# Patient Record
Sex: FEMALE | Race: WHITE | NOT HISPANIC OR LATINO | Employment: OTHER | URBAN - METROPOLITAN AREA
[De-identification: names, ages, dates, MRNs, and addresses within clinical notes are randomized per-mention and may not be internally consistent; named-entity substitution may affect disease eponyms.]

---

## 2019-04-13 ENCOUNTER — HOSPITAL ENCOUNTER (EMERGENCY)
Facility: HOSPITAL | Age: 74
Discharge: HOME | End: 2019-04-13
Attending: EMERGENCY MEDICINE
Payer: MEDICARE

## 2019-04-13 VITALS
HEIGHT: 65 IN | WEIGHT: 180 LBS | RESPIRATION RATE: 19 BRPM | DIASTOLIC BLOOD PRESSURE: 82 MMHG | SYSTOLIC BLOOD PRESSURE: 157 MMHG | BODY MASS INDEX: 29.99 KG/M2 | TEMPERATURE: 98 F | HEART RATE: 78 BPM | OXYGEN SATURATION: 98 %
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
## 2019-04-13 DIAGNOSIS — S20.211A CHEST WALL CONTUSION, RIGHT, INITIAL ENCOUNTER: Primary | ICD-10-CM

## 2019-04-13 DIAGNOSIS — T14.8XXA MUSCLE STRAIN: ICD-10-CM

## 2019-04-13 PROCEDURE — 99284 EMERGENCY DEPT VISIT MOD MDM: CPT | Mod: ER

## 2019-04-13 RX ORDER — DIAZEPAM 5 MG/1
5 TABLET ORAL EVERY 6 HOURS PRN
Qty: 10 TABLET | Refills: 0 | Status: SHIPPED | OUTPATIENT
Start: 2019-04-13 | End: 2019-06-04

## 2019-04-13 RX ORDER — NAPROXEN 500 MG/1
500 TABLET ORAL 2 TIMES DAILY WITH MEALS
Qty: 60 TABLET | Refills: 0 | Status: SHIPPED | OUTPATIENT
Start: 2019-04-13 | End: 2019-06-04

## 2019-04-13 NOTE — ED PROVIDER NOTES
"Encounter Date: 4/13/2019       History     Chief Complaint   Patient presents with    Fall     fall while getting in car yesterday, pain to back, and "everywhere"     Patient presents to ER with pain to right posterior ribs after she fell while trying to get into her car yesterday.  Patient states she fell because her foot got stuck between the grass in the pavement while she was getting an and fell back into the open door, injuring her right posterior ribs.  Patient states that hurts when she takes a deep breath. Patient denies any head injury or loss of consciousness.  Ambulated into ER.        Review of patient's allergies indicates:  No Known Allergies  Past Medical History:   Diagnosis Date    Cataract     Glaucoma     Heart valve problem      Past Surgical History:   Procedure Laterality Date    ANKLE SURGERY      lipoma removal       Family History   Problem Relation Age of Onset    Cancer Mother     Cataracts Sister     Amblyopia Neg Hx     Blindness Neg Hx     Diabetes Neg Hx     Glaucoma Neg Hx     Hypertension Neg Hx     Macular degeneration Neg Hx     Retinal detachment Neg Hx     Strabismus Neg Hx     Stroke Neg Hx     Thyroid disease Neg Hx     Breast cancer Neg Hx     Colon cancer Neg Hx     Ovarian cancer Neg Hx      Social History     Tobacco Use    Smoking status: Never Smoker   Substance Use Topics    Alcohol use: No    Drug use: No     Review of Systems   Musculoskeletal: Positive for back pain.        Right posterior rib pain that is worse with deep breath   All other systems reviewed and are negative.      Physical Exam     Initial Vitals [04/13/19 1042]   BP Pulse Resp Temp SpO2   (!) 157/82 78 19 97.6 °F (36.4 °C) 98 %      MAP       --         Physical Exam    Nursing note and vitals reviewed.  Constitutional: Vital signs are normal. She appears well-developed and well-nourished. She is not diaphoretic. She is cooperative.   HENT:   Head: Normocephalic and " atraumatic.   Right Ear: External ear normal.   Left Ear: External ear normal.   Nose: Nose normal.   Mouth/Throat: Oropharynx is clear and moist.   Eyes: Conjunctivae, EOM and lids are normal. Pupils are equal, round, and reactive to light. Right eye exhibits no discharge. No scleral icterus.   Neck: Trachea normal, normal range of motion and full passive range of motion without pain. Neck supple. No thyromegaly present. No tracheal deviation and normal range of motion present. No neck rigidity. No Brudzinski's sign noted. No JVD present.   Cardiovascular: Normal rate, regular rhythm, normal heart sounds, intact distal pulses and normal pulses. Exam reveals no gallop and no friction rub.    No murmur heard.  Pulmonary/Chest: Effort normal and breath sounds normal. No stridor. No tachypnea. No respiratory distress. She has no wheezes. She has no rhonchi. She has no rales. She exhibits no tenderness.   Abdominal: Soft. Normal appearance and bowel sounds are normal. She exhibits no distension and no mass. There is no tenderness. There is no rebound and no guarding.   Musculoskeletal: Normal range of motion. She exhibits tenderness. She exhibits no edema.   Right posterior rib pain that is worse with palpation and deep breathing   Lymphadenopathy:     She has no cervical adenopathy.     She has no axillary adenopathy.   Neurological: She is alert and oriented to person, place, and time. She has normal strength and normal reflexes.   Skin: Skin is warm, dry and intact. Capillary refill takes less than 2 seconds. No rash noted. No erythema.   Psychiatric: She has a normal mood and affect. Her speech is normal and behavior is normal. Judgment and thought content normal. Cognition and memory are normal.         ED Course   Procedures  Labs Reviewed - No data to display       Imaging Results    None          Medical Decision Making:   Differential Diagnosis:   Rib contusion, chest wall contusion, back pain, pneumothorax                       Clinical Impression:       ICD-10-CM ICD-9-CM   1. Chest wall contusion, right, initial encounter S20.211A 922.1   2. Fall against sharp object, subsequent encounter W01.119D V58.89     E888.0   3. Muscle strain T14.8XXA 848.9                                Ary Trujillo, [facility]  04/13/19 1214

## 2020-01-01 ENCOUNTER — ANESTHESIA (OUTPATIENT)
Dept: INTENSIVE CARE | Facility: HOSPITAL | Age: 75
DRG: 219 | End: 2020-01-01
Payer: MEDICARE

## 2020-01-01 ENCOUNTER — ANESTHESIA (OUTPATIENT)
Dept: SURGERY | Facility: HOSPITAL | Age: 75
DRG: 219 | End: 2020-01-01
Payer: MEDICARE

## 2020-01-01 ENCOUNTER — ANESTHESIA EVENT (OUTPATIENT)
Dept: CARDIOLOGY | Facility: HOSPITAL | Age: 75
DRG: 189 | End: 2020-01-01
Payer: MEDICARE

## 2020-01-01 ENCOUNTER — OFFICE VISIT (OUTPATIENT)
Dept: URGENT CARE | Facility: CLINIC | Age: 75
End: 2020-01-01
Payer: MEDICARE

## 2020-01-01 ENCOUNTER — ANESTHESIA EVENT (OUTPATIENT)
Dept: SURGERY | Facility: HOSPITAL | Age: 75
DRG: 219 | End: 2020-01-01
Payer: MEDICARE

## 2020-01-01 ENCOUNTER — HOSPITAL ENCOUNTER (INPATIENT)
Facility: HOSPITAL | Age: 75
LOS: 67 days | DRG: 219 | End: 2020-11-05
Attending: EMERGENCY MEDICINE | Admitting: HOSPITALIST
Payer: MEDICARE

## 2020-01-01 ENCOUNTER — TELEPHONE (OUTPATIENT)
Dept: OTOLARYNGOLOGY | Facility: CLINIC | Age: 75
End: 2020-01-01

## 2020-01-01 ENCOUNTER — RESEARCH ENCOUNTER (OUTPATIENT)
Dept: RESEARCH | Facility: HOSPITAL | Age: 75
End: 2020-01-01

## 2020-01-01 ENCOUNTER — HOSPITAL ENCOUNTER (INPATIENT)
Facility: HOSPITAL | Age: 75
LOS: 4 days | Discharge: HOME | DRG: 189 | End: 2020-08-27
Attending: EMERGENCY MEDICINE | Admitting: INTERNAL MEDICINE
Payer: MEDICARE

## 2020-01-01 ENCOUNTER — TELEPHONE (OUTPATIENT)
Dept: FAMILY MEDICINE | Facility: CLINIC | Age: 75
End: 2020-01-01

## 2020-01-01 ENCOUNTER — PATIENT OUTREACH (OUTPATIENT)
Dept: ADMINISTRATIVE | Facility: CLINIC | Age: 75
End: 2020-01-01

## 2020-01-01 ENCOUNTER — ANESTHESIA EVENT (OUTPATIENT)
Dept: INTENSIVE CARE | Facility: HOSPITAL | Age: 75
DRG: 219 | End: 2020-01-01
Payer: MEDICARE

## 2020-01-01 ENCOUNTER — NURSE TRIAGE (OUTPATIENT)
Dept: ADMINISTRATIVE | Facility: CLINIC | Age: 75
End: 2020-01-01

## 2020-01-01 ENCOUNTER — OFFICE VISIT (OUTPATIENT)
Dept: OPTOMETRY | Facility: CLINIC | Age: 75
End: 2020-01-01
Payer: MEDICARE

## 2020-01-01 ENCOUNTER — ANESTHESIA (OUTPATIENT)
Dept: CARDIOLOGY | Facility: HOSPITAL | Age: 75
DRG: 189 | End: 2020-01-01
Payer: MEDICARE

## 2020-01-01 VITALS
WEIGHT: 155.19 LBS | TEMPERATURE: 98 F | HEIGHT: 65 IN | BODY MASS INDEX: 25.85 KG/M2 | SYSTOLIC BLOOD PRESSURE: 103 MMHG | OXYGEN SATURATION: 52 % | DIASTOLIC BLOOD PRESSURE: 51 MMHG

## 2020-01-01 VITALS
DIASTOLIC BLOOD PRESSURE: 65 MMHG | SYSTOLIC BLOOD PRESSURE: 117 MMHG | WEIGHT: 177.94 LBS | HEART RATE: 75 BPM | HEIGHT: 65 IN | TEMPERATURE: 98 F | RESPIRATION RATE: 16 BRPM | OXYGEN SATURATION: 95 % | BODY MASS INDEX: 29.65 KG/M2

## 2020-01-01 VITALS
TEMPERATURE: 101 F | RESPIRATION RATE: 16 BRPM | HEIGHT: 65 IN | BODY MASS INDEX: 31.16 KG/M2 | WEIGHT: 187 LBS | OXYGEN SATURATION: 97 % | SYSTOLIC BLOOD PRESSURE: 129 MMHG | DIASTOLIC BLOOD PRESSURE: 84 MMHG | HEART RATE: 117 BPM

## 2020-01-01 DIAGNOSIS — J96.01 ACUTE RESPIRATORY FAILURE WITH HYPOXIA: ICD-10-CM

## 2020-01-01 DIAGNOSIS — R06.2 WHEEZING: ICD-10-CM

## 2020-01-01 DIAGNOSIS — I48.91 A-FIB: ICD-10-CM

## 2020-01-01 DIAGNOSIS — R09.02 HYPOXIA: ICD-10-CM

## 2020-01-01 DIAGNOSIS — Z98.890 S/P MVR (MITRAL VALVE REPAIR): Primary | ICD-10-CM

## 2020-01-01 DIAGNOSIS — K11.7 INCREASED OROPHARYNGEAL SECRETIONS: ICD-10-CM

## 2020-01-01 DIAGNOSIS — I50.9 CONGESTIVE HEART FAILURE, UNSPECIFIED HF CHRONICITY, UNSPECIFIED HEART FAILURE TYPE: ICD-10-CM

## 2020-01-01 DIAGNOSIS — Z03.818 ENCOUNTER FOR OBSERVATION FOR SUSPECTED EXPOSURE TO OTHER BIOLOGICAL AGENTS RULED OUT: ICD-10-CM

## 2020-01-01 DIAGNOSIS — N17.9 AKI (ACUTE KIDNEY INJURY): ICD-10-CM

## 2020-01-01 DIAGNOSIS — R52 PAIN: ICD-10-CM

## 2020-01-01 DIAGNOSIS — R07.9 CHEST PAIN: ICD-10-CM

## 2020-01-01 DIAGNOSIS — E87.0 HYPERNATREMIA: ICD-10-CM

## 2020-01-01 DIAGNOSIS — I95.9 HYPOTENSION: ICD-10-CM

## 2020-01-01 DIAGNOSIS — I31.4 PERICARDIAL EFFUSION WITH CARDIAC TAMPONADE: ICD-10-CM

## 2020-01-01 DIAGNOSIS — R00.0 TACHYCARDIA: ICD-10-CM

## 2020-01-01 DIAGNOSIS — Z71.89 ADVANCED CARE PLANNING/COUNSELING DISCUSSION: ICD-10-CM

## 2020-01-01 DIAGNOSIS — I48.91 ATRIAL FIBRILLATION WITH RVR: ICD-10-CM

## 2020-01-01 DIAGNOSIS — R00.1 BRADYCARDIA: ICD-10-CM

## 2020-01-01 DIAGNOSIS — H25.13 NUCLEAR SCLEROSIS OF BOTH EYES: Primary | ICD-10-CM

## 2020-01-01 DIAGNOSIS — R06.02 SOB (SHORTNESS OF BREATH): ICD-10-CM

## 2020-01-01 DIAGNOSIS — G93.41 ENCEPHALOPATHY, METABOLIC: ICD-10-CM

## 2020-01-01 DIAGNOSIS — R06.02 SHORTNESS OF BREATH: ICD-10-CM

## 2020-01-01 DIAGNOSIS — Z51.5 PALLIATIVE CARE ENCOUNTER: ICD-10-CM

## 2020-01-01 DIAGNOSIS — D72.829 LEUKOCYTOSIS, UNSPECIFIED TYPE: ICD-10-CM

## 2020-01-01 DIAGNOSIS — I51.3 LEFT ATRIAL THROMBUS: ICD-10-CM

## 2020-01-01 DIAGNOSIS — J38.01 VOCAL FOLD PARESIS, RIGHT: ICD-10-CM

## 2020-01-01 DIAGNOSIS — I34.0 MITRAL VALVE INSUFFICIENCY, UNSPECIFIED ETIOLOGY: ICD-10-CM

## 2020-01-01 DIAGNOSIS — J96.01 ACUTE RESPIRATORY FAILURE WITH HYPOXIA: Primary | ICD-10-CM

## 2020-01-01 DIAGNOSIS — I48.0 PAROXYSMAL ATRIAL FIBRILLATION: ICD-10-CM

## 2020-01-01 DIAGNOSIS — I50.9 CHF (CONGESTIVE HEART FAILURE): ICD-10-CM

## 2020-01-01 DIAGNOSIS — R50.9 FEVER, UNSPECIFIED FEVER CAUSE: ICD-10-CM

## 2020-01-01 DIAGNOSIS — Z79.899 ON COMBINATION ANTIPSYCHOTIC DRUG THERAPY: ICD-10-CM

## 2020-01-01 DIAGNOSIS — F43.23 ADJUSTMENT DISORDER WITH MIXED ANXIETY AND DEPRESSED MOOD: ICD-10-CM

## 2020-01-01 DIAGNOSIS — Z71.89 GOALS OF CARE, COUNSELING/DISCUSSION: ICD-10-CM

## 2020-01-01 DIAGNOSIS — I50.9 HEART FAILURE: ICD-10-CM

## 2020-01-01 DIAGNOSIS — R06.03 RESPIRATORY DISTRESS: ICD-10-CM

## 2020-01-01 DIAGNOSIS — Z79.899 LONG TERM CURRENT USE OF ANTIPSYCHOTIC MEDICATION: ICD-10-CM

## 2020-01-01 DIAGNOSIS — Z98.890 S/P TVR (TRICUSPID VALVE REPAIR): ICD-10-CM

## 2020-01-01 DIAGNOSIS — I34.0 SEVERE MITRAL REGURGITATION: ICD-10-CM

## 2020-01-01 DIAGNOSIS — J90 PLEURAL EFFUSION: ICD-10-CM

## 2020-01-01 DIAGNOSIS — R06.00 DYSPNEA, UNSPECIFIED TYPE: ICD-10-CM

## 2020-01-01 DIAGNOSIS — Z98.890 S/P PERICARDIAL WINDOW CREATION: ICD-10-CM

## 2020-01-01 DIAGNOSIS — J20.9 ACUTE PURULENT BRONCHITIS: Primary | ICD-10-CM

## 2020-01-01 DIAGNOSIS — F41.9 ANXIETY: ICD-10-CM

## 2020-01-01 DIAGNOSIS — R53.81 DEBILITY: ICD-10-CM

## 2020-01-01 DIAGNOSIS — H52.7 REFRACTIVE ERROR: ICD-10-CM

## 2020-01-01 DIAGNOSIS — I50.33 ACUTE ON CHRONIC DIASTOLIC (CONGESTIVE) HEART FAILURE: ICD-10-CM

## 2020-01-01 DIAGNOSIS — D72.829 LEUKOCYTOSIS: ICD-10-CM

## 2020-01-01 DIAGNOSIS — I31.39 PERICARDIAL EFFUSION WITH CARDIAC TAMPONADE: ICD-10-CM

## 2020-01-01 DIAGNOSIS — E87.6 HYPOKALEMIA: ICD-10-CM

## 2020-01-01 LAB
ABO + RH BLD: NORMAL
ALBUMIN SERPL BCP-MCNC: 1.2 G/DL (ref 3.5–5.2)
ALBUMIN SERPL BCP-MCNC: 1.3 G/DL (ref 3.5–5.2)
ALBUMIN SERPL BCP-MCNC: 1.4 G/DL (ref 3.5–5.2)
ALBUMIN SERPL BCP-MCNC: 1.5 G/DL (ref 3.5–5.2)
ALBUMIN SERPL BCP-MCNC: 1.6 G/DL (ref 3.5–5.2)
ALBUMIN SERPL BCP-MCNC: 1.7 G/DL (ref 3.5–5.2)
ALBUMIN SERPL BCP-MCNC: 1.8 G/DL (ref 3.5–5.2)
ALBUMIN SERPL BCP-MCNC: 1.9 G/DL (ref 3.5–5.2)
ALBUMIN SERPL BCP-MCNC: 2 G/DL (ref 3.5–5.2)
ALBUMIN SERPL BCP-MCNC: 2.1 G/DL (ref 3.5–5.2)
ALBUMIN SERPL BCP-MCNC: 2.1 G/DL (ref 3.5–5.2)
ALBUMIN SERPL BCP-MCNC: 2.2 G/DL (ref 3.5–5.2)
ALBUMIN SERPL BCP-MCNC: 2.2 G/DL (ref 3.5–5.2)
ALBUMIN SERPL BCP-MCNC: 2.3 G/DL (ref 3.5–5.2)
ALBUMIN SERPL BCP-MCNC: 2.4 G/DL (ref 3.5–5.2)
ALBUMIN SERPL BCP-MCNC: 2.5 G/DL (ref 3.5–5.2)
ALBUMIN SERPL BCP-MCNC: 2.6 G/DL (ref 3.5–5.2)
ALBUMIN SERPL BCP-MCNC: 2.7 G/DL (ref 3.5–5.2)
ALBUMIN SERPL BCP-MCNC: 2.8 G/DL (ref 3.5–5.2)
ALBUMIN SERPL BCP-MCNC: 2.9 G/DL (ref 3.5–5.2)
ALBUMIN SERPL BCP-MCNC: 3 G/DL (ref 3.5–5.2)
ALBUMIN SERPL BCP-MCNC: 3 G/DL (ref 3.5–5.2)
ALBUMIN SERPL BCP-MCNC: 3.1 G/DL (ref 3.5–5.2)
ALBUMIN SERPL BCP-MCNC: 3.2 G/DL (ref 3.5–5.2)
ALBUMIN SERPL BCP-MCNC: 3.3 G/DL (ref 3.5–5.2)
ALBUMIN SERPL BCP-MCNC: 3.3 G/DL (ref 3.5–5.2)
ALBUMIN SERPL BCP-MCNC: 3.5 G/DL (ref 3.5–5.2)
ALBUMIN SERPL BCP-MCNC: 3.6 G/DL (ref 3.5–5.2)
ALBUMIN SERPL BCP-MCNC: 3.7 G/DL (ref 3.5–5.2)
ALBUMIN SERPL BCP-MCNC: 3.8 G/DL (ref 3.5–5.2)
ALBUMIN SERPL BCP-MCNC: 4 G/DL (ref 3.5–5.2)
ALBUMIN SERPL-MCNC: 4.2 G/DL (ref 3.3–5.5)
ALBUMIN/CREAT UR: 1033.3 UG/MG (ref 0–30)
ALLENS TEST: ABNORMAL
ALLENS TEST: NORMAL
ALP SERPL-CCNC: 110 U/L (ref 55–135)
ALP SERPL-CCNC: 127 U/L (ref 55–135)
ALP SERPL-CCNC: 141 U/L (ref 55–135)
ALP SERPL-CCNC: 142 U/L (ref 55–135)
ALP SERPL-CCNC: 156 U/L (ref 55–135)
ALP SERPL-CCNC: 160 U/L (ref 55–135)
ALP SERPL-CCNC: 163 U/L (ref 55–135)
ALP SERPL-CCNC: 168 U/L (ref 55–135)
ALP SERPL-CCNC: 180 U/L (ref 55–135)
ALP SERPL-CCNC: 181 U/L (ref 55–135)
ALP SERPL-CCNC: 181 U/L (ref 55–135)
ALP SERPL-CCNC: 182 U/L (ref 55–135)
ALP SERPL-CCNC: 183 U/L (ref 55–135)
ALP SERPL-CCNC: 185 U/L (ref 55–135)
ALP SERPL-CCNC: 188 U/L (ref 55–135)
ALP SERPL-CCNC: 193 U/L (ref 55–135)
ALP SERPL-CCNC: 194 U/L (ref 55–135)
ALP SERPL-CCNC: 200 U/L (ref 55–135)
ALP SERPL-CCNC: 200 U/L (ref 55–135)
ALP SERPL-CCNC: 202 U/L (ref 55–135)
ALP SERPL-CCNC: 203 U/L (ref 55–135)
ALP SERPL-CCNC: 204 U/L (ref 55–135)
ALP SERPL-CCNC: 204 U/L (ref 55–135)
ALP SERPL-CCNC: 214 U/L (ref 55–135)
ALP SERPL-CCNC: 215 U/L (ref 55–135)
ALP SERPL-CCNC: 218 U/L (ref 55–135)
ALP SERPL-CCNC: 226 U/L (ref 55–135)
ALP SERPL-CCNC: 231 U/L (ref 55–135)
ALP SERPL-CCNC: 235 U/L (ref 55–135)
ALP SERPL-CCNC: 237 U/L (ref 55–135)
ALP SERPL-CCNC: 238 U/L (ref 55–135)
ALP SERPL-CCNC: 252 U/L (ref 55–135)
ALP SERPL-CCNC: 253 U/L (ref 55–135)
ALP SERPL-CCNC: 254 U/L (ref 55–135)
ALP SERPL-CCNC: 263 U/L (ref 55–135)
ALP SERPL-CCNC: 272 U/L (ref 55–135)
ALP SERPL-CCNC: 276 U/L (ref 55–135)
ALP SERPL-CCNC: 28 U/L (ref 55–135)
ALP SERPL-CCNC: 280 U/L (ref 55–135)
ALP SERPL-CCNC: 281 U/L (ref 55–135)
ALP SERPL-CCNC: 282 U/L (ref 55–135)
ALP SERPL-CCNC: 285 U/L (ref 55–135)
ALP SERPL-CCNC: 29 U/L (ref 55–135)
ALP SERPL-CCNC: 294 U/L (ref 55–135)
ALP SERPL-CCNC: 30 U/L (ref 55–135)
ALP SERPL-CCNC: 30 U/L (ref 55–135)
ALP SERPL-CCNC: 307 U/L (ref 55–135)
ALP SERPL-CCNC: 309 U/L (ref 55–135)
ALP SERPL-CCNC: 315 U/L (ref 55–135)
ALP SERPL-CCNC: 316 U/L (ref 55–135)
ALP SERPL-CCNC: 318 U/L (ref 55–135)
ALP SERPL-CCNC: 32 U/L (ref 55–135)
ALP SERPL-CCNC: 321 U/L (ref 55–135)
ALP SERPL-CCNC: 325 U/L (ref 55–135)
ALP SERPL-CCNC: 327 U/L (ref 55–135)
ALP SERPL-CCNC: 327 U/L (ref 55–135)
ALP SERPL-CCNC: 335 U/L (ref 55–135)
ALP SERPL-CCNC: 34 U/L (ref 55–135)
ALP SERPL-CCNC: 343 U/L (ref 55–135)
ALP SERPL-CCNC: 358 U/L (ref 55–135)
ALP SERPL-CCNC: 37 U/L (ref 55–135)
ALP SERPL-CCNC: 375 U/L (ref 55–135)
ALP SERPL-CCNC: 380 U/L (ref 55–135)
ALP SERPL-CCNC: 388 U/L (ref 55–135)
ALP SERPL-CCNC: 41 U/L (ref 55–135)
ALP SERPL-CCNC: 50 U/L (ref 55–135)
ALP SERPL-CCNC: 53 U/L (ref 55–135)
ALP SERPL-CCNC: 62 U/L (ref 55–135)
ALP SERPL-CCNC: 65 U/L (ref 55–135)
ALP SERPL-CCNC: 69 U/L (ref 42–141)
ALP SERPL-CCNC: 71 U/L (ref 55–135)
ALP SERPL-CCNC: 72 U/L (ref 55–135)
ALP SERPL-CCNC: 72 U/L (ref 55–135)
ALP SERPL-CCNC: 73 U/L (ref 55–135)
ALP SERPL-CCNC: 73 U/L (ref 55–135)
ALP SERPL-CCNC: 74 U/L (ref 55–135)
ALP SERPL-CCNC: 74 U/L (ref 55–135)
ALP SERPL-CCNC: 79 U/L (ref 55–135)
ALP SERPL-CCNC: 80 U/L (ref 55–135)
ALP SERPL-CCNC: 80 U/L (ref 55–135)
ALP SERPL-CCNC: 81 U/L (ref 55–135)
ALP SERPL-CCNC: 88 U/L (ref 55–135)
ALP SERPL-CCNC: 93 U/L (ref 55–135)
ALP SERPL-CCNC: 98 U/L (ref 55–135)
ALT SERPL W/O P-5'-P-CCNC: 101 U/L (ref 10–44)
ALT SERPL W/O P-5'-P-CCNC: 117 U/L (ref 10–44)
ALT SERPL W/O P-5'-P-CCNC: 121 U/L (ref 10–44)
ALT SERPL W/O P-5'-P-CCNC: 15 U/L (ref 10–44)
ALT SERPL W/O P-5'-P-CCNC: 16 U/L (ref 10–44)
ALT SERPL W/O P-5'-P-CCNC: 169 U/L (ref 10–44)
ALT SERPL W/O P-5'-P-CCNC: 17 U/L (ref 10–44)
ALT SERPL W/O P-5'-P-CCNC: 17 U/L (ref 10–44)
ALT SERPL W/O P-5'-P-CCNC: 181 U/L (ref 10–44)
ALT SERPL W/O P-5'-P-CCNC: 189 U/L (ref 10–44)
ALT SERPL W/O P-5'-P-CCNC: 19 U/L (ref 10–44)
ALT SERPL W/O P-5'-P-CCNC: 20 U/L (ref 10–44)
ALT SERPL W/O P-5'-P-CCNC: 203 U/L (ref 10–44)
ALT SERPL W/O P-5'-P-CCNC: 21 U/L (ref 10–44)
ALT SERPL W/O P-5'-P-CCNC: 21 U/L (ref 10–44)
ALT SERPL W/O P-5'-P-CCNC: 22 U/L (ref 10–44)
ALT SERPL W/O P-5'-P-CCNC: 24 U/L (ref 10–44)
ALT SERPL W/O P-5'-P-CCNC: 24 U/L (ref 10–44)
ALT SERPL W/O P-5'-P-CCNC: 27 U/L (ref 10–44)
ALT SERPL W/O P-5'-P-CCNC: 27 U/L (ref 10–44)
ALT SERPL W/O P-5'-P-CCNC: 29 U/L (ref 10–44)
ALT SERPL W/O P-5'-P-CCNC: 30 U/L (ref 10–44)
ALT SERPL W/O P-5'-P-CCNC: 32 U/L (ref 10–44)
ALT SERPL W/O P-5'-P-CCNC: 32 U/L (ref 10–44)
ALT SERPL W/O P-5'-P-CCNC: 35 U/L (ref 10–44)
ALT SERPL W/O P-5'-P-CCNC: 35 U/L (ref 10–44)
ALT SERPL W/O P-5'-P-CCNC: 36 U/L (ref 10–44)
ALT SERPL W/O P-5'-P-CCNC: 36 U/L (ref 10–44)
ALT SERPL W/O P-5'-P-CCNC: 37 U/L (ref 10–44)
ALT SERPL W/O P-5'-P-CCNC: 38 U/L (ref 10–44)
ALT SERPL W/O P-5'-P-CCNC: 39 U/L (ref 10–44)
ALT SERPL W/O P-5'-P-CCNC: 40 U/L (ref 10–44)
ALT SERPL W/O P-5'-P-CCNC: 41 U/L (ref 10–44)
ALT SERPL W/O P-5'-P-CCNC: 43 U/L (ref 10–44)
ALT SERPL W/O P-5'-P-CCNC: 44 U/L (ref 10–44)
ALT SERPL W/O P-5'-P-CCNC: 45 U/L (ref 10–44)
ALT SERPL W/O P-5'-P-CCNC: 46 U/L (ref 10–44)
ALT SERPL W/O P-5'-P-CCNC: 47 U/L (ref 10–44)
ALT SERPL W/O P-5'-P-CCNC: 48 U/L (ref 10–44)
ALT SERPL W/O P-5'-P-CCNC: 49 U/L (ref 10–44)
ALT SERPL W/O P-5'-P-CCNC: 58 U/L (ref 10–44)
ALT SERPL W/O P-5'-P-CCNC: 58 U/L (ref 10–44)
ALT SERPL W/O P-5'-P-CCNC: 61 U/L (ref 10–44)
ALT SERPL W/O P-5'-P-CCNC: 62 U/L (ref 10–44)
ALT SERPL W/O P-5'-P-CCNC: 63 U/L (ref 10–44)
ALT SERPL W/O P-5'-P-CCNC: 66 U/L (ref 10–44)
ALT SERPL W/O P-5'-P-CCNC: 66 U/L (ref 10–44)
ALT SERPL W/O P-5'-P-CCNC: 72 U/L (ref 10–44)
ALT SERPL W/O P-5'-P-CCNC: 72 U/L (ref 10–44)
ALT SERPL W/O P-5'-P-CCNC: 77 U/L (ref 10–44)
ALT SERPL W/O P-5'-P-CCNC: 79 U/L (ref 10–44)
ALT SERPL W/O P-5'-P-CCNC: 87 U/L (ref 10–44)
ALT SERPL W/O P-5'-P-CCNC: 88 U/L (ref 10–44)
ALT SERPL W/O P-5'-P-CCNC: 95 U/L (ref 10–44)
ANION GAP SERPL CALC-SCNC: 10 MMOL/L (ref 8–16)
ANION GAP SERPL CALC-SCNC: 11 MMOL/L (ref 8–16)
ANION GAP SERPL CALC-SCNC: 12 MMOL/L (ref 8–16)
ANION GAP SERPL CALC-SCNC: 13 MMOL/L (ref 8–16)
ANION GAP SERPL CALC-SCNC: 14 MMOL/L (ref 8–16)
ANION GAP SERPL CALC-SCNC: 15 MMOL/L (ref 8–16)
ANION GAP SERPL CALC-SCNC: 16 MMOL/L (ref 8–16)
ANION GAP SERPL CALC-SCNC: 17 MMOL/L (ref 8–16)
ANION GAP SERPL CALC-SCNC: 19 MMOL/L (ref 8–16)
ANION GAP SERPL CALC-SCNC: 19 MMOL/L (ref 8–16)
ANION GAP SERPL CALC-SCNC: 5 MMOL/L (ref 8–16)
ANION GAP SERPL CALC-SCNC: 6 MMOL/L (ref 8–16)
ANION GAP SERPL CALC-SCNC: 7 MMOL/L (ref 8–16)
ANION GAP SERPL CALC-SCNC: 8 MMOL/L (ref 8–16)
ANION GAP SERPL CALC-SCNC: 9 MMOL/L (ref 8–16)
ANISOCYTOSIS BLD QL SMEAR: SLIGHT
AORTIC ROOT ANNULUS: 2.5 CM
AORTIC VALVE CUSP SEPERATION: 1.87 CM
APTT BLDCRRT: 100.1 SEC (ref 21–32)
APTT BLDCRRT: 24.2 SEC (ref 21–32)
APTT BLDCRRT: 26 SEC (ref 21–32)
APTT BLDCRRT: 26.7 SEC (ref 21–32)
APTT BLDCRRT: 27 SEC (ref 21–32)
APTT BLDCRRT: 29.2 SEC (ref 21–32)
APTT BLDCRRT: 29.3 SEC (ref 21–32)
APTT BLDCRRT: 29.3 SEC (ref 21–32)
APTT BLDCRRT: 29.4 SEC (ref 21–32)
APTT BLDCRRT: 29.6 SEC (ref 21–32)
APTT BLDCRRT: 29.9 SEC (ref 21–32)
APTT BLDCRRT: 30.2 SEC (ref 21–32)
APTT BLDCRRT: 30.2 SEC (ref 21–32)
APTT BLDCRRT: 30.4 SEC (ref 21–32)
APTT BLDCRRT: 30.5 SEC (ref 21–32)
APTT BLDCRRT: 31 SEC (ref 21–32)
APTT BLDCRRT: 31.3 SEC (ref 21–32)
APTT BLDCRRT: 31.4 SEC (ref 21–32)
APTT BLDCRRT: 31.7 SEC (ref 21–32)
APTT BLDCRRT: 31.8 SEC (ref 21–32)
APTT BLDCRRT: 31.9 SEC (ref 21–32)
APTT BLDCRRT: 31.9 SEC (ref 21–32)
APTT BLDCRRT: 32.3 SEC (ref 21–32)
APTT BLDCRRT: 32.8 SEC (ref 21–32)
APTT BLDCRRT: 33 SEC (ref 21–32)
APTT BLDCRRT: 33.2 SEC (ref 21–32)
APTT BLDCRRT: 33.6 SEC (ref 21–32)
APTT BLDCRRT: 34.6 SEC (ref 21–32)
APTT BLDCRRT: 35.2 SEC (ref 21–32)
APTT BLDCRRT: 35.2 SEC (ref 21–32)
APTT BLDCRRT: 35.3 SEC (ref 21–32)
APTT BLDCRRT: 37.3 SEC (ref 21–32)
APTT BLDCRRT: 37.7 SEC (ref 21–32)
APTT BLDCRRT: 39.6 SEC (ref 21–32)
APTT BLDCRRT: 39.6 SEC (ref 21–32)
APTT BLDCRRT: 39.8 SEC (ref 21–32)
APTT BLDCRRT: 40 SEC (ref 21–32)
APTT BLDCRRT: 40.5 SEC (ref 21–32)
APTT BLDCRRT: 41 SEC (ref 21–32)
APTT BLDCRRT: 41.3 SEC (ref 21–32)
APTT BLDCRRT: 41.5 SEC (ref 21–32)
APTT BLDCRRT: 41.5 SEC (ref 21–32)
APTT BLDCRRT: 41.7 SEC (ref 21–32)
APTT BLDCRRT: 41.9 SEC (ref 21–32)
APTT BLDCRRT: 42.1 SEC (ref 21–32)
APTT BLDCRRT: 42.4 SEC (ref 21–32)
APTT BLDCRRT: 42.4 SEC (ref 21–32)
APTT BLDCRRT: 43.5 SEC (ref 21–32)
APTT BLDCRRT: 43.6 SEC (ref 21–32)
APTT BLDCRRT: 44.2 SEC (ref 21–32)
APTT BLDCRRT: 44.2 SEC (ref 21–32)
APTT BLDCRRT: 44.5 SEC (ref 21–32)
APTT BLDCRRT: 44.7 SEC (ref 21–32)
APTT BLDCRRT: 45.6 SEC (ref 21–32)
APTT BLDCRRT: 45.7 SEC (ref 21–32)
APTT BLDCRRT: 46.7 SEC (ref 21–32)
APTT BLDCRRT: 47.1 SEC (ref 21–32)
APTT BLDCRRT: 48.4 SEC (ref 21–32)
APTT BLDCRRT: 49.8 SEC (ref 21–32)
APTT BLDCRRT: 50.8 SEC (ref 21–32)
APTT BLDCRRT: 53.5 SEC (ref 21–32)
APTT BLDCRRT: 56.1 SEC (ref 21–32)
APTT BLDCRRT: 56.5 SEC (ref 21–32)
APTT BLDCRRT: 57.3 SEC (ref 21–32)
APTT BLDCRRT: 58.4 SEC (ref 21–32)
APTT BLDCRRT: 58.6 SEC (ref 21–32)
APTT BLDCRRT: 58.7 SEC (ref 21–32)
APTT BLDCRRT: 61.2 SEC (ref 21–32)
APTT BLDCRRT: 61.5 SEC (ref 21–32)
APTT BLDCRRT: 61.8 SEC (ref 21–32)
APTT BLDCRRT: 61.9 SEC (ref 21–32)
APTT BLDCRRT: 62 SEC (ref 21–32)
APTT BLDCRRT: 62.1 SEC (ref 21–32)
APTT BLDCRRT: 62.6 SEC (ref 21–32)
APTT BLDCRRT: 64.1 SEC (ref 21–32)
APTT BLDCRRT: 64.2 SEC (ref 21–32)
APTT BLDCRRT: 66.2 SEC (ref 21–32)
APTT BLDCRRT: 67.7 SEC (ref 21–32)
APTT BLDCRRT: 67.8 SEC (ref 21–32)
APTT BLDCRRT: 68.9 SEC (ref 21–32)
APTT BLDCRRT: 69 SEC (ref 21–32)
APTT BLDCRRT: 69.7 SEC (ref 21–32)
APTT BLDCRRT: 71.2 SEC (ref 21–32)
APTT BLDCRRT: 72.8 SEC (ref 21–32)
APTT BLDCRRT: 72.8 SEC (ref 21–32)
APTT BLDCRRT: 73.5 SEC (ref 21–32)
APTT BLDCRRT: 74.3 SEC (ref 21–32)
APTT BLDCRRT: 75.1 SEC (ref 21–32)
APTT BLDCRRT: 75.2 SEC (ref 21–32)
APTT BLDCRRT: 77 SEC (ref 21–32)
APTT BLDCRRT: 78.1 SEC (ref 21–32)
APTT BLDCRRT: 78.3 SEC (ref 21–32)
APTT BLDCRRT: 79.8 SEC (ref 21–32)
APTT BLDCRRT: 81 SEC (ref 21–32)
APTT BLDCRRT: 82.4 SEC (ref 21–32)
APTT BLDCRRT: 82.4 SEC (ref 21–32)
APTT BLDCRRT: 86.4 SEC (ref 21–32)
APTT BLDCRRT: 86.4 SEC (ref 21–32)
APTT BLDCRRT: 87.5 SEC (ref 21–32)
APTT BLDCRRT: 94.8 SEC (ref 21–32)
APTT BLDCRRT: 96.6 SEC (ref 21–32)
APTT BLDCRRT: >150 SEC (ref 21–32)
APTT BLDCRRT: NORMAL SEC (ref 21–32)
ASCENDING AORTA: 2.11 CM
ASCENDING AORTA: 3.15 CM
ASCENDING AORTA: 3.18 CM
ASCENDING AORTA: 3.4 CM
AST SERPL-CCNC: 101 U/L (ref 10–40)
AST SERPL-CCNC: 103 U/L (ref 10–40)
AST SERPL-CCNC: 106 U/L (ref 10–40)
AST SERPL-CCNC: 110 U/L (ref 10–40)
AST SERPL-CCNC: 116 U/L (ref 10–40)
AST SERPL-CCNC: 118 U/L (ref 10–40)
AST SERPL-CCNC: 121 U/L (ref 10–40)
AST SERPL-CCNC: 122 U/L (ref 10–40)
AST SERPL-CCNC: 126 U/L (ref 10–40)
AST SERPL-CCNC: 128 U/L (ref 10–40)
AST SERPL-CCNC: 13 U/L (ref 10–40)
AST SERPL-CCNC: 136 U/L (ref 10–40)
AST SERPL-CCNC: 145 U/L (ref 10–40)
AST SERPL-CCNC: 15 U/L (ref 10–40)
AST SERPL-CCNC: 155 U/L (ref 10–40)
AST SERPL-CCNC: 16 U/L (ref 10–40)
AST SERPL-CCNC: 16 U/L (ref 10–40)
AST SERPL-CCNC: 18 U/L (ref 10–40)
AST SERPL-CCNC: 25 U/L (ref 10–40)
AST SERPL-CCNC: 27 U/L (ref 10–40)
AST SERPL-CCNC: 28 U/L (ref 10–40)
AST SERPL-CCNC: 28 U/L (ref 10–40)
AST SERPL-CCNC: 30 U/L (ref 10–40)
AST SERPL-CCNC: 31 U/L (ref 10–40)
AST SERPL-CCNC: 32 U/L (ref 10–40)
AST SERPL-CCNC: 32 U/L (ref 10–40)
AST SERPL-CCNC: 33 U/L (ref 10–40)
AST SERPL-CCNC: 33 U/L (ref 10–40)
AST SERPL-CCNC: 34 U/L (ref 10–40)
AST SERPL-CCNC: 34 U/L (ref 10–40)
AST SERPL-CCNC: 340 U/L (ref 10–40)
AST SERPL-CCNC: 35 U/L (ref 10–40)
AST SERPL-CCNC: 36 U/L (ref 10–40)
AST SERPL-CCNC: 37 U/L (ref 10–40)
AST SERPL-CCNC: 39 U/L (ref 10–40)
AST SERPL-CCNC: 39 U/L (ref 10–40)
AST SERPL-CCNC: 41 U/L (ref 10–40)
AST SERPL-CCNC: 41 U/L (ref 10–40)
AST SERPL-CCNC: 42 U/L (ref 10–40)
AST SERPL-CCNC: 42 U/L (ref 10–40)
AST SERPL-CCNC: 428 U/L (ref 10–40)
AST SERPL-CCNC: 43 U/L (ref 10–40)
AST SERPL-CCNC: 44 U/L (ref 10–40)
AST SERPL-CCNC: 448 U/L (ref 10–40)
AST SERPL-CCNC: 45 U/L (ref 10–40)
AST SERPL-CCNC: 450 U/L (ref 10–40)
AST SERPL-CCNC: 46 U/L (ref 10–40)
AST SERPL-CCNC: 46 U/L (ref 10–40)
AST SERPL-CCNC: 47 U/L (ref 10–40)
AST SERPL-CCNC: 48 U/L (ref 10–40)
AST SERPL-CCNC: 48 U/L (ref 10–40)
AST SERPL-CCNC: 49 U/L (ref 10–40)
AST SERPL-CCNC: 49 U/L (ref 10–40)
AST SERPL-CCNC: 51 U/L (ref 10–40)
AST SERPL-CCNC: 52 U/L (ref 10–40)
AST SERPL-CCNC: 52 U/L (ref 10–40)
AST SERPL-CCNC: 53 U/L (ref 10–40)
AST SERPL-CCNC: 54 U/L (ref 10–40)
AST SERPL-CCNC: 55 U/L (ref 10–40)
AST SERPL-CCNC: 58 U/L (ref 10–40)
AST SERPL-CCNC: 60 U/L (ref 10–40)
AST SERPL-CCNC: 62 U/L (ref 10–40)
AST SERPL-CCNC: 67 U/L (ref 10–40)
AST SERPL-CCNC: 70 U/L (ref 10–40)
AST SERPL-CCNC: 73 U/L (ref 10–40)
AST SERPL-CCNC: 74 U/L (ref 10–40)
AST SERPL-CCNC: 77 U/L (ref 10–40)
AST SERPL-CCNC: 79 U/L (ref 10–40)
AST SERPL-CCNC: 87 U/L (ref 10–40)
AST SERPL-CCNC: 92 U/L (ref 10–40)
AST SERPL-CCNC: 92 U/L (ref 10–40)
AST SERPL-CCNC: 96 U/L (ref 10–40)
AST SERPL-CCNC: 98 U/L (ref 10–40)
AV INDEX (PROSTH): 0.54
AV INDEX (PROSTH): 0.6
AV INDEX (PROSTH): 0.86
AV INDEX (PROSTH): 1.02
AV MEAN GRADIENT: 2 MMHG
AV MEAN GRADIENT: 3 MMHG
AV MEAN GRADIENT: 3 MMHG
AV MEAN GRADIENT: 5 MMHG
AV PEAK GRADIENT: 11 MMHG
AV PEAK GRADIENT: 4 MMHG
AV VALVE AREA: 1.69 CM2
AV VALVE AREA: 2.24 CM2
AV VALVE AREA: 2.45 CM2
AV VALVE AREA: 3.11 CM2
AV VELOCITY RATIO: 0.51
AV VELOCITY RATIO: 0.6
AV VELOCITY RATIO: 0.62
AV VELOCITY RATIO: 0.68
BACTERIA #/AREA URNS AUTO: ABNORMAL /HPF
BACTERIA #/AREA URNS HPF: ABNORMAL /HPF
BACTERIA #/AREA URNS HPF: NORMAL /HPF
BACTERIA BLD CULT: NORMAL
BACTERIA SPEC AEROBE CULT: ABNORMAL
BACTERIA SPEC AEROBE CULT: NORMAL
BACTERIA SPEC AEROBE CULT: NORMAL
BACTERIA SPEC ANAEROBE CULT: NORMAL
BACTERIA UR CULT: ABNORMAL
BACTERIA UR CULT: NORMAL
BASO STIPL BLD QL SMEAR: ABNORMAL
BASOPHILS # BLD AUTO: 0.01 K/UL (ref 0–0.2)
BASOPHILS # BLD AUTO: 0.02 K/UL (ref 0–0.2)
BASOPHILS # BLD AUTO: 0.03 K/UL (ref 0–0.2)
BASOPHILS # BLD AUTO: 0.04 K/UL (ref 0–0.2)
BASOPHILS # BLD AUTO: 0.05 K/UL (ref 0–0.2)
BASOPHILS # BLD AUTO: 0.06 K/UL (ref 0–0.2)
BASOPHILS # BLD AUTO: 0.06 K/UL (ref 0–0.2)
BASOPHILS # BLD AUTO: 0.07 K/UL (ref 0–0.2)
BASOPHILS # BLD AUTO: 0.07 K/UL (ref 0–0.2)
BASOPHILS # BLD AUTO: ABNORMAL K/UL (ref 0–0.2)
BASOPHILS NFR BLD: 0 % (ref 0–1.9)
BASOPHILS NFR BLD: 0.1 % (ref 0–1.9)
BASOPHILS NFR BLD: 0.2 % (ref 0–1.9)
BASOPHILS NFR BLD: 0.3 % (ref 0–1.9)
BASOPHILS NFR BLD: 0.4 % (ref 0–1.9)
BASOPHILS NFR BLD: 0.5 % (ref 0–1.9)
BASOPHILS NFR BLD: 0.6 % (ref 0–1.9)
BASOPHILS NFR BLD: 0.7 % (ref 0–1.9)
BILIRUB DIRECT SERPL-MCNC: 0.5 MG/DL (ref 0.1–0.3)
BILIRUB SERPL-MCNC: 0.3 MG/DL (ref 0.1–1)
BILIRUB SERPL-MCNC: 0.4 MG/DL (ref 0.1–1)
BILIRUB SERPL-MCNC: 0.5 MG/DL (ref 0.1–1)
BILIRUB SERPL-MCNC: 0.5 MG/DL (ref 0.1–1)
BILIRUB SERPL-MCNC: 0.6 MG/DL (ref 0.1–1)
BILIRUB SERPL-MCNC: 0.7 MG/DL (ref 0.1–1)
BILIRUB SERPL-MCNC: 0.8 MG/DL (ref 0.1–1)
BILIRUB SERPL-MCNC: 0.9 MG/DL (ref 0.1–1)
BILIRUB SERPL-MCNC: 0.9 MG/DL (ref 0.2–1.6)
BILIRUB SERPL-MCNC: 1 MG/DL (ref 0.1–1)
BILIRUB SERPL-MCNC: 1.1 MG/DL (ref 0.1–1)
BILIRUB SERPL-MCNC: 1.2 MG/DL (ref 0.1–1)
BILIRUB SERPL-MCNC: 1.3 MG/DL (ref 0.1–1)
BILIRUB SERPL-MCNC: 1.4 MG/DL (ref 0.1–1)
BILIRUB SERPL-MCNC: 1.5 MG/DL (ref 0.1–1)
BILIRUB SERPL-MCNC: 1.7 MG/DL (ref 0.1–1)
BILIRUB SERPL-MCNC: 1.7 MG/DL (ref 0.1–1)
BILIRUB SERPL-MCNC: 1.8 MG/DL (ref 0.1–1)
BILIRUB SERPL-MCNC: 1.9 MG/DL (ref 0.1–1)
BILIRUB SERPL-MCNC: 2 MG/DL (ref 0.1–1)
BILIRUB SERPL-MCNC: 2.1 MG/DL (ref 0.1–1)
BILIRUB SERPL-MCNC: 2.1 MG/DL (ref 0.1–1)
BILIRUB SERPL-MCNC: 2.2 MG/DL (ref 0.1–1)
BILIRUB SERPL-MCNC: 2.3 MG/DL (ref 0.1–1)
BILIRUB SERPL-MCNC: 2.5 MG/DL (ref 0.1–1)
BILIRUB SERPL-MCNC: 2.6 MG/DL (ref 0.1–1)
BILIRUB SERPL-MCNC: 2.9 MG/DL (ref 0.1–1)
BILIRUB SERPL-MCNC: 2.9 MG/DL (ref 0.1–1)
BILIRUB SERPL-MCNC: 3.2 MG/DL (ref 0.1–1)
BILIRUB SERPL-MCNC: 3.3 MG/DL (ref 0.1–1)
BILIRUB SERPL-MCNC: 3.5 MG/DL (ref 0.1–1)
BILIRUB SERPL-MCNC: 3.5 MG/DL (ref 0.1–1)
BILIRUB SERPL-MCNC: 3.6 MG/DL (ref 0.1–1)
BILIRUB SERPL-MCNC: 3.7 MG/DL (ref 0.1–1)
BILIRUB SERPL-MCNC: 3.7 MG/DL (ref 0.1–1)
BILIRUB SERPL-MCNC: 3.9 MG/DL (ref 0.1–1)
BILIRUB SERPL-MCNC: 3.9 MG/DL (ref 0.1–1)
BILIRUB SERPL-MCNC: 4 MG/DL (ref 0.1–1)
BILIRUB SERPL-MCNC: 4 MG/DL (ref 0.1–1)
BILIRUB SERPL-MCNC: 4.2 MG/DL (ref 0.1–1)
BILIRUB SERPL-MCNC: 4.3 MG/DL (ref 0.1–1)
BILIRUB SERPL-MCNC: 4.4 MG/DL (ref 0.1–1)
BILIRUB UR QL STRIP: NEGATIVE
BILIRUBIN, POC UA: NEGATIVE
BLD GP AB SCN CELLS X3 SERPL QL: NORMAL
BLD PROD TYP BPU: NORMAL
BLOOD UNIT EXPIRATION DATE: NORMAL
BLOOD UNIT TYPE CODE: 600
BLOOD UNIT TYPE CODE: 600
BLOOD UNIT TYPE CODE: 6200
BLOOD UNIT TYPE CODE: 9500
BLOOD UNIT TYPE: NORMAL
BLOOD, POC UA: ABNORMAL
BNP SERPL-MCNC: 250 PG/ML (ref 0–99)
BSA FOR ECHO PROCEDURE: 1.95 M2
BSA FOR ECHO PROCEDURE: 1.95 M2
BSA FOR ECHO PROCEDURE: 1.96 M2
BSA FOR ECHO PROCEDURE: 1.96 M2
BSA FOR ECHO PROCEDURE: 1.99 M2
BUN SERPL-MCNC: 103 MG/DL (ref 8–23)
BUN SERPL-MCNC: 112 MG/DL (ref 8–23)
BUN SERPL-MCNC: 113 MG/DL (ref 8–23)
BUN SERPL-MCNC: 116 MG/DL (ref 8–23)
BUN SERPL-MCNC: 128 MG/DL (ref 8–23)
BUN SERPL-MCNC: 132 MG/DL (ref 8–23)
BUN SERPL-MCNC: 141 MG/DL (ref 8–23)
BUN SERPL-MCNC: 149 MG/DL (ref 8–23)
BUN SERPL-MCNC: 149 MG/DL (ref 8–23)
BUN SERPL-MCNC: 2 MG/DL (ref 8–23)
BUN SERPL-MCNC: 20 MG/DL (ref 8–23)
BUN SERPL-MCNC: 21 MG/DL (ref 8–23)
BUN SERPL-MCNC: 21 MG/DL (ref 8–23)
BUN SERPL-MCNC: 22 MG/DL (ref 8–23)
BUN SERPL-MCNC: 22 MG/DL (ref 8–23)
BUN SERPL-MCNC: 23 MG/DL (ref 8–23)
BUN SERPL-MCNC: 23 MG/DL (ref 8–23)
BUN SERPL-MCNC: 24 MG/DL (ref 8–23)
BUN SERPL-MCNC: 25 MG/DL (ref 7–22)
BUN SERPL-MCNC: 25 MG/DL (ref 8–23)
BUN SERPL-MCNC: 25 MG/DL (ref 8–23)
BUN SERPL-MCNC: 26 MG/DL (ref 8–23)
BUN SERPL-MCNC: 27 MG/DL (ref 8–23)
BUN SERPL-MCNC: 28 MG/DL (ref 8–23)
BUN SERPL-MCNC: 29 MG/DL (ref 8–23)
BUN SERPL-MCNC: 3 MG/DL (ref 8–23)
BUN SERPL-MCNC: 30 MG/DL (ref 8–23)
BUN SERPL-MCNC: 30 MG/DL (ref 8–23)
BUN SERPL-MCNC: 31 MG/DL (ref 8–23)
BUN SERPL-MCNC: 32 MG/DL (ref 8–23)
BUN SERPL-MCNC: 33 MG/DL (ref 8–23)
BUN SERPL-MCNC: 34 MG/DL (ref 8–23)
BUN SERPL-MCNC: 35 MG/DL (ref 8–23)
BUN SERPL-MCNC: 35 MG/DL (ref 8–23)
BUN SERPL-MCNC: 36 MG/DL (ref 8–23)
BUN SERPL-MCNC: 36 MG/DL (ref 8–23)
BUN SERPL-MCNC: 37 MG/DL (ref 8–23)
BUN SERPL-MCNC: 38 MG/DL (ref 8–23)
BUN SERPL-MCNC: 39 MG/DL (ref 8–23)
BUN SERPL-MCNC: 4 MG/DL (ref 8–23)
BUN SERPL-MCNC: 40 MG/DL (ref 8–23)
BUN SERPL-MCNC: 41 MG/DL (ref 8–23)
BUN SERPL-MCNC: 41 MG/DL (ref 8–23)
BUN SERPL-MCNC: 42 MG/DL (ref 8–23)
BUN SERPL-MCNC: 42 MG/DL (ref 8–23)
BUN SERPL-MCNC: 43 MG/DL (ref 8–23)
BUN SERPL-MCNC: 44 MG/DL (ref 8–23)
BUN SERPL-MCNC: 45 MG/DL (ref 8–23)
BUN SERPL-MCNC: 47 MG/DL (ref 8–23)
BUN SERPL-MCNC: 48 MG/DL (ref 8–23)
BUN SERPL-MCNC: 49 MG/DL (ref 8–23)
BUN SERPL-MCNC: 5 MG/DL (ref 8–23)
BUN SERPL-MCNC: 50 MG/DL (ref 8–23)
BUN SERPL-MCNC: 51 MG/DL (ref 8–23)
BUN SERPL-MCNC: 51 MG/DL (ref 8–23)
BUN SERPL-MCNC: 52 MG/DL (ref 8–23)
BUN SERPL-MCNC: 53 MG/DL (ref 8–23)
BUN SERPL-MCNC: 54 MG/DL (ref 8–23)
BUN SERPL-MCNC: 54 MG/DL (ref 8–23)
BUN SERPL-MCNC: 55 MG/DL (ref 8–23)
BUN SERPL-MCNC: 56 MG/DL (ref 8–23)
BUN SERPL-MCNC: 56 MG/DL (ref 8–23)
BUN SERPL-MCNC: 57 MG/DL (ref 8–23)
BUN SERPL-MCNC: 57 MG/DL (ref 8–23)
BUN SERPL-MCNC: 58 MG/DL (ref 8–23)
BUN SERPL-MCNC: 61 MG/DL (ref 8–23)
BUN SERPL-MCNC: 62 MG/DL (ref 8–23)
BUN SERPL-MCNC: 63 MG/DL (ref 8–23)
BUN SERPL-MCNC: 64 MG/DL (ref 8–23)
BUN SERPL-MCNC: 65 MG/DL (ref 8–23)
BUN SERPL-MCNC: 66 MG/DL (ref 8–23)
BUN SERPL-MCNC: 66 MG/DL (ref 8–23)
BUN SERPL-MCNC: 68 MG/DL (ref 8–23)
BUN SERPL-MCNC: 71 MG/DL (ref 8–23)
BUN SERPL-MCNC: 73 MG/DL (ref 8–23)
BUN SERPL-MCNC: 78 MG/DL (ref 8–23)
BUN SERPL-MCNC: 90 MG/DL (ref 8–23)
BURR CELLS BLD QL SMEAR: ABNORMAL
CA-I BLDV-SCNC: 1.04 MMOL/L (ref 1.06–1.42)
CA-I BLDV-SCNC: 1.11 MMOL/L (ref 1.06–1.42)
CA-I BLDV-SCNC: 1.13 MMOL/L (ref 1.06–1.42)
CA-I BLDV-SCNC: 1.13 MMOL/L (ref 1.06–1.42)
CA-I BLDV-SCNC: 1.23 MMOL/L (ref 1.06–1.42)
CA-I BLDV-SCNC: 1.23 MMOL/L (ref 1.06–1.42)
CA-I BLDV-SCNC: 1.26 MMOL/L (ref 1.06–1.42)
CA-I BLDV-SCNC: 1.26 MMOL/L (ref 1.06–1.42)
CA-I BLDV-SCNC: 1.28 MMOL/L (ref 1.06–1.42)
CA-I BLDV-SCNC: 1.3 MMOL/L (ref 1.06–1.42)
CA-I BLDV-SCNC: 1.31 MMOL/L (ref 1.06–1.42)
CA-I BLDV-SCNC: 1.32 MMOL/L (ref 1.06–1.42)
CALCIUM SERPL-MCNC: 10 MG/DL (ref 8.7–10.5)
CALCIUM SERPL-MCNC: 10.2 MG/DL (ref 8–10.3)
CALCIUM SERPL-MCNC: 10.3 MG/DL (ref 8.7–10.5)
CALCIUM SERPL-MCNC: 11.1 MG/DL (ref 8.7–10.5)
CALCIUM SERPL-MCNC: 7.3 MG/DL (ref 8.7–10.5)
CALCIUM SERPL-MCNC: 7.3 MG/DL (ref 8.7–10.5)
CALCIUM SERPL-MCNC: 7.4 MG/DL (ref 8.7–10.5)
CALCIUM SERPL-MCNC: 7.4 MG/DL (ref 8.7–10.5)
CALCIUM SERPL-MCNC: 7.5 MG/DL (ref 8.7–10.5)
CALCIUM SERPL-MCNC: 7.6 MG/DL (ref 8.7–10.5)
CALCIUM SERPL-MCNC: 7.7 MG/DL (ref 8.7–10.5)
CALCIUM SERPL-MCNC: 7.8 MG/DL (ref 8.7–10.5)
CALCIUM SERPL-MCNC: 7.9 MG/DL (ref 8.7–10.5)
CALCIUM SERPL-MCNC: 8 MG/DL (ref 8.7–10.5)
CALCIUM SERPL-MCNC: 8.1 MG/DL (ref 8.7–10.5)
CALCIUM SERPL-MCNC: 8.2 MG/DL (ref 8.7–10.5)
CALCIUM SERPL-MCNC: 8.3 MG/DL (ref 8.7–10.5)
CALCIUM SERPL-MCNC: 8.4 MG/DL (ref 8.7–10.5)
CALCIUM SERPL-MCNC: 8.5 MG/DL (ref 8.7–10.5)
CALCIUM SERPL-MCNC: 8.6 MG/DL (ref 8.7–10.5)
CALCIUM SERPL-MCNC: 8.6 MG/DL (ref 8.7–10.5)
CALCIUM SERPL-MCNC: 8.7 MG/DL (ref 8.7–10.5)
CALCIUM SERPL-MCNC: 8.8 MG/DL (ref 8.7–10.5)
CALCIUM SERPL-MCNC: 8.9 MG/DL (ref 8.7–10.5)
CALCIUM SERPL-MCNC: 9 MG/DL (ref 8.7–10.5)
CALCIUM SERPL-MCNC: 9.1 MG/DL (ref 8.7–10.5)
CALCIUM SERPL-MCNC: 9.2 MG/DL (ref 8.7–10.5)
CALCIUM SERPL-MCNC: 9.3 MG/DL (ref 8.7–10.5)
CALCIUM SERPL-MCNC: 9.4 MG/DL (ref 8.7–10.5)
CALCIUM SERPL-MCNC: 9.5 MG/DL (ref 8.7–10.5)
CALCIUM SERPL-MCNC: 9.7 MG/DL (ref 8.7–10.5)
CALCIUM SERPL-MCNC: 9.8 MG/DL (ref 8.7–10.5)
CALCIUM SERPL-MCNC: 9.9 MG/DL (ref 8.7–10.5)
CHLORIDE SERPL-SCNC: 100 MMOL/L (ref 95–110)
CHLORIDE SERPL-SCNC: 100 MMOL/L (ref 95–110)
CHLORIDE SERPL-SCNC: 101 MMOL/L (ref 95–110)
CHLORIDE SERPL-SCNC: 101 MMOL/L (ref 95–110)
CHLORIDE SERPL-SCNC: 102 MMOL/L (ref 95–110)
CHLORIDE SERPL-SCNC: 103 MMOL/L (ref 95–110)
CHLORIDE SERPL-SCNC: 104 MMOL/L (ref 95–110)
CHLORIDE SERPL-SCNC: 105 MMOL/L (ref 95–110)
CHLORIDE SERPL-SCNC: 106 MMOL/L (ref 95–110)
CHLORIDE SERPL-SCNC: 107 MMOL/L (ref 95–110)
CHLORIDE SERPL-SCNC: 107 MMOL/L (ref 98–108)
CHLORIDE SERPL-SCNC: 108 MMOL/L (ref 95–110)
CHLORIDE SERPL-SCNC: 109 MMOL/L (ref 95–110)
CHLORIDE SERPL-SCNC: 110 MMOL/L (ref 95–110)
CHLORIDE SERPL-SCNC: 110 MMOL/L (ref 95–110)
CHLORIDE SERPL-SCNC: 111 MMOL/L (ref 95–110)
CHLORIDE SERPL-SCNC: 112 MMOL/L (ref 95–110)
CHLORIDE SERPL-SCNC: 113 MMOL/L (ref 95–110)
CHLORIDE SERPL-SCNC: 114 MMOL/L (ref 95–110)
CHLORIDE SERPL-SCNC: 115 MMOL/L (ref 95–110)
CHLORIDE SERPL-SCNC: 116 MMOL/L (ref 95–110)
CHLORIDE SERPL-SCNC: 117 MMOL/L (ref 95–110)
CHLORIDE SERPL-SCNC: 118 MMOL/L (ref 95–110)
CHLORIDE SERPL-SCNC: 119 MMOL/L (ref 95–110)
CHLORIDE SERPL-SCNC: 121 MMOL/L (ref 95–110)
CHLORIDE SERPL-SCNC: 122 MMOL/L (ref 95–110)
CHLORIDE SERPL-SCNC: 123 MMOL/L (ref 95–110)
CHLORIDE SERPL-SCNC: 94 MMOL/L (ref 95–110)
CHLORIDE SERPL-SCNC: 95 MMOL/L (ref 95–110)
CHLORIDE SERPL-SCNC: 96 MMOL/L (ref 95–110)
CHLORIDE SERPL-SCNC: 97 MMOL/L (ref 95–110)
CHLORIDE SERPL-SCNC: 98 MMOL/L (ref 95–110)
CHLORIDE SERPL-SCNC: 99 MMOL/L (ref 95–110)
CLARITY UR REFRACT.AUTO: ABNORMAL
CLARITY UR: CLEAR
CLARITY UR: CLEAR
CLARITY, POC UA: CLEAR
CO2 SERPL-SCNC: 17 MMOL/L (ref 23–29)
CO2 SERPL-SCNC: 18 MMOL/L (ref 23–29)
CO2 SERPL-SCNC: 19 MMOL/L (ref 23–29)
CO2 SERPL-SCNC: 20 MMOL/L (ref 23–29)
CO2 SERPL-SCNC: 21 MMOL/L (ref 23–29)
CO2 SERPL-SCNC: 22 MMOL/L (ref 23–29)
CO2 SERPL-SCNC: 23 MMOL/L (ref 23–29)
CO2 SERPL-SCNC: 24 MMOL/L (ref 23–29)
CO2 SERPL-SCNC: 25 MMOL/L (ref 23–29)
CO2 SERPL-SCNC: 26 MMOL/L (ref 23–29)
CO2 SERPL-SCNC: 27 MMOL/L (ref 23–29)
CO2 SERPL-SCNC: 28 MMOL/L (ref 23–29)
CO2 SERPL-SCNC: 29 MMOL/L (ref 23–29)
CO2 SERPL-SCNC: 30 MMOL/L (ref 23–29)
CO2 SERPL-SCNC: 31 MMOL/L (ref 23–29)
CO2 SERPL-SCNC: 32 MMOL/L (ref 23–29)
CO2 SERPL-SCNC: 32 MMOL/L (ref 23–29)
CO2 SERPL-SCNC: 35 MMOL/L (ref 23–29)
CODING SYSTEM: NORMAL
COLOR UR AUTO: ABNORMAL
COLOR UR AUTO: ABNORMAL
COLOR UR AUTO: YELLOW
COLOR UR: COLORLESS
COLOR UR: YELLOW
COLOR, POC UA: YELLOW
CREAT SERPL-MCNC: 0.3 MG/DL (ref 0.5–1.4)
CREAT SERPL-MCNC: 0.4 MG/DL (ref 0.5–1.4)
CREAT SERPL-MCNC: 0.7 MG/DL (ref 0.5–1.4)
CREAT SERPL-MCNC: 0.8 MG/DL (ref 0.5–1.4)
CREAT SERPL-MCNC: 0.9 MG/DL (ref 0.5–1.4)
CREAT SERPL-MCNC: 1 MG/DL (ref 0.5–1.4)
CREAT SERPL-MCNC: 1 MG/DL (ref 0.6–1.2)
CREAT SERPL-MCNC: 1.1 MG/DL (ref 0.5–1.4)
CREAT SERPL-MCNC: 1.2 MG/DL (ref 0.5–1.4)
CREAT SERPL-MCNC: 1.3 MG/DL (ref 0.5–1.4)
CREAT SERPL-MCNC: 1.4 MG/DL (ref 0.5–1.4)
CREAT SERPL-MCNC: 1.5 MG/DL (ref 0.5–1.4)
CREAT SERPL-MCNC: 1.6 MG/DL (ref 0.5–1.4)
CREAT SERPL-MCNC: 1.7 MG/DL (ref 0.5–1.4)
CREAT SERPL-MCNC: 1.8 MG/DL (ref 0.5–1.4)
CREAT SERPL-MCNC: 1.9 MG/DL (ref 0.5–1.4)
CREAT SERPL-MCNC: 2 MG/DL (ref 0.5–1.4)
CREAT SERPL-MCNC: 2.1 MG/DL (ref 0.5–1.4)
CREAT SERPL-MCNC: 2.2 MG/DL (ref 0.5–1.4)
CREAT SERPL-MCNC: 2.3 MG/DL (ref 0.5–1.4)
CREAT SERPL-MCNC: 2.4 MG/DL (ref 0.5–1.4)
CREAT SERPL-MCNC: 2.4 MG/DL (ref 0.5–1.4)
CREAT SERPL-MCNC: 2.6 MG/DL (ref 0.5–1.4)
CREAT SERPL-MCNC: 2.6 MG/DL (ref 0.5–1.4)
CREAT SERPL-MCNC: 2.7 MG/DL (ref 0.5–1.4)
CREAT SERPL-MCNC: 2.8 MG/DL (ref 0.5–1.4)
CREAT SERPL-MCNC: 2.9 MG/DL (ref 0.5–1.4)
CREAT SERPL-MCNC: 2.9 MG/DL (ref 0.5–1.4)
CREAT SERPL-MCNC: 3.1 MG/DL (ref 0.5–1.4)
CREAT SERPL-MCNC: 3.4 MG/DL (ref 0.5–1.4)
CREAT SERPL-MCNC: 3.6 MG/DL (ref 0.5–1.4)
CREAT SERPL-MCNC: 3.7 MG/DL (ref 0.5–1.4)
CREAT SERPL-MCNC: 3.8 MG/DL (ref 0.5–1.4)
CREAT SERPL-MCNC: 4 MG/DL (ref 0.5–1.4)
CREAT SERPL-MCNC: 4.1 MG/DL (ref 0.5–1.4)
CREAT UR-MCNC: 27 MG/DL (ref 15–325)
CREAT UR-MCNC: 33 MG/DL (ref 15–325)
CTP QC/QA: YES
CTP QC/QA: YES
CV ECHO LV RWT: 0.37 CM
CV ECHO LV RWT: 0.4 CM
CV ECHO LV RWT: 0.47 CM
CV ECHO LV RWT: 0.47 CM
DACRYOCYTES BLD QL SMEAR: ABNORMAL
DELSYS: ABNORMAL
DELSYS: NORMAL
DIFFERENTIAL METHOD: ABNORMAL
DIFFERENTIAL METHOD: NORMAL
DIFFERENTIAL METHOD: NORMAL
DIGOXIN SERPL-MCNC: 0.5 NG/ML (ref 0.8–2)
DIGOXIN SERPL-MCNC: 0.5 NG/ML (ref 0.8–2)
DIGOXIN SERPL-MCNC: 0.6 NG/ML (ref 0.8–2)
DIGOXIN SERPL-MCNC: 0.7 NG/ML (ref 0.8–2)
DIGOXIN SERPL-MCNC: 0.7 NG/ML (ref 0.8–2)
DIGOXIN SERPL-MCNC: 0.9 NG/ML (ref 0.8–2)
DIGOXIN SERPL-MCNC: 0.9 NG/ML (ref 0.8–2)
DIGOXIN SERPL-MCNC: 1 NG/ML (ref 0.8–2)
DIGOXIN SERPL-MCNC: 1.1 NG/ML (ref 0.8–2)
DIGOXIN SERPL-MCNC: 1.3 NG/ML (ref 0.8–2)
DIGOXIN SERPL-MCNC: 1.3 NG/ML (ref 0.8–2)
DIGOXIN SERPL-MCNC: 1.4 NG/ML (ref 0.8–2)
DIGOXIN SERPL-MCNC: 1.5 NG/ML (ref 0.8–2)
DIGOXIN SERPL-MCNC: 1.6 NG/ML (ref 0.8–2)
DIGOXIN SERPL-MCNC: 1.9 NG/ML (ref 0.8–2)
DIGOXIN SERPL-MCNC: 2 NG/ML (ref 0.8–2)
DIGOXIN SERPL-MCNC: 2 NG/ML (ref 0.8–2)
DIGOXIN SERPL-MCNC: 2.1 NG/ML (ref 0.8–2)
DIGOXIN SERPL-MCNC: 2.2 NG/ML (ref 0.8–2)
DIGOXIN SERPL-MCNC: 2.4 NG/ML (ref 0.8–2)
DIGOXIN SERPL-MCNC: 2.5 NG/ML (ref 0.8–2)
DIGOXIN SERPL-MCNC: 2.5 NG/ML (ref 0.8–2)
DIGOXIN SERPL-MCNC: 2.7 NG/ML (ref 0.8–2)
DIGOXIN SERPL-MCNC: NORMAL NG/ML (ref 0.8–2)
DISPENSE STATUS: NORMAL
DOP CALC AO PEAK VEL: 0.99 M/S
DOP CALC AO PEAK VEL: 1.01 M/S
DOP CALC AO PEAK VEL: 1.02 M/S
DOP CALC AO PEAK VEL: 1.67 M/S
DOP CALC AO VTI: 10.25 CM
DOP CALC AO VTI: 12.97 CM
DOP CALC AO VTI: 13.09 CM
DOP CALC AO VTI: 24.75 CM
DOP CALC LVOT AREA: 2.8 CM2
DOP CALC LVOT AREA: 3 CM2
DOP CALC LVOT AREA: 3.1 CM2
DOP CALC LVOT AREA: 3.8 CM2
DOP CALC LVOT DIAMETER: 1.9 CM
DOP CALC LVOT DIAMETER: 1.97 CM
DOP CALC LVOT DIAMETER: 2 CM
DOP CALC LVOT DIAMETER: 2.19 CM
DOP CALC LVOT PEAK VEL: 0.52 M/S
DOP CALC LVOT PEAK VEL: 0.63 M/S
DOP CALC LVOT PEAK VEL: 0.67 M/S
DOP CALC LVOT PEAK VEL: 1 M/S
DOP CALC LVOT STROKE VOLUME: 21.95 CM3
DOP CALC LVOT STROKE VOLUME: 31.84 CM3
DOP CALC LVOT STROKE VOLUME: 32.02 CM3
DOP CALC LVOT STROKE VOLUME: 55.5 CM3
DOP CALCLVOT PEAK VEL VTI: 10.45 CM
DOP CALCLVOT PEAK VEL VTI: 11.3 CM
DOP CALCLVOT PEAK VEL VTI: 14.74 CM
DOP CALCLVOT PEAK VEL VTI: 6.99 CM
E WAVE DECELERATION TIME: 167.17 MSEC
E/A RATIO: 2.2
E/E' RATIO: 18 M/S
ECHO LV POSTERIOR WALL: 0.8 CM (ref 0.6–1.1)
ECHO LV POSTERIOR WALL: 0.94 CM (ref 0.6–1.1)
ECHO LV POSTERIOR WALL: 1.08 CM (ref 0.6–1.1)
ECHO LV POSTERIOR WALL: 1.1 CM (ref 0.6–1.1)
EOSINOPHIL # BLD AUTO: 0 K/UL (ref 0–0.5)
EOSINOPHIL # BLD AUTO: 0.1 K/UL (ref 0–0.5)
EOSINOPHIL # BLD AUTO: 0.2 K/UL (ref 0–0.5)
EOSINOPHIL # BLD AUTO: 0.3 K/UL (ref 0–0.5)
EOSINOPHIL # BLD AUTO: 0.4 K/UL (ref 0–0.5)
EOSINOPHIL # BLD AUTO: ABNORMAL K/UL (ref 0–0.5)
EOSINOPHIL NFR BLD: 0 % (ref 0–8)
EOSINOPHIL NFR BLD: 0.1 % (ref 0–8)
EOSINOPHIL NFR BLD: 0.2 % (ref 0–8)
EOSINOPHIL NFR BLD: 0.3 % (ref 0–8)
EOSINOPHIL NFR BLD: 0.4 % (ref 0–8)
EOSINOPHIL NFR BLD: 0.5 % (ref 0–8)
EOSINOPHIL NFR BLD: 0.5 % (ref 0–8)
EOSINOPHIL NFR BLD: 0.6 % (ref 0–8)
EOSINOPHIL NFR BLD: 0.6 % (ref 0–8)
EOSINOPHIL NFR BLD: 0.7 % (ref 0–8)
EOSINOPHIL NFR BLD: 0.8 % (ref 0–8)
EOSINOPHIL NFR BLD: 0.9 % (ref 0–8)
EOSINOPHIL NFR BLD: 0.9 % (ref 0–8)
EOSINOPHIL NFR BLD: 1 % (ref 0–8)
EOSINOPHIL NFR BLD: 1.1 % (ref 0–8)
EOSINOPHIL NFR BLD: 1.2 % (ref 0–8)
EOSINOPHIL NFR BLD: 1.3 % (ref 0–8)
EOSINOPHIL NFR BLD: 1.4 % (ref 0–8)
EOSINOPHIL NFR BLD: 1.7 % (ref 0–8)
EOSINOPHIL NFR BLD: 1.8 % (ref 0–8)
EOSINOPHIL NFR BLD: 1.8 % (ref 0–8)
EOSINOPHIL NFR BLD: 2 % (ref 0–8)
EOSINOPHIL NFR BLD: 2.2 % (ref 0–8)
EOSINOPHIL NFR BLD: 2.4 % (ref 0–8)
EOSINOPHIL NFR BLD: 2.5 % (ref 0–8)
EOSINOPHIL NFR BLD: 2.7 % (ref 0–8)
EOSINOPHIL NFR BLD: 3.2 % (ref 0–8)
EOSINOPHIL NFR BLD: 3.5 % (ref 0–8)
ERYTHROCYTE [DISTWIDTH] IN BLOOD BY AUTOMATED COUNT: 13.4 % (ref 11.5–14.5)
ERYTHROCYTE [DISTWIDTH] IN BLOOD BY AUTOMATED COUNT: 13.5 % (ref 11.5–14.5)
ERYTHROCYTE [DISTWIDTH] IN BLOOD BY AUTOMATED COUNT: 13.6 % (ref 11.5–14.5)
ERYTHROCYTE [DISTWIDTH] IN BLOOD BY AUTOMATED COUNT: 13.6 % (ref 11.5–14.5)
ERYTHROCYTE [DISTWIDTH] IN BLOOD BY AUTOMATED COUNT: 13.8 % (ref 11.5–14.5)
ERYTHROCYTE [DISTWIDTH] IN BLOOD BY AUTOMATED COUNT: 13.8 % (ref 11.5–14.5)
ERYTHROCYTE [DISTWIDTH] IN BLOOD BY AUTOMATED COUNT: 14.1 % (ref 11.5–14.5)
ERYTHROCYTE [DISTWIDTH] IN BLOOD BY AUTOMATED COUNT: 14.2 % (ref 11.5–14.5)
ERYTHROCYTE [DISTWIDTH] IN BLOOD BY AUTOMATED COUNT: 14.2 % (ref 11.5–14.5)
ERYTHROCYTE [DISTWIDTH] IN BLOOD BY AUTOMATED COUNT: 14.3 % (ref 11.5–14.5)
ERYTHROCYTE [DISTWIDTH] IN BLOOD BY AUTOMATED COUNT: 14.5 % (ref 11.5–14.5)
ERYTHROCYTE [DISTWIDTH] IN BLOOD BY AUTOMATED COUNT: 14.6 % (ref 11.5–14.5)
ERYTHROCYTE [DISTWIDTH] IN BLOOD BY AUTOMATED COUNT: 15 % (ref 11.5–14.5)
ERYTHROCYTE [DISTWIDTH] IN BLOOD BY AUTOMATED COUNT: 15.1 % (ref 11.5–14.5)
ERYTHROCYTE [DISTWIDTH] IN BLOOD BY AUTOMATED COUNT: 15.2 % (ref 11.5–14.5)
ERYTHROCYTE [DISTWIDTH] IN BLOOD BY AUTOMATED COUNT: 15.5 % (ref 11.5–14.5)
ERYTHROCYTE [DISTWIDTH] IN BLOOD BY AUTOMATED COUNT: 15.9 % (ref 11.5–14.5)
ERYTHROCYTE [DISTWIDTH] IN BLOOD BY AUTOMATED COUNT: 16.1 % (ref 11.5–14.5)
ERYTHROCYTE [DISTWIDTH] IN BLOOD BY AUTOMATED COUNT: 16.2 % (ref 11.5–14.5)
ERYTHROCYTE [DISTWIDTH] IN BLOOD BY AUTOMATED COUNT: 16.2 % (ref 11.5–14.5)
ERYTHROCYTE [DISTWIDTH] IN BLOOD BY AUTOMATED COUNT: 16.3 % (ref 11.5–14.5)
ERYTHROCYTE [DISTWIDTH] IN BLOOD BY AUTOMATED COUNT: 16.4 % (ref 11.5–14.5)
ERYTHROCYTE [DISTWIDTH] IN BLOOD BY AUTOMATED COUNT: 16.5 % (ref 11.5–14.5)
ERYTHROCYTE [DISTWIDTH] IN BLOOD BY AUTOMATED COUNT: 16.6 % (ref 11.5–14.5)
ERYTHROCYTE [DISTWIDTH] IN BLOOD BY AUTOMATED COUNT: 16.7 % (ref 11.5–14.5)
ERYTHROCYTE [DISTWIDTH] IN BLOOD BY AUTOMATED COUNT: 16.7 % (ref 11.5–14.5)
ERYTHROCYTE [DISTWIDTH] IN BLOOD BY AUTOMATED COUNT: 16.8 % (ref 11.5–14.5)
ERYTHROCYTE [DISTWIDTH] IN BLOOD BY AUTOMATED COUNT: 16.9 % (ref 11.5–14.5)
ERYTHROCYTE [DISTWIDTH] IN BLOOD BY AUTOMATED COUNT: 17 % (ref 11.5–14.5)
ERYTHROCYTE [DISTWIDTH] IN BLOOD BY AUTOMATED COUNT: 17.1 % (ref 11.5–14.5)
ERYTHROCYTE [DISTWIDTH] IN BLOOD BY AUTOMATED COUNT: 17.2 % (ref 11.5–14.5)
ERYTHROCYTE [DISTWIDTH] IN BLOOD BY AUTOMATED COUNT: 17.3 % (ref 11.5–14.5)
ERYTHROCYTE [DISTWIDTH] IN BLOOD BY AUTOMATED COUNT: 17.4 % (ref 11.5–14.5)
ERYTHROCYTE [DISTWIDTH] IN BLOOD BY AUTOMATED COUNT: 17.4 % (ref 11.5–14.5)
ERYTHROCYTE [DISTWIDTH] IN BLOOD BY AUTOMATED COUNT: 17.5 % (ref 11.5–14.5)
ERYTHROCYTE [DISTWIDTH] IN BLOOD BY AUTOMATED COUNT: 17.6 % (ref 11.5–14.5)
ERYTHROCYTE [DISTWIDTH] IN BLOOD BY AUTOMATED COUNT: 17.6 % (ref 11.5–14.5)
ERYTHROCYTE [DISTWIDTH] IN BLOOD BY AUTOMATED COUNT: 17.7 % (ref 11.5–14.5)
ERYTHROCYTE [DISTWIDTH] IN BLOOD BY AUTOMATED COUNT: 17.8 % (ref 11.5–14.5)
ERYTHROCYTE [DISTWIDTH] IN BLOOD BY AUTOMATED COUNT: 17.9 % (ref 11.5–14.5)
ERYTHROCYTE [DISTWIDTH] IN BLOOD BY AUTOMATED COUNT: 18 % (ref 11.5–14.5)
ERYTHROCYTE [DISTWIDTH] IN BLOOD BY AUTOMATED COUNT: 18 % (ref 11.5–14.5)
ERYTHROCYTE [DISTWIDTH] IN BLOOD BY AUTOMATED COUNT: 18.1 % (ref 11.5–14.5)
ERYTHROCYTE [DISTWIDTH] IN BLOOD BY AUTOMATED COUNT: 18.2 % (ref 11.5–14.5)
ERYTHROCYTE [DISTWIDTH] IN BLOOD BY AUTOMATED COUNT: 18.3 % (ref 11.5–14.5)
ERYTHROCYTE [DISTWIDTH] IN BLOOD BY AUTOMATED COUNT: 18.5 % (ref 11.5–14.5)
ERYTHROCYTE [DISTWIDTH] IN BLOOD BY AUTOMATED COUNT: 18.6 % (ref 11.5–14.5)
ERYTHROCYTE [DISTWIDTH] IN BLOOD BY AUTOMATED COUNT: 18.6 % (ref 11.5–14.5)
ERYTHROCYTE [DISTWIDTH] IN BLOOD BY AUTOMATED COUNT: 18.7 % (ref 11.5–14.5)
ERYTHROCYTE [DISTWIDTH] IN BLOOD BY AUTOMATED COUNT: 18.7 % (ref 11.5–14.5)
ERYTHROCYTE [DISTWIDTH] IN BLOOD BY AUTOMATED COUNT: 19 % (ref 11.5–14.5)
ERYTHROCYTE [DISTWIDTH] IN BLOOD BY AUTOMATED COUNT: 19 % (ref 11.5–14.5)
ERYTHROCYTE [DISTWIDTH] IN BLOOD BY AUTOMATED COUNT: 19.4 % (ref 11.5–14.5)
ERYTHROCYTE [DISTWIDTH] IN BLOOD BY AUTOMATED COUNT: 19.4 % (ref 11.5–14.5)
ERYTHROCYTE [DISTWIDTH] IN BLOOD BY AUTOMATED COUNT: 19.5 % (ref 11.5–14.5)
ERYTHROCYTE [DISTWIDTH] IN BLOOD BY AUTOMATED COUNT: 19.5 % (ref 11.5–14.5)
ERYTHROCYTE [SEDIMENTATION RATE] IN BLOOD BY WESTERGREN METHOD: 10 MM/H
ERYTHROCYTE [SEDIMENTATION RATE] IN BLOOD BY WESTERGREN METHOD: 12 MM/H
ERYTHROCYTE [SEDIMENTATION RATE] IN BLOOD BY WESTERGREN METHOD: 12 MM/H
ERYTHROCYTE [SEDIMENTATION RATE] IN BLOOD BY WESTERGREN METHOD: 14 MM/H
ERYTHROCYTE [SEDIMENTATION RATE] IN BLOOD BY WESTERGREN METHOD: 14 MM/H
ERYTHROCYTE [SEDIMENTATION RATE] IN BLOOD BY WESTERGREN METHOD: 16 MM/H
ERYTHROCYTE [SEDIMENTATION RATE] IN BLOOD BY WESTERGREN METHOD: 18 MM/H
ERYTHROCYTE [SEDIMENTATION RATE] IN BLOOD BY WESTERGREN METHOD: 20 MM/H
ERYTHROCYTE [SEDIMENTATION RATE] IN BLOOD BY WESTERGREN METHOD: 24 MM/H
ERYTHROCYTE [SEDIMENTATION RATE] IN BLOOD BY WESTERGREN METHOD: 28 MM/H
EST. GFR  (AFRICAN AMERICAN): 11.6 ML/MIN/1.73 M^2
EST. GFR  (AFRICAN AMERICAN): 11.9 ML/MIN/1.73 M^2
EST. GFR  (AFRICAN AMERICAN): 12.7 ML/MIN/1.73 M^2
EST. GFR  (AFRICAN AMERICAN): 13.1 ML/MIN/1.73 M^2
EST. GFR  (AFRICAN AMERICAN): 13.5 ML/MIN/1.73 M^2
EST. GFR  (AFRICAN AMERICAN): 14.5 ML/MIN/1.73 M^2
EST. GFR  (AFRICAN AMERICAN): 16.2 ML/MIN/1.73 M^2
EST. GFR  (AFRICAN AMERICAN): 17.6 ML/MIN/1.73 M^2
EST. GFR  (AFRICAN AMERICAN): 17.6 ML/MIN/1.73 M^2
EST. GFR  (AFRICAN AMERICAN): 18.3 ML/MIN/1.73 M^2
EST. GFR  (AFRICAN AMERICAN): 19.2 ML/MIN/1.73 M^2
EST. GFR  (AFRICAN AMERICAN): 20.1 ML/MIN/1.73 M^2
EST. GFR  (AFRICAN AMERICAN): 20.1 ML/MIN/1.73 M^2
EST. GFR  (AFRICAN AMERICAN): 22.1 ML/MIN/1.73 M^2
EST. GFR  (AFRICAN AMERICAN): 22.1 ML/MIN/1.73 M^2
EST. GFR  (AFRICAN AMERICAN): 23.3 ML/MIN/1.73 M^2
EST. GFR  (AFRICAN AMERICAN): 24.5 ML/MIN/1.73 M^2
EST. GFR  (AFRICAN AMERICAN): 26 ML/MIN/1.73 M^2
EST. GFR  (AFRICAN AMERICAN): 27.5 ML/MIN/1.73 M^2
EST. GFR  (AFRICAN AMERICAN): 29.3 ML/MIN/1.73 M^2
EST. GFR  (AFRICAN AMERICAN): 31.3 ML/MIN/1.73 M^2
EST. GFR  (AFRICAN AMERICAN): 33.5 ML/MIN/1.73 M^2
EST. GFR  (AFRICAN AMERICAN): 36.1 ML/MIN/1.73 M^2
EST. GFR  (AFRICAN AMERICAN): 39 ML/MIN/1.73 M^2
EST. GFR  (AFRICAN AMERICAN): 42 ML/MIN/1.73 M^2
EST. GFR  (AFRICAN AMERICAN): 42.4 ML/MIN/1.73 M^2
EST. GFR  (AFRICAN AMERICAN): 46 ML/MIN/1.73 M^2
EST. GFR  (AFRICAN AMERICAN): 46 ML/MIN/1.73 M^2
EST. GFR  (AFRICAN AMERICAN): 46.4 ML/MIN/1.73 M^2
EST. GFR  (AFRICAN AMERICAN): 51 ML/MIN/1.73 M^2
EST. GFR  (AFRICAN AMERICAN): 51 ML/MIN/1.73 M^2
EST. GFR  (AFRICAN AMERICAN): 51.1 ML/MIN/1.73 M^2
EST. GFR  (AFRICAN AMERICAN): 56.8 ML/MIN/1.73 M^2
EST. GFR  (AFRICAN AMERICAN): 56.8 ML/MIN/1.73 M^2
EST. GFR  (AFRICAN AMERICAN): 57 ML/MIN/1.73 M^2
EST. GFR  (AFRICAN AMERICAN): >60 ML/MIN/1.73 M^2
EST. GFR  (NON AFRICAN AMERICAN): 10 ML/MIN/1.73 M^2
EST. GFR  (NON AFRICAN AMERICAN): 10.3 ML/MIN/1.73 M^2
EST. GFR  (NON AFRICAN AMERICAN): 11 ML/MIN/1.73 M^2
EST. GFR  (NON AFRICAN AMERICAN): 11.4 ML/MIN/1.73 M^2
EST. GFR  (NON AFRICAN AMERICAN): 11.7 ML/MIN/1.73 M^2
EST. GFR  (NON AFRICAN AMERICAN): 12.6 ML/MIN/1.73 M^2
EST. GFR  (NON AFRICAN AMERICAN): 14.1 ML/MIN/1.73 M^2
EST. GFR  (NON AFRICAN AMERICAN): 15.2 ML/MIN/1.73 M^2
EST. GFR  (NON AFRICAN AMERICAN): 15.2 ML/MIN/1.73 M^2
EST. GFR  (NON AFRICAN AMERICAN): 15.9 ML/MIN/1.73 M^2
EST. GFR  (NON AFRICAN AMERICAN): 16.6 ML/MIN/1.73 M^2
EST. GFR  (NON AFRICAN AMERICAN): 17.4 ML/MIN/1.73 M^2
EST. GFR  (NON AFRICAN AMERICAN): 17.4 ML/MIN/1.73 M^2
EST. GFR  (NON AFRICAN AMERICAN): 19.2 ML/MIN/1.73 M^2
EST. GFR  (NON AFRICAN AMERICAN): 19.2 ML/MIN/1.73 M^2
EST. GFR  (NON AFRICAN AMERICAN): 20.2 ML/MIN/1.73 M^2
EST. GFR  (NON AFRICAN AMERICAN): 21.3 ML/MIN/1.73 M^2
EST. GFR  (NON AFRICAN AMERICAN): 22.5 ML/MIN/1.73 M^2
EST. GFR  (NON AFRICAN AMERICAN): 23.9 ML/MIN/1.73 M^2
EST. GFR  (NON AFRICAN AMERICAN): 25.4 ML/MIN/1.73 M^2
EST. GFR  (NON AFRICAN AMERICAN): 27.1 ML/MIN/1.73 M^2
EST. GFR  (NON AFRICAN AMERICAN): 29.1 ML/MIN/1.73 M^2
EST. GFR  (NON AFRICAN AMERICAN): 31.3 ML/MIN/1.73 M^2
EST. GFR  (NON AFRICAN AMERICAN): 33.8 ML/MIN/1.73 M^2
EST. GFR  (NON AFRICAN AMERICAN): 36.8 ML/MIN/1.73 M^2
EST. GFR  (NON AFRICAN AMERICAN): 37 ML/MIN/1.73 M^2
EST. GFR  (NON AFRICAN AMERICAN): 40 ML/MIN/1.73 M^2
EST. GFR  (NON AFRICAN AMERICAN): 40 ML/MIN/1.73 M^2
EST. GFR  (NON AFRICAN AMERICAN): 40.2 ML/MIN/1.73 M^2
EST. GFR  (NON AFRICAN AMERICAN): 44 ML/MIN/1.73 M^2
EST. GFR  (NON AFRICAN AMERICAN): 44 ML/MIN/1.73 M^2
EST. GFR  (NON AFRICAN AMERICAN): 44.3 ML/MIN/1.73 M^2
EST. GFR  (NON AFRICAN AMERICAN): 49 ML/MIN/1.73 M^2
EST. GFR  (NON AFRICAN AMERICAN): 49.2 ML/MIN/1.73 M^2
EST. GFR  (NON AFRICAN AMERICAN): 49.2 ML/MIN/1.73 M^2
EST. GFR  (NON AFRICAN AMERICAN): 55 ML/MIN/1.73 M^2
EST. GFR  (NON AFRICAN AMERICAN): 55.2 ML/MIN/1.73 M^2
EST. GFR  (NON AFRICAN AMERICAN): >60 ML/MIN/1.73 M^2
FIBRINOGEN PPP-MCNC: 123 MG/DL (ref 182–366)
FIBRINOGEN PPP-MCNC: 134 MG/DL (ref 182–366)
FIBRINOGEN PPP-MCNC: 157 MG/DL (ref 182–366)
FIBRINOGEN PPP-MCNC: 222 MG/DL (ref 182–366)
FIBRINOGEN PPP-MCNC: 229 MG/DL (ref 182–366)
FIBRINOGEN PPP-MCNC: 238 MG/DL (ref 182–366)
FIBRINOGEN PPP-MCNC: 238 MG/DL (ref 182–366)
FIBRINOGEN PPP-MCNC: 261 MG/DL (ref 182–366)
FIBRINOGEN PPP-MCNC: 288 MG/DL (ref 182–366)
FIBRINOGEN PPP-MCNC: 315 MG/DL (ref 182–366)
FIBRINOGEN PPP-MCNC: 354 MG/DL (ref 182–366)
FIBRINOGEN PPP-MCNC: 362 MG/DL (ref 182–366)
FIBRINOGEN PPP-MCNC: 387 MG/DL (ref 182–366)
FIBRINOGEN PPP-MCNC: 395 MG/DL (ref 182–366)
FIBRINOGEN PPP-MCNC: 404 MG/DL (ref 182–366)
FIBRINOGEN PPP-MCNC: 414 MG/DL (ref 182–366)
FIBRINOGEN PPP-MCNC: 422 MG/DL (ref 182–366)
FIBRINOGEN PPP-MCNC: 424 MG/DL (ref 182–366)
FIBRINOGEN PPP-MCNC: 424 MG/DL (ref 182–366)
FIBRINOGEN PPP-MCNC: 433 MG/DL (ref 182–366)
FIBRINOGEN PPP-MCNC: 451 MG/DL (ref 182–366)
FIBRINOGEN PPP-MCNC: 451 MG/DL (ref 182–366)
FIBRINOGEN PPP-MCNC: 470 MG/DL (ref 182–366)
FIBRINOGEN PPP-MCNC: 477 MG/DL (ref 182–366)
FIBRINOGEN PPP-MCNC: 506 MG/DL (ref 182–366)
FIBRINOGEN PPP-MCNC: 548 MG/DL (ref 182–366)
FIBRINOGEN PPP-MCNC: 557 MG/DL (ref 182–366)
FIBRINOGEN PPP-MCNC: 577 MG/DL (ref 182–366)
FIBRINOGEN PPP-MCNC: 587 MG/DL (ref 182–366)
FIBRINOGEN PPP-MCNC: 643 MG/DL (ref 182–366)
FIBRINOGEN PPP-MCNC: 655 MG/DL (ref 182–366)
FIBRINOGEN PPP-MCNC: 791 MG/DL (ref 182–366)
FIBRINOGEN PPP-MCNC: 791 MG/DL (ref 182–366)
FIBRINOGEN PPP-MCNC: >800 MG/DL (ref 182–366)
FIBRINOGEN PPP-MCNC: >800 MG/DL (ref 182–366)
FINAL PATHOLOGIC DIAGNOSIS: NORMAL
FINAL PATHOLOGIC DIAGNOSIS: NORMAL
FIO2: 100
FIO2: 30
FIO2: 40
FIO2: 45
FIO2: 50
FIO2: 55
FIO2: 60
FIO2: 65
FIO2: 65
FIO2: 70
FIO2: 80
FIO2: 80
FLOW: 25
FLOW: 3
FLOW: 5
FLUAV AG NPH QL: NEGATIVE
FLUBV AG NPH QL: NEGATIVE
FOLATE SERPL-MCNC: 9.2 NG/ML (ref 4–24)
FRACTIONAL SHORTENING: 15 % (ref 28–44)
FRACTIONAL SHORTENING: 15 % (ref 28–44)
FRACTIONAL SHORTENING: 27 % (ref 28–44)
FRACTIONAL SHORTENING: 30 % (ref 28–44)
GIANT PLATELETS BLD QL SMEAR: PRESENT
GIANT PLATELETS BLD QL SMEAR: PRESENT
GLUCOSE SERPL-MCNC: 100 MG/DL (ref 70–110)
GLUCOSE SERPL-MCNC: 100 MG/DL (ref 70–110)
GLUCOSE SERPL-MCNC: 101 MG/DL (ref 70–110)
GLUCOSE SERPL-MCNC: 102 MG/DL (ref 70–110)
GLUCOSE SERPL-MCNC: 102 MG/DL (ref 70–110)
GLUCOSE SERPL-MCNC: 103 MG/DL (ref 70–110)
GLUCOSE SERPL-MCNC: 104 MG/DL (ref 70–110)
GLUCOSE SERPL-MCNC: 105 MG/DL (ref 70–110)
GLUCOSE SERPL-MCNC: 105 MG/DL (ref 70–110)
GLUCOSE SERPL-MCNC: 106 MG/DL (ref 70–110)
GLUCOSE SERPL-MCNC: 107 MG/DL (ref 70–110)
GLUCOSE SERPL-MCNC: 108 MG/DL (ref 70–110)
GLUCOSE SERPL-MCNC: 109 MG/DL (ref 70–110)
GLUCOSE SERPL-MCNC: 110 MG/DL (ref 70–110)
GLUCOSE SERPL-MCNC: 111 MG/DL (ref 70–110)
GLUCOSE SERPL-MCNC: 112 MG/DL (ref 70–110)
GLUCOSE SERPL-MCNC: 113 MG/DL (ref 70–110)
GLUCOSE SERPL-MCNC: 114 MG/DL (ref 70–110)
GLUCOSE SERPL-MCNC: 115 MG/DL (ref 70–110)
GLUCOSE SERPL-MCNC: 116 MG/DL (ref 70–110)
GLUCOSE SERPL-MCNC: 117 MG/DL (ref 70–110)
GLUCOSE SERPL-MCNC: 117 MG/DL (ref 70–110)
GLUCOSE SERPL-MCNC: 119 MG/DL (ref 70–110)
GLUCOSE SERPL-MCNC: 119 MG/DL (ref 70–110)
GLUCOSE SERPL-MCNC: 120 MG/DL (ref 70–110)
GLUCOSE SERPL-MCNC: 120 MG/DL (ref 70–110)
GLUCOSE SERPL-MCNC: 121 MG/DL (ref 70–110)
GLUCOSE SERPL-MCNC: 121 MG/DL (ref 70–110)
GLUCOSE SERPL-MCNC: 122 MG/DL (ref 70–110)
GLUCOSE SERPL-MCNC: 124 MG/DL (ref 70–110)
GLUCOSE SERPL-MCNC: 125 MG/DL (ref 70–110)
GLUCOSE SERPL-MCNC: 126 MG/DL (ref 70–110)
GLUCOSE SERPL-MCNC: 127 MG/DL (ref 70–110)
GLUCOSE SERPL-MCNC: 128 MG/DL (ref 70–110)
GLUCOSE SERPL-MCNC: 128 MG/DL (ref 70–110)
GLUCOSE SERPL-MCNC: 129 MG/DL (ref 70–110)
GLUCOSE SERPL-MCNC: 130 MG/DL (ref 70–110)
GLUCOSE SERPL-MCNC: 130 MG/DL (ref 70–110)
GLUCOSE SERPL-MCNC: 132 MG/DL (ref 70–110)
GLUCOSE SERPL-MCNC: 134 MG/DL (ref 70–110)
GLUCOSE SERPL-MCNC: 135 MG/DL (ref 70–110)
GLUCOSE SERPL-MCNC: 135 MG/DL (ref 70–110)
GLUCOSE SERPL-MCNC: 136 MG/DL (ref 70–110)
GLUCOSE SERPL-MCNC: 137 MG/DL (ref 70–110)
GLUCOSE SERPL-MCNC: 139 MG/DL (ref 70–110)
GLUCOSE SERPL-MCNC: 139 MG/DL (ref 70–110)
GLUCOSE SERPL-MCNC: 140 MG/DL (ref 70–110)
GLUCOSE SERPL-MCNC: 141 MG/DL (ref 70–110)
GLUCOSE SERPL-MCNC: 142 MG/DL (ref 70–110)
GLUCOSE SERPL-MCNC: 144 MG/DL (ref 70–110)
GLUCOSE SERPL-MCNC: 146 MG/DL (ref 70–110)
GLUCOSE SERPL-MCNC: 146 MG/DL (ref 70–110)
GLUCOSE SERPL-MCNC: 148 MG/DL (ref 70–110)
GLUCOSE SERPL-MCNC: 148 MG/DL (ref 70–110)
GLUCOSE SERPL-MCNC: 149 MG/DL (ref 70–110)
GLUCOSE SERPL-MCNC: 149 MG/DL (ref 70–110)
GLUCOSE SERPL-MCNC: 150 MG/DL (ref 70–110)
GLUCOSE SERPL-MCNC: 152 MG/DL (ref 70–110)
GLUCOSE SERPL-MCNC: 152 MG/DL (ref 70–110)
GLUCOSE SERPL-MCNC: 155 MG/DL (ref 73–118)
GLUCOSE SERPL-MCNC: 161 MG/DL (ref 70–110)
GLUCOSE SERPL-MCNC: 162 MG/DL (ref 70–110)
GLUCOSE SERPL-MCNC: 175 MG/DL (ref 70–110)
GLUCOSE SERPL-MCNC: 178 MG/DL (ref 70–110)
GLUCOSE SERPL-MCNC: 180 MG/DL (ref 70–110)
GLUCOSE SERPL-MCNC: 183 MG/DL (ref 70–110)
GLUCOSE SERPL-MCNC: 188 MG/DL (ref 70–110)
GLUCOSE SERPL-MCNC: 196 MG/DL (ref 70–110)
GLUCOSE SERPL-MCNC: 202 MG/DL (ref 70–110)
GLUCOSE SERPL-MCNC: 203 MG/DL (ref 70–110)
GLUCOSE SERPL-MCNC: 209 MG/DL (ref 70–110)
GLUCOSE SERPL-MCNC: 211 MG/DL (ref 70–110)
GLUCOSE SERPL-MCNC: 217 MG/DL (ref 70–110)
GLUCOSE SERPL-MCNC: 221 MG/DL (ref 70–110)
GLUCOSE SERPL-MCNC: 223 MG/DL (ref 70–110)
GLUCOSE SERPL-MCNC: 229 MG/DL (ref 70–110)
GLUCOSE SERPL-MCNC: 235 MG/DL (ref 70–110)
GLUCOSE SERPL-MCNC: 236 MG/DL (ref 70–110)
GLUCOSE SERPL-MCNC: 243 MG/DL (ref 70–110)
GLUCOSE SERPL-MCNC: 251 MG/DL (ref 70–110)
GLUCOSE SERPL-MCNC: 72 MG/DL (ref 70–110)
GLUCOSE SERPL-MCNC: 75 MG/DL (ref 70–110)
GLUCOSE SERPL-MCNC: 77 MG/DL (ref 70–110)
GLUCOSE SERPL-MCNC: 77 MG/DL (ref 70–110)
GLUCOSE SERPL-MCNC: 78 MG/DL (ref 70–110)
GLUCOSE SERPL-MCNC: 78 MG/DL (ref 70–110)
GLUCOSE SERPL-MCNC: 81 MG/DL (ref 70–110)
GLUCOSE SERPL-MCNC: 82 MG/DL (ref 70–110)
GLUCOSE SERPL-MCNC: 85 MG/DL (ref 70–110)
GLUCOSE SERPL-MCNC: 86 MG/DL (ref 70–110)
GLUCOSE SERPL-MCNC: 88 MG/DL (ref 70–110)
GLUCOSE SERPL-MCNC: 90 MG/DL (ref 70–110)
GLUCOSE SERPL-MCNC: 91 MG/DL (ref 70–110)
GLUCOSE SERPL-MCNC: 91 MG/DL (ref 70–110)
GLUCOSE SERPL-MCNC: 93 MG/DL (ref 70–110)
GLUCOSE SERPL-MCNC: 94 MG/DL (ref 70–110)
GLUCOSE SERPL-MCNC: 94 MG/DL (ref 70–110)
GLUCOSE SERPL-MCNC: 96 MG/DL (ref 70–110)
GLUCOSE SERPL-MCNC: 96 MG/DL (ref 70–110)
GLUCOSE SERPL-MCNC: 97 MG/DL (ref 70–110)
GLUCOSE SERPL-MCNC: 97 MG/DL (ref 70–110)
GLUCOSE SERPL-MCNC: 98 MG/DL (ref 70–110)
GLUCOSE SERPL-MCNC: 99 MG/DL (ref 70–110)
GLUCOSE UR QL STRIP: NEGATIVE
GLUCOSE, POC UA: NEGATIVE
GRAM STN SPEC: ABNORMAL
GRAM STN SPEC: NORMAL
HCO3 UR-SCNC: 15.9 MMOL/L (ref 24–28)
HCO3 UR-SCNC: 16.7 MMOL/L (ref 24–28)
HCO3 UR-SCNC: 16.8 MMOL/L (ref 24–28)
HCO3 UR-SCNC: 17.1 MMOL/L (ref 24–28)
HCO3 UR-SCNC: 17.3 MMOL/L (ref 24–28)
HCO3 UR-SCNC: 17.7 MMOL/L (ref 24–28)
HCO3 UR-SCNC: 17.8 MMOL/L (ref 24–28)
HCO3 UR-SCNC: 18.2 MMOL/L (ref 24–28)
HCO3 UR-SCNC: 18.8 MMOL/L (ref 24–28)
HCO3 UR-SCNC: 19.1 MMOL/L (ref 24–28)
HCO3 UR-SCNC: 19.3 MMOL/L (ref 24–28)
HCO3 UR-SCNC: 19.3 MMOL/L (ref 24–28)
HCO3 UR-SCNC: 19.4 MMOL/L (ref 24–28)
HCO3 UR-SCNC: 19.4 MMOL/L (ref 24–28)
HCO3 UR-SCNC: 19.5 MMOL/L (ref 24–28)
HCO3 UR-SCNC: 19.6 MMOL/L (ref 24–28)
HCO3 UR-SCNC: 19.7 MMOL/L (ref 24–28)
HCO3 UR-SCNC: 20 MMOL/L (ref 24–28)
HCO3 UR-SCNC: 20 MMOL/L (ref 24–28)
HCO3 UR-SCNC: 20.1 MMOL/L (ref 24–28)
HCO3 UR-SCNC: 20.2 MMOL/L (ref 24–28)
HCO3 UR-SCNC: 20.4 MMOL/L (ref 24–28)
HCO3 UR-SCNC: 20.5 MMOL/L (ref 24–28)
HCO3 UR-SCNC: 20.6 MMOL/L (ref 24–28)
HCO3 UR-SCNC: 20.9 MMOL/L (ref 24–28)
HCO3 UR-SCNC: 21.3 MMOL/L (ref 24–28)
HCO3 UR-SCNC: 21.3 MMOL/L (ref 24–28)
HCO3 UR-SCNC: 21.4 MMOL/L (ref 24–28)
HCO3 UR-SCNC: 21.9 MMOL/L (ref 24–28)
HCO3 UR-SCNC: 22 MMOL/L (ref 24–28)
HCO3 UR-SCNC: 22.1 MMOL/L (ref 24–28)
HCO3 UR-SCNC: 22.1 MMOL/L (ref 24–28)
HCO3 UR-SCNC: 22.2 MMOL/L (ref 24–28)
HCO3 UR-SCNC: 22.3 MMOL/L (ref 24–28)
HCO3 UR-SCNC: 22.4 MMOL/L (ref 24–28)
HCO3 UR-SCNC: 23 MMOL/L (ref 24–28)
HCO3 UR-SCNC: 23.2 MMOL/L (ref 24–28)
HCO3 UR-SCNC: 23.3 MMOL/L (ref 24–28)
HCO3 UR-SCNC: 23.4 MMOL/L (ref 24–28)
HCO3 UR-SCNC: 23.5 MMOL/L (ref 24–28)
HCO3 UR-SCNC: 23.5 MMOL/L (ref 24–28)
HCO3 UR-SCNC: 23.6 MMOL/L (ref 24–28)
HCO3 UR-SCNC: 23.7 MMOL/L (ref 24–28)
HCO3 UR-SCNC: 23.8 MMOL/L (ref 24–28)
HCO3 UR-SCNC: 24 MMOL/L (ref 24–28)
HCO3 UR-SCNC: 24.4 MMOL/L (ref 24–28)
HCO3 UR-SCNC: 24.5 MMOL/L (ref 24–28)
HCO3 UR-SCNC: 24.5 MMOL/L (ref 24–28)
HCO3 UR-SCNC: 24.6 MMOL/L (ref 24–28)
HCO3 UR-SCNC: 24.6 MMOL/L (ref 24–28)
HCO3 UR-SCNC: 24.8 MMOL/L (ref 24–28)
HCO3 UR-SCNC: 24.8 MMOL/L (ref 24–28)
HCO3 UR-SCNC: 25 MMOL/L (ref 24–28)
HCO3 UR-SCNC: 25 MMOL/L (ref 24–28)
HCO3 UR-SCNC: 25.2 MMOL/L (ref 24–28)
HCO3 UR-SCNC: 25.3 MMOL/L (ref 24–28)
HCO3 UR-SCNC: 25.4 MMOL/L (ref 24–28)
HCO3 UR-SCNC: 25.4 MMOL/L (ref 24–28)
HCO3 UR-SCNC: 25.7 MMOL/L (ref 24–28)
HCO3 UR-SCNC: 25.9 MMOL/L (ref 24–28)
HCO3 UR-SCNC: 25.9 MMOL/L (ref 24–28)
HCO3 UR-SCNC: 26.1 MMOL/L (ref 24–28)
HCO3 UR-SCNC: 26.2 MMOL/L (ref 24–28)
HCO3 UR-SCNC: 26.3 MMOL/L (ref 24–28)
HCO3 UR-SCNC: 26.4 MMOL/L (ref 24–28)
HCO3 UR-SCNC: 26.6 MMOL/L (ref 24–28)
HCO3 UR-SCNC: 26.7 MMOL/L (ref 24–28)
HCO3 UR-SCNC: 26.7 MMOL/L (ref 24–28)
HCO3 UR-SCNC: 26.8 MMOL/L (ref 24–28)
HCO3 UR-SCNC: 26.8 MMOL/L (ref 24–28)
HCO3 UR-SCNC: 26.9 MMOL/L (ref 24–28)
HCO3 UR-SCNC: 27.1 MMOL/L (ref 24–28)
HCO3 UR-SCNC: 27.1 MMOL/L (ref 24–28)
HCO3 UR-SCNC: 28.4 MMOL/L (ref 24–28)
HCO3 UR-SCNC: 29.2 MMOL/L (ref 24–28)
HCO3 UR-SCNC: 29.9 MMOL/L (ref 24–28)
HCO3 UR-SCNC: 31.3 MMOL/L (ref 24–28)
HCO3 UR-SCNC: 31.5 MMOL/L (ref 24–28)
HCO3 UR-SCNC: 31.6 MMOL/L (ref 24–28)
HCO3 UR-SCNC: 31.8 MMOL/L (ref 24–28)
HCO3 UR-SCNC: 32.1 MMOL/L (ref 24–28)
HCO3 UR-SCNC: 32.2 MMOL/L (ref 24–28)
HCO3 UR-SCNC: 33.9 MMOL/L (ref 24–28)
HCO3 UR-SCNC: 34.3 MMOL/L (ref 24–28)
HCO3 UR-SCNC: 35.2 MMOL/L (ref 24–28)
HCO3 UR-SCNC: 36.7 MMOL/L (ref 24–28)
HCO3 UR-SCNC: 37.2 MMOL/L (ref 24–28)
HCO3 UR-SCNC: 37.3 MMOL/L (ref 24–28)
HCT VFR BLD AUTO: 19.4 % (ref 37–48.5)
HCT VFR BLD AUTO: 19.4 % (ref 37–48.5)
HCT VFR BLD AUTO: 21.2 % (ref 37–48.5)
HCT VFR BLD AUTO: 21.4 % (ref 37–48.5)
HCT VFR BLD AUTO: 21.7 % (ref 37–48.5)
HCT VFR BLD AUTO: 22.1 % (ref 37–48.5)
HCT VFR BLD AUTO: 22.3 % (ref 37–48.5)
HCT VFR BLD AUTO: 22.3 % (ref 37–48.5)
HCT VFR BLD AUTO: 22.4 % (ref 37–48.5)
HCT VFR BLD AUTO: 22.4 % (ref 37–48.5)
HCT VFR BLD AUTO: 22.5 % (ref 37–48.5)
HCT VFR BLD AUTO: 23 % (ref 37–48.5)
HCT VFR BLD AUTO: 23.2 % (ref 37–48.5)
HCT VFR BLD AUTO: 23.3 % (ref 37–48.5)
HCT VFR BLD AUTO: 23.6 % (ref 37–48.5)
HCT VFR BLD AUTO: 23.7 % (ref 37–48.5)
HCT VFR BLD AUTO: 23.7 % (ref 37–48.5)
HCT VFR BLD AUTO: 24.1 % (ref 37–48.5)
HCT VFR BLD AUTO: 24.2 % (ref 37–48.5)
HCT VFR BLD AUTO: 24.2 % (ref 37–48.5)
HCT VFR BLD AUTO: 24.3 % (ref 37–48.5)
HCT VFR BLD AUTO: 24.3 % (ref 37–48.5)
HCT VFR BLD AUTO: 24.4 % (ref 37–48.5)
HCT VFR BLD AUTO: 24.4 % (ref 37–48.5)
HCT VFR BLD AUTO: 24.5 % (ref 37–48.5)
HCT VFR BLD AUTO: 24.5 % (ref 37–48.5)
HCT VFR BLD AUTO: 24.6 % (ref 37–48.5)
HCT VFR BLD AUTO: 24.8 % (ref 37–48.5)
HCT VFR BLD AUTO: 24.9 % (ref 37–48.5)
HCT VFR BLD AUTO: 25.1 % (ref 37–48.5)
HCT VFR BLD AUTO: 25.2 % (ref 37–48.5)
HCT VFR BLD AUTO: 25.3 % (ref 37–48.5)
HCT VFR BLD AUTO: 25.4 % (ref 37–48.5)
HCT VFR BLD AUTO: 25.5 % (ref 37–48.5)
HCT VFR BLD AUTO: 25.5 % (ref 37–48.5)
HCT VFR BLD AUTO: 25.6 % (ref 37–48.5)
HCT VFR BLD AUTO: 25.7 % (ref 37–48.5)
HCT VFR BLD AUTO: 26 % (ref 37–48.5)
HCT VFR BLD AUTO: 26.1 % (ref 37–48.5)
HCT VFR BLD AUTO: 26.4 % (ref 37–48.5)
HCT VFR BLD AUTO: 26.5 % (ref 37–48.5)
HCT VFR BLD AUTO: 26.5 % (ref 37–48.5)
HCT VFR BLD AUTO: 26.9 % (ref 37–48.5)
HCT VFR BLD AUTO: 26.9 % (ref 37–48.5)
HCT VFR BLD AUTO: 27 % (ref 37–48.5)
HCT VFR BLD AUTO: 27.3 % (ref 37–48.5)
HCT VFR BLD AUTO: 27.4 % (ref 37–48.5)
HCT VFR BLD AUTO: 27.4 % (ref 37–48.5)
HCT VFR BLD AUTO: 27.6 % (ref 37–48.5)
HCT VFR BLD AUTO: 27.6 % (ref 37–48.5)
HCT VFR BLD AUTO: 27.9 % (ref 37–48.5)
HCT VFR BLD AUTO: 28.1 % (ref 37–48.5)
HCT VFR BLD AUTO: 28.2 % (ref 37–48.5)
HCT VFR BLD AUTO: 28.5 % (ref 37–48.5)
HCT VFR BLD AUTO: 28.6 % (ref 37–48.5)
HCT VFR BLD AUTO: 28.7 % (ref 37–48.5)
HCT VFR BLD AUTO: 28.7 % (ref 37–48.5)
HCT VFR BLD AUTO: 28.8 % (ref 37–48.5)
HCT VFR BLD AUTO: 28.9 % (ref 37–48.5)
HCT VFR BLD AUTO: 29.2 % (ref 37–48.5)
HCT VFR BLD AUTO: 29.3 % (ref 37–48.5)
HCT VFR BLD AUTO: 29.4 % (ref 37–48.5)
HCT VFR BLD AUTO: 29.5 % (ref 37–48.5)
HCT VFR BLD AUTO: 29.6 % (ref 37–48.5)
HCT VFR BLD AUTO: 30 % (ref 37–48.5)
HCT VFR BLD AUTO: 30 % (ref 37–48.5)
HCT VFR BLD AUTO: 30.2 % (ref 37–48.5)
HCT VFR BLD AUTO: 30.3 % (ref 37–48.5)
HCT VFR BLD AUTO: 30.3 % (ref 37–48.5)
HCT VFR BLD AUTO: 30.4 % (ref 37–48.5)
HCT VFR BLD AUTO: 30.4 % (ref 37–48.5)
HCT VFR BLD AUTO: 30.5 % (ref 37–48.5)
HCT VFR BLD AUTO: 30.6 % (ref 37–48.5)
HCT VFR BLD AUTO: 30.7 % (ref 37–48.5)
HCT VFR BLD AUTO: 30.7 % (ref 37–48.5)
HCT VFR BLD AUTO: 30.8 % (ref 37–48.5)
HCT VFR BLD AUTO: 31.1 % (ref 37–48.5)
HCT VFR BLD AUTO: 31.2 % (ref 37–48.5)
HCT VFR BLD AUTO: 31.2 % (ref 37–48.5)
HCT VFR BLD AUTO: 31.7 % (ref 37–48.5)
HCT VFR BLD AUTO: 32 % (ref 37–48.5)
HCT VFR BLD AUTO: 32 % (ref 37–48.5)
HCT VFR BLD AUTO: 32.4 % (ref 37–48.5)
HCT VFR BLD AUTO: 32.6 % (ref 37–48.5)
HCT VFR BLD AUTO: 32.7 % (ref 37–48.5)
HCT VFR BLD AUTO: 32.9 % (ref 37–48.5)
HCT VFR BLD AUTO: 33.3 % (ref 37–48.5)
HCT VFR BLD AUTO: 33.5 % (ref 37–48.5)
HCT VFR BLD AUTO: 42.8 % (ref 37–48.5)
HCT VFR BLD AUTO: 44.1 % (ref 37–48.5)
HCT VFR BLD AUTO: 44.9 % (ref 37–48.5)
HCT VFR BLD AUTO: 45.4 % (ref 37–48.5)
HCT VFR BLD AUTO: 47.2 % (ref 37–48.5)
HCT VFR BLD CALC: 17 %PCV (ref 36–54)
HCT VFR BLD CALC: 17 %PCV (ref 36–54)
HCT VFR BLD CALC: 20 %PCV (ref 36–54)
HCT VFR BLD CALC: 21 %PCV (ref 36–54)
HCT VFR BLD CALC: 22 %PCV (ref 36–54)
HCT VFR BLD CALC: 22 %PCV (ref 36–54)
HCT VFR BLD CALC: 23 %PCV (ref 36–54)
HCT VFR BLD CALC: 24 %PCV (ref 36–54)
HCT VFR BLD CALC: 25 %PCV (ref 36–54)
HCT VFR BLD CALC: 25 %PCV (ref 36–54)
HCT VFR BLD CALC: 26 %PCV (ref 36–54)
HCT VFR BLD CALC: 27 %PCV (ref 36–54)
HCT VFR BLD CALC: 28 %PCV (ref 36–54)
HCT VFR BLD CALC: 33 %PCV (ref 36–54)
HCT VFR BLD CALC: <15 %PCV (ref 36–54)
HGB BLD-MCNC: 14 G/DL (ref 12–16)
HGB BLD-MCNC: 14.1 G/DL (ref 12–16)
HGB BLD-MCNC: 14.2 G/DL (ref 12–16)
HGB BLD-MCNC: 14.7 G/DL (ref 12–16)
HGB BLD-MCNC: 15 G/DL (ref 12–16)
HGB BLD-MCNC: 5.8 G/DL (ref 12–16)
HGB BLD-MCNC: 5.8 G/DL (ref 12–16)
HGB BLD-MCNC: 6.5 G/DL (ref 12–16)
HGB BLD-MCNC: 6.5 G/DL (ref 12–16)
HGB BLD-MCNC: 6.6 G/DL (ref 12–16)
HGB BLD-MCNC: 6.7 G/DL (ref 12–16)
HGB BLD-MCNC: 6.8 G/DL (ref 12–16)
HGB BLD-MCNC: 6.8 G/DL (ref 12–16)
HGB BLD-MCNC: 6.9 G/DL (ref 12–16)
HGB BLD-MCNC: 6.9 G/DL (ref 12–16)
HGB BLD-MCNC: 7 G/DL (ref 12–16)
HGB BLD-MCNC: 7.1 G/DL (ref 12–16)
HGB BLD-MCNC: 7.1 G/DL (ref 12–16)
HGB BLD-MCNC: 7.2 G/DL (ref 12–16)
HGB BLD-MCNC: 7.3 G/DL (ref 12–16)
HGB BLD-MCNC: 7.3 G/DL (ref 12–16)
HGB BLD-MCNC: 7.4 G/DL (ref 12–16)
HGB BLD-MCNC: 7.5 G/DL (ref 12–16)
HGB BLD-MCNC: 7.5 G/DL (ref 12–16)
HGB BLD-MCNC: 7.6 G/DL (ref 12–16)
HGB BLD-MCNC: 7.6 G/DL (ref 12–16)
HGB BLD-MCNC: 7.7 G/DL (ref 12–16)
HGB BLD-MCNC: 7.8 G/DL (ref 12–16)
HGB BLD-MCNC: 7.9 G/DL (ref 12–16)
HGB BLD-MCNC: 8 G/DL (ref 12–16)
HGB BLD-MCNC: 8.1 G/DL (ref 12–16)
HGB BLD-MCNC: 8.2 G/DL (ref 12–16)
HGB BLD-MCNC: 8.3 G/DL (ref 12–16)
HGB BLD-MCNC: 8.3 G/DL (ref 12–16)
HGB BLD-MCNC: 8.5 G/DL (ref 12–16)
HGB BLD-MCNC: 8.5 G/DL (ref 12–16)
HGB BLD-MCNC: 8.6 G/DL (ref 12–16)
HGB BLD-MCNC: 8.6 G/DL (ref 12–16)
HGB BLD-MCNC: 8.7 G/DL (ref 12–16)
HGB BLD-MCNC: 8.8 G/DL (ref 12–16)
HGB BLD-MCNC: 8.9 G/DL (ref 12–16)
HGB BLD-MCNC: 9 G/DL (ref 12–16)
HGB BLD-MCNC: 9.1 G/DL (ref 12–16)
HGB BLD-MCNC: 9.2 G/DL (ref 12–16)
HGB BLD-MCNC: 9.3 G/DL (ref 12–16)
HGB BLD-MCNC: 9.4 G/DL (ref 12–16)
HGB BLD-MCNC: 9.5 G/DL (ref 12–16)
HGB BLD-MCNC: 9.6 G/DL (ref 12–16)
HGB BLD-MCNC: 9.7 G/DL (ref 12–16)
HGB BLD-MCNC: 9.9 G/DL (ref 12–16)
HGB BLD-MCNC: 9.9 G/DL (ref 12–16)
HGB UR QL STRIP: ABNORMAL
HR TR ECHO: 94 BPM
HYALINE CASTS #/AREA URNS LPF: 1 /LPF
HYALINE CASTS #/AREA URNS LPF: 1 /LPF
HYALINE CASTS UR QL AUTO: 0 /LPF
HYALINE CASTS UR QL AUTO: 0 /LPF
HYALINE CASTS UR QL AUTO: 9 /LPF
HYPOCHROMIA BLD QL SMEAR: ABNORMAL
IMM GRANULOCYTES # BLD AUTO: 0.01 K/UL (ref 0–0.04)
IMM GRANULOCYTES # BLD AUTO: 0.01 K/UL (ref 0–0.04)
IMM GRANULOCYTES # BLD AUTO: 0.03 K/UL (ref 0–0.04)
IMM GRANULOCYTES # BLD AUTO: 0.04 K/UL (ref 0–0.04)
IMM GRANULOCYTES # BLD AUTO: 0.04 K/UL (ref 0–0.04)
IMM GRANULOCYTES # BLD AUTO: 0.05 K/UL (ref 0–0.04)
IMM GRANULOCYTES # BLD AUTO: 0.07 K/UL (ref 0–0.04)
IMM GRANULOCYTES # BLD AUTO: 0.08 K/UL (ref 0–0.04)
IMM GRANULOCYTES # BLD AUTO: 0.09 K/UL (ref 0–0.04)
IMM GRANULOCYTES # BLD AUTO: 0.1 K/UL (ref 0–0.04)
IMM GRANULOCYTES # BLD AUTO: 0.11 K/UL (ref 0–0.04)
IMM GRANULOCYTES # BLD AUTO: 0.11 K/UL (ref 0–0.04)
IMM GRANULOCYTES # BLD AUTO: 0.12 K/UL (ref 0–0.04)
IMM GRANULOCYTES # BLD AUTO: 0.13 K/UL (ref 0–0.04)
IMM GRANULOCYTES # BLD AUTO: 0.14 K/UL (ref 0–0.04)
IMM GRANULOCYTES # BLD AUTO: 0.15 K/UL (ref 0–0.04)
IMM GRANULOCYTES # BLD AUTO: 0.16 K/UL (ref 0–0.04)
IMM GRANULOCYTES # BLD AUTO: 0.17 K/UL (ref 0–0.04)
IMM GRANULOCYTES # BLD AUTO: 0.18 K/UL (ref 0–0.04)
IMM GRANULOCYTES # BLD AUTO: 0.19 K/UL (ref 0–0.04)
IMM GRANULOCYTES # BLD AUTO: 0.2 K/UL (ref 0–0.04)
IMM GRANULOCYTES # BLD AUTO: 0.21 K/UL (ref 0–0.04)
IMM GRANULOCYTES # BLD AUTO: 0.22 K/UL (ref 0–0.04)
IMM GRANULOCYTES # BLD AUTO: 0.23 K/UL (ref 0–0.04)
IMM GRANULOCYTES # BLD AUTO: 0.24 K/UL (ref 0–0.04)
IMM GRANULOCYTES # BLD AUTO: 0.26 K/UL (ref 0–0.04)
IMM GRANULOCYTES # BLD AUTO: 0.26 K/UL (ref 0–0.04)
IMM GRANULOCYTES # BLD AUTO: 0.27 K/UL (ref 0–0.04)
IMM GRANULOCYTES # BLD AUTO: 0.29 K/UL (ref 0–0.04)
IMM GRANULOCYTES # BLD AUTO: 0.3 K/UL (ref 0–0.04)
IMM GRANULOCYTES # BLD AUTO: 0.31 K/UL (ref 0–0.04)
IMM GRANULOCYTES # BLD AUTO: 0.31 K/UL (ref 0–0.04)
IMM GRANULOCYTES # BLD AUTO: 0.32 K/UL (ref 0–0.04)
IMM GRANULOCYTES # BLD AUTO: 0.34 K/UL (ref 0–0.04)
IMM GRANULOCYTES # BLD AUTO: 0.35 K/UL (ref 0–0.04)
IMM GRANULOCYTES # BLD AUTO: 0.36 K/UL (ref 0–0.04)
IMM GRANULOCYTES # BLD AUTO: 0.36 K/UL (ref 0–0.04)
IMM GRANULOCYTES # BLD AUTO: 0.37 K/UL (ref 0–0.04)
IMM GRANULOCYTES # BLD AUTO: 0.38 K/UL (ref 0–0.04)
IMM GRANULOCYTES # BLD AUTO: 0.38 K/UL (ref 0–0.04)
IMM GRANULOCYTES # BLD AUTO: 0.39 K/UL (ref 0–0.04)
IMM GRANULOCYTES # BLD AUTO: 0.4 K/UL (ref 0–0.04)
IMM GRANULOCYTES # BLD AUTO: 0.41 K/UL (ref 0–0.04)
IMM GRANULOCYTES # BLD AUTO: 0.42 K/UL (ref 0–0.04)
IMM GRANULOCYTES # BLD AUTO: 0.43 K/UL (ref 0–0.04)
IMM GRANULOCYTES # BLD AUTO: 0.43 K/UL (ref 0–0.04)
IMM GRANULOCYTES # BLD AUTO: 0.47 K/UL (ref 0–0.04)
IMM GRANULOCYTES # BLD AUTO: 0.51 K/UL (ref 0–0.04)
IMM GRANULOCYTES # BLD AUTO: 0.52 K/UL (ref 0–0.04)
IMM GRANULOCYTES # BLD AUTO: 0.58 K/UL (ref 0–0.04)
IMM GRANULOCYTES # BLD AUTO: 0.59 K/UL (ref 0–0.04)
IMM GRANULOCYTES # BLD AUTO: 0.72 K/UL (ref 0–0.04)
IMM GRANULOCYTES # BLD AUTO: 0.84 K/UL (ref 0–0.04)
IMM GRANULOCYTES # BLD AUTO: 0.86 K/UL (ref 0–0.04)
IMM GRANULOCYTES # BLD AUTO: 1.06 K/UL (ref 0–0.04)
IMM GRANULOCYTES # BLD AUTO: ABNORMAL K/UL (ref 0–0.04)
IMM GRANULOCYTES NFR BLD AUTO: 0.1 % (ref 0–0.5)
IMM GRANULOCYTES NFR BLD AUTO: 0.1 % (ref 0–0.5)
IMM GRANULOCYTES NFR BLD AUTO: 0.3 % (ref 0–0.5)
IMM GRANULOCYTES NFR BLD AUTO: 0.4 % (ref 0–0.5)
IMM GRANULOCYTES NFR BLD AUTO: 0.4 % (ref 0–0.5)
IMM GRANULOCYTES NFR BLD AUTO: 0.5 % (ref 0–0.5)
IMM GRANULOCYTES NFR BLD AUTO: 0.6 % (ref 0–0.5)
IMM GRANULOCYTES NFR BLD AUTO: 0.7 % (ref 0–0.5)
IMM GRANULOCYTES NFR BLD AUTO: 0.8 % (ref 0–0.5)
IMM GRANULOCYTES NFR BLD AUTO: 0.8 % (ref 0–0.5)
IMM GRANULOCYTES NFR BLD AUTO: 0.9 % (ref 0–0.5)
IMM GRANULOCYTES NFR BLD AUTO: 1 % (ref 0–0.5)
IMM GRANULOCYTES NFR BLD AUTO: 1 % (ref 0–0.5)
IMM GRANULOCYTES NFR BLD AUTO: 1.1 % (ref 0–0.5)
IMM GRANULOCYTES NFR BLD AUTO: 1.2 % (ref 0–0.5)
IMM GRANULOCYTES NFR BLD AUTO: 1.2 % (ref 0–0.5)
IMM GRANULOCYTES NFR BLD AUTO: 1.3 % (ref 0–0.5)
IMM GRANULOCYTES NFR BLD AUTO: 1.4 % (ref 0–0.5)
IMM GRANULOCYTES NFR BLD AUTO: 1.5 % (ref 0–0.5)
IMM GRANULOCYTES NFR BLD AUTO: 1.6 % (ref 0–0.5)
IMM GRANULOCYTES NFR BLD AUTO: 1.7 % (ref 0–0.5)
IMM GRANULOCYTES NFR BLD AUTO: 1.7 % (ref 0–0.5)
IMM GRANULOCYTES NFR BLD AUTO: 1.8 % (ref 0–0.5)
IMM GRANULOCYTES NFR BLD AUTO: 1.9 % (ref 0–0.5)
IMM GRANULOCYTES NFR BLD AUTO: 2 % (ref 0–0.5)
IMM GRANULOCYTES NFR BLD AUTO: 2 % (ref 0–0.5)
IMM GRANULOCYTES NFR BLD AUTO: 2.1 % (ref 0–0.5)
IMM GRANULOCYTES NFR BLD AUTO: 2.2 % (ref 0–0.5)
IMM GRANULOCYTES NFR BLD AUTO: 2.3 % (ref 0–0.5)
IMM GRANULOCYTES NFR BLD AUTO: 2.4 % (ref 0–0.5)
IMM GRANULOCYTES NFR BLD AUTO: 2.4 % (ref 0–0.5)
IMM GRANULOCYTES NFR BLD AUTO: 2.6 % (ref 0–0.5)
IMM GRANULOCYTES NFR BLD AUTO: 2.7 % (ref 0–0.5)
IMM GRANULOCYTES NFR BLD AUTO: 2.7 % (ref 0–0.5)
IMM GRANULOCYTES NFR BLD AUTO: 3.2 % (ref 0–0.5)
IMM GRANULOCYTES NFR BLD AUTO: 3.3 % (ref 0–0.5)
IMM GRANULOCYTES NFR BLD AUTO: 3.4 % (ref 0–0.5)
IMM GRANULOCYTES NFR BLD AUTO: 3.4 % (ref 0–0.5)
IMM GRANULOCYTES NFR BLD AUTO: 3.5 % (ref 0–0.5)
IMM GRANULOCYTES NFR BLD AUTO: 3.7 % (ref 0–0.5)
IMM GRANULOCYTES NFR BLD AUTO: 3.7 % (ref 0–0.5)
IMM GRANULOCYTES NFR BLD AUTO: 3.8 % (ref 0–0.5)
IMM GRANULOCYTES NFR BLD AUTO: 3.8 % (ref 0–0.5)
IMM GRANULOCYTES NFR BLD AUTO: 4.2 % (ref 0–0.5)
IMM GRANULOCYTES NFR BLD AUTO: 4.4 % (ref 0–0.5)
IMM GRANULOCYTES NFR BLD AUTO: ABNORMAL % (ref 0–0.5)
INR PPP: 1 (ref 0.8–1.2)
INR PPP: 1.1 (ref 0.8–1.2)
INR PPP: 1.2 (ref 0.8–1.2)
INR PPP: 1.3 (ref 0.8–1.2)
INR PPP: 1.4 (ref 0.8–1.2)
INR PPP: 1.5 (ref 0.8–1.2)
INR PPP: 1.6 (ref 0.8–1.2)
INR PPP: 1.6 (ref 0.8–1.2)
INR PPP: 1.7 (ref 0.8–1.2)
INR PPP: 1.8 (ref 0.8–1.2)
INR PPP: 1.8 (ref 0.8–1.2)
INR PPP: 1.9 (ref 0.8–1.2)
INR PPP: 2 (ref 0.8–1.2)
INR PPP: 2.1 (ref 0.8–1.2)
INR PPP: 2.1 (ref 0.8–1.2)
INR PPP: 2.2 (ref 0.8–1.2)
INR PPP: 2.3 (ref 0.8–1.2)
INR PPP: 2.4 (ref 0.8–1.2)
INR PPP: 2.6 (ref 0.8–1.2)
INR PPP: 2.7 (ref 0.8–1.2)
INR PPP: 2.8 (ref 0.8–1.2)
INR PPP: 2.8 (ref 0.8–1.2)
INR PPP: 3.2 (ref 0.8–1.2)
INR PPP: 3.4 (ref 0.8–1.2)
INR PPP: 3.5 (ref 0.8–1.2)
INR PPP: 3.7 (ref 0.8–1.2)
INR PPP: 3.8 (ref 0.8–1.2)
INR PPP: 4 (ref 0.8–1.2)
INR PPP: 4.9 (ref 0.8–1.2)
INR PPP: 5.1 (ref 0.8–1.2)
INR PPP: 5.5 (ref 0.8–1.2)
INTERVENTRICULAR SEPTUM: 0.93 CM (ref 0.6–1.1)
INTERVENTRICULAR SEPTUM: 1.1 CM (ref 0.6–1.1)
INTERVENTRICULAR SEPTUM: 1.12 CM (ref 0.6–1.1)
INTERVENTRICULAR SEPTUM: 1.17 CM (ref 0.6–1.1)
IVRT: 65.74 MSEC
KETONES UR QL STRIP: NEGATIVE
KETONES, POC UA: NEGATIVE
LA MAJOR: 5.2 CM
LA MAJOR: 6.25 CM
LA MAJOR: 6.54 CM
LA MINOR: 5.31 CM
LA MINOR: 6.05 CM
LA MINOR: 6.91 CM
LA WIDTH: 4.69 CM
LA WIDTH: 4.9 CM
LA WIDTH: 5.88 CM
LACTATE SERPL-SCNC: 3.7 MMOL/L (ref 0.5–2.2)
LDH SERPL L TO P-CCNC: 0.5 MMOL/L (ref 0.36–1.25)
LDH SERPL L TO P-CCNC: 0.51 MMOL/L (ref 0.36–1.25)
LDH SERPL L TO P-CCNC: 0.53 MMOL/L (ref 0.36–1.25)
LDH SERPL L TO P-CCNC: 0.55 MMOL/L (ref 0.36–1.25)
LDH SERPL L TO P-CCNC: 0.58 MMOL/L (ref 0.36–1.25)
LDH SERPL L TO P-CCNC: 0.59 MMOL/L (ref 0.36–1.25)
LDH SERPL L TO P-CCNC: 0.6 MMOL/L (ref 0.36–1.25)
LDH SERPL L TO P-CCNC: 0.61 MMOL/L (ref 0.36–1.25)
LDH SERPL L TO P-CCNC: 0.64 MMOL/L (ref 0.36–1.25)
LDH SERPL L TO P-CCNC: 0.64 MMOL/L (ref 0.36–1.25)
LDH SERPL L TO P-CCNC: 0.65 MMOL/L (ref 0.36–1.25)
LDH SERPL L TO P-CCNC: 0.69 MMOL/L (ref 0.36–1.25)
LDH SERPL L TO P-CCNC: 0.75 MMOL/L (ref 0.36–1.25)
LDH SERPL L TO P-CCNC: 0.77 MMOL/L (ref 0.36–1.25)
LDH SERPL L TO P-CCNC: 0.79 MMOL/L (ref 0.36–1.25)
LDH SERPL L TO P-CCNC: 0.79 MMOL/L (ref 0.36–1.25)
LDH SERPL L TO P-CCNC: 0.8 MMOL/L (ref 0.36–1.25)
LDH SERPL L TO P-CCNC: 0.85 MMOL/L (ref 0.36–1.25)
LDH SERPL L TO P-CCNC: 0.86 MMOL/L (ref 0.36–1.25)
LDH SERPL L TO P-CCNC: 1 MMOL/L (ref 0.36–1.25)
LDH SERPL L TO P-CCNC: 1.39 MMOL/L (ref 0.36–1.25)
LDH SERPL L TO P-CCNC: 1.43 MMOL/L (ref 0.36–1.25)
LDH SERPL L TO P-CCNC: 1.57 MMOL/L (ref 0.36–1.25)
LDH SERPL L TO P-CCNC: 1.63 MMOL/L (ref 0.36–1.25)
LDH SERPL L TO P-CCNC: 1.67 MMOL/L (ref 0.36–1.25)
LDH SERPL L TO P-CCNC: 1.7 MMOL/L (ref 0.36–1.25)
LDH SERPL L TO P-CCNC: 1.83 MMOL/L (ref 0.5–2.2)
LDH SERPL L TO P-CCNC: 1.99 MMOL/L (ref 0.36–1.25)
LDH SERPL L TO P-CCNC: 10.11 MMOL/L (ref 0.36–1.25)
LDH SERPL L TO P-CCNC: 2.42 MMOL/L (ref 0.36–1.25)
LDH SERPL L TO P-CCNC: 2.68 MMOL/L (ref 0.5–2.2)
LDH SERPL L TO P-CCNC: 3.88 MMOL/L (ref 0.36–1.25)
LDH SERPL L TO P-CCNC: 7.5 MMOL/L (ref 0.36–1.25)
LDH SERPL L TO P-CCNC: 8.53 MMOL/L (ref 0.36–1.25)
LDH SERPL L TO P-CCNC: 8.72 MMOL/L (ref 0.36–1.25)
LDH SERPL L TO P-CCNC: 8.94 MMOL/L (ref 0.36–1.25)
LDH SERPL L TO P-CCNC: 8.96 MMOL/L (ref 0.36–1.25)
LDH SERPL L TO P-CCNC: 8.99 MMOL/L (ref 0.36–1.25)
LDH SERPL L TO P-CCNC: 9.04 MMOL/L (ref 0.36–1.25)
LDH SERPL L TO P-CCNC: 9.8 MMOL/L (ref 0.36–1.25)
LDH SERPL L TO P-CCNC: 9.87 MMOL/L (ref 0.36–1.25)
LEFT ATRIUM SIZE: 4.01 CM
LEFT ATRIUM SIZE: 4.31 CM
LEFT ATRIUM SIZE: 4.92 CM
LEFT ATRIUM SIZE: 5.65 CM
LEFT ATRIUM VOLUME INDEX: 47.3 ML/M2
LEFT ATRIUM VOLUME INDEX: 66.4 ML/M2
LEFT ATRIUM VOLUME INDEX: 97.5 ML/M2
LEFT ATRIUM VOLUME: 128.8 CM3
LEFT ATRIUM VOLUME: 185.34 CM3
LEFT ATRIUM VOLUME: 90.28 CM3
LEFT INTERNAL DIMENSION IN SYSTOLE: 3.02 CM (ref 2.1–4)
LEFT INTERNAL DIMENSION IN SYSTOLE: 3.39 CM (ref 2.1–4)
LEFT INTERNAL DIMENSION IN SYSTOLE: 3.9 CM (ref 2.1–4)
LEFT INTERNAL DIMENSION IN SYSTOLE: 4.01 CM (ref 2.1–4)
LEFT VENTRICLE DIASTOLIC VOLUME INDEX: 42.81 ML/M2
LEFT VENTRICLE DIASTOLIC VOLUME INDEX: 43.98 ML/M2
LEFT VENTRICLE DIASTOLIC VOLUME INDEX: 52.67 ML/M2
LEFT VENTRICLE DIASTOLIC VOLUME INDEX: 54.4 ML/M2
LEFT VENTRICLE DIASTOLIC VOLUME: 100.12 ML
LEFT VENTRICLE DIASTOLIC VOLUME: 103.86 ML
LEFT VENTRICLE DIASTOLIC VOLUME: 83.04 ML
LEFT VENTRICLE DIASTOLIC VOLUME: 83.97 ML
LEFT VENTRICLE MASS INDEX: 102 G/M2
LEFT VENTRICLE MASS INDEX: 60 G/M2
LEFT VENTRICLE MASS INDEX: 91 G/M2
LEFT VENTRICLE MASS INDEX: 94 G/M2
LEFT VENTRICLE SYSTOLIC VOLUME INDEX: 18.3 ML/M2
LEFT VENTRICLE SYSTOLIC VOLUME INDEX: 24.7 ML/M2
LEFT VENTRICLE SYSTOLIC VOLUME INDEX: 37 ML/M2
LEFT VENTRICLE SYSTOLIC VOLUME INDEX: 40.9 ML/M2
LEFT VENTRICLE SYSTOLIC VOLUME: 35.49 ML
LEFT VENTRICLE SYSTOLIC VOLUME: 46.93 ML
LEFT VENTRICLE SYSTOLIC VOLUME: 70.55 ML
LEFT VENTRICLE SYSTOLIC VOLUME: 78.18 ML
LEFT VENTRICULAR INTERNAL DIMENSION IN DIASTOLE: 4.3 CM (ref 3.5–6)
LEFT VENTRICULAR INTERNAL DIMENSION IN DIASTOLE: 4.6 CM (ref 3.5–6)
LEFT VENTRICULAR INTERNAL DIMENSION IN DIASTOLE: 4.66 CM (ref 3.5–6)
LEFT VENTRICULAR INTERNAL DIMENSION IN DIASTOLE: 4.73 CM (ref 3.5–6)
LEFT VENTRICULAR MASS: 116.87 G
LEFT VENTRICULAR MASS: 173.01 G
LEFT VENTRICULAR MASS: 178.92 G
LEFT VENTRICULAR MASS: 193.31 G
LEUKOCYTE EST, POC UA: NEGATIVE
LEUKOCYTE ESTERASE UR QL STRIP: ABNORMAL
LV LATERAL E/E' RATIO: 15.75 M/S
LV SEPTAL E/E' RATIO: 21 M/S
LYMPHOCYTES # BLD AUTO: 0.5 K/UL (ref 1–4.8)
LYMPHOCYTES # BLD AUTO: 0.5 K/UL (ref 1–4.8)
LYMPHOCYTES # BLD AUTO: 0.6 K/UL (ref 1–4.8)
LYMPHOCYTES # BLD AUTO: 0.7 K/UL (ref 1–4.8)
LYMPHOCYTES # BLD AUTO: 0.7 K/UL (ref 1–4.8)
LYMPHOCYTES # BLD AUTO: 0.8 K/UL (ref 1–4.8)
LYMPHOCYTES # BLD AUTO: 0.9 K/UL (ref 1–4.8)
LYMPHOCYTES # BLD AUTO: 1 K/UL (ref 1–4.8)
LYMPHOCYTES # BLD AUTO: 1.1 K/UL (ref 1–4.8)
LYMPHOCYTES # BLD AUTO: 1.2 K/UL (ref 1–4.8)
LYMPHOCYTES # BLD AUTO: 1.3 K/UL (ref 1–4.8)
LYMPHOCYTES # BLD AUTO: 1.4 K/UL (ref 1–4.8)
LYMPHOCYTES # BLD AUTO: 1.5 K/UL (ref 1–4.8)
LYMPHOCYTES # BLD AUTO: 1.6 K/UL (ref 1–4.8)
LYMPHOCYTES # BLD AUTO: 1.7 K/UL (ref 1–4.8)
LYMPHOCYTES # BLD AUTO: 1.7 K/UL (ref 1–4.8)
LYMPHOCYTES # BLD AUTO: 1.8 K/UL (ref 1–4.8)
LYMPHOCYTES # BLD AUTO: 1.9 K/UL (ref 1–4.8)
LYMPHOCYTES # BLD AUTO: 2.1 K/UL (ref 1–4.8)
LYMPHOCYTES # BLD AUTO: 2.2 K/UL (ref 1–4.8)
LYMPHOCYTES # BLD AUTO: 2.4 K/UL (ref 1–4.8)
LYMPHOCYTES # BLD AUTO: 2.5 K/UL (ref 1–4.8)
LYMPHOCYTES # BLD AUTO: 2.6 K/UL (ref 1–4.8)
LYMPHOCYTES # BLD AUTO: 2.8 K/UL (ref 1–4.8)
LYMPHOCYTES # BLD AUTO: 3.8 K/UL (ref 1–4.8)
LYMPHOCYTES # BLD AUTO: ABNORMAL K/UL (ref 1–4.8)
LYMPHOCYTES NFR BLD: 10.2 % (ref 18–48)
LYMPHOCYTES NFR BLD: 10.3 % (ref 18–48)
LYMPHOCYTES NFR BLD: 10.4 % (ref 18–48)
LYMPHOCYTES NFR BLD: 10.6 % (ref 18–48)
LYMPHOCYTES NFR BLD: 10.8 % (ref 18–48)
LYMPHOCYTES NFR BLD: 10.9 % (ref 18–48)
LYMPHOCYTES NFR BLD: 11.1 % (ref 18–48)
LYMPHOCYTES NFR BLD: 11.2 % (ref 18–48)
LYMPHOCYTES NFR BLD: 11.2 % (ref 18–48)
LYMPHOCYTES NFR BLD: 11.3 % (ref 18–48)
LYMPHOCYTES NFR BLD: 11.5 % (ref 18–48)
LYMPHOCYTES NFR BLD: 11.6 % (ref 18–48)
LYMPHOCYTES NFR BLD: 11.7 % (ref 18–48)
LYMPHOCYTES NFR BLD: 12 % (ref 18–48)
LYMPHOCYTES NFR BLD: 12.1 % (ref 18–48)
LYMPHOCYTES NFR BLD: 12.6 % (ref 18–48)
LYMPHOCYTES NFR BLD: 12.8 % (ref 18–48)
LYMPHOCYTES NFR BLD: 12.9 % (ref 18–48)
LYMPHOCYTES NFR BLD: 12.9 % (ref 18–48)
LYMPHOCYTES NFR BLD: 13 % (ref 18–48)
LYMPHOCYTES NFR BLD: 13 % (ref 18–48)
LYMPHOCYTES NFR BLD: 13.4 % (ref 18–48)
LYMPHOCYTES NFR BLD: 13.6 % (ref 18–48)
LYMPHOCYTES NFR BLD: 14.1 % (ref 18–48)
LYMPHOCYTES NFR BLD: 14.4 % (ref 18–48)
LYMPHOCYTES NFR BLD: 14.8 % (ref 18–48)
LYMPHOCYTES NFR BLD: 15 % (ref 18–48)
LYMPHOCYTES NFR BLD: 15.3 % (ref 18–48)
LYMPHOCYTES NFR BLD: 16 % (ref 18–48)
LYMPHOCYTES NFR BLD: 2 % (ref 18–48)
LYMPHOCYTES NFR BLD: 2 % (ref 18–48)
LYMPHOCYTES NFR BLD: 24.3 % (ref 18–48)
LYMPHOCYTES NFR BLD: 33.1 % (ref 18–48)
LYMPHOCYTES NFR BLD: 37.5 % (ref 18–48)
LYMPHOCYTES NFR BLD: 4 % (ref 18–48)
LYMPHOCYTES NFR BLD: 4 % (ref 18–48)
LYMPHOCYTES NFR BLD: 4.3 % (ref 18–48)
LYMPHOCYTES NFR BLD: 4.5 % (ref 18–48)
LYMPHOCYTES NFR BLD: 4.7 % (ref 18–48)
LYMPHOCYTES NFR BLD: 5 % (ref 18–48)
LYMPHOCYTES NFR BLD: 5.2 % (ref 18–48)
LYMPHOCYTES NFR BLD: 5.4 % (ref 18–48)
LYMPHOCYTES NFR BLD: 5.4 % (ref 18–48)
LYMPHOCYTES NFR BLD: 5.6 % (ref 18–48)
LYMPHOCYTES NFR BLD: 5.8 % (ref 18–48)
LYMPHOCYTES NFR BLD: 5.8 % (ref 18–48)
LYMPHOCYTES NFR BLD: 5.9 % (ref 18–48)
LYMPHOCYTES NFR BLD: 6 % (ref 18–48)
LYMPHOCYTES NFR BLD: 6.3 % (ref 18–48)
LYMPHOCYTES NFR BLD: 6.4 % (ref 18–48)
LYMPHOCYTES NFR BLD: 6.5 % (ref 18–48)
LYMPHOCYTES NFR BLD: 6.6 % (ref 18–48)
LYMPHOCYTES NFR BLD: 6.7 % (ref 18–48)
LYMPHOCYTES NFR BLD: 6.9 % (ref 18–48)
LYMPHOCYTES NFR BLD: 6.9 % (ref 18–48)
LYMPHOCYTES NFR BLD: 7 % (ref 18–48)
LYMPHOCYTES NFR BLD: 7.2 % (ref 18–48)
LYMPHOCYTES NFR BLD: 7.3 % (ref 18–48)
LYMPHOCYTES NFR BLD: 7.4 % (ref 18–48)
LYMPHOCYTES NFR BLD: 7.5 % (ref 18–48)
LYMPHOCYTES NFR BLD: 7.6 % (ref 18–48)
LYMPHOCYTES NFR BLD: 7.7 % (ref 18–48)
LYMPHOCYTES NFR BLD: 7.7 % (ref 18–48)
LYMPHOCYTES NFR BLD: 7.8 % (ref 18–48)
LYMPHOCYTES NFR BLD: 8 % (ref 18–48)
LYMPHOCYTES NFR BLD: 8.1 % (ref 18–48)
LYMPHOCYTES NFR BLD: 8.4 % (ref 18–48)
LYMPHOCYTES NFR BLD: 8.7 % (ref 18–48)
LYMPHOCYTES NFR BLD: 8.9 % (ref 18–48)
LYMPHOCYTES NFR BLD: 9 % (ref 18–48)
LYMPHOCYTES NFR BLD: 9.1 % (ref 18–48)
LYMPHOCYTES NFR BLD: 9.4 % (ref 18–48)
LYMPHOCYTES NFR BLD: 9.5 % (ref 18–48)
LYMPHOCYTES NFR BLD: 9.6 % (ref 18–48)
LYMPHOCYTES NFR BLD: 9.6 % (ref 18–48)
LYMPHOCYTES NFR BLD: 9.7 % (ref 18–48)
LYMPHOCYTES NFR BLD: 9.7 % (ref 18–48)
LYMPHOCYTES NFR BLD: 9.8 % (ref 18–48)
LYMPHOCYTES NFR BLD: 9.8 % (ref 18–48)
Lab: NORMAL
MAGNESIUM SERPL-MCNC: 1.8 MG/DL (ref 1.6–2.6)
MAGNESIUM SERPL-MCNC: 1.9 MG/DL (ref 1.6–2.6)
MAGNESIUM SERPL-MCNC: 1.9 MG/DL (ref 1.6–2.6)
MAGNESIUM SERPL-MCNC: 2 MG/DL (ref 1.6–2.6)
MAGNESIUM SERPL-MCNC: 2.1 MG/DL (ref 1.6–2.6)
MAGNESIUM SERPL-MCNC: 2.2 MG/DL (ref 1.6–2.6)
MAGNESIUM SERPL-MCNC: 2.3 MG/DL (ref 1.6–2.6)
MAGNESIUM SERPL-MCNC: 2.4 MG/DL (ref 1.6–2.6)
MAGNESIUM SERPL-MCNC: 2.5 MG/DL (ref 1.6–2.6)
MAGNESIUM SERPL-MCNC: 2.6 MG/DL (ref 1.6–2.6)
MAGNESIUM SERPL-MCNC: 2.7 MG/DL (ref 1.6–2.6)
MAGNESIUM SERPL-MCNC: 2.8 MG/DL (ref 1.6–2.6)
MAGNESIUM SERPL-MCNC: 2.9 MG/DL (ref 1.6–2.6)
MAGNESIUM SERPL-MCNC: 3.1 MG/DL (ref 1.6–2.6)
MAGNESIUM SERPL-MCNC: 3.2 MG/DL (ref 1.6–2.6)
MAGNESIUM SERPL-MCNC: 3.3 MG/DL (ref 1.6–2.6)
MAGNESIUM SERPL-MCNC: 3.3 MG/DL (ref 1.6–2.6)
MAGNESIUM SERPL-MCNC: 3.4 MG/DL (ref 1.6–2.6)
MCH RBC QN AUTO: 28.5 PG (ref 27–31)
MCH RBC QN AUTO: 28.5 PG (ref 27–31)
MCH RBC QN AUTO: 28.9 PG (ref 27–31)
MCH RBC QN AUTO: 28.9 PG (ref 27–31)
MCH RBC QN AUTO: 29 PG (ref 27–31)
MCH RBC QN AUTO: 29.1 PG (ref 27–31)
MCH RBC QN AUTO: 29.2 PG (ref 27–31)
MCH RBC QN AUTO: 29.3 PG (ref 27–31)
MCH RBC QN AUTO: 29.4 PG (ref 27–31)
MCH RBC QN AUTO: 29.5 PG (ref 27–31)
MCH RBC QN AUTO: 29.6 PG (ref 27–31)
MCH RBC QN AUTO: 29.7 PG (ref 27–31)
MCH RBC QN AUTO: 29.8 PG (ref 27–31)
MCH RBC QN AUTO: 29.9 PG (ref 27–31)
MCH RBC QN AUTO: 30 PG (ref 27–31)
MCH RBC QN AUTO: 30.1 PG (ref 27–31)
MCH RBC QN AUTO: 30.2 PG (ref 27–31)
MCH RBC QN AUTO: 30.2 PG (ref 27–31)
MCH RBC QN AUTO: 30.3 PG (ref 27–31)
MCH RBC QN AUTO: 30.4 PG (ref 27–31)
MCH RBC QN AUTO: 30.5 PG (ref 27–31)
MCH RBC QN AUTO: 30.6 PG (ref 27–31)
MCH RBC QN AUTO: 30.7 PG (ref 27–31)
MCH RBC QN AUTO: 30.8 PG (ref 27–31)
MCH RBC QN AUTO: 30.9 PG (ref 27–31)
MCH RBC QN AUTO: 31 PG (ref 27–31)
MCH RBC QN AUTO: 31.1 PG (ref 27–31)
MCH RBC QN AUTO: 31.1 PG (ref 27–31)
MCH RBC QN AUTO: 31.2 PG (ref 27–31)
MCH RBC QN AUTO: 31.2 PG (ref 27–31)
MCH RBC QN AUTO: 31.3 PG (ref 27–31)
MCH RBC QN AUTO: 31.4 PG (ref 27–31)
MCHC RBC AUTO-ENTMCNC: 28.7 G/DL (ref 32–36)
MCHC RBC AUTO-ENTMCNC: 28.8 G/DL (ref 32–36)
MCHC RBC AUTO-ENTMCNC: 28.9 G/DL (ref 32–36)
MCHC RBC AUTO-ENTMCNC: 28.9 G/DL (ref 32–36)
MCHC RBC AUTO-ENTMCNC: 29 G/DL (ref 32–36)
MCHC RBC AUTO-ENTMCNC: 29 G/DL (ref 32–36)
MCHC RBC AUTO-ENTMCNC: 29.1 G/DL (ref 32–36)
MCHC RBC AUTO-ENTMCNC: 29.2 G/DL (ref 32–36)
MCHC RBC AUTO-ENTMCNC: 29.3 G/DL (ref 32–36)
MCHC RBC AUTO-ENTMCNC: 29.3 G/DL (ref 32–36)
MCHC RBC AUTO-ENTMCNC: 29.4 G/DL (ref 32–36)
MCHC RBC AUTO-ENTMCNC: 29.5 G/DL (ref 32–36)
MCHC RBC AUTO-ENTMCNC: 29.6 G/DL (ref 32–36)
MCHC RBC AUTO-ENTMCNC: 29.6 G/DL (ref 32–36)
MCHC RBC AUTO-ENTMCNC: 29.7 G/DL (ref 32–36)
MCHC RBC AUTO-ENTMCNC: 29.8 G/DL (ref 32–36)
MCHC RBC AUTO-ENTMCNC: 29.8 G/DL (ref 32–36)
MCHC RBC AUTO-ENTMCNC: 29.9 G/DL (ref 32–36)
MCHC RBC AUTO-ENTMCNC: 30 G/DL (ref 32–36)
MCHC RBC AUTO-ENTMCNC: 30.1 G/DL (ref 32–36)
MCHC RBC AUTO-ENTMCNC: 30.1 G/DL (ref 32–36)
MCHC RBC AUTO-ENTMCNC: 30.2 G/DL (ref 32–36)
MCHC RBC AUTO-ENTMCNC: 30.2 G/DL (ref 32–36)
MCHC RBC AUTO-ENTMCNC: 30.3 G/DL (ref 32–36)
MCHC RBC AUTO-ENTMCNC: 30.3 G/DL (ref 32–36)
MCHC RBC AUTO-ENTMCNC: 30.4 G/DL (ref 32–36)
MCHC RBC AUTO-ENTMCNC: 30.4 G/DL (ref 32–36)
MCHC RBC AUTO-ENTMCNC: 30.5 G/DL (ref 32–36)
MCHC RBC AUTO-ENTMCNC: 30.5 G/DL (ref 32–36)
MCHC RBC AUTO-ENTMCNC: 30.6 G/DL (ref 32–36)
MCHC RBC AUTO-ENTMCNC: 30.7 G/DL (ref 32–36)
MCHC RBC AUTO-ENTMCNC: 30.8 G/DL (ref 32–36)
MCHC RBC AUTO-ENTMCNC: 30.8 G/DL (ref 32–36)
MCHC RBC AUTO-ENTMCNC: 30.9 G/DL (ref 32–36)
MCHC RBC AUTO-ENTMCNC: 31 G/DL (ref 32–36)
MCHC RBC AUTO-ENTMCNC: 31.1 G/DL (ref 32–36)
MCHC RBC AUTO-ENTMCNC: 31.2 G/DL (ref 32–36)
MCHC RBC AUTO-ENTMCNC: 31.2 G/DL (ref 32–36)
MCHC RBC AUTO-ENTMCNC: 31.3 G/DL (ref 32–36)
MCHC RBC AUTO-ENTMCNC: 31.3 G/DL (ref 32–36)
MCHC RBC AUTO-ENTMCNC: 31.4 G/DL (ref 32–36)
MCHC RBC AUTO-ENTMCNC: 31.4 G/DL (ref 32–36)
MCHC RBC AUTO-ENTMCNC: 31.5 G/DL (ref 32–36)
MCHC RBC AUTO-ENTMCNC: 31.5 G/DL (ref 32–36)
MCHC RBC AUTO-ENTMCNC: 31.6 G/DL (ref 32–36)
MCHC RBC AUTO-ENTMCNC: 31.7 G/DL (ref 32–36)
MCHC RBC AUTO-ENTMCNC: 31.8 G/DL (ref 32–36)
MCHC RBC AUTO-ENTMCNC: 31.8 G/DL (ref 32–36)
MCHC RBC AUTO-ENTMCNC: 31.9 G/DL (ref 32–36)
MCHC RBC AUTO-ENTMCNC: 32 G/DL (ref 32–36)
MCHC RBC AUTO-ENTMCNC: 32.1 G/DL (ref 32–36)
MCHC RBC AUTO-ENTMCNC: 32.2 G/DL (ref 32–36)
MCHC RBC AUTO-ENTMCNC: 32.3 G/DL (ref 32–36)
MCHC RBC AUTO-ENTMCNC: 32.3 G/DL (ref 32–36)
MCHC RBC AUTO-ENTMCNC: 32.4 G/DL (ref 32–36)
MCHC RBC AUTO-ENTMCNC: 32.5 G/DL (ref 32–36)
MCHC RBC AUTO-ENTMCNC: 32.5 G/DL (ref 32–36)
MCHC RBC AUTO-ENTMCNC: 32.6 G/DL (ref 32–36)
MCHC RBC AUTO-ENTMCNC: 32.6 G/DL (ref 32–36)
MCHC RBC AUTO-ENTMCNC: 32.7 G/DL (ref 32–36)
MCHC RBC AUTO-ENTMCNC: 32.7 G/DL (ref 32–36)
MCHC RBC AUTO-ENTMCNC: 32.8 G/DL (ref 32–36)
MCHC RBC AUTO-ENTMCNC: 32.9 G/DL (ref 32–36)
MCHC RBC AUTO-ENTMCNC: 32.9 G/DL (ref 32–36)
MCHC RBC AUTO-ENTMCNC: 33 G/DL (ref 32–36)
MCHC RBC AUTO-ENTMCNC: 33.2 G/DL (ref 32–36)
MCHC RBC AUTO-ENTMCNC: 33.2 G/DL (ref 32–36)
MCHC RBC AUTO-ENTMCNC: 33.3 G/DL (ref 32–36)
MCHC RBC AUTO-ENTMCNC: 33.5 G/DL (ref 32–36)
MCHC RBC AUTO-ENTMCNC: 33.6 G/DL (ref 32–36)
MCHC RBC AUTO-ENTMCNC: 33.7 G/DL (ref 32–36)
MCHC RBC AUTO-ENTMCNC: 33.9 G/DL (ref 32–36)
MCHC RBC AUTO-ENTMCNC: 34.1 G/DL (ref 32–36)
MCHC RBC AUTO-ENTMCNC: 34.4 G/DL (ref 32–36)
MCHC RBC AUTO-ENTMCNC: 34.4 G/DL (ref 32–36)
MCHC RBC AUTO-ENTMCNC: 34.6 G/DL (ref 32–36)
MCV RBC AUTO: 100 FL (ref 82–98)
MCV RBC AUTO: 101 FL (ref 82–98)
MCV RBC AUTO: 102 FL (ref 82–98)
MCV RBC AUTO: 103 FL (ref 82–98)
MCV RBC AUTO: 90 FL (ref 82–98)
MCV RBC AUTO: 91 FL (ref 82–98)
MCV RBC AUTO: 92 FL (ref 82–98)
MCV RBC AUTO: 93 FL (ref 82–98)
MCV RBC AUTO: 94 FL (ref 82–98)
MCV RBC AUTO: 95 FL (ref 82–98)
MCV RBC AUTO: 96 FL (ref 82–98)
MCV RBC AUTO: 97 FL (ref 82–98)
MCV RBC AUTO: 98 FL (ref 82–98)
MCV RBC AUTO: 99 FL (ref 82–98)
METAMYELOCYTES NFR BLD MANUAL: 1 %
METAMYELOCYTES NFR BLD MANUAL: 2 %
METAMYELOCYTES NFR BLD MANUAL: 3 %
METHEMOGLOBIN: 1.1 % (ref 0–3)
METHEMOGLOBIN: 1.2 % (ref 0–3)
METHEMOGLOBIN: 1.2 % (ref 0–3)
METHEMOGLOBIN: 1.3 % (ref 0–3)
METHEMOGLOBIN: 1.4 % (ref 0–3)
MICROALBUMIN UR DL<=1MG/L-MCNC: 341 UG/ML
MICROSCOPIC COMMENT: ABNORMAL
MICROSCOPIC COMMENT: NORMAL
MIN VOL: 10.9
MIN VOL: 5
MIN VOL: 5.6
MIN VOL: 6.2
MIN VOL: 6.27
MIN VOL: 6.49
MIN VOL: 6.52
MIN VOL: 6.61
MIN VOL: 6.89
MIN VOL: 7.3
MIN VOL: 8.55
MIN VOL: 9
MODE: ABNORMAL
MODE: NORMAL
MONOCYTES # BLD AUTO: 0.3 K/UL (ref 0.3–1)
MONOCYTES # BLD AUTO: 0.5 K/UL (ref 0.3–1)
MONOCYTES # BLD AUTO: 0.6 K/UL (ref 0.3–1)
MONOCYTES # BLD AUTO: 0.7 K/UL (ref 0.3–1)
MONOCYTES # BLD AUTO: 0.8 K/UL (ref 0.3–1)
MONOCYTES # BLD AUTO: 0.9 K/UL (ref 0.3–1)
MONOCYTES # BLD AUTO: 1 K/UL (ref 0.3–1)
MONOCYTES # BLD AUTO: 1.1 K/UL (ref 0.3–1)
MONOCYTES # BLD AUTO: 1.2 K/UL (ref 0.3–1)
MONOCYTES # BLD AUTO: 1.3 K/UL (ref 0.3–1)
MONOCYTES # BLD AUTO: 1.4 K/UL (ref 0.3–1)
MONOCYTES # BLD AUTO: 1.4 K/UL (ref 0.3–1)
MONOCYTES # BLD AUTO: 1.5 K/UL (ref 0.3–1)
MONOCYTES # BLD AUTO: 1.7 K/UL (ref 0.3–1)
MONOCYTES # BLD AUTO: 2 K/UL (ref 0.3–1)
MONOCYTES # BLD AUTO: ABNORMAL K/UL (ref 0.3–1)
MONOCYTES NFR BLD: 0 % (ref 4–15)
MONOCYTES NFR BLD: 1 % (ref 4–15)
MONOCYTES NFR BLD: 10.1 % (ref 4–15)
MONOCYTES NFR BLD: 10.3 % (ref 4–15)
MONOCYTES NFR BLD: 10.4 % (ref 4–15)
MONOCYTES NFR BLD: 10.5 % (ref 4–15)
MONOCYTES NFR BLD: 10.6 % (ref 4–15)
MONOCYTES NFR BLD: 10.6 % (ref 4–15)
MONOCYTES NFR BLD: 11.7 % (ref 4–15)
MONOCYTES NFR BLD: 12.1 % (ref 4–15)
MONOCYTES NFR BLD: 14.4 % (ref 4–15)
MONOCYTES NFR BLD: 2 % (ref 4–15)
MONOCYTES NFR BLD: 2.9 % (ref 4–15)
MONOCYTES NFR BLD: 3 % (ref 4–15)
MONOCYTES NFR BLD: 3.3 % (ref 4–15)
MONOCYTES NFR BLD: 3.7 % (ref 4–15)
MONOCYTES NFR BLD: 3.7 % (ref 4–15)
MONOCYTES NFR BLD: 3.9 % (ref 4–15)
MONOCYTES NFR BLD: 4 % (ref 4–15)
MONOCYTES NFR BLD: 4.2 % (ref 4–15)
MONOCYTES NFR BLD: 4.2 % (ref 4–15)
MONOCYTES NFR BLD: 4.3 % (ref 4–15)
MONOCYTES NFR BLD: 4.3 % (ref 4–15)
MONOCYTES NFR BLD: 4.5 % (ref 4–15)
MONOCYTES NFR BLD: 4.6 % (ref 4–15)
MONOCYTES NFR BLD: 4.8 % (ref 4–15)
MONOCYTES NFR BLD: 4.9 % (ref 4–15)
MONOCYTES NFR BLD: 4.9 % (ref 4–15)
MONOCYTES NFR BLD: 5 % (ref 4–15)
MONOCYTES NFR BLD: 5.1 % (ref 4–15)
MONOCYTES NFR BLD: 5.4 % (ref 4–15)
MONOCYTES NFR BLD: 5.4 % (ref 4–15)
MONOCYTES NFR BLD: 5.5 % (ref 4–15)
MONOCYTES NFR BLD: 5.5 % (ref 4–15)
MONOCYTES NFR BLD: 5.7 % (ref 4–15)
MONOCYTES NFR BLD: 5.8 % (ref 4–15)
MONOCYTES NFR BLD: 5.9 % (ref 4–15)
MONOCYTES NFR BLD: 6 % (ref 4–15)
MONOCYTES NFR BLD: 6.1 % (ref 4–15)
MONOCYTES NFR BLD: 6.5 % (ref 4–15)
MONOCYTES NFR BLD: 6.5 % (ref 4–15)
MONOCYTES NFR BLD: 6.6 % (ref 4–15)
MONOCYTES NFR BLD: 6.8 % (ref 4–15)
MONOCYTES NFR BLD: 7 % (ref 4–15)
MONOCYTES NFR BLD: 7.1 % (ref 4–15)
MONOCYTES NFR BLD: 7.1 % (ref 4–15)
MONOCYTES NFR BLD: 7.2 % (ref 4–15)
MONOCYTES NFR BLD: 7.3 % (ref 4–15)
MONOCYTES NFR BLD: 7.5 % (ref 4–15)
MONOCYTES NFR BLD: 7.5 % (ref 4–15)
MONOCYTES NFR BLD: 7.7 % (ref 4–15)
MONOCYTES NFR BLD: 7.8 % (ref 4–15)
MONOCYTES NFR BLD: 7.9 % (ref 4–15)
MONOCYTES NFR BLD: 8 % (ref 4–15)
MONOCYTES NFR BLD: 8.2 % (ref 4–15)
MONOCYTES NFR BLD: 8.3 % (ref 4–15)
MONOCYTES NFR BLD: 8.4 % (ref 4–15)
MONOCYTES NFR BLD: 8.4 % (ref 4–15)
MONOCYTES NFR BLD: 8.5 % (ref 4–15)
MONOCYTES NFR BLD: 8.6 % (ref 4–15)
MONOCYTES NFR BLD: 8.7 % (ref 4–15)
MONOCYTES NFR BLD: 8.8 % (ref 4–15)
MONOCYTES NFR BLD: 8.8 % (ref 4–15)
MONOCYTES NFR BLD: 8.9 % (ref 4–15)
MONOCYTES NFR BLD: 8.9 % (ref 4–15)
MONOCYTES NFR BLD: 9 % (ref 4–15)
MONOCYTES NFR BLD: 9.1 % (ref 4–15)
MONOCYTES NFR BLD: 9.2 % (ref 4–15)
MONOCYTES NFR BLD: 9.2 % (ref 4–15)
MONOCYTES NFR BLD: 9.3 % (ref 4–15)
MONOCYTES NFR BLD: 9.8 % (ref 4–15)
MV MEAN GRADIENT: 4 MMHG
MV PEAK A VEL: 0.86 M/S
MV PEAK E VEL: 1.89 M/S
MV STENOSIS PRESSURE HALF TIME: 46.74 MS
MV STENOSIS PRESSURE HALF TIME: 48.48 MS
MV VALVE AREA P 1/2 METHOD: 4.54 CM2
MV VALVE AREA P 1/2 METHOD: 4.71 CM2
MYELOCYTES NFR BLD MANUAL: 1 %
MYELOCYTES NFR BLD MANUAL: 2 %
MYELOCYTES NFR BLD MANUAL: 3 %
NEUTROPHILS # BLD AUTO: 10 K/UL (ref 1.8–7.7)
NEUTROPHILS # BLD AUTO: 10 K/UL (ref 1.8–7.7)
NEUTROPHILS # BLD AUTO: 10.1 K/UL (ref 1.8–7.7)
NEUTROPHILS # BLD AUTO: 10.2 K/UL (ref 1.8–7.7)
NEUTROPHILS # BLD AUTO: 10.5 K/UL (ref 1.8–7.7)
NEUTROPHILS # BLD AUTO: 10.5 K/UL (ref 1.8–7.7)
NEUTROPHILS # BLD AUTO: 10.7 K/UL (ref 1.8–7.7)
NEUTROPHILS # BLD AUTO: 10.8 K/UL (ref 1.8–7.7)
NEUTROPHILS # BLD AUTO: 10.9 K/UL (ref 1.8–7.7)
NEUTROPHILS # BLD AUTO: 11 K/UL (ref 1.8–7.7)
NEUTROPHILS # BLD AUTO: 11.2 K/UL (ref 1.8–7.7)
NEUTROPHILS # BLD AUTO: 11.2 K/UL (ref 1.8–7.7)
NEUTROPHILS # BLD AUTO: 11.5 K/UL (ref 1.8–7.7)
NEUTROPHILS # BLD AUTO: 11.6 K/UL (ref 1.8–7.7)
NEUTROPHILS # BLD AUTO: 11.7 K/UL (ref 1.8–7.7)
NEUTROPHILS # BLD AUTO: 11.7 K/UL (ref 1.8–7.7)
NEUTROPHILS # BLD AUTO: 11.8 K/UL (ref 1.8–7.7)
NEUTROPHILS # BLD AUTO: 12 K/UL (ref 1.8–7.7)
NEUTROPHILS # BLD AUTO: 12.1 K/UL (ref 1.8–7.7)
NEUTROPHILS # BLD AUTO: 12.7 K/UL (ref 1.8–7.7)
NEUTROPHILS # BLD AUTO: 12.8 K/UL (ref 1.8–7.7)
NEUTROPHILS # BLD AUTO: 13.8 K/UL (ref 1.8–7.7)
NEUTROPHILS # BLD AUTO: 14.3 K/UL (ref 1.8–7.7)
NEUTROPHILS # BLD AUTO: 14.5 K/UL (ref 1.8–7.7)
NEUTROPHILS # BLD AUTO: 14.7 K/UL (ref 1.8–7.7)
NEUTROPHILS # BLD AUTO: 15.4 K/UL (ref 1.8–7.7)
NEUTROPHILS # BLD AUTO: 16.2 K/UL (ref 1.8–7.7)
NEUTROPHILS # BLD AUTO: 16.2 K/UL (ref 1.8–7.7)
NEUTROPHILS # BLD AUTO: 17.2 K/UL (ref 1.8–7.7)
NEUTROPHILS # BLD AUTO: 17.4 K/UL (ref 1.8–7.7)
NEUTROPHILS # BLD AUTO: 17.6 K/UL (ref 1.8–7.7)
NEUTROPHILS # BLD AUTO: 17.6 K/UL (ref 1.8–7.7)
NEUTROPHILS # BLD AUTO: 18.6 K/UL (ref 1.8–7.7)
NEUTROPHILS # BLD AUTO: 18.9 K/UL (ref 1.8–7.7)
NEUTROPHILS # BLD AUTO: 18.9 K/UL (ref 1.8–7.7)
NEUTROPHILS # BLD AUTO: 19.6 K/UL (ref 1.8–7.7)
NEUTROPHILS # BLD AUTO: 20 K/UL (ref 1.8–7.7)
NEUTROPHILS # BLD AUTO: 20.9 K/UL (ref 1.8–7.7)
NEUTROPHILS # BLD AUTO: 21 K/UL (ref 1.8–7.7)
NEUTROPHILS # BLD AUTO: 21.5 K/UL (ref 1.8–7.7)
NEUTROPHILS # BLD AUTO: 21.6 K/UL (ref 1.8–7.7)
NEUTROPHILS # BLD AUTO: 21.9 K/UL (ref 1.8–7.7)
NEUTROPHILS # BLD AUTO: 23.4 K/UL (ref 1.8–7.7)
NEUTROPHILS # BLD AUTO: 4.8 K/UL (ref 1.8–7.7)
NEUTROPHILS # BLD AUTO: 5.2 K/UL (ref 1.8–7.7)
NEUTROPHILS # BLD AUTO: 5.6 K/UL (ref 1.8–7.7)
NEUTROPHILS # BLD AUTO: 5.7 K/UL (ref 1.8–7.7)
NEUTROPHILS # BLD AUTO: 6.2 K/UL (ref 1.8–7.7)
NEUTROPHILS # BLD AUTO: 6.8 K/UL (ref 1.8–7.7)
NEUTROPHILS # BLD AUTO: 6.9 K/UL (ref 1.8–7.7)
NEUTROPHILS # BLD AUTO: 6.9 K/UL (ref 1.8–7.7)
NEUTROPHILS # BLD AUTO: 7 K/UL (ref 1.8–7.7)
NEUTROPHILS # BLD AUTO: 7.2 K/UL (ref 1.8–7.7)
NEUTROPHILS # BLD AUTO: 7.4 K/UL (ref 1.8–7.7)
NEUTROPHILS # BLD AUTO: 7.5 K/UL (ref 1.8–7.7)
NEUTROPHILS # BLD AUTO: 7.5 K/UL (ref 1.8–7.7)
NEUTROPHILS # BLD AUTO: 7.7 K/UL (ref 1.8–7.7)
NEUTROPHILS # BLD AUTO: 7.8 K/UL (ref 1.8–7.7)
NEUTROPHILS # BLD AUTO: 7.9 K/UL (ref 1.8–7.7)
NEUTROPHILS # BLD AUTO: 8 K/UL (ref 1.8–7.7)
NEUTROPHILS # BLD AUTO: 8 K/UL (ref 1.8–7.7)
NEUTROPHILS # BLD AUTO: 8.1 K/UL (ref 1.8–7.7)
NEUTROPHILS # BLD AUTO: 8.2 K/UL (ref 1.8–7.7)
NEUTROPHILS # BLD AUTO: 8.3 K/UL (ref 1.8–7.7)
NEUTROPHILS # BLD AUTO: 8.3 K/UL (ref 1.8–7.7)
NEUTROPHILS # BLD AUTO: 8.4 K/UL (ref 1.8–7.7)
NEUTROPHILS # BLD AUTO: 8.5 K/UL (ref 1.8–7.7)
NEUTROPHILS # BLD AUTO: 8.5 K/UL (ref 1.8–7.7)
NEUTROPHILS # BLD AUTO: 8.6 K/UL (ref 1.8–7.7)
NEUTROPHILS # BLD AUTO: 8.7 K/UL (ref 1.8–7.7)
NEUTROPHILS # BLD AUTO: 8.9 K/UL (ref 1.8–7.7)
NEUTROPHILS # BLD AUTO: 9 K/UL (ref 1.8–7.7)
NEUTROPHILS # BLD AUTO: 9 K/UL (ref 1.8–7.7)
NEUTROPHILS # BLD AUTO: 9.1 K/UL (ref 1.8–7.7)
NEUTROPHILS # BLD AUTO: 9.4 K/UL (ref 1.8–7.7)
NEUTROPHILS # BLD AUTO: 9.7 K/UL (ref 1.8–7.7)
NEUTROPHILS # BLD AUTO: 9.8 K/UL (ref 1.8–7.7)
NEUTROPHILS # BLD AUTO: 9.9 K/UL (ref 1.8–7.7)
NEUTROPHILS # BLD AUTO: ABNORMAL K/UL (ref 1.8–7.7)
NEUTROPHILS NFR BLD: 51.1 % (ref 38–73)
NEUTROPHILS NFR BLD: 56.3 % (ref 38–73)
NEUTROPHILS NFR BLD: 56.5 % (ref 38–73)
NEUTROPHILS NFR BLD: 67.9 % (ref 38–73)
NEUTROPHILS NFR BLD: 70.5 % (ref 38–73)
NEUTROPHILS NFR BLD: 72.1 % (ref 38–73)
NEUTROPHILS NFR BLD: 72.4 % (ref 38–73)
NEUTROPHILS NFR BLD: 73.1 % (ref 38–73)
NEUTROPHILS NFR BLD: 74.5 % (ref 38–73)
NEUTROPHILS NFR BLD: 75.1 % (ref 38–73)
NEUTROPHILS NFR BLD: 75.6 % (ref 38–73)
NEUTROPHILS NFR BLD: 75.6 % (ref 38–73)
NEUTROPHILS NFR BLD: 75.8 % (ref 38–73)
NEUTROPHILS NFR BLD: 76 % (ref 38–73)
NEUTROPHILS NFR BLD: 76 % (ref 38–73)
NEUTROPHILS NFR BLD: 76.6 % (ref 38–73)
NEUTROPHILS NFR BLD: 76.9 % (ref 38–73)
NEUTROPHILS NFR BLD: 77.1 % (ref 38–73)
NEUTROPHILS NFR BLD: 77.3 % (ref 38–73)
NEUTROPHILS NFR BLD: 77.3 % (ref 38–73)
NEUTROPHILS NFR BLD: 77.8 % (ref 38–73)
NEUTROPHILS NFR BLD: 78 % (ref 38–73)
NEUTROPHILS NFR BLD: 78.1 % (ref 38–73)
NEUTROPHILS NFR BLD: 78.4 % (ref 38–73)
NEUTROPHILS NFR BLD: 78.4 % (ref 38–73)
NEUTROPHILS NFR BLD: 78.5 % (ref 38–73)
NEUTROPHILS NFR BLD: 78.9 % (ref 38–73)
NEUTROPHILS NFR BLD: 78.9 % (ref 38–73)
NEUTROPHILS NFR BLD: 79 % (ref 38–73)
NEUTROPHILS NFR BLD: 79.2 % (ref 38–73)
NEUTROPHILS NFR BLD: 79.5 % (ref 38–73)
NEUTROPHILS NFR BLD: 79.6 % (ref 38–73)
NEUTROPHILS NFR BLD: 79.9 % (ref 38–73)
NEUTROPHILS NFR BLD: 80 % (ref 38–73)
NEUTROPHILS NFR BLD: 80.1 % (ref 38–73)
NEUTROPHILS NFR BLD: 80.1 % (ref 38–73)
NEUTROPHILS NFR BLD: 80.2 % (ref 38–73)
NEUTROPHILS NFR BLD: 80.4 % (ref 38–73)
NEUTROPHILS NFR BLD: 80.4 % (ref 38–73)
NEUTROPHILS NFR BLD: 80.5 % (ref 38–73)
NEUTROPHILS NFR BLD: 80.9 % (ref 38–73)
NEUTROPHILS NFR BLD: 81 % (ref 38–73)
NEUTROPHILS NFR BLD: 81 % (ref 38–73)
NEUTROPHILS NFR BLD: 81.1 % (ref 38–73)
NEUTROPHILS NFR BLD: 81.1 % (ref 38–73)
NEUTROPHILS NFR BLD: 81.3 % (ref 38–73)
NEUTROPHILS NFR BLD: 81.8 % (ref 38–73)
NEUTROPHILS NFR BLD: 81.9 % (ref 38–73)
NEUTROPHILS NFR BLD: 81.9 % (ref 38–73)
NEUTROPHILS NFR BLD: 82 % (ref 38–73)
NEUTROPHILS NFR BLD: 82.3 % (ref 38–73)
NEUTROPHILS NFR BLD: 82.3 % (ref 38–73)
NEUTROPHILS NFR BLD: 82.4 % (ref 38–73)
NEUTROPHILS NFR BLD: 83.2 % (ref 38–73)
NEUTROPHILS NFR BLD: 83.5 % (ref 38–73)
NEUTROPHILS NFR BLD: 83.5 % (ref 38–73)
NEUTROPHILS NFR BLD: 83.6 % (ref 38–73)
NEUTROPHILS NFR BLD: 83.7 % (ref 38–73)
NEUTROPHILS NFR BLD: 83.8 % (ref 38–73)
NEUTROPHILS NFR BLD: 83.9 % (ref 38–73)
NEUTROPHILS NFR BLD: 84 % (ref 38–73)
NEUTROPHILS NFR BLD: 84.4 % (ref 38–73)
NEUTROPHILS NFR BLD: 84.4 % (ref 38–73)
NEUTROPHILS NFR BLD: 84.8 % (ref 38–73)
NEUTROPHILS NFR BLD: 85 % (ref 38–73)
NEUTROPHILS NFR BLD: 85.4 % (ref 38–73)
NEUTROPHILS NFR BLD: 85.6 % (ref 38–73)
NEUTROPHILS NFR BLD: 85.6 % (ref 38–73)
NEUTROPHILS NFR BLD: 86 % (ref 38–73)
NEUTROPHILS NFR BLD: 86.1 % (ref 38–73)
NEUTROPHILS NFR BLD: 86.3 % (ref 38–73)
NEUTROPHILS NFR BLD: 86.5 % (ref 38–73)
NEUTROPHILS NFR BLD: 86.9 % (ref 38–73)
NEUTROPHILS NFR BLD: 87 % (ref 38–73)
NEUTROPHILS NFR BLD: 87 % (ref 38–73)
NEUTROPHILS NFR BLD: 87.1 % (ref 38–73)
NEUTROPHILS NFR BLD: 87.5 % (ref 38–73)
NEUTROPHILS NFR BLD: 87.6 % (ref 38–73)
NEUTROPHILS NFR BLD: 87.8 % (ref 38–73)
NEUTROPHILS NFR BLD: 87.9 % (ref 38–73)
NEUTROPHILS NFR BLD: 88 % (ref 38–73)
NEUTROPHILS NFR BLD: 88.3 % (ref 38–73)
NEUTROPHILS NFR BLD: 88.3 % (ref 38–73)
NEUTROPHILS NFR BLD: 88.7 % (ref 38–73)
NEUTROPHILS NFR BLD: 89 % (ref 38–73)
NEUTROPHILS NFR BLD: 90 % (ref 38–73)
NEUTROPHILS NFR BLD: 92 % (ref 38–73)
NEUTROPHILS NFR BLD: 93 % (ref 38–73)
NEUTROPHILS NFR BLD: 94 % (ref 38–73)
NEUTS BAND NFR BLD MANUAL: 1 %
NEUTS BAND NFR BLD MANUAL: 2 %
NEUTS BAND NFR BLD MANUAL: 3 %
NEUTS BAND NFR BLD MANUAL: 4 %
NEUTS BAND NFR BLD MANUAL: 4 %
NEUTS BAND NFR BLD MANUAL: 4.7 %
NITRITE UR QL STRIP: NEGATIVE
NITRITE, POC UA: NEGATIVE
NRBC BLD-RTO: 0 /100 WBC
NRBC BLD-RTO: 1 /100 WBC
NRBC BLD-RTO: 2 /100 WBC
NUM UNITS TRANS FFP: NORMAL
NUM UNITS TRANS PACKED RBC: NORMAL
NUM UNITS TRANS PACKED RBC: NORMAL
OVALOCYTES BLD QL SMEAR: ABNORMAL
PCO2 BLDA: 22.8 MMHG (ref 35–45)
PCO2 BLDA: 22.9 MMHG (ref 35–45)
PCO2 BLDA: 26.6 MMHG (ref 35–45)
PCO2 BLDA: 27.5 MMHG (ref 35–45)
PCO2 BLDA: 28 MMHG (ref 35–45)
PCO2 BLDA: 29.6 MMHG (ref 35–45)
PCO2 BLDA: 29.8 MMHG (ref 35–45)
PCO2 BLDA: 30 MMHG (ref 35–45)
PCO2 BLDA: 30.2 MMHG (ref 35–45)
PCO2 BLDA: 30.4 MMHG (ref 35–45)
PCO2 BLDA: 30.8 MMHG (ref 35–45)
PCO2 BLDA: 30.8 MMHG (ref 35–45)
PCO2 BLDA: 31.3 MMHG (ref 35–45)
PCO2 BLDA: 32.1 MMHG (ref 35–45)
PCO2 BLDA: 32.4 MMHG (ref 35–45)
PCO2 BLDA: 32.5 MMHG (ref 35–45)
PCO2 BLDA: 32.6 MMHG (ref 35–45)
PCO2 BLDA: 32.8 MMHG (ref 35–45)
PCO2 BLDA: 32.8 MMHG (ref 35–45)
PCO2 BLDA: 32.9 MMHG (ref 35–45)
PCO2 BLDA: 33.1 MMHG (ref 35–45)
PCO2 BLDA: 33.2 MMHG (ref 35–45)
PCO2 BLDA: 33.4 MMHG (ref 35–45)
PCO2 BLDA: 33.5 MMHG (ref 35–45)
PCO2 BLDA: 33.5 MMHG (ref 35–45)
PCO2 BLDA: 33.9 MMHG (ref 35–45)
PCO2 BLDA: 34 MMHG (ref 35–45)
PCO2 BLDA: 34 MMHG (ref 35–45)
PCO2 BLDA: 34.1 MMHG (ref 35–45)
PCO2 BLDA: 34.5 MMHG (ref 35–45)
PCO2 BLDA: 34.6 MMHG (ref 35–45)
PCO2 BLDA: 34.8 MMHG (ref 35–45)
PCO2 BLDA: 34.9 MMHG (ref 35–45)
PCO2 BLDA: 35 MMHG (ref 35–45)
PCO2 BLDA: 35.1 MMHG (ref 35–45)
PCO2 BLDA: 35.2 MMHG (ref 35–45)
PCO2 BLDA: 35.2 MMHG (ref 35–45)
PCO2 BLDA: 35.4 MMHG (ref 35–45)
PCO2 BLDA: 35.5 MMHG (ref 35–45)
PCO2 BLDA: 35.7 MMHG (ref 35–45)
PCO2 BLDA: 35.9 MMHG (ref 35–45)
PCO2 BLDA: 36 MMHG (ref 35–45)
PCO2 BLDA: 36.1 MMHG (ref 35–45)
PCO2 BLDA: 36.1 MMHG (ref 35–45)
PCO2 BLDA: 36.4 MMHG (ref 35–45)
PCO2 BLDA: 36.5 MMHG (ref 35–45)
PCO2 BLDA: 36.5 MMHG (ref 35–45)
PCO2 BLDA: 36.9 MMHG (ref 35–45)
PCO2 BLDA: 36.9 MMHG (ref 35–45)
PCO2 BLDA: 37 MMHG (ref 35–45)
PCO2 BLDA: 37.1 MMHG (ref 35–45)
PCO2 BLDA: 37.1 MMHG (ref 35–45)
PCO2 BLDA: 37.3 MMHG (ref 35–45)
PCO2 BLDA: 37.6 MMHG (ref 35–45)
PCO2 BLDA: 37.7 MMHG (ref 35–45)
PCO2 BLDA: 37.7 MMHG (ref 35–45)
PCO2 BLDA: 38.3 MMHG (ref 35–45)
PCO2 BLDA: 38.7 MMHG (ref 35–45)
PCO2 BLDA: 39 MMHG (ref 35–45)
PCO2 BLDA: 39.1 MMHG (ref 35–45)
PCO2 BLDA: 39.8 MMHG (ref 35–45)
PCO2 BLDA: 39.8 MMHG (ref 35–45)
PCO2 BLDA: 40 MMHG (ref 35–45)
PCO2 BLDA: 40 MMHG (ref 35–45)
PCO2 BLDA: 40.2 MMHG (ref 35–45)
PCO2 BLDA: 40.7 MMHG (ref 35–45)
PCO2 BLDA: 41.1 MMHG (ref 35–45)
PCO2 BLDA: 41.1 MMHG (ref 35–45)
PCO2 BLDA: 41.3 MMHG (ref 35–45)
PCO2 BLDA: 41.5 MMHG (ref 35–45)
PCO2 BLDA: 41.6 MMHG (ref 35–45)
PCO2 BLDA: 41.8 MMHG (ref 35–45)
PCO2 BLDA: 42.6 MMHG (ref 35–45)
PCO2 BLDA: 42.9 MMHG (ref 35–45)
PCO2 BLDA: 43 MMHG (ref 35–45)
PCO2 BLDA: 43.4 MMHG (ref 35–45)
PCO2 BLDA: 43.4 MMHG (ref 35–45)
PCO2 BLDA: 43.5 MMHG (ref 35–45)
PCO2 BLDA: 43.6 MMHG (ref 35–45)
PCO2 BLDA: 43.9 MMHG (ref 35–45)
PCO2 BLDA: 44.1 MMHG (ref 35–45)
PCO2 BLDA: 44.6 MMHG (ref 35–45)
PCO2 BLDA: 45.1 MMHG (ref 35–45)
PCO2 BLDA: 45.2 MMHG (ref 35–45)
PCO2 BLDA: 45.3 MMHG (ref 35–45)
PCO2 BLDA: 45.6 MMHG (ref 35–45)
PCO2 BLDA: 45.9 MMHG (ref 35–45)
PCO2 BLDA: 47.1 MMHG (ref 35–45)
PCO2 BLDA: 47.5 MMHG (ref 35–45)
PCO2 BLDA: 47.8 MMHG (ref 35–45)
PCO2 BLDA: 48 MMHG (ref 35–45)
PCO2 BLDA: 48.4 MMHG (ref 35–45)
PCO2 BLDA: 49.1 MMHG (ref 35–45)
PCO2 BLDA: 49.1 MMHG (ref 35–45)
PCO2 BLDA: 49.7 MMHG (ref 35–45)
PEEP: 5
PH SMN: 7.18 [PH] (ref 7.35–7.45)
PH SMN: 7.2 [PH] (ref 7.35–7.45)
PH SMN: 7.24 [PH] (ref 7.35–7.45)
PH SMN: 7.27 [PH] (ref 7.35–7.45)
PH SMN: 7.28 [PH] (ref 7.35–7.45)
PH SMN: 7.28 [PH] (ref 7.35–7.45)
PH SMN: 7.29 [PH] (ref 7.35–7.45)
PH SMN: 7.29 [PH] (ref 7.35–7.45)
PH SMN: 7.3 [PH] (ref 7.35–7.45)
PH SMN: 7.31 [PH] (ref 7.35–7.45)
PH SMN: 7.32 [PH] (ref 7.35–7.45)
PH SMN: 7.34 [PH] (ref 7.35–7.45)
PH SMN: 7.34 [PH] (ref 7.35–7.45)
PH SMN: 7.35 [PH] (ref 7.35–7.45)
PH SMN: 7.36 [PH] (ref 7.35–7.45)
PH SMN: 7.37 [PH] (ref 7.35–7.45)
PH SMN: 7.38 [PH] (ref 7.35–7.45)
PH SMN: 7.38 [PH] (ref 7.35–7.45)
PH SMN: 7.39 [PH] (ref 7.35–7.45)
PH SMN: 7.4 [PH] (ref 7.35–7.45)
PH SMN: 7.41 [PH] (ref 7.35–7.45)
PH SMN: 7.42 [PH] (ref 7.35–7.45)
PH SMN: 7.43 [PH] (ref 7.35–7.45)
PH SMN: 7.44 [PH] (ref 7.35–7.45)
PH SMN: 7.45 [PH] (ref 7.35–7.45)
PH SMN: 7.46 [PH] (ref 7.35–7.45)
PH SMN: 7.46 [PH] (ref 7.35–7.45)
PH SMN: 7.47 [PH] (ref 7.35–7.45)
PH SMN: 7.48 [PH] (ref 7.35–7.45)
PH SMN: 7.49 [PH] (ref 7.35–7.45)
PH SMN: 7.5 [PH] (ref 7.35–7.45)
PH SMN: 7.51 [PH] (ref 7.35–7.45)
PH SMN: 7.51 [PH] (ref 7.35–7.45)
PH SMN: 7.52 [PH] (ref 7.35–7.45)
PH SMN: 7.53 [PH] (ref 7.35–7.45)
PH SMN: 7.54 [PH] (ref 7.35–7.45)
PH SMN: 7.54 [PH] (ref 7.35–7.45)
PH SMN: 7.56 [PH] (ref 7.35–7.45)
PH SMN: 7.56 [PH] (ref 7.35–7.45)
PH UR STRIP: 5 [PH] (ref 5–8)
PH UR STRIP: 5.5 [PH]
PHOSPHATE SERPL-MCNC: 1.1 MG/DL (ref 2.7–4.5)
PHOSPHATE SERPL-MCNC: 1.2 MG/DL (ref 2.7–4.5)
PHOSPHATE SERPL-MCNC: 1.4 MG/DL (ref 2.7–4.5)
PHOSPHATE SERPL-MCNC: 1.6 MG/DL (ref 2.7–4.5)
PHOSPHATE SERPL-MCNC: 1.6 MG/DL (ref 2.7–4.5)
PHOSPHATE SERPL-MCNC: 1.8 MG/DL (ref 2.7–4.5)
PHOSPHATE SERPL-MCNC: 2.2 MG/DL (ref 2.7–4.5)
PHOSPHATE SERPL-MCNC: 2.2 MG/DL (ref 2.7–4.5)
PHOSPHATE SERPL-MCNC: 2.4 MG/DL (ref 2.7–4.5)
PHOSPHATE SERPL-MCNC: 2.5 MG/DL (ref 2.7–4.5)
PHOSPHATE SERPL-MCNC: 2.6 MG/DL (ref 2.7–4.5)
PHOSPHATE SERPL-MCNC: 2.6 MG/DL (ref 2.7–4.5)
PHOSPHATE SERPL-MCNC: 2.7 MG/DL (ref 2.7–4.5)
PHOSPHATE SERPL-MCNC: 2.8 MG/DL (ref 2.7–4.5)
PHOSPHATE SERPL-MCNC: 2.9 MG/DL (ref 2.7–4.5)
PHOSPHATE SERPL-MCNC: 2.9 MG/DL (ref 2.7–4.5)
PHOSPHATE SERPL-MCNC: 3 MG/DL (ref 2.7–4.5)
PHOSPHATE SERPL-MCNC: 3.1 MG/DL (ref 2.7–4.5)
PHOSPHATE SERPL-MCNC: 3.2 MG/DL (ref 2.7–4.5)
PHOSPHATE SERPL-MCNC: 3.2 MG/DL (ref 2.7–4.5)
PHOSPHATE SERPL-MCNC: 3.3 MG/DL (ref 2.7–4.5)
PHOSPHATE SERPL-MCNC: 3.4 MG/DL (ref 2.7–4.5)
PHOSPHATE SERPL-MCNC: 3.5 MG/DL (ref 2.7–4.5)
PHOSPHATE SERPL-MCNC: 3.6 MG/DL (ref 2.7–4.5)
PHOSPHATE SERPL-MCNC: 3.7 MG/DL (ref 2.7–4.5)
PHOSPHATE SERPL-MCNC: 3.8 MG/DL (ref 2.7–4.5)
PHOSPHATE SERPL-MCNC: 3.8 MG/DL (ref 2.7–4.5)
PHOSPHATE SERPL-MCNC: 3.9 MG/DL (ref 2.7–4.5)
PHOSPHATE SERPL-MCNC: 4 MG/DL (ref 2.7–4.5)
PHOSPHATE SERPL-MCNC: 4 MG/DL (ref 2.7–4.5)
PHOSPHATE SERPL-MCNC: 4.1 MG/DL (ref 2.7–4.5)
PHOSPHATE SERPL-MCNC: 4.2 MG/DL (ref 2.7–4.5)
PHOSPHATE SERPL-MCNC: 4.3 MG/DL (ref 2.7–4.5)
PHOSPHATE SERPL-MCNC: 4.4 MG/DL (ref 2.7–4.5)
PHOSPHATE SERPL-MCNC: 4.5 MG/DL (ref 2.7–4.5)
PHOSPHATE SERPL-MCNC: 4.6 MG/DL (ref 2.7–4.5)
PHOSPHATE SERPL-MCNC: 4.7 MG/DL (ref 2.7–4.5)
PHOSPHATE SERPL-MCNC: 4.8 MG/DL (ref 2.7–4.5)
PHOSPHATE SERPL-MCNC: 4.8 MG/DL (ref 2.7–4.5)
PHOSPHATE SERPL-MCNC: 4.9 MG/DL (ref 2.7–4.5)
PHOSPHATE SERPL-MCNC: 5 MG/DL (ref 2.7–4.5)
PHOSPHATE SERPL-MCNC: 5.1 MG/DL (ref 2.7–4.5)
PHOSPHATE SERPL-MCNC: 5.2 MG/DL (ref 2.7–4.5)
PHOSPHATE SERPL-MCNC: 5.4 MG/DL (ref 2.7–4.5)
PHOSPHATE SERPL-MCNC: 5.7 MG/DL (ref 2.7–4.5)
PHOSPHATE SERPL-MCNC: 5.7 MG/DL (ref 2.7–4.5)
PHOSPHATE SERPL-MCNC: 5.9 MG/DL (ref 2.7–4.5)
PHOSPHATE SERPL-MCNC: 5.9 MG/DL (ref 2.7–4.5)
PHOSPHATE SERPL-MCNC: 6 MG/DL (ref 2.7–4.5)
PHOSPHATE SERPL-MCNC: 6.1 MG/DL (ref 2.7–4.5)
PHOSPHATE SERPL-MCNC: 6.5 MG/DL (ref 2.7–4.5)
PHOSPHATE SERPL-MCNC: 6.6 MG/DL (ref 2.7–4.5)
PHOSPHATE SERPL-MCNC: 6.6 MG/DL (ref 2.7–4.5)
PHOSPHATE SERPL-MCNC: 6.7 MG/DL (ref 2.7–4.5)
PHOSPHATE SERPL-MCNC: 6.7 MG/DL (ref 2.7–4.5)
PHOSPHATE SERPL-MCNC: 6.9 MG/DL (ref 2.7–4.5)
PHOSPHATE SERPL-MCNC: 7.1 MG/DL (ref 2.7–4.5)
PHOSPHATE SERPL-MCNC: 7.3 MG/DL (ref 2.7–4.5)
PHOSPHATE SERPL-MCNC: 7.3 MG/DL (ref 2.7–4.5)
PHOSPHATE SERPL-MCNC: 7.8 MG/DL (ref 2.7–4.5)
PHOSPHATE SERPL-MCNC: <1 MG/DL (ref 2.7–4.5)
PIP: 15
PIP: 15
PIP: 18
PIP: 20
PIP: 21
PIP: 22
PIP: 22
PIP: 23
PIP: 25
PIP: 25
PIP: 34
PISA MRMAX VEL: 0.05 M/S
PISA TR MAX VEL: 2.34 M/S
PISA TR MAX VEL: 3.09 M/S
PISA TR MAX VEL: 3.53 M/S
PLATELET # BLD AUTO: 102 K/UL (ref 150–350)
PLATELET # BLD AUTO: 103 K/UL (ref 150–350)
PLATELET # BLD AUTO: 111 K/UL (ref 150–350)
PLATELET # BLD AUTO: 114 K/UL (ref 150–350)
PLATELET # BLD AUTO: 117 K/UL (ref 150–350)
PLATELET # BLD AUTO: 121 K/UL (ref 150–350)
PLATELET # BLD AUTO: 122 K/UL (ref 150–350)
PLATELET # BLD AUTO: 123 K/UL (ref 150–350)
PLATELET # BLD AUTO: 124 K/UL (ref 150–350)
PLATELET # BLD AUTO: 124 K/UL (ref 150–350)
PLATELET # BLD AUTO: 127 K/UL (ref 150–350)
PLATELET # BLD AUTO: 127 K/UL (ref 150–350)
PLATELET # BLD AUTO: 129 K/UL (ref 150–350)
PLATELET # BLD AUTO: 136 K/UL (ref 150–350)
PLATELET # BLD AUTO: 140 K/UL (ref 150–350)
PLATELET # BLD AUTO: 140 K/UL (ref 150–350)
PLATELET # BLD AUTO: 144 K/UL (ref 150–350)
PLATELET # BLD AUTO: 144 K/UL (ref 150–350)
PLATELET # BLD AUTO: 147 K/UL (ref 150–350)
PLATELET # BLD AUTO: 153 K/UL (ref 150–350)
PLATELET # BLD AUTO: 161 K/UL (ref 150–350)
PLATELET # BLD AUTO: 164 K/UL (ref 150–350)
PLATELET # BLD AUTO: 166 K/UL (ref 150–350)
PLATELET # BLD AUTO: 166 K/UL (ref 150–350)
PLATELET # BLD AUTO: 169 K/UL (ref 150–350)
PLATELET # BLD AUTO: 174 K/UL (ref 150–350)
PLATELET # BLD AUTO: 175 K/UL (ref 150–350)
PLATELET # BLD AUTO: 175 K/UL (ref 150–350)
PLATELET # BLD AUTO: 180 K/UL (ref 150–350)
PLATELET # BLD AUTO: 183 K/UL (ref 150–350)
PLATELET # BLD AUTO: 183 K/UL (ref 150–350)
PLATELET # BLD AUTO: 184 K/UL (ref 150–350)
PLATELET # BLD AUTO: 192 K/UL (ref 150–350)
PLATELET # BLD AUTO: 194 K/UL (ref 150–350)
PLATELET # BLD AUTO: 194 K/UL (ref 150–350)
PLATELET # BLD AUTO: 195 K/UL (ref 150–350)
PLATELET # BLD AUTO: 198 K/UL (ref 150–350)
PLATELET # BLD AUTO: 198 K/UL (ref 150–350)
PLATELET # BLD AUTO: 201 K/UL (ref 150–350)
PLATELET # BLD AUTO: 207 K/UL (ref 150–350)
PLATELET # BLD AUTO: 207 K/UL (ref 150–350)
PLATELET # BLD AUTO: 215 K/UL (ref 150–350)
PLATELET # BLD AUTO: 216 K/UL (ref 150–350)
PLATELET # BLD AUTO: 218 K/UL (ref 150–350)
PLATELET # BLD AUTO: 227 K/UL (ref 150–350)
PLATELET # BLD AUTO: 227 K/UL (ref 150–350)
PLATELET # BLD AUTO: 234 K/UL (ref 150–350)
PLATELET # BLD AUTO: 235 K/UL (ref 150–350)
PLATELET # BLD AUTO: 237 K/UL (ref 150–350)
PLATELET # BLD AUTO: 238 K/UL (ref 150–350)
PLATELET # BLD AUTO: 238 K/UL (ref 150–350)
PLATELET # BLD AUTO: 239 K/UL (ref 150–350)
PLATELET # BLD AUTO: 246 K/UL (ref 150–350)
PLATELET # BLD AUTO: 252 K/UL (ref 150–350)
PLATELET # BLD AUTO: 252 K/UL (ref 150–350)
PLATELET # BLD AUTO: 253 K/UL (ref 150–350)
PLATELET # BLD AUTO: 256 K/UL (ref 150–350)
PLATELET # BLD AUTO: 266 K/UL (ref 150–350)
PLATELET # BLD AUTO: 266 K/UL (ref 150–350)
PLATELET # BLD AUTO: 267 K/UL (ref 150–350)
PLATELET # BLD AUTO: 270 K/UL (ref 150–350)
PLATELET # BLD AUTO: 273 K/UL (ref 150–350)
PLATELET # BLD AUTO: 281 K/UL (ref 150–350)
PLATELET # BLD AUTO: 283 K/UL (ref 150–350)
PLATELET # BLD AUTO: 289 K/UL (ref 150–350)
PLATELET # BLD AUTO: 292 K/UL (ref 150–350)
PLATELET # BLD AUTO: 295 K/UL (ref 150–350)
PLATELET # BLD AUTO: 296 K/UL (ref 150–350)
PLATELET # BLD AUTO: 301 K/UL (ref 150–350)
PLATELET # BLD AUTO: 307 K/UL (ref 150–350)
PLATELET # BLD AUTO: 310 K/UL (ref 150–350)
PLATELET # BLD AUTO: 325 K/UL (ref 150–350)
PLATELET # BLD AUTO: 328 K/UL (ref 150–350)
PLATELET # BLD AUTO: 337 K/UL (ref 150–350)
PLATELET # BLD AUTO: 345 K/UL (ref 150–350)
PLATELET # BLD AUTO: 360 K/UL (ref 150–350)
PLATELET # BLD AUTO: 361 K/UL (ref 150–350)
PLATELET # BLD AUTO: 362 K/UL (ref 150–350)
PLATELET # BLD AUTO: 377 K/UL (ref 150–350)
PLATELET # BLD AUTO: 379 K/UL (ref 150–350)
PLATELET # BLD AUTO: 386 K/UL (ref 150–350)
PLATELET # BLD AUTO: 40 K/UL (ref 150–350)
PLATELET # BLD AUTO: 40 K/UL (ref 150–350)
PLATELET # BLD AUTO: 401 K/UL (ref 150–350)
PLATELET # BLD AUTO: 404 K/UL (ref 150–350)
PLATELET # BLD AUTO: 41 K/UL (ref 150–350)
PLATELET # BLD AUTO: 42 K/UL (ref 150–350)
PLATELET # BLD AUTO: 43 K/UL (ref 150–350)
PLATELET # BLD AUTO: 44 K/UL (ref 150–350)
PLATELET # BLD AUTO: 45 K/UL (ref 150–350)
PLATELET # BLD AUTO: 46 K/UL (ref 150–350)
PLATELET # BLD AUTO: 47 K/UL (ref 150–350)
PLATELET # BLD AUTO: 47 K/UL (ref 150–350)
PLATELET # BLD AUTO: 48 K/UL (ref 150–350)
PLATELET # BLD AUTO: 49 K/UL (ref 150–350)
PLATELET # BLD AUTO: 52 K/UL (ref 150–350)
PLATELET # BLD AUTO: 56 K/UL (ref 150–350)
PLATELET # BLD AUTO: 61 K/UL (ref 150–350)
PLATELET # BLD AUTO: 63 K/UL (ref 150–350)
PLATELET # BLD AUTO: 66 K/UL (ref 150–350)
PLATELET # BLD AUTO: 71 K/UL (ref 150–350)
PLATELET # BLD AUTO: 75 K/UL (ref 150–350)
PLATELET # BLD AUTO: 75 K/UL (ref 150–350)
PLATELET # BLD AUTO: 80 K/UL (ref 150–350)
PLATELET # BLD AUTO: 84 K/UL (ref 150–350)
PLATELET # BLD AUTO: 86 K/UL (ref 150–350)
PLATELET # BLD AUTO: 95 K/UL (ref 150–350)
PLATELET BLD QL SMEAR: ABNORMAL
PMV BLD AUTO: 10.8 FL (ref 9.2–12.9)
PMV BLD AUTO: 10.8 FL (ref 9.2–12.9)
PMV BLD AUTO: 11 FL (ref 9.2–12.9)
PMV BLD AUTO: 11 FL (ref 9.2–12.9)
PMV BLD AUTO: 11.1 FL (ref 9.2–12.9)
PMV BLD AUTO: 11.2 FL (ref 9.2–12.9)
PMV BLD AUTO: 11.3 FL (ref 9.2–12.9)
PMV BLD AUTO: 11.3 FL (ref 9.2–12.9)
PMV BLD AUTO: 11.4 FL (ref 9.2–12.9)
PMV BLD AUTO: 11.5 FL (ref 9.2–12.9)
PMV BLD AUTO: 11.6 FL (ref 9.2–12.9)
PMV BLD AUTO: 11.7 FL (ref 9.2–12.9)
PMV BLD AUTO: 11.8 FL (ref 9.2–12.9)
PMV BLD AUTO: 11.9 FL (ref 9.2–12.9)
PMV BLD AUTO: 12 FL (ref 9.2–12.9)
PMV BLD AUTO: 12.1 FL (ref 9.2–12.9)
PMV BLD AUTO: 12.2 FL (ref 9.2–12.9)
PMV BLD AUTO: 12.3 FL (ref 9.2–12.9)
PMV BLD AUTO: 12.4 FL (ref 9.2–12.9)
PMV BLD AUTO: 12.5 FL (ref 9.2–12.9)
PMV BLD AUTO: 12.5 FL (ref 9.2–12.9)
PMV BLD AUTO: 12.6 FL (ref 9.2–12.9)
PMV BLD AUTO: 12.7 FL (ref 9.2–12.9)
PMV BLD AUTO: 12.8 FL (ref 9.2–12.9)
PMV BLD AUTO: 12.9 FL (ref 9.2–12.9)
PMV BLD AUTO: 13.1 FL (ref 9.2–12.9)
PMV BLD AUTO: 13.3 FL (ref 9.2–12.9)
PMV BLD AUTO: 13.4 FL (ref 9.2–12.9)
PMV BLD AUTO: 13.5 FL (ref 9.2–12.9)
PMV BLD AUTO: 13.8 FL (ref 9.2–12.9)
PO2 BLDA: 101 MMHG (ref 80–100)
PO2 BLDA: 102 MMHG (ref 80–100)
PO2 BLDA: 104 MMHG (ref 80–100)
PO2 BLDA: 104 MMHG (ref 80–100)
PO2 BLDA: 106 MMHG (ref 80–100)
PO2 BLDA: 108 MMHG (ref 80–100)
PO2 BLDA: 109 MMHG (ref 80–100)
PO2 BLDA: 109 MMHG (ref 80–100)
PO2 BLDA: 111 MMHG (ref 80–100)
PO2 BLDA: 112 MMHG (ref 80–100)
PO2 BLDA: 112 MMHG (ref 80–100)
PO2 BLDA: 116 MMHG (ref 80–100)
PO2 BLDA: 118 MMHG (ref 80–100)
PO2 BLDA: 118 MMHG (ref 80–100)
PO2 BLDA: 119 MMHG (ref 80–100)
PO2 BLDA: 123 MMHG (ref 80–100)
PO2 BLDA: 124 MMHG (ref 80–100)
PO2 BLDA: 125 MMHG (ref 80–100)
PO2 BLDA: 125 MMHG (ref 80–100)
PO2 BLDA: 132 MMHG (ref 80–100)
PO2 BLDA: 133 MMHG (ref 80–100)
PO2 BLDA: 137 MMHG (ref 80–100)
PO2 BLDA: 138 MMHG (ref 80–100)
PO2 BLDA: 140 MMHG (ref 80–100)
PO2 BLDA: 147 MMHG (ref 80–100)
PO2 BLDA: 149 MMHG (ref 80–100)
PO2 BLDA: 152 MMHG (ref 80–100)
PO2 BLDA: 156 MMHG (ref 80–100)
PO2 BLDA: 157 MMHG (ref 80–100)
PO2 BLDA: 159 MMHG (ref 80–100)
PO2 BLDA: 163 MMHG (ref 80–100)
PO2 BLDA: 166 MMHG (ref 80–100)
PO2 BLDA: 168 MMHG (ref 80–100)
PO2 BLDA: 179 MMHG (ref 80–100)
PO2 BLDA: 202 MMHG (ref 80–100)
PO2 BLDA: 208 MMHG (ref 80–100)
PO2 BLDA: 224 MMHG (ref 80–100)
PO2 BLDA: 24 MMHG (ref 40–60)
PO2 BLDA: 245 MMHG (ref 80–100)
PO2 BLDA: 260 MMHG (ref 80–100)
PO2 BLDA: 267 MMHG (ref 80–100)
PO2 BLDA: 267 MMHG (ref 80–100)
PO2 BLDA: 27 MMHG (ref 40–60)
PO2 BLDA: 278 MMHG (ref 80–100)
PO2 BLDA: 281 MMHG (ref 80–100)
PO2 BLDA: 29 MMHG (ref 40–60)
PO2 BLDA: 300 MMHG (ref 80–100)
PO2 BLDA: 308 MMHG (ref 80–100)
PO2 BLDA: 318 MMHG (ref 80–100)
PO2 BLDA: 318 MMHG (ref 80–100)
PO2 BLDA: 330 MMHG (ref 80–100)
PO2 BLDA: 334 MMHG (ref 80–100)
PO2 BLDA: 343 MMHG (ref 80–100)
PO2 BLDA: 35 MMHG (ref 40–60)
PO2 BLDA: 36 MMHG (ref 40–60)
PO2 BLDA: 362 MMHG (ref 80–100)
PO2 BLDA: 381 MMHG (ref 80–100)
PO2 BLDA: 395 MMHG (ref 80–100)
PO2 BLDA: 396 MMHG (ref 80–100)
PO2 BLDA: 409 MMHG (ref 80–100)
PO2 BLDA: 42 MMHG (ref 40–60)
PO2 BLDA: 42 MMHG (ref 40–60)
PO2 BLDA: 452 MMHG (ref 80–100)
PO2 BLDA: 47 MMHG (ref 40–60)
PO2 BLDA: 485 MMHG (ref 80–100)
PO2 BLDA: 486 MMHG (ref 80–100)
PO2 BLDA: 51 MMHG (ref 40–60)
PO2 BLDA: 56 MMHG (ref 80–100)
PO2 BLDA: 57 MMHG (ref 80–100)
PO2 BLDA: 58 MMHG (ref 80–100)
PO2 BLDA: 63 MMHG (ref 80–100)
PO2 BLDA: 67 MMHG (ref 80–100)
PO2 BLDA: 70 MMHG (ref 80–100)
PO2 BLDA: 70 MMHG (ref 80–100)
PO2 BLDA: 71 MMHG (ref 80–100)
PO2 BLDA: 76 MMHG (ref 80–100)
PO2 BLDA: 81 MMHG (ref 80–100)
PO2 BLDA: 82 MMHG (ref 80–100)
PO2 BLDA: 83 MMHG (ref 80–100)
PO2 BLDA: 84 MMHG (ref 80–100)
PO2 BLDA: 84 MMHG (ref 80–100)
PO2 BLDA: 85 MMHG (ref 80–100)
PO2 BLDA: 86 MMHG (ref 80–100)
PO2 BLDA: 87 MMHG (ref 80–100)
PO2 BLDA: 88 MMHG (ref 80–100)
PO2 BLDA: 88 MMHG (ref 80–100)
PO2 BLDA: 90 MMHG (ref 80–100)
PO2 BLDA: 90 MMHG (ref 80–100)
PO2 BLDA: 91 MMHG (ref 80–100)
PO2 BLDA: 91 MMHG (ref 80–100)
PO2 BLDA: 93 MMHG (ref 80–100)
PO2 BLDA: 95 MMHG (ref 80–100)
PO2 BLDA: 95 MMHG (ref 80–100)
PO2 BLDA: 96 MMHG (ref 80–100)
PO2 BLDA: 98 MMHG (ref 80–100)
PO2 BLDA: 99 MMHG (ref 80–100)
PO2 BLDA: 99 MMHG (ref 80–100)
POC ALT (SGPT): 50 U/L (ref 10–47)
POC AST (SGOT): 70 U/L (ref 11–38)
POC B-TYPE NATRIURETIC PEPTIDE: 382 PG/ML (ref 0–100)
POC BE: -1 MMOL/L
POC BE: -10 MMOL/L
POC BE: -11 MMOL/L
POC BE: -2 MMOL/L
POC BE: -3 MMOL/L
POC BE: -4 MMOL/L
POC BE: -5 MMOL/L
POC BE: -6 MMOL/L
POC BE: -6 MMOL/L
POC BE: -7 MMOL/L
POC BE: -8 MMOL/L
POC BE: -9 MMOL/L
POC BE: 0 MMOL/L
POC BE: 1 MMOL/L
POC BE: 11 MMOL/L
POC BE: 12 MMOL/L
POC BE: 12 MMOL/L
POC BE: 14 MMOL/L
POC BE: 14 MMOL/L
POC BE: 15 MMOL/L
POC BE: 2 MMOL/L
POC BE: 3 MMOL/L
POC BE: 4 MMOL/L
POC BE: 5 MMOL/L
POC BE: 6 MMOL/L
POC BE: 7 MMOL/L
POC BE: 7 MMOL/L
POC BE: 8 MMOL/L
POC BE: 8 MMOL/L
POC BE: 9 MMOL/L
POC BE: 9 MMOL/L
POC CARDIAC TROPONIN I: 0.01 NG/ML
POC IONIZED CALCIUM: 0.89 MMOL/L (ref 1.06–1.42)
POC IONIZED CALCIUM: 0.93 MMOL/L (ref 1.06–1.42)
POC IONIZED CALCIUM: 0.94 MMOL/L (ref 1.06–1.42)
POC IONIZED CALCIUM: 0.96 MMOL/L (ref 1.06–1.42)
POC IONIZED CALCIUM: 0.97 MMOL/L (ref 1.06–1.42)
POC IONIZED CALCIUM: 0.98 MMOL/L (ref 1.06–1.42)
POC IONIZED CALCIUM: 1 MMOL/L (ref 1.06–1.42)
POC IONIZED CALCIUM: 1.01 MMOL/L (ref 1.06–1.42)
POC IONIZED CALCIUM: 1.03 MMOL/L (ref 1.06–1.42)
POC IONIZED CALCIUM: 1.04 MMOL/L (ref 1.06–1.42)
POC IONIZED CALCIUM: 1.05 MMOL/L (ref 1.06–1.42)
POC IONIZED CALCIUM: 1.05 MMOL/L (ref 1.06–1.42)
POC IONIZED CALCIUM: 1.06 MMOL/L (ref 1.06–1.42)
POC IONIZED CALCIUM: 1.07 MMOL/L (ref 1.06–1.42)
POC IONIZED CALCIUM: 1.08 MMOL/L (ref 1.06–1.42)
POC IONIZED CALCIUM: 1.09 MMOL/L (ref 1.06–1.42)
POC IONIZED CALCIUM: 1.09 MMOL/L (ref 1.06–1.42)
POC IONIZED CALCIUM: 1.1 MMOL/L (ref 1.06–1.42)
POC IONIZED CALCIUM: 1.1 MMOL/L (ref 1.06–1.42)
POC IONIZED CALCIUM: 1.11 MMOL/L (ref 1.06–1.42)
POC IONIZED CALCIUM: 1.14 MMOL/L (ref 1.06–1.42)
POC IONIZED CALCIUM: 1.17 MMOL/L (ref 1.06–1.42)
POC IONIZED CALCIUM: 1.19 MMOL/L (ref 1.06–1.42)
POC IONIZED CALCIUM: 1.27 MMOL/L (ref 1.06–1.42)
POC IONIZED CALCIUM: 1.44 MMOL/L (ref 1.06–1.42)
POC IONIZED CALCIUM: 1.57 MMOL/L (ref 1.06–1.42)
POC PTINR: 1 (ref 0.9–1.2)
POC PTWBT: 11.6 SEC (ref 9.7–14.3)
POC SATURATED O2: 100 % (ref 95–100)
POC SATURATED O2: 41 % (ref 95–100)
POC SATURATED O2: 48 % (ref 95–100)
POC SATURATED O2: 48 % (ref 95–100)
POC SATURATED O2: 60 % (ref 95–100)
POC SATURATED O2: 67 % (ref 95–100)
POC SATURATED O2: 71 % (ref 95–100)
POC SATURATED O2: 74 % (ref 95–100)
POC SATURATED O2: 80 % (ref 95–100)
POC SATURATED O2: 86 % (ref 95–100)
POC SATURATED O2: 87 % (ref 95–100)
POC SATURATED O2: 91 % (ref 95–100)
POC SATURATED O2: 91 % (ref 95–100)
POC SATURATED O2: 92 % (ref 95–100)
POC SATURATED O2: 93 % (ref 95–100)
POC SATURATED O2: 95 % (ref 95–100)
POC SATURATED O2: 95 % (ref 95–100)
POC SATURATED O2: 96 % (ref 95–100)
POC SATURATED O2: 97 % (ref 95–100)
POC SATURATED O2: 98 % (ref 95–100)
POC SATURATED O2: 99 % (ref 95–100)
POC TCO2: 17 MMOL/L (ref 23–27)
POC TCO2: 17 MMOL/L (ref 23–27)
POC TCO2: 18 MMOL/L (ref 23–27)
POC TCO2: 19 MMOL/L (ref 23–27)
POC TCO2: 20 MMOL/L (ref 23–27)
POC TCO2: 21 MMOL/L (ref 23–27)
POC TCO2: 21 MMOL/L (ref 24–29)
POC TCO2: 22 MMOL/L (ref 23–27)
POC TCO2: 23 MMOL/L (ref 23–27)
POC TCO2: 23 MMOL/L (ref 24–29)
POC TCO2: 24 MMOL/L (ref 23–27)
POC TCO2: 25 MMOL/L (ref 18–33)
POC TCO2: 25 MMOL/L (ref 23–27)
POC TCO2: 25 MMOL/L (ref 24–29)
POC TCO2: 25 MMOL/L (ref 24–29)
POC TCO2: 26 MMOL/L (ref 23–27)
POC TCO2: 27 MMOL/L (ref 23–27)
POC TCO2: 27 MMOL/L (ref 24–29)
POC TCO2: 28 MMOL/L (ref 23–27)
POC TCO2: 28 MMOL/L (ref 24–29)
POC TCO2: 30 MMOL/L (ref 23–27)
POC TCO2: 31 MMOL/L (ref 23–27)
POC TCO2: 31 MMOL/L (ref 24–29)
POC TCO2: 33 MMOL/L (ref 23–27)
POC TCO2: 34 MMOL/L (ref 23–27)
POC TCO2: 35 MMOL/L (ref 23–27)
POC TCO2: 36 MMOL/L (ref 23–27)
POC TCO2: 36 MMOL/L (ref 23–27)
POC TCO2: 38 MMOL/L (ref 23–27)
POC TCO2: 39 MMOL/L (ref 23–27)
POC TCO2: 39 MMOL/L (ref 23–27)
POCT GLUCOSE: 100 MG/DL (ref 70–110)
POCT GLUCOSE: 101 MG/DL (ref 70–110)
POCT GLUCOSE: 101 MG/DL (ref 70–110)
POCT GLUCOSE: 102 MG/DL (ref 70–110)
POCT GLUCOSE: 102 MG/DL (ref 70–110)
POCT GLUCOSE: 103 MG/DL (ref 70–110)
POCT GLUCOSE: 104 MG/DL (ref 70–110)
POCT GLUCOSE: 104 MG/DL (ref 70–110)
POCT GLUCOSE: 105 MG/DL (ref 70–110)
POCT GLUCOSE: 105 MG/DL (ref 70–110)
POCT GLUCOSE: 106 MG/DL (ref 70–110)
POCT GLUCOSE: 107 MG/DL (ref 70–110)
POCT GLUCOSE: 108 MG/DL (ref 70–110)
POCT GLUCOSE: 109 MG/DL (ref 70–110)
POCT GLUCOSE: 110 MG/DL (ref 70–110)
POCT GLUCOSE: 111 MG/DL (ref 70–110)
POCT GLUCOSE: 111 MG/DL (ref 70–110)
POCT GLUCOSE: 112 MG/DL (ref 70–110)
POCT GLUCOSE: 112 MG/DL (ref 70–110)
POCT GLUCOSE: 113 MG/DL (ref 70–110)
POCT GLUCOSE: 113 MG/DL (ref 70–110)
POCT GLUCOSE: 114 MG/DL (ref 70–110)
POCT GLUCOSE: 115 MG/DL (ref 70–110)
POCT GLUCOSE: 115 MG/DL (ref 70–110)
POCT GLUCOSE: 116 MG/DL (ref 70–110)
POCT GLUCOSE: 116 MG/DL (ref 70–110)
POCT GLUCOSE: 117 MG/DL (ref 70–110)
POCT GLUCOSE: 118 MG/DL (ref 70–110)
POCT GLUCOSE: 119 MG/DL (ref 70–110)
POCT GLUCOSE: 120 MG/DL (ref 70–110)
POCT GLUCOSE: 121 MG/DL (ref 70–110)
POCT GLUCOSE: 122 MG/DL (ref 70–110)
POCT GLUCOSE: 123 MG/DL (ref 70–110)
POCT GLUCOSE: 124 MG/DL (ref 70–110)
POCT GLUCOSE: 124 MG/DL (ref 70–110)
POCT GLUCOSE: 125 MG/DL (ref 70–110)
POCT GLUCOSE: 126 MG/DL (ref 70–110)
POCT GLUCOSE: 127 MG/DL (ref 70–110)
POCT GLUCOSE: 128 MG/DL (ref 70–110)
POCT GLUCOSE: 128 MG/DL (ref 70–110)
POCT GLUCOSE: 129 MG/DL (ref 70–110)
POCT GLUCOSE: 130 MG/DL (ref 70–110)
POCT GLUCOSE: 131 MG/DL (ref 70–110)
POCT GLUCOSE: 131 MG/DL (ref 70–110)
POCT GLUCOSE: 133 MG/DL (ref 70–110)
POCT GLUCOSE: 135 MG/DL (ref 70–110)
POCT GLUCOSE: 136 MG/DL (ref 70–110)
POCT GLUCOSE: 137 MG/DL (ref 70–110)
POCT GLUCOSE: 139 MG/DL (ref 70–110)
POCT GLUCOSE: 139 MG/DL (ref 70–110)
POCT GLUCOSE: 140 MG/DL (ref 70–110)
POCT GLUCOSE: 141 MG/DL (ref 70–110)
POCT GLUCOSE: 142 MG/DL (ref 70–110)
POCT GLUCOSE: 143 MG/DL (ref 70–110)
POCT GLUCOSE: 144 MG/DL (ref 70–110)
POCT GLUCOSE: 145 MG/DL (ref 70–110)
POCT GLUCOSE: 146 MG/DL (ref 70–110)
POCT GLUCOSE: 146 MG/DL (ref 70–110)
POCT GLUCOSE: 147 MG/DL (ref 70–110)
POCT GLUCOSE: 149 MG/DL (ref 70–110)
POCT GLUCOSE: 149 MG/DL (ref 70–110)
POCT GLUCOSE: 150 MG/DL (ref 70–110)
POCT GLUCOSE: 150 MG/DL (ref 70–110)
POCT GLUCOSE: 153 MG/DL (ref 70–110)
POCT GLUCOSE: 154 MG/DL (ref 70–110)
POCT GLUCOSE: 157 MG/DL (ref 70–110)
POCT GLUCOSE: 158 MG/DL (ref 70–110)
POCT GLUCOSE: 160 MG/DL (ref 70–110)
POCT GLUCOSE: 163 MG/DL (ref 70–110)
POCT GLUCOSE: 164 MG/DL (ref 70–110)
POCT GLUCOSE: 164 MG/DL (ref 70–110)
POCT GLUCOSE: 165 MG/DL (ref 70–110)
POCT GLUCOSE: 167 MG/DL (ref 70–110)
POCT GLUCOSE: 170 MG/DL (ref 70–110)
POCT GLUCOSE: 170 MG/DL (ref 70–110)
POCT GLUCOSE: 171 MG/DL (ref 70–110)
POCT GLUCOSE: 172 MG/DL (ref 70–110)
POCT GLUCOSE: 173 MG/DL (ref 70–110)
POCT GLUCOSE: 178 MG/DL (ref 70–110)
POCT GLUCOSE: 181 MG/DL (ref 70–110)
POCT GLUCOSE: 196 MG/DL (ref 70–110)
POCT GLUCOSE: 244 MG/DL (ref 70–110)
POCT GLUCOSE: 248 MG/DL (ref 70–110)
POCT GLUCOSE: 31 MG/DL (ref 70–110)
POCT GLUCOSE: 431 MG/DL (ref 70–110)
POCT GLUCOSE: 48 MG/DL (ref 70–110)
POCT GLUCOSE: 49 MG/DL (ref 70–110)
POCT GLUCOSE: 52 MG/DL (ref 70–110)
POCT GLUCOSE: 65 MG/DL (ref 70–110)
POCT GLUCOSE: 66 MG/DL (ref 70–110)
POCT GLUCOSE: 69 MG/DL (ref 70–110)
POCT GLUCOSE: 71 MG/DL (ref 70–110)
POCT GLUCOSE: 72 MG/DL (ref 70–110)
POCT GLUCOSE: 72 MG/DL (ref 70–110)
POCT GLUCOSE: 73 MG/DL (ref 70–110)
POCT GLUCOSE: 75 MG/DL (ref 70–110)
POCT GLUCOSE: 77 MG/DL (ref 70–110)
POCT GLUCOSE: 78 MG/DL (ref 70–110)
POCT GLUCOSE: 79 MG/DL (ref 70–110)
POCT GLUCOSE: 79 MG/DL (ref 70–110)
POCT GLUCOSE: 81 MG/DL (ref 70–110)
POCT GLUCOSE: 82 MG/DL (ref 70–110)
POCT GLUCOSE: 83 MG/DL (ref 70–110)
POCT GLUCOSE: 84 MG/DL (ref 70–110)
POCT GLUCOSE: 84 MG/DL (ref 70–110)
POCT GLUCOSE: 85 MG/DL (ref 70–110)
POCT GLUCOSE: 86 MG/DL (ref 70–110)
POCT GLUCOSE: 87 MG/DL (ref 70–110)
POCT GLUCOSE: 88 MG/DL (ref 70–110)
POCT GLUCOSE: 88 MG/DL (ref 70–110)
POCT GLUCOSE: 91 MG/DL (ref 70–110)
POCT GLUCOSE: 92 MG/DL (ref 70–110)
POCT GLUCOSE: 93 MG/DL (ref 70–110)
POCT GLUCOSE: 93 MG/DL (ref 70–110)
POCT GLUCOSE: 94 MG/DL (ref 70–110)
POCT GLUCOSE: 95 MG/DL (ref 70–110)
POCT GLUCOSE: 96 MG/DL (ref 70–110)
POCT GLUCOSE: 96 MG/DL (ref 70–110)
POCT GLUCOSE: 97 MG/DL (ref 70–110)
POCT GLUCOSE: 97 MG/DL (ref 70–110)
POCT GLUCOSE: 98 MG/DL (ref 70–110)
POCT GLUCOSE: 99 MG/DL (ref 70–110)
POCT GLUCOSE: >500 MG/DL (ref 70–110)
POIKILOCYTOSIS BLD QL SMEAR: SLIGHT
POLYCHROMASIA BLD QL SMEAR: ABNORMAL
POTASSIUM BLD-SCNC: 3.4 MMOL/L (ref 3.5–5.1)
POTASSIUM BLD-SCNC: 3.5 MMOL/L (ref 3.5–5.1)
POTASSIUM BLD-SCNC: 3.6 MMOL/L (ref 3.5–5.1)
POTASSIUM BLD-SCNC: 3.7 MMOL/L (ref 3.5–5.1)
POTASSIUM BLD-SCNC: 3.7 MMOL/L (ref 3.6–5.1)
POTASSIUM BLD-SCNC: 3.8 MMOL/L (ref 3.5–5.1)
POTASSIUM BLD-SCNC: 3.8 MMOL/L (ref 3.5–5.1)
POTASSIUM BLD-SCNC: 3.9 MMOL/L (ref 3.5–5.1)
POTASSIUM BLD-SCNC: 3.9 MMOL/L (ref 3.5–5.1)
POTASSIUM BLD-SCNC: 4 MMOL/L (ref 3.5–5.1)
POTASSIUM BLD-SCNC: 4.1 MMOL/L (ref 3.5–5.1)
POTASSIUM BLD-SCNC: 4.2 MMOL/L (ref 3.5–5.1)
POTASSIUM BLD-SCNC: 4.4 MMOL/L (ref 3.5–5.1)
POTASSIUM BLD-SCNC: 4.9 MMOL/L (ref 3.5–5.1)
POTASSIUM BLD-SCNC: 4.9 MMOL/L (ref 3.5–5.1)
POTASSIUM BLD-SCNC: 5 MMOL/L (ref 3.5–5.1)
POTASSIUM BLD-SCNC: 5.2 MMOL/L (ref 3.5–5.1)
POTASSIUM BLD-SCNC: 5.3 MMOL/L (ref 3.5–5.1)
POTASSIUM BLD-SCNC: 5.3 MMOL/L (ref 3.5–5.1)
POTASSIUM BLD-SCNC: 5.4 MMOL/L (ref 3.5–5.1)
POTASSIUM BLD-SCNC: 5.6 MMOL/L (ref 3.5–5.1)
POTASSIUM SERPL-SCNC: 3 MMOL/L (ref 3.5–5.1)
POTASSIUM SERPL-SCNC: 3 MMOL/L (ref 3.5–5.1)
POTASSIUM SERPL-SCNC: 3.1 MMOL/L (ref 3.5–5.1)
POTASSIUM SERPL-SCNC: 3.2 MMOL/L (ref 3.5–5.1)
POTASSIUM SERPL-SCNC: 3.3 MMOL/L (ref 3.5–5.1)
POTASSIUM SERPL-SCNC: 3.4 MMOL/L (ref 3.5–5.1)
POTASSIUM SERPL-SCNC: 3.5 MMOL/L (ref 3.5–5.1)
POTASSIUM SERPL-SCNC: 3.6 MMOL/L (ref 3.5–5.1)
POTASSIUM SERPL-SCNC: 3.7 MMOL/L (ref 3.5–5.1)
POTASSIUM SERPL-SCNC: 3.8 MMOL/L (ref 3.5–5.1)
POTASSIUM SERPL-SCNC: 3.9 MMOL/L (ref 3.5–5.1)
POTASSIUM SERPL-SCNC: 4 MMOL/L (ref 3.5–5.1)
POTASSIUM SERPL-SCNC: 4.1 MMOL/L (ref 3.5–5.1)
POTASSIUM SERPL-SCNC: 4.2 MMOL/L (ref 3.5–5.1)
POTASSIUM SERPL-SCNC: 4.3 MMOL/L (ref 3.5–5.1)
POTASSIUM SERPL-SCNC: 4.4 MMOL/L (ref 3.5–5.1)
POTASSIUM SERPL-SCNC: 4.5 MMOL/L (ref 3.5–5.1)
POTASSIUM SERPL-SCNC: 4.6 MMOL/L (ref 3.5–5.1)
POTASSIUM SERPL-SCNC: 4.7 MMOL/L (ref 3.5–5.1)
POTASSIUM SERPL-SCNC: 4.8 MMOL/L (ref 3.5–5.1)
POTASSIUM SERPL-SCNC: 4.9 MMOL/L (ref 3.5–5.1)
POTASSIUM SERPL-SCNC: 4.9 MMOL/L (ref 3.5–5.1)
POTASSIUM SERPL-SCNC: 5.1 MMOL/L (ref 3.5–5.1)
POTASSIUM SERPL-SCNC: 5.1 MMOL/L (ref 3.5–5.1)
POTASSIUM SERPL-SCNC: 5.3 MMOL/L (ref 3.5–5.1)
PROCALCITONIN SERPL IA-MCNC: 0.02 NG/ML
PROCALCITONIN SERPL IA-MCNC: 0.24 NG/ML
PROCALCITONIN SERPL IA-MCNC: 0.62 NG/ML
PROCALCITONIN SERPL IA-MCNC: 0.94 NG/ML
PROCALCITONIN SERPL IA-MCNC: 1.4 NG/ML
PROMYELOCYTES NFR BLD MANUAL: 1 %
PROT SERPL-MCNC: 4.8 G/DL (ref 6–8.4)
PROT SERPL-MCNC: 4.9 G/DL (ref 6–8.4)
PROT SERPL-MCNC: 5 G/DL (ref 6–8.4)
PROT SERPL-MCNC: 5.1 G/DL (ref 6–8.4)
PROT SERPL-MCNC: 5.2 G/DL (ref 6–8.4)
PROT SERPL-MCNC: 5.3 G/DL (ref 6–8.4)
PROT SERPL-MCNC: 5.4 G/DL (ref 6–8.4)
PROT SERPL-MCNC: 5.5 G/DL (ref 6–8.4)
PROT SERPL-MCNC: 5.6 G/DL (ref 6–8.4)
PROT SERPL-MCNC: 5.7 G/DL (ref 6–8.4)
PROT SERPL-MCNC: 5.7 G/DL (ref 6–8.4)
PROT SERPL-MCNC: 5.8 G/DL (ref 6–8.4)
PROT SERPL-MCNC: 5.9 G/DL (ref 6–8.4)
PROT SERPL-MCNC: 6 G/DL (ref 6–8.4)
PROT SERPL-MCNC: 6.1 G/DL (ref 6–8.4)
PROT SERPL-MCNC: 6.1 G/DL (ref 6–8.4)
PROT SERPL-MCNC: 6.2 G/DL (ref 6–8.4)
PROT SERPL-MCNC: 6.2 G/DL (ref 6–8.4)
PROT SERPL-MCNC: 6.3 G/DL (ref 6–8.4)
PROT SERPL-MCNC: 6.3 G/DL (ref 6–8.4)
PROT SERPL-MCNC: 6.4 G/DL (ref 6–8.4)
PROT SERPL-MCNC: 6.5 G/DL (ref 6–8.4)
PROT SERPL-MCNC: 6.6 G/DL (ref 6–8.4)
PROT SERPL-MCNC: 7 G/DL (ref 6–8.4)
PROT SERPL-MCNC: 7 G/DL (ref 6–8.4)
PROT SERPL-MCNC: 7.2 G/DL (ref 6–8.4)
PROT SERPL-MCNC: 7.2 G/DL (ref 6–8.4)
PROT SERPL-MCNC: 7.4 G/DL (ref 6–8.4)
PROT UR QL STRIP: ABNORMAL
PROT UR QL STRIP: NEGATIVE
PROT UR QL STRIP: NEGATIVE
PROTEIN, POC UA: NEGATIVE
PROTEIN, POC: 7.5 G/DL (ref 6.4–8.1)
PROTHROMBIN TIME: 10.6 SEC (ref 9–12.5)
PROTHROMBIN TIME: 10.7 SEC (ref 9–12.5)
PROTHROMBIN TIME: 10.8 SEC (ref 9–12.5)
PROTHROMBIN TIME: 10.9 SEC (ref 9–12.5)
PROTHROMBIN TIME: 11 SEC (ref 9–12.5)
PROTHROMBIN TIME: 11.1 SEC (ref 9–12.5)
PROTHROMBIN TIME: 11.2 SEC (ref 9–12.5)
PROTHROMBIN TIME: 11.3 SEC (ref 9–12.5)
PROTHROMBIN TIME: 11.4 SEC (ref 9–12.5)
PROTHROMBIN TIME: 11.4 SEC (ref 9–12.5)
PROTHROMBIN TIME: 12.1 SEC (ref 9–12.5)
PROTHROMBIN TIME: 12.2 SEC (ref 9–12.5)
PROTHROMBIN TIME: 12.3 SEC (ref 9–12.5)
PROTHROMBIN TIME: 12.4 SEC (ref 9–12.5)
PROTHROMBIN TIME: 12.5 SEC (ref 9–12.5)
PROTHROMBIN TIME: 12.5 SEC (ref 9–12.5)
PROTHROMBIN TIME: 12.6 SEC (ref 9–12.5)
PROTHROMBIN TIME: 12.6 SEC (ref 9–12.5)
PROTHROMBIN TIME: 12.7 SEC (ref 9–12.5)
PROTHROMBIN TIME: 12.9 SEC (ref 9–12.5)
PROTHROMBIN TIME: 13 SEC (ref 9–12.5)
PROTHROMBIN TIME: 13.3 SEC (ref 9–12.5)
PROTHROMBIN TIME: 13.4 SEC (ref 9–12.5)
PROTHROMBIN TIME: 13.8 SEC (ref 9–12.5)
PROTHROMBIN TIME: 14.4 SEC (ref 9–12.5)
PROTHROMBIN TIME: 14.7 SEC (ref 9–12.5)
PROTHROMBIN TIME: 15 SEC (ref 9–12.5)
PROTHROMBIN TIME: 15.2 SEC (ref 9–12.5)
PROTHROMBIN TIME: 15.3 SEC (ref 9–12.5)
PROTHROMBIN TIME: 15.6 SEC (ref 9–12.5)
PROTHROMBIN TIME: 16 SEC (ref 9–12.5)
PROTHROMBIN TIME: 16.2 SEC (ref 9–12.5)
PROTHROMBIN TIME: 16.5 SEC (ref 9–12.5)
PROTHROMBIN TIME: 16.5 SEC (ref 9–12.5)
PROTHROMBIN TIME: 16.6 SEC (ref 9–12.5)
PROTHROMBIN TIME: 17.2 SEC (ref 9–12.5)
PROTHROMBIN TIME: 17.6 SEC (ref 9–12.5)
PROTHROMBIN TIME: 18.4 SEC (ref 9–12.5)
PROTHROMBIN TIME: 19.4 SEC (ref 9–12.5)
PROTHROMBIN TIME: 19.4 SEC (ref 9–12.5)
PROTHROMBIN TIME: 19.9 SEC (ref 9–12.5)
PROTHROMBIN TIME: 20.3 SEC (ref 9–12.5)
PROTHROMBIN TIME: 20.4 SEC (ref 9–12.5)
PROTHROMBIN TIME: 20.7 SEC (ref 9–12.5)
PROTHROMBIN TIME: 20.9 SEC (ref 9–12.5)
PROTHROMBIN TIME: 20.9 SEC (ref 9–12.5)
PROTHROMBIN TIME: 21.3 SEC (ref 9–12.5)
PROTHROMBIN TIME: 22.1 SEC (ref 9–12.5)
PROTHROMBIN TIME: 22.4 SEC (ref 9–12.5)
PROTHROMBIN TIME: 23.6 SEC (ref 9–12.5)
PROTHROMBIN TIME: 24 SEC (ref 9–12.5)
PROTHROMBIN TIME: 24 SEC (ref 9–12.5)
PROTHROMBIN TIME: 24.2 SEC (ref 9–12.5)
PROTHROMBIN TIME: 24.7 SEC (ref 9–12.5)
PROTHROMBIN TIME: 25.6 SEC (ref 9–12.5)
PROTHROMBIN TIME: 27.1 SEC (ref 9–12.5)
PROTHROMBIN TIME: 28.5 SEC (ref 9–12.5)
PROTHROMBIN TIME: 28.7 SEC (ref 9–12.5)
PROTHROMBIN TIME: 28.7 SEC (ref 9–12.5)
PROTHROMBIN TIME: 29.4 SEC (ref 9–12.5)
PROTHROMBIN TIME: 29.4 SEC (ref 9–12.5)
PROTHROMBIN TIME: 33.5 SEC (ref 9–12.5)
PROTHROMBIN TIME: 35.9 SEC (ref 9–12.5)
PROTHROMBIN TIME: 36.6 SEC (ref 9–12.5)
PROTHROMBIN TIME: 38.9 SEC (ref 9–12.5)
PROTHROMBIN TIME: 39.8 SEC (ref 9–12.5)
PROTHROMBIN TIME: 41.3 SEC (ref 9–12.5)
PROTHROMBIN TIME: 50.4 SEC (ref 9–12.5)
PROTHROMBIN TIME: 52.4 SEC (ref 9–12.5)
PROTHROMBIN TIME: 56.6 SEC (ref 9–12.5)
PV PEAK VELOCITY: 0.81 CM/S
RA MAJOR: 3.27 CM
RA MAJOR: 4.36 CM
RA MAJOR: 4.77 CM
RA PRESSURE: 15 MMHG
RA PRESSURE: 15 MMHG
RA PRESSURE: 3 MMHG
RA PRESSURE: 8 MMHG
RA WIDTH: 2.71 CM
RA WIDTH: 3.2 CM
RBC # BLD AUTO: 2 M/UL (ref 4–5.4)
RBC # BLD AUTO: 2 M/UL (ref 4–5.4)
RBC # BLD AUTO: 2.17 M/UL (ref 4–5.4)
RBC # BLD AUTO: 2.17 M/UL (ref 4–5.4)
RBC # BLD AUTO: 2.21 M/UL (ref 4–5.4)
RBC # BLD AUTO: 2.22 M/UL (ref 4–5.4)
RBC # BLD AUTO: 2.24 M/UL (ref 4–5.4)
RBC # BLD AUTO: 2.26 M/UL (ref 4–5.4)
RBC # BLD AUTO: 2.33 M/UL (ref 4–5.4)
RBC # BLD AUTO: 2.35 M/UL (ref 4–5.4)
RBC # BLD AUTO: 2.36 M/UL (ref 4–5.4)
RBC # BLD AUTO: 2.37 M/UL (ref 4–5.4)
RBC # BLD AUTO: 2.37 M/UL (ref 4–5.4)
RBC # BLD AUTO: 2.38 M/UL (ref 4–5.4)
RBC # BLD AUTO: 2.38 M/UL (ref 4–5.4)
RBC # BLD AUTO: 2.39 M/UL (ref 4–5.4)
RBC # BLD AUTO: 2.4 M/UL (ref 4–5.4)
RBC # BLD AUTO: 2.41 M/UL (ref 4–5.4)
RBC # BLD AUTO: 2.44 M/UL (ref 4–5.4)
RBC # BLD AUTO: 2.46 M/UL (ref 4–5.4)
RBC # BLD AUTO: 2.48 M/UL (ref 4–5.4)
RBC # BLD AUTO: 2.5 M/UL (ref 4–5.4)
RBC # BLD AUTO: 2.54 M/UL (ref 4–5.4)
RBC # BLD AUTO: 2.56 M/UL (ref 4–5.4)
RBC # BLD AUTO: 2.56 M/UL (ref 4–5.4)
RBC # BLD AUTO: 2.57 M/UL (ref 4–5.4)
RBC # BLD AUTO: 2.59 M/UL (ref 4–5.4)
RBC # BLD AUTO: 2.6 M/UL (ref 4–5.4)
RBC # BLD AUTO: 2.6 M/UL (ref 4–5.4)
RBC # BLD AUTO: 2.61 M/UL (ref 4–5.4)
RBC # BLD AUTO: 2.61 M/UL (ref 4–5.4)
RBC # BLD AUTO: 2.62 M/UL (ref 4–5.4)
RBC # BLD AUTO: 2.62 M/UL (ref 4–5.4)
RBC # BLD AUTO: 2.63 M/UL (ref 4–5.4)
RBC # BLD AUTO: 2.64 M/UL (ref 4–5.4)
RBC # BLD AUTO: 2.64 M/UL (ref 4–5.4)
RBC # BLD AUTO: 2.65 M/UL (ref 4–5.4)
RBC # BLD AUTO: 2.66 M/UL (ref 4–5.4)
RBC # BLD AUTO: 2.67 M/UL (ref 4–5.4)
RBC # BLD AUTO: 2.67 M/UL (ref 4–5.4)
RBC # BLD AUTO: 2.68 M/UL (ref 4–5.4)
RBC # BLD AUTO: 2.68 M/UL (ref 4–5.4)
RBC # BLD AUTO: 2.69 M/UL (ref 4–5.4)
RBC # BLD AUTO: 2.69 M/UL (ref 4–5.4)
RBC # BLD AUTO: 2.74 M/UL (ref 4–5.4)
RBC # BLD AUTO: 2.75 M/UL (ref 4–5.4)
RBC # BLD AUTO: 2.75 M/UL (ref 4–5.4)
RBC # BLD AUTO: 2.76 M/UL (ref 4–5.4)
RBC # BLD AUTO: 2.8 M/UL (ref 4–5.4)
RBC # BLD AUTO: 2.81 M/UL (ref 4–5.4)
RBC # BLD AUTO: 2.82 M/UL (ref 4–5.4)
RBC # BLD AUTO: 2.84 M/UL (ref 4–5.4)
RBC # BLD AUTO: 2.85 M/UL (ref 4–5.4)
RBC # BLD AUTO: 2.86 M/UL (ref 4–5.4)
RBC # BLD AUTO: 2.89 M/UL (ref 4–5.4)
RBC # BLD AUTO: 2.9 M/UL (ref 4–5.4)
RBC # BLD AUTO: 2.9 M/UL (ref 4–5.4)
RBC # BLD AUTO: 2.91 M/UL (ref 4–5.4)
RBC # BLD AUTO: 2.92 M/UL (ref 4–5.4)
RBC # BLD AUTO: 2.92 M/UL (ref 4–5.4)
RBC # BLD AUTO: 2.93 M/UL (ref 4–5.4)
RBC # BLD AUTO: 2.94 M/UL (ref 4–5.4)
RBC # BLD AUTO: 2.95 M/UL (ref 4–5.4)
RBC # BLD AUTO: 2.98 M/UL (ref 4–5.4)
RBC # BLD AUTO: 2.99 M/UL (ref 4–5.4)
RBC # BLD AUTO: 2.99 M/UL (ref 4–5.4)
RBC # BLD AUTO: 3.01 M/UL (ref 4–5.4)
RBC # BLD AUTO: 3.03 M/UL (ref 4–5.4)
RBC # BLD AUTO: 3.05 M/UL (ref 4–5.4)
RBC # BLD AUTO: 3.06 M/UL (ref 4–5.4)
RBC # BLD AUTO: 3.06 M/UL (ref 4–5.4)
RBC # BLD AUTO: 3.07 M/UL (ref 4–5.4)
RBC # BLD AUTO: 3.09 M/UL (ref 4–5.4)
RBC # BLD AUTO: 3.1 M/UL (ref 4–5.4)
RBC # BLD AUTO: 3.11 M/UL (ref 4–5.4)
RBC # BLD AUTO: 3.14 M/UL (ref 4–5.4)
RBC # BLD AUTO: 3.14 M/UL (ref 4–5.4)
RBC # BLD AUTO: 3.15 M/UL (ref 4–5.4)
RBC # BLD AUTO: 3.16 M/UL (ref 4–5.4)
RBC # BLD AUTO: 3.17 M/UL (ref 4–5.4)
RBC # BLD AUTO: 3.18 M/UL (ref 4–5.4)
RBC # BLD AUTO: 3.2 M/UL (ref 4–5.4)
RBC # BLD AUTO: 3.21 M/UL (ref 4–5.4)
RBC # BLD AUTO: 3.21 M/UL (ref 4–5.4)
RBC # BLD AUTO: 3.23 M/UL (ref 4–5.4)
RBC # BLD AUTO: 3.24 M/UL (ref 4–5.4)
RBC # BLD AUTO: 3.25 M/UL (ref 4–5.4)
RBC # BLD AUTO: 3.25 M/UL (ref 4–5.4)
RBC # BLD AUTO: 3.26 M/UL (ref 4–5.4)
RBC # BLD AUTO: 3.27 M/UL (ref 4–5.4)
RBC # BLD AUTO: 3.29 M/UL (ref 4–5.4)
RBC # BLD AUTO: 3.3 M/UL (ref 4–5.4)
RBC # BLD AUTO: 3.32 M/UL (ref 4–5.4)
RBC # BLD AUTO: 4.64 M/UL (ref 4–5.4)
RBC # BLD AUTO: 4.76 M/UL (ref 4–5.4)
RBC # BLD AUTO: 4.82 M/UL (ref 4–5.4)
RBC # BLD AUTO: 5.01 M/UL (ref 4–5.4)
RBC # BLD AUTO: 5.03 M/UL (ref 4–5.4)
RBC #/AREA URNS AUTO: 17 /HPF (ref 0–4)
RBC #/AREA URNS AUTO: 23 /HPF (ref 0–4)
RBC #/AREA URNS AUTO: >100 /HPF (ref 0–4)
RBC #/AREA URNS HPF: 11 /HPF (ref 0–4)
RBC #/AREA URNS HPF: 4 /HPF (ref 0–4)
RIGHT VENTRICULAR END-DIASTOLIC DIMENSION: 2.91 CM
RIGHT VENTRICULAR END-DIASTOLIC DIMENSION: 2.95 CM
RIGHT VENTRICULAR END-DIASTOLIC DIMENSION: 3.22 CM
SAMPLE: ABNORMAL
SAMPLE: NORMAL
SARS-COV-2 RDRP RESP QL NAA+PROBE: NEGATIVE
SARS-COV-2 RNA RESP QL NAA+PROBE: NOT DETECTED
SARS-COV-2 RNA RESP QL NAA+PROBE: NOT DETECTED
SCHISTOCYTES BLD QL SMEAR: PRESENT
SINUS: 2.63 CM
SINUS: 2.81 CM
SINUS: 2.83 CM
SINUS: 2.88 CM
SITE: ABNORMAL
SITE: NORMAL
SODIUM BLD-SCNC: 135 MMOL/L (ref 136–145)
SODIUM BLD-SCNC: 138 MMOL/L (ref 136–145)
SODIUM BLD-SCNC: 139 MMOL/L (ref 136–145)
SODIUM BLD-SCNC: 140 MMOL/L (ref 136–145)
SODIUM BLD-SCNC: 141 MMOL/L (ref 136–145)
SODIUM BLD-SCNC: 142 MMOL/L (ref 136–145)
SODIUM BLD-SCNC: 143 MMOL/L (ref 136–145)
SODIUM BLD-SCNC: 143 MMOL/L (ref 136–145)
SODIUM BLD-SCNC: 144 MMOL/L (ref 136–145)
SODIUM BLD-SCNC: 145 MMOL/L (ref 128–145)
SODIUM BLD-SCNC: 145 MMOL/L (ref 136–145)
SODIUM BLD-SCNC: 146 MMOL/L (ref 136–145)
SODIUM BLD-SCNC: 146 MMOL/L (ref 136–145)
SODIUM BLD-SCNC: 147 MMOL/L (ref 136–145)
SODIUM BLD-SCNC: 147 MMOL/L (ref 136–145)
SODIUM BLD-SCNC: 148 MMOL/L (ref 136–145)
SODIUM BLD-SCNC: 149 MMOL/L (ref 136–145)
SODIUM BLD-SCNC: 149 MMOL/L (ref 136–145)
SODIUM BLD-SCNC: 151 MMOL/L (ref 136–145)
SODIUM BLD-SCNC: 152 MMOL/L (ref 136–145)
SODIUM BLD-SCNC: 153 MMOL/L (ref 136–145)
SODIUM BLD-SCNC: 155 MMOL/L (ref 136–145)
SODIUM SERPL-SCNC: 130 MMOL/L (ref 136–145)
SODIUM SERPL-SCNC: 135 MMOL/L (ref 136–145)
SODIUM SERPL-SCNC: 135 MMOL/L (ref 136–145)
SODIUM SERPL-SCNC: 136 MMOL/L (ref 136–145)
SODIUM SERPL-SCNC: 137 MMOL/L (ref 136–145)
SODIUM SERPL-SCNC: 138 MMOL/L (ref 136–145)
SODIUM SERPL-SCNC: 139 MMOL/L (ref 136–145)
SODIUM SERPL-SCNC: 140 MMOL/L (ref 136–145)
SODIUM SERPL-SCNC: 141 MMOL/L (ref 136–145)
SODIUM SERPL-SCNC: 142 MMOL/L (ref 136–145)
SODIUM SERPL-SCNC: 143 MMOL/L (ref 136–145)
SODIUM SERPL-SCNC: 144 MMOL/L (ref 136–145)
SODIUM SERPL-SCNC: 145 MMOL/L (ref 136–145)
SODIUM SERPL-SCNC: 146 MMOL/L (ref 136–145)
SODIUM SERPL-SCNC: 147 MMOL/L (ref 136–145)
SODIUM SERPL-SCNC: 148 MMOL/L (ref 136–145)
SODIUM SERPL-SCNC: 149 MMOL/L (ref 136–145)
SODIUM SERPL-SCNC: 150 MMOL/L (ref 136–145)
SODIUM SERPL-SCNC: 151 MMOL/L (ref 136–145)
SODIUM SERPL-SCNC: 152 MMOL/L (ref 136–145)
SODIUM SERPL-SCNC: 153 MMOL/L (ref 136–145)
SODIUM SERPL-SCNC: 155 MMOL/L (ref 136–145)
SODIUM SERPL-SCNC: 156 MMOL/L (ref 136–145)
SODIUM SERPL-SCNC: 157 MMOL/L (ref 136–145)
SODIUM SERPL-SCNC: 157 MMOL/L (ref 136–145)
SODIUM UR-SCNC: 30 MMOL/L (ref 20–250)
SP GR UR STRIP: 1 (ref 1–1.03)
SP GR UR STRIP: 1.01 (ref 1–1.03)
SP GR UR STRIP: 1.01 (ref 1–1.03)
SP GR UR STRIP: 1.02 (ref 1–1.03)
SP GR UR STRIP: 1.02 (ref 1–1.03)
SP02: 100
SP02: 94
SP02: 95
SP02: 95
SP02: 96
SP02: 97
SP02: 98
SP02: 98
SP02: 99
SPECIFIC GRAVITY, POC UA: 1.02
SPONT RATE: 19
SQUAMOUS #/AREA URNS AUTO: 1 /HPF
SQUAMOUS #/AREA URNS HPF: 1 /HPF
SQUAMOUS #/AREA URNS HPF: 2 /HPF
STJ: 1.78 CM
STJ: 2.51 CM
STJ: 2.83 CM
STJ: 2.86 CM
STOMATOCYTES BLD QL SMEAR: PRESENT
TARGETS BLD QL SMEAR: ABNORMAL
TDI LATERAL: 0.05 M/S
TDI LATERAL: 0.05 M/S
TDI LATERAL: 0.12 M/S
TDI SEPTAL: 0.05 M/S
TDI SEPTAL: 0.06 M/S
TDI SEPTAL: 0.09 M/S
TDI: 0.05 M/S
TDI: 0.06 M/S
TDI: 0.11 M/S
TOXIC GRANULES BLD QL SMEAR: PRESENT
TR MAX PG: 22 MMHG
TR MAX PG: 38 MMHG
TR MAX PG: 50 MMHG
TRANS ERYTHROCYTES VOL PATIENT: NORMAL ML
TRANS PLATPHERESIS VOL PATIENT: NORMAL ML
TRICUSPID ANNULAR PLANE SYSTOLIC EXCURSION: 0.69 CM
TRICUSPID ANNULAR PLANE SYSTOLIC EXCURSION: 2.11 CM
TROPONIN I SERPL DL<=0.01 NG/ML-MCNC: 0.02 NG/ML (ref 0–0.03)
TROPONIN I SERPL DL<=0.01 NG/ML-MCNC: 0.02 NG/ML (ref 0–0.03)
TROPONIN I SERPL DL<=0.01 NG/ML-MCNC: 0.03 NG/ML (ref 0–0.03)
TROPONIN I SERPL DL<=0.01 NG/ML-MCNC: 0.04 NG/ML (ref 0–0.03)
TROPONIN I SERPL DL<=0.01 NG/ML-MCNC: 0.18 NG/ML (ref 0–0.03)
TV REST PULMONARY ARTERY PRESSURE: 37 MMHG
TV REST PULMONARY ARTERY PRESSURE: 46 MMHG
TV REST PULMONARY ARTERY PRESSURE: 53 MMHG
UNIT NUMBER: NORMAL
UNIT NUMBER: NORMAL
URN SPEC COLLECT METH UR: ABNORMAL
UROBILINOGEN UR STRIP-ACNC: NEGATIVE EU/DL
UROBILINOGEN UR STRIP-ACNC: NEGATIVE EU/DL
UROBILINOGEN, POC UA: 0.2 E.U./DL
VANCOMYCIN SERPL-MCNC: 17.9 UG/ML
VANCOMYCIN SERPL-MCNC: 19.1 UG/ML
VANCOMYCIN SERPL-MCNC: 20.9 UG/ML
VANCOMYCIN SERPL-MCNC: 21.1 UG/ML
VANCOMYCIN SERPL-MCNC: 21.4 UG/ML
VANCOMYCIN SERPL-MCNC: 22.8 UG/ML
VANCOMYCIN SERPL-MCNC: 24.4 UG/ML
VANCOMYCIN SERPL-MCNC: 29.1 UG/ML
VANCOMYCIN SERPL-MCNC: 30.6 UG/ML
VANCOMYCIN SERPL-MCNC: 32.1 UG/ML
VANCOMYCIN TROUGH SERPL-MCNC: 22.7 UG/ML (ref 10–22)
VANCOMYCIN TROUGH SERPL-MCNC: 27.2 UG/ML (ref 10–22)
VANCOMYCIN TROUGH SERPL-MCNC: 28.9 UG/ML (ref 10–22)
VIT B12 SERPL-MCNC: 1207 PG/ML (ref 210–950)
VT: 350
VT: 375
VT: 380
VT: 390
VT: 390
VT: 400
VT: 410
VT: 440
VT: 448
VT: 450
WBC # BLD AUTO: 10.01 K/UL (ref 3.9–12.7)
WBC # BLD AUTO: 10.02 K/UL (ref 3.9–12.7)
WBC # BLD AUTO: 10.04 K/UL (ref 3.9–12.7)
WBC # BLD AUTO: 10.1 K/UL (ref 3.9–12.7)
WBC # BLD AUTO: 10.11 K/UL (ref 3.9–12.7)
WBC # BLD AUTO: 10.27 K/UL (ref 3.9–12.7)
WBC # BLD AUTO: 10.32 K/UL (ref 3.9–12.7)
WBC # BLD AUTO: 10.38 K/UL (ref 3.9–12.7)
WBC # BLD AUTO: 10.4 K/UL (ref 3.9–12.7)
WBC # BLD AUTO: 10.79 K/UL (ref 3.9–12.7)
WBC # BLD AUTO: 10.83 K/UL (ref 3.9–12.7)
WBC # BLD AUTO: 10.93 K/UL (ref 3.9–12.7)
WBC # BLD AUTO: 10.95 K/UL (ref 3.9–12.7)
WBC # BLD AUTO: 10.99 K/UL (ref 3.9–12.7)
WBC # BLD AUTO: 10.99 K/UL (ref 3.9–12.7)
WBC # BLD AUTO: 11.29 K/UL (ref 3.9–12.7)
WBC # BLD AUTO: 11.32 K/UL (ref 3.9–12.7)
WBC # BLD AUTO: 11.52 K/UL (ref 3.9–12.7)
WBC # BLD AUTO: 11.57 K/UL (ref 3.9–12.7)
WBC # BLD AUTO: 11.62 K/UL (ref 3.9–12.7)
WBC # BLD AUTO: 11.71 K/UL (ref 3.9–12.7)
WBC # BLD AUTO: 11.76 K/UL (ref 3.9–12.7)
WBC # BLD AUTO: 11.78 K/UL (ref 3.9–12.7)
WBC # BLD AUTO: 11.88 K/UL (ref 3.9–12.7)
WBC # BLD AUTO: 11.9 K/UL (ref 3.9–12.7)
WBC # BLD AUTO: 12.09 K/UL (ref 3.9–12.7)
WBC # BLD AUTO: 12.3 K/UL (ref 3.9–12.7)
WBC # BLD AUTO: 12.56 K/UL (ref 3.9–12.7)
WBC # BLD AUTO: 12.58 K/UL (ref 3.9–12.7)
WBC # BLD AUTO: 12.64 K/UL (ref 3.9–12.7)
WBC # BLD AUTO: 12.67 K/UL (ref 3.9–12.7)
WBC # BLD AUTO: 12.68 K/UL (ref 3.9–12.7)
WBC # BLD AUTO: 12.76 K/UL (ref 3.9–12.7)
WBC # BLD AUTO: 12.77 K/UL (ref 3.9–12.7)
WBC # BLD AUTO: 12.88 K/UL (ref 3.9–12.7)
WBC # BLD AUTO: 13.39 K/UL (ref 3.9–12.7)
WBC # BLD AUTO: 13.49 K/UL (ref 3.9–12.7)
WBC # BLD AUTO: 13.7 K/UL (ref 3.9–12.7)
WBC # BLD AUTO: 13.77 K/UL (ref 3.9–12.7)
WBC # BLD AUTO: 13.86 K/UL (ref 3.9–12.7)
WBC # BLD AUTO: 13.92 K/UL (ref 3.9–12.7)
WBC # BLD AUTO: 13.95 K/UL (ref 3.9–12.7)
WBC # BLD AUTO: 14.05 K/UL (ref 3.9–12.7)
WBC # BLD AUTO: 14.36 K/UL (ref 3.9–12.7)
WBC # BLD AUTO: 14.53 K/UL (ref 3.9–12.7)
WBC # BLD AUTO: 14.72 K/UL (ref 3.9–12.7)
WBC # BLD AUTO: 14.86 K/UL (ref 3.9–12.7)
WBC # BLD AUTO: 15.46 K/UL (ref 3.9–12.7)
WBC # BLD AUTO: 15.79 K/UL (ref 3.9–12.7)
WBC # BLD AUTO: 16.2 K/UL (ref 3.9–12.7)
WBC # BLD AUTO: 16.59 K/UL (ref 3.9–12.7)
WBC # BLD AUTO: 16.64 K/UL (ref 3.9–12.7)
WBC # BLD AUTO: 17.11 K/UL (ref 3.9–12.7)
WBC # BLD AUTO: 17.58 K/UL (ref 3.9–12.7)
WBC # BLD AUTO: 17.67 K/UL (ref 3.9–12.7)
WBC # BLD AUTO: 17.67 K/UL (ref 3.9–12.7)
WBC # BLD AUTO: 17.69 K/UL (ref 3.9–12.7)
WBC # BLD AUTO: 17.78 K/UL (ref 3.9–12.7)
WBC # BLD AUTO: 18.12 K/UL (ref 3.9–12.7)
WBC # BLD AUTO: 18.46 K/UL (ref 3.9–12.7)
WBC # BLD AUTO: 18.58 K/UL (ref 3.9–12.7)
WBC # BLD AUTO: 18.67 K/UL (ref 3.9–12.7)
WBC # BLD AUTO: 18.67 K/UL (ref 3.9–12.7)
WBC # BLD AUTO: 18.86 K/UL (ref 3.9–12.7)
WBC # BLD AUTO: 19.21 K/UL (ref 3.9–12.7)
WBC # BLD AUTO: 19.59 K/UL (ref 3.9–12.7)
WBC # BLD AUTO: 19.61 K/UL (ref 3.9–12.7)
WBC # BLD AUTO: 19.87 K/UL (ref 3.9–12.7)
WBC # BLD AUTO: 20.58 K/UL (ref 3.9–12.7)
WBC # BLD AUTO: 20.84 K/UL (ref 3.9–12.7)
WBC # BLD AUTO: 20.86 K/UL (ref 3.9–12.7)
WBC # BLD AUTO: 21.18 K/UL (ref 3.9–12.7)
WBC # BLD AUTO: 21.77 K/UL (ref 3.9–12.7)
WBC # BLD AUTO: 21.77 K/UL (ref 3.9–12.7)
WBC # BLD AUTO: 22.07 K/UL (ref 3.9–12.7)
WBC # BLD AUTO: 22.13 K/UL (ref 3.9–12.7)
WBC # BLD AUTO: 22.14 K/UL (ref 3.9–12.7)
WBC # BLD AUTO: 22.77 K/UL (ref 3.9–12.7)
WBC # BLD AUTO: 22.77 K/UL (ref 3.9–12.7)
WBC # BLD AUTO: 23.13 K/UL (ref 3.9–12.7)
WBC # BLD AUTO: 23.69 K/UL (ref 3.9–12.7)
WBC # BLD AUTO: 23.77 K/UL (ref 3.9–12.7)
WBC # BLD AUTO: 23.77 K/UL (ref 3.9–12.7)
WBC # BLD AUTO: 23.91 K/UL (ref 3.9–12.7)
WBC # BLD AUTO: 24.2 K/UL (ref 3.9–12.7)
WBC # BLD AUTO: 24.34 K/UL (ref 3.9–12.7)
WBC # BLD AUTO: 24.83 K/UL (ref 3.9–12.7)
WBC # BLD AUTO: 24.89 K/UL (ref 3.9–12.7)
WBC # BLD AUTO: 24.91 K/UL (ref 3.9–12.7)
WBC # BLD AUTO: 25.49 K/UL (ref 3.9–12.7)
WBC # BLD AUTO: 27.16 K/UL (ref 3.9–12.7)
WBC # BLD AUTO: 27.32 K/UL (ref 3.9–12.7)
WBC # BLD AUTO: 34.24 K/UL (ref 3.9–12.7)
WBC # BLD AUTO: 7.62 K/UL (ref 3.9–12.7)
WBC # BLD AUTO: 7.65 K/UL (ref 3.9–12.7)
WBC # BLD AUTO: 7.8 K/UL (ref 3.9–12.7)
WBC # BLD AUTO: 8.02 K/UL (ref 3.9–12.7)
WBC # BLD AUTO: 8.15 K/UL (ref 3.9–12.7)
WBC # BLD AUTO: 8.15 K/UL (ref 3.9–12.7)
WBC # BLD AUTO: 8.51 K/UL (ref 3.9–12.7)
WBC # BLD AUTO: 8.56 K/UL (ref 3.9–12.7)
WBC # BLD AUTO: 8.57 K/UL (ref 3.9–12.7)
WBC # BLD AUTO: 8.8 K/UL (ref 3.9–12.7)
WBC # BLD AUTO: 8.8 K/UL (ref 3.9–12.7)
WBC # BLD AUTO: 8.86 K/UL (ref 3.9–12.7)
WBC # BLD AUTO: 8.86 K/UL (ref 3.9–12.7)
WBC # BLD AUTO: 8.95 K/UL (ref 3.9–12.7)
WBC # BLD AUTO: 9.12 K/UL (ref 3.9–12.7)
WBC # BLD AUTO: 9.33 K/UL (ref 3.9–12.7)
WBC # BLD AUTO: 9.47 K/UL (ref 3.9–12.7)
WBC # BLD AUTO: 9.6 K/UL (ref 3.9–12.7)
WBC # BLD AUTO: 9.61 K/UL (ref 3.9–12.7)
WBC # BLD AUTO: 9.92 K/UL (ref 3.9–12.7)
WBC # BLD AUTO: 9.95 K/UL (ref 3.9–12.7)
WBC #/AREA URNS AUTO: 3 /HPF (ref 0–5)
WBC #/AREA URNS AUTO: 89 /HPF (ref 0–5)
WBC #/AREA URNS AUTO: >100 /HPF (ref 0–5)
WBC #/AREA URNS HPF: 4 /HPF (ref 0–5)
WBC #/AREA URNS HPF: 7 /HPF (ref 0–5)
WBC CLUMPS URNS QL MICRO: ABNORMAL
WBC TOXIC VACUOLES BLD QL SMEAR: PRESENT

## 2020-01-01 PROCEDURE — 25000242 PHARM REV CODE 250 ALT 637 W/ HCPCS: Performed by: THORACIC SURGERY (CARDIOTHORACIC VASCULAR SURGERY)

## 2020-01-01 PROCEDURE — 27000221 HC OXYGEN, UP TO 24 HOURS

## 2020-01-01 PROCEDURE — 82803 BLOOD GASES ANY COMBINATION: CPT

## 2020-01-01 PROCEDURE — 25000003 PHARM REV CODE 250: Performed by: PHYSICIAN ASSISTANT

## 2020-01-01 PROCEDURE — 85610 PROTHROMBIN TIME: CPT

## 2020-01-01 PROCEDURE — 80053 COMPREHEN METABOLIC PANEL: CPT

## 2020-01-01 PROCEDURE — 63600175 PHARM REV CODE 636 W HCPCS: Mod: ER | Performed by: EMERGENCY MEDICINE

## 2020-01-01 PROCEDURE — 27000190 HC CPAP FULL FACE MASK W/VALVE

## 2020-01-01 PROCEDURE — 85384 FIBRINOGEN ACTIVITY: CPT | Mod: 91

## 2020-01-01 PROCEDURE — 94002 VENT MGMT INPAT INIT DAY: CPT

## 2020-01-01 PROCEDURE — 25000242 PHARM REV CODE 250 ALT 637 W/ HCPCS: Performed by: INTERNAL MEDICINE

## 2020-01-01 PROCEDURE — 84132 ASSAY OF SERUM POTASSIUM: CPT | Mod: 91

## 2020-01-01 PROCEDURE — 63600175 PHARM REV CODE 636 W HCPCS: Performed by: STUDENT IN AN ORGANIZED HEALTH CARE EDUCATION/TRAINING PROGRAM

## 2020-01-01 PROCEDURE — 84100 ASSAY OF PHOSPHORUS: CPT | Mod: 91

## 2020-01-01 PROCEDURE — 87205 SMEAR GRAM STAIN: CPT

## 2020-01-01 PROCEDURE — 99291 CRITICAL CARE FIRST HOUR: CPT | Mod: ,,, | Performed by: SURGERY

## 2020-01-01 PROCEDURE — 83735 ASSAY OF MAGNESIUM: CPT

## 2020-01-01 PROCEDURE — 99232 SBSQ HOSP IP/OBS MODERATE 35: CPT | Mod: ,,, | Performed by: INTERNAL MEDICINE

## 2020-01-01 PROCEDURE — 25000003 PHARM REV CODE 250: Performed by: INTERNAL MEDICINE

## 2020-01-01 PROCEDURE — 63600175 PHARM REV CODE 636 W HCPCS: Performed by: THORACIC SURGERY (CARDIOTHORACIC VASCULAR SURGERY)

## 2020-01-01 PROCEDURE — 85027 COMPLETE CBC AUTOMATED: CPT | Mod: 91

## 2020-01-01 PROCEDURE — 84100 ASSAY OF PHOSPHORUS: CPT

## 2020-01-01 PROCEDURE — 85730 THROMBOPLASTIN TIME PARTIAL: CPT

## 2020-01-01 PROCEDURE — 83735 ASSAY OF MAGNESIUM: CPT | Mod: 91

## 2020-01-01 PROCEDURE — C1729 CATH, DRAINAGE: HCPCS | Performed by: THORACIC SURGERY (CARDIOTHORACIC VASCULAR SURGERY)

## 2020-01-01 PROCEDURE — 63600175 PHARM REV CODE 636 W HCPCS

## 2020-01-01 PROCEDURE — 25000003 PHARM REV CODE 250: Performed by: THORACIC SURGERY (CARDIOTHORACIC VASCULAR SURGERY)

## 2020-01-01 PROCEDURE — 97110 THERAPEUTIC EXERCISES: CPT

## 2020-01-01 PROCEDURE — 99231 SBSQ HOSP IP/OBS SF/LOW 25: CPT | Mod: GC,,, | Performed by: SURGERY

## 2020-01-01 PROCEDURE — 80162 ASSAY OF DIGOXIN TOTAL: CPT

## 2020-01-01 PROCEDURE — 99232 PR SUBSEQUENT HOSPITAL CARE,LEVL II: ICD-10-PCS | Mod: GC,,, | Performed by: PSYCHIATRY & NEUROLOGY

## 2020-01-01 PROCEDURE — 25000003 PHARM REV CODE 250: Performed by: STUDENT IN AN ORGANIZED HEALTH CARE EDUCATION/TRAINING PROGRAM

## 2020-01-01 PROCEDURE — 80048 BASIC METABOLIC PNL TOTAL CA: CPT

## 2020-01-01 PROCEDURE — 36600 WITHDRAWAL OF ARTERIAL BLOOD: CPT

## 2020-01-01 PROCEDURE — 85025 COMPLETE CBC W/AUTO DIFF WBC: CPT

## 2020-01-01 PROCEDURE — 99232 PR SUBSEQUENT HOSPITAL CARE,LEVL II: ICD-10-PCS | Mod: ,,, | Performed by: ANESTHESIOLOGY

## 2020-01-01 PROCEDURE — 82330 ASSAY OF CALCIUM: CPT

## 2020-01-01 PROCEDURE — 20600001 HC STEP DOWN PRIVATE ROOM

## 2020-01-01 PROCEDURE — 20000000 HC ICU ROOM

## 2020-01-01 PROCEDURE — 63600175 PHARM REV CODE 636 W HCPCS: Performed by: SURGERY

## 2020-01-01 PROCEDURE — S0020 INJECTION, BUPIVICAINE HYDRO: HCPCS | Performed by: THORACIC SURGERY (CARDIOTHORACIC VASCULAR SURGERY)

## 2020-01-01 PROCEDURE — 25000242 PHARM REV CODE 250 ALT 637 W/ HCPCS: Performed by: STUDENT IN AN ORGANIZED HEALTH CARE EDUCATION/TRAINING PROGRAM

## 2020-01-01 PROCEDURE — 99233 SBSQ HOSP IP/OBS HIGH 50: CPT | Mod: ,,, | Performed by: INTERNAL MEDICINE

## 2020-01-01 PROCEDURE — 31500 INSERT EMERGENCY AIRWAY: CPT | Mod: ,,, | Performed by: ANESTHESIOLOGY

## 2020-01-01 PROCEDURE — 99291 PR CRITICAL CARE, E/M 30-74 MINUTES: ICD-10-PCS | Mod: ,,, | Performed by: ANESTHESIOLOGY

## 2020-01-01 PROCEDURE — 37799 UNLISTED PX VASCULAR SURGERY: CPT

## 2020-01-01 PROCEDURE — 25000003 PHARM REV CODE 250: Performed by: SURGERY

## 2020-01-01 PROCEDURE — 99499 UNLISTED E&M SERVICE: CPT | Mod: ,,, | Performed by: OTOLARYNGOLOGY

## 2020-01-01 PROCEDURE — 25000003 PHARM REV CODE 250: Performed by: ANESTHESIOLOGY

## 2020-01-01 PROCEDURE — 90945 DIALYSIS ONE EVALUATION: CPT

## 2020-01-01 PROCEDURE — 25000003 PHARM REV CODE 250: Performed by: HOSPITALIST

## 2020-01-01 PROCEDURE — 94640 AIRWAY INHALATION TREATMENT: CPT

## 2020-01-01 PROCEDURE — 25000003 PHARM REV CODE 250

## 2020-01-01 PROCEDURE — 85730 THROMBOPLASTIN TIME PARTIAL: CPT | Mod: 91

## 2020-01-01 PROCEDURE — U0002 COVID-19 LAB TEST NON-CDC: HCPCS | Mod: ER | Performed by: EMERGENCY MEDICINE

## 2020-01-01 PROCEDURE — 83605 ASSAY OF LACTIC ACID: CPT

## 2020-01-01 PROCEDURE — 99900035 HC TECH TIME PER 15 MIN (STAT)

## 2020-01-01 PROCEDURE — 99900017 HC EXTUBATION W/PARAMETERS (STAT)

## 2020-01-01 PROCEDURE — 25000003 PHARM REV CODE 250: Performed by: NURSE PRACTITIONER

## 2020-01-01 PROCEDURE — 94761 N-INVAS EAR/PLS OXIMETRY MLT: CPT

## 2020-01-01 PROCEDURE — 85014 HEMATOCRIT: CPT

## 2020-01-01 PROCEDURE — 96372 PR INJECTION,THERAP/PROPH/DIAG2ST, IM OR SUBCUT: ICD-10-PCS | Mod: 59,S$GLB,, | Performed by: PHYSICIAN ASSISTANT

## 2020-01-01 PROCEDURE — 27100088 HC CELL SAVER

## 2020-01-01 PROCEDURE — S0028 INJECTION, FAMOTIDINE, 20 MG: HCPCS | Performed by: STUDENT IN AN ORGANIZED HEALTH CARE EDUCATION/TRAINING PROGRAM

## 2020-01-01 PROCEDURE — 97535 SELF CARE MNGMENT TRAINING: CPT

## 2020-01-01 PROCEDURE — 85025 COMPLETE CBC W/AUTO DIFF WBC: CPT | Mod: 91

## 2020-01-01 PROCEDURE — 99232 SBSQ HOSP IP/OBS MODERATE 35: CPT | Mod: ,,, | Performed by: NURSE PRACTITIONER

## 2020-01-01 PROCEDURE — 99999 PR PBB SHADOW E&M-EST. PATIENT-LVL II: CPT | Mod: PBBFAC,,, | Performed by: OPTOMETRIST

## 2020-01-01 PROCEDURE — 93320 PR DOPPLER ECHO HEART,COMPLETE: ICD-10-PCS | Mod: 26,59,, | Performed by: ANESTHESIOLOGY

## 2020-01-01 PROCEDURE — 86901 BLOOD TYPING SEROLOGIC RH(D): CPT

## 2020-01-01 PROCEDURE — 36556 PR INSERT NON-TUNNEL CV CATH 5+ YRS OLD: ICD-10-PCS | Mod: RT,GC,, | Performed by: SURGERY

## 2020-01-01 PROCEDURE — 80053 COMPREHEN METABOLIC PANEL: CPT | Mod: ER

## 2020-01-01 PROCEDURE — 99233 PR SUBSEQUENT HOSPITAL CARE,LEVL III: ICD-10-PCS | Mod: ,,, | Performed by: ANESTHESIOLOGY

## 2020-01-01 PROCEDURE — 93005 ELECTROCARDIOGRAM TRACING: CPT

## 2020-01-01 PROCEDURE — 96375 TX/PRO/DX INJ NEW DRUG ADDON: CPT | Mod: ER

## 2020-01-01 PROCEDURE — 84145 PROCALCITONIN (PCT): CPT

## 2020-01-01 PROCEDURE — 97530 THERAPEUTIC ACTIVITIES: CPT

## 2020-01-01 PROCEDURE — 36415 COLL VENOUS BLD VENIPUNCTURE: CPT

## 2020-01-01 PROCEDURE — 87070 CULTURE OTHR SPECIMN AEROBIC: CPT

## 2020-01-01 PROCEDURE — 85384 FIBRINOGEN ACTIVITY: CPT

## 2020-01-01 PROCEDURE — 84132 ASSAY OF SERUM POTASSIUM: CPT

## 2020-01-01 PROCEDURE — D9220A PRA ANESTHESIA: Mod: CRNA,,, | Performed by: NURSE ANESTHETIST, CERTIFIED REGISTERED

## 2020-01-01 PROCEDURE — 85610 PROTHROMBIN TIME: CPT | Mod: 91

## 2020-01-01 PROCEDURE — 97161 PT EVAL LOW COMPLEX 20 MIN: CPT

## 2020-01-01 PROCEDURE — 36556 PR INSERT NON-TUNNEL CV CATH 5+ YRS OLD: ICD-10-PCS | Mod: LT,GC,, | Performed by: SURGERY

## 2020-01-01 PROCEDURE — 93010 EKG 12-LEAD: ICD-10-PCS | Mod: ,,, | Performed by: INTERNAL MEDICINE

## 2020-01-01 PROCEDURE — 99233 SBSQ HOSP IP/OBS HIGH 50: CPT | Mod: ,,, | Performed by: PHYSICIAN ASSISTANT

## 2020-01-01 PROCEDURE — 94003 VENT MGMT INPAT SUBQ DAY: CPT

## 2020-01-01 PROCEDURE — 36000710: Performed by: THORACIC SURGERY (CARDIOTHORACIC VASCULAR SURGERY)

## 2020-01-01 PROCEDURE — 80053 COMPREHEN METABOLIC PANEL: CPT | Mod: 91

## 2020-01-01 PROCEDURE — 94668 MNPJ CHEST WALL SBSQ: CPT

## 2020-01-01 PROCEDURE — 25000003 PHARM REV CODE 250: Performed by: NURSE ANESTHETIST, CERTIFIED REGISTERED

## 2020-01-01 PROCEDURE — 99497 PR ADVNCD CARE PLAN 30 MIN: ICD-10-PCS | Mod: ,,, | Performed by: CLINICAL NURSE SPECIALIST

## 2020-01-01 PROCEDURE — 85520 HEPARIN ASSAY: CPT

## 2020-01-01 PROCEDURE — 63600175 PHARM REV CODE 636 W HCPCS: Performed by: INTERNAL MEDICINE

## 2020-01-01 PROCEDURE — 85007 BL SMEAR W/DIFF WBC COUNT: CPT

## 2020-01-01 PROCEDURE — 63600175 PHARM REV CODE 636 W HCPCS: Performed by: NURSE PRACTITIONER

## 2020-01-01 PROCEDURE — 99233 SBSQ HOSP IP/OBS HIGH 50: CPT | Mod: ,,, | Performed by: ANESTHESIOLOGY

## 2020-01-01 PROCEDURE — 31571 LARYNGOSCOP W/VC INJ + SCOPE: CPT | Mod: ,,, | Performed by: OTOLARYNGOLOGY

## 2020-01-01 PROCEDURE — 63600175 PHARM REV CODE 636 W HCPCS: Mod: JG

## 2020-01-01 PROCEDURE — 27100171 HC OXYGEN HIGH FLOW UP TO 24 HOURS

## 2020-01-01 PROCEDURE — P9035 PLATELET PHERES LEUKOREDUCED: HCPCS

## 2020-01-01 PROCEDURE — 80069 RENAL FUNCTION PANEL: CPT

## 2020-01-01 PROCEDURE — 99291 CRITICAL CARE FIRST HOUR: CPT | Mod: ,,, | Performed by: ANESTHESIOLOGY

## 2020-01-01 PROCEDURE — U0003 INFECTIOUS AGENT DETECTION BY NUCLEIC ACID (DNA OR RNA); SEVERE ACUTE RESPIRATORY SYNDROME CORONAVIRUS 2 (SARS-COV-2) (CORONAVIRUS DISEASE [COVID-19]), AMPLIFIED PROBE TECHNIQUE, MAKING USE OF HIGH THROUGHPUT TECHNOLOGIES AS DESCRIBED BY CMS-2020-01-R: HCPCS

## 2020-01-01 PROCEDURE — 99233 SBSQ HOSP IP/OBS HIGH 50: CPT | Mod: 95,,, | Performed by: CLINICAL NURSE SPECIALIST

## 2020-01-01 PROCEDURE — P9045 ALBUMIN (HUMAN), 5%, 250 ML: HCPCS | Mod: JG | Performed by: STUDENT IN AN ORGANIZED HEALTH CARE EDUCATION/TRAINING PROGRAM

## 2020-01-01 PROCEDURE — 99232 SBSQ HOSP IP/OBS MODERATE 35: CPT | Mod: ,,, | Performed by: ANESTHESIOLOGY

## 2020-01-01 PROCEDURE — 36556 INSERT NON-TUNNEL CV CATH: CPT | Mod: RT,GC,, | Performed by: SURGERY

## 2020-01-01 PROCEDURE — 99291 PR CRITICAL CARE, E/M 30-74 MINUTES: ICD-10-PCS | Mod: 25,ICN,, | Performed by: INTERNAL MEDICINE

## 2020-01-01 PROCEDURE — 37000009 HC ANESTHESIA EA ADD 15 MINS: Performed by: THORACIC SURGERY (CARDIOTHORACIC VASCULAR SURGERY)

## 2020-01-01 PROCEDURE — D9220A PRA ANESTHESIA: ICD-10-PCS | Mod: ,,, | Performed by: STUDENT IN AN ORGANIZED HEALTH CARE EDUCATION/TRAINING PROGRAM

## 2020-01-01 PROCEDURE — 99900026 HC AIRWAY MAINTENANCE (STAT)

## 2020-01-01 PROCEDURE — 63600175 PHARM REV CODE 636 W HCPCS: Performed by: ANESTHESIOLOGY

## 2020-01-01 PROCEDURE — 31624 DX BRONCHOSCOPE/LAVAGE: CPT

## 2020-01-01 PROCEDURE — 99223 1ST HOSP IP/OBS HIGH 75: CPT | Mod: ,,, | Performed by: PHYSICIAN ASSISTANT

## 2020-01-01 PROCEDURE — 87186 SC STD MICRODIL/AGAR DIL: CPT

## 2020-01-01 PROCEDURE — 85027 COMPLETE CBC AUTOMATED: CPT

## 2020-01-01 PROCEDURE — 27201423 OPTIME MED/SURG SUP & DEVICES STERILE SUPPLY: Performed by: THORACIC SURGERY (CARDIOTHORACIC VASCULAR SURGERY)

## 2020-01-01 PROCEDURE — 36620 INSERTION CATHETER ARTERY: CPT | Mod: GC,,, | Performed by: SURGERY

## 2020-01-01 PROCEDURE — 99232 PR SUBSEQUENT HOSPITAL CARE,LEVL II: ICD-10-PCS | Mod: ,,, | Performed by: PSYCHIATRY & NEUROLOGY

## 2020-01-01 PROCEDURE — 83050 HGB METHEMOGLOBIN QUAN: CPT

## 2020-01-01 PROCEDURE — 99222 1ST HOSP IP/OBS MODERATE 55: CPT | Mod: ,,, | Performed by: NURSE PRACTITIONER

## 2020-01-01 PROCEDURE — C1769 GUIDE WIRE: HCPCS | Performed by: INTERNAL MEDICINE

## 2020-01-01 PROCEDURE — 99291 CRITICAL CARE FIRST HOUR: CPT | Mod: 25,ICN,, | Performed by: INTERNAL MEDICINE

## 2020-01-01 PROCEDURE — 92015 DETERMINE REFRACTIVE STATE: CPT | Mod: S$GLB,,, | Performed by: OPTOMETRIST

## 2020-01-01 PROCEDURE — 99900025 HC BRONCHOSCOPY-ASST (STAT)

## 2020-01-01 PROCEDURE — S5010 5% DEXTROSE AND 0.45% SALINE: HCPCS | Performed by: NURSE PRACTITIONER

## 2020-01-01 PROCEDURE — 93320 DOPPLER ECHO COMPLETE: CPT | Mod: 26,59,, | Performed by: ANESTHESIOLOGY

## 2020-01-01 PROCEDURE — 90945 PR DIALYSIS, NOT HEMO, 1 EVAL: ICD-10-PCS | Mod: ,,, | Performed by: INTERNAL MEDICINE

## 2020-01-01 PROCEDURE — 80100008 HC CRRT DAILY MAINTENANCE

## 2020-01-01 PROCEDURE — 63700000 PHARM REV CODE 250 ALT 637 W/O HCPCS: Performed by: INTERNAL MEDICINE

## 2020-01-01 PROCEDURE — 99233 PR SUBSEQUENT HOSPITAL CARE,LEVL III: ICD-10-PCS | Mod: ,,, | Performed by: PHYSICIAN ASSISTANT

## 2020-01-01 PROCEDURE — P9021 RED BLOOD CELLS UNIT: HCPCS

## 2020-01-01 PROCEDURE — 99233 PR SUBSEQUENT HOSPITAL CARE,LEVL III: ICD-10-PCS | Mod: ,,, | Performed by: NURSE PRACTITIONER

## 2020-01-01 PROCEDURE — 94667 MNPJ CHEST WALL 1ST: CPT

## 2020-01-01 PROCEDURE — 99291 PR CRITICAL CARE, E/M 30-74 MINUTES: ICD-10-PCS | Mod: ,,, | Performed by: INTERNAL MEDICINE

## 2020-01-01 PROCEDURE — 85610 PROTHROMBIN TIME: CPT | Mod: ER

## 2020-01-01 PROCEDURE — 63600175 PHARM REV CODE 636 W HCPCS: Performed by: PHYSICIAN ASSISTANT

## 2020-01-01 PROCEDURE — 63600367 HC NITRIC OXIDE PER HOUR

## 2020-01-01 PROCEDURE — 99499 NO LOS: ICD-10-PCS | Mod: ,,, | Performed by: OTOLARYNGOLOGY

## 2020-01-01 PROCEDURE — U0002 COVID-19 LAB TEST NON-CDC: HCPCS

## 2020-01-01 PROCEDURE — 27200966 HC CLOSED SUCTION SYSTEM

## 2020-01-01 PROCEDURE — 21400001 HC TELEMETRY ROOM

## 2020-01-01 PROCEDURE — 80202 ASSAY OF VANCOMYCIN: CPT

## 2020-01-01 PROCEDURE — 32651 THORACOSCOPY REMOVE CORTEX: CPT | Mod: RT,,, | Performed by: THORACIC SURGERY (CARDIOTHORACIC VASCULAR SURGERY)

## 2020-01-01 PROCEDURE — P9017 PLASMA 1 DONOR FRZ W/IN 8 HR: HCPCS

## 2020-01-01 PROCEDURE — 97110 THERAPEUTIC EXERCISES: CPT | Mod: CQ

## 2020-01-01 PROCEDURE — 99232 PR SUBSEQUENT HOSPITAL CARE,LEVL II: ICD-10-PCS | Mod: ,,, | Performed by: NURSE PRACTITIONER

## 2020-01-01 PROCEDURE — 36000712 HC OR TIME LEV V 1ST 15 MIN: Performed by: THORACIC SURGERY (CARDIOTHORACIC VASCULAR SURGERY)

## 2020-01-01 PROCEDURE — 93010 ELECTROCARDIOGRAM REPORT: CPT | Mod: ,,, | Performed by: INTERNAL MEDICINE

## 2020-01-01 PROCEDURE — 99291 CRITICAL CARE FIRST HOUR: CPT | Mod: GC,,, | Performed by: SURGERY

## 2020-01-01 PROCEDURE — 25000003 PHARM REV CODE 250: Performed by: EMERGENCY MEDICINE

## 2020-01-01 PROCEDURE — 99499 NO LOS: ICD-10-PCS | Mod: ,,, | Performed by: NURSE PRACTITIONER

## 2020-01-01 PROCEDURE — 63600175 PHARM REV CODE 636 W HCPCS: Mod: JG | Performed by: STUDENT IN AN ORGANIZED HEALTH CARE EDUCATION/TRAINING PROGRAM

## 2020-01-01 PROCEDURE — 92610 EVALUATE SWALLOWING FUNCTION: CPT

## 2020-01-01 PROCEDURE — 31500 INSERT EMERGENCY AIRWAY: CPT | Mod: ,,, | Performed by: SURGERY

## 2020-01-01 PROCEDURE — 97164 PT RE-EVAL EST PLAN CARE: CPT

## 2020-01-01 PROCEDURE — 63600175 PHARM REV CODE 636 W HCPCS: Performed by: HOSPITALIST

## 2020-01-01 PROCEDURE — D9220A PRA ANESTHESIA: ICD-10-PCS | Mod: ANES,,, | Performed by: ANESTHESIOLOGY

## 2020-01-01 PROCEDURE — D9220A PRA ANESTHESIA: Mod: ANES,,, | Performed by: ANESTHESIOLOGY

## 2020-01-01 PROCEDURE — 99291 PR CRITICAL CARE, E/M 30-74 MINUTES: ICD-10-PCS | Mod: GC,,, | Performed by: SURGERY

## 2020-01-01 PROCEDURE — 25500020 PHARM REV CODE 255: Performed by: INTERNAL MEDICINE

## 2020-01-01 PROCEDURE — 99232 SBSQ HOSP IP/OBS MODERATE 35: CPT | Mod: GC,,, | Performed by: INTERNAL MEDICINE

## 2020-01-01 PROCEDURE — 80048 BASIC METABOLIC PNL TOTAL CA: CPT | Mod: 91

## 2020-01-01 PROCEDURE — 84295 ASSAY OF SERUM SODIUM: CPT

## 2020-01-01 PROCEDURE — 86580 TB INTRADERMAL TEST: CPT | Performed by: THORACIC SURGERY (CARDIOTHORACIC VASCULAR SURGERY)

## 2020-01-01 PROCEDURE — 36592 COLLECT BLOOD FROM PICC: CPT

## 2020-01-01 PROCEDURE — 99232 PR SUBSEQUENT HOSPITAL CARE,LEVL II: ICD-10-PCS | Mod: ,,, | Performed by: INTERNAL MEDICINE

## 2020-01-01 PROCEDURE — C1887 CATHETER, GUIDING: HCPCS | Performed by: INTERNAL MEDICINE

## 2020-01-01 PROCEDURE — 82330 ASSAY OF CALCIUM: CPT | Mod: 91

## 2020-01-01 PROCEDURE — 92526 ORAL FUNCTION THERAPY: CPT

## 2020-01-01 PROCEDURE — 63600175 PHARM REV CODE 636 W HCPCS: Performed by: NURSE ANESTHETIST, CERTIFIED REGISTERED

## 2020-01-01 PROCEDURE — 97112 NEUROMUSCULAR REEDUCATION: CPT

## 2020-01-01 PROCEDURE — 94664 DEMO&/EVAL PT USE INHALER: CPT

## 2020-01-01 PROCEDURE — 31500 PR INSERT, EMERGENCY ENDOTRACH AIRWAY: ICD-10-PCS | Mod: ,,, | Performed by: SURGERY

## 2020-01-01 PROCEDURE — 80069 RENAL FUNCTION PANEL: CPT | Mod: 91

## 2020-01-01 PROCEDURE — 36410 VNPNXR 3YR/> PHY/QHP DX/THER: CPT

## 2020-01-01 PROCEDURE — D9220A PRA ANESTHESIA: Mod: CRNA,,, | Performed by: STUDENT IN AN ORGANIZED HEALTH CARE EDUCATION/TRAINING PROGRAM

## 2020-01-01 PROCEDURE — 82043 UR ALBUMIN QUANTITATIVE: CPT

## 2020-01-01 PROCEDURE — 99291 PR CRITICAL CARE, E/M 30-74 MINUTES: ICD-10-PCS | Mod: 25,GC,, | Performed by: SURGERY

## 2020-01-01 PROCEDURE — 99233 PR SUBSEQUENT HOSPITAL CARE,LEVL III: ICD-10-PCS | Mod: 95,,, | Performed by: CLINICAL NURSE SPECIALIST

## 2020-01-01 PROCEDURE — 99233 PR SUBSEQUENT HOSPITAL CARE,LEVL III: ICD-10-PCS | Mod: ,,, | Performed by: INTERNAL MEDICINE

## 2020-01-01 PROCEDURE — 99292 CRITICAL CARE ADDL 30 MIN: CPT | Mod: 25,,, | Performed by: ANESTHESIOLOGY

## 2020-01-01 PROCEDURE — 36000713 HC OR TIME LEV V EA ADD 15 MIN: Performed by: THORACIC SURGERY (CARDIOTHORACIC VASCULAR SURGERY)

## 2020-01-01 PROCEDURE — C1751 CATH, INF, PER/CENT/MIDLINE: HCPCS

## 2020-01-01 PROCEDURE — 94640 PR INHAL RX, AIRWAY OBST/DX SPUTUM INDUCT: ICD-10-PCS | Mod: S$GLB,,, | Performed by: PHYSICIAN ASSISTANT

## 2020-01-01 PROCEDURE — 97165 OT EVAL LOW COMPLEX 30 MIN: CPT

## 2020-01-01 PROCEDURE — 99291 CRITICAL CARE FIRST HOUR: CPT | Mod: 25,,, | Performed by: SURGERY

## 2020-01-01 PROCEDURE — 31500 INTUBATION: ICD-10-PCS | Mod: ,,, | Performed by: ANESTHESIOLOGY

## 2020-01-01 PROCEDURE — 27000190 HC CPAP FULL FACE MASK W/VALVE: Mod: ER

## 2020-01-01 PROCEDURE — 31624 PR BRONCHOSCOPY,DIAG2STIC W LAVAGE: ICD-10-PCS | Mod: ,,, | Performed by: SURGERY

## 2020-01-01 PROCEDURE — 92507 TX SP LANG VOICE COMM INDIV: CPT

## 2020-01-01 PROCEDURE — 99223 1ST HOSP IP/OBS HIGH 75: CPT | Mod: ,,, | Performed by: CLINICAL NURSE SPECIALIST

## 2020-01-01 PROCEDURE — 88305 TISSUE EXAM BY PATHOLOGIST: CPT | Mod: 26,,, | Performed by: PATHOLOGY

## 2020-01-01 PROCEDURE — 97168 OT RE-EVAL EST PLAN CARE: CPT

## 2020-01-01 PROCEDURE — 37000009 HC ANESTHESIA EA ADD 15 MINS: Performed by: SURGERY

## 2020-01-01 PROCEDURE — 31500 INSERT EMERGENCY AIRWAY: CPT | Performed by: STUDENT IN AN ORGANIZED HEALTH CARE EDUCATION/TRAINING PROGRAM

## 2020-01-01 PROCEDURE — 36556 INSERT NON-TUNNEL CV CATH: CPT | Mod: LT,GC,, | Performed by: SURGERY

## 2020-01-01 PROCEDURE — 99232 SBSQ HOSP IP/OBS MODERATE 35: CPT | Mod: ,,, | Performed by: PSYCHIATRY & NEUROLOGY

## 2020-01-01 PROCEDURE — 99285 EMERGENCY DEPT VISIT HI MDM: CPT | Mod: 25

## 2020-01-01 PROCEDURE — 99232 SBSQ HOSP IP/OBS MODERATE 35: CPT | Mod: 25,GC,, | Performed by: SURGERY

## 2020-01-01 PROCEDURE — 86920 COMPATIBILITY TEST SPIN: CPT

## 2020-01-01 PROCEDURE — 93325 PR DOPPLER COLOR FLOW VELOCITY MAP: ICD-10-PCS | Mod: 26,59,, | Performed by: ANESTHESIOLOGY

## 2020-01-01 PROCEDURE — 92611 MOTION FLUOROSCOPY/SWALLOW: CPT

## 2020-01-01 PROCEDURE — 81003 URINALYSIS AUTO W/O SCOPE: CPT | Mod: ER

## 2020-01-01 PROCEDURE — 36000711: Performed by: THORACIC SURGERY (CARDIOTHORACIC VASCULAR SURGERY)

## 2020-01-01 PROCEDURE — 81001 URINALYSIS AUTO W/SCOPE: CPT

## 2020-01-01 PROCEDURE — 99233 SBSQ HOSP IP/OBS HIGH 50: CPT | Mod: ,,, | Performed by: EMERGENCY MEDICINE

## 2020-01-01 PROCEDURE — 85007 BL SMEAR W/DIFF WBC COUNT: CPT | Mod: 91

## 2020-01-01 PROCEDURE — 99232 PR SUBSEQUENT HOSPITAL CARE,LEVL II: ICD-10-PCS | Mod: GC,,, | Performed by: INTERNAL MEDICINE

## 2020-01-01 PROCEDURE — 33259 ABLATE ATRIA W/BYPASS ADD-ON: CPT | Mod: ,,, | Performed by: THORACIC SURGERY (CARDIOTHORACIC VASCULAR SURGERY)

## 2020-01-01 PROCEDURE — 25000242 PHARM REV CODE 250 ALT 637 W/ HCPCS

## 2020-01-01 PROCEDURE — 99214 OFFICE O/P EST MOD 30 MIN: CPT | Mod: 25,S$GLB,, | Performed by: PHYSICIAN ASSISTANT

## 2020-01-01 PROCEDURE — 99223 PR INITIAL HOSPITAL CARE,LEVL III: ICD-10-PCS | Mod: ,,, | Performed by: SURGERY

## 2020-01-01 PROCEDURE — 30200315 PPD INTRADERMAL TEST REV CODE 302: Performed by: THORACIC SURGERY (CARDIOTHORACIC VASCULAR SURGERY)

## 2020-01-01 PROCEDURE — 99214 PR OFFICE/OUTPT VISIT, EST, LEVL IV, 30-39 MIN: ICD-10-PCS | Mod: 25,S$GLB,, | Performed by: PHYSICIAN ASSISTANT

## 2020-01-01 PROCEDURE — 27000646 HC AEROBIKA DEVICE

## 2020-01-01 PROCEDURE — 94640 AIRWAY INHALATION TREATMENT: CPT | Mod: S$GLB,,, | Performed by: PHYSICIAN ASSISTANT

## 2020-01-01 PROCEDURE — 94660 CPAP INITIATION&MGMT: CPT | Mod: ER

## 2020-01-01 PROCEDURE — 93005 ELECTROCARDIOGRAM TRACING: CPT | Mod: ER

## 2020-01-01 PROCEDURE — 99291 PR CRITICAL CARE, E/M 30-74 MINUTES: ICD-10-PCS | Mod: ,,, | Performed by: SURGERY

## 2020-01-01 PROCEDURE — 27800595 HC HEART VALVES

## 2020-01-01 PROCEDURE — 25000242 PHARM REV CODE 250 ALT 637 W/ HCPCS: Performed by: ANESTHESIOLOGY

## 2020-01-01 PROCEDURE — 76937 US GUIDE VASCULAR ACCESS: CPT

## 2020-01-01 PROCEDURE — 93312 PR ECHO HEART,TRANSESOPHAGEAL: ICD-10-PCS | Mod: 26,59,, | Performed by: ANESTHESIOLOGY

## 2020-01-01 PROCEDURE — 31720 CLEARANCE OF AIRWAYS: CPT

## 2020-01-01 PROCEDURE — 99152 MOD SED SAME PHYS/QHP 5/>YRS: CPT | Mod: ,,, | Performed by: INTERNAL MEDICINE

## 2020-01-01 PROCEDURE — 80076 HEPATIC FUNCTION PANEL: CPT

## 2020-01-01 PROCEDURE — 99291 CRITICAL CARE FIRST HOUR: CPT | Mod: ,,, | Performed by: INTERNAL MEDICINE

## 2020-01-01 PROCEDURE — 37000008 HC ANESTHESIA 1ST 15 MINUTES: Performed by: THORACIC SURGERY (CARDIOTHORACIC VASCULAR SURGERY)

## 2020-01-01 PROCEDURE — 99223 1ST HOSP IP/OBS HIGH 75: CPT | Mod: ,,, | Performed by: SURGERY

## 2020-01-01 PROCEDURE — 99152 PR MOD CONSCIOUS SEDATION, SAME PHYS, 5+ YRS, FIRST 15 MIN: ICD-10-PCS | Mod: ,,, | Performed by: INTERNAL MEDICINE

## 2020-01-01 PROCEDURE — 27200188 HC TRANSDUCER, ART ADULT/PEDS

## 2020-01-01 PROCEDURE — 87116 MYCOBACTERIA CULTURE: CPT

## 2020-01-01 PROCEDURE — 99223 PR INITIAL HOSPITAL CARE,LEVL III: ICD-10-PCS | Mod: ,,, | Performed by: PHYSICIAN ASSISTANT

## 2020-01-01 PROCEDURE — 87086 URINE CULTURE/COLONY COUNT: CPT

## 2020-01-01 PROCEDURE — 99231 PR SUBSEQUENT HOSPITAL CARE,LEVL I: ICD-10-PCS | Mod: GC,,, | Performed by: SURGERY

## 2020-01-01 PROCEDURE — 86965 POOLING BLOOD PLATELETS: CPT

## 2020-01-01 PROCEDURE — A9698 NON-RAD CONTRAST MATERIALNOC: HCPCS | Performed by: THORACIC SURGERY (CARDIOTHORACIC VASCULAR SURGERY)

## 2020-01-01 PROCEDURE — 99233 PR SUBSEQUENT HOSPITAL CARE,LEVL III: ICD-10-PCS | Mod: GC,,, | Performed by: SURGERY

## 2020-01-01 PROCEDURE — D9220A PRA ANESTHESIA: ICD-10-PCS | Mod: CRNA,,, | Performed by: STUDENT IN AN ORGANIZED HEALTH CARE EDUCATION/TRAINING PROGRAM

## 2020-01-01 PROCEDURE — 97530 THERAPEUTIC ACTIVITIES: CPT | Mod: CQ

## 2020-01-01 PROCEDURE — 88304 TISSUE EXAM BY PATHOLOGIST: CPT | Mod: 26,,, | Performed by: PATHOLOGY

## 2020-01-01 PROCEDURE — 87040 BLOOD CULTURE FOR BACTERIA: CPT

## 2020-01-01 PROCEDURE — 87102 FUNGUS ISOLATION CULTURE: CPT

## 2020-01-01 PROCEDURE — 87075 CULTR BACTERIA EXCEPT BLOOD: CPT

## 2020-01-01 PROCEDURE — 93458 L HRT ARTERY/VENTRICLE ANGIO: CPT | Performed by: INTERNAL MEDICINE

## 2020-01-01 PROCEDURE — 37000008 HC ANESTHESIA 1ST 15 MINUTES: Performed by: INTERNAL MEDICINE

## 2020-01-01 PROCEDURE — 27800903 OPTIME MED/SURG SUP & DEVICES OTHER IMPLANTS: Performed by: SURGERY

## 2020-01-01 PROCEDURE — 82565 ASSAY OF CREATININE: CPT

## 2020-01-01 PROCEDURE — 90792 PSYCH DIAG EVAL W/MED SRVCS: CPT | Mod: GC,,, | Performed by: PSYCHIATRY & NEUROLOGY

## 2020-01-01 PROCEDURE — 25000003 PHARM REV CODE 250: Mod: ER | Performed by: EMERGENCY MEDICINE

## 2020-01-01 PROCEDURE — 99497 PR ADVNCD CARE PLAN 30 MIN: ICD-10-PCS | Mod: 25,,, | Performed by: CLINICAL NURSE SPECIALIST

## 2020-01-01 PROCEDURE — 84484 ASSAY OF TROPONIN QUANT: CPT | Mod: 91

## 2020-01-01 PROCEDURE — P9045 ALBUMIN (HUMAN), 5%, 250 ML: HCPCS | Mod: JG | Performed by: ANESTHESIOLOGY

## 2020-01-01 PROCEDURE — 33464 PR VALVULOPLAS TRICUS W RING INSERT: ICD-10-PCS | Mod: 51,,, | Performed by: THORACIC SURGERY (CARDIOTHORACIC VASCULAR SURGERY)

## 2020-01-01 PROCEDURE — 99222 1ST HOSP IP/OBS MODERATE 55: CPT | Mod: ,,, | Performed by: INTERNAL MEDICINE

## 2020-01-01 PROCEDURE — 71046 X-RAY EXAM CHEST 2 VIEWS: CPT | Mod: S$GLB,,, | Performed by: RADIOLOGY

## 2020-01-01 PROCEDURE — 36620 PR INSERT CATH,ART,PERCUT,SHORTTERM: ICD-10-PCS | Mod: GC,,, | Performed by: SURGERY

## 2020-01-01 PROCEDURE — D9220A PRA ANESTHESIA: Mod: ,,, | Performed by: STUDENT IN AN ORGANIZED HEALTH CARE EDUCATION/TRAINING PROGRAM

## 2020-01-01 PROCEDURE — 99223 PR INITIAL HOSPITAL CARE,LEVL III: ICD-10-PCS | Mod: ,,, | Performed by: INTERNAL MEDICINE

## 2020-01-01 PROCEDURE — 99358 PR PROLONGED SERV,NO CONTACT,1ST HR: ICD-10-PCS | Mod: ,,, | Performed by: CLINICAL NURSE SPECIALIST

## 2020-01-01 PROCEDURE — 12000002 HC ACUTE/MED SURGE SEMI-PRIVATE ROOM

## 2020-01-01 PROCEDURE — C1878 MATRL FOR VOCAL CORD: HCPCS | Performed by: THORACIC SURGERY (CARDIOTHORACIC VASCULAR SURGERY)

## 2020-01-01 PROCEDURE — 33427 PR MITRALPLASTY W RADICAL RECONSTR: ICD-10-PCS | Mod: ,,, | Performed by: THORACIC SURGERY (CARDIOTHORACIC VASCULAR SURGERY)

## 2020-01-01 PROCEDURE — P9045 ALBUMIN (HUMAN), 5%, 250 ML: HCPCS | Mod: JG

## 2020-01-01 PROCEDURE — 87040 BLOOD CULTURE FOR BACTERIA: CPT | Mod: 59

## 2020-01-01 PROCEDURE — 88305 TISSUE EXAM BY PATHOLOGIST: ICD-10-PCS | Mod: 26,,, | Performed by: PATHOLOGY

## 2020-01-01 PROCEDURE — 36620 ARTERIAL: ICD-10-PCS | Mod: 59,,, | Performed by: ANESTHESIOLOGY

## 2020-01-01 PROCEDURE — P9012 CRYOPRECIPITATE EACH UNIT: HCPCS

## 2020-01-01 PROCEDURE — 36430 TRANSFUSION BLD/BLD COMPNT: CPT

## 2020-01-01 PROCEDURE — 31571 PR LARYNGOSCOPY,DIRECT,SCOPE,INJ CORDS: ICD-10-PCS | Mod: ,,, | Performed by: OTOLARYNGOLOGY

## 2020-01-01 PROCEDURE — 82607 VITAMIN B-12: CPT

## 2020-01-01 PROCEDURE — P9016 RBC LEUKOCYTES REDUCED: HCPCS

## 2020-01-01 PROCEDURE — 71046 XR CHEST PA AND LATERAL: ICD-10-PCS | Mod: S$GLB,,, | Performed by: RADIOLOGY

## 2020-01-01 PROCEDURE — 82746 ASSAY OF FOLIC ACID SERUM: CPT

## 2020-01-01 PROCEDURE — 25000242 PHARM REV CODE 250 ALT 637 W/ HCPCS: Performed by: NURSE PRACTITIONER

## 2020-01-01 PROCEDURE — 99223 1ST HOSP IP/OBS HIGH 75: CPT | Mod: ,,, | Performed by: INTERNAL MEDICINE

## 2020-01-01 PROCEDURE — 99232 SBSQ HOSP IP/OBS MODERATE 35: CPT | Mod: GC,,, | Performed by: SURGERY

## 2020-01-01 PROCEDURE — 92015 PR REFRACTION: ICD-10-PCS | Mod: S$GLB,,, | Performed by: OPTOMETRIST

## 2020-01-01 PROCEDURE — 99233 SBSQ HOSP IP/OBS HIGH 50: CPT | Mod: ,,, | Performed by: NURSE PRACTITIONER

## 2020-01-01 PROCEDURE — 99232 SBSQ HOSP IP/OBS MODERATE 35: CPT | Mod: GC,,, | Performed by: PSYCHIATRY & NEUROLOGY

## 2020-01-01 PROCEDURE — 99291 CRITICAL CARE FIRST HOUR: CPT | Mod: 25,GC,, | Performed by: SURGERY

## 2020-01-01 PROCEDURE — 99356 PR PROLONGED SERV,INPATIENT,1ST HR: CPT | Mod: ,,, | Performed by: EMERGENCY MEDICINE

## 2020-01-01 PROCEDURE — 87077 CULTURE AEROBIC IDENTIFY: CPT

## 2020-01-01 PROCEDURE — 99233 SBSQ HOSP IP/OBS HIGH 50: CPT | Mod: GC,,, | Performed by: SURGERY

## 2020-01-01 PROCEDURE — 84484 ASSAY OF TROPONIN QUANT: CPT | Mod: ER

## 2020-01-01 PROCEDURE — 99231 PR SUBSEQUENT HOSPITAL CARE,LEVL I: ICD-10-PCS | Mod: ,,, | Performed by: INTERNAL MEDICINE

## 2020-01-01 PROCEDURE — 37000009 HC ANESTHESIA EA ADD 15 MINS: Performed by: INTERNAL MEDICINE

## 2020-01-01 PROCEDURE — 31624 DX BRONCHOSCOPE/LAVAGE: CPT | Mod: ,,, | Performed by: SURGERY

## 2020-01-01 PROCEDURE — 27200953 HC CARDIOPLEGIA SYSTEM

## 2020-01-01 PROCEDURE — 84484 ASSAY OF TROPONIN QUANT: CPT

## 2020-01-01 PROCEDURE — 99152 MOD SED SAME PHYS/QHP 5/>YRS: CPT | Performed by: INTERNAL MEDICINE

## 2020-01-01 PROCEDURE — 86850 RBC ANTIBODY SCREEN: CPT

## 2020-01-01 PROCEDURE — 82803 BLOOD GASES ANY COMBINATION: CPT | Mod: ER

## 2020-01-01 PROCEDURE — 94150 VITAL CAPACITY TEST: CPT

## 2020-01-01 PROCEDURE — 33464 VALVULOPLASTY TRICUSPID: CPT | Mod: 51,,, | Performed by: THORACIC SURGERY (CARDIOTHORACIC VASCULAR SURGERY)

## 2020-01-01 PROCEDURE — 27201037 HC PRESSURE MONITORING SET UP

## 2020-01-01 PROCEDURE — 99292 PR CRITICAL CARE, ADDL 30 MIN: ICD-10-PCS | Mod: 25,,, | Performed by: ANESTHESIOLOGY

## 2020-01-01 PROCEDURE — 25500020 PHARM REV CODE 255: Performed by: THORACIC SURGERY (CARDIOTHORACIC VASCULAR SURGERY)

## 2020-01-01 PROCEDURE — D9220A PRA ANESTHESIA: Mod: ,,, | Performed by: ANESTHESIOLOGY

## 2020-01-01 PROCEDURE — 99222 PR INITIAL HOSPITAL CARE,LEVL II: ICD-10-PCS | Mod: ,,, | Performed by: INTERNAL MEDICINE

## 2020-01-01 PROCEDURE — 36000706: Performed by: SURGERY

## 2020-01-01 PROCEDURE — 94660 CPAP INITIATION&MGMT: CPT

## 2020-01-01 PROCEDURE — 99232 PR SUBSEQUENT HOSPITAL CARE,LEVL II: ICD-10-PCS | Mod: GC,,, | Performed by: SURGERY

## 2020-01-01 PROCEDURE — 81000 URINALYSIS NONAUTO W/SCOPE: CPT | Mod: 91

## 2020-01-01 PROCEDURE — 97803 MED NUTRITION INDIV SUBSEQ: CPT

## 2020-01-01 PROCEDURE — 92004 COMPRE OPH EXAM NEW PT 1/>: CPT | Mod: S$GLB,,, | Performed by: OPTOMETRIST

## 2020-01-01 PROCEDURE — 99358 PROLONG SERVICE W/O CONTACT: CPT | Mod: ,,, | Performed by: CLINICAL NURSE SPECIALIST

## 2020-01-01 PROCEDURE — 84300 ASSAY OF URINE SODIUM: CPT

## 2020-01-01 PROCEDURE — 99233 PR SUBSEQUENT HOSPITAL CARE,LEVL III: ICD-10-PCS | Mod: GC,,, | Performed by: NURSE PRACTITIONER

## 2020-01-01 PROCEDURE — D9220A PRA ANESTHESIA: ICD-10-PCS | Mod: ,,, | Performed by: ANESTHESIOLOGY

## 2020-01-01 PROCEDURE — 99223 PR INITIAL HOSPITAL CARE,LEVL III: ICD-10-PCS | Mod: ,,, | Performed by: CLINICAL NURSE SPECIALIST

## 2020-01-01 PROCEDURE — 90945 DIALYSIS ONE EVALUATION: CPT | Mod: ,,, | Performed by: INTERNAL MEDICINE

## 2020-01-01 PROCEDURE — 99291 PR CRITICAL CARE, E/M 30-74 MINUTES: ICD-10-PCS | Mod: ICN,,, | Performed by: INTERNAL MEDICINE

## 2020-01-01 PROCEDURE — 85018 HEMOGLOBIN: CPT

## 2020-01-01 PROCEDURE — 94770 HC EXHALED C02 TEST: CPT

## 2020-01-01 PROCEDURE — 99497 ADVNCD CARE PLAN 30 MIN: CPT | Mod: ,,, | Performed by: CLINICAL NURSE SPECIALIST

## 2020-01-01 PROCEDURE — 43246 EGD PLACE GASTROSTOMY TUBE: CPT | Mod: ,,, | Performed by: SURGERY

## 2020-01-01 PROCEDURE — C1894 INTRO/SHEATH, NON-LASER: HCPCS | Performed by: INTERNAL MEDICINE

## 2020-01-01 PROCEDURE — 99291 PR CRITICAL CARE, E/M 30-74 MINUTES: ICD-10-PCS | Mod: 25,,, | Performed by: SURGERY

## 2020-01-01 PROCEDURE — 32651 PR THORACOSCOPY SURG PART PULM DECORT: ICD-10-PCS | Mod: RT,,, | Performed by: THORACIC SURGERY (CARDIOTHORACIC VASCULAR SURGERY)

## 2020-01-01 PROCEDURE — 90792 PR PSYCHIATRIC DIAGNOSTIC EVALUATION W/MEDICAL SERVICES: ICD-10-PCS | Mod: GC,,, | Performed by: PSYCHIATRY & NEUROLOGY

## 2020-01-01 PROCEDURE — 99356 PR PROLONGED SERV,INPATIENT,1ST HR: ICD-10-PCS | Mod: ,,, | Performed by: EMERGENCY MEDICINE

## 2020-01-01 PROCEDURE — 87804 INFLUENZA ASSAY W/OPTIC: CPT | Mod: QW,S$GLB,, | Performed by: PHYSICIAN ASSISTANT

## 2020-01-01 PROCEDURE — 96372 THER/PROPH/DIAG INJ SC/IM: CPT | Mod: 59,S$GLB,, | Performed by: PHYSICIAN ASSISTANT

## 2020-01-01 PROCEDURE — 99233 SBSQ HOSP IP/OBS HIGH 50: CPT | Mod: GC,,, | Performed by: NURSE PRACTITIONER

## 2020-01-01 PROCEDURE — 88305 TISSUE EXAM BY PATHOLOGIST: CPT | Performed by: PATHOLOGY

## 2020-01-01 PROCEDURE — 88304 PR  SURG PATH,LEVEL III: ICD-10-PCS | Mod: 26,,, | Performed by: PATHOLOGY

## 2020-01-01 PROCEDURE — 99232 PR SUBSEQUENT HOSPITAL CARE,LEVL II: ICD-10-PCS | Mod: 25,GC,, | Performed by: SURGERY

## 2020-01-01 PROCEDURE — 88304 TISSUE EXAM BY PATHOLOGIST: CPT | Performed by: PATHOLOGY

## 2020-01-01 PROCEDURE — 33259 PR ABLATE/ RECONSTUCT ATRIA, W OTHER PROCED EXTENS W/ BYPASS: ICD-10-PCS | Mod: ,,, | Performed by: THORACIC SURGERY (CARDIOTHORACIC VASCULAR SURGERY)

## 2020-01-01 PROCEDURE — 99999 PR PBB SHADOW E&M-EST. PATIENT-LVL II: ICD-10-PCS | Mod: PBBFAC,,, | Performed by: OPTOMETRIST

## 2020-01-01 PROCEDURE — 99222 PR INITIAL HOSPITAL CARE,LEVL II: ICD-10-PCS | Mod: ,,, | Performed by: SURGERY

## 2020-01-01 PROCEDURE — 87804 POCT INFLUENZA A/B: ICD-10-PCS | Mod: QW,S$GLB,, | Performed by: PHYSICIAN ASSISTANT

## 2020-01-01 PROCEDURE — 63600175 PHARM REV CODE 636 W HCPCS: Performed by: EMERGENCY MEDICINE

## 2020-01-01 PROCEDURE — 99291 CRITICAL CARE FIRST HOUR: CPT | Mod: 25,,, | Performed by: INTERNAL MEDICINE

## 2020-01-01 PROCEDURE — 92004 PR EYE EXAM, NEW PATIENT,COMPREHESV: ICD-10-PCS | Mod: S$GLB,,, | Performed by: OPTOMETRIST

## 2020-01-01 PROCEDURE — 27000175 HC ADULT BYPASS PUMP

## 2020-01-01 PROCEDURE — 87206 SMEAR FLUORESCENT/ACID STAI: CPT

## 2020-01-01 PROCEDURE — 94010 BREATHING CAPACITY TEST: CPT

## 2020-01-01 PROCEDURE — 99233 PR SUBSEQUENT HOSPITAL CARE,LEVL III: ICD-10-PCS | Mod: ,,, | Performed by: EMERGENCY MEDICINE

## 2020-01-01 PROCEDURE — 93458 L HRT ARTERY/VENTRICLE ANGIO: CPT | Mod: 26,,, | Performed by: INTERNAL MEDICINE

## 2020-01-01 PROCEDURE — 27202608 HC CANNULA, MISC

## 2020-01-01 PROCEDURE — 93458 PR CATH PLACE/CORON ANGIO, IMG SUPER/INTERP,W LEFT HEART VENTRICULOGRAPHY: ICD-10-PCS | Mod: 26,,, | Performed by: INTERNAL MEDICINE

## 2020-01-01 PROCEDURE — 83880 ASSAY OF NATRIURETIC PEPTIDE: CPT | Mod: ER

## 2020-01-01 PROCEDURE — 37000008 HC ANESTHESIA 1ST 15 MINUTES: Performed by: SURGERY

## 2020-01-01 PROCEDURE — 33427 REPAIR OF MITRAL VALVE: CPT | Mod: ,,, | Performed by: THORACIC SURGERY (CARDIOTHORACIC VASCULAR SURGERY)

## 2020-01-01 PROCEDURE — 36620 INSERTION CATHETER ARTERY: CPT

## 2020-01-01 PROCEDURE — 99497 ADVNCD CARE PLAN 30 MIN: CPT | Mod: 25,,, | Performed by: CLINICAL NURSE SPECIALIST

## 2020-01-01 PROCEDURE — 99231 SBSQ HOSP IP/OBS SF/LOW 25: CPT | Mod: ,,, | Performed by: INTERNAL MEDICINE

## 2020-01-01 PROCEDURE — 99291 CRITICAL CARE FIRST HOUR: CPT | Mod: 25,ER

## 2020-01-01 PROCEDURE — 63600175 PHARM REV CODE 636 W HCPCS: Mod: JG | Performed by: ANESTHESIOLOGY

## 2020-01-01 PROCEDURE — 99222 PR INITIAL HOSPITAL CARE,LEVL II: ICD-10-PCS | Mod: ,,, | Performed by: NURSE PRACTITIONER

## 2020-01-01 PROCEDURE — 43246 PR EGD, FLEX, W/PLCMT, GASTROSTOMY TUBE: ICD-10-PCS | Mod: ,,, | Performed by: SURGERY

## 2020-01-01 PROCEDURE — 93325 DOPPLER ECHO COLOR FLOW MAPG: CPT | Mod: 26,59,, | Performed by: ANESTHESIOLOGY

## 2020-01-01 PROCEDURE — 85025 COMPLETE CBC W/AUTO DIFF WBC: CPT | Mod: ER

## 2020-01-01 PROCEDURE — C2618 PROBE/NEEDLE, CRYO: HCPCS | Performed by: THORACIC SURGERY (CARDIOTHORACIC VASCULAR SURGERY)

## 2020-01-01 PROCEDURE — 96374 THER/PROPH/DIAG INJ IV PUSH: CPT | Mod: ER

## 2020-01-01 PROCEDURE — 36000707: Performed by: SURGERY

## 2020-01-01 PROCEDURE — 99499 UNLISTED E&M SERVICE: CPT | Mod: ,,, | Performed by: NURSE PRACTITIONER

## 2020-01-01 PROCEDURE — 87106 FUNGI IDENTIFICATION YEAST: CPT

## 2020-01-01 PROCEDURE — 94799 UNLISTED PULMONARY SVC/PX: CPT

## 2020-01-01 PROCEDURE — 99291 PR CRITICAL CARE, E/M 30-74 MINUTES: ICD-10-PCS | Mod: 25,,, | Performed by: INTERNAL MEDICINE

## 2020-01-01 PROCEDURE — 99291 PR CRITICAL CARE, E/M 30-74 MINUTES: ICD-10-PCS | Mod: 25,,, | Performed by: ANESTHESIOLOGY

## 2020-01-01 PROCEDURE — 83880 ASSAY OF NATRIURETIC PEPTIDE: CPT

## 2020-01-01 PROCEDURE — 99222 1ST HOSP IP/OBS MODERATE 55: CPT | Mod: ,,, | Performed by: SURGERY

## 2020-01-01 PROCEDURE — D9220A PRA ANESTHESIA: ICD-10-PCS | Mod: CRNA,,, | Performed by: NURSE ANESTHETIST, CERTIFIED REGISTERED

## 2020-01-01 PROCEDURE — 93312 ECHO TRANSESOPHAGEAL: CPT | Mod: 26,59,, | Performed by: ANESTHESIOLOGY

## 2020-01-01 PROCEDURE — 99900035 HC TECH TIME PER 15 MIN (STAT): Mod: ER

## 2020-01-01 PROCEDURE — 27000191 HC C-V MONITORING

## 2020-01-01 PROCEDURE — 99291 CRITICAL CARE FIRST HOUR: CPT | Mod: ICN,,, | Performed by: INTERNAL MEDICINE

## 2020-01-01 PROCEDURE — 36620 INSERTION CATHETER ARTERY: CPT | Mod: 59,,, | Performed by: ANESTHESIOLOGY

## 2020-01-01 PROCEDURE — 82570 ASSAY OF URINE CREATININE: CPT

## 2020-01-01 PROCEDURE — 99291 CRITICAL CARE FIRST HOUR: CPT | Mod: 25,,, | Performed by: ANESTHESIOLOGY

## 2020-01-01 PROCEDURE — 87088 URINE BACTERIA CULTURE: CPT

## 2020-01-01 DEVICE — RING ANNULOPLASTY TRICUSPID: Type: IMPLANTABLE DEVICE | Site: HEART | Status: FUNCTIONAL

## 2020-01-01 DEVICE — BAND SIM FLEX 31MM: Type: IMPLANTABLE DEVICE | Site: HEART | Status: FUNCTIONAL

## 2020-01-01 DEVICE — PEG PULL 18FR: Type: IMPLANTABLE DEVICE | Site: ABDOMEN | Status: FUNCTIONAL

## 2020-01-01 RX ORDER — NAPROXEN SODIUM 220 MG/1
81 TABLET, FILM COATED ORAL DAILY
Status: DISCONTINUED | OUTPATIENT
Start: 2020-01-01 | End: 2020-01-01

## 2020-01-01 RX ORDER — FAMOTIDINE 20 MG/1
20 TABLET, FILM COATED ORAL 2 TIMES DAILY
Status: DISCONTINUED | OUTPATIENT
Start: 2020-01-01 | End: 2020-01-01

## 2020-01-01 RX ORDER — HYDROMORPHONE HYDROCHLORIDE 1 MG/ML
0.2 INJECTION, SOLUTION INTRAMUSCULAR; INTRAVENOUS; SUBCUTANEOUS EVERY 6 HOURS SCHEDULED
Status: DISCONTINUED | OUTPATIENT
Start: 2020-01-01 | End: 2020-01-01 | Stop reason: HOSPADM

## 2020-01-01 RX ORDER — METOPROLOL TARTRATE 25 MG/1
25 TABLET, FILM COATED ORAL 2 TIMES DAILY
Status: DISCONTINUED | OUTPATIENT
Start: 2020-01-01 | End: 2020-01-01

## 2020-01-01 RX ORDER — LIDOCAINE HYDROCHLORIDE 10 MG/ML
INJECTION, SOLUTION EPIDURAL; INFILTRATION; INTRACAUDAL; PERINEURAL
Status: DISCONTINUED | OUTPATIENT
Start: 2020-01-01 | End: 2020-01-01 | Stop reason: HOSPADM

## 2020-01-01 RX ORDER — POTASSIUM CHLORIDE 29.8 MG/ML
40 INJECTION INTRAVENOUS ONCE
Status: COMPLETED | OUTPATIENT
Start: 2020-01-01 | End: 2020-01-01

## 2020-01-01 RX ORDER — HYDROCODONE BITARTRATE AND ACETAMINOPHEN 500; 5 MG/1; MG/1
TABLET ORAL
Status: DISCONTINUED | OUTPATIENT
Start: 2020-01-01 | End: 2020-01-01

## 2020-01-01 RX ORDER — ALBUMIN HUMAN 50 G/1000ML
25 SOLUTION INTRAVENOUS ONCE
Status: DISCONTINUED | OUTPATIENT
Start: 2020-01-01 | End: 2020-01-01

## 2020-01-01 RX ORDER — FENTANYL CITRATE 50 UG/ML
25 INJECTION, SOLUTION INTRAMUSCULAR; INTRAVENOUS ONCE
Status: COMPLETED | OUTPATIENT
Start: 2020-01-01 | End: 2020-01-01

## 2020-01-01 RX ORDER — LIDOCAINE HYDROCHLORIDE 10 MG/ML
INJECTION INFILTRATION; PERINEURAL
Status: COMPLETED
Start: 2020-01-01 | End: 2020-01-01

## 2020-01-01 RX ORDER — AMIODARONE HYDROCHLORIDE 200 MG/1
200 TABLET ORAL 2 TIMES DAILY
Status: DISCONTINUED | OUTPATIENT
Start: 2020-01-01 | End: 2020-01-01

## 2020-01-01 RX ORDER — METOPROLOL TARTRATE 1 MG/ML
5 INJECTION, SOLUTION INTRAVENOUS EVERY 5 MIN PRN
Status: DISCONTINUED | OUTPATIENT
Start: 2020-01-01 | End: 2020-01-01

## 2020-01-01 RX ORDER — HEPARIN SODIUM 1000 [USP'U]/ML
INJECTION, SOLUTION INTRAVENOUS; SUBCUTANEOUS
Status: DISCONTINUED | OUTPATIENT
Start: 2020-01-01 | End: 2020-01-01

## 2020-01-01 RX ORDER — INSULIN ASPART 100 [IU]/ML
0-5 INJECTION, SOLUTION INTRAVENOUS; SUBCUTANEOUS EVERY 6 HOURS PRN
Status: DISCONTINUED | OUTPATIENT
Start: 2020-01-01 | End: 2020-01-01

## 2020-01-01 RX ORDER — CEFAZOLIN SODIUM 1 G/3ML
2 INJECTION, POWDER, FOR SOLUTION INTRAMUSCULAR; INTRAVENOUS
Status: DISCONTINUED | OUTPATIENT
Start: 2020-01-01 | End: 2020-01-01

## 2020-01-01 RX ORDER — OXYCODONE HYDROCHLORIDE 5 MG/1
5 TABLET ORAL EVERY 4 HOURS PRN
Status: DISCONTINUED | OUTPATIENT
Start: 2020-01-01 | End: 2020-01-01

## 2020-01-01 RX ORDER — EPINEPHRINE 1 MG/ML
INJECTION, SOLUTION INTRACARDIAC; INTRAMUSCULAR; INTRAVENOUS; SUBCUTANEOUS
Status: DISCONTINUED | OUTPATIENT
Start: 2020-01-01 | End: 2020-01-01

## 2020-01-01 RX ORDER — FUROSEMIDE 10 MG/ML
10 INJECTION INTRAMUSCULAR; INTRAVENOUS ONCE
Status: DISCONTINUED | OUTPATIENT
Start: 2020-01-01 | End: 2020-01-01

## 2020-01-01 RX ORDER — ROCURONIUM BROMIDE 10 MG/ML
INJECTION, SOLUTION INTRAVENOUS
Status: DISCONTINUED | OUTPATIENT
Start: 2020-01-01 | End: 2020-01-01

## 2020-01-01 RX ORDER — GLUCAGON 1 MG
1 KIT INJECTION
Status: DISCONTINUED | OUTPATIENT
Start: 2020-01-01 | End: 2020-01-01

## 2020-01-01 RX ORDER — LIDOCAINE HCL/PF 100 MG/5ML
SYRINGE (ML) INTRAVENOUS
Status: DISCONTINUED | OUTPATIENT
Start: 2020-01-01 | End: 2020-01-01

## 2020-01-01 RX ORDER — FUROSEMIDE 10 MG/ML
60 INJECTION INTRAMUSCULAR; INTRAVENOUS
Status: DISCONTINUED | OUTPATIENT
Start: 2020-01-01 | End: 2020-01-01

## 2020-01-01 RX ORDER — MIDAZOLAM HYDROCHLORIDE 1 MG/ML
INJECTION INTRAMUSCULAR; INTRAVENOUS
Status: DISCONTINUED
Start: 2020-01-01 | End: 2020-01-01 | Stop reason: WASHOUT

## 2020-01-01 RX ORDER — NITROGLYCERIN 0.4 MG/1
0.4 TABLET SUBLINGUAL
Status: DISCONTINUED | OUTPATIENT
Start: 2020-01-01 | End: 2020-01-01

## 2020-01-01 RX ORDER — ACETAMINOPHEN 325 MG/1
650 TABLET ORAL EVERY 6 HOURS
Status: DISCONTINUED | OUTPATIENT
Start: 2020-01-01 | End: 2020-01-01

## 2020-01-01 RX ORDER — VANCOMYCIN HCL IN 5 % DEXTROSE 1G/250ML
1000 PLASTIC BAG, INJECTION (ML) INTRAVENOUS ONCE
Status: COMPLETED | OUTPATIENT
Start: 2020-01-01 | End: 2020-01-01

## 2020-01-01 RX ORDER — POTASSIUM CHLORIDE 29.8 MG/ML
80 INJECTION INTRAVENOUS
Status: DISCONTINUED | OUTPATIENT
Start: 2020-01-01 | End: 2020-01-01

## 2020-01-01 RX ORDER — IPRATROPIUM BROMIDE AND ALBUTEROL SULFATE 2.5; .5 MG/3ML; MG/3ML
3 SOLUTION RESPIRATORY (INHALATION)
Status: DISCONTINUED | OUTPATIENT
Start: 2020-01-01 | End: 2020-01-01

## 2020-01-01 RX ORDER — FAMOTIDINE 20 MG/1
20 TABLET, FILM COATED ORAL DAILY
Status: DISCONTINUED | OUTPATIENT
Start: 2020-01-01 | End: 2020-01-01

## 2020-01-01 RX ORDER — HYDROMORPHONE HYDROCHLORIDE 1 MG/ML
0.5 INJECTION, SOLUTION INTRAMUSCULAR; INTRAVENOUS; SUBCUTANEOUS ONCE
Status: COMPLETED | OUTPATIENT
Start: 2020-01-01 | End: 2020-01-01

## 2020-01-01 RX ORDER — LIDOCAINE HYDROCHLORIDE 20 MG/ML
INJECTION, SOLUTION EPIDURAL; INFILTRATION; INTRACAUDAL; PERINEURAL
Status: DISCONTINUED | OUTPATIENT
Start: 2020-01-01 | End: 2020-01-01

## 2020-01-01 RX ORDER — POLYETHYLENE GLYCOL 3350 17 G/17G
17 POWDER, FOR SOLUTION ORAL DAILY
Status: DISCONTINUED | OUTPATIENT
Start: 2020-01-01 | End: 2020-01-01

## 2020-01-01 RX ORDER — IPRATROPIUM BROMIDE AND ALBUTEROL SULFATE 2.5; .5 MG/3ML; MG/3ML
3 SOLUTION RESPIRATORY (INHALATION) EVERY 4 HOURS
Status: DISCONTINUED | OUTPATIENT
Start: 2020-01-01 | End: 2020-01-01

## 2020-01-01 RX ORDER — ACETAMINOPHEN 325 MG/1
975 TABLET ORAL EVERY 8 HOURS
Status: DISCONTINUED | OUTPATIENT
Start: 2020-01-01 | End: 2020-01-01

## 2020-01-01 RX ORDER — MIRTAZAPINE 7.5 MG/1
7.5 TABLET, FILM COATED ORAL NIGHTLY
Status: DISCONTINUED | OUTPATIENT
Start: 2020-01-01 | End: 2020-01-01

## 2020-01-01 RX ORDER — HYDROCODONE BITARTRATE AND ACETAMINOPHEN 500; 5 MG/1; MG/1
TABLET ORAL CONTINUOUS
Status: DISCONTINUED | OUTPATIENT
Start: 2020-01-01 | End: 2020-01-01

## 2020-01-01 RX ORDER — POTASSIUM CHLORIDE 1.5 G/1.58G
40 POWDER, FOR SOLUTION ORAL ONCE
Status: COMPLETED | OUTPATIENT
Start: 2020-01-01 | End: 2020-01-01

## 2020-01-01 RX ORDER — MAGNESIUM SULFATE HEPTAHYDRATE 40 MG/ML
2 INJECTION, SOLUTION INTRAVENOUS
Status: DISCONTINUED | OUTPATIENT
Start: 2020-01-01 | End: 2020-01-01

## 2020-01-01 RX ORDER — MORPHINE SULFATE 2 MG/ML
2 INJECTION, SOLUTION INTRAMUSCULAR; INTRAVENOUS EVERY 4 HOURS PRN
Status: DISCONTINUED | OUTPATIENT
Start: 2020-01-01 | End: 2020-01-01

## 2020-01-01 RX ORDER — HEPARIN SODIUM 10000 [USP'U]/100ML
600 INJECTION, SOLUTION INTRAVENOUS CONTINUOUS
Status: DISCONTINUED | OUTPATIENT
Start: 2020-01-01 | End: 2020-01-01

## 2020-01-01 RX ORDER — PROPOFOL 10 MG/ML
INJECTION, EMULSION INTRAVENOUS
Status: COMPLETED
Start: 2020-01-01 | End: 2020-01-01

## 2020-01-01 RX ORDER — ASPIRIN 325 MG
325 TABLET ORAL ONCE
Status: DISCONTINUED | OUTPATIENT
Start: 2020-01-01 | End: 2020-01-01

## 2020-01-01 RX ORDER — INDOMETHACIN 25 MG/1
100 CAPSULE ORAL ONCE
Status: COMPLETED | OUTPATIENT
Start: 2020-01-01 | End: 2020-01-01

## 2020-01-01 RX ORDER — ACETAMINOPHEN 325 MG/1
650 TABLET ORAL EVERY 6 HOURS PRN
Status: DISCONTINUED | OUTPATIENT
Start: 2020-01-01 | End: 2020-01-01

## 2020-01-01 RX ORDER — AMIODARONE HYDROCHLORIDE 200 MG/1
400 TABLET ORAL 2 TIMES DAILY
Status: DISCONTINUED | OUTPATIENT
Start: 2020-01-01 | End: 2020-01-01 | Stop reason: HOSPADM

## 2020-01-01 RX ORDER — ALBUTEROL SULFATE 0.83 MG/ML
2.5 SOLUTION RESPIRATORY (INHALATION)
Status: COMPLETED | OUTPATIENT
Start: 2020-01-01 | End: 2020-01-01

## 2020-01-01 RX ORDER — LIDOCAINE HYDROCHLORIDE 10 MG/ML
10 INJECTION, SOLUTION EPIDURAL; INFILTRATION; INTRACAUDAL; PERINEURAL ONCE
Status: DISCONTINUED | OUTPATIENT
Start: 2020-01-01 | End: 2020-01-01

## 2020-01-01 RX ORDER — HEPARIN SODIUM 10000 [USP'U]/100ML
700 INJECTION, SOLUTION INTRAVENOUS CONTINUOUS
Status: DISCONTINUED | OUTPATIENT
Start: 2020-01-01 | End: 2020-01-01

## 2020-01-01 RX ORDER — MAGNESIUM SULFATE HEPTAHYDRATE 40 MG/ML
2 INJECTION, SOLUTION INTRAVENOUS
Status: ACTIVE | OUTPATIENT
Start: 2020-01-01 | End: 2020-01-01

## 2020-01-01 RX ORDER — POTASSIUM CHLORIDE 20 MEQ/15ML
40 SOLUTION ORAL EVERY 4 HOURS
Status: DISCONTINUED | OUTPATIENT
Start: 2020-01-01 | End: 2020-01-01

## 2020-01-01 RX ORDER — ASPIRIN 300 MG/1
300 SUPPOSITORY RECTAL DAILY
Status: DISCONTINUED | OUTPATIENT
Start: 2020-01-01 | End: 2020-01-01

## 2020-01-01 RX ORDER — TALC
6 POWDER (GRAM) TOPICAL NIGHTLY PRN
Status: DISCONTINUED | OUTPATIENT
Start: 2020-01-01 | End: 2020-01-01

## 2020-01-01 RX ORDER — FUROSEMIDE 40 MG/1
40 TABLET ORAL 2 TIMES DAILY
Status: DISCONTINUED | OUTPATIENT
Start: 2020-01-01 | End: 2020-01-01 | Stop reason: HOSPADM

## 2020-01-01 RX ORDER — HYDROMORPHONE HYDROCHLORIDE 1 MG/ML
0.2 INJECTION, SOLUTION INTRAMUSCULAR; INTRAVENOUS; SUBCUTANEOUS ONCE
Status: COMPLETED | OUTPATIENT
Start: 2020-01-01 | End: 2020-01-01

## 2020-01-01 RX ORDER — ALBUMIN HUMAN 50 G/1000ML
25 SOLUTION INTRAVENOUS ONCE
Status: COMPLETED | OUTPATIENT
Start: 2020-01-01 | End: 2020-01-01

## 2020-01-01 RX ORDER — DOBUTAMINE HYDROCHLORIDE 400 MG/100ML
5 INJECTION, SOLUTION INTRAVENOUS CONTINUOUS
Status: DISCONTINUED | OUTPATIENT
Start: 2020-01-01 | End: 2020-01-01

## 2020-01-01 RX ORDER — POTASSIUM CHLORIDE 14.9 MG/ML
60 INJECTION INTRAVENOUS
Status: DISCONTINUED | OUTPATIENT
Start: 2020-01-01 | End: 2020-01-01

## 2020-01-01 RX ORDER — ETOMIDATE 2 MG/ML
INJECTION INTRAVENOUS
Status: COMPLETED
Start: 2020-01-01 | End: 2020-01-01

## 2020-01-01 RX ORDER — POTASSIUM CHLORIDE 20 MEQ/15ML
40 SOLUTION ORAL EVERY 4 HOURS
Status: COMPLETED | OUTPATIENT
Start: 2020-01-01 | End: 2020-01-01

## 2020-01-01 RX ORDER — DEXTROSE MONOHYDRATE AND SODIUM CHLORIDE 5; .45 G/100ML; G/100ML
INJECTION, SOLUTION INTRAVENOUS CONTINUOUS
Status: ACTIVE | OUTPATIENT
Start: 2020-01-01 | End: 2020-01-01

## 2020-01-01 RX ORDER — FUROSEMIDE 10 MG/ML
20 INJECTION INTRAMUSCULAR; INTRAVENOUS ONCE
Status: COMPLETED | OUTPATIENT
Start: 2020-01-01 | End: 2020-01-01

## 2020-01-01 RX ORDER — TALC
6 POWDER (GRAM) TOPICAL NIGHTLY
Status: DISCONTINUED | OUTPATIENT
Start: 2020-01-01 | End: 2020-01-01 | Stop reason: HOSPADM

## 2020-01-01 RX ORDER — POTASSIUM CHLORIDE 14.9 MG/ML
20 INJECTION INTRAVENOUS ONCE
Status: COMPLETED | OUTPATIENT
Start: 2020-01-01 | End: 2020-01-01

## 2020-01-01 RX ORDER — NICARDIPINE HYDROCHLORIDE 0.2 MG/ML
1 INJECTION INTRAVENOUS CONTINUOUS
Status: DISCONTINUED | OUTPATIENT
Start: 2020-01-01 | End: 2020-01-01

## 2020-01-01 RX ORDER — TALC
6 POWDER (GRAM) TOPICAL NIGHTLY
Status: DISCONTINUED | OUTPATIENT
Start: 2020-01-01 | End: 2020-01-01

## 2020-01-01 RX ORDER — POTASSIUM CHLORIDE 14.9 MG/ML
20 INJECTION INTRAVENOUS
Status: DISCONTINUED | OUTPATIENT
Start: 2020-01-01 | End: 2020-01-01

## 2020-01-01 RX ORDER — PROTAMINE SULFATE 10 MG/ML
25 INJECTION, SOLUTION INTRAVENOUS ONCE
Status: COMPLETED | OUTPATIENT
Start: 2020-01-01 | End: 2020-01-01

## 2020-01-01 RX ORDER — HEPARIN SODIUM 10000 [USP'U]/100ML
1000 INJECTION, SOLUTION INTRAVENOUS CONTINUOUS
Status: DISCONTINUED | OUTPATIENT
Start: 2020-01-01 | End: 2020-01-01

## 2020-01-01 RX ORDER — ONDANSETRON 2 MG/ML
4 INJECTION INTRAMUSCULAR; INTRAVENOUS EVERY 4 HOURS PRN
Status: DISCONTINUED | OUTPATIENT
Start: 2020-01-01 | End: 2020-01-01

## 2020-01-01 RX ORDER — ESCITALOPRAM OXALATE 10 MG/1
20 TABLET ORAL DAILY
Status: DISCONTINUED | OUTPATIENT
Start: 2020-01-01 | End: 2020-01-01

## 2020-01-01 RX ORDER — LORAZEPAM 2 MG/ML
INJECTION INTRAMUSCULAR
Status: DISPENSED
Start: 2020-01-01 | End: 2020-01-01

## 2020-01-01 RX ORDER — CEFTRIAXONE 500 MG/1
500 INJECTION, POWDER, FOR SOLUTION INTRAMUSCULAR; INTRAVENOUS
Status: COMPLETED | OUTPATIENT
Start: 2020-01-01 | End: 2020-01-01

## 2020-01-01 RX ORDER — HEPARIN SODIUM,PORCINE/D5W 25000/250
11 INTRAVENOUS SOLUTION INTRAVENOUS CONTINUOUS
Status: DISCONTINUED | OUTPATIENT
Start: 2020-01-01 | End: 2020-01-01

## 2020-01-01 RX ORDER — ONDANSETRON 2 MG/ML
4 INJECTION INTRAMUSCULAR; INTRAVENOUS EVERY 12 HOURS PRN
Status: DISCONTINUED | OUTPATIENT
Start: 2020-01-01 | End: 2020-01-01

## 2020-01-01 RX ORDER — TIZANIDINE 4 MG/1
4 TABLET ORAL EVERY 6 HOURS PRN
Status: DISCONTINUED | OUTPATIENT
Start: 2020-01-01 | End: 2020-01-01

## 2020-01-01 RX ORDER — POTASSIUM CHLORIDE 20 MEQ/1
40 TABLET, EXTENDED RELEASE ORAL EVERY 4 HOURS
Status: COMPLETED | OUTPATIENT
Start: 2020-01-01 | End: 2020-01-01

## 2020-01-01 RX ORDER — FUROSEMIDE 40 MG/1
40 TABLET ORAL 2 TIMES DAILY
Status: DISCONTINUED | OUTPATIENT
Start: 2020-01-01 | End: 2020-01-01

## 2020-01-01 RX ORDER — LIDOCAINE HYDROCHLORIDE 20 MG/ML
JELLY TOPICAL ONCE
Status: COMPLETED | OUTPATIENT
Start: 2020-01-01 | End: 2020-01-01

## 2020-01-01 RX ORDER — FUROSEMIDE 10 MG/ML
40 INJECTION INTRAMUSCULAR; INTRAVENOUS ONCE
Status: COMPLETED | OUTPATIENT
Start: 2020-01-01 | End: 2020-01-01

## 2020-01-01 RX ORDER — DOBUTAMINE HYDROCHLORIDE 400 MG/100ML
2.5 INJECTION, SOLUTION INTRAVENOUS CONTINUOUS
Status: DISCONTINUED | OUTPATIENT
Start: 2020-01-01 | End: 2020-01-01

## 2020-01-01 RX ORDER — HALOPERIDOL 5 MG/ML
2 INJECTION INTRAMUSCULAR EVERY 4 HOURS PRN
Status: DISCONTINUED | OUTPATIENT
Start: 2020-01-01 | End: 2020-01-01

## 2020-01-01 RX ORDER — PROPOFOL 10 MG/ML
INJECTION, EMULSION INTRAVENOUS
Status: DISCONTINUED | OUTPATIENT
Start: 2020-01-01 | End: 2020-01-01

## 2020-01-01 RX ORDER — SUCCINYLCHOLINE CHLORIDE 20 MG/ML
INJECTION INTRAMUSCULAR; INTRAVENOUS
Status: DISCONTINUED | OUTPATIENT
Start: 2020-01-01 | End: 2020-01-01

## 2020-01-01 RX ORDER — FLUCONAZOLE 2 MG/ML
200 INJECTION, SOLUTION INTRAVENOUS
Status: DISCONTINUED | OUTPATIENT
Start: 2020-01-01 | End: 2020-01-01

## 2020-01-01 RX ORDER — SENNOSIDES 8.8 MG/5ML
5 LIQUID ORAL DAILY
Status: DISCONTINUED | OUTPATIENT
Start: 2020-01-01 | End: 2020-01-01

## 2020-01-01 RX ORDER — PHENYLEPHRINE HCL IN 0.9% NACL 1 MG/10 ML
300 SYRINGE (ML) INTRAVENOUS ONCE
Status: COMPLETED | OUTPATIENT
Start: 2020-01-01 | End: 2020-01-01

## 2020-01-01 RX ORDER — DIAZEPAM 5 MG/1
5 TABLET ORAL EVERY 8 HOURS PRN
Status: DISCONTINUED | OUTPATIENT
Start: 2020-01-01 | End: 2020-01-01

## 2020-01-01 RX ORDER — AMIODARONE HYDROCHLORIDE 100 MG/1
400 TABLET ORAL DAILY
Status: DISCONTINUED | OUTPATIENT
Start: 2020-01-01 | End: 2020-01-01

## 2020-01-01 RX ORDER — FAMOTIDINE 10 MG/ML
20 INJECTION INTRAVENOUS 2 TIMES DAILY
Status: DISCONTINUED | OUTPATIENT
Start: 2020-01-01 | End: 2020-01-01

## 2020-01-01 RX ORDER — WARFARIN 2 MG/1
2 TABLET ORAL ONCE
Status: COMPLETED | OUTPATIENT
Start: 2020-01-01 | End: 2020-01-01

## 2020-01-01 RX ORDER — ASCORBIC ACID 500 MG
500 TABLET ORAL 2 TIMES DAILY
Status: DISCONTINUED | OUTPATIENT
Start: 2020-01-01 | End: 2020-01-01

## 2020-01-01 RX ORDER — FUROSEMIDE 10 MG/ML
40 INJECTION INTRAMUSCULAR; INTRAVENOUS
Status: DISCONTINUED | OUTPATIENT
Start: 2020-01-01 | End: 2020-01-01

## 2020-01-01 RX ORDER — TRANEXAMIC ACID 100 MG/ML
INJECTION, SOLUTION INTRAVENOUS CONTINUOUS PRN
Status: DISCONTINUED | OUTPATIENT
Start: 2020-01-01 | End: 2020-01-01

## 2020-01-01 RX ORDER — IPRATROPIUM BROMIDE AND ALBUTEROL SULFATE 2.5; .5 MG/3ML; MG/3ML
SOLUTION RESPIRATORY (INHALATION)
Status: COMPLETED
Start: 2020-01-01 | End: 2020-01-01

## 2020-01-01 RX ORDER — ESCITALOPRAM OXALATE 5 MG/5ML
10 SOLUTION ORAL DAILY
Status: DISCONTINUED | OUTPATIENT
Start: 2020-01-01 | End: 2020-01-01

## 2020-01-01 RX ORDER — LORAZEPAM 2 MG/ML
INJECTION INTRAMUSCULAR
Status: COMPLETED
Start: 2020-01-01 | End: 2020-01-01

## 2020-01-01 RX ORDER — HEPARIN SODIUM 10000 [USP'U]/100ML
1200 INJECTION, SOLUTION INTRAVENOUS CONTINUOUS
Status: DISCONTINUED | OUTPATIENT
Start: 2020-01-01 | End: 2020-01-01

## 2020-01-01 RX ORDER — POTASSIUM CHLORIDE 20 MEQ/1
40 TABLET, EXTENDED RELEASE ORAL ONCE
Status: COMPLETED | OUTPATIENT
Start: 2020-01-01 | End: 2020-01-01

## 2020-01-01 RX ORDER — DIGOXIN 125 MCG
0.12 TABLET ORAL DAILY
Status: DISCONTINUED | OUTPATIENT
Start: 2020-01-01 | End: 2020-01-01

## 2020-01-01 RX ORDER — DIPHENHYDRAMINE HYDROCHLORIDE 50 MG/ML
25 INJECTION INTRAMUSCULAR; INTRAVENOUS ONCE
Status: DISCONTINUED | OUTPATIENT
Start: 2020-01-01 | End: 2020-01-01

## 2020-01-01 RX ORDER — MIDAZOLAM HYDROCHLORIDE 1 MG/ML
INJECTION, SOLUTION INTRAMUSCULAR; INTRAVENOUS
Status: DISCONTINUED | OUTPATIENT
Start: 2020-01-01 | End: 2020-01-01

## 2020-01-01 RX ORDER — METOPROLOL TARTRATE 1 MG/ML
5 INJECTION, SOLUTION INTRAVENOUS EVERY 4 HOURS PRN
Status: DISCONTINUED | OUTPATIENT
Start: 2020-01-01 | End: 2020-01-01

## 2020-01-01 RX ORDER — METOPROLOL TARTRATE 1 MG/ML
2.5 INJECTION, SOLUTION INTRAVENOUS EVERY 4 HOURS PRN
Status: DISCONTINUED | OUTPATIENT
Start: 2020-01-01 | End: 2020-01-01

## 2020-01-01 RX ORDER — ONDANSETRON 2 MG/ML
4 INJECTION INTRAMUSCULAR; INTRAVENOUS EVERY 6 HOURS PRN
Status: DISCONTINUED | OUTPATIENT
Start: 2020-01-01 | End: 2020-01-01

## 2020-01-01 RX ORDER — HEPARIN SODIUM 10000 [USP'U]/100ML
500 INJECTION, SOLUTION INTRAVENOUS CONTINUOUS
Status: DISCONTINUED | OUTPATIENT
Start: 2020-01-01 | End: 2020-01-01

## 2020-01-01 RX ORDER — NOREPINEPHRINE BITARTRATE 1 MG/ML
INJECTION, SOLUTION INTRAVENOUS
Status: DISCONTINUED | OUTPATIENT
Start: 2020-01-01 | End: 2020-01-01

## 2020-01-01 RX ORDER — ALBUMIN HUMAN 50 G/1000ML
SOLUTION INTRAVENOUS CONTINUOUS PRN
Status: DISCONTINUED | OUTPATIENT
Start: 2020-01-01 | End: 2020-01-01

## 2020-01-01 RX ORDER — ALBUTEROL SULFATE 90 UG/1
2 AEROSOL, METERED RESPIRATORY (INHALATION) EVERY 4 HOURS PRN
Qty: 6.7 G | Refills: 0 | Status: ON HOLD | OUTPATIENT
Start: 2020-01-01 | End: 2020-01-01 | Stop reason: HOSPADM

## 2020-01-01 RX ORDER — ALBUMIN HUMAN 50 G/1000ML
SOLUTION INTRAVENOUS
Status: COMPLETED
Start: 2020-01-01 | End: 2020-01-01

## 2020-01-01 RX ORDER — POTASSIUM CHLORIDE 7.45 MG/ML
20 INJECTION INTRAVENOUS ONCE
Status: DISCONTINUED | OUTPATIENT
Start: 2020-01-01 | End: 2020-01-01

## 2020-01-01 RX ORDER — WARFARIN 2 MG/1
2 TABLET ORAL ONCE
Status: DISCONTINUED | OUTPATIENT
Start: 2020-01-01 | End: 2020-01-01

## 2020-01-01 RX ORDER — ACETAMINOPHEN 650 MG/20.3ML
650 LIQUID ORAL EVERY 6 HOURS PRN
Status: DISCONTINUED | OUTPATIENT
Start: 2020-01-01 | End: 2020-01-01 | Stop reason: HOSPADM

## 2020-01-01 RX ORDER — ETOMIDATE 2 MG/ML
INJECTION INTRAVENOUS
Status: DISPENSED
Start: 2020-01-01 | End: 2020-01-01

## 2020-01-01 RX ORDER — SUCCINYLCHOLINE CHLORIDE 20 MG/ML
INJECTION INTRAMUSCULAR; INTRAVENOUS
Status: COMPLETED
Start: 2020-01-01 | End: 2020-01-01

## 2020-01-01 RX ORDER — ASPIRIN 325 MG
325 TABLET ORAL DAILY
Status: DISCONTINUED | OUTPATIENT
Start: 2020-01-01 | End: 2020-01-01

## 2020-01-01 RX ORDER — POTASSIUM CHLORIDE 1.5 G/1.58G
20 POWDER, FOR SOLUTION ORAL 2 TIMES DAILY
Status: DISCONTINUED | OUTPATIENT
Start: 2020-01-01 | End: 2020-01-01

## 2020-01-01 RX ORDER — SODIUM CHLORIDE FOR INHALATION 3 %
4 VIAL, NEBULIZER (ML) INHALATION EVERY 6 HOURS
Status: DISCONTINUED | OUTPATIENT
Start: 2020-01-01 | End: 2020-01-01

## 2020-01-01 RX ORDER — FENTANYL CITRATE 50 UG/ML
25 INJECTION, SOLUTION INTRAMUSCULAR; INTRAVENOUS
Status: DISCONTINUED | OUTPATIENT
Start: 2020-01-01 | End: 2020-01-01

## 2020-01-01 RX ORDER — PROPOFOL 10 MG/ML
5 INJECTION, EMULSION INTRAVENOUS CONTINUOUS
Status: DISCONTINUED | OUTPATIENT
Start: 2020-01-01 | End: 2020-01-01

## 2020-01-01 RX ORDER — POTASSIUM CHLORIDE 14.9 MG/ML
INJECTION INTRAVENOUS
Status: COMPLETED
Start: 2020-01-01 | End: 2020-01-01

## 2020-01-01 RX ORDER — METOPROLOL TARTRATE 1 MG/ML
5 INJECTION, SOLUTION INTRAVENOUS ONCE
Status: COMPLETED | OUTPATIENT
Start: 2020-01-01 | End: 2020-01-01

## 2020-01-01 RX ORDER — POTASSIUM CHLORIDE 7.45 MG/ML
10 INJECTION INTRAVENOUS ONCE
Status: DISCONTINUED | OUTPATIENT
Start: 2020-01-01 | End: 2020-01-01

## 2020-01-01 RX ORDER — HEPARIN SODIUM 5000 [USP'U]/ML
5000 INJECTION, SOLUTION INTRAVENOUS; SUBCUTANEOUS EVERY 8 HOURS
Status: DISCONTINUED | OUTPATIENT
Start: 2020-01-01 | End: 2020-01-01

## 2020-01-01 RX ORDER — METOPROLOL TARTRATE 25 MG/1
25 TABLET, FILM COATED ORAL ONCE
Status: COMPLETED | OUTPATIENT
Start: 2020-01-01 | End: 2020-01-01

## 2020-01-01 RX ORDER — INDOMETHACIN 25 MG/1
CAPSULE ORAL
Status: COMPLETED
Start: 2020-01-01 | End: 2020-01-01

## 2020-01-01 RX ORDER — ISOSORBIDE MONONITRATE 30 MG/1
30 TABLET, EXTENDED RELEASE ORAL DAILY
Status: DISCONTINUED | OUTPATIENT
Start: 2020-01-01 | End: 2020-01-01

## 2020-01-01 RX ORDER — CEFEPIME HYDROCHLORIDE 1 G/1
1 INJECTION, POWDER, FOR SOLUTION INTRAMUSCULAR; INTRAVENOUS
Status: DISCONTINUED | OUTPATIENT
Start: 2020-01-01 | End: 2020-01-01

## 2020-01-01 RX ORDER — HYDROMORPHONE HYDROCHLORIDE 1 MG/ML
0.2 INJECTION, SOLUTION INTRAMUSCULAR; INTRAVENOUS; SUBCUTANEOUS EVERY 4 HOURS PRN
Status: DISCONTINUED | OUTPATIENT
Start: 2020-01-01 | End: 2020-01-01

## 2020-01-01 RX ORDER — HEPARIN SODIUM,PORCINE/D5W 25000/250
12 INTRAVENOUS SOLUTION INTRAVENOUS CONTINUOUS
Status: DISCONTINUED | OUTPATIENT
Start: 2020-01-01 | End: 2020-01-01

## 2020-01-01 RX ORDER — ONDANSETRON 2 MG/ML
4 INJECTION INTRAMUSCULAR; INTRAVENOUS EVERY 6 HOURS PRN
Status: DISCONTINUED | OUTPATIENT
Start: 2020-01-01 | End: 2020-01-01 | Stop reason: HOSPADM

## 2020-01-01 RX ORDER — MIDAZOLAM HYDROCHLORIDE 1 MG/ML
INJECTION, SOLUTION INTRAMUSCULAR; INTRAVENOUS
Status: DISCONTINUED | OUTPATIENT
Start: 2020-01-01 | End: 2020-01-01 | Stop reason: HOSPADM

## 2020-01-01 RX ORDER — WARFARIN 3 MG/1
3 TABLET ORAL ONCE
Status: COMPLETED | OUTPATIENT
Start: 2020-01-01 | End: 2020-01-01

## 2020-01-01 RX ORDER — LORAZEPAM 0.5 MG/1
1 TABLET ORAL EVERY 30 MIN PRN
Status: DISCONTINUED | OUTPATIENT
Start: 2020-01-01 | End: 2020-01-01

## 2020-01-01 RX ORDER — DEXMEDETOMIDINE HYDROCHLORIDE 4 UG/ML
0.2 INJECTION, SOLUTION INTRAVENOUS CONTINUOUS
Status: DISCONTINUED | OUTPATIENT
Start: 2020-01-01 | End: 2020-01-01

## 2020-01-01 RX ORDER — MORPHINE SULFATE 2 MG/ML
2 INJECTION, SOLUTION INTRAMUSCULAR; INTRAVENOUS
Status: DISCONTINUED | OUTPATIENT
Start: 2020-01-01 | End: 2020-01-01

## 2020-01-01 RX ORDER — MODAFINIL 100 MG/1
100 TABLET ORAL ONCE
Status: COMPLETED | OUTPATIENT
Start: 2020-01-01 | End: 2020-01-01

## 2020-01-01 RX ORDER — ALBUTEROL SULFATE 2.5 MG/.5ML
2.5 SOLUTION RESPIRATORY (INHALATION) ONCE
Status: COMPLETED | OUTPATIENT
Start: 2020-01-01 | End: 2020-01-01

## 2020-01-01 RX ORDER — SODIUM BICARBONATE 42 MG/ML
50 INJECTION, SOLUTION INTRAVENOUS ONCE
Status: DISCONTINUED | OUTPATIENT
Start: 2020-01-01 | End: 2020-01-01

## 2020-01-01 RX ORDER — HYDROXYZINE HYDROCHLORIDE 25 MG/1
50 TABLET, FILM COATED ORAL NIGHTLY
Status: DISCONTINUED | OUTPATIENT
Start: 2020-01-01 | End: 2020-01-01

## 2020-01-01 RX ORDER — HEPARIN SODIUM 10000 [USP'U]/100ML
900 INJECTION, SOLUTION INTRAVENOUS CONTINUOUS
Status: DISCONTINUED | OUTPATIENT
Start: 2020-01-01 | End: 2020-01-01

## 2020-01-01 RX ORDER — EPINEPHRINE 1 MG/ML
INJECTION, SOLUTION INTRACARDIAC; INTRAMUSCULAR; INTRAVENOUS; SUBCUTANEOUS
Status: COMPLETED
Start: 2020-01-01 | End: 2020-01-01

## 2020-01-01 RX ORDER — TRANEXAMIC ACID 100 MG/ML
INJECTION, SOLUTION INTRAVENOUS
Status: DISCONTINUED | OUTPATIENT
Start: 2020-01-01 | End: 2020-01-01

## 2020-01-01 RX ORDER — IPRATROPIUM BROMIDE 0.5 MG/2.5ML
0.5 SOLUTION RESPIRATORY (INHALATION)
Status: COMPLETED | OUTPATIENT
Start: 2020-01-01 | End: 2020-01-01

## 2020-01-01 RX ORDER — DIGOXIN 0.25 MG/ML
125 INJECTION INTRAMUSCULAR; INTRAVENOUS ONCE
Status: DISCONTINUED | OUTPATIENT
Start: 2020-01-01 | End: 2020-01-01

## 2020-01-01 RX ORDER — HEPARIN SODIUM 10000 [USP'U]/100ML
800 INJECTION, SOLUTION INTRAVENOUS CONTINUOUS
Status: DISCONTINUED | OUTPATIENT
Start: 2020-01-01 | End: 2020-01-01

## 2020-01-01 RX ORDER — FUROSEMIDE 10 MG/ML
10 INJECTION INTRAMUSCULAR; INTRAVENOUS ONCE
Status: COMPLETED | OUTPATIENT
Start: 2020-01-01 | End: 2020-01-01

## 2020-01-01 RX ORDER — DIGOXIN 0.25 MG/ML
125 INJECTION INTRAMUSCULAR; INTRAVENOUS DAILY
Status: DISCONTINUED | OUTPATIENT
Start: 2020-01-01 | End: 2020-01-01

## 2020-01-01 RX ORDER — LORAZEPAM 2 MG/ML
1 INJECTION INTRAMUSCULAR EVERY 10 MIN PRN
Status: DISCONTINUED | OUTPATIENT
Start: 2020-01-01 | End: 2020-01-01 | Stop reason: HOSPADM

## 2020-01-01 RX ORDER — IPRATROPIUM BROMIDE AND ALBUTEROL SULFATE 2.5; .5 MG/3ML; MG/3ML
3 SOLUTION RESPIRATORY (INHALATION) ONCE
Status: COMPLETED | OUTPATIENT
Start: 2020-01-01 | End: 2020-01-01

## 2020-01-01 RX ORDER — ACETAMINOPHEN 10 MG/ML
INJECTION, SOLUTION INTRAVENOUS
Status: DISCONTINUED | OUTPATIENT
Start: 2020-01-01 | End: 2020-01-01

## 2020-01-01 RX ORDER — MAGNESIUM SULFATE HEPTAHYDRATE 40 MG/ML
4 INJECTION, SOLUTION INTRAVENOUS
Status: DISCONTINUED | OUTPATIENT
Start: 2020-01-01 | End: 2020-01-01

## 2020-01-01 RX ORDER — DEXTROSE MONOHYDRATE, SODIUM CHLORIDE, AND POTASSIUM CHLORIDE 50; 1.49; 4.5 G/1000ML; G/1000ML; G/1000ML
INJECTION, SOLUTION INTRAVENOUS CONTINUOUS
Status: DISCONTINUED | OUTPATIENT
Start: 2020-01-01 | End: 2020-01-01

## 2020-01-01 RX ORDER — ASPIRIN 325 MG
325 TABLET ORAL
Status: ACTIVE | OUTPATIENT
Start: 2020-01-01 | End: 2020-01-01

## 2020-01-01 RX ORDER — ALBUMIN HUMAN 50 G/1000ML
12.5 SOLUTION INTRAVENOUS ONCE
Status: COMPLETED | OUTPATIENT
Start: 2020-01-01 | End: 2020-01-01

## 2020-01-01 RX ORDER — PROPOFOL 10 MG/ML
VIAL (ML) INTRAVENOUS CONTINUOUS PRN
Status: DISCONTINUED | OUTPATIENT
Start: 2020-01-01 | End: 2020-01-01

## 2020-01-01 RX ORDER — POTASSIUM CHLORIDE 1.5 G/1.58G
20 POWDER, FOR SOLUTION ORAL ONCE
Status: COMPLETED | OUTPATIENT
Start: 2020-01-01 | End: 2020-01-01

## 2020-01-01 RX ORDER — SULFAMETHOXAZOLE AND TRIMETHOPRIM 800; 160 MG/1; MG/1
1 TABLET ORAL 2 TIMES DAILY
Status: DISCONTINUED | OUTPATIENT
Start: 2020-01-01 | End: 2020-01-01

## 2020-01-01 RX ORDER — METOPROLOL TARTRATE 25 MG/1
25 TABLET, FILM COATED ORAL 3 TIMES DAILY
Status: DISCONTINUED | OUTPATIENT
Start: 2020-01-01 | End: 2020-01-01

## 2020-01-01 RX ORDER — HYDROMORPHONE HYDROCHLORIDE 1 MG/ML
0.2 INJECTION, SOLUTION INTRAMUSCULAR; INTRAVENOUS; SUBCUTANEOUS
Status: DISCONTINUED | OUTPATIENT
Start: 2020-01-01 | End: 2020-01-01 | Stop reason: HOSPADM

## 2020-01-01 RX ORDER — AMIODARONE HYDROCHLORIDE 400 MG/1
TABLET ORAL
Qty: 42 TABLET | Refills: 0 | Status: SHIPPED | OUTPATIENT
Start: 2020-01-01

## 2020-01-01 RX ORDER — NOREPINEPHRINE BITARTRATE/D5W 4MG/250ML
0.02 PLASTIC BAG, INJECTION (ML) INTRAVENOUS CONTINUOUS
Status: DISCONTINUED | OUTPATIENT
Start: 2020-01-01 | End: 2020-01-01

## 2020-01-01 RX ORDER — FUROSEMIDE 10 MG/ML
120 INJECTION INTRAMUSCULAR; INTRAVENOUS ONCE
Status: COMPLETED | OUTPATIENT
Start: 2020-01-01 | End: 2020-01-01

## 2020-01-01 RX ORDER — HYDROMORPHONE HYDROCHLORIDE 1 MG/ML
0.5 INJECTION, SOLUTION INTRAMUSCULAR; INTRAVENOUS; SUBCUTANEOUS EVERY 4 HOURS PRN
Status: DISCONTINUED | OUTPATIENT
Start: 2020-01-01 | End: 2020-01-01

## 2020-01-01 RX ORDER — WARFARIN SODIUM 5 MG/1
5 TABLET ORAL ONCE
Status: COMPLETED | OUTPATIENT
Start: 2020-01-01 | End: 2020-01-01

## 2020-01-01 RX ORDER — FENTANYL CITRATE 50 UG/ML
50 INJECTION, SOLUTION INTRAMUSCULAR; INTRAVENOUS ONCE
Status: COMPLETED | OUTPATIENT
Start: 2020-01-01 | End: 2020-01-01

## 2020-01-01 RX ORDER — LEVOFLOXACIN 5 MG/ML
750 INJECTION, SOLUTION INTRAVENOUS
Status: DISCONTINUED | OUTPATIENT
Start: 2020-01-01 | End: 2020-01-01

## 2020-01-01 RX ORDER — PROPOFOL 10 MG/ML
INJECTION, EMULSION INTRAVENOUS
Status: DISPENSED
Start: 2020-01-01 | End: 2020-01-01

## 2020-01-01 RX ORDER — CEFAZOLIN SODIUM 1 G/3ML
2 INJECTION, POWDER, FOR SOLUTION INTRAMUSCULAR; INTRAVENOUS
Status: COMPLETED | OUTPATIENT
Start: 2020-01-01 | End: 2020-01-01

## 2020-01-01 RX ORDER — FENTANYL CITRATE 50 UG/ML
INJECTION, SOLUTION INTRAMUSCULAR; INTRAVENOUS
Status: DISCONTINUED | OUTPATIENT
Start: 2020-01-01 | End: 2020-01-01 | Stop reason: HOSPADM

## 2020-01-01 RX ORDER — PROPOFOL 10 MG/ML
0-50 INJECTION, EMULSION INTRAVENOUS CONTINUOUS
Status: DISCONTINUED | OUTPATIENT
Start: 2020-01-01 | End: 2020-01-01

## 2020-01-01 RX ORDER — FUROSEMIDE 10 MG/ML
20 INJECTION INTRAMUSCULAR; INTRAVENOUS
Status: DISCONTINUED | OUTPATIENT
Start: 2020-01-01 | End: 2020-01-01

## 2020-01-01 RX ORDER — PROPOFOL 10 MG/ML
VIAL (ML) INTRAVENOUS
Status: DISCONTINUED | OUTPATIENT
Start: 2020-01-01 | End: 2020-01-01

## 2020-01-01 RX ORDER — METHYLPREDNISOLONE 4 MG/1
TABLET ORAL
Qty: 1 PACKAGE | Refills: 0 | Status: ON HOLD | OUTPATIENT
Start: 2020-01-01 | End: 2020-01-01 | Stop reason: HOSPADM

## 2020-01-01 RX ORDER — CALCIUM CHLORIDE INJECTION 100 MG/ML
1 INJECTION, SOLUTION INTRAVENOUS ONCE
Status: COMPLETED | OUTPATIENT
Start: 2020-01-01 | End: 2020-01-01

## 2020-01-01 RX ORDER — FENTANYL CITRATE 50 UG/ML
INJECTION, SOLUTION INTRAMUSCULAR; INTRAVENOUS
Status: COMPLETED
Start: 2020-01-01 | End: 2020-01-01

## 2020-01-01 RX ORDER — KETAMINE HCL IN 0.9 % NACL 50 MG/5 ML
SYRINGE (ML) INTRAVENOUS
Status: DISCONTINUED | OUTPATIENT
Start: 2020-01-01 | End: 2020-01-01

## 2020-01-01 RX ORDER — WARFARIN 4 MG/1
4 TABLET ORAL ONCE
Status: DISCONTINUED | OUTPATIENT
Start: 2020-01-01 | End: 2020-01-01

## 2020-01-01 RX ORDER — POTASSIUM CHLORIDE 1.5 G/1.58G
20 POWDER, FOR SOLUTION ORAL ONCE
Qty: 30 PACKET | Refills: 0 | Status: SHIPPED | OUTPATIENT
Start: 2020-01-01 | End: 2020-01-01

## 2020-01-01 RX ORDER — SODIUM CHLORIDE 9 MG/ML
INJECTION, SOLUTION INTRAVENOUS CONTINUOUS
Status: CANCELLED | OUTPATIENT
Start: 2020-01-01

## 2020-01-01 RX ORDER — WARFARIN 2 MG/1
2 TABLET ORAL DAILY
Status: DISCONTINUED | OUTPATIENT
Start: 2020-01-01 | End: 2020-01-01

## 2020-01-01 RX ORDER — WARFARIN 1 MG/1
1 TABLET ORAL ONCE
Status: COMPLETED | OUTPATIENT
Start: 2020-01-01 | End: 2020-01-01

## 2020-01-01 RX ORDER — PROPOFOL 10 MG/ML
INJECTION, EMULSION INTRAVENOUS
Status: DISCONTINUED
Start: 2020-01-01 | End: 2020-01-01 | Stop reason: WASHOUT

## 2020-01-01 RX ORDER — NICARDIPINE HYDROCHLORIDE 0.2 MG/ML
INJECTION INTRAVENOUS
Status: COMPLETED
Start: 2020-01-01 | End: 2020-01-01

## 2020-01-01 RX ORDER — IPRATROPIUM BROMIDE AND ALBUTEROL SULFATE 2.5; .5 MG/3ML; MG/3ML
3 SOLUTION RESPIRATORY (INHALATION) EVERY 4 HOURS PRN
Status: DISCONTINUED | OUTPATIENT
Start: 2020-01-01 | End: 2020-01-01

## 2020-01-01 RX ORDER — SODIUM CHLORIDE FOR INHALATION 3 %
4 VIAL, NEBULIZER (ML) INHALATION
Status: DISCONTINUED | OUTPATIENT
Start: 2020-01-01 | End: 2020-01-01

## 2020-01-01 RX ORDER — INDOMETHACIN 25 MG/1
CAPSULE ORAL
Status: DISCONTINUED | OUTPATIENT
Start: 2020-01-01 | End: 2020-01-01

## 2020-01-01 RX ORDER — DIGOXIN 0.25 MG/ML
125 INJECTION INTRAMUSCULAR; INTRAVENOUS ONCE
Status: COMPLETED | OUTPATIENT
Start: 2020-01-01 | End: 2020-01-01

## 2020-01-01 RX ORDER — LOSARTAN POTASSIUM 25 MG/1
25 TABLET ORAL DAILY
Status: DISCONTINUED | OUTPATIENT
Start: 2020-01-01 | End: 2020-01-01

## 2020-01-01 RX ORDER — POTASSIUM CHLORIDE 7.45 MG/ML
10 INJECTION INTRAVENOUS
Status: COMPLETED | OUTPATIENT
Start: 2020-01-01 | End: 2020-01-01

## 2020-01-01 RX ORDER — INDOMETHACIN 25 MG/1
200 CAPSULE ORAL ONCE
Status: DISCONTINUED | OUTPATIENT
Start: 2020-01-01 | End: 2020-01-01

## 2020-01-01 RX ORDER — ACETYLCYSTEINE 100 MG/ML
4 SOLUTION ORAL; RESPIRATORY (INHALATION)
Status: DISCONTINUED | OUTPATIENT
Start: 2020-01-01 | End: 2020-01-01

## 2020-01-01 RX ORDER — ACETAMINOPHEN 500 MG
1000 TABLET ORAL
Status: COMPLETED | OUTPATIENT
Start: 2020-01-01 | End: 2020-01-01

## 2020-01-01 RX ORDER — LIDOCAINE HYDROCHLORIDE 10 MG/ML
2 INJECTION INFILTRATION; PERINEURAL
Status: COMPLETED | OUTPATIENT
Start: 2020-01-01 | End: 2020-01-01

## 2020-01-01 RX ORDER — BENZONATATE 100 MG/1
100 CAPSULE ORAL EVERY 6 HOURS PRN
Qty: 30 CAPSULE | Refills: 1 | Status: SHIPPED | OUTPATIENT
Start: 2020-01-01 | End: 2020-01-01

## 2020-01-01 RX ORDER — HYDROCODONE BITARTRATE AND ACETAMINOPHEN 500; 5 MG/1; MG/1
TABLET ORAL CONTINUOUS
Status: ACTIVE | OUTPATIENT
Start: 2020-01-01 | End: 2020-01-01

## 2020-01-01 RX ORDER — SODIUM BICARBONATE 1 MEQ/ML
50 SYRINGE (ML) INTRAVENOUS ONCE
Status: COMPLETED | OUTPATIENT
Start: 2020-01-01 | End: 2020-01-01

## 2020-01-01 RX ORDER — HALOPERIDOL 5 MG/ML
1 INJECTION INTRAMUSCULAR EVERY 4 HOURS PRN
Status: DISCONTINUED | OUTPATIENT
Start: 2020-01-01 | End: 2020-01-01 | Stop reason: HOSPADM

## 2020-01-01 RX ORDER — ONDANSETRON 2 MG/ML
INJECTION INTRAMUSCULAR; INTRAVENOUS
Status: COMPLETED
Start: 2020-01-01 | End: 2020-01-01

## 2020-01-01 RX ORDER — FAMOTIDINE 10 MG/ML
20 INJECTION INTRAVENOUS 2 TIMES DAILY
Status: COMPLETED | OUTPATIENT
Start: 2020-01-01 | End: 2020-01-01

## 2020-01-01 RX ORDER — DIGOXIN 0.25 MG/ML
INJECTION INTRAMUSCULAR; INTRAVENOUS
Status: COMPLETED
Start: 2020-01-01 | End: 2020-01-01

## 2020-01-01 RX ORDER — DEXMEDETOMIDINE HYDROCHLORIDE 100 UG/ML
INJECTION, SOLUTION INTRAVENOUS
Status: DISCONTINUED | OUTPATIENT
Start: 2020-01-01 | End: 2020-01-01

## 2020-01-01 RX ORDER — DEXTROSE MONOHYDRATE AND SODIUM CHLORIDE 5; .45 G/100ML; G/100ML
INJECTION, SOLUTION INTRAVENOUS CONTINUOUS
Status: DISCONTINUED | OUTPATIENT
Start: 2020-01-01 | End: 2020-01-01

## 2020-01-01 RX ORDER — PROTAMINE SULFATE 10 MG/ML
50 INJECTION, SOLUTION INTRAVENOUS ONCE
Status: COMPLETED | OUTPATIENT
Start: 2020-01-01 | End: 2020-01-01

## 2020-01-01 RX ORDER — ATROPINE SULFATE 10 MG/ML
2 SOLUTION/ DROPS OPHTHALMIC EVERY 4 HOURS PRN
Status: DISCONTINUED | OUTPATIENT
Start: 2020-01-01 | End: 2020-01-01 | Stop reason: HOSPADM

## 2020-01-01 RX ORDER — POTASSIUM CHLORIDE 7.45 MG/ML
10 INJECTION INTRAVENOUS ONCE
Status: COMPLETED | OUTPATIENT
Start: 2020-01-01 | End: 2020-01-01

## 2020-01-01 RX ORDER — FUROSEMIDE 10 MG/ML
60 INJECTION INTRAMUSCULAR; INTRAVENOUS DAILY
Status: DISCONTINUED | OUTPATIENT
Start: 2020-01-01 | End: 2020-01-01

## 2020-01-01 RX ORDER — LIDOCAINE HYDROCHLORIDE 10 MG/ML
1 INJECTION INFILTRATION; PERINEURAL ONCE
Status: COMPLETED | OUTPATIENT
Start: 2020-01-01 | End: 2020-01-01

## 2020-01-01 RX ORDER — FENTANYL CITRATE 50 UG/ML
INJECTION, SOLUTION INTRAMUSCULAR; INTRAVENOUS
Status: DISCONTINUED
Start: 2020-01-01 | End: 2020-01-01 | Stop reason: WASHOUT

## 2020-01-01 RX ORDER — CLOPIDOGREL BISULFATE 75 MG/1
75 TABLET ORAL DAILY
Status: DISCONTINUED | OUTPATIENT
Start: 2020-01-01 | End: 2020-01-01

## 2020-01-01 RX ORDER — AMIODARONE HYDROCHLORIDE 200 MG/1
400 TABLET ORAL 2 TIMES DAILY
Status: DISCONTINUED | OUTPATIENT
Start: 2020-01-01 | End: 2020-01-01

## 2020-01-01 RX ORDER — MORPHINE SULFATE 10 MG/ML
2 INJECTION INTRAMUSCULAR; INTRAVENOUS; SUBCUTANEOUS EVERY 4 HOURS PRN
Status: DISCONTINUED | OUTPATIENT
Start: 2020-01-01 | End: 2020-01-01

## 2020-01-01 RX ORDER — CLOPIDOGREL 300 MG/1
300 TABLET, FILM COATED ORAL ONCE
Status: COMPLETED | OUTPATIENT
Start: 2020-01-01 | End: 2020-01-01

## 2020-01-01 RX ORDER — FENTANYL CITRATE 50 UG/ML
100 INJECTION, SOLUTION INTRAMUSCULAR; INTRAVENOUS ONCE
Status: COMPLETED | OUTPATIENT
Start: 2020-01-01 | End: 2020-01-01

## 2020-01-01 RX ORDER — ONDANSETRON 2 MG/ML
8 INJECTION INTRAMUSCULAR; INTRAVENOUS EVERY 8 HOURS PRN
Status: DISCONTINUED | OUTPATIENT
Start: 2020-01-01 | End: 2020-01-01 | Stop reason: HOSPADM

## 2020-01-01 RX ORDER — POTASSIUM CHLORIDE 20 MEQ/1
40 TABLET, EXTENDED RELEASE ORAL ONCE
Status: DISCONTINUED | OUTPATIENT
Start: 2020-01-01 | End: 2020-01-01

## 2020-01-01 RX ORDER — VERAPAMIL HYDROCHLORIDE 2.5 MG/ML
INJECTION, SOLUTION INTRAVENOUS
Status: DISCONTINUED | OUTPATIENT
Start: 2020-01-01 | End: 2020-01-01 | Stop reason: HOSPADM

## 2020-01-01 RX ORDER — ALBUMIN HUMAN 250 G/1000ML
25 SOLUTION INTRAVENOUS ONCE
Status: DISCONTINUED | OUTPATIENT
Start: 2020-01-01 | End: 2020-01-01

## 2020-01-01 RX ORDER — POTASSIUM CHLORIDE 29.8 MG/ML
40 INJECTION INTRAVENOUS
Status: DISCONTINUED | OUTPATIENT
Start: 2020-01-01 | End: 2020-01-01

## 2020-01-01 RX ORDER — CEFEPIME HYDROCHLORIDE 2 G/1
2 INJECTION, POWDER, FOR SOLUTION INTRAVENOUS
Status: DISCONTINUED | OUTPATIENT
Start: 2020-01-01 | End: 2020-01-01

## 2020-01-01 RX ORDER — CALCIUM CARBONATE 200(500)MG
1000 TABLET,CHEWABLE ORAL 3 TIMES DAILY PRN
Status: DISCONTINUED | OUTPATIENT
Start: 2020-01-01 | End: 2020-01-01

## 2020-01-01 RX ORDER — ROCURONIUM BROMIDE 10 MG/ML
INJECTION, SOLUTION INTRAVENOUS
Status: DISPENSED
Start: 2020-01-01 | End: 2020-01-01

## 2020-01-01 RX ORDER — FUROSEMIDE 10 MG/ML
80 INJECTION INTRAMUSCULAR; INTRAVENOUS ONCE
Status: COMPLETED | OUTPATIENT
Start: 2020-01-01 | End: 2020-01-01

## 2020-01-01 RX ORDER — FUROSEMIDE 10 MG/ML
40 INJECTION INTRAMUSCULAR; INTRAVENOUS 2 TIMES DAILY
Status: DISCONTINUED | OUTPATIENT
Start: 2020-01-01 | End: 2020-01-01

## 2020-01-01 RX ORDER — AMIODARONE HYDROCHLORIDE 100 MG/1
200 TABLET ORAL 2 TIMES DAILY
Status: DISCONTINUED | OUTPATIENT
Start: 2020-01-01 | End: 2020-01-01

## 2020-01-01 RX ORDER — HEPARIN SODIUM 10000 [USP'U]/100ML
1100 INJECTION, SOLUTION INTRAVENOUS CONTINUOUS
Status: DISCONTINUED | OUTPATIENT
Start: 2020-01-01 | End: 2020-01-01

## 2020-01-01 RX ORDER — INDOMETHACIN 25 MG/1
50 CAPSULE ORAL ONCE
Status: COMPLETED | OUTPATIENT
Start: 2020-01-01 | End: 2020-01-01

## 2020-01-01 RX ORDER — FAMOTIDINE 10 MG/ML
20 INJECTION INTRAVENOUS DAILY
Status: COMPLETED | OUTPATIENT
Start: 2020-01-01 | End: 2020-01-01

## 2020-01-01 RX ORDER — SODIUM CHLORIDE 0.9 % (FLUSH) 0.9 %
10 SYRINGE (ML) INJECTION
Status: DISCONTINUED | OUTPATIENT
Start: 2020-01-01 | End: 2020-01-01

## 2020-01-01 RX ORDER — SODIUM CHLORIDE 9 MG/ML
INJECTION, SOLUTION INTRAVENOUS CONTINUOUS
Status: DISCONTINUED | OUTPATIENT
Start: 2020-01-01 | End: 2020-01-01

## 2020-01-01 RX ORDER — METOPROLOL TARTRATE 1 MG/ML
5 INJECTION, SOLUTION INTRAVENOUS ONCE
Status: DISCONTINUED | OUTPATIENT
Start: 2020-01-01 | End: 2020-01-01

## 2020-01-01 RX ORDER — DIGOXIN 0.25 MG/ML
500 INJECTION INTRAMUSCULAR; INTRAVENOUS ONCE
Status: COMPLETED | OUTPATIENT
Start: 2020-01-01 | End: 2020-01-01

## 2020-01-01 RX ORDER — ACETAMINOPHEN 500 MG
500 TABLET ORAL EVERY 6 HOURS PRN
Status: DISCONTINUED | OUTPATIENT
Start: 2020-01-01 | End: 2020-01-01

## 2020-01-01 RX ORDER — AMIODARONE HYDROCHLORIDE 100 MG/1
200 TABLET ORAL DAILY
Status: DISCONTINUED | OUTPATIENT
Start: 2020-01-01 | End: 2020-01-01

## 2020-01-01 RX ORDER — SODIUM CHLORIDE 0.9 % (FLUSH) 0.9 %
10 SYRINGE (ML) INJECTION
Status: DISCONTINUED | OUTPATIENT
Start: 2020-01-01 | End: 2020-01-01 | Stop reason: HOSPADM

## 2020-01-01 RX ORDER — MUPIROCIN 20 MG/G
OINTMENT TOPICAL 2 TIMES DAILY
Status: COMPLETED | OUTPATIENT
Start: 2020-01-01 | End: 2020-01-01

## 2020-01-01 RX ORDER — HEPARIN SODIUM 1000 [USP'U]/ML
INJECTION, SOLUTION INTRAVENOUS; SUBCUTANEOUS
Status: DISCONTINUED | OUTPATIENT
Start: 2020-01-01 | End: 2020-01-01 | Stop reason: HOSPADM

## 2020-01-01 RX ORDER — ACETAMINOPHEN 325 MG/1
650 TABLET ORAL EVERY 4 HOURS PRN
Status: DISCONTINUED | OUTPATIENT
Start: 2020-01-01 | End: 2020-01-01 | Stop reason: HOSPADM

## 2020-01-01 RX ORDER — FENTANYL CITRATE 50 UG/ML
INJECTION, SOLUTION INTRAMUSCULAR; INTRAVENOUS
Status: DISCONTINUED | OUTPATIENT
Start: 2020-01-01 | End: 2020-01-01

## 2020-01-01 RX ORDER — METOPROLOL TARTRATE 1 MG/ML
2.5 INJECTION, SOLUTION INTRAVENOUS EVERY 6 HOURS
Status: DISCONTINUED | OUTPATIENT
Start: 2020-01-01 | End: 2020-01-01

## 2020-01-01 RX ORDER — ASPIRIN 300 MG/1
150 SUPPOSITORY RECTAL DAILY
Status: COMPLETED | OUTPATIENT
Start: 2020-01-01 | End: 2020-01-01

## 2020-01-01 RX ORDER — CALCIUM ACETATE 667 MG/1
2001 CAPSULE ORAL
Status: DISCONTINUED | OUTPATIENT
Start: 2020-01-01 | End: 2020-01-01

## 2020-01-01 RX ORDER — NOREPINEPHRINE BITARTRATE/D5W 4MG/250ML
PLASTIC BAG, INJECTION (ML) INTRAVENOUS
Status: DISPENSED
Start: 2020-01-01 | End: 2020-01-01

## 2020-01-01 RX ORDER — ALPRAZOLAM 0.25 MG/1
0.25 TABLET ORAL ONCE
Status: COMPLETED | OUTPATIENT
Start: 2020-01-01 | End: 2020-01-01

## 2020-01-01 RX ORDER — METOPROLOL TARTRATE 25 MG/1
25 TABLET, FILM COATED ORAL 3 TIMES DAILY
Qty: 90 TABLET | Refills: 1 | Status: SHIPPED | OUTPATIENT
Start: 2020-01-01 | End: 2021-08-27

## 2020-01-01 RX ORDER — MILRINONE LACTATE 0.2 MG/ML
0.25 INJECTION, SOLUTION INTRAVENOUS CONTINUOUS
Status: DISCONTINUED | OUTPATIENT
Start: 2020-01-01 | End: 2020-01-01

## 2020-01-01 RX ORDER — POTASSIUM CHLORIDE 14.9 MG/ML
20 INJECTION INTRAVENOUS 2 TIMES DAILY
Status: DISCONTINUED | OUTPATIENT
Start: 2020-01-01 | End: 2020-01-01

## 2020-01-01 RX ORDER — DOXYCYCLINE 100 MG/1
100 CAPSULE ORAL 2 TIMES DAILY
Qty: 20 CAPSULE | Refills: 0 | Status: SHIPPED | OUTPATIENT
Start: 2020-01-01 | End: 2020-01-01

## 2020-01-01 RX ORDER — METOPROLOL TARTRATE 1 MG/ML
5 INJECTION, SOLUTION INTRAVENOUS
Status: COMPLETED | OUTPATIENT
Start: 2020-01-01 | End: 2020-01-01

## 2020-01-01 RX ORDER — MIDAZOLAM HYDROCHLORIDE 1 MG/ML
INJECTION INTRAMUSCULAR; INTRAVENOUS
Status: DISCONTINUED | OUTPATIENT
Start: 2020-01-01 | End: 2020-01-01

## 2020-01-01 RX ORDER — DIPHENHYDRAMINE HCL 25 MG
25 CAPSULE ORAL ONCE
Status: COMPLETED | OUTPATIENT
Start: 2020-01-01 | End: 2020-01-01

## 2020-01-01 RX ORDER — LIDOCAINE HYDROCHLORIDE 20 MG/ML
JELLY TOPICAL
Status: DISCONTINUED | OUTPATIENT
Start: 2020-01-01 | End: 2020-01-01

## 2020-01-01 RX ORDER — ONDANSETRON 2 MG/ML
INJECTION INTRAMUSCULAR; INTRAVENOUS
Status: DISCONTINUED | OUTPATIENT
Start: 2020-01-01 | End: 2020-01-01

## 2020-01-01 RX ORDER — DEXMEDETOMIDINE HYDROCHLORIDE 4 UG/ML
INJECTION, SOLUTION INTRAVENOUS
Status: COMPLETED
Start: 2020-01-01 | End: 2020-01-01

## 2020-01-01 RX ORDER — CEFEPIME HYDROCHLORIDE 1 G/1
1 INJECTION, POWDER, FOR SOLUTION INTRAMUSCULAR; INTRAVENOUS
Status: COMPLETED | OUTPATIENT
Start: 2020-01-01 | End: 2020-01-01

## 2020-01-01 RX ORDER — THIAMINE HCL 100 MG
100 TABLET ORAL DAILY
Status: DISCONTINUED | OUTPATIENT
Start: 2020-01-01 | End: 2020-01-01

## 2020-01-01 RX ORDER — FUROSEMIDE 10 MG/ML
60 INJECTION INTRAMUSCULAR; INTRAVENOUS
Status: COMPLETED | OUTPATIENT
Start: 2020-01-01 | End: 2020-01-01

## 2020-01-01 RX ORDER — ENOXAPARIN SODIUM 100 MG/ML
1 INJECTION SUBCUTANEOUS
Status: DISCONTINUED | OUTPATIENT
Start: 2020-01-01 | End: 2020-01-01

## 2020-01-01 RX ORDER — FUROSEMIDE 40 MG/1
40 TABLET ORAL 2 TIMES DAILY
Qty: 60 TABLET | Refills: 1 | Status: SHIPPED | OUTPATIENT
Start: 2020-01-01 | End: 2021-08-27

## 2020-01-01 RX ORDER — ACETAMINOPHEN 500 MG
500 TABLET ORAL EVERY 8 HOURS
Status: DISCONTINUED | OUTPATIENT
Start: 2020-01-01 | End: 2020-01-01

## 2020-01-01 RX ORDER — METOPROLOL TARTRATE 50 MG/1
50 TABLET ORAL 2 TIMES DAILY
Status: DISCONTINUED | OUTPATIENT
Start: 2020-01-01 | End: 2020-01-01

## 2020-01-01 RX ORDER — LIDOCAINE HYDROCHLORIDE 40 MG/ML
INJECTION, SOLUTION RETROBULBAR
Status: DISCONTINUED | OUTPATIENT
Start: 2020-01-01 | End: 2020-01-01 | Stop reason: HOSPADM

## 2020-01-01 RX ORDER — LABETALOL HCL 20 MG/4 ML
10 SYRINGE (ML) INTRAVENOUS EVERY 6 HOURS PRN
Status: DISCONTINUED | OUTPATIENT
Start: 2020-01-01 | End: 2020-01-01

## 2020-01-01 RX ORDER — VASOPRESSIN 20 [USP'U]/ML
INJECTION, SOLUTION INTRAMUSCULAR; SUBCUTANEOUS
Status: COMPLETED
Start: 2020-01-01 | End: 2020-01-01

## 2020-01-01 RX ORDER — PROTAMINE SULFATE 10 MG/ML
INJECTION, SOLUTION INTRAVENOUS
Status: DISCONTINUED | OUTPATIENT
Start: 2020-01-01 | End: 2020-01-01

## 2020-01-01 RX ORDER — SODIUM CHLORIDE FOR INHALATION 3 %
4 VIAL, NEBULIZER (ML) INHALATION EVERY 6 HOURS PRN
Status: DISCONTINUED | OUTPATIENT
Start: 2020-01-01 | End: 2020-01-01

## 2020-01-01 RX ORDER — FUROSEMIDE 10 MG/ML
40 INJECTION INTRAMUSCULAR; INTRAVENOUS DAILY
Status: DISCONTINUED | OUTPATIENT
Start: 2020-01-01 | End: 2020-01-01

## 2020-01-01 RX ORDER — MORPHINE SULFATE 10 MG/ML
4 INJECTION INTRAMUSCULAR; INTRAVENOUS; SUBCUTANEOUS EVERY 4 HOURS PRN
Status: DISCONTINUED | OUTPATIENT
Start: 2020-01-01 | End: 2020-01-01

## 2020-01-01 RX ORDER — SODIUM CHLORIDE 9 MG/ML
INJECTION, SOLUTION INTRAVENOUS CONTINUOUS PRN
Status: DISCONTINUED | OUTPATIENT
Start: 2020-01-01 | End: 2020-01-01

## 2020-01-01 RX ORDER — ROCURONIUM BROMIDE 10 MG/ML
INJECTION, SOLUTION INTRAVENOUS CODE/TRAUMA/SEDATION MEDICATION
Status: COMPLETED | OUTPATIENT
Start: 2020-01-01 | End: 2020-01-01

## 2020-01-01 RX ORDER — ENOXAPARIN SODIUM 100 MG/ML
40 INJECTION SUBCUTANEOUS EVERY 24 HOURS
Status: DISCONTINUED | OUTPATIENT
Start: 2020-01-01 | End: 2020-01-01

## 2020-01-01 RX ORDER — PRAVASTATIN SODIUM 20 MG/1
80 TABLET ORAL DAILY
Status: DISCONTINUED | OUTPATIENT
Start: 2020-01-01 | End: 2020-01-01

## 2020-01-01 RX ORDER — NITROGLYCERIN 20 MG/100ML
INJECTION INTRAVENOUS
Status: DISCONTINUED | OUTPATIENT
Start: 2020-01-01 | End: 2020-01-01 | Stop reason: HOSPADM

## 2020-01-01 RX ORDER — PHENYLEPHRINE HYDROCHLORIDE 10 MG/ML
INJECTION INTRAVENOUS
Status: DISCONTINUED | OUTPATIENT
Start: 2020-01-01 | End: 2020-01-01

## 2020-01-01 RX ORDER — BUPIVACAINE HYDROCHLORIDE 5 MG/ML
INJECTION, SOLUTION EPIDURAL; INTRACAUDAL
Status: DISCONTINUED | OUTPATIENT
Start: 2020-01-01 | End: 2020-01-01 | Stop reason: HOSPADM

## 2020-01-01 RX ORDER — NOREPINEPHRINE BITARTRATE/D5W 4MG/250ML
PLASTIC BAG, INJECTION (ML) INTRAVENOUS
Status: COMPLETED
Start: 2020-01-01 | End: 2020-01-01

## 2020-01-01 RX ORDER — PHENYLEPHRINE HCL IN 0.9% NACL 1 MG/10 ML
SYRINGE (ML) INTRAVENOUS
Status: DISCONTINUED | OUTPATIENT
Start: 2020-01-01 | End: 2020-01-01

## 2020-01-01 RX ORDER — MIDODRINE HYDROCHLORIDE 5 MG/1
5 TABLET ORAL 3 TIMES DAILY
Status: DISCONTINUED | OUTPATIENT
Start: 2020-01-01 | End: 2020-01-01

## 2020-01-01 RX ORDER — SUCCINYLCHOLINE CHLORIDE 20 MG/ML
INJECTION INTRAMUSCULAR; INTRAVENOUS
Status: DISCONTINUED
Start: 2020-01-01 | End: 2020-01-01 | Stop reason: WASHOUT

## 2020-01-01 RX ORDER — MORPHINE SULFATE 2 MG/ML
1 INJECTION, SOLUTION INTRAMUSCULAR; INTRAVENOUS EVERY 6 HOURS PRN
Status: DISCONTINUED | OUTPATIENT
Start: 2020-01-01 | End: 2020-01-01

## 2020-01-01 RX ORDER — TRAZODONE HYDROCHLORIDE 50 MG/1
50 TABLET ORAL NIGHTLY PRN
Status: DISCONTINUED | OUTPATIENT
Start: 2020-01-01 | End: 2020-01-01 | Stop reason: HOSPADM

## 2020-01-01 RX ORDER — AMIODARONE HYDROCHLORIDE 200 MG/1
800 TABLET ORAL 2 TIMES DAILY
Status: DISCONTINUED | OUTPATIENT
Start: 2020-01-01 | End: 2020-01-01

## 2020-01-01 RX ORDER — DEXTROSE MONOHYDRATE 50 MG/ML
INJECTION, SOLUTION INTRAVENOUS CONTINUOUS
Status: DISCONTINUED | OUTPATIENT
Start: 2020-01-01 | End: 2020-01-01

## 2020-01-01 RX ORDER — METOPROLOL TARTRATE 1 MG/ML
5 INJECTION, SOLUTION INTRAVENOUS EVERY 5 MIN PRN
Status: COMPLETED | OUTPATIENT
Start: 2020-01-01 | End: 2020-01-01

## 2020-01-01 RX ORDER — BUMETANIDE 1 MG/1
1 TABLET ORAL 2 TIMES DAILY
Status: DISCONTINUED | OUTPATIENT
Start: 2020-01-01 | End: 2020-01-01

## 2020-01-01 RX ORDER — VASOPRESSIN 20 [USP'U]/ML
INJECTION, SOLUTION INTRAMUSCULAR; SUBCUTANEOUS
Status: DISCONTINUED | OUTPATIENT
Start: 2020-01-01 | End: 2020-01-01

## 2020-01-01 RX ORDER — IPRATROPIUM BROMIDE AND ALBUTEROL SULFATE 2.5; .5 MG/3ML; MG/3ML
3 SOLUTION RESPIRATORY (INHALATION) EVERY 6 HOURS PRN
Status: DISCONTINUED | OUTPATIENT
Start: 2020-01-01 | End: 2020-01-01

## 2020-01-01 RX ORDER — METOPROLOL TARTRATE 25 MG/1
25 TABLET, FILM COATED ORAL 3 TIMES DAILY
Status: DISCONTINUED | OUTPATIENT
Start: 2020-01-01 | End: 2020-01-01 | Stop reason: HOSPADM

## 2020-01-01 RX ORDER — LOSARTAN POTASSIUM 50 MG/1
25 TABLET ORAL DAILY
Qty: 30 TABLET | Refills: 1 | Status: SHIPPED | OUTPATIENT
Start: 2020-01-01

## 2020-01-01 RX ORDER — HEPARIN SODIUM 5000 [USP'U]/ML
5000 INJECTION, SOLUTION INTRAVENOUS; SUBCUTANEOUS EVERY 12 HOURS
Status: DISCONTINUED | OUTPATIENT
Start: 2020-01-01 | End: 2020-01-01

## 2020-01-01 RX ORDER — ALPRAZOLAM 0.25 MG/1
0.25 TABLET ORAL 2 TIMES DAILY PRN
Status: DISCONTINUED | OUTPATIENT
Start: 2020-01-01 | End: 2020-01-01

## 2020-01-01 RX ORDER — FAMOTIDINE 10 MG/ML
20 INJECTION INTRAVENOUS DAILY
Status: DISCONTINUED | OUTPATIENT
Start: 2020-01-01 | End: 2020-01-01

## 2020-01-01 RX ORDER — SODIUM CHLORIDE, SODIUM LACTATE, POTASSIUM CHLORIDE, CALCIUM CHLORIDE 600; 310; 30; 20 MG/100ML; MG/100ML; MG/100ML; MG/100ML
INJECTION, SOLUTION INTRAVENOUS CONTINUOUS PRN
Status: DISCONTINUED | OUTPATIENT
Start: 2020-01-01 | End: 2020-01-01

## 2020-01-01 RX ORDER — MUPIROCIN 20 MG/G
OINTMENT TOPICAL
Status: DISCONTINUED | OUTPATIENT
Start: 2020-01-01 | End: 2020-01-01

## 2020-01-01 RX ORDER — FENTANYL CITRATE 50 UG/ML
INJECTION, SOLUTION INTRAMUSCULAR; INTRAVENOUS
Status: DISPENSED
Start: 2020-01-01 | End: 2020-01-01

## 2020-01-01 RX ORDER — ASPIRIN 325 MG
325 TABLET ORAL ONCE
Status: COMPLETED | OUTPATIENT
Start: 2020-01-01 | End: 2020-01-01

## 2020-01-01 RX ORDER — BALSAM PERU/CASTOR OIL
OINTMENT (GRAM) TOPICAL 2 TIMES DAILY
Status: DISCONTINUED | OUTPATIENT
Start: 2020-01-01 | End: 2020-01-01

## 2020-01-01 RX ORDER — POTASSIUM CHLORIDE 14.9 MG/ML
40 INJECTION INTRAVENOUS
Status: DISCONTINUED | OUTPATIENT
Start: 2020-01-01 | End: 2020-01-01

## 2020-01-01 RX ORDER — POTASSIUM CHLORIDE 1.5 G/1.58G
40 POWDER, FOR SOLUTION ORAL EVERY 4 HOURS
Status: COMPLETED | OUTPATIENT
Start: 2020-01-01 | End: 2020-01-01

## 2020-01-01 RX ORDER — IPRATROPIUM BROMIDE AND ALBUTEROL SULFATE 2.5; .5 MG/3ML; MG/3ML
3 SOLUTION RESPIRATORY (INHALATION) EVERY 6 HOURS
Status: DISCONTINUED | OUTPATIENT
Start: 2020-01-01 | End: 2020-01-01

## 2020-01-01 RX ORDER — PHENYLEPHRINE HCL IN 0.9% NACL 1 MG/10 ML
SYRINGE (ML) INTRAVENOUS
Status: COMPLETED
Start: 2020-01-01 | End: 2020-01-01

## 2020-01-01 RX ORDER — ROCURONIUM BROMIDE 10 MG/ML
INJECTION, SOLUTION INTRAVENOUS
Status: COMPLETED
Start: 2020-01-01 | End: 2020-01-01

## 2020-01-01 RX ORDER — HYDRALAZINE HYDROCHLORIDE 20 MG/ML
10 INJECTION INTRAMUSCULAR; INTRAVENOUS EVERY 8 HOURS PRN
Status: DISCONTINUED | OUTPATIENT
Start: 2020-01-01 | End: 2020-01-01

## 2020-01-01 RX ORDER — HEPARIN SODIUM 10000 [USP'U]/100ML
400 INJECTION, SOLUTION INTRAVENOUS CONTINUOUS
Status: DISCONTINUED | OUTPATIENT
Start: 2020-01-01 | End: 2020-01-01

## 2020-01-01 RX ORDER — ESCITALOPRAM OXALATE 5 MG/5ML
10 SOLUTION ORAL NIGHTLY
Status: DISCONTINUED | OUTPATIENT
Start: 2020-01-01 | End: 2020-01-01

## 2020-01-01 RX ORDER — EPINEPHRINE 1 MG/ML
INJECTION INTRAMUSCULAR; INTRAVENOUS; SUBCUTANEOUS
Status: COMPLETED
Start: 2020-01-01 | End: 2020-01-01

## 2020-01-01 RX ADMIN — MIRTAZAPINE 7.5 MG: 7.5 TABLET ORAL at 10:10

## 2020-01-01 RX ADMIN — VANCOMYCIN HYDROCHLORIDE 1500 MG: 1.5 INJECTION, POWDER, LYOPHILIZED, FOR SOLUTION INTRAVENOUS at 01:10

## 2020-01-01 RX ADMIN — AMIODARONE HYDROCHLORIDE 200 MG: 200 TABLET ORAL at 10:09

## 2020-01-01 RX ADMIN — MIDODRINE HYDROCHLORIDE 5 MG: 5 TABLET ORAL at 04:10

## 2020-01-01 RX ADMIN — HYDROMORPHONE HYDROCHLORIDE 0.5 MG: 1 INJECTION, SOLUTION INTRAMUSCULAR; INTRAVENOUS; SUBCUTANEOUS at 12:09

## 2020-01-01 RX ADMIN — CALCIUM ACETATE 2001 MG: 667 CAPSULE ORAL at 08:10

## 2020-01-01 RX ADMIN — AMIODARONE HYDROCHLORIDE 1 MG/MIN: 1.8 INJECTION, SOLUTION INTRAVENOUS at 06:09

## 2020-01-01 RX ADMIN — IPRATROPIUM BROMIDE AND ALBUTEROL SULFATE 3 ML: .5; 2.5 SOLUTION RESPIRATORY (INHALATION) at 07:10

## 2020-01-01 RX ADMIN — IPRATROPIUM BROMIDE AND ALBUTEROL SULFATE 3 ML: .5; 2.5 SOLUTION RESPIRATORY (INHALATION) at 02:11

## 2020-01-01 RX ADMIN — ETOMIDATE 8 MG: 2 INJECTION, SOLUTION INTRAVENOUS at 06:10

## 2020-01-01 RX ADMIN — DIPHENHYDRAMINE HYDROCHLORIDE 25 MG: 25 CAPSULE ORAL at 10:10

## 2020-01-01 RX ADMIN — CALCIUM ACETATE 2001 MG: 667 CAPSULE ORAL at 10:10

## 2020-01-01 RX ADMIN — FLUCONAZOLE 200 MG: 2 INJECTION, SOLUTION INTRAVENOUS at 03:10

## 2020-01-01 RX ADMIN — ACETAMINOPHEN 650 MG: 160 SOLUTION ORAL at 11:10

## 2020-01-01 RX ADMIN — SUGAMMADEX 400 MG: 100 INJECTION, SOLUTION INTRAVENOUS at 04:10

## 2020-01-01 RX ADMIN — METOROPROLOL TARTRATE 5 MG: 5 INJECTION, SOLUTION INTRAVENOUS at 11:09

## 2020-01-01 RX ADMIN — SULFAMETHOXAZOLE AND TRIMETHOPRIM 1 TABLET: 800; 160 TABLET ORAL at 09:10

## 2020-01-01 RX ADMIN — ACETAMINOPHEN 650 MG: 325 TABLET ORAL at 12:09

## 2020-01-01 RX ADMIN — PROPOFOL 15 MCG/KG/MIN: 10 INJECTION, EMULSION INTRAVENOUS at 06:11

## 2020-01-01 RX ADMIN — POTASSIUM CHLORIDE 40 MEQ: 14.9 INJECTION, SOLUTION INTRAVENOUS at 09:09

## 2020-01-01 RX ADMIN — MIRTAZAPINE 7.5 MG: 7.5 TABLET ORAL at 08:10

## 2020-01-01 RX ADMIN — CASTOR OIL AND BALSAM, PERU: 788; 87 OINTMENT TOPICAL at 08:11

## 2020-01-01 RX ADMIN — HEPARIN SODIUM 5000 UNITS: 5000 INJECTION INTRAVENOUS; SUBCUTANEOUS at 06:11

## 2020-01-01 RX ADMIN — POLYETHYLENE GLYCOL 3350 17 G: 17 POWDER, FOR SOLUTION ORAL at 09:09

## 2020-01-01 RX ADMIN — MIDODRINE HYDROCHLORIDE 5 MG: 5 TABLET ORAL at 08:10

## 2020-01-01 RX ADMIN — SODIUM PHOSPHATE, MONOBASIC, MONOHYDRATE 39.99 MMOL: 276; 142 INJECTION, SOLUTION INTRAVENOUS at 04:11

## 2020-01-01 RX ADMIN — OXYCODONE HYDROCHLORIDE AND ACETAMINOPHEN 500 MG: 500 TABLET ORAL at 08:09

## 2020-01-01 RX ADMIN — CEFEPIME 2 G: 2 INJECTION, POWDER, FOR SOLUTION INTRAVENOUS at 03:10

## 2020-01-01 RX ADMIN — SODIUM CHLORIDE SOLN NEBU 3% 4 ML: 3 NEBU SOLN at 08:10

## 2020-01-01 RX ADMIN — ASPIRIN 81 MG CHEWABLE TABLET 81 MG: 81 TABLET CHEWABLE at 08:09

## 2020-01-01 RX ADMIN — AMIODARONE HYDROCHLORIDE 400 MG: 200 TABLET ORAL at 08:09

## 2020-01-01 RX ADMIN — AMIODARONE HYDROCHLORIDE 200 MG: 100 TABLET ORAL at 08:09

## 2020-01-01 RX ADMIN — METOPROLOL TARTRATE 25 MG: 25 TABLET, FILM COATED ORAL at 08:10

## 2020-01-01 RX ADMIN — HEPARIN SODIUM AND DEXTROSE 14 UNITS/KG/HR: 10000; 5 INJECTION INTRAVENOUS at 07:09

## 2020-01-01 RX ADMIN — SODIUM CHLORIDE SOLN NEBU 3% 4 ML: 3 NEBU SOLN at 12:10

## 2020-01-01 RX ADMIN — EPINEPHRINE 0.06 MCG/KG/MIN: 1 INJECTION INTRAMUSCULAR; INTRAVENOUS; SUBCUTANEOUS at 11:09

## 2020-01-01 RX ADMIN — THERA TABS 1 TABLET: TAB at 08:11

## 2020-01-01 RX ADMIN — PROPOFOL 15 MCG/KG/MIN: 10 INJECTION, EMULSION INTRAVENOUS at 02:09

## 2020-01-01 RX ADMIN — DEXTROSE MONOHYDRATE 12.5 G: 25 INJECTION, SOLUTION INTRAVENOUS at 06:10

## 2020-01-01 RX ADMIN — IPRATROPIUM BROMIDE AND ALBUTEROL SULFATE 3 ML: .5; 2.5 SOLUTION RESPIRATORY (INHALATION) at 04:10

## 2020-01-01 RX ADMIN — AMIODARONE HYDROCHLORIDE 0.5 MG/MIN: 1.8 INJECTION, SOLUTION INTRAVENOUS at 02:09

## 2020-01-01 RX ADMIN — POLYETHYLENE GLYCOL 3350 17 G: 17 POWDER, FOR SOLUTION ORAL at 09:10

## 2020-01-01 RX ADMIN — METOPROLOL TARTRATE 50 MG: 50 TABLET, FILM COATED ORAL at 09:09

## 2020-01-01 RX ADMIN — IPRATROPIUM BROMIDE AND ALBUTEROL SULFATE 3 ML: .5; 2.5 SOLUTION RESPIRATORY (INHALATION) at 01:10

## 2020-01-01 RX ADMIN — HYDROMORPHONE HYDROCHLORIDE: 1 INJECTION, SOLUTION INTRAMUSCULAR; INTRAVENOUS; SUBCUTANEOUS at 09:11

## 2020-01-01 RX ADMIN — CEFTRIAXONE SODIUM 2 G: 2 INJECTION, SOLUTION INTRAVENOUS at 03:10

## 2020-01-01 RX ADMIN — CEFEPIME 1 G: 2 INJECTION, POWDER, FOR SOLUTION INTRAVENOUS at 03:10

## 2020-01-01 RX ADMIN — POLYETHYLENE GLYCOL 3350 17 G: 17 POWDER, FOR SOLUTION ORAL at 08:09

## 2020-01-01 RX ADMIN — AMIODARONE HYDROCHLORIDE 400 MG: 200 TABLET ORAL at 09:09

## 2020-01-01 RX ADMIN — FUROSEMIDE 15 MG/HR: 10 INJECTION, SOLUTION INTRAMUSCULAR; INTRAVENOUS at 04:09

## 2020-01-01 RX ADMIN — NOREPINEPHRINE BITARTRATE 0.1 MCG/KG/MIN: 1 INJECTION, SOLUTION, CONCENTRATE INTRAVENOUS at 03:09

## 2020-01-01 RX ADMIN — POTASSIUM CHLORIDE 20 MEQ: 14.9 INJECTION, SOLUTION INTRAVENOUS at 05:09

## 2020-01-01 RX ADMIN — ALBUMIN (HUMAN) 25 G: 12.5 INJECTION, SOLUTION INTRAVENOUS at 03:11

## 2020-01-01 RX ADMIN — IPRATROPIUM BROMIDE AND ALBUTEROL SULFATE 3 ML: .5; 2.5 SOLUTION RESPIRATORY (INHALATION) at 12:10

## 2020-01-01 RX ADMIN — PROPOFOL 50 MCG/KG/MIN: 10 INJECTION, EMULSION INTRAVENOUS at 09:09

## 2020-01-01 RX ADMIN — IPRATROPIUM BROMIDE AND ALBUTEROL SULFATE 3 ML: .5; 2.5 SOLUTION RESPIRATORY (INHALATION) at 05:10

## 2020-01-01 RX ADMIN — PHENYLEPHRINE HYDROCHLORIDE 100 MCG: 10 INJECTION INTRAVENOUS at 01:08

## 2020-01-01 RX ADMIN — MODAFINIL 100 MG: 100 TABLET ORAL at 08:10

## 2020-01-01 RX ADMIN — PROTAMINE SULFATE 30 MG: 10 INJECTION, SOLUTION INTRAVENOUS at 09:09

## 2020-01-01 RX ADMIN — DEXTROSE 2 G: 50 INJECTION, SOLUTION INTRAVENOUS at 08:09

## 2020-01-01 RX ADMIN — HEPARIN SODIUM AND DEXTROSE 500 UNITS/HR: 10000; 5 INJECTION INTRAVENOUS at 09:10

## 2020-01-01 RX ADMIN — AMIODARONE HYDROCHLORIDE 1 MG/MIN: 1.8 INJECTION, SOLUTION INTRAVENOUS at 12:09

## 2020-01-01 RX ADMIN — ALPRAZOLAM 0.25 MG: 0.25 TABLET ORAL at 08:09

## 2020-01-01 RX ADMIN — FUROSEMIDE 20 MG: 10 INJECTION, SOLUTION INTRAMUSCULAR; INTRAVENOUS at 05:09

## 2020-01-01 RX ADMIN — SODIUM CHLORIDE SOLN NEBU 3% 4 ML: 3 NEBU SOLN at 08:09

## 2020-01-01 RX ADMIN — METOPROLOL TARTRATE 25 MG: 25 TABLET, FILM COATED ORAL at 08:08

## 2020-01-01 RX ADMIN — IPRATROPIUM BROMIDE AND ALBUTEROL SULFATE 3 ML: .5; 2.5 SOLUTION RESPIRATORY (INHALATION) at 11:10

## 2020-01-01 RX ADMIN — IPRATROPIUM BROMIDE AND ALBUTEROL SULFATE 3 ML: .5; 2.5 SOLUTION RESPIRATORY (INHALATION) at 02:10

## 2020-01-01 RX ADMIN — MIDODRINE HYDROCHLORIDE 5 MG: 5 TABLET ORAL at 03:11

## 2020-01-01 RX ADMIN — CEFAZOLIN 2 G: 1 INJECTION, POWDER, FOR SOLUTION INTRAMUSCULAR; INTRAVENOUS at 08:09

## 2020-01-01 RX ADMIN — DEXTROSE MONOHYDRATE 25 G: 25 INJECTION, SOLUTION INTRAVENOUS at 07:10

## 2020-01-01 RX ADMIN — FUROSEMIDE 40 MG: 10 INJECTION, SOLUTION INTRAMUSCULAR; INTRAVENOUS at 01:09

## 2020-01-01 RX ADMIN — FUROSEMIDE 40 MG: 10 INJECTION, SOLUTION INTRAMUSCULAR; INTRAVENOUS at 11:09

## 2020-01-01 RX ADMIN — CALCIUM ACETATE 2001 MG: 667 CAPSULE ORAL at 04:10

## 2020-01-01 RX ADMIN — PROPOFOL 25 MCG/KG/MIN: 10 INJECTION, EMULSION INTRAVENOUS at 03:10

## 2020-01-01 RX ADMIN — POTASSIUM CHLORIDE 40 MEQ: 29.8 INJECTION, SOLUTION INTRAVENOUS at 06:09

## 2020-01-01 RX ADMIN — ASPIRIN 325 MG ORAL TABLET 325 MG: 325 PILL ORAL at 09:09

## 2020-01-01 RX ADMIN — METOPROLOL TARTRATE 12.5 MG: 25 TABLET ORAL at 09:10

## 2020-01-01 RX ADMIN — CEFEPIME 1 G: 2 INJECTION, POWDER, FOR SOLUTION INTRAVENOUS at 02:10

## 2020-01-01 RX ADMIN — ISOSORBIDE MONONITRATE 30 MG: 30 TABLET, EXTENDED RELEASE ORAL at 10:09

## 2020-01-01 RX ADMIN — MELATONIN TAB 3 MG 6 MG: 3 TAB at 09:09

## 2020-01-01 RX ADMIN — PROPOFOL 10 MG: 10 INJECTION, EMULSION INTRAVENOUS at 01:10

## 2020-01-01 RX ADMIN — ACETAMINOPHEN 975 MG: 325 TABLET ORAL at 03:09

## 2020-01-01 RX ADMIN — ASPIRIN 81 MG CHEWABLE TABLET 81 MG: 81 TABLET CHEWABLE at 08:10

## 2020-01-01 RX ADMIN — MUPIROCIN: 20 OINTMENT TOPICAL at 08:09

## 2020-01-01 RX ADMIN — PROPOFOL 30 MCG/KG/MIN: 10 INJECTION, EMULSION INTRAVENOUS at 02:09

## 2020-01-01 RX ADMIN — POTASSIUM CHLORIDE 40 MEQ: 29.8 INJECTION, SOLUTION INTRAVENOUS at 05:09

## 2020-01-01 RX ADMIN — ASPIRIN 325 MG ORAL TABLET 325 MG: 325 PILL ORAL at 08:09

## 2020-01-01 RX ADMIN — HEPARIN SODIUM AND DEXTROSE 800 UNITS/HR: 10000; 5 INJECTION INTRAVENOUS at 01:10

## 2020-01-01 RX ADMIN — FAMOTIDINE 20 MG: 20 TABLET, FILM COATED ORAL at 09:09

## 2020-01-01 RX ADMIN — POTASSIUM CHLORIDE 20 MEQ: 14.9 INJECTION, SOLUTION INTRAVENOUS at 02:09

## 2020-01-01 RX ADMIN — DIGOXIN 0.12 MG: 125 TABLET ORAL at 09:09

## 2020-01-01 RX ADMIN — HYDROMORPHONE HYDROCHLORIDE 0.2 MG: 1 INJECTION, SOLUTION INTRAMUSCULAR; INTRAVENOUS; SUBCUTANEOUS at 11:11

## 2020-01-01 RX ADMIN — DIGOXIN 125 MCG: 0.25 INJECTION INTRAMUSCULAR; INTRAVENOUS at 08:09

## 2020-01-01 RX ADMIN — ASPIRIN 81 MG: 81 TABLET, CHEWABLE ORAL at 09:09

## 2020-01-01 RX ADMIN — FAMOTIDINE 20 MG: 20 TABLET, FILM COATED ORAL at 08:11

## 2020-01-01 RX ADMIN — DEXMEDETOMIDINE HYDROCHLORIDE 4 MCG: 100 INJECTION, SOLUTION, CONCENTRATE INTRAVENOUS at 02:10

## 2020-01-01 RX ADMIN — PROTAMINE SULFATE 25 MG: 10 INJECTION, SOLUTION INTRAVENOUS at 09:09

## 2020-01-01 RX ADMIN — SODIUM CHLORIDE: 0.9 INJECTION, SOLUTION INTRAVENOUS at 02:10

## 2020-01-01 RX ADMIN — MIDODRINE HYDROCHLORIDE 5 MG: 5 TABLET ORAL at 09:10

## 2020-01-01 RX ADMIN — MUPIROCIN: 20 OINTMENT TOPICAL at 12:09

## 2020-01-01 RX ADMIN — FAMOTIDINE 20 MG: 20 TABLET ORAL at 09:09

## 2020-01-01 RX ADMIN — POTASSIUM CHLORIDE 20 MEQ: 1.5 POWDER, FOR SOLUTION ORAL at 09:09

## 2020-01-01 RX ADMIN — AMIODARONE HYDROCHLORIDE 400 MG: 200 TABLET ORAL at 08:08

## 2020-01-01 RX ADMIN — SODIUM CHLORIDE: 0.9 INJECTION, SOLUTION INTRAVENOUS at 09:10

## 2020-01-01 RX ADMIN — HEPARIN SODIUM 5000 UNITS: 5000 INJECTION INTRAVENOUS; SUBCUTANEOUS at 02:10

## 2020-01-01 RX ADMIN — MELATONIN TAB 3 MG 6 MG: 3 TAB at 08:09

## 2020-01-01 RX ADMIN — MIRTAZAPINE 7.5 MG: 7.5 TABLET ORAL at 09:10

## 2020-01-01 RX ADMIN — WARFARIN SODIUM 3 MG: 2 TABLET ORAL at 05:09

## 2020-01-01 RX ADMIN — FUROSEMIDE 20 MG: 10 INJECTION, SOLUTION INTRAMUSCULAR; INTRAVENOUS at 09:10

## 2020-01-01 RX ADMIN — Medication 10 MG: at 12:10

## 2020-01-01 RX ADMIN — WARFARIN SODIUM 1 MG: 1 TABLET ORAL at 04:10

## 2020-01-01 RX ADMIN — AMIODARONE HYDROCHLORIDE 200 MG: 200 TABLET ORAL at 09:09

## 2020-01-01 RX ADMIN — SODIUM CHLORIDE SOLN NEBU 3% 4 ML: 3 NEBU SOLN at 03:10

## 2020-01-01 RX ADMIN — SUCCINYLCHOLINE CHLORIDE 80 MG: 20 INJECTION, SOLUTION INTRAMUSCULAR; INTRAVENOUS at 05:10

## 2020-01-01 RX ADMIN — THERA TABS 1 TABLET: TAB at 10:10

## 2020-01-01 RX ADMIN — CEFAZOLIN 2 G: 1 INJECTION, POWDER, FOR SOLUTION INTRAMUSCULAR; INTRAVENOUS at 02:09

## 2020-01-01 RX ADMIN — FENTANYL CITRATE 25 MCG: 50 INJECTION, SOLUTION INTRAMUSCULAR; INTRAVENOUS at 01:10

## 2020-01-01 RX ADMIN — POTASSIUM CHLORIDE 20 MEQ: 14.9 INJECTION, SOLUTION INTRAVENOUS at 08:10

## 2020-01-01 RX ADMIN — HEPARIN SODIUM AND DEXTROSE 1000 UNITS/HR: 10000; 5 INJECTION INTRAVENOUS at 02:10

## 2020-01-01 RX ADMIN — FUROSEMIDE 40 MG: 10 INJECTION, SOLUTION INTRAMUSCULAR; INTRAVENOUS at 10:10

## 2020-01-01 RX ADMIN — DEXMEDETOMIDINE HYDROCHLORIDE 1.4 MCG/KG/HR: 4 INJECTION, SOLUTION INTRAVENOUS at 11:09

## 2020-01-01 RX ADMIN — AMIODARONE HYDROCHLORIDE 1 MG/MIN: 1.8 INJECTION, SOLUTION INTRAVENOUS at 01:09

## 2020-01-01 RX ADMIN — PROPOFOL 30 MCG/KG/MIN: 10 INJECTION, EMULSION INTRAVENOUS at 11:09

## 2020-01-01 RX ADMIN — PRAVASTATIN SODIUM 80 MG: 40 TABLET ORAL at 09:09

## 2020-01-01 RX ADMIN — METOPROLOL TARTRATE 25 MG: 25 TABLET, FILM COATED ORAL at 08:09

## 2020-01-01 RX ADMIN — PROPOFOL 30 MCG/KG/MIN: 10 INJECTION, EMULSION INTRAVENOUS at 10:10

## 2020-01-01 RX ADMIN — ETOMIDATE 40 MG: 2 INJECTION INTRAVENOUS at 10:09

## 2020-01-01 RX ADMIN — DIGOXIN 0.12 MG: 125 TABLET ORAL at 08:10

## 2020-01-01 RX ADMIN — CEFEPIME 2 G: 2 INJECTION, POWDER, FOR SOLUTION INTRAVENOUS at 06:10

## 2020-01-01 RX ADMIN — AMIODARONE HYDROCHLORIDE 200 MG: 200 TABLET ORAL at 08:09

## 2020-01-01 RX ADMIN — WARFARIN SODIUM 2 MG: 2 TABLET ORAL at 05:09

## 2020-01-01 RX ADMIN — Medication 100 MCG: at 08:09

## 2020-01-01 RX ADMIN — CEFEPIME 2 G: 2 INJECTION, POWDER, FOR SOLUTION INTRAVENOUS at 02:10

## 2020-01-01 RX ADMIN — SODIUM CHLORIDE SOLN NEBU 3% 4 ML: 3 NEBU SOLN at 07:10

## 2020-01-01 RX ADMIN — EPINEPHRINE 20 MCG: 1 INJECTION, SOLUTION INTRAMUSCULAR; SUBCUTANEOUS at 03:09

## 2020-01-01 RX ADMIN — CALCIUM ACETATE 2001 MG: 667 CAPSULE ORAL at 07:10

## 2020-01-01 RX ADMIN — ASPIRIN 81 MG CHEWABLE TABLET 81 MG: 81 TABLET CHEWABLE at 08:11

## 2020-01-01 RX ADMIN — FENTANYL CITRATE 25 MCG: 50 INJECTION INTRAMUSCULAR; INTRAVENOUS at 03:09

## 2020-01-01 RX ADMIN — NOREPINEPHRINE BITARTRATE 16 MCG: 1 INJECTION, SOLUTION, CONCENTRATE INTRAVENOUS at 03:09

## 2020-01-01 RX ADMIN — ACETAMINOPHEN 650 MG: 160 SOLUTION ORAL at 05:10

## 2020-01-01 RX ADMIN — MIDAZOLAM HYDROCHLORIDE 2 MG: 1 INJECTION, SOLUTION INTRAMUSCULAR; INTRAVENOUS at 02:09

## 2020-01-01 RX ADMIN — FAMOTIDINE 20 MG: 20 TABLET, FILM COATED ORAL at 09:10

## 2020-01-01 RX ADMIN — PROTAMINE SULFATE 25 MG: 10 INJECTION, SOLUTION INTRAVENOUS at 06:09

## 2020-01-01 RX ADMIN — POTASSIUM CHLORIDE 40 MEQ: 1500 TABLET, EXTENDED RELEASE ORAL at 08:09

## 2020-01-01 RX ADMIN — METOROPROLOL TARTRATE 5 MG: 5 INJECTION, SOLUTION INTRAVENOUS at 12:09

## 2020-01-01 RX ADMIN — DEXTROSE MONOHYDRATE, SODIUM CITRATE, AND CITRIC ACID MONOHYDRATE: 2.45; 2.2; .8 INJECTION, SOLUTION INTRAVENOUS at 12:11

## 2020-01-01 RX ADMIN — FUROSEMIDE 20 MG/HR: 10 INJECTION, SOLUTION INTRAMUSCULAR; INTRAVENOUS at 04:09

## 2020-01-01 RX ADMIN — CEFEPIME 2 G: 2 INJECTION, POWDER, FOR SOLUTION INTRAVENOUS at 10:10

## 2020-01-01 RX ADMIN — FAMOTIDINE 20 MG: 10 INJECTION INTRAVENOUS at 08:10

## 2020-01-01 RX ADMIN — ESCITALOPRAM OXALATE 20 MG: 10 TABLET ORAL at 08:11

## 2020-01-01 RX ADMIN — APIXABAN 5 MG: 5 TABLET, FILM COATED ORAL at 09:09

## 2020-01-01 RX ADMIN — ACETAMINOPHEN 650 MG: 325 TABLET ORAL at 11:09

## 2020-01-01 RX ADMIN — PIPERACILLIN SODIUM AND TAZOBACTAM SODIUM 4.5 G: 4; .5 INJECTION, POWDER, LYOPHILIZED, FOR SOLUTION INTRAVENOUS at 10:09

## 2020-01-01 RX ADMIN — WARFARIN SODIUM 2 MG: 2 TABLET ORAL at 01:09

## 2020-01-01 RX ADMIN — MIDODRINE HYDROCHLORIDE 5 MG: 5 TABLET ORAL at 08:11

## 2020-01-01 RX ADMIN — FENTANYL CITRATE 25 MCG: 50 INJECTION INTRAMUSCULAR; INTRAVENOUS at 09:09

## 2020-01-01 RX ADMIN — IPRATROPIUM BROMIDE AND ALBUTEROL SULFATE 3 ML: .5; 2.5 SOLUTION RESPIRATORY (INHALATION) at 08:10

## 2020-01-01 RX ADMIN — HEPARIN SODIUM AND DEXTROSE 600 UNITS/HR: 10000; 5 INJECTION INTRAVENOUS at 10:10

## 2020-01-01 RX ADMIN — DOBUTAMINE HYDROCHLORIDE 5 MCG/KG/MIN: 200 INJECTION INTRAVENOUS at 05:09

## 2020-01-01 RX ADMIN — CALCIUM ACETATE 2001 MG: 667 CAPSULE ORAL at 11:10

## 2020-01-01 RX ADMIN — HEPARIN SODIUM AND DEXTROSE 1000 UNITS/HR: 10000; 5 INJECTION INTRAVENOUS at 11:10

## 2020-01-01 RX ADMIN — POTASSIUM CHLORIDE 40 MEQ: 29.8 INJECTION, SOLUTION INTRAVENOUS at 11:09

## 2020-01-01 RX ADMIN — SODIUM CHLORIDE SOLN NEBU 3% 4 ML: 3 NEBU SOLN at 01:10

## 2020-01-01 RX ADMIN — FAMOTIDINE 20 MG: 20 TABLET, FILM COATED ORAL at 08:10

## 2020-01-01 RX ADMIN — FAMOTIDINE 20 MG: 20 TABLET, FILM COATED ORAL at 08:09

## 2020-01-01 RX ADMIN — SODIUM PHOSPHATE, MONOBASIC, MONOHYDRATE 39.99 MMOL: 276; 142 INJECTION, SOLUTION INTRAVENOUS at 12:11

## 2020-01-01 RX ADMIN — MELATONIN TAB 3 MG 6 MG: 3 TAB at 08:10

## 2020-01-01 RX ADMIN — PROPOFOL 20 MCG/KG/MIN: 10 INJECTION, EMULSION INTRAVENOUS at 07:10

## 2020-01-01 RX ADMIN — THERA TABS 1 TABLET: TAB at 08:10

## 2020-01-01 RX ADMIN — POTASSIUM CHLORIDE 10 MEQ: 7.46 INJECTION, SOLUTION INTRAVENOUS at 09:10

## 2020-01-01 RX ADMIN — IPRATROPIUM BROMIDE AND ALBUTEROL SULFATE 3 ML: .5; 2.5 SOLUTION RESPIRATORY (INHALATION) at 11:09

## 2020-01-01 RX ADMIN — AMIODARONE HYDROCHLORIDE 0.5 MG/MIN: 1.8 INJECTION, SOLUTION INTRAVENOUS at 03:08

## 2020-01-01 RX ADMIN — SODIUM BICARBONATE: 84 INJECTION, SOLUTION INTRAVENOUS at 09:10

## 2020-01-01 RX ADMIN — ACETAMINOPHEN 650 MG: 325 TABLET ORAL at 05:09

## 2020-01-01 RX ADMIN — AMIODARONE HYDROCHLORIDE 360 MG: 1.8 INJECTION, SOLUTION INTRAVENOUS at 08:09

## 2020-01-01 RX ADMIN — SULFAMETHOXAZOLE AND TRIMETHOPRIM 1 TABLET: 800; 160 TABLET ORAL at 09:09

## 2020-01-01 RX ADMIN — PROPOFOL 50 MG: 10 INJECTION, EMULSION INTRAVENOUS at 01:08

## 2020-01-01 RX ADMIN — FUROSEMIDE 20 MG/HR: 10 INJECTION, SOLUTION INTRAMUSCULAR; INTRAVENOUS at 05:09

## 2020-01-01 RX ADMIN — FUROSEMIDE 20 MG/HR: 10 INJECTION, SOLUTION INTRAMUSCULAR; INTRAVENOUS at 07:09

## 2020-01-01 RX ADMIN — METOPROLOL TARTRATE 25 MG: 25 TABLET, FILM COATED ORAL at 06:10

## 2020-01-01 RX ADMIN — ALBUMIN (HUMAN) 12.5 G: 12.5 SOLUTION INTRAVENOUS at 08:09

## 2020-01-01 RX ADMIN — BUMETANIDE 1 MG: 1 TABLET ORAL at 08:09

## 2020-01-01 RX ADMIN — POTASSIUM CHLORIDE 40 MEQ: 29.8 INJECTION, SOLUTION INTRAVENOUS at 12:09

## 2020-01-01 RX ADMIN — FAMOTIDINE 20 MG: 20 TABLET ORAL at 08:09

## 2020-01-01 RX ADMIN — MILRINONE LACTATE 0.38 MCG/KG/MIN: 200 INJECTION, SOLUTION INTRAVENOUS at 10:09

## 2020-01-01 RX ADMIN — ACETAMINOPHEN 500 MG: 325 TABLET ORAL at 09:09

## 2020-01-01 RX ADMIN — POTASSIUM CHLORIDE 40 MEQ: 1.5 POWDER, FOR SOLUTION ORAL at 10:09

## 2020-01-01 RX ADMIN — VASOPRESSIN 0.04 UNITS/MIN: 20 INJECTION INTRAVENOUS at 02:09

## 2020-01-01 RX ADMIN — METOPROLOL TARTRATE 25 MG: 25 TABLET, FILM COATED ORAL at 09:09

## 2020-01-01 RX ADMIN — SODIUM PHOSPHATE, MONOBASIC, MONOHYDRATE 39.99 MMOL: 276; 142 INJECTION, SOLUTION INTRAVENOUS at 07:11

## 2020-01-01 RX ADMIN — NOREPINEPHRINE BITARTRATE 16 MCG: 1 INJECTION, SOLUTION, CONCENTRATE INTRAVENOUS at 02:09

## 2020-01-01 RX ADMIN — DEXTROSE: 5 SOLUTION INTRAVENOUS at 06:10

## 2020-01-01 RX ADMIN — SENNOSIDES 5 ML: 8.8 SYRUP ORAL at 09:09

## 2020-01-01 RX ADMIN — POLYETHYLENE GLYCOL 3350 17 G: 17 POWDER, FOR SOLUTION ORAL at 08:10

## 2020-01-01 RX ADMIN — ASPIRIN 81 MG CHEWABLE TABLET 81 MG: 81 TABLET CHEWABLE at 09:10

## 2020-01-01 RX ADMIN — POTASSIUM CHLORIDE 40 MEQ: 1.5 POWDER, FOR SOLUTION ORAL at 08:09

## 2020-01-01 RX ADMIN — FUROSEMIDE 20 MG/HR: 10 INJECTION, SOLUTION INTRAMUSCULAR; INTRAVENOUS at 01:09

## 2020-01-01 RX ADMIN — SODIUM CHLORIDE: 0.9 INJECTION, SOLUTION INTRAVENOUS at 03:10

## 2020-01-01 RX ADMIN — POTASSIUM CHLORIDE 40 MEQ: 29.8 INJECTION, SOLUTION INTRAVENOUS at 05:10

## 2020-01-01 RX ADMIN — POTASSIUM CHLORIDE 20 MEQ: 14.9 INJECTION, SOLUTION INTRAVENOUS at 01:09

## 2020-01-01 RX ADMIN — POTASSIUM CHLORIDE 40 MEQ: 29.8 INJECTION, SOLUTION INTRAVENOUS at 06:10

## 2020-01-01 RX ADMIN — DEXTROSE 2 G: 50 INJECTION, SOLUTION INTRAVENOUS at 03:09

## 2020-01-01 RX ADMIN — DEXMEDETOMIDINE HYDROCHLORIDE 4 MCG: 100 INJECTION, SOLUTION, CONCENTRATE INTRAVENOUS at 12:10

## 2020-01-01 RX ADMIN — ACETAMINOPHEN 650 MG: 325 TABLET ORAL at 06:09

## 2020-01-01 RX ADMIN — METOPROLOL TARTRATE 25 MG: 25 TABLET, FILM COATED ORAL at 09:08

## 2020-01-01 RX ADMIN — NITROGLYCERIN 1 INCH: 20 OINTMENT TOPICAL at 03:08

## 2020-01-01 RX ADMIN — SODIUM CHLORIDE SOLN NEBU 3% 4 ML: 3 NEBU SOLN at 08:11

## 2020-01-01 RX ADMIN — SODIUM CHLORIDE SOLN NEBU 3% 4 ML: 3 NEBU SOLN at 06:10

## 2020-01-01 RX ADMIN — POTASSIUM CHLORIDE 20 MEQ: 1.5 POWDER, FOR SOLUTION ORAL at 08:09

## 2020-01-01 RX ADMIN — SODIUM CHLORIDE SOLN NEBU 3% 4 ML: 3 NEBU SOLN at 02:09

## 2020-01-01 RX ADMIN — Medication 0.04 MCG/KG/MIN: at 01:09

## 2020-01-01 RX ADMIN — METOPROLOL TARTRATE 25 MG: 25 TABLET ORAL at 09:10

## 2020-01-01 RX ADMIN — PROPOFOL 30 MCG/KG/MIN: 10 INJECTION, EMULSION INTRAVENOUS at 10:09

## 2020-01-01 RX ADMIN — CEFEPIME 2 G: 2 INJECTION, POWDER, FOR SOLUTION INTRAVENOUS at 11:10

## 2020-01-01 RX ADMIN — MUPIROCIN: 20 OINTMENT TOPICAL at 09:09

## 2020-01-01 RX ADMIN — POTASSIUM CHLORIDE 40 MEQ: 1.5 POWDER, FOR SOLUTION ORAL at 02:09

## 2020-01-01 RX ADMIN — ASPIRIN 81 MG CHEWABLE TABLET 81 MG: 81 TABLET CHEWABLE at 09:09

## 2020-01-01 RX ADMIN — PROPOFOL 20 MCG/KG/MIN: 10 INJECTION, EMULSION INTRAVENOUS at 06:10

## 2020-01-01 RX ADMIN — SODIUM BICARBONATE 50 MEQ: 84 INJECTION, SOLUTION INTRAVENOUS at 10:09

## 2020-01-01 RX ADMIN — FENTANYL CITRATE 50 MCG: 50 INJECTION, SOLUTION INTRAMUSCULAR; INTRAVENOUS at 12:10

## 2020-01-01 RX ADMIN — SODIUM PHOSPHATE, MONOBASIC, MONOHYDRATE AND SODIUM PHOSPHATE, DIBASIC, ANHYDROUS 15 MMOL: 276; 142 INJECTION, SOLUTION INTRAVENOUS at 05:10

## 2020-01-01 RX ADMIN — ASPIRIN 81 MG 81 MG: 81 TABLET ORAL at 08:08

## 2020-01-01 RX ADMIN — DIGOXIN 0.12 MG: 125 TABLET ORAL at 11:09

## 2020-01-01 RX ADMIN — ALBUMIN (HUMAN) 25 G: 12.5 SOLUTION INTRAVENOUS at 06:10

## 2020-01-01 RX ADMIN — MELATONIN TAB 3 MG 6 MG: 3 TAB at 09:10

## 2020-01-01 RX ADMIN — AMIODARONE HYDROCHLORIDE 1 MG/MIN: 1.8 INJECTION, SOLUTION INTRAVENOUS at 11:09

## 2020-01-01 RX ADMIN — CALCIUM ACETATE 2001 MG: 667 CAPSULE ORAL at 05:11

## 2020-01-01 RX ADMIN — LORAZEPAM 1 MG: 2 INJECTION INTRAMUSCULAR; INTRAVENOUS at 11:11

## 2020-01-01 RX ADMIN — OXYCODONE HYDROCHLORIDE 5 MG: 5 TABLET ORAL at 04:09

## 2020-01-01 RX ADMIN — DEXMEDETOMIDINE HYDROCHLORIDE 1.4 MCG/KG/HR: 4 INJECTION, SOLUTION INTRAVENOUS at 01:09

## 2020-01-01 RX ADMIN — ENOXAPARIN SODIUM 70 MG: 80 INJECTION SUBCUTANEOUS at 09:09

## 2020-01-01 RX ADMIN — MIDODRINE HYDROCHLORIDE 5 MG: 5 TABLET ORAL at 09:11

## 2020-01-01 RX ADMIN — NOREPINEPHRINE BITARTRATE 16 MCG: 1 INJECTION, SOLUTION, CONCENTRATE INTRAVENOUS at 05:09

## 2020-01-01 RX ADMIN — VASOPRESSIN 0.04 UNITS/MIN: 20 INJECTION INTRAVENOUS at 06:09

## 2020-01-01 RX ADMIN — FLUCONAZOLE 200 MG: 2 INJECTION, SOLUTION INTRAVENOUS at 02:10

## 2020-01-01 RX ADMIN — FAMOTIDINE 20 MG: 10 INJECTION INTRAVENOUS at 08:09

## 2020-01-01 RX ADMIN — DEXMEDETOMIDINE HYDROCHLORIDE 0.6 MCG/KG/HR: 4 INJECTION, SOLUTION INTRAVENOUS at 05:10

## 2020-01-01 RX ADMIN — POTASSIUM CHLORIDE 20 MEQ: 1.5 POWDER, FOR SOLUTION ORAL at 08:10

## 2020-01-01 RX ADMIN — DEXTROSE MONOHYDRATE, SODIUM CITRATE, AND CITRIC ACID MONOHYDRATE: 2.45; 2.2; .8 INJECTION, SOLUTION INTRAVENOUS at 01:11

## 2020-01-01 RX ADMIN — CHLOROTHIAZIDE SODIUM 250 MG: 500 INJECTION, POWDER, LYOPHILIZED, FOR SOLUTION INTRAVENOUS at 05:09

## 2020-01-01 RX ADMIN — SODIUM CHLORIDE SOLN NEBU 3% 4 ML: 3 NEBU SOLN at 09:09

## 2020-01-01 RX ADMIN — PHENYLEPHRINE HYDROCHLORIDE 100 MCG: 10 INJECTION INTRAVENOUS at 02:10

## 2020-01-01 RX ADMIN — MIDODRINE HYDROCHLORIDE 5 MG: 5 TABLET ORAL at 02:10

## 2020-01-01 RX ADMIN — DIAZEPAM 5 MG: 5 TABLET ORAL at 10:09

## 2020-01-01 RX ADMIN — DEXMEDETOMIDINE HYDROCHLORIDE 0.2 MCG/KG/HR: 4 INJECTION, SOLUTION INTRAVENOUS at 02:09

## 2020-01-01 RX ADMIN — PROPOFOL 35 MCG/KG/MIN: 10 INJECTION, EMULSION INTRAVENOUS at 05:09

## 2020-01-01 RX ADMIN — PROPOFOL 25 MCG/KG/MIN: 10 INJECTION, EMULSION INTRAVENOUS at 12:11

## 2020-01-01 RX ADMIN — HYDROMORPHONE HYDROCHLORIDE 0.2 MG: 1 INJECTION, SOLUTION INTRAMUSCULAR; INTRAVENOUS; SUBCUTANEOUS at 08:10

## 2020-01-01 RX ADMIN — POTASSIUM CHLORIDE 40 MEQ: 1500 TABLET, EXTENDED RELEASE ORAL at 09:09

## 2020-01-01 RX ADMIN — METOPROLOL TARTRATE 50 MG: 50 TABLET, FILM COATED ORAL at 08:09

## 2020-01-01 RX ADMIN — PROTAMINE SULFATE 50 MG: 10 INJECTION, SOLUTION INTRAVENOUS at 12:09

## 2020-01-01 RX ADMIN — SODIUM CHLORIDE, SODIUM GLUCONATE, SODIUM ACETATE, POTASSIUM CHLORIDE, MAGNESIUM CHLORIDE, SODIUM PHOSPHATE, DIBASIC, AND POTASSIUM PHOSPHATE: .53; .5; .37; .037; .03; .012; .00082 INJECTION, SOLUTION INTRAVENOUS at 08:09

## 2020-01-01 RX ADMIN — AMIODARONE HYDROCHLORIDE 1 MG/MIN: 1.8 INJECTION, SOLUTION INTRAVENOUS at 05:09

## 2020-01-01 RX ADMIN — SODIUM CHLORIDE SOLN NEBU 3% 4 ML: 3 NEBU SOLN at 09:10

## 2020-01-01 RX ADMIN — LORAZEPAM 1 MG: 2 INJECTION INTRAMUSCULAR; INTRAVENOUS at 03:11

## 2020-01-01 RX ADMIN — SODIUM CHLORIDE SOLN NEBU 3% 4 ML: 3 NEBU SOLN at 07:09

## 2020-01-01 RX ADMIN — SODIUM CHLORIDE, SODIUM GLUCONATE, SODIUM ACETATE, POTASSIUM CHLORIDE, MAGNESIUM CHLORIDE, SODIUM PHOSPHATE, DIBASIC, AND POTASSIUM PHOSPHATE: .53; .5; .37; .037; .03; .012; .00082 INJECTION, SOLUTION INTRAVENOUS at 03:09

## 2020-01-01 RX ADMIN — DIAZEPAM 5 MG: 5 TABLET ORAL at 09:09

## 2020-01-01 RX ADMIN — FUROSEMIDE 20 MG: 10 INJECTION, SOLUTION INTRAMUSCULAR; INTRAVENOUS at 10:10

## 2020-01-01 RX ADMIN — METOPROLOL TARTRATE 25 MG: 25 TABLET, FILM COATED ORAL at 03:08

## 2020-01-01 RX ADMIN — SODIUM CHLORIDE SOLN NEBU 3% 4 ML: 3 NEBU SOLN at 07:11

## 2020-01-01 RX ADMIN — OXYCODONE HYDROCHLORIDE AND ACETAMINOPHEN 500 MG: 500 TABLET ORAL at 12:09

## 2020-01-01 RX ADMIN — METOPROLOL TARTRATE 25 MG: 25 TABLET, FILM COATED ORAL at 11:08

## 2020-01-01 RX ADMIN — APIXABAN 5 MG: 5 TABLET, FILM COATED ORAL at 09:10

## 2020-01-01 RX ADMIN — FAMOTIDINE 20 MG: 10 INJECTION INTRAVENOUS at 09:09

## 2020-01-01 RX ADMIN — TIZANIDINE 4 MG: 4 TABLET ORAL at 11:08

## 2020-01-01 RX ADMIN — EPINEPHRINE 0.04 MCG/KG/MIN: 1 INJECTION INTRAMUSCULAR; INTRAVENOUS; SUBCUTANEOUS at 10:10

## 2020-01-01 RX ADMIN — ASPIRIN 325 MG ORAL TABLET 325 MG: 325 PILL ORAL at 09:08

## 2020-01-01 RX ADMIN — METOROPROLOL TARTRATE 2.5 MG: 5 INJECTION, SOLUTION INTRAVENOUS at 12:10

## 2020-01-01 RX ADMIN — ALPRAZOLAM 0.25 MG: 0.25 TABLET ORAL at 10:09

## 2020-01-01 RX ADMIN — METOPROLOL TARTRATE 12.5 MG: 25 TABLET ORAL at 08:10

## 2020-01-01 RX ADMIN — IPRATROPIUM BROMIDE AND ALBUTEROL SULFATE 3 ML: .5; 2.5 SOLUTION RESPIRATORY (INHALATION) at 09:10

## 2020-01-01 RX ADMIN — FUROSEMIDE 40 MG: 10 INJECTION, SOLUTION INTRAMUSCULAR; INTRAVENOUS at 09:09

## 2020-01-01 RX ADMIN — APIXABAN 5 MG: 5 TABLET, FILM COATED ORAL at 09:08

## 2020-01-01 RX ADMIN — HEPARIN SODIUM 5000 UNITS: 5000 INJECTION INTRAVENOUS; SUBCUTANEOUS at 10:10

## 2020-01-01 RX ADMIN — CALCIUM ACETATE 2001 MG: 667 CAPSULE ORAL at 12:10

## 2020-01-01 RX ADMIN — PIPERACILLIN SODIUM AND TAZOBACTAM SODIUM 4.5 G: 4; .5 INJECTION, POWDER, LYOPHILIZED, FOR SOLUTION INTRAVENOUS at 08:09

## 2020-01-01 RX ADMIN — HEPARIN SODIUM 5000 UNITS: 5000 INJECTION INTRAVENOUS; SUBCUTANEOUS at 07:10

## 2020-01-01 RX ADMIN — SODIUM CHLORIDE SOLN NEBU 3% 4 ML: 3 NEBU SOLN at 03:09

## 2020-01-01 RX ADMIN — DIGOXIN 0.12 MG: 125 TABLET ORAL at 09:10

## 2020-01-01 RX ADMIN — FUROSEMIDE 20 MG/HR: 10 INJECTION, SOLUTION INTRAMUSCULAR; INTRAVENOUS at 06:09

## 2020-01-01 RX ADMIN — DEXMEDETOMIDINE HYDROCHLORIDE 1.4 MCG/KG/HR: 4 INJECTION, SOLUTION INTRAVENOUS at 04:09

## 2020-01-01 RX ADMIN — METOPROLOL TARTRATE 25 MG: 25 TABLET ORAL at 08:10

## 2020-01-01 RX ADMIN — AMIODARONE HYDROCHLORIDE 200 MG: 100 TABLET ORAL at 09:09

## 2020-01-01 RX ADMIN — LIDOCAINE HYDROCHLORIDE: 20 JELLY TOPICAL at 06:09

## 2020-01-01 RX ADMIN — DIGOXIN 0.12 MG: 125 TABLET ORAL at 08:09

## 2020-01-01 RX ADMIN — DOBUTAMINE HYDROCHLORIDE 5 MCG/KG/MIN: 200 INJECTION INTRAVENOUS at 02:09

## 2020-01-01 RX ADMIN — HYDROMORPHONE HYDROCHLORIDE 0.2 MG: 1 INJECTION, SOLUTION INTRAMUSCULAR; INTRAVENOUS; SUBCUTANEOUS at 06:11

## 2020-01-01 RX ADMIN — CALCIUM GLUCONATE 3000 MG: 98 INJECTION, SOLUTION INTRAVENOUS at 06:11

## 2020-01-01 RX ADMIN — POTASSIUM CHLORIDE 40 MEQ: 20 SOLUTION ORAL at 11:09

## 2020-01-01 RX ADMIN — SODIUM CHLORIDE SOLN NEBU 3% 4 ML: 3 NEBU SOLN at 02:11

## 2020-01-01 RX ADMIN — HEPARIN SODIUM 5000 UNITS: 5000 INJECTION INTRAVENOUS; SUBCUTANEOUS at 05:11

## 2020-01-01 RX ADMIN — POTASSIUM CHLORIDE 20 MEQ: 14.9 INJECTION, SOLUTION INTRAVENOUS at 06:10

## 2020-01-01 RX ADMIN — ASPIRIN 81 MG CHEWABLE TABLET 81 MG: 81 TABLET CHEWABLE at 10:10

## 2020-01-01 RX ADMIN — MIDAZOLAM HYDROCHLORIDE 1 MG: 1 INJECTION, SOLUTION INTRAMUSCULAR; INTRAVENOUS at 12:10

## 2020-01-01 RX ADMIN — POTASSIUM CHLORIDE 20 MEQ: 1.5 POWDER, FOR SOLUTION ORAL at 09:10

## 2020-01-01 RX ADMIN — METOPROLOL TARTRATE 5 MG: 5 INJECTION, SOLUTION INTRAVENOUS at 12:08

## 2020-01-01 RX ADMIN — FENTANYL CITRATE 25 MCG: 50 INJECTION INTRAMUSCULAR; INTRAVENOUS at 11:09

## 2020-01-01 RX ADMIN — EPINEPHRINE 0.08 MCG/KG/MIN: 1 INJECTION INTRAMUSCULAR; INTRAVENOUS; SUBCUTANEOUS at 12:09

## 2020-01-01 RX ADMIN — EPINEPHRINE 0.06 MCG/KG/MIN: 1 INJECTION INTRAMUSCULAR; INTRAVENOUS; SUBCUTANEOUS at 08:09

## 2020-01-01 RX ADMIN — AMIODARONE HYDROCHLORIDE 0.5 MG/MIN: 1.8 INJECTION, SOLUTION INTRAVENOUS at 03:09

## 2020-01-01 RX ADMIN — AMIODARONE HYDROCHLORIDE 200 MG: 200 TABLET ORAL at 11:09

## 2020-01-01 RX ADMIN — SODIUM CHLORIDE, SODIUM GLUCONATE, SODIUM ACETATE, POTASSIUM CHLORIDE, MAGNESIUM CHLORIDE, SODIUM PHOSPHATE, DIBASIC, AND POTASSIUM PHOSPHATE: .53; .5; .37; .037; .03; .012; .00082 INJECTION, SOLUTION INTRAVENOUS at 02:10

## 2020-01-01 RX ADMIN — FENTANYL CITRATE 100 MCG: 50 INJECTION, SOLUTION INTRAMUSCULAR; INTRAVENOUS at 10:09

## 2020-01-01 RX ADMIN — MIDODRINE HYDROCHLORIDE 5 MG: 5 TABLET ORAL at 03:10

## 2020-01-01 RX ADMIN — FENTANYL CITRATE 25 MCG: 50 INJECTION INTRAMUSCULAR; INTRAVENOUS at 08:09

## 2020-01-01 RX ADMIN — HEPARIN SODIUM AND DEXTROSE 800 UNITS/HR: 10000; 5 INJECTION INTRAVENOUS at 08:10

## 2020-01-01 RX ADMIN — MELATONIN TAB 3 MG 6 MG: 3 TAB at 09:08

## 2020-01-01 RX ADMIN — CALCIUM ACETATE 2001 MG: 667 CAPSULE ORAL at 05:10

## 2020-01-01 RX ADMIN — CEFEPIME 2 G: 2 INJECTION, POWDER, FOR SOLUTION INTRAVENOUS at 05:10

## 2020-01-01 RX ADMIN — POLYETHYLENE GLYCOL 3350 17 G: 17 POWDER, FOR SOLUTION ORAL at 10:10

## 2020-01-01 RX ADMIN — SODIUM CHLORIDE SOLN NEBU 3% 4 ML: 3 NEBU SOLN at 11:10

## 2020-01-01 RX ADMIN — DIGOXIN 125 MCG: 0.25 INJECTION INTRAMUSCULAR; INTRAVENOUS at 02:10

## 2020-01-01 RX ADMIN — METOPROLOL TARTRATE 12.5 MG: 25 TABLET ORAL at 10:10

## 2020-01-01 RX ADMIN — MORPHINE SULFATE 1 MG: 2 INJECTION, SOLUTION INTRAMUSCULAR; INTRAVENOUS at 02:10

## 2020-01-01 RX ADMIN — ASPIRIN 300 MG: 300 SUPPOSITORY RECTAL at 09:10

## 2020-01-01 RX ADMIN — SODIUM CHLORIDE SOLN NEBU 3% 4 ML: 3 NEBU SOLN at 11:09

## 2020-01-01 RX ADMIN — DEXMEDETOMIDINE HYDROCHLORIDE 1.4 MCG/KG/HR: 4 INJECTION, SOLUTION INTRAVENOUS at 07:09

## 2020-01-01 RX ADMIN — FUROSEMIDE 40 MG: 10 INJECTION, SOLUTION INTRAMUSCULAR; INTRAVENOUS at 08:09

## 2020-01-01 RX ADMIN — VANCOMYCIN HYDROCHLORIDE 1500 MG: 1.5 INJECTION, POWDER, LYOPHILIZED, FOR SOLUTION INTRAVENOUS at 02:10

## 2020-01-01 RX ADMIN — SODIUM CHLORIDE SOLN NEBU 3% 4 ML: 3 NEBU SOLN at 05:10

## 2020-01-01 RX ADMIN — LIDOCAINE HYDROCHLORIDE 4 ML: 20 INJECTION, SOLUTION EPIDURAL; INFILTRATION; INTRACAUDAL; PERINEURAL at 12:10

## 2020-01-01 RX ADMIN — IPRATROPIUM BROMIDE AND ALBUTEROL SULFATE 3 ML: .5; 2.5 SOLUTION RESPIRATORY (INHALATION) at 03:10

## 2020-01-01 RX ADMIN — PROPOFOL 10 MCG/KG/MIN: 10 INJECTION, EMULSION INTRAVENOUS at 10:10

## 2020-01-01 RX ADMIN — VASOPRESSIN 0.04 UNITS/MIN: 20 INJECTION INTRAVENOUS at 03:09

## 2020-01-01 RX ADMIN — POTASSIUM PHOSPHATE, MONOBASIC AND POTASSIUM PHOSPHATE, DIBASIC 15 MMOL: 224; 236 INJECTION, SOLUTION, CONCENTRATE INTRAVENOUS at 08:09

## 2020-01-01 RX ADMIN — POTASSIUM CHLORIDE 40 MEQ: 1.5 POWDER, FOR SOLUTION ORAL at 06:09

## 2020-01-01 RX ADMIN — ALBUMIN (HUMAN) 25 G: 12.5 SOLUTION INTRAVENOUS at 02:09

## 2020-01-01 RX ADMIN — SODIUM CHLORIDE SOLN NEBU 3% 4 ML: 3 NEBU SOLN at 12:09

## 2020-01-01 RX ADMIN — FUROSEMIDE 40 MG: 10 INJECTION, SOLUTION INTRAVENOUS at 08:08

## 2020-01-01 RX ADMIN — HEPARIN SODIUM AND DEXTROSE 900 UNITS/HR: 10000; 5 INJECTION INTRAVENOUS at 08:10

## 2020-01-01 RX ADMIN — AMIODARONE HYDROCHLORIDE 1 MG/MIN: 1.8 INJECTION, SOLUTION INTRAVENOUS at 02:09

## 2020-01-01 RX ADMIN — HEPARIN SODIUM 5000 UNITS: 5000 INJECTION INTRAVENOUS; SUBCUTANEOUS at 02:11

## 2020-01-01 RX ADMIN — DEXTROSE MONOHYDRATE, SODIUM CITRATE, AND CITRIC ACID MONOHYDRATE: 2.45; 2.2; .8 INJECTION, SOLUTION INTRAVENOUS at 03:11

## 2020-01-01 RX ADMIN — HEPARIN SODIUM AND DEXTROSE 700 UNITS/HR: 10000; 5 INJECTION INTRAVENOUS at 11:10

## 2020-01-01 RX ADMIN — WARFARIN SODIUM 3 MG: 2 TABLET ORAL at 04:09

## 2020-01-01 RX ADMIN — DEXMEDETOMIDINE HYDROCHLORIDE 0.6 MCG/KG/HR: 4 INJECTION, SOLUTION INTRAVENOUS at 01:10

## 2020-01-01 RX ADMIN — PROPOFOL 15 MCG/KG/MIN: 10 INJECTION, EMULSION INTRAVENOUS at 04:10

## 2020-01-01 RX ADMIN — CHLOROTHIAZIDE SODIUM 250 MG: 500 INJECTION, POWDER, LYOPHILIZED, FOR SOLUTION INTRAVENOUS at 11:09

## 2020-01-01 RX ADMIN — VASOPRESSIN 0.02 UNITS/MIN: 20 INJECTION INTRAVENOUS at 03:09

## 2020-01-01 RX ADMIN — Medication 100 MG: at 08:11

## 2020-01-01 RX ADMIN — ROCURONIUM BROMIDE 50 MG: 10 INJECTION, SOLUTION INTRAVENOUS at 02:10

## 2020-01-01 RX ADMIN — FUROSEMIDE 10 MG/HR: 10 INJECTION, SOLUTION INTRAMUSCULAR; INTRAVENOUS at 04:09

## 2020-01-01 RX ADMIN — DEXTROSE: 5 SOLUTION INTRAVENOUS at 03:10

## 2020-01-01 RX ADMIN — EPINEPHRINE 0.1 MCG/KG/MIN: 1 INJECTION INTRAMUSCULAR; INTRAVENOUS; SUBCUTANEOUS at 05:09

## 2020-01-01 RX ADMIN — LORAZEPAM 1 MG: 2 INJECTION INTRAMUSCULAR; INTRAVENOUS at 06:11

## 2020-01-01 RX ADMIN — POTASSIUM CHLORIDE 20 MEQ: 14.9 INJECTION INTRAVENOUS at 11:09

## 2020-01-01 RX ADMIN — ALBUMIN HUMAN 25 G: 50 SOLUTION INTRAVENOUS at 06:10

## 2020-01-01 RX ADMIN — PROPOFOL 15 MCG/KG/MIN: 10 INJECTION, EMULSION INTRAVENOUS at 08:09

## 2020-01-01 RX ADMIN — PROPOFOL 30 MCG/KG/MIN: 10 INJECTION, EMULSION INTRAVENOUS at 08:09

## 2020-01-01 RX ADMIN — PRAVASTATIN SODIUM 80 MG: 40 TABLET ORAL at 10:09

## 2020-01-01 RX ADMIN — SODIUM CHLORIDE SOLN NEBU 3% 4 ML: 3 NEBU SOLN at 01:09

## 2020-01-01 RX ADMIN — SODIUM CHLORIDE SOLN NEBU 3% 4 ML: 3 NEBU SOLN at 02:10

## 2020-01-01 RX ADMIN — FUROSEMIDE 20 MG/HR: 10 INJECTION, SOLUTION INTRAMUSCULAR; INTRAVENOUS at 02:09

## 2020-01-01 RX ADMIN — SODIUM CHLORIDE SOLN NEBU 3% 4 ML: 3 NEBU SOLN at 04:11

## 2020-01-01 RX ADMIN — DEXMEDETOMIDINE HYDROCHLORIDE 0.6 MCG/KG/HR: 4 INJECTION, SOLUTION INTRAVENOUS at 02:09

## 2020-01-01 RX ADMIN — ASPIRIN 81 MG CHEWABLE TABLET 81 MG: 81 TABLET CHEWABLE at 12:09

## 2020-01-01 RX ADMIN — PROPOFOL 35 MCG/KG/MIN: 10 INJECTION, EMULSION INTRAVENOUS at 11:10

## 2020-01-01 RX ADMIN — OXYCODONE HYDROCHLORIDE AND ACETAMINOPHEN 500 MG: 500 TABLET ORAL at 10:09

## 2020-01-01 RX ADMIN — AMIODARONE HYDROCHLORIDE 400 MG: 200 TABLET ORAL at 09:08

## 2020-01-01 RX ADMIN — HYDROMORPHONE HYDROCHLORIDE 0.5 MG: 1 INJECTION, SOLUTION INTRAMUSCULAR; INTRAVENOUS; SUBCUTANEOUS at 01:11

## 2020-01-01 RX ADMIN — SODIUM PHOSPHATE, MONOBASIC, MONOHYDRATE 30 MMOL: 276; 142 INJECTION, SOLUTION INTRAVENOUS at 10:10

## 2020-01-01 RX ADMIN — PROPOFOL 50 MG: 10 INJECTION, EMULSION INTRAVENOUS at 05:10

## 2020-01-01 RX ADMIN — OXYCODONE HYDROCHLORIDE AND ACETAMINOPHEN 500 MG: 500 TABLET ORAL at 09:09

## 2020-01-01 RX ADMIN — SODIUM BICARBONATE 100 MEQ: 84 INJECTION, SOLUTION INTRAVENOUS at 02:09

## 2020-01-01 RX ADMIN — DOBUTAMINE HYDROCHLORIDE 5 MCG/KG/MIN: 200 INJECTION INTRAVENOUS at 10:09

## 2020-01-01 RX ADMIN — DEXMEDETOMIDINE HYDROCHLORIDE 4 MCG: 100 INJECTION, SOLUTION, CONCENTRATE INTRAVENOUS at 01:10

## 2020-01-01 RX ADMIN — MELATONIN TAB 3 MG 6 MG: 3 TAB at 10:09

## 2020-01-01 RX ADMIN — HEPARIN SODIUM AND DEXTROSE 1000 UNITS/HR: 10000; 5 INJECTION INTRAVENOUS at 10:10

## 2020-01-01 RX ADMIN — AMIODARONE HYDROCHLORIDE 0.5 MG/MIN: 1.8 INJECTION, SOLUTION INTRAVENOUS at 06:08

## 2020-01-01 RX ADMIN — CHLOROTHIAZIDE 250 MG: 250 SUSPENSION ORAL at 08:09

## 2020-01-01 RX ADMIN — POTASSIUM CHLORIDE 20 MEQ: 14.9 INJECTION, SOLUTION INTRAVENOUS at 04:09

## 2020-01-01 RX ADMIN — CALCIUM CARBONATE (ANTACID) CHEW TAB 500 MG 1000 MG: 500 CHEW TAB at 12:09

## 2020-01-01 RX ADMIN — PROPOFOL 15 MCG/KG/MIN: 10 INJECTION, EMULSION INTRAVENOUS at 04:09

## 2020-01-01 RX ADMIN — POTASSIUM CHLORIDE 20 MEQ: 14.9 INJECTION, SOLUTION INTRAVENOUS at 11:10

## 2020-01-01 RX ADMIN — NOREPINEPHRINE BITARTRATE 0.08 MCG/KG/MIN: 1 INJECTION, SOLUTION, CONCENTRATE INTRAVENOUS at 03:10

## 2020-01-01 RX ADMIN — ALBUMIN (HUMAN) 25 G: 12.5 SOLUTION INTRAVENOUS at 11:09

## 2020-01-01 RX ADMIN — POTASSIUM CHLORIDE, DEXTROSE MONOHYDRATE AND SODIUM CHLORIDE: 150; 5; 450 INJECTION, SOLUTION INTRAVENOUS at 01:10

## 2020-01-01 RX ADMIN — HYDROMORPHONE HYDROCHLORIDE 0.2 MG: 1 INJECTION, SOLUTION INTRAMUSCULAR; INTRAVENOUS; SUBCUTANEOUS at 07:11

## 2020-01-01 RX ADMIN — ETOMIDATE 40 MG: 2 INJECTION INTRAVENOUS at 06:10

## 2020-01-01 RX ADMIN — HEPARIN SODIUM AND DEXTROSE 1200 UNITS/HR: 10000; 5 INJECTION INTRAVENOUS at 09:09

## 2020-01-01 RX ADMIN — SODIUM CHLORIDE, SODIUM LACTATE, POTASSIUM CHLORIDE, AND CALCIUM CHLORIDE 500 ML: .6; .31; .03; .02 INJECTION, SOLUTION INTRAVENOUS at 01:10

## 2020-01-01 RX ADMIN — APIXABAN 5 MG: 5 TABLET, FILM COATED ORAL at 08:08

## 2020-01-01 RX ADMIN — POTASSIUM PHOSPHATE, MONOBASIC AND POTASSIUM PHOSPHATE, DIBASIC 15 MMOL: 224; 236 INJECTION, SOLUTION, CONCENTRATE INTRAVENOUS at 09:09

## 2020-01-01 RX ADMIN — AMIODARONE HYDROCHLORIDE 200 MG: 100 TABLET ORAL at 12:09

## 2020-01-01 RX ADMIN — TRANEXAMIC ACID 1 MG/KG/HR: 100 INJECTION, SOLUTION INTRAVENOUS at 02:09

## 2020-01-01 RX ADMIN — FUROSEMIDE 60 MG: 10 INJECTION, SOLUTION INTRAMUSCULAR; INTRAVENOUS at 09:09

## 2020-01-01 RX ADMIN — CALCIUM ACETATE 2001 MG: 667 CAPSULE ORAL at 07:11

## 2020-01-01 RX ADMIN — FENTANYL CITRATE 25 MCG: 50 INJECTION, SOLUTION INTRAMUSCULAR; INTRAVENOUS at 08:10

## 2020-01-01 RX ADMIN — HYDROMORPHONE HYDROCHLORIDE 0.2 MG: 1 INJECTION, SOLUTION INTRAMUSCULAR; INTRAVENOUS; SUBCUTANEOUS at 01:10

## 2020-01-01 RX ADMIN — CEFEPIME 2 G: 2 INJECTION, POWDER, FOR SOLUTION INTRAVENOUS at 01:10

## 2020-01-01 RX ADMIN — POTASSIUM CHLORIDE 40 MEQ: 29.8 INJECTION, SOLUTION INTRAVENOUS at 02:09

## 2020-01-01 RX ADMIN — FUROSEMIDE 80 MG: 10 INJECTION, SOLUTION INTRAMUSCULAR; INTRAVENOUS at 09:09

## 2020-01-01 RX ADMIN — AMIODARONE HYDROCHLORIDE 0.5 MG/MIN: 1.8 INJECTION, SOLUTION INTRAVENOUS at 05:08

## 2020-01-01 RX ADMIN — POTASSIUM CHLORIDE 20 MEQ: 14.9 INJECTION, SOLUTION INTRAVENOUS at 09:10

## 2020-01-01 RX ADMIN — DIAZEPAM 5 MG: 5 TABLET ORAL at 01:09

## 2020-01-01 RX ADMIN — CALCIUM ACETATE 2001 MG: 667 CAPSULE ORAL at 08:11

## 2020-01-01 RX ADMIN — PROPOFOL 35 MCG/KG/MIN: 10 INJECTION, EMULSION INTRAVENOUS at 09:09

## 2020-01-01 RX ADMIN — LIDOCAINE HYDROCHLORIDE 200 MG: 10 INJECTION, SOLUTION INFILTRATION; PERINEURAL at 10:09

## 2020-01-01 RX ADMIN — AMIODARONE HYDROCHLORIDE 400 MG: 100 TABLET ORAL at 09:09

## 2020-01-01 RX ADMIN — EPINEPHRINE 0.03 MCG/KG/MIN: 1 INJECTION INTRAMUSCULAR; INTRAVENOUS; SUBCUTANEOUS at 04:09

## 2020-01-01 RX ADMIN — TRANEXAMIC ACID 750 MG: 100 INJECTION, SOLUTION INTRAVENOUS at 03:09

## 2020-01-01 RX ADMIN — PROPOFOL: 10 INJECTION, EMULSION INTRAVENOUS at 07:10

## 2020-01-01 RX ADMIN — DEXMEDETOMIDINE HYDROCHLORIDE 1 MCG/KG/HR: 4 INJECTION, SOLUTION INTRAVENOUS at 10:10

## 2020-01-01 RX ADMIN — WARFARIN SODIUM 2 MG: 2 TABLET ORAL at 06:10

## 2020-01-01 RX ADMIN — POTASSIUM CHLORIDE 20 MEQ: 1.5 POWDER, FOR SOLUTION ORAL at 10:10

## 2020-01-01 RX ADMIN — OXYCODONE HYDROCHLORIDE 5 MG: 5 TABLET ORAL at 08:09

## 2020-01-01 RX ADMIN — DEXMEDETOMIDINE HYDROCHLORIDE 0.7 MCG/KG/HR: 4 INJECTION, SOLUTION INTRAVENOUS at 09:10

## 2020-01-01 RX ADMIN — ACETAMINOPHEN 650 MG: 325 TABLET ORAL at 09:09

## 2020-01-01 RX ADMIN — IPRATROPIUM BROMIDE AND ALBUTEROL SULFATE 3 ML: .5; 2.5 SOLUTION RESPIRATORY (INHALATION) at 07:11

## 2020-01-01 RX ADMIN — APIXABAN 5 MG: 5 TABLET, FILM COATED ORAL at 08:09

## 2020-01-01 RX ADMIN — OXYCODONE HYDROCHLORIDE 5 MG: 5 TABLET ORAL at 12:09

## 2020-01-01 RX ADMIN — Medication 100 MG: at 09:11

## 2020-01-01 RX ADMIN — PRAVASTATIN SODIUM 80 MG: 40 TABLET ORAL at 08:09

## 2020-01-01 RX ADMIN — DOBUTAMINE HYDROCHLORIDE 2.5 MCG/KG/MIN: 200 INJECTION INTRAVENOUS at 11:09

## 2020-01-01 RX ADMIN — FAMOTIDINE 20 MG: 10 INJECTION INTRAVENOUS at 11:10

## 2020-01-01 RX ADMIN — AMIODARONE HYDROCHLORIDE 1 MG/MIN: 1.8 INJECTION, SOLUTION INTRAVENOUS at 08:09

## 2020-01-01 RX ADMIN — POTASSIUM CHLORIDE 40 MEQ: 29.8 INJECTION, SOLUTION INTRAVENOUS at 03:09

## 2020-01-01 RX ADMIN — PROPOFOL 20 MG: 10 INJECTION, EMULSION INTRAVENOUS at 01:08

## 2020-01-01 RX ADMIN — METOROPROLOL TARTRATE 2.5 MG: 5 INJECTION, SOLUTION INTRAVENOUS at 05:10

## 2020-01-01 RX ADMIN — AMIODARONE HYDROCHLORIDE 150 MG: 1.5 INJECTION, SOLUTION INTRAVENOUS at 08:09

## 2020-01-01 RX ADMIN — WARFARIN SODIUM 3 MG: 2 TABLET ORAL at 06:09

## 2020-01-01 RX ADMIN — ROCURONIUM BROMIDE 50 MG: 10 INJECTION, SOLUTION INTRAVENOUS at 04:09

## 2020-01-01 RX ADMIN — FAMOTIDINE 20 MG: 20 TABLET, FILM COATED ORAL at 10:10

## 2020-01-01 RX ADMIN — METOROPROLOL TARTRATE 2.5 MG: 5 INJECTION, SOLUTION INTRAVENOUS at 06:10

## 2020-01-01 RX ADMIN — METOPROLOL TARTRATE 5 MG: 5 INJECTION, SOLUTION INTRAVENOUS at 11:08

## 2020-01-01 RX ADMIN — METOPROLOL TARTRATE 25 MG: 25 TABLET, FILM COATED ORAL at 09:10

## 2020-01-01 RX ADMIN — FUROSEMIDE 40 MG: 10 INJECTION, SOLUTION INTRAMUSCULAR; INTRAVENOUS at 11:10

## 2020-01-01 RX ADMIN — METOROPROLOL TARTRATE 5 MG: 5 INJECTION, SOLUTION INTRAVENOUS at 11:10

## 2020-01-01 RX ADMIN — VASOPRESSIN 0.04 UNITS/MIN: 20 INJECTION INTRAVENOUS at 04:09

## 2020-01-01 RX ADMIN — CALCIUM ACETATE 2001 MG: 667 CAPSULE ORAL at 06:10

## 2020-01-01 RX ADMIN — HEPARIN SODIUM AND DEXTROSE 600 UNITS/HR: 10000; 5 INJECTION INTRAVENOUS at 12:10

## 2020-01-01 RX ADMIN — HEPARIN SODIUM 5000 UNITS: 5000 INJECTION INTRAVENOUS; SUBCUTANEOUS at 01:11

## 2020-01-01 RX ADMIN — DEXTROSE AND SODIUM CHLORIDE: 5; .45 INJECTION, SOLUTION INTRAVENOUS at 03:10

## 2020-01-01 RX ADMIN — SODIUM PHOSPHATE, MONOBASIC, MONOHYDRATE 39.99 MMOL: 276; 142 INJECTION, SOLUTION INTRAVENOUS at 05:10

## 2020-01-01 RX ADMIN — SODIUM CHLORIDE 3 UNITS/HR: 9 INJECTION, SOLUTION INTRAVENOUS at 08:09

## 2020-01-01 RX ADMIN — PROPOFOL 30 MCG/KG/MIN: 10 INJECTION, EMULSION INTRAVENOUS at 12:09

## 2020-01-01 RX ADMIN — CALCIUM CHLORIDE 20 ML: 100 INJECTION, SOLUTION INTRAVENOUS at 08:09

## 2020-01-01 RX ADMIN — MIDODRINE HYDROCHLORIDE 5 MG: 5 TABLET ORAL at 02:11

## 2020-01-01 RX ADMIN — FUROSEMIDE 40 MG: 10 INJECTION, SOLUTION INTRAMUSCULAR; INTRAVENOUS at 12:09

## 2020-01-01 RX ADMIN — ESCITALOPRAM OXALATE 20 MG: 10 TABLET ORAL at 09:11

## 2020-01-01 RX ADMIN — ALPRAZOLAM 0.25 MG: 0.25 TABLET ORAL at 12:09

## 2020-01-01 RX ADMIN — MIDODRINE HYDROCHLORIDE 5 MG: 5 TABLET ORAL at 11:10

## 2020-01-01 RX ADMIN — AMIODARONE HYDROCHLORIDE 400 MG: 200 TABLET ORAL at 10:09

## 2020-01-01 RX ADMIN — PROPOFOL 50 MCG/KG/MIN: 10 INJECTION, EMULSION INTRAVENOUS at 05:10

## 2020-01-01 RX ADMIN — CALCIUM CHLORIDE 10 ML: 100 INJECTION, SOLUTION INTRAVENOUS at 08:09

## 2020-01-01 RX ADMIN — ASPIRIN 81 MG: 81 TABLET, CHEWABLE ORAL at 11:09

## 2020-01-01 RX ADMIN — CASTOR OIL AND BALSAM, PERU: 788; 87 OINTMENT TOPICAL at 10:11

## 2020-01-01 RX ADMIN — PROPOFOL 15 MCG/KG/MIN: 10 INJECTION, EMULSION INTRAVENOUS at 06:10

## 2020-01-01 RX ADMIN — ONDANSETRON 4 MG: 2 INJECTION INTRAMUSCULAR; INTRAVENOUS at 08:08

## 2020-01-01 RX ADMIN — DEXTROSE MONOHYDRATE, SODIUM CITRATE, AND CITRIC ACID MONOHYDRATE: 2.45; 2.2; .8 INJECTION, SOLUTION INTRAVENOUS at 05:11

## 2020-01-01 RX ADMIN — DOBUTAMINE HYDROCHLORIDE 2.5 MCG/KG/MIN: 200 INJECTION INTRAVENOUS at 09:09

## 2020-01-01 RX ADMIN — VASOPRESSIN 0.04 UNITS/MIN: 20 INJECTION INTRAVENOUS at 12:09

## 2020-01-01 RX ADMIN — CEFEPIME 1 G: 1 INJECTION, POWDER, FOR SOLUTION INTRAMUSCULAR; INTRAVENOUS at 12:11

## 2020-01-01 RX ADMIN — FUROSEMIDE 20 MG: 10 INJECTION, SOLUTION INTRAMUSCULAR; INTRAVENOUS at 08:09

## 2020-01-01 RX ADMIN — SODIUM CHLORIDE: 0.9 INJECTION, SOLUTION INTRAVENOUS at 02:09

## 2020-01-01 RX ADMIN — DEXMEDETOMIDINE HYDROCHLORIDE 1.2 MCG/KG/HR: 4 INJECTION, SOLUTION INTRAVENOUS at 09:09

## 2020-01-01 RX ADMIN — ACETAMINOPHEN 975 MG: 325 TABLET ORAL at 05:09

## 2020-01-01 RX ADMIN — DIGOXIN 125 MCG: 0.25 INJECTION INTRAMUSCULAR; INTRAVENOUS at 11:10

## 2020-01-01 RX ADMIN — FUROSEMIDE 80 MG: 10 INJECTION, SOLUTION INTRAMUSCULAR; INTRAVENOUS at 06:09

## 2020-01-01 RX ADMIN — MAGNESIUM SULFATE 2 G: 2 INJECTION INTRAVENOUS at 11:10

## 2020-01-01 RX ADMIN — APIXABAN 5 MG: 5 TABLET, FILM COATED ORAL at 10:09

## 2020-01-01 RX ADMIN — ESCITALOPRAM 10 MG: 5 SOLUTION ORAL at 03:10

## 2020-01-01 RX ADMIN — SENNOSIDES 5 ML: 8.8 SYRUP ORAL at 08:09

## 2020-01-01 RX ADMIN — FENTANYL CITRATE 25 MCG: 50 INJECTION, SOLUTION INTRAMUSCULAR; INTRAVENOUS at 06:10

## 2020-01-01 RX ADMIN — LIDOCAINE HYDROCHLORIDE 1 ML: 10 INJECTION, SOLUTION INFILTRATION; PERINEURAL at 11:10

## 2020-01-01 RX ADMIN — ACETAMINOPHEN 500 MG: 325 TABLET ORAL at 05:09

## 2020-01-01 RX ADMIN — DEXTROSE MONOHYDRATE 12.5 G: 25 INJECTION, SOLUTION INTRAVENOUS at 12:10

## 2020-01-01 RX ADMIN — PROPOFOL 35 MCG/KG/MIN: 10 INJECTION, EMULSION INTRAVENOUS at 03:09

## 2020-01-01 RX ADMIN — DEXTROSE AND SODIUM CHLORIDE: 5; .45 INJECTION, SOLUTION INTRAVENOUS at 03:09

## 2020-01-01 RX ADMIN — ISOSORBIDE MONONITRATE 30 MG: 30 TABLET, EXTENDED RELEASE ORAL at 09:09

## 2020-01-01 RX ADMIN — MIDAZOLAM HYDROCHLORIDE 1 MG: 1 INJECTION, SOLUTION INTRAMUSCULAR; INTRAVENOUS at 02:10

## 2020-01-01 RX ADMIN — IPRATROPIUM BROMIDE AND ALBUTEROL SULFATE 3 ML: .5; 2.5 SOLUTION RESPIRATORY (INHALATION) at 08:11

## 2020-01-01 RX ADMIN — PROPOFOL 5 MCG/KG/MIN: 10 INJECTION, EMULSION INTRAVENOUS at 06:09

## 2020-01-01 RX ADMIN — FUROSEMIDE 10 MG: 10 INJECTION, SOLUTION INTRAMUSCULAR; INTRAVENOUS at 02:09

## 2020-01-01 RX ADMIN — DEXMEDETOMIDINE HYDROCHLORIDE 0.2 MCG/KG/HR: 4 INJECTION, SOLUTION INTRAVENOUS at 08:10

## 2020-01-01 RX ADMIN — MORPHINE SULFATE 1 MG: 2 INJECTION, SOLUTION INTRAMUSCULAR; INTRAVENOUS at 12:10

## 2020-01-01 RX ADMIN — PROPOFOL 100 MG: 10 INJECTION, EMULSION INTRAVENOUS at 02:10

## 2020-01-01 RX ADMIN — FUROSEMIDE 20 MG: 10 INJECTION, SOLUTION INTRAMUSCULAR; INTRAVENOUS at 06:10

## 2020-01-01 RX ADMIN — POTASSIUM CHLORIDE 40 MEQ: 1500 TABLET, EXTENDED RELEASE ORAL at 12:09

## 2020-01-01 RX ADMIN — METOPROLOL TARTRATE 25 MG: 25 TABLET ORAL at 10:10

## 2020-01-01 RX ADMIN — ROCURONIUM BROMIDE 20 MG: 10 INJECTION, SOLUTION INTRAVENOUS at 03:10

## 2020-01-01 RX ADMIN — FAMOTIDINE 20 MG: 20 TABLET, FILM COATED ORAL at 11:09

## 2020-01-01 RX ADMIN — ASPIRIN 81 MG CHEWABLE TABLET 81 MG: 81 TABLET CHEWABLE at 09:11

## 2020-01-01 RX ADMIN — VANCOMYCIN HYDROCHLORIDE 2000 MG: 10 INJECTION, POWDER, LYOPHILIZED, FOR SOLUTION INTRAVENOUS at 06:10

## 2020-01-01 RX ADMIN — SODIUM BICARBONATE 100 MEQ: 84 INJECTION, SOLUTION INTRAVENOUS at 06:09

## 2020-01-01 RX ADMIN — PROPOFOL 30 MCG/KG/MIN: 10 INJECTION, EMULSION INTRAVENOUS at 04:09

## 2020-01-01 RX ADMIN — THERA TABS 1 TABLET: TAB at 09:11

## 2020-01-01 RX ADMIN — EPINEPHRINE 0.02 MCG/KG/MIN: 1 INJECTION INTRAMUSCULAR; INTRAVENOUS; SUBCUTANEOUS at 12:09

## 2020-01-01 RX ADMIN — HEPARIN SODIUM AND DEXTROSE 600 UNITS/HR: 10000; 5 INJECTION INTRAVENOUS at 01:10

## 2020-01-01 RX ADMIN — MIDODRINE HYDROCHLORIDE 5 MG: 5 TABLET ORAL at 10:10

## 2020-01-01 RX ADMIN — HYDROMORPHONE HYDROCHLORIDE 0.5 MG: 1 INJECTION, SOLUTION INTRAMUSCULAR; INTRAVENOUS; SUBCUTANEOUS at 05:09

## 2020-01-01 RX ADMIN — ENOXAPARIN SODIUM 70 MG: 80 INJECTION SUBCUTANEOUS at 08:09

## 2020-01-01 RX ADMIN — ROCURONIUM BROMIDE 50 MG: 10 INJECTION, SOLUTION INTRAVENOUS at 06:09

## 2020-01-01 RX ADMIN — LIDOCAINE HYDROCHLORIDE 2 ML: 10 INJECTION INFILTRATION; PERINEURAL at 04:02

## 2020-01-01 RX ADMIN — FUROSEMIDE 40 MG: 40 TABLET ORAL at 09:08

## 2020-01-01 RX ADMIN — ATROPINE SULFATE 2 DROP: 10 SOLUTION OPHTHALMIC at 11:11

## 2020-01-01 RX ADMIN — Medication 0.02 MCG/KG/MIN: at 01:10

## 2020-01-01 RX ADMIN — VANCOMYCIN HYDROCHLORIDE 1750 MG: 750 INJECTION, POWDER, LYOPHILIZED, FOR SOLUTION INTRAVENOUS at 08:09

## 2020-01-01 RX ADMIN — MELATONIN TAB 3 MG 6 MG: 3 TAB at 10:10

## 2020-01-01 RX ADMIN — VASOPRESSIN 0.04 UNITS/MIN: 20 INJECTION INTRAVENOUS at 08:09

## 2020-01-01 RX ADMIN — ROCURONIUM BROMIDE 100 MG: 10 INJECTION, SOLUTION INTRAVENOUS at 06:10

## 2020-01-01 RX ADMIN — SODIUM CHLORIDE 500 ML: 0.9 INJECTION, SOLUTION INTRAVENOUS at 02:08

## 2020-01-01 RX ADMIN — SODIUM CHLORIDE, SODIUM LACTATE, POTASSIUM CHLORIDE, AND CALCIUM CHLORIDE: .6; .31; .03; .02 INJECTION, SOLUTION INTRAVENOUS at 01:08

## 2020-01-01 RX ADMIN — DEXTROSE: 5 SOLUTION INTRAVENOUS at 02:10

## 2020-01-01 RX ADMIN — FAMOTIDINE 20 MG: 10 INJECTION INTRAVENOUS at 09:10

## 2020-01-01 RX ADMIN — EPINEPHRINE 0.04 MCG/KG/MIN: 1 INJECTION INTRAMUSCULAR; INTRAVENOUS; SUBCUTANEOUS at 06:10

## 2020-01-01 RX ADMIN — FUROSEMIDE 60 MG: 10 INJECTION, SOLUTION INTRAMUSCULAR; INTRAVENOUS at 03:08

## 2020-01-01 RX ADMIN — SUGAMMADEX 200 MG: 100 INJECTION, SOLUTION INTRAVENOUS at 03:09

## 2020-01-01 RX ADMIN — DEXTROSE MONOHYDRATE, SODIUM CITRATE, AND CITRIC ACID MONOHYDRATE: 2.45; 2.2; .8 INJECTION, SOLUTION INTRAVENOUS at 07:11

## 2020-01-01 RX ADMIN — PROPOFOL 10 MCG/KG/MIN: 10 INJECTION, EMULSION INTRAVENOUS at 09:09

## 2020-01-01 RX ADMIN — PIPERACILLIN AND TAZOBACTAM 4.5 G: 4; .5 INJECTION, POWDER, LYOPHILIZED, FOR SOLUTION INTRAVENOUS; PARENTERAL at 03:08

## 2020-01-01 RX ADMIN — BARIUM SULFATE 30 ML: 0.81 POWDER, FOR SUSPENSION ORAL at 08:10

## 2020-01-01 RX ADMIN — Medication 6 MG: at 09:08

## 2020-01-01 RX ADMIN — AMIODARONE HYDROCHLORIDE 150 MG: 1.5 INJECTION, SOLUTION INTRAVENOUS at 12:08

## 2020-01-01 RX ADMIN — PROPOFOL 10 MCG/KG/MIN: 10 INJECTION, EMULSION INTRAVENOUS at 12:10

## 2020-01-01 RX ADMIN — PROPOFOL 5 MCG/KG/MIN: 10 INJECTION, EMULSION INTRAVENOUS at 12:09

## 2020-01-01 RX ADMIN — ONDANSETRON 4 MG: 2 INJECTION INTRAMUSCULAR; INTRAVENOUS at 03:09

## 2020-01-01 RX ADMIN — WARFARIN SODIUM 5 MG: 5 TABLET ORAL at 05:09

## 2020-01-01 RX ADMIN — ASPIRIN 300 MG: 300 SUPPOSITORY RECTAL at 02:10

## 2020-01-01 RX ADMIN — ROCURONIUM BROMIDE 50 MG: 10 INJECTION, SOLUTION INTRAVENOUS at 06:10

## 2020-01-01 RX ADMIN — AMIODARONE HYDROCHLORIDE 150 MG: 1.5 INJECTION, SOLUTION INTRAVENOUS at 12:09

## 2020-01-01 RX ADMIN — TRAZODONE HYDROCHLORIDE 50 MG: 50 TABLET ORAL at 09:08

## 2020-01-01 RX ADMIN — METOPROLOL TARTRATE 50 MG: 50 TABLET, FILM COATED ORAL at 10:09

## 2020-01-01 RX ADMIN — ALBUMIN (HUMAN): 12.5 SOLUTION INTRAVENOUS at 09:09

## 2020-01-01 RX ADMIN — WARFARIN SODIUM 3 MG: 3 TABLET ORAL at 04:10

## 2020-01-01 RX ADMIN — PROPOFOL 25 MCG/KG/MIN: 10 INJECTION, EMULSION INTRAVENOUS at 02:10

## 2020-01-01 RX ADMIN — NICARDIPINE HYDROCHLORIDE 2.5 MG/HR: 0.2 INJECTION INTRAVENOUS at 01:09

## 2020-01-01 RX ADMIN — PROPOFOL 35 MCG/KG/MIN: 10 INJECTION, EMULSION INTRAVENOUS at 11:09

## 2020-01-01 RX ADMIN — MORPHINE SULFATE 1 MG: 2 INJECTION, SOLUTION INTRAMUSCULAR; INTRAVENOUS at 01:10

## 2020-01-01 RX ADMIN — CALCIUM GLUCONATE 3000 MG: 98 INJECTION, SOLUTION INTRAVENOUS at 10:11

## 2020-01-01 RX ADMIN — LIDOCAINE HYDROCHLORIDE 100 MG: 20 INJECTION, SOLUTION INTRAVENOUS at 02:09

## 2020-01-01 RX ADMIN — VANCOMYCIN HYDROCHLORIDE 2000 MG: 10 INJECTION, POWDER, LYOPHILIZED, FOR SOLUTION INTRAVENOUS at 08:10

## 2020-01-01 RX ADMIN — AMIODARONE HYDROCHLORIDE 400 MG: 100 TABLET ORAL at 11:09

## 2020-01-01 RX ADMIN — LIDOCAINE HYDROCHLORIDE 200 MG: 10 INJECTION, SOLUTION INFILTRATION; PERINEURAL at 03:09

## 2020-01-01 RX ADMIN — DIGOXIN 125 MCG: 0.25 INJECTION INTRAMUSCULAR; INTRAVENOUS at 02:09

## 2020-01-01 RX ADMIN — LIDOCAINE HYDROCHLORIDE 1 ML: 10 INJECTION INFILTRATION; PERINEURAL at 11:10

## 2020-01-01 RX ADMIN — FUROSEMIDE 60 MG: 10 INJECTION, SOLUTION INTRAMUSCULAR; INTRAVENOUS at 08:08

## 2020-01-01 RX ADMIN — PROPOFOL 100 MG: 10 INJECTION, EMULSION INTRAVENOUS at 02:09

## 2020-01-01 RX ADMIN — DEXMEDETOMIDINE HYDROCHLORIDE 0.7 MCG/KG/HR: 4 INJECTION, SOLUTION INTRAVENOUS at 03:10

## 2020-01-01 RX ADMIN — CLOPIDOGREL BISULFATE 300 MG: 300 TABLET, FILM COATED ORAL at 09:08

## 2020-01-01 RX ADMIN — ACETAMINOPHEN 975 MG: 325 TABLET ORAL at 09:09

## 2020-01-01 RX ADMIN — CALCIUM CHLORIDE 0.5 G: 100 INJECTION, SOLUTION INTRAVENOUS at 10:09

## 2020-01-01 RX ADMIN — POTASSIUM CHLORIDE 20 MEQ: 14.9 INJECTION, SOLUTION INTRAVENOUS at 07:09

## 2020-01-01 RX ADMIN — FUROSEMIDE 40 MG: 10 INJECTION, SOLUTION INTRAMUSCULAR; INTRAVENOUS at 09:10

## 2020-01-01 RX ADMIN — HEPARIN SODIUM AND DEXTROSE 1000 UNITS/HR: 10000; 5 INJECTION INTRAVENOUS at 08:10

## 2020-01-01 RX ADMIN — DEXMEDETOMIDINE HYDROCHLORIDE 1 MCG/KG/HR: 4 INJECTION, SOLUTION INTRAVENOUS at 12:09

## 2020-01-01 RX ADMIN — SODIUM CHLORIDE, SODIUM GLUCONATE, SODIUM ACETATE, POTASSIUM CHLORIDE, MAGNESIUM CHLORIDE, SODIUM PHOSPHATE, DIBASIC, AND POTASSIUM PHOSPHATE: .53; .5; .37; .037; .03; .012; .00082 INJECTION, SOLUTION INTRAVENOUS at 10:09

## 2020-01-01 RX ADMIN — CEFEPIME 1 G: 1 INJECTION, POWDER, FOR SOLUTION INTRAMUSCULAR; INTRAVENOUS at 04:11

## 2020-01-01 RX ADMIN — HYDRALAZINE HYDROCHLORIDE 10 MG: 20 INJECTION INTRAMUSCULAR; INTRAVENOUS at 10:09

## 2020-01-01 RX ADMIN — DIAZEPAM 5 MG: 5 TABLET ORAL at 02:09

## 2020-01-01 RX ADMIN — PROPOFOL 35 MCG/KG/MIN: 10 INJECTION, EMULSION INTRAVENOUS at 04:09

## 2020-01-01 RX ADMIN — VASOPRESSIN 2 UNITS: 20 INJECTION INTRAVENOUS at 02:10

## 2020-01-01 RX ADMIN — CASTOR OIL AND BALSAM, PERU: 788; 87 OINTMENT TOPICAL at 09:11

## 2020-01-01 RX ADMIN — POTASSIUM CHLORIDE 20 MEQ: 14.9 INJECTION, SOLUTION INTRAVENOUS at 11:09

## 2020-01-01 RX ADMIN — Medication 100 MG: at 10:10

## 2020-01-01 RX ADMIN — AMIODARONE HYDROCHLORIDE 0.5 MG/MIN: 1.8 INJECTION, SOLUTION INTRAVENOUS at 01:09

## 2020-01-01 RX ADMIN — HYDROXYZINE HYDROCHLORIDE 50 MG: 25 TABLET, FILM COATED ORAL at 08:09

## 2020-01-01 RX ADMIN — NITROGLYCERIN 1 INCH: 20 OINTMENT TOPICAL at 12:08

## 2020-01-01 RX ADMIN — VANCOMYCIN HYDROCHLORIDE 1250 MG: 1.25 INJECTION, POWDER, LYOPHILIZED, FOR SOLUTION INTRAVENOUS at 08:10

## 2020-01-01 RX ADMIN — FENTANYL CITRATE 25 MCG: 50 INJECTION, SOLUTION INTRAMUSCULAR; INTRAVENOUS at 04:10

## 2020-01-01 RX ADMIN — CALCIUM CARBONATE (ANTACID) CHEW TAB 500 MG 1000 MG: 500 CHEW TAB at 08:09

## 2020-01-01 RX ADMIN — IPRATROPIUM BROMIDE AND ALBUTEROL SULFATE 3 ML: .5; 2.5 SOLUTION RESPIRATORY (INHALATION) at 06:10

## 2020-01-01 RX ADMIN — PROTAMINE SULFATE 5 MG: 10 INJECTION, SOLUTION INTRAVENOUS at 09:09

## 2020-01-01 RX ADMIN — POTASSIUM PHOSPHATE, MONOBASIC AND POTASSIUM PHOSPHATE, DIBASIC 15 MMOL: 224; 236 INJECTION, SOLUTION, CONCENTRATE INTRAVENOUS at 06:09

## 2020-01-01 RX ADMIN — SODIUM CHLORIDE, SODIUM LACTATE, POTASSIUM CHLORIDE, AND CALCIUM CHLORIDE 1000 ML: .6; .31; .03; .02 INJECTION, SOLUTION INTRAVENOUS at 09:10

## 2020-01-01 RX ADMIN — FENTANYL CITRATE 25 MCG: 50 INJECTION INTRAMUSCULAR; INTRAVENOUS at 02:09

## 2020-01-01 RX ADMIN — ACETAMINOPHEN 650 MG: 160 SOLUTION ORAL at 09:10

## 2020-01-01 RX ADMIN — LEVOFLOXACIN 750 MG: 750 INJECTION, SOLUTION INTRAVENOUS at 04:10

## 2020-01-01 RX ADMIN — THERA TABS 1 TABLET: TAB at 09:10

## 2020-01-01 RX ADMIN — METOROPROLOL TARTRATE 5 MG: 5 INJECTION, SOLUTION INTRAVENOUS at 04:09

## 2020-01-01 RX ADMIN — AMIODARONE HYDROCHLORIDE 800 MG: 200 TABLET ORAL at 08:08

## 2020-01-01 RX ADMIN — MAGNESIUM SULFATE IN WATER 2 G: 40 INJECTION, SOLUTION INTRAVENOUS at 05:10

## 2020-01-01 RX ADMIN — AMIODARONE HYDROCHLORIDE 150 MG: 1.5 INJECTION, SOLUTION INTRAVENOUS at 04:09

## 2020-01-01 RX ADMIN — IPRATROPIUM BROMIDE AND ALBUTEROL SULFATE 3 ML: .5; 2.5 SOLUTION RESPIRATORY (INHALATION) at 09:09

## 2020-01-01 RX ADMIN — MELATONIN TAB 3 MG 6 MG: 3 TAB at 08:11

## 2020-01-01 RX ADMIN — ALBUMIN (HUMAN) 25 G: 12.5 SOLUTION INTRAVENOUS at 02:10

## 2020-01-01 RX ADMIN — MUPIROCIN: 20 OINTMENT TOPICAL at 02:09

## 2020-01-01 RX ADMIN — FUROSEMIDE 20 MG: 10 INJECTION, SOLUTION INTRAMUSCULAR; INTRAVENOUS at 08:10

## 2020-01-01 RX ADMIN — MIRTAZAPINE 7.5 MG: 7.5 TABLET ORAL at 09:11

## 2020-01-01 RX ADMIN — CALCIUM ACETATE 2001 MG: 667 CAPSULE ORAL at 11:11

## 2020-01-01 RX ADMIN — Medication 0.02 MCG/KG/MIN: at 11:09

## 2020-01-01 RX ADMIN — ONDANSETRON 4 MG: 2 INJECTION, SOLUTION INTRAMUSCULAR; INTRAVENOUS at 11:09

## 2020-01-01 RX ADMIN — LOSARTAN POTASSIUM 25 MG: 25 TABLET ORAL at 08:08

## 2020-01-01 RX ADMIN — FUROSEMIDE 20 MG: 10 INJECTION, SOLUTION INTRAMUSCULAR; INTRAVENOUS at 05:10

## 2020-01-01 RX ADMIN — FAMOTIDINE 20 MG: 20 TABLET, FILM COATED ORAL at 12:09

## 2020-01-01 RX ADMIN — ONDANSETRON 4 MG: 2 INJECTION INTRAMUSCULAR; INTRAVENOUS at 01:10

## 2020-01-01 RX ADMIN — CALCIUM CHLORIDE 1 G: 100 INJECTION INTRAVENOUS; INTRAVENTRICULAR at 09:10

## 2020-01-01 RX ADMIN — FAMOTIDINE 20 MG: 20 TABLET ORAL at 08:08

## 2020-01-01 RX ADMIN — FUROSEMIDE 10 MG/HR: 10 INJECTION, SOLUTION INTRAMUSCULAR; INTRAVENOUS at 09:09

## 2020-01-01 RX ADMIN — POTASSIUM CHLORIDE, DEXTROSE MONOHYDRATE AND SODIUM CHLORIDE: 150; 5; 450 INJECTION, SOLUTION INTRAVENOUS at 02:09

## 2020-01-01 RX ADMIN — SODIUM CHLORIDE, SODIUM GLUCONATE, SODIUM ACETATE, POTASSIUM CHLORIDE, MAGNESIUM CHLORIDE, SODIUM PHOSPHATE, DIBASIC, AND POTASSIUM PHOSPHATE: .53; .5; .37; .037; .03; .012; .00082 INJECTION, SOLUTION INTRAVENOUS at 12:10

## 2020-01-01 RX ADMIN — CALCIUM GLUCONATE 3000 MG: 98 INJECTION, SOLUTION INTRAVENOUS at 10:10

## 2020-01-01 RX ADMIN — CEFEPIME 1 G: 1 INJECTION, POWDER, FOR SOLUTION INTRAMUSCULAR; INTRAVENOUS at 02:11

## 2020-01-01 RX ADMIN — FUROSEMIDE 120 MG: 10 INJECTION, SOLUTION INTRAMUSCULAR; INTRAVENOUS at 09:10

## 2020-01-01 RX ADMIN — POTASSIUM CHLORIDE 40 MEQ: 1.5 POWDER, FOR SOLUTION ORAL at 07:09

## 2020-01-01 RX ADMIN — PROPOFOL 20 MG: 10 INJECTION, EMULSION INTRAVENOUS at 12:10

## 2020-01-01 RX ADMIN — MELATONIN TAB 3 MG 6 MG: 3 TAB at 09:11

## 2020-01-01 RX ADMIN — PRAVASTATIN SODIUM 80 MG: 40 TABLET ORAL at 08:08

## 2020-01-01 RX ADMIN — ALBUMIN (HUMAN) 25 G: 12.5 SOLUTION INTRAVENOUS at 01:09

## 2020-01-01 RX ADMIN — SODIUM BICARBONATE 50 MEQ: 84 INJECTION, SOLUTION INTRAVENOUS at 08:10

## 2020-01-01 RX ADMIN — SODIUM CHLORIDE 2.3 UNITS/HR: 9 INJECTION, SOLUTION INTRAVENOUS at 04:09

## 2020-01-01 RX ADMIN — HEPARIN SODIUM AND DEXTROSE 12 UNITS/KG/HR: 10000; 5 INJECTION INTRAVENOUS at 07:09

## 2020-01-01 RX ADMIN — PROPOFOL 15 MCG/KG/MIN: 10 INJECTION, EMULSION INTRAVENOUS at 10:09

## 2020-01-01 RX ADMIN — ACETAMINOPHEN 500 MG: 325 TABLET ORAL at 01:09

## 2020-01-01 RX ADMIN — PROPOFOL 5 MCG/KG/MIN: 10 INJECTION, EMULSION INTRAVENOUS at 10:09

## 2020-01-01 RX ADMIN — ALPRAZOLAM 0.25 MG: 0.25 TABLET ORAL at 11:09

## 2020-01-01 RX ADMIN — EPINEPHRINE 0.07 MCG/KG/MIN: 1 INJECTION INTRAMUSCULAR; INTRAVENOUS; SUBCUTANEOUS at 03:09

## 2020-01-01 RX ADMIN — EPINEPHRINE 5 MG: 1 INJECTION, SOLUTION INTRAMUSCULAR; SUBCUTANEOUS at 06:10

## 2020-01-01 RX ADMIN — HEPARIN SODIUM AND DEXTROSE 12 UNITS/KG/HR: 10000; 5 INJECTION INTRAVENOUS at 01:09

## 2020-01-01 RX ADMIN — CALCIUM ACETATE 2001 MG: 667 CAPSULE ORAL at 09:10

## 2020-01-01 RX ADMIN — SODIUM BICARBONATE 50 MEQ: 84 INJECTION, SOLUTION INTRAVENOUS at 06:10

## 2020-01-01 RX ADMIN — POTASSIUM CHLORIDE 10 MEQ: 7.46 INJECTION, SOLUTION INTRAVENOUS at 08:10

## 2020-01-01 RX ADMIN — WARFARIN SODIUM 1 MG: 1 TABLET ORAL at 05:10

## 2020-01-01 RX ADMIN — MORPHINE SULFATE 1 MG: 2 INJECTION, SOLUTION INTRAMUSCULAR; INTRAVENOUS at 06:10

## 2020-01-01 RX ADMIN — FENTANYL CITRATE 50 MCG: 50 INJECTION INTRAMUSCULAR; INTRAVENOUS at 12:10

## 2020-01-01 RX ADMIN — ACETAMINOPHEN 500 MG: 325 TABLET ORAL at 06:09

## 2020-01-01 RX ADMIN — NICARDIPINE HYDROCHLORIDE 2.5 MG/HR: 0.2 INJECTION, SOLUTION INTRAVENOUS at 01:09

## 2020-01-01 RX ADMIN — HEPARIN SODIUM AND DEXTROSE 11 UNITS/KG/HR: 10000; 5 INJECTION INTRAVENOUS at 03:09

## 2020-01-01 RX ADMIN — FENTANYL CITRATE 100 MCG: 50 INJECTION, SOLUTION INTRAMUSCULAR; INTRAVENOUS at 02:09

## 2020-01-01 RX ADMIN — DIAZEPAM 5 MG: 5 TABLET ORAL at 06:09

## 2020-01-01 RX ADMIN — WARFARIN SODIUM 2 MG: 2 TABLET ORAL at 05:10

## 2020-01-01 RX ADMIN — DEXMEDETOMIDINE HYDROCHLORIDE 0.7 MCG/KG/HR: 4 INJECTION, SOLUTION INTRAVENOUS at 04:10

## 2020-01-01 RX ADMIN — DIGOXIN 125 MCG: 0.25 INJECTION INTRAMUSCULAR; INTRAVENOUS at 09:09

## 2020-01-01 RX ADMIN — HYDROMORPHONE HYDROCHLORIDE 0.5 MG: 1 INJECTION, SOLUTION INTRAMUSCULAR; INTRAVENOUS; SUBCUTANEOUS at 06:09

## 2020-01-01 RX ADMIN — ACETAMINOPHEN 975 MG: 325 TABLET ORAL at 06:09

## 2020-01-01 RX ADMIN — CALCIUM ACETATE 2001 MG: 667 CAPSULE ORAL at 01:10

## 2020-01-01 RX ADMIN — Medication 100 MG: at 08:10

## 2020-01-01 RX ADMIN — VANCOMYCIN HYDROCHLORIDE 1000 MG: 1 INJECTION, POWDER, LYOPHILIZED, FOR SOLUTION INTRAVENOUS at 11:09

## 2020-01-01 RX ADMIN — FENTANYL CITRATE 25 MCG: 50 INJECTION, SOLUTION INTRAMUSCULAR; INTRAVENOUS at 02:10

## 2020-01-01 RX ADMIN — CEFEPIME 1 G: 1 INJECTION, POWDER, FOR SOLUTION INTRAMUSCULAR; INTRAVENOUS at 08:11

## 2020-01-01 RX ADMIN — TUBERCULIN PURIFIED PROTEIN DERIVATIVE 5 UNITS: 5 INJECTION, SOLUTION INTRADERMAL at 01:09

## 2020-01-01 RX ADMIN — SODIUM PHOSPHATE, MONOBASIC, MONOHYDRATE AND SODIUM PHOSPHATE, DIBASIC, ANHYDROUS 15 MMOL: 276; 142 INJECTION, SOLUTION INTRAVENOUS at 06:10

## 2020-01-01 RX ADMIN — EPINEPHRINE 0.04 MCG/KG/MIN: 1 INJECTION INTRAMUSCULAR; INTRAVENOUS; SUBCUTANEOUS at 03:09

## 2020-01-01 RX ADMIN — HEPARIN SODIUM 5000 UNITS: 5000 INJECTION INTRAVENOUS; SUBCUTANEOUS at 06:10

## 2020-01-01 RX ADMIN — POTASSIUM CHLORIDE, DEXTROSE MONOHYDRATE AND SODIUM CHLORIDE: 150; 5; 450 INJECTION, SOLUTION INTRAVENOUS at 06:10

## 2020-01-01 RX ADMIN — SODIUM PHOSPHATE, MONOBASIC, MONOHYDRATE 15 MMOL: 276; 142 INJECTION, SOLUTION INTRAVENOUS at 02:11

## 2020-01-01 RX ADMIN — ACETAMINOPHEN 650 MG: 325 TABLET ORAL at 01:09

## 2020-01-01 RX ADMIN — DEXMEDETOMIDINE HYDROCHLORIDE 1 MCG/KG/HR: 4 INJECTION, SOLUTION INTRAVENOUS at 04:09

## 2020-01-01 RX ADMIN — FUROSEMIDE 60 MG: 40 TABLET ORAL at 08:08

## 2020-01-01 RX ADMIN — FUROSEMIDE 20 MG: 10 INJECTION, SOLUTION INTRAMUSCULAR; INTRAVENOUS at 12:09

## 2020-01-01 RX ADMIN — CALCIUM CHLORIDE 0.5 G: 100 INJECTION, SOLUTION INTRAVENOUS at 08:09

## 2020-01-01 RX ADMIN — HYDROMORPHONE HYDROCHLORIDE 0.2 MG: 1 INJECTION, SOLUTION INTRAMUSCULAR; INTRAVENOUS; SUBCUTANEOUS at 01:11

## 2020-01-01 RX ADMIN — HEPARIN SODIUM AND DEXTROSE 1000 UNITS/HR: 10000; 5 INJECTION INTRAVENOUS at 11:09

## 2020-01-01 RX ADMIN — ESCITALOPRAM OXALATE 20 MG: 10 TABLET ORAL at 09:10

## 2020-01-01 RX ADMIN — SODIUM BICARBONATE: 84 INJECTION, SOLUTION INTRAVENOUS at 12:10

## 2020-01-01 RX ADMIN — ALBUMIN HUMAN 25 G: 50 SOLUTION INTRAVENOUS at 11:09

## 2020-01-01 RX ADMIN — ETOMIDATE 16 MG: 2 INJECTION INTRAVENOUS at 08:09

## 2020-01-01 RX ADMIN — HEPARIN SODIUM 5000 UNITS: 5000 INJECTION INTRAVENOUS; SUBCUTANEOUS at 09:11

## 2020-01-01 RX ADMIN — EPINEPHRINE 20 MCG: 1 INJECTION, SOLUTION INTRAMUSCULAR; SUBCUTANEOUS at 02:09

## 2020-01-01 RX ADMIN — Medication 100 MG: at 09:10

## 2020-01-01 RX ADMIN — Medication 0.06 MCG/KG/MIN: at 05:10

## 2020-01-01 RX ADMIN — ALPRAZOLAM 0.25 MG: 0.25 TABLET ORAL at 03:09

## 2020-01-01 RX ADMIN — OXYCODONE HYDROCHLORIDE AND ACETAMINOPHEN 500 MG: 500 TABLET ORAL at 11:09

## 2020-01-01 RX ADMIN — PROPOFOL 35 MCG/KG/MIN: 10 INJECTION, EMULSION INTRAVENOUS at 06:09

## 2020-01-01 RX ADMIN — FUROSEMIDE 40 MG: 10 INJECTION, SOLUTION INTRAMUSCULAR; INTRAVENOUS at 10:09

## 2020-01-01 RX ADMIN — SODIUM CHLORIDE SOLN NEBU 3% 4 ML: 3 NEBU SOLN at 01:11

## 2020-01-01 RX ADMIN — PROPOFOL 50 MCG/KG/MIN: 10 INJECTION, EMULSION INTRAVENOUS at 10:10

## 2020-01-01 RX ADMIN — METOPROLOL TARTRATE 12.5 MG: 25 TABLET, FILM COATED ORAL at 08:09

## 2020-01-01 RX ADMIN — DIGOXIN 500 MCG: 0.25 INJECTION INTRAMUSCULAR; INTRAVENOUS at 10:08

## 2020-01-01 RX ADMIN — DEXTROSE MONOHYDRATE 12.5 G: 25 INJECTION, SOLUTION INTRAVENOUS at 07:10

## 2020-01-01 RX ADMIN — ACETAMINOPHEN 650 MG: 325 TABLET ORAL at 08:08

## 2020-01-01 RX ADMIN — FUROSEMIDE 40 MG: 10 INJECTION, SOLUTION INTRAMUSCULAR; INTRAVENOUS at 09:08

## 2020-01-01 RX ADMIN — METOPROLOL TARTRATE 25 MG: 25 TABLET, FILM COATED ORAL at 10:10

## 2020-01-01 RX ADMIN — ALBUTEROL SULFATE 2.5 MG: 0.83 SOLUTION RESPIRATORY (INHALATION) at 12:02

## 2020-01-01 RX ADMIN — FAMOTIDINE 20 MG: 20 TABLET, FILM COATED ORAL at 09:11

## 2020-01-01 RX ADMIN — AMIODARONE HYDROCHLORIDE 1 MG/MIN: 1.8 INJECTION, SOLUTION INTRAVENOUS at 12:08

## 2020-01-01 RX ADMIN — IPRATROPIUM BROMIDE AND ALBUTEROL SULFATE 3 ML: .5; 2.5 SOLUTION RESPIRATORY (INHALATION) at 01:11

## 2020-01-01 RX ADMIN — DEXTROSE AND SODIUM CHLORIDE: 5; .45 INJECTION, SOLUTION INTRAVENOUS at 11:10

## 2020-01-01 RX ADMIN — ESCITALOPRAM OXALATE 20 MG: 10 TABLET ORAL at 08:10

## 2020-01-01 RX ADMIN — ACETAMINOPHEN 325 MG: 325 TABLET ORAL at 05:09

## 2020-01-01 RX ADMIN — FUROSEMIDE 10 MG: 10 INJECTION, SOLUTION INTRAMUSCULAR; INTRAVENOUS at 04:10

## 2020-01-01 RX ADMIN — POTASSIUM CHLORIDE 40 MEQ: 20 SOLUTION ORAL at 10:09

## 2020-01-01 RX ADMIN — HEPARIN SODIUM AND DEXTROSE 1100 UNITS/HR: 10000; 5 INJECTION INTRAVENOUS at 03:09

## 2020-01-01 RX ADMIN — CALCIUM GLUCONATE 3000 MG: 98 INJECTION, SOLUTION INTRAVENOUS at 01:11

## 2020-01-01 RX ADMIN — POTASSIUM CHLORIDE 20 MEQ: 14.9 INJECTION, SOLUTION INTRAVENOUS at 06:09

## 2020-01-01 RX ADMIN — HEPARIN SODIUM 5000 UNITS: 5000 INJECTION INTRAVENOUS; SUBCUTANEOUS at 10:11

## 2020-01-01 RX ADMIN — HEPARIN SODIUM 5000 UNITS: 5000 INJECTION INTRAVENOUS; SUBCUTANEOUS at 09:08

## 2020-01-01 RX ADMIN — VANCOMYCIN HYDROCHLORIDE 1750 MG: 750 INJECTION, POWDER, LYOPHILIZED, FOR SOLUTION INTRAVENOUS at 06:10

## 2020-01-01 RX ADMIN — CALCIUM GLUCONATE 3000 MG: 98 INJECTION, SOLUTION INTRAVENOUS at 05:11

## 2020-01-01 RX ADMIN — PROPOFOL 20 MCG/KG/MIN: 10 INJECTION, EMULSION INTRAVENOUS at 09:10

## 2020-01-01 RX ADMIN — PROPOFOL 30 MCG/KG/MIN: 10 INJECTION, EMULSION INTRAVENOUS at 05:09

## 2020-01-01 RX ADMIN — CEFEPIME 1 G: 2 INJECTION, POWDER, FOR SOLUTION INTRAVENOUS at 03:11

## 2020-01-01 RX ADMIN — EPINEPHRINE 0.02 MCG/KG/MIN: 1 INJECTION INTRAMUSCULAR; INTRAVENOUS; SUBCUTANEOUS at 04:10

## 2020-01-01 RX ADMIN — CALCIUM GLUCONATE 3000 MG: 98 INJECTION, SOLUTION INTRAVENOUS at 07:11

## 2020-01-01 RX ADMIN — ONDANSETRON 4 MG: 2 INJECTION INTRAMUSCULAR; INTRAVENOUS at 11:09

## 2020-01-01 RX ADMIN — FENTANYL CITRATE 25 MCG: 50 INJECTION, SOLUTION INTRAMUSCULAR; INTRAVENOUS at 05:10

## 2020-01-01 RX ADMIN — FUROSEMIDE 60 MG: 10 INJECTION, SOLUTION INTRAMUSCULAR; INTRAVENOUS at 09:08

## 2020-01-01 RX ADMIN — ACETAMINOPHEN 500 MG: 500 TABLET ORAL at 10:09

## 2020-01-01 RX ADMIN — PHENYLEPHRINE HYDROCHLORIDE 200 MCG: 10 INJECTION INTRAVENOUS at 01:08

## 2020-01-01 RX ADMIN — PROPOFOL 25 MCG/KG/MIN: 10 INJECTION, EMULSION INTRAVENOUS at 07:10

## 2020-01-01 RX ADMIN — ACETAMINOPHEN 1000 MG: 10 INJECTION, SOLUTION INTRAVENOUS at 03:09

## 2020-01-01 RX ADMIN — AMIODARONE HYDROCHLORIDE 0.5 MG/MIN: 1.8 INJECTION, SOLUTION INTRAVENOUS at 07:09

## 2020-01-01 RX ADMIN — LIDOCAINE HYDROCHLORIDE 60 MG: 20 INJECTION, SOLUTION INTRAVENOUS at 01:08

## 2020-01-01 RX ADMIN — PROPOFOL 25 MCG/KG/MIN: 10 INJECTION, EMULSION INTRAVENOUS at 05:10

## 2020-01-01 RX ADMIN — Medication 1000 MG: at 11:02

## 2020-01-01 RX ADMIN — VANCOMYCIN HYDROCHLORIDE 1750 MG: 750 INJECTION, POWDER, LYOPHILIZED, FOR SOLUTION INTRAVENOUS at 05:10

## 2020-01-01 RX ADMIN — EPINEPHRINE 0.04 MCG/KG/MIN: 1 INJECTION INTRAMUSCULAR; INTRAVENOUS; SUBCUTANEOUS at 12:09

## 2020-01-01 RX ADMIN — FAMOTIDINE 20 MG: 20 TABLET ORAL at 10:09

## 2020-01-01 RX ADMIN — HEPARIN SODIUM AND DEXTROSE 1000 UNITS/HR: 10000; 5 INJECTION INTRAVENOUS at 04:09

## 2020-01-01 RX ADMIN — HEPARIN SODIUM 26000 UNITS: 1000 INJECTION, SOLUTION INTRAVENOUS; SUBCUTANEOUS at 04:09

## 2020-01-01 RX ADMIN — HEPARIN SODIUM 5000 UNITS: 5000 INJECTION INTRAVENOUS; SUBCUTANEOUS at 08:08

## 2020-01-01 RX ADMIN — HYDROMORPHONE HYDROCHLORIDE 0.5 MG: 1 INJECTION, SOLUTION INTRAMUSCULAR; INTRAVENOUS; SUBCUTANEOUS at 01:09

## 2020-01-01 RX ADMIN — METOROPROLOL TARTRATE 2.5 MG: 5 INJECTION, SOLUTION INTRAVENOUS at 11:10

## 2020-01-01 RX ADMIN — LORAZEPAM 1 MG: 2 INJECTION INTRAMUSCULAR; INTRAVENOUS at 12:11

## 2020-01-01 RX ADMIN — ASPIRIN 81 MG: 81 TABLET, CHEWABLE ORAL at 08:09

## 2020-01-01 RX ADMIN — LORAZEPAM 1 MG: 2 INJECTION INTRAMUSCULAR; INTRAVENOUS at 03:08

## 2020-01-01 RX ADMIN — CEFAZOLIN 2 G: 1 INJECTION, POWDER, FOR SOLUTION INTRAMUSCULAR; INTRAVENOUS at 10:09

## 2020-01-01 RX ADMIN — CEFTRIAXONE 500 MG: 500 INJECTION, POWDER, FOR SOLUTION INTRAMUSCULAR; INTRAVENOUS at 12:02

## 2020-01-01 RX ADMIN — FENTANYL CITRATE 25 MCG: 50 INJECTION, SOLUTION INTRAMUSCULAR; INTRAVENOUS at 07:10

## 2020-01-01 RX ADMIN — ROCURONIUM BROMIDE 50 MG: 10 INJECTION, SOLUTION INTRAVENOUS at 02:09

## 2020-01-01 RX ADMIN — DEXMEDETOMIDINE HYDROCHLORIDE 0.4 MCG/KG/HR: 4 INJECTION, SOLUTION INTRAVENOUS at 12:09

## 2020-01-01 RX ADMIN — CEFEPIME 1 G: 1 INJECTION, POWDER, FOR SOLUTION INTRAMUSCULAR; INTRAVENOUS at 03:10

## 2020-01-01 RX ADMIN — ONDANSETRON 4 MG: 2 INJECTION INTRAMUSCULAR; INTRAVENOUS at 05:10

## 2020-01-01 RX ADMIN — NITROGLYCERIN 0.4 MG: 0.4 TABLET, ORALLY DISINTEGRATING SUBLINGUAL at 10:09

## 2020-01-01 RX ADMIN — EPINEPHRINE 0.16 MCG/KG/MIN: 1 INJECTION INTRAMUSCULAR; INTRAVENOUS; SUBCUTANEOUS at 08:09

## 2020-01-01 RX ADMIN — PROPOFOL 35 MCG/KG/MIN: 10 INJECTION, EMULSION INTRAVENOUS at 01:09

## 2020-01-01 RX ADMIN — FUROSEMIDE 40 MG: 40 TABLET ORAL at 06:08

## 2020-01-01 RX ADMIN — ALTEPLASE 2 MG: 2.2 INJECTION, POWDER, LYOPHILIZED, FOR SOLUTION INTRAVENOUS at 04:10

## 2020-01-01 RX ADMIN — ALBUMIN HUMAN 25 G: 50 SOLUTION INTRAVENOUS at 02:09

## 2020-01-01 RX ADMIN — EPINEPHRINE 0.05 MCG/KG/MIN: 1 INJECTION INTRAMUSCULAR; INTRAVENOUS; SUBCUTANEOUS at 02:09

## 2020-01-01 RX ADMIN — PROPOFOL 20 MCG/KG/MIN: 10 INJECTION, EMULSION INTRAVENOUS at 12:10

## 2020-01-01 RX ADMIN — ACETAMINOPHEN 650 MG: 325 TABLET ORAL at 04:09

## 2020-01-01 RX ADMIN — ACETAMINOPHEN 500 MG: 325 TABLET ORAL at 03:09

## 2020-01-01 RX ADMIN — ASPIRIN 81 MG CHEWABLE TABLET 81 MG: 81 TABLET CHEWABLE at 02:09

## 2020-01-01 RX ADMIN — CLOPIDOGREL 75 MG: 75 TABLET, FILM COATED ORAL at 08:08

## 2020-01-01 RX ADMIN — WARFARIN SODIUM 3 MG: 3 TABLET ORAL at 03:10

## 2020-01-01 RX ADMIN — HEPARIN SODIUM 8.1 UNITS/KG/HR: 5000 INJECTION INTRAVENOUS; SUBCUTANEOUS at 04:08

## 2020-01-01 RX ADMIN — METOPROLOL TARTRATE 25 MG: 25 TABLET, FILM COATED ORAL at 11:09

## 2020-01-01 RX ADMIN — HEPARIN SODIUM 12 UNITS/KG/HR: 5000 INJECTION INTRAVENOUS; SUBCUTANEOUS at 11:08

## 2020-01-01 RX ADMIN — METOPROLOL TARTRATE 25 MG: 25 TABLET, FILM COATED ORAL at 06:08

## 2020-01-01 RX ADMIN — POTASSIUM CHLORIDE 40 MEQ: 1.5 POWDER, FOR SOLUTION ORAL at 07:08

## 2020-01-01 RX ADMIN — CALCIUM CHLORIDE 1 G: 100 INJECTION, SOLUTION INTRAVENOUS at 09:09

## 2020-01-01 RX ADMIN — DEXTROSE MONOHYDRATE, SODIUM CITRATE, AND CITRIC ACID MONOHYDRATE: 2.45; 2.2; .8 INJECTION, SOLUTION INTRAVENOUS at 06:11

## 2020-01-01 RX ADMIN — VASOPRESSIN 0.4 UNITS/MIN: 20 INJECTION INTRAVENOUS at 03:09

## 2020-01-01 RX ADMIN — FENTANYL CITRATE 25 MCG: 50 INJECTION, SOLUTION INTRAMUSCULAR; INTRAVENOUS at 11:10

## 2020-01-01 RX ADMIN — CEFEPIME 1 G: 2 INJECTION, POWDER, FOR SOLUTION INTRAVENOUS at 04:10

## 2020-01-01 RX ADMIN — IPRATROPIUM BROMIDE AND ALBUTEROL SULFATE 3 ML: .5; 2.5 SOLUTION RESPIRATORY (INHALATION) at 04:11

## 2020-01-01 RX ADMIN — ASPIRIN 150 MG: 300 SUPPOSITORY RECTAL at 11:10

## 2020-01-01 RX ADMIN — POTASSIUM CHLORIDE 40 MEQ: 29.8 INJECTION, SOLUTION INTRAVENOUS at 11:10

## 2020-01-01 RX ADMIN — POTASSIUM CHLORIDE 20 MEQ: 1.5 POWDER, FOR SOLUTION ORAL at 10:09

## 2020-01-01 RX ADMIN — ALBUTEROL SULFATE 2.5 MG: 2.5 SOLUTION RESPIRATORY (INHALATION) at 01:09

## 2020-01-01 RX ADMIN — PROPOFOL INJECTABLE EMULSION 15 MCG/KG/MIN: 10 INJECTION, EMULSION INTRAVENOUS at 10:09

## 2020-01-01 RX ADMIN — DOBUTAMINE HYDROCHLORIDE 5 MCG/KG/MIN: 200 INJECTION INTRAVENOUS at 12:09

## 2020-01-01 RX ADMIN — MIRTAZAPINE 7.5 MG: 7.5 TABLET ORAL at 08:11

## 2020-01-01 RX ADMIN — DOBUTAMINE HYDROCHLORIDE 5 MCG/KG/MIN: 200 INJECTION INTRAVENOUS at 06:09

## 2020-01-01 RX ADMIN — SODIUM PHOSPHATE, MONOBASIC, MONOHYDRATE 39.99 MMOL: 276; 142 INJECTION, SOLUTION INTRAVENOUS at 05:11

## 2020-01-01 RX ADMIN — ALBUMIN (HUMAN) 25 G: 12.5 SOLUTION INTRAVENOUS at 12:10

## 2020-01-01 RX ADMIN — ACETAMINOPHEN 650 MG: 160 SOLUTION ORAL at 09:09

## 2020-01-01 RX ADMIN — POTASSIUM CHLORIDE, DEXTROSE MONOHYDRATE AND SODIUM CHLORIDE: 150; 5; 450 INJECTION, SOLUTION INTRAVENOUS at 06:09

## 2020-01-01 RX ADMIN — Medication 10 MG: at 02:10

## 2020-01-01 RX ADMIN — DIGOXIN 125 MCG: 0.25 INJECTION INTRAMUSCULAR; INTRAVENOUS at 09:10

## 2020-01-01 RX ADMIN — POTASSIUM CHLORIDE 40 MEQ: 29.8 INJECTION, SOLUTION INTRAVENOUS at 04:09

## 2020-01-01 RX ADMIN — HYDROXYZINE HYDROCHLORIDE 50 MG: 25 TABLET, FILM COATED ORAL at 09:09

## 2020-01-01 RX ADMIN — MAGNESIUM SULFATE IN WATER 2 G: 40 INJECTION, SOLUTION INTRAVENOUS at 11:09

## 2020-01-01 RX ADMIN — DEXTROSE MONOHYDRATE, SODIUM CITRATE, AND CITRIC ACID MONOHYDRATE: 2.45; 2.2; .8 INJECTION, SOLUTION INTRAVENOUS at 10:10

## 2020-01-01 RX ADMIN — AMIODARONE HYDROCHLORIDE 400 MG: 100 TABLET ORAL at 08:09

## 2020-01-01 RX ADMIN — IPRATROPIUM BROMIDE 0.5 MG: 0.5 SOLUTION RESPIRATORY (INHALATION) at 12:02

## 2020-01-01 RX ADMIN — HEPARIN SODIUM 5000 UNITS: 5000 INJECTION INTRAVENOUS; SUBCUTANEOUS at 01:10

## 2020-01-01 RX ADMIN — POTASSIUM CHLORIDE 40 MEQ: 29.8 INJECTION, SOLUTION INTRAVENOUS at 08:09

## 2020-02-21 NOTE — PROGRESS NOTES
"Subjective:       Patient ID: Fatou Lorenzo is a 74 y.o. female.    Vitals:  height is 5' 5" (1.651 m) and weight is 84.8 kg (187 lb). Her temperature is 101.2 °F (38.4 °C) (abnormal). Her blood pressure is 129/84 and her pulse is 117 (abnormal). Her respiration is 16 and oxygen saturation is 97%.     Chief Complaint: Fever    Pt c/o cough and fever for past couple of days. Reports cough is productive of colored mucus and is keeping her up at night. States cough is associated with wheezing. Denies history of asthma, COPD. Denies smoking. Denies SOB, CP, sinus pain/pressure, ear pain, neck pain/stiffness, abdominal pain, N/V/D. She's been taking tylenol and some cough medication she had from a year ago with minimal relief. She has not taken any medication today.    Fever    This is a new problem. The current episode started in the past 7 days. The problem occurs constantly. The problem has been unchanged. Her temperature was unmeasured prior to arrival. Associated symptoms include coughing, headaches, a sore throat and wheezing. Pertinent negatives include no congestion, diarrhea, ear pain, nausea or vomiting. She has tried acetaminophen for the symptoms. The treatment provided mild relief.       Constitution: Positive for fever. Negative for chills, sweating and fatigue.   HENT: Positive for postnasal drip and sore throat. Negative for ear pain, congestion, sinus pain, sinus pressure and trouble swallowing.    Neck: Negative for neck stiffness and painful lymph nodes.   Cardiovascular: Negative for leg swelling.   Eyes: Negative for photophobia, vision loss, double vision and blurred vision.   Respiratory: Positive for cough, sputum production and wheezing. Negative for chest tightness, bloody sputum, shortness of breath and asthma.         Chest congestion    Gastrointestinal: Negative for abdominal trauma, nausea, vomiting, constipation and diarrhea.   Genitourinary: Negative for frequency, urgency and history of " kidney stones.   Musculoskeletal: Negative for joint pain, joint swelling, muscle cramps and muscle ache.   Skin: Negative for color change, pale and bruising.   Allergic/Immunologic: Negative for seasonal allergies and asthma.   Neurological: Positive for headaches. Negative for dizziness, history of vertigo, light-headedness, passing out, numbness and tingling.   Hematologic/Lymphatic: Negative for swollen lymph nodes.   Psychiatric/Behavioral: Negative for nervous/anxious, sleep disturbance and depression. The patient is not nervous/anxious.        Objective:      Physical Exam   Constitutional: She is oriented to person, place, and time. She appears well-developed and well-nourished. She is cooperative.  Non-toxic appearance. She does not have a sickly appearance. She appears ill. No distress.   Patient is sitting on exam table in no acute distress. Nontoxic appearing.    HENT:   Head: Normocephalic and atraumatic.   Right Ear: Hearing, external ear and ear canal normal. A middle ear effusion is present.   Left Ear: Hearing, external ear and ear canal normal. A middle ear effusion is present.   Nose: Nose normal. No mucosal edema, rhinorrhea or nasal deformity. No epistaxis. Right sinus exhibits no maxillary sinus tenderness and no frontal sinus tenderness. Left sinus exhibits no maxillary sinus tenderness and no frontal sinus tenderness.   Mouth/Throat: Uvula is midline and mucous membranes are normal. No trismus in the jaw. Normal dentition. No uvula swelling. Posterior oropharyngeal erythema and cobblestoning present. No oropharyngeal exudate or posterior oropharyngeal edema. No tonsillar exudate.   Eyes: Pupils are equal, round, and reactive to light. Conjunctivae and lids are normal. No scleral icterus.   Neck: Trachea normal, full passive range of motion without pain and phonation normal. Neck supple. No neck rigidity. No edema and no erythema present.   Cardiovascular: Normal rate, regular rhythm, normal  heart sounds, intact distal pulses and normal pulses.   Pulmonary/Chest: Effort normal. No respiratory distress. She has no decreased breath sounds. She has wheezes. She has no rhonchi.   On initial assessment, diffuse expiratory wheezing noted to all lung fields. O2 sat 95% on RA. Duo neb given in clinic. On reassessment, wheezing significantly improved, O2 sat improved to 97%, and patient reports improvement in ease of breathing after treatment.    Abdominal: Normal appearance.   Musculoskeletal: Normal range of motion. She exhibits no edema or deformity.   Lymphadenopathy:     She has no cervical adenopathy.   Neurological: She is alert and oriented to person, place, and time. She exhibits normal muscle tone. Coordination normal.   Skin: Skin is warm, dry, intact, not diaphoretic and not pale.   Psychiatric: She has a normal mood and affect. Her speech is normal and behavior is normal. Judgment and thought content normal. Cognition and memory are normal.   Nursing note and vitals reviewed.        X-ray Chest Pa And Lateral  Result Date: 2/21/2020  EXAMINATION: XR CHEST PA AND LATERAL CLINICAL HISTORY: COUGH; TECHNIQUE: Two views of the chest were obtained, with PA and lateral projections submitted. COMPARISON: No prior chest radiographs are currently available for comparison purposes.  Clinical information of cough and fever. FINDINGS: Heart is at the upper limit of normal in size.  Pulmonary vascularity appears normal.  Lung zones are clear, and free of significant airspace consolidation or volume loss.  No pleural fluid.  No hilar or mediastinal mass lesion.  Slight loss of height of one mid thoracic vertebral body is appreciated, a finding which should be of no clinical importance unless the patient has current back pain referable to this area.  Incidental note is also made of a minor deformity relating to an old healed fracture of the posterolateral aspect of the right 8th rib.   No significant intrathoracic  abnormality referable to the current clinical history is observed. Electronically signed by: Ayan Glynn MD Date:    02/21/2020 Time:    12:19    Results for orders placed or performed in visit on 02/21/20   POCT Influenza A/B   Result Value Ref Range    Rapid Influenza A Ag Negative Negative    Rapid Influenza B Ag Negative Negative     Acceptable Yes      CXR negative for pneumonia. Clinically, symptoms consistent with bacterial bronchitis. Will treat accordingly. Discussed risks of steroids with patient. Advised on return/follow-up precautions. Advised on ER precautions. Answered all patient questions. Patient verbalized understanding and voiced agreement with current treatment plan.    Assessment:       1. Acute purulent bronchitis    2. Wheezing    3. Fever, unspecified fever cause        Plan:         Acute purulent bronchitis  -     cefTRIAXone injection 500 mg  -     lidocaine HCL 10 mg/ml (1%) injection 2 mL  -     doxycycline (VIBRAMYCIN) 100 MG Cap; Take 1 capsule (100 mg total) by mouth 2 (two) times daily. for 10 days  Dispense: 20 capsule; Refill: 0  -     methylPREDNISolone (MEDROL DOSEPACK) 4 mg tablet; use as directed on package until gone  Dispense: 1 Package; Refill: 0  -     benzonatate (TESSALON PERLES) 100 MG capsule; Take 1 capsule (100 mg total) by mouth every 6 (six) hours as needed.  Dispense: 30 capsule; Refill: 1  -     albuterol (PROVENTIL/VENTOLIN HFA) 90 mcg/actuation inhaler; Inhale 2 puffs into the lungs every 4 (four) hours as needed for Wheezing or Shortness of Breath. Rescue  Dispense: 6.7 g; Refill: 0    Wheezing  -     X-Ray Chest PA And Lateral; Future; Expected date: 02/21/2020  -     albuterol nebulizer solution 2.5 mg  -     ipratropium 0.02 % nebulizer solution 0.5 mg    Fever, unspecified fever cause  -     POCT Influenza A/B  -     acetaminophen tablet 1,000 mg  -     X-Ray Chest PA And Lateral; Future; Expected date: 02/21/2020      Patient Instructions        If your condition worsens or fails to improve we recommend that you receive another evaluation at the ER immediately or contact your PCP to discuss your concerns or return here. You must understand that you've received an urgent care treatment only and that you may be released before all your medical problems are known or treated. You the patient will arrange for followup care as instructed.    Rest and fluids are important  Can use honey with joan to soothe your throat    Take full course of antibiotics as directed.    You received a steroid prescription today -  this can elevate your blood pressure, elevate your blood sugar, water weight gain, nervous energy, redness to the face.    Take inhaler as prescribed and needed for wheezing  Take prescription cough meds (pills) as prescribed as needed for cough  Take mucinex as directed to help with chest congestion    -  Flonase (fluticasone) is a nasal spray which is available over the counter and may help with symptoms of nasal congestion.     -  Tylenol or ibuprofen can also be used as directed for pain unless you have an allergy to them or medical condition such as stomach ulcers, kidney or liver disease or blood thinners etc for which you should not be taking these type of medications.     Please follow up with your primary care doctor or specialist in the next 48-72hrs as needed and if no improvement.    If you  smoke, please stop smoking.      What Is Acute Bronchitis?  Acute bronchitis is when the airways in your lungs (bronchial tubes) become red and swollen (inflamed). It is usually caused by a viral infection. But it can also occur because of a bacteria or allergen. Symptoms include a cough that produces yellow or greenish mucus and can last for days or sometimes weeks.  Inside healthy lungs    Air travels in and out of the lungs through the airways. The linings of these airways produce sticky mucus. This mucus traps particles that enter the lungs.  Tiny structures called cilia then sweep the particles out of the airways.     Healthy airway: Airways are normally open. Air moves in and out easily.      Healthy cilia: Tiny, hairlike cilia sweep mucus and particles up and out of the airways.   Lungs with bronchitis  Bronchitis often occurs with a cold or the flu virus. The airways become inflamed (red and swollen). There is a deep hacking cough from the extra mucus. Other symptoms may include:  · Wheezing  · Chest discomfort  · Shortness of breath  · Mild fever  A second infection, this time due to bacteria, may then occur. And airways irritated by allergens or smoke are more likely to get infected.        Inflamed airway: Inflammation and extra mucus narrow the airway, causing shortness of breath.      Impaired cilia: Extra mucus impairs cilia, causing congestion and wheezing. Smoking makes the problem worse.   Making a diagnosis  A physical exam, health history, and certain tests help your healthcare provider make the diagnosis.  Health history  Your healthcare provider will ask you about your symptoms.  The exam  Your provider listens to your chest for signs of congestion. He or she may also check your ears, nose, and throat.  Possible tests  · A sputum test for bacteria. This requires a sample of mucus from your lungs.  · A nasal or throat swab. This tests to see if you have a bacterial infection.  · A chest X-ray. This is done if your healthcare provider thinks you have pneumonia.  · Tests to check for an underlying condition. Other tests may be done to check for things such as allergies, asthma, or COPD (chronic obstructive pulmonary disease). You may need to see a specialist for more lung function testing.  Treating a cough  The main treatment for bronchitis is easing symptoms. Avoiding smoke, allergens, and other things that trigger coughing can often help. If the infection is bacterial, you may be given antibiotics. During the illness, it's important to  get plenty of sleep. To ease symptoms:  · Dont smoke. Also avoid secondhand smoke.  · Use a humidifier. Or try breathing in steam from a hot shower. This may help loosen mucus.  · Drink a lot of water and juice. They can soothe the throat and may help thin mucus.  · Sit up or use extra pillows when in bed. This helps to lessen coughing and congestion.  · Ask your provider about using medicine. Ask about using cough medicine, pain and fever medicine, or a decongestant.  Antibiotics  Most cases of bronchitis are caused by cold or flu viruses. They dont need antibiotics to treat them, even if your mucus is thick and green or yellow. Antibiotics dont treat viral illness and antibiotics have not been shown to have any benefit in cases of acute bronchitis. Taking antibiotics when they are not needed increases your risk of getting an infection later that is antibiotic-resistant. Antibiotics can also cause severe cases of diarrhea that require other antibiotics to treat.  It is important that you accept your healthcare provider's opinion to not use antibiotics. Your provider will prescribe antibiotics if the infection is caused by bacteria. If they are prescribed:  · Take all of the medicine. Take the medicine until it is used up, even if symptoms have improved. If you dont, the bronchitis may come back.  · Take the medicines as directed. For instance, some medicines should be taken with food.  · Ask about side effects. Ask your provider or pharmacist what side effects are common, and what to do about them.  Follow-up care  You should see your provider again in 2 to 3 weeks. By this time, symptoms should have improved. An infection that lasts longer may mean you have a more serious problem.  Prevention  · Avoid tobacco smoke. If you smoke, quit. Stay away from smoky places. Ask friends and family not to smoke around you, or in your home or car.  · Get checked for allergies.  · Ask your provider about getting a yearly  flu shot. Also ask about pneumococcal or pneumonia shots.  · Wash your hands often. This helps reduce the chance of picking up viruses that cause colds and flu.  Call your healthcare provider if:  · Symptoms worsen, or you have new symptoms  · Breathing problems worsen or  become severe  · Symptoms dont get better within a week, or within 3 days of taking antibiotics   Date Last Reviewed: 2/1/2017  © 8870-9823 [employer]. [address] [place] [address], [place] [address]. All rights reserved. This information is not intended as a substitute for professional medical care. Always follow your healthcare professional's instructions.

## 2020-02-21 NOTE — PATIENT INSTRUCTIONS
If your condition worsens or fails to improve we recommend that you receive another evaluation at the ER immediately or contact your PCP to discuss your concerns or return here. You must understand that you've received an urgent care treatment only and that you may be released before all your medical problems are known or treated. You the patient will arrange for followup care as instructed.    Rest and fluids are important  Can use honey with joan to soothe your throat    Take full course of antibiotics as directed.    You received a steroid prescription today -  this can elevate your blood pressure, elevate your blood sugar, water weight gain, nervous energy, redness to the face.    Take inhaler as prescribed and needed for wheezing  Take prescription cough meds (pills) as prescribed as needed for cough  Take mucinex as directed to help with chest congestion    -  Flonase (fluticasone) is a nasal spray which is available over the counter and may help with symptoms of nasal congestion.     -  Tylenol or ibuprofen can also be used as directed for pain unless you have an allergy to them or medical condition such as stomach ulcers, kidney or liver disease or blood thinners etc for which you should not be taking these type of medications.     Please follow up with your primary care doctor or specialist in the next 48-72hrs as needed and if no improvement.    If you  smoke, please stop smoking.      What Is Acute Bronchitis?  Acute bronchitis is when the airways in your lungs (bronchial tubes) become red and swollen (inflamed). It is usually caused by a viral infection. But it can also occur because of a bacteria or allergen. Symptoms include a cough that produces yellow or greenish mucus and can last for days or sometimes weeks.  Inside healthy lungs    Air travels in and out of the lungs through the airways. The linings of these airways produce sticky mucus. This mucus traps particles that enter the lungs. Tiny  structures called cilia then sweep the particles out of the airways.     Healthy airway: Airways are normally open. Air moves in and out easily.      Healthy cilia: Tiny, hairlike cilia sweep mucus and particles up and out of the airways.   Lungs with bronchitis  Bronchitis often occurs with a cold or the flu virus. The airways become inflamed (red and swollen). There is a deep hacking cough from the extra mucus. Other symptoms may include:  · Wheezing  · Chest discomfort  · Shortness of breath  · Mild fever  A second infection, this time due to bacteria, may then occur. And airways irritated by allergens or smoke are more likely to get infected.        Inflamed airway: Inflammation and extra mucus narrow the airway, causing shortness of breath.      Impaired cilia: Extra mucus impairs cilia, causing congestion and wheezing. Smoking makes the problem worse.   Making a diagnosis  A physical exam, health history, and certain tests help your healthcare provider make the diagnosis.  Health history  Your healthcare provider will ask you about your symptoms.  The exam  Your provider listens to your chest for signs of congestion. He or she may also check your ears, nose, and throat.  Possible tests  · A sputum test for bacteria. This requires a sample of mucus from your lungs.  · A nasal or throat swab. This tests to see if you have a bacterial infection.  · A chest X-ray. This is done if your healthcare provider thinks you have pneumonia.  · Tests to check for an underlying condition. Other tests may be done to check for things such as allergies, asthma, or COPD (chronic obstructive pulmonary disease). You may need to see a specialist for more lung function testing.  Treating a cough  The main treatment for bronchitis is easing symptoms. Avoiding smoke, allergens, and other things that trigger coughing can often help. If the infection is bacterial, you may be given antibiotics. During the illness, it's important to get  plenty of sleep. To ease symptoms:  · Dont smoke. Also avoid secondhand smoke.  · Use a humidifier. Or try breathing in steam from a hot shower. This may help loosen mucus.  · Drink a lot of water and juice. They can soothe the throat and may help thin mucus.  · Sit up or use extra pillows when in bed. This helps to lessen coughing and congestion.  · Ask your provider about using medicine. Ask about using cough medicine, pain and fever medicine, or a decongestant.  Antibiotics  Most cases of bronchitis are caused by cold or flu viruses. They dont need antibiotics to treat them, even if your mucus is thick and green or yellow. Antibiotics dont treat viral illness and antibiotics have not been shown to have any benefit in cases of acute bronchitis. Taking antibiotics when they are not needed increases your risk of getting an infection later that is antibiotic-resistant. Antibiotics can also cause severe cases of diarrhea that require other antibiotics to treat.  It is important that you accept your healthcare provider's opinion to not use antibiotics. Your provider will prescribe antibiotics if the infection is caused by bacteria. If they are prescribed:  · Take all of the medicine. Take the medicine until it is used up, even if symptoms have improved. If you dont, the bronchitis may come back.  · Take the medicines as directed. For instance, some medicines should be taken with food.  · Ask about side effects. Ask your provider or pharmacist what side effects are common, and what to do about them.  Follow-up care  You should see your provider again in 2 to 3 weeks. By this time, symptoms should have improved. An infection that lasts longer may mean you have a more serious problem.  Prevention  · Avoid tobacco smoke. If you smoke, quit. Stay away from smoky places. Ask friends and family not to smoke around you, or in your home or car.  · Get checked for allergies.  · Ask your provider about getting a yearly flu  shot. Also ask about pneumococcal or pneumonia shots.  · Wash your hands often. This helps reduce the chance of picking up viruses that cause colds and flu.  Call your healthcare provider if:  · Symptoms worsen, or you have new symptoms  · Breathing problems worsen or  become severe  · Symptoms dont get better within a week, or within 3 days of taking antibiotics   Date Last Reviewed: 2/1/2017  © 6512-4858 [employer] [address] [place] [address], [place] [address]. All rights reserved. This information is not intended as a substitute for professional medical care. Always follow your healthcare professional's instructions.

## 2020-06-30 NOTE — PROGRESS NOTES
Subjective:       Patient ID: Fatou Lorenzo is a 75 y.o. female      Chief Complaint   Patient presents with    Concerns About Ocular Health     History of Present Illness  Dls: 9-10 months     74 y/o female presents today with c/o blurry vision at distance and near   ou.   Pt wears pal's.     + tearing  No itching   No burning  No pain  No ha's  No floaters  No flashes    Eye meds  None      Assessment/Plan:     1. Nuclear sclerosis of both eyes  NVS per pt. Recheck in one year, sooner PRN. Educated pt on presence of cataracts and effects on vision. No surgery at this time.     2. Refractive error  Optional Rx. Can use readers PRN for near. Educated patient on refractive error and discussed lens options. Dispensed updated spectacle Rx. Educated about adaptation period to new specs.    Eyeglass Final Rx     Eyeglass Final Rx       Sphere Cylinder Axis Add    Right +0.25 Sphere  +2.75    Left [place] +0.75 170 +2.75    Expiration Date: 7/1/2021                  Follow up in about 1 year (around 6/30/2021), or if symptoms worsen or fail to improve, for Cataract check.

## 2020-08-23 PROBLEM — I50.9 CHF (CONGESTIVE HEART FAILURE): Status: ACTIVE | Noted: 2020-01-01

## 2020-08-23 PROBLEM — I34.0 MITRAL INCOMPETENCE: Status: ACTIVE | Noted: 2020-01-01

## 2020-08-23 PROBLEM — I50.33 ACUTE ON CHRONIC DIASTOLIC CONGESTIVE HEART FAILURE: Status: ACTIVE | Noted: 2020-01-01

## 2020-08-23 PROBLEM — R06.02 SOB (SHORTNESS OF BREATH): Status: ACTIVE | Noted: 2020-01-01

## 2020-08-23 PROBLEM — U07.1 COVID-19: Status: ACTIVE | Noted: 2020-01-01

## 2020-08-23 PROBLEM — I07.1 SEVERE TRICUSPID VALVE REGURGITATION: Status: ACTIVE | Noted: 2020-01-01

## 2020-08-23 PROBLEM — J96.01 ACUTE RESPIRATORY FAILURE WITH HYPOXIA: Status: ACTIVE | Noted: 2020-01-01

## 2020-08-23 NOTE — Clinical Note
Catheter is inserted into the ostium   left main. Angiography performed of the left coronary arteries in multiple views. Catheter removed.

## 2020-08-23 NOTE — ASSESSMENT & PLAN NOTE
Likely underlying causes severe mitral regurgitation.  Had a detailed discussion with the patient.  Will do further evaluation with CHERY tomorrow.  Once euvolemic, will do cardiac catheterization.  After initial workup complete, and patient's heart failure compensated, will refer to Dr. Waddell for evaluation of mitral valve surgery.  Continue IV diuresis.

## 2020-08-23 NOTE — Clinical Note
Catheter is inserted into the ostium   right coronary artery. Angiography performed of the right coronary arteries in multiple views. Wire removed prior to angiography. Catheter removed.

## 2020-08-23 NOTE — PROGRESS NOTES
Received patient from EMS. She is AAOx4. Bipap in place at 15/10 and 60%. Denies chest pain just has heavy feeling. SR on the monitor with frequent PVC. SCD's applied. Orwell patient to room, call light. Discussed the current need for covid precautions.

## 2020-08-23 NOTE — HPI
Patient with a past medical history of mitral valve prolapse with mitral regurgitation.  In being followed at [facility].  States 8 years ago was referred to Dr. Waddell, but valvular regurgitation not bad enough at that time to do surgery.  Denies any chest pains.  Came in with worsening shortness of breath, orthopnea.  Admitted with acute decompensated heart failure.  X-ray showed bibasilar infiltrates.  EKG personally reviewed.  Shows sinus tachycardia.  No acute ischemic changes.  Initially on BiPAP, now on nasal cannula after IV Lasix.  Saturating well.    Echo personally reviewed, reveals mitral valve prolapse with severe mitral regurgitation, moderate to severe tricuspid regurgitation.    Patient being ruled out for COVID also.

## 2020-08-23 NOTE — CONSULTS
[facility]  Cardiology  Consult Note    Patient Name: Fatou Lorenzo  MRN: [id number]  Admission Date: 8/23/2020  Hospital Length of Stay: 0 days  Code Status: Full Code   Attending Provider: Ashley Naranjo MD   Consulting Provider: Marlee Carrillo MD  Primary Care Physician: Ludin Rivas MD  Principal Problem:<principal problem not specified>    Patient information was obtained from patient and ER records.     Consults  Subjective:     Chief Complaint:      HPI:   Patient with a past medical history of mitral valve prolapse with mitral regurgitation.  In being followed at [facility].  States 8 years ago was referred to Dr. Waddell, but valvular regurgitation not bad enough at that time to do surgery.  Denies any chest pains.  Came in with worsening shortness of breath, orthopnea.  Admitted with acute decompensated heart failure.  X-ray showed bibasilar infiltrates.  EKG personally reviewed.  Shows sinus tachycardia.  No acute ischemic changes.  Initially on BiPAP, now on nasal cannula after IV Lasix.  Saturating well.    Echo personally reviewed, reveals mitral valve prolapse with severe mitral regurgitation, moderate to severe tricuspid regurgitation.    Patient being ruled out for COVID also.        Past Medical History:   Diagnosis Date    Cataract     Glaucoma     Heart valve problem     Hyperlipidemia        Past Surgical History:   Procedure Laterality Date    ANKLE SURGERY      lipoma removal         Review of patient's allergies indicates:  No Known Allergies    No current facility-administered medications on file prior to encounter.      Current Outpatient Medications on File Prior to Encounter   Medication Sig    albuterol (PROVENTIL/VENTOLIN HFA) 90 mcg/actuation inhaler Inhale 2 puffs into the lungs every 4 (four) hours as needed for Wheezing or Shortness of Breath. Rescue    losartan (COZAAR) 50 MG tablet Take 50 mg by mouth once daily.    methylPREDNISolone (MEDROL  DOSEPACK) 4 mg tablet use as directed on package until gone    metoprolol tartrate (LOPRESSOR) 50 MG tablet Take 50 mg by mouth 2 (two) times daily.    pravastatin (PRAVACHOL) 80 MG tablet Take 80 mg by mouth once daily.     Family History     Problem Relation (Age of Onset)    Cancer Mother    Cataracts Sister    No Known Problems Father, Brother, Maternal Aunt, Maternal Uncle, Paternal Aunt, Paternal Uncle, Maternal Grandmother, Maternal Grandfather, Paternal Grandmother, Paternal Grandfather        Tobacco Use    Smoking status: Never Smoker    Smokeless tobacco: Never Used   Substance and Sexual Activity    Alcohol use: No    Drug use: No    Sexual activity: Not Currently     Review of Systems   Constitution: Negative.   HENT: Negative.    Eyes: Negative.    Cardiovascular: Positive for dyspnea on exertion and orthopnea. Negative for chest pain.   Respiratory: Positive for shortness of breath.    Endocrine: Negative.    Hematologic/Lymphatic: Negative.    Skin: Negative.    Musculoskeletal: Negative.    Gastrointestinal: Negative.    Genitourinary: Negative.    Neurological: Negative.    Psychiatric/Behavioral: Negative.    Allergic/Immunologic: Negative.      Objective:     Vital Signs (Most Recent):  Temp: 97.3 °F (36.3 °C) (08/23/20 1110)  Pulse: 98 (08/23/20 1400)  Resp: (!) 24 (08/23/20 1400)  BP: 124/68 (08/23/20 1400)  SpO2: 97 % (08/23/20 1400) Vital Signs (24h Range):  Temp:  [44.2 °F (6.8 °C)-98.1 °F (36.7 °C)] 97.3 °F (36.3 °C)  Pulse:  [] 98  Resp:  [19-38] 24  SpO2:  [91 %-100 %] 97 %  BP: (100-186)/(60-95) 124/68     Weight: 82.6 kg (182 lb 1.6 oz)  Body mass index is 30.3 kg/m².    SpO2: 97 %  O2 Device (Oxygen Therapy): High Flow nasal Cannula      Intake/Output Summary (Last 24 hours) at 8/23/2020 1421  Last data filed at 8/23/2020 1300  Gross per 24 hour   Intake 200 ml   Output 1375 ml   Net -1175 ml       Lines/Drains/Airways     Drain                 Urethral Catheter 08/23/20  0310 Non-latex 16 Fr. less than 1 day          Peripheral Intravenous Line                 Peripheral IV - Single Lumen 08/23/20 0148 20 G Right Antecubital less than 1 day         Peripheral IV - Single Lumen 08/23/20 22 G Posterior;Right Forearm less than 1 day                Physical Exam   Constitutional: She is oriented to person, place, and time. She appears well-developed and well-nourished.   HENT:   Head: Normocephalic.   Eyes: Pupils are equal, round, and reactive to light.   Neck: Normal range of motion. Neck supple.   Cardiovascular: Normal rate and regular rhythm.   Murmur heard.  High-pitched blowing holosystolic murmur is present at the apex.  Pulmonary/Chest: Effort normal. She has rales.   Abdominal: Soft. Normal appearance and bowel sounds are normal. There is no abdominal tenderness.   Musculoskeletal: Normal range of motion.   Neurological: She is alert and oriented to person, place, and time.   Skin: Skin is warm.   Psychiatric: She has a normal mood and affect.   Nursing note and vitals reviewed.      Significant Labs:   BMP:   Recent Labs   Lab 08/23/20  0609   *      K 4.7      CO2 24   BUN 27*   CREATININE 1.2   CALCIUM 9.5   MG 2.2   , CMP   Recent Labs   Lab 08/23/20  0609      K 4.7      CO2 24   *   BUN 27*   CREATININE 1.2   CALCIUM 9.5   PROT 7.2   ALBUMIN 4.0   BILITOT 1.0   ALKPHOS 80   AST 92*   ALT 63*   ANIONGAP 12   ESTGFRAFRICA 51*   EGFRNONAA 44*   , CBC   Recent Labs   Lab 08/23/20  0609   WBC 11.32   HGB 15.0   HCT 47.2      , INR No results for input(s): INR, PROTIME in the last 48 hours., Lipid Panel No results for input(s): CHOL, HDL, LDLCALC, TRIG, CHOLHDL in the last 48 hours., Troponin   Recent Labs   Lab 08/23/20  0609   TROPONINI 0.040*    and All pertinent lab results from the last 24 hours have been reviewed.    Significant Imaging: Echocardiogram:   Transthoracic echo (TTE) complete ([facility] Only):   Results for orders  placed or performed during the hospital encounter of 08/23/20   Echo Color Flow Doppler? Yes   Result Value Ref Range    Ascending aorta 2.11 cm    STJ 1.78 cm    AV mean gradient 2 mmHg    Ao peak riley 1.02 m/s    Ao VTI 13.09 cm    IVRT 65.74 msec    IVS 1.17 (A) 0.6 - 1.1 cm    LA size 5.65 cm    Left Atrium Major Axis 6.25 cm    Left Atrium Minor Axis 6.91 cm    LVIDD 4.66 3.5 - 6.0 cm    LVIDS 3.39 2.1 - 4.0 cm    LVOT diameter 1.90 cm    LVOT peak VTI 11.30 cm    PW 1.10 0.6 - 1.1 cm    MV Peak A Riley 0.86 m/s    E wave decelartion time 167.17 msec    MV Peak E Riley 1.89 m/s    RA Major Axis 3.27 cm    RVDD 2.95 cm    Sinus 2.63 cm    TAPSE 2.11 cm    TR Max Riley 3.53 m/s    TDI LATERAL 0.12 m/s    TDI SEPTAL 0.09 m/s    LA WIDTH 5.88 cm    Ao root annulus 2.50 cm    AORTIC VALVE CUSP SEPERATION 1.87 cm    PV PEAK VELOCITY 0.81 cm/s    MV stenosis pressure 1/2 time 48.48 ms    LV Diastolic Volume 100.12 mL    LV Systolic Volume 46.93 mL    LVOT peak riley 0.52 m/s    Mr max riley 0.05 m/s    LV LATERAL E/E' RATIO 15.75 m/s    LV SEPTAL E/E' RATIO 21.00 m/s    FS 27 %    LA volume 185.34 cm3    LV mass 193.31 g    Left Ventricle Relative Wall Thickness 0.47 cm    AV valve area 2.45 cm2    AV Velocity Ratio 0.51     AV index (prosthetic) 0.86     MV valve area p 1/2 method 4.54 cm2    E/A ratio 2.20     Mean e' 0.11 m/s    LVOT area 2.8 cm2    LVOT stroke volume 32.02 cm3    AV peak gradient 4 mmHg    E/E' ratio 18.00 m/s    LV Systolic Volume Index 24.7 mL/m2    LV Diastolic Volume Index 52.67 mL/m2    LA Volume Index 97.5 mL/m2    LV Mass Index 102 g/m2    Triscuspid Valve Regurgitation Peak Gradient 50 mmHg    BSA 1.95 m2    Right Atrial Pressure (from IVC) 3 mmHg    TV rest pulmonary artery pressure 53 mmHg    Narrative    · Normal left ventricular systolic function. The estimated ejection   fraction is 60%.  · Concentric left ventricular hypertrophy.  · Severe mitral regurgitation.  · Moderate to severe tricuspid  regurgitation.  · Mild aortic regurgitation  · Aortic valve area is 2.45 cm2; peak velocity is 1.02 m/s; mean gradient   is 2 mmHg.  · Severe left atrial enlargement.  · Grade III (severe) left ventricular diastolic dysfunction consistent   with restrictive physiology.  · Normal right ventricular systolic function.  · Mild pulmonic regurgitation.  · Normal central venous pressure (3 mmHg).  · The estimated PA systolic pressure is 53 mmHg.  · Pulmonary hypertension present.     mitral valve prolaspe with severe regurgitation      Assessment and Plan:     COVID-19  Being ruled out for COVID-19    Mitral incompetence  As above    SOB (shortness of breath)  Caused by decompensated heart failure    CHF (congestive heart failure)  Likely underlying causes severe mitral regurgitation.  Had a detailed discussion with the patient.  Will do further evaluation with CHERY tomorrow.  Once euvolemic, will do cardiac catheterization.  After initial workup complete, and patient's heart failure compensated, will refer to Dr. Waddell for evaluation of mitral valve surgery.  Continue IV diuresis.        VTE Risk Mitigation (From admission, onward)         Ordered     IP VTE HIGH RISK PATIENT  Once      08/23/20 0452     Place sequential compression device  Until discontinued      08/23/20 0452                Thank you for your consult. I will follow-up with patient. Please contact us if you have any additional questions.    Marlee Carrillo MD  Cardiology   [facility]    Critical Care Time:  45 minutes     Critical care was time spent personally by me on the following activities: development of treatment plan with patient or surrogate and bedside caregivers, discussions with consultants, evaluation of patient's response to treatment, examination of patient, ordering and performing treatments and interventions, ordering and review of laboratory studies, ordering and review of radiographic studies, pulse oximetry,  re-evaluation of patient's condition. This critical care time did not overlap with that of any other provider or involve time for any procedures.

## 2020-08-23 NOTE — ED NOTES
[facility] ambulance arrived in ed; report given to ems; paperwork given to ems included: demographic sheet and emtala/transfer form as well as ed record

## 2020-08-23 NOTE — CARE UPDATE
Pt was transferred from [place] ED to ICU with EMS on bipap. Pt arrived in ICU and was placed on our V60 bipap with the following settings IP 15 EP 10 FIO2 60%. Will continue to monitor.

## 2020-08-23 NOTE — ED NOTES
Pt O2 sats 88% on 4L/nc; pt continues to remove nc c/o sob; pt placed on 15L nrb; O2 sats increased to 92%

## 2020-08-23 NOTE — SUBJECTIVE & OBJECTIVE
Past Medical History:   Diagnosis Date    Cataract     Glaucoma     Heart valve problem     Hyperlipidemia        Past Surgical History:   Procedure Laterality Date    ANKLE SURGERY      lipoma removal         Review of patient's allergies indicates:  No Known Allergies    No current facility-administered medications on file prior to encounter.      Current Outpatient Medications on File Prior to Encounter   Medication Sig    albuterol (PROVENTIL/VENTOLIN HFA) 90 mcg/actuation inhaler Inhale 2 puffs into the lungs every 4 (four) hours as needed for Wheezing or Shortness of Breath. Rescue    losartan (COZAAR) 50 MG tablet Take 50 mg by mouth once daily.    methylPREDNISolone (MEDROL DOSEPACK) 4 mg tablet use as directed on package until gone    metoprolol tartrate (LOPRESSOR) 50 MG tablet Take 50 mg by mouth 2 (two) times daily.    pravastatin (PRAVACHOL) 80 MG tablet Take 80 mg by mouth once daily.     Family History     Problem Relation (Age of Onset)    Cancer Mother    Cataracts Sister    No Known Problems Father, Brother, Maternal Aunt, Maternal Uncle, Paternal Aunt, Paternal Uncle, Maternal Grandmother, Maternal Grandfather, Paternal Grandmother, Paternal Grandfather        Tobacco Use    Smoking status: Never Smoker    Smokeless tobacco: Never Used   Substance and Sexual Activity    Alcohol use: No    Drug use: No    Sexual activity: Not Currently     Review of Systems   Constitution: Negative.   HENT: Negative.    Eyes: Negative.    Cardiovascular: Positive for dyspnea on exertion and orthopnea. Negative for chest pain.   Respiratory: Positive for shortness of breath.    Endocrine: Negative.    Hematologic/Lymphatic: Negative.    Skin: Negative.    Musculoskeletal: Negative.    Gastrointestinal: Negative.    Genitourinary: Negative.    Neurological: Negative.    Psychiatric/Behavioral: Negative.    Allergic/Immunologic: Negative.      Objective:     Vital Signs (Most Recent):  Temp: 97.3 °F  (36.3 °C) (08/23/20 1110)  Pulse: 98 (08/23/20 1400)  Resp: (!) 24 (08/23/20 1400)  BP: 124/68 (08/23/20 1400)  SpO2: 97 % (08/23/20 1400) Vital Signs (24h Range):  Temp:  [44.2 °F (6.8 °C)-98.1 °F (36.7 °C)] 97.3 °F (36.3 °C)  Pulse:  [] 98  Resp:  [19-38] 24  SpO2:  [91 %-100 %] 97 %  BP: (100-186)/(60-95) 124/68     Weight: 82.6 kg (182 lb 1.6 oz)  Body mass index is 30.3 kg/m².    SpO2: 97 %  O2 Device (Oxygen Therapy): High Flow nasal Cannula      Intake/Output Summary (Last 24 hours) at 8/23/2020 1421  Last data filed at 8/23/2020 1300  Gross per 24 hour   Intake 200 ml   Output 1375 ml   Net -1175 ml       Lines/Drains/Airways     Drain                 Urethral Catheter 08/23/20 0310 Non-latex 16 Fr. less than 1 day          Peripheral Intravenous Line                 Peripheral IV - Single Lumen 08/23/20 0148 20 G Right Antecubital less than 1 day         Peripheral IV - Single Lumen 08/23/20 22 G Posterior;Right Forearm less than 1 day                Physical Exam   Constitutional: She is oriented to person, place, and time. She appears well-developed and well-nourished.   HENT:   Head: Normocephalic.   Eyes: Pupils are equal, round, and reactive to light.   Neck: Normal range of motion. Neck supple.   Cardiovascular: Normal rate and regular rhythm.   Murmur heard.  High-pitched blowing holosystolic murmur is present at the apex.  Pulmonary/Chest: Effort normal. She has rales.   Abdominal: Soft. Normal appearance and bowel sounds are normal. There is no abdominal tenderness.   Musculoskeletal: Normal range of motion.   Neurological: She is alert and oriented to person, place, and time.   Skin: Skin is warm.   Psychiatric: She has a normal mood and affect.   Nursing note and vitals reviewed.      Significant Labs:   BMP:   Recent Labs   Lab 08/23/20  0609   *      K 4.7      CO2 24   BUN 27*   CREATININE 1.2   CALCIUM 9.5   MG 2.2   , CMP   Recent Labs   Lab 08/23/20  0609       K 4.7      CO2 24   *   BUN 27*   CREATININE 1.2   CALCIUM 9.5   PROT 7.2   ALBUMIN 4.0   BILITOT 1.0   ALKPHOS 80   AST 92*   ALT 63*   ANIONGAP 12   ESTGFRAFRICA 51*   EGFRNONAA 44*   , CBC   Recent Labs   Lab 08/23/20  0609   WBC 11.32   HGB 15.0   HCT 47.2      , INR No results for input(s): INR, PROTIME in the last 48 hours., Lipid Panel No results for input(s): CHOL, HDL, LDLCALC, TRIG, CHOLHDL in the last 48 hours., Troponin   Recent Labs   Lab 08/23/20  0609   TROPONINI 0.040*    and All pertinent lab results from the last 24 hours have been reviewed.    Significant Imaging: Echocardiogram:   Transthoracic echo (TTE) complete (Cupid Only):   Results for orders placed or performed during the hospital encounter of 08/23/20   Echo Color Flow Doppler? Yes   Result Value Ref Range    Ascending aorta 2.11 cm    STJ 1.78 cm    AV mean gradient 2 mmHg    Ao peak riley 1.02 m/s    Ao VTI 13.09 cm    IVRT 65.74 msec    IVS 1.17 (A) 0.6 - 1.1 cm    LA size 5.65 cm    Left Atrium Major Axis 6.25 cm    Left Atrium Minor Axis 6.91 cm    LVIDD 4.66 3.5 - 6.0 cm    LVIDS 3.39 2.1 - 4.0 cm    LVOT diameter 1.90 cm    LVOT peak VTI 11.30 cm    PW 1.10 0.6 - 1.1 cm    MV Peak A Riley 0.86 m/s    E wave decelartion time 167.17 msec    MV Peak E Riley 1.89 m/s    RA Major Axis 3.27 cm    RVDD 2.95 cm    Sinus 2.63 cm    TAPSE 2.11 cm    TR Max Riley 3.53 m/s    TDI LATERAL 0.12 m/s    TDI SEPTAL 0.09 m/s    LA WIDTH 5.88 cm    Ao root annulus 2.50 cm    AORTIC VALVE CUSP SEPERATION 1.87 cm    PV PEAK VELOCITY 0.81 cm/s    MV stenosis pressure 1/2 time 48.48 ms    LV Diastolic Volume 100.12 mL    LV Systolic Volume 46.93 mL    LVOT peak riley 0.52 m/s    Mr max riley 0.05 m/s    LV LATERAL E/E' RATIO 15.75 m/s    LV SEPTAL E/E' RATIO 21.00 m/s    FS 27 %    LA volume 185.34 cm3    LV mass 193.31 g    Left Ventricle Relative Wall Thickness 0.47 cm    AV valve area 2.45 cm2    AV Velocity Ratio 0.51     AV index  (prosthetic) 0.86     MV valve area p 1/2 method 4.54 cm2    E/A ratio 2.20     Mean e' 0.11 m/s    LVOT area 2.8 cm2    LVOT stroke volume 32.02 cm3    AV peak gradient 4 mmHg    E/E' ratio 18.00 m/s    LV Systolic Volume Index 24.7 mL/m2    LV Diastolic Volume Index 52.67 mL/m2    LA Volume Index 97.5 mL/m2    LV Mass Index 102 g/m2    Triscuspid Valve Regurgitation Peak Gradient 50 mmHg    BSA 1.95 m2    Right Atrial Pressure (from IVC) 3 mmHg    TV rest pulmonary artery pressure 53 mmHg    Narrative    · Normal left ventricular systolic function. The estimated ejection   fraction is 60%.  · Concentric left ventricular hypertrophy.  · Severe mitral regurgitation.  · Moderate to severe tricuspid regurgitation.  · Mild aortic regurgitation  · Aortic valve area is 2.45 cm2; peak velocity is 1.02 m/s; mean gradient   is 2 mmHg.  · Severe left atrial enlargement.  · Grade III (severe) left ventricular diastolic dysfunction consistent   with restrictive physiology.  · Normal right ventricular systolic function.  · Mild pulmonic regurgitation.  · Normal central venous pressure (3 mmHg).  · The estimated PA systolic pressure is 53 mmHg.  · Pulmonary hypertension present.     mitral valve prolaspe with severe regurgitation

## 2020-08-23 NOTE — PROGRESS NOTES
eICU Brief Admission Note       Briefly, 75 y/F with h/o heart valve problem, dyslipidemia, Glaucoma presents to the emergency department with complaints of being progressively short of breath over the last 3-4 days.  Patient noticed most her symptoms at night when she tried to lay flat in the bed.  She also denies any fever or cough.        Video assessment :  On BiPAP     Vitals reviewed   Afebrile, stable vitals     LABs reviewed       Radiology reviewed   CXR   New bilateral pulmonary interstitial and minimal airspace opacities.  Differential considerations include pulmonary edema, pneumonitis or atypical viral and fungal pneumonia.  COVID-19 pneumonia is not excluded.        Assessment / Plan :  Acute hypoxic hypercapnic respiratory failure -- likely CHF exacerbation vs unlikely pneumonia; COVID negative   H/o valvular disease   Dyslipidemia   - BiPAP 15/10, 60 %, pulling TV in 400s   - s/p Lasix   - NTG ointment   - ABX : s/p Zosyn ; f/u cultures   - TTE       DVT Px : SCD; may start Heparin after CBC results   GI Px : N/A      Patient seen over video : Yes  Chart reviewed : Yes  Spoke with RN : No

## 2020-08-23 NOTE — PLAN OF CARE
"Patient tolerating bipap at 15/10 60%. O2 sats 100%. Patient denies chest pain at this time, states it just feels "heavy". No SOB reported at this time. SR on the monitor with frequent PVC's. Yoo with adequate urine output after lasix given in ED.  "

## 2020-08-23 NOTE — ED PROVIDER NOTES
Encounter Date: 8/23/2020       History     Chief Complaint   Patient presents with    Shortness of Breath     states she went to bed at 1130 pm last night and developed difficulty breathing and chest pain. states her shortness of breath has worsened since onset. denies fever/cough     This patient presents to the emergency department with complaints of being progressively short of breath over the last 3-4 days.  Patient noticed most her symptoms at night when she tried to lay flat in the bed.  Patient states she does not have a history of hypertension.  She also denies any fever or cough.  She has no other complaints of at present.    The history is provided by the patient.     Review of patient's allergies indicates:  No Known Allergies  Past Medical History:   Diagnosis Date    Cataract     Glaucoma     Heart valve problem     Hyperlipidemia      Past Surgical History:   Procedure Laterality Date    ANKLE SURGERY      lipoma removal       Family History   Problem Relation Age of Onset    Cancer Mother     Cataracts Sister     No Known Problems Father     No Known Problems Brother     No Known Problems Maternal Aunt     No Known Problems Maternal Uncle     No Known Problems Paternal Aunt     No Known Problems Paternal Uncle     No Known Problems Maternal Grandmother     No Known Problems Maternal Grandfather     No Known Problems Paternal Grandmother     No Known Problems Paternal Grandfather     Amblyopia Neg Hx     Blindness Neg Hx     Diabetes Neg Hx     Glaucoma Neg Hx     Hypertension Neg Hx     Macular degeneration Neg Hx     Retinal detachment Neg Hx     Strabismus Neg Hx     Stroke Neg Hx     Thyroid disease Neg Hx     Breast cancer Neg Hx     Colon cancer Neg Hx     Ovarian cancer Neg Hx      Social History     Tobacco Use    Smoking status: Never Smoker    Smokeless tobacco: Never Used   Substance Use Topics    Alcohol use: No    Drug use: No     Review of Systems    Constitutional: Negative.    HENT: Negative.    Eyes: Negative.    Respiratory: Positive for shortness of breath.    Cardiovascular: Negative.  Negative for chest pain.   Gastrointestinal: Negative.    Endocrine: Negative.    Genitourinary: Negative.    Musculoskeletal: Negative.    Skin: Negative.    Allergic/Immunologic: Negative.    Neurological: Negative.    Hematological: Negative.    Psychiatric/Behavioral: Negative.    All other systems reviewed and are negative.      Physical Exam     Initial Vitals [08/23/20 0131]   BP Pulse Resp Temp SpO2   107/78 104 (!) 26 97 °F (36.1 °C) 95 %      MAP       --         Physical Exam    Nursing note and vitals reviewed.  Constitutional: Vital signs are normal. She appears well-developed. She is active and cooperative. She appears distressed.   HENT:   Head: Normocephalic and atraumatic.   Eyes: Conjunctivae, EOM and lids are normal. Pupils are equal, round, and reactive to light.   Neck: Trachea normal, normal range of motion, full passive range of motion without pain and phonation normal. Neck supple. No thyroid mass present. JVD present. No hepatojugular reflux present.   Cardiovascular: Regular rhythm, S1 normal, S2 normal, normal heart sounds, intact distal pulses and normal pulses. Tachycardia present.  Exam reveals no gallop, no distant heart sounds and no friction rub.    No murmur heard.  Pulmonary/Chest: Tachypnea noted. She is in respiratory distress. She has decreased breath sounds. She has wheezes. She has rales.   Abdominal: Soft. Normal appearance, normal aorta and bowel sounds are normal. There is no abdominal tenderness. There is no rigidity, no rebound, no guarding, no CVA tenderness, no tenderness at McBurney's point and negative Salcedo's sign.   Musculoskeletal: Normal range of motion.   Lymphadenopathy:     She has no axillary adenopathy.   Neurological: She is alert and oriented to person, place, and time. She has normal strength. No cranial nerve  deficit or sensory deficit. GCS eye subscore is 4. GCS verbal subscore is 5. GCS motor subscore is 6.   Skin: Skin is warm, dry and intact.   Psychiatric: She has a normal mood and affect. Her speech is normal and behavior is normal. Judgment and thought content normal. Cognition and memory are normal.         ED Course   Critical Care    Date/Time: 8/23/2020 3:31 AM  Performed by: Jeremie Victor MD  Authorized by: Jeremie Victor MD   Direct patient critical care time: 12 minutes  Additional history critical care time: 11 minutes  Ordering / reviewing critical care time: 15 minutes  Documentation critical care time: 15 minutes  Consulting other physicians critical care time: 4 minutes  Total critical care time (exclusive of procedural time) : 57 minutes  Critical care time was exclusive of separately billable procedures and treating other patients and teaching time.  Critical care was necessary to treat or prevent imminent or life-threatening deterioration of the following conditions: respiratory failure.  Critical care was time spent personally by me on the following activities: evaluation of patient's response to treatment, ordering and performing treatments and interventions, pulse oximetry, re-evaluation of patient's condition, ordering and review of laboratory studies, examination of patient, obtaining history from patient or surrogate, ordering and review of radiographic studies and review of old charts.        Labs Reviewed   POCT URINALYSIS W/O SCOPE - Abnormal; Notable for the following components:       Result Value    Blood, UA Trace-intact (*)     All other components within normal limits   POCT CMP - Abnormal; Notable for the following components:    ALT (SGPT), POC 50 (*)     AST (SGOT), POC 70 (*)     POC BUN 25 (*)     POC Glucose 155 (*)     All other components within normal limits   ISTAT PROCEDURE - Abnormal; Notable for the following components:    POC PH 7.290 (*)     POC PCO2 49.1  (*)     POC HCO3 23.6 (*)     POC SATURATED O2 71 (*)     All other components within normal limits   POCT B-TYPE NATRIURETIC PEPTIDE (BNP) - Abnormal; Notable for the following components:    POC B-Type Natriuretic Peptide 382 (*)     All other components within normal limits   CULTURE, BLOOD   CULTURE, BLOOD   CULTURE, URINE   TROPONIN ISTAT   PROCALCITONIN   POCT CBC   SARS-COV-2 RDRP GENE   POCT URINALYSIS DIPSTICK (CULTURE IF INDICATED)   POCT CMP   POCT PROTIME-INR   POCT TROPONIN   POCT B-TYPE NATRIURETIC PEPTIDE (BNP)   ISTAT PROCEDURE     EKG Readings: (Independently Interpreted)   Rhythm: Sinus Tachycardia. Heart Rate: 104. Ectopy: No Ectopy. Conduction: Normal. ST Segments: Non-Specific ST Segment Depression. T Waves: Normal. Axis: Normal. Clinical Impression: Sinus Tachycardia       Imaging Results           X-Ray Chest AP Portable (Final result)  Result time 08/23/20 01:54:58    Final result by Doc Prado MD (08/23/20 01:54:58)                 Impression:      New bilateral pulmonary interstitial and minimal airspace opacities.  Differential considerations include pulmonary edema, pneumonitis or atypical viral and fungal pneumonia.  COVID-19 pneumonia is not excluded.    This report was flagged in Epic as abnormal.      Electronically signed by: Doc Prado  Date:    08/23/2020  Time:    01:54             Narrative:    EXAMINATION:  XR CHEST AP PORTABLE    CLINICAL HISTORY:  sob;    TECHNIQUE:  Single frontal view of the chest was performed.    COMPARISON:  02/21/2020 chest x-ray    FINDINGS:  There are new bilateral airspace and interstitial opacities with more confluent opacities in the right lung base.  No effusion is seen.  The cardiac silhouette is not enlarged.                                 Medical Decision Making:   ED Management:  This patient presents somewhat of a confusing Anh picture as patient has no prior history of hypertension.  Her story house is consistent with  congestive heart failure.  Patient is also very concerned about having COVID as she has had some exposure.  Chest x-ray evaluation reveals bilateral interstitial infiltrates potentially secondary to pneumonitis such as COVID and or congestive failure.  Patient's COVID test here in the emergency department is negative.  Patient is still having some respiratory difficulty 100% non-rebreather to patient's now receives BiPAP and I have provided her with nitroglycerin to her chest wall and 60 mg of Lasix IV.  Patient is looking much better on BiPAP Ativan and nitroglycerin with Lasix.  I will cover the patient in the event that she has pneumonia but I feel clinically and with her history of present illness that congestive heart failure is most likely diagnosis.  I have spoke with Dr. Chris carias except the patient transfers an ICU admission to [facility].                   ED Course as of Aug 23 0324   Sun Aug 23, 2020   0318 Spec Grav UA: 1.025 [MI]      ED Course User Index  [MI] Jeremie Victor MD                Clinical Impression:       ICD-10-CM ICD-9-CM   1. SOB (shortness of breath)  R06.02 786.05   2. Chest pain  R07.9 786.50   3. Congestive heart failure, unspecified HF chronicity, unspecified heart failure type  I50.9 428.0   4. CHF (congestive heart failure)  I50.9 428.0                                Jeremie Victor MD  08/23/20 0334       Jeremie Victor MD  08/23/20 0336

## 2020-08-23 NOTE — Clinical Note
41 ml injected throughout the case. 14 mL total wasted during the case. 55 mL total used in the case.

## 2020-08-23 NOTE — PLAN OF CARE
Patient is progressing towards goals. Pt was able to wean from BiPap to high flow nasal cannula. Pt continues to have some shortness of breath with exertion. Pt continues to be on bedrest, however patient able and encouraged to reposition herself in bed as needed. Pt ready to go to another floor so that she can get out of bed. Pt has no s/s of infection at this time. No skin issues. Will continue to monitor patient.     Educated patient and son today on Nitroglycerin, COVID-19 precautions, lasix, and CHERY. Both verbalized understanding. Will continue to educate and re-educate as needed.     Ashley BROCKN, RN

## 2020-08-24 PROBLEM — I51.3 LEFT ATRIAL THROMBUS: Status: ACTIVE | Noted: 2020-01-01

## 2020-08-24 PROBLEM — I34.0 SEVERE MITRAL REGURGITATION: Status: ACTIVE | Noted: 2020-01-01

## 2020-08-24 PROBLEM — I48.91 ATRIAL FIBRILLATION WITH RVR: Status: ACTIVE | Noted: 2020-01-01

## 2020-08-24 NOTE — NURSING
[facility]  ICU Shift Summary  Date: 8/24/2020      COVID Test: (--)  Isolation: No active isolations     Prehospitalization: Home  Admit Date / LOS : 8/23/2020/ 1 days    Diagnosis: Acute respiratory failure with hypoxia    Consults:        Active: Cardio and SW       Needed: N/A     Code Status: Full Code   Advanced Directive: Not Received    LDA: Yoo and PIV       Central Lines/Site/Justification:Patient Does Not Have Central Line       Urinary Cath/Order/Justification:Critically Ill in the ICU and requiring intensive monitoring    Vasopressors/Infusions:    amiodarone in dextrose 5% 0.5 mg/min (08/24/20 1600)    heparin (porcine) in D5W 12 Units/kg/hr (08/24/20 1600)          GOALS: Volume/ Hemodynamic: N/A                     RASS: N/A    Pain Management: none       Pain Controlled: not applicable     Rhythm: A-Fib    Respiratory Device: Nasal Cannula                  Most Recent SBT/ SAT: N/A       MOVE Screen: PASS    VTE Prophylaxis: Pharm  Mobility: Bedrest  Stress Ulcer Prophylaxis: No    Dietary: PO  Tolerance: yes  /  Advancement: @ goal    I & O (24h):    Intake/Output Summary (Last 24 hours) at 8/24/2020 1631  Last data filed at 8/24/2020 1600  Gross per 24 hour   Intake 1882.19 ml   Output 1878 ml   Net 4.19 ml        Restraints: No    Significant Dates:  Post Op Date: n/a  Rescue Date: N/A  Imaging/ Diagnostics: CHERY without cardioversion    Noteworthy Labs:  none    CBC/Anemia Labs: Coags:    Recent Labs   Lab 08/23/20  0609   WBC 11.32   HGB 15.0   HCT 47.2      MCV 94   RDW 14.2    Recent Labs   Lab 08/24/20  1056   INR 1.0   APTT 24.2        Chemistries:   Recent Labs   Lab 08/23/20  0609 08/24/20  0422    138   K 4.7 5.3*    105   CO2 24 22*   BUN 27* 26*   CREATININE 1.2 1.1   CALCIUM 9.5 8.8   PROT 7.2 7.0   BILITOT 1.0 1.0   ALKPHOS 80 73   ALT 63* 46*   AST 92* 48*   MG 2.2  --    PHOS 4.4  --         Cardiac Enzymes: Ejection Fractions:    Recent  Labs     08/23/20  0609   TROPONINI 0.040*    No results found for: EF     POCT Glucose: HbA1c:    No results for input(s): POCTGLUCOSE in the last 168 hours. No results found for: HGBA1C        ICU LOS 1d 11h  Level of Care: Critical Care    Shift Summary/Plan for the shift: none

## 2020-08-24 NOTE — SUBJECTIVE & OBJECTIVE
Interval History:  Patient went into AFib with RVR last night.  Hypertensive.  Heart rate around 120 beats per minute.  Blood pressure between  systolic.  Patient feels palpitations.  Denies chest pain.    Review of Systems   Constitution: Negative.   HENT: Negative.    Eyes: Negative.    Cardiovascular: Positive for dyspnea on exertion.   Respiratory: Positive for shortness of breath.    Endocrine: Negative.    Hematologic/Lymphatic: Negative.    Skin: Negative.    Musculoskeletal: Negative.    Gastrointestinal: Negative.    Genitourinary: Negative.    Neurological: Negative.    Psychiatric/Behavioral: Negative.    Allergic/Immunologic: Negative.      Objective:     Vital Signs (Most Recent):  Temp: 97.7 °F (36.5 °C) (08/24/20 0745)  Pulse: (!) 120 (08/24/20 0939)  Resp: 20 (08/24/20 0815)  BP: 97/71 (08/24/20 0939)  SpO2: 99 % (08/24/20 0815) Vital Signs (24h Range):  Temp:  [96.5 °F (35.8 °C)-98.1 °F (36.7 °C)] 97.7 °F (36.5 °C)  Pulse:  [] 120  Resp:  [13-32] 20  SpO2:  [95 %-100 %] 99 %  BP: ()/(39-90) 97/71     Weight: 82.6 kg (182 lb 1.6 oz)  Body mass index is 30.3 kg/m².     SpO2: 99 %  O2 Device (Oxygen Therapy): (P) nasal cannula w/ humidification      Intake/Output Summary (Last 24 hours) at 8/24/2020 1036  Last data filed at 8/24/2020 0800  Gross per 24 hour   Intake 1292.38 ml   Output 1576 ml   Net -283.62 ml       Lines/Drains/Airways     Drain                 Urethral Catheter 08/23/20 0310 Non-latex 16 Fr. 1 day          Peripheral Intravenous Line                 Peripheral IV - Single Lumen 08/23/20 22 G Posterior;Right Forearm 1 day         Peripheral IV - Single Lumen 08/24/20 0051 20 G Left Antecubital less than 1 day                Physical Exam   Constitutional: She is oriented to person, place, and time. She appears well-developed and well-nourished.   HENT:   Head: Normocephalic.   Eyes: Pupils are equal, round, and reactive to light.   Neck: Normal range of motion. Neck  supple.   Cardiovascular: Normal rate and regular rhythm.   Murmur heard.  High-pitched blowing holosystolic murmur is present at the apex.  Pulmonary/Chest: Effort normal. She has rales.   Abdominal: Soft. Normal appearance and bowel sounds are normal. There is no abdominal tenderness.   Musculoskeletal: Normal range of motion.   Neurological: She is alert and oriented to person, place, and time.   Skin: Skin is warm.   Psychiatric: She has a normal mood and affect.   Nursing note and vitals reviewed.      Significant Labs:   BMP:   Recent Labs   Lab 08/23/20  0609 08/24/20  0422   * 128*    138   K 4.7 5.3*    105   CO2 24 22*   BUN 27* 26*   CREATININE 1.2 1.1   CALCIUM 9.5 8.8   MG 2.2  --    , CMP   Recent Labs   Lab 08/23/20  0609 08/24/20  0422    138   K 4.7 5.3*    105   CO2 24 22*   * 128*   BUN 27* 26*   CREATININE 1.2 1.1   CALCIUM 9.5 8.8   PROT 7.2 7.0   ALBUMIN 4.0 3.6   BILITOT 1.0 1.0   ALKPHOS 80 73   AST 92* 48*   ALT 63* 46*   ANIONGAP 12 11   ESTGFRAFRICA 51* 57*   EGFRNONAA 44* 49*   , CBC   Recent Labs   Lab 08/23/20  0609   WBC 11.32   HGB 15.0   HCT 47.2      , INR No results for input(s): INR, PROTIME in the last 48 hours., Lipid Panel No results for input(s): CHOL, HDL, LDLCALC, TRIG, CHOLHDL in the last 48 hours., Troponin   Recent Labs   Lab 08/23/20  0609   TROPONINI 0.040*    and All pertinent lab results from the last 24 hours have been reviewed.    Significant Imaging: Echocardiogram:   Transthoracic echo (TTE) complete (Cupid Only):   Results for orders placed or performed during the hospital encounter of 08/23/20   Echo Color Flow Doppler? Yes   Result Value Ref Range    Ascending aorta 2.11 cm    STJ 1.78 cm    AV mean gradient 2 mmHg    Ao peak becca 1.02 m/s    Ao VTI 13.09 cm    IVRT 65.74 msec    IVS 1.17 (A) 0.6 - 1.1 cm    LA size 5.65 cm    Left Atrium Major Axis 6.25 cm    Left Atrium Minor Axis 6.91 cm    LVIDD 4.66 3.5 - 6.0 cm     LVIDS 3.39 2.1 - 4.0 cm    LVOT diameter 1.90 cm    LVOT peak VTI 11.30 cm    PW 1.10 0.6 - 1.1 cm    MV Peak A Riley 0.86 m/s    E wave decelartion time 167.17 msec    MV Peak E Riley 1.89 m/s    RA Major Axis 3.27 cm    RVDD 2.95 cm    Sinus 2.63 cm    TAPSE 2.11 cm    TR Max Riley 3.53 m/s    TDI LATERAL 0.12 m/s    TDI SEPTAL 0.09 m/s    LA WIDTH 5.88 cm    Ao root annulus 2.50 cm    AORTIC VALVE CUSP SEPERATION 1.87 cm    PV PEAK VELOCITY 0.81 cm/s    MV stenosis pressure 1/2 time 48.48 ms    LV Diastolic Volume 100.12 mL    LV Systolic Volume 46.93 mL    LVOT peak riley 0.52 m/s    Mr max riley 0.05 m/s    LV LATERAL E/E' RATIO 15.75 m/s    LV SEPTAL E/E' RATIO 21.00 m/s    FS 27 %    LA volume 185.34 cm3    LV mass 193.31 g    Left Ventricle Relative Wall Thickness 0.47 cm    AV valve area 2.45 cm2    AV Velocity Ratio 0.51     AV index (prosthetic) 0.86     MV valve area p 1/2 method 4.54 cm2    E/A ratio 2.20     Mean e' 0.11 m/s    LVOT area 2.8 cm2    LVOT stroke volume 32.02 cm3    AV peak gradient 4 mmHg    E/E' ratio 18.00 m/s    LV Systolic Volume Index 24.7 mL/m2    LV Diastolic Volume Index 52.67 mL/m2    LA Volume Index 97.5 mL/m2    LV Mass Index 102 g/m2    Triscuspid Valve Regurgitation Peak Gradient 50 mmHg    BSA 1.95 m2    Right Atrial Pressure (from IVC) 3 mmHg    TV rest pulmonary artery pressure 53 mmHg    Narrative    · Normal left ventricular systolic function. The estimated ejection   fraction is 60%.  · Concentric left ventricular hypertrophy.  · Severe mitral regurgitation.  · Moderate to severe tricuspid regurgitation.  · Mild-to-moderate aortic valve stenosis.  · Aortic valve area is 2.45 cm2; peak velocity is 1.02 m/s; mean gradient   is 2 mmHg.  · Severe left atrial enlargement.  · Grade III (severe) left ventricular diastolic dysfunction consistent   with restrictive physiology.  · Normal right ventricular systolic function.  · Mild pulmonic regurgitation.  · Normal central venous  pressure (3 mmHg).  · The estimated PA systolic pressure is 53 mmHg.  · Pulmonary hypertension present.     mitral valve prolaspe with severe regurgitation

## 2020-08-24 NOTE — ASSESSMENT & PLAN NOTE
Severe and likely cause of patient's symptoms  Plan for left heart catheterization tomorrow for ischemic workup

## 2020-08-24 NOTE — PLAN OF CARE
08/24/20 1350   Discharge Assessment   Assessment Type Discharge Planning Assessment   Confirmed/corrected address and phone number on facesheet? Yes   Assessment information obtained from? Patient   Prior to hospitilization cognitive status: Alert/Oriented   Prior to hospitalization functional status: Independent   Current cognitive status: Alert/Oriented   Current Functional Status: Independent   Facility Arrived From: home   Lives With child(luis), adult   Able to Return to Prior Arrangements yes   Is patient able to care for self after discharge? Yes   Who are your caregiver(s) and their phone number(s)? Carter Lorenzo [phone]   Patient's perception of discharge disposition home or selfcare   Readmission Within the Last 30 Days no previous admission in last 30 days   Patient currently being followed by outpatient case management? No   Patient currently receives any other outside agency services? No   Equipment Currently Used at Home none   Do you have any problems affording any of your prescribed medications? No   Is the patient taking medications as prescribed? yes   Does the patient have transportation home? Yes   Transportation Anticipated car, drives self;family or friend will provide   Dialysis Name and Scheduled days N/A   Does the patient receive services at the [facility]? No   Discharge Plan A Home with family   Discharge Plan B Home with family;Home Health   DME Needed Upon Discharge  none   Patient/Family in Agreement with Plan yes         [id number]  CAROL MARTINEZ  [address] [place] [address] [place] AT [facility] & [address] [place] [address]  Phone: [phone] Fax: [phone]

## 2020-08-24 NOTE — PROGRESS NOTES
Patient complaining of leg cramps. Dr. Perez notified and zanaflex 4mg ordered. While in the room giving the medication. Patient went into atrial fib 155. EKG done that verified atrial fib with RVR. Dr. Perez notified and orders given for lopressor IV and po. Patient states she feels her heart racing.

## 2020-08-24 NOTE — HPI
Patient with a past medical history of mitral valve prolapse with mitral regurgitation.  Is followed at [place].  States 8 years ago was referred to Dr. Waddell, but valvular regurgitation not bad enough at that time to do surgery.  Denies any chest pains.  Came in with worsening shortness of breath, orthopnea.  Admitted with acute decompensated heart failure.  X-ray showed bibasilar infiltrates.  EKG personally reviewed.  Shows sinus tachycardia.  No acute ischemic changes.  Initially on BiPAP, now on nasal cannula after IV Lasix.  Saturating well. Denied chest pain, fever, chills, nausea, vomiting, abdominal pain, cough         Patient being ruled out for COVID also.

## 2020-08-24 NOTE — ASSESSMENT & PLAN NOTE
Developed overnight  This is a new diagnosis  Currently on amiodarone infusion and heparin infusion for stroke prophylaxis

## 2020-08-24 NOTE — PLAN OF CARE
Remains on amio gtt. Unable to be cardioverted. Diuresed well. Up to commode. Anxious. Heparin gtt started. Ptt discussed with patient. Patient kept up to date on plan of care. Scheduled for left heart cath in AM. Initiated cath lab checklist and discussed with night RN.      Problem: Gas Exchange Impaired  Goal: Optimal Gas Exchange  Outcome: Ongoing, Progressing     Problem: Arrhythmia/Dysrhythmia  Goal: Normalized Cardiac Rhythm  Outcome: Unable to Meet, Plan Revised

## 2020-08-24 NOTE — PROGRESS NOTES
Third and final dose of lopressor IV given. HR still in 130's.   Spoke with Dr. Carrillo and amiodarone drip ordered. IV to right AC leaking and discontinued. 20g started to left AC with good blood return.    0030 Amiodarone bolus given and drip hung at 1mg/min.

## 2020-08-24 NOTE — TRANSFER OF CARE
"Anesthesia Transfer of Care Note    Patient: Fatou Lorenzo    Procedure(s) Performed: Procedure(s) (LRB):  Transesophageal echo (CHERY) intra-procedure log documentation (N/A)    Patient location: Cath Lab    Anesthesia Type: general    Transport from OR: Transported from OR on room air with adequate spontaneous ventilation    Post pain: adequate analgesia    Post assessment: no apparent anesthetic complications and tolerated procedure well    Post vital signs: stable    Level of consciousness: awake and alert    Nausea/Vomiting: no nausea/vomiting    Complications: none    Transfer of care protocol was followed      Last vitals:   Visit Vitals  BP 97/71   Pulse 107   Temp 36.5 °C (97.7 °F) (Oral)   Resp 18   Ht 5' 5" (1.651 m)   Wt 82.6 kg (182 lb 1.6 oz)   SpO2 (!) 94%   Breastfeeding No   BMI 30.30 kg/m²     "

## 2020-08-24 NOTE — PROGRESS NOTES
[facility]  Cardiology  Progress Note    Patient Name: Fatou Lorenzo  MRN: [id number]  Admission Date: 8/23/2020  Hospital Length of Stay: 1 days  Code Status: Full Code   Attending Physician: Ashley Naranjo MD   Primary Care Physician: Ludin Rivas MD  Expected Discharge Date:   Principal Problem:Acute respiratory failure with hypoxia    Subjective:       Interval History:  Patient went into AFib with RVR last night.  Hypertensive.  Heart rate around 120 beats per minute.  Blood pressure between  systolic.  Patient feels palpitations.  Denies chest pain.    Review of Systems   Constitution: Negative.   HENT: Negative.    Eyes: Negative.    Cardiovascular: Positive for dyspnea on exertion.   Respiratory: Positive for shortness of breath.    Endocrine: Negative.    Hematologic/Lymphatic: Negative.    Skin: Negative.    Musculoskeletal: Negative.    Gastrointestinal: Negative.    Genitourinary: Negative.    Neurological: Negative.    Psychiatric/Behavioral: Negative.    Allergic/Immunologic: Negative.      Objective:     Vital Signs (Most Recent):  Temp: 97.7 °F (36.5 °C) (08/24/20 0745)  Pulse: (!) 120 (08/24/20 0939)  Resp: 20 (08/24/20 0815)  BP: 97/71 (08/24/20 0939)  SpO2: 99 % (08/24/20 0815) Vital Signs (24h Range):  Temp:  [96.5 °F (35.8 °C)-98.1 °F (36.7 °C)] 97.7 °F (36.5 °C)  Pulse:  [] 120  Resp:  [13-32] 20  SpO2:  [95 %-100 %] 99 %  BP: ()/(39-90) 97/71     Weight: 82.6 kg (182 lb 1.6 oz)  Body mass index is 30.3 kg/m².     SpO2: 99 %  O2 Device (Oxygen Therapy): (P) nasal cannula w/ humidification      Intake/Output Summary (Last 24 hours) at 8/24/2020 1036  Last data filed at 8/24/2020 0800  Gross per 24 hour   Intake 1292.38 ml   Output 1576 ml   Net -283.62 ml       Lines/Drains/Airways     Drain                 Urethral Catheter 08/23/20 0310 Non-latex 16 Fr. 1 day          Peripheral Intravenous Line                 Peripheral IV - Single Lumen 08/23/20 22 G  Posterior;Right Forearm 1 day         Peripheral IV - Single Lumen 08/24/20 0051 20 G Left Antecubital less than 1 day                Physical Exam   Constitutional: She is oriented to person, place, and time. She appears well-developed and well-nourished.   HENT:   Head: Normocephalic.   Eyes: Pupils are equal, round, and reactive to light.   Neck: Normal range of motion. Neck supple.   Cardiovascular: Normal rate and regular rhythm.   Murmur heard.  High-pitched blowing holosystolic murmur is present at the apex.  Pulmonary/Chest: Effort normal. She has rales.   Abdominal: Soft. Normal appearance and bowel sounds are normal. There is no abdominal tenderness.   Musculoskeletal: Normal range of motion.   Neurological: She is alert and oriented to person, place, and time.   Skin: Skin is warm.   Psychiatric: She has a normal mood and affect.   Nursing note and vitals reviewed.      Significant Labs:   BMP:   Recent Labs   Lab 08/23/20  0609 08/24/20  0422   * 128*    138   K 4.7 5.3*    105   CO2 24 22*   BUN 27* 26*   CREATININE 1.2 1.1   CALCIUM 9.5 8.8   MG 2.2  --    , CMP   Recent Labs   Lab 08/23/20  0609 08/24/20  0422    138   K 4.7 5.3*    105   CO2 24 22*   * 128*   BUN 27* 26*   CREATININE 1.2 1.1   CALCIUM 9.5 8.8   PROT 7.2 7.0   ALBUMIN 4.0 3.6   BILITOT 1.0 1.0   ALKPHOS 80 73   AST 92* 48*   ALT 63* 46*   ANIONGAP 12 11   ESTGFRAFRICA 51* 57*   EGFRNONAA 44* 49*   , CBC   Recent Labs   Lab 08/23/20  0609   WBC 11.32   HGB 15.0   HCT 47.2      , INR No results for input(s): INR, PROTIME in the last 48 hours., Lipid Panel No results for input(s): CHOL, HDL, LDLCALC, TRIG, CHOLHDL in the last 48 hours., Troponin   Recent Labs   Lab 08/23/20  0609   TROPONINI 0.040*    and All pertinent lab results from the last 24 hours have been reviewed.    Significant Imaging: Echocardiogram:   Transthoracic echo (TTE) complete (Cupid Only):   Results for orders placed or  performed during the hospital encounter of 08/23/20   Echo Color Flow Doppler? Yes   Result Value Ref Range    Ascending aorta 2.11 cm    STJ 1.78 cm    AV mean gradient 2 mmHg    Ao peak riley 1.02 m/s    Ao VTI 13.09 cm    IVRT 65.74 msec    IVS 1.17 (A) 0.6 - 1.1 cm    LA size 5.65 cm    Left Atrium Major Axis 6.25 cm    Left Atrium Minor Axis 6.91 cm    LVIDD 4.66 3.5 - 6.0 cm    LVIDS 3.39 2.1 - 4.0 cm    LVOT diameter 1.90 cm    LVOT peak VTI 11.30 cm    PW 1.10 0.6 - 1.1 cm    MV Peak A Riley 0.86 m/s    E wave decelartion time 167.17 msec    MV Peak E Riley 1.89 m/s    RA Major Axis 3.27 cm    RVDD 2.95 cm    Sinus 2.63 cm    TAPSE 2.11 cm    TR Max Riley 3.53 m/s    TDI LATERAL 0.12 m/s    TDI SEPTAL 0.09 m/s    LA WIDTH 5.88 cm    Ao root annulus 2.50 cm    AORTIC VALVE CUSP SEPERATION 1.87 cm    PV PEAK VELOCITY 0.81 cm/s    MV stenosis pressure 1/2 time 48.48 ms    LV Diastolic Volume 100.12 mL    LV Systolic Volume 46.93 mL    LVOT peak riley 0.52 m/s    Mr max riley 0.05 m/s    LV LATERAL E/E' RATIO 15.75 m/s    LV SEPTAL E/E' RATIO 21.00 m/s    FS 27 %    LA volume 185.34 cm3    LV mass 193.31 g    Left Ventricle Relative Wall Thickness 0.47 cm    AV valve area 2.45 cm2    AV Velocity Ratio 0.51     AV index (prosthetic) 0.86     MV valve area p 1/2 method 4.54 cm2    E/A ratio 2.20     Mean e' 0.11 m/s    LVOT area 2.8 cm2    LVOT stroke volume 32.02 cm3    AV peak gradient 4 mmHg    E/E' ratio 18.00 m/s    LV Systolic Volume Index 24.7 mL/m2    LV Diastolic Volume Index 52.67 mL/m2    LA Volume Index 97.5 mL/m2    LV Mass Index 102 g/m2    Triscuspid Valve Regurgitation Peak Gradient 50 mmHg    BSA 1.95 m2    Right Atrial Pressure (from IVC) 3 mmHg    TV rest pulmonary artery pressure 53 mmHg    Narrative    · Normal left ventricular systolic function. The estimated ejection   fraction is 60%.  · Concentric left ventricular hypertrophy.  · Severe mitral regurgitation.  · Moderate to severe tricuspid  regurgitation.  · Mild-to-moderate aortic valve stenosis.  · Aortic valve area is 2.45 cm2; peak velocity is 1.02 m/s; mean gradient   is 2 mmHg.  · Severe left atrial enlargement.  · Grade III (severe) left ventricular diastolic dysfunction consistent   with restrictive physiology.  · Normal right ventricular systolic function.  · Mild pulmonic regurgitation.  · Normal central venous pressure (3 mmHg).  · The estimated PA systolic pressure is 53 mmHg.  · Pulmonary hypertension present.     mitral valve prolaspe with severe regurgitation      Assessment and Plan:     Brief HPI:     Severe mitral regurgitation  Marciano today.  Cardiac catheterization tomorrow.  Will refer to Dr. Waddell for mitral valve repair/replacement after initial workup complete.    Risks, benefits and alternatives of the catheterization procedure were discussed with the patient.The risks of coronary angiography include but are not limited to: bleeding, infection, death, heart attack, arrhythmia, kidney injury or failure, potential need for dialysis, allergic reactions, stroke, need for emergency surgery, hematoma, pseudoaneurysm etc.  Should stenting be indicated, the patient has agreed to dual anti-platelet therapy for 1-consecutive year with a drug-eluting stent and a minimum of 1-month with the use of a bare metal stent. Additionally, pt is aware that non-compliance is likely to result in stent clotting with heart attack, heart failure, and/or death  The risks of moderate sedation include hypotension, respiratory depression, arrhythmias, bronchospasm, and death. Informed consent was obtained and the  patient is agreeable to proceed with the procedure.       Atrial fibrillation with RVR  Patient with newly diagnosed atrial fibrillation with RVR.  Mild hypotension associated with it.  On amiodarone drip.  Will start heparin drip.  I talked in detail with the patient about various options including cardioversion after the MARCIANO if there is no left  atrial appendage clot.  Patient states that she has felt her heart beating fast in the past also but it usually goes away after she takes an extra dose of metoprolol.  It has been going on and off in the past.  We discussed of various complications of cardioversion including stroke.  Patient understands and is willing to proceed with cardioversion after the CHERY today.    Moderate to severe mitral regurgitation  As above    SOB (shortness of breath)  Caused by decompensated heart failure    Acute on chronic diastolic congestive heart failure  Likely underlying causes severe mitral regurgitation.  Had a detailed discussion with the patient.  Will do further evaluation with CHERY tomorrow.  Once euvolemic, will do cardiac catheterization.  After initial workup complete, and patient's heart failure compensated, will refer to Dr. Waddell for evaluation of mitral valve surgery.  Continue IV diuresis.        VTE Risk Mitigation (From admission, onward)         Ordered     heparin 25,000 units in dextrose 5% (100 units/ml) IV bolus from bag INITIAL BOLUS  Once     Question:  Heparin Infusion Adjustment (DO NOT MODIFY ANSWER)  Answer:  \\[email or web address]\epic\Images\Pharmacy\HeparinInfusions\heparin LOW INTENSITY nomogram for [facility] [id number]    08/24/20 1020     heparin 25,000 units in dextrose 5% 250 mL (100 units/mL) infusion LOW INTENSITY nomogram - [facility]  Continuous     Question:  Heparin Infusion Adjustment (DO NOT MODIFY ANSWER)  Answer:  \\[email or web address]\epic\Images\Pharmacy\HeparinInfusions\heparin LOW INTENSITY nomogram for [facility] [id number]    08/24/20 1020     heparin 25,000 units in dextrose 5% (100 units/ml) IV bolus from bag - ADDITIONAL PRN BOLUS - 60 units/kg  As needed (PRN)     Question:  Heparin Infusion Adjustment (DO NOT MODIFY ANSWER)  Answer:  \[facility]\epic\Images\Pharmacy\HeparinInfusions\heparin LOW INTENSITY nomogram for [facility] [id number].pdf    08/24/20 1020     heparin 25,000 units in dextrose 5% (100 units/ml) IV  bolus from bag - ADDITIONAL PRN BOLUS - 30 units/kg  As needed (PRN)     Question:  Heparin Infusion Adjustment (DO NOT MODIFY ANSWER)  Answer:  [email or web address]\epic\Images\Pharmacy\HeparinInfusions\heparin LOW INTENSITY nomogram for [facility] [id number]    08/24/20 1020     IP VTE HIGH RISK PATIENT  Once      08/23/20 0452     Place sequential compression device  Until discontinued      08/23/20 0452                Marlee Carrillo MD  Cardiology  [facility]    Critical Care Time: 40 minutes     Critical care was time spent personally by me on the following activities: development of treatment plan with patient or surrogate and bedside caregivers, discussions with consultants, evaluation of patient's response to treatment, examination of patient, ordering and performing treatments and interventions, ordering and review of laboratory studies, ordering and review of radiographic studies, pulse oximetry, re-evaluation of patient's condition. This critical care time did not overlap with that of any other provider or involve time for any procedures.

## 2020-08-24 NOTE — ANESTHESIA PREPROCEDURE EVALUATION
08/24/2020  Fatou Lorenzo is a 75 y.o., female.  Pre-operative evaluation for Procedure(s) (LRB):  Transesophageal echo (CHERY) intra-procedure log documentation (N/A)    Admitted 8/23 for SOB found to be in AFIB with RVR.   Admitted to ICU in diastolic HF in  atrial fib  sustained and respiratory failure on BIPAP initially and now 10L/min HFNC. Continued on amiodarone drip 0.5mg/min, now rate is 105-120. TTE MVP with severe MR. Cardiology following and plans CHERY today.    Vitals:    08/24/20 0730 08/24/20 0745 08/24/20 0800 08/24/20 0815   BP: 103/81  112/65    Pulse: 105 104 106 110   Resp: 19 13 (!) 23 20   Temp:  36.5 °C (97.7 °F)     TempSrc:  Oral     SpO2: 100% 100% 100% 99%   Weight:       Height:           Patient Active Problem List   Diagnosis    Nuclear sclerosis - Both Eyes    Dry eyes - Both Eyes    Refractive error    Acute on chronic diastolic congestive heart failure    SOB (shortness of breath)    Moderate to severe mitral regurgitation    Severe tricuspid valve regurgitation    Acute respiratory failure with hypoxia       Review of patient's allergies indicates:  No Known Allergies    No current facility-administered medications on file prior to encounter.      Current Outpatient Medications on File Prior to Encounter   Medication Sig Dispense Refill    albuterol (PROVENTIL/VENTOLIN HFA) 90 mcg/actuation inhaler Inhale 2 puffs into the lungs every 4 (four) hours as needed for Wheezing or Shortness of Breath. Rescue 6.7 g 0    losartan (COZAAR) 50 MG tablet Take 50 mg by mouth once daily.      methylPREDNISolone (MEDROL DOSEPACK) 4 mg tablet use as directed on package until gone 1 Package 0    metoprolol tartrate (LOPRESSOR) 50 MG tablet Take 50 mg by mouth 2 (two) times daily.      pravastatin (PRAVACHOL) 80 MG tablet Take 80 mg by mouth once daily.         Past Surgical  History:   Procedure Laterality Date    ANKLE SURGERY      lipoma removal         Social History     Socioeconomic History    Marital status:      Spouse name: Not on file    Number of children: Not on file    Years of education: Not on file    Highest education level: Not on file   Occupational History    Not on file   Social Needs    Financial resource strain: Not on file    Food insecurity     Worry: Not on file     Inability: Not on file    Transportation needs     Medical: Not on file     Non-medical: Not on file   Tobacco Use    Smoking status: Never Smoker    Smokeless tobacco: Never Used   Substance and Sexual Activity    Alcohol use: No    Drug use: No    Sexual activity: Not Currently   Lifestyle    Physical activity     Days per week: Not on file     Minutes per session: Not on file    Stress: Not on file   Relationships    Social connections     Talks on phone: Not on file     Gets together: Not on file     Attends [employer] service: Not on file     Active member of club or organization: Not on file     Attends meetings of clubs or organizations: Not on file     Relationship status: Not on file   Other Topics Concern    Not on file   Social History Narrative    Not on file         CBC:   Recent Labs     08/23/20  0609   WBC 11.32   RBC 5.01   HGB 15.0   HCT 47.2      MCV 94   MCH 29.9   MCHC 31.8*       CMP:   Recent Labs     08/23/20  0609 08/24/20  0422    138   K 4.7 5.3*    105   CO2 24 22*   BUN 27* 26*   CREATININE 1.2 1.1   * 128*   MG 2.2  --    PHOS 4.4  --    CALCIUM 9.5 8.8   ALBUMIN 4.0 3.6   PROT 7.2 7.0   ALKPHOS 80 73   ALT 63* 46*   AST 92* 48*   BILITOT 1.0 1.0       INR  No results for input(s): PT, INR, PROTIME, APTT in the last 72 hours.        Diagnostic Studies:      EKG:  Vent. Rate : 151 BPM     Atrial Rate : 163 BPM      P-R Int : 000 ms          QRS Dur : 092 ms       QT Int : 254 ms       P-R-T Axes : 000 052 250 degrees       QTc Int : 402 ms     Atrial fibrillation with rapid ventricular response   Marked ST abnormality, possible inferior subendocardial injury   Abnormal ECG   When compared with ECG of 23-AUG-2020 01:34,   Significant changes have occurred     2D Echo:  TTE 8/23/20  · Normal left ventricular systolic function. The estimated ejection fraction is 60%.  · Concentric left ventricular hypertrophy.  · Severe mitral regurgitation.  · Moderate to severe tricuspid regurgitation.  · Severe left atrial enlargement.  · Grade III (severe) left ventricular diastolic dysfunction consistent with restrictive physiology.  · Normal right ventricular systolic function.  · Mild pulmonic regurgitation.  · Normal central venous pressure (3 mmHg).  · The estimated PA systolic pressure is 53 mmHg.  · Pulmonary hypertension present.  · Mild aortic regurgitation.    Anesthesia Evaluation    I have reviewed the Patient Summary Reports.    I have reviewed the Nursing Notes.       Review of Systems  Anesthesia Hx:  No problems with previous Anesthesia  History of prior surgery of interest to airway management or planning:  Denies Personal Hx of Anesthesia complications.   Social:  Non-Smoker    Cardiovascular:   Exercise tolerance: good Denies Pacemaker. Valvular problems/Murmurs (severe mitral regurgitation; mild aortic regurg), MR, AI Dysrhythmias atrial fibrillation CHF hyperlipidemia Echo 8/23/2020  · Normal left ventricular systolic function. The estimated ejection fraction is 60%.  · Concentric left ventricular hypertrophy.  · Severe mitral regurgitation.  · Moderate to severe tricuspid regurgitation.  · Severe left atrial enlargement.  · Grade III (severe) left ventricular diastolic dysfunction consistent with restrictive physiology.  · Normal right ventricular systolic function.  · Mild pulmonic regurgitation.  · Normal central venous pressure (3 mmHg).  · The estimated PA systolic pressure is 53 mmHg.  · Pulmonary hypertension  present.  · Mild aortic regurgitation.     mitral valve prolaspe with severe regurgitation    Pulmonary:   Denies Pneumonia Asthma Shortness of breath Denies Recent URI.  Denies Sleep Apnea. Moderately severe Pulmonary HTN (PA pressure=53)   Renal/:  Renal/ Normal     Hepatic/GI:  Hepatic/GI Normal    Neurological:  Neurology Normal    Endocrine:  Endocrine Normal        Physical Exam  General:  Obesity    Airway/Jaw/Neck:  AIRWAY FINDINGS: Normal      Chest/Lungs:  Chest/Lungs Clear    Heart/Vascular:  Heart Findings: Rate: Tachycardia  Rhythm: Irregularly Irregular        Mental Status:  Mental Status Findings: Normal        Anesthesia Plan  Type of Anesthesia, risks & benefits discussed:  Anesthesia Type:  general  Patient's Preference:   Intra-op Monitoring Plan: standard ASA monitors  Intra-op Monitoring Plan Comments:   Post Op Pain Control Plan:   Post Op Pain Control Plan Comments:   Induction:   IV  Beta Blocker:  Patient is on a Beta-Blocker and has received one dose within the past 24 hours (No further documentation required).       Informed Consent: Patient understands risks and agrees with Anesthesia plan.  Questions answered. Anesthesia consent signed with patient.  ASA Score: 3     Day of Surgery Review of History & Physical:  There are no significant changes.  H&P update referred to the provider.         Ready For Surgery From Anesthesia Perspective.

## 2020-08-24 NOTE — ASSESSMENT & PLAN NOTE
Secondary to cardiogenic pulmonary edema in setting of grade 3 diastolic dysfunction and severe mitral valve regurgitation.  Patient is on furosemide 60 mg IV b.i.d. which will continue.  Also on amiodarone infusion for rate and rhythm control for atrial fibrillation with RVR.  Stable on low-flow nasal cannula  Transesophageal echocardiogram showed possible thrombus to left atrium  On heparin infusion  Plan for left heart catheterization in a.m.  Will discuss with cardiology if transfer to [facility] [place] is warranted

## 2020-08-24 NOTE — PLAN OF CARE
Remains in ICU. Had complaints of leg cramps and zanaflex given. Then patient went into atrial fib  sustained. Lopressor IV given x3 and rate remained in 140's. Then started on amiodarone drip, now rate is 105-120. NPO after midnight for CHERY this am. Yoo with good output following lasix but BP dropped after other meds given. NS bolus given and improved BP some. Patient mildly anxious with changes occurring but relieved to be covid negative.

## 2020-08-24 NOTE — CARE UPDATE
[facility]  ICU Shift Summary  Date: 8/24/2020      COVID Test: (--)  Isolation: No active isolations     Prehospitalization: Home  Admit Date / LOS : 8/23/2020/ 1 days    Diagnosis: Acute respiratory failure with hypoxia    Consults:        Active: Cardio       Needed: N/A     Code Status: Full Code   Advanced Directive: Not Received    LDA: Yoo and PIV       Central Lines/Site/Justification:Patient Does Not Have Central Line       Urinary Cath/Order/Justification:Critically Ill in the ICU and requiring intensive monitoring    Vasopressors/Infusions:    amiodarone in dextrose 5% 1 mg/min (08/24/20 0400)    amiodarone in dextrose 5%            GOALS: Volume/ Hemodynamic: N/A                     RASS: 0  alert and calm    Pain Management: PO       Pain Controlled: yes     Rhythm: A-Fib    Respiratory Device: HFNC    Oxygen Concentration (%):  [60] 60             Most Recent SBT/ SAT: N/A       MOVE Screen: PASS    VTE Prophylaxis: Pharm and Mechanical  Mobility: Bedrest  Stress Ulcer Prophylaxis: No    Dietary: NPO  Tolerance: yes  /  Advancement: no    I & O (24h):    Intake/Output Summary (Last 24 hours) at 8/24/2020 0424  Last data filed at 8/24/2020 0400  Gross per 24 hour   Intake 1387.67 ml   Output 2596 ml   Net -1208.33 ml        Restraints: No    Significant Dates:  Post Op Date: N/A  Rescue Date: N/A  Imaging/ Diagnostics: N/A    Noteworthy Labs:      CBC/Anemia Labs: Coags:    Recent Labs   Lab 08/23/20  0609   WBC 11.32   HGB 15.0   HCT 47.2      MCV 94   RDW 14.2    No results for input(s): PT, INR, APTT in the last 168 hours.     Chemistries:   Recent Labs   Lab 08/23/20  0609      K 4.7      CO2 24   BUN 27*   CREATININE 1.2   CALCIUM 9.5   PROT 7.2   BILITOT 1.0   ALKPHOS 80   ALT 63*   AST 92*   MG 2.2   PHOS 4.4        Cardiac Enzymes: Ejection Fractions:    Recent Labs     08/23/20  0609   TROPONINI 0.040*    No results found for: EF     POCT Glucose:  HbA1c:    No results for input(s): POCTGLUCOSE in the last 168 hours. No results found for: HGBA1C        ICU LOS 23h  Level of Care: Critical Care    Shift Summary/Plan for the shift: CHERY today. Patient went in to atrial fib last night and amiodarone drip started. Lasix 60mg given and patient put out greater than 1000. BP low and NS bolus 500ml given and BP improved.

## 2020-08-24 NOTE — ASSESSMENT & PLAN NOTE
Patient with newly diagnosed atrial fibrillation with RVR.  Mild hypotension associated with it.  On amiodarone drip.  Will start heparin drip.  I talked in detail with the patient about various options including cardioversion after the CHERY if there is no left atrial appendage clot.  Patient states that she has felt her heart beating fast in the past also but it usually goes away after she takes an extra dose of metoprolol.  It has been going on and off in the past.  We discussed of various complications of cardioversion including stroke.  Patient understands and is willing to proceed with cardioversion after the CHERY today.

## 2020-08-24 NOTE — ASSESSMENT & PLAN NOTE
Likely due to acute exacerbation of diastolic HF in setting of significant mitral valve regurgitation. Echocardiogram confirms severity of regurgitation. Responding to IV furosemide and now on high flow NC.   Low sodium diet  Cardiology consulted for co-management  CMP in AM

## 2020-08-24 NOTE — SUBJECTIVE & OBJECTIVE
Interval History:  Patient wean down to low-flow.     Review of Systems   Constitutional: Negative.    Respiratory: Positive for shortness of breath.    Cardiovascular: Negative.    Gastrointestinal: Negative.      Objective:     Vital Signs (Most Recent):  Temp: 98.2 °F (36.8 °C) (08/24/20 1600)  Pulse: 91 (08/24/20 1645)  Resp: 16 (08/24/20 1645)  BP: 111/88 (08/24/20 1633)  SpO2: 97 % (08/24/20 1645) Vital Signs (24h Range):  Temp:  [96.5 °F (35.8 °C)-98.2 °F (36.8 °C)] 98.2 °F (36.8 °C)  Pulse:  [] 91  Resp:  [13-28] 16  SpO2:  [94 %-100 %] 97 %  BP: ()/(39-92) 111/88     Weight: 82.6 kg (182 lb)  Body mass index is 30.29 kg/m².    Intake/Output Summary (Last 24 hours) at 8/24/2020 1738  Last data filed at 8/24/2020 1600  Gross per 24 hour   Intake 1882.19 ml   Output 1848 ml   Net 34.19 ml      Physical Exam  Vitals signs and nursing note reviewed.   Cardiovascular:      Rate and Rhythm: Normal rate and regular rhythm.      Heart sounds: Murmur present.   Pulmonary:      Effort: Pulmonary effort is normal.      Breath sounds: Normal breath sounds.      Comments: Breathing comfortably on supplemental O2  Abdominal:      General: Abdomen is flat.   Musculoskeletal:      Right lower leg: Edema present.      Left lower leg: Edema present.      Comments: Trace pitting edema   Skin:     General: Skin is warm and dry.      Capillary Refill: Capillary refill takes less than 2 seconds.   Neurological:      Mental Status: She is alert and oriented to person, place, and time.   Psychiatric:         Mood and Affect: Mood normal.         Behavior: Behavior normal.         Thought Content: Thought content normal.         Judgment: Judgment normal.         Significant Labs: All pertinent labs within the past 24 hours have been reviewed.    Significant Imaging: I have reviewed all pertinent imaging results/findings within the past 24 hours.  I have reviewed and interpreted all pertinent imaging results/findings  within the past 24 hours.

## 2020-08-24 NOTE — H&P
[facility] Hospital Medicine  History & Physical    Patient Name: Fatou Lorenzo  MRN: [id number]  Admission Date: 8/23/2020  Attending Physician: Ashley Naranjo MD   Primary Care Provider: Ludin Rivas MD         Patient information was obtained from patient and ER records.     Subjective:     Principal Problem:Acute respiratory failure with hypoxia    Chief Complaint:   Chief Complaint   Patient presents with    Shortness of Breath     states she went to bed at 1130 pm last night and developed difficulty breathing and chest pain. states her shortness of breath has worsened since onset. denies fever/cough        HPI: Patient with a past medical history of mitral valve prolapse with mitral regurgitation.  Is followed at [place].  States 8 years ago was referred to Dr. Waddell, but valvular regurgitation not bad enough at that time to do surgery.  Denies any chest pains.  Came in with worsening shortness of breath, orthopnea.  Admitted with acute decompensated heart failure.  X-ray showed bibasilar infiltrates.  EKG personally reviewed.  Shows sinus tachycardia.  No acute ischemic changes.  Initially on BiPAP, now on nasal cannula after IV Lasix.  Saturating well. Denied chest pain, fever, chills, nausea, vomiting, abdominal pain, cough         Patient being ruled out for COVID also.    Past Medical History:   Diagnosis Date    Cataract     Glaucoma     Heart valve problem     Hyperlipidemia        Past Surgical History:   Procedure Laterality Date    ANKLE SURGERY      lipoma removal         Review of patient's allergies indicates:  No Known Allergies    No current facility-administered medications on file prior to encounter.      Current Outpatient Medications on File Prior to Encounter   Medication Sig    albuterol (PROVENTIL/VENTOLIN HFA) 90 mcg/actuation inhaler Inhale 2 puffs into the lungs every 4 (four) hours as needed for Wheezing or Shortness of Breath. Rescue     losartan (COZAAR) 50 MG tablet Take 50 mg by mouth once daily.    methylPREDNISolone (MEDROL DOSEPACK) 4 mg tablet use as directed on package until gone    metoprolol tartrate (LOPRESSOR) 50 MG tablet Take 50 mg by mouth 2 (two) times daily.    pravastatin (PRAVACHOL) 80 MG tablet Take 80 mg by mouth once daily.     Family History     Problem Relation (Age of Onset)    Cancer Mother    Cataracts Sister    No Known Problems Father, Brother, Maternal Aunt, Maternal Uncle, Paternal Aunt, Paternal Uncle, Maternal Grandmother, Maternal Grandfather, Paternal Grandmother, Paternal Grandfather        Tobacco Use    Smoking status: Never Smoker    Smokeless tobacco: Never Used   Substance and Sexual Activity    Alcohol use: No    Drug use: No    Sexual activity: Not Currently     Review of Systems   Constitutional: Negative.    HENT: Negative.    Eyes: Negative.    Respiratory: Positive for shortness of breath.    Cardiovascular: Negative.    Gastrointestinal: Negative.    Endocrine: Negative.    Genitourinary: Negative.    Musculoskeletal: Negative.    Skin: Negative.    Neurological: Negative.    Hematological: Negative.    Psychiatric/Behavioral: Negative.      Objective:     Vital Signs (Most Recent):  Temp: 98.1 °F (36.7 °C) (08/23/20 1915)  Pulse: 95 (08/23/20 2000)  Resp: (!) 22 (08/23/20 2000)  BP: 125/60 (08/23/20 2000)  SpO2: 100 % (08/23/20 2000) Vital Signs (24h Range):  Temp:  [44.2 °F (6.8 °C)-98.1 °F (36.7 °C)] 98.1 °F (36.7 °C)  Pulse:  [] 95  Resp:  [18-38] 22  SpO2:  [91 %-100 %] 100 %  BP: (100-186)/(60-95) 125/60     Weight: 82.6 kg (182 lb 1.6 oz)  Body mass index is 30.3 kg/m².    Physical Exam  Vitals signs and nursing note reviewed.   HENT:      Head: Normocephalic and atraumatic.      Nose: Nose normal.   Cardiovascular:      Rate and Rhythm: Normal rate and regular rhythm.      Heart sounds: Murmur present.   Pulmonary:      Effort: Pulmonary effort is normal.      Breath sounds:  Normal breath sounds.   Abdominal:      General: Abdomen is flat.   Musculoskeletal:      Right lower leg: Edema present.      Left lower leg: Edema present.      Comments: Trace pitting edema   Skin:     General: Skin is warm and dry.      Capillary Refill: Capillary refill takes less than 2 seconds.   Neurological:      Mental Status: She is alert and oriented to person, place, and time.   Psychiatric:         Mood and Affect: Mood normal.         Behavior: Behavior normal.         Thought Content: Thought content normal.         Judgment: Judgment normal.             Significant Labs: All pertinent labs within the past 24 hours have been reviewed.    Significant Imaging: I have reviewed all pertinent imaging results/findings within the past 24 hours.  I have reviewed and interpreted all pertinent imaging results/findings within the past 24 hours.    Assessment/Plan:     * Acute respiratory failure with hypoxia  Likely due to acute exacerbation of diastolic HF in setting of significant mitral valve regurgitation. Echocardiogram confirms severity of regurgitation. Responding to IV furosemide and now on high flow NC.   Low sodium diet  Cardiology consulted for co-management  CMP in AM      Severe tricuspid valve regurgitation  As above      Moderate to severe mitral regurgitation  As above      SOB (shortness of breath)  As above      Acute on chronic diastolic congestive heart failure  As above        VTE Risk Mitigation (From admission, onward)         Ordered     heparin (porcine) injection 5,000 Units  Every 12 hours      08/23/20 2042     IP VTE HIGH RISK PATIENT  Once      08/23/20 0452     Place sequential compression device  Until discontinued      08/23/20 0452              Critical care time spent on the evaluation and treatment of severe organ dysfunction, review of pertinent labs and imaging studies, discussions with consulting providers and discussions with patient/family: > 45 minutes.     COVID  negative. Stop precautions    Ashley Segura MD  Department of Hospital Medicine   [facility]

## 2020-08-24 NOTE — PROGRESS NOTES
[facility] Medicine  Progress Note    Patient Name: Fatou Lorenzo  MRN: [id number]  Patient Class: IP- Inpatient   Admission Date: 8/23/2020  Length of Stay: 1 days  Attending Physician: Ashley Naranjo MD  Primary Care Provider: Ludin Rivas MD        Subjective:     Principal Problem:Acute respiratory failure with hypoxia        HPI:  Patient with a past medical history of mitral valve prolapse with mitral regurgitation.  Is followed at [facility].  States 8 years ago was referred to Dr. Waddell, but valvular regurgitation not bad enough at that time to do surgery.  Denies any chest pains.  Came in with worsening shortness of breath, orthopnea.  Admitted with acute decompensated heart failure.  X-ray showed bibasilar infiltrates.  EKG personally reviewed.  Shows sinus tachycardia.  No acute ischemic changes.  Initially on BiPAP, now on nasal cannula after IV Lasix.  Saturating well. Denied chest pain, fever, chills, nausea, vomiting, abdominal pain, cough         Patient being ruled out for COVID also.    Overview/Hospital Course:  Ms Lorenzo presented with acute respiratory failure with hypoxia secondary to cardiogenic edema.  Patient quickly improved with IV furosemide supporting diagnosis.  However she did require high-flow type nasal cannula once taken of NIPPV.  Transthoracic echocardiogram obtained showed severe mitral valve regurgitation and moderate to severe tricuspid valve regurgitation. Also with grade 3 diastolic dysfunction and severely enlarged left atrium.  No pulmonary hypertension, pulmonic valve regurgitation or right-sided heart failure.  Cardiology consulted.  Overnight patient developed atrial fibrillation with RVR therefore placed on amiodarone infusion.  Underwent transesophageal ECHO where a possible left atrial thrombus was identified.  Patient was initiated on heparin infusion for both stroke prophylaxis and possible thrombus.  Plan for left heart  catheterization on 08/25.  Has continued on IV furosemide    Interval History:  Patient wean down to low-flow.     Review of Systems   Constitutional: Negative.    Respiratory: Positive for shortness of breath.    Cardiovascular: Negative.    Gastrointestinal: Negative.      Objective:     Vital Signs (Most Recent):  Temp: 98.2 °F (36.8 °C) (08/24/20 1600)  Pulse: 91 (08/24/20 1645)  Resp: 16 (08/24/20 1645)  BP: 111/88 (08/24/20 1633)  SpO2: 97 % (08/24/20 1645) Vital Signs (24h Range):  Temp:  [96.5 °F (35.8 °C)-98.2 °F (36.8 °C)] 98.2 °F (36.8 °C)  Pulse:  [] 91  Resp:  [13-28] 16  SpO2:  [94 %-100 %] 97 %  BP: ()/(39-92) 111/88     Weight: 82.6 kg (182 lb)  Body mass index is 30.29 kg/m².    Intake/Output Summary (Last 24 hours) at 8/24/2020 1738  Last data filed at 8/24/2020 1600  Gross per 24 hour   Intake 1882.19 ml   Output 1848 ml   Net 34.19 ml      Physical Exam  Vitals signs and nursing note reviewed.   Cardiovascular:      Rate and Rhythm: Normal rate and regular rhythm.      Heart sounds: Murmur present.   Pulmonary:      Effort: Pulmonary effort is normal.      Breath sounds: Normal breath sounds.      Comments: Breathing comfortably on supplemental O2  Abdominal:      General: Abdomen is flat.   Musculoskeletal:      Right lower leg: Edema present.      Left lower leg: Edema present.      Comments: Trace pitting edema   Skin:     General: Skin is warm and dry.      Capillary Refill: Capillary refill takes less than 2 seconds.   Neurological:      Mental Status: She is alert and oriented to person, place, and time.   Psychiatric:         Mood and Affect: Mood normal.         Behavior: Behavior normal.         Thought Content: Thought content normal.         Judgment: Judgment normal.         Significant Labs: All pertinent labs within the past 24 hours have been reviewed.    Significant Imaging: I have reviewed all pertinent imaging results/findings within the past 24 hours.  I have  reviewed and interpreted all pertinent imaging results/findings within the past 24 hours.      Assessment/Plan:      * Acute respiratory failure with hypoxia  Secondary to cardiogenic pulmonary edema in setting of grade 3 diastolic dysfunction and severe mitral valve regurgitation.  Patient is on furosemide 60 mg IV b.i.d. which will continue.  Also on amiodarone infusion for rate and rhythm control for atrial fibrillation with RVR.  Stable on low-flow nasal cannula  Transesophageal echocardiogram showed possible thrombus to left atrium  On heparin infusion  Plan for left heart catheterization in a.m.  Will discuss with cardiology if transfer to [facility] is warranted      Left atrial thrombus  Per transesophageal echo      Severe mitral regurgitation  Severe and likely cause of patient's symptoms  Plan for left heart catheterization tomorrow for ischemic workup        Atrial fibrillation with RVR  Developed overnight  This is a new diagnosis  Currently on amiodarone infusion and heparin infusion for stroke prophylaxis      Moderate tricuspid regurgitation  Moderate, per transesophageal echocardiogram      SOB (shortness of breath)  As above      Acute on chronic diastolic congestive heart failure  As above      VTE Risk Mitigation (From admission, onward)         Ordered     heparin 25,000 units in dextrose 5% 250 mL (100 units/mL) infusion LOW INTENSITY nomogram - [facility]  Continuous     Question:  Heparin Infusion Adjustment (DO NOT MODIFY ANSWER)  Answer:  \[email or web address]\[employer]\Images\Pharmacy\HeparinInfusions\heparin LOW INTENSITY nomogram for [facility] [id number].pdf    08/24/20 1020     heparin 25,000 units in dextrose 5% (100 units/ml) IV bolus from bag - ADDITIONAL PRN BOLUS - 60 units/kg  As needed (PRN)     Question:  Heparin Infusion Adjustment (DO NOT MODIFY ANSWER)  Answer:  \\[email or web address]\epic\Images\Pharmacy\HeparinInfusions\heparin LOW INTENSITY nomogram for [facility] [id number].pdf    08/24/20 1020     heparin 25,000 units  in dextrose 5% (100 units/ml) IV bolus from bag - ADDITIONAL PRN BOLUS - 30 units/kg  As needed (PRN)     Question:  Heparin Infusion Adjustment (DO NOT MODIFY ANSWER)  Answer:  \\[email or web address]\epic\Images\Pharmacy\HeparinInfusions\heparin LOW INTENSITY nomogram for [facility] [id number].pdf    08/24/20 1020     IP VTE HIGH RISK PATIENT  Once      08/23/20 0452     Place sequential compression device  Until discontinued      08/23/20 0452                Discharge Planning   OLE:      Code Status: Full Code   Is the patient medically ready for discharge?:     Reason for patient still in hospital (select all that apply): Treatment  Discharge Plan A: Home with family        Assessment and plan discussed with patient at bedside who currently has capacity to make own medical decisions.    Critical care time spent on the evaluation and treatment of severe organ dysfunction, review of pertinent labs and imaging studies, discussions with consulting providers and discussions with patient/family: > 45 minutes.      Ashley Segura MD  Department of Hospital Medicine   [facility]

## 2020-08-24 NOTE — HOSPITAL COURSE
Ms Lorenzo presented with acute respiratory failure with hypoxia secondary to cardiogenic edema.  Patient quickly improved with IV furosemide supporting diagnosis.  However she did require high-flow type nasal cannula once taken off NIPPV.  Transthoracic echocardiogram obtained showed severe mitral valve regurgitation and moderate to severe tricuspid valve regurgitation. Also with grade 3 diastolic dysfunction and severely enlarged left atrium.  No pulmonary hypertension, pulmonic valve regurgitation or right-sided heart failure.  Cardiology consulted.  Patient developed atrial fibrillation with RVR, therefore placed on amiodarone infusion.  Underwent transesophageal ECHO where a possible left atrial thrombus was identified.  Patient was initiated on heparin infusion for both stroke prophylaxis and possible thrombus.  Left heart catheterization on 8/25/20 showed non-obstructive CAD. Patient continue to improve with diuresis and IV amiodarone continued post cath with spontaneous conversion to NSR.

## 2020-08-24 NOTE — ASSESSMENT & PLAN NOTE
Marciano today.  Cardiac catheterization tomorrow.  Will refer to Dr. Waddell for mitral valve repair/replacement after initial workup complete.    Risks, benefits and alternatives of the catheterization procedure were discussed with the patient.The risks of coronary angiography include but are not limited to: bleeding, infection, death, heart attack, arrhythmia, kidney injury or failure, potential need for dialysis, allergic reactions, stroke, need for emergency surgery, hematoma, pseudoaneurysm etc.  Should stenting be indicated, the patient has agreed to dual anti-platelet therapy for 1-consecutive year with a drug-eluting stent and a minimum of 1-month with the use of a bare metal stent. Additionally, pt is aware that non-compliance is likely to result in stent clotting with heart attack, heart failure, and/or death  The risks of moderate sedation include hypotension, respiratory depression, arrhythmias, bronchospasm, and death. Informed consent was obtained and the  patient is agreeable to proceed with the procedure.

## 2020-08-24 NOTE — SUBJECTIVE & OBJECTIVE
Past Medical History:   Diagnosis Date    Cataract     Glaucoma     Heart valve problem     Hyperlipidemia        Past Surgical History:   Procedure Laterality Date    ANKLE SURGERY      lipoma removal         Review of patient's allergies indicates:  No Known Allergies    No current facility-administered medications on file prior to encounter.      Current Outpatient Medications on File Prior to Encounter   Medication Sig    albuterol (PROVENTIL/VENTOLIN HFA) 90 mcg/actuation inhaler Inhale 2 puffs into the lungs every 4 (four) hours as needed for Wheezing or Shortness of Breath. Rescue    losartan (COZAAR) 50 MG tablet Take 50 mg by mouth once daily.    methylPREDNISolone (MEDROL DOSEPACK) 4 mg tablet use as directed on package until gone    metoprolol tartrate (LOPRESSOR) 50 MG tablet Take 50 mg by mouth 2 (two) times daily.    pravastatin (PRAVACHOL) 80 MG tablet Take 80 mg by mouth once daily.     Family History     Problem Relation (Age of Onset)    Cancer Mother    Cataracts Sister    No Known Problems Father, Brother, Maternal Aunt, Maternal Uncle, Paternal Aunt, Paternal Uncle, Maternal Grandmother, Maternal Grandfather, Paternal Grandmother, Paternal Grandfather        Tobacco Use    Smoking status: Never Smoker    Smokeless tobacco: Never Used   Substance and Sexual Activity    Alcohol use: No    Drug use: No    Sexual activity: Not Currently     Review of Systems   Constitutional: Negative.    HENT: Negative.    Eyes: Negative.    Respiratory: Positive for shortness of breath.    Cardiovascular: Negative.    Gastrointestinal: Negative.    Endocrine: Negative.    Genitourinary: Negative.    Musculoskeletal: Negative.    Skin: Negative.    Neurological: Negative.    Hematological: Negative.    Psychiatric/Behavioral: Negative.      Objective:     Vital Signs (Most Recent):  Temp: 98.1 °F (36.7 °C) (08/23/20 1915)  Pulse: 95 (08/23/20 2000)  Resp: (!) 22 (08/23/20 2000)  BP: 125/60 (08/23/20  2000)  SpO2: 100 % (08/23/20 2000) Vital Signs (24h Range):  Temp:  [44.2 °F (6.8 °C)-98.1 °F (36.7 °C)] 98.1 °F (36.7 °C)  Pulse:  [] 95  Resp:  [18-38] 22  SpO2:  [91 %-100 %] 100 %  BP: (100-186)/(60-95) 125/60     Weight: 82.6 kg (182 lb 1.6 oz)  Body mass index is 30.3 kg/m².    Physical Exam  Vitals signs and nursing note reviewed.   HENT:      Head: Normocephalic and atraumatic.      Nose: Nose normal.   Cardiovascular:      Rate and Rhythm: Normal rate and regular rhythm.      Heart sounds: Murmur present.   Pulmonary:      Effort: Pulmonary effort is normal.      Breath sounds: Normal breath sounds.   Abdominal:      General: Abdomen is flat.   Musculoskeletal:      Right lower leg: Edema present.      Left lower leg: Edema present.      Comments: Trace pitting edema   Skin:     General: Skin is warm and dry.      Capillary Refill: Capillary refill takes less than 2 seconds.   Neurological:      Mental Status: She is alert and oriented to person, place, and time.   Psychiatric:         Mood and Affect: Mood normal.         Behavior: Behavior normal.         Thought Content: Thought content normal.         Judgment: Judgment normal.             Significant Labs: All pertinent labs within the past 24 hours have been reviewed.    Significant Imaging: I have reviewed all pertinent imaging results/findings within the past 24 hours.  I have reviewed and interpreted all pertinent imaging results/findings within the past 24 hours.

## 2020-08-25 NOTE — ASSESSMENT & PLAN NOTE
Secondary to cardiogenic pulmonary edema in setting of grade 3 diastolic dysfunction and severe mitral valve regurgitation.    Patient is on furosemide 60 mg IV b.i.d. which will continue.    Also on amiodarone infusion for rate and rhythm control for atrial fibrillation with RVR.  Stable on low-flow nasal cannula  Transesophageal echocardiogram showed possible thrombus to left atrium  On heparin infusion for anticoagualation  Plan for left heart catheterization today

## 2020-08-25 NOTE — CONSULTS
Follow-up Information     Marlee Carrillo MD On 8/31/2020.    Specialties: Cardiology, INTERVENTIONAL CARDIOLOGY  Why: Outpatient Services @9:20am   Contact information:  [address]  [address]  [facility] [address] [phone]

## 2020-08-25 NOTE — SUBJECTIVE & OBJECTIVE
Interval History:  Feeling better with rate control.  Denies chest pains at rest on exertion.  Denies resting shortness of breath.    Review of Systems   Constitution: Negative.   HENT: Negative.    Eyes: Negative.    Cardiovascular: Positive for dyspnea on exertion.   Respiratory: Positive for shortness of breath.    Endocrine: Negative.    Hematologic/Lymphatic: Negative.    Skin: Negative.    Musculoskeletal: Negative.    Gastrointestinal: Negative.    Genitourinary: Negative.    Neurological: Negative.    Psychiatric/Behavioral: Negative.    Allergic/Immunologic: Negative.      Objective:     Vital Signs (Most Recent):  Temp: 98.4 °F (36.9 °C) (08/25/20 0400)  Pulse: (!) 113 (08/25/20 0923)  Resp: 19 (08/25/20 0923)  BP: 115/70 (08/25/20 0857)  SpO2: 95 % (08/25/20 0923) Vital Signs (24h Range):  Temp:  [98.1 °F (36.7 °C)-98.4 °F (36.9 °C)] 98.4 °F (36.9 °C)  Pulse:  [] 113  Resp:  [16-44] 19  SpO2:  [93 %-100 %] 95 %  BP: ()/(52-99) 115/70     Weight: 82.6 kg (182 lb)  Body mass index is 30.29 kg/m².     SpO2: 95 %  O2 Device (Oxygen Therapy): High Flow nasal Cannula      Intake/Output Summary (Last 24 hours) at 8/25/2020 1113  Last data filed at 8/25/2020 0700  Gross per 24 hour   Intake 1036.09 ml   Output 2860 ml   Net -1823.91 ml       Lines/Drains/Airways     Drain                 Urethral Catheter 08/23/20 0310 Non-latex 16 Fr. 2 days          Peripheral Intravenous Line                 Peripheral IV - Single Lumen 08/23/20 22 G Posterior;Right;Distal Forearm 2 days         Peripheral IV - Single Lumen 08/24/20 0051 20 G Left Antecubital 1 day         Peripheral IV - Single Lumen 08/24/20 1050 22 G Posterior;Right;Proximal Forearm 1 day                Physical Exam   Constitutional: She is oriented to person, place, and time. She appears well-developed and well-nourished.   HENT:   Head: Normocephalic.   Eyes: Pupils are equal, round, and reactive to light.   Neck: Normal range of motion. Neck  supple.   Cardiovascular: An irregularly irregular rhythm present.   Murmur heard.  High-pitched blowing holosystolic murmur is present at the apex.  Pulmonary/Chest: Effort normal. She has rales.   Abdominal: Soft. Normal appearance and bowel sounds are normal. There is no abdominal tenderness.   Musculoskeletal: Normal range of motion.   Neurological: She is alert and oriented to person, place, and time.   Skin: Skin is warm.   Psychiatric: She has a normal mood and affect.   Nursing note and vitals reviewed.      Significant Labs:   BMP:   Recent Labs   Lab 08/24/20 0422 08/25/20  0349   * 119*    141   K 5.3* 3.4*    101   CO2 22* 24   BUN 26* 22   CREATININE 1.1 1.0   CALCIUM 8.8 9.7   MG  --  2.1   , CMP   Recent Labs   Lab 08/24/20 0422 08/25/20 0349    141   K 5.3* 3.4*    101   CO2 22* 24   * 119*   BUN 26* 22   CREATININE 1.1 1.0   CALCIUM 8.8 9.7   PROT 7.0 7.2   ALBUMIN 3.6 4.0   BILITOT 1.0 1.1*   ALKPHOS 73 81   AST 48* 31   ALT 46* 40   ANIONGAP 11 16   ESTGFRAFRICA 57* >60   EGFRNONAA 49* 55*   , CBC   Recent Labs   Lab 08/25/20  0544   WBC 8.56   HGB 14.7   HCT 45.4      , INR   Recent Labs   Lab 08/24/20  1056   INR 1.0   , Lipid Panel No results for input(s): CHOL, HDL, LDLCALC, TRIG, CHOLHDL in the last 48 hours., Troponin No results for input(s): TROPONINI in the last 48 hours. and All pertinent lab results from the last 24 hours have been reviewed.    Significant Imaging: Echocardiogram:   Transthoracic echo (TTE) complete (Cupid Only):   Results for orders placed or performed during the hospital encounter of 08/23/20   Echo Color Flow Doppler? Yes   Result Value Ref Range    Ascending aorta 2.11 cm    STJ 1.78 cm    AV mean gradient 2 mmHg    Ao peak becca 1.02 m/s    Ao VTI 13.09 cm    IVRT 65.74 msec    IVS 1.17 (A) 0.6 - 1.1 cm    LA size 5.65 cm    Left Atrium Major Axis 6.25 cm    Left Atrium Minor Axis 6.91 cm    LVIDD 4.66 3.5 - 6.0 cm     LVIDS 3.39 2.1 - 4.0 cm    LVOT diameter 1.90 cm    LVOT peak VTI 11.30 cm    PW 1.10 0.6 - 1.1 cm    MV Peak A Riley 0.86 m/s    E wave decelartion time 167.17 msec    MV Peak E Riley 1.89 m/s    RA Major Axis 3.27 cm    RVDD 2.95 cm    Sinus 2.63 cm    TAPSE 2.11 cm    TR Max Riley 3.53 m/s    TDI LATERAL 0.12 m/s    TDI SEPTAL 0.09 m/s    LA WIDTH 5.88 cm    Ao root annulus 2.50 cm    AORTIC VALVE CUSP SEPERATION 1.87 cm    PV PEAK VELOCITY 0.81 cm/s    MV stenosis pressure 1/2 time 48.48 ms    LV Diastolic Volume 100.12 mL    LV Systolic Volume 46.93 mL    LVOT peak riley 0.52 m/s    Mr max riley 0.05 m/s    LV LATERAL E/E' RATIO 15.75 m/s    LV SEPTAL E/E' RATIO 21.00 m/s    FS 27 %    LA volume 185.34 cm3    LV mass 193.31 g    Left Ventricle Relative Wall Thickness 0.47 cm    AV valve area 2.45 cm2    AV Velocity Ratio 0.51     AV index (prosthetic) 0.86     MV valve area p 1/2 method 4.54 cm2    E/A ratio 2.20     Mean e' 0.11 m/s    LVOT area 2.8 cm2    LVOT stroke volume 32.02 cm3    AV peak gradient 4 mmHg    E/E' ratio 18.00 m/s    LV Systolic Volume Index 24.7 mL/m2    LV Diastolic Volume Index 52.67 mL/m2    LA Volume Index 97.5 mL/m2    LV Mass Index 102 g/m2    Triscuspid Valve Regurgitation Peak Gradient 50 mmHg    BSA 1.95 m2    Right Atrial Pressure (from IVC) 3 mmHg    TV rest pulmonary artery pressure 53 mmHg    Narrative    · Normal left ventricular systolic function. The estimated ejection   fraction is 60%.  · Concentric left ventricular hypertrophy.  · Severe mitral regurgitation.  · Moderate to severe tricuspid regurgitation.  · Mild aortic regurgitation  · Aortic valve area is 2.45 cm2; peak velocity is 1.02 m/s; mean gradient   is 2 mmHg.  · Severe left atrial enlargement.  · Grade III (severe) left ventricular diastolic dysfunction consistent   with restrictive physiology.  · Normal right ventricular systolic function.  · Mild pulmonic regurgitation.  · Normal central venous pressure (3 mmHg).  ·  The estimated PA systolic pressure is 53 mmHg.  · Pulmonary hypertension present.     mitral valve prolaspe with severe regurgitation

## 2020-08-25 NOTE — PROGRESS NOTES
[facility] Medicine  Progress Note    Patient Name: Fatou Lorenzo  MRN: [id number]  Patient Class: IP- Inpatient   Admission Date: 8/23/2020  Length of Stay: 2 days  Attending Physician: Bella Ramos MD  Primary Care Provider: Ludin Rivas MD        Subjective:     Principal Problem:Acute respiratory failure with hypoxia        HPI:  Patient with a past medical history of mitral valve prolapse with mitral regurgitation.  Is followed at [facility].  States 8 years ago was referred to Dr. Waddell, but valvular regurgitation not bad enough at that time to do surgery.  Denies any chest pains.  Came in with worsening shortness of breath, orthopnea.  Admitted with acute decompensated heart failure.  X-ray showed bibasilar infiltrates.  EKG personally reviewed.  Shows sinus tachycardia.  No acute ischemic changes.  Initially on BiPAP, now on nasal cannula after IV Lasix.  Saturating well. Denied chest pain, fever, chills, nausea, vomiting, abdominal pain, cough         Patient being ruled out for COVID also.    Overview/Hospital Course:  Ms Lorenzo presented with acute respiratory failure with hypoxia secondary to cardiogenic edema.  Patient quickly improved with IV furosemide supporting diagnosis.  However she did require high-flow type nasal cannula once taken of NIPPV.  Transthoracic echocardiogram obtained showed severe mitral valve regurgitation and moderate to severe tricuspid valve regurgitation. Also with grade 3 diastolic dysfunction and severely enlarged left atrium.  No pulmonary hypertension, pulmonic valve regurgitation or right-sided heart failure.  Cardiology consulted.  Overnight patient developed atrial fibrillation with RVR therefore placed on amiodarone infusion.  Underwent transesophageal ECHO where a possible left atrial thrombus was identified.  Patient was initiated on heparin infusion for both stroke prophylaxis and possible thrombus.  Plan for left heart  catheterization on 08/25.  Has continued on IV furosemide    Interval History:  Patient continues on low-flow O2, no chest pain, and HR in 70s. No acute events.    Review of Systems   Constitutional: Negative for chills and fever.   Respiratory: Positive for shortness of breath.    Cardiovascular: Negative for chest pain.     Objective:     Vital Signs (Most Recent):  Temp: 98.5 °F (36.9 °C) (08/25/20 0815)  Pulse: 74 (08/25/20 1145)  Resp: (!) 22 (08/25/20 1145)  BP: 115/75 (08/25/20 1138)  SpO2: 98 % (08/25/20 1145) Vital Signs (24h Range):  Temp:  [98.1 °F (36.7 °C)-98.5 °F (36.9 °C)] 98.5 °F (36.9 °C)  Pulse:  [] 74  Resp:  [13-44] 22  SpO2:  [93 %-100 %] 98 %  BP: ()/(52-99) 115/75     Weight: 82.6 kg (182 lb)  Body mass index is 30.29 kg/m².    Intake/Output Summary (Last 24 hours) at 8/25/2020 1158  Last data filed at 8/25/2020 0700  Gross per 24 hour   Intake 995.79 ml   Output 2860 ml   Net -1864.21 ml      Physical Exam  Vitals signs and nursing note reviewed.   Cardiovascular:      Rate and Rhythm: Normal rate and regular rhythm.      Heart sounds: Murmur present.   Pulmonary:      Effort: Pulmonary effort is normal.      Breath sounds: Normal breath sounds.      Comments: Breathing comfortably on supplemental O2  Abdominal:      General: Abdomen is flat.   Musculoskeletal:      Right lower leg: Edema present.      Left lower leg: Edema present.      Comments: Trace pitting edema   Skin:     General: Skin is warm and dry.      Capillary Refill: Capillary refill takes less than 2 seconds.   Neurological:      Mental Status: She is alert and oriented to person, place, and time.   Psychiatric:         Mood and Affect: Mood normal.         Behavior: Behavior normal.         Thought Content: Thought content normal.         Judgment: Judgment normal.         Significant Labs: All pertinent labs within the past 24 hours have been reviewed.    Significant Imaging: I have reviewed all pertinent imaging  results/findings within the past 24 hours.  I have reviewed and interpreted all pertinent imaging results/findings within the past 24 hours.      Assessment/Plan:      * Acute respiratory failure with hypoxia  Secondary to cardiogenic pulmonary edema in setting of grade 3 diastolic dysfunction and severe mitral valve regurgitation.    Patient is on furosemide 60 mg IV b.i.d. which will continue.    Also on amiodarone infusion for rate and rhythm control for atrial fibrillation with RVR.  Stable on low-flow nasal cannula  Transesophageal echocardiogram showed possible thrombus to left atrium  On heparin infusion for anticoagualation  Plan for left heart catheterization today    Left atrial thrombus  Per transesophageal echo  Anticoagulation with heparin    Severe mitral regurgitation  Severe and likely cause of patient's symptoms  Plan for left heart catheterization tomorrow for ischemic workup    Atrial fibrillation with RVR  This is a new diagnosis  Currently on amiodarone infusion and heparin infusion for stroke prophylaxis  Rate controlled but still in Afib with rate in 70s  Possible CHERY with cardioversion tomorrow if remains in Afib as per Cards    Moderate tricuspid regurgitation  Moderate, per transesophageal echocardiogram    SOB (shortness of breath)  As above    Acute on chronic diastolic congestive heart failure  As above      VTE Risk Mitigation (From admission, onward)         Ordered     heparin 25,000 units in dextrose 5% 250 mL (100 units/mL) infusion LOW INTENSITY nomogram - [facility]  Continuous     Question:  Heparin Infusion Adjustment (DO NOT MODIFY ANSWER)  Answer:  [email or web address]\epic\Images\Pharmacy\HeparinInfusions\heparin LOW INTENSITY nomogram for [facility] [id number]    08/24/20 1020     heparin 25,000 units in dextrose 5% (100 units/ml) IV bolus from bag - ADDITIONAL PRN BOLUS - 60 units/kg  As needed (PRN)     Question:  Heparin Infusion Adjustment (DO NOT MODIFY ANSWER)  Answer:   \\[email or web address]\epic\Images\Pharmacy\HeparinInfusions\heparin LOW INTENSITY nomogram for [facility] [id number].pdf    08/24/20 1020     heparin 25,000 units in dextrose 5% (100 units/ml) IV bolus from bag - ADDITIONAL PRN BOLUS - 30 units/kg  As needed (PRN)     Question:  Heparin Infusion Adjustment (DO NOT MODIFY ANSWER)  Answer:  \\[email or web address]\epic\Images\Pharmacy\HeparinInfusions\heparin LOW INTENSITY nomogram for [facility] [id number].pdf    08/24/20 1020     IP VTE HIGH RISK PATIENT  Once      08/23/20 0452     Place sequential compression device  Until discontinued      08/23/20 0452                Critical care time spent on the evaluation and treatment of severe organ dysfunction, review of pertinent labs and imaging studies, discussions with consulting providers and discussions with patient/family: 35 minutes.      Bella Ramos MD  Department of Hospital Medicine   [facility]

## 2020-08-25 NOTE — ASSESSMENT & PLAN NOTE
Patient with newly diagnosed atrial fibrillation with RVR.  Mild hypotension associated with it.  On amiodarone drip.  Will start heparin drip.  I talked in detail with the patient about various options including cardioversion after the CHERY if there is no left atrial appendage clot.  Patient states that she has felt her heart beating fast in the past also but it usually goes away after she takes an extra dose of metoprolol.  It has been going on and off in the past.  We discussed of various complications of cardioversion including stroke.  Patient understands and is willing to proceed with cardioversion after the CHERY today.    8/25:  AFib.  On amiodarone drip.  Rate controlled.  Feeling better with rate control.  Start full oral anticoagulation in a.m..

## 2020-08-25 NOTE — PLAN OF CARE
Pt A&Ox4, afebrile. No c/o pain. Pt in Afib w/ HR . Amio gtt infusing at 0.5. Normotensive. No SOB, on 2 L NC. Yoo with adequate UOP. No BM overnight. Pt prepped and ready for cath lab later today. Gtts: Heparin, Amio. Will continue to monitor closely.

## 2020-08-25 NOTE — PROGRESS NOTES
[facility]  Cardiology  Progress Note    Patient Name: Fatou Lorenzo  MRN: [id number]  Admission Date: 8/23/2020  Hospital Length of Stay: 2 days  Code Status: Full Code   Attending Physician: Bella Ramos MD   Primary Care Physician: Ludin Rivas MD  Expected Discharge Date:   Principal Problem:Acute respiratory failure with hypoxia    Subjective:       Interval History:  Feeling better with rate control.  Denies chest pains at rest on exertion.  Denies resting shortness of breath.    Review of Systems   Constitution: Negative.   HENT: Negative.    Eyes: Negative.    Cardiovascular: Positive for dyspnea on exertion.   Respiratory: Positive for shortness of breath.    Endocrine: Negative.    Hematologic/Lymphatic: Negative.    Skin: Negative.    Musculoskeletal: Negative.    Gastrointestinal: Negative.    Genitourinary: Negative.    Neurological: Negative.    Psychiatric/Behavioral: Negative.    Allergic/Immunologic: Negative.      Objective:     Vital Signs (Most Recent):  Temp: 98.4 °F (36.9 °C) (08/25/20 0400)  Pulse: (!) 113 (08/25/20 0923)  Resp: 19 (08/25/20 0923)  BP: 115/70 (08/25/20 0857)  SpO2: 95 % (08/25/20 0923) Vital Signs (24h Range):  Temp:  [98.1 °F (36.7 °C)-98.4 °F (36.9 °C)] 98.4 °F (36.9 °C)  Pulse:  [] 113  Resp:  [16-44] 19  SpO2:  [93 %-100 %] 95 %  BP: ()/(52-99) 115/70     Weight: 82.6 kg (182 lb)  Body mass index is 30.29 kg/m².     SpO2: 95 %  O2 Device (Oxygen Therapy): High Flow nasal Cannula      Intake/Output Summary (Last 24 hours) at 8/25/2020 1113  Last data filed at 8/25/2020 0700  Gross per 24 hour   Intake 1036.09 ml   Output 2860 ml   Net -1823.91 ml       Lines/Drains/Airways     Drain                 Urethral Catheter 08/23/20 0310 Non-latex 16 Fr. 2 days          Peripheral Intravenous Line                 Peripheral IV - Single Lumen 08/23/20 22 G Posterior;Right;Distal Forearm 2 days         Peripheral IV - Single Lumen 08/24/20 0051 20 G  Left Antecubital 1 day         Peripheral IV - Single Lumen 08/24/20 1050 22 G Posterior;Right;Proximal Forearm 1 day                Physical Exam   Constitutional: She is oriented to person, place, and time. She appears well-developed and well-nourished.   HENT:   Head: Normocephalic.   Eyes: Pupils are equal, round, and reactive to light.   Neck: Normal range of motion. Neck supple.   Cardiovascular: An irregularly irregular rhythm present.   Murmur heard.  High-pitched blowing holosystolic murmur is present at the apex.  Pulmonary/Chest: Effort normal. She has rales.   Abdominal: Soft. Normal appearance and bowel sounds are normal. There is no abdominal tenderness.   Musculoskeletal: Normal range of motion.   Neurological: She is alert and oriented to person, place, and time.   Skin: Skin is warm.   Psychiatric: She has a normal mood and affect.   Nursing note and vitals reviewed.      Significant Labs:   BMP:   Recent Labs   Lab 08/24/20  0422 08/25/20  0349   * 119*    141   K 5.3* 3.4*    101   CO2 22* 24   BUN 26* 22   CREATININE 1.1 1.0   CALCIUM 8.8 9.7   MG  --  2.1   , CMP   Recent Labs   Lab 08/24/20  0422 08/25/20  0349    141   K 5.3* 3.4*    101   CO2 22* 24   * 119*   BUN 26* 22   CREATININE 1.1 1.0   CALCIUM 8.8 9.7   PROT 7.0 7.2   ALBUMIN 3.6 4.0   BILITOT 1.0 1.1*   ALKPHOS 73 81   AST 48* 31   ALT 46* 40   ANIONGAP 11 16   ESTGFRAFRICA 57* >60   EGFRNONAA 49* 55*   , CBC   Recent Labs   Lab 08/25/20  0544   WBC 8.56   HGB 14.7   HCT 45.4      , INR   Recent Labs   Lab 08/24/20  1056   INR 1.0   , Lipid Panel No results for input(s): CHOL, HDL, LDLCALC, TRIG, CHOLHDL in the last 48 hours., Troponin No results for input(s): TROPONINI in the last 48 hours. and All pertinent lab results from the last 24 hours have been reviewed.    Significant Imaging: Echocardiogram:   Transthoracic echo (TTE) complete (Cupid Only):   Results for orders placed or  performed during the hospital encounter of 08/23/20   Echo Color Flow Doppler? Yes   Result Value Ref Range    Ascending aorta 2.11 cm    STJ 1.78 cm    AV mean gradient 2 mmHg    Ao peak riley 1.02 m/s    Ao VTI 13.09 cm    IVRT 65.74 msec    IVS 1.17 (A) 0.6 - 1.1 cm    LA size 5.65 cm    Left Atrium Major Axis 6.25 cm    Left Atrium Minor Axis 6.91 cm    LVIDD 4.66 3.5 - 6.0 cm    LVIDS 3.39 2.1 - 4.0 cm    LVOT diameter 1.90 cm    LVOT peak VTI 11.30 cm    PW 1.10 0.6 - 1.1 cm    MV Peak A Riley 0.86 m/s    E wave decelartion time 167.17 msec    MV Peak E Riley 1.89 m/s    RA Major Axis 3.27 cm    RVDD 2.95 cm    Sinus 2.63 cm    TAPSE 2.11 cm    TR Max Riley 3.53 m/s    TDI LATERAL 0.12 m/s    TDI SEPTAL 0.09 m/s    LA WIDTH 5.88 cm    Ao root annulus 2.50 cm    AORTIC VALVE CUSP SEPERATION 1.87 cm    PV PEAK VELOCITY 0.81 cm/s    MV stenosis pressure 1/2 time 48.48 ms    LV Diastolic Volume 100.12 mL    LV Systolic Volume 46.93 mL    LVOT peak riley 0.52 m/s    Mr max riley 0.05 m/s    LV LATERAL E/E' RATIO 15.75 m/s    LV SEPTAL E/E' RATIO 21.00 m/s    FS 27 %    LA volume 185.34 cm3    LV mass 193.31 g    Left Ventricle Relative Wall Thickness 0.47 cm    AV valve area 2.45 cm2    AV Velocity Ratio 0.51     AV index (prosthetic) 0.86     MV valve area p 1/2 method 4.54 cm2    E/A ratio 2.20     Mean e' 0.11 m/s    LVOT area 2.8 cm2    LVOT stroke volume 32.02 cm3    AV peak gradient 4 mmHg    E/E' ratio 18.00 m/s    LV Systolic Volume Index 24.7 mL/m2    LV Diastolic Volume Index 52.67 mL/m2    LA Volume Index 97.5 mL/m2    LV Mass Index 102 g/m2    Triscuspid Valve Regurgitation Peak Gradient 50 mmHg    BSA 1.95 m2    Right Atrial Pressure (from IVC) 3 mmHg    TV rest pulmonary artery pressure 53 mmHg    Narrative    · Normal left ventricular systolic function. The estimated ejection   fraction is 60%.  · Concentric left ventricular hypertrophy.  · Severe mitral regurgitation.  · Moderate to severe tricuspid  regurgitation.  · Mild aortic regurgitation  · Aortic valve area is 2.45 cm2; peak velocity is 1.02 m/s; mean gradient   is 2 mmHg.  · Severe left atrial enlargement.  · Grade III (severe) left ventricular diastolic dysfunction consistent   with restrictive physiology.  · Normal right ventricular systolic function.  · Mild pulmonic regurgitation.  · Normal central venous pressure (3 mmHg).  · The estimated PA systolic pressure is 53 mmHg.  · Pulmonary hypertension present.     mitral valve prolaspe with severe regurgitation      Assessment and Plan:     Brief HPI:     Severe mitral regurgitation  Marciano today.  Cardiac catheterization tomorrow.  Will refer to Dr. Waddell for mitral valve repair/replacement after initial workup complete.    Risks, benefits and alternatives of the catheterization procedure were discussed with the patient.The risks of coronary angiography include but are not limited to: bleeding, infection, death, heart attack, arrhythmia, kidney injury or failure, potential need for dialysis, allergic reactions, stroke, need for emergency surgery, hematoma, pseudoaneurysm etc.  Should stenting be indicated, the patient has agreed to dual anti-platelet therapy for 1-consecutive year with a drug-eluting stent and a minimum of 1-month with the use of a bare metal stent. Additionally, pt is aware that non-compliance is likely to result in stent clotting with heart attack, heart failure, and/or death  The risks of moderate sedation include hypotension, respiratory depression, arrhythmias, bronchospasm, and death. Informed consent was obtained and the  patient is agreeable to proceed with the procedure.       Atrial fibrillation with RVR  Patient with newly diagnosed atrial fibrillation with RVR.  Mild hypotension associated with it.  On amiodarone drip.  Will start heparin drip.  I talked in detail with the patient about various options including cardioversion after the MARCIANO if there is no left atrial  appendage clot.  Patient states that she has felt her heart beating fast in the past also but it usually goes away after she takes an extra dose of metoprolol.  It has been going on and off in the past.  We discussed of various complications of cardioversion including stroke.  Patient understands and is willing to proceed with cardioversion after the CHERY today.    8/25:  AFib.  On amiodarone drip.  Rate controlled.  Feeling better with rate control.  Start full oral anticoagulation in a.m..    SOB (shortness of breath)  Caused by decompensated heart failure    Acute on chronic diastolic congestive heart failure  Likely underlying causes severe mitral regurgitation.  Had a detailed discussion with the patient.  Will do further evaluation with CHERY tomorrow.  Once euvolemic, will do cardiac catheterization.  After initial workup complete, and patient's heart failure compensated, will refer to Dr. Waddell for evaluation of mitral valve surgery.  Continue IV diuresis.        VTE Risk Mitigation (From admission, onward)         Ordered     heparin 25,000 units in dextrose 5% 250 mL (100 units/mL) infusion LOW INTENSITY nomogram - [facility]  Continuous     Question:  Heparin Infusion Adjustment (DO NOT MODIFY ANSWER)  Answer:  [email or web address]\epic\Images\Pharmacy\HeparinInfusions\heparin LOW INTENSITY nomogram for [facility] [id number].pdf    08/24/20 1020     heparin 25,000 units in dextrose 5% (100 units/ml) IV bolus from bag - ADDITIONAL PRN BOLUS - 60 units/kg  As needed (PRN)     Question:  Heparin Infusion Adjustment (DO NOT MODIFY ANSWER)  Answer:  [email or web address]\epic\Images\Pharmacy\HeparinInfusions\heparin LOW INTENSITY nomogram for [facility] [id number].pdf    08/24/20 1020     heparin 25,000 units in dextrose 5% (100 units/ml) IV bolus from bag - ADDITIONAL PRN BOLUS - 30 units/kg  As needed (PRN)     Question:  Heparin Infusion Adjustment (DO NOT MODIFY ANSWER)  Answer:  [email or web address]\epic\Images\Pharmacy\HeparinInfusions\heparin LOW  INTENSITY nomogram for [facility] [id number]    08/24/20 1020     IP VTE HIGH RISK PATIENT  Once      08/23/20 0452     Place sequential compression device  Until discontinued      08/23/20 0452                Marlee Carrillo MD  Cardiology  [facility]    Critical Care Time: 35 minutes     Critical care was time spent personally by me on the following activities: development of treatment plan with patient or surrogate and bedside caregivers, discussions with consultants, evaluation of patient's response to treatment, examination of patient, ordering and performing treatments and interventions, ordering and review of laboratory studies, ordering and review of radiographic studies, pulse oximetry, re-evaluation of patient's condition. This critical care time did not overlap with that of any other provider or involve time for any procedures.

## 2020-08-25 NOTE — NURSING
[facility]  ICU Shift Summary  Date: 8/25/2020      COVID Test: (--)  Isolation: No active isolations     Prehospitalization: Nursing Home  Admit Date / LOS : 8/23/2020/ 2 days    Diagnosis: Acute respiratory failure with hypoxia    Consults:        Active: Cardio and SW       Needed: N/A     Code Status: Full Code   Advanced Directive: Not Received    LDA: PIV       Central Lines/Site/Justification:Patient Does Not Have Central Line       Urinary Cath/Order/Justification:Patient Does Not Have Urinary Catheter    Vasopressors/Infusions:    amiodarone in dextrose 5% 0.5 mg/min (08/25/20 1800)    heparin (porcine) in D5W 8.036 Units/kg/hr (08/25/20 1800)          GOALS: Volume/ Hemodynamic: N/A                     RASS: N/A    Pain Management: PO       Pain Controlled: yes     Rhythm: NSR    Respiratory Device: Room Air                  Most Recent SBT/ SAT: N/A       MOVE Screen: PASS    VTE Prophylaxis: Pharm and Ambulation  Mobility: Ambulatory and OOB to Chair  Stress Ulcer Prophylaxis: No    Dietary: PO  Tolerance: yes  /  Advancement: @ goal    I & O (24h):    Intake/Output Summary (Last 24 hours) at 8/25/2020 1842  Last data filed at 8/25/2020 1800  Gross per 24 hour   Intake 767.04 ml   Output 2175 ml   Net -1407.96 ml        Restraints: No    Significant Dates:  Post Op Date: N/A  Rescue Date: N/A  Imaging/ Diagnostics: [facility]    Noteworthy Labs:  none    CBC/Anemia Labs: Coags:    Recent Labs   Lab 08/23/20  0609 08/25/20  0544   WBC 11.32 8.56   HGB 15.0 14.7   HCT 47.2 45.4    183   MCV 94 90   RDW 14.2 13.8    Recent Labs   Lab 08/24/20  1056 08/24/20 2001 08/25/20  0349   INR 1.0  --   --    APTT 24.2 62.0* 44.2*        Chemistries:   Recent Labs   Lab 08/23/20  0609 08/24/20  0422 08/25/20  0349    138 141   K 4.7 5.3* 3.4*    105 101   CO2 24 22* 24   BUN 27* 26* 22   CREATININE 1.2 1.1 1.0   CALCIUM 9.5 8.8 9.7   PROT 7.2 7.0 7.2   BILITOT 1.0 1.0 1.1*   ALKPHOS  80 73 81   ALT 63* 46* 40   AST 92* 48* 31   MG 2.2  --  2.1   PHOS 4.4  --  4.5        Cardiac Enzymes: Ejection Fractions:    Recent Labs     08/23/20  0609   TROPONINI 0.040*    No results found for: EF     POCT Glucose: HbA1c:    No results for input(s): POCTGLUCOSE in the last 168 hours. No results found for: HGBA1C        ICU LOS 2d 14h  Level of Care: Critical Care potential d/c tomorrow.    Shift Summary/Plan for the shift: [facility] with no interventions. Vasband removed with no complications. bandaid applied. Up to bedside commode. Ambulated to sink without issue. Educated on medication changes. Weaned to room air in anticipation of discharge

## 2020-08-25 NOTE — NURSING
Patient remains in the ICU. On O2 2L/min via NC. In A fib. On amiodarone gtt and heparin gtt. Alert and oriented x4, very pleasant. FC remains in place, 1500 cc UO. Cath Prep Checklist done. Plan of care reviewed with patient. No new skin tears, falls or injuries during shift.  Precautions met for all. Report given to Jacquelin GARCIA.

## 2020-08-25 NOTE — NURSING
TR band deflation completed. No site complications. Patient education on signs and symptoms of bleeding/hematoma formation. Instructed to notify nursing immediately if changes occur.

## 2020-08-25 NOTE — SUBJECTIVE & OBJECTIVE
Interval History:  Patient continues on low-flow O2, no chest pain, and HR in 70s. No acute events.    Review of Systems   Constitutional: Negative for chills and fever.   Respiratory: Positive for shortness of breath.    Cardiovascular: Negative for chest pain.     Objective:     Vital Signs (Most Recent):  Temp: 98.5 °F (36.9 °C) (08/25/20 0815)  Pulse: 74 (08/25/20 1145)  Resp: (!) 22 (08/25/20 1145)  BP: 115/75 (08/25/20 1138)  SpO2: 98 % (08/25/20 1145) Vital Signs (24h Range):  Temp:  [98.1 °F (36.7 °C)-98.5 °F (36.9 °C)] 98.5 °F (36.9 °C)  Pulse:  [] 74  Resp:  [13-44] 22  SpO2:  [93 %-100 %] 98 %  BP: ()/(52-99) 115/75     Weight: 82.6 kg (182 lb)  Body mass index is 30.29 kg/m².    Intake/Output Summary (Last 24 hours) at 8/25/2020 1158  Last data filed at 8/25/2020 0700  Gross per 24 hour   Intake 995.79 ml   Output 2860 ml   Net -1864.21 ml      Physical Exam  Vitals signs and nursing note reviewed.   Cardiovascular:      Rate and Rhythm: Normal rate and regular rhythm.      Heart sounds: Murmur present.   Pulmonary:      Effort: Pulmonary effort is normal.      Breath sounds: Normal breath sounds.      Comments: Breathing comfortably on supplemental O2  Abdominal:      General: Abdomen is flat.   Musculoskeletal:      Right lower leg: Edema present.      Left lower leg: Edema present.      Comments: Trace pitting edema   Skin:     General: Skin is warm and dry.      Capillary Refill: Capillary refill takes less than 2 seconds.   Neurological:      Mental Status: She is alert and oriented to person, place, and time.   Psychiatric:         Mood and Affect: Mood normal.         Behavior: Behavior normal.         Thought Content: Thought content normal.         Judgment: Judgment normal.         Significant Labs: All pertinent labs within the past 24 hours have been reviewed.    Significant Imaging: I have reviewed all pertinent imaging results/findings within the past 24 hours.  I have reviewed  and interpreted all pertinent imaging results/findings within the past 24 hours.

## 2020-08-25 NOTE — NURSING
1900: Lab called with critical aPTT of 92.7. Per heparin nomogram, infusion stopped and stat redraw collected. Will continue to monitor.  2100: aPTT of 62. Per nomogram restarted and decreased by 4 units. Will continue to monitor.

## 2020-08-25 NOTE — ASSESSMENT & PLAN NOTE
This is a new diagnosis  Currently on amiodarone infusion and heparin infusion for stroke prophylaxis  Rate controlled but still in Afib with rate in 70s  Possible CHERY with cardioversion tomorrow if remains in Afib as per Cards

## 2020-08-26 NOTE — PLAN OF CARE
Received awake voiced no concerns at present denies CP or any discomforts but states she is a little afraid about having surgery. 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 Radial site after cardiac cath clean no hematoma able to freely move fingers. No injuries noted, assisted up to BSC voided without problems remains in NSR.

## 2020-08-26 NOTE — ANESTHESIA POSTPROCEDURE EVALUATION
Anesthesia Post Evaluation    Patient: Fatou Lorenzo    Procedure(s) Performed: Procedure(s) (LRB):  Transesophageal echo (CHERY) intra-procedure log documentation (N/A)    Final Anesthesia Type: general    Patient location: recovered in OR.  Patient participation: Yes- Able to Participate  Level of consciousness: awake and alert  Post-procedure vital signs: reviewed and stable  Pain management: adequate  Airway patency: patent    PONV status at discharge: No PONV  Anesthetic complications: no      Cardiovascular status: blood pressure returned to baseline and hemodynamically stable  Respiratory status: unassisted and spontaneous ventilation  Hydration status: euvolemic  Follow-up not needed.          Vitals Value Taken Time   /65 08/26/20 0702   Temp 36.9 °C (98.4 °F) 08/26/20 0324   Pulse 73 08/26/20 0707   Resp 18 08/26/20 0707   SpO2 97 % 08/26/20 0707   Vitals shown include unvalidated device data.      No case tracking events are documented in the log.      Pain/Sheila Score: Sheila Score: 10 (8/25/2020  1:45 PM)

## 2020-08-26 NOTE — NURSING TRANSFER
Nursing Transfer Note      8/26/2020     Transfer To: Room 336 B    Transfer via wheelchair    Transfer with cardiac monitoring    Transported by RN and transport    Medicines sent: N/A    Chart send with patient: Yes    Notified: Patient notified son    Patient reassessed at: 8/26/20 1410    Upon arrival to floor: cardiac monitor applied, patient oriented to room, call bell in reach and bed in lowest position

## 2020-08-26 NOTE — ASSESSMENT & PLAN NOTE
Likely underlying causes severe mitral regurgitation.  Had a detailed discussion with the patient.  Will do further evaluation with CHERY tomorrow.  Once euvolemic, will do cardiac catheterization.  After initial workup complete, and patient's heart failure compensated, will refer to Dr. Waddell for evaluation of mitral valve surgery.  Continue IV diuresis.    8/26:  Breathing better.  Switch to oral diuresis

## 2020-08-26 NOTE — PLAN OF CARE
Problem: Physical Therapy Goal  Goal: Physical Therapy Goal  Outcome: Met     Pt safe to DC home. No further PT required at this time.

## 2020-08-26 NOTE — NURSING
[facility]  ICU Shift Summary  Date: 8/26/2020      COVID Test: (--)  Isolation: No active isolations     Prehospitalization: Home  Admit Date / LOS : 8/23/2020/ 3 days    Diagnosis: Acute respiratory failure with hypoxia    Consults:        Active: Cardio       Needed: N/A     Code Status: Full Code   Advanced Directive: Not Received    LDA: PIV       Central Lines/Site/Justification:Patient Does Not Have Central Line       Urinary Cath/Order/Justification:Patient Does Not Have Urinary Catheter    Vasopressors/Infusions:    amiodarone in dextrose 5% 0.5 mg/min (08/26/20 0402)    heparin (porcine) in D5W 8.1 Units/kg/hr (08/26/20 0402)          GOALS: Volume/ Hemodynamic: N/A                     RASS: 0  alert and calm    Pain Management: none       Pain Controlled: not applicable     Rhythm: NSR    Respiratory Device: Room Air                  Most Recent SBT/ SAT: N/A           VTE Prophylaxis: Pharm  Mobility: Bedrest  Stress Ulcer Prophylaxis: No    Dietary: PO  Tolerance: yes  /  Advancement: no    I & O (24h):    Intake/Output Summary (Last 24 hours) at 8/26/2020 0504  Last data filed at 8/26/2020 0402  Gross per 24 hour   Intake 912.22 ml   Output 1600 ml   Net -687.78 ml        Restraints: No    Significant Dates:  Post Op Date: N/A  Rescue Date: N/A  Imaging/ Diagnostics: N/A    Noteworthy Labs:  none    CBC/Anemia Labs: Coags:    Recent Labs   Lab 08/23/20  0609 08/25/20  0544   WBC 11.32 8.56   HGB 15.0 14.7   HCT 47.2 45.4    183   MCV 94 90   RDW 14.2 13.8    Recent Labs   Lab 08/24/20  1056 08/24/20 2001 08/25/20  0349   INR 1.0  --   --    APTT 24.2 62.0* 44.2*        Chemistries:   Recent Labs   Lab 08/23/20  0609 08/24/20  0422 08/25/20  0349    138 141   K 4.7 5.3* 3.4*    105 101   CO2 24 22* 24   BUN 27* 26* 22   CREATININE 1.2 1.1 1.0   CALCIUM 9.5 8.8 9.7   PROT 7.2 7.0 7.2   BILITOT 1.0 1.0 1.1*   ALKPHOS 80 73 81   ALT 63* 46* 40   AST 92* 48* 31   MG  2.2  --  2.1   PHOS 4.4  --  4.5        Cardiac Enzymes: Ejection Fractions:    Recent Labs     08/23/20  0609   TROPONINI 0.040*    No results found for: EF     POCT Glucose: HbA1c:    No results for input(s): POCTGLUCOSE in the last 168 hours. No results found for: HGBA1C        ICU LOS 3d  Level of Care: Discharge    Shift Summary/Plan for the shift:   After adm trazadone for sleep pt felt she didn't sleep well continues to void without problems. Cardiac shows SR heparin and amio drips remain at same rate. Pt looking forward to going home.

## 2020-08-26 NOTE — ASSESSMENT & PLAN NOTE
Patient with newly diagnosed atrial fibrillation with RVR.  Mild hypotension associated with it.  On amiodarone drip.  Will start heparin drip.  I talked in detail with the patient about various options including cardioversion after the CHERY if there is no left atrial appendage clot.  Patient states that she has felt her heart beating fast in the past also but it usually goes away after she takes an extra dose of metoprolol.  It has been going on and off in the past.  We discussed of various complications of cardioversion including stroke.  Patient understands and is willing to proceed with cardioversion after the CHERY today.    8/25:  AFib.  On amiodarone drip.  Rate controlled.  Feeling better with rate control.  Start full oral anticoagulation in a.m..    8/26:  Now back to normal sinus rhythm.  Switch to oral amiodarone from amiodarone drip. start Eliquis 5 mg b.i.d.

## 2020-08-26 NOTE — PROGRESS NOTES
[facility]  Cardiology  Progress Note    Patient Name: Fatou Lorenzo  MRN: [id number]  Admission Date: 8/23/2020  Hospital Length of Stay: 3 days  Code Status: Full Code   Attending Physician: Bella Ramos MD   Primary Care Physician: Ludin Rivas MD  Expected Discharge Date:   Principal Problem:Acute respiratory failure with hypoxia    Subjective:       Interval History:  Patient doing fine.  Converted to normal sinus rhythm yesterday.  Feeling much better.  Breathing almost back to baseline.  No complications from cardiac catheterization yesterday.    Review of Systems   Constitution: Positive for malaise/fatigue.   HENT: Negative.    Eyes: Negative.    Cardiovascular: Negative.    Respiratory: Negative.    Endocrine: Negative.    Hematologic/Lymphatic: Negative.    Skin: Negative.    Musculoskeletal: Negative.    Gastrointestinal: Negative.    Genitourinary: Negative.    Neurological: Negative.    Psychiatric/Behavioral: Negative.    Allergic/Immunologic: Negative.      Objective:     Vital Signs (Most Recent):  Temp: 98 °F (36.7 °C) (08/26/20 1200)  Pulse: 73 (08/26/20 1236)  Resp: 20 (08/26/20 1236)  BP: 111/61 (08/26/20 1236)  SpO2: 97 % (08/26/20 1236) Vital Signs (24h Range):  Temp:  [98 °F (36.7 °C)-98.5 °F (36.9 °C)] 98 °F (36.7 °C)  Pulse:  [64-88] 73  Resp:  [0-33] 20  SpO2:  [79 %-99 %] 97 %  BP: ()/(51-79) 111/61     Weight: 82.6 kg (182 lb)  Body mass index is 30.29 kg/m².     SpO2: 97 %  O2 Device (Oxygen Therapy): room air      Intake/Output Summary (Last 24 hours) at 8/26/2020 1248  Last data filed at 8/26/2020 0900  Gross per 24 hour   Intake 1120.11 ml   Output 1565 ml   Net -444.89 ml       Lines/Drains/Airways     Peripheral Intravenous Line                 Peripheral IV - Single Lumen 08/23/20 22 G Posterior;Right;Distal Forearm 3 days         Peripheral IV - Single Lumen 08/24/20 1050 22 G Posterior;Right;Proximal Forearm 2 days                Physical Exam    Constitutional: She is oriented to person, place, and time. She appears well-developed and well-nourished.   HENT:   Head: Normocephalic.   Eyes: Pupils are equal, round, and reactive to light.   Neck: Normal range of motion. Neck supple.   Cardiovascular: Normal rate and regular rhythm.   Pulmonary/Chest: Effort normal and breath sounds normal.   Abdominal: Soft. Normal appearance and bowel sounds are normal. There is no abdominal tenderness.   Musculoskeletal: Normal range of motion.   Neurological: She is alert and oriented to person, place, and time.   Skin: Skin is warm.   Psychiatric: She has a normal mood and affect.       Significant Labs:   BMP:   Recent Labs   Lab 08/25/20  0349 08/26/20  0847   * 152*    138   K 3.4* 3.4*    96   CO2 24 31*   BUN 22 23   CREATININE 1.0 1.1   CALCIUM 9.7 9.8   MG 2.1 2.1   , CMP   Recent Labs   Lab 08/25/20  0349 08/26/20  0847    138   K 3.4* 3.4*    96   CO2 24 31*   * 152*   BUN 22 23   CREATININE 1.0 1.1   CALCIUM 9.7 9.8   PROT 7.2  --    ALBUMIN 4.0  --    BILITOT 1.1*  --    ALKPHOS 81  --    AST 31  --    ALT 40  --    ANIONGAP 16 11   ESTGFRAFRICA >60 57*   EGFRNONAA 55* 49*   , CBC   Recent Labs   Lab 08/25/20  0544   WBC 8.56   HGB 14.7   HCT 45.4      , INR No results for input(s): INR, PROTIME in the last 48 hours., Lipid Panel No results for input(s): CHOL, HDL, LDLCALC, TRIG, CHOLHDL in the last 48 hours., Troponin No results for input(s): TROPONINI in the last 48 hours. and All pertinent lab results from the last 24 hours have been reviewed.    Significant Imaging: Echocardiogram:   Transthoracic echo (TTE) complete (Cupid Only):   Results for orders placed or performed during the hospital encounter of 08/23/20   Echo Color Flow Doppler? Yes   Result Value Ref Range    Ascending aorta 2.11 cm    STJ 1.78 cm    AV mean gradient 2 mmHg    Ao peak becca 1.02 m/s    Ao VTI 13.09 cm    IVRT 65.74 msec    IVS 1.17 (A)  0.6 - 1.1 cm    LA size 5.65 cm    Left Atrium Major Axis 6.25 cm    Left Atrium Minor Axis 6.91 cm    LVIDD 4.66 3.5 - 6.0 cm    LVIDS 3.39 2.1 - 4.0 cm    LVOT diameter 1.90 cm    LVOT peak VTI 11.30 cm    PW 1.10 0.6 - 1.1 cm    MV Peak A Riley 0.86 m/s    E wave decelartion time 167.17 msec    MV Peak E Riley 1.89 m/s    RA Major Axis 3.27 cm    RVDD 2.95 cm    Sinus 2.63 cm    TAPSE 2.11 cm    TR Max Riley 3.53 m/s    TDI LATERAL 0.12 m/s    TDI SEPTAL 0.09 m/s    LA WIDTH 5.88 cm    Ao root annulus 2.50 cm    AORTIC VALVE CUSP SEPERATION 1.87 cm    PV PEAK VELOCITY 0.81 cm/s    MV stenosis pressure 1/2 time 48.48 ms    LV Diastolic Volume 100.12 mL    LV Systolic Volume 46.93 mL    LVOT peak riley 0.52 m/s    Mr max riley 0.05 m/s    LV LATERAL E/E' RATIO 15.75 m/s    LV SEPTAL E/E' RATIO 21.00 m/s    FS 27 %    LA volume 185.34 cm3    LV mass 193.31 g    Left Ventricle Relative Wall Thickness 0.47 cm    AV valve area 2.45 cm2    AV Velocity Ratio 0.51     AV index (prosthetic) 0.86     MV valve area p 1/2 method 4.54 cm2    E/A ratio 2.20     Mean e' 0.11 m/s    LVOT area 2.8 cm2    LVOT stroke volume 32.02 cm3    AV peak gradient 4 mmHg    E/E' ratio 18.00 m/s    LV Systolic Volume Index 24.7 mL/m2    LV Diastolic Volume Index 52.67 mL/m2    LA Volume Index 97.5 mL/m2    LV Mass Index 102 g/m2    Triscuspid Valve Regurgitation Peak Gradient 50 mmHg    BSA 1.95 m2    Right Atrial Pressure (from IVC) 3 mmHg    TV rest pulmonary artery pressure 53 mmHg    Narrative    · Normal left ventricular systolic function. The estimated ejection   fraction is 60%.  · Concentric left ventricular hypertrophy.  · Severe mitral regurgitation.  · Moderate to severe tricuspid regurgitation.  · Mild-to-moderate aortic valve stenosis.  · Aortic valve area is 2.45 cm2; peak velocity is 1.02 m/s; mean gradient   is 2 mmHg.  · Severe left atrial enlargement.  · Grade III (severe) left ventricular diastolic dysfunction consistent   with  restrictive physiology.  · Normal right ventricular systolic function.  · Mild pulmonic regurgitation.  · Normal central venous pressure (3 mmHg).  · The estimated PA systolic pressure is 53 mmHg.  · Pulmonary hypertension present.     mitral valve prolaspe with severe regurgitation      Assessment and Plan:     Brief HPI:     Left atrial thrombus  Continue anticoagulation    Severe mitral regurgitation  Marciano today.  Cardiac catheterization tomorrow.  Will refer to Dr. Waddell for mitral valve repair/replacement after initial workup complete.    Risks, benefits and alternatives of the catheterization procedure were discussed with the patient.The risks of coronary angiography include but are not limited to: bleeding, infection, death, heart attack, arrhythmia, kidney injury or failure, potential need for dialysis, allergic reactions, stroke, need for emergency surgery, hematoma, pseudoaneurysm etc.  Should stenting be indicated, the patient has agreed to dual anti-platelet therapy for 1-consecutive year with a drug-eluting stent and a minimum of 1-month with the use of a bare metal stent. Additionally, pt is aware that non-compliance is likely to result in stent clotting with heart attack, heart failure, and/or death  The risks of moderate sedation include hypotension, respiratory depression, arrhythmias, bronchospasm, and death. Informed consent was obtained and the  patient is agreeable to proceed with the procedure.     8/26:  Will refer to Dr. Waddell at [facility] for discussion of mitral valve repair/replacement.  I recommend making an appointment for the patient in the next few weeks so she can be evaluated by Dr. Waddell.      Atrial fibrillation with RVR  Patient with newly diagnosed atrial fibrillation with RVR.  Mild hypotension associated with it.  On amiodarone drip.  Will start heparin drip.  I talked in detail with the patient about various options including cardioversion after the MARCIANO if there is no  left atrial appendage clot.  Patient states that she has felt her heart beating fast in the past also but it usually goes away after she takes an extra dose of metoprolol.  It has been going on and off in the past.  We discussed of various complications of cardioversion including stroke.  Patient understands and is willing to proceed with cardioversion after the CHERY today.    8/25:  AFib.  On amiodarone drip.  Rate controlled.  Feeling better with rate control.  Start full oral anticoagulation in a.m..    8/26:  Now back to normal sinus rhythm.  Switch to oral amiodarone from amiodarone drip. start Eliquis 5 mg b.i.d.    SOB (shortness of breath)  Caused by decompensated heart failure    Acute on chronic diastolic congestive heart failure  Likely underlying causes severe mitral regurgitation.  Had a detailed discussion with the patient.  Will do further evaluation with CHERY tomorrow.  Once euvolemic, will do cardiac catheterization.  After initial workup complete, and patient's heart failure compensated, will refer to Dr. Waddell for evaluation of mitral valve surgery.  Continue IV diuresis.    8/26:  Breathing better.  Switch to oral diuresis        VTE Risk Mitigation (From admission, onward)         Ordered     apixaban tablet 5 mg  2 times daily      08/26/20 0720     IP VTE HIGH RISK PATIENT  Once      08/23/20 0452     Place sequential compression device  Until discontinued      08/23/20 0452                Marlee Carrillo MD  Cardiology  [facility]    Critical Care Time: 35 minutes     Critical care was time spent personally by me on the following activities: development of treatment plan with patient or surrogate and bedside caregivers, discussions with consultants, evaluation of patient's response to treatment, examination of patient, ordering and performing treatments and interventions, ordering and review of laboratory studies, ordering and review of radiographic studies, pulse oximetry,  re-evaluation of patient's condition. This critical care time did not overlap with that of any other provider or involve time for any procedures.

## 2020-08-26 NOTE — PLAN OF CARE
08/26/20 1022   Medicare Message   Important Message from Medicare regarding Discharge Appeal Rights Given to patient/caregiver;Explained to patient/caregiver;Signed/date by patient/caregiver   Date IMM was signed 08/26/20   Time IMM was signed 0922

## 2020-08-26 NOTE — SUBJECTIVE & OBJECTIVE
Interval History:  Patient doing fine.  Converted to normal sinus rhythm yesterday.  Feeling much better.  Breathing almost back to baseline.  No complications from cardiac catheterization yesterday.    Review of Systems   Constitution: Positive for malaise/fatigue.   HENT: Negative.    Eyes: Negative.    Cardiovascular: Negative.    Respiratory: Negative.    Endocrine: Negative.    Hematologic/Lymphatic: Negative.    Skin: Negative.    Musculoskeletal: Negative.    Gastrointestinal: Negative.    Genitourinary: Negative.    Neurological: Negative.    Psychiatric/Behavioral: Negative.    Allergic/Immunologic: Negative.      Objective:     Vital Signs (Most Recent):  Temp: 98 °F (36.7 °C) (08/26/20 1200)  Pulse: 73 (08/26/20 1236)  Resp: 20 (08/26/20 1236)  BP: 111/61 (08/26/20 1236)  SpO2: 97 % (08/26/20 1236) Vital Signs (24h Range):  Temp:  [98 °F (36.7 °C)-98.5 °F (36.9 °C)] 98 °F (36.7 °C)  Pulse:  [64-88] 73  Resp:  [0-33] 20  SpO2:  [79 %-99 %] 97 %  BP: ()/(51-79) 111/61     Weight: 82.6 kg (182 lb)  Body mass index is 30.29 kg/m².     SpO2: 97 %  O2 Device (Oxygen Therapy): room air      Intake/Output Summary (Last 24 hours) at 8/26/2020 1248  Last data filed at 8/26/2020 0900  Gross per 24 hour   Intake 1120.11 ml   Output 1565 ml   Net -444.89 ml       Lines/Drains/Airways     Peripheral Intravenous Line                 Peripheral IV - Single Lumen 08/23/20 22 G Posterior;Right;Distal Forearm 3 days         Peripheral IV - Single Lumen 08/24/20 1050 22 G Posterior;Right;Proximal Forearm 2 days                Physical Exam   Constitutional: She is oriented to person, place, and time. She appears well-developed and well-nourished.   HENT:   Head: Normocephalic.   Eyes: Pupils are equal, round, and reactive to light.   Neck: Normal range of motion. Neck supple.   Cardiovascular: Normal rate and regular rhythm.   Pulmonary/Chest: Effort normal and breath sounds normal.   Abdominal: Soft. Normal appearance  and bowel sounds are normal. There is no abdominal tenderness.   Musculoskeletal: Normal range of motion.   Neurological: She is alert and oriented to person, place, and time.   Skin: Skin is warm.   Psychiatric: She has a normal mood and affect.       Significant Labs:   BMP:   Recent Labs   Lab 08/25/20  0349 08/26/20  0847   * 152*    138   K 3.4* 3.4*    96   CO2 24 31*   BUN 22 23   CREATININE 1.0 1.1   CALCIUM 9.7 9.8   MG 2.1 2.1   , CMP   Recent Labs   Lab 08/25/20  0349 08/26/20  0847    138   K 3.4* 3.4*    96   CO2 24 31*   * 152*   BUN 22 23   CREATININE 1.0 1.1   CALCIUM 9.7 9.8   PROT 7.2  --    ALBUMIN 4.0  --    BILITOT 1.1*  --    ALKPHOS 81  --    AST 31  --    ALT 40  --    ANIONGAP 16 11   ESTGFRAFRICA >60 57*   EGFRNONAA 55* 49*   , CBC   Recent Labs   Lab 08/25/20  0544   WBC 8.56   HGB 14.7   HCT 45.4      , INR No results for input(s): INR, PROTIME in the last 48 hours., Lipid Panel No results for input(s): CHOL, HDL, LDLCALC, TRIG, CHOLHDL in the last 48 hours., Troponin No results for input(s): TROPONINI in the last 48 hours. and All pertinent lab results from the last 24 hours have been reviewed.    Significant Imaging: Echocardiogram:   Transthoracic echo (TTE) complete (Cupid Only):   Results for orders placed or performed during the hospital encounter of 08/23/20   Echo Color Flow Doppler? Yes   Result Value Ref Range    Ascending aorta 2.11 cm    STJ 1.78 cm    AV mean gradient 2 mmHg    Ao peak riley 1.02 m/s    Ao VTI 13.09 cm    IVRT 65.74 msec    IVS 1.17 (A) 0.6 - 1.1 cm    LA size 5.65 cm    Left Atrium Major Axis 6.25 cm    Left Atrium Minor Axis 6.91 cm    LVIDD 4.66 3.5 - 6.0 cm    LVIDS 3.39 2.1 - 4.0 cm    LVOT diameter 1.90 cm    LVOT peak VTI 11.30 cm    PW 1.10 0.6 - 1.1 cm    MV Peak A Riley 0.86 m/s    E wave decelartion time 167.17 msec    MV Peak E Riley 1.89 m/s    RA Major Axis 3.27 cm    RVDD 2.95 cm    Sinus 2.63 cm    TAPSE  2.11 cm    TR Max Riley 3.53 m/s    TDI LATERAL 0.12 m/s    TDI SEPTAL 0.09 m/s    LA WIDTH 5.88 cm    Ao root annulus 2.50 cm    AORTIC VALVE CUSP SEPERATION 1.87 cm    PV PEAK VELOCITY 0.81 cm/s    MV stenosis pressure 1/2 time 48.48 ms    LV Diastolic Volume 100.12 mL    LV Systolic Volume 46.93 mL    LVOT peak riley 0.52 m/s    Mr max riley 0.05 m/s    LV LATERAL E/E' RATIO 15.75 m/s    LV SEPTAL E/E' RATIO 21.00 m/s    FS 27 %    LA volume 185.34 cm3    LV mass 193.31 g    Left Ventricle Relative Wall Thickness 0.47 cm    AV valve area 2.45 cm2    AV Velocity Ratio 0.51     AV index (prosthetic) 0.86     MV valve area p 1/2 method 4.54 cm2    E/A ratio 2.20     Mean e' 0.11 m/s    LVOT area 2.8 cm2    LVOT stroke volume 32.02 cm3    AV peak gradient 4 mmHg    E/E' ratio 18.00 m/s    LV Systolic Volume Index 24.7 mL/m2    LV Diastolic Volume Index 52.67 mL/m2    LA Volume Index 97.5 mL/m2    LV Mass Index 102 g/m2    Triscuspid Valve Regurgitation Peak Gradient 50 mmHg    BSA 1.95 m2    Right Atrial Pressure (from IVC) 3 mmHg    TV rest pulmonary artery pressure 53 mmHg    Narrative    · Normal left ventricular systolic function. The estimated ejection   fraction is 60%.  · Concentric left ventricular hypertrophy.  · Severe mitral regurgitation.  · Moderate to severe tricuspid regurgitation.  · Mild-to-moderate aortic valve stenosis.  · Aortic valve area is 2.45 cm2; peak velocity is 1.02 m/s; mean gradient   is 2 mmHg.  · Severe left atrial enlargement.  · Grade III (severe) left ventricular diastolic dysfunction consistent   with restrictive physiology.  · Normal right ventricular systolic function.  · Mild pulmonic regurgitation.  · Normal central venous pressure (3 mmHg).  · The estimated PA systolic pressure is 53 mmHg.  · Pulmonary hypertension present.     mitral valve prolaspe with severe regurgitation

## 2020-08-26 NOTE — PLAN OF CARE
Problem: Arrhythmia/Dysrhythmia  Goal: Normalized Cardiac Rhythm  Outcome: Met     Problem: Gas Exchange Impaired  Goal: Optimal Gas Exchange  Outcome: Met

## 2020-08-26 NOTE — ASSESSMENT & PLAN NOTE
Marciano today.  Cardiac catheterization tomorrow.  Will refer to Dr. Waddell for mitral valve repair/replacement after initial workup complete.    Risks, benefits and alternatives of the catheterization procedure were discussed with the patient.The risks of coronary angiography include but are not limited to: bleeding, infection, death, heart attack, arrhythmia, kidney injury or failure, potential need for dialysis, allergic reactions, stroke, need for emergency surgery, hematoma, pseudoaneurysm etc.  Should stenting be indicated, the patient has agreed to dual anti-platelet therapy for 1-consecutive year with a drug-eluting stent and a minimum of 1-month with the use of a bare metal stent. Additionally, pt is aware that non-compliance is likely to result in stent clotting with heart attack, heart failure, and/or death  The risks of moderate sedation include hypotension, respiratory depression, arrhythmias, bronchospasm, and death. Informed consent was obtained and the  patient is agreeable to proceed with the procedure.     8/26:  Will refer to Dr. Waddell at [facility] for discussion of mitral valve repair/replacement.  I recommend making an appointment for the patient in the next few weeks so she can be evaluated by Dr. Waddell.

## 2020-08-26 NOTE — PT/OT/SLP EVAL
Occupational Therapy   Evaluation and Discharge Note    Name: Fatou Lorenzo  MRN: [id number]  Admitting Diagnosis:  Acute respiratory failure with hypoxia 1 Day Post-Op    Recommendations:     Discharge Recommendations: home(with faimly)  Discharge Equipment Recommendations:  none  Barriers to discharge:  None    Assessment:     Fatou Lorenzo is a 75 y.o. female with a medical diagnosis of Acute respiratory failure with hypoxia. The patient is able to perform self care and ambulate without AD in her room (I)/(S). No OT or DME is recommended. The patient is aware of the recommendation to sit in the chair and amb to the bathroom with nursing (S).    Plan:     During this hospitalization, patient does not require further acute OT services.  Please re-consult if situation changes.    · Plan of Care Reviewed with: patient    Subjective     Chief Complaint: happy to transfer out of ICU  Patient/Family Comments/goals: plans to have cardiac procedure in the future    Occupational Profile:  Living Environment: The patient lives with her son in a [facility] with [address]. The patient has a shower with a built in shower chair.  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 The patient drives.  Equipment Used at home:  (built in shower chair)  Assistance upon Discharge: son    Pain/Comfort:  · Pain Rating 1: 0/10(slight discomfort in left upper arm at old IV site)    Patients cultural, spiritual, Uatsdin conflicts given the current situation: no    Objective:     Communicated with: nurse prior to session.  Patient found HOB elevated with peripheral IV, telemetry upon OT entry to room.    General Precautions: Standard, fall   Orthopedic Precautions:N/A   Braces: N/A     Occupational Performance:    Bed Mobility:    · Patient completed Scooting/Bridging with modified independence  · Patient completed Supine to Sit  "with modified independence    Functional Mobility/Transfers:  · Patient completed Sit <> Stand Transfer with supervision  with  no assistive device   · Functional Mobility: The patient amb without AD around the bed with good awareness of her telemetry lines. The patient was able to step to the chair with (I)/(S).    Activities of Daily Living:  · Upper Body Dressing: modified independence    · Lower Body Dressing: modified independence and supervision to don B socks while seated on the EOB    Cognitive/Visual Perceptual:  Cognitive/Psychosocial Skills:     -       Oriented to: Person, Place, Time and Situation   -       Follows Commands/attention:Follows multistep  commands  -       Communication: clear/fluent  -       Memory: No Deficits noted  -       Safety awareness/insight to disability: intact   -       Mood/Affect/Coping skills/emotional control: Appropriate to situation  Visual/Perceptual:      -Intact      Physical Exam:  Balance: -       good  Postural examination/scapula alignment:    -       No postural abnormalities identified  Skin integrity: Visible skin intact and bruises noted in BUE and c/o "lump" at left upper arm at former IV site  Edema:  None noted  Sensation:    -       Intact  Upper Extremity Range of Motion:     -       Right Upper Extremity: WFL  -       Left Upper Extremity: WFL  Upper Extremity Strength:    -       Right Upper Extremity: WFL  -       Left Upper Extremity: WFL    AMPAC 6 Click ADL:  AMPAC Total Score: 24    Treatment & Education:  The patient participated in OT eval and was educated re: OT role and benefits of sitting in the chair/ambulating to the bathroom with nursing (S). The patient reports she will have assistance as needed at home and agrees with no OT or DME needs.  Education:    Patient left up in chair with all lines intact and call button in reach (transport arrived to take the patient to the telemetry unit.    GOALS:   Multidisciplinary Problems     Occupational " Therapy Goals     Not on file          Multidisciplinary Problems (Resolved)        Problem: Occupational Therapy Goal    Goal Priority Disciplines Outcome Interventions   Occupational Therapy Goal   (Resolved)     OT, PT/OT Met                    History:     Past Medical History:   Diagnosis Date    Atrial fibrillation with RVR 8/24/2020    Cataract     Glaucoma     Heart valve problem     Hyperlipidemia     Severe mitral regurgitation 8/24/2020       Past Surgical History:   Procedure Laterality Date    ANKLE SURGERY      LEFT HEART CATHETERIZATION Left 8/25/2020    Procedure: Left heart cath, radial;  Surgeon: Marlee Carrillo MD;  Location: [facility] CATH LAB;  Service: Cardiology;  Laterality: Left;    lipoma removal         Time Tracking:     OT Date of Treatment: 08/26/20  OT Start Time: 1356  OT Stop Time: 1407  OT Total Time (min): 11 min    Billable Minutes:Evaluation 11    Maria Elena Ricks OT  8/26/2020

## 2020-08-26 NOTE — PLAN OF CARE
Problem: Occupational Therapy Goal  Goal: Occupational Therapy Goal  Outcome: Met   OT eval is complete. The patient is at her functional baseline and no OT is recommended.     The patient was encouraged to sit the chair for meals and amb to the bathroom with nursing (S).

## 2020-08-26 NOTE — CONSULTS
Follow-up Information     Marlee Carrillo MD On 8/31/2020.    Specialties: Cardiology, INTERVENTIONAL CARDIOLOGY  Why: Outpatient Services @9:20am   Contact information:  [address]  [address]  [facility] [address] [phone]             Antoni Waddell MD.    Specialties: Cardiothoracic Surgery, Transplant  Why: Nurse will contact the pt with an appt   Contact information:  [address] [facility] [address]  [phone]

## 2020-08-26 NOTE — PT/OT/SLP EVAL
Physical Therapy Evaluation and Discharge Note    Patient Name:  Fatou Lorenzo   MRN:  [id number]    Recommendations:     Discharge Recommendations:  home   Discharge Equipment Recommendations: none   Barriers to discharge home: None    Assessment:     Fatou Lorenzo is a 75 y.o. female admitted with a medical diagnosis of Acute respiratory failure with hypoxia. At this time, patient is functioning at their prior level of function and does not require further acute PT services.     Recent Surgery: Procedure(s) (LRB):  Left heart cath, radial (Left) 1 Day Post-Op    Plan:     During this hospitalization, patient does not require further acute PT services.  Please re-consult if situation changes.      Subjective     Chief Complaint: Pt feels weak from not getting out of bed the past 4 days.  Patient/Family Comments/goals: Pt is eager to go home tomorrow.  Pain/Comfort:  Pain Rating 1: 0/10    Patients cultural, spiritual, Episcopalian conflicts given the current situation: no    Living Environment:  Pt lives with her adult son in a [facility].   Prior to admission, patients level of function was modified independent with no AD. Pt stopped working earlier this year due to COVID age restrictions at her work. Pt is currently driving.  Equipment used at home: none.  DME owned (not currently used): none.  Upon discharge, patient will have assistance from her son and self.    Objective:     Patient found HOB elevated with telemetry, peripheral IV upon PT entry to room.    General Precautions: Standard,     Orthopedic Precautions:N/A   Braces: N/A     Exams:  · Cognitive Exam:  Patient is oriented to Person, Place, Time and Situation  · Postural Exam:  Patient presented with the following abnormalities:    · -       No postural abnormalities identified  · Skin Integrity/Edema:      · -       Skin integrity: Visible skin intact  · -       Edema: None noted (B) LE  · (B)LE ROM: WNL  · (B)LE Strength:  · Hip flexion:  5/5  · Knee flexion/extension: 5/5  · Ankle PF/DF: 5/5    Functional Mobility:  · Bed Mobility:     · Scooting: modified independence  · Supine to Sit: modified independence  · Transfers:     · Sit to Stand: modified independence with no AD  · Bed to Chair: modified independence with  no AD  using  Step Transfer  · Gait: Pt ambulated 175ft SBA for safety with no AD with reciprocal gait.   · Balance: Good static sitting, Good static and dynamic standing balance    AM-PAC 6 CLICK MOBILITY  Total Score:24       Therapeutic Activities and Exercises:  Pt education of PT role in acute hospital and continuance of mobility during her admission in the hospital and at home to prevent blood clots and muscle wasting. Pt provided with handout for standing (B) LE therex. Pt verbalized understanding.    AM-PAC 6 CLICK MOBILITY  Total Score:24     Patient left up in chair with all lines intact, call button in reach, Charge RN Sole notified, and pt's son present.    GOALS:   Multidisciplinary Problems     Physical Therapy Goals     Not on file          Multidisciplinary Problems (Resolved)        Problem: Physical Therapy Goal    Goal Priority Disciplines Outcome Goal Variances Interventions   Physical Therapy Goal   (Resolved)     PT, PT/OT Met                     History:     Past Medical History:   Diagnosis Date    Atrial fibrillation with RVR 8/24/2020    Cataract     Glaucoma     Heart valve problem     Hyperlipidemia     Severe mitral regurgitation 8/24/2020       Past Surgical History:   Procedure Laterality Date    ANKLE SURGERY      LEFT HEART CATHETERIZATION Left 8/25/2020    Procedure: Left heart cath, radial;  Surgeon: Marlee Carrillo MD;  Location: [facility] CATH LAB;  Service: Cardiology;  Laterality: Left;    lipoma removal         Time Tracking:     PT Received On: 08/26/20  PT Start Time: 1457     PT Stop Time: 1509  PT Total Time (min): 12 min     Billable Minutes: Evaluation 12      Lorena Lechuga  SPT  08/26/2020

## 2020-08-27 NOTE — ASSESSMENT & PLAN NOTE
Severe and likely cause of patient's symptoms  S/p left heart catheterization for ischemic workup on 8/25 with non-obstructive CAD  Case discussed with Cardiology and she will need referral to Dr. Waddell for mitral valve repair/replacement as an outpatient

## 2020-08-27 NOTE — SUBJECTIVE & OBJECTIVE
Interval History:  Patient doing fine.  No complications from cardiac catheterization.  Switch to oral diuretics.  Currently euvolemic.  Main complaint is mild generalized weakness    Review of Systems   Constitution: Positive for malaise/fatigue.   HENT: Negative.    Cardiovascular: Negative.  Negative for chest pain.   Respiratory: Negative for shortness of breath.    Musculoskeletal: Negative.      Objective:     Vital Signs (Most Recent):  Temp: 97.5 °F (36.4 °C) (08/27/20 0750)  Pulse: 75 (08/27/20 0750)  Resp: 16 (08/27/20 0750)  BP: 117/65 (08/27/20 0750)  SpO2: (!) 94 % (08/27/20 0750) Vital Signs (24h Range):  Temp:  [97.4 °F (36.3 °C)-98.6 °F (37 °C)] 97.5 °F (36.4 °C)  Pulse:  [71-82] 75  Resp:  [16-25] 16  SpO2:  [93 %-99 %] 94 %  BP: (101-138)/(55-65) 117/65     Weight: 80.7 kg (177 lb 14.6 oz)  Body mass index is 29.61 kg/m².     SpO2: (!) 94 %  O2 Device (Oxygen Therapy): room air      Intake/Output Summary (Last 24 hours) at 8/27/2020 0849  Last data filed at 8/27/2020 0600  Gross per 24 hour   Intake 845.4 ml   Output --   Net 845.4 ml       Lines/Drains/Airways     Peripheral Intravenous Line                 Peripheral IV - Single Lumen 08/24/20 1050 22 G Posterior;Right;Proximal Forearm 2 days                Physical Exam   Constitutional: She is oriented to person, place, and time. She appears well-developed and well-nourished.   HENT:   Head: Normocephalic.   Eyes: Pupils are equal, round, and reactive to light.   Neck: Normal range of motion. Neck supple.   Cardiovascular: Normal rate and regular rhythm.   Pulmonary/Chest: Effort normal and breath sounds normal.   Abdominal: Soft. Normal appearance and bowel sounds are normal. There is no abdominal tenderness.   Musculoskeletal: Normal range of motion.   Neurological: She is alert and oriented to person, place, and time.   Skin: Skin is warm.   Psychiatric: She has a normal mood and affect.       Significant Labs:   BMP:   Recent Labs   Lab  08/26/20  0847 08/27/20  0731   * 116*    139   K 3.4* 3.7   CL 96 97   CO2 31* 31*   BUN 23 24*   CREATININE 1.1 1.1   CALCIUM 9.8 9.4   MG 2.1 2.3   , CMP   Recent Labs   Lab 08/26/20  0847 08/27/20  0731    139   K 3.4* 3.7   CL 96 97   CO2 31* 31*   * 116*   BUN 23 24*   CREATININE 1.1 1.1   CALCIUM 9.8 9.4   ANIONGAP 11 11   ESTGFRAFRICA 57* 57*   EGFRNONAA 49* 49*   , CBC   Recent Labs   Lab 08/27/20  0731   WBC 8.02   HGB 14.2   HCT 44.1      , INR No results for input(s): INR, PROTIME in the last 48 hours., Lipid Panel No results for input(s): CHOL, HDL, LDLCALC, TRIG, CHOLHDL in the last 48 hours., Troponin No results for input(s): TROPONINI in the last 48 hours. and All pertinent lab results from the last 24 hours have been reviewed.    Significant Imaging: Echocardiogram:   Transthoracic echo (TTE) complete (Cupid Only):   Results for orders placed or performed during the hospital encounter of 08/23/20   Echo Color Flow Doppler? Yes   Result Value Ref Range    Ascending aorta 2.11 cm    STJ 1.78 cm    AV mean gradient 2 mmHg    Ao peak riley 1.02 m/s    Ao VTI 13.09 cm    IVRT 65.74 msec    IVS 1.17 (A) 0.6 - 1.1 cm    LA size 5.65 cm    Left Atrium Major Axis 6.25 cm    Left Atrium Minor Axis 6.91 cm    LVIDD 4.66 3.5 - 6.0 cm    LVIDS 3.39 2.1 - 4.0 cm    LVOT diameter 1.90 cm    LVOT peak VTI 11.30 cm    PW 1.10 0.6 - 1.1 cm    MV Peak A Riley 0.86 m/s    E wave decelartion time 167.17 msec    MV Peak E Riley 1.89 m/s    RA Major Axis 3.27 cm    RVDD 2.95 cm    Sinus 2.63 cm    TAPSE 2.11 cm    TR Max Riley 3.53 m/s    TDI LATERAL 0.12 m/s    TDI SEPTAL 0.09 m/s    LA WIDTH 5.88 cm    Ao root annulus 2.50 cm    AORTIC VALVE CUSP SEPERATION 1.87 cm    PV PEAK VELOCITY 0.81 cm/s    MV stenosis pressure 1/2 time 48.48 ms    LV Diastolic Volume 100.12 mL    LV Systolic Volume 46.93 mL    LVOT peak riley 0.52 m/s    Mr max riley 0.05 m/s    LV LATERAL E/E' RATIO 15.75 m/s    LV SEPTAL  E/E' RATIO 21.00 m/s    FS 27 %    LA volume 185.34 cm3    LV mass 193.31 g    Left Ventricle Relative Wall Thickness 0.47 cm    AV valve area 2.45 cm2    AV Velocity Ratio 0.51     AV index (prosthetic) 0.86     MV valve area p 1/2 method 4.54 cm2    E/A ratio 2.20     Mean e' 0.11 m/s    LVOT area 2.8 cm2    LVOT stroke volume 32.02 cm3    AV peak gradient 4 mmHg    E/E' ratio 18.00 m/s    LV Systolic Volume Index 24.7 mL/m2    LV Diastolic Volume Index 52.67 mL/m2    LA Volume Index 97.5 mL/m2    LV Mass Index 102 g/m2    Triscuspid Valve Regurgitation Peak Gradient 50 mmHg    BSA 1.95 m2    Right Atrial Pressure (from IVC) 3 mmHg    TV rest pulmonary artery pressure 53 mmHg    Narrative    · Normal left ventricular systolic function. The estimated ejection   fraction is 60%.  · Concentric left ventricular hypertrophy.  · Severe mitral regurgitation.  · Moderate to severe tricuspid regurgitation.  · Aortic valve area is 2.45 cm2; peak velocity is 1.02 m/s; mean gradient   is 2 mmHg.  · Severe left atrial enlargement.  · Grade III (severe) left ventricular diastolic dysfunction consistent   with restrictive physiology.  · Normal right ventricular systolic function.  · Mild pulmonic regurgitation.  · Normal central venous pressure (3 mmHg).  · The estimated PA systolic pressure is 53 mmHg.  · Pulmonary hypertension present.     mitral valve prolaspe with severe regurgitation

## 2020-08-27 NOTE — PROGRESS NOTES
[facility]  Progress Note    Patient Name: Fatou Lorenzo  MRN: [id number]  Patient Class: IP- Inpatient   Admission Date: 8/23/2020  Length of Stay: 3 days  Attending Physician: Bella Ramos MD  Primary Care Provider: Ludin Rivas MD        Subjective:     Principal Problem:Acute respiratory failure with hypoxia        HPI:  Patient with a past medical history of mitral valve prolapse with mitral regurgitation.  Is followed at [facility].  States 8 years ago was referred to Dr. Waddell, but valvular regurgitation not bad enough at that time to do surgery.  Denies any chest pains.  Came in with worsening shortness of breath, orthopnea.  Admitted with acute decompensated heart failure.  X-ray showed bibasilar infiltrates.  EKG personally reviewed.  Shows sinus tachycardia.  No acute ischemic changes.  Initially on BiPAP, now on nasal cannula after IV Lasix.  Saturating well. Denied chest pain, fever, chills, nausea, vomiting, abdominal pain, cough         Patient being ruled out for COVID also.    Overview/Hospital Course:  Ms Lorenzo presented with acute respiratory failure with hypoxia secondary to cardiogenic edema.  Patient quickly improved with IV furosemide supporting diagnosis.  However she did require high-flow type nasal cannula once taken off NIPPV.  Transthoracic echocardiogram obtained showed severe mitral valve regurgitation and moderate to severe tricuspid valve regurgitation. Also with grade 3 diastolic dysfunction and severely enlarged left atrium.  No pulmonary hypertension, pulmonic valve regurgitation or right-sided heart failure.  Cardiology consulted.  Patient developed atrial fibrillation with RVR, therefore placed on amiodarone infusion.  Underwent transesophageal ECHO where a possible left atrial thrombus was identified.  Patient was initiated on heparin infusion for both stroke prophylaxis and possible thrombus.  Left heart catheterization on 8/25/20  showed non-obstructive CAD. Patient continue to improve with diuresis and IV amiodarone continued post cath with spontaneous conversion to NSR.    Interval History:  Patient feels much better, no chest pain, and HR in 70s in NSR. No acute events.On RA.    Review of Systems   Constitutional: Negative for chills and fever.   Respiratory: Positive for shortness of breath.    Cardiovascular: Negative for chest pain.     Objective:     Vital Signs (Most Recent):  Temp: 98.6 °F (37 °C) (08/26/20 2243)  Pulse: 77 (08/26/20 2243)  Resp: 16 (08/26/20 2243)  BP: 119/64 (08/26/20 2243)  SpO2: (!) 94 % (08/26/20 2243) Vital Signs (24h Range):  Temp:  [97.5 °F (36.4 °C)-98.6 °F (37 °C)] 98.6 °F (37 °C)  Pulse:  [64-82] 77  Resp:  [16-33] 16  SpO2:  [92 %-99 %] 94 %  BP: (101-138)/(55-65) 119/64     Weight: 80.7 kg (177 lb 14.6 oz)  Body mass index is 29.61 kg/m².    Intake/Output Summary (Last 24 hours) at 8/26/2020 2338  Last data filed at 8/26/2020 1730  Gross per 24 hour   Intake 684.3 ml   Output 350 ml   Net 334.3 ml      Physical Exam  Vitals signs and nursing note reviewed.   HENT:      Head: Normocephalic and atraumatic.      Nose: Nose normal.   Eyes:      Conjunctiva/sclera: Conjunctivae normal.      Pupils: Pupils are equal, round, and reactive to light.   Neck:      Musculoskeletal: Normal range of motion.   Cardiovascular:      Rate and Rhythm: Normal rate and regular rhythm.      Heart sounds: Murmur present.   Pulmonary:      Effort: Pulmonary effort is normal.      Breath sounds: Normal breath sounds.      Comments: Breathing comfortably on RA  Abdominal:      General: Abdomen is flat. Bowel sounds are normal. There is no distension.      Palpations: There is no mass.      Tenderness: There is no abdominal tenderness. There is no guarding.   Musculoskeletal: Normal range of motion.      Right lower leg: Edema present.      Left lower leg: Edema present.      Comments: Trace pitting edema   Skin:     General: Skin  is warm and dry.      Capillary Refill: Capillary refill takes less than 2 seconds.   Neurological:      Mental Status: She is alert and oriented to person, place, and time.   Psychiatric:         Mood and Affect: Mood normal.         Behavior: Behavior normal.         Thought Content: Thought content normal.         Judgment: Judgment normal.         Significant Labs: All pertinent labs within the past 24 hours have been reviewed.    Significant Imaging: I have reviewed all pertinent imaging results/findings within the past 24 hours.  I have reviewed and interpreted all pertinent imaging results/findings within the past 24 hours.      Assessment/Plan:      * Acute respiratory failure with hypoxia  Secondary to cardiogenic pulmonary edema in setting of grade 3 diastolic dysfunction and severe mitral valve regurgitation.    Patient was on furosemide 60 mg IV b.i.d.   Also on amiodarone infusion for rate and rhythm control for atrial fibrillation with RVR.  S/p spontaneous conversion to NSR on 8/25  Weaned from BIPAP to low-flow nasal cannula - now stable on RA  Transesophageal echocardiogram showed possible thrombus to left atrium  Treated with heparin infusion for anticoagualation - convert to apixaban today  s/p left heart catheterization with non-obstructive CAD on 8/25  Converted to NSR on 8/25 - will stop amiodarone gtt and convert to PO now  Change lasix to PO BID today  Stable for step down to floor telemetry  Monitor overnight and if patient continues to do well, can likely discharge home tomorrow  Case discussed with Cardiology and she will need follow up with them and have referral to Dr. Waddell for mitral valve repair/replacement as an outpatient    Atrial fibrillation with RVR  This is a new diagnosis  S/p amiodarone infusion and heparin infusion for stroke prophylaxis  Conversion to NSR and amiodarone gtt stopped - started PO amiodarone today  Anticoagulation converted to apixaban and heparin gtt  stopped    Severe mitral regurgitation  Severe and likely cause of patient's symptoms  S/p left heart catheterization for ischemic workup on 8/25 with non-obstructive CAD  Case discussed with Cardiology and she will need referral to Dr. Waddell for mitral valve repair/replacement as an outpatient    Left atrial thrombus  Per transesophageal echo  Anticoagulation with heparin - changed to apixaban today    Moderate tricuspid regurgitation  Moderate, per transesophageal echocardiogram    SOB (shortness of breath)  As above    Acute on chronic diastolic congestive heart failure  As above      VTE Risk Mitigation (From admission, onward)         Ordered     apixaban tablet 5 mg  2 times daily      08/26/20 0720     IP VTE HIGH RISK PATIENT  Once      08/23/20 0452     Place sequential compression device  Until discontinued      08/23/20 0452                Critical care time spent on the evaluation and treatment of severe organ dysfunction, review of pertinent labs and imaging studies, discussions with consulting providers and discussions with patient/family:  45  minutes.      Bella Ramos MD  Department of Hospital Medicine   [facility]

## 2020-08-27 NOTE — ASSESSMENT & PLAN NOTE
Patient with newly diagnosed atrial fibrillation with RVR.  Mild hypotension associated with it.  On amiodarone drip.  Will start heparin drip.  I talked in detail with the patient about various options including cardioversion after the CHERY if there is no left atrial appendage clot.  Patient states that she has felt her heart beating fast in the past also but it usually goes away after she takes an extra dose of metoprolol.  It has been going on and off in the past.  We discussed of various complications of cardioversion including stroke.  Patient understands and is willing to proceed with cardioversion after the CHERY today.    8/25:  AFib.  On amiodarone drip.  Rate controlled.  Feeling better with rate control.  Start full oral anticoagulation in a.m..    8/26:  Now back to normal sinus rhythm.  Switch to oral amiodarone from amiodarone drip. start Eliquis 5 mg b.i.d.    8/27:  Continues to be normal sinus rhythm.  Recommend Eliquis.  Amiodarone 400 mg b.i.d. for 12 days and then 200 mg daily.

## 2020-08-27 NOTE — SUBJECTIVE & OBJECTIVE
Interval History:  Patient feels much better, no chest pain, and HR in 70s in NSR. No acute events.On RA.    Review of Systems   Constitutional: Negative for chills and fever.   Respiratory: Positive for shortness of breath.    Cardiovascular: Negative for chest pain.     Objective:     Vital Signs (Most Recent):  Temp: 98.6 °F (37 °C) (08/26/20 2243)  Pulse: 77 (08/26/20 2243)  Resp: 16 (08/26/20 2243)  BP: 119/64 (08/26/20 2243)  SpO2: (!) 94 % (08/26/20 2243) Vital Signs (24h Range):  Temp:  [97.5 °F (36.4 °C)-98.6 °F (37 °C)] 98.6 °F (37 °C)  Pulse:  [64-82] 77  Resp:  [16-33] 16  SpO2:  [92 %-99 %] 94 %  BP: (101-138)/(55-65) 119/64     Weight: 80.7 kg (177 lb 14.6 oz)  Body mass index is 29.61 kg/m².    Intake/Output Summary (Last 24 hours) at 8/26/2020 2338  Last data filed at 8/26/2020 1730  Gross per 24 hour   Intake 684.3 ml   Output 350 ml   Net 334.3 ml      Physical Exam  Vitals signs and nursing note reviewed.   HENT:      Head: Normocephalic and atraumatic.      Nose: Nose normal.   Eyes:      Conjunctiva/sclera: Conjunctivae normal.      Pupils: Pupils are equal, round, and reactive to light.   Neck:      Musculoskeletal: Normal range of motion.   Cardiovascular:      Rate and Rhythm: Normal rate and regular rhythm.      Heart sounds: Murmur present.   Pulmonary:      Effort: Pulmonary effort is normal.      Breath sounds: Normal breath sounds.      Comments: Breathing comfortably on RA  Abdominal:      General: Abdomen is flat. Bowel sounds are normal. There is no distension.      Palpations: There is no mass.      Tenderness: There is no abdominal tenderness. There is no guarding.   Musculoskeletal: Normal range of motion.      Right lower leg: Edema present.      Left lower leg: Edema present.      Comments: Trace pitting edema   Skin:     General: Skin is warm and dry.      Capillary Refill: Capillary refill takes less than 2 seconds.   Neurological:      Mental Status: She is alert and oriented  to person, place, and time.   Psychiatric:         Mood and Affect: Mood normal.         Behavior: Behavior normal.         Thought Content: Thought content normal.         Judgment: Judgment normal.         Significant Labs: All pertinent labs within the past 24 hours have been reviewed.    Significant Imaging: I have reviewed all pertinent imaging results/findings within the past 24 hours.  I have reviewed and interpreted all pertinent imaging results/findings within the past 24 hours.

## 2020-08-27 NOTE — PLAN OF CARE
Problem: Fall Injury Risk  Goal: Absence of Fall and Fall-Related Injury  Intervention: Identify and Manage Contributors to Fall Injury Risk  Flowsheets (Taken 8/27/2020 1244)  Self-Care Promotion:   independence encouraged   BADL personal routines maintained   BADL personal objects within reach  Medication Review/Management: medications reviewed  Intervention: Promote Injury-Free Environment  Flowsheets (Taken 8/27/2020 1244)  Safety Promotion/Fall Prevention:   assistive device/personal item within reach   bed alarm set   nonskid shoes/socks when out of bed   Fall Risk reviewed with patient/family   family to remain at bedside   Fall Risk signage in place   instructed to call staff for mobility   room near unit station  Environmental Safety Modification:   assistive device/personal items within reach   room organization consistent   room near unit station   clutter free environment maintained     Problem: Adult Inpatient Plan of Care  Goal: Plan of Care Review  Flowsheets (Taken 8/27/2020 1244)  Plan of Care Reviewed With: patient  Goal: Absence of Hospital-Acquired Illness or Injury  Intervention: Identify and Manage Fall Risk  Flowsheets (Taken 8/27/2020 1244)  Safety Promotion/Fall Prevention:   assistive device/personal item within reach   bed alarm set   nonskid shoes/socks when out of bed   Fall Risk reviewed with patient/family   family to remain at bedside   Fall Risk signage in place   instructed to call staff for mobility   room near unit station  Intervention: Prevent VTE (venous thromboembolism)  Flowsheets (Taken 8/27/2020 1244)  VTE Prevention/Management:   ambulation promoted   ROM (active) performed  Goal: Optimal Comfort and Wellbeing  Intervention: Provide Person-Centered Care  Flowsheets (Taken 8/27/2020 1244)  Trust Relationship/Rapport:   care explained   choices provided   questions encouraged   questions answered

## 2020-08-27 NOTE — NURSING
Report Recieved from off going nurse Kinga GARCIA. Patient is AAO, on Room Air, no distress noted, Ambulated to Bathroom independently. Bed in lowest position, wheels locked, call light in reach.

## 2020-08-27 NOTE — NURSING
Patient is discharge with instructions, questions asked and answered, Sitting at edge of bed, on Room air, awaiting transport. Son at bedside. Telemetry removed. IV removed with tip intact.

## 2020-08-27 NOTE — PROGRESS NOTES
[facility] CASE MANAGEMENT                  WRITTEN DISCHARGE INFORMATION      APPOINTMENTS AND RESOURCES TO HELP YOU MANAGE YOUR CARE AT HOME BASED ON YOUR PREFERENCES:  (If an appointment is not scheduled for you when you leave the hospital, call your doctor to schedule a follow up visit within a week)  Follow-up Information     Marlee Carrillo MD On 8/31/2020.    Specialties: Cardiology, INTERVENTIONAL CARDIOLOGY  Why: Outpatient Services @9:20am   Contact information:  [address]  [address]  [place] [address]  [phone]             Antoni Waddell MD.    Specialties: Cardiothoracic Surgery, Transplant  Why: Nurse will contact the pt with an appt   Contact information:  [address]  [facility] [address]  [phone]             Ludin Rivas MD On 9/3/2020.    Specialty: Internal Medicine  Why: 10:00 am  Contact information:  [address] [place] [address]  [phone]                       Healthy Living Instructions to HELP MANAGE YOUR CARE AT HOME:  Things You are responsible for:  1.    Getting your prescriptions filled   2.    Taking your medications as directed, DO NOT MISS ANY DOSES!  3.    Following the diet and exercise recommended by your doctor  4.    Going to your follow-up doctor appointment. This is important because it allows the doctor to monitor your progress and determine if any changes need to made to your treatment plan.  5. If you have any questions about MANAGING YOUR CARE AT HOME Call the Nurse Care Line for 24/7 Assistance [phone]       Please answer any calls you may receive from [facility]. We want to continue to support you as you manage your healthcare needs. [facility] is happy to have the opportunity to serve you.      Thank you for choosing [facility] for your healthcare needs!  Your [facility] Case Management Team,

## 2020-08-27 NOTE — PROGRESS NOTES
[facility]  Cardiology  Progress Note    Patient Name: Fatou Lorenzo  MRN: [id number]  Admission Date: 8/23/2020  Hospital Length of Stay: 4 days  Code Status: Full Code   Attending Physician: Ayan Hill MD   Primary Care Physician: Ludin Rivas MD  Expected Discharge Date: 8/27/2020  Principal Problem:Acute respiratory failure with hypoxia    Subjective:       Interval History:  Patient doing fine.  No complications from cardiac catheterization.  Switch to oral diuretics.  Currently euvolemic.  Main complaint is mild generalized weakness    Review of Systems   Constitution: Positive for malaise/fatigue.   HENT: Negative.    Cardiovascular: Negative.  Negative for chest pain.   Respiratory: Negative for shortness of breath.    Musculoskeletal: Negative.      Objective:     Vital Signs (Most Recent):  Temp: 97.5 °F (36.4 °C) (08/27/20 0750)  Pulse: 75 (08/27/20 0750)  Resp: 16 (08/27/20 0750)  BP: 117/65 (08/27/20 0750)  SpO2: (!) 94 % (08/27/20 0750) Vital Signs (24h Range):  Temp:  [97.4 °F (36.3 °C)-98.6 °F (37 °C)] 97.5 °F (36.4 °C)  Pulse:  [71-82] 75  Resp:  [16-25] 16  SpO2:  [93 %-99 %] 94 %  BP: (101-138)/(55-65) 117/65     Weight: 80.7 kg (177 lb 14.6 oz)  Body mass index is 29.61 kg/m².     SpO2: (!) 94 %  O2 Device (Oxygen Therapy): room air      Intake/Output Summary (Last 24 hours) at 8/27/2020 0849  Last data filed at 8/27/2020 0600  Gross per 24 hour   Intake 845.4 ml   Output --   Net 845.4 ml       Lines/Drains/Airways     Peripheral Intravenous Line                 Peripheral IV - Single Lumen 08/24/20 1050 22 G Posterior;Right;Proximal Forearm 2 days                Physical Exam   Constitutional: She is oriented to person, place, and time. She appears well-developed and well-nourished.   HENT:   Head: Normocephalic.   Eyes: Pupils are equal, round, and reactive to light.   Neck: Normal range of motion. Neck supple.   Cardiovascular: Normal rate and regular rhythm.    Pulmonary/Chest: Effort normal and breath sounds normal.   Abdominal: Soft. Normal appearance and bowel sounds are normal. There is no abdominal tenderness.   Musculoskeletal: Normal range of motion.   Neurological: She is alert and oriented to person, place, and time.   Skin: Skin is warm.   Psychiatric: She has a normal mood and affect.       Significant Labs:   BMP:   Recent Labs   Lab 08/26/20  0847 08/27/20  0731   * 116*    139   K 3.4* 3.7   CL 96 97   CO2 31* 31*   BUN 23 24*   CREATININE 1.1 1.1   CALCIUM 9.8 9.4   MG 2.1 2.3   , CMP   Recent Labs   Lab 08/26/20  0847 08/27/20  0731    139   K 3.4* 3.7   CL 96 97   CO2 31* 31*   * 116*   BUN 23 24*   CREATININE 1.1 1.1   CALCIUM 9.8 9.4   ANIONGAP 11 11   ESTGFRAFRICA 57* 57*   EGFRNONAA 49* 49*   , CBC   Recent Labs   Lab 08/27/20  0731   WBC 8.02   HGB 14.2   HCT 44.1      , INR No results for input(s): INR, PROTIME in the last 48 hours., Lipid Panel No results for input(s): CHOL, HDL, LDLCALC, TRIG, CHOLHDL in the last 48 hours., Troponin No results for input(s): TROPONINI in the last 48 hours. and All pertinent lab results from the last 24 hours have been reviewed.    Significant Imaging: Echocardiogram:   Transthoracic echo (TTE) complete (Cupid Only):   Results for orders placed or performed during the hospital encounter of 08/23/20   Echo Color Flow Doppler? Yes   Result Value Ref Range    Ascending aorta 2.11 cm    STJ 1.78 cm    AV mean gradient 2 mmHg    Ao peak riley 1.02 m/s    Ao VTI 13.09 cm    IVRT 65.74 msec    IVS 1.17 (A) 0.6 - 1.1 cm    LA size 5.65 cm    Left Atrium Major Axis 6.25 cm    Left Atrium Minor Axis 6.91 cm    LVIDD 4.66 3.5 - 6.0 cm    LVIDS 3.39 2.1 - 4.0 cm    LVOT diameter 1.90 cm    LVOT peak VTI 11.30 cm    PW 1.10 0.6 - 1.1 cm    MV Peak A Riley 0.86 m/s    E wave decelartion time 167.17 msec    MV Peak E Riley 1.89 m/s    RA Major Axis 3.27 cm    RVDD 2.95 cm    Sinus 2.63 cm    TAPSE 2.11  cm    TR Max Riley 3.53 m/s    TDI LATERAL 0.12 m/s    TDI SEPTAL 0.09 m/s    LA WIDTH 5.88 cm    Ao root annulus 2.50 cm    AORTIC VALVE CUSP SEPERATION 1.87 cm    PV PEAK VELOCITY 0.81 cm/s    MV stenosis pressure 1/2 time 48.48 ms    LV Diastolic Volume 100.12 mL    LV Systolic Volume 46.93 mL    LVOT peak riley 0.52 m/s    Mr max riley 0.05 m/s    LV LATERAL E/E' RATIO 15.75 m/s    LV SEPTAL E/E' RATIO 21.00 m/s    FS 27 %    LA volume 185.34 cm3    LV mass 193.31 g    Left Ventricle Relative Wall Thickness 0.47 cm    AV valve area 2.45 cm2    AV Velocity Ratio 0.51     AV index (prosthetic) 0.86     MV valve area p 1/2 method 4.54 cm2    E/A ratio 2.20     Mean e' 0.11 m/s    LVOT area 2.8 cm2    LVOT stroke volume 32.02 cm3    AV peak gradient 4 mmHg    E/E' ratio 18.00 m/s    LV Systolic Volume Index 24.7 mL/m2    LV Diastolic Volume Index 52.67 mL/m2    LA Volume Index 97.5 mL/m2    LV Mass Index 102 g/m2    Triscuspid Valve Regurgitation Peak Gradient 50 mmHg    BSA 1.95 m2    Right Atrial Pressure (from IVC) 3 mmHg    TV rest pulmonary artery pressure 53 mmHg    Narrative    · Normal left ventricular systolic function. The estimated ejection   fraction is 60%.  · Concentric left ventricular hypertrophy.  · Severe mitral regurgitation.  · Moderate to severe tricuspid regurgitation.  · Aortic valve area is 2.45 cm2; peak velocity is 1.02 m/s; mean gradient   is 2 mmHg.  · Severe left atrial enlargement.  · Grade III (severe) left ventricular diastolic dysfunction consistent   with restrictive physiology.  · Normal right ventricular systolic function.  · Mild pulmonic regurgitation.  · Normal central venous pressure (3 mmHg).  · The estimated PA systolic pressure is 53 mmHg.  · Pulmonary hypertension present.     mitral valve prolaspe with severe regurgitation      Assessment and Plan:     Brief HPI:     Left atrial thrombus  Continue anticoagulation    Severe mitral regurgitation  Marciano today.  Cardiac  catheterization tomorrow.  Will refer to Dr. Waddell for mitral valve repair/replacement after initial workup complete.    Risks, benefits and alternatives of the catheterization procedure were discussed with the patient.The risks of coronary angiography include but are not limited to: bleeding, infection, death, heart attack, arrhythmia, kidney injury or failure, potential need for dialysis, allergic reactions, stroke, need for emergency surgery, hematoma, pseudoaneurysm etc.  Should stenting be indicated, the patient has agreed to dual anti-platelet therapy for 1-consecutive year with a drug-eluting stent and a minimum of 1-month with the use of a bare metal stent. Additionally, pt is aware that non-compliance is likely to result in stent clotting with heart attack, heart failure, and/or death  The risks of moderate sedation include hypotension, respiratory depression, arrhythmias, bronchospasm, and death. Informed consent was obtained and the  patient is agreeable to proceed with the procedure.     8/26:  Will refer to Dr. Waddell at [facility] for discussion of mitral valve repair/replacement.  I recommend making an appointment for the patient in the next few weeks so she can be evaluated by Dr. Waddell.    8/27:  Has an appointment with Dr. Waddell.       Atrial fibrillation with RVR  Patient with newly diagnosed atrial fibrillation with RVR.  Mild hypotension associated with it.  On amiodarone drip.  Will start heparin drip.  I talked in detail with the patient about various options including cardioversion after the CHERY if there is no left atrial appendage clot.  Patient states that she has felt her heart beating fast in the past also but it usually goes away after she takes an extra dose of metoprolol.  It has been going on and off in the past.  We discussed of various complications of cardioversion including stroke.  Patient understands and is willing to proceed with cardioversion after the CHERY  today.    8/25:  AFib.  On amiodarone drip.  Rate controlled.  Feeling better with rate control.  Start full oral anticoagulation in a.m..    8/26:  Now back to normal sinus rhythm.  Switch to oral amiodarone from amiodarone drip. start Eliquis 5 mg b.i.d.    8/27:  Continues to be normal sinus rhythm.  Recommend Eliquis.  Amiodarone 400 mg b.i.d. for 12 days and then 200 mg daily.     SOB (shortness of breath)  Caused by decompensated heart failure    Acute on chronic diastolic congestive heart failure  Likely underlying causes severe mitral regurgitation.  Had a detailed discussion with the patient.  Will do further evaluation with CHERY tomorrow.  Once euvolemic, will do cardiac catheterization.  After initial workup complete, and patient's heart failure compensated, will refer to Dr. Waddell for evaluation of mitral valve surgery.  Continue IV diuresis.    8/26:  Breathing better.  Switch to oral diuresis        VTE Risk Mitigation (From admission, onward)         Ordered     apixaban tablet 5 mg  2 times daily      08/26/20 0720     IP VTE HIGH RISK PATIENT  Once      08/23/20 0452     Place sequential compression device  Until discontinued      08/23/20 0452                Marlee Carrillo MD  Cardiology  [facility]

## 2020-08-27 NOTE — PLAN OF CARE
TN informed Kimmy that the patient is cleared for discharge from a CM standpoint.   Patient was informed that her eliiqus 5mg copayment is 47.00.        08/27/20 1158   Final Note   Assessment Type Final Discharge Note   Anticipated Discharge Disposition Home   What phone number can be called within the next 1-3 days to see how you are doing after discharge? [phone]   Hospital Follow Up  Appt(s) scheduled? Yes   Discharge plans and expectations educations in teach back method with documentation complete? Yes   Right Care Referral Info   Post Acute Recommendation No Care   Post-Acute Status   Post-Acute Authorization Other   Other Status No Post-Acute Service Needs   Discharge Delays None known at this time

## 2020-08-27 NOTE — PLAN OF CARE
Problem: Fall Injury Risk  Goal: Absence of Fall and Fall-Related Injury  Outcome: Ongoing, Progressing  Intervention: Identify and Manage Contributors to Fall Injury Risk  Flowsheets (Taken 8/27/2020 0439)  Self-Care Promotion:   independence encouraged   BADL personal objects within reach  Medication Review/Management: medications reviewed  Intervention: Promote Injury-Free Environment  Flowsheets (Taken 8/27/2020 0439)  Safety Promotion/Fall Prevention:   assistive device/personal item within reach   bed alarm set   side rails raised x 2   nonskid shoes/socks when out of bed   instructed to call staff for mobility  Environmental Safety Modification:   assistive device/personal items within reach   clutter free environment maintained     Problem: Adult Inpatient Plan of Care  Goal: Plan of Care Review  Outcome: Ongoing, Progressing     Problem: Infection  Goal: Infection Symptom Resolution  Outcome: Ongoing, Progressing  Intervention: Prevent or Manage Infection  Flowsheets (Taken 8/27/2020 0439)  Infection Management: aseptic technique maintained  Isolation Precautions: protective environment maintained     Problem: Skin Injury Risk Increased  Goal: Skin Health and Integrity  Outcome: Ongoing, Progressing  Intervention: Optimize Skin Protection  Flowsheets (Taken 8/27/2020 0439)  Pressure Reduction Techniques: frequent weight shift encouraged  Head of Bed (HOB): HOB elevated

## 2020-08-27 NOTE — ASSESSMENT & PLAN NOTE
Secondary to cardiogenic pulmonary edema in setting of grade 3 diastolic dysfunction and severe mitral valve regurgitation.    Patient was on furosemide 60 mg IV b.i.d.   Also on amiodarone infusion for rate and rhythm control for atrial fibrillation with RVR.  S/p spontaneous conversion to NSR on 8/25  Weaned from BIPAP to low-flow nasal cannula - now stable on RA  Transesophageal echocardiogram showed possible thrombus to left atrium  Treated with heparin infusion for anticoagualation - convert to apixaban today  s/p left heart catheterization with non-obstructive CAD on 8/25  Converted to NSR on 8/25 - will stop amiodarone gtt and convert to PO now  Change lasix to PO BID today  Stable for step down to floor telemetry  Monitor overnight and if patient continues to do well, can likely discharge home tomorrow  Case discussed with Cardiology and she will need follow up with them and have referral to Dr. Waddell for mitral valve repair/replacement as an outpatient

## 2020-08-27 NOTE — ASSESSMENT & PLAN NOTE
Marciano today.  Cardiac catheterization tomorrow.  Will refer to Dr. Waddell for mitral valve repair/replacement after initial workup complete.    Risks, benefits and alternatives of the catheterization procedure were discussed with the patient.The risks of coronary angiography include but are not limited to: bleeding, infection, death, heart attack, arrhythmia, kidney injury or failure, potential need for dialysis, allergic reactions, stroke, need for emergency surgery, hematoma, pseudoaneurysm etc.  Should stenting be indicated, the patient has agreed to dual anti-platelet therapy for 1-consecutive year with a drug-eluting stent and a minimum of 1-month with the use of a bare metal stent. Additionally, pt is aware that non-compliance is likely to result in stent clotting with heart attack, heart failure, and/or death  The risks of moderate sedation include hypotension, respiratory depression, arrhythmias, bronchospasm, and death. Informed consent was obtained and the  patient is agreeable to proceed with the procedure.     8/26:  Will refer to Dr. Waddell at [facility] for discussion of mitral valve repair/replacement.  I recommend making an appointment for the patient in the next few weeks so she can be evaluated by Dr. Waddell.    8/27:  Has an appointment with Dr. Waddell.

## 2020-08-27 NOTE — PROGRESS NOTES
"EDUCATION:  Ms Lorenzo and son Carter was provided with educational information on hypoxia/respiratory distress.  Information reviewed and placed in :My Healthcare Packet" to be brought home for patient and family to use as resource after discharge.  Information included:  problems and symptoms to look for and call the doctor if experiencing, and symptoms that may indicate a medical emergency: CALL 911.      All questions answered.  Teach back method used.    Patient stated, "I will call the doctor if I have fever or dizziness".        "

## 2020-08-27 NOTE — ASSESSMENT & PLAN NOTE
This is a new diagnosis  S/p amiodarone infusion and heparin infusion for stroke prophylaxis  Conversion to NSR and amiodarone gtt stopped - started PO amiodarone today  Anticoagulation converted to apixaban and heparin gtt stopped

## 2020-08-28 NOTE — PROGRESS NOTES
C3 nurse attempted to contact patient. The following occurred:   C3 nurse attempted to contact Fatou Lorenzo for a TCC post hospital discharge follow up call. The patient is unable to conduct the call @ this time. The patient requested a callback.    The patient has a scheduled [facility] [place] appointment with Ludin Rivas MD on 9/3 @ 1000.

## 2020-08-31 PROBLEM — I10 ESSENTIAL HYPERTENSION: Status: ACTIVE | Noted: 2020-01-01

## 2020-08-31 PROBLEM — I50.9 CONGESTIVE HEART FAILURE: Status: ACTIVE | Noted: 2020-01-01

## 2020-08-31 PROBLEM — I48.0 PAROXYSMAL ATRIAL FIBRILLATION: Status: ACTIVE | Noted: 2020-01-01

## 2020-08-31 PROBLEM — E78.49 OTHER HYPERLIPIDEMIA: Status: ACTIVE | Noted: 2020-01-01

## 2020-08-31 PROBLEM — I25.10 CORONARY ARTERY DISEASE INVOLVING NATIVE CORONARY ARTERY OF NATIVE HEART WITHOUT ANGINA PECTORIS: Status: ACTIVE | Noted: 2020-01-01

## 2020-08-31 NOTE — RESPIRATORY THERAPY
Pt arrived on ambulance CPAP. Set up on V300 BIPAP per Dr Tomlin with settings 10/5, R 8, FIO2 50%. Medium full face mask. Tolerating well.

## 2020-08-31 NOTE — CONSULTS
"Food & Nutrition  Education    Diet Education: For education on fluid and salt restriction  Time Spent: 15 minutes  Learners: Patient      Nutrition Education provided with handouts: Heart Failure Medical Nutrition Therapy      Comments: 75 year female who presented to the ED due to SOB. She was recently hospitalized for heart failure. Pt currently NPO and on 3L nasal cannula. Pt awake, alert, laying in bed at visit. Pt states that a low sodium diet is a new concept for her. Pt states that she knows that all the food in her fridge is "not good for her." Pt currently lives with son, but does all the cooking herself. Talked to pt about a low sodium fluid restrictive diet. Encourage pt to continue to consume cooked foods but initiate different cooking techniques that would lower the sodium in her meals. Talked about the foods to avoid such as processed foods, canned items, read-to-eat meals, and cured meats along with some condiments high in sodium. Encourage pt to begin label reading and aim for foods that contain less than 140 milligrams sodium. Talked about how to monitor fluid intake and encourage pt to weigh herself each morning to monitor fluid retention. Gave pt handout; highlighted and explained handout. Pt verbalized understanding. Pt denies n/v/c diarrhea at this time. Pt intentionally lost 13 pounds since February to "be more healthy" by reducing her portion sizes and limiting her sugar intake.  and is now currently 170 pounds. NFPE not warranted at this time, pt well nourished.     Typical Food/Fluid Intake: Spaghetti, gumbo, hamburger beans, salads, sandwiches, grits, oatmeal      All questions and concerns answered. Dietitian's contact information provided.       Follow-Up: Yes    Please Re-consult as needed    Delores Mtz RD    Thanks!    "

## 2020-08-31 NOTE — PLAN OF CARE
08/31/20 1513   Discharge Assessment   Assessment Type Discharge Planning Assessment   Confirmed/corrected address and phone number on facesheet? Yes   Assessment information obtained from? Medical Record   Prior to hospitilization cognitive status: Alert/Oriented   Prior to hospitalization functional status: Independent   Current cognitive status: Alert/Oriented   Current Functional Status: Independent   Facility Arrived From: home   Lives With child(luis), adult   Able to Return to Prior Arrangements yes   Is patient able to care for self after discharge? Yes   Who are your caregiver(s) and their phone number(s)? Carter Lorenzo [phone]   Patient's perception of discharge disposition home or selfcare   Readmission Within the Last 30 Days previous discharge plan unsuccessful   If yes, most recent facility name: [facility]   Patient currently being followed by outpatient case management? No   Patient currently receives any other outside agency services? No   Equipment Currently Used at Home none   Do you have any problems affording any of your prescribed medications? No   Is the patient taking medications as prescribed? yes   Does the patient have transportation home? Yes   Transportation Anticipated car, drives self;family or friend will provide   Dialysis Name and Scheduled days N/A   Does the patient receive services at the [facility]? No   Discharge Plan A Home with family   Discharge Plan B Home with family;Home Health   DME Needed Upon Discharge  none   Patient/Family in Agreement with Plan yes   Readmission Questionnaire   At the time of your discharge, did someone talk to you about what your health problems were? Yes   At the time of discharge, did someone talk to you about what to watch out for regarding worsening of your health problem? Yes   At the time of discharge, did someone talk to you about what to do if you experienced worsening of your health problem? Yes   At the time of discharge,  did someone talk to you about which medication to take when you left the hospital and which ones to stop taking? Yes   At the time of discharge, did someone talk to you about when and where to follow up with a doctor after you left the hospital? Yes   How often do you need to have someone help you when you read instructions, pamphlets, or other written material from your doctor or pharmacy? Often   Do you have problems taking your medications as prescribed? No   Do you have any problems affording any of  your prescribed medications? No   Do you have problems obtaining/receiving your medications? No   Does the patient have transportation to healthcare appointments? Yes   Living Arrangements house   Does the patient have family/friends to help with healtcare needs after discharge? yes         [id number] - [place] [address] [place] AT [address] [place] [address]  Phone: [phone] Fax: [phone]

## 2020-08-31 NOTE — ASSESSMENT & PLAN NOTE
Noted on recent transesophageal echocardiogram.  Anticoagulation.  Rate control.  Patient had converted to sinus rhythm.

## 2020-08-31 NOTE — ASSESSMENT & PLAN NOTE
Elevated blood pressure.  Severe mitral regurgitation.  Symptomaticly improved with blood pressure control.

## 2020-08-31 NOTE — PLAN OF CARE
Patient calm and pleasant throughout shift, weaned off BiPAP by respiratory, SpO2 >95% on room air. No complaints of SOB. Tylenol administered for headache, treatment effective. Voiding without difficulty, utilizes toilet with stand by assist. Needs reminder to utilize toilet hat for accurate I&O. Diet advanced around 6pm. Tray received.

## 2020-08-31 NOTE — CARE UPDATE
Pt seen and examined, and I agree with Dr. Nguyen's assessment and plan. Will continue current care outlined in the H&P with the following additions:    Patient weaned off BiPAP to 2 L via nasal cannula after diuresis and control of blood pressures.  Respiratory status stable.  Patient seen by Cardiology who recommend continued diuresis. Patient is stable for telemetry.  Continue anticoagulation with apixaban for AFib and presumed left atrial thrombus diagnosed on previous hospital stay.  Amiodarone dose adjusted by Cardiology for her AFib.  Patient with known severe mitral regurgitation which is likely contributing to her symptoms and is scheduled for.  CT surgery evaluation on 9/8 - cardiology looking into if patient can be seen sooner.  Discharge pending on continue diuresis with continue clinical progress, cardiology recommendation, and rehab services reconsulted along with  for discharge planning.      Critical care time spent on the evaluation and treatment of severe organ dysfunction, review of pertinent labs and imaging studies, discussions with consulting providers and discussions with patient/family:  35  minutes.    GUERO Ramos M.D.   Hospitalist [facility]   Department of Hospital Medicine   Pager: [phone]

## 2020-08-31 NOTE — ED PROVIDER NOTES
"Encounter Date: 2020       History     Chief Complaint   Patient presents with    Shortness of Breath     Patient reports SOB that started at . Patient states when she lie down it feels as if shes drowning. Patient reports being seen in ED 3 days ago for the same symptoms.     76 yo female presents via [facility] with shortness of breath starting acutely tonight at 8pm, about 2 hours prior to arrival to ED. EMS found patient with /100 and room air sat 90%, noted rales to physical exam, and placed 1" nitropaste to her chest and started her on CPAP. Sats 100% at ED arrival.    History is limited by respiratory distress.    Son Carter Lorenzo: [phone]    PMH: afib with RVR, heart valve problem, mitral regurgitation, DLD, cataract, glaucoma    PSH: L heart cath, lipoma removal, ankle surgery        Review of patient's allergies indicates:  No Known Allergies  Past Medical History:   Diagnosis Date    Atrial fibrillation with RVR 2020    Cataract     Essential hypertension 2020    Glaucoma     Heart valve problem     Hyperlipidemia     Severe mitral regurgitation 2020     Past Surgical History:   Procedure Laterality Date    ANKLE SURGERY      LEFT HEART CATHETERIZATION Left 2020    Procedure: Left heart cath, radial;  Surgeon: Marlee Carrillo MD;  Location: [facility] CATH LAB;  Service: Cardiology;  Laterality: Left;    lipoma removal       Family History   Problem Relation Age of Onset    Cancer Mother     Cataracts Sister     No Known Problems Father     No Known Problems Brother     No Known Problems Maternal Aunt     No Known Problems Maternal Uncle     No Known Problems Paternal Aunt     No Known Problems Paternal Uncle     No Known Problems Maternal Grandmother     No Known Problems Maternal Grandfather     No Known Problems Paternal Grandmother     No Known Problems Paternal Grandfather     Amblyopia Neg Hx     Blindness Neg Hx     Diabetes Neg Hx     " Glaucoma Neg Hx     Hypertension Neg Hx     Macular degeneration Neg Hx     Retinal detachment Neg Hx     Strabismus Neg Hx     Stroke Neg Hx     Thyroid disease Neg Hx     Breast cancer Neg Hx     Colon cancer Neg Hx     Ovarian cancer Neg Hx      Social History     Tobacco Use    Smoking status: Never Smoker    Smokeless tobacco: Never Used   Substance Use Topics    Alcohol use: No    Drug use: No     Review of Systems   Constitutional: Negative for fever.   HENT: Negative for sore throat.    Eyes: Negative for photophobia.   Respiratory: Positive for shortness of breath.    Cardiovascular: Negative for leg swelling.   Gastrointestinal: Negative for abdominal pain.   Genitourinary: Negative for dysuria.   Musculoskeletal: Negative for neck stiffness.   Skin: Negative for rash.   Neurological: Negative for syncope.       Physical Exam     Initial Vitals   BP Pulse Resp Temp SpO2   08/30/20 2212 08/30/20 2212 08/30/20 2212 08/31/20 0532 08/30/20 2212   (!) 160/100 86 (!) 24 98.6 °F (37 °C) 99 %      MAP       --                Physical Exam    Nursing note and vitals reviewed.  Constitutional: She appears well-developed and well-nourished. She is not diaphoretic. She appears distressed (respiratory).   Awake, alert woman who appears stated age. CPAP in place.   HENT:   Head: Normocephalic and atraumatic.   Eyes: Conjunctivae and EOM are normal. Pupils are equal, round, and reactive to light.   Neck: Normal range of motion. Neck supple.   Cardiovascular: Normal rate, regular rhythm and intact distal pulses.   Murmur heard.  Pulmonary/Chest: She is in respiratory distress. She has no wheezes. She has no rhonchi. She has rales (pronounced).   Abdominal: Soft. There is no abdominal tenderness.   Musculoskeletal: Normal range of motion. No tenderness or edema.   Neurological: She is alert and oriented to person, place, and time. She has normal strength.   Moving all extremities.   Skin: Skin is warm and dry.  No erythema. No pallor.   Psychiatric: She has a normal mood and affect.         ED Course   Critical Care    Date/Time: 8/31/2020 3:00 AM  Performed by: Rebecca Tomlin MD  Authorized by: Ashley Naranjo MD   Direct patient critical care time: 9 minutes  Additional history critical care time: 8 minutes  Ordering / reviewing critical care time: 9 minutes  Documentation critical care time: 6 minutes  Consulting other physicians critical care time: 8 minutes  Total critical care time (exclusive of procedural time) : 40 minutes        Labs Reviewed   COMPREHENSIVE METABOLIC PANEL - Abnormal; Notable for the following components:       Result Value    Sodium 130 (*)     Chloride 94 (*)     Glucose 180 (*)     BUN, Bld 26 (*)     eGFR if  46 (*)     eGFR if non  40 (*)     All other components within normal limits   B-TYPE NATRIURETIC PEPTIDE - Abnormal; Notable for the following components:     (*)     All other components within normal limits   TROPONIN I - Abnormal; Notable for the following components:    Troponin I 0.033 (*)     All other components within normal limits   SARS-COV-2 RNA AMPLIFICATION, QUAL   CBC W/ AUTO DIFFERENTIAL   PROTIME-INR     EKG Readings: (Independently Interpreted)   22:17: NSR, HR 83. Normal axis. PVCs. LBBB. No STEMI.      ECG Results          EKG 12-lead (Final result)  Result time 08/31/20 14:22:07    Final result by Interface, Lab In [facility] (08/31/20 14:22:07)                 Narrative:    Test Reason : R06.02,    Vent. Rate : 083 BPM     Atrial Rate : 083 BPM     P-R Int : 288 ms          QRS Dur : 116 ms      QT Int : 424 ms       P-R-T Axes : 055 051 077 degrees     QTc Int : 498 ms    Sinus rhythm with 1st degree A-V block with occasional Premature  ventricular complexes  Incomplete left bundle branch block  Nonspecific ST and T wave abnormality  Prolonged QT  Abnormal ECG  When compared with ECG of 25-AUG-2020 13:33,  Significant  "changes have occurred  Confirmed by Jevon Jones MD ([id number]) on 8/31/2020 2:21:53 PM    Referred By: [email or web address]   SELF           Confirmed By:Jevon Jones MD                            Imaging Results          X-Ray Chest AP Portable (Final result)  Result time 08/30/20 23:14:24    Final result by Perez Greer MD (08/30/20 23:14:24)                 Impression:      Improving interstitial predominant opacities and decreased prominence of the central pulmonary vasculature relative to the 08/23/2020 study.  No new acute finding identified      Electronically signed by: Perez Greer  Date:    08/30/2020  Time:    23:14             Narrative:    EXAMINATION:  XR CHEST AP PORTABLE    CLINICAL HISTORY:  shortness of breath;    TECHNIQUE:  Single frontal view of the chest was performed.    COMPARISON:  Chest radiograph performed 08/23/2020, 01:46 hours    FINDINGS:  Monitoring wires overlie the chest.  The cardiomediastinal silhouette appears unchanged.  Apparent slight improvement in pulmonary vascular congestion.  Ill-defined interstitial and airspace opacities have also improved.  No definite pneumothorax or pleural effusion.  Osseous demineralization.  Remote right-sided rib fractures.  Degenerative change of the acromioclavicular joints.  No acute findings suggested in the visualized abdomen.                              X-Rays:   Independently Interpreted Readings:   Other Readings:  CXR "Improving interstitial predominant opacities and decreased prominence of the central pulmonary vasculature relative to the 08/23/2020 study."    Medical Decision Making:   History:   Old Medical Records: I decided to obtain old medical records.  Old Records Summarized: records from previous admission(s) and records from clinic visits.  Initial Assessment:   75 y.o. female s/p recent admit for CHF presents via EMS with shortness of breath.  Differential Diagnosis:   Ddx includes CHF, ACS, PE, PNA, COVID-19, " other.  Independently Interpreted Test(s):   I have ordered and independently interpreted X-rays - see prior notes.  I have ordered and independently interpreted EKG Reading(s) - see prior notes  Clinical Tests:   Lab Tests: Ordered and Reviewed  Radiological Study: Ordered and Reviewed  Medical Tests: Ordered and Reviewed  ED Management:  EKG no STEMI. Morphology similar to prior.    CXR shows pulmonary edema, though improved from study 7 days ago.     Labs with mildly elevated troponin (0.033) but also improved. .    Despite improving labs and CXR, patient clinically is in respiratory distress with rales. She is requiring BIPAP for respiratory support. Consequently she requires re-admission for CHF and further monitoring and care.    I discussed patient with Dr. Jeremie Nguyen, nocturnist for \[facility]\"" medicine. I have written courtesy orders.   Other:   I have discussed this case with another health care provider.                                 Clinical Impression:       ICD-10-CM ICD-9-CM   1. Congestive heart failure, unspecified HF chronicity, unspecified heart failure type  I50.9 428.0   2. Shortness of breath  R06.02 786.05   3. Respiratory distress  R06.03 786.09   4. Hypoxia  R09.02 799.02             ED Disposition Condition    Admit                           Rebecca Tomlin MD  09/01/20 1019

## 2020-08-31 NOTE — CONSULTS
[facility]  Cardiology  Consult Note    Patient Name: Fatou Lorenzo  MRN: [id number]  Admission Date: 8/30/2020  Hospital Length of Stay: 0 days  Code Status: Full Code   Attending Provider: Bella Ramos MD   Consulting Provider: Jevon Jones MD  Primary Care Physician: Ludin Rivas MD  Principal Problem:Acute on chronic diastolic congestive heart failure    Patient information was obtained from patient, past medical records and ER records.     Inpatient consult to Cardiology  Consult performed by: Jevon Jones MD  Consult ordered by: Bella Ramos MD        Subjective:     Chief Complaint:  Shortness of breath     HPI:   Fatou Lorenzo presents with complaints of shortness of breath. BP elevated on arrival. Given NTP. Placed on CPAP. Admitted to ICU. Patient recently admitted with shortness of breath.  Atrial fibrillation.  Severe mitral regurgitation.  Transesophageal echocardiogram was suspicious for thrombus in left atrial appendage.  No cardioversion.  Left heart catheterization revealed nonobstructive coronary artery disease.  EKG on presentation shows sinus rhythm first-degree AV block.  Patient states that she is feeling better.  Breathing better.  Since she was discharged home she feels that she was having difficulty breathing.  Chest pressure.      Left heart catheterization 08/25/2020:    · LVEDP (Pre): 7  · Estimated blood loss: <50 mL  · Non-obstructive CAD.     Left main:  No significant stenosis     Lad:  Mid 40-3% stenosis     Circumflex luminal irregularities.  Ostial 10-20% stenosis     RCA proximal 20% stenosis     Access:  Right radial artery access was obtained     Assessment plan     Continue amiodarone drip.  Switch to oral amiodarone in the morning.     Start Eliquis 5 mg b.i.d. in a.m..  Stop heparin drip when starting Eliquis.     Refer to Dr. Waddell for mitral valve repair/replacement as an outpatient.     Follow-up in Cardiology Clinic    Transesophageal  "echocardiogram 08/24/2020:    · Normal left ventricular systolic function. The estimated ejection fraction is 60%.  · Normal right ventricular systolic function.  · Severe left atrial enlargement.  · Severe mitral regurgitation.  · Mild aortic regurgitation.  · Moderate tricuspid regurgitation.  · Suspicion of thrombus in LAE. No cardioversion performed.  · Aortic atherosclerosis    Echocardiogram 08/23/2020:    · Normal left ventricular systolic function. The estimated ejection fraction is 60%.  · Concentric left ventricular hypertrophy.  · Severe mitral regurgitation.  · Moderate to severe tricuspid regurgitation.  · Severe left atrial enlargement.  · Grade III (severe) left ventricular diastolic dysfunction consistent with restrictive physiology.  · Normal right ventricular systolic function.  · Mild pulmonic regurgitation.  · Normal central venous pressure (3 mmHg).  · The estimated PA systolic pressure is 53 mmHg.  · Pulmonary hypertension present.  · Mild aortic regurgitation.    74 yo female presents via [facility] with shortness of breath starting acutely tonight at 8pm, about 2 hours prior to arrival to ED. EMS found patient with /100 and room air sat 90%, noted rales to physical exam, and placed 1" nitropaste to her chest and started her on CPAP. Sats 100% at ED arrival.    Past Medical History:   Diagnosis Date    Atrial fibrillation with RVR 8/24/2020    Cataract     Glaucoma     Heart valve problem     Hyperlipidemia     Severe mitral regurgitation 8/24/2020       Past Surgical History:   Procedure Laterality Date    ANKLE SURGERY      LEFT HEART CATHETERIZATION Left 8/25/2020    Procedure: Left heart cath, radial;  Surgeon: Marlee Carrillo MD;  Location: [facility] CATH LAB;  Service: Cardiology;  Laterality: Left;    lipoma removal         Review of patient's allergies indicates:  No Known Allergies    No current facility-administered medications on file prior to encounter.      Current " Outpatient Medications on File Prior to Encounter   Medication Sig    amiodarone (PACERONE) 400 MG tablet Take two tablets by mouth twice daily for twelve days, then take one tablet by mouth daily.    apixaban (ELIQUIS) 5 mg Tab Take 1 tablet (5 mg total) by mouth 2 (two) times daily.    furosemide (LASIX) 40 MG tablet Take 1 tablet (40 mg total) by mouth 2 (two) times daily.    losartan (COZAAR) 50 MG tablet Take 0.5 tablets (25 mg total) by mouth once daily.    metoprolol tartrate (LOPRESSOR) 25 MG tablet Take 1 tablet (25 mg total) by mouth 3 (three) times daily.    pravastatin (PRAVACHOL) 80 MG tablet Take 80 mg by mouth once daily.     Family History     Problem Relation (Age of Onset)    Cancer Mother    Cataracts Sister    No Known Problems Father, Brother, Maternal Aunt, Maternal Uncle, Paternal Aunt, Paternal Uncle, Maternal Grandmother, Maternal Grandfather, Paternal Grandmother, Paternal Grandfather        Tobacco Use    Smoking status: Never Smoker    Smokeless tobacco: Never Used   Substance and Sexual Activity    Alcohol use: No    Drug use: No    Sexual activity: Not Currently     Review of Systems   Cardiovascular: Positive for chest pain and dyspnea on exertion. Negative for irregular heartbeat, leg swelling, orthopnea, palpitations and syncope.   Respiratory: Positive for shortness of breath. Negative for sputum production and wheezing.      Objective:     Vital Signs (Most Recent):  Temp: 97.8 °F (36.6 °C) (08/31/20 0800)  Pulse: 62 (08/31/20 1137)  Resp: 20 (08/31/20 1137)  BP: 103/63 (08/31/20 1137)  SpO2: 97 % (08/31/20 1137) Vital Signs (24h Range):  Temp:  [97.8 °F (36.6 °C)-98.6 °F (37 °C)] 97.8 °F (36.6 °C)  Pulse:  [58-86] 62  Resp:  [12-33] 20  SpO2:  [97 %-100 %] 97 %  BP: ()/() 103/63     Weight: 78 kg (171 lb 15.3 oz)  Body mass index is 28.62 kg/m².    SpO2: 97 %  O2 Device (Oxygen Therapy): nasal cannula      Intake/Output Summary (Last 24 hours) at 8/31/2020  1201  Last data filed at 8/31/2020 1000  Gross per 24 hour   Intake 4 ml   Output 100 ml   Net -96 ml       Lines/Drains/Airways     Peripheral Intravenous Line                 Peripheral IV - Single Lumen 20 G Left Forearm -- days                Physical Exam   Constitutional: She is oriented to person, place, and time. No distress.   Cardiovascular: Normal rate, regular rhythm and intact distal pulses.   Murmur heard.  High-pitched blowing holosystolic murmur is present with a grade of 4/6 at the apex.  Pulmonary/Chest: Effort normal and breath sounds normal.   Abdominal: Soft. Bowel sounds are normal. There is no abdominal tenderness.   Musculoskeletal:      Right lower leg: No edema.      Left lower leg: No edema.   Neurological: She is alert and oriented to person, place, and time.   Skin: She is not diaphoretic.   Vitals reviewed.      Significant Labs:   CMP   Recent Labs   Lab 08/30/20 2221 08/31/20  0721   * 138   K 3.7 4.1   CL 94* 95   CO2 24 30*   * 124*   BUN 26* 24*   CREATININE 1.3 1.2   CALCIUM 9.7 9.2   PROT 7.4  --    ALBUMIN 4.0  --    BILITOT 1.0  --    ALKPHOS 93  --    AST 28  --    ALT 36  --    ANIONGAP 12 13   ESTGFRAFRICA 46* 51*   EGFRNONAA 40* 44*   , CBC   Recent Labs   Lab 08/30/20 2221   WBC 10.10   HGB 14.1   HCT 42.8      , Lipid Panel No results for input(s): CHOL, HDL, LDLCALC, TRIG, CHOLHDL in the last 48 hours. and Troponin   Recent Labs   Lab 08/30/20 2221 08/31/20  0721   TROPONINI 0.033* 0.019       Significant Imaging: Echocardiogram:   Transthoracic echo (TTE) complete (Cupid Only):   Results for orders placed or performed during the hospital encounter of 08/23/20   Echo Color Flow Doppler? Yes   Result Value Ref Range    Ascending aorta 2.11 cm    STJ 1.78 cm    AV mean gradient 2 mmHg    Ao peak becca 1.02 m/s    Ao VTI 13.09 cm    IVRT 65.74 msec    IVS 1.17 (A) 0.6 - 1.1 cm    LA size 5.65 cm    Left Atrium Major Axis 6.25 cm    Left Atrium Minor  Axis 6.91 cm    LVIDD 4.66 3.5 - 6.0 cm    LVIDS 3.39 2.1 - 4.0 cm    LVOT diameter 1.90 cm    LVOT peak VTI 11.30 cm    PW 1.10 0.6 - 1.1 cm    MV Peak A Riley 0.86 m/s    E wave decelartion time 167.17 msec    MV Peak E Riley 1.89 m/s    RA Major Axis 3.27 cm    RVDD 2.95 cm    Sinus 2.63 cm    TAPSE 2.11 cm    TR Max Riley 3.53 m/s    TDI LATERAL 0.12 m/s    TDI SEPTAL 0.09 m/s    LA WIDTH 5.88 cm    Ao root annulus 2.50 cm    AORTIC VALVE CUSP SEPERATION 1.87 cm    PV PEAK VELOCITY 0.81 cm/s    MV stenosis pressure 1/2 time 48.48 ms    LV Diastolic Volume 100.12 mL    LV Systolic Volume 46.93 mL    LVOT peak riley 0.52 m/s    Mr max riley 0.05 m/s    LV LATERAL E/E' RATIO 15.75 m/s    LV SEPTAL E/E' RATIO 21.00 m/s    FS 27 %    LA volume 185.34 cm3    LV mass 193.31 g    Left Ventricle Relative Wall Thickness 0.47 cm    AV valve area 2.45 cm2    AV Velocity Ratio 0.51     AV index (prosthetic) 0.86     MV valve area p 1/2 method 4.54 cm2    E/A ratio 2.20     Mean e' 0.11 m/s    LVOT area 2.8 cm2    LVOT stroke volume 32.02 cm3    AV peak gradient 4 mmHg    E/E' ratio 18.00 m/s    LV Systolic Volume Index 24.7 mL/m2    LV Diastolic Volume Index 52.67 mL/m2    LA Volume Index 97.5 mL/m2    LV Mass Index 102 g/m2    Triscuspid Valve Regurgitation Peak Gradient 50 mmHg    BSA 1.95 m2    Right Atrial Pressure (from IVC) 3 mmHg    TV rest pulmonary artery pressure 53 mmHg    Narrative    · Normal left ventricular systolic function. The estimated ejection   fraction is 60%.  · Concentric left ventricular hypertrophy.  · Severe mitral regurgitation.  · Moderate to severe tricuspid regurgitation.  · Mild-to-moderate aortic valve stenosis.  · Aortic valve area is 2.45 cm2; peak velocity is 1.02 m/s; mean gradient   is 2 mmHg.  · Severe left atrial enlargement.  · Grade III (severe) left ventricular diastolic dysfunction consistent   with restrictive physiology.  · Normal right ventricular systolic function.  · Mild pulmonic  regurgitation.  · Normal central venous pressure (3 mmHg).  · The estimated PA systolic pressure is 53 mmHg.  · Pulmonary hypertension present.     mitral valve prolaspe with severe regurgitation      Assessment and Plan:     * Acute on chronic diastolic congestive heart failure  Elevated blood pressure.  Severe mitral regurgitation.  Symptomaticly improved with blood pressure control.    Coronary artery disease involving native coronary artery of native heart without angina pectoris  Nonobstructive disease on recent heart catheterization    Other hyperlipidemia  Continue statin    Essential hypertension  Elevated blood pressure on presentation.  Nitro paste added.  Monitor.    Left atrial thrombus  Noted on recent transesophageal echocardiogram.  Anticoagulation.  Rate control.  Patient had converted to sinus rhythm.    Severe mitral regurgitation  Patient was schedule to see CTS on September 8th.  Will see if she can be seen sooner or hospital hospital transfer.    Paroxysmal atrial fibrillation  In sinus rhythm.  Would decrease dose of amiodarone.  Currently on 800 mg b.i.d..        VTE Risk Mitigation (From admission, onward)         Ordered     apixaban tablet 5 mg  2 times daily      08/31/20 0441     IP VTE HIGH RISK PATIENT  Once      08/31/20 0441     Place sequential compression device  Until discontinued      08/31/20 0441     Place TAINA hose  Until discontinued      08/31/20 0441     Reason for No Pharmacological VTE Prophylaxis  Once     Question:  Reasons:  Answer:  Physician Provided (leave comment)  Comment:  Continuing apixaban from home    08/31/20 0441     Place sequential compression device  Until discontinued      08/31/20 0441                Thank you for your consult. I will follow-up with patient. Please contact us if you have any additional questions.    Jevon Jones MD  Cardiology   [facility]

## 2020-08-31 NOTE — HPI
Fatou Lorenzo is a 75 y.o. female that (in part)  has a past medical history of Atrial fibrillation with RVR, Cataract, Glaucoma, Heart valve problem, Hyperlipidemia, and Severe mitral regurgitation.  has a past surgical history that includes Ankle surgery; lipoma removal; and Left heart catheterization (Left, 8/25/2020). Presents to [facility] Emergency Department complaining of recurrent shortness of breath.  Patient states it feels like she is having heart failure again.  She was discharged presumably on August 27th (no discharge summary available at this time), 4 days ago for acute respiratory failure with hypoxia secondary to CHF exacerbation.  The patient is typically followed at [place].  Reports 8 years ago was referred to Dr. Waddell, but valvular regurgitation not bad enough at that time to do surgery.  However see has severe tricuspid and mitral valve disease which was confirmed during her previous admission by echocardiogram.  She is having dyspnea with exertion, shortness of breath at rest, and orthopnea.  Worsened with exertion.  No relieving factors.     In last hospital stay she underwent left heart angiography as well as transesophageal echocardiogram and was diagnosed with a left atrial thrombus.  Nonobstructive CAD.  Had episode of atrial fibrillation with RVR which spontaneously resolved post catheterization.    In the emergency department routine laboratory studies, chest x-ray, EKG, cardiac enzymes were obtained.  Patient required BiPAP due to hypoxemia not responsive to nasal cannula.  Chest x-ray actually showed improvement as did her cardiac enzymes compared to previous lab studies and imaging during her last hospital stay.  Lasix and nitroglycerin given.    Hospital medicine has been asked to admit to inpatient in the ICU due to BiPAP requirements for further evaluation and treatment.

## 2020-08-31 NOTE — HPI
"Fatou Lorenzo is a 75 year old female with recent episode of Atrial fibrillation with RVR, Cataract, Glaucoma, Hyperlipidemia, Severe mitral regurgitation, moderate tricuspid regurgitation. She was hospitalized from 8/20-8/27 for acute respiratory failure with hypoxia secondary to CHF exacerbation. Last hospital stay she underwent LHC and CHERY. The patient is typically followed at [place]. Reports 8 years ago was referred to Dr. Waddell, but valvular regurgitation not bad enough at that time to do surgery.  However she has moderate tricuspid and severe mitral valve disease which was confirmed during her previous admission by echocardiogram.      This hospitalization she presented via [facility] with shortness of breath starting acutely 8/30 at 8pm, about 2 hours prior to arrival to ED. EMS found patient with /100 and room air sat 90%, noted rales to physical exam, and placed 1" nitropaste to her chest and started her on CPAP. Sats 100% at ED arrival. Patient was transferred to [facility] 9/2.    Cardiology was consulted for recurrent CHF exacerbations likely due to severe MR. Today, patient does not feel well. She is anxious. She reports slight chest pain and shortness at breath at rest especially when lying flat. She reports shortness of breath on exertion. She denies palpitations, nausea, vomiting, fever, and chills. Patient describes being in her usual state of health until March 2020. Since March 2020, she has been having recurrent episodes of  CHF exacerbations with SOB.      "

## 2020-08-31 NOTE — ASSESSMENT & PLAN NOTE
Patient was schedule to see CTS on September 8th.  Will see if she can be seen sooner or hospital hospital transfer.

## 2020-08-31 NOTE — PLAN OF CARE
Pt arrived to ICU on non-rebreather and put back on Bipap. Sats 100%. A&Ox4. Called and updated son per pt request. VSS. No distress noted. No falls, skin breakdown or injuries since arrival.

## 2020-08-31 NOTE — H&P
[facility] Hospital Medicine  History & Physical    Patient Name: Fatou Lorenzo  MRN: [id number]  Admission Date: 08/31/2020  Attending Physician: Jeremie Nguyen MD, MPH      PCP:     uLdin Rivas MD    CC:     Chief Complaint   Patient presents with    Shortness of Breath     Patient reports SOB that started at 2000. Patient states when she lie down it feels as if shes drowning. Patient reports being seen in ED 3 days ago for the same symptoms.       HISTORY OF PRESENT ILLNESS:     Fatou Lorenzo is a 75 y.o. female that (in part)  has a past medical history of Atrial fibrillation with RVR, Cataract, Glaucoma, Heart valve problem, Hyperlipidemia, and Severe mitral regurgitation.  has a past surgical history that includes Ankle surgery; lipoma removal; and Left heart catheterization (Left, 8/25/2020). Presents to [facility] Emergency Department complaining of recurrent shortness of breath.  Patient states it feels like she is having heart failure again.  She was discharged presumably on August 27th (no discharge summary available at this time), 4 days ago for acute respiratory failure with hypoxia secondary to CHF exacerbation.  The patient is typically followed at [place].  Reports 8 years ago was referred to Dr. Waddell, but valvular regurgitation not bad enough at that time to do surgery.  However see has severe tricuspid and mitral valve disease which was confirmed during her previous admission by echocardiogram.  She is having dyspnea with exertion, shortness of breath at rest, and orthopnea.  Worsened with exertion.  No relieving factors.     In last hospital stay she underwent left heart angiography as well as transesophageal echocardiogram and was diagnosed with a left atrial thrombus.  Nonobstructive CAD.  Had episode of atrial fibrillation with RVR which spontaneously resolved post catheterization.    In the emergency department routine laboratory  studies, chest x-ray, EKG, cardiac enzymes were obtained.  Patient required BiPAP due to hypoxemia not responsive to nasal cannula.  Chest x-ray actually showed improvement as did her cardiac enzymes compared to previous lab studies and imaging during her last hospital stay.  Lasix and nitroglycerin given.    Hospital medicine has been asked to admit to inpatient in the ICU due to BiPAP requirements for further evaluation and treatment.       REVIEW OF SYSTEMS:     -- Constitutional: No fever or chills.  Generalized fatigue.  -- Eyes: No visual changes, diplopia, pain, tearing, blind spots, or discharge.   -- Ears, nose, mouth, throat, and face: No congestion, sore throat, epistaxis, d/c, bleeding gums, neck stiffness masses, or dental issues.  -- Respiratory:  As above in HPI.  -- Cardiovascular:  As above in HPI.   -- Gastrointestinal: No vomiting, abdominal pain, hematemesis, melena, dyspepsia, or change in bowel habits.  -- Genitourinary: No hematuria, dysuria, frequency, urgency, nocturia, polyuria, stones, or incontinence.  -- Integument/breast: No rash, pruritis, pigmentation changes, dryness, or changes in hair.  -- Hematologic/lymphatic:  Positive for easy bruising.  Denies lymphadenopathy.   -- Musculoskeletal: No acute arthralgias, acute myalgias, joint swelling, acute limitations of ROM, or acute muscular weakness.  -- Neurological: No seizures, headaches, incoordination, paraesthesias, ataxia, vertigo, or tremors.  -- Behavioral/Psych: No auditory or visual hallucinations, depression, or suicidal/homicidal ideations.  -- Endocrine: No heat or cold intolerance, polydipsia, or unintentional weight gain / loss.  -- Allergy/Immunologic: No recurrent infections or adverse reaction to food, insects, or difficulty breathing.      PAST MEDICAL / SURGICAL HISTORY:     Past Medical History:   Diagnosis Date    Atrial fibrillation with RVR 8/24/2020    Cataract     Glaucoma     Heart valve problem      Hyperlipidemia     Severe mitral regurgitation 8/24/2020     Past Surgical History:   Procedure Laterality Date    ANKLE SURGERY      LEFT HEART CATHETERIZATION Left 8/25/2020    Procedure: Left heart cath, radial;  Surgeon: Marlee Carrillo MD;  Location: [facility] CATH LAB;  Service: Cardiology;  Laterality: Left;    lipoma removal           FAMILY HISTORY:     Family History   Problem Relation Age of Onset    Cancer Mother     Cataracts Sister     No Known Problems Father     No Known Problems Brother     No Known Problems Maternal Aunt     No Known Problems Maternal Uncle     No Known Problems Paternal Aunt     No Known Problems Paternal Uncle     No Known Problems Maternal Grandmother     No Known Problems Maternal Grandfather     No Known Problems Paternal Grandmother     No Known Problems Paternal Grandfather     Amblyopia Neg Hx     Blindness Neg Hx     Diabetes Neg Hx     Glaucoma Neg Hx     Hypertension Neg Hx     Macular degeneration Neg Hx     Retinal detachment Neg Hx     Strabismus Neg Hx     Stroke Neg Hx     Thyroid disease Neg Hx     Breast cancer Neg Hx     Colon cancer Neg Hx     Ovarian cancer Neg Hx          SOCIAL HISTORY:     Social History     Socioeconomic History    Marital status:      Spouse name: Not on file    Number of children: Not on file    Years of education: Not on file    Highest education level: Not on file   Occupational History    Not on file   Social Needs    Financial resource strain: Not on file    Food insecurity     Worry: Not on file     Inability: Not on file    Transportation needs     Medical: Not on file     Non-medical: Not on file   Tobacco Use    Smoking status: Never Smoker    Smokeless tobacco: Never Used   Substance and Sexual Activity    Alcohol use: No    Drug use: No    Sexual activity: Not Currently   Lifestyle    Physical activity     Days per week: Not on file     Minutes per session: Not on file    Stress: Not  on file   Relationships    Social connections     Talks on phone: Not on file     Gets together: Not on file     Attends Jew service: Not on file     Active member of club or organization: Not on file     Attends meetings of clubs or organizations: Not on file     Relationship status: Not on file   Other Topics Concern    Not on file   Social History Narrative    Not on file         ALLERGIES:       Review of patient's allergies indicates:  No Known Allergies          HOME MEDICATIONS:     Prior to Admission medications    Medication Sig Start Date End Date Taking? Authorizing Provider   amiodarone (PACERONE) 400 MG tablet Take two tablets by mouth twice daily for twelve days, then take one tablet by mouth daily. 8/27/20   Ayan Hill MD   apixaban (ELIQUIS) 5 mg Tab Take 1 tablet (5 mg total) by mouth 2 (two) times daily. 8/27/20   Ayan Hill MD   furosemide (LASIX) 40 MG tablet Take 1 tablet (40 mg total) by mouth 2 (two) times daily. 8/27/20 8/27/21  Ayan Hill MD   losartan (COZAAR) 50 MG tablet Take 0.5 tablets (25 mg total) by mouth once daily. 8/27/20   Ayan Hill MD   metoprolol tartrate (LOPRESSOR) 25 MG tablet Take 1 tablet (25 mg total) by mouth 3 (three) times daily. 8/27/20 8/27/21  Ayan Hill MD   pravastatin (PRAVACHOL) 80 MG tablet Take 80 mg by mouth once daily.    Historical Provider, MD          HOSPITAL MEDICATIONS:     Scheduled Meds:    amiodarone  800 mg Oral BID    apixaban  5 mg Oral BID    aspirin  325 mg Oral ED 1 Time    enoxaparin  40 mg Subcutaneous Q24H    famotidine  20 mg Oral Daily    furosemide (LASIX) IV  40 mg Intravenous Q12H    losartan  25 mg Oral Daily    metoprolol tartrate  25 mg Oral TID    polyethylene glycol  17 g Oral Daily    pravastatin  80 mg Oral Daily     Continuous Infusions:   PRN Meds:       PHYSICAL EXAM:     Wt Readings from Last 1 Encounters:   08/30/20 2212 80.3 kg (177 lb)     Body mass index is 29.45  "kg/m².  Vitals:    08/30/20 2212 08/31/20 0205   BP: (!) 160/100 122/64   Pulse: 86 68   Resp: (!) 24 16   SpO2: 99% 100%   Weight: 80.3 kg (177 lb)    Height: 5' 5" (1.651 m)           -- General appearance: well developed. appears stated age   -- Head: normocephalic, atraumatic   -- Eyes: conjunctivae clear. Extraocular muscles intact  -- Nose: Nares normal. Septum midline.   -- Mouth/Throat:  On BiPAP.  lips, mucosa, and tongue normal.   -- Neck:  Elevated JVD.  Supple, symmetrical, trachea midline, thyroid not grossly enlarged, appears symmetric  -- Lungs:  Crackles to bases bilaterally.  Increased respiratory rate and respiratory effort. No use of accessory muscles.   -- Chest wall: no tenderness. equal bilateral chest rise   -- Heart: regular rate and regular rhythm. S1, S2 normal. + murmur present..  no click, rub or gallop   -- Abdomen: soft, non-tender, non-distended, non-tympanic; bowel sounds normal; no masses  -- Extremities: no cyanosis, clubbing.  Bilateral lower extremity pitting edema  -- Pulses: 2+ and symmetric   -- Skin:  Turgor normal. Color normal. Texture normal. No rashes or lesions.   -- Neurologic: Normal strength and tone. No focal numbness or weakness. CNII-XII intact. Raquel coma scale: eyes open spontaneously-4, oriented & converses-5, obeys commands-6.      LABORATORY STUDIES:     Recent Results (from the past 36 hour(s))   Comprehensive metabolic panel    Collection Time: 08/30/20 10:21 PM   Result Value Ref Range    Sodium 130 (L) 136 - 145 mmol/L    Potassium 3.7 3.5 - 5.1 mmol/L    Chloride 94 (L) 95 - 110 mmol/L    CO2 24 23 - 29 mmol/L    Glucose 180 (H) 70 - 110 mg/dL    BUN, Bld 26 (H) 8 - 23 mg/dL    Creatinine 1.3 0.5 - 1.4 mg/dL    Calcium 9.7 8.7 - 10.5 mg/dL    Total Protein 7.4 6.0 - 8.4 g/dL    Albumin 4.0 3.5 - 5.2 g/dL    Total Bilirubin 1.0 0.1 - 1.0 mg/dL    Alkaline Phosphatase 93 55 - 135 U/L    AST 28 10 - 40 U/L    ALT 36 10 - 44 U/L    Anion Gap 12 8 - 16 mmol/L "    eGFR if African American 46 (A) >60 mL/min/1.73 m^2    eGFR if non African American 40 (A) >60 mL/min/1.73 m^2   CBC auto differential    Collection Time: 08/30/20 10:21 PM   Result Value Ref Range    WBC 10.10 3.90 - 12.70 K/uL    RBC 4.76 4.00 - 5.40 M/uL    Hemoglobin 14.1 12.0 - 16.0 g/dL    Hematocrit 42.8 37.0 - 48.5 %    Mean Corpuscular Volume 90 82 - 98 fL    Mean Corpuscular Hemoglobin 29.6 27.0 - 31.0 pg    Mean Corpuscular Hemoglobin Conc 32.9 32.0 - 36.0 g/dL    RDW 13.5 11.5 - 14.5 %    Platelets 235 150 - 350 K/uL    MPV 11.8 9.2 - 12.9 fL    Immature Granulocytes 0.1 0.0 - 0.5 %    Gran # (ANC) 5.2 1.8 - 7.7 K/uL    Immature Grans (Abs) 0.01 0.00 - 0.04 K/uL    Lymph # 3.8 1.0 - 4.8 K/uL    Mono # 0.9 0.3 - 1.0 K/uL    Eos # 0.2 0.0 - 0.5 K/uL    Baso # 0.07 0.00 - 0.20 K/uL    nRBC 0 0 /100 WBC    Gran% 51.1 38.0 - 73.0 %    Lymph% 37.5 18.0 - 48.0 %    Mono% 8.8 4.0 - 15.0 %    Eosinophil% 1.8 0.0 - 8.0 %    Basophil% 0.7 0.0 - 1.9 %    Differential Method Automated    Brain natriuretic peptide    Collection Time: 08/30/20 10:21 PM   Result Value Ref Range     (H) 0 - 99 pg/mL   Troponin I    Collection Time: 08/30/20 10:21 PM   Result Value Ref Range    Troponin I 0.033 (H) 0.000 - 0.026 ng/mL   Protime-INR    Collection Time: 08/30/20 10:21 PM   Result Value Ref Range    Prothrombin Time 11.0 9.0 - 12.5 sec    INR 1.0 0.8 - 1.2   COVID-19 Rapid Screening    Collection Time: 08/30/20 10:23 PM   Result Value Ref Range    SARS-CoV-2 RNA, Amplification, Qual Negative Negative       Lab Results   Component Value Date    INR 1.0 08/30/2020    INR 1.0 08/24/2020     No results found for: HGBA1C  No results for input(s): POCTGLUCOSE in the last 72 hours.        MICROBIOLOGY DATA:     Urine Culture, Routine   Date Value Ref Range Status   08/23/2020 No significant growth  Final       Microbiology x 7d:   Microbiology Results (last 7 days)     ** No results found for the last 168 hours. **             IMAGING:     Imaging Results          X-Ray Chest AP Portable (Final result)  Result time 08/30/20 23:14:24    Final result by Perez Greer MD (08/30/20 23:14:24)                 Impression:      Improving interstitial predominant opacities and decreased prominence of the central pulmonary vasculature relative to the 08/23/2020 study.  No new acute finding identified      Electronically signed by: Perez Greer  Date:    08/30/2020  Time:    23:14             Narrative:    EXAMINATION:  XR CHEST AP PORTABLE    CLINICAL HISTORY:  shortness of breath;    TECHNIQUE:  Single frontal view of the chest was performed.    COMPARISON:  Chest radiograph performed 08/23/2020, 01:46 hours    FINDINGS:  Monitoring wires overlie the chest.  The cardiomediastinal silhouette appears unchanged.  Apparent slight improvement in pulmonary vascular congestion.  Ill-defined interstitial and airspace opacities have also improved.  No definite pneumothorax or pleural effusion.  Osseous demineralization.  Remote right-sided rib fractures.  Degenerative change of the acromioclavicular joints.  No acute findings suggested in the visualized abdomen.                                  CONSULTS:     IP CONSULT TO CARDIOLOGY  IP CONSULT TO SOCIAL WORK/CASE MANAGEMENT  IP CONSULT TO REGISTERED DIETITIAN/NUTRITIONIST       ASSESSMENT & PLAN:     Primary Diagnosis:  Acute on chronic diastolic congestive heart failure    Active Hospital Problems    Diagnosis  POA    *Acute on chronic diastolic congestive heart failure [I50.33]  Yes     Priority: 1 - High    Severe mitral regurgitation [I34.0]  Yes    Left atrial thrombus [I51.3]  Yes    Paroxysmal atrial fibrillation [I48.0]  Yes    SOB (shortness of breath) [R06.02]  Yes    Moderate tricuspid regurgitation [I07.1]  Yes    Acute respiratory failure with hypoxia [J96.01]  Yes      Resolved Hospital Problems   No resolved problems to display.         Acute respiratory failure with  hypoxemia secondary to acute CHF exacerbation secondary to diastolic dysfunction and mitral and tricuspid valve regurgitation  Transthoracic echocardiogram obtained showed severe mitral valve regurgitation and moderate to severe tricuspid valve regurgitation. Also with grade 3 diastolic dysfunction and severely enlarged left atrium.  No pulmonary hypertension, pulmonic valve regurgitation or right-sided heart failure.  Cardiology consulted.  Patient developed atrial fibrillation with RVR, therefore placed on amiodarone infusion.  Underwent transesophageal ECHO where a possible left atrial thrombus was identified.  Patient was initiated on heparin infusion for both stroke prophylaxis and possible thrombus.  Left heart catheterization on 8/25/20 showed non-obstructive CAD.   ·  Evidenced by history, elevated BNP, pulmonary edema, peripheral edema  · Provide diuresis w/ IV medication  · Maintain w/ beta-blocker  · ACE inhibitor or ARB if GFR allows and remains stable  · If 1) Patient cannot tolerate ACEi or 2) NYHA class III or above, add spironolactone (or eplerenone) and hydralazine-isosorbide dinitrate   · Nitroglycerin ointment to chest given in the ED  · Daily Weights  · Strict I/O  · Fluid restriction to 1,500cc daily  · Low-sodium cardiac diet  · Obtain 2D echo if <6 months   No results found for: EF  · Chest X-ray  · Check TSH, albumin, UA, and renal function  · EKG and cardiac enzymes PRN  · DVT prophylaxis w/ pharmacological and/or mechanical measures  · Oxygen supplementation support PRN    Instructions given to patient/family:  Monitor daily weight.  Regular activity within patient's limitations.  Low salt, low fat and low choleterol diet and restrict fluid < 2L per day.  Call MD if SOB, chest pain, weight gain > 2-3 lbs per day and/or 5-6 lbs per week.   No smoking. Annual influenza vaccine required.    Severe mitral regurgitation  Severe and likely cause of patient's symptoms  S/p left heart  catheterization for ischemic workup on 8/25 with non-obstructive CAD  Case discussed with Cardiology and she will need referral to Dr. Waddell for mitral valve repair/replacement as an outpatient     Left atrial thrombus  Per transesophageal echo  Anticoagulation with heparin - changed to apixaban today     Moderate to severe tricuspid regurgitation   per transesophageal echocardiogram     SOB (shortness of breath)  As above    Chronic anticoagulation  · For treatment of paroxysmal atrial fibrillation and left atrial thrombus  · No evidence of acute blood loss   · Continue apixaban.        VTE Risk Mitigation (From admission, onward)         Ordered     enoxaparin injection 40 mg  Every 24 hours      08/31/20 0441     apixaban tablet 5 mg  2 times daily      08/31/20 0441     IP VTE HIGH RISK PATIENT  Once      08/31/20 0441     Place sequential compression device  Until discontinued      08/31/20 0441     Place TAINA hose  Until discontinued      08/31/20 0441     Reason for No Pharmacological VTE Prophylaxis  Once     Question:  Reasons:  Answer:  Physician Provided (leave comment)  Comment:  Continuing apixaban from home    08/31/20 0441     IP VTE HIGH RISK PATIENT  Once      08/31/20 0441     Place sequential compression device  Until discontinued      08/31/20 0441                  Adult PRN medications available   DVT prophylaxis given       DISPOSITION:     Will admit to the Hospital Medicine service for further evaluation and treatment.    Chart reviewed and updated where applicable.    High Risk Conditions:  Patient has a condition that poses threat to life and bodily function:  Acute hypoxemic respiratory failure.  Acute CHF exacerbation      ===============================================================    Jeremie Nguyen MD, MPH  Department of Hospital Medicine   [facility]  [phone] pg  (7pm - 6am)          This note is dictated using [employer] voice recognition software.  There  are word recognition mistakes that are occasionally missed on review.

## 2020-08-31 NOTE — SUBJECTIVE & OBJECTIVE
Past Medical History:   Diagnosis Date    Atrial fibrillation with RVR 8/24/2020    Cataract     Glaucoma     Heart valve problem     Hyperlipidemia     Severe mitral regurgitation 8/24/2020       Past Surgical History:   Procedure Laterality Date    ANKLE SURGERY      LEFT HEART CATHETERIZATION Left 8/25/2020    Procedure: Left heart cath, radial;  Surgeon: Marlee Carrillo MD;  Location: [facility], [place] CATH LAB;  Service: Cardiology;  Laterality: Left;    lipoma removal         Review of patient's allergies indicates:  No Known Allergies    No current facility-administered medications on file prior to encounter.      Current Outpatient Medications on File Prior to Encounter   Medication Sig    amiodarone (PACERONE) 400 MG tablet Take two tablets by mouth twice daily for twelve days, then take one tablet by mouth daily.    apixaban (ELIQUIS) 5 mg Tab Take 1 tablet (5 mg total) by mouth 2 (two) times daily.    furosemide (LASIX) 40 MG tablet Take 1 tablet (40 mg total) by mouth 2 (two) times daily.    losartan (COZAAR) 50 MG tablet Take 0.5 tablets (25 mg total) by mouth once daily.    metoprolol tartrate (LOPRESSOR) 25 MG tablet Take 1 tablet (25 mg total) by mouth 3 (three) times daily.    pravastatin (PRAVACHOL) 80 MG tablet Take 80 mg by mouth once daily.     Family History     Problem Relation (Age of Onset)    Cancer Mother    Cataracts Sister    No Known Problems Father, Brother, Maternal Aunt, Maternal Uncle, Paternal Aunt, Paternal Uncle, Maternal Grandmother, Maternal Grandfather, Paternal Grandmother, Paternal Grandfather        Tobacco Use    Smoking status: Never Smoker    Smokeless tobacco: Never Used   Substance and Sexual Activity    Alcohol use: No    Drug use: No    Sexual activity: Not Currently     Review of Systems   Cardiovascular: Positive for chest pain and dyspnea on exertion. Negative for irregular heartbeat, leg swelling, orthopnea, palpitations and syncope.   Respiratory:  Positive for shortness of breath. Negative for sputum production and wheezing.      Objective:     Vital Signs (Most Recent):  Temp: 97.8 °F (36.6 °C) (08/31/20 0800)  Pulse: 62 (08/31/20 1137)  Resp: 20 (08/31/20 1137)  BP: 103/63 (08/31/20 1137)  SpO2: 97 % (08/31/20 1137) Vital Signs (24h Range):  Temp:  [97.8 °F (36.6 °C)-98.6 °F (37 °C)] 97.8 °F (36.6 °C)  Pulse:  [58-86] 62  Resp:  [12-33] 20  SpO2:  [97 %-100 %] 97 %  BP: ()/() 103/63     Weight: 78 kg (171 lb 15.3 oz)  Body mass index is 28.62 kg/m².    SpO2: 97 %  O2 Device (Oxygen Therapy): nasal cannula      Intake/Output Summary (Last 24 hours) at 8/31/2020 1201  Last data filed at 8/31/2020 1000  Gross per 24 hour   Intake 4 ml   Output 100 ml   Net -96 ml       Lines/Drains/Airways     Peripheral Intravenous Line                 Peripheral IV - Single Lumen 20 G Left Forearm -- days                Physical Exam   Constitutional: She is oriented to person, place, and time. No distress.   Cardiovascular: Normal rate, regular rhythm and intact distal pulses.   Murmur heard.  High-pitched blowing holosystolic murmur is present with a grade of 4/6 at the apex.  Pulmonary/Chest: Effort normal and breath sounds normal.   Abdominal: Soft. Bowel sounds are normal. There is no abdominal tenderness.   Musculoskeletal:      Right lower leg: No edema.      Left lower leg: No edema.   Neurological: She is alert and oriented to person, place, and time.   Skin: She is not diaphoretic.   Vitals reviewed.      Significant Labs:   CMP   Recent Labs   Lab 08/30/20  2221 08/31/20  0721   * 138   K 3.7 4.1   CL 94* 95   CO2 24 30*   * 124*   BUN 26* 24*   CREATININE 1.3 1.2   CALCIUM 9.7 9.2   PROT 7.4  --    ALBUMIN 4.0  --    BILITOT 1.0  --    ALKPHOS 93  --    AST 28  --    ALT 36  --    ANIONGAP 12 13   ESTGFRAFRICA 46* 51*   EGFRNONAA 40* 44*   , CBC   Recent Labs   Lab 08/30/20  2221   WBC 10.10   HGB 14.1   HCT 42.8      , Lipid Panel  No results for input(s): CHOL, HDL, LDLCALC, TRIG, CHOLHDL in the last 48 hours. and Troponin   Recent Labs   Lab 08/30/20  2221 08/31/20  0721   TROPONINI 0.033* 0.019       Significant Imaging: Echocardiogram:   Transthoracic echo (TTE) complete (Cupid Only):   Results for orders placed or performed during the hospital encounter of 08/23/20   Echo Color Flow Doppler? Yes   Result Value Ref Range    Ascending aorta 2.11 cm    STJ 1.78 cm    AV mean gradient 2 mmHg    Ao peak riley 1.02 m/s    Ao VTI 13.09 cm    IVRT 65.74 msec    IVS 1.17 (A) 0.6 - 1.1 cm    LA size 5.65 cm    Left Atrium Major Axis 6.25 cm    Left Atrium Minor Axis 6.91 cm    LVIDD 4.66 3.5 - 6.0 cm    LVIDS 3.39 2.1 - 4.0 cm    LVOT diameter 1.90 cm    LVOT peak VTI 11.30 cm    PW 1.10 0.6 - 1.1 cm    MV Peak A Riley 0.86 m/s    E wave decelartion time 167.17 msec    MV Peak E Riley 1.89 m/s    RA Major Axis 3.27 cm    RVDD 2.95 cm    Sinus 2.63 cm    TAPSE 2.11 cm    TR Max Riley 3.53 m/s    TDI LATERAL 0.12 m/s    TDI SEPTAL 0.09 m/s    LA WIDTH 5.88 cm    Ao root annulus 2.50 cm    AORTIC VALVE CUSP SEPERATION 1.87 cm    PV PEAK VELOCITY 0.81 cm/s    MV stenosis pressure 1/2 time 48.48 ms    LV Diastolic Volume 100.12 mL    LV Systolic Volume 46.93 mL    LVOT peak riley 0.52 m/s    Mr max riley 0.05 m/s    LV LATERAL E/E' RATIO 15.75 m/s    LV SEPTAL E/E' RATIO 21.00 m/s    FS 27 %    LA volume 185.34 cm3    LV mass 193.31 g    Left Ventricle Relative Wall Thickness 0.47 cm    AV valve area 2.45 cm2    AV Velocity Ratio 0.51     AV index (prosthetic) 0.86     MV valve area p 1/2 method 4.54 cm2    E/A ratio 2.20     Mean e' 0.11 m/s    LVOT area 2.8 cm2    LVOT stroke volume 32.02 cm3    AV peak gradient 4 mmHg    E/E' ratio 18.00 m/s    LV Systolic Volume Index 24.7 mL/m2    LV Diastolic Volume Index 52.67 mL/m2    LA Volume Index 97.5 mL/m2    LV Mass Index 102 g/m2    Triscuspid Valve Regurgitation Peak Gradient 50 mmHg    BSA 1.95 m2    Right Atrial  Pressure (from IVC) 3 mmHg    TV rest pulmonary artery pressure 53 mmHg    Narrative    · Normal left ventricular systolic function. The estimated ejection   fraction is 60%.  · Concentric left ventricular hypertrophy.  · Severe mitral regurgitation.  · Moderate to severe tricuspid regurgitation.  · Mild-to-moderate aortic valve stenosis.  · Aortic valve area is 2.45 cm2; peak velocity is 1.02 m/s; mean gradient   is 2 mmHg.  · Severe left atrial enlargement.  · Grade III (severe) left ventricular diastolic dysfunction consistent   with restrictive physiology.  · Normal right ventricular systolic function.  · Mild pulmonic regurgitation.  · Normal central venous pressure (3 mmHg).  · The estimated PA systolic pressure is 53 mmHg.  · Pulmonary hypertension present.     mitral valve prolaspe with severe regurgitation

## 2020-09-01 PROBLEM — I34.0 SEVERE MITRAL VALVE REGURGITATION: Status: ACTIVE | Noted: 2020-01-01

## 2020-09-01 PROBLEM — I50.33 ACUTE ON CHRONIC DIASTOLIC (CONGESTIVE) HEART FAILURE: Status: ACTIVE | Noted: 2020-01-01

## 2020-09-01 NOTE — SUBJECTIVE & OBJECTIVE
Review of Systems   Cardiovascular: Positive for chest pain. Negative for dyspnea on exertion, leg swelling, orthopnea, palpitations and paroxysmal nocturnal dyspnea.   Respiratory: Positive for shortness of breath. Negative for sputum production and wheezing.      Objective:     Vital Signs (Most Recent):  Temp: 97.8 °F (36.6 °C) (09/01/20 0741)  Pulse: 64 (09/01/20 0741)  Resp: 18 (09/01/20 0741)  BP: 119/67 (09/01/20 0741)  SpO2: 99 % (09/01/20 0824) Vital Signs (24h Range):  Temp:  [87.5 °F (30.8 °C)-98.2 °F (36.8 °C)] 97.8 °F (36.6 °C)  Pulse:  [56-88] 64  Resp:  [14-75] 18  SpO2:  [93 %-99 %] 99 %  BP: ()/(49-74) 119/67     Weight: 77.6 kg (171 lb 1.2 oz)  Body mass index is 28.47 kg/m².     SpO2: 99 %  O2 Device (Oxygen Therapy): nasal cannula      Intake/Output Summary (Last 24 hours) at 9/1/2020 0958  Last data filed at 9/1/2020 0600  Gross per 24 hour   Intake 4 ml   Output 100 ml   Net -96 ml       Lines/Drains/Airways     Peripheral Intravenous Line                 Peripheral IV - Single Lumen 20 G Left Forearm -- days                Physical Exam   Constitutional: She is oriented to person, place, and time. She appears distressed.   Cardiovascular: Intact distal pulses. An irregularly irregular rhythm present. Tachycardia present.   Murmur heard.  High-pitched blowing holosystolic murmur is present with a grade of 4/6 at the apex.  Pulmonary/Chest: Effort normal and breath sounds normal.   Abdominal: Soft. Bowel sounds are normal.   Musculoskeletal:      Right lower leg: No edema.      Left lower leg: No edema.   Neurological: She is alert and oriented to person, place, and time.   Vitals reviewed.      Significant Labs:   BMP:   Recent Labs   Lab 08/30/20  2221 08/31/20  0721 09/01/20  0542   * 124* 111*   * 138 140   K 3.7 4.1 4.1   CL 94* 95 98   CO2 24 30* 29   BUN 26* 24* 26*   CREATININE 1.3 1.2 1.3   CALCIUM 9.7 9.2 9.3   MG  --  2.6  --    , CBC   Recent Labs   Lab  08/30/20  2221   WBC 10.10   HGB 14.1   HCT 42.8      , Lipid Panel No results for input(s): CHOL, HDL, LDLCALC, TRIG, CHOLHDL in the last 48 hours. and Troponin   Recent Labs   Lab 08/30/20  2221 08/31/20  0721 08/31/20  1415   TROPONINI 0.033* 0.019 0.017       Significant Imaging: Echocardiogram:   Transthoracic echo (TTE) complete (Cupid Only):   Results for orders placed or performed during the hospital encounter of 08/23/20   Echo Color Flow Doppler? Yes   Result Value Ref Range    Ascending aorta 2.11 cm    STJ 1.78 cm    AV mean gradient 2 mmHg    Ao peak riley 1.02 m/s    Ao VTI 13.09 cm    IVRT 65.74 msec    IVS 1.17 (A) 0.6 - 1.1 cm    LA size 5.65 cm    Left Atrium Major Axis 6.25 cm    Left Atrium Minor Axis 6.91 cm    LVIDD 4.66 3.5 - 6.0 cm    LVIDS 3.39 2.1 - 4.0 cm    LVOT diameter 1.90 cm    LVOT peak VTI 11.30 cm    PW 1.10 0.6 - 1.1 cm    MV Peak A Riley 0.86 m/s    E wave decelartion time 167.17 msec    MV Peak E Riley 1.89 m/s    RA Major Axis 3.27 cm    RVDD 2.95 cm    Sinus 2.63 cm    TAPSE 2.11 cm    TR Max Riley 3.53 m/s    TDI LATERAL 0.12 m/s    TDI SEPTAL 0.09 m/s    LA WIDTH 5.88 cm    Ao root annulus 2.50 cm    AORTIC VALVE CUSP SEPERATION 1.87 cm    PV PEAK VELOCITY 0.81 cm/s    MV stenosis pressure 1/2 time 48.48 ms    LV Diastolic Volume 100.12 mL    LV Systolic Volume 46.93 mL    LVOT peak riley 0.52 m/s    Mr max riley 0.05 m/s    LV LATERAL E/E' RATIO 15.75 m/s    LV SEPTAL E/E' RATIO 21.00 m/s    FS 27 %    LA volume 185.34 cm3    LV mass 193.31 g    Left Ventricle Relative Wall Thickness 0.47 cm    AV valve area 2.45 cm2    AV Velocity Ratio 0.51     AV index (prosthetic) 0.86     MV valve area p 1/2 method 4.54 cm2    E/A ratio 2.20     Mean e' 0.11 m/s    LVOT area 2.8 cm2    LVOT stroke volume 32.02 cm3    AV peak gradient 4 mmHg    E/E' ratio 18.00 m/s    LV Systolic Volume Index 24.7 mL/m2    LV Diastolic Volume Index 52.67 mL/m2    LA Volume Index 97.5 mL/m2    LV Mass Index  102 g/m2    Triscuspid Valve Regurgitation Peak Gradient 50 mmHg    BSA 1.95 m2    Right Atrial Pressure (from IVC) 3 mmHg    TV rest pulmonary artery pressure 53 mmHg    Narrative    · Normal left ventricular systolic function. The estimated ejection   fraction is 60%.  · Concentric left ventricular hypertrophy.  · Severe mitral regurgitation.  · Moderate to severe tricuspid regurgitation.  · Mild-to-moderate aortic valve stenosis.  · Aortic valve area is 2.45 cm2; peak velocity is 1.02 m/s; mean gradient   is 2 mmHg.  · Severe left atrial enlargement.  · Grade III (severe) left ventricular diastolic dysfunction consistent   with restrictive physiology.  · Normal right ventricular systolic function.  · Mild pulmonic regurgitation.  · Normal central venous pressure (3 mmHg).  · The estimated PA systolic pressure is 53 mmHg.  · Pulmonary hypertension present.     mitral valve prolaspe with severe regurgitation

## 2020-09-01 NOTE — ASSESSMENT & PLAN NOTE
Evidently symptomatic despite adequate diuresis  Confirmed via transesophageal echocardiogram on recent admission  Left heart catheterization with non obstructive coronary artery disease  Has severe left atrial enlargement with thrombus  Management as above

## 2020-09-01 NOTE — ASSESSMENT & PLAN NOTE
Currently well controlled on amiodarone 400 mg PO BID and metoprolol 50 mg BID  On NOAC for stroke prophylaxis (CHADS VASc of at least 4)  Cardiac monitoring  Electrolytes reviewed

## 2020-09-01 NOTE — PROGRESS NOTES
[facility]  Cardiology  Progress Note    Patient Name: Fatou Lorenzo  MRN: [id number]  Admission Date: 8/30/2020  Hospital Length of Stay: 1 days  Code Status: Full Code   Attending Physician: Ashley Naranjo MD   Primary Care Physician: Ludin Rivas MD  Expected Discharge Date:   Principal Problem:Acute on chronic diastolic congestive heart failure    Subjective:     Hospital Course:   No notes on file        Review of Systems   Cardiovascular: Positive for chest pain. Negative for dyspnea on exertion, leg swelling, orthopnea, palpitations and paroxysmal nocturnal dyspnea.   Respiratory: Positive for shortness of breath. Negative for sputum production and wheezing.      Objective:     Vital Signs (Most Recent):  Temp: 97.8 °F (36.6 °C) (09/01/20 0741)  Pulse: 64 (09/01/20 0741)  Resp: 18 (09/01/20 0741)  BP: 119/67 (09/01/20 0741)  SpO2: 99 % (09/01/20 0824) Vital Signs (24h Range):  Temp:  [87.5 °F (30.8 °C)-98.2 °F (36.8 °C)] 97.8 °F (36.6 °C)  Pulse:  [56-88] 64  Resp:  [14-75] 18  SpO2:  [93 %-99 %] 99 %  BP: ()/(49-74) 119/67     Weight: 77.6 kg (171 lb 1.2 oz)  Body mass index is 28.47 kg/m².     SpO2: 99 %  O2 Device (Oxygen Therapy): nasal cannula      Intake/Output Summary (Last 24 hours) at 9/1/2020 0958  Last data filed at 9/1/2020 0600  Gross per 24 hour   Intake 4 ml   Output 100 ml   Net -96 ml       Lines/Drains/Airways     Peripheral Intravenous Line                 Peripheral IV - Single Lumen 20 G Left Forearm -- days                Physical Exam   Constitutional: She is oriented to person, place, and time. She appears distressed.   Cardiovascular: Intact distal pulses. An irregularly irregular rhythm present. Tachycardia present.   Murmur heard.  High-pitched blowing holosystolic murmur is present with a grade of 4/6 at the apex.  Pulmonary/Chest: Effort normal and breath sounds normal.   Abdominal: Soft. Bowel sounds are normal.   Musculoskeletal:      Right lower  leg: No edema.      Left lower leg: No edema.   Neurological: She is alert and oriented to person, place, and time.   Vitals reviewed.      Significant Labs:   BMP:   Recent Labs   Lab 08/30/20 2221 08/31/20 0721 09/01/20  0542   * 124* 111*   * 138 140   K 3.7 4.1 4.1   CL 94* 95 98   CO2 24 30* 29   BUN 26* 24* 26*   CREATININE 1.3 1.2 1.3   CALCIUM 9.7 9.2 9.3   MG  --  2.6  --    , CBC   Recent Labs   Lab 08/30/20 2221   WBC 10.10   HGB 14.1   HCT 42.8      , Lipid Panel No results for input(s): CHOL, HDL, LDLCALC, TRIG, CHOLHDL in the last 48 hours. and Troponin   Recent Labs   Lab 08/30/20 2221 08/31/20 0721 08/31/20  1415   TROPONINI 0.033* 0.019 0.017       Significant Imaging: Echocardiogram:   Transthoracic echo (TTE) complete (Cupid Only):   Results for orders placed or performed during the hospital encounter of 08/23/20   Echo Color Flow Doppler? Yes   Result Value Ref Range    Ascending aorta 2.11 cm    STJ 1.78 cm    AV mean gradient 2 mmHg    Ao peak riley 1.02 m/s    Ao VTI 13.09 cm    IVRT 65.74 msec    IVS 1.17 (A) 0.6 - 1.1 cm    LA size 5.65 cm    Left Atrium Major Axis 6.25 cm    Left Atrium Minor Axis 6.91 cm    LVIDD 4.66 3.5 - 6.0 cm    LVIDS 3.39 2.1 - 4.0 cm    LVOT diameter 1.90 cm    LVOT peak VTI 11.30 cm    PW 1.10 0.6 - 1.1 cm    MV Peak A Riley 0.86 m/s    E wave decelartion time 167.17 msec    MV Peak E Riley 1.89 m/s    RA Major Axis 3.27 cm    RVDD 2.95 cm    Sinus 2.63 cm    TAPSE 2.11 cm    TR Max Riley 3.53 m/s    TDI LATERAL 0.12 m/s    TDI SEPTAL 0.09 m/s    LA WIDTH 5.88 cm    Ao root annulus 2.50 cm    AORTIC VALVE CUSP SEPERATION 1.87 cm    PV PEAK VELOCITY 0.81 cm/s    MV stenosis pressure 1/2 time 48.48 ms    LV Diastolic Volume 100.12 mL    LV Systolic Volume 46.93 mL    LVOT peak riley 0.52 m/s    Mr max riley 0.05 m/s    LV LATERAL E/E' RATIO 15.75 m/s    LV SEPTAL E/E' RATIO 21.00 m/s    FS 27 %    LA volume 185.34 cm3    LV mass 193.31 g    Left  Ventricle Relative Wall Thickness 0.47 cm    AV valve area 2.45 cm2    AV Velocity Ratio 0.51     AV index (prosthetic) 0.86     MV valve area p 1/2 method 4.54 cm2    E/A ratio 2.20     Mean e' 0.11 m/s    LVOT area 2.8 cm2    LVOT stroke volume 32.02 cm3    AV peak gradient 4 mmHg    E/E' ratio 18.00 m/s    LV Systolic Volume Index 24.7 mL/m2    LV Diastolic Volume Index 52.67 mL/m2    LA Volume Index 97.5 mL/m2    LV Mass Index 102 g/m2    Triscuspid Valve Regurgitation Peak Gradient 50 mmHg    BSA 1.95 m2    Right Atrial Pressure (from IVC) 3 mmHg    TV rest pulmonary artery pressure 53 mmHg    Narrative    · Normal left ventricular systolic function. The estimated ejection   fraction is 60%.  · Concentric left ventricular hypertrophy.  · Severe mitral regurgitation.  · Moderate to severe tricuspid regurgitation.  · Mild-to-moderate aortic valve stenosis.  · Aortic valve area is 2.45 cm2; peak velocity is 1.02 m/s; mean gradient   is 2 mmHg.  · Severe left atrial enlargement.  · Grade III (severe) left ventricular diastolic dysfunction consistent   with restrictive physiology.  · Normal right ventricular systolic function.  · Mild pulmonic regurgitation.  · Normal central venous pressure (3 mmHg).  · The estimated PA systolic pressure is 53 mmHg.  · Pulmonary hypertension present.     mitral valve prolaspe with severe regurgitation      Assessment and Plan:         * Acute on chronic diastolic congestive heart failure  Elevated blood pressure.  Severe mitral regurgitation.  Symptomaticly improved with blood pressure control.    Coronary artery disease involving native coronary artery of native heart without angina pectoris  Nonobstructive disease on recent heart catheterization    Other hyperlipidemia  Continue statin    Essential hypertension  Elevated blood pressure on presentation.  Nitro paste added.  Monitor.    9/1/20: NTP change to Imdur.    Left atrial thrombus  Noted on recent transesophageal  echocardiogram.  Anticoagulation.  Rate control.  Patient had converted to sinus rhythm.    Severe mitral regurgitation  Patient was schedule to see CTS on September 8th.  Will see if she can be seen sooner or hospital hospital transfer.    Paroxysmal atrial fibrillation  In sinus rhythm.  Would decrease dose of amiodarone.  Currently on 800 mg b.i.d..    9/1/20: PAF. Continue amiodarone. Eliquis. Change dosing of lopressor.        VTE Risk Mitigation (From admission, onward)         Ordered     apixaban tablet 5 mg  2 times daily      08/31/20 0441     IP VTE HIGH RISK PATIENT  Once      08/31/20 0441     Place sequential compression device  Until discontinued      08/31/20 0441     Place TAINA hose  Until discontinued      08/31/20 0441     Reason for No Pharmacological VTE Prophylaxis  Once     Question:  Reasons:  Answer:  Physician Provided (leave comment)  Comment:  Continuing apixaban from home    08/31/20 0441     Place sequential compression device  Until discontinued      08/31/20 0441                Jevon Jones MD  Cardiology  [facility]

## 2020-09-01 NOTE — SUBJECTIVE & OBJECTIVE
Interval History: SOB on minimal exertion.Early satiety.     Review of Systems   Constitutional: Positive for appetite change.   Respiratory: Positive for shortness of breath.    Cardiovascular: Negative.    Gastrointestinal: Negative.      Objective:     Vital Signs (Most Recent):  Temp: 97.8 °F (36.6 °C) (09/01/20 1127)  Pulse: 76 (09/01/20 1500)  Resp: 18 (09/01/20 1127)  BP: 113/64 (09/01/20 1127)  SpO2: 97 % (09/01/20 1127) Vital Signs (24h Range):  Temp:  [87.5 °F (30.8 °C)-98.2 °F (36.8 °C)] 97.8 °F (36.6 °C)  Pulse:  [] 76  Resp:  [14-33] 18  SpO2:  [93 %-99 %] 97 %  BP: ()/(49-74) 113/64     Weight: 77.6 kg (171 lb 1.2 oz)  Body mass index is 28.47 kg/m².    Intake/Output Summary (Last 24 hours) at 9/1/2020 1629  Last data filed at 9/1/2020 0600  Gross per 24 hour   Intake 0 ml   Output --   Net 0 ml      Physical Exam  Vitals signs and nursing note reviewed.   Constitutional:       General: She is not in acute distress.     Appearance: She is not toxic-appearing.   Cardiovascular:      Rate and Rhythm: Normal rate and regular rhythm.   Pulmonary:      Effort: Pulmonary effort is normal.      Breath sounds: Normal breath sounds. No wheezing or rales.      Comments: Able to speak mid to full sentences without distress but has to pause at times to catch her breath   Abdominal:      General: Abdomen is flat. Bowel sounds are normal.   Musculoskeletal:      Right lower leg: Edema (trace) present.      Left lower leg: Edema (trace) present.   Skin:     General: Skin is warm and dry.      Capillary Refill: Capillary refill takes less than 2 seconds.   Neurological:      Mental Status: She is alert and oriented to person, place, and time.   Psychiatric:         Mood and Affect: Mood normal.         Behavior: Behavior normal.         Thought Content: Thought content normal.         Judgment: Judgment normal.         Significant Labs: All pertinent labs within the past 24 hours have been  reviewed.    Significant Imaging: I have reviewed all pertinent imaging results/findings within the past 24 hours.  I have reviewed and interpreted all pertinent imaging results/findings within the past 24 hours.

## 2020-09-01 NOTE — PT/OT/SLP PROGRESS
Physical Therapy      Patient Name:  Fatou Lorenzo   MRN:  [id number]    Patient not seen today for PT eval secondary to Patient unwilling to participate, Pain. Pt reported not feeling well, still with c/o 6/10 chest pain. Nurse Marino notified. Pt familiar to acute skilled PT services from last hospital admit, re-educated on therapy services and goals. Per nurse Marino, pt was doing well ambulating to the bathroom and getting OOB>chair, however with c/o chest pain this am and unable to receive anything more 2* decreased BP. Will follow-up as able and as appropriate.    Priyanka Carter, PT

## 2020-09-01 NOTE — PROGRESS NOTES
"   09/01/20 1000   Vital Signs   SpO2 99 %   Flow (L/min) 2   O2 Device (Oxygen Therapy) nasal cannula   /72   BP Location Right arm   BP Method Automatic   Patient Position Lying   CP 8/10, pt states "my arm feels heavy" Dr. Jones notified, came to bedside. New orders received prn NTG and isosorbide   "

## 2020-09-01 NOTE — NURSING TRANSFER
Nursing Transfer Note      9/1/2020     Transfer From:  to Tele room 308    Transfer via wheelchair    Transfer with: O2, cardiac monitoring    Transported by Transport and RN    Medicines sent:     Chart send with patient: Yes    Notified: pt will notify family    Patient reassessed at:      Upon arrival to floor: patient oriented to room. Floor nurse at bedside

## 2020-09-01 NOTE — PLAN OF CARE
Outside Transfer Acceptance Note / Regional Referral Center    Date of acceptance: 09/01/2020    Referring facility/ provider:[facility] Ashley Segura MD     Reason for transfer:  Higher level of care/ acute on chronic diastolic heart failure/   Severe mitral valve disease    Report from referring provider:    75-year-old woman with a PMH of AF/ RVR (controlled on amiodarone), severe MR, and acute on chronic diastolic heart failure with recent admission for heart failure (8/23 - 8/27) presented to [facility] on 08/30 with SOB.  At the time of presentation, she was in pulmonary edema.  She was started on IV diuretics (Lasix 60 mg) and has improved but she has significant dyspnea with minimal activity.  She had a cardiac catheterization during her recent admission which showed nonocclusive coronary disease and an atrial thrombusCase was reviewed with Dr. Ferrer recommended transfer to [facility].     Temp:  [87.5 °F (30.8 °C)-98.2 °F (36.8 °C)]   Pulse:  []   Resp:  [14-33]   BP: ()/(49-74)   SpO2:  [93 %-99 %]     Recent Labs   Lab 08/27/20  0731 08/30/20  2221   WBC 8.02 10.10   HGB 14.2 14.1   HCT 44.1 42.8    235     Recent Labs   Lab 08/26/20  0847 08/27/20  0731 08/30/20  2221 08/31/20  0721 09/01/20  0542    139 130* 138 140   K 3.4* 3.7 3.7 4.1 4.1   CL 96 97 94* 95 98   CO2 31* 31* 24 30* 29   BUN 23 24* 26* 24* 26*   CREATININE 1.1 1.1 1.3 1.2 1.3   * 116* 180* 124* 111*   CALCIUM 9.8 9.4 9.7 9.2 9.3   MG 2.1 2.3  --  2.6  --      Recent Labs   Lab 08/30/20  2221   ALKPHOS 93   ALT 36   AST 28   ALBUMIN 4.0   PROT 7.4   BILITOT 1.0   INR 1.0       To Do List:    Admit to   Consult CTS/Dr. Waddell    Upon patient arrival to floor, please send SecureChat to [facility] or call extension [phone] (if no answer, this will flip to a beeper, so enter your call back number) for Hospital Medicine admit team assignment and for additional admit orders for the patient.  Do not page the attending  physician associated with the patient on arrival (this physician may not be on duty at the time of arrival).  Rather, always call [phone] to reach the triage physician for orders and team assignment.    Brown Phipps MD [facility]/ Hospital Medicine

## 2020-09-01 NOTE — PROGRESS NOTES
[facility] Hospital Medicine  Progress Note    Patient Name: Fatou Lorenzo  MRN: [id number]  Patient Class: IP- Inpatient   Admission Date: 8/30/2020  Length of Stay: 1 days  Attending Physician: Ashley Naranjo MD  Primary Care Provider: Ludin Rivas MD        Subjective:     Principal Problem:Acute respiratory failure with hypoxia        HPI:    Fatou Lorenzo is a 75 y.o. female that (in part)  has a past medical history of Atrial fibrillation with RVR, Cataract, Glaucoma, Heart valve problem, Hyperlipidemia, and Severe mitral regurgitation.  has a past surgical history that includes Ankle surgery; lipoma removal; and Left heart catheterization (Left, 8/25/2020). Presents to [facility] Emergency Department complaining of recurrent shortness of breath.  Patient states it feels like she is having heart failure again.  She was discharged presumably on August 27th (no discharge summary available at this time), 4 days ago for acute respiratory failure with hypoxia secondary to CHF exacerbation.  The patient is typically followed at [place].  Reports 8 years ago was referred to Dr. Waddell, but valvular regurgitation not bad enough at that time to do surgery.  However see has severe tricuspid and mitral valve disease which was confirmed during her previous admission by echocardiogram.  She is having dyspnea with exertion, shortness of breath at rest, and orthopnea.  Worsened with exertion.  No relieving factors.     In last hospital stay she underwent left heart angiography as well as transesophageal echocardiogram and was diagnosed with a left atrial thrombus.  Nonobstructive CAD.  Had episode of atrial fibrillation with RVR which spontaneously resolved post catheterization.    In the emergency department routine laboratory studies, chest x-ray, EKG, cardiac enzymes were obtained.  Patient required BiPAP due to hypoxemia not responsive to nasal cannula.  Chest  x-ray actually showed improvement as did her cardiac enzymes compared to previous lab studies and imaging during her last hospital stay.  Lasix and nitroglycerin given.    Hospital medicine has been asked to admit to inpatient in the ICU due to BiPAP requirements for further evaluation and treatment.     Overview/Hospital Course:  Ms Lorenzo presented with cardiogenic pulmonary edema likely due to severe mitral valve regurgitation. Placed on NIPPV and IV furosemide. Admitted to ICU for close observation. Patient improved, weaned to low flow NC and stepped down to telemetry floor in stable condition. Symptomatic with minimal activity despite adequate diuresis. AFib well controlled on oral amiodarone 400 mg BID and on NOAC for stroke prophylaxis and thrombus to left atrium, which are new diagnoses from recent admission. Cardiology consulted. I discussed case with Dr Jones (cardiologist on call at our facility) who recommends transfer to [facility] for formal CT surgery evaluation given recurrent admission despite compliance, BP control and diuresis. Transfer Center called to request transfer for higher level of care. Case discussed with Dr Waddell (CTS attending) who agrees with transfer. Recommends Hospital Medicine as primary and have his team consulted. Discussed with Dr Phipps ( for [facility]) who will communicate with accepting HM team at [facility]. ADT 32 placed.     Interval History: SOB on minimal exertion.Early satiety.     Review of Systems   Constitutional: Positive for appetite change.   Respiratory: Positive for shortness of breath.    Cardiovascular: Negative.    Gastrointestinal: Negative.      Objective:     Vital Signs (Most Recent):  Temp: 97.8 °F (36.6 °C) (09/01/20 1127)  Pulse: 76 (09/01/20 1500)  Resp: 18 (09/01/20 1127)  BP: 113/64 (09/01/20 1127)  SpO2: 97 % (09/01/20 1127) Vital Signs (24h Range):  Temp:  [87.5 °F (30.8 °C)-98.2 °F (36.8 °C)] 97.8 °F (36.6 °C)  Pulse:  []  76  Resp:  [14-33] 18  SpO2:  [93 %-99 %] 97 %  BP: ()/(49-74) 113/64     Weight: 77.6 kg (171 lb 1.2 oz)  Body mass index is 28.47 kg/m².    Intake/Output Summary (Last 24 hours) at 9/1/2020 1629  Last data filed at 9/1/2020 0600  Gross per 24 hour   Intake 0 ml   Output --   Net 0 ml      Physical Exam  Vitals signs and nursing note reviewed.   Constitutional:       General: She is not in acute distress.     Appearance: She is not toxic-appearing.   Cardiovascular:      Rate and Rhythm: Normal rate and regular rhythm.   Pulmonary:      Effort: Pulmonary effort is normal.      Breath sounds: Normal breath sounds. No wheezing or rales.      Comments: Able to speak mid to full sentences without distress but has to pause at times to catch her breath   Abdominal:      General: Abdomen is flat. Bowel sounds are normal.   Musculoskeletal:      Right lower leg: Edema (trace) present.      Left lower leg: Edema (trace) present.   Skin:     General: Skin is warm and dry.      Capillary Refill: Capillary refill takes less than 2 seconds.   Neurological:      Mental Status: She is alert and oriented to person, place, and time.   Psychiatric:         Mood and Affect: Mood normal.         Behavior: Behavior normal.         Thought Content: Thought content normal.         Judgment: Judgment normal.         Significant Labs: All pertinent labs within the past 24 hours have been reviewed.    Significant Imaging: I have reviewed all pertinent imaging results/findings within the past 24 hours.  I have reviewed and interpreted all pertinent imaging results/findings within the past 24 hours.      Assessment/Plan:      * Acute respiratory failure with hypoxia  2/2 cardiogenic pulmonary edema  Has improved with IV furosemide however symptomatic with minimal activity  I agree with Dr Jones that mitral valve needs to be replaced sooner rather than later  Continue with IV diuresis and supplemental O2 via low flow NC  Monitoring renal  function  Is to transfer to [facility] for CTS evaluation as soon as bed available on telemetry floor      Severe mitral regurgitation  Evidently symptomatic despite adequate diuresis  Confirmed via transesophageal echocardiogram on recent admission  Left heart catheterization with non obstructive coronary artery disease  Has severe left atrial enlargement with thrombus  Management as above      SOB (shortness of breath)  2/2 cardiogenic pulmonary edema      Left atrial thrombus  On apixaban 5 mg BID (completed loading regimen)  Will likely transition to heparin infusion vs full dose lovenox at [facility] if surgery planned      Acute on chronic diastolic (congestive) heart failure  As above      Coronary artery disease involving native coronary artery of native heart without angina pectoris  Non obstructive disease  On cardioprotective regimen      Other hyperlipidemia  Continue statin      Essential hypertension  BP at goal on amiodarone, metoprolol and isosorbide mononitrate  Vitals every 4 hours      Paroxysmal atrial fibrillation  Currently well controlled on amiodarone 400 mg PO BID and metoprolol 50 mg BID  On NOAC for stroke prophylaxis (CHADS VASc of at least 4)  Cardiac monitoring  Electrolytes reviewed        VTE Risk Mitigation (From admission, onward)         Ordered     apixaban tablet 5 mg  2 times daily      08/31/20 0441     IP VTE HIGH RISK PATIENT  Once      08/31/20 0441     Place sequential compression device  Until discontinued      08/31/20 0441     Place TAINA hose  Until discontinued      08/31/20 0441     Reason for No Pharmacological VTE Prophylaxis  Once     Question:  Reasons:  Answer:  Physician Provided (leave comment)  Comment:  Continuing apixaban from home    08/31/20 0441     Place sequential compression device  Until discontinued      08/31/20 0441                Discharge Planning   OLE:      Code Status: Full Code   Is the patient medically ready for discharge?:     Reason for  patient still in hospital (select all that apply): Treatment  Discharge Plan A: Home with family          Assessment and plan discussed with patient and son at bedside. All questions answered to satisfaction. Agree with transfer to [facility] for CTS eval. Patient accepted. ADT 32 placed.         Ashley Segura MD  Department of Hospital Medicine   [facility]

## 2020-09-01 NOTE — HOSPITAL COURSE
Ms Lorenzo presented with cardiogenic pulmonary edema likely due to severe mitral valve regurgitation. Placed on NIPPV and IV furosemide. Admitted to ICU for close observation. Patient improved, weaned to low flow NC and stepped down to telemetry floor in stable condition. Symptomatic with minimal activity despite adequate diuresis. AFib well controlled on oral amiodarone 400 mg BID and on NOAC for stroke prophylaxis and thrombus to left atrium, which are new diagnoses from recent admission. Cardiology consulted. I discussed case with Dr Jones (cardiologist on call at our facility) who recommends transfer to [facility] for formal CT surgery evaluation given recurrent admission despite compliance, BP control and diuresis. Transfer Center called to request transfer for higher level of care. Case discussed with Dr Waddell (CTS attending) who agrees with transfer. Recommends Hospital Medicine as primary and have his team consulted. Discussed with Dr Phipps ( for Hospital Medicine) who will communicate with accepting HM team at [facility]. ADT 32 placed.

## 2020-09-01 NOTE — ASSESSMENT & PLAN NOTE
On apixaban 5 mg BID (completed loading regimen)  Will likely transition to heparin infusion vs full dose lovenox at [facility] if surgery planned

## 2020-09-01 NOTE — ASSESSMENT & PLAN NOTE
Elevated blood pressure on presentation.  Nitro paste added.  Monitor.    9/1/20: NTP change to Imdur.

## 2020-09-01 NOTE — ASSESSMENT & PLAN NOTE
In sinus rhythm.  Would decrease dose of amiodarone.  Currently on 800 mg b.i.d..    9/1/20: PAF. Continue amiodarone. Eliquis. Change dosing of lopressor.

## 2020-09-02 NOTE — NURSING
[facility] stretcher at bedside for transport to [facility]. Patient awake, alert, oriented. No apparent distress noted.

## 2020-09-02 NOTE — SUBJECTIVE & OBJECTIVE
Interval History: states she was able ambulate to bathroom. Not complaining of chest pain or SOB necessarily but still no appetite. There were no beds available at [facility] yesterday. I am told there will be beds today    Review of Systems   Constitutional: Positive for appetite change.   Respiratory: Negative for shortness of breath.    Cardiovascular: Negative.    Gastrointestinal: Negative.      Objective:     Vital Signs (Most Recent):  Temp: 98.3 °F (36.8 °C) (09/02/20 1606)  Pulse: 63 (09/02/20 1606)  Resp: 16 (09/02/20 1606)  BP: (!) 115/59 (09/02/20 1606)  SpO2: 99 % (09/02/20 1606) Vital Signs (24h Range):  Temp:  [97.8 °F (36.6 °C)-98.3 °F (36.8 °C)] 98.3 °F (36.8 °C)  Pulse:  [58-73] 63  Resp:  [16-18] 16  SpO2:  [96 %-100 %] 99 %  BP: (100-115)/(56-70) 115/59     Weight: 77.2 kg (170 lb 3.1 oz)  Body mass index is 28.32 kg/m².    Intake/Output Summary (Last 24 hours) at 9/2/2020 5288  Last data filed at 9/2/2020 0439  Gross per 24 hour   Intake 0 ml   Output --   Net 0 ml      Physical Exam  Vitals signs and nursing note reviewed.   Constitutional:       General: She is not in acute distress.     Appearance: She is not toxic-appearing.   Cardiovascular:      Rate and Rhythm: Normal rate and regular rhythm.   Pulmonary:      Effort: Pulmonary effort is normal.      Breath sounds: Normal breath sounds. No wheezing or rales.      Comments: Able to speak mid to full sentences without distress but has to pause at times to catch her breath   Abdominal:      General: Abdomen is flat. Bowel sounds are normal.   Musculoskeletal:      Right lower leg: Edema (trace) present.      Left lower leg: Edema (trace) present.   Skin:     General: Skin is warm and dry.      Capillary Refill: Capillary refill takes less than 2 seconds.   Neurological:      Mental Status: She is alert and oriented to person, place, and time.   Psychiatric:         Mood and Affect: Mood normal.         Behavior: Behavior normal.         Thought  Content: Thought content normal.         Judgment: Judgment normal.

## 2020-09-02 NOTE — PT/OT/SLP PROGRESS
Physical Therapy      Patient Name:  Fatou Lorenzo   MRN:  [id number]    PT orders canceled per Dr. Naranjo with plans for pt to be transferred to [facility] [place].    Priyanka Carter, PT

## 2020-09-02 NOTE — NURSING
Call placed to [phone] pt transfer center, spoke to Judy.  pt to go to [facility],  call report [phone].

## 2020-09-02 NOTE — NURSING
Pt's new team was notified that she arrived on. Per MD Desire request, nurse called [phone] when pt arrived on floor.

## 2020-09-02 NOTE — PLAN OF CARE
Seen patient at bedside and personally examined her.    75 y.o. female with PAF, Hyperlipidemia and severe mitral regurgitation admitted to  ICU for CHF exacerbation. Placed on NIPPV and IV furosemide with improving, weaned to low flow NC, subsequently stepped down to telemetry floor in stable condition. Symptomatic with minimal activity despite adequate diuresis. Transferred to [facility] for CTS evaluation after discussing with CTS at [facility]. Patient comfortable on NC and remains of IV lasix 60 mg BID. Will continue to monitor patient.      Rosendo Adams MD  [facility] Medicine Staff

## 2020-09-02 NOTE — NURSING
Call received from Manuel at transfer center [phone], states waiting on bed for pt at [facility] [place] location.

## 2020-09-02 NOTE — NURSING TRANSFER
Nursing Transfer Note      9/2/2020     Transfer From: [facility] to [facility]    Transfer via stretcher    Transfer with  to O2    Transported by EMS    Medicines sent: yes, NITRO    Chart send with patient: Yes    Notified: spouse    Patient reassessed at: 1606 9/2/2020    Upon arrival to floor: cardiac monitor applied, patient oriented to room, call bell in reach and bed in lowest position

## 2020-09-02 NOTE — PROGRESS NOTES
[facility] Hospital Medicine  Progress Note    Patient Name: Fatou Lorenzo  MRN: [id number]  Patient Class: IP- Inpatient   Admission Date: 8/30/2020  Length of Stay: 2 days  Attending Physician: Rosendo Adams MD  Primary Care Provider: Ludin Rivas MD    [facility] Medicine Team: [facility] HOSP MED WILLIS Segura MD    Subjective:     Principal Problem:Acute respiratory failure with hypoxia        HPI:    Fatou Lorenzo is a 75 y.o. female that (in part)  has a past medical history of Atrial fibrillation with RVR, Cataract, Glaucoma, Heart valve problem, Hyperlipidemia, and Severe mitral regurgitation.  has a past surgical history that includes Ankle surgery; lipoma removal; and Left heart catheterization (Left, 8/25/2020). Presents to [facility] Emergency Department complaining of recurrent shortness of breath.  Patient states it feels like she is having heart failure again.  She was discharged presumably on August 27th (no discharge summary available at this time), 4 days ago for acute respiratory failure with hypoxia secondary to CHF exacerbation.  The patient is typically followed at [place].  Reports 8 years ago was referred to Dr. Waddell, but valvular regurgitation not bad enough at that time to do surgery.  However see has severe tricuspid and mitral valve disease which was confirmed during her previous admission by echocardiogram.  She is having dyspnea with exertion, shortness of breath at rest, and orthopnea.  Worsened with exertion.  No relieving factors.     In last hospital stay she underwent left heart angiography as well as transesophageal echocardiogram and was diagnosed with a left atrial thrombus.  Nonobstructive CAD.  Had episode of atrial fibrillation with RVR which spontaneously resolved post catheterization.    In the emergency department routine laboratory studies, chest x-ray, EKG, cardiac enzymes were obtained.  Patient required BiPAP due to  hypoxemia not responsive to nasal cannula.  Chest x-ray actually showed improvement as did her cardiac enzymes compared to previous lab studies and imaging during her last hospital stay.  Lasix and nitroglycerin given.    Hospital medicine has been asked to admit to inpatient in the ICU due to BiPAP requirements for further evaluation and treatment.     Overview/Hospital Course:  Ms Lorenzo presented with cardiogenic pulmonary edema likely due to severe mitral valve regurgitation. Placed on NIPPV and IV furosemide. Admitted to ICU for close observation. Patient improved, weaned to low flow NC and stepped down to telemetry floor in stable condition. Symptomatic with minimal activity despite adequate diuresis. AFib well controlled on oral amiodarone 400 mg BID and on NOAC for stroke prophylaxis and thrombus to left atrium, which are new diagnoses from recent admission. Cardiology consulted. I discussed case with Dr Jones (cardiologist on call at our facility) who recommends transfer to [facility] for formal CT surgery evaluation given recurrent admission despite compliance, BP control and diuresis. Transfer Center called to request transfer for higher level of care. Case discussed with Dr Waddell (CTS attending) who agrees with transfer. Recommends Hospital Medicine as primary and have his team consulted. Discussed with Dr Phipps ( for [facility] Medicine) who will communicate with accepting HM team at [facility]. ADT 32 placed.     Interval History: states she was able ambulate to bathroom. Not complaining of chest pain or SOB necessarily but still no appetite. There were no beds available at [facility] yesterday. I am told there will be beds today    Review of Systems   Constitutional: Positive for appetite change.   Respiratory: Negative for shortness of breath.    Cardiovascular: Negative.    Gastrointestinal: Negative.      Objective:     Vital Signs (Most Recent):  Temp: 98.3 °F (36.8 °C) (09/02/20 1606)  Pulse:  63 (09/02/20 1606)  Resp: 16 (09/02/20 1606)  BP: (!) 115/59 (09/02/20 1606)  SpO2: 99 % (09/02/20 1606) Vital Signs (24h Range):  Temp:  [97.8 °F (36.6 °C)-98.3 °F (36.8 °C)] 98.3 °F (36.8 °C)  Pulse:  [58-73] 63  Resp:  [16-18] 16  SpO2:  [96 %-100 %] 99 %  BP: (100-115)/(56-70) 115/59     Weight: 77.2 kg (170 lb 3.1 oz)  Body mass index is 28.32 kg/m².    Intake/Output Summary (Last 24 hours) at 9/2/2020 6725  Last data filed at 9/2/2020 0437  Gross per 24 hour   Intake 0 ml   Output --   Net 0 ml      Physical Exam  Vitals signs and nursing note reviewed.   Constitutional:       General: She is not in acute distress.     Appearance: She is not toxic-appearing.   Cardiovascular:      Rate and Rhythm: Normal rate and regular rhythm.   Pulmonary:      Effort: Pulmonary effort is normal.      Breath sounds: Normal breath sounds. No wheezing or rales.      Comments: Able to speak mid to full sentences without distress but has to pause at times to catch her breath   Abdominal:      General: Abdomen is flat. Bowel sounds are normal.   Musculoskeletal:      Right lower leg: Edema (trace) present.      Left lower leg: Edema (trace) present.   Skin:     General: Skin is warm and dry.      Capillary Refill: Capillary refill takes less than 2 seconds.   Neurological:      Mental Status: She is alert and oriented to person, place, and time.   Psychiatric:         Mood and Affect: Mood normal.         Behavior: Behavior normal.         Thought Content: Thought content normal.         Judgment: Judgment normal.           Assessment/Plan:      * Acute respiratory failure with hypoxia  2/2 cardiogenic pulmonary edema  Has improved with IV furosemide however symptomatic with minimal activity  I agree with Dr Jones that mitral valve needs to be replaced sooner rather than later  Continue with IV diuresis and supplemental O2 via low flow NC  Monitoring renal function  Is to transfer to [facility] for CTS evaluation as soon as bed  available on telemetry floor      Severe mitral regurgitation  Evidently symptomatic despite adequate diuresis  Confirmed via transesophageal echocardiogram on recent admission  Left heart catheterization with non obstructive coronary artery disease  Has severe left atrial enlargement with thrombus  Management as above      SOB (shortness of breath)  2/2 cardiogenic pulmonary edema      Left atrial thrombus  On apixaban 5 mg BID (completed loading regimen)  Will likely transition to heparin infusion vs full dose lovenox at [facility] if surgery planned      Acute on chronic diastolic (congestive) heart failure  As above      Coronary artery disease involving native coronary artery of native heart without angina pectoris  Non obstructive disease  On cardioprotective regimen      Other hyperlipidemia  Continue statin      Essential hypertension  BP at goal on amiodarone, metoprolol and isosorbide mononitrate  Vitals every 4 hours      Paroxysmal atrial fibrillation  Currently well controlled on amiodarone 400 mg PO BID and metoprolol 50 mg BID  On NOAC for stroke prophylaxis (CHADS VASc of at least 4)  Cardiac monitoring  Electrolytes reviewed      VTE Risk Mitigation (From admission, onward)         Ordered     apixaban tablet 5 mg  2 times daily      08/31/20 0441     IP VTE HIGH RISK PATIENT  Once      08/31/20 0441     Place sequential compression device  Until discontinued      08/31/20 0441     Place TAINA hose  Until discontinued      08/31/20 0441     Reason for No Pharmacological VTE Prophylaxis  Once     Question:  Reasons:  Answer:  Physician Provided (leave comment)  Comment:  Continuing apixaban from home    08/31/20 0441     Place sequential compression device  Until discontinued      08/31/20 0441                Discharge Planning   OLE: 9/7/2020     Code Status: Full Code   Is the patient medically ready for discharge?:     Reason for patient still in hospital (select all that apply):  Treatment  Discharge Plan A: Home with family        I called transfer center earlier today and I was told there will be a bed available today. Patient remains stable for transfer to telemetry floor. Patient is still in agreement with transfer.     Addendum: patient transferred to Anton Segura MD  Department of Hospital Medicine   [facility]

## 2020-09-02 NOTE — NURSING
Report called to Yenny at [facility]. to go to [facility] 324

## 2020-09-03 PROBLEM — R06.02 SOB (SHORTNESS OF BREATH): Status: RESOLVED | Noted: 2020-01-01 | Resolved: 2020-01-01

## 2020-09-03 NOTE — ASSESSMENT & PLAN NOTE
-Mrs. Farrar was admitted to inpatient status  -She was hypoxic on room air and had cardiogenic pulmonary edema likely due to severe MV regurgitation, grade II diastolic dysfunction and new atrial fibrillation  -She was treated in icu with bipap, an amiodarone drip and diuresed with iv lasix.  -She was weaned to oral amiodarone and room air and transferred out of the icu.  -On 8/25 she converted to NSR.  -Transesophageal echocardiogram showed possible thrombus to left atrium and she was treated with a heparin drip and subsequently converted to apixaban.  -She was taken for left heart catheterization on 8/25 which showed non-obstructive CAD   -She continued to do well and is stable for discharge home today.  -She will continue amiodarone, apixaban and lasix at discharge.  -Follow up with Dr. Waddell within 1-2 weeks and pcp within one week.

## 2020-09-03 NOTE — ASSESSMENT & PLAN NOTE
-This is a new diagnosis  -S/p amiodarone infusion and heparin infusion for stroke prophylaxis  -Conversion to NSR and amiodarone gtt stopped and converted to PO amiodarone   -Anticoagulation converted to apixaban and heparin gtt stopped  -Noted likely LA thrombus on CHERY  -Follow up with Dr. Carrillo or Bairon within 1-2 weeks.

## 2020-09-03 NOTE — PLAN OF CARE
09/03/20 1405   Discharge Reassessment   Assessment Type Discharge Planning Reassessment   Provided patient/caregiver education on the expected discharge date and the discharge plan Yes   Do you have any problems affording any of your prescribed medications? No   Discharge Plan A Home   Discharge Plan B Home;Home with family   DME Needed Upon Discharge  none   Anticipated Discharge Disposition Home     Pt transferred from [facility] for CTS evaluation.       Anticipate d/c home with with family when medically ready. SW/CM will continue to follow and assist with d/c planning.    Julie Haase RN  Case Management [phone]

## 2020-09-03 NOTE — HPI
Fatou Lorenzo is a 75 y.o. female that (in part)  has a past medical history of Atrial fibrillation with RVR, Cataract, Glaucoma, Heart valve problem, Hyperlipidemia, and Severe mitral regurgitation.  has a past surgical history that includes Ankle surgery; lipoma removal; and Left heart catheterization (Left, 8/25/2020). Presents to [facility] Department complaining of recurrent shortness of breath.  Patient states it feels like she is having heart failure again.  She was discharged presumably on August 27th (no discharge summary available at this time), 4 days ago for acute respiratory failure with hypoxia secondary to CHF exacerbation.  The patient is typically followed at [place].  Reports 8 years ago was referred to Dr. Waddell, but valvular regurgitation not bad enough at that time to do surgery.  However see has severe tricuspid and mitral valve disease which was confirmed during her previous admission by echocardiogram.  She is having dyspnea with exertion, shortness of breath at rest, and orthopnea.  Worsened with exertion.  No relieving factors.      In last hospital stay she underwent left heart angiography as well as transesophageal echocardiogram and was diagnosed with a left atrial thrombus.  Nonobstructive CAD.  Had episode of atrial fibrillation with RVR which spontaneously resolved post catheterization.     In the emergency department routine laboratory studies, chest x-ray, EKG, cardiac enzymes were obtained.  Patient required BiPAP due to hypoxemia not responsive to nasal cannula.  Chest x-ray actually showed improvement as did her cardiac enzymes compared to previous lab studies and imaging during her last hospital stay.  Lasix and nitroglycerin given.     CTS consulted for surgical evaluation of severe MR and LA thrombus.

## 2020-09-03 NOTE — ASSESSMENT & PLAN NOTE
Fatou Lorenzo is a 75 y.o. female that (in part)  has a past medical history of Atrial fibrillation with RVR, Cataract, Glaucoma, Heart valve problem, Hyperlipidemia, and Severe mitral regurgitation. CTS consulted to evaluate for MR. Moderate TR and left atrial thrombus also noted on echo. EF 60%. Patient was seen ~8 years ago by Dr. Waddell and at that time valve did not require surgical intervention.     Patient was transferred from [facility] where she presented with SOB. Symptoms started in march and have progressively gotten worse. States that many of her appointments this year were pushed back due to the COVID pandemic. Reports since hospitalization only able to walk around room before developing SOB. States prior to admission she could performs ADLs at a slow pace. Endorses swelling to b/l upper extremities but denies LE edema. Energy level has been low and patient can develop dizziness with ambulation or lifting head from pillow. Endorses orthopnea. Reports having frequent palpitations with chest burning/pressure during these episodes. No prior strokes, seizurs, stents, or surgeries to chest. Never smoked. No drinking history.     Currently on Eliquis 5 BID for left atrial thrombus. Also on Imdur for BP control. Continue with medical optimization. If patient medically stable, can follow up in clinic in ~1week to finalize surgical plan.     Dr. Waddell to review and staff.

## 2020-09-03 NOTE — PLAN OF CARE
Patient stepped down to CSU. Patient to be evaluated for mitral heart valve replacement. Patient being diuresed @ 60 mg Lasix IVP BID. Patient had no complaints of chest pain and SOB. Normal Sinus Rhythm on telemetry. Administered Xanax PRN. Patient on 2L NC. Patient experienced no fall and no injuries through out shift. Patient AAO and VSS. Plan of care reviewed with patient. Patient verbalizes understanding of plan. Patient Will continue to monitor.

## 2020-09-03 NOTE — ASSESSMENT & PLAN NOTE
-Severe and likely cause of patient's symptoms  -S/p left heart catheterization for ischemic workup on 8/25 with non-obstructive CAD  -Case discussed with Cardiology and she will need referral to Dr. Waddell for mitral valve repair/replacement as an outpatient

## 2020-09-03 NOTE — SUBJECTIVE & OBJECTIVE
No current facility-administered medications on file prior to encounter.      Current Outpatient Medications on File Prior to Encounter   Medication Sig    amiodarone (PACERONE) 400 MG tablet Take two tablets by mouth twice daily for twelve days, then take one tablet by mouth daily.    apixaban (ELIQUIS) 5 mg Tab Take 1 tablet (5 mg total) by mouth 2 (two) times daily.    furosemide (LASIX) 40 MG tablet Take 1 tablet (40 mg total) by mouth 2 (two) times daily.    losartan (COZAAR) 50 MG tablet Take 0.5 tablets (25 mg total) by mouth once daily.    metoprolol tartrate (LOPRESSOR) 25 MG tablet Take 1 tablet (25 mg total) by mouth 3 (three) times daily.    pravastatin (PRAVACHOL) 80 MG tablet Take 80 mg by mouth once daily.       Review of patient's allergies indicates:  No Known Allergies    Past Medical History:   Diagnosis Date    Atrial fibrillation with RVR 8/24/2020    Cataract     Essential hypertension 8/31/2020    Glaucoma     Heart valve problem     Hyperlipidemia     Severe mitral regurgitation 8/24/2020     Past Surgical History:   Procedure Laterality Date    ANKLE SURGERY      LEFT HEART CATHETERIZATION Left 8/25/2020    Procedure: Left heart cath, radial;  Surgeon: Marlee Carrillo MD;  Location: [facility] CATH LAB;  Service: Cardiology;  Laterality: Left;    lipoma removal       Family History     Problem Relation (Age of Onset)    Cancer Mother    Cataracts Sister    No Known Problems Father, Brother, Maternal Aunt, Maternal Uncle, Paternal Aunt, Paternal Uncle, Maternal Grandmother, Maternal Grandfather, Paternal Grandmother, Paternal Grandfather        Tobacco Use    Smoking status: Never Smoker    Smokeless tobacco: Never Used   Substance and Sexual Activity    Alcohol use: No    Drug use: No    Sexual activity: Not Currently     Review of Systems   Constitutional: Positive for activity change and fatigue.   HENT: Negative for nosebleeds.    Eyes: Negative for visual disturbance.    Respiratory: Positive for shortness of breath.    Cardiovascular: Positive for palpitations. Negative for chest pain and leg swelling.   Gastrointestinal: Negative for nausea.   Musculoskeletal: Negative for gait problem.   Skin: Negative for color change.   Neurological: Positive for dizziness. Negative for seizures and syncope.   Hematological: Does not bruise/bleed easily.   Psychiatric/Behavioral: Negative for sleep disturbance.     Objective:     Vital Signs (Most Recent):  Temp: 97.8 °F (36.6 °C) (09/03/20 0501)  Pulse: 65 (09/03/20 0708)  Resp: 19 (09/03/20 0501)  BP: (!) 98/55 (09/03/20 0501)  SpO2: 97 % (09/03/20 0501) Vital Signs (24h Range):  Temp:  [97.7 °F (36.5 °C)-98.3 °F (36.8 °C)] 97.8 °F (36.6 °C)  Pulse:  [58-67] 65  Resp:  [16-20] 19  SpO2:  [96 %-99 %] 97 %  BP: ()/(55-59) 98/55     Weight: 77.2 kg (170 lb 3.1 oz)  Body mass index is 28.32 kg/m².    SpO2: 97 %  O2 Device (Oxygen Therapy): nasal cannula     Intake/Output - Last 3 Shifts       09/01 0700 - 09/02 0659 09/02 0700 - 09/03 0659 09/03 0700 - 09/04 0659    P.O. 0 340     Total Intake(mL/kg) 0 (0) 340 (4.4)     Urine (mL/kg/hr)       Total Output       Net 0 +340            Urine Occurrence 2 x 2 x     Stool Occurrence 0 x 1 x            Lines/Drains/Airways     Peripheral Intravenous Line                 Peripheral IV - Single Lumen 09/02/20 1441 22 G Left;Posterior Forearm less than 1 day                 STS Risk Score: 5%    Physical Exam  Vitals signs reviewed.   Constitutional:       General: She is not in acute distress.     Appearance: She is well-developed. She is not diaphoretic.   HENT:      Head: Normocephalic and atraumatic.   Eyes:      Pupils: Pupils are equal, round, and reactive to light.   Neck:      Musculoskeletal: Normal range of motion.      Vascular: No JVD.   Cardiovascular:      Rate and Rhythm: Normal rate and regular rhythm.      Heart sounds: Murmur present.   Pulmonary:      Effort: Pulmonary effort is  normal. No respiratory distress.      Comments: NC  Abdominal:      Palpations: Abdomen is soft.   Musculoskeletal:         General: No swelling.   Skin:     General: Skin is warm and dry.   Neurological:      Mental Status: She is alert and oriented to person, place, and time.   Psychiatric:         Speech: Speech normal.         Behavior: Behavior normal.         Thought Content: Thought content normal.         Judgment: Judgment normal.         Significant Labs:  ABGs: No results for input(s): PH, PCO2, PO2, HCO3, POCSATURATED, BE in the last 48 hours.  Amylase: No results for input(s): AMYLASE in the last 48 hours.  BMP:   Recent Labs   Lab 09/03/20  0553         K 3.2*   CL 96   CO2 32*   BUN 25*   CREATININE 1.5*   CALCIUM 9.1     Cardiac markers: No results for input(s): CKMB, CPKMB, TROPONINT, TROPONINI, MYOGLOBIN in the last 48 hours.  CBC: No results for input(s): WBC, RBC, HGB, HCT, PLT, MCV, MCH, MCHC in the last 48 hours.  CMP:   Recent Labs   Lab 09/03/20  0553      CALCIUM 9.1      K 3.2*   CO2 32*   CL 96   BUN 25*   CREATININE 1.5*     Coagulation: No results for input(s): PT, INR, APTT in the last 48 hours.  Lactic Acid: No results for input(s): LACTATE in the last 48 hours.  LFTs: No results for input(s): ALT, AST, ALKPHOS, BILITOT, PROT, ALBUMIN in the last 48 hours.  Lipase: No results for input(s): LIPASE in the last 48 hours.    Significant Diagnostics: reviewed   [facility] 8/25/2020  · LVEDP (Pre): 7  · Estimated blood loss: <50 mL  · Non-obstructive CAD    CHERY 8/24/2020  · Normal left ventricular systolic function. The estimated ejection fraction is 60%.  · Normal right ventricular systolic function.  · Severe left atrial enlargement.  · Severe mitral regurgitation.  · Mild aortic regurgitation.  · Moderate tricuspid regurgitation.  · Suspicion of thrombus in LAE. No cardioversion performed.  · Aortic atherosclerosis

## 2020-09-03 NOTE — PROGRESS NOTES
[facility] Hospital Medicine                                                                     Progress Note     [place]: [facility] A Rosendo Adams MD   Admit Date: 8/30/2020   Hospital Day: 3  OLE: 9/7/2020   Code status: Full Code   Principal Problem: Acute respiratory failure with hypoxia     SUMMARY:     Fatou Lorenzo is a 75 y.o. female that (in part)  has a past medical history of Atrial fibrillation with RVR, Cataract, Glaucoma, Heart valve problem, Hyperlipidemia, and Severe mitral regurgitation.  has a past surgical history that includes Ankle surgery; lipoma removal; and Left heart catheterization (Left, 8/25/2020). Presents to [facility] Emergency Department complaining of recurrent shortness of breath.  Patient states it feels like she is having heart failure again.  She was discharged presumably on August 27th (no discharge summary available at this time), 4 days ago for acute respiratory failure with hypoxia secondary to CHF exacerbation.  The patient is typically followed at [place].  Reports 8 years ago was referred to Dr. Waddell, but valvular regurgitation not bad enough at that time to do surgery.  However see has severe tricuspid and mitral valve disease which was confirmed during her previous admission by echocardiogram.  She is having dyspnea with exertion, shortness of breath at rest, and orthopnea.  Worsened with exertion.  No relieving factors.      In last hospital stay she underwent left heart angiography as well as transesophageal echocardiogram and was diagnosed with a left atrial thrombus.  Nonobstructive CAD.  Had episode of atrial fibrillation with RVR which spontaneously resolved post catheterization.     In the emergency department routine laboratory studies, chest x-ray, EKG, cardiac enzymes were  obtained.  Patient required BiPAP due to hypoxemia not responsive to nasal cannula.  Chest x-ray actually showed improvement as did her cardiac enzymes compared to previous lab studies and imaging during her last hospital stay.  Lasix and nitroglycerin given.     Hospital medicine has been asked to admit to inpatient in the ICU due to BiPAP requirements for further evaluation and treatment.     Ms Lorenzo presented with cardiogenic pulmonary edema likely due to severe mitral valve regurgitation. Placed on NIPPV and IV furosemide. Admitted to ICU for close observation. Patient improved, weaned to low flow NC and stepped down to telemetry floor in stable condition. Symptomatic with minimal activity despite adequate diuresis. AFib well controlled on oral amiodarone 400 mg BID and on NOAC for stroke prophylaxis and thrombus to left atrium, which are new diagnoses from recent admission. Cardiology consulted. I discussed case with Dr Jones (cardiologist on call at our facility) who recommends transfer to [facility] for formal CT surgery evaluation given recurrent admission despite compliance, BP control and diuresis. Transfer Center called to request transfer for higher level of care. Case discussed with Dr Waddell (CTS attending) who agrees with transfer. Recommends Hospital Medicine as primary and have his team consulted. Discussed with Dr Phipps ( for [facility] Medicine) who will communicate with accepting HM team at [facility]. ADT 32 placed. Patient was transferred over to [facility] for CTS evaluation.    Transferred 9/2 evening to [facility]. CTS consulted, pending eval. Lasix IV transitioned to PO.      SUBJECTIVE:     Pt was seen and examined at bedside. Pt had no acute events overnight, and no new complaints this morning. Pt remained hemodynamically stable and afebrile. Denies any fever, cp, worsening dyspnea, abdominal pain, cough. CTS has evaluated patient this AM, pending note. No other issues endorsed.     ROS  (Positive in Bold, otherwise negative)  Pain Scale: 0 /10   Constitutional: fever, chills, night sweats, appetite change  CV: chest pain, edema, palpitations  Resp: SOB (resolved), cough, sputum production  GI: changes in appetite, NVDC, pain, melena, hematochezia, GERD, hematemesis  : Dysuria, hematuria, urinary urgency, frequency  MSK: arthralgia/myalgia, joint swelling  SKIN: rashes, pruritis, petechiae   Neuro/Psych: FND, anxiety, depression      OBJECTIVE:     Vitals:  Temp:  [97.7 °F (36.5 °C)-98.4 °F (36.9 °C)]   Pulse:  [60-68]   Resp:  [16-20]   BP: ()/(55-59)   SpO2:  [96 %-99 %]      I & O (Last 24H):     Intake/Output Summary (Last 24 hours) at 9/3/2020 1205  Last data filed at 9/3/2020 0400  Gross per 24 hour   Intake 340 ml   Output --   Net 340 ml         GEN: Non toxic appearing female  in no acute distress. Nontoxic. Resting in bed. Cooperative.  HEENT: NCAT. PERRL. EOMI. Conjunctivae/corneas clear, sclera Anicteric. No JVD appreciated  CVS: RRR. Normal s1 s2 +holosystolic murmur, no click, rub or gallop  LUNG: CTAB. Normal respiratory effort. No wheezes, rhonchi, or crackles. She had rales yesterday evening which are now resolved  ABD: Normoactive BS, soft, NT, ND, no masses or organomegaly.  EXT: Trace LE edema. No cyanosis. Full ROM.  SKIN: color, texture, turgor normal. No rashes or lesions  NEURO: Alert, oriented x 4, Spont mvt of all extremities with no focal deficits noted.      All recent labs and imaging has been reviewed.     Recent Results (from the past 24 hour(s))   Basic metabolic panel    Collection Time: 09/03/20  5:53 AM   Result Value Ref Range    Sodium 139 136 - 145 mmol/L    Potassium 3.2 (L) 3.5 - 5.1 mmol/L    Chloride 96 95 - 110 mmol/L    CO2 32 (H) 23 - 29 mmol/L    Glucose 108 70 - 110 mg/dL    BUN, Bld 25 (H) 8 - 23 mg/dL    Creatinine 1.5 (H) 0.5 - 1.4 mg/dL    Calcium 9.1 8.7 - 10.5 mg/dL    Anion Gap 11 8 - 16 mmol/L    eGFR if African American 39.0 (A) >60  mL/min/1.73 m^2    eGFR if non  33.8 (A) >60 mL/min/1.73 m^2   Hepatic function panel    Collection Time: 09/03/20  5:53 AM   Result Value Ref Range    Total Protein 6.4 6.0 - 8.4 g/dL    Albumin 3.7 3.5 - 5.2 g/dL    Total Bilirubin 1.1 (H) 0.1 - 1.0 mg/dL    Bilirubin, Direct 0.5 (H) 0.1 - 0.3 mg/dL    AST 18 10 - 40 U/L    ALT 20 10 - 44 U/L    Alkaline Phosphatase 72 55 - 135 U/L   Magnesium    Collection Time: 09/03/20  5:53 AM   Result Value Ref Range    Magnesium 2.3 1.6 - 2.6 mg/dL       No results for input(s): POCTGLUCOSE in the last 168 hours.    No results found for: HGBA1C     Active Hospital Problems    Diagnosis  POA    *Acute respiratory failure with hypoxia [J96.01]  Yes    Essential hypertension [I10]  Yes    Other hyperlipidemia [E78.49]  Yes    Coronary artery disease involving native coronary artery of native heart without angina pectoris [I25.10]  Yes    Acute on chronic diastolic (congestive) heart failure [I50.33]  Yes    Severe mitral regurgitation [I34.0]  Yes    Left atrial thrombus [I51.3]  Yes    Paroxysmal atrial fibrillation [I48.0]  Yes    SOB (shortness of breath) [R06.02]  Yes      Resolved Hospital Problems   No resolved problems to display.          ASSESSMENT AND PLAN:     Acute on chronic diastolic (congestive) heart failure  Acute respiratory failure with hypoxia  - 2/2 cardiogenic pulmonary edema  - Started on IV furosemide, was in ICU with bipap transitioned to NC  - [place] [facility] [place] eval  - CTS cosulted  - IV lasix now switched to po  - Wean oxygen as able  - Strict in outs  - Low sodium diet  - Fluid restriction  - Daily standing weight  - Bipap PRN for worsening dyspnea       Severe mitral regurgitation  - Confirmed via transesophageal echocardiogram on recent admission\  - Left heart catheterization with non obstructive coronary artery disease  - Has severe left atrial enlargement with thrombus  - Management as above  - CTS  consulted     Left atrial thrombus  - On apixaban 5 mg BID (completed loading regimen)  - Will likely transition to heparin infusion vs full dose lovenox at [place] if surgery planned, pending CTS     Coronary artery disease involving native coronary artery of native heart without angina pectoris  - Non obstructive disease  - On cardioprotective regimen     Other hyperlipidemia  - Continue statin     Essential hypertension  - continue current meds     Paroxysmal atrial fibrillation  - Currently well controlled on amiodarone 400 mg PO BID and metoprolol 50 mg BID  - Continue NOAC for stroke prophylaxis (CHADS VASc of at least 4)  - Cardiac monitoring  - Electrolytes reviewed    Hypokalemic  - replete and recheck         Prophylaxis- home apixaban   Code Status- Full Code   Discharge plan and follow up - d/c home once medically stable    Discharge Planning   OLE: 9/7/2020     Code Status: Full Code   Is the patient medically ready for discharge?:     Reason for patient still in hospital (select all that apply): Patient trending condition and Consult recommendations  Discharge Plan A: Home with family          Rosendo Adams MD  Hospital Medicine Staff  Pager [phone]

## 2020-09-03 NOTE — CONSULTS
[facility]  Cardiothoracic Surgery  Consult Note    Patient Name: Fatou Lorenzo  MRN: [id number]  Admission Date: 8/30/2020  Attending Physician: Rosendo Adams MD  Referring Provider: Self, Aaareferral    Patient information was obtained from patient, past medical records and ER records.     Inpatient consult to Cardiothoracic Surgery  Consult performed by: Alison Hobbs PA-C  Consult ordered by: Rosendo Adams MD        Subjective:     Principal Problem: Acute respiratory failure with hypoxia    History of Present Illness: Fatou Lorenzo is a 75 y.o. female that (in part)  has a past medical history of Atrial fibrillation with RVR, Cataract, Glaucoma, Heart valve problem, Hyperlipidemia, and Severe mitral regurgitation.  has a past surgical history that includes Ankle surgery; lipoma removal; and Left heart catheterization (Left, 8/25/2020). Presents to [facility] Emergency Department complaining of recurrent shortness of breath.  Patient states it feels like she is having heart failure again.  She was discharged presumably on August 27th (no discharge summary available at this time), 4 days ago for acute respiratory failure with hypoxia secondary to CHF exacerbation.  The patient is typically followed at [place].  Reports 8 years ago was referred to Dr. Waddell, but valvular regurgitation not bad enough at that time to do surgery.  However see has severe tricuspid and mitral valve disease which was confirmed during her previous admission by echocardiogram.  She is having dyspnea with exertion, shortness of breath at rest, and orthopnea.  Worsened with exertion.  No relieving factors.      In last hospital stay she underwent left heart angiography as well as transesophageal echocardiogram and was diagnosed with a left atrial thrombus.  Nonobstructive CAD.  Had episode of atrial fibrillation with RVR which spontaneously resolved post catheterization.     In  the emergency department routine laboratory studies, chest x-ray, EKG, cardiac enzymes were obtained.  Patient required BiPAP due to hypoxemia not responsive to nasal cannula.  Chest x-ray actually showed improvement as did her cardiac enzymes compared to previous lab studies and imaging during her last hospital stay.  Lasix and nitroglycerin given.     CTS consulted for surgical evaluation of severe MR and LA thrombus.     No current facility-administered medications on file prior to encounter.      Current Outpatient Medications on File Prior to Encounter   Medication Sig    amiodarone (PACERONE) 400 MG tablet Take two tablets by mouth twice daily for twelve days, then take one tablet by mouth daily.    apixaban (ELIQUIS) 5 mg Tab Take 1 tablet (5 mg total) by mouth 2 (two) times daily.    furosemide (LASIX) 40 MG tablet Take 1 tablet (40 mg total) by mouth 2 (two) times daily.    losartan (COZAAR) 50 MG tablet Take 0.5 tablets (25 mg total) by mouth once daily.    metoprolol tartrate (LOPRESSOR) 25 MG tablet Take 1 tablet (25 mg total) by mouth 3 (three) times daily.    pravastatin (PRAVACHOL) 80 MG tablet Take 80 mg by mouth once daily.       Review of patient's allergies indicates:  No Known Allergies    Past Medical History:   Diagnosis Date    Atrial fibrillation with RVR 8/24/2020    Cataract     Essential hypertension 8/31/2020    Glaucoma     Heart valve problem     Hyperlipidemia     Severe mitral regurgitation 8/24/2020     Past Surgical History:   Procedure Laterality Date    ANKLE SURGERY      LEFT HEART CATHETERIZATION Left 8/25/2020    Procedure: Left heart cath, radial;  Surgeon: Marlee Carrillo MD;  Location: [facility] CATH LAB;  Service: Cardiology;  Laterality: Left;    lipoma removal       Family History     Problem Relation (Age of Onset)    Cancer Mother    Cataracts Sister    No Known Problems Father, Brother, Maternal Aunt, Maternal Uncle, Paternal Aunt, Paternal Uncle, Maternal  Grandmother, Maternal Grandfather, Paternal Grandmother, Paternal Grandfather        Tobacco Use    Smoking status: Never Smoker    Smokeless tobacco: Never Used   Substance and Sexual Activity    Alcohol use: No    Drug use: No    Sexual activity: Not Currently     Review of Systems   Constitutional: Positive for activity change and fatigue.   HENT: Negative for nosebleeds.    Eyes: Negative for visual disturbance.   Respiratory: Positive for shortness of breath.    Cardiovascular: Positive for palpitations. Negative for chest pain and leg swelling.   Gastrointestinal: Negative for nausea.   Musculoskeletal: Negative for gait problem.   Skin: Negative for color change.   Neurological: Positive for dizziness. Negative for seizures and syncope.   Hematological: Does not bruise/bleed easily.   Psychiatric/Behavioral: Negative for sleep disturbance.     Objective:     Vital Signs (Most Recent):  Temp: 97.8 °F (36.6 °C) (09/03/20 0501)  Pulse: 65 (09/03/20 0708)  Resp: 19 (09/03/20 0501)  BP: (!) 98/55 (09/03/20 0501)  SpO2: 97 % (09/03/20 0501) Vital Signs (24h Range):  Temp:  [97.7 °F (36.5 °C)-98.3 °F (36.8 °C)] 97.8 °F (36.6 °C)  Pulse:  [58-67] 65  Resp:  [16-20] 19  SpO2:  [96 %-99 %] 97 %  BP: ()/(55-59) 98/55     Weight: 77.2 kg (170 lb 3.1 oz)  Body mass index is 28.32 kg/m².    SpO2: 97 %  O2 Device (Oxygen Therapy): nasal cannula     Intake/Output - Last 3 Shifts       09/01 0700 - 09/02 0659 09/02 0700 - 09/03 0659 09/03 0700 - 09/04 0659    P.O. 0 340     Total Intake(mL/kg) 0 (0) 340 (4.4)     Urine (mL/kg/hr)       Total Output       Net 0 +340            Urine Occurrence 2 x 2 x     Stool Occurrence 0 x 1 x            Lines/Drains/Airways     Peripheral Intravenous Line                 Peripheral IV - Single Lumen 09/02/20 1441 22 G Left;Posterior Forearm less than 1 day                 STS Risk Score: 5%    Physical Exam  Vitals signs reviewed.   Constitutional:       General: She is not in  acute distress.     Appearance: She is well-developed. She is not diaphoretic.   HENT:      Head: Normocephalic and atraumatic.   Eyes:      Pupils: Pupils are equal, round, and reactive to light.   Neck:      Musculoskeletal: Normal range of motion.      Vascular: No JVD.   Cardiovascular:      Rate and Rhythm: Normal rate and regular rhythm.      Heart sounds: Murmur present.   Pulmonary:      Effort: Pulmonary effort is normal. No respiratory distress.      Comments: NC  Abdominal:      Palpations: Abdomen is soft.   Musculoskeletal:         General: No swelling.   Skin:     General: Skin is warm and dry.   Neurological:      Mental Status: She is alert and oriented to person, place, and time.   Psychiatric:         Speech: Speech normal.         Behavior: Behavior normal.         Thought Content: Thought content normal.         Judgment: Judgment normal.         Significant Labs:  ABGs: No results for input(s): PH, PCO2, PO2, HCO3, POCSATURATED, BE in the last 48 hours.  Amylase: No results for input(s): AMYLASE in the last 48 hours.  BMP:   Recent Labs   Lab 09/03/20  0553         K 3.2*   CL 96   CO2 32*   BUN 25*   CREATININE 1.5*   CALCIUM 9.1     Cardiac markers: No results for input(s): CKMB, CPKMB, TROPONINT, TROPONINI, MYOGLOBIN in the last 48 hours.  CBC: No results for input(s): WBC, RBC, HGB, HCT, PLT, MCV, MCH, MCHC in the last 48 hours.  CMP:   Recent Labs   Lab 09/03/20  0553      CALCIUM 9.1      K 3.2*   CO2 32*   CL 96   BUN 25*   CREATININE 1.5*     Coagulation: No results for input(s): PT, INR, APTT in the last 48 hours.  Lactic Acid: No results for input(s): LACTATE in the last 48 hours.  LFTs: No results for input(s): ALT, AST, ALKPHOS, BILITOT, PROT, ALBUMIN in the last 48 hours.  Lipase: No results for input(s): LIPASE in the last 48 hours.    Significant Diagnostics: reviewed   [facility] 8/25/2020  · LVEDP (Pre): 7  · Estimated blood loss: <50 mL  · Non-obstructive  CAD    [facility] 8/24/2020  · Normal left ventricular systolic function. The estimated ejection fraction is 60%.  · Normal right ventricular systolic function.  · Severe left atrial enlargement.  · Severe mitral regurgitation.  · Mild aortic regurgitation.  · Moderate tricuspid regurgitation.  · Suspicion of thrombus in LAE. No cardioversion performed.  · Aortic atherosclerosis        Assessment/Plan:     NYHA Score: NYHA III: marked limitation of physical activity, comfortable at rest    Severe mitral regurgitation  Fatou Lorenzo is a 75 y.o. female that (in part)  has a past medical history of Atrial fibrillation with RVR, Cataract, Glaucoma, Heart valve problem, Hyperlipidemia, and Severe mitral regurgitation. CTS consulted to evaluate for MR. Moderate TR and left atrial thrombus also noted on echo. EF 60%. Patient was seen ~8 years ago by Dr. Waddell and at that time valve did not require surgical intervention.     Patient was transferred from [facility] where she presented with SOB. Symptoms started in march and have progressively gotten worse. States that many of her appointments this year were pushed back due to the COVID pandemic. Reports since hospitalization only able to walk around room before developing SOB. States prior to admission she could performs ADLs at a slow pace. Endorses swelling to b/l upper extremities but denies LE edema. Energy level has been low and patient can develop dizziness with ambulation or lifting head from pillow. Endorses orthopnea. Reports having frequent palpitations with chest burning/pressure during these episodes. No prior strokes, seizurs, stents, or surgeries to chest. Never smoked. No drinking history.     Currently on Eliquis 5 BID for left atrial thrombus. Also on Imdur for BP control. Continue with medical optimization. If patient medically stable, can follow up in clinic in ~1week to finalize surgical plan.     Dr. Waddell to review and staff.         Thank you for  your consult. I will follow-up with patient. Please contact us if you have any additional questions.    Alison Hobbs PA-C  Cardiothoracic Surgery  [facility] Attending Note:    I have personally taken the history and examined this patient and agree with the KAYLEN's note as stated above. 74 yo F with severe MR. Difficult to manage on po meds. Will plan for MV repair this week.

## 2020-09-03 NOTE — DISCHARGE SUMMARY
[facility]  Discharge Summary      Patient Name: Fatou Lorenzo  MRN: [id number]  Admission Date: 8/23/2020  Hospital Length of Stay: 4 days  Discharge Date and Time: 8/27/2020  1:33 PM  Attending Physician: No att. providers found   Discharging Provider: Ayan Hill MD  Primary Care Provider: Ludin Rivas MD      HPI:   Patient with a past medical history of mitral valve prolapse with mitral regurgitation.  Is followed at [facility].  States 8 years ago was referred to Dr. Waddell, but valvular regurgitation not bad enough at that time to do surgery.  Denies any chest pains.  Came in with worsening shortness of breath, orthopnea.  Admitted with acute decompensated heart failure.  X-ray showed bibasilar infiltrates.  EKG personally reviewed.  Shows sinus tachycardia.  No acute ischemic changes.  Initially on BiPAP, now on nasal cannula after IV Lasix.  Saturating well. Denied chest pain, fever, chills, nausea, vomiting, abdominal pain, cough      Procedure(s) (LRB):  Left heart cath, radial (Left)      Hospital Course:   Ms Lorenzo presented with acute respiratory failure with hypoxia secondary to cardiogenic edema.  Patient quickly improved with IV furosemide supporting diagnosis.  However she did require high-flow type nasal cannula once taken off NIPPV.  Transthoracic echocardiogram obtained showed severe mitral valve regurgitation and moderate to severe tricuspid valve regurgitation. Also with grade 3 diastolic dysfunction and severely enlarged left atrium.  No pulmonary hypertension, pulmonic valve regurgitation or right-sided heart failure.  Cardiology consulted.  Patient developed atrial fibrillation with RVR, therefore placed on amiodarone infusion.  Underwent transesophageal ECHO where a possible left atrial thrombus was identified.  Patient was initiated on heparin infusion for both stroke prophylaxis and possible thrombus.  Left heart catheterization on 8/25/20  showed non-obstructive CAD. Patient continue to improve with diuresis and IV amiodarone continued post cath with spontaneous conversion to NSR.  She remained stable and is medically cleared for discharge home today with amiodarone, apixaban, lasix, losartan, metoprolol and pravastatin     Consults:   Consults (From admission, onward)        Status Ordering Provider     Inpatient consult to Cardiology  Once     Provider:  Margie Cole MD    Completed ELAN PORTILLO     Inpatient consult to Social Work  Once     Provider:  (Not yet assigned)    Completed MARGIE COLE     Inpatient consult to Social Work  Once     Provider:  (Not yet assigned)    Completed MARGIE COLE     Inpatient consult to Social Work/Case Management  Once     Provider:  (Not yet assigned)    Completed LU HERNANDEZ        Hospital Course By Problem:   * Acute respiratory failure with hypoxia  -Mrs. Farrar was admitted to inpatient status  -She was hypoxic on room air and had cardiogenic pulmonary edema likely due to severe MV regurgitation, grade II diastolic dysfunction and new atrial fibrillation  -She was treated in icu with bipap, an amiodarone drip and diuresed with iv lasix.  -She was weaned to oral amiodarone and room air and transferred out of the icu.  -On 8/25 she converted to NSR.  -Transesophageal echocardiogram showed possible thrombus to left atrium and she was treated with a heparin drip and subsequently converted to apixaban.  -She was taken for left heart catheterization on 8/25 which showed non-obstructive CAD   -She continued to do well and is stable for discharge home today.  -She will continue amiodarone, apixaban and lasix at discharge.  -Follow up with Dr. Waddell within 1-2 weeks and pcp within one week.    Paroxysmal atrial fibrillation  -This is a new diagnosis  -S/p amiodarone infusion and heparin infusion for stroke prophylaxis  -Conversion to NSR and amiodarone gtt stopped and converted to PO amiodarone    -Anticoagulation converted to apixaban and heparin gtt stopped  -Noted likely LA thrombus on CHERY  -Follow up with Dr. Carrillo or Bairon within 1-2 weeks.    Left atrial thrombus  -Per transesophageal echo  -Continue apixaban    Severe mitral regurgitation  -Severe and likely cause of patient's symptoms  -S/p left heart catheterization for ischemic workup on 8/25 with non-obstructive CAD  -Case discussed with Cardiology and she will need referral to Dr. Waddell for mitral valve repair/replacement as an outpatient    SOB (shortness of breath)-resolved as of 9/3/2020  As above      Final Active Diagnoses:    Diagnosis Date Noted POA    PRINCIPAL PROBLEM:  Acute respiratory failure with hypoxia [J96.01] 08/23/2020 Yes    Paroxysmal atrial fibrillation [I48.0] 08/24/2020 No    Left atrial thrombus [I51.3] 08/24/2020 Yes    Severe mitral regurgitation [I34.0] 08/24/2020 Yes      Problems Resolved During this Admission:    Diagnosis Date Noted Date Resolved POA    SOB (shortness of breath) [R06.02] 08/23/2020 09/03/2020 Yes       Discharged Condition: stable    Disposition: Home or Self Care    Follow Up:  Follow-up Information     Marlee Carrillo MD On 8/31/2020.    Specialties: Cardiology, INTERVENTIONAL CARDIOLOGY  Why: Outpatient Services @9:20am   Contact information:  [address]  [address]  [place] [address]  [phone]             Antoni Waddell MD.    Specialties: Cardiothoracic Surgery, Transplant  Why: Nurse will contact the pt with an appt   Contact information:  [address]  [facility] [address]  [phone]             Ludin Rivas MD On 9/3/2020.    Specialty: Internal Medicine  Why: 10:00 am  Contact information:  [address] [place] [address]  [phone]                 Patient Instructions:      Diet Cardiac     Notify your health care provider if you experience any of the following:  increased confusion or weakness     Notify your health care provider if you experience any of  the following:  persistent dizziness, light-headedness, or visual disturbances     Notify your health care provider if you experience any of the following:  worsening rash     Notify your health care provider if you experience any of the following:  severe persistent headache     Notify your health care provider if you experience any of the following:  difficulty breathing or increased cough     Notify your health care provider if you experience any of the following:  severe uncontrolled pain     Notify your health care provider if you experience any of the following:  persistent nausea and vomiting or diarrhea     Notify your health care provider if you experience any of the following:  temperature >100.4     Activity as tolerated       Significant Diagnostic Studies: Labs:   BMP:   Recent Labs   Lab 09/02/20  0555 09/03/20  0553   * 108    139   K 3.5 3.2*   CL 96 96   CO2 31* 32*   BUN 26* 25*   CREATININE 1.4 1.5*   CALCIUM 8.9 9.1   MG  --  2.3   , CMP   Recent Labs   Lab 09/02/20  0555 09/03/20  0553    139   K 3.5 3.2*   CL 96 96   CO2 31* 32*   * 108   BUN 26* 25*   CREATININE 1.4 1.5*   CALCIUM 8.9 9.1   PROT  --  6.4   ALBUMIN  --  3.7   BILITOT  --  1.1*   ALKPHOS  --  72   AST  --  18   ALT  --  20   ANIONGAP 12 11   ESTGFRAFRICA 42* 39.0*   EGFRNONAA 37* 33.8*   , CBC No results for input(s): WBC, HGB, HCT, PLT in the last 48 hours., Lipid Panel No results found for: CHOL, HDL, LDLCALC, TRIG, CHOLHDL, Troponin   Recent Labs   Lab 08/30/20  2221 08/31/20  0721 08/31/20  1415   TROPONINI 0.033* 0.019 0.017    and A1C: No results for input(s): HGBA1C in the last 4320 hours.    Pending Diagnostic Studies:     None         Medications:  Reconciled Home Medications:      Medication List      START taking these medications    amiodarone 400 MG tablet  Commonly known as: PACERONE  Take two tablets by mouth twice daily for twelve days, then take one tablet by mouth daily.     apixaban 5  mg Tab  Commonly known as: ELIQUIS  Take 1 tablet (5 mg total) by mouth 2 (two) times daily.     furosemide 40 MG tablet  Commonly known as: LASIX  Take 1 tablet (40 mg total) by mouth 2 (two) times daily.        CHANGE how you take these medications    losartan 50 MG tablet  Commonly known as: COZAAR  Take 0.5 tablets (25 mg total) by mouth once daily.  What changed: how much to take     metoprolol tartrate 25 MG tablet  Commonly known as: LOPRESSOR  Take 1 tablet (25 mg total) by mouth 3 (three) times daily.  What changed:   · medication strength  · how much to take  · when to take this        CONTINUE taking these medications    pravastatin 80 MG tablet  Commonly known as: PRAVACHOL  Take 80 mg by mouth once daily.        STOP taking these medications    albuterol 90 mcg/actuation inhaler  Commonly known as: PROVENTIL/VENTOLIN HFA     methylPREDNISolone 4 mg tablet  Commonly known as: MEDROL DOSEPACK        ASK your doctor about these medications    potassium chloride 20 mEq Pack  Commonly known as: KLOR-CON  Take 20 mEq by mouth once. for 1 dose  Ask about: Should I take this medication?            Indwelling Lines/Drains at time of discharge:   Lines/Drains/Airways     None                 Time spent on the discharge of patient: 35 minutes  Patient was seen and examined on the date of discharge and determined to be suitable for discharge.         Ayan Hill MD  Department of Hospital Medicine  [facility]

## 2020-09-03 NOTE — PLAN OF CARE
K+ 3.2. Replaced. Plan of care discussed with patient. Fall precautions in place. Patient has no complaints of pain. Discussed medications and care. Patient has no questions at this time. Will continue to monitor.

## 2020-09-04 NOTE — PLAN OF CARE
POC reviewed with patient. VSS. SOB noted with anxiety. Valium ordered and given for anxiety.Bipap PRN. IV lasix given and potassium replaced. Good U/O US Bilateral carotids completed. Call light in reach, will continue to monitor

## 2020-09-04 NOTE — CONSULTS
"[facility]  Cardiology  Consult Note    Patient Name: Fatou Lorenzo  MRN: [id number]  Admission Date: 8/30/2020  Hospital Length of Stay: 4 days  Code Status: Full Code   Attending Provider: Rosendo Adams MD   Consulting Provider: Devonte Ibarra MD  Primary Care Physician: Ludin Rivas MD  Principal Problem:Acute respiratory failure with hypoxia    Patient information was obtained from patient and ER records.     Inpatient consult to Cardiology  Consult performed by: Devonte Ibarra MD  Consult ordered by: Rosendo Adams MD        Subjective:     Chief Complaint:  Shortness of breath     HPI:   Fatou Lorenzo is a 75 year old female with recent episode of Atrial fibrillation with RVR, Cataract, Glaucoma, Hyperlipidemia, Severe mitral regurgitation, moderate tricuspid regurgitation. She was hospitalized from 8/20-8/27 for acute respiratory failure with hypoxia secondary to CHF exacerbation. Last hospital stay she underwent LHC and CHERY. The patient is typically followed at [facility]. Reports 8 years ago was referred to Dr. Waddell, but valvular regurgitation not bad enough at that time to do surgery.  However she has moderate tricuspid and severe mitral valve disease which was confirmed during her previous admission by echocardiogram.      This hospitalization she presented via [facility] with shortness of breath starting acutely 8/30 at 8pm, about 2 hours prior to arrival to ED. EMS found patient with /100 and room air sat 90%, noted rales to physical exam, and placed 1" nitropaste to her chest and started her on CPAP. Sats 100% at ED arrival. Patient was transferred to [facility] 9/2.    Cardiology was consulted for recurrent CHF exacerbations likely due to severe MR. Today, patient does not feel well. She is anxious. She reports slight chest pain and shortness at breath at rest especially when lying flat. She reports shortness of breath on exertion. She denies palpitations, nausea, " vomiting, fever, and chills. Patient describes being in her usual state of health until March 2020. Since March 2020, she has been having recurrent episodes of  CHF exacerbations with SOB.        Past Medical History:   Diagnosis Date    Atrial fibrillation with RVR 8/24/2020    Cataract     Essential hypertension 8/31/2020    Glaucoma     Heart valve problem     Hyperlipidemia     Severe mitral regurgitation 8/24/2020       Past Surgical History:   Procedure Laterality Date    ANKLE SURGERY      LEFT HEART CATHETERIZATION Left 8/25/2020    Procedure: Left heart cath, radial;  Surgeon: Marlee Carrillo MD;  Location: [facility] CATH LAB;  Service: Cardiology;  Laterality: Left;    lipoma removal         Review of patient's allergies indicates:  No Known Allergies    No current facility-administered medications on file prior to encounter.      Current Outpatient Medications on File Prior to Encounter   Medication Sig    amiodarone (PACERONE) 400 MG tablet Take two tablets by mouth twice daily for twelve days, then take one tablet by mouth daily.    apixaban (ELIQUIS) 5 mg Tab Take 1 tablet (5 mg total) by mouth 2 (two) times daily.    furosemide (LASIX) 40 MG tablet Take 1 tablet (40 mg total) by mouth 2 (two) times daily.    losartan (COZAAR) 50 MG tablet Take 0.5 tablets (25 mg total) by mouth once daily.    metoprolol tartrate (LOPRESSOR) 25 MG tablet Take 1 tablet (25 mg total) by mouth 3 (three) times daily.    pravastatin (PRAVACHOL) 80 MG tablet Take 80 mg by mouth once daily.     Family History     Problem Relation (Age of Onset)    Cancer Mother    Cataracts Sister    No Known Problems Father, Brother, Maternal Aunt, Maternal Uncle, Paternal Aunt, Paternal Uncle, Maternal Grandmother, Maternal Grandfather, Paternal Grandmother, Paternal Grandfather        Tobacco Use    Smoking status: Never Smoker    Smokeless tobacco: Never Used   Substance and Sexual Activity    Alcohol use: No    Drug use:  No    Sexual activity: Not Currently     Review of Systems   Constitution: Negative for chills and fever.   Cardiovascular: Positive for chest pain, dyspnea on exertion and orthopnea. Negative for irregular heartbeat, leg swelling, palpitations and syncope.   Respiratory: Positive for shortness of breath.    Gastrointestinal: Negative for nausea and vomiting.   Neurological: Positive for dizziness and light-headedness.     Objective:     Vital Signs (Most Recent):  Temp: 98.5 °F (36.9 °C) (09/04/20 1125)  Pulse: 67 (09/04/20 1134)  Resp: 19 (09/04/20 1132)  BP: 122/70 (09/04/20 1132)  SpO2: 98 % (09/04/20 1132) Vital Signs (24h Range):  Temp:  [97 °F (36.1 °C)-98.5 °F (36.9 °C)] 98.5 °F (36.9 °C)  Pulse:  [63-86] 67  Resp:  [16-22] 19  SpO2:  [92 %-98 %] 98 %  BP: ()/(56-90) 122/70     Weight: 74.8 kg (164 lb 14.5 oz)  Body mass index is 27.44 kg/m².    SpO2: 98 %  O2 Device (Oxygen Therapy): nasal cannula      Intake/Output Summary (Last 24 hours) at 9/4/2020 1343  Last data filed at 9/4/2020 0800  Gross per 24 hour   Intake 720 ml   Output 400 ml   Net 320 ml       Lines/Drains/Airways     Peripheral Intravenous Line                 Peripheral IV - Single Lumen 09/02/20 1441 22 G Left;Posterior Forearm 1 day                Physical Exam   Constitutional: She is oriented to person, place, and time.   Patient appear anxious   HENT:   Head: Normocephalic and atraumatic.   Eyes: EOM are normal. Right eye exhibits no discharge. Left eye exhibits no discharge.   Cardiovascular: Normal rate, regular rhythm and intact distal pulses.   Murmur (Holosystolic murmur heard thorughout precordium) heard.  Pulmonary/Chest: Effort normal and breath sounds normal. No respiratory distress. She has no wheezes. She has no rales.   Musculoskeletal:         General: No edema.   Neurological: She is alert and oriented to person, place, and time.   Skin: Skin is warm and dry. She is not diaphoretic.       Significant Labs:   CMP    Recent Labs   Lab 09/03/20  0553 09/04/20  0330    138   K 3.2* 3.0*   CL 96 100   CO2 32* 23    127*   BUN 25* 29*   CREATININE 1.5* 1.4   CALCIUM 9.1 9.0   PROT 6.4 7.0   ALBUMIN 3.7 3.8   BILITOT 1.1* 0.9   ALKPHOS 72 79   AST 18 16   ALT 20 16   ANIONGAP 11 15   ESTGFRAFRICA 39.0* 42.4*   EGFRNONAA 33.8* 36.8*   , CBC   Recent Labs   Lab 09/04/20  0331   WBC 12.58   HGB 14.0   HCT 44.9       and Troponin No results for input(s): TROPONINI in the last 48 hours.    Significant Imaging:     Left heart Cath 8/25  · LVEDP (Pre): 7  · Estimated blood loss: <50 mL  · Non-obstructive CAD.     Left main:  No significant stenosis     Lad:  Mid 40-3% stenosis     Circumflex luminal irregularities.  Ostial 10-20% stenosis     RCA proximal 20% stenosis    CHERY 8/24  · Normal left ventricular systolic function. The estimated ejection fraction is 60%.  · Normal right ventricular systolic function.  · Severe left atrial enlargement.  · Severe mitral regurgitation.  · Mild aortic regurgitation.  · Moderate tricuspid regurgitation.  · Suspicion of thrombus in LAE. No cardioversion performed.  · Aortic atherosclerosis    TTE 8/23  · Normal left ventricular systolic function. The estimated ejection fraction is 60%.  · Concentric left ventricular hypertrophy.  · Severe mitral regurgitation.  · Moderate to severe tricuspid regurgitation.  · Severe left atrial enlargement.  · Grade III (severe) left ventricular diastolic dysfunction consistent with restrictive physiology.  · Normal right ventricular systolic function.  · Mild pulmonic regurgitation.  · Normal central venous pressure (3 mmHg).  · The estimated PA systolic pressure is 53 mmHg.  · Pulmonary hypertension present.  · Mild aortic regurgitation.     mitral valve prolaspe with severe regurgitation     Assessment and Plan:     Acute on chronic diastolic (congestive) heart failure  Fatou Lorenzo is a 75 year old female with recent episode of Atrial  fibrillation with RVR, Cataract, Glaucoma, Hyperlipidemia, Severe mitral regurgitation, moderate tricuspid regurgitation. She has been having recurrent episodes of CHF exacerbations likely due to chronic severe mitral regurgitation. LTEE 8/24 showed EF 60%, severe left atrial enlargement, severe mitral regurgitation, moderate tricuspid regurgitation, mild aortic regurgitation, and suspicion for left atrial thrombus. LHC 8/25 showed nonobstructive CAD.    Plan:  Optimize volume status  Continue current medications: Amiodarone, lasix, lopressor, pravastatin  Mitral valve replacement per CTS        VTE Risk Mitigation (From admission, onward)         Ordered     IP VTE HIGH RISK PATIENT  Once      08/31/20 0441     Place sequential compression device  Until discontinued      08/31/20 0441     Place TAINA hose  Until discontinued      08/31/20 0441     Reason for No Pharmacological VTE Prophylaxis  Once     Question:  Reasons:  Answer:  Physician Provided (leave comment)  Comment:  Continuing apixaban from home    08/31/20 0441     Place sequential compression device  Until discontinued      08/31/20 0441                Thank you for your consult. I will follow-up with patient. Please contact us if you have any additional questions.    Devonte Ibarra MD  Cardiology   [facility]

## 2020-09-04 NOTE — PT/OT/SLP EVAL
Occupational Therapy   Evaluation    Name: Fatou Lorenzo  MRN: [id number]  Admitting Diagnosis:  Acute respiratory failure with hypoxia      Recommendations:     Discharge Recommendations: home health OT, home health PT  Discharge Equipment Recommendations:  (TBD pending progress)  Barriers to discharge:  None    Assessment:     Fatou Lorenzo is a 75 y.o. female with a medical diagnosis of Acute respiratory failure with hypoxia.  She presents with deconditioning, reports generalized malaise. Performance deficits affecting function: weakness, impaired endurance, impaired self care skills, impaired functional mobilty, impaired cardiopulmonary response to activity, decreased upper extremity function, decreased lower extremity function, gait instability.      Rehab Prognosis: Good; patient would benefit from acute skilled OT services to address these deficits and reach maximum level of function.       Plan:     Patient to be seen 3 x/week to address the above listed problems via self-care/home management, therapeutic exercises  · Plan of Care Expires: 10/04/20  · Plan of Care Reviewed with: patient, son    Subjective     Chief Complaint: Pt reports she does not have any pain but endorses generalized malaise  Patient/Family Comments/goals: To return to 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 Cooks, cleans, drives, completes own grocery shopping. 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  Patient found HOB elevated with peripheral IV, telemetry, pulse ox (continuous) upon OT entry to room.    General Precautions: Standard, fall   Orthopedic Precautions:N/A   Braces: N/A     Occupational Performance:    Bed Mobility:    · Patient completed Rolling/Turning to Right with contact guard assistance  · Patient completed Scooting/Bridging with stand by assistance  · Patient completed Supine to Sit with stand by assistance and contact guard assistance  · Patient completed Sit to Supine with stand by assistance    Functional Mobility/Transfers:  · Patient completed Sit <> Stand Transfer with stand by assistance  with  no assistive device   · Patient completed Toilet Transfer Step Transfer technique with stand by assistance with  no AD  Functional Mobility: Pt with fair dynamic seated and standing balance.      Activities of Daily Living:  · Upper Body Dressing: minimum assistance to don gown as robe seated EOB  · Lower Body Dressing: stand by assistance to don/doff b socks seated EOB  · Toileting: supervision for pericare and clothing management    Cognitive/Visual Perceptual:  Cognitive/Psychosocial Skills:     -       Oriented to: Person, Place, Time and Situation   -       Follows Commands/attention:Follows multistep  commands  -       Communication: clear/fluent  -       Memory: No Deficits noted  -       Safety awareness/insight to disability: intact   -       Mood/Affect/Coping skills/emotional control: Appropriate to situation    Physical Exam:  Postural examination/scapula alignment:    -       Rounded shoulders  Skin integrity: Visible skin intact  Motor Planning:    -       WFL  Dominant hand:    -       R handed  Upper Extremity Range of Motion: BUE WFL     Upper Extremity Strength:  BUE 4/5   Strength:  B hands WFL  Fine Motor Coordination:    -       Intact  Gross motor  coordination:   WFL     [facility] 6 Click ADL:  [facility] Total Score: 21    Treatment & Education:  Pt educated on role of OT and POC.   Pt performing skills as listed above.   Pt reports that she is to have surgery later and states that she may need increased level of assistance after surgical interventions. Pt and son education on follow up for OT including possible re-evaluation if functional status changes after surgery. Both verbalized understanding.   Education:    Patient left HOB elevated with all lines intact, call button in reach, nsg notified and son present    GOALS:   Multidisciplinary Problems     Occupational Therapy Goals        Problem: Occupational Therapy Goal    Goal Priority Disciplines Outcome Interventions   Occupational Therapy Goal     OT, PT/OT Ongoing, Progressing    Description: Goals to be met by: 10/04/2020      Patient will increase functional independence with ADLs by performing:    UE Dressing with [place].  LE Dressing with Modified [place].  Grooming while standing with Modified [place].  Toileting from toilet with Modified [place] for hygiene and clothing management.   Toilet transfer to toilet with Modified [place].  Increased functional strength to [facility] for self  care.  Upper extremity exercise program x10 reps per handout, with independence.                      History:     Past Medical History:   Diagnosis Date    Atrial fibrillation with RVR 8/24/2020    Cataract     Essential hypertension 8/31/2020    Glaucoma     Heart valve problem     Hyperlipidemia     Severe mitral regurgitation 8/24/2020       Past Surgical History:   Procedure Laterality Date    ANKLE SURGERY      LEFT HEART CATHETERIZATION Left 8/25/2020    Procedure: Left heart cath, radial;  Surgeon: Marlee Carrillo MD;  Location: [facility] CATH LAB;  Service: Cardiology;  Laterality: Left;    lipoma removal         Time Tracking:     OT Date of Treatment: 09/04/20  OT Start Time: 1627  OT  Stop Time: 1637  OT Total Time (min): 10 min    Billable Minutes:Evaluation 10    Helen Hill OT  9/4/2020

## 2020-09-04 NOTE — NURSING
Patient on bipap per PRN orders. Patient sat 100%. Patient resting with no complaints of SOB at this time.

## 2020-09-04 NOTE — NURSING
Patient called RN with complaints of SOB and feeling of fullness. O2 sat low 90s to high 80s on 3L of O2 by NC. RN called respiratory and MD. As per prescription, administered 20 mg Lasix IVP. Respiratory administered Duoneb as prescribed. Patient sat improved to 95% Administered Xanax as prescribed. Radiology performed Chest Xray. Will continue to monitor.

## 2020-09-04 NOTE — NURSING
RN called to bedside patient complaining of cough and SOB. Upon arrival to room patient had noticeable audible wheeze. Lung sounds are coarse. MD Torres

## 2020-09-04 NOTE — PLAN OF CARE
Patient transitioned to PO Lasix; pt received 1 x dose of 20 mg Lasix IVP overnight for cough and SOB.. (see note). Patient on Bipap overnight per PRN orders..(see note). Once medically stable patient to go home and return for surgery. Patient experienced no fall and no injuries through out shift. Patient AAO and VSS. Plan of care reviewed with patient. Patient verbalizes understanding of plan. Patient Will continue to monitor.

## 2020-09-04 NOTE — PROGRESS NOTES
[facility] Hospital Medicine                                                                     Progress Note     Team: [facility] A Rosendo Adams MD   Admit Date: 8/30/2020   Hospital Day: 4  OLE: 9/7/2020   Code status: Full Code   Principal Problem: Acute respiratory failure with hypoxia     SUMMARY:     Fatou Lorenzo is a 75 y.o. female that (in part)  has a past medical history of Atrial fibrillation with RVR, Cataract, Glaucoma, Heart valve problem, Hyperlipidemia, and Severe mitral regurgitation.  has a past surgical history that includes Ankle surgery; lipoma removal; and Left heart catheterization (Left, 8/25/2020). Presents to [facility] Department complaining of recurrent shortness of breath.  Patient states it feels like she is having heart failure again.  She was discharged presumably on August 27th (no discharge summary available at this time), 4 days ago for acute respiratory failure with hypoxia secondary to CHF exacerbation.  The patient is typically followed at [facility].  Reports 8 years ago was referred to Dr. Waddell, but valvular regurgitation not bad enough at that time to do surgery.  However see has severe tricuspid and mitral valve disease which was confirmed during her previous admission by echocardiogram.  She is having dyspnea with exertion, shortness of breath at rest, and orthopnea.  Worsened with exertion.  No relieving factors.      In last hospital stay she underwent left heart angiography as well as transesophageal echocardiogram and was diagnosed with a left atrial thrombus.  Nonobstructive CAD.  Had episode of atrial fibrillation with RVR which spontaneously resolved post catheterization.     In the emergency department routine laboratory studies, chest x-ray, EKG, cardiac enzymes were  obtained.  Patient required BiPAP due to hypoxemia not responsive to nasal cannula.  Chest x-ray actually showed improvement as did her cardiac enzymes compared to previous lab studies and imaging during her last hospital stay. Lasix and nitroglycerin given.     Hospital medicine has been asked to admit to inpatient in the ICU due to BiPAP requirements for further evaluation and treatment.     Ms Lorenzo presented with cardiogenic pulmonary edema likely due to severe mitral valve regurgitation. Placed on NIPPV and IV furosemide. Admitted to ICU for close observation. Patient improved, weaned to low flow NC and stepped down to telemetry floor in stable condition. Symptomatic with minimal activity despite adequate diuresis. AFib well controlled on oral amiodarone 400 mg BID and on NOAC for stroke prophylaxis and thrombus to left atrium, which are new diagnoses from recent admission. Cardiology consulted. I discussed case with Dr Jones (cardiologist on call at our facility) who recommends transfer to [facility] for formal CT surgery evaluation given recurrent admission despite compliance, BP control and diuresis. Transfer Center called to request transfer for higher level of care. Case discussed with Dr Waddell (CTS attending) who agrees with transfer. Recommends Hospital Medicine as primary and have his team consulted. Discussed with Dr Phipps ( for [facility]) who will communicate with accepting HM team at [facility]. ADT 32 placed. Patient was transferred [place] [facility] [place].    Transferred 9/2 evening to [facility] CTS consulted. CHF had resolved (no JVD and clear lungs) thus Lasix IV transitioned to PO. Unfortunately patient went back into CHF now back on IV lasix. Cardiology consulted, no LA thormbus .    SUBJECTIVE:     Pt was seen and examined at bedside. Overnight patient desaturated and went back into CHF. Sats low. CXR with pulmonary edema. Placed on bipap and given lasix IV. This morning  patient very emotional and anxious this AM, cannot seem to catch a break. She stated she was avoiding excess fluids and salt all day, despite this she went back into CHF. Her PO lasix changed to IV this AM. Denied any fevers, cp, abdominal pain, cough. Discussed case personally with CTS and Cardiology. Consulted cardiology for co-mgmt.      ROS (Positive in Bold, otherwise negative)  Pain Scale: 0 /10   Constitutional: fever, chills, night sweats, decreased appetite, early satiety   CV: chest pain, edema, palpitations  Resp: SOB, cough, sputum production  GI: changes in appetite, NVDC, pain, melena, hematochezia, GERD, hematemesis  : Dysuria, hematuria, urinary urgency, frequency  MSK: arthralgia/myalgia, joint swelling  SKIN: rashes, pruritis, petechiae   Neuro/Psych: FND, anxiety, depression      OBJECTIVE:     Vitals:  Temp:  [97 °F (36.1 °C)-98.5 °F (36.9 °C)]   Pulse:  [63-86]   Resp:  [12-22]   BP: ()/(56-90)   SpO2:  [92 %-99 %]      I & O (Last 24H):     Intake/Output Summary (Last 24 hours) at 9/4/2020 1626  Last data filed at 9/4/2020 1558  Gross per 24 hour   Intake 1200 ml   Output 1000 ml   Net 200 ml         GEN: Non toxic appearing female  in no acute distress. Nontoxic. Resting in bed. Cooperative.  HEENT: NCAT. PERRL. EOMI. Conjunctivae/corneas clear, sclera Anicteric. JVD appreciated to earlobe  CVS: RRR. Normal s1 s2 +holosystolic murmur, no click, rub or gallop  LUNG: CTAB. Normal respiratory effort. Bibasilar rales heard. No wheezes, rhonchi.  ABD: Normoactive BS, soft, NT, ND, no masses or organomegaly.  EXT: Trace LE edema. No cyanosis. Full ROM.  SKIN: color, texture, turgor normal. No rashes or lesions  NEURO: Alert, oriented x 4, Spont mvt of all extremities with no focal deficits noted.      All recent labs and imaging has been reviewed.     Recent Results (from the past 24 hour(s))   Comprehensive metabolic panel    Collection Time: 09/04/20  3:30 AM   Result Value Ref Range     Sodium 138 136 - 145 mmol/L    Potassium 3.0 (L) 3.5 - 5.1 mmol/L    Chloride 100 95 - 110 mmol/L    CO2 23 23 - 29 mmol/L    Glucose 127 (H) 70 - 110 mg/dL    BUN, Bld 29 (H) 8 - 23 mg/dL    Creatinine 1.4 0.5 - 1.4 mg/dL    Calcium 9.0 8.7 - 10.5 mg/dL    Total Protein 7.0 6.0 - 8.4 g/dL    Albumin 3.8 3.5 - 5.2 g/dL    Total Bilirubin 0.9 0.1 - 1.0 mg/dL    Alkaline Phosphatase 79 55 - 135 U/L    AST 16 10 - 40 U/L    ALT 16 10 - 44 U/L    Anion Gap 15 8 - 16 mmol/L    eGFR if African American 42.4 (A) >60 mL/min/1.73 m^2    eGFR if non  36.8 (A) >60 mL/min/1.73 m^2   Magnesium    Collection Time: 09/04/20  3:30 AM   Result Value Ref Range    Magnesium 2.4 1.6 - 2.6 mg/dL   Phosphorus    Collection Time: 09/04/20  3:30 AM   Result Value Ref Range    Phosphorus 3.5 2.7 - 4.5 mg/dL   CBC auto differential    Collection Time: 09/04/20  3:31 AM   Result Value Ref Range    WBC 12.58 3.90 - 12.70 K/uL    RBC 4.64 4.00 - 5.40 M/uL    Hemoglobin 14.0 12.0 - 16.0 g/dL    Hematocrit 44.9 37.0 - 48.5 %    Mean Corpuscular Volume 97 82 - 98 fL    Mean Corpuscular Hemoglobin 30.2 27.0 - 31.0 pg    Mean Corpuscular Hemoglobin Conc 31.2 (L) 32.0 - 36.0 g/dL    RDW 13.6 11.5 - 14.5 %    Platelets 183 150 - 350 K/uL    MPV 12.1 9.2 - 12.9 fL    Immature Granulocytes 0.6 (H) 0.0 - 0.5 %    Gran # (ANC) 10.1 (H) 1.8 - 7.7 K/uL    Immature Grans (Abs) 0.08 (H) 0.00 - 0.04 K/uL    Lymph # 1.4 1.0 - 4.8 K/uL    Mono # 0.9 0.3 - 1.0 K/uL    Eos # 0.0 0.0 - 0.5 K/uL    Baso # 0.06 0.00 - 0.20 K/uL    nRBC 0 0 /100 WBC    Gran% 80.4 (H) 38.0 - 73.0 %    Lymph% 10.8 (L) 18.0 - 48.0 %    Mono% 7.5 4.0 - 15.0 %    Eosinophil% 0.2 0.0 - 8.0 %    Basophil% 0.5 0.0 - 1.9 %    Differential Method Automated        No results for input(s): POCTGLUCOSE in the last 168 hours.    No results found for: HGBA1C     Active Hospital Problems    Diagnosis  POA    *Acute respiratory failure with hypoxia [J96.01]  Yes    Essential  hypertension [I10]  Yes    Other hyperlipidemia [E78.49]  Yes    Coronary artery disease involving native coronary artery of native heart without angina pectoris [I25.10]  Yes    Acute on chronic diastolic (congestive) heart failure [I50.33]  Yes    Severe mitral regurgitation [I34.0]  Yes    Left atrial thrombus [I51.3]  Yes    Paroxysmal atrial fibrillation [I48.0]  Yes      Resolved Hospital Problems    Diagnosis Date Resolved POA    SOB (shortness of breath) [R06.02] 09/03/2020 Yes          ASSESSMENT AND PLAN:     Acute on chronic diastolic (congestive) heart failure  Acute respiratory failure with hypoxia  - 2/2 cardiogenic pulmonary edema  - Started on IV furosemide, was in ICU with bipap transitioned to NC  - Transferred to [facility] [place]  for CTS eval  - CTS cosulted  - IV lasix now switched to po 9/3 AM as JVD and rales resolved, however overnight 9/3 she went back into CHF, now back on IV lasix  - Cardiology consulted, CTS following, tentative procedure next week  - Wean oxygen as able  - Strict in outs  - Low sodium diet  - Fluid restriction  - Daily standing weight  - Bipap PRN for worsening dyspnea       Severe mitral regurgitation  - Confirmed via transesophageal echocardiogram on recent admission  - Left heart catheterization with non obstructive coronary artery disease  - Has severe left atrial enlargement with thrombus  - Management as above  - CTS consulted     Left atrial thrombus - ruled out  - Patient was started apixaban loading dose however discussed with cardiology, doesn't seem to have a LA thrombus     Coronary artery disease involving native coronary artery of native heart without angina pectoris  - Non obstructive disease  - On cardioprotective regimen     Other hyperlipidemia  - Continue statin     Essential hypertension  - continue current meds     Paroxysmal atrial fibrillation  - Currently well controlled on amiodarone 400 mg PO BID and metoprolol 50 mg BID  - Continue NOAC for  stroke prophylaxis (CHADS VASc of at least 4), change apixaban to Lovenox for now   - Cardiac monitoring  - Electrolytes reviewed    Hypokalemic  - replete and recheck         Prophylaxis- Lovenox   Code Status- Full Code   Discharge plan and follow up - d/c home once medically stable    Discharge Planning   OLE: 9/7/2020     Code Status: Full Code   Is the patient medically ready for discharge?:     Reason for patient still in hospital (select all that apply): Patient trending condition and Consult recommendations  Discharge Plan A: Home          Rosendo Adams MD  [facility] Medicine Staff  Pager [phone]

## 2020-09-04 NOTE — ASSESSMENT & PLAN NOTE
Fatou Lorenzo is a 75 year old female with recent episode of Atrial fibrillation with RVR, Cataract, Glaucoma, Hyperlipidemia, Severe mitral regurgitation, moderate tricuspid regurgitation. She has been having recurrent episodes of CHF exacerbations likely due to chronic severe mitral regurgitation. LTEE 8/24 showed EF 60%, severe left atrial enlargement, severe mitral regurgitation, moderate tricuspid regurgitation, mild aortic regurgitation, and suspicion for left atrial thrombus. LHC 8/25 showed nonobstructive CAD.    Plan:  Optimize volume status  Continue current medications: Amiodarone, lasix, lopressor, pravastatin  Mitral valve replacement per CTS

## 2020-09-04 NOTE — PLAN OF CARE
Problem: Occupational Therapy Goal  Goal: Occupational Therapy Goal  Description: Goals to be met by: 10/04/2020      Patient will increase functional independence with ADLs by performing:    UE Dressing with [place].  LE Dressing with Modified [place].  Grooming while standing with Modified [place].  Toileting from toilet with Modified [place] for hygiene and clothing management.   Toilet transfer to toilet with Modified [place].  Increased functional strength to WFL for self  care.  Upper extremity exercise program x10 reps per handout, with independence.     Outcome: Ongoing, Progressing   Pt would benefit from continued OT to address deficits in self care and functional mobility. Recommending HHOT/PT; DME needs TBD pending progress with therapies

## 2020-09-04 NOTE — SUBJECTIVE & OBJECTIVE
Past Medical History:   Diagnosis Date    Atrial fibrillation with RVR 8/24/2020    Cataract     Essential hypertension 8/31/2020    Glaucoma     Heart valve problem     Hyperlipidemia     Severe mitral regurgitation 8/24/2020       Past Surgical History:   Procedure Laterality Date    ANKLE SURGERY      LEFT HEART CATHETERIZATION Left 8/25/2020    Procedure: Left heart cath, radial;  Surgeon: Marlee Carrillo MD;  Location: [facility] CATH LAB;  Service: Cardiology;  Laterality: Left;    lipoma removal         Review of patient's allergies indicates:  No Known Allergies    No current facility-administered medications on file prior to encounter.      Current Outpatient Medications on File Prior to Encounter   Medication Sig    amiodarone (PACERONE) 400 MG tablet Take two tablets by mouth twice daily for twelve days, then take one tablet by mouth daily.    apixaban (ELIQUIS) 5 mg Tab Take 1 tablet (5 mg total) by mouth 2 (two) times daily.    furosemide (LASIX) 40 MG tablet Take 1 tablet (40 mg total) by mouth 2 (two) times daily.    losartan (COZAAR) 50 MG tablet Take 0.5 tablets (25 mg total) by mouth once daily.    metoprolol tartrate (LOPRESSOR) 25 MG tablet Take 1 tablet (25 mg total) by mouth 3 (three) times daily.    pravastatin (PRAVACHOL) 80 MG tablet Take 80 mg by mouth once daily.     Family History     Problem Relation (Age of Onset)    Cancer Mother    Cataracts Sister    No Known Problems Father, Brother, Maternal Aunt, Maternal Uncle, Paternal Aunt, Paternal Uncle, Maternal Grandmother, Maternal Grandfather, Paternal Grandmother, Paternal Grandfather        Tobacco Use    Smoking status: Never Smoker    Smokeless tobacco: Never Used   Substance and Sexual Activity    Alcohol use: No    Drug use: No    Sexual activity: Not Currently     Review of Systems   Constitution: Negative for chills and fever.   Cardiovascular: Positive for chest pain, dyspnea on exertion and orthopnea. Negative for  irregular heartbeat, leg swelling, palpitations and syncope.   Respiratory: Positive for shortness of breath.    Gastrointestinal: Negative for nausea and vomiting.   Neurological: Positive for dizziness and light-headedness.     Objective:     Vital Signs (Most Recent):  Temp: 98.5 °F (36.9 °C) (09/04/20 1125)  Pulse: 67 (09/04/20 1134)  Resp: 19 (09/04/20 1132)  BP: 122/70 (09/04/20 1132)  SpO2: 98 % (09/04/20 1132) Vital Signs (24h Range):  Temp:  [97 °F (36.1 °C)-98.5 °F (36.9 °C)] 98.5 °F (36.9 °C)  Pulse:  [63-86] 67  Resp:  [16-22] 19  SpO2:  [92 %-98 %] 98 %  BP: ()/(56-90) 122/70     Weight: 74.8 kg (164 lb 14.5 oz)  Body mass index is 27.44 kg/m².    SpO2: 98 %  O2 Device (Oxygen Therapy): nasal cannula      Intake/Output Summary (Last 24 hours) at 9/4/2020 1343  Last data filed at 9/4/2020 0800  Gross per 24 hour   Intake 720 ml   Output 400 ml   Net 320 ml       Lines/Drains/Airways     Peripheral Intravenous Line                 Peripheral IV - Single Lumen 09/02/20 1441 22 G Left;Posterior Forearm 1 day                Physical Exam   Constitutional: She is oriented to person, place, and time.   Patient appear anxious   HENT:   Head: Normocephalic and atraumatic.   Eyes: EOM are normal. Right eye exhibits no discharge. Left eye exhibits no discharge.   Cardiovascular: Normal rate, regular rhythm and intact distal pulses.   Murmur (Holosystolic murmur heard thorughout precordium) heard.  Pulmonary/Chest: Effort normal and breath sounds normal. No respiratory distress. She has no wheezes. She has no rales.   Musculoskeletal:         General: No edema.   Neurological: She is alert and oriented to person, place, and time.   Skin: Skin is warm and dry. She is not diaphoretic.       Significant Labs:   CMP   Recent Labs   Lab 09/03/20  0553 09/04/20  0330    138   K 3.2* 3.0*   CL 96 100   CO2 32* 23    127*   BUN 25* 29*   CREATININE 1.5* 1.4   CALCIUM 9.1 9.0   PROT 6.4 7.0   ALBUMIN 3.7  3.8   BILITOT 1.1* 0.9   ALKPHOS 72 79   AST 18 16   ALT 20 16   ANIONGAP 11 15   ESTGFRAFRICA 39.0* 42.4*   EGFRNONAA 33.8* 36.8*   , CBC   Recent Labs   Lab 09/04/20  0331   WBC 12.58   HGB 14.0   HCT 44.9       and Troponin No results for input(s): TROPONINI in the last 48 hours.    Significant Imaging:     Left heart Cath 8/25  · LVEDP (Pre): 7  · Estimated blood loss: <50 mL  · Non-obstructive CAD.     Left main:  No significant stenosis     Lad:  Mid 40-3% stenosis     Circumflex luminal irregularities.  Ostial 10-20% stenosis     RCA proximal 20% stenosis    CHERY 8/24  · Normal left ventricular systolic function. The estimated ejection fraction is 60%.  · Normal right ventricular systolic function.  · Severe left atrial enlargement.  · Severe mitral regurgitation.  · Mild aortic regurgitation.  · Moderate tricuspid regurgitation.  · Suspicion of thrombus in LAE. No cardioversion performed.  · Aortic atherosclerosis

## 2020-09-04 NOTE — RESPIRATORY THERAPY
Rapid Response Respiratory Therapy Proactive Rounding Note      Time of visit: 0854     Code Status: Full Code   : 1945  Bed: 324/324 A:   MRN: [id number]    SITUATION     Evaluated patient for: BIPAP PRN    BACKGROUND     Patient has a past medical history of Atrial fibrillation with RVR, Cataract, Essential hypertension, Glaucoma, Heart valve problem, Hyperlipidemia, and Severe mitral regurgitation.    ASSESSMENT/INTERVENTIONS     Upon arrival in room patient resting in bed.  No respiratory distress noted.  Patient did say she has shortness of breath when she takes a deep breath, but is not related to her breathing.  BIPAP at bedside if needed.    Pulse: 68 Respiratory rate: 12 Temperature: Temp: 98.5 °F (36.9 °C) BP: BP: 123/71 SpO2: 99%  Level of Consciousness: Level of Consciousness (AVPU): alert  Respiratory Effort: Respiratory Effort: Normal, Unlabored Expansion/Accessory Muscle Usage: Expansion/Accessory Muscles/Retractions: no retractions, no use of accessory muscles  All Lung Field Breath Sounds: All Lung Fields Breath Sounds: clear, diminished  LLL Breath Sounds: coarse  RLL Breath Sounds: coarse  O2 Device/Concentration: 2L NC  Most recent blood gas: No results for input(s): PH, PCO2, PO2, HCO3, POCSATURATED, BE in the last 72 hours.  NIPPV: No Surgical airway: No  ETCO2 monitored:    Ambu at bedside: Ambu bag with the patient?: Yes, Adult Ambu    Current Respiratory Care Orders:   Start   Ordered   20 1102  Complete PFT w/o bronchodilator Once      20 1101   20 0743  Six Minute Walk Test to qualify for Home Oxygen Once      20 0742   20 0800  Pulse Oximetry Q4H Every 4 hours (23 of 40 released)    Release    20 0649   Unscheduled  Bipap PRN Use PRN (0 of 37858 released)    Release   Question Answer Comment   FiO2% 50    Inspiratory pressure: 10    Expiratory pressure: 5        20 1535   Unscheduled  Oxygen PRN Use PRN (0 of 20583 released)     Release   References: Oxygen Titration Protocol   Question Answer Comment   Device type: Low flow    Device: Nasal Cannula (1- 5 Liters)    LPM: 2    Titrate O2 per Oxygen Titration Protocol: Yes    To maintain SpO2 goal of: >= 90%    Notify MD of: Inability to achieve desired SpO2; Sudden change in patient status and requires 20% increase in FiO2; Patient requires >60% FiO2        09/03/20 1216   Unscheduled  Inhalation Treatment Q6H PRN Every 6 hours PRN (0 of 22386 released)    Release    09/04/20 1249      IP Meds - Nasal and Inhaled  Comment  Hide  (From admission, onward)     Last edited by  on  at    Start   Stop Status Route Frequency Ordered   09/04/20 1349  albuterol-ipratropium 2.5 mg-0.5 mg/3 mL nebulizer solution 3 mL (albuterol-ipratropium (DUO-NEB) 0.5 - 3 mg (2.5 mg base)/ 3 mL nebulizer panel)    Discontinue    -- Verified NEBULIZATION Every 6 hours PRN 09/04/20 1249         RECOMMENDATIONS     We recommend: continue with POC and call respiratory with any questions or concerns.    ESCALATION      Physician Escalation (Yes/No) no  Discussed plan of care primary RTESTEBAN, RRT     FOLLOW-UP     Please call back the Rapid Response RTSoo, SHERINE at x [phone] for any questions or concerns.

## 2020-09-05 NOTE — PLAN OF CARE
Plan of Care:  Discharge Recommendation: HHPT.  Please continue Progressive Mobility Protocol as appropriate.  Appropriate transfer level with nursing staff: Bed <> Chair:  Stand Pivot with independence with No Assistive Device.    INES Chacon  2020    Physical Therapy Treatment Goals:  Multidisciplinary Problems       Physical Therapy Goals          Problem: Physical Therapy Goal    Goal Priority Disciplines Outcome Goal Variances Interventions   Physical Therapy Goal     PT, PT/OT Ongoing, Progressing     Description: Goals to be met by: 2020    Patient will increase functional independence with mobility by performin. Gait  x 400 feet with [place] using No Assistive Device to prepare for community ambulation and endurance activities.

## 2020-09-05 NOTE — PLAN OF CARE
POC reviewed with patient. VSS. IV lasix continues with good U/O. Patient ambulated in cueto x 1 approx 150 with minimal SOB. Potassium level 3.8 and replaced today. Call light in reach. Will continue to monitor.

## 2020-09-05 NOTE — PLAN OF CARE
DX: Acute Respiratory Failure with hypoxia    Shift Events: No changes this shift. Pt rested comfortably though out the night     Plan of Care:      Neuro: AAO X 4     Respiratory:  02 remained stable on room air. No S.O.B reported     Cardiac: SB to NSR     Diet:  Cardiac Diet  1500 ml FR     Gtts: Saline locked     Skin: Intact

## 2020-09-05 NOTE — PT/OT/SLP EVAL
Physical Therapy Evaluation    Patient Name:  Fatou Lorenzo   MRN:  [id number]  Admit Date: 8/30/2020  Admitting Diagnosis:  Acute respiratory failure with hypoxia   Length of Stay: 5 days  Recent Surgery: * No surgery found *      Recommendations:     Discharge Recommendations:  home health PT   Discharge Equipment Recommendations: none   Barriers to discharge: Decreased caregiver support    Assessment:     Fatou Lorenzo is a 75 y.o. female admitted with a medical diagnosis of Acute respiratory failure with hypoxia.  She presents with the following impairments/functional limitations: decreased endurance and SOB. Pt was mildly SOB upon entry. Pt tolerated session well. Pt able to perform bed mobility, transfers and walked ~100ft with SBA. Pt cried at end of session, appearing overwhelmed by recent changes.Pt would benefit from continued acute skilled PT services to improve cardiorespiratory endurance and increase mobility. Plan to walk pt multiple rounds of short distances upon next visit with symptoms permitting. Pt would benefit from home health PT to address endurance following d/c.    Problem List: impaired endurance, impaired cardiopulmonary response to activity, gait instability  Rehab Prognosis: Good; patient would benefit from acute skilled PT services to address these deficits and reach maximum level of function.      Plan:     During this hospitalization, patient to be seen 3 x/week to address the identified rehab impairments via gait training, therapeutic activities and progress towards the established goals.    · Plan of Care Expires:  10/04/20    Subjective   Communicated with RN prior to session.  Patient found supine upon PT entry to room, agreeable to evaluation. Fatou Lorenzo was alone during session.    Chief Complaint: Shortness of Breath (Patient reports SOB that started at 2000. Patient states when she lie down it feels as if shes drowning. Patient reports being seen in ED 3 days ago for the  "same symptoms.)    Patient/Family Comments/goals: "This is not my normal." "I am happy to be here." (in reference to seeking medical attention with recent symptoms)  Pain/Comfort:  · Pain Rating 1: 0/10    Living Environment:  Patient lives with son in a single family home with 0 DOMINIQUE.   Prior Level of Function: Patient reports being independent with mobility & with ADLs. Patient uses DME as follows: shower chair. DME owned (not currently used): none.  Roles/Repsonsibilities: Hand Dominance: right Work: yes. Technically retired but does some part time work for [employer]. Duties involve sitting most of the day. Drive: yes. Managing Medicines/Managing Home: yes. Hobbies: playing computer games.    Patient reports they will have assistance from son upon discharge.    Objective:   Patient found with: peripheral IV(vissi monitor)   General Precautions: Standard, fall   Orthopedic Precautions:N/A   Braces: N/A   Body mass index is 27.92 kg/m².  Oxygen Device: Room Air  Vitals: /75   Pulse 72   Temp 98.2 °F (36.8 °C)   Resp 16   Ht 5' 5" (1.651 m)   Wt 76.1 kg (167 lb 12.3 oz)   SpO2 96%   Breastfeeding No   BMI 27.92 kg/m²     Exams:  · Cognition:   · Alert  · AxOx4  · Command following: Follows multistep  commands  · Fluency: clear/fluent  · Hearing: Intact  · Vision:  Intact visual fields     Left LE Right LE   Edema absent absent   ROM AROM WFL AROM WFL   Modified Concepcion Scale 0: No increase in muscle tone 0: No increase in muscle tone   Strength 4/5 4/5   Sensation intact to light touch intact to light touch   Coordination not tested not tested     Outcome Measures:  AM-PAC 6 CLICK MOBILITY  Turning over in bed (including adjusting bedclothes, sheets and blankets)?: 4  Sitting down on and standing up from a chair with arms (e.g., wheelchair, bedside commode, etc.): 4  Moving from lying on back to sitting on the side of the bed?: 4  Moving to and from a bed to a chair (including a " wheelchair)?: 4  Need to walk in hospital room?: 4  Climbing 3-5 steps with a railing?: 3  Basic Mobility Total Score: 23     Functional Mobility:  Additional staff present: Supervising PT  Bed Mobility:   · Supine to Sit: stand by assistance; from R side of bed  · Scooting anteriorly to EOB to have both feet planted on floor: stand by assistance  · Pt found/returned to bedside chair.    Transfers:   · Sit <> Stand Transfer: stand by assistance with no assistive device   · Stand <> Sit Transfer: stand by assistance with no assistive device   · [id number] from bedside chair      Gait:   · Patient ambulated: ~100ft   · Patient required: standy by assistance  · Patient used:  no assistive device   · Gait Pattern observed: reciprocal gait  · Gait Deviation(s): decreased arm swing and decreased clair  · Comments: Pt needed verbal cuing to keep her head up and put her arms by her side. Pt appeared guarded throughout walk. Pt was limited most by SOB. Vissi Monitor readings post walk : HR 72 bpm, /79 mmHg, O2 97%, RR 25 bpm. (Pt's HR was 69 bpm prior to walking).    Therapeutic Activities & Exercises:     Education:  Educated pt on her ability to move around her room without assistance. Educated pt on POC and answered questions regarding mobility prior to her next procedure. Encouraged pt to sit in her chair throughout the day.    Patient left up in chair, with head in midline, neutral pelvis & heels floated for skin protection with all lines intact, call button in reach and RN notified.    GOALS:   Multidisciplinary Problems     Physical Therapy Goals        Problem: Physical Therapy Goal    Goal Priority Disciplines Outcome Goal Variances Interventions   Physical Therapy Goal     PT, PT/OT Ongoing, Progressing     Description: Goals to be met by: 2020    Patient will increase functional independence with mobility by performin. Gait  x 400 feet with [place] using No Assistive Device to prepare for  community ambulation and endurance activities.                         History:     Past Medical History:   Diagnosis Date    Atrial fibrillation with RVR 8/24/2020    Cataract     Essential hypertension 8/31/2020    Glaucoma     Heart valve problem     Hyperlipidemia     Severe mitral regurgitation 8/24/2020       Past Surgical History:   Procedure Laterality Date    ANKLE SURGERY      LEFT HEART CATHETERIZATION Left 8/25/2020    Procedure: Left heart cath, radial;  Surgeon: Marlee Carrillo MD;  Location: [facility] CATH LAB;  Service: Cardiology;  Laterality: Left;    lipoma removal         Time Tracking:     PT Received On: 09/05/20  PT Start Time: 1000     PT Stop Time: 1018  PT Total Time (min): 18 min     Billable Minutes: Evaluation 8 and Therapeutic Activity 10    INES Chacon  9/5/2020

## 2020-09-05 NOTE — PROGRESS NOTES
[facility] Hospital Medicine                                                                     Progress Note     Team: [facility] HOSP MED A Rosendo Adams MD   Admit Date: 8/30/2020   Hospital Day: 5  OLE: 9/11/2020   Code status: Full Code   Principal Problem: Acute respiratory failure with hypoxia     SUMMARY:     Fatou Lorenzo is a 75 y.o. female that (in part)  has a past medical history of Atrial fibrillation with RVR, Cataract, Glaucoma, Heart valve problem, Hyperlipidemia, and Severe mitral regurgitation.  has a past surgical history that includes Ankle surgery; lipoma removal; and Left heart catheterization (Left, 8/25/2020). Presents to [facility] Emergency Department complaining of recurrent shortness of breath.  Patient states it feels like she is having heart failure again.  She was discharged presumably on August 27th (no discharge summary available at this time), 4 days ago for acute respiratory failure with hypoxia secondary to CHF exacerbation.  The patient is typically followed at [place].  Reports 8 years ago was referred to Dr. Waddell, but valvular regurgitation not bad enough at that time to do surgery.  However see has severe tricuspid and mitral valve disease which was confirmed during her previous admission by echocardiogram.  She is having dyspnea with exertion, shortness of breath at rest, and orthopnea.  Worsened with exertion.  No relieving factors.      In last hospital stay she underwent left heart angiography as well as transesophageal echocardiogram and was diagnosed with a left atrial thrombus.  Nonobstructive CAD.  Had episode of atrial fibrillation with RVR which spontaneously resolved post catheterization.     In the emergency department routine laboratory studies, chest x-ray, EKG, cardiac enzymes were  obtained.  Patient required BiPAP due to hypoxemia not responsive to nasal cannula.  Chest x-ray actually showed improvement as did her cardiac enzymes compared to previous lab studies and imaging during her last hospital stay. Lasix and nitroglycerin given.     Hospital medicine has been asked to admit to inpatient in the ICU due to BiPAP requirements for further evaluation and treatment.     Ms Lorenzo presented with cardiogenic pulmonary edema likely due to severe mitral valve regurgitation. Placed on NIPPV and IV furosemide. Admitted to ICU for close observation. Patient improved, weaned to low flow NC and stepped down to telemetry floor in stable condition. Symptomatic with minimal activity despite adequate diuresis. AFib well controlled on oral amiodarone 400 mg BID and on NOAC for stroke prophylaxis and thrombus to left atrium, which are new diagnoses from recent admission. Cardiology consulted. I discussed case with Dr Jones (cardiologist on call at our facility) who recommends transfer to [facility] for formal CT surgery evaluation given recurrent admission despite compliance, BP control and diuresis. Transfer Center called to request transfer for higher level of care. Case discussed with Dr Waddell (CTS attending) who agrees with transfer. Recommends Hospital Medicine as primary and have his team consulted. Discussed with Dr Phipps ( for [facility]) who will communicate with accepting HM team at [facility]. ADT 32 placed. Patient was transferred over to [facility] for CTS evaluation.    Transferred 9/2 evening to [facility]. CTS consulted. CHF had resolved (no JVD and clear lungs) thus Lasix IV transitioned to PO. Unfortunately patient went back into CHF now back on IV lasix. Cardiology consulted, no LA thormbus. CTS planning tentative surgery next week.    SUBJECTIVE:     Pt was seen and examined at bedside. No acute events overnight. Had a pleasant night. HDS and afebrile. Denied any fevers, cp, worsening  dyspnea, abdominal pain, cough. Intermittent anxiety relieved with valium prn. Ambulating with oxygen to bathroom. Good UOP and normal BM. No other issues endorsed.      ROS (Positive in Bold, otherwise negative)  Pain Scale: 0 /10   Constitutional: fever, chills, night sweats, decreased appetite, early satiety   CV: chest pain, edema, palpitations  Resp: SOB, cough, sputum production  GI: changes in appetite, NVDC, pain, melena, hematochezia, GERD, hematemesis  : Dysuria, hematuria, urinary urgency, frequency  MSK: arthralgia/myalgia, joint swelling  SKIN: rashes, pruritis, petechiae   Neuro/Psych: FND, anxiety, depression      OBJECTIVE:     Vitals:  Temp:  [98 °F (36.7 °C)-98.3 °F (36.8 °C)]   Pulse:  [59-73]   Resp:  [13-20]   BP: ()/(57-78)   SpO2:  [93 %-98 %]      I & O (Last 24H):     Intake/Output Summary (Last 24 hours) at 9/5/2020 1244  Last data filed at 9/5/2020 0732  Gross per 24 hour   Intake 850 ml   Output 1350 ml   Net -500 ml         GEN: Non toxic appearing female  in no acute distress. Nontoxic. Resting in bed. Cooperative.  HEENT: NCAT. PERRL. EOMI. Conjunctivae/corneas clear, sclera Anicteric. +JVD  CVS: RRR. Normal s1 s2 +holosystolic murmur, no click, rub or gallop  LUNG: CTAB. Normal respiratory effort. Bibasilar rales heard, improved from yesterday. No wheezes, rhonchi.  ABD: Normoactive BS, soft, NT, ND, no masses or organomegaly.  EXT: Trace LE edema. No cyanosis. Full ROM.  SKIN: color, texture, turgor normal. No rashes or lesions  NEURO: Alert, oriented x 4, Spont mvt of all extremities with no focal deficits noted.      All recent labs and imaging has been reviewed.     Recent Results (from the past 24 hour(s))   Comprehensive metabolic panel    Collection Time: 09/05/20  5:30 AM   Result Value Ref Range    Sodium 138 136 - 145 mmol/L    Potassium 3.8 3.5 - 5.1 mmol/L    Chloride 101 95 - 110 mmol/L    CO2 27 23 - 29 mmol/L    Glucose 112 (H) 70 - 110 mg/dL    BUN, Bld 30 (H)  8 - 23 mg/dL    Creatinine 1.4 0.5 - 1.4 mg/dL    Calcium 9.2 8.7 - 10.5 mg/dL    Total Protein 6.2 6.0 - 8.4 g/dL    Albumin 3.5 3.5 - 5.2 g/dL    Total Bilirubin 1.2 (H) 0.1 - 1.0 mg/dL    Alkaline Phosphatase 65 55 - 135 U/L    AST 13 10 - 40 U/L    ALT 15 10 - 44 U/L    Anion Gap 10 8 - 16 mmol/L    eGFR if African American 42.4 (A) >60 mL/min/1.73 m^2    eGFR if non  36.8 (A) >60 mL/min/1.73 m^2   Magnesium    Collection Time: 09/05/20  5:30 AM   Result Value Ref Range    Magnesium 2.4 1.6 - 2.6 mg/dL   Phosphorus    Collection Time: 09/05/20  5:30 AM   Result Value Ref Range    Phosphorus 3.6 2.7 - 4.5 mg/dL       No results for input(s): POCTGLUCOSE in the last 168 hours.    No results found for: HGBA1C     Active Hospital Problems    Diagnosis  POA    *Acute respiratory failure with hypoxia [J96.01]  Yes    Essential hypertension [I10]  Yes    Other hyperlipidemia [E78.49]  Yes    Coronary artery disease involving native coronary artery of native heart without angina pectoris [I25.10]  Yes    Acute on chronic diastolic (congestive) heart failure [I50.33]  Yes    Severe mitral regurgitation [I34.0]  Yes    Left atrial thrombus [I51.3]  Yes    Paroxysmal atrial fibrillation [I48.0]  Yes      Resolved Hospital Problems    Diagnosis Date Resolved POA    SOB (shortness of breath) [R06.02] 09/03/2020 Yes          ASSESSMENT AND PLAN:     Acute on chronic diastolic (congestive) heart failure  Acute respiratory failure with hypoxia  - 2/2 cardiogenic pulmonary edema  - Started on IV furosemide, was in ICU with bipap transitioned to NC  - Transferred to [facility] from  for CTS eval  - CTS cosulted  - IV lasix now switched to po 9/3 AM as JVD and rales resolved, however overnight 9/3 she went back into CHF, now back on IV lasix  - Cardiology consulted, CTS following, tentative procedure next week  - Wean oxygen as able  - Strict in outs  - Low sodium diet  - Fluid restriction  - Daily standing  weight  - Bipap PRN for worsening dyspnea       Severe mitral regurgitation  - Confirmed via transesophageal echocardiogram on recent admission  - Left heart catheterization with non obstructive coronary artery disease  - Has severe left atrial enlargement   - Management as above  - CTS consulted     Left atrial thrombus - ruled out  - Patient was started apixaban loading dose however discussed with cardiology, doesn't seem to have a LA thrombus     Coronary artery disease involving native coronary artery of native heart without angina pectoris  - Non obstructive disease  - On cardioprotective regimen     Other hyperlipidemia  - Continue statin     Essential hypertension  - continue current meds     Paroxysmal atrial fibrillation  - Currently well controlled on amiodarone 400 mg PO BID and metoprolol 50 mg BID  - Continue NOAC for stroke prophylaxis (CHADS VASc of at least 4), change apixaban to Lovenox for now for tentative procedure next week  - Cardiac monitoring  - Electrolytes reviewed    Hypokalemic  - replete and recheck         Prophylaxis- Lovenox   Code Status- Full Code   Discharge plan and follow up - pending stability, then surgery, then PT OT recs    Discharge Planning   OLE: 9/11/2020     Code Status: Full Code   Is the patient medically ready for discharge?:     Reason for patient still in hospital (select all that apply): Patient trending condition and Consult recommendations  Discharge Plan A: Home          Rosendo Adams MD  Hospital Medicine Staff  Pager [phone]

## 2020-09-06 NOTE — PLAN OF CARE
POC reviewed with patient. VSS. Patient OOB to chair and ambulating today. No SOB noted. IV lasix with good U/O. Call light in reach Will continue to monitor

## 2020-09-06 NOTE — PROGRESS NOTES
[facility] Hospital Medicine                                                                     Progress Note     Team: [facility] HOSP MED A Rosendo Adams MD   Admit Date: 8/30/2020   Hospital Day: 6  OLE: 9/11/2020   Code status: Full Code   Principal Problem: Acute respiratory failure with hypoxia     SUMMARY:     Fatou Lorenzo is a 75 y.o. female that (in part)  has a past medical history of Atrial fibrillation with RVR, Cataract, Glaucoma, Heart valve problem, Hyperlipidemia, and Severe mitral regurgitation.  has a past surgical history that includes Ankle surgery; lipoma removal; and Left heart catheterization (Left, 8/25/2020). Presents to [facility] Emergency Department complaining of recurrent shortness of breath.  Patient states it feels like she is having heart failure again.  She was discharged presumably on August 27th (no discharge summary available at this time), 4 days ago for acute respiratory failure with hypoxia secondary to CHF exacerbation.  The patient is typically followed at [place].  Reports 8 years ago was referred to Dr. Waddell, but valvular regurgitation not bad enough at that time to do surgery.  However see has severe tricuspid and mitral valve disease which was confirmed during her previous admission by echocardiogram.  She is having dyspnea with exertion, shortness of breath at rest, and orthopnea.  Worsened with exertion.  No relieving factors.      In last hospital stay she underwent left heart angiography as well as transesophageal echocardiogram and was diagnosed with a left atrial thrombus.  Nonobstructive CAD.  Had episode of atrial fibrillation with RVR which spontaneously resolved post catheterization.     In the emergency department routine laboratory studies, chest x-ray, EKG, cardiac enzymes were  obtained.  Patient required BiPAP due to hypoxemia not responsive to nasal cannula.  Chest x-ray actually showed improvement as did her cardiac enzymes compared to previous lab studies and imaging during her last hospital stay. Lasix and nitroglycerin given.     Hospital medicine has been asked to admit to inpatient in the ICU due to BiPAP requirements for further evaluation and treatment.     Ms Lorenzo presented with cardiogenic pulmonary edema likely due to severe mitral valve regurgitation. Placed on NIPPV and IV furosemide. Admitted to ICU for close observation. Patient improved, weaned to low flow NC and stepped down to telemetry floor in stable condition. Symptomatic with minimal activity despite adequate diuresis. AFib well controlled on oral amiodarone 400 mg BID and on NOAC for stroke prophylaxis and thrombus to left atrium, which are new diagnoses from recent admission. Cardiology consulted. I discussed case with Dr Jones (cardiologist on call at our facility) who recommends transfer to [facility] for formal CT surgery evaluation given recurrent admission despite compliance, BP control and diuresis. Transfer Center called to request transfer for higher level of care. Case discussed with Dr Waddell (CTS attending) who agrees with transfer. Recommends Hospital Medicine as primary and have his team consulted. Discussed with Dr Phipps ( for [facility]) who will communicate with accepting HM team at [facility]. ADT 32 placed. Patient was transferred over to [facility] for CTS evaluation.    Transferred 9/2 evening to [facility]. CTS and cardiology consulted. Remains on IV lasix at this time. Cardiology suspect no LA thormbus. Apixaban changed to Lovenox as per CTS. CTS planning tentative surgery next week.      SUBJECTIVE:     Pt was seen and examined at bedside. No acute events overnight. HDS and afebrile. Endorsed some palpitations overnight. No events noted on telemetry. Denied any fevers, cp, worsening  dyspnea, abdominal pain, cough. Intermittent anxiety relieved with valium prn. Ambulating with oxygen to bathroom. Good UOP and normal BM. No other issues endorsed.      ROS (Positive in Bold, otherwise negative)  Pain Scale: 0 /10   Constitutional: fever, chills, night sweats, decreased appetite, early satiety   CV: chest pain, edema, palpitations  Resp: SOB, cough, sputum production  GI: changes in appetite, NVDC, pain, melena, hematochezia, GERD, hematemesis  : Dysuria, hematuria, urinary urgency, frequency  MSK: arthralgia/myalgia, joint swelling  SKIN: rashes, pruritis, petechiae   Neuro/Psych: FND, anxiety, depression      OBJECTIVE:     Vitals:  Temp:  [97.5 °F (36.4 °C)-98.3 °F (36.8 °C)]   Pulse:  []   Resp:  [14-23]   BP: ()/(58-80)   SpO2:  [92 %-99 %]      I & O (Last 24H):     Intake/Output Summary (Last 24 hours) at 9/6/2020 1118  Last data filed at 9/6/2020 0700  Gross per 24 hour   Intake 867 ml   Output 1400 ml   Net -533 ml         GEN: Non toxic appearing female  in no acute distress. Nontoxic. Resting in bed. Cooperative.  HEENT: NCAT. PERRL. EOMI. Conjunctivae/corneas clear, sclera Anicteric. improving JVD  CVS: RRR. Normal s1 s2 +holosystolic murmur, no click, rub or gallop  LUNG: CTAB. Normal respiratory effort. Bibasilar rales heard, improved from yesterday. No wheezes, rhonchi.  ABD: Normoactive BS, soft, NT, ND, no masses or organomegaly.  EXT: Trace LE edema. No cyanosis. Full ROM.  SKIN: color, texture, turgor normal. No rashes or lesions  NEURO: Alert, oriented x 4, Spont mvt of all extremities with no focal deficits noted.      All recent labs and imaging has been reviewed.     Recent Results (from the past 24 hour(s))   Comprehensive metabolic panel    Collection Time: 09/06/20  4:39 AM   Result Value Ref Range    Sodium 139 136 - 145 mmol/L    Potassium 3.5 3.5 - 5.1 mmol/L    Chloride 103 95 - 110 mmol/L    CO2 21 (L) 23 - 29 mmol/L    Glucose 109 70 - 110 mg/dL    BUN,  Bld 29 (H) 8 - 23 mg/dL    Creatinine 1.3 0.5 - 1.4 mg/dL    Calcium 9.3 8.7 - 10.5 mg/dL    Total Protein 6.6 6.0 - 8.4 g/dL    Albumin 3.7 3.5 - 5.2 g/dL    Total Bilirubin 1.3 (H) 0.1 - 1.0 mg/dL    Alkaline Phosphatase 74 55 - 135 U/L    AST 16 10 - 40 U/L    ALT 17 10 - 44 U/L    Anion Gap 15 8 - 16 mmol/L    eGFR if African American 46.4 (A) >60 mL/min/1.73 m^2    eGFR if non  40.2 (A) >60 mL/min/1.73 m^2   Magnesium    Collection Time: 09/06/20  4:39 AM   Result Value Ref Range    Magnesium 2.2 1.6 - 2.6 mg/dL   Phosphorus    Collection Time: 09/06/20  4:39 AM   Result Value Ref Range    Phosphorus 4.1 2.7 - 4.5 mg/dL       No results for input(s): POCTGLUCOSE in the last 168 hours.    No results found for: HGBA1C     Active Hospital Problems    Diagnosis  POA    *Acute respiratory failure with hypoxia [J96.01]  Yes    Essential hypertension [I10]  Yes    Other hyperlipidemia [E78.49]  Yes    Coronary artery disease involving native coronary artery of native heart without angina pectoris [I25.10]  Yes    Acute on chronic diastolic (congestive) heart failure [I50.33]  Yes    Severe mitral regurgitation [I34.0]  Yes    Left atrial thrombus [I51.3]  Yes    Paroxysmal atrial fibrillation [I48.0]  Yes      Resolved Hospital Problems    Diagnosis Date Resolved POA    SOB (shortness of breath) [R06.02] 09/03/2020 Yes          ASSESSMENT AND PLAN:     Acute on chronic diastolic (congestive) heart failure  Acute respiratory failure with hypoxia  - 2/2 cardiogenic pulmonary edema  - Started on IV furosemide, was in ICU with bipap transitioned to NC  - Transferred to [facility] from  for CTS eval  - CTS and cardiology consulted  - Continue IV lasix 40 mg BID for now  - No LA thrombus noted as per cardiology, appreciate recs  - CTS following, tentative procedure next week  - Wean oxygen as able  - Strict in outs  - Low sodium diet  - Fluid restriction  - Daily standing weight  - Bipap PRN for  worsening dyspnea       Severe mitral regurgitation  - Confirmed via transesophageal echocardiogram on recent admission  - Left heart catheterization with non obstructive coronary artery disease  - Has severe left atrial enlargement   - Management as above  - CTS consulted     Left atrial thrombus - ruled out  - Patient was started apixaban loading dose however discussed with cardiology, doesn't seem to have a LA thrombus     Coronary artery disease involving native coronary artery of native heart without angina pectoris  - Non obstructive disease  - On cardioprotective regimen     Other hyperlipidemia  - Continue statin     Essential hypertension  - continue current meds     Paroxysmal atrial fibrillation  - Currently well controlled on amiodarone 400 mg PO BID and metoprolol 50 mg BID  - Continue NOAC for stroke prophylaxis (CHADS VASc of at least 4), change apixaban to Lovenox for now for tentative procedure next week  - Cardiac monitoring  - Electrolytes reviewed    Hypokalemic  - replete and recheck         Prophylaxis- Lovenox   Code Status- Full Code   Discharge plan and follow up - pending stability, then surgery, then PT OT recs    Discharge Planning   OLE: 9/11/2020     Code Status: Full Code   Is the patient medically ready for discharge?:     Reason for patient still in hospital (select all that apply): Patient trending condition and Consult recommendations  Discharge Plan A: Home          Rosendo Adams MD  Hospital Medicine Staff  Pager [phone]

## 2020-09-07 NOTE — PROGRESS NOTES
[facility] Hospital Medicine                                                                     Progress Note     Team: [facility] HOSP MED A Rosendo Adams MD   Admit Date: 8/30/2020   Hospital Day: 7  OLE: 9/11/2020   Code status: Full Code   Principal Problem: Acute respiratory failure with hypoxia     SUMMARY:     Fatou Lorenzo is a 75 y.o. female that (in part)  has a past medical history of Atrial fibrillation with RVR, Cataract, Glaucoma, Heart valve problem, Hyperlipidemia, and Severe mitral regurgitation.  has a past surgical history that includes Ankle surgery; lipoma removal; and Left heart catheterization (Left, 8/25/2020). Presents to [facility] Emergency Department complaining of recurrent shortness of breath.  Patient states it feels like she is having heart failure again.  She was discharged presumably on August 27th (no discharge summary available at this time), 4 days ago for acute respiratory failure with hypoxia secondary to CHF exacerbation.  The patient is typically followed at [place].  Reports 8 years ago was referred to Dr. Waddell, but valvular regurgitation not bad enough at that time to do surgery.  However see has severe tricuspid and mitral valve disease which was confirmed during her previous admission by echocardiogram.  She is having dyspnea with exertion, shortness of breath at rest, and orthopnea.  Worsened with exertion.  No relieving factors.      In last hospital stay she underwent left heart angiography as well as transesophageal echocardiogram and was diagnosed with a left atrial thrombus.  Nonobstructive CAD.  Had episode of atrial fibrillation with RVR which spontaneously resolved post catheterization.     In the emergency department routine laboratory studies, chest x-ray, EKG, cardiac enzymes were  obtained.  Patient required BiPAP due to hypoxemia not responsive to nasal cannula.  Chest x-ray actually showed improvement as did her cardiac enzymes compared to previous lab studies and imaging during her last hospital stay. Lasix and nitroglycerin given.     Hospital medicine has been asked to admit to inpatient in the ICU due to BiPAP requirements for further evaluation and treatment.     Ms Lorenzo presented with cardiogenic pulmonary edema likely due to severe mitral valve regurgitation. Placed on NIPPV and IV furosemide. Admitted to ICU for close observation. Patient improved, weaned to low flow NC and stepped down to telemetry floor in stable condition. Symptomatic with minimal activity despite adequate diuresis. AFib well controlled on oral amiodarone 400 mg BID and on NOAC for stroke prophylaxis and thrombus to left atrium, which are new diagnoses from recent admission. Cardiology consulted. I discussed case with Dr Jones (cardiologist on call at our facility) who recommends transfer to [facility] for formal CT surgery evaluation given recurrent admission despite compliance, BP control and diuresis. Transfer Center called to request transfer for higher level of care. Case discussed with Dr Waddell (CTS attending) who agrees with transfer. Recommends Hospital Medicine as primary and have his team consulted. Discussed with Dr Phipps ( for [facility] Medicine) who will communicate with accepting HM team at [facility]. ADT 32 placed. Patient was transferred over to [facility] for CTS evaluation.    Transferred 9/2 evening to [facility]. CTS and cardiology consulted. Remains on IV lasix at this time. Cardiology suspect no LA thormbus. Apixaban changed to Lovenox as per CTS. CTS planning tentative surgery next week.      SUBJECTIVE:     Pt was seen and examined at bedside. No acute events overnight. HDS and afebrile. Feel well overall. Didn't get good sleep overnight. Denied any fevers, cp, worsening dyspnea, abdominal  pain, cough. Intermittent anxiety relieved with valium prn. Ambulating with oxygen to bathroom. Good UOP and normal BM. No other issues endorsed.      ROS (Positive in Bold, otherwise negative)  Pain Scale: 0 /10   Constitutional: fever, chills, night sweats, decreased appetite, early satiety   CV: chest pain, edema, palpitations  Resp: SOB, cough, sputum production  GI: changes in appetite, NVDC, pain, melena, hematochezia, GERD, hematemesis  : Dysuria, hematuria, urinary urgency, frequency  MSK: arthralgia/myalgia, joint swelling  SKIN: rashes, pruritis, petechiae   Neuro/Psych: FND, anxiety, depression      OBJECTIVE:     Vitals:  Temp:  [97.1 °F (36.2 °C)-98.7 °F (37.1 °C)]   Pulse:  []   Resp:  [16-20]   BP: (100-127)/(51-75)   SpO2:  [90 %-99 %]      I & O (Last 24H):     Intake/Output Summary (Last 24 hours) at 9/7/2020 1135  Last data filed at 9/7/2020 0906  Gross per 24 hour   Intake 702 ml   Output 1100 ml   Net -398 ml         GEN: Non toxic appearing female  in no acute distress. Nontoxic. Resting in bed. Cooperative.  HEENT: NCAT. PERRL. EOMI. Conjunctivae/corneas clear, sclera Anicteric. improving JVD  CVS: RRR. Normal s1 s2 +holosystolic murmur, no click, rub or gallop  LUNG: CTAB. Normal respiratory effort. Bibasilar rales heard, improved from yesterday. No wheezes, rhonchi.  ABD: Normoactive BS, soft, NT, ND, no masses or organomegaly.  EXT: Trace LE edema. No cyanosis. Full ROM.  SKIN: color, texture, turgor normal. No rashes or lesions  NEURO: Alert, oriented x 4, Spont mvt of all extremities with no focal deficits noted.      All recent labs and imaging has been reviewed.     Recent Results (from the past 24 hour(s))   Comprehensive metabolic panel    Collection Time: 09/07/20  5:42 AM   Result Value Ref Range    Sodium 138 136 - 145 mmol/L    Potassium 3.4 (L) 3.5 - 5.1 mmol/L    Chloride 102 95 - 110 mmol/L    CO2 23 23 - 29 mmol/L    Glucose 103 70 - 110 mg/dL    BUN, Bld 26 (H) 8 - 23  mg/dL    Creatinine 1.4 0.5 - 1.4 mg/dL    Calcium 9.1 8.7 - 10.5 mg/dL    Total Protein 6.5 6.0 - 8.4 g/dL    Albumin 3.7 3.5 - 5.2 g/dL    Total Bilirubin 1.2 (H) 0.1 - 1.0 mg/dL    Alkaline Phosphatase 72 55 - 135 U/L    AST 15 10 - 40 U/L    ALT 17 10 - 44 U/L    Anion Gap 13 8 - 16 mmol/L    eGFR if African American 42.4 (A) >60 mL/min/1.73 m^2    eGFR if non  36.8 (A) >60 mL/min/1.73 m^2   Magnesium    Collection Time: 09/07/20  5:42 AM   Result Value Ref Range    Magnesium 2.2 1.6 - 2.6 mg/dL   Phosphorus    Collection Time: 09/07/20  5:42 AM   Result Value Ref Range    Phosphorus 4.4 2.7 - 4.5 mg/dL       No results for input(s): POCTGLUCOSE in the last 168 hours.    No results found for: HGBA1C     Active Hospital Problems    Diagnosis  POA    *Acute respiratory failure with hypoxia [J96.01]  Yes    Essential hypertension [I10]  Yes    Other hyperlipidemia [E78.49]  Yes    Coronary artery disease involving native coronary artery of native heart without angina pectoris [I25.10]  Yes    Acute on chronic diastolic (congestive) heart failure [I50.33]  Yes    Severe mitral regurgitation [I34.0]  Yes    Left atrial thrombus [I51.3]  Yes    Paroxysmal atrial fibrillation [I48.0]  Yes      Resolved Hospital Problems    Diagnosis Date Resolved POA    SOB (shortness of breath) [R06.02] 09/03/2020 Yes          ASSESSMENT AND PLAN:     Acute on chronic diastolic (congestive) heart failure  Acute respiratory failure with hypoxia  - 2/2 cardiogenic pulmonary edema  - Started on IV furosemide, was in ICU with bipap transitioned to NC  - Transferred to [facility] from  for CTS eval  - CTS and cardiology consulted  - Continue IV lasix 40 mg BID for now  - No LA thrombus noted as per cardiology, appreciate recs  - CTS following, tentative procedure this week  - Wean oxygen as able  - Strict in outs  - Low sodium diet  - Fluid restriction  - Daily standing weight  - Bipap PRN for worsening dyspnea        Severe mitral regurgitation  - Confirmed via transesophageal echocardiogram on recent admission  - Left heart catheterization with non obstructive coronary artery disease  - Has severe left atrial enlargement   - Management as above  - CTS consulted     Left atrial thrombus - ruled out  - Patient was started apixaban loading dose however discussed with cardiology, doesn't seem to have a LA thrombus     Coronary artery disease involving native coronary artery of native heart without angina pectoris  - Non obstructive disease  - On cardioprotective regimen     Other hyperlipidemia  - Continue statin     Essential hypertension  - continue current meds     Paroxysmal atrial fibrillation  - Currently well controlled on amiodarone 400 mg PO BID and metoprolol 50 mg BID  - Continue NOAC for stroke prophylaxis (CHADS VASc of at least 4), change apixaban to Lovenox for now for tentative procedure next week  - Cardiac monitoring  - Electrolytes reviewed    Hypokalemic  - replete and recheck         Prophylaxis- Lovenox   Code Status- Full Code   Discharge plan and follow up - pending stability, then surgery, then PT OT recs    Discharge Planning   OLE: 9/11/2020     Code Status: Full Code   Is the patient medically ready for discharge?:     Reason for patient still in hospital (select all that apply): Patient trending condition and Consult recommendations  Discharge Plan A: Home          Rosendo Adams MD  Hospital Medicine Staff  Pager [phone]

## 2020-09-07 NOTE — PLAN OF CARE
POC reviewed with patient. VSS. IV lasix with good U/O. Up in room ambulating. Denies pain or SOB. Requested  valium x1 dose today. Call light in reach. Will continue to monitor

## 2020-09-07 NOTE — PLAN OF CARE
POC reviewed with patient. VSS. Patient free of injuries and falls. Patient has no c/o pain or discomfort. Telemetry monitor showing NSR. Patient being diuresed with lasix 40 mg BID; diuresing well. Patient awaiting date for MVR surgery. All questions were addressed. WCTM.

## 2020-09-07 NOTE — RESPIRATORY THERAPY
Rapid Response Respiratory Therapy Proactive Rounding Note      Time of visit: 920    Code Status: Full Code   : 1945  Bed: 324/324 A:   MRN: [id number]    SITUATION     Evaluated patient for: Non-Invasive Positive Pressure Ventilation Compliance     BACKGROUND     Patient has a past medical history of Atrial fibrillation with RVR, Cataract, Essential hypertension, Glaucoma, Heart valve problem, Hyperlipidemia, and Severe mitral regurgitation.    ASSESSMENT/INTERVENTIONS     Upon arrival in room pt alert and awake on room air. No respiratory distress at this time. Bipap on standby in room. Pt is refusing to wear bipap at night. Bipap is a prn order.    Pulse: 72 Respiratory rate: 18 SpO2: 98%   Level of Consciousness: Level of Consciousness (AVPU): alert  Respiratory Effort: Respiratory Effort: Normal, Unlabored Expansion/Accessory Muscle Usage: Expansion/Accessory Muscles/Retractions: expansion symmetric, no retractions, no use of accessory muscles  All Lung Field Breath Sounds: All Lung Fields Breath Sounds: Anterior:, Lateral:, diminished  LLL Breath Sounds: clear  RLL Breath Sounds: clear  O2 Device/Concentration room air  Most recent blood gas: No results for input(s): PH, PCO2, PO2, HCO3, POCSATURATED, BE in the last 72 hours.  NIPPV: Yes, Type: bipap on standby Settings: 10/5   Ambu at bedside: Ambu bag with the patient?: Yes, Adult Ambu    Current Respiratory Care Orders:   20 0800  Pulse Oximetry Q4H Every 4 hours (41 of 58 released)    Release    20 0649   Unscheduled  Bipap PRN Use PRN (0 of 52071 released)    Release   Question Answer Comment   FiO2% 50    Inspiratory pressure: 10    Expiratory pressure: 5        20 1535   Unscheduled  Oxygen PRN Use PRN (0 of 71635 released)    Release   References: Oxygen Titration Protocol   Question Answer Comment   Device type: Low flow    Device: Nasal Cannula (1- 5 Liters)    LPM: 2    Titrate O2 per Oxygen Titration Protocol: Yes     To maintain SpO2 goal of: >= 90%    Notify MD of: Inability to achieve desired SpO2; Sudden change in patient status and requires 20% increase in FiO2; Patient requires >60% FiO2        09/03/20 1216   Unscheduled  Inhalation Treatment Q6H PRN Every 6 hours PRN (0 of 40884 released)    Release             RECOMMENDATIONS     We recommend: continue with current POC. Discontinue bipap order if pt continues to refuse. Encourage bipap use if  Pt needs. Pt does not need at this time.    ESCALATION      Physician Escalation (Yes/No) no    Discussed plan of care primary RT, T Mahmood     FOLLOW-UP     Please call back the Rapid Response RT, Zarina Snyder, CRT at x [phone] for any questions or concerns.

## 2020-09-08 NOTE — PT/OT/SLP PROGRESS
Occupational Therapy      Patient Name:  Fatou Lorenzo   MRN:  [id number]    Pt not seen on this date; Chart reviewed and pt remain appropriate for OT services at this time.  Will see pt as schedule allows. Pt is on track to meet plan of care.     Bartolo Ledbetter, OT  9/8/2020

## 2020-09-08 NOTE — ANESTHESIA PREPROCEDURE EVALUATION
[facility]  Anesthesia Pre-Operative Evaluation         Patient Name: Fatou Lorenzo  YOB: 1945  MRN: [id number]    SUBJECTIVE:     Pre-operative evaluation for Procedure(s) (LRB):  Valvuloplasty, Mitral (N/A)  REPAIR, TRICUSPID VALVE (N/A)  Ablation (N/A)     09/08/2020    Fatou Lorenzo is a 75 y.o. female Admitted 8/23 to OSH for SOB found to be in AFIB with RVR. Admitted to ICU for close observation in diastolic HF in  atrial fib  sustained and respiratory failure on BIPAP initially. Patient given amiodarone and diuresed. CHERY revealed severe mitral regurg. Patient eventually weaned to nasal canula and now out of the ICU. Patient transferred to [facility] 9/2 for CTS surgery evaluation.     PMH: afib w/rvr, severe mitral rergug, CAD but no obstructive disease, hypertension     Patient now presents for the above procedure(s).      LDA: None documented.       Peripheral IV - Single Lumen 09/02/20 1441 22 G Left;Posterior Forearm (Active)   Site Assessment No redness;No swelling 09/07/20 2000   Line Status Flushed;Saline locked 09/07/20 2000   Dressing Status Clean;Dry;Intact 09/07/20 2000   Dressing Intervention Integrity maintained 09/07/20 2000   Dressing Change Due 09/07/20 09/07/20 0800   Site Change Due 09/07/20 09/07/20 0800   Reason Not Rotated Other (Comment) 09/07/20 2000   Number of days: 5       Prev airway: None documented.    Drips: None documented      Patient Active Problem List   Diagnosis    Nuclear sclerosis - Both Eyes    Dry eyes - Both Eyes    Refractive error    Acute respiratory failure with hypoxia    Paroxysmal atrial fibrillation    Severe mitral regurgitation    Left atrial thrombus    Essential hypertension    Other hyperlipidemia    Coronary artery disease involving native coronary artery of native heart without angina pectoris    Acute on  chronic diastolic (congestive) heart failure       Review of patient's allergies indicates:  No Known Allergies    Current Inpatient Medications:      Current Facility-Administered Medications on File Prior to Encounter   Medication Dose Route Frequency Provider Last Rate Last Dose    acetaminophen tablet 650 mg  650 mg Oral Q6H PRN Rosendo Adams MD        albuterol-ipratropium 2.5 mg-0.5 mg/3 mL nebulizer solution 3 mL  3 mL Nebulization Q6H PRN Rosendo Adams MD   3 mL at 09/05/20 1142    amiodarone tablet 400 mg  400 mg Oral BID Bella Ramos MD   400 mg at 09/08/20 0838    calcium carbonate 200 mg calcium (500 mg) chewable tablet 1,000 mg  1,000 mg Oral TID PRN Teresa Funes NP   1,000 mg at 09/06/20 2044    diazePAM tablet 5 mg  5 mg Oral Q8H PRN Rosendo Adams MD   5 mg at 09/07/20 1302    famotidine tablet 20 mg  20 mg Oral Daily Bella Ramos MD   20 mg at 09/08/20 0838    furosemide injection 40 mg  40 mg Intravenous Daily Rosendo Adams MD        hydrOXYzine HCL tablet 50 mg  50 mg Oral QHS Rosendo Adams MD   50 mg at 09/07/20 2119    melatonin tablet 6 mg  6 mg Oral Nightly PRN Jeremie Nguyen MD   6 mg at 09/07/20 2119    metoprolol tartrate (LOPRESSOR) tablet 50 mg  50 mg Oral BID Jevon Jones MD   50 mg at 09/08/20 0838    nitroGLYCERIN SL tablet 0.4 mg  0.4 mg Sublingual PRN Jevon Jones MD   0.4 mg at 09/01/20 1005    ondansetron injection 4 mg  4 mg Intravenous Q4H PRN Bella Ramos MD        polyethylene glycol packet 17 g  17 g Oral Daily Bella Ramos MD   17 g at 09/01/20 0912    pravastatin tablet 80 mg  80 mg Oral Daily Bella Ramos MD   80 mg at 09/08/20 0838    promethazine (PHENERGAN) 6.25 mg in dextrose 5 % 50 mL IVPB  6.25 mg Intravenous Q6H PRN Bella Ramos MD        sodium chloride 0.9% flush 10 mL  10 mL Intravenous PRN Bella Ramos MD         Current Outpatient Medications on File Prior to Encounter   Medication Sig Dispense Refill     amiodarone (PACERONE) 400 MG tablet Take two tablets by mouth twice daily for twelve days, then take one tablet by mouth daily. 42 tablet 0    apixaban (ELIQUIS) 5 mg Tab Take 1 tablet (5 mg total) by mouth 2 (two) times daily. 60 tablet 1    furosemide (LASIX) 40 MG tablet Take 1 tablet (40 mg total) by mouth 2 (two) times daily. 60 tablet 1    losartan (COZAAR) 50 MG tablet Take 0.5 tablets (25 mg total) by mouth once daily. 30 tablet 1    metoprolol tartrate (LOPRESSOR) 25 MG tablet Take 1 tablet (25 mg total) by mouth 3 (three) times daily. 90 tablet 1    pravastatin (PRAVACHOL) 80 MG tablet Take 80 mg by mouth once daily.         Past Surgical History:   Procedure Laterality Date    ANKLE SURGERY      LEFT HEART CATHETERIZATION Left 8/25/2020    Procedure: Left heart cath, radial;  Surgeon: Marlee Carrillo MD;  Location: [facility] CATH LAB;  Service: Cardiology;  Laterality: Left;    lipoma removal         Social History     Socioeconomic History    Marital status:      Spouse name: Not on file    Number of children: Not on file    Years of education: Not on file    Highest education level: Not on file   Occupational History    Not on file   Social Needs    Financial resource strain: Not on file    Food insecurity     Worry: Not on file     Inability: Not on file    Transportation needs     Medical: Not on file     Non-medical: Not on file   Tobacco Use    Smoking status: Never Smoker    Smokeless tobacco: Never Used   Substance and Sexual Activity    Alcohol use: No    Drug use: No    Sexual activity: Not Currently   Lifestyle    Physical activity     Days per week: Not on file     Minutes per session: Not on file    Stress: Not on file   Relationships    Social connections     Talks on phone: Not on file     Gets together: Not on file     Attends Mandaeism service: Not on file     Active member of club or organization: Not on file     Attends meetings of clubs or organizations: Not  on file     Relationship status: Not on file   Other Topics Concern    Not on file   Social History Narrative    Not on file       OBJECTIVE:     Vital Signs Range (Last 24H):  Temp:  [36.4 °C (97.5 °F)-37.4 °C (99.4 °F)]   Pulse:  [58-98]   Resp:  [16-25]   BP: ()/(56-72)   SpO2:  [93 %-98 %]       Significant Labs:  Lab Results   Component Value Date    WBC 12.58 09/04/2020    HGB 14.0 09/04/2020    HCT 44.9 09/04/2020     09/04/2020    ALT 27 09/08/2020    AST 30 09/08/2020     09/08/2020    K 3.8 09/08/2020     09/08/2020    CREATININE 1.6 (H) 09/08/2020    BUN 27 (H) 09/08/2020    CO2 26 09/08/2020    INR 1.0 08/30/2020       Diagnostic Studies: No relevant studies.    EKG:   Results for orders placed or performed during the hospital encounter of 08/30/20   EKG 12-lead    Collection Time: 08/30/20 10:17 PM    Narrative    Test Reason : R06.02,    Vent. Rate : 083 BPM     Atrial Rate : 083 BPM     P-R Int : 288 ms          QRS Dur : 116 ms      QT Int : 424 ms       P-R-T Axes : 055 051 077 degrees     QTc Int : 498 ms    Sinus rhythm with 1st degree A-V block with occasional Premature  ventricular complexes  Incomplete left bundle branch block  Nonspecific ST and T wave abnormality  Prolonged QT  Abnormal ECG  When compared with ECG of 25-AUG-2020 13:33,  Significant changes have occurred  Confirmed by Jevon Jones MD ([id number]) on 8/31/2020 2:21:53 PM    Referred By: [email or web address]   SELF           Confirmed By:Jevon Jones MD       2D ECHO:  TTE:  Results for orders placed or performed during the hospital encounter of 08/23/20   Echo Color Flow Doppler? Yes   Result Value Ref Range    Ascending aorta 2.11 cm    STJ 1.78 cm    AV mean gradient 2 mmHg    Ao peak becca 1.02 m/s    Ao VTI 13.09 cm    IVRT 65.74 msec    IVS 1.17 (A) 0.6 - 1.1 cm    LA size 5.65 cm    Left Atrium Major Axis 6.25 cm    Left Atrium Minor Axis 6.91 cm    LVIDD 4.66 3.5 - 6.0 cm    LVIDS 3.39 2.1 - 4.0 cm    LVOT  134 diameter 1.90 cm    LVOT peak VTI 11.30 cm    PW 1.10 0.6 - 1.1 cm    MV Peak A Riley 0.86 m/s    E wave decelartion time 167.17 msec    MV Peak E Riley 1.89 m/s    RA Major Axis 3.27 cm    RVDD 2.95 cm    Sinus 2.63 cm    TAPSE 2.11 cm    TR Max Riley 3.53 m/s    TDI LATERAL 0.12 m/s    TDI SEPTAL 0.09 m/s    LA WIDTH 5.88 cm    Ao root annulus 2.50 cm    AORTIC VALVE CUSP SEPERATION 1.87 cm    PV PEAK VELOCITY 0.81 cm/s    MV stenosis pressure 1/2 time 48.48 ms    LV Diastolic Volume 100.12 mL    LV Systolic Volume 46.93 mL    LVOT peak riley 0.52 m/s    Mr max riley 0.05 m/s    LV LATERAL E/E' RATIO 15.75 m/s    LV SEPTAL E/E' RATIO 21.00 m/s    FS 27 %    LA volume 185.34 cm3    LV mass 193.31 g    Left Ventricle Relative Wall Thickness 0.47 cm    AV valve area 2.45 cm2    AV Velocity Ratio 0.51     AV index (prosthetic) 0.86     MV valve area p 1/2 method 4.54 cm2    E/A ratio 2.20     Mean e' 0.11 m/s    LVOT area 2.8 cm2    LVOT stroke volume 32.02 cm3    AV peak gradient 4 mmHg    E/E' ratio 18.00 m/s    LV Systolic Volume Index 24.7 mL/m2    LV Diastolic Volume Index 52.67 mL/m2    LA Volume Index 97.5 mL/m2    LV Mass Index 102 g/m2    Triscuspid Valve Regurgitation Peak Gradient 50 mmHg    BSA 1.95 m2    Right Atrial Pressure (from IVC) 3 mmHg    TV rest pulmonary artery pressure 53 mmHg    Narrative    · Normal left ventricular systolic function. The estimated ejection   fraction is 60%.  · Concentric left ventricular hypertrophy.  · Severe mitral regurgitation.  · Moderate to severe tricuspid regurgitation.  · Mild-to-moderate aortic valve stenosis.  · Aortic valve area is 2.45 cm2; peak velocity is 1.02 m/s; mean gradient   is 2 mmHg.  · Severe left atrial enlargement.  · Grade III (severe) left ventricular diastolic dysfunction consistent   with restrictive physiology.  · Normal right ventricular systolic function.  · Mild pulmonic regurgitation.  · Normal central venous pressure (3 mmHg).  · The estimated PA  systolic pressure is 53 mmHg.  · Pulmonary hypertension present.     mitral valve prolaspe with severe regurgitation        CHERY:  No results found for this or any previous visit.    ASSESSMENT/PLAN:         Anesthesia Evaluation    I have reviewed the Patient Summary Reports.    I have reviewed the Nursing Notes. I have reviewed the NPO Status.   I have reviewed the Medications.     Review of Systems  Anesthesia Hx:  No problems with previous Anesthesia  History of prior surgery of interest to airway management or planning:  Denies Personal Hx of Anesthesia complications.   Social:  Non-Smoker    Cardiovascular:   Exercise tolerance: good Denies Pacemaker. Valvular problems/Murmurs (severe mitral regurgitation; mild aortic regurg), MR, AI CAD  asymptomatic Dysrhythmias atrial fibrillation CHF hyperlipidemia    Pulmonary:   Denies Pneumonia Asthma Shortness of breath Denies Recent URI.  Denies Sleep Apnea.    Renal/:  Renal/ Normal     Hepatic/GI:  Hepatic/GI Normal    Neurological:  Neurology Normal    Endocrine:  Endocrine Normal        Physical Exam  General:  Obesity    Airway/Jaw/Neck:  Airway Findings: Mouth Opening: Normal Tongue: Normal  General Airway Assessment: Adult, Good  Mallampati: I  TM Distance: Normal, at least 6 cm  Jaw/Neck Findings:  Neck ROM: Normal ROM      Dental:  Dental Findings: In tact   Chest/Lungs:  Chest/Lungs Clear    Heart/Vascular:  Heart Findings: Rate: Tachycardia  Rhythm: Irregularly Irregular        Mental Status:  Mental Status Findings: Normal        Anesthesia Plan  Type of Anesthesia, risks & benefits discussed:  Anesthesia Type:  general  Patient's Preference:   Intra-op Monitoring Plan: standard ASA monitors, central line, [place], arterial line and cardiac output  Intra-op Monitoring Plan Comments:   Post Op Pain Control Plan: multimodal analgesia, IV/PO Opioids PRN and per primary service following discharge from PACU  Post Op Pain Control Plan Comments:    Induction:   IV  Beta Blocker:  Patient is on a Beta-Blocker and has received one dose within the past 24 hours (No further documentation required).       Informed Consent: Patient understands risks and agrees with Anesthesia plan.  Questions answered. Anesthesia consent signed with patient.  ASA Score: 4     Day of Surgery Review of History & Physical:    H&P update referred to the surgeon.         Ready For Surgery From Anesthesia Perspective.

## 2020-09-08 NOTE — PROGRESS NOTES
[facility] Hospital Medicine                                                                     Progress Note     Team: [facility] HOSP MED A Rosendo Adams MD   Admit Date: 8/30/2020   Hospital Day: 8  OLE: 9/11/2020   Code status: Full Code   Principal Problem: Acute respiratory failure with hypoxia     SUMMARY:     Fatou Lorenzo is a 75 y.o. female that (in part)  has a past medical history of Atrial fibrillation with RVR, Cataract, Glaucoma, Heart valve problem, Hyperlipidemia, and Severe mitral regurgitation.  has a past surgical history that includes Ankle surgery; lipoma removal; and Left heart catheterization (Left, 8/25/2020). Presents to [facility] Emergency Department complaining of recurrent shortness of breath.  Patient states it feels like she is having heart failure again.  She was discharged presumably on August 27th (no discharge summary available at this time), 4 days ago for acute respiratory failure with hypoxia secondary to CHF exacerbation.  The patient is typically followed at [place].  Reports 8 years ago was referred to Dr. Waddell, but valvular regurgitation not bad enough at that time to do surgery.  However see has severe tricuspid and mitral valve disease which was confirmed during her previous admission by echocardiogram.  She is having dyspnea with exertion, shortness of breath at rest, and orthopnea.  Worsened with exertion.  No relieving factors.      In last hospital stay she underwent left heart angiography as well as transesophageal echocardiogram and was diagnosed with a left atrial thrombus.  Nonobstructive CAD.  Had episode of atrial fibrillation with RVR which spontaneously resolved post catheterization.     In the emergency department routine laboratory studies, chest x-ray, EKG, cardiac enzymes were  obtained.  Patient required BiPAP due to hypoxemia not responsive to nasal cannula.  Chest x-ray actually showed improvement as did her cardiac enzymes compared to previous lab studies and imaging during her last hospital stay. Lasix and nitroglycerin given.     Hospital medicine has been asked to admit to inpatient in the ICU due to BiPAP requirements for further evaluation and treatment.     Ms Lorenzo presented with cardiogenic pulmonary edema likely due to severe mitral valve regurgitation. Placed on NIPPV and IV furosemide. Admitted to ICU for close observation. Patient improved, weaned to low flow NC and stepped down to telemetry floor in stable condition. Symptomatic with minimal activity despite adequate diuresis. AFib well controlled on oral amiodarone 400 mg BID and on NOAC for stroke prophylaxis and thrombus to left atrium, which are new diagnoses from recent admission. Cardiology consulted. I discussed case with Dr Jones (cardiologist on call at our facility) who recommends transfer to [facility] for formal CT surgery evaluation given recurrent admission despite compliance, BP control and diuresis. Transfer Center called to request transfer for higher level of care. Case discussed with Dr Waddell (CTS attending) who agrees with transfer. Recommends Hospital Medicine as primary and have his team consulted. Discussed with Dr Phipps ( for [facility]) who will communicate with accepting HM team at [facility]. ADT 32 placed. Patient was transferred over to [facility] for CTS evaluation.    Transferred 9/2 evening to [facility]. CTS and cardiology consulted. Remains on IV lasix at this time. Cardiology suspect no LA thormbus. Apixaban changed to Lovenox as per CTS. IV lasix changed to PO Bumex 1mg BID. CTS planning surgery 9/9.      SUBJECTIVE:     Pt was seen and examined at bedside. No acute events overnight. HDS and afebrile. Denied any fevers, cp, worsening dyspnea, abdominal pain, cough. Ambulating with  oxygen to bathroom. Good UOP and normal BM. No other issues endorsed. Changed IV to po diuretics.       ROS (Positive in Bold, otherwise negative)  Pain Scale: 0 /10   Constitutional: fever, chills, night sweats, decreased appetite, early satiety   CV: chest pain, edema, palpitations  Resp: SOB, cough, sputum production  GI: changes in appetite, NVDC, pain, melena, hematochezia, GERD, hematemesis  : Dysuria, hematuria, urinary urgency, frequency  MSK: arthralgia/myalgia, joint swelling  SKIN: rashes, pruritis, petechiae   Neuro/Psych: FND, anxiety, depression      OBJECTIVE:     Vitals:  Temp:  [97.5 °F (36.4 °C)-99.4 °F (37.4 °C)]   Pulse:  [58-80]   Resp:  [16-25]   BP: ()/(56-72)   SpO2:  [93 %-98 %]      I & O (Last 24H):     Intake/Output Summary (Last 24 hours) at 9/8/2020 1212  Last data filed at 9/8/2020 0600  Gross per 24 hour   Intake 847 ml   Output 900 ml   Net -53 ml         GEN: Non toxic appearing female  in no acute distress. Nontoxic. Resting in bed. Cooperative.  HEENT: NCAT. PERRL. EOMI. Conjunctivae/corneas clear, sclera Anicteric. Resolved JVD  CVS: RRR. Normal s1 s2 +holosystolic murmur, no click, rub or gallop  LUNG: CTAB. Normal respiratory effort. No rales heard. No wheezes, rhonchi.  ABD: Normoactive BS, soft, NT, ND, no masses or organomegaly.  EXT: Trace LE edema. No cyanosis. Full ROM.  SKIN: color, texture, turgor normal. No rashes or lesions  NEURO: Alert, oriented x 4, Spont mvt of all extremities with no focal deficits noted.      All recent labs and imaging has been reviewed.     Recent Results (from the past 24 hour(s))   Comprehensive metabolic panel    Collection Time: 09/08/20  4:04 AM   Result Value Ref Range    Sodium 140 136 - 145 mmol/L    Potassium 3.8 3.5 - 5.1 mmol/L    Chloride 103 95 - 110 mmol/L    CO2 26 23 - 29 mmol/L    Glucose 101 70 - 110 mg/dL    BUN, Bld 27 (H) 8 - 23 mg/dL    Creatinine 1.6 (H) 0.5 - 1.4 mg/dL    Calcium 9.0 8.7 - 10.5 mg/dL    Total  Protein 6.4 6.0 - 8.4 g/dL    Albumin 3.7 3.5 - 5.2 g/dL    Total Bilirubin 1.1 (H) 0.1 - 1.0 mg/dL    Alkaline Phosphatase 73 55 - 135 U/L    AST 30 10 - 40 U/L    ALT 27 10 - 44 U/L    Anion Gap 11 8 - 16 mmol/L    eGFR if African American 36.1 (A) >60 mL/min/1.73 m^2    eGFR if non  31.3 (A) >60 mL/min/1.73 m^2   Magnesium    Collection Time: 09/08/20  4:04 AM   Result Value Ref Range    Magnesium 2.3 1.6 - 2.6 mg/dL   Phosphorus    Collection Time: 09/08/20  4:04 AM   Result Value Ref Range    Phosphorus 4.0 2.7 - 4.5 mg/dL       No results for input(s): POCTGLUCOSE in the last 168 hours.    No results found for: HGBA1C     Active Hospital Problems    Diagnosis  POA    *Acute respiratory failure with hypoxia [J96.01]  Yes    Essential hypertension [I10]  Yes    Other hyperlipidemia [E78.49]  Yes    Coronary artery disease involving native coronary artery of native heart without angina pectoris [I25.10]  Yes    Acute on chronic diastolic (congestive) heart failure [I50.33]  Yes    Severe mitral regurgitation [I34.0]  Yes    Left atrial thrombus [I51.3]  Yes    Paroxysmal atrial fibrillation [I48.0]  Yes      Resolved Hospital Problems    Diagnosis Date Resolved POA    SOB (shortness of breath) [R06.02] 09/03/2020 Yes          ASSESSMENT AND PLAN:     Acute on chronic diastolic (congestive) heart failure  Acute respiratory failure with hypoxia  - 2/2 cardiogenic pulmonary edema  - Started on IV furosemide, was in ICU with bipap transitioned to NC  - Transferred to [facility] from  for CTS eval  - CTS and cardiology consulted  - Started on IV lasix 40 mg BID, changed to PO Bumex 1mg BID  - No LA thrombus noted as per cardiology, appreciate recs  - CTS following, planned for surgery tomorrow 9/9  - Wean oxygen as able  - Strict in outs  - Low sodium diet  - Fluid restriction  - Daily standing weight  - Bipap PRN for worsening dyspnea       Severe mitral regurgitation  - Confirmed via  transesophageal echocardiogram on recent admission  - Left heart catheterization with non obstructive coronary artery disease  - Has severe left atrial enlargement   - Management as above  - CTS consulted     Left atrial thrombus - ruled out  - Patient was started apixaban loading dose however discussed with cardiology, doesn't seem to have a LA thrombus     Coronary artery disease involving native coronary artery of native heart without angina pectoris  - Non obstructive disease  - On cardioprotective regimen     Other hyperlipidemia  - Continue statin     Essential hypertension  - continue current meds     Paroxysmal atrial fibrillation  - Currently well controlled on amiodarone 400 mg PO BID and metoprolol 50 mg BID  - Continue NOAC for stroke prophylaxis (CHADS VASc of at least 4), change apixaban to Lovenox for now for tentative procedure next week, holding lovenox 9/8 for surgery 9/9, resume as per CTS recs  - Cardiac monitoring  - Electrolytes reviewed    Hypokalemic  - replete and recheck         Prophylaxis- DC Lovenox for procedure tomorrow  Code Status- Full Code   Discharge plan and follow up - CHF resolved, pending surgery, then PT OT recs    Discharge Planning   OLE: 9/11/2020     Code Status: Full Code   Is the patient medically ready for discharge?:     Reason for patient still in hospital (select all that apply): Patient trending condition and Consult recommendations  Discharge Plan A: Home          Rosendo Adams MD  Hospital Medicine Staff  Pager [phone]

## 2020-09-08 NOTE — TELEPHONE ENCOUNTER
Patient escalated to Covid System tracker in response to text message.  Patient is currently inpatient   No contact required at this time

## 2020-09-08 NOTE — PLAN OF CARE
Plan of care discussed with patient. Patient is free of fall/trauma/injury. Patient ambulates independently. Denies CP, SOB, or pain/discomfort. Prn diazepam administered for anxiety about procedure (MVR) tomorrow; consents in chart. Routine COVID test administered for Mitral Valve Repair tomorrow; resulted negative. Electrolytes replenished as ordered. Lasix 40mg IVP administered. All questions addressed. Will continue to monitor.

## 2020-09-08 NOTE — PT/OT/SLP PROGRESS
Physical Therapy      Patient Name:  Fatou Lorenzo   MRN:  [id number]    Patient not seen today secondary to surgeons speaking with patient upon attempt at 15:45. PT unable to return at a later time. Will follow-up tomorrow as available and appropriate for therapy.     Lyubov Green, PT   9/8/2020

## 2020-09-08 NOTE — PLAN OF CARE
09/08/20 1523   Discharge Reassessment   Assessment Type Discharge Planning Reassessment   Provided patient/caregiver education on the expected discharge date and the discharge plan Yes   Do you have any problems affording any of your prescribed medications? No   Discharge Plan A Home Health   Discharge Plan B Rehab   DME Needed Upon Discharge  none   Anticipated Discharge Disposition Home-Health     Pt to have surgery tomorrow then transfer to ICU. Needs TBD.    Julie Haase RN  Case Management [phone]

## 2020-09-08 NOTE — PLAN OF CARE
Patient cooperative and pleasant with routine care and procedures.  Fall precautions reviewed and patient verbalized understanding.  Patient ambulating in room independently with steady gait and no complaints of weakness or dizziness.  Resting quietly without complaints.  Will continue to monitor.

## 2020-09-09 NOTE — PLAN OF CARE
Pt NPO at [facility] for a MVR scheduled tomorrow. PRN Valium given for anxiety in regards to the procedure. Pre-op orders reviewed, chlorhexidine bath completed. Pre-op checklist started. Lovenox d/c'd for MVR. Pt remained free from falls/trauma/injury. VSS. Denies any CP, SOB, palpitations, dizziness, pain and discomfort. Turning/repositioning independently in bed. Plan of care reviewed with patient and all questions answered, verbalizes understanding. No acute distress noted. Will continue to monitor.

## 2020-09-09 NOTE — PT/OT/SLP PROGRESS
Physical Therapy Missed Treatment Note      Patient Name:  Fatou Lorenzo   MRN:  [id number]  Admitting Diagnosis:  Acute respiratory failure with hypoxia   Recent Surgery: Procedure(s) (LRB):  Valvuloplasty, Mitral (N/A)  REPAIR, TRICUSPID VALVE (N/A)  Ablation (N/A) Day of Surgery  Admit Date: 8/30/2020  Length of Stay: 9 days    Patient not seen today due to Unavailable: Pt MARY to OR for mitral valvuloplasty and tricuspid repair with tfer to ICU.  Fatou Lorenzo's plan of care and PT goals reviewed on this date and remain appropriate. Will follow-up for progressive mobility as appropriate pending continued medical stability and patient participation.    Yenny Corral PT, DPT  9/9/2020   Pager: [phone]

## 2020-09-09 NOTE — PT/OT/SLP DISCHARGE
Physical Therapy Discharge Summary    Name: Fatou Lorenzo  MRN: [id number]   Principal Problem: Acute respiratory failure with hypoxia     Patient Discharged from acute Physical Therapy on 2020.  Please refer to prior PT noted date on 2020 for functional status.     Assessment:     Patient to OR for mitral valvuloplasty and tricuspid repair on this date (2020). Patient to transfer to ICU after. New PT/OT orders needed after surgical procedure.    Objective:     GOALS:   Multidisciplinary Problems     Physical Therapy Goals        Problem: Physical Therapy Goal    Goal Priority Disciplines Outcome Goal Variances Interventions   Physical Therapy Goal     PT, PT/OT Ongoing, Progressing     Description: Goals to be met by: 2020    Patient will increase functional independence with mobility by performin. Gait  x 400 feet with [place] using No Assistive Device to prepare for community ambulation and endurance activities.                         Reasons for Discontinuation of Therapy Services  S/p surgery and subsequent ICU transfer . Please re-consult when appropriate.      Plan:     Patient Discharged to: SICU.    Yenny Corral PT, DPT  2020  Pager: [phone]

## 2020-09-09 NOTE — ANESTHESIA PROCEDURE NOTES
[place] [facility] [place]    Diagnosis: mitral regurgitation  Patient location during procedure: done in OR  Procedure start time: 9/9/2020 3:15 PM  Timeout: 9/9/2020 3:15 PM  Procedure end time: 9/9/2020 3:40 PM    Staffing  Authorizing Provider: Lily Khan MD  Performing Provider: Jayleen Anderson MD    Anesthesiologist was present at the time of the procedure.  Preanesthetic Checklist  Completed: patient identified, site marked, surgical consent, pre-op evaluation, timeout performed, IV checked, risks and benefits discussed, monitors and equipment checked and anesthesia consent given  [place] [facility]  Skin Prep: chlorhexidine gluconate and isopropyl alcohol  Location: right,  internal jugular vein  Vessel Caliber: large, patent, compressibility normal  Vascular Doppler:  not done  Introducer: 9 Fr single lumen, manometry not used.  Device: CCO/Oximetric Catheter  Catheter Size: 9 Fr  Catheter placement by yes. Heme positive aspiration all ports. PAC floated with balloon up until wedged (wedged then retracted to coil)Sterile sheath usedInsertion Attempts: 1  Indication: intravenous therapy, hemodynamic monitoring  Ultrasound Guidance  Needle advanced into vessel with real time Ultrasound guidance.  Image recorded and saved.  Guidewire confirmed in vessel.  Sterile sheath used.  Assessment  Central Line Bundle Protocol followed. Hand hygiene before procedure, surgical cap worn, surgical mask worn, sterile surgical gloves worn, large sterile drape used.  Verification: ultrasound and blood return  Dressing: sutured in place and taped and tegaderm  Patient: Tolerated Well

## 2020-09-09 NOTE — PROGRESS NOTES
"[facility]  Adult Nutrition  Progress Note    SUMMARY       Recommendations  1. As medically appropriate, ADAT to low Na.   2. Add Boost Plus TID (chocolate per pt preference) to aid in intake.   3. RD to monitor.    Goals: Pt to return to nutritionally adequate diet and meet > 75% EEN/EPN by RD follow up  Nutrition Goal Status: new  Communication of RD Recs: other (comment)(POC)    Reason for Assessment    Reason For Assessment: RD follow-up  Diagnosis: (Acute respiratory failure with hypoxia)  Relevant Medical History: afib w/ RVR, HTN, HLD  Interdisciplinary Rounds: did not attend  General Information Comments: Pt sitting up in bed with no family at bedside this AM. NPO today for procedure later this afternoon. Pt reports decreased appetite since admission. Agreeable to ONS to aid in intake. Pt otherwise with good PO intake PTA and intentional 13# wt loss per previous RD note. 13# wt loss since admission, likely fluid 2/2 diuresis. NFPE completed today with no physical s/s of malnutrition at this time.  Nutrition Discharge Planning: Adequate PO intake on low Na diet    Nutrition Risk Screen    Nutrition Risk Screen: no indicators present    Nutrition/Diet History    Spiritual, Cultural Beliefs, Temple Practices, Values that Affect Care: no    Anthropometrics    Temp: 97.7 °F (36.5 °C)  Height Method: Stated  Height: 5' 5" (165.1 cm)  Height (inches): 65 in  Weight Method: Standard Scale  Weight: 74.7 kg (164 lb 12.7 oz)  Weight (lb): 164.8 lb  Ideal Body Weight (IBW), Female: 125 lb  % Ideal Body Weight, Female (lb): 136.16 %  BMI (Calculated): 27.4    Lab/Procedures/Meds    Pertinent Labs Reviewed: reviewed  Pertinent Labs Comments: BUN 24, Cr 1.6, GFR 31.3  Pertinent Medications Reviewed: reviewed  Pertinent Medications Comments: amiodarone, famotidine, polyethylene glycol, statin    Estimated/Assessed Needs    Weight Used For Calorie Calculations: 74.7 kg (164 lb 10.9 oz)  Energy Calorie " Requirements (kcal): 1554 kcal/day  Energy Need Method: Stigler-St Jeor(x 1.25 PAL)  Protein Requirements: 75-90 g/day(1-1.2 g/kg)  Weight Used For Protein Calculations: 74.7 kg (164 lb 10.9 oz)  Fluid Requirements (mL): per MD or 1 mL/kcal     RDA Method (mL): 1554    Nutrition Prescription Ordered    Current Diet Order: NPO    Evaluation of Received Nutrient/Fluid Intake    I/O: -0.8L since admit  Comments: LBM 9/7  % Intake of Estimated Energy Needs: 50 - 75 %  % Meal Intake: 50 - 75 %    Nutrition Risk    Level of Risk/Frequency of Follow-up: low     Assessment and Plan    Nutrition Problem  Inadequate energy intake    Related to (etiology):   Decreased appetite    Signs and Symptoms (as evidenced by):   Pt reports decreased appetite since admission and consuming < 75% of meals     Interventions (treatment strategy):  Collaboration with other providers    Nutrition Diagnosis Status:   New     Monitor and Evaluation    Food and Nutrient Intake: energy intake, food and beverage intake  Food and Nutrient Adminstration: diet order  Knowledge/Beliefs/Attitudes: food and nutrition knowledge/skill  Anthropometric Measurements: weight, weight change, body mass index  Biochemical Data, Medical Tests and Procedures: electrolyte and renal panel, gastrointestinal profile, glucose/endocrine profile, inflammatory profile, lipid profile  Nutrition-Focused Physical Findings: overall appearance    Malnutrition Assessment  Orbital Region (Subcutaneous Fat Loss): well nourished  Upper Arm Region (Subcutaneous Fat Loss): well nourished   Hindu Region (Muscle Loss): well nourished  Clavicle Bone Region (Muscle Loss): well nourished  Clavicle and Acromion Bone Region (Muscle Loss): well nourished  Dorsal Hand (Muscle Loss): well nourished  Anterior Thigh Region (Muscle Loss): well nourished  Posterior Calf Region (Muscle Loss): well nourished     Nutrition Follow-Up    RD Follow-up?: Yes

## 2020-09-09 NOTE — PT/OT/SLP DISCHARGE
Occupational Therapy Discharge Summary    Fatou Lorenzo  MRN: [id number]   Principal Problem: Acute respiratory failure with hypoxia      Patient Discharged from acute Occupational Therapy on 9/9/2020. Please refer to prior OT note dated 9/4/2020 for functional status.    Assessment:     Patient discharged 2* to patient to OR for mitral valvuloplasty and tricuspid repair 9/9/2020. Please re-consult therapy following surgery for re-eval.      Objective:     GOALS:   Multidisciplinary Problems     Occupational Therapy Goals        Problem: Occupational Therapy Goal    Goal Priority Disciplines Outcome Interventions   Occupational Therapy Goal     OT, PT/OT Ongoing, Progressing    Description: Goals to be met by: 10/04/2020      Patient will increase functional independence with ADLs by performing:    UE Dressing with [place].  LE Dressing with Modified [place].  Grooming while standing with Modified [place].  Toileting from toilet with Modified [place] for hygiene and clothing management.   Toilet transfer to toilet with Modified [place].  Increased functional strength to WFL for self  care.  Upper extremity exercise program x10 reps per handout, with independence.                      Reasons for Discontinuation of Therapy Services  Surgical procedure     Plan:     Patient Discharged to: ICU following surgery    Lorena Cronin OT  9/9/2020

## 2020-09-09 NOTE — ANESTHESIA PROCEDURE NOTES
[facility]    Diagnosis: mitral regurgitation   Patient location during procedure: OR  Procedure start time: 9/9/2020 5:22 PM  Timeout: 9/9/2020 5:22 PM  Procedure end time: 9/9/2020 5:22 PM  Exam type: Baseline  Staffing  Anesthesiologist: MARILYN Cardenas MD  Performed: anesthesiologist   Preanesthetic Checklist  Completed: patient identified, surgical consent, pre-op evaluation, timeout performed, risks and benefits discussed, monitors and equipment checked, anesthesia consent given, oxygen available, suction available, hand hygiene performed and patient being monitored  Setup & Induction  Patient preparation: bite block inserted  Probe Insertion: easy  Exam: complete      Findings  Impression  Other Findings  Normal LV size and function.  Moderately depressed RV intraop.   Severe MR with flail P2.  PISA radius 1.01 at 61.6 cm/s   Mild TR   No other valvular abnormalities     Post first bypass findings:   Mild to moderate MR,  Went back on pump to repair     Post Second bypass findings:   Trace MR  Moderately depressed LV, mildly depressed RV   Trace AI   No dissection   No TR or other valvular abnormalities       Probe Removal      Exam     Left Heart  Left Atruim: dilated    Left Ventricle: 5.42 cm, mildy abnormal (men 6.0-6.3; women 5.3-5.6)  LV Wall Thickness (posterior wall):0.94 cm, normal (men 0.6-1.0 cm; women 0.6-0.9 cm)    LVAD:no  Estimated Ejection Fraction: > 55% normal  Regional Wall Abnormalities: no RWMA        Left Ventricle Diastolic Function  E Velocity: 126 cm/s  A Velocity: 54.7 cm/s  Deceleration time: 158 ms, Restrictive (<160)  E/A Ratio: 2.3, restrictive (>/=2)    Right Heart  Right Ventricle: normal  Right Ventricle Function: mildly decreased  Right Atria: cm and mildly abnormal    Intra Atrial Septum  PFO: no shunt by color flow doppler  no IAS aneurysm  no lipomatous hypertrophy  no Atrial Septal Defect (Asd)    Right Ventricle  Size: normal, Free Wall Thickness: RVH >/= 0.5cm    Aortic  Valve:  Stenosis: none  Morphology: trileaflet  Regurgitation: no aortic valve regurgitation   Diastolic Flow Reversal in Descending Aorta: no diastolic flow reversal in descending aorta    Mitral Valve:  Morphology:myxomatous  Flail: P2  Jet Description: severe    Tricuspid Valve:  Morphology: normal  Regurgitation: moderate    Pulmonic Valve:  Morphology:normal  Regurgitation(color flow): none    Great Vessels  Ascending Aorta Atherosclerosis: 2=mild dz (<3mm)  Aortic Arch Atherosclerosis: 2=mild dz (<3mm)  IABP: no  Descending Aorta Atherosclerosis: 2=mild dz (<3mm)  Aorta    Descending aorta IABP: no    Effusions  Effusions: none    Summary  Findings discussed with surgeon.    Other Findings   Normal LV size and function.  Moderately depressed RV intraop.   Severe MR with flail P2.  PISA radius 1.01 at 61.6 cm/s   Mild TR   No other valvular abnormalities     Post first bypass findings:   Mild to moderate MR,  Went back on pump to repair     Post Second bypass findings:   Trace MR  Moderately depressed LV, mildly depressed RV   Trace AI   No dissection   No TR or other valvular abnormalities

## 2020-09-09 NOTE — PLAN OF CARE
Pt to go to surgery and step up to ICU after surgery. D/C planning deferred to SW/CM in ICU.      Julie Haase RN  Case Management [phone]

## 2020-09-09 NOTE — PROGRESS NOTES
[facility] Hospital Medicine                                                                     Progress Note     Team: [facility] HOSP MED A Rosendo Adams MD   Admit Date: 8/30/2020   Hospital Day: 9  OLE: 9/15/2020   Code status: Full Code   Principal Problem: Acute respiratory failure with hypoxia     SUMMARY:     Fatou Lorenzo is a 75 y.o. female that (in part)  has a past medical history of Atrial fibrillation with RVR, Cataract, Glaucoma, Heart valve problem, Hyperlipidemia, and Severe mitral regurgitation.  has a past surgical history that includes Ankle surgery; lipoma removal; and Left heart catheterization (Left, 8/25/2020). Presents to [facility] Emergency Department complaining of recurrent shortness of breath.  Patient states it feels like she is having heart failure again.  She was discharged presumably on August 27th (no discharge summary available at this time), 4 days ago for acute respiratory failure with hypoxia secondary to CHF exacerbation.  The patient is typically followed at [place].  Reports 8 years ago was referred to Dr. Waddell, but valvular regurgitation not bad enough at that time to do surgery.  However see has severe tricuspid and mitral valve disease which was confirmed during her previous admission by echocardiogram.  She is having dyspnea with exertion, shortness of breath at rest, and orthopnea.  Worsened with exertion.  No relieving factors.      In last hospital stay she underwent left heart angiography as well as transesophageal echocardiogram and was diagnosed with a left atrial thrombus.  Nonobstructive CAD.  Had episode of atrial fibrillation with RVR which spontaneously resolved post catheterization.     In the emergency department routine laboratory studies, chest x-ray, EKG, cardiac enzymes were  obtained.  Patient required BiPAP due to hypoxemia not responsive to nasal cannula.  Chest x-ray actually showed improvement as did her cardiac enzymes compared to previous lab studies and imaging during her last hospital stay. Lasix and nitroglycerin given.     Hospital medicine has been asked to admit to inpatient in the ICU due to BiPAP requirements for further evaluation and treatment.     Ms Lorenzo presented with cardiogenic pulmonary edema likely due to severe mitral valve regurgitation. Placed on NIPPV and IV furosemide. Admitted to ICU for close observation. Patient improved, weaned to low flow NC and stepped down to telemetry floor in stable condition. Symptomatic with minimal activity despite adequate diuresis. AFib well controlled on oral amiodarone 400 mg BID and on NOAC for stroke prophylaxis and thrombus to left atrium, which are new diagnoses from recent admission. Cardiology consulted. I discussed case with Dr Jones (cardiologist on call at our facility) who recommends transfer to [facility] for formal CT surgery evaluation given recurrent admission despite compliance, BP control and diuresis. Transfer Center called to request transfer for higher level of care. Case discussed with Dr Waddell (CTS attending) who agrees with transfer. Recommends Hospital Medicine as primary and have his team consulted. Discussed with Dr Phipps ( for [facility] Medicine) who will communicate with accepting HM team at [facility]. ADT 32 placed. Patient was [place] [facility] [place].    Transferred 9/2 evening [place] [facility] CTS and cardiology consulted. Remains on IV lasix at this time. Cardiology suspect no LA thormbus. Apixaban changed to Lovenox as per CTS. IV lasix changed to PO Bumex 1mg BID. CTS planning surgery 9/9.      SUBJECTIVE:     Pt was seen and examined at bedside. No acute events overnight. HDS and afebrile. Denied any fevers, cp, worsening dyspnea, abdominal pain, cough. Changed IV lasix  to bumex oral yesterday, good UOP endorsed by patient. Ambulating with oxygen to bathroom. Normal BM. No other issues endorsed. NPO for procedure today.       ROS (Positive in Bold, otherwise negative)  Pain Scale: 0 /10   Constitutional: fever, chills, night sweats, decreased appetite, early satiety   CV: chest pain, edema, palpitations  Resp: SOB (resolving), cough, sputum production  GI: changes in appetite, NVDC, pain, melena, hematochezia, GERD, hematemesis  : Dysuria, hematuria, urinary urgency, frequency  MSK: arthralgia/myalgia, joint swelling  SKIN: rashes, pruritis, petechiae   Neuro/Psych: FND, anxiety (resolving), depression      OBJECTIVE:     Vitals:  Temp:  [96.9 °F (36.1 °C)-98.1 °F (36.7 °C)]   Pulse:  []   Resp:  [13-24]   BP: ()/(59-76)   SpO2:  [95 %-98 %]      I & O (Last 24H):     Intake/Output Summary (Last 24 hours) at 9/9/2020 1137  Last data filed at 9/8/2020 2200  Gross per 24 hour   Intake 360 ml   Output 500 ml   Net -140 ml         GEN: Non toxic appearing female  in no acute distress. Nontoxic. Resting in bed. Cooperative.  HEENT: NCAT. PERRL. EOMI. Conjunctivae/corneas clear, sclera Anicteric. Resolved JVD  CVS: RRR. Normal s1 s2 +holosystolic murmur, no click, rub or gallop  LUNG: CTAB. Normal respiratory effort. No rales heard. No wheezes, rhonchi.  ABD: Normoactive BS, soft, NT, ND, no masses or organomegaly.  EXT: Trace LE edema. No cyanosis. Full ROM.  SKIN: color, texture, turgor normal. No rashes or lesions  NEURO: Alert, oriented x 4, Spont mvt of all extremities with no focal deficits noted.      All recent labs and imaging has been reviewed.     Recent Results (from the past 24 hour(s))   COVID-19 Rapid Screening    Collection Time: 09/08/20 11:51 AM   Result Value Ref Range    SARS-CoV-2 RNA, Amplification, Qual Negative Negative   Type & Screen    Collection Time: 09/08/20  4:07 PM   Result Value Ref Range    Group & Rh A POS     Indirect Pawel NEG     Comprehensive metabolic panel    Collection Time: 09/09/20  4:37 AM   Result Value Ref Range    Sodium 139 136 - 145 mmol/L    Potassium 3.9 3.5 - 5.1 mmol/L    Chloride 103 95 - 110 mmol/L    CO2 26 23 - 29 mmol/L    Glucose 100 70 - 110 mg/dL    BUN, Bld 24 (H) 8 - 23 mg/dL    Creatinine 1.6 (H) 0.5 - 1.4 mg/dL    Calcium 9.0 8.7 - 10.5 mg/dL    Total Protein 6.4 6.0 - 8.4 g/dL    Albumin 3.6 3.5 - 5.2 g/dL    Total Bilirubin 1.2 (H) 0.1 - 1.0 mg/dL    Alkaline Phosphatase 80 55 - 135 U/L    AST 62 (H) 10 - 40 U/L    ALT 58 (H) 10 - 44 U/L    Anion Gap 10 8 - 16 mmol/L    eGFR if African American 36.1 (A) >60 mL/min/1.73 m^2    eGFR if non  31.3 (A) >60 mL/min/1.73 m^2   Magnesium    Collection Time: 09/09/20  4:37 AM   Result Value Ref Range    Magnesium 2.3 1.6 - 2.6 mg/dL   Phosphorus    Collection Time: 09/09/20  4:37 AM   Result Value Ref Range    Phosphorus 4.1 2.7 - 4.5 mg/dL       No results for input(s): POCTGLUCOSE in the last 168 hours.    No results found for: HGBA1C     Active Hospital Problems    Diagnosis  POA    *Acute respiratory failure with hypoxia [J96.01]  Yes    Essential hypertension [I10]  Yes    Other hyperlipidemia [E78.49]  Yes    Coronary artery disease involving native coronary artery of native heart without angina pectoris [I25.10]  Yes    Acute on chronic diastolic (congestive) heart failure [I50.33]  Yes    Severe mitral regurgitation [I34.0]  Yes    Left atrial thrombus [I51.3]  Yes    Paroxysmal atrial fibrillation [I48.0]  Yes      Resolved Hospital Problems    Diagnosis Date Resolved POA    SOB (shortness of breath) [R06.02] 09/03/2020 Yes          ASSESSMENT AND PLAN:     Acute on chronic diastolic (congestive) heart failure  Acute respiratory failure with hypoxia  - 2/2 cardiogenic pulmonary edema  - Started on IV furosemide, was in ICU with bipap transitioned to NC  - Transferred to [facility] from  for CTS eval  - CTS and cardiology consulted  - Started  on IV lasix 40 mg BID, changed to PO Bumex 1mg BID  - No LA thrombus noted as per cardiology, appreciate recs  - CTS following, planned for surgery today 9/9  - Wean oxygen as able  - Strict in outs  - Low sodium diet 2g  - Fluid restriction 1500 cc daiily  - Daily standing weight  - Bipap PRN for worsening dyspnea       Severe mitral regurgitation  - Confirmed via transesophageal echocardiogram on recent admission  - Left heart catheterization with non obstructive coronary artery disease  - Has severe left atrial enlargement   - Management as above  - CTS consulted     Left atrial thrombus - ruled out  - Patient was started apixaban loading dose however discussed with cardiology, doesn't seem to have a LA thrombus     Coronary artery disease involving native coronary artery of native heart without angina pectoris  - Non obstructive disease  - On cardioprotective regimen     Other hyperlipidemia  - Continue statin     Essential hypertension  - continue current meds     Paroxysmal atrial fibrillation  - Currently well controlled on amiodarone 400 mg PO BID and metoprolol 50 mg BID  - Continue NOAC for stroke prophylaxis (CHADS VASc of at least 4), change apixaban to Lovenox for now for tentative procedure next week, holding lovenox 9/8 for surgery 9/9, resume as per CTS recs  - Cardiac monitoring  - Electrolytes reviewed    Hypokalemic  - replete and recheck         Prophylaxis- DC Lovenox for procedure, resume as per CTS recs  Code Status- Full Code   Discharge plan and follow up - CHF resolved, pending surgery, then PT OT recs    Discharge Planning   OLE: 9/15/2020     Code Status: Full Code   Is the patient medically ready for discharge?:     Reason for patient still in hospital (select all that apply): Surgery planned  Discharge Plan A: Home Health          Rosendo Adams MD  Hospital Medicine Staff  Pager [phone]

## 2020-09-09 NOTE — ANESTHESIA PROCEDURE NOTES
Arterial    Diagnosis: mitral regurgitation    Patient location during procedure: done in OR  Procedure start time: 9/9/2020 2:50 PM  Timeout: 9/9/2020 2:50 PM  Procedure end time: 9/9/2020 3:04 PM    Staffing  Authorizing Provider: Lily Khan MD  Performing Provider: Jayleen Anderson MD    Anesthesiologist was present at the time of the procedure.    Preanesthetic Checklist  Completed: patient identified, site marked, surgical consent, pre-op evaluation, timeout performed, IV checked, risks and benefits discussed, monitors and equipment checked and anesthesia consent givenArterial  Skin Prep: chlorhexidine gluconate and isopropyl alcohol  Local Infiltration: lidocaine  Orientation: right  Location: radial  Catheter Size: 20 G  Catheter placement by Anatomical landmarks. Heme positive aspiration all ports.Insertion Attempts: 2  Assessment  Dressing: secured with tape and tegaderm  Patient: Tolerated well

## 2020-09-09 NOTE — PLAN OF CARE
Safety checklist complete. No full assessment possible done due to time constrains. Patient out to OR with anesthesia now.

## 2020-09-09 NOTE — NURSING TRANSFER
Nursing Transfer Note      9/9/2020     Transfer To: [facility]    Transfer via stretcher    Transfer with cardiac monitoring    Transported by transport tech    Medicines sent: none    Chart send with patient: Yes    Notified: family at bedside

## 2020-09-09 NOTE — ANESTHESIA PROCEDURE NOTES
Intubation  Performed by: Jayleen Anderson MD  Authorized by: Lily Khan MD     Intubation:     Induction:  Intravenous    Intubated:  Postinduction    Mask Ventilation:  Easy mask    Attempts:  1    Attempted By:  Staff anesthesiologist    Blade:  Lechuga 2    Laryngeal View Grade: Grade I - full view of chords      Difficult Airway Encountered?: No      Complications:  None    Airway Device Size:  7.0    Style/Cuff Inflation:  Cuffed (inflated to minimal occlusive pressure)    Tube secured:  23    Secured at:  The lips    Placement Verified By:  Capnometry    Complicating Factors:  None    Findings Post-Intubation:  BS equal bilateral

## 2020-09-10 NOTE — PROGRESS NOTES
[facility]  Critical Care - Surgery  Progress Note    Patient Name: Fatou Lorenzo  MRN: [id number]  Admission Date: 8/30/2020  Hospital Length of Stay: 10 days  Code Status: Full Code  Attending Provider: Antoni Waddell MD  Primary Care Provider: Ludin Rivas MD   Principal Problem: Acute respiratory failure with hypoxia    Subjective:     Hospital/ICU Course:  No notes on file    Interval History/Significant Events: Rising lactate overnight despite fluid and blood product  Resuscitation. Including the OR, she has received 3u FFP, 2u plts, 1u pRBC, 1u cryo. ABG this am with metabolic acidosis and pH as low as 7.281. Has since received another 2u pRBC, 1u FFP, 1u cryo, and 2 amps bicarb. Most recent ABG pH 7.56, lactate 3.88.     Increasing pressor requirements throughout the am. Started on levo and small dose of vaso. Also given 500 cc albumin.     Continued minimal UOP 25-45 cc/hr  Gtts:  Epi 0.1  Milrinone 0.25  Levo 0.06  Vaso 0.04  Insulin 5.5  Prop 15    Follow-up For: Procedure(s) (LRB):  Valvuloplasty, Mitral (N/A)  REPAIR, TRICUSPID VALVE (N/A)  BAIN MAZE PROCEDURE (N/A)    Post-Operative Day: 1 Day Post-Op    Objective:     Vital Signs (Most Recent):  Temp: 99.3 °F (37.4 °C) (09/10/20 1339)  Pulse: 79 (09/10/20 1339)  Resp: (!) 25 (09/10/20 1339)  BP: (!) 92/51 (09/10/20 1200)  SpO2: (!) 94 % (09/10/20 1339) Vital Signs (24h Range):  Temp:  [97.1 °F (36.2 °C)-99.3 °F (37.4 °C)] 99.3 °F (37.4 °C)  Pulse:  [] 79  Resp:  [0-25] 25  SpO2:  [84 %-99 %] 94 %  BP: ()/(41-74) 92/51  Arterial Line BP: ()/(29-63) 99/40     Weight: 89.1 kg (196 lb 6.9 oz)  Body mass index is 32.69 kg/m².      Intake/Output Summary (Last 24 hours) at 9/10/2020 1422  Last data filed at 9/10/2020 1200  Gross per 24 hour   Intake 7888 ml   Output 1430 ml   Net 6458 ml       Physical Exam    Vents:  Vent Mode: A/C (09/10/20 1339)  Ventilator Initiated: Yes (09/09/20 2301)  Set Rate: 20 BPM  (09/10/20 1339)  Vt Set: 380 mL (09/10/20 1339)  PEEP/CPAP: 5 cmH20 (09/10/20 1339)  Oxygen Concentration (%): 50 (09/10/20 1339)  Peak Airway Pressure: 55 cmH2O (09/10/20 1339)  Plateau Pressure: 16 cmH20 (09/10/20 1339)  Total Ve: 5.9 mL (09/10/20 1339)  F/VT Ratio<105 (RSBI): 520.83 (09/10/20 1339)    Lines/Drains/Airways     Central Venous Catheter Line             Introducer with Double Lumen 09/09/20 right internal jugular 1 day    Pulmonary Artery Catheter Assessment  09/09/20 1515 right internal jugular less than 1 day          Drain                 NG/OG Tube 09/09/20 Right mouth 1 day         Urethral Catheter 09/09/20 1512 Non-latex;Straight-tip;Temperature probe 14 Fr. less than 1 day         Y Chest Tube 1 and 2 09/09/20 2145 1 Anterior Mediastinal 19 Fr. 2 Anterior Mediastinal 19 Fr. less than 1 day          Airway                 Airway - Non-Surgical 09/09/20 1640 less than 1 day          Arterial Line            Arterial Line 09/09/20 Right Femoral 1 day    Arterial Line 09/09/20 1450 Right Radial less than 1 day          Line                 Pacer Wires 09/09/20 2057 less than 1 day          Peripheral Intravenous Line                 Peripheral IV - Single Lumen 09/02/20 1441 22 G Left;Posterior Forearm 7 days         Peripheral IV - Single Lumen 09/09/20 0900 20 G Right Forearm 1 day                Significant Labs:    CBC/Anemia Profile:  Recent Labs   Lab 09/10/20  0354  09/10/20  0934 09/10/20  0942 09/10/20  1325 09/10/20  1339   WBC 13.70*  --  11.29  --  10.11  --    HGB 7.0*  --  8.7*  --  8.3*  --    HCT 23.2*   < > 26.1* 24* 24.1* 24*   PLT 75*  --  66*  --  63*  --    MCV 99*  --  92  --  90  --    RDW 14.6*  --  13.6  --  13.8  --     < > = values in this interval not displayed.        Chemistries:  Recent Labs   Lab 09/09/20  0437 09/09/20  2315 09/10/20  0351 09/10/20  0934 09/10/20  1325    148* 150* 153* 153*   K 3.9 3.7  3.7 3.7 3.8 4.1    112* 113* 113* 113*   CO2 26  17* 18* 23 29   BUN 24* 20 22 23 25*   CREATININE 1.6* 1.6* 1.7* 1.8* 1.8*   CALCIUM 9.0 8.5* 8.2* 8.3* 8.2*   ALBUMIN 3.6  --   --  3.6 3.5   PROT 6.4  --   --  4.9* 4.8*   BILITOT 1.2*  --   --  2.0* 2.1*   ALKPHOS 80  --   --  29* 30*   ALT 58*  --   --  39 48*   AST 62*  --   --  121* 145*   MG 2.3 3.4*  --  2.7* 2.8*   PHOS 4.1 3.4  --  2.7 3.0       ABGs:   Recent Labs   Lab 09/10/20  1339   PH 7.564*   PCO2 30.0*   HCO3 27.1   POCSATURATED 96   BE 5     Coagulation:   Recent Labs   Lab 09/10/20  1325   INR 1.1   APTT 29.3     All pertinent labs within the past 24 hours have been reviewed.    Significant Imaging:  I have reviewed and interpreted all pertinent imaging results/findings within the past 24 hours.     09/10/2020 CXR  FINDINGS:  Postoperative changes are again identified in the thorax.  ET tube remains in place with its tip above the anjel at about the T4-5 level.  NG tube remains in place with its tip in the gastric fundus and its distal side hole near the level of the EG junction.  [place] catheter again seen with its tip in the region of the pulmonary outflow tract.  Mediastinal drains remain in place.  Allowing for difference in technique, there does not appear to have been any significant change in the cardiopulmonary status since yesterday's study.  No pneumothorax.     Impression:     No significant interval change    Assessment/Plan:     Severe mitral regurgitation  Fatou Lorenzo is a 75 y.o. female s/p MVr, TVr 9/9/2020. Case noteworthy for multiple pump runs (3) and RV hematoma due to retraction.     Neuro:  - Sedation: propofol - daily sedation vacation -> if continues to be hypotensive, may need to transition to precedex as is currently being paced at 80     CV:  S/p MVr, TVr 9/9/20. Case noteworthy for pump run x3, RV hematoma 2/2 retraction  - Increasing pressor requirements throughout the morning as noted in the interval history  - Bolus 500 cc albumin now  - MAP goal 60-80, SBP  <140  - Will wean levo first followed by vaso then epi  - Chest tubes (mediastinal x2) to suction  - HR 40s-50s (intrinsic), continue pacing at 80 to maintain CI > 2.2     Pulm:  Will continue mechanical ventilation until more hemodynamically stable  Adjusting tidal volume, RR, and FiO2 appropriately with RT  Oneil at 10 ppm. Will start to wean once stable  Daily CXR  ABG q4 hrs with lactate     FEN/GI:  - Net positive 6.9L in last 24 hours and 5.9 overall  - Vasoplegia vs intravascular depletion; giving 500 cc albumin now  - replacing lytes as needed  - NPO  - famotidine  - miralax   - LFTs this afternoon with transaminitis likely secondary to right heart strain; will continue to trend     Renal:  - Yoo in place for strict I/O  - Very poor UOP during the case, but has been picking up  - Holding off on lasix given increasing pressor requirements   - BUN/Cr 22/1.7, will continue to monitor     Heme/Onc:  - Has now received a total of 3u pRBCs, 4u FFP, 2u plts, 2u cryo  - Coags have now normalized  - H/H continues to slowly drift down (8.7/26.1 -> 8.3/24.1)  - Transition to q6 hr labs     ID:  - Afebrile and leukocytosis has resolved  - periop antibiotics to be completed today  - Will continue to monitor for now     Endo:  - Insulin gtt  - Endocrine consulted, appreciate recs     PPx:  - famotidine, SCDs     Dispo: continue ICU care      Critical secondary to Patient has a condition that poses threat to life and bodily function: s/p MVr, TVr still intubated and sedated     Critical care was time spent personally by me on the following activities: development of treatment plan with patient or surrogate and bedside caregivers, discussions with consultants, evaluation of patient's response to treatment, examination of patient, ordering and performing treatments and interventions, ordering and review of laboratory studies, ordering and review of radiographic studies, pulse oximetry, re-evaluation of patient's condition.   This critical care time did not overlap with that of any other provider or involve time for any procedures.     Madhuri Ng MD  Critical Care - Surgery  [facility]

## 2020-09-10 NOTE — PLAN OF CARE
Recommendations     1.) If able to extubate, advance diet as tolerated to cardiac diet per SLP recommendations once medically appropriate, as tolerated.   2.) If unable to extubate pt, initiate EN of Impact Peptide 1.5; begin at 10mL/hr and advance 5-10mL Q8hr as tolerated to goal rate at 40mL/hr. EN provides 1440kcal, 90gm of protein, 739mL of free water. Add free water per MD recommendations.      Goals: Pt to return to nutritionally adequate diet and meet > 75% EEN/EPN by RD follow up  Nutrition Goal Status: goal not met  Communication of RD Recs: other (comment)(POC)

## 2020-09-10 NOTE — SUBJECTIVE & OBJECTIVE
No current facility-administered medications on file prior to encounter.      Current Outpatient Medications on File Prior to Encounter   Medication Sig    metoprolol tartrate (LOPRESSOR) 25 MG tablet Take 1 tablet (25 mg total) by mouth 3 (three) times daily.    amiodarone (PACERONE) 400 MG tablet Take two tablets by mouth twice daily for twelve days, then take one tablet by mouth daily.    apixaban (ELIQUIS) 5 mg Tab Take 1 tablet (5 mg total) by mouth 2 (two) times daily.    furosemide (LASIX) 40 MG tablet Take 1 tablet (40 mg total) by mouth 2 (two) times daily.    losartan (COZAAR) 50 MG tablet Take 0.5 tablets (25 mg total) by mouth once daily.    pravastatin (PRAVACHOL) 80 MG tablet Take 80 mg by mouth once daily.       Review of patient's allergies indicates:  No Known Allergies    Past Medical History:   Diagnosis Date    Atrial fibrillation with RVR 8/24/2020    Cataract     Essential hypertension 8/31/2020    Glaucoma     Heart valve problem     Hyperlipidemia     Severe mitral regurgitation 8/24/2020     Past Surgical History:   Procedure Laterality Date    ANKLE SURGERY      LEFT HEART CATHETERIZATION Left 8/25/2020    Procedure: Left heart cath, radial;  Surgeon: Marlee Carrillo MD;  [facility] CATH LAB;  Service: Cardiology;  Laterality: Left;    lipoma removal       Family History     Problem Relation (Age of Onset)    Cancer Mother    Cataracts Sister    No Known Problems Father, Brother, Maternal Aunt, Maternal Uncle, Paternal Aunt, Paternal Uncle, Maternal Grandmother, Maternal Grandfather, Paternal Grandmother, Paternal Grandfather        Tobacco Use    Smoking status: Never Smoker    Smokeless tobacco: Never Used   Substance and Sexual Activity    Alcohol use: No    Drug use: No    Sexual activity: Not Currently     Review of Systems   Unable to perform ROS: Intubated     Objective:     Vital Signs (Most Recent):  Temp: 97.7 °F (36.5 °C) (09/09/20 1431)  Pulse: (!) 44  (09/09/20 2315)  Resp: 16 (09/09/20 2315)  BP: 128/74 (09/09/20 1431)  SpO2: 99 % (09/09/20 2315) Vital Signs (24h Range):  Temp:  [97.7 °F (36.5 °C)-98.1 °F (36.7 °C)] 97.7 °F (36.5 °C)  Pulse:  [] 44  Resp:  [13-18] 16  SpO2:  [95 %-99 %] 99 %  BP: ()/(59-76) 128/74  Arterial Line BP: (128)/(46) 128/46     Weight: 74.7 kg (164 lb 12.7 oz)  Body mass index is 27.42 kg/m².    Physical Exam  Constitutional:       Comments: Intubated and Sedated   HENT:      Head: Normocephalic and atraumatic.      Mouth/Throat:      Comments: ET tube in place  Neck:      Musculoskeletal: Neck supple.   Cardiovascular:      Rate and Rhythm: Normal rate and regular rhythm.      Comments: Mediastinal CTs x2 with bloody output  Sternal dressing is clean and dry  Pulmonary:      Comments: Vent Mode: A/C  Oxygen Concentration (%):  (100) 100  Resp Rate Total:  (16 br/min) 16 br/min  Vt Set:  (440 mL) 440 mL  PEEP/CPAP:  (5 cmH20) 5 cmH20  Mean Airway Pressure:  (9.1 cmH20) 9.1 cmH20  Abdominal:      General: There is no distension.      Palpations: Abdomen is soft.   Musculoskeletal:         General: No deformity.   Skin:     General: Skin is warm and dry.   Neurological:      Comments: Sedated         Significant Labs:  CBC:   Recent Labs   Lab 09/04/20 0331 09/09/20 2048   WBC 12.58  --   --    RBC 4.64  --   --    HGB 14.0  --   --    HCT 44.9   < > 22*     --   --    MCV 97  --   --    MCH 30.2  --   --    MCHC 31.2*  --   --     < > = values in this interval not displayed.     CMP:   Recent Labs   Lab 09/09/20  0437      CALCIUM 9.0   ALBUMIN 3.6   PROT 6.4      K 3.9   CO2 26      BUN 24*   CREATININE 1.6*   ALKPHOS 80   ALT 58*   AST 62*   BILITOT 1.2*       Significant Diagnostics:  I have reviewed all pertinent imaging results/findings within the past 24 hours.

## 2020-09-10 NOTE — HPI
Fatou Lorenzo is a 75 y.o. female that is status post three valve replacement (TVR, MVR, AVR) on 9/9/20. Prolonged ICU course. General surgery placed a PEG for dysphagia on 10/21/20 without issue. On the evening of 10/28 the patient had an acute worsening of her respiratory status and required emergent intubation. Concern for aspiration. Now on mechanical ventilation with 60% FiO2 5PEEP. Still requiring CRRT (SCUF) via right IJ trialysis line. General surgery consulted for trach placement.

## 2020-09-10 NOTE — PLAN OF CARE
"      SICU PLAN OF CARE NOTE    Dx: Acute respiratory failure with hypoxia    Shift Events: bleeding from R fem, chest tubes, CT re-stitched, 1 U cryo, 1U PRBC, 1 U FFP    Goals of Care: MAP 60-80    Neuro: Sedated    Vital Signs: BP (!) 78/41   Pulse 79   Temp 97.6 °F (36.4 °C) (Oral)   Resp (!) 8   Ht 5' 5" (1.651 m)   Wt 89.1 kg (196 lb 6.9 oz)   SpO2 (!) 94%   Breastfeeding No   BMI 32.69 kg/m²     Respiratory: Ventilator    Diet: NPO    Gtts: Propfol, Insulin, and Epinephrine    Urine Output: Urinary Catheter 450 cc/shift    Drains: Chest Tube, total output 570 cc /  shift and NG/OG Tube 0 cc /  shift    Labs/Accuchecks: q4 labs, q1 accuchecks    Skin: skin free of pressure wounds, foam padding applied      "

## 2020-09-10 NOTE — ASSESSMENT & PLAN NOTE
Fatou Lorenzo is a 75 y.o. female s/p MVr, TVr 9/9/2020. Case noteworthy for multiple pump runs (3) and RV hematoma due to retraction.     Neuro:  - Sedation: propofol - daily sedation vacation -> if continues to be hypotensive, may need to transition to precedex as is currently being paced at 80     CV:  S/p MVr, TVr 9/9/20. Case noteworthy for pump run x3, RV hematoma 2/2 retraction  - Increasing pressor requirements throughout the morning as noted in the interval history  - Bolus 500 cc albumin now  - MAP goal 60-80, SBP <140  - Will wean levo first followed by vaso then epi  - Chest tubes (mediastinal x2) to suction  - HR 40s-50s (intrinsic), continue pacing at 80 to maintain CI > 2.2     Pulm:  Will continue mechanical ventilation until more hemodynamically stable  Adjusting tidal volume, RR, and FiO2 appropriately with RT  Oneil at 10 ppm. Will start to wean once stable  Daily CXR  ABG q4 hrs with lactate     FEN/GI:  - Net positive 6.9L in last 24 hours and 5.9 overall  - Vasoplegia vs intravascular depletion; giving 500 cc albumin now  - replacing lytes as needed  - NPO  - famotidine  - miralax   - LFTs this afternoon with transaminitis likely secondary to right heart strain; will continue to trend     Renal:  - Yoo in place for strict I/O  - Very poor UOP during the case, but has been picking up  - Holding off on lasix given increasing pressor requirements   - BUN/Cr 22/1.7, will continue to monitor     Heme/Onc:  - Has now received a total of 3u pRBCs, 4u FFP, 2u plts, 2u cryo  - Coags have now normalized  - H/H continues to slowly drift down (8.7/26.1 -> 8.3/24.1)  - Transition to q6 hr labs     ID:  - Afebrile and leukocytosis has resolved  - periop antibiotics to be completed today  - Will continue to monitor for now     Endo:  - Insulin gtt  - Endocrine consulted, appreciate recs     PPx:  - famotidine, SCDs     Dispo: continue ICU care

## 2020-09-10 NOTE — RESPIRATORY THERAPY
Patient arrived to SICU 50[address] from OR intubated with 7.0 ET tube secured at 24 cm at the lip. Placed patient on ventilator at documented settings with no adverse reactions.

## 2020-09-10 NOTE — ASSESSMENT & PLAN NOTE
Neuro:  - Propofol for sedation  - Fentanyl prn for pain    CV:  S/p MVr, TVr 9/9/20. Case noteworthy for pump run x3, RV hematoma 2/2 retraction, and total of 30cc UOP during case.   - MAP goal 60-80  - Drips: Epi at 0.08 upon arrival  - Chest tubes (mediastinal x2) with 110 cc bloody ouput   - HR 40s-50s (intrinsic) with backup pacing at 40 bpm  - MAP in 70s    Pulm:  - Intubated with mechanical vent  Vent Mode: A/C  Oxygen Concentration (%):  [100] 100  Resp Rate Total:  [16 br/min] 16 br/min  Vt Set:  [440 mL] 440 mL  PEEP/CPAP:  [5 cmH20] 5 cmH20  Mean Airway Pressure:  [9.1 cmH20] 9.1 cmH20  - ABG q1hr  - DuoNebs  - Postop CXR    FEN/GI:  - NPO  - Trend CMP  - Lyte replacement prn  - Give 500 cc Albumin     Renal:  - Yoo in place  - Monitor UOP  - Trend BUN/Cr  - 30 cc UOP during case    Heme/Onc:  - Received a total of 1u pRBC, 2u FFP, 2u Plts today  - Trend H/H  - 1u pRBC running upon arrival  - Postop labs including coags and fibrinogen    ID:  - AF  - Trend WBC  - Periop Ancef    Endo:  - Insulin gtt    PPx:  - Protonix, TEDs/SCDs    Dispo: continue ICU care, remain intubated tonight

## 2020-09-10 NOTE — SUBJECTIVE & OBJECTIVE
No current facility-administered medications on file prior to encounter.      Current Outpatient Medications on File Prior to Encounter   Medication Sig    metoprolol tartrate (LOPRESSOR) 25 MG tablet Take 1 tablet (25 mg total) by mouth 3 (three) times daily.    amiodarone (PACERONE) 400 MG tablet Take two tablets by mouth twice daily for twelve days, then take one tablet by mouth daily.    apixaban (ELIQUIS) 5 mg Tab Take 1 tablet (5 mg total) by mouth 2 (two) times daily.    furosemide (LASIX) 40 MG tablet Take 1 tablet (40 mg total) by mouth 2 (two) times daily.    losartan (COZAAR) 50 MG tablet Take 0.5 tablets (25 mg total) by mouth once daily.    pravastatin (PRAVACHOL) 80 MG tablet Take 80 mg by mouth once daily.       Review of patient's allergies indicates:  No Known Allergies    Past Medical History:   Diagnosis Date    Atrial fibrillation with RVR 8/24/2020    Cataract     Essential hypertension 8/31/2020    Glaucoma     Heart valve problem     Hyperlipidemia     Severe mitral regurgitation 8/24/2020     Past Surgical History:   Procedure Laterality Date    ANKLE SURGERY      LEFT HEART CATHETERIZATION Left 8/25/2020    Procedure: Left heart cath, radial;  Surgeon: Marlee Carrillo MD;  Location: [facility] CATH LAB;  Service: Cardiology;  Laterality: Left;    lipoma removal       Family History     Problem Relation (Age of Onset)    Cancer Mother    Cataracts Sister    No Known Problems Father, Brother, Maternal Aunt, Maternal Uncle, Paternal Aunt, Paternal Uncle, Maternal Grandmother, Maternal Grandfather, Paternal Grandmother, Paternal Grandfather        Tobacco Use    Smoking status: Never Smoker    Smokeless tobacco: Never Used   Substance and Sexual Activity    Alcohol use: No    Drug use: No    Sexual activity: Not Currently     Review of Systems   Unable to perform ROS: Intubated     Objective:     Vital Signs (Most Recent):  Temp: 97.7 °F (36.5 °C) (09/09/20 1431)  Pulse: (!) 44  (09/09/20 2315)  Resp: 16 (09/09/20 2315)  BP: 128/74 (09/09/20 1431)  SpO2: 99 % (09/09/20 2315) Vital Signs (24h Range):  Temp:  [97.7 °F (36.5 °C)-98.1 °F (36.7 °C)] 97.7 °F (36.5 °C)  Pulse:  [] 44  Resp:  [13-18] 16  SpO2:  [95 %-99 %] 99 %  BP: ()/(59-76) 128/74  Arterial Line BP: (128)/(46) 128/46     Weight: 74.7 kg (164 lb 12.7 oz)  Body mass index is 27.42 kg/m².    Physical Exam  Constitutional:       Comments: Intubated and Sedated   HENT:      Head: Normocephalic and atraumatic.      Mouth/Throat:      Comments: ET tube in place  Neck:      Musculoskeletal: Neck supple.   Cardiovascular:      Rate and Rhythm: Normal rate and regular rhythm.      Comments: Mediastinal CTs x2 with bloody output  Sternal dressing is clean and dry  Pulmonary:      Comments: Vent Mode: A/C  Oxygen Concentration (%):  (100) 100  Resp Rate Total:  (16 br/min) 16 br/min  Vt Set:  (440 mL) 440 mL  PEEP/CPAP:  (5 cmH20) 5 cmH20  Mean Airway Pressure:  (9.1 cmH20) 9.1 cmH20  Abdominal:      General: There is no distension.      Palpations: Abdomen is soft.   Musculoskeletal:         General: No deformity.   Skin:     General: Skin is warm and dry.   Neurological:      Comments: Sedated         Significant Labs:  CBC:   Recent Labs   Lab 09/04/20 0331 09/09/20 2048   WBC 12.58  --   --    RBC 4.64  --   --    HGB 14.0  --   --    HCT 44.9   < > 22*     --   --    MCV 97  --   --    MCH 30.2  --   --    MCHC 31.2*  --   --     < > = values in this interval not displayed.     CMP:   Recent Labs   Lab 09/09/20  0437      CALCIUM 9.0   ALBUMIN 3.6   PROT 6.4      K 3.9   CO2 26      BUN 24*   CREATININE 1.6*   ALKPHOS 80   ALT 58*   AST 62*   BILITOT 1.2*       Significant Diagnostics:  I have reviewed all pertinent imaging results/findings within the past 24 hours.

## 2020-09-10 NOTE — PLAN OF CARE
SW is following this Pt for DC planning needs. There are no identified needs at this time. Discharge Disposition: TBD - pending patient progress; prev. Plan was for HH    SW will continue to coordinate with patient, family, team and insurance to complete patient's discharge plan.    Esthela Peralta LMSW   - Case Management

## 2020-09-10 NOTE — PLAN OF CARE
09/10/20 1141   Discharge Reassessment   Assessment Type Discharge Planning Reassessment   Provided patient/caregiver education on the expected discharge date and the discharge plan Yes   Do you have any problems affording any of your prescribed medications? No   Discharge Plan A Home Health   Discharge Plan B Home with family   DME Needed Upon Discharge  other (see comments)  (TBD)   Post-Acute Status   Discharge Delays None known at this time       Mel Simons MPH, RN, CM  Ext. [phone]

## 2020-09-10 NOTE — CONSULTS
"  [facility]  Adult Nutrition  Consult Note    SUMMARY     Recommendations    1.) If able to extubate, advance diet as tolerated to cardiac diet per SLP recommendations once medically appropriate, as tolerated.   2.) If unable to extubate pt, initiate EN of Impact Peptide 1.5; begin at 10mL/hr and advance 5-10mL Q8hr as tolerated to goal rate at 40mL/hr. EN provides 1440kcal, 90gm of protein, 739mL of free water. Add free water per MD recommendations.     Goals: Pt to return to nutritionally adequate diet and meet > 75% EEN/EPN by RD follow up  Nutrition Goal Status: goal not met  Communication of RD Recs: other (comment)(POC)    Reason for Assessment    Reason For Assessment: consult  Diagnosis: (Acute respiratory failure with hypoxia)  Relevant Medical History: afib w/ RVR, HTN, HLD  Interdisciplinary Rounds: did not attend  General Information Comments: s/p mitral valve replacement with tricuspid valve repair. Pt is intubated, sedated and on mechanical ventilation. Pt had 13# wt loss since admission, likely due to diuresis. NFPE completed 9/9 with no s/s of malnutrition. RD will continue to monitor. GI- LBM 9/7.   Nutrition Discharge Planning: Adequate PO intake on low Na diet    Nutrition Risk Screen    Nutrition Risk Screen: no indicators present    Nutrition/Diet History    Spiritual, Cultural Beliefs, Evangelical Practices, Values that Affect Care: no    Anthropometrics    Temp: 99.3 °F (37.4 °C)  Height Method: Stated  Height: 5' 5" (165.1 cm)  Height (inches): 65 in  Weight Method: Standard Scale  Weight: 89.1 kg (196 lb 6.9 oz)  Weight (lb): 196.43 lb  Ideal Body Weight (IBW), Female: 125 lb  % Ideal Body Weight, Female (lb): 136.16 %  BMI (Calculated): 32.7       Lab/Procedures/Meds    Pertinent Labs Reviewed: reviewed  Pertinent Labs Comments: WBC 13.7, Hgb 7.0, Hct 23.2, Na 150, Chl 113, Cr 1.7, GFR 29.1, Glucose 126, Ca 8.2  Pertinent Medications Reviewed: reviewed  Pertinent " Medications Comments: Albumin, viatmin C, famotidine, fentanyl, polyethylene glycol, insulin    Physical Findings/Assessment     Edema 2+ generalized    Estimated/Assessed Needs    Weight Used For Calorie Calculations: 76 kg (167 lb 8.8 oz)(UBW)  Energy Calorie Requirements (kcal): 5386-3081  Energy Need Method: Kcal/kg(20-25kcal/kg)  Protein Requirements:  g (1.2-1.5g/kg)  Weight Used For Protein Calculations: 76 kg (167 lb 8.8 oz)(UBW)  Fluid Requirements (mL): per MD or 1 mL/kcal     RDA Method (mL): 1520         Nutrition Prescription Ordered    Current Diet Order: NPO (9/9)    Evaluation of Received Nutrient/Fluid Intake    Other Calories (kcal): 237(Propofol at 9mL/hr)  I/O: +5.9L since admission; I: 7858; o: 1140  Energy Calories Required: not meeting needs  Protein Required: not meeting needs  Fluid Required: not meeting needs  Comments: LBM 9/7  Tolerance: (RD to monitor)  % Intake of Estimated Energy Needs: 15  % Meal Intake: 0    Nutrition Risk    Level of Risk/Frequency of Follow-up: high , 2x weekly    Assessment and Plan         Nutrition Problem  Inadequate energy intake    Related to (etiology):   NPO    Signs and Symptoms (as evidenced by):   NPO    Interventions(treatment strategy):  1.) Collaboration with other providers    Nutrition Diagnosis Status:   new    Monitor and Evaluation    Food and Nutrient Intake: enteral nutrition intake  Food and Nutrient Adminstration: enteral and parenteral nutrition administration  Knowledge/Beliefs/Attitudes: food and nutrition knowledge/skill  Anthropometric Measurements: weight, weight change  Biochemical Data, Medical Tests and Procedures: electrolyte and renal panel, gastrointestinal profile  Nutrition-Focused Physical Findings: overall appearance, skin     Malnutrition Assessment                 Orbital Region (Subcutaneous Fat Loss): well nourished  Upper Arm Region (Subcutaneous Fat Loss): well nourished   Religion Region (Muscle Loss): well  nourished  Clavicle Bone Region (Muscle Loss): well nourished  Clavicle and Acromion Bone Region (Muscle Loss): well nourished  Dorsal Hand (Muscle Loss): well nourished  Anterior Thigh Region (Muscle Loss): well nourished  Posterior Calf Region (Muscle Loss): well nourished                 Nutrition Follow-Up    RD Follow-up?: Yes

## 2020-09-10 NOTE — HOSPITAL COURSE
She underwent MVr and TVr today 9/9/20. This required 3 pump runs. She arrives to the SICU intubated and sedated. She is on Epi 0.08. Her HR (intrinsic) is in the 40s and MAPs in 70s. She is on 100/5. Two mediastinal CTs in place with 110 cc output upon arrival. She received 1u pRBC, 2u Plts, and 2u FFP. Of note from the case, she developed an RV hematoma 2/2 to retraction and total UOP during the case was about 30 cc.

## 2020-09-10 NOTE — SUBJECTIVE & OBJECTIVE
Interval History/Significant Events: Rising lactate overnight despite fluid and blood product  Resuscitation. Including the OR, she has received 3u FFP, 2u plts, 1u pRBC, 1u cryo. ABG this am with metabolic acidosis and pH as low as 7.281. Has since received another 2u pRBC, 1u FFP, 1u cryo, and 2 amps bicarb. Most recent ABG pH 7.56, lactate 3.88.     Increasing pressor requirements throughout the am. Started on levo and small dose of vaso. Also given 500 cc albumin.     Continued minimal UOP 25-45 cc/hr  Gtts:  Epi 0.1  Milrinone 0.25  Levo 0.06  Vaso 0.04  Insulin 5.5  Prop 15    Follow-up For: Procedure(s) (LRB):  Valvuloplasty, Mitral (N/A)  REPAIR, TRICUSPID VALVE (N/A)  BAIN MAZE PROCEDURE (N/A)    Post-Operative Day: 1 Day Post-Op    Objective:     Vital Signs (Most Recent):  Temp: 99.3 °F (37.4 °C) (09/10/20 1339)  Pulse: 79 (09/10/20 1339)  Resp: (!) 25 (09/10/20 1339)  BP: (!) 92/51 (09/10/20 1200)  SpO2: (!) 94 % (09/10/20 1339) Vital Signs (24h Range):  Temp:  [97.1 °F (36.2 °C)-99.3 °F (37.4 °C)] 99.3 °F (37.4 °C)  Pulse:  [] 79  Resp:  [0-25] 25  SpO2:  [84 %-99 %] 94 %  BP: ()/(41-74) 92/51  Arterial Line BP: ()/(29-63) 99/40     Weight: 89.1 kg (196 lb 6.9 oz)  Body mass index is 32.69 kg/m².      Intake/Output Summary (Last 24 hours) at 9/10/2020 1422  Last data filed at 9/10/2020 1200  Gross per 24 hour   Intake 7888 ml   Output 1430 ml   Net 6458 ml       Physical Exam    Vents:  Vent Mode: A/C (09/10/20 1339)  Ventilator Initiated: Yes (09/09/20 2301)  Set Rate: 20 BPM (09/10/20 1339)  Vt Set: 380 mL (09/10/20 1339)  PEEP/CPAP: 5 cmH20 (09/10/20 1339)  Oxygen Concentration (%): 50 (09/10/20 1339)  Peak Airway Pressure: 55 cmH2O (09/10/20 1339)  Plateau Pressure: 16 cmH20 (09/10/20 1339)  Total Ve: 5.9 mL (09/10/20 1339)  F/VT Ratio<105 (RSBI): 520.83 (09/10/20 1339)    Lines/Drains/Airways     Central Venous Catheter Line             Introducer with Double Lumen 09/09/20 right  internal jugular 1 day    Pulmonary Artery Catheter Assessment  09/09/20 1515 right internal jugular less than 1 day          Drain                 NG/OG Tube 09/09/20 Right mouth 1 day         Urethral Catheter 09/09/20 1512 Non-latex;Straight-tip;Temperature probe 14 Fr. less than 1 day         Y Chest Tube 1 and 2 09/09/20 2145 1 Anterior Mediastinal 19 Fr. 2 Anterior Mediastinal 19 Fr. less than 1 day          Airway                 Airway - Non-Surgical 09/09/20 1640 less than 1 day          Arterial Line            Arterial Line 09/09/20 Right Femoral 1 day    Arterial Line 09/09/20 1450 Right Radial less than 1 day          Line                 Pacer Wires 09/09/20 2057 less than 1 day          Peripheral Intravenous Line                 Peripheral IV - Single Lumen 09/02/20 1441 22 G Left;Posterior Forearm 7 days         Peripheral IV - Single Lumen 09/09/20 0900 20 G Right Forearm 1 day                Significant Labs:    CBC/Anemia Profile:  Recent Labs   Lab 09/10/20  0354  09/10/20  0934 09/10/20  0942 09/10/20  1325 09/10/20  1339   WBC 13.70*  --  11.29  --  10.11  --    HGB 7.0*  --  8.7*  --  8.3*  --    HCT 23.2*   < > 26.1* 24* 24.1* 24*   PLT 75*  --  66*  --  63*  --    MCV 99*  --  92  --  90  --    RDW 14.6*  --  13.6  --  13.8  --     < > = values in this interval not displayed.        Chemistries:  Recent Labs   Lab 09/09/20  0437 09/09/20  2315 09/10/20  0351 09/10/20  0934 09/10/20  1325    148* 150* 153* 153*   K 3.9 3.7  3.7 3.7 3.8 4.1    112* 113* 113* 113*   CO2 26 17* 18* 23 29   BUN 24* 20 22 23 25*   CREATININE 1.6* 1.6* 1.7* 1.8* 1.8*   CALCIUM 9.0 8.5* 8.2* 8.3* 8.2*   ALBUMIN 3.6  --   --  3.6 3.5   PROT 6.4  --   --  4.9* 4.8*   BILITOT 1.2*  --   --  2.0* 2.1*   ALKPHOS 80  --   --  29* 30*   ALT 58*  --   --  39 48*   AST 62*  --   --  121* 145*   MG 2.3 3.4*  --  2.7* 2.8*   PHOS 4.1 3.4  --  2.7 3.0       ABGs:   Recent Labs   Lab 09/10/20  6299   PH 7.564*    PCO2 30.0*   HCO3 27.1   POCSATURATED 96   BE 5     Coagulation:   Recent Labs   Lab 09/10/20  1325   INR 1.1   APTT 29.3     All pertinent labs within the past 24 hours have been reviewed.    Significant Imaging:  I have reviewed and interpreted all pertinent imaging results/findings within the past 24 hours.     09/10/2020 CXR  FINDINGS:  Postoperative changes are again identified in the thorax.  ET tube remains in place with its tip above the anjel at about the T4-5 level.  NG tube remains in place with its tip in the gastric fundus and its distal side hole near the level of the EG junction.  [place] catheter again seen with its tip in the region of the pulmonary outflow tract.  Mediastinal drains remain in place.  Allowing for difference in technique, there does not appear to have been any significant change in the cardiopulmonary status since yesterday's study.  No pneumothorax.     Impression:     No significant interval change

## 2020-09-10 NOTE — H&P
[facility]  General Surgery  History & Physical    Patient Name: Fatou Lorenzo  MRN: [id number]  Admission Date: 8/30/2020  Attending Physician: Antoni Waddell MD   Primary Care Provider: Ludin Rivas MD    Patient information was obtained from past medical records.     Subjective:     Chief Complaint/Reason for Admission: MVr, TVr    History of Present Illness:  Fatou Lorenzo is a 75 y.o. female that has a past medical history of Atrial fibrillation with RVR, Cataract, Glaucoma, Heart valve problem, Hyperlipidemia, and Severe mitral regurgitation.  has a past surgical history that includes Ankle surgery; lipoma removal; and Left heart catheterization (Left, 8/25/2020). Presents to [facility] complaining of recurrent shortness of breath.  Patient states it feels like she is having heart failure again.  She was discharged presumably on August 27th (no discharge summary available at this time), 4 days ago for acute respiratory failure with hypoxia secondary to CHF exacerbation.  The patient is typically followed at [place].  Reports 8 years ago was referred to Dr. Waddell, but valvular regurgitation not bad enough at that time to do surgery.  However see has severe tricuspid and mitral valve disease which was confirmed during her previous admission by echocardiogram.  She is having dyspnea with exertion, shortness of breath at rest, and orthopnea.  Worsened with exertion.  No relieving factors.      In last hospital stay she underwent left heart angiography as well as transesophageal echocardiogram and was diagnosed with a left atrial thrombus.  Nonobstructive CAD.  Had episode of atrial fibrillation with RVR which spontaneously resolved post catheterization.     In the emergency department routine laboratory studies, chest x-ray, EKG, cardiac enzymes were obtained.  Patient required BiPAP due to hypoxemia not responsive to nasal cannula.   Chest x-ray actually showed improvement as did her cardiac enzymes compared to previous lab studies and imaging during her last hospital stay.  Lasix and nitroglycerin given.     CTS consulted for surgical evaluation of severe MR and LA thrombus.     Hospital Course: She underwent MVr and TVr today 9/9/20. This required 3 pump runs. She arrives to the SICU intubated and sedated. She is on Epi 0.08. Her HR (intrinsic) is in the 40s and MAPs in 70s. She is on 100/5. Two mediastinal CTs in place with 110 cc output upon arrival. She received 1u pRBC, 2u Plts, and 2u FFP. Of note from the case, she developed an RV hematoma 2/2 to retraction and total UOP during the case was about 30 cc.     No current facility-administered medications on file prior to encounter.      Current Outpatient Medications on File Prior to Encounter   Medication Sig    metoprolol tartrate (LOPRESSOR) 25 MG tablet Take 1 tablet (25 mg total) by mouth 3 (three) times daily.    amiodarone (PACERONE) 400 MG tablet Take two tablets by mouth twice daily for twelve days, then take one tablet by mouth daily.    apixaban (ELIQUIS) 5 mg Tab Take 1 tablet (5 mg total) by mouth 2 (two) times daily.    furosemide (LASIX) 40 MG tablet Take 1 tablet (40 mg total) by mouth 2 (two) times daily.    losartan (COZAAR) 50 MG tablet Take 0.5 tablets (25 mg total) by mouth once daily.    pravastatin (PRAVACHOL) 80 MG tablet Take 80 mg by mouth once daily.       Review of patient's allergies indicates:  No Known Allergies    Past Medical History:   Diagnosis Date    Atrial fibrillation with RVR 8/24/2020    Cataract     Essential hypertension 8/31/2020    Glaucoma     Heart valve problem     Hyperlipidemia     Severe mitral regurgitation 8/24/2020     Past Surgical History:   Procedure Laterality Date    ANKLE SURGERY      LEFT HEART CATHETERIZATION Left 8/25/2020    Procedure: Left heart cath, radial;  Surgeon: Marlee Carrillo MD;  [facility] CATH  LAB;  Service: Cardiology;  Laterality: Left;    lipoma removal       Family History     Problem Relation (Age of Onset)    Cancer Mother    Cataracts Sister    No Known Problems Father, Brother, Maternal Aunt, Maternal Uncle, Paternal Aunt, Paternal Uncle, Maternal Grandmother, Maternal Grandfather, Paternal Grandmother, Paternal Grandfather        Tobacco Use    Smoking status: Never Smoker    Smokeless tobacco: Never Used   Substance and Sexual Activity    Alcohol use: No    Drug use: No    Sexual activity: Not Currently     Review of Systems   Unable to perform ROS: Intubated     Objective:     Vital Signs (Most Recent):  Temp: 97.7 °F (36.5 °C) (09/09/20 1431)  Pulse: (!) 44 (09/09/20 2315)  Resp: 16 (09/09/20 2315)  BP: 128/74 (09/09/20 1431)  SpO2: 99 % (09/09/20 2315) Vital Signs (24h Range):  Temp:  [97.7 °F (36.5 °C)-98.1 °F (36.7 °C)] 97.7 °F (36.5 °C)  Pulse:  [] 44  Resp:  [13-18] 16  SpO2:  [95 %-99 %] 99 %  BP: ()/(59-76) 128/74  Arterial Line BP: (128)/(46) 128/46     Weight: 74.7 kg (164 lb 12.7 oz)  Body mass index is 27.42 kg/m².    Physical Exam  Constitutional:       Comments: Intubated and Sedated   HENT:      Head: Normocephalic and atraumatic.      Mouth/Throat:      Comments: ET tube in place  Neck:      Musculoskeletal: Neck supple.   Cardiovascular:      Rate and Rhythm: Normal rate and regular rhythm.      Comments: Mediastinal CTs x2 with bloody output  Sternal dressing is clean and dry  Pulmonary:      Comments: Vent Mode: A/C  Oxygen Concentration (%):  (100) 100  Resp Rate Total:  (16 br/min) 16 br/min  Vt Set:  (440 mL) 440 mL  PEEP/CPAP:  (5 cmH20) 5 cmH20  Mean Airway Pressure:  (9.1 cmH20) 9.1 cmH20  Abdominal:      General: There is no distension.      Palpations: Abdomen is soft.   Musculoskeletal:         General: No deformity.   Skin:     General: Skin is warm and dry.   Neurological:      Comments: Sedated         Significant Labs:  CBC:   Recent Labs   Lab  09/04/20  0331  09/09/20 2048   WBC 12.58  --   --    RBC 4.64  --   --    HGB 14.0  --   --    HCT 44.9   < > 22*     --   --    MCV 97  --   --    MCH 30.2  --   --    MCHC 31.2*  --   --     < > = values in this interval not displayed.     CMP:   Recent Labs   Lab 09/09/20  0437      CALCIUM 9.0   ALBUMIN 3.6   PROT 6.4      K 3.9   CO2 26      BUN 24*   CREATININE 1.6*   ALKPHOS 80   ALT 58*   AST 62*   BILITOT 1.2*       Significant Diagnostics:  I have reviewed all pertinent imaging results/findings within the past 24 hours.    Assessment/Plan:     * Acute respiratory failure with hypoxia  Neuro:  - Propofol for sedation  - Fentanyl prn for pain    CV:  S/p MVr, TVr 9/9/20. Case noteworthy for pump run x3, RV hematoma 2/2 retraction, and total of 30cc UOP during case.   - MAP goal 60-80  - Drips: Epi at 0.08 upon arrival  - Chest tubes (mediastinal x2) with 110 cc bloody ouput   - HR 40s-50s (intrinsic) with backup pacing at 40 bpm  - MAP in 70s    Pulm:  - Intubated with mechanical vent  Vent Mode: A/C  Oxygen Concentration (%):  [100] 100  Resp Rate Total:  [16 br/min] 16 br/min  Vt Set:  [440 mL] 440 mL  PEEP/CPAP:  [5 cmH20] 5 cmH20  Mean Airway Pressure:  [9.1 cmH20] 9.1 cmH20  - ABG q1hr  - DuoNebs  - Postop CXR    FEN/GI:  - NPO  - Trend CMP  - Lyte replacement prn  - Give 500 cc Albumin     Renal:  - Yoo in place  - Monitor UOP  - Trend BUN/Cr  - 30 cc UOP during case    Heme/Onc:  - Received a total of 1u pRBC, 2u FFP, 2u Plts today  - Trend H/H  - 1u pRBC running upon arrival  - Postop labs including coags and fibrinogen    ID:  - AF  - Trend WBC  - Periop Ancef    Endo:  - Insulin gtt    PPx:  - Protonix, TEDs/SCDs    Dispo: continue ICU care, remain intubated tonight          VTE Risk Mitigation (From admission, onward)         Ordered     IP VTE HIGH RISK PATIENT  Once      09/09/20 2250     Place sequential compression device  Until discontinued      09/09/20 2250                 Albert Abrams MD  General Surgery  [facility]

## 2020-09-10 NOTE — TRANSFER OF CARE
"Anesthesia Transfer of Care Note    Patient: Fatou Lorenzo    Procedure(s) Performed: Procedure(s) (LRB):  Valvuloplasty, Mitral (N/A)  REPAIR, TRICUSPID VALVE (N/A)  BAIN MAZE PROCEDURE (N/A)    Patient location: ICU    Anesthesia Type: general    Transport from OR: Transported from OR intubated on 100% O2 by AMBU with adequate controlled ventilation. Continuos invasive BP monitoring in transport. Continuous SpO2 monitoring in transport. Continuous ECG monitoring in transport    Post assessment: no apparent anesthetic complications    Post vital signs: stable    Level of consciousness: unresponsive and sedated    Nausea/Vomiting: no nausea/vomiting    Complications: none    Transfer of care protocol was followed      Last vitals:   Visit Vitals  /74 (BP Location: Right arm, Patient Position: Lying)   Pulse 110   Temp 36.5 °C (97.7 °F) (Oral)   Resp 16   Ht 5' 5" (1.651 m)   Wt 74.7 kg (164 lb 12.7 oz)   SpO2 99%   Breastfeeding No   BMI 27.42 kg/m²     "

## 2020-09-10 NOTE — PT/OT/SLP PROGRESS
Physical Therapy      Patient Name:  Fatou Lorenzo   MRN:  [id number]    PT re-eval orders received and acknowledged. PT intubated following surgery, not appropriate for initiation of therapy services at this time. Will follow-up as pt is medically appropriate.    Janine Soto, PT

## 2020-09-10 NOTE — ANESTHESIA POSTPROCEDURE EVALUATION
Anesthesia Post Evaluation    Patient: Fatou Lorenzo    Procedure(s) Performed: Procedure(s) (LRB):  Valvuloplasty, Mitral (N/A)  REPAIR, TRICUSPID VALVE (N/A)  BAIN MAZE PROCEDURE (N/A)    Final Anesthesia Type: general    Patient location during evaluation: ICU  Patient participation: No - Unable to Participate, Intubation  Level of consciousness: sedated  Post-procedure vital signs: reviewed and stable  Pain management: adequate  Airway patency: patent    PONV status at discharge: No PONV  Anesthetic complications: no      Cardiovascular status: hemodynamically stable and bradycardic  Respiratory status: ETT and ventilator  Hydration status: euvolemic            Vitals Value Taken Time   BP 85/50 09/10/20 0801   Temp 37 °C (98.6 °F) 09/10/20 0934   Pulse 80 09/10/20 0934   Resp 20 09/10/20 0934   SpO2 93 % 09/10/20 0934   Vitals shown include unvalidated device data.      No case tracking events are documented in the log.      Pain/Sheila Score: Pain Rating Prior to Med Admin: 0 (9/10/2020  5:48 AM)

## 2020-09-11 NOTE — NURSING
Notified Dr. Ng on pt's low MAP 60, cardiac index 1.8 and updated on urine output/chest tube drainage/ and vital signs. MD ordered levo starting at .04

## 2020-09-11 NOTE — PLAN OF CARE
"      SICU PLAN OF CARE NOTE    Dx: Acute respiratory failure with hypoxia    Shift Events: Nitric weaned to 5    Goals of Care: Map 60- 80    Neuro: Sedated    Vital Signs: BP (!) 102/51 (BP Location: Right arm, Patient Position: Lying)   Pulse 74   Temp 98.1 °F (36.7 °C)   Resp (!) 6   Ht 5' 5" (1.651 m)   Wt 89.1 kg (196 lb 6.9 oz)   SpO2 96%   Breastfeeding No   BMI 32.69 kg/m²     Respiratory: Ventilator    Diet: NPO    Gtts: Propfol, Insulin, Vasopressin, Epinephrine, and Milrinone    Urine Output: Urinary Catheter 295 cc/shift    Drains: Chest Tube, total output 90 cc /  shift and NG/OG Tube 250 cc /  shift    Restraints      Labs/Accuchecks: Q4hr labs, Q1hr accuchecks    Skin: foam padding applied, skin absent of pressure injuries      "

## 2020-09-11 NOTE — NURSING
MAP 57, Dr Ng notified, decreased primacor to 0.25 mcg/kg/min. Notified of gtts/vitals/ and outputs.

## 2020-09-11 NOTE — NURSING
Dr. Colon notified of urine output/chest tube output and color/vent settings/ labs/ and vital signs. See chart for orders.

## 2020-09-11 NOTE — PLAN OF CARE
SW is following this Pt for DC planning needs. There are no identified needs at this time. Discharge Disposition: TBD - pending patient progress; patient intubated/sedated    SW will continue to coordinate with patient, family, team and insurance to complete patient's discharge plan.    Esthela Peralta LMSW   - Case Management

## 2020-09-11 NOTE — NURSING
Propofol turned off for assessment, pt follows commands, open eye to gentle touch and voice, moves all extremities equally, see flow sheet.

## 2020-09-11 NOTE — PLAN OF CARE
"      SICU PLAN OF CARE NOTE    Dx: Acute respiratory failure with hypoxia    Shift Events: Pt remains intubated, vent settings AC/VS FiO2 50%/PEEP 5. Primacor weaned to off, vaso weaned to off.     Goals of Care: MAP 60-80. SBP < 140    Neuro: Arouses to Voice and Follows Commands    Vital Signs: BP (!) 103/54 (BP Location: Right arm, Patient Position: Lying)   Pulse 74   Temp 98.1 °F (36.7 °C)   Resp 15   Ht 5' 5" (1.651 m)   Wt 91 kg (200 lb 9.9 oz)   SpO2 100%   Breastfeeding No   BMI 33.38 kg/m²     Respiratory: Ventilator    Diet: NPO    Gtts: Propfol and Epinephrine    Urine Output: Urinary Catheter 490 cc/shift    Drains: Chest Tube, total output 30 cc /  shift     Labs/Accuchecks: Labs trended, electrolytes replaced per PRN order. Accuchecks Q1h.    Skin: Midsternal incision dressing CDI. CT site cleansed and dressing changed. No skin breakdown noted. Heel foam in place. Bed plugged in with waffle mattress inflated. Weight shift assistance provided throughout shift.      "

## 2020-09-11 NOTE — SUBJECTIVE & OBJECTIVE
Interval History/Significant Events: No acute events. Weaned off Oneil this am without issue. Levo now off, on epi 0.8 and vaso 0.04 which is being weaned as well. Remains intubated and sedated. UOP still low only 20-40 cc/hr overnight    Follow-up For: Procedure(s) (LRB):  Valvuloplasty, Mitral (N/A)  REPAIR, TRICUSPID VALVE (N/A)  BAIN MAZE PROCEDURE (N/A)    Post-Operative Day: 2 Days Post-Op    Objective:     Vital Signs (Most Recent):  Temp: 97.7 °F (36.5 °C) (09/11/20 1132)  Pulse: (!) 126 (09/11/20 1200)  Resp: (!) 4 (09/11/20 1200)  BP: (!) 100/51 (09/11/20 1200)  SpO2: 100 % (09/11/20 1200) Vital Signs (24h Range):  Temp:  [97.5 °F (36.4 °C)-99.9 °F (37.7 °C)] 97.7 °F (36.5 °C)  Pulse:  [] 126  Resp:  [2-25] 4  SpO2:  [81 %-100 %] 100 %  BP: ()/(46-59) 100/51  Arterial Line BP: ()/(39-55) 101/53     Weight: 91 kg (200 lb 9.9 oz)  Body mass index is 33.38 kg/m².      Intake/Output Summary (Last 24 hours) at 9/11/2020 1249  Last data filed at 9/11/2020 1200  Gross per 24 hour   Intake 3112 ml   Output 1300 ml   Net 1812 ml       Physical Exam  Vitals signs and nursing note reviewed.   Constitutional:       General: She is not in acute distress.     Appearance: She is well-developed. She is not toxic-appearing.   HENT:      Head: Normocephalic and atraumatic.      Mouth/Throat:      Comments: Intubated  Eyes:      General: No scleral icterus.        Right eye: No discharge.         Left eye: No discharge.   Neck:      Vascular: No JVD.      Comments: RIJ in place  Cardiovascular:      Rate and Rhythm: Normal rate. Rhythm irregular.   Pulmonary:      Comments: Chest tubes with SS output  Pacer wires in place  Dressing c/d/i  Abdominal:      General: There is no distension.      Palpations: Abdomen is soft.   Genitourinary:     Comments: Yoo in place  Skin:     General: Skin is warm and dry.   Neurological:      Comments: sedated         Vents:  Vent Mode: A/C (09/11/20 1132)  Ventilator  Initiated: Yes (09/09/20 2301)  Set Rate: 16 BPM (09/11/20 1132)  Vt Set: 410 mL (09/11/20 1132)  PEEP/CPAP: 5 cmH20 (09/11/20 1132)  Oxygen Concentration (%): 60 (09/11/20 1200)  Peak Airway Pressure: 22 cmH2O (09/11/20 1132)  Plateau Pressure: 16 cmH20 (09/11/20 1132)  Total Ve: 7.11 mL (09/11/20 1132)  F/VT Ratio<105 (RSBI): (!) 15.32 (09/11/20 1132)    Lines/Drains/Airways     Central Venous Catheter Line             Introducer with Double Lumen 09/09/20 right internal jugular 2 days    Pulmonary Artery Catheter Assessment  09/09/20 1515 right internal jugular 1 day          Drain                 NG/OG Tube 09/09/20 Right mouth 2 days         Urethral Catheter 09/09/20 1512 Non-latex;Straight-tip;Temperature probe 14 Fr. 1 day         Y Chest Tube 1 and 2 09/09/20 2145 1 Anterior Mediastinal 19 Fr. 2 Anterior Mediastinal 19 Fr. 1 day          Airway                 Airway - Non-Surgical 09/09/20 1640 1 day          Arterial Line            Arterial Line 09/09/20 Right Femoral 2 days    Arterial Line 09/09/20 1450 Right Radial 1 day          Line                 Pacer Wires 09/09/20 2057 1 day          Peripheral Intravenous Line                 Peripheral IV - Single Lumen 09/09/20 0900 20 G Right Forearm 2 days                Significant Labs:    CBC/Anemia Profile:  Recent Labs   Lab 09/11/20  0013 09/11/20  0304 09/11/20  0815   WBC 11.90 13.77* 12.76*   HGB 8.1* 8.2* 8.0*   HCT 24.2* 25.3* 24.6*   PLT 47* 48* 44*   MCV 92 94 94   RDW 14.5 14.3 14.6*        Chemistries:  Recent Labs   Lab 09/11/20  0013 09/11/20  0304 09/11/20  0815   * 150*  150* 146*   K 3.8 4.1  4.1 4.0   * 114*  114* 111*   CO2 27 25  25 25   BUN 26* 27*  27* 29*   CREATININE 1.8* 1.8*  1.8* 1.7*   CALCIUM 8.1* 8.2*  8.2* 8.1*   ALBUMIN 3.6 3.6 3.5   PROT 4.9* 4.8* 4.9*   BILITOT 3.3* 3.7* 3.9*   ALKPHOS 32* 34* 37*   ALT 29 27 24   * 122* 116*   MG 2.7* 2.7* 2.6   PHOS 4.3 5.0* 4.0       ABGs:   Recent Labs    Lab 09/11/20  1132   PH 7.499*   PCO2 32.4*   HCO3 25.2   POCSATURATED 91*   BE 2     Coagulation:   Recent Labs   Lab 09/11/20  0815   INR 1.1   APTT 31.3     All pertinent labs within the past 24 hours have been reviewed.    Significant Imaging:  I have reviewed and interpreted all pertinent imaging results/findings within the past 24 hours.

## 2020-09-11 NOTE — PLAN OF CARE
Pt afebrile, sats >95% on AC 70%/5 PEEP. MAP > 60. -130. Gave 1 U PRBC, 1U cryo, and 1 U FFP in AM. Propofol/insulin/vaso/levo/primacor/epi gtts currently infusing. Accuchecks qh, insulin titrated accordingly. Frequent checks made to ensure pain control, pt complained of no pain during shift. Labs monitored, electrolytes replaced. Not able to wean vent settings, nitric at 10. No skin breakdown noted on heals, buttocks, or sacrum at this time. POC reviewed with pt and son, no questions at this time. Plan for transfer pending bed assignment. Will continue to monitor

## 2020-09-11 NOTE — NURSING
Spoke to MD regarding pressors, per MD decrease Vaso to 0.02 units/min. In an hour is MAP is >60 and BP stable turn off vaso and begin to decrease nitric

## 2020-09-11 NOTE — PROGRESS NOTES
[facility]  Critical Care - Surgery  Progress Note    Patient Name: Fatou Lorenzo  MRN: [id number]  Admission Date: 8/30/2020  Hospital Length of Stay: 11 days  Code Status: Full Code  Attending Provider: Antoni Waddell MD  Primary Care Provider: Ludin Rivas MD   Principal Problem: Acute respiratory failure with hypoxia    Subjective:     Hospital/ICU Course:  No notes on file    Interval History/Significant Events: No acute events. Weaned off Oneil this am without issue. Levo now off, on epi 0.8 and vaso 0.04 which is being weaned as well. Remains intubated and sedated. UOP still low only 20-40 cc/hr overnight    Follow-up For: Procedure(s) (LRB):  Valvuloplasty, Mitral (N/A)  REPAIR, TRICUSPID VALVE (N/A)  BAIN MAZE PROCEDURE (N/A)    Post-Operative Day: 2 Days Post-Op    Objective:     Vital Signs (Most Recent):  Temp: 97.7 °F (36.5 °C) (09/11/20 1132)  Pulse: (!) 126 (09/11/20 1200)  Resp: (!) 4 (09/11/20 1200)  BP: (!) 100/51 (09/11/20 1200)  SpO2: 100 % (09/11/20 1200) Vital Signs (24h Range):  Temp:  [97.5 °F (36.4 °C)-99.9 °F (37.7 °C)] 97.7 °F (36.5 °C)  Pulse:  [] 126  Resp:  [2-25] 4  SpO2:  [81 %-100 %] 100 %  BP: ()/(46-59) 100/51  Arterial Line BP: ()/(39-55) 101/53     Weight: 91 kg (200 lb 9.9 oz)  Body mass index is 33.38 kg/m².      Intake/Output Summary (Last 24 hours) at 9/11/2020 1249  Last data filed at 9/11/2020 1200  Gross per 24 hour   Intake 3112 ml   Output 1300 ml   Net 1812 ml       Physical Exam  Vitals signs and nursing note reviewed.   Constitutional:       General: She is not in acute distress.     Appearance: She is well-developed. She is not toxic-appearing.   HENT:      Head: Normocephalic and atraumatic.      Mouth/Throat:      Comments: Intubated  Eyes:      General: No scleral icterus.        Right eye: No discharge.         Left eye: No discharge.   Neck:      Vascular: No JVD.      Comments: RIJ in place  Cardiovascular:      Rate and  Rhythm: Normal rate. Rhythm irregular.   Pulmonary:      Comments: Chest tubes with SS output  Pacer wires in place  Dressing c/d/i  Abdominal:      General: There is no distension.      Palpations: Abdomen is soft.   Genitourinary:     Comments: Yoo in place  Skin:     General: Skin is warm and dry.   Neurological:      Comments: sedated         Vents:  Vent Mode: A/C (09/11/20 1132)  Ventilator Initiated: Yes (09/09/20 2301)  Set Rate: 16 BPM (09/11/20 1132)  Vt Set: 410 mL (09/11/20 1132)  PEEP/CPAP: 5 cmH20 (09/11/20 1132)  Oxygen Concentration (%): 60 (09/11/20 1200)  Peak Airway Pressure: 22 cmH2O (09/11/20 1132)  Plateau Pressure: 16 cmH20 (09/11/20 1132)  Total Ve: 7.11 mL (09/11/20 1132)  F/VT Ratio<105 (RSBI): (!) 15.32 (09/11/20 1132)    Lines/Drains/Airways     Central Venous Catheter Line             Introducer with Double Lumen 09/09/20 right internal jugular 2 days    Pulmonary Artery Catheter Assessment  09/09/20 1515 right internal jugular 1 day          Drain                 NG/OG Tube 09/09/20 Right mouth 2 days         Urethral Catheter 09/09/20 1512 Non-latex;Straight-tip;Temperature probe 14 Fr. 1 day         Y Chest Tube 1 and 2 09/09/20 2145 1 Anterior Mediastinal 19 Fr. 2 Anterior Mediastinal 19 Fr. 1 day          Airway                 Airway - Non-Surgical 09/09/20 1640 1 day          Arterial Line            Arterial Line 09/09/20 Right Femoral 2 days    Arterial Line 09/09/20 1450 Right Radial 1 day          Line                 Pacer Wires 09/09/20 2057 1 day          Peripheral Intravenous Line                 Peripheral IV - Single Lumen 09/09/20 0900 20 G Right Forearm 2 days                Significant Labs:    CBC/Anemia Profile:  Recent Labs   Lab 09/11/20  0013 09/11/20  0304 09/11/20  0815   WBC 11.90 13.77* 12.76*   HGB 8.1* 8.2* 8.0*   HCT 24.2* 25.3* 24.6*   PLT 47* 48* 44*   MCV 92 94 94   RDW 14.5 14.3 14.6*        Chemistries:  Recent Labs   Lab 09/11/20  0013  09/11/20  0304 09/11/20  0815   * 150*  150* 146*   K 3.8 4.1  4.1 4.0   * 114*  114* 111*   CO2 27 25  25 25   BUN 26* 27*  27* 29*   CREATININE 1.8* 1.8*  1.8* 1.7*   CALCIUM 8.1* 8.2*  8.2* 8.1*   ALBUMIN 3.6 3.6 3.5   PROT 4.9* 4.8* 4.9*   BILITOT 3.3* 3.7* 3.9*   ALKPHOS 32* 34* 37*   ALT 29 27 24   * 122* 116*   MG 2.7* 2.7* 2.6   PHOS 4.3 5.0* 4.0       ABGs:   Recent Labs   Lab 09/11/20  1132   PH 7.499*   PCO2 32.4*   HCO3 25.2   POCSATURATED 91*   BE 2     Coagulation:   Recent Labs   Lab 09/11/20  0815   INR 1.1   APTT 31.3     All pertinent labs within the past 24 hours have been reviewed.    Significant Imaging:  I have reviewed and interpreted all pertinent imaging results/findings within the past 24 hours.    Assessment/Plan:     Severe mitral regurgitation  Fatou Lorenzo is a 75 y.o. female s/p MVr, TVr 9/9/2020. Case noteworthy for multiple pump runs (3) and RV hematoma due to retraction.     Neuro:  - Sedation: propofol - daily sedation vacation  - fentanyl for pain     CV:  S/p MVr, TVr 9/9/20. Case noteworthy for pump run x3, RV hematoma 2/2 retraction  - Intrinsic rate in the 70s but with occasional PAC/PVCs.   - Will try to wean off epi as possibly contributing to arrhythmia. Will titrate vaso appropriately  - Decrease milrinone to 0.125  - MAP goal 60-80, SBP <140  - Chest tubes (mediastinal x2) to suction     Pulm:  Will continue mechanical ventilation until more hemodynamically stable  Adjusting tidal volume, RR, and FiO2 appropriately with RT  Daily CXR  ABG q6 hrs     FEN/GI:  - Net positive 1.5 L in the last 24 hrs, 7.5 L overall  - replacing lytes as needed  - NPO  - famotidine  - miralax     Renal:  - Yoo in place for strict I/O  - Still with low UOP  - Start lasix 40 IV BID (is on lasix at home)  - BUN/Cr 22/1.7, will continue to monitor     Heme/Onc:  - H/H and coags stabilized  - Continue to trend  - No indication for anticoagulation  - Transition to q6  hr labs     ID:  - Afebrile with mild elevation in WBC  - periop antibiotics complete  - Will continue to monitor for now     Endo:  - Insulin gtt  - Endocrine consulted, appreciate recs     PPx:  - famotidine, SCDs     Dispo: continue ICU care      Critical secondary to Patient has a condition that poses threat to life and bodily function: MVr, TVr, MAZE     Critical care was time spent personally by me on the following activities: development of treatment plan with patient or surrogate and bedside caregivers, discussions with consultants, evaluation of patient's response to treatment, examination of patient, ordering and performing treatments and interventions, ordering and review of laboratory studies, ordering and review of radiographic studies, pulse oximetry, re-evaluation of patient's condition.  This critical care time did not overlap with that of any other provider or involve time for any procedures.     Madhuri Ng MD  Critical Care - Surgery  [facility]

## 2020-09-11 NOTE — PROGRESS NOTES
Pt HR on monitor reading Afib and bigeminy with multiple PVCs. EKG ordered and read accelerated junctional rhythm with incomplete LBBB. CTS MD notified. No new orders received. Will continue to closely monitor.

## 2020-09-11 NOTE — PROGRESS NOTES
Vaso gtt was turned off at 01:00. Around 03:00 Pt's MAP began dropping to 50-54. Dr. Zacarias notified, also updated on gtts, CI, CVP, and nitric. MD orders to restart vaso gtt @ 0.04 U/min and decrease epi as tolerated, but not below 0.06 mcg/kg/min. Will continue to closely monitor.

## 2020-09-11 NOTE — PROGRESS NOTES
HR on monitor reading Afib and bigeminy with multiple PVCs, HR 70-80s. EKG ordered and read accelerated junctional rhythm with incomplete LBBB. CTS MD notified. No new orders received. Will continue to closely monitor.

## 2020-09-12 NOTE — NURSING
Contacted MD regarding pt UO decreasing and electrolytes increasing as well as BUN/Cr. Per Md, will continue to monitor

## 2020-09-12 NOTE — SUBJECTIVE & OBJECTIVE
Interval History/Significant Events: No acute events. Remains intubated and sedated. Has been weaned off milrinone, levo and vaso. Still on epi at 0.04 this am. Oneil completely off and coming down on FiO2 to 50%.     Currently being back up paced at 50, intrinsic rate mid-50s. CT with minimal output. Still with minimal UOP despite lasix    Follow-up For: Procedure(s) (LRB):  Valvuloplasty, Mitral (N/A)  REPAIR, TRICUSPID VALVE (N/A)  BAIN MAZE PROCEDURE (N/A)    Post-Operative Day: 3 Days Post-Op    Objective:     Vital Signs (Most Recent):  Temp: 98.2 °F (36.8 °C) (09/12/20 1134)  Pulse: 103 (09/12/20 1400)  Resp: 16 (09/12/20 1400)  BP: 101/70 (09/12/20 1400)  SpO2: 100 % (09/12/20 1400) Vital Signs (24h Range):  Temp:  [97.5 °F (36.4 °C)-98.8 °F (37.1 °C)] 98.2 °F (36.8 °C)  Pulse:  [] 103  Resp:  [8-32] 16  SpO2:  [85 %-100 %] 100 %  BP: ()/(43-70) 101/70  Arterial Line BP: ()/(38-65) 109/55     Weight: 90.3 kg (199 lb 1.2 oz)  Body mass index is 33.13 kg/m².      Intake/Output Summary (Last 24 hours) at 9/12/2020 1428  Last data filed at 9/12/2020 1400  Gross per 24 hour   Intake 2022 ml   Output 720 ml   Net 1302 ml       Physical Exam  Vitals signs and nursing note reviewed.   Constitutional:       General: She is not in acute distress.     Appearance: She is well-developed. She is not toxic-appearing.   HENT:      Head: Normocephalic and atraumatic.      Mouth/Throat:      Comments: Intubated  Eyes:      General: No scleral icterus.        Right eye: No discharge.         Left eye: No discharge.   Neck:      Vascular: No JVD.      Comments: RIJ in place  Cardiovascular:      Rate and Rhythm: Regular rhythm. Tachycardia present.   Pulmonary:      Comments: Chest tubes with SS output  Pacer wires in place  Dressing c/d/i  Abdominal:      General: There is no distension.      Palpations: Abdomen is soft.   Genitourinary:     Comments: Yoo in place  Skin:     General: Skin is warm and dry.    Neurological:      Comments: sedated         Vents:  Vent Mode: A/C (09/12/20 1134)  Ventilator Initiated: Yes (09/09/20 2301)  Set Rate: 12 BPM (09/12/20 1134)  Vt Set: 410 mL (09/12/20 1134)  PEEP/CPAP: 5 cmH20 (09/12/20 1134)  Oxygen Concentration (%): 40 (09/12/20 1400)  Peak Airway Pressure: 22 cmH2O (09/12/20 1134)  Plateau Pressure: 19 cmH20 (09/12/20 1134)  Total Ve: 8.31 mL (09/12/20 1134)  F/VT Ratio<105 (RSBI): (!) 37.65 (09/12/20 1134)    Lines/Drains/Airways     Central Venous Catheter Line             Introducer with Double Lumen 09/09/20 right internal jugular 3 days    Pulmonary Artery Catheter Assessment  09/09/20 1515 right internal jugular 2 days          Drain                 NG/OG Tube 09/09/20 Right mouth 3 days         Urethral Catheter 09/09/20 1512 Non-latex;Straight-tip;Temperature probe 14 Fr. 2 days         Y Chest Tube 1 and 2 09/09/20 2145 1 Anterior Mediastinal 19 Fr. 2 Anterior Mediastinal 19 Fr. 2 days          Airway                 Airway - Non-Surgical 09/09/20 1640 2 days          Arterial Line            Arterial Line 09/09/20 Right Femoral 3 days          Line                 Pacer Wires 09/09/20 2057 2 days          Peripheral Intravenous Line                 Peripheral IV - Single Lumen 09/09/20 0900 20 G Right Forearm 3 days                Significant Labs:    CBC/Anemia Profile:  Recent Labs   Lab 09/11/20  2333 09/12/20  0334 09/12/20  1145   WBC 13.92* 11.71  11.71  11.71 10.32   HGB 8.2* 7.9*  7.9*  7.9* 8.2*   HCT 25.1* 24.6*  24.6*  24.6* 26.0*   PLT 46* 45*  45*  45* 42*   MCV 95 96  96  96 97   RDW 15.0* 15.2*  15.2*  15.2* 15.1*        Chemistries:  Recent Labs   Lab 09/11/20  2333 09/12/20  0334 09/12/20  1145    143  143 142   K 4.6 4.7  4.7 4.3    109  109 107   CO2 24 23  23 23   BUN 35* 37*  37* 42*   CREATININE 2.0* 2.1*  2.1* 2.1*   CALCIUM 8.1* 8.1*  8.1* 8.2*   ALBUMIN 3.3* 3.2* 3.1*   PROT 5.1* 4.8* 5.1*   BILITOT 3.5* 3.3*  4.0*   ALKPHOS 62 53* 71   ALT 21 19 20   * 96* 98*   MG 2.6 2.5 2.7*   PHOS 4.6* 5.1* 4.7*       ABGs:   Recent Labs   Lab 09/12/20  0409   PH 7.499*   PCO2 29.6*   HCO3 23.0*   POCSATURATED 98   BE 0     Coagulation:   Recent Labs   Lab 09/12/20  1145   INR 1.0   APTT 31.9     All pertinent labs within the past 24 hours have been reviewed.    Significant Imaging:  I have reviewed and interpreted all pertinent imaging results/findings within the past 24 hours.     09/12/2020 CXR  FINDINGS:  Lines and tubes project in expected position.     Slightly decreased diffuse bilateral airspace opacities.     Slightly decreased moderate right and small left pleural effusions.     No pneumothorax.  No new abnormality.     Impression:     Since September 11, 2020, slightly decreased diffuse bilateral airspace opacities.     Slightly decreased moderate right and small left pleural effusions.

## 2020-09-12 NOTE — PROGRESS NOTES
[facility]  Critical Care - Surgery  Progress Note    Patient Name: Fatou Lorenzo  MRN: [id number]  Admission Date: 8/30/2020  Hospital Length of Stay: 12 days  Code Status: Full Code  Attending Provider: Antoni Waddell MD  Primary Care Provider: Ludin Rivas MD   Principal Problem: Acute respiratory failure with hypoxia    Subjective:     Hospital/ICU Course:  No notes on file    Interval History/Significant Events: No acute events. Remains intubated and sedated. Has been weaned off milrinone, levo and vaso. Still on epi at 0.04 this am. Oneil completely off and coming down on FiO2 to 50%.     Currently being back up paced at 50, intrinsic rate mid-50s. CT with minimal output. Still with minimal UOP despite lasix    Follow-up For: Procedure(s) (LRB):  Valvuloplasty, Mitral (N/A)  REPAIR, TRICUSPID VALVE (N/A)  BAIN MAZE PROCEDURE (N/A)    Post-Operative Day: 3 Days Post-Op    Objective:     Vital Signs (Most Recent):  Temp: 98.2 °F (36.8 °C) (09/12/20 1134)  Pulse: 103 (09/12/20 1400)  Resp: 16 (09/12/20 1400)  BP: 101/70 (09/12/20 1400)  SpO2: 100 % (09/12/20 1400) Vital Signs (24h Range):  Temp:  [97.5 °F (36.4 °C)-98.8 °F (37.1 °C)] 98.2 °F (36.8 °C)  Pulse:  [] 103  Resp:  [8-32] 16  SpO2:  [85 %-100 %] 100 %  BP: ()/(43-70) 101/70  Arterial Line BP: ()/(38-65) 109/55     Weight: 90.3 kg (199 lb 1.2 oz)  Body mass index is 33.13 kg/m².      Intake/Output Summary (Last 24 hours) at 9/12/2020 1428  Last data filed at 9/12/2020 1400  Gross per 24 hour   Intake 2022 ml   Output 720 ml   Net 1302 ml       Physical Exam  Vitals signs and nursing note reviewed.   Constitutional:       General: She is not in acute distress.     Appearance: She is well-developed. She is not toxic-appearing.   HENT:      Head: Normocephalic and atraumatic.      Mouth/Throat:      Comments: Intubated  Eyes:      General: No scleral icterus.        Right eye: No discharge.         Left eye: No  discharge.   Neck:      Vascular: No JVD.      Comments: RIJ in place  Cardiovascular:      Rate and Rhythm: Regular rhythm. Tachycardia present.   Pulmonary:      Comments: Chest tubes with SS output  Pacer wires in place  Dressing c/d/i  Abdominal:      General: There is no distension.      Palpations: Abdomen is soft.   Genitourinary:     Comments: Yoo in place  Skin:     General: Skin is warm and dry.   Neurological:      Comments: sedated         Vents:  Vent Mode: A/C (09/12/20 1134)  Ventilator Initiated: Yes (09/09/20 2301)  Set Rate: 12 BPM (09/12/20 1134)  Vt Set: 410 mL (09/12/20 1134)  PEEP/CPAP: 5 cmH20 (09/12/20 1134)  Oxygen Concentration (%): 40 (09/12/20 1400)  Peak Airway Pressure: 22 cmH2O (09/12/20 1134)  Plateau Pressure: 19 cmH20 (09/12/20 1134)  Total Ve: 8.31 mL (09/12/20 1134)  F/VT Ratio<105 (RSBI): (!) 37.65 (09/12/20 1134)    Lines/Drains/Airways     Central Venous Catheter Line             Introducer with Double Lumen 09/09/20 right internal jugular 3 days    Pulmonary Artery Catheter Assessment  09/09/20 1515 right internal jugular 2 days          Drain                 NG/OG Tube 09/09/20 Right mouth 3 days         Urethral Catheter 09/09/20 1512 Non-latex;Straight-tip;Temperature probe 14 Fr. 2 days         Y Chest Tube 1 and 2 09/09/20 2145 1 Anterior Mediastinal 19 Fr. 2 Anterior Mediastinal 19 Fr. 2 days          Airway                 Airway - Non-Surgical 09/09/20 1640 2 days          Arterial Line            Arterial Line 09/09/20 Right Femoral 3 days          Line                 Pacer Wires 09/09/20 2057 2 days          Peripheral Intravenous Line                 Peripheral IV - Single Lumen 09/09/20 0900 20 G Right Forearm 3 days                Significant Labs:    CBC/Anemia Profile:  Recent Labs   Lab 09/11/20  2333 09/12/20  0334 09/12/20  1145   WBC 13.92* 11.71  11.71  11.71 10.32   HGB 8.2* 7.9*  7.9*  7.9* 8.2*   HCT 25.1* 24.6*  24.6*  24.6* 26.0*   PLT 46* 45*   45*  45* 42*   MCV 95 96  96  96 97   RDW 15.0* 15.2*  15.2*  15.2* 15.1*        Chemistries:  Recent Labs   Lab 09/11/20  2333 09/12/20  0334 09/12/20  1145    143  143 142   K 4.6 4.7  4.7 4.3    109  109 107   CO2 24 23  23 23   BUN 35* 37*  37* 42*   CREATININE 2.0* 2.1*  2.1* 2.1*   CALCIUM 8.1* 8.1*  8.1* 8.2*   ALBUMIN 3.3* 3.2* 3.1*   PROT 5.1* 4.8* 5.1*   BILITOT 3.5* 3.3* 4.0*   ALKPHOS 62 53* 71   ALT 21 19 20   * 96* 98*   MG 2.6 2.5 2.7*   PHOS 4.6* 5.1* 4.7*       ABGs:   Recent Labs   Lab 09/12/20  0409   PH 7.499*   PCO2 29.6*   HCO3 23.0*   POCSATURATED 98   BE 0     Coagulation:   Recent Labs   Lab 09/12/20  1145   INR 1.0   APTT 31.9     All pertinent labs within the past 24 hours have been reviewed.    Significant Imaging:  I have reviewed and interpreted all pertinent imaging results/findings within the past 24 hours.     09/12/2020 CXR  FINDINGS:  Lines and tubes project in expected position.     Slightly decreased diffuse bilateral airspace opacities.     Slightly decreased moderate right and small left pleural effusions.     No pneumothorax.  No new abnormality.     Impression:     Since September 11, 2020, slightly decreased diffuse bilateral airspace opacities.     Slightly decreased moderate right and small left pleural effusions.    Assessment/Plan:     Severe mitral regurgitation  Fatou Lorenzo is a 75 y.o. female s/p MVr, TVr 9/9/2020. Case noteworthy for multiple pump runs (3) and RV hematoma due to retraction.     Neuro:  - Sedation: propofol - daily sedation vacation  - Fentanyl for pain     CV:  S/p MVr, TVr 9/9/20. Case noteworthy for pump run x3, RV hematoma 2/2 retraction  - Intrinsic rate now in the mid 50s. Continue back up pacing at 50  - Start on dobutamine 2.5 and wean from epi  - Continue amio 400 BID  - MAP goal 60-80, SBP <140  - Chest tubes (mediastinal x2) to suction     Pulm:  Weaning from vent as able, down to 50% FiO2  Daily CXR  ABG  q6 hrs     FEN/GI:  - Net positive 1590 cc yesterday. Appears fluid overloaded on CXR. Will shoot to be net negative 1-2L today  - replacing lytes as needed  - Continue free water flushes 300 QID for hypernatremia  - NPO  - famotidine  - miralax     Renal:  - Yoo in place for strict I/O  - Still with low UOP  - Start lasix 40 IV BID (is on lasix at home)  - BUN/Cr 22/1.7 -> 34/2.1     Heme/Onc:  - H/H and coags stabilized  - Continue to trend  - Labs BID     ID:  - Afebrile, WBC WNL  - periop antibiotics complete     Endo:  - Endocrine consulted, appreciate recs   - ISS    PPx:  - famotidine, SCDs     Dispo: continue ICU care      Critical secondary to Patient has a condition that poses threat to life and bodily function: s/p MVr, TVr, MAZE     Critical care was time spent personally by me on the following activities: development of treatment plan with patient or surrogate and bedside caregivers, discussions with consultants, evaluation of patient's response to treatment, examination of patient, ordering and performing treatments and interventions, ordering and review of laboratory studies, ordering and review of radiographic studies, pulse oximetry, re-evaluation of patient's condition.  This critical care time did not overlap with that of any other provider or involve time for any procedures.     Madhuri Ng MD  Critical Care - Surgery  [facility]

## 2020-09-12 NOTE — PLAN OF CARE
"      SICU PLAN OF CARE NOTE    Dx: Acute respiratory failure with hypoxia    Shift Events: no acute changes    Goals of Care: MAP 60-80    Neuro: Sedated    Vital Signs: BP (!) 73/47 (BP Location: Right arm, Patient Position: Lying)   Pulse 61   Temp 98.8 °F (37.1 °C)   Resp 16   Ht 5' 5" (1.651 m)   Wt 90.3 kg (199 lb 1.2 oz)   SpO2 99%   Breastfeeding No   BMI 33.13 kg/m²     Respiratory: Ventilator    Diet: NPO    Gtts: Propfol and Epinephrine    Urine Output: Urinary Catheter 245 cc/shift    Drains: Chest Tube, total output 0 cc /  shift and NG/OG Tube 0 cc /  shift     Labs/Accuchecks: q4 labs and accuchecks    Skin: foam dressing applied, free of pressure wounds      "

## 2020-09-12 NOTE — PLAN OF CARE
"      SICU PLAN OF CARE NOTE    Dx: Acute respiratory failure with hypoxia    Shift Events: Pt remains intubated, vent settings AC/VC FiO2 40%/PEEP 5. Epi weaned to off.  started, currently infusing at 2.5 mcg/kg/min. TF started at 10 cc/hr.     Goals of Care: MAP 60-80, SBP < 140    Neuro: Arouses to Voice, Follows Commands and Moves All Extremities    Vital Signs: /70 (BP Location: Right arm, Patient Position: Lying)   Pulse 103   Temp 98.2 °F (36.8 °C)   Resp 16   Ht 5' 5" (1.651 m)   Wt 90.3 kg (199 lb 1.2 oz)   SpO2 100%   Breastfeeding No   BMI 33.13 kg/m²     Respiratory: Ventilator    Diet: NPO and Tube Feeds    Gtts: Propfol and Dobutamine    Urine Output: Urinary Catheter 590 cc/shift    Drains: Chest Tube, total output 20 cc /  shift     Labs/Accuchecks: Labs trended. Accuchecks Q6h.     Skin: Midsternal incision dressing CDI. CT site dressing CDI. No skin breakdown noted. Heel and sacral foams in place. Bed plugged in with waffle mattress inflated. Weight shift assistance provided throughout shift.     "

## 2020-09-12 NOTE — PROGRESS NOTES
CT surgery progress note    A/P  75F s/p tricuspid valve annuloplasty and mitral valve repair on 9/9 (pod3)    Wean to extubate  Start dobutamine 2.5  Keep epi 0.04  Monitor Cr, marginal uop  Continue diuresis    Continue chest tubes  Start trophic feeds if unable to extubate  Cont ICU

## 2020-09-12 NOTE — ASSESSMENT & PLAN NOTE
Fatou Lorenzo is a 75 y.o. female s/p MVr, TVr 9/9/2020. Case noteworthy for multiple pump runs (3) and RV hematoma due to retraction.     Neuro:  - Sedation: propofol - daily sedation vacation  - Fentanyl for pain     CV:  S/p MVr, TVr 9/9/20. Case noteworthy for pump run x3, RV hematoma 2/2 retraction  - Intrinsic rate now in the mid 50s. Continue back up pacing at 50  - Start on dobutamine 2.5 and wean from epi  - Continue amio 400 BID  - MAP goal 60-80, SBP <140  - Chest tubes (mediastinal x2) to suction     Pulm:  Weaning from vent as able, down to 50% FiO2  Daily CXR  ABG q6 hrs     FEN/GI:  - Net positive 1590 cc yesterday. Appears fluid overloaded on CXR. Will shoot to be net negative 1-2L today  - replacing lytes as needed  - Continue free water flushes 300 QID for hypernatremia  - NPO  - famotidine  - miralax     Renal:  - Yoo in place for strict I/O  - Still with low UOP  - Start lasix 40 IV BID (is on lasix at home)  - BUN/Cr 22/1.7 -> 34/2.1     Heme/Onc:  - H/H and coags stabilized  - Continue to trend  - Labs BID     ID:  - Afebrile, WBC WNL  - periop antibiotics complete     Endo:  - Endocrine consulted, appreciate recs   - ISS    PPx:  - famotidine, SCDs     Dispo: continue ICU care

## 2020-09-13 NOTE — PROGRESS NOTES
[facility]  Critical Care - Surgery  Progress Note    Patient Name: Fatou Lorenzo  MRN: [id number]  Admission Date: 8/30/2020  Hospital Length of Stay: 13 days  Code Status: Full Code  Attending Provider: Antoni Waddell MD  Primary Care Provider: Ludin Rivas MD   Principal Problem: Acute respiratory failure with hypoxia    Subjective:     Hospital/ICU Course:  No notes on file    Interval History/Significant Events: CI around 1.8-1.9 early in the evening and so started on epi gtt. CIs somewhat improved, but then entered into A fib with frequent PVCs. Received IV metoprolol x1 which led to a dec in HR to the 60s. CI also noted to decrease at this time so was paced at 90.    This am CI is >2.2. Currently on dobutamine 5 and epi 0.02. Vent has also been weaned to minimal settings    Follow-up For: Procedure(s) (LRB):  Valvuloplasty, Mitral (N/A)  REPAIR, TRICUSPID VALVE (N/A)  BAIN MAZE PROCEDURE (N/A)    Post-Operative Day: 4 Days Post-Op    Objective:     Vital Signs (Most Recent):  Temp: 98.1 °F (36.7 °C) (09/13/20 0805)  Pulse: (!) 112 (09/13/20 1000)  Resp: 16 (09/13/20 1000)  BP: 112/65 (09/13/20 1000)  SpO2: 100 % (09/13/20 1000) Vital Signs (24h Range):  Temp:  [97.7 °F (36.5 °C)-98.4 °F (36.9 °C)] 98.1 °F (36.7 °C)  Pulse:  [] 112  Resp:  [12-34] 16  SpO2:  [96 %-100 %] 100 %  BP: ()/(46-70) 112/65  Arterial Line BP: ()/(43-63) 114/56     Weight: 90.9 kg (200 lb 6.4 oz)  Body mass index is 33.35 kg/m².      Intake/Output Summary (Last 24 hours) at 9/13/2020 1032  Last data filed at 9/13/2020 1000  Gross per 24 hour   Intake 1457 ml   Output 1113 ml   Net 344 ml       Physical Exam  Vitals signs and nursing note reviewed.   Constitutional:       General: She is not in acute distress.     Appearance: She is well-developed. She is not toxic-appearing.   HENT:      Head: Normocephalic and atraumatic.      Mouth/Throat:      Comments: Intubated  Eyes:      General: No  scleral icterus.        Right eye: No discharge.         Left eye: No discharge.   Neck:      Vascular: No JVD.      Comments: RIJ in place  Cardiovascular:      Rate and Rhythm: Tachycardia present. Rhythm irregular.   Pulmonary:      Comments: Chest tubes with SS output  Pacer wires in place -> HR>90 so now on back up rate  Dressing c/d/i  Abdominal:      General: There is no distension.      Palpations: Abdomen is soft.   Genitourinary:     Comments: Yoo in place  Skin:     General: Skin is warm and dry.   Neurological:      Comments: sedated         Vents:  Vent Mode: A/C (09/13/20 0805)  Ventilator Initiated: Yes (09/09/20 2301)  Set Rate: 12 BPM (09/13/20 0805)  Vt Set: 410 mL (09/13/20 0805)  PEEP/CPAP: 5 cmH20 (09/13/20 0805)  Oxygen Concentration (%): 40 (09/13/20 1000)  Peak Airway Pressure: 22 cmH2O (09/13/20 0805)  Plateau Pressure: 19 cmH20 (09/13/20 0805)  Total Ve: 5.94 mL (09/13/20 0805)  F/VT Ratio<105 (RSBI): (!) 19.35 (09/13/20 0805)    Lines/Drains/Airways     Central Venous Catheter Line             Introducer with Double Lumen 09/09/20 right internal jugular 4 days    Pulmonary Artery Catheter Assessment  09/09/20 1515 right internal jugular 3 days          Drain                 NG/OG Tube 09/09/20 Right mouth 4 days         Urethral Catheter 09/09/20 1512 Non-latex;Straight-tip;Temperature probe 14 Fr. 3 days         Y Chest Tube 1 and 2 09/09/20 2145 1 Anterior Mediastinal 19 Fr. 2 Anterior Mediastinal 19 Fr. 3 days          Airway                 Airway - Non-Surgical 09/09/20 1640 3 days          Arterial Line            Arterial Line 09/09/20 Right Femoral 4 days          Line                 Pacer Wires 09/09/20 2057 3 days          Peripheral Intravenous Line                 Peripheral IV - Single Lumen 09/09/20 0900 20 G Right Forearm 4 days                Significant Labs:    CBC/Anemia Profile:  Recent Labs   Lab 09/12/20  1800 09/12/20  2332 09/13/20  0304   WBC 8.80 9.92 10.04    HGB 8.0* 8.2* 8.2*   HCT 25.2* 26.9* 26.4*   PLT 41* 40* 40*   MCV 97 98 99*   RDW 15.1* 15.0* 15.1*        Chemistries:  Recent Labs   Lab 09/12/20  1800 09/12/20  2332 09/13/20  0304    141 141   K 4.0 4.1 4.0    107 106   CO2 23 22* 23   BUN 45* 47* 49*   CREATININE 2.2* 2.1* 2.2*   CALCIUM 8.2* 8.1* 8.1*   ALBUMIN 3.0* 2.9* 2.9*   PROT 4.8* 4.9* 5.0*   BILITOT 4.2* 4.4* 4.3*   ALKPHOS 74 88 98   ALT 22 20 20   * 92* 87*   MG 2.5 2.7* 2.6   PHOS 4.7* 4.1 4.9*       ABGs:   Recent Labs   Lab 09/13/20  0330   PH 7.440   PCO2 35.9   HCO3 24.4   POCSATURATED 98   BE 0     Coagulation:   Recent Labs   Lab 09/13/20  0304   INR 1.0   APTT 30.2     All pertinent labs within the past 24 hours have been reviewed.    Significant Imaging:  I have reviewed and interpreted all pertinent imaging results/findings within the past 24 hours.     09/13/2020 CXR  FINDINGS:  Endotracheal tube terminates in the mid trachea.  Nasogastric tube terminates in the left upper quadrant.  Right IJ Green-Emily catheter terminates over the pulmonary artery trunk.  Drainage catheters are present.  There are small to moderate bilateral pleural effusions with bibasilar consolidation.  No pneumothorax.  Cardiac silhouette is prominent.     Impression:       Assessment/Plan:     Severe mitral regurgitation  Fatou Lorenzo is a 75 y.o. female s/p MVr, TVr 9/9/2020. Case noteworthy for multiple pump runs (3) and RV hematoma due to retraction.     Neuro:  - Sedation: propofol - daily sedation vacation  - Fentanyl for pain     CV:  S/p MVr, TVr 9/9/20. Case noteworthy for pump run x3, RV hematoma 2/2 retraction  - Currently in rate controlled a fib with frequent PVCs  - Continue on dobutamine 2.5 and wean from epi as able  - Continue amio 400 qd  - MAP goal 60-80, SBP <140  - Chest tubes (mediastinal x2) to suction     Pulm:  Weaning from vent as able, down to 40% FiO2  Daily CXR  ABG q6 hrs     FEN/GI:  - Net positive 400 cc yesterday  although goal negative 1-2 L. Not responsive to lasix   - replacing lytes as needed  - Hypernatremia corrected. Will continue free water flushes 300 QID however, given potential for ongoing diuresis  - NPO  - famotidine  - miralax     Renal:  - Yoo in place for strict I/O  - Still with low UOP  - Lasix challenge 80 mg IV followed by lasix gtt  - BUN/Cr continuing to increase 34/2.1 -> 49/2.2     Heme/Onc:  - H/H and coags stabilized  - Continue to trend  - Labs BID     ID:  - Afebrile, WBC WNL  - periop antibiotics complete     Endo:  - Endocrine consulted, appreciate recs   - ISS    PPx:  - famotidine, SCDs     Dispo: continue ICU care      Critical secondary to Patient has a condition that poses threat to life and bodily function: s/p MVr, TVr, ELBA     Critical care was time spent personally by me on the following activities: development of treatment plan with patient or surrogate and bedside caregivers, discussions with consultants, evaluation of patient's response to treatment, examination of patient, ordering and performing treatments and interventions, ordering and review of laboratory studies, ordering and review of radiographic studies, pulse oximetry, re-evaluation of patient's condition.  This critical care time did not overlap with that of any other provider or involve time for any procedures.     Madhuri Ng MD  Critical Care - Surgery  [facility]

## 2020-09-13 NOTE — PROGRESS NOTES
CT surgery progress note     Into a. Fib overnight  Given metoprolol and patient subsequently bradycardic  Paced to 90 with hemodynamic improvement     A/P  75F s/p tricuspid valve annuloplasty and mitral valve repair on 9/9 (pod4)     Increase dobutamine to 5  Keep epi 0.04  Start lasix gtt  Start heparin bridge  Continue chest tubes  Trophic feeds  Cont ICU

## 2020-09-13 NOTE — PROGRESS NOTES
Patient went into afib with rate of 110s-120s. BP maintained WNL. Dr. Ng notified. Order for metoprolol received.   About 30 minutes later, patient's rate decreased into 50s with MAPs also in the 50s. Notified Dr. Ng. MD at bedside. Rate on pacer increased to 90. And Epi increased. Patient paced appropriately and MAP increased into 70-80s. Will continue to monitor

## 2020-09-13 NOTE — PLAN OF CARE
"      SICU PLAN OF CARE NOTE    Dx: Acute respiratory failure with hypoxia    Shift Events: patient restarted on epi to keep CI > 2.2. see note for additional details.    Goals of Care: extubated, wean pressors    Neuro: Sedated, Follows Commands, and Moves All Extremities    Vital Signs: BP (!) 100/53 (BP Location: Right arm, Patient Position: Lying)   Pulse 103   Temp 97.9 °F (36.6 °C) (Core Bladder)   Resp 14   Ht 5' 5" (1.651 m)   Wt 90.9 kg (200 lb 6.4 oz)   SpO2 100%   Breastfeeding No   BMI 33.35 kg/m²     Respiratory: Ventilator 40%/5 PEEP    Diet: Tube Feeds @ 10 cc/hr    Gtts: Propfol, Epinephrine, and Dobutamine    Urine Output: Urinary Catheter 348 cc/shift    Drains: Chest Tube, total output 10 cc /  shift     Labs/Accuchecks: Q6H accuchecks, labs    Skin: patient noted to have nonblanchable redness to bilateral buttocks at shift change, foam applied. Patient turned Q2H. Heel boots on. Waffle inflated. Bed plugged in. No other skin breakdown noted.        "

## 2020-09-13 NOTE — SUBJECTIVE & OBJECTIVE
Interval History/Significant Events: CI around 1.8-1.9 early in the evening and so started on epi gtt. CIs somewhat improved, but then entered into A fib with frequent PVCs. Received IV metoprolol x1 which led to a dec in HR to the 60s. CI also noted to decrease at this time so was paced at 90.    This am CI is >2.2. Currently on dobutamine 5 and epi 0.02. Vent has also been weaned to minimal settings    Follow-up For: Procedure(s) (LRB):  Valvuloplasty, Mitral (N/A)  REPAIR, TRICUSPID VALVE (N/A)  BAIN MAZE PROCEDURE (N/A)    Post-Operative Day: 4 Days Post-Op    Objective:     Vital Signs (Most Recent):  Temp: 98.1 °F (36.7 °C) (09/13/20 0805)  Pulse: (!) 112 (09/13/20 1000)  Resp: 16 (09/13/20 1000)  BP: 112/65 (09/13/20 1000)  SpO2: 100 % (09/13/20 1000) Vital Signs (24h Range):  Temp:  [97.7 °F (36.5 °C)-98.4 °F (36.9 °C)] 98.1 °F (36.7 °C)  Pulse:  [] 112  Resp:  [12-34] 16  SpO2:  [96 %-100 %] 100 %  BP: ()/(46-70) 112/65  Arterial Line BP: ()/(43-63) 114/56     Weight: 90.9 kg (200 lb 6.4 oz)  Body mass index is 33.35 kg/m².      Intake/Output Summary (Last 24 hours) at 9/13/2020 1032  Last data filed at 9/13/2020 1000  Gross per 24 hour   Intake 1457 ml   Output 1113 ml   Net 344 ml       Physical Exam  Vitals signs and nursing note reviewed.   Constitutional:       General: She is not in acute distress.     Appearance: She is well-developed. She is not toxic-appearing.   HENT:      Head: Normocephalic and atraumatic.      Mouth/Throat:      Comments: Intubated  Eyes:      General: No scleral icterus.        Right eye: No discharge.         Left eye: No discharge.   Neck:      Vascular: No JVD.      Comments: RIJ in place  Cardiovascular:      Rate and Rhythm: Tachycardia present. Rhythm irregular.   Pulmonary:      Comments: Chest tubes with SS output  Pacer wires in place -> HR>90 so now on back up rate  Dressing c/d/i  Abdominal:      General: There is no distension.      Palpations:  Abdomen is soft.   Genitourinary:     Comments: Yoo in place  Skin:     General: Skin is warm and dry.   Neurological:      Comments: sedated         Vents:  Vent Mode: A/C (09/13/20 0805)  Ventilator Initiated: Yes (09/09/20 2301)  Set Rate: 12 BPM (09/13/20 0805)  Vt Set: 410 mL (09/13/20 0805)  PEEP/CPAP: 5 cmH20 (09/13/20 0805)  Oxygen Concentration (%): 40 (09/13/20 1000)  Peak Airway Pressure: 22 cmH2O (09/13/20 0805)  Plateau Pressure: 19 cmH20 (09/13/20 0805)  Total Ve: 5.94 mL (09/13/20 0805)  F/VT Ratio<105 (RSBI): (!) 19.35 (09/13/20 0805)    Lines/Drains/Airways     Central Venous Catheter Line             Introducer with Double Lumen 09/09/20 right internal jugular 4 days    Pulmonary Artery Catheter Assessment  09/09/20 1515 right internal jugular 3 days          Drain                 NG/OG Tube 09/09/20 Right mouth 4 days         Urethral Catheter 09/09/20 1512 Non-latex;Straight-tip;Temperature probe 14 Fr. 3 days         Y Chest Tube 1 and 2 09/09/20 2145 1 Anterior Mediastinal 19 Fr. 2 Anterior Mediastinal 19 Fr. 3 days          Airway                 Airway - Non-Surgical 09/09/20 1640 3 days          Arterial Line            Arterial Line 09/09/20 Right Femoral 4 days          Line                 Pacer Wires 09/09/20 2057 3 days          Peripheral Intravenous Line                 Peripheral IV - Single Lumen 09/09/20 0900 20 G Right Forearm 4 days                Significant Labs:    CBC/Anemia Profile:  Recent Labs   Lab 09/12/20  1800 09/12/20 2332 09/13/20  0304   WBC 8.80 9.92 10.04   HGB 8.0* 8.2* 8.2*   HCT 25.2* 26.9* 26.4*   PLT 41* 40* 40*   MCV 97 98 99*   RDW 15.1* 15.0* 15.1*        Chemistries:  Recent Labs   Lab 09/12/20  1800 09/12/20  2332 09/13/20  0304    141 141   K 4.0 4.1 4.0    107 106   CO2 23 22* 23   BUN 45* 47* 49*   CREATININE 2.2* 2.1* 2.2*   CALCIUM 8.2* 8.1* 8.1*   ALBUMIN 3.0* 2.9* 2.9*   PROT 4.8* 4.9* 5.0*   BILITOT 4.2* 4.4* 4.3*   ALKPHOS 74 88  98   ALT 22 20 20   * 92* 87*   MG 2.5 2.7* 2.6   PHOS 4.7* 4.1 4.9*       ABGs:   Recent Labs   Lab 09/13/20  0330   PH 7.440   PCO2 35.9   HCO3 24.4   POCSATURATED 98   BE 0     Coagulation:   Recent Labs   Lab 09/13/20  0304   INR 1.0   APTT 30.2     All pertinent labs within the past 24 hours have been reviewed.    Significant Imaging:  I have reviewed and interpreted all pertinent imaging results/findings within the past 24 hours.     09/13/2020 CXR  FINDINGS:  Endotracheal tube terminates in the mid trachea.  Nasogastric tube terminates in the left upper quadrant.  Right IJ Pyote-Emily catheter terminates over the pulmonary artery trunk.  Drainage catheters are present.  There are small to moderate bilateral pleural effusions with bibasilar consolidation.  No pneumothorax.  Cardiac silhouette is prominent.     Impression:

## 2020-09-13 NOTE — ASSESSMENT & PLAN NOTE
Fatou Lorenzo is a 75 y.o. female s/p MVr, TVr 9/9/2020. Case noteworthy for multiple pump runs (3) and RV hematoma due to retraction.     Neuro:  - Sedation: propofol - daily sedation vacation  - Fentanyl for pain     CV:  S/p MVr, TVr 9/9/20. Case noteworthy for pump run x3, RV hematoma 2/2 retraction  - Currently in rate controlled a fib with frequent PVCs  - Continue on dobutamine 2.5 and wean from epi as able  - Continue amio 400 qd  - MAP goal 60-80, SBP <140  - Chest tubes (mediastinal x2) to suction     Pulm:  Weaning from vent as able, down to 40% FiO2  Daily CXR  ABG q6 hrs     FEN/GI:  - Net positive 400 cc yesterday although goal negative 1-2 L. Not responsive to lasix   - replacing lytes as needed  - Hypernatremia corrected. Will continue free water flushes 300 QID however, given potential for ongoing diuresis  - NPO  - famotidine  - miralax     Renal:  - Yoo in place for strict I/O  - Still with low UOP  - Lasix challenge 80 mg IV followed by lasix gtt  - BUN/Cr continuing to increase 34/2.1 -> 49/2.2     Heme/Onc:  - H/H and coags stabilized  - Continue to trend  - Labs BID     ID:  - Afebrile, WBC WNL  - periop antibiotics complete     Endo:  - Endocrine consulted, appreciate recs   - ISS    PPx:  - famotidine, SCDs     Dispo: continue ICU care

## 2020-09-13 NOTE — PLAN OF CARE
"      SICU PLAN OF CARE NOTE    Dx: Acute respiratory failure with hypoxia    Shift Events: Dobutamine gtt increased to 5. Heparin gtt started. Lasix gtt titrated to maintain UOP > 200 mL/hr.    Goals of Care: MAP 60-80, SBP < 140    Neuro: Sedated, Arouses to Voice, Follows Commands and Moves All Extremities    Vital Signs: BP (!) 105/59 (BP Location: Right arm, Patient Position: Lying)   Pulse (!) 118   Temp 98.4 °F (36.9 °C)   Resp 12   Ht 5' 5" (1.651 m)   Wt 90.9 kg (200 lb 6.4 oz)   SpO2 100%   Breastfeeding No   BMI 33.35 kg/m²     Respiratory: Ventilator, AC/VC FiO2 40%, PEEP 5, SpO2 100%    Diet: NPO and Tube Feeds @ 10 cc/hr    Gtts: Propfol, Dobutamine, Lasix and Heparin    Urine Output: Urinary Catheter 1790 cc/shift    Drains: Chest Tube, total output 40 cc /  shift     Labs/Accuchecks: Labs trended. Accuchecks Q6h.    Skin: Midsternal incision dressing CDI. CT site dressing CDI. Heel and sacral foams in place. Blanchable redness noted to sacrum. Bed plugged in with waffle mattress inflated. Weight shift assistance provided throughout shift.       "

## 2020-09-14 NOTE — PLAN OF CARE
09/14/20 1417   Discharge Reassessment   Assessment Type Discharge Planning Reassessment   Provided patient/caregiver education on the expected discharge date and the discharge plan Yes   Do you have any problems affording any of your prescribed medications? No   Discharge Plan A Home Health   Discharge Plan B Other  (TBD)   DME Needed Upon Discharge  other (see comments)  (TBD)       Mel Simons MPH, RN, CM  Ext. [phone]

## 2020-09-14 NOTE — PROGRESS NOTES
CT surgery progress note     A/P  75F s/p tricuspid valve annuloplasty and mitral valve repair on 9/9 (pod4)     Wean sedation  Possible extubation today   Continue dobutamine  Continue lasix gtt   D/c chest tubes  Resume enteral feeds unless extubating  Heparin bridge   Cont ICU

## 2020-09-14 NOTE — PROGRESS NOTES
[facility]  Critical Care - Surgery  Progress Note    Patient Name: Fatou Lorenzo  MRN: [id number]  Admission Date: 8/30/2020  Hospital Length of Stay: 14 days  Code Status: Full Code  Attending Provider: Antoni Waddell MD  Primary Care Provider: Ludin Rivas MD   Principal Problem: Acute respiratory failure with hypoxia    Subjective:     Hospital/ICU Course:  No notes on file    Interval History/Significant Events: No significant clinical changes. Started to make urine with lasix challenge and gtt. Goal to wean today.     Follow-up For: Procedure(s) (LRB):  Valvuloplasty, Mitral (N/A)  REPAIR, TRICUSPID VALVE (N/A)  BAIN MAZE PROCEDURE (N/A)    Post-Operative Day: 5 Days Post-Op    Objective:     Vital Signs (Most Recent):  Temp: 97.9 °F (36.6 °C) (09/14/20 0700)  Pulse: (!) 119 (09/14/20 0700)  Resp: 12 (09/14/20 0700)  BP: 111/83 (09/14/20 0700)  SpO2: 99 % (09/14/20 0700) Vital Signs (24h Range):  Temp:  [97.9 °F (36.6 °C)-99.1 °F (37.3 °C)] 97.9 °F (36.6 °C)  Pulse:  [] 119  Resp:  [10-29] 12  SpO2:  [97 %-100 %] 99 %  BP: ()/(46-83) 111/83  Arterial Line BP: ()/(48-64) 113/55     Weight: 91.4 kg (201 lb 8 oz)  Body mass index is 33.53 kg/m².      Intake/Output Summary (Last 24 hours) at 9/14/2020 0743  Last data filed at 9/14/2020 0700  Gross per 24 hour   Intake 2077 ml   Output 3980 ml   Net -1903 ml       Physical Exam  Vitals signs and nursing note reviewed.   Constitutional:       General: She is not in acute distress.     Appearance: She is well-developed. She is not toxic-appearing.   HENT:      Head: Normocephalic and atraumatic.      Mouth/Throat:      Comments: Intubated  Eyes:      General: No scleral icterus.        Right eye: No discharge.         Left eye: No discharge.   Neck:      Vascular: No JVD.      Comments: RIJ in place  Cardiovascular:      Rate and Rhythm: Tachycardia present. Rhythm irregular.   Pulmonary:      Comments: Chest tubes with SS  output  Pacer wires in place  Dressing c/d/i  Abdominal:      General: There is no distension.      Palpations: Abdomen is soft.   Genitourinary:     Comments: Yoo in place  Skin:     General: Skin is warm and dry.   Neurological:      Comments: sedated         Vents:  Vent Mode: A/C (09/14/20 0434)  Ventilator Initiated: Yes (09/09/20 2301)  Set Rate: 12 BPM (09/14/20 0434)  Vt Set: 410 mL (09/14/20 0434)  PEEP/CPAP: 5 cmH20 (09/14/20 0434)  Oxygen Concentration (%): 40 (09/14/20 0700)  Peak Airway Pressure: 22 cmH2O (09/14/20 0434)  Plateau Pressure: 18 cmH20 (09/14/20 0434)  Total Ve: 5.59 mL (09/14/20 0434)  F/VT Ratio<105 (RSBI): (!) 33.82 (09/14/20 0434)    Lines/Drains/Airways     Central Venous Catheter Line             Introducer with Double Lumen 09/09/20 right internal jugular 5 days    Pulmonary Artery Catheter Assessment  09/09/20 1515 right internal jugular 4 days          Drain                 NG/OG Tube 09/09/20 Right mouth 5 days         Urethral Catheter 09/09/20 1512 Non-latex;Straight-tip;Temperature probe 14 Fr. 4 days         Y Chest Tube 1 and 2 09/09/20 2145 1 Anterior Mediastinal 19 Fr. 2 Anterior Mediastinal 19 Fr. 4 days          Airway                 Airway - Non-Surgical 09/09/20 1640 4 days          Arterial Line            Arterial Line 09/09/20 Right Femoral 5 days          Line                 Pacer Wires 09/09/20 2057 4 days          Peripheral Intravenous Line                 Peripheral IV - Single Lumen 09/14/20 0051 20 G Left;Posterior Forearm less than 1 day                Significant Labs:    CBC/Anemia Profile:  Recent Labs   Lab 09/13/20  1745 09/13/20  2349 09/14/20  0300   WBC 8.80 8.86 8.15  8.15   HGB 8.2* 8.1* 7.9*  7.9*   HCT 25.3* 25.5* 24.9*  24.9*   PLT 43* 48* 42*  42*   MCV 94 97 97  97   RDW 15.0* 15.0* 15.0*  15.0*        Chemistries:  Recent Labs   Lab 09/13/20  1200 09/13/20  1745 09/13/20  2349 09/14/20  0300    140 141 141   K 3.5 3.5 3.4* 3.6     105 105 105   CO2 25 25 23 24   BUN 51* 49* 48* 49*   CREATININE 2.0* 2.0* 1.9* 1.8*   CALCIUM 8.0* 8.0* 7.8* 7.5*   ALBUMIN 2.9* 2.7* 2.6* 2.6*   PROT 5.0* 4.9* 4.9* 4.9*   BILITOT 4.2* 4.2* 4.0* 3.6*   ALKPHOS 110 127 141* 142*   ALT 20 19 20 19   AST 73* 67* 60* 52*   MG 2.6 2.5 2.5  2.5  --    PHOS 4.7* 5.2* 4.7* 4.7*       ABGs:   Recent Labs   Lab 09/14/20  0434   PH 7.430   PCO2 36.9   HCO3 24.5   POCSATURATED 98   BE 0     Coagulation:   Recent Labs   Lab 09/14/20  0300   INR 1.0   APTT 41.5*     All pertinent labs within the past 24 hours have been reviewed.    Significant Imaging:  I have reviewed and interpreted all pertinent imaging results/findings within the past 24 hours.    Assessment/Plan:     Severe mitral regurgitation  Fatou Lorenzo is a 75 y.o. female s/p MVr, TVr 9/9/2020. Case noteworthy for multiple pump runs (3) and RV hematoma due to retraction.     Neuro:  - Sedation: transitioning to precedex  - Fentanyl for pain      CV:  S/p MVr, TVr 9/9/20. Case noteworthy for pump run x3, RV hematoma 2/2 retraction  - Currently in rate controlled a fib. Reload amio this am followed by drip  - Continue on dobutamine 5  - MAP goal 60-80, SBP <140  - Meds out today; keeping pacer wires in place for now     Pulm:  - Minimal vent settings  - CXR improved from yesterday after diuresis but still appears fluid overloaded  - Goal to extubate today  - Daily CXR     FEN/GI:  - Net negative 2L yesterday   - replacing lytes as needed  - Hypernatremia corrected. Will continue free water flushes 300 QID with ongoing diuresis  - NPO  - famotidine  - miralax     Renal:  - Yoo in place for strict I/O  - Good response to lasix challenge yesterday  - Continue diuresis today with diuril 250 mg and lasix gtt at 20  - BUN/Cr now downtrending 49/2.2 -> 46/1.7     Heme/Onc:  - H/H stable  - Started on hep gtt for persistent a fib; low intensity nomogram  - Continue to trend     ID:  - Afebrile, WBC WNL  - periop  antibiotics complete     Endo:  - no hx of DM  - off insulin gtt on ISS    PPx:  - famotidine, SCDs     Dispo: continue ICU care        Critical secondary to Patient has a condition that poses threat to life and bodily function: s/p MVr, TVr, MAZE     Critical care was time spent personally by me on the following activities: development of treatment plan with patient or surrogate and bedside caregivers, discussions with consultants, evaluation of patient's response to treatment, examination of patient, ordering and performing treatments and interventions, ordering and review of laboratory studies, ordering and review of radiographic studies, pulse oximetry, re-evaluation of patient's condition.  This critical care time did not overlap with that of any other provider or involve time for any procedures.     Madhuri Ng MD  Critical Care - Surgery  [facility]

## 2020-09-14 NOTE — CONSULTS
"  [facility]  Adult Nutrition  Consult Note    SUMMARY     Recommendations    1.) If able to extubate pt, advance diet as tolerated to cardiac per SLP texture recommendations once medically appropriate.   2.) If unable to extubate pt, continue Impact Peptide 1.5; goal rate at 35mL/hr. Advance 5-10mL Q8hr as tolerated. EN provides 1260kcal, 78gm of protein, 646mL of free water. Add free water per MD recommendations. Add 1 packet of beneprotein TID to provide 18gm of protein.     Goals: Pt to return to nutritionally adequate diet and meet > 75% EEN/EPN by RD follow up  Nutrition Goal Status: progressing towards goal  Communication of RD Recs: other (comment)(POC)    Reason for Assessment    Reason For Assessment: consult  Diagnosis: (Acute respiratory failure with hypoxia)  Relevant Medical History: afib w/ RVR, HTN, HLD  Interdisciplinary Rounds: did not attend  General Information Comments: Pt is currented intubated, and on mechanical ventilation, nsg staff reports attempting to extubate today. NFPE completed 9/9 with no s/s of malnutrition. Nsg staff reports pt is tolerating trickle tubefeeds of Impact Peptide 1.5 at 10mL/hr. G- LBM 9/13  Nutrition Discharge Planning: Adequate PO intake on low Na diet    Nutrition Risk Screen    Nutrition Risk Screen: no indicators present    Nutrition/Diet History    Spiritual, Cultural Beliefs, Roman [employer] Practices, Values that Affect Care: no    Anthropometrics    Temp: 98.6 °F (37 °C)  Height Method: Stated  Height: 5' 5" (165.1 cm)  Height (inches): 65 in  Weight Method: Bed Scale  Weight: 91.4 kg (201 lb 8 oz)  Weight (lb): 201.5 lb  Ideal Body Weight (IBW), Female: 125 lb  % Ideal Body Weight, Female (lb): 136.16 %  BMI (Calculated): 33.5       Lab/Procedures/Meds    Pertinent Labs Reviewed: reviewed  Pertinent Labs Comments: Hgb 7.9, Hct 24.9, BUN 49, Cr 1.8, GFR 27.1, Ca 7.5, Phosphorus 4.7, Alb 2.6  Pertinent Medications Reviewed: reviewed  Pertinent " Medications Comments: Albumin, Amiodarone, Vitamin C, Chlorothiazide, famotidine, furosemide, heparin, polyethylene glycol, potassium chloride, sennosides    Physical Findings/Assessment         Estimated/Assessed Needs    Weight Used For Calorie Calculations: 76 kg (167 lb 8.8 oz)(UBW)  Energy Calorie Requirements (kcal): 5735-9530  Energy Need Method: Kcal/kg(20-25kcal/kg)  Protein Requirements:  g (1.2-1.5g/kg)  Weight Used For Protein Calculations: 76 kg (167 lb 8.8 oz)(UBW)  Fluid Requirements (mL): per MD or 1 mL/kcal     RDA Method (mL): 1520         Nutrition Prescription Ordered    Current Diet Order: NPO (9/9)  Current Nutrition Support Formula Ordered: Impact Peptide 1.5  Current Nutrition Support Rate Ordered: 10 (ml)  Current Nutrition Support Frequency Ordered: mL/hr    Evaluation of Received Nutrient/Fluid Intake    Enteral Calories (kcal): 360  Enteral Protein (gm): 22  Enteral (Free Water) Fluid (mL): 184  Other Calories (kcal): 591(Propofol at 22.4mL/hr)  Total Calories (kcal): 951  Total Calories (kcal/kg): 10  % Kcal Needs: 62  I/O: I: 2087; O: 4035; +7.5L since admission  Energy Calories Required: not meeting needs  Protein Required: not meeting needs  Fluid Required: not meeting needs  Comments: LBM 9/13  Tolerance: tolerating  % Intake of Estimated Energy Needs: 62  % Meal Intake: 0    Nutrition Risk    Level of Risk/Frequency of Follow-up: high , 2x weekly    Assessment and Plan     Nutrition Problem  Inadequate energy intake     Related to (etiology):   NPO     Signs and Symptoms (as evidenced by):   NPO     Interventions(treatment strategy):  1.) Collaboration with other providers     Nutrition Diagnosis Status:   Ongoing    Monitor and Evaluation    Food and Nutrient Intake: enteral nutrition intake  Food and Nutrient Adminstration: enteral and parenteral nutrition administration  Knowledge/Beliefs/Attitudes: food and nutrition knowledge/skill  Anthropometric Measurements: weight,  weight change  Biochemical Data, Medical Tests and Procedures: electrolyte and renal panel, gastrointestinal profile  Nutrition-Focused Physical Findings: overall appearance, skin     Malnutrition Assessment                 Orbital Region (Subcutaneous Fat Loss): well nourished  Upper Arm Region (Subcutaneous Fat Loss): well nourished   Denominational Region (Muscle Loss): well nourished  Clavicle Bone Region (Muscle Loss): well nourished  Clavicle and Acromion Bone Region (Muscle Loss): well nourished  Dorsal Hand (Muscle Loss): well nourished  Anterior Thigh Region (Muscle Loss): well nourished  Posterior Calf Region (Muscle Loss): well nourished                 Nutrition Follow-Up    RD Follow-up?: Yes

## 2020-09-14 NOTE — PT/OT/SLP PROGRESS
Physical Therapy Missed Treatment Note      Patient Name:  Fatou Lorenzo   MRN:  [id number]  Admitting Diagnosis:  Acute respiratory failure with hypoxia   Recent Surgery: Procedure(s) (LRB):  Valvuloplasty, Mitral (N/A)  REPAIR, TRICUSPID VALVE (N/A)  BAIN MAZE PROCEDURE (N/A) 5 Days Post-Op  Admit Date: 8/30/2020  Length of Stay: 14 days    Patient not seen today for PT re-evaluation s/p BAIN MAZE 9/9 due to patient remaining intubated and sedated. Team is planning on weaning vent settings and decreasing sedation on this date. Pt is not appropriate for mobility at this time. Therapy team will continue to follow pt's progress and assess appropriateness for mobilization.       Yenny Corral PT, DPT  9/14/2020   Pager: [phone]

## 2020-09-14 NOTE — OP NOTE
DATE OF PROCEDURE: 09/09/2020      ATTENDING SURGEON:  Antoni Waddell M.D.    ASSISTANT: Jj Patterson MD (Cardiothoracic Resident)     PREOPERATIVE DIAGNOSES:  1.  Severe mitral regurgitation.  2.  Rupture of chordae tendenae  3.  Severe tricuspid regurgiation  4.  Atrial fibrillation  5.  Hyperlipidemia     POSTOPERATIVE DIAGNOSES:  1.  Severe mitral regurgitation.  2.  Rupture of chordae tendenae  3.  Severe tricuspid regurgiation  4.  Atrial fibrillation  5.  Hyperlipidemia    OPERATION PERFORMED:    1)  Mitral valve repair with triangular resection of the medial aspect of P1 scallop, the lateral aspect of P2 scallop of the posterior leaflet of the mitral valve, central Tee stitch,  and 31 mm Medtronic ATS   Simulus band annuloplasty.    2) Tricuspid valve repair with a 26 mm Medtronic Contour annuloplasty    3)  Maze procedure with complete left atrial lesion set and resection of left atrial appendage     ANESTHESIA:  General endotracheal.     ESTIMATED BLOOD LOSS:  100 mL.     BRIEF HISTORY:  Ms. Lorenzo is a 75 year old woman who I had seen several years ago for mitral regurgitation.  At that time, her mitral regurgitation was not that significant and she was asymptomatic.  She was recently admitted the hospital with acute heart failure.  She underwent another evaluation which demonstrated atrial fibrillation as well as severe tricuspid regurgitation.  We attempted to manage her medically, and allow her to be discharged, but unfortunately medical management was unsuccessful.  She now presents for mitral valve repair, tricuspid valve repair, and Maze procedure.     PROCEDURE IN DETAIL:  After obtaining informed and written consent, the patient   was brought to the Operating Room and placed on the operating table in supine   position.  After induction of adequate general endotracheal anesthesia, the   patient's chest, abdomen, pelvis and bilateral lower extremities were prepped   and draped in  the usual sterile fashion.  An upper midline skin incision was   made, and a median sternotomy was performed.  A pericardial well was created.    The patient was systemically heparinized.  Cannulation sutures were placed in   the aorta and in the superior vena cava.  The aortic cannula was inserted,   followed by the venous.  An IVC cannula was also placed.  Antegrade and   retrograde cardioplegia catheters were placed.  The patient was then put on   cardiopulmonary bypass.  Once on bypass, circumferential control was obtained   over the superior vena cava.  The aortic crossclamp was applied, and the heart   was arrested using cold blood enhanced antegrade cardioplegia.  A prompt   electromechanical arrest was achieved.      Once the cardioplegia was all in, we first addressed the left-sided pulmonary veins.  A circumferential lesion was made using the AtriCure device.  This lesion was duplicated.  The left atrial appendage was then opened.  It was free of thrombus.  A connecting lesion was made from the appendage down onto the left superior pulmonary vein, again   using the AtriCure device.  The left atrial appendage was then resected using   the Widener stapler.  We then turned our attention to the right-sided pulmonary   veins, where again a double circumferential lesion was made using the AtriCure   device.  A left atriotomy was then made near the right-sided pulmonary veins.    Through the left atriotomy, connecting lesions were made from the atriotomy   first to the left superior pulmonary vein, and secondly from the atriotomy to   the left inferior pulmonary vein.  Finally, a connecting lesion was made that   was directed from the atriotomy towards the junction of the P2 and P3 scallops   of the posterior leaflet of the mitral valve.  Although this lesion was directed   towards the junction of the P2 and P3 scallops, it did not reach the mitral   annulus.  It was created using the AtriCure device.  The lesion  was completed to   the level of the mitral annulus using the AtriCure cryoprobe.  The Giuliano   retractor was then placed for exposure, and the mitral valve was evaluated.    The P1 and P2 scallops of the posterior leaflet demonstrated   myxomatous change, and were quite redundant.  There were multiple broken cords.  The medial aspect of P1 was flail, as was the lateral aspect of P2.  Multiple nonpledgeted 2-0 Ethibond sutures were placed along the posterior annulus from the medial trigone to the lateral trigone.  Silk sutures were placed around the nearest normal cords on either side of the flail   segment.  The leaflet resection was then performed.  The leaflet was then   reconstructed using a running 5-0 Ethibond in two layers.  The valve was then   tested, and no significant mitral regurgitation was seen.  The annulus was   sized, and a 31 mm band was selected.  The band was brought up, the annuloplasty   sutures were passed through it, and it was slid into position.  It seated   nicely.  The sutures were tied and then cut.  The valve was again tested, and   again no mitral regurgitation was seen.  The left atriotomy was then closed in a   single layer using running 4-0 Prolene suture.  Prior to closing the left   atrium, de-airing maneuvers were performed.     Once the left atrium was closed, the cannulas were repositioned.  The hotshot was given retrograde. Once the hotshot was all in, the aortic crossclamp was removed. A right atriotomy was made.  Silk sutures were placed for exposure.  The tricuspid valve was evaluated.  The annulus was severely dilated.  The leaflets were structurally normal.  Multiple non pledgetted 2-0 Ethibond sutures were placed along the tricuspid annulus from the commissure between the septal and anterior leaflets to midway along the septal leaflet.  The annulus was sized, and a 26 mm rigid band was selected.  The band was brought up, the sutures were passed through it, and it was  slid into position.  It seated nicely.  The sutures were tied and then cut.  We attempted to test the valve by occluding the pulmonary artery.  The coaptation appeared acceptable.  The right atriotomy was then closed in a single layer using running 4-0 Prolene suture.    The patient was subsequently weaned from cardiopulmonary bypass.  The patient did separate easily from bypass.  Once off bypass, all surgical sites were inspected.  There was good   hemostasis.  The valve was evaluated using CHERY.  There was mild to moderate mitral   regurgitation seen.   I felt that this was unacceptable.  The patient was placed back on cardiopulmonary bypass.  The aortic cross-clamp was applied, and the heart was arrested using cold blood enhanced antegrade cardioplegia.  A prompt electromechanical arrest was achieved.  The left atrial incision was reopened.  The retractor was placed.  A central Tee stitch was placed using a 5 0 Ethibond.  The valve was again tested, and no mitral regurgitation was seen.  The left atrium was then closed in a single layer using running 4-0 Prolene suture.  Prior to closing the left atrium, de-airing maneuvers were performed.  Once the left atrium was closed, the hotshot was given retrograde.  Additional de-airing maneuvers were performed, and the aortic cross-clamp was removed. The patient was subsequently weaned from cardiopulmonary bypass. The patient did separate easily from bypass.    once off bypass, all surgical sites were inspected.  There was good hemostasis.  The mitral valve was evaluated with CHERY.  There was only trace mitral regurgitation.  The tricuspid valve was evaluated.  There was only trace tricuspid regurgitation.  The test   dose of protamine was administered, and this was well tolerated.  The total dose   was then given.  alf through the total dose, all cannulas were removed.    All surgical sites were again inspected, again there was good hemostasis.    Ventricular  pacing wires were placed.  Drains were placed.  After again   confirming adequate hemostasis, the patient's chest was closed using eight #6   stainless steel wires to reapproximate the sternum.  The overlying soft tissues   were reapproximated using absorbable suture material.  The patient's chest was   washed and dried, and a dry dressing was applied.  The patient tolerated the   procedure well, there were no complications.  At the conclusion of the case,   sponge and instrument counts were correct.

## 2020-09-14 NOTE — PLAN OF CARE
Recommendations     1.) If able to extubate pt, advance diet as tolerated to cardiac per SLP texture recommendations once medically appropriate.   2.) If unable to extubate pt, continue Impact Peptide 1.5; goal rate at 35mL/hr. Advance 5-10mL Q8hr as tolerated. EN provides 1260kcal, 78gm of protein, 646mL of free water. Add free water per MD recommendations. Add 1 packet of beneprotein TID to provide 18gm of protein.      Goals: Pt to return to nutritionally adequate diet and meet > 75% EEN/EPN by RD follow up  Nutrition Goal Status: progressing towards goal  Communication of RD Recs: other (comment)(POC)

## 2020-09-14 NOTE — BRIEF OP NOTE
[facility]  Cardiothoracic Surgery  Operative Note    SUMMARY     Date of Procedure: 9/9/2020     Procedure: Procedure(s) (LRB):  1)  REPAIR, MITRAL VALVE with resection of medial portion of P1, lateral portion of P2, central Tee stitch, and 31 mm Medtronic Simulus band annuloplasty 23564    2)  REPAIR, TRICUSPID VALVE with a 26 mm Medtronic Contour annuloplasty  30709    3)  BAIN MAZE PROCEDURE with complete left atrial lesion set and resection of ALISA  21262    Surgeon(s) and Role:     * Antoni Waddell MD - Primary     * Jj Patterson MD - Fellow    Assisting Surgeon: None    Pre-Operative Diagnosis: Paroxysmal atrial fibrillation [I48.0]  Severe mitral regurgitation [I34.0]    Post-Operative Diagnosis: Post-Op Diagnosis Codes:     * Paroxysmal atrial fibrillation [I48.0]     * Severe mitral regurgitation [I34.0]    Anesthesia: General        Description of the Findings of the Procedure: Flail P1 and P2.        Complications:None    Estimated Blood Loss (EBL): 100 mL           Implants:   Implant Name Type Inv. Item Serial No.  Lot No. LRB No. Used Action   BAND SIM FLEX 31MM - [id number]  BAND SIM FLEX 31MM [id number] [employer]  N/A 1 Implanted   RING ANNULOPLASTY TRICUSPID - [id number]  RING ANNULOPLASTY TRICUSPID [id number] [employer] [place]  N/A 1 Implanted       Specimens:   Specimen (12h ago, onward)    None                  Attestation:  I was present and scrubbed for the procedure.

## 2020-09-14 NOTE — SUBJECTIVE & OBJECTIVE
Interval History/Significant Events: No significant clinical changes. Started to make urine with lasix challenge and gtt. Goal to wean today.     Follow-up For: Procedure(s) (LRB):  Valvuloplasty, Mitral (N/A)  REPAIR, TRICUSPID VALVE (N/A)  BAIN MAZE PROCEDURE (N/A)    Post-Operative Day: 5 Days Post-Op    Objective:     Vital Signs (Most Recent):  Temp: 97.9 °F (36.6 °C) (09/14/20 0700)  Pulse: (!) 119 (09/14/20 0700)  Resp: 12 (09/14/20 0700)  BP: 111/83 (09/14/20 0700)  SpO2: 99 % (09/14/20 0700) Vital Signs (24h Range):  Temp:  [97.9 °F (36.6 °C)-99.1 °F (37.3 °C)] 97.9 °F (36.6 °C)  Pulse:  [] 119  Resp:  [10-29] 12  SpO2:  [97 %-100 %] 99 %  BP: ()/(46-83) 111/83  Arterial Line BP: ()/(48-64) 113/55     Weight: 91.4 kg (201 lb 8 oz)  Body mass index is 33.53 kg/m².      Intake/Output Summary (Last 24 hours) at 9/14/2020 0743  Last data filed at 9/14/2020 0700  Gross per 24 hour   Intake 2077 ml   Output 3980 ml   Net -1903 ml       Physical Exam  Vitals signs and nursing note reviewed.   Constitutional:       General: She is not in acute distress.     Appearance: She is well-developed. She is not toxic-appearing.   HENT:      Head: Normocephalic and atraumatic.      Mouth/Throat:      Comments: Intubated  Eyes:      General: No scleral icterus.        Right eye: No discharge.         Left eye: No discharge.   Neck:      Vascular: No JVD.      Comments: RIJ in place  Cardiovascular:      Rate and Rhythm: Tachycardia present. Rhythm irregular.   Pulmonary:      Comments: Chest tubes with SS output  Pacer wires in place  Dressing c/d/i  Abdominal:      General: There is no distension.      Palpations: Abdomen is soft.   Genitourinary:     Comments: Yoo in place  Skin:     General: Skin is warm and dry.   Neurological:      Comments: sedated         Vents:  Vent Mode: A/C (09/14/20 0434)  Ventilator Initiated: Yes (09/09/20 2301)  Set Rate: 12 BPM (09/14/20 0434)  Vt Set: 410 mL (09/14/20  0434)  PEEP/CPAP: 5 cmH20 (09/14/20 0434)  Oxygen Concentration (%): 40 (09/14/20 0700)  Peak Airway Pressure: 22 cmH2O (09/14/20 0434)  Plateau Pressure: 18 cmH20 (09/14/20 0434)  Total Ve: 5.59 mL (09/14/20 0434)  F/VT Ratio<105 (RSBI): (!) 33.82 (09/14/20 0434)    Lines/Drains/Airways     Central Venous Catheter Line             Introducer with Double Lumen 09/09/20 right internal jugular 5 days    Pulmonary Artery Catheter Assessment  09/09/20 1515 right internal jugular 4 days          Drain                 NG/OG Tube 09/09/20 Right mouth 5 days         Urethral Catheter 09/09/20 1512 Non-latex;Straight-tip;Temperature probe 14 Fr. 4 days         Y Chest Tube 1 and 2 09/09/20 2145 1 Anterior Mediastinal 19 Fr. 2 Anterior Mediastinal 19 Fr. 4 days          Airway                 Airway - Non-Surgical 09/09/20 1640 4 days          Arterial Line            Arterial Line 09/09/20 Right Femoral 5 days          Line                 Pacer Wires 09/09/20 2057 4 days          Peripheral Intravenous Line                 Peripheral IV - Single Lumen 09/14/20 0051 20 G Left;Posterior Forearm less than 1 day                Significant Labs:    CBC/Anemia Profile:  Recent Labs   Lab 09/13/20  1745 09/13/20 2349 09/14/20  0300   WBC 8.80 8.86 8.15  8.15   HGB 8.2* 8.1* 7.9*  7.9*   HCT 25.3* 25.5* 24.9*  24.9*   PLT 43* 48* 42*  42*   MCV 94 97 97  97   RDW 15.0* 15.0* 15.0*  15.0*        Chemistries:  Recent Labs   Lab 09/13/20  1200 09/13/20  1745 09/13/20  2349 09/14/20  0300    140 141 141   K 3.5 3.5 3.4* 3.6    105 105 105   CO2 25 25 23 24   BUN 51* 49* 48* 49*   CREATININE 2.0* 2.0* 1.9* 1.8*   CALCIUM 8.0* 8.0* 7.8* 7.5*   ALBUMIN 2.9* 2.7* 2.6* 2.6*   PROT 5.0* 4.9* 4.9* 4.9*   BILITOT 4.2* 4.2* 4.0* 3.6*   ALKPHOS 110 127 141* 142*   ALT 20 19 20 19   AST 73* 67* 60* 52*   MG 2.6 2.5 2.5  2.5  --    PHOS 4.7* 5.2* 4.7* 4.7*       ABGs:   Recent Labs   Lab 09/14/20  0434   PH 7.430   PCO2 36.9    HCO3 24.5   POCSATURATED 98   BE 0     Coagulation:   Recent Labs   Lab 09/14/20  0300   INR 1.0   APTT 41.5*     All pertinent labs within the past 24 hours have been reviewed.    Significant Imaging:  I have reviewed and interpreted all pertinent imaging results/findings within the past 24 hours.

## 2020-09-14 NOTE — ASSESSMENT & PLAN NOTE
Fatou Lorenzo is a 75 y.o. female s/p MVr, TVr 9/9/2020. Case noteworthy for multiple pump runs (3) and RV hematoma due to retraction.     Neuro:  - Sedation: transitioning to precedex  - Fentanyl for pain      CV:  S/p MVr, TVr 9/9/20. Case noteworthy for pump run x3, RV hematoma 2/2 retraction  - Currently in rate controlled a fib. Reload amio this am followed by drip  - Continue on dobutamine 5  - MAP goal 60-80, SBP <140  - Meds out today; keeping pacer wires in place for now     Pulm:  - Minimal vent settings  - CXR improved from yesterday after diuresis but still appears fluid overloaded  - Goal to extubate today  - Daily CXR     FEN/GI:  - Net negative 2L yesterday   - replacing lytes as needed  - Hypernatremia corrected. Will continue free water flushes 300 QID with ongoing diuresis  - NPO  - famotidine  - miralax     Renal:  - Yoo in place for strict I/O  - Good response to lasix challenge yesterday  - Continue diuresis today with diuril 250 mg and lasix gtt at 20  - BUN/Cr now downtrending 49/2.2 -> 46/1.7     Heme/Onc:  - H/H stable  - Started on hep gtt for persistent a fib; low intensity nomogram  - Continue to trend     ID:  - Afebrile, WBC WNL  - periop antibiotics complete     Endo:  - no hx of DM  - off insulin gtt on ISS    PPx:  - famotidine, SCDs     Dispo: continue ICU care

## 2020-09-14 NOTE — PLAN OF CARE
"      SICU PLAN OF CARE NOTE    Dx: Acute respiratory failure with hypoxia    Shift Events: no acute events overnight. CHG bath given. Patient remains in Afib with rate of 110s-120s    Goals of Care: extubate    Neuro: Sedated, Arouses to Voice, and Moves All Extremities    Vital Signs: /77 (BP Location: Right arm, Patient Position: Lying)   Pulse (!) 116   Temp 98.2 °F (36.8 °C) (Core Bladder)   Resp 15   Ht 5' 5" (1.651 m)   Wt 90.9 kg (200 lb 6.4 oz)   SpO2 100%   Breastfeeding No   BMI 33.35 kg/m²     Respiratory: Ventilator    Diet: Tube Feeds    Gtts: Propfol and Dobutamine, lasix, heparin    Urine Output: Urinary Catheter 1640 cc/shift    Drains: Chest Tube, total output 40 cc /  shift     Labs/Accuchecks: Q6h accuchecks, labs    Skin: patient turned Q2H. Heel boots and sacral foam on. Bilateral discoloration on buttocks, unsure if it is DTI or not; LDA in chart. Waffle inflated. Bed plugged in and inflated.        "

## 2020-09-15 NOTE — PROGRESS NOTES
[facility]  Critical Care - Surgery  Progress Note    Patient Name: Fatou Lorenzo  MRN: [id number]  Admission Date: 8/30/2020  Hospital Length of Stay: 15 days  Code Status: Full Code  Attending Provider: Antoni Waddell MD  Primary Care Provider: Ludin Rivas MD   Principal Problem: Acute respiratory failure with hypoxia    Subjective:     Hospital/ICU Course:  No notes on file    Interval History/Significant Events: No acute events. Has been off sedation since yesterday afternoon and is on minimal vent settings. Obtained extubation parameters which were adequate for extubation. Successfully extubated this morning but has lots of secretions    Aggressive diuresis yesterday as well. Net negative 2.9L. Remains in a fib with heparin gtt being titrated appropriately.     Follow-up For: Procedure(s) (LRB):  Valvuloplasty, Mitral (N/A)  REPAIR, TRICUSPID VALVE (N/A)  BAIN MAZE PROCEDURE (N/A)    Post-Operative Day: 6 Days Post-Op    Objective:     Vital Signs (Most Recent):  Temp: 98.1 °F (36.7 °C) (09/15/20 0724)  Pulse: 101 (09/15/20 0724)  Resp: 20 (09/15/20 0724)  BP: (!) 117/55 (09/15/20 0600)  SpO2: 95 % (09/15/20 0724) Vital Signs (24h Range):  Temp:  [98.1 °F (36.7 °C)-99.1 °F (37.3 °C)] 98.1 °F (36.7 °C)  Pulse:  [] 101  Resp:  [13-27] 20  SpO2:  [94 %-100 %] 95 %  BP: ()/(54-67) 117/55  Arterial Line BP: (100-142)/(39-60) 117/51     Weight: 86.6 kg (190 lb 14.7 oz)  Body mass index is 31.77 kg/m².      Intake/Output Summary (Last 24 hours) at 9/15/2020 0817  Last data filed at 9/15/2020 0700  Gross per 24 hour   Intake 2371 ml   Output 4420 ml   Net -2049 ml       Physical Exam  Vitals signs and nursing note reviewed.   Constitutional:       General: She is not in acute distress.     Appearance: She is well-developed. She is not toxic-appearing.   HENT:      Head: Normocephalic and atraumatic.   Eyes:      General: No scleral icterus.        Right eye: No discharge.          Left eye: No discharge.   Neck:      Vascular: No JVD.      Comments: RIJ in place  Cardiovascular:      Rate and Rhythm: Tachycardia present. Rhythm irregular.   Pulmonary:      Comments: Pacer wires in place  Dressing c/d/i  Abdominal:      General: There is no distension.      Palpations: Abdomen is soft.   Genitourinary:     Comments: Yoo in place  Skin:     General: Skin is warm and dry.   Neurological:      General: No focal deficit present.      Cranial Nerves: No cranial nerve deficit.      Comments: Following commands         Vents:  Vent Mode: Spont (09/15/20 0724)  Ventilator Initiated: Yes (09/09/20 2301)  Set Rate: 12 BPM (09/14/20 1145)  Vt Set: 410 mL (09/14/20 1145)  Pressure Support: 5 cmH20 (09/15/20 0113)  PEEP/CPAP: 5 cmH20 (09/15/20 0724)  Oxygen Concentration (%): 30 (09/15/20 0724)  Peak Airway Pressure: 9 cmH2O (09/15/20 0724)  Plateau Pressure: 0 cmH20 (09/15/20 0724)  Total Ve: 6.08 mL (09/15/20 0724)  F/VT Ratio<105 (RSBI): (!) 46.84 (09/15/20 0724)    Lines/Drains/Airways     Central Venous Catheter Line             Introducer with Double Lumen 09/09/20 right internal jugular 6 days          Drain                 NG/OG Tube 09/09/20 Right mouth 6 days         Urethral Catheter 09/09/20 1512 Non-latex;Straight-tip;Temperature probe 14 Fr. 5 days          Airway                 Airway - Non-Surgical 09/09/20 1640 5 days          Arterial Line            Arterial Line 09/09/20 Right Femoral 6 days          Line                 Pacer Wires 09/09/20 2057 5 days          Peripheral Intravenous Line                 Peripheral IV - Single Lumen 09/14/20 0051 20 G Left;Posterior Forearm 1 day         Peripheral IV - Single Lumen 09/14/20 1544 20 G Anterior;Right Forearm less than 1 day                Significant Labs:    CBC/Anemia Profile:  Recent Labs   Lab 09/13/20  2349 09/14/20  0300 09/15/20  0315   WBC 8.86 8.15  8.15 7.65   HGB 8.1* 7.9*  7.9* 7.9*   HCT 25.5* 24.9*  24.9* 25.2*   PLT  48* 42*  42* 61*   MCV 97 97  97 97   RDW 15.0* 15.0*  15.0* 15.5*        Chemistries:  Recent Labs   Lab 09/13/20  1745 09/13/20  2349 09/14/20  0300  09/14/20  1821 09/14/20  2245 09/15/20  0315    141 141  --   --   --  143   K 3.5 3.4* 3.6   < > 3.4* 3.3* 3.9    105 105  --   --   --  103   CO2 25 23 24  --   --   --  27   BUN 49* 48* 49*  --   --   --  49*   CREATININE 2.0* 1.9* 1.8*  --   --   --  1.9*   CALCIUM 8.0* 7.8* 7.5*  --   --   --  7.9*   ALBUMIN 2.7* 2.6* 2.6*  --   --   --  2.5*   PROT 4.9* 4.9* 4.9*  --   --   --  5.1*   BILITOT 4.2* 4.0* 3.6*  --   --   --  3.2*   ALKPHOS 127 141* 142*  --   --   --  180*   ALT 19 20 19  --   --   --  19   AST 67* 60* 52*  --   --   --  43*   MG 2.5 2.5  2.5  --   --   --   --  2.5   PHOS 5.2* 4.7* 4.7*  --   --   --  4.9*    < > = values in this interval not displayed.       ABGs:   Recent Labs   Lab 09/15/20  0355   PH 7.449   PCO2 37.1   HCO3 25.7   POCSATURATED 95   BE 2     Coagulation:   Recent Labs   Lab 09/15/20  0315 09/15/20  0705   INR 1.0  --    APTT  --  29.4     All pertinent labs within the past 24 hours have been reviewed.    Significant Imaging:  I have reviewed and interpreted all pertinent imaging results/findings within the past 24 hours.     09/15/2020 CXR  COMPARISON:  Multiple prior exams, most recent from 09/14/2020     FINDINGS:  Endotracheal tube with its tip 3.3 cm above the anjel.  Enteric tube with its tip within the proximal portion of the stomach.  The side port is proximal to the gastroesophageal junction.  This should be advanced approximately 8 cm.  Postsurgical changes status post sternotomy and valve replacements.  Right-sided central venous catheter with its tip in the mid SVC.  Interval removal of mediastinal drains.     Mild cardiomegaly similar to prior exam.  Moderate right pleural effusion with atelectasis/consolidation in the right lower lung zone.  Trace left pleural effusion.  No  pneumothorax.    Assessment/Plan:     Severe mitral regurgitation  Fatou Lorenzo is a 75 y.o. female s/p MVr, TVr 9/9/2020. Case noteworthy for multiple pump runs (3) and RV hematoma due to retraction.     Neuro:  - Recently extubated, no sedation indicated  - PRN IV pain meds while NPO, will transition to PO pain meds once cleared for diet      CV:  S/p MVr, TVr 9/9/20. Case noteworthy for pump run x3, RV hematoma 2/2 retraction  - Remains in rate controlled a fib and is HDS  - Continue dobutamine 5  - Decrease amio gtt 1 -> 0.5. Will d/c after 18 hours  - MAP goal 60-80, SBP <140  - Pacer wires in place, set to back up rate 45     Pulm:  - Extubated this am. Lots of secretions noted with exubation  - Q4 hr breathing treatments with NS nebs  - IS  - Daily CXR     FEN/GI:  - Net negative 3 L yesterday   - replacing lytes as needed  - Na 143 today. Will need to monitor closely while NPO and being diuresed  - NPO  - famotidine  - miralax once able to take PO     Renal:  - Yoo in place for strict I/O  - Continue with aggressive diuresis   - lasix gtt 20   - will hold off on diuril for now  - BUN/Cr persistently elevated 46/1.7 -> 49/1.9     Heme/Onc:  - H/H stable  - Heparin gtt for persistent a fib  - Continue to trend     ID:  - Afebrile, WBC WNL  - periop antibiotics complete     Endo:  - no hx of DM  - ISS    PPx:  - famotidine, SCDs     Dispo: continue ICU care        Critical secondary to Patient has a condition that poses threat to life and bodily function: s/p MVr, TVr, MAZE     Critical care was time spent personally by me on the following activities: development of treatment plan with patient or surrogate and bedside caregivers, discussions with consultants, evaluation of patient's response to treatment, examination of patient, ordering and performing treatments and interventions, ordering and review of laboratory studies, ordering and review of radiographic studies, pulse oximetry, re-evaluation of  patient's condition.  This critical care time did not overlap with that of any other provider or involve time for any procedures.     Madhuri Ng MD  Critical Care - Surgery  [facility]

## 2020-09-15 NOTE — PLAN OF CARE
SW is following this Pt for DC planning needs. There are no identified needs at this time. Discharge Disposition: TBD - pending patient progress; patient extubated today.     SW will continue to coordinate with patient, family, team and insurance to complete patient's discharge plan.    Esthela Peralta LMSW   - Case Management

## 2020-09-15 NOTE — SUBJECTIVE & OBJECTIVE
Interval History/Significant Events: No acute events. Has been off sedation since yesterday afternoon and is on minimal vent settings. Obtained extubation parameters which were adequate for extubation. Successfully extubated this morning but has lots of secretions    Aggressive diuresis yesterday as well. Net negative 2.9L. Remains in a fib with heparin gtt being titrated appropriately.     Follow-up For: Procedure(s) (LRB):  Valvuloplasty, Mitral (N/A)  REPAIR, TRICUSPID VALVE (N/A)  BAIN MAZE PROCEDURE (N/A)    Post-Operative Day: 6 Days Post-Op    Objective:     Vital Signs (Most Recent):  Temp: 98.1 °F (36.7 °C) (09/15/20 0724)  Pulse: 101 (09/15/20 0724)  Resp: 20 (09/15/20 0724)  BP: (!) 117/55 (09/15/20 0600)  SpO2: 95 % (09/15/20 0724) Vital Signs (24h Range):  Temp:  [98.1 °F (36.7 °C)-99.1 °F (37.3 °C)] 98.1 °F (36.7 °C)  Pulse:  [] 101  Resp:  [13-27] 20  SpO2:  [94 %-100 %] 95 %  BP: ()/(54-67) 117/55  Arterial Line BP: (100-142)/(39-60) 117/51     Weight: 86.6 kg (190 lb 14.7 oz)  Body mass index is 31.77 kg/m².      Intake/Output Summary (Last 24 hours) at 9/15/2020 0817  Last data filed at 9/15/2020 0700  Gross per 24 hour   Intake 2371 ml   Output 4420 ml   Net -2049 ml       Physical Exam  Vitals signs and nursing note reviewed.   Constitutional:       General: She is not in acute distress.     Appearance: She is well-developed. She is not toxic-appearing.   HENT:      Head: Normocephalic and atraumatic.   Eyes:      General: No scleral icterus.        Right eye: No discharge.         Left eye: No discharge.   Neck:      Vascular: No JVD.      Comments: RIJ in place  Cardiovascular:      Rate and Rhythm: Tachycardia present. Rhythm irregular.   Pulmonary:      Comments: Pacer wires in place  Dressing c/d/i  Abdominal:      General: There is no distension.      Palpations: Abdomen is soft.   Genitourinary:     Comments: Yoo in place  Skin:     General: Skin is warm and dry.    Neurological:      General: No focal deficit present.      Cranial Nerves: No cranial nerve deficit.      Comments: Following commands         Vents:  Vent Mode: Spont (09/15/20 0724)  Ventilator Initiated: Yes (09/09/20 2301)  Set Rate: 12 BPM (09/14/20 1145)  Vt Set: 410 mL (09/14/20 1145)  Pressure Support: 5 cmH20 (09/15/20 0113)  PEEP/CPAP: 5 cmH20 (09/15/20 0724)  Oxygen Concentration (%): 30 (09/15/20 0724)  Peak Airway Pressure: 9 cmH2O (09/15/20 0724)  Plateau Pressure: 0 cmH20 (09/15/20 0724)  Total Ve: 6.08 mL (09/15/20 0724)  F/VT Ratio<105 (RSBI): (!) 46.84 (09/15/20 0724)    Lines/Drains/Airways     Central Venous Catheter Line             Introducer with Double Lumen 09/09/20 right internal jugular 6 days          Drain                 NG/OG Tube 09/09/20 Right mouth 6 days         Urethral Catheter 09/09/20 1512 Non-latex;Straight-tip;Temperature probe 14 Fr. 5 days          Airway                 Airway - Non-Surgical 09/09/20 1640 5 days          Arterial Line            Arterial Line 09/09/20 Right Femoral 6 days          Line                 Pacer Wires 09/09/20 2057 5 days          Peripheral Intravenous Line                 Peripheral IV - Single Lumen 09/14/20 0051 20 G Left;Posterior Forearm 1 day         Peripheral IV - Single Lumen 09/14/20 1544 20 G Anterior;Right Forearm less than 1 day                Significant Labs:    CBC/Anemia Profile:  Recent Labs   Lab 09/13/20 2349 09/14/20  0300 09/15/20  0315   WBC 8.86 8.15  8.15 7.65   HGB 8.1* 7.9*  7.9* 7.9*   HCT 25.5* 24.9*  24.9* 25.2*   PLT 48* 42*  42* 61*   MCV 97 97  97 97   RDW 15.0* 15.0*  15.0* 15.5*        Chemistries:  Recent Labs   Lab 09/13/20  1745 09/13/20  2349 09/14/20  0300  09/14/20  1821 09/14/20  2245 09/15/20  0315    141 141  --   --   --  143   K 3.5 3.4* 3.6   < > 3.4* 3.3* 3.9    105 105  --   --   --  103   CO2 25 23 24  --   --   --  27   BUN 49* 48* 49*  --   --   --  49*   CREATININE 2.0*  1.9* 1.8*  --   --   --  1.9*   CALCIUM 8.0* 7.8* 7.5*  --   --   --  7.9*   ALBUMIN 2.7* 2.6* 2.6*  --   --   --  2.5*   PROT 4.9* 4.9* 4.9*  --   --   --  5.1*   BILITOT 4.2* 4.0* 3.6*  --   --   --  3.2*   ALKPHOS 127 141* 142*  --   --   --  180*   ALT 19 20 19  --   --   --  19   AST 67* 60* 52*  --   --   --  43*   MG 2.5 2.5  2.5  --   --   --   --  2.5   PHOS 5.2* 4.7* 4.7*  --   --   --  4.9*    < > = values in this interval not displayed.       ABGs:   Recent Labs   Lab 09/15/20  0355   PH 7.449   PCO2 37.1   HCO3 25.7   POCSATURATED 95   BE 2     Coagulation:   Recent Labs   Lab 09/15/20  0315 09/15/20  0705   INR 1.0  --    APTT  --  29.4     All pertinent labs within the past 24 hours have been reviewed.    Significant Imaging:  I have reviewed and interpreted all pertinent imaging results/findings within the past 24 hours.     09/15/2020 CXR  COMPARISON:  Multiple prior exams, most recent from 09/14/2020     FINDINGS:  Endotracheal tube with its tip 3.3 cm above the anjel.  Enteric tube with its tip within the proximal portion of the stomach.  The side port is proximal to the gastroesophageal junction.  This should be advanced approximately 8 cm.  Postsurgical changes status post sternotomy and valve replacements.  Right-sided central venous catheter with its tip in the mid SVC.  Interval removal of mediastinal drains.     Mild cardiomegaly similar to prior exam.  Moderate right pleural effusion with atelectasis/consolidation in the right lower lung zone.  Trace left pleural effusion.  No pneumothorax.

## 2020-09-15 NOTE — ASSESSMENT & PLAN NOTE
Fatou Lorenzo is a 75 y.o. female s/p MVr, TVr 9/9/2020. Case noteworthy for multiple pump runs (3) and RV hematoma due to retraction.     Neuro:  - Recently extubated, no sedation indicated  - PRN IV pain meds while NPO, will transition to PO pain meds once cleared for diet      CV:  S/p MVr, TVr 9/9/20. Case noteworthy for pump run x3, RV hematoma 2/2 retraction  - Remains in rate controlled a fib and is HDS  - Continue dobutamine 5  - Decrease amio gtt 1 -> 0.5. Will d/c after 18 hours  - MAP goal 60-80, SBP <140  - Pacer wires in place, set to back up rate 45     Pulm:  - Extubated this am. Lots of secretions noted with exubation  - Q4 hr breathing treatments with NS nebs  - IS  - Daily CXR     FEN/GI:  - Net negative 3 L yesterday   - replacing lytes as needed  - Na 143 today. Will need to monitor closely while NPO and being diuresed  - NPO  - famotidine  - miralax once able to take PO     Renal:  - Yoo in place for strict I/O  - Continue with aggressive diuresis   - lasix gtt 20   - will hold off on diuril for now  - BUN/Cr persistently elevated 46/1.7 -> 49/1.9     Heme/Onc:  - H/H stable  - Heparin gtt for persistent a fib  - Continue to trend     ID:  - Afebrile, WBC WNL  - periop antibiotics complete     Endo:  - no hx of DM  - ISS    PPx:  - famotidine, SCDs     Dispo: continue ICU care

## 2020-09-15 NOTE — PLAN OF CARE
"Dx: Acute respiratory failure with hypoxia     Shift Events: No acute events.  Maintained MAPs 60-80.  Pt in Afib with -110s. Oxy 5mg x2 doses administered for pain.  Lasix gtt titrated to maintain UO >200cc/hr.  Chest tube site leaking scant sanguineous drainage, Vaseline gauze and Tegaderm applied to site.  CVP 11 & 12.  Plan to extubate this morning.  WCTM.    Neuro: Arouses to Voice, Follows Commands and Moves All Extremities     Respiratory: Ventilator    Vital Signs: BP (!) 112/59 (BP Location: Right arm, Patient Position: Lying)   Pulse 100   Temp 98.6 °F (37 °C) (Core Bladder)   Resp 15   Ht 5' 5" (1.651 m)   Wt 91.4 kg (201 lb 8 oz)   SpO2 95%   Breastfeeding No   BMI 33.53 kg/m²     Diet: Tube Feeds    Gtts: Dobutamine, Amiodarone, Lasix and Heparin    Urine Output: Urinary Catheter 2100 cc/shift    Restraints checked Q2hrs, see flowsheets.     Labs/Accuchecks: Labs checked as ordered, replacements administered as ordered.  Accuchecks Q6hrs, no correction needed.    Skin: No new skin breakdown noted.  Foams applied to sacrum and heels.  Heel boots on.  Waffle inflated, bed plugged in and working.                 "

## 2020-09-16 NOTE — NURSING
Patient had <150 ml of urine output for the past 2 hours. Patient's net output currently is -278 ml. MD Hernandez notified. No new orders received. WCTM.

## 2020-09-16 NOTE — PLAN OF CARE
Problem: Physical Therapy Goal  Goal: Physical Therapy Goal  Description: Goals to be met by: 2020     Patient will increase functional independence with mobility by performin. Supine to sit with MInimal Assistance  2. Sit to stand transfer with Moderate Assistance  3. Bed to chair transfer with Moderate Assistance using LRAD  4. Sitting at edge of bed x10 minutes with Stand-by Assistance  5. Lower extremity exercise program x10 reps per handout, with assistance as needed          Outcome: Ongoing, Progressing     Pt re-evaluated and appropriate goals established.     Janine Soto, PT, DPT  2020  [phone]

## 2020-09-16 NOTE — PLAN OF CARE
Problem: Occupational Therapy Goal  Goal: Occupational Therapy Goal  Description: Goals to be met by: 9/30/2020      Patient will increase functional independence with ADLs by performing:    UE Dressing with Minimum Assistance.  Grooming while sitting EOB with Contact Guard Assistance.  Toileting from bedside commode with Max Assistance for hygiene and clothing management.   Toilet transfer to bedside commode with Max Assistance.  Supine <> Sit with minimum assistance in preparation for EOB/OOB functional activities.     Outcome: Ongoing, Progressing    OT re-evaluation completed; goals reviewed and updated.  Tete Grady OT  9/16/2020

## 2020-09-16 NOTE — PLAN OF CARE
"      SICU PLAN OF CARE NOTE    Dx: Acute respiratory failure with hypoxia    Vital Signs: /64   Pulse 107   Temp 98.4 °F (36.9 °C)   Resp (!) 25   Ht 5' 5" (1.651 m)   Wt 86.6 kg (190 lb 14.7 oz)   SpO2 (!) 88%   Breastfeeding No   BMI 31.77 kg/m²     Neuro: AAO x3, Slow To Respond, Opens Eyes to Verbal Stimulus, Follows Commands and Moves All Extremities     Respiratory: 3 L Nasal Cannula     Cardiac: Afib, HR      Diet: NPO, SPL assessed patient at bedside - okay to give ice chips for pleasure and crush meds given with puree    Gtts:   Dobutamine at 5 mcg/kg/min  Amiodarone at 1 mg/min  Lasix at 20 mg/hr  Heparin at 1000 units/ml    Urine Output: Urinary Catheter 1185 cc/shift    Labs/Accuchecks:  CBG Q6H  Potassium TID      SKIN NOTE:  Skin: Foam applied to heels and sacrum. Generalized bruising noted at R femoral site and L arm. Skin otherwise C/D/I.  Skin precautions maintained including:  Frequent weight shift assistance provided; Patient turned Q2 hr to prevent breakdown. Bed plugged in and mattress inflated. Adhesive use limited. Heels elevated off bed. Pressure points protected and positioning supports utilized.  Skin-to-device areas padded. Skin-to-skin areas padded    SHIFT EVENTS:  Patient in afib upon morning assessment. HR high 110s-130s. Amio bolus given overnight, current rate 1 mg/min (since 0300). Mg 2 gm given. Metoprolol  25 mg PO BID started this afternoon, patient responded currently with HR in low 100s-110s. Patient remains asymptomatic throughout shift.     Heparin drip converted from weight-based to non-weight based per order. APTT goal increased to 60-80.     Lasix drip continued at rate of 20 mg/hr with a goal urine output of 150-200 ml/hr. Urine output dropped below 100 this afternoon. Orders received for Diuril 250 mg.     Right fem A-line DC'd. Site remains soft and without oozing. PT/OT worked with patient at bedside this afternoon. POC updated with patient and sons. All " questions addressed. WCTM.

## 2020-09-16 NOTE — PLAN OF CARE
SW is following this Pt for DC planning needs.  Discharge Disposition: SNF - pending progress    SW will continue to coordinate with patient, family, team and insurance to complete patient's discharge plan.    Esthela Peralta LMSW   - Case Management

## 2020-09-16 NOTE — PROGRESS NOTES
[facility]  Critical Care - Surgery  Progress Note    Patient Name: Fatou Lorenzo  MRN: [id number]  Admission Date: 8/30/2020  Hospital Length of Stay: 16 days  Code Status: Full Code  Attending Provider: Antoni Waddell MD  Primary Care Provider: Ludin Rivas MD   Principal Problem: Acute respiratory failure with hypoxia    Subjective:     Hospital/ICU Course:  No notes on file    Interval History/Significant Events: No significant clinical changes. Remains extubated and is currently satting well on 2L NC. A fib RVR overnight. Amio gtt increased to 0.5 -> 1 and rebolused. Still in a fib with -120s this am.     Continues to diurese and is net negative approximately 2 L in the last 24 hours. Had a bowel movement    Follow-up For: Procedure(s) (LRB):  Valvuloplasty, Mitral (N/A)  REPAIR, TRICUSPID VALVE (N/A)  BAIN MAZE PROCEDURE (N/A)    Post-Operative Day: 7 Days Post-Op    Objective:     Vital Signs (Most Recent):  Temp: 97.3 °F (36.3 °C) (09/16/20 1200)  Pulse: (!) 117 (09/16/20 1200)  Resp: 19 (09/16/20 1200)  BP: 117/60 (09/16/20 1130)  SpO2: 95 % (09/16/20 1200) Vital Signs (24h Range):  Temp:  [97.3 °F (36.3 °C)-98.5 °F (36.9 °C)] 97.3 °F (36.3 °C)  Pulse:  [103-131] 117  Resp:  [14-61] 19  SpO2:  [94 %-100 %] 95 %  BP: (107-129)/(52-79) 117/60  Arterial Line BP: (111-137)/(53-66) 129/63     Weight: 86.6 kg (190 lb 14.7 oz)  Body mass index is 31.77 kg/m².      Intake/Output Summary (Last 24 hours) at 9/16/2020 1256  Last data filed at 9/16/2020 1200  Gross per 24 hour   Intake 1946 ml   Output 3255 ml   Net -1309 ml       Physical Exam  Vitals signs and nursing note reviewed.   Constitutional:       General: She is not in acute distress.     Appearance: She is well-developed. She is not toxic-appearing.   HENT:      Head: Normocephalic and atraumatic.   Eyes:      General: No scleral icterus.        Right eye: No discharge.         Left eye: No discharge.   Neck:      Vascular:  No JVD.      Comments: RIJ in place  Cardiovascular:      Rate and Rhythm: Tachycardia present. Rhythm irregular.   Pulmonary:      Comments: Pacer wires in place  Dressing c/d/i  Abdominal:      General: There is no distension.      Palpations: Abdomen is soft.   Genitourinary:     Comments: Yoo in place  Skin:     General: Skin is warm and dry.   Neurological:      General: No focal deficit present.      Cranial Nerves: No cranial nerve deficit.      Comments: Following commands         Vents:  Vent Mode: Spont (09/15/20 0724)  Ventilator Initiated: Yes (09/09/20 2301)  Set Rate: 12 BPM (09/14/20 1145)  Vt Set: 410 mL (09/14/20 1145)  Pressure Support: 5 cmH20 (09/15/20 0113)  PEEP/CPAP: 5 cmH20 (09/15/20 0724)  Oxygen Concentration (%): 28 (09/16/20 0420)  Peak Airway Pressure: 9 cmH2O (09/15/20 0724)  Plateau Pressure: 0 cmH20 (09/15/20 0724)  Total Ve: 6.08 mL (09/15/20 0724)  Negative Inspiratory Force (cm H2O): -27 (09/15/20 0926)  F/VT Ratio<105 (RSBI): (!) 46.84 (09/15/20 0724)    Lines/Drains/Airways     Central Venous Catheter Line             Introducer with Double Lumen 09/09/20 right internal jugular 7 days          Drain                 Urethral Catheter 09/09/20 1512 Non-latex;Straight-tip;Temperature probe 14 Fr. 6 days          Arterial Line            Arterial Line 09/09/20 Right Femoral 7 days          Line                 Pacer Wires 09/09/20 2057 6 days          Peripheral Intravenous Line                 Peripheral IV - Single Lumen 09/14/20 0051 20 G Left;Posterior Forearm 2 days                Significant Labs:    CBC/Anemia Profile:  Recent Labs   Lab 09/15/20  0315 09/16/20  0320   WBC 7.65 7.80   HGB 7.9* 7.6*   HCT 25.2* 23.6*   PLT 61* 95*   MCV 97 97   RDW 15.5* 15.9*        Chemistries:  Recent Labs   Lab 09/15/20  0315  09/15/20  2140 09/16/20  0320 09/16/20  0932     --   --  144  --    K 3.9   < > 3.7 3.3* 4.3     --   --  102  --    CO2 27  --   --  28  --    BUN 49*   --   --  44*  --    CREATININE 1.9*  --   --  1.5*  --    CALCIUM 7.9*  --   --  7.8*  --    ALBUMIN 2.5*  --   --  2.5*  --    PROT 5.1*  --   --  5.2*  --    BILITOT 3.2*  --   --  2.9*  --    ALKPHOS 180*  --   --  168*  --    ALT 19  --   --  21  --    AST 43*  --   --  42*  --    MG 2.5  --   --  2.5  --    PHOS 4.9*  --   --  4.2  --     < > = values in this interval not displayed.       Bilirubin:   Recent Labs   Lab 09/03/20  0553  09/13/20  1745 09/13/20  2349 09/14/20  0300 09/15/20  0315 09/16/20  0320   BILIDIR 0.5*  --   --   --   --   --   --    BILITOT 1.1*   < > 4.2* 4.0* 3.6* 3.2* 2.9*    < > = values in this interval not displayed.     Coagulation:   Recent Labs   Lab 09/16/20  0320 09/16/20  0932   INR 1.0  --    APTT  --  41.7*     All pertinent labs within the past 24 hours have been reviewed.    Significant Imaging:  I have reviewed and interpreted all pertinent imaging results/findings within the past 24 hours.     09/16/2020 CXR  FINDINGS:  Lines and tubes:  Endotracheal and enteric tube side have been removed in the interim.  Right IJ vascular sheath remains.     Lung volumes are stable.  Moderate right and small left pleural effusions with atelectasis unchanged.  Stable perihilar pulmonary vascular congestion.  Cardiac silhouette is enlarged, stable.  Previous median sternotomy with prosthetic cardiac valve.     Impression:     Stable chest following extubation as above.    Assessment/Plan:     Severe mitral regurgitation  Fatou Lorenzo is a 75 y.o. female s/p MVr, TVr 9/9/2020. Case noteworthy for multiple pump runs (3) and RV hematoma due to retraction.     Neuro:  - Cleared for PO by speech  - Started on scheduled tylenol and PRN oxy, dilaudid      CV:  S/p MVr, TVr 9/9/20. Case noteworthy for pump run x3, RV hematoma 2/2 retraction  - Remains in poorly controlled RVR.   - Start metop 12.5 BID. May need to increase if enters into RVR   - may increase to 25 tonight for better rate  control  - Continue dobutamine 5  - MAP goal 60-80, SBP <140  - Pacer wires in place, set to back up rate 45     Pulm:  - Respiratory function continues to improve  - Currently on 2L NC, wean as tolerated  - OOB/IS     FEN/GI:  - Diuresing well and is net negative nearly 2L over the last 24 hours   - Restart scheduled potassium  - Replace other lytes as needed  - Na stable 143 -> 144  - Cleared for ice chips sparingly and meds crushed in puree  - ppx: famotidine, miralax    Renal:  - Yoo in place for strict I/O  - Continue with aggressive diuresis   - lasix gtt 20   - holding diuril today  - BUN/Cr improving 49/1.9 -> 44/1.5     Heme/Onc:  - H/H stable  - Heparin gtt for persistent a fib  - Start bridge to warfarin     ID:  - Afebrile, WBC WNL  - periop antibiotics complete     Endo:  - no hx of DM  - ISS    PPx:  - famotidine, heparin gtt bridge to warfarin, SCDs     Dispo: continue ICU care      Critical secondary to Patient has a condition that poses threat to life and bodily function: MVr, TVr, ELBA     Critical care was time spent personally by me on the following activities: development of treatment plan with patient or surrogate and bedside caregivers, discussions with consultants, evaluation of patient's response to treatment, examination of patient, ordering and performing treatments and interventions, ordering and review of laboratory studies, ordering and review of radiographic studies, pulse oximetry, re-evaluation of patient's condition.  This critical care time did not overlap with that of any other provider or involve time for any procedures.     Madhuri Ng MD  Critical Care - Surgery  [facility]

## 2020-09-16 NOTE — SUBJECTIVE & OBJECTIVE
Interval History/Significant Events: No significant clinical changes. Remains extubated and is currently satting well on 2L NC. A fib RVR overnight. Amio gtt increased to 0.5 -> 1 and rebolused. Still in a fib with -120s this am.     Continues to diurese and is net negative approximately 2 L in the last 24 hours. Had a bowel movement    Follow-up For: Procedure(s) (LRB):  Valvuloplasty, Mitral (N/A)  REPAIR, TRICUSPID VALVE (N/A)  BAIN MAZE PROCEDURE (N/A)    Post-Operative Day: 7 Days Post-Op    Objective:     Vital Signs (Most Recent):  Temp: 97.3 °F (36.3 °C) (09/16/20 1200)  Pulse: (!) 117 (09/16/20 1200)  Resp: 19 (09/16/20 1200)  BP: 117/60 (09/16/20 1130)  SpO2: 95 % (09/16/20 1200) Vital Signs (24h Range):  Temp:  [97.3 °F (36.3 °C)-98.5 °F (36.9 °C)] 97.3 °F (36.3 °C)  Pulse:  [103-131] 117  Resp:  [14-61] 19  SpO2:  [94 %-100 %] 95 %  BP: (107-129)/(52-79) 117/60  Arterial Line BP: (111-137)/(53-66) 129/63     Weight: 86.6 kg (190 lb 14.7 oz)  Body mass index is 31.77 kg/m².      Intake/Output Summary (Last 24 hours) at 9/16/2020 1256  Last data filed at 9/16/2020 1200  Gross per 24 hour   Intake 1946 ml   Output 3255 ml   Net -1309 ml       Physical Exam  Vitals signs and nursing note reviewed.   Constitutional:       General: She is not in acute distress.     Appearance: She is well-developed. She is not toxic-appearing.   HENT:      Head: Normocephalic and atraumatic.   Eyes:      General: No scleral icterus.        Right eye: No discharge.         Left eye: No discharge.   Neck:      Vascular: No JVD.      Comments: RIJ in place  Cardiovascular:      Rate and Rhythm: Tachycardia present. Rhythm irregular.   Pulmonary:      Comments: Pacer wires in place  Dressing c/d/i  Abdominal:      General: There is no distension.      Palpations: Abdomen is soft.   Genitourinary:     Comments: Yoo in place  Skin:     General: Skin is warm and dry.   Neurological:      General: No focal deficit present.       Cranial Nerves: No cranial nerve deficit.      Comments: Following commands         Vents:  Vent Mode: Spont (09/15/20 0724)  Ventilator Initiated: Yes (09/09/20 2301)  Set Rate: 12 BPM (09/14/20 1145)  Vt Set: 410 mL (09/14/20 1145)  Pressure Support: 5 cmH20 (09/15/20 0113)  PEEP/CPAP: 5 cmH20 (09/15/20 0724)  Oxygen Concentration (%): 28 (09/16/20 0420)  Peak Airway Pressure: 9 cmH2O (09/15/20 0724)  Plateau Pressure: 0 cmH20 (09/15/20 0724)  Total Ve: 6.08 mL (09/15/20 0724)  Negative Inspiratory Force (cm H2O): -27 (09/15/20 0926)  F/VT Ratio<105 (RSBI): (!) 46.84 (09/15/20 0724)    Lines/Drains/Airways     Central Venous Catheter Line             Introducer with Double Lumen 09/09/20 right internal jugular 7 days          Drain                 Urethral Catheter 09/09/20 1512 Non-latex;Straight-tip;Temperature probe 14 Fr. 6 days          Arterial Line            Arterial Line 09/09/20 Right Femoral 7 days          Line                 Pacer Wires 09/09/20 2057 6 days          Peripheral Intravenous Line                 Peripheral IV - Single Lumen 09/14/20 0051 20 G Left;Posterior Forearm 2 days                Significant Labs:    CBC/Anemia Profile:  Recent Labs   Lab 09/15/20  0315 09/16/20  0320   WBC 7.65 7.80   HGB 7.9* 7.6*   HCT 25.2* 23.6*   PLT 61* 95*   MCV 97 97   RDW 15.5* 15.9*        Chemistries:  Recent Labs   Lab 09/15/20  0315  09/15/20  2140 09/16/20  0320 09/16/20  0932     --   --  144  --    K 3.9   < > 3.7 3.3* 4.3     --   --  102  --    CO2 27  --   --  28  --    BUN 49*  --   --  44*  --    CREATININE 1.9*  --   --  1.5*  --    CALCIUM 7.9*  --   --  7.8*  --    ALBUMIN 2.5*  --   --  2.5*  --    PROT 5.1*  --   --  5.2*  --    BILITOT 3.2*  --   --  2.9*  --    ALKPHOS 180*  --   --  168*  --    ALT 19  --   --  21  --    AST 43*  --   --  42*  --    MG 2.5  --   --  2.5  --    PHOS 4.9*  --   --  4.2  --     < > = values in this interval not displayed.       Bilirubin:    Recent Labs   Lab 09/03/20  0553  09/13/20  1745 09/13/20  2349 09/14/20  0300 09/15/20  0315 09/16/20  0320   BILIDIR 0.5*  --   --   --   --   --   --    BILITOT 1.1*   < > 4.2* 4.0* 3.6* 3.2* 2.9*    < > = values in this interval not displayed.     Coagulation:   Recent Labs   Lab 09/16/20  0320 09/16/20  0932   INR 1.0  --    APTT  --  41.7*     All pertinent labs within the past 24 hours have been reviewed.    Significant Imaging:  I have reviewed and interpreted all pertinent imaging results/findings within the past 24 hours.     09/16/2020 CXR  FINDINGS:  Lines and tubes:  Endotracheal and enteric tube side have been removed in the interim.  Right IJ vascular sheath remains.     Lung volumes are stable.  Moderate right and small left pleural effusions with atelectasis unchanged.  Stable perihilar pulmonary vascular congestion.  Cardiac silhouette is enlarged, stable.  Previous median sternotomy with prosthetic cardiac valve.     Impression:     Stable chest following extubation as above.

## 2020-09-16 NOTE — PROGRESS NOTES
"[facility]  Adult Nutrition  Progress Note    SUMMARY       Recommendations    1.) Advance diet as tolerated to cardiac per SLP texture recommendations once medically appropriate.   2.) If EN needed, continue Impact Peptide 1.5; goal rate at 35mL/hr. Advance 5-10mL Q8hr as tolerated. EN provides 1260kcal, 78gm of protein, 646mL of free water. Add free water per MD recommendations.   3.) Add Boost Plus TID to provide 1080kcal, 42gm of protein.     Goals: Pt to return to nutritionally adequate diet and meet > 75% EEN/EPN by RD follow up  Nutrition Goal Status: progressing towards goal  Communication of RD Recs: other (comment)(POC)    Reason for Assessment    Reason For Assessment: RD follow-up  Diagnosis: (Acute respiratory failure with hypoxia)  Relevant Medical History: afib w/ RVR, HTN, HLD  Interdisciplinary Rounds: did not attend  General Information Comments: Pt s/p Mahmood Maze procedure. Pt extubated. Pt remains NPO, SLP evaluation ordered. NFPE completed 9/9 with no s/s of malnutrition observed.GI- LBM 9/15   Nutrition Discharge Planning: Adequate PO intake on low Na diet    Nutrition Risk Screen    Nutrition Risk Screen: no indicators present    Nutrition/Diet History    Spiritual, Cultural Beliefs, Uatsdin Practices, Values that Affect Care: no    Anthropometrics    Temp: 97.3 °F (36.3 °C)  Height Method: Stated  Height: 5' 5" (165.1 cm)  Height (inches): 65 in  Weight Method: Bed Scale  Weight: 86.6 kg (190 lb 14.7 oz)  Weight (lb): 190.92 lb  Ideal Body Weight (IBW), Female: 125 lb  % Ideal Body Weight, Female (lb): 136.16 %  BMI (Calculated): 31.8       Lab/Procedures/Meds    Pertinent Labs Reviewed: reviewed  Pertinent Labs Comments: Hgb 7.6, Hct 23.6, K 3.3, BUN 44, Cr 1.5, GFR 33.8, Glucose 130, Ca 7.8, Alb 2.5  Pertinent Medications Reviewed: reviewed  Pertinent Medications Comments: Vitamin C, famotidine, polyethylene glycol, potassium chloride, sennosides, amiodarone, furosemide, " heparin    Physical Findings/Assessment     Edema 1+ generalized  Wound bilateral buttocks    Estimated/Assessed Needs    Weight Used For Calorie Calculations: 80 kg (176 lb 5.9 oz)  Energy Calorie Requirements (kcal): 1686(x1.3 PAL)  Energy Need Method: Kcal/kg(20-25kcal/kg)  Protein Requirements:  g (1.2-1.5g/kg)  Weight Used For Protein Calculations: 76 kg (167 lb 8.8 oz)(UBW)  Fluid Requirements (mL): per MD or 1 mL/kcal     RDA Method (mL): 1686         Nutrition Prescription Ordered    Current Diet Order: NPO (9/9)    Evaluation of Received Nutrient/Fluid Intake    I/O: I: 1946; O: 3295; +4.2L since 9/2  Energy Calories Required: not meeting needs  Protein Required: not meeting needs  Fluid Required: not meeting needs  Comments: LBM 9/15  Tolerance: (RD to monitor)  % Intake of Estimated Energy Needs: 0  % Meal Intake: 0    Nutrition Risk    Level of Risk/Frequency of Follow-up: high , 2x weekly    Assessment and Plan     Nutrition Problem  Inadequate energy intake     Related to (etiology):   NPO     Signs and Symptoms (as evidenced by):   NPO     Interventions(treatment strategy):  1.) Collaboration with other providers     Nutrition Diagnosis Status:   Ongoing     Monitor and Evaluation    Food and Nutrient Intake: energy intake, food and beverage intake, enteral nutrition intake  Food and Nutrient Adminstration: diet order, enteral and parenteral nutrition administration  Knowledge/Beliefs/Attitudes: food and nutrition knowledge/skill  Anthropometric Measurements: weight, weight change  Biochemical Data, Medical Tests and Procedures: electrolyte and renal panel, gastrointestinal profile, glucose/endocrine profile  Nutrition-Focused Physical Findings: overall appearance, skin     Malnutrition Assessment                 Orbital Region (Subcutaneous Fat Loss): well nourished  Upper Arm Region (Subcutaneous Fat Loss): well nourished   [place] Region (Muscle Loss): well nourished  Clavicle Bone Region  (Muscle Loss): well nourished  Clavicle and Acromion Bone Region (Muscle Loss): well nourished  Dorsal Hand (Muscle Loss): well nourished  Anterior Thigh Region (Muscle Loss): well nourished  Posterior Calf Region (Muscle Loss): well nourished                 Nutrition Follow-Up    RD Follow-up?: Yes

## 2020-09-16 NOTE — PROGRESS NOTES
CT surgery progress note    Converted into a. Fib overnight  bolused amiodarone and gtt started      A/P  75F s/p tricuspid valve annuloplasty and mitral valve repair on 9/9 (pod7)     Progressing    Continue dobutamine  Continue lasix gtt    Wean amio gtt per sicu team   Awaits SLP eval   Heparin bridge, no nomogram.   - ptt goal 60-80  Cont ICU

## 2020-09-16 NOTE — PLAN OF CARE
Problem: SLP Goal  Goal: SLP Goal  Description: Speech Language Pathology Goals  Goals expected to be met 9/23 1. Pt will participate in ongoing swallow assessment to determine safest and least restrictive diet.    Outcome: Ongoing, Progressing     Eval completed and POC initiated. Continue NPO with ice chips and meds crushed in puree with strict aspiration precautions. ST to continue to monitor.     Zulay Colon, BURKE  9/16/2020

## 2020-09-16 NOTE — PT/OT/SLP RE-EVAL
Physical Therapy Re-evaluation and Treatment     Patient Name:  Fatou Lorenzo   MRN:  [id number]    *co-treatment with OT   Recommendations:     Discharge Recommendations:  nursing facility, skilled   Discharge Equipment Recommendations: (TBD pending progress)   Barriers to discharge: Decreased caregiver support    Assessment:     Fatou Lorenzo is a 75 y.o. female admitted with a medical diagnosis of Acute respiratory failure with hypoxia.  She presents with the following impairments/functional limitations:  weakness, impaired endurance, impaired self care skills, impaired functional mobilty, gait instability, impaired balance, impaired cardiopulmonary response to activity. Pt tolerated sitting EOB with moderate assistance required for static sitting balance. Pt with excessive coughing throughout session, SpO2 90-91% on 3L O2. Following prolonged bed rest with impaired medical status, pt is not at baseline and not safe to return home upon discharge. Pt would benefit from continued skilled therapy intervention at skilled nursing facility to progress toward more independent mobility. At this time, pt would continue to benefit from acute skilled therapy intervention to address deficits and progress toward prior level of function.       Rehab Prognosis:  good; patient would benefit from acute skilled PT services to address these deficits and reach maximum level of function.      Recent Surgery: Procedure(s) (LRB):  Valvuloplasty, Mitral (N/A)  REPAIR, TRICUSPID VALVE (N/A)  BAIN MAZE PROCEDURE (N/A) 7 Days Post-Op    Plan:     During this hospitalization, patient to be seen 4 x/week to address the above listed problems via gait training, therapeutic activities, therapeutic exercises, neuromuscular re-education  · Plan of Care Expires:  10/16/20   Plan of Care Reviewed with: patient    Subjective     Communicated with RN prior to session.  Patient found HOB elevated with central line, blood pressure cuff, pulse ox  (continuous), telemetry, oxygen, vivas catheter upon PT entry to room, agreeable to evaluation.      Chief Complaint: Pt c/o thirst and excessive coughing   Patient comments/goals: to get better and return home   Pain/Comfort:  · Pain Rating 1: 0/10  · Pain Rating Post-Intervention 1: 0/10    Patients cultural, spiritual, Anglican conflicts given the current situation: no      Objective:     Patient found with: central line, blood pressure cuff, pulse ox (continuous), telemetry, oxygen, vivas catheter     General Precautions: Standard, fall   Orthopedic Precautions:N/A   Braces: N/A     Exams:  · Cognitive Exam:  Patient is oriented to self, followed all commands, able to verbalize quietly  · Gross Motor Coordination:  Impaired   · RLE ROM: WFL  · RLE Strength: grossly 3/5   · LLE ROM: WFL  · LLE Strength: grossly 3/5     Functional Mobility:  · Bed Mobility:     · Supine to Sit: maximal assistance  · Sit to Supine: total assistance    AM-PAC 6 CLICK MOBILITY  Total Score:8       Therapeutic Activities and Exercises:   Pt sat EOB for 8 mins with moderate assistance required for static sitting balance. Pt demo'd excessive anterior lean. Pt able to correct to midline with assistance, unable to maintain. Pt for excessive cervical flexion, able to lift head to neutral position with verbal cuing. Pt occasionally demo'd excessive L lateral flexion, required assistance to return to midline.   SpO2 90-91% on 3L O2. Pt with multiple episodes of coughing.   Pt educated on role of PT/POC. Pt verbalized understanding.   Pt encouraged to only perform OOB mobility with assistance from nursing/therapy. Pt agreeable.   Pt educated regarding 3/3 sternal precautions. Pt compliant throughout session.      Patient left HOB elevated with all lines intact, call button in reach and RN notified.    GOALS:   Multidisciplinary Problems     Physical Therapy Goals        Problem: Physical Therapy Goal    Goal Priority Disciplines Outcome  Goal Variances Interventions   Physical Therapy Goal     PT, PT/OT Ongoing, Progressing     Description: Goals to be met by: 2020     Patient will increase functional independence with mobility by performin. Supine to sit with MInimal Assistance  2. Sit to stand transfer with Moderate Assistance  3. Bed to chair transfer with Moderate Assistance using LRAD  4. Sitting at edge of bed x10 minutes with Stand-by Assistance  5. Lower extremity exercise program x10 reps per handout, with assistance as needed                           History:     Past Medical History:   Diagnosis Date    Atrial fibrillation with RVR 2020    Cataract     Essential hypertension 2020    Glaucoma     Heart valve problem     Hyperlipidemia     Severe mitral regurgitation 2020       Past Surgical History:   Procedure Laterality Date    ANKLE SURGERY      BAIN MAZE PROCEDURE N/A 2020    Procedure: BAIN MAZE PROCEDURE;  Surgeon: Antoni Waddell MD;  Location: [facility] OR [address] [place] [address];  Service: Cardiothoracic;  Laterality: N/A;  MAZE     LEFT HEART CATHETERIZATION Left 2020    Procedure: Left heart cath, radial;  Surgeon: Marlee Carrillo MD;  Location: [facility] CATH LAB;  Service: Cardiology;  Laterality: Left;    lipoma removal      TRICUSPID VALVULOPLASTY N/A 2020    Procedure: REPAIR, TRICUSPID VALVE;  Surgeon: Antoni Waddell MD;  Location: [address];  Service: Cardiothoracic;  Laterality: N/A;       Time Tracking:     PT Received On: 20  PT Start Time: 1333     PT Stop Time: 1356  PT Total Time (min): 23 min     Billable Minutes: Evaluation 10 mins  and Therapeutic Exercise 13 mins       Janine Soto, PT  2020

## 2020-09-16 NOTE — PT/OT/SLP RE-EVAL
Occupational Therapy   Re-evaluation    Name: Fatou Lorenzo  MRN: [id number]  Admitting Diagnosis:  Acute respiratory failure with hypoxia 7 Days Post-Op   Procedure(s):  Valvuloplasty, Mitral  REPAIR, TRICUSPID VALVE  BAIN MAZE PROCEDURE     Recommendations:     Discharge Recommendations: nursing facility, skilled  Discharge Equipment Recommendations:  (TBD)  Barriers to discharge:  None    Assessment:     Fatou Lorenzo is a 75 y.o. female with a medical diagnosis of Acute respiratory failure with hypoxia.  She presents with a decline in occupational participation and functional mobility following hospitalization and surgery. Patient is agreeable to participate with therapy and tolerates session fairly.  Performance deficits affecting function are weakness, impaired endurance, impaired self care skills, impaired functional mobilty, gait instability, impaired balance, decreased upper extremity function, decreased lower extremity function, impaired cardiopulmonary response to activity, impaired skin.      Rehab Prognosis:  Good; patient would benefit from acute skilled OT services to address these deficits and reach maximum level of function.       Plan:     Patient to be seen 4 x/week to address the above listed problems via self-care/home management, therapeutic activities, therapeutic exercises  · Plan of Care Expires: 10/16/20  · Plan of Care Reviewed with: patient    Subjective     Chief Complaint: Fatigue  Patient/Family stated goals: To feel better and return home  Communicated with: RN prior to session.  Pain/Comfort:  · Pain Rating 1: 0/10    Objective:     Communicated with: RN prior to session.  Patient found HOB elevated with: blood pressure cuff, central line, vivas catheter, oxygen, pressure relief boots, pulse ox (continuous), telemetry upon OT entry to room.    General Precautions: Standard, fall, sternal   Orthopedic Precautions:N/A   Braces: N/A     Occupational Performance:    Bed Mobility:     · Patient completed Supine to Sit to L side EOB with maximal assistance  · Patient sat EOB ~8 minutes with mod assist for sitting balance with patient exhibiting anterior lean requiring verbal/tactile cueing to attain/maintain upright seated posture  · Patient completed Sit to Supine with total assistance    Functional Mobility/Transfers:  · Deferred this day    Activities of Daily Living:  · Grooming: minimum assistance Patient participated in face wash with a washcloth while sitting EOB with min assist for thoroughness.  · Lower Body Dressing: total assistance Patient donned/doffed non-slip socks while in bed with total assist.    Cognitive/Visual Perceptual:  Cognitive/Psychosocial Skills:     -       Oriented to: Person   -       Follows Commands/attention:Follows multistep  commands    Physical Exam:  Upper Extremity Range of Motion:     -       Right Upper Extremity: WFL  -       Left Upper Extremity: WFL  Upper Extremity Strength:    -       Right Upper Extremity: DNT 2' sternal precautions  -       Left Upper Extremity: DNT 2' sternal precautions    [facility] 6 Click:  [facility] Total Score: 12    Treatment & Education:  Role of OT/evaluation  Call button for assistanceEducation:      Patient left HOB elevated with all lines intact, call button in reach and RN notified    GOALS:   Multidisciplinary Problems     Occupational Therapy Goals        Problem: Occupational Therapy Goal    Goal Priority Disciplines Outcome Interventions   Occupational Therapy Goal     OT, PT/OT Ongoing, Progressing    Description: Goals to be met by: 9/30/2020      Patient will increase functional independence with ADLs by performing:    UE Dressing with Minimum Assistance.  Grooming while sitting EOB with Contact Guard Assistance.  Toileting from bedside commode with Max Assistance for hygiene and clothing management.   Toilet transfer to bedside commode with Max Assistance.  Supine <> Sit with minimum assistance in preparation for  EOB/OOB functional activities.                        History:     Past Medical History:   Diagnosis Date    Atrial fibrillation with RVR 8/24/2020    Cataract     Essential hypertension 8/31/2020    Glaucoma     Heart valve problem     Hyperlipidemia     Severe mitral regurgitation 8/24/2020       Past Surgical History:   Procedure Laterality Date    ANKLE SURGERY      BAIN MAZE PROCEDURE N/A 9/9/2020    Procedure: BAIN MAZE PROCEDURE;  Surgeon: Antoni Waddell MD;  Location: [facility] OR [address] [place] [address];  Service: Cardiothoracic;  Laterality: N/A;  MAZE     LEFT HEART CATHETERIZATION Left 8/25/2020    Procedure: Left heart cath, radial;  Surgeon: Marlee Carrillo MD;  Location: [facility] CATH LAB;  Service: Cardiology;  Laterality: Left;    lipoma removal      TRICUSPID VALVULOPLASTY N/A 9/9/2020    Procedure: REPAIR, TRICUSPID VALVE;  Surgeon: Antoni Waddell MD;  Location: [address];  Service: Cardiothoracic;  Laterality: N/A;       Time Tracking:     OT Date of Treatment: 09/16/20  OT Start Time: 1334  OT Stop Time: 1357  OT Total Time (min): 23 min    Billable Minutes:Evaluation 10 minutes  Self Care/Home Management 8 minutes    Tete Grady OT  9/16/2020

## 2020-09-16 NOTE — PT/OT/SLP EVAL
Speech Language Pathology Evaluation  Bedside Swallow    Patient Name:  Fatou Lorenzo   MRN:  [id number]  Admitting Diagnosis: Acute respiratory failure with hypoxia    Recommendations:                 General Recommendations:  Dysphagia therapy  Diet recommendations:  NPO, NPO   Aspiration Precautions: Ice chips sparingly, Meds crushed in puree and Strict aspiration precautions   General Precautions: Standard, fall  Communication strategies:  go to room if call light pushed    History:     Past Medical History:   Diagnosis Date    Atrial fibrillation with RVR 8/24/2020    Cataract     Essential hypertension 8/31/2020    Glaucoma     Heart valve problem     Hyperlipidemia     Severe mitral regurgitation 8/24/2020       Past Surgical History:   Procedure Laterality Date    ANKLE SURGERY      BAIN MAZE PROCEDURE N/A 9/9/2020    Procedure: BAIN MAZE PROCEDURE;  Surgeon: Antoni Waddell MD;  Location: [facility] OR [address] [place] [address];  Service: Cardiothoracic;  Laterality: N/A;  MAZE     LEFT HEART CATHETERIZATION Left 8/25/2020    Procedure: Left heart cath, radial;  Surgeon: Marlee Carrillo MD;  Location: [facility] CATH LAB;  Service: Cardiology;  Laterality: Left;    lipoma removal      TRICUSPID VALVULOPLASTY N/A 9/9/2020    Procedure: REPAIR, TRICUSPID VALVE;  Surgeon: Antoni Waddell MD;  Location: [address];  Service: Cardiothoracic;  Laterality: N/A;     Prior Intubation HX:  9/9-9/15    Subjective     Pt awake and alert upon SLP entry, though mild lethargy present. Throat cleaning/coughing appreciated while SLP at nurse's station; no PO present. RN also reports with frequent coughing/throat clearing.    Objective:     Oral Musculature Evaluation  · Oral Musculature: WFL  · Dentition: present and adequate  · Secretion Management: adequate  · Mucosal Quality: adequate  · Mandibular Strength and Mobility: WFL  · Oral Labial Strength and Mobility: WFL  · Lingual Strength and Mobility: WFL  · Velar Elevation:  WFL  · Buccal Strength and Mobility: WFL  · Volitional Cough: present  · Volitional Swallow: WFL  · Voice Prior to PO Intake: hoarse; dysphonic    Bedside Swallow Eval:   Consistencies Assessed:  · Ice chip via tsp x1  · Thin liquids via cup x1  · Puree via tsp x2     Oral Phase:   · Mild anterior loss with thin liquids given lethargy    Pharyngeal Phase:   · Persistent coughing t/o session, however mild increase following trial with thin liquids  · Double swallows with puree  · No fluctuations in SP O2, no changes in vocal quality    Compensatory Strategies  · None    Treatment: Pt with persistent coughing t/o session and immediately following trial with thin liquids. Frequent coughing/throat clearing influencing full determination of swallow function/safety. ST rec NPO with ice chips sparingly. Medications crushed in puree at this time. Skilled education provided regarding diet recommendations, swallow precautions, s/s aspiration, and ongoing ST plan of care. Pt verbalized understanding and agreement. ST to continue to monitor.    Assessment:     Fatou Lorenzo is a 75 y.o. female with an SLP diagnosis of Dysphagia and Dysphonia.      Goals:   Multidisciplinary Problems     SLP Goals        Problem: SLP Goal    Goal Priority Disciplines Outcome   SLP Goal     SLP Ongoing, Progressing   Description: Speech Language Pathology Goals  Goals expected to be met 9/23  1. Pt will participate in ongoing swallow assessment to determine safest and least restrictive diet.                 Plan:     · Patient to be seen:  4 x/week   · Plan of Care expires:  10/16/20  · Plan of Care reviewed with:  patient   · SLP Follow-Up:  Yes       Discharge recommendations:  home health speech therapy   Barriers to Discharge:  None    Time Tracking:     SLP Treatment Date:   09/16/20  Speech Start Time:  0838  Speech Stop Time:  0848     Speech Total Time (min):  10 min    Billable Minutes: Eval Swallow and Oral Function 10    Zulay Colon  [facility]  09/16/2020

## 2020-09-16 NOTE — PLAN OF CARE
"Dx: Acute respiratory failure with hypoxia     Shift Events: MD notified of patient in Afib with HR 130s sustaining.  Amio gtt rate increased and amio bolus given.  Patients HR now 110-120s Afib.  Pain medication administered PRN.  Monitoring aPTTs and using Heparin Nomogram for Heparin rate changes.  Plan for next apTT check @ 0900.  Patient NPO, waiting for speech eval. Speech consulted.      Neuro: AAO x4, Follows Commands and Moves All Extremities     Respiratory: Nasal Cannula    Vital Signs: BP (!) 107/52   Pulse (!) 119   Temp 98.1 °F (36.7 °C) (Oral)   Resp 17   Ht 5' 5" (1.651 m)   Wt 86.6 kg (190 lb 14.7 oz)   SpO2 99%   Breastfeeding No   BMI 31.77 kg/m²     Diet: NPO    Gtts: Dobutamine, Amiodarone, Lasix and Heparin    Urine Output: Urinary Catheter 100-150 cc/hour    Labs/Accuchecks: Labs checked as ordered, replacements administered as ordered. Accuchecks Q6hrs, no correction needed.    Skin: No new skin break down noted. Foams on heels and sacrum, boots on heels.  Waffle on bed, bed plugged in and working.               "

## 2020-09-16 NOTE — ASSESSMENT & PLAN NOTE
Fatou Lorenzo is a 75 y.o. female s/p MVr, TVr 9/9/2020. Case noteworthy for multiple pump runs (3) and RV hematoma due to retraction.     Neuro:  - Cleared for PO by speech  - Started on scheduled tylenol and PRN oxy, dilaudid      CV:  S/p MVr, TVr 9/9/20. Case noteworthy for pump run x3, RV hematoma 2/2 retraction  - Remains in poorly controlled RVR.   - Start metop 12.5 BID. May need to increase if enters into RVR   - may increase to 25 tonight for better rate control  - Continue dobutamine 5  - MAP goal 60-80, SBP <140  - Pacer wires in place, set to back up rate 45     Pulm:  - Respiratory function continues to improve  - Currently on 2L NC, wean as tolerated  - OOB/IS     FEN/GI:  - Diuresing well and is net negative nearly 2L over the last 24 hours   - Restart scheduled potassium  - Replace other lytes as needed  - Na stable 143 -> 144  - Cleared for ice chips sparingly and meds crushed in puree  - ppx: famotidine, miralax    Renal:  - Yoo in place for strict I/O  - Continue with aggressive diuresis   - lasix gtt 20   - holding diuril today  - BUN/Cr improving 49/1.9 -> 44/1.5     Heme/Onc:  - H/H stable  - Heparin gtt for persistent a fib  - Start bridge to warfarin     ID:  - Afebrile, WBC WNL  - periop antibiotics complete     Endo:  - no hx of DM  - ISS    PPx:  - famotidine, heparin gtt bridge to warfarin, SCDs     Dispo: continue ICU care

## 2020-09-17 NOTE — PLAN OF CARE
Problem: SLP Goal  Goal: SLP Goal  Description: Speech Language Pathology Goals  Goals expected to be met 9/23  1. Pt will participate in ongoing swallow assessment to determine safest and least restrictive diet.  Outcome: Ongoing, Progressing     Goals remain appropriate.   Emily P. Abadie M.S., CCC-SLP  Speech Language Pathologist  [phone]  09/17/2020  [id number]

## 2020-09-17 NOTE — PLAN OF CARE
Problem: Occupational Therapy Goal  Goal: Occupational Therapy Goal  Description: Goals to be met by: 9/30/2020      Patient will increase functional independence with ADLs by performing:    UE Dressing with Minimum Assistance.  Grooming while sitting EOB with Contact Guard Assistance.  Toileting from bedside commode with Max Assistance for hygiene and clothing management.   Toilet transfer to bedside commode with Max Assistance.  Supine <> Sit with minimum assistance in preparation for EOB/OOB functional activities.       Outcome: Ongoing, Progressing

## 2020-09-17 NOTE — PLAN OF CARE
IZZY sent referral to OSNF. IZZY will meet with patient/family to obtain additional SNF choices.     IZZY will continue to coordinate with patient, family, team and insurance to complete patient's discharge plan.       09/17/20 0955   Post-Acute Status   Post-Acute Authorization Placement   Post-Acute Placement Status Referrals Sent   Discharge Plan   Discharge Plan A Skilled Nursing Facility     Esthela Peralta LMSW   - Case Management

## 2020-09-17 NOTE — PLAN OF CARE
Pt free from falls and injury this shift. All VSS at this time. Pt pain controlled with scheduled medication. Pt remains mildly drowsy, orientedx4. Sats >95% on 2L NC. MAPs maintained 60-80 with no issues, pt only on continuous heparin @ 1100units/hr. Abdomen rounded/nondistended/soft, 3   small liquid BM's. Chest tube/pacer wire sites clean/intact, dsg changed. Yoo intact, uop marginal. Team aware. Pt up to chair this afternoon with maximal assistance. Back in a-fib this afternoon, 1 push IV metoprolol without success, decrease in SBP. Pacer set to 80 by MD. No other significant events this shift. Plan of care reviewed with pt and sons, all questions answered. Will continue to monitor closely.    Skin Note: No new skin breakdown noted to pt skin this shift. Sacral and heel foams in place. Turned q2h when in bed.

## 2020-09-17 NOTE — PROGRESS NOTES
[facility]  Critical Care - Surgery  Progress Note    Patient Name: Fatou Lorenzo  MRN: [id number]  Admission Date: 8/30/2020  Hospital Length of Stay: 17 days  Code Status: Full Code  Attending Provider: Antoni Waddell MD  Primary Care Provider: Ludin Rivas MD   Principal Problem: Acute respiratory failure with hypoxia    Subjective:     Hospital/ICU Course:  No notes on file    Interval History/Significant Events: Patient seen and examined this am. Pt converted out of a fib overnight; HR 60s currently. Alert, oriented this am. Off dobutamine, amiodarone. No new complaints at this time.     Follow-up For: Procedure(s) (LRB):  Valvuloplasty, Mitral (N/A)  REPAIR, TRICUSPID VALVE (N/A)  BAIN MAZE PROCEDURE (N/A)    Post-Operative Day: 8 Days Post-Op    Objective:     Vital Signs (Most Recent):  Temp: 99 °F (37.2 °C) (09/17/20 1100)  Pulse: 63 (09/17/20 1200)  Resp: 16 (09/17/20 1200)  BP: (!) 101/53 (09/17/20 1200)  SpO2: 100 % (09/17/20 1200) Vital Signs (24h Range):  Temp:  [97.9 °F (36.6 °C)-99 °F (37.2 °C)] 99 °F (37.2 °C)  Pulse:  [] 63  Resp:  [16-38] 16  SpO2:  [88 %-100 %] 100 %  BP: ()/(36-71) 101/53     Weight: 87 kg (191 lb 12.8 oz)  Body mass index is 31.92 kg/m².      Intake/Output Summary (Last 24 hours) at 9/17/2020 1218  Last data filed at 9/17/2020 1200  Gross per 24 hour   Intake 1499.27 ml   Output 3300 ml   Net -1800.73 ml       Physical Exam  Vitals signs and nursing note reviewed.   Constitutional:       General: She is not in acute distress.     Appearance: She is well-developed. She is not toxic-appearing.   HENT:      Head: Normocephalic and atraumatic.   Eyes:      General: No scleral icterus.        Right eye: No discharge.         Left eye: No discharge.   Neck:      Vascular: No JVD.      Comments: RIJ in place  Cardiovascular:      Rate and Rhythm: Normal rate and regular rhythm.   Pulmonary:      Comments: Pacer wires in place  Dressing  c/d/i  Abdominal:      General: There is no distension.      Palpations: Abdomen is soft.   Genitourinary:     Comments: Yoo in place  Skin:     General: Skin is warm and dry.   Neurological:      General: No focal deficit present.      Cranial Nerves: No cranial nerve deficit.      Comments: More appropriate than past several days, following commands         Vents:  Vent Mode: Spont (09/15/20 0724)  Ventilator Initiated: Yes (09/09/20 2301)  Set Rate: 12 BPM (09/14/20 1145)  Vt Set: 410 mL (09/14/20 1145)  Pressure Support: 5 cmH20 (09/15/20 0113)  PEEP/CPAP: 5 cmH20 (09/15/20 0724)  Oxygen Concentration (%): 28 (09/16/20 0420)  Peak Airway Pressure: 9 cmH2O (09/15/20 0724)  Plateau Pressure: 0 cmH20 (09/15/20 0724)  Total Ve: 6.08 mL (09/15/20 0724)  Negative Inspiratory Force (cm H2O): -27 (09/15/20 0926)  F/VT Ratio<105 (RSBI): (!) 46.84 (09/15/20 0724)    Lines/Drains/Airways     Central Venous Catheter Line             Introducer with Double Lumen 09/09/20 right internal jugular 8 days          Drain                 Urethral Catheter 09/09/20 1512 Non-latex;Straight-tip;Temperature probe 14 Fr. 7 days          Line                 Pacer Wires 09/09/20 2057 7 days          Peripheral Intravenous Line                 Peripheral IV - Single Lumen 09/14/20 0051 20 G Left;Posterior Forearm 3 days         Peripheral IV - Single Lumen 09/16/20 1809 18 G Anterior;Distal;Left Wrist less than 1 day                Significant Labs:    CBC/Anemia Profile:  Recent Labs   Lab 09/16/20  0320 09/17/20  0330   WBC 7.80 7.62   HGB 7.6* 7.8*   HCT 23.6* 24.4*   PLT 95* 164   MCV 97 95   RDW 15.9* 16.1*        Chemistries:  Recent Labs   Lab 09/16/20  0320  09/16/20  1623 09/16/20  2236 09/17/20  0330     --   --   --  143   K 3.3*   < > 3.3* 5.1 3.1*     --   --   --  96   CO2 28  --   --   --  35*   BUN 44*  --   --   --  41*   CREATININE 1.5*  --   --   --  1.5*   CALCIUM 7.8*  --   --   --  8.4*   ALBUMIN 2.5*   --   --   --  2.8*   PROT 5.2*  --   --   --  6.0   BILITOT 2.9*  --   --   --  2.5*   ALKPHOS 168*  --   --   --  215*   ALT 21  --   --   --  30   AST 42*  --   --   --  54*   MG 2.5  --   --   --  3.2*   PHOS 4.2  --   --   --  4.3    < > = values in this interval not displayed.       All pertinent labs within the past 24 hours have been reviewed.    Significant Imaging:  I have reviewed all pertinent imaging results/findings within the past 24 hours.    Assessment/Plan:     Severe mitral regurgitation  Fatou Lorenzo is a 75 y.o. female s/p MVr, TVr 9/9/2020. Case noteworthy for multiple pump runs (3) and RV hematoma due to retraction.     Neuro:  - Cleared for PO by speech  - Started on scheduled tylenol and PRN oxy, dilaudid  - patient more alert, oriented, and appropriate today as compared to prior exams      CV:  S/p MVr, TVr 9/9/20. Case noteworthy for pump run x3, RV hematoma 2/2 retraction  - Patient converted to sinus rhythm overnight; maintains HR 60s  - Start metop 25 BID  - D/C dobutamine 5  - MAP goal 60-80, SBP <140  - Pacer wires in place, set to back up rate 50     Pulm:  - Respiratory function continues to improve  - Currently on 2L NC, wean as tolerated  - OOB/IS     FEN/GI:  - Patient continues to respond to diuresis appropriately; continue lasix 40mg BID  - UOP nearly 2L over last 24hrs  - Restart scheduled potassium  - Replace other lytes as needed  - Na stable, 143 today  - mechanical soft diet   - ppx: famotidine, miralax    Renal:  - Yoo in place for strict I/O  - Continue with aggressive diuresis   - lasix 40mg BID   - continue holding diuril   - BUN/Cr improving 49/1.9 -> 44/1.5 --> 41/1.5     Heme/Onc:  - H/H stable  - Heparin gtt for persistent a fib  - Start bridge to warfarin     ID:  - Afebrile, WBC WNL  - periop antibiotics complete     Endo:  - no hx of DM  - ISS    PPx:  - famotidine, heparin gtt bridge to warfarin, SCDs     Dispo: continue ICU care; consider transfer to floor  this afternoon if pt remains stable          Critical care was time spent personally by me on the following activities: development of treatment plan with patient or surrogate and bedside caregivers, discussions with consultants, evaluation of patient's response to treatment, examination of patient, ordering and performing treatments and interventions, ordering and review of laboratory studies, ordering and review of radiographic studies, pulse oximetry, re-evaluation of patient's condition.  This critical care time did not overlap with that of any other provider or involve time for any procedures.     Dony Limon MD  Critical Care - Surgery  [facility]

## 2020-09-17 NOTE — PLAN OF CARE
Problem: Physical Therapy Goal  Goal: Physical Therapy Goal  Description: Goals to be met by: 2020     Patient will increase functional independence with mobility by performin. Supine to sit with MInimal Assistance  2. Sit to stand transfer with Moderate Assistance  3. Bed to chair transfer with Moderate Assistance using LRAD  4. Sitting at edge of bed x10 minutes with Stand-by Assistance  5. Lower extremity exercise program x10 reps per handout, with assistance as needed          Outcome: Ongoing, Progressing     Pt is progressing toward goals. All goals remain appropriate.    Janine Soto, PT, DPT  2020  [phone]

## 2020-09-17 NOTE — ASSESSMENT & PLAN NOTE
Fatou Lorenzo is a 75 y.o. female s/p MVr, TVr 9/9/2020. Case noteworthy for multiple pump runs (3) and RV hematoma due to retraction.     Neuro:  - Cleared for PO by speech  - Started on scheduled tylenol and PRN oxy, dilaudid  - patient more alert, oriented, and appropriate today as compared to prior exams      CV:  S/p MVr, TVr 9/9/20. Case noteworthy for pump run x3, RV hematoma 2/2 retraction  - Patient converted to sinus rhythm overnight; maintains HR 60s  - Start metop 25 BID  - D/C dobutamine 5  - MAP goal 60-80, SBP <140  - Pacer wires in place, set to back up rate 50     Pulm:  - Respiratory function continues to improve  - Currently on 2L NC, wean as tolerated  - OOB/IS     FEN/GI:  - Patient continues to respond to diuresis appropriately; continue lasix 40mg BID  - UOP nearly 2L over last 24hrs  - Restart scheduled potassium  - Replace other lytes as needed  - Na stable, 143 today  - mechanical soft diet   - ppx: famotidine, miralax    Renal:  - Yoo in place for strict I/O  - Continue with aggressive diuresis   - lasix 40mg BID   - continue holding diuril   - BUN/Cr improving 49/1.9 -> 44/1.5 --> 41/1.5     Heme/Onc:  - H/H stable  - Heparin gtt for persistent a fib  - Start bridge to warfarin     ID:  - Afebrile, WBC WNL  - periop antibiotics complete     Endo:  - no hx of DM  - ISS    PPx:  - famotidine, heparin gtt bridge to warfarin, SCDs     Dispo: continue ICU care; consider transfer to floor this afternoon if pt remains stable

## 2020-09-17 NOTE — PLAN OF CARE
09/17/20 1001   Discharge Reassessment   Assessment Type Discharge Planning Reassessment   Provided patient/caregiver education on the expected discharge date and the discharge plan Yes   Do you have any problems affording any of your prescribed medications? No   Discharge Plan A Skilled Nursing Facility   Discharge Plan B Home Health   DME Needed Upon Discharge  other (see comments)  (TBD)       Mel Simons MPH, RN, CM  Ext. [phone]

## 2020-09-17 NOTE — PROGRESS NOTES
CT surgery progress note    Converted into normal sinus; d.c dobutamine     A/P  75F s/p tricuspid valve annuloplasty and mitral valve repair and MAZE procedure on 9/9     Progressing well  Advance diet, speech clearance  Monitor R pleural effusion  Continue metoprolol  Continue anticoagulation, heparin and coumadin and aspirin    Isaiah Zacarias MD  Cardiothoracic Surgery Fellow

## 2020-09-17 NOTE — PLAN OF CARE
Pt aaox4 throughout shift; intermittent drowsiness observed. Pain moderately controlled with tylenol. Afebrile. HR paced with a back up rate of 60; see previous note. MAP maintained 60-80. O2 sat>95% on 2 LNC.  CVp 13,14. Current gtt: heparin, dobutamine, lasix, amiodarone. Small BMx1. Lasix gtt titrated to -200cc/hr. Pt on bed with waffle mattress inflated. Pt turned q2 and encouraged to shift weight as able. Heels floated on green boots. No sacral or heel breakdown noted. Elbows offloaded on pillows. All pt questions encouraged and answered.

## 2020-09-17 NOTE — PT/OT/SLP PROGRESS
Physical Therapy Treatment    Patient Name:  Fatou Lorenzo   MRN:  [id number]    *co-treatment with OT   Recommendations:     Discharge Recommendations:  nursing facility, skilled   Discharge Equipment Recommendations: (TBD)   Barriers to discharge: Decreased caregiver support    Assessment:     Fatou Lorenzo is a 75 y.o. female admitted with a medical diagnosis of Acute respiratory failure with hypoxia.  She presents with the following impairments/functional limitations:  weakness, impaired endurance, impaired self care skills, impaired functional mobilty, gait instability, impaired balance, impaired cardiopulmonary response to activity. Pt tolerated activity with improved sitting balance. Pt able to progress to standing this session, however, demo'd significant hypotension upon standing prompting return to supine. Pt with improved sitting balance this session. Upon discharge, pt would benefit from continued skilled therapy intervention at [facility] nursing facility to progress toward more independent mobility. At this time, pt would continue to benefit from acute skilled therapy intervention to address deficits and progress toward prior level of function.       Rehab Prognosis: Good; patient would benefit from acute skilled PT services to address these deficits and reach maximum level of function.    Recent Surgery: Procedure(s) (LRB):  Valvuloplasty, Mitral (N/A)  REPAIR, TRICUSPID VALVE (N/A)  BAIN MAZE PROCEDURE (N/A) 8 Days Post-Op    Plan:     During this hospitalization, patient to be seen 4 x/week to address the identified rehab impairments via gait training, therapeutic activities, therapeutic exercises, neuromuscular re-education and progress toward the following goals:    · Plan of Care Expires:  10/16/20    Subjective     Chief Complaint: Pt c/o thirst, c/o dizziness following standing   Patient/Family Comments/goals: to get better and return home   Pain/Comfort:  · Pain Rating 1: 0/10  · Pain Rating  Post-Intervention 1: 0/10      Objective:     Communicated with RN prior to session.  Patient found HOB elevated with blood pressure cuff, central line, oxygen, vivas catheter, pulse ox (continuous), telemetry upon PT entry to room.     General Precautions: Standard, fall, sternal   Orthopedic Precautions:N/A   Braces: N/A     Functional Mobility:  · Bed Mobility:     · Supine to Sit: maximal assistance of 2 persons  · Sit to Supine: maximal assistance  · Transfers:     · Sit to Stand:  moderate assistance with no AD with facilitation at posterior pelvis to promote extension and upright posture. Pt then had BM while in standing, maintained standing for ~20 seconds for cleaning. Following return to sitting EOB, pt demo'd increased instability, BP taken and read 76/40 mmHg. Pt quickly returned to supine with improved symptoms and BP. RN present to monitor.       AM-PAC 6 CLICK MOBILITY  Turning over in bed (including adjusting bedclothes, sheets and blankets)?: 2  Sitting down on and standing up from a chair with arms (e.g., wheelchair, bedside commode, etc.): 2  Moving from lying on back to sitting on the side of the bed?: 2  Moving to and from a bed to a chair (including a wheelchair)?: 1  Need to walk in hospital room?: 1  Climbing 3-5 steps with a railing?: 1  Basic Mobility Total Score: 9       Therapeutic Activities and Exercises:   Pt sat EOB for ~10 mins with contact guard assistance for static sitting balance. Cues provided to encourage pt to maintain upright posture.   Pt educated on role of PT/POC. Pt verbalized understanding.   Pt encouraged to only perform OOB mobility with assistance from nursing/therapy. Pt agreeable.   Pt encouraged to continue to perform bed level exercise.     Patient left HOB elevated with all lines intact, call button in reach and RN notified..    GOALS:   Multidisciplinary Problems     Physical Therapy Goals        Problem: Physical Therapy Goal    Goal Priority Disciplines  Outcome Goal Variances Interventions   Physical Therapy Goal     PT, PT/OT Ongoing, Progressing     Description: Goals to be met by: 2020     Patient will increase functional independence with mobility by performin. Supine to sit with MInimal Assistance  2. Sit to stand transfer with Moderate Assistance  3. Bed to chair transfer with Moderate Assistance using LRAD  4. Sitting at edge of bed x10 minutes with Stand-by Assistance  5. Lower extremity exercise program x10 reps per handout, with assistance as needed                           Time Tracking:     PT Received On: 20  PT Start Time: 949     PT Stop Time: 9  PT Total Time (min): 30 min     Billable Minutes: Therapeutic Exercise 30 mins     Treatment Type: Treatment  PT/PTA: PT     PTA Visit Number: 0     Janine Soto, PT  2020

## 2020-09-17 NOTE — PT/OT/SLP PROGRESS
Speech Language Pathology Treatment    Patient Name:  Fatou Lorenzo   MRN:  [id number]  Admitting Diagnosis: Acute respiratory failure with hypoxia    Recommendations:                 General Recommendations:  Dysphagia therapy  Diet recommendations:  Mechanical soft, Liquid Diet Level: Nectar Thick   Aspiration Precautions: Assistance with meals and Standard aspiration precautions   General Precautions: Standard, fall  Communication strategies:  none    Subjective     Patient awake, lethargic.   Objective:     Has the patient been evaluated by SLP for swallowing?   Yes  Keep patient NPO? No   Current Respiratory Status: nasal cannula      Patient seen for follow up diet assessment.  Patient tolerated thin liquids via tsp and cup sips with throat clears consistently and cough x1.  Nectar thickened liquid via cup and straw sips over 4oz revealed no signs of airway compromise: no coughing/choking, no spo2 fluctuations, and no changes in vocal quality.  Prolonged mastication revealed with regular solids x1. Patient appears appropriate for diet advancement to nectar thickened liquids and mechancial soft consistencies. Patient remains dysphonic. Skilled education was provided to patient re: diet recs, standard aspiration precautions of which to follow, and ongoing ST plan of care.    Assessment:     Fatou Lorenzo is a 75 y.o. female with an SLP diagnosis of Dysphagia.      Goals:   Multidisciplinary Problems     SLP Goals        Problem: SLP Goal    Goal Priority Disciplines Outcome   SLP Goal     SLP Ongoing, Progressing   Description: Speech Language Pathology Goals  Goals expected to be met 9/23  1. Pt will participate in ongoing swallow assessment to determine safest and least restrictive diet.                   Plan:     · Patient to be seen:  4 x/week   · Plan of Care expires:  10/16/20  · Plan of Care reviewed with:  patient   · SLP Follow-Up:  Yes       Discharge recommendations:  nursing facility, skilled    Barriers to Discharge:  None    Time Tracking:     SLP Treatment Date:   09/17/20  Speech Start Time:  0855  Speech Stop Time:  0910     Speech Total Time (min):  15 min    Billable Minutes: Treatment Swallowing Dysfunction 7 and Seld Care/Home Management Training 8    Emily Abadie, CCC-SLP  09/17/2020

## 2020-09-17 NOTE — SUBJECTIVE & OBJECTIVE
Interval History/Significant Events: Patient seen and examined this am. Pt converted out of a fib overnight; HR 60s currently. Alert, oriented this am. Off dobutamine, amiodarone. No new complaints at this time.     Follow-up For: Procedure(s) (LRB):  Valvuloplasty, Mitral (N/A)  REPAIR, TRICUSPID VALVE (N/A)  BAIN MAZE PROCEDURE (N/A)    Post-Operative Day: 8 Days Post-Op    Objective:     Vital Signs (Most Recent):  Temp: 99 °F (37.2 °C) (09/17/20 1100)  Pulse: 63 (09/17/20 1200)  Resp: 16 (09/17/20 1200)  BP: (!) 101/53 (09/17/20 1200)  SpO2: 100 % (09/17/20 1200) Vital Signs (24h Range):  Temp:  [97.9 °F (36.6 °C)-99 °F (37.2 °C)] 99 °F (37.2 °C)  Pulse:  [] 63  Resp:  [16-38] 16  SpO2:  [88 %-100 %] 100 %  BP: ()/(36-71) 101/53     Weight: 87 kg (191 lb 12.8 oz)  Body mass index is 31.92 kg/m².      Intake/Output Summary (Last 24 hours) at 9/17/2020 1218  Last data filed at 9/17/2020 1200  Gross per 24 hour   Intake 1499.27 ml   Output 3300 ml   Net -1800.73 ml       Physical Exam  Vitals signs and nursing note reviewed.   Constitutional:       General: She is not in acute distress.     Appearance: She is well-developed. She is not toxic-appearing.   HENT:      Head: Normocephalic and atraumatic.   Eyes:      General: No scleral icterus.        Right eye: No discharge.         Left eye: No discharge.   Neck:      Vascular: No JVD.      Comments: RIJ in place  Cardiovascular:      Rate and Rhythm: Normal rate and regular rhythm.   Pulmonary:      Comments: Pacer wires in place  Dressing c/d/i  Abdominal:      General: There is no distension.      Palpations: Abdomen is soft.   Genitourinary:     Comments: Yoo in place  Skin:     General: Skin is warm and dry.   Neurological:      General: No focal deficit present.      Cranial Nerves: No cranial nerve deficit.      Comments: More appropriate than past several days, following commands         Vents:  Vent Mode: Spont (09/15/20 9484)  Ventilator  Initiated: Yes (09/09/20 2301)  Set Rate: 12 BPM (09/14/20 1145)  Vt Set: 410 mL (09/14/20 1145)  Pressure Support: 5 cmH20 (09/15/20 0113)  PEEP/CPAP: 5 cmH20 (09/15/20 0724)  Oxygen Concentration (%): 28 (09/16/20 0420)  Peak Airway Pressure: 9 cmH2O (09/15/20 0724)  Plateau Pressure: 0 cmH20 (09/15/20 0724)  Total Ve: 6.08 mL (09/15/20 0724)  Negative Inspiratory Force (cm H2O): -27 (09/15/20 0926)  F/VT Ratio<105 (RSBI): (!) 46.84 (09/15/20 0724)    Lines/Drains/Airways     Central Venous Catheter Line             Introducer with Double Lumen 09/09/20 right internal jugular 8 days          Drain                 Urethral Catheter 09/09/20 1512 Non-latex;Straight-tip;Temperature probe 14 Fr. 7 days          Line                 Pacer Wires 09/09/20 2057 7 days          Peripheral Intravenous Line                 Peripheral IV - Single Lumen 09/14/20 0051 20 G Left;Posterior Forearm 3 days         Peripheral IV - Single Lumen 09/16/20 1809 18 G Anterior;Distal;Left Wrist less than 1 day                Significant Labs:    CBC/Anemia Profile:  Recent Labs   Lab 09/16/20 0320 09/17/20  0330   WBC 7.80 7.62   HGB 7.6* 7.8*   HCT 23.6* 24.4*   PLT 95* 164   MCV 97 95   RDW 15.9* 16.1*        Chemistries:  Recent Labs   Lab 09/16/20 0320 09/16/20  1623 09/16/20  2236 09/17/20  0330     --   --   --  143   K 3.3*   < > 3.3* 5.1 3.1*     --   --   --  96   CO2 28  --   --   --  35*   BUN 44*  --   --   --  41*   CREATININE 1.5*  --   --   --  1.5*   CALCIUM 7.8*  --   --   --  8.4*   ALBUMIN 2.5*  --   --   --  2.8*   PROT 5.2*  --   --   --  6.0   BILITOT 2.9*  --   --   --  2.5*   ALKPHOS 168*  --   --   --  215*   ALT 21  --   --   --  30   AST 42*  --   --   --  54*   MG 2.5  --   --   --  3.2*   PHOS 4.2  --   --   --  4.3    < > = values in this interval not displayed.       All pertinent labs within the past 24 hours have been reviewed.    Significant Imaging:  I have reviewed all pertinent imaging  results/findings within the past 24 hours.

## 2020-09-17 NOTE — NURSING
Pt noted to be bradycardic paced in the 50s with back up pacer rate of 50. Dr Hernandez notified. BP stable at this time and pt asymptomatic. MD ordered to increase pacer back up to 60. Will carry out order.

## 2020-09-17 NOTE — PT/OT/SLP PROGRESS
"Occupational Therapy   Treatment    Name: Fatou Lorenzo  MRN: [id number]  Admitting Diagnosis:  Acute respiratory failure with hypoxia  8 Days Post-Op    Recommendations:     Discharge Recommendations: nursing facility, skilled    Assessment:     Fatou Lorenzo is a 75 y.o. female with a medical diagnosis of Acute respiratory failure with hypoxia.   Performance deficits affecting function are weakness, impaired endurance, impaired self care skills, impaired functional mobilty, impaired balance, decreased lower extremity function, decreased safety awareness, decreased upper extremity function, gait instability.   Pt tolerated session well with good effort and performance. However, pt limited by decreased BP with stand. Pt to benefit from cont OT and anticipate pt will need post acute therapy prior to return home.     Rehab Prognosis:  Good; patient would benefit from acute skilled OT services to address these deficits and reach maximum level of function.       Plan:     Patient to be seen 4 x/week to address the above listed problems via self-care/home management, therapeutic activities  · Plan of Care Expires: 10/16/20  · Plan of Care Reviewed with: patient    Subjective     "What has happened to me?" pt asks at end of session.   Pain/Comfort:  · Pain Rating 1: 0/10    Objective:     Communicated with: nsg prior to session.  Pt found supine in bed    General Precautions: Standard, fall, sternal       Occupational Performance:     Bed Mobility:    Supine<>sit with MAX A x 2  Rolling Right<>left with MAX A     Functional Mobility/Transfers:  · Sit>stand with MOD A     Activities of Daily Living:  · G/H: pt able to comb hair with MOD A   · UE dressing: TOTAL A   · LE dressing: TOTAL A   · TOileting: TOTAL A with BM upon stand       AMPAC 6 Click ADL: 6    Treatment & Education:  Pt unable to identify any sternal precautions. Education provided re: sternal precautions and implications on functional activity. Pt verb fair " understanding and to benefit from further education.   Pt tolerated sitting EOb approx 10 min with Fair sitting balance. Occasional cues needed for full upright position  Pt participate with basic g/h skills seated EOB. Pt completed one stand, but had BM in stand with significant drop in BP to  76/40. Nsg present in room and pt returned supine with BP returning to 99/55.     TOTAL A provided for hygiene and linen change.  Education provided re: OT POC and safety with functional mobility/ADL skills.           Patient left with bed in chair position with all lines intact, call button in reach and nsg notifiedEducation:      GOALS:   Multidisciplinary Problems     Occupational Therapy Goals        Problem: Occupational Therapy Goal    Goal Priority Disciplines Outcome Interventions   Occupational Therapy Goal     OT, PT/OT Ongoing, Progressing    Description: Goals to be met by: 9/30/2020      Patient will increase functional independence with ADLs by performing:    UE Dressing with Minimum Assistance.  Grooming while sitting EOB with Contact Guard Assistance.  Toileting from bedside commode with Max Assistance for hygiene and clothing management.   Toilet transfer to bedside commode with Max Assistance.  Supine <> Sit with minimum assistance in preparation for EOB/OOB functional activities.                        Time Tracking:     OT Date of Treatment: 09/17/20  OT Start Time: 0951  OT Stop Time: 1019  OT Total Time (min): 28 min    Billable Minutes:Self Care/Home Management 15  Therapeutic Activity 13    JANETT Damon  9/17/2020

## 2020-09-18 PROBLEM — D72.829 LEUKOCYTOSIS: Status: ACTIVE | Noted: 2020-01-01

## 2020-09-18 PROBLEM — N17.9 AKI (ACUTE KIDNEY INJURY): Status: ACTIVE | Noted: 2020-01-01

## 2020-09-18 PROBLEM — I48.91 ATRIAL FIBRILLATION WITH RVR: Status: ACTIVE | Noted: 2020-01-01

## 2020-09-18 PROBLEM — R34 OLIGURIA AND ANURIA: Status: ACTIVE | Noted: 2020-01-01

## 2020-09-18 NOTE — PT/OT/SLP PROGRESS
"Speech Language Pathology Treatment    Patient Name:  Fatou Lorenzo   MRN:  [id number]  Admitting Diagnosis: Acute respiratory failure with hypoxia    Recommendations:                 General Recommendations:  Dysphagia therapy  Diet recommendations:  Mechanical soft, Liquid Diet Level: Nectar Thick   Aspiration Precautions: Assistance with meals and Standard aspiration precautions   General Precautions: Standard, fall  Communication strategies:  none    Subjective     Pt alert and found seated upright in cardiac chair upon SLP entry. Lethargy appreciated t/o session.     Objective:     Has the patient been evaluated by SLP for swallowing?   Yes  Keep patient NPO? No   Current Respiratory Status: nasal cannula      Pt seen for ongoing dysphagia therapy. Alertness maintained t/o session, however pt with lethargy and decreased engagement. Continued dysphonic, weak and raspy vocal quality observed this service date. Pt reported oral stasis from trial of solid during previous session with inability to clear "all day." However, pt reports tolerance with current diet. Pt demonstrated throat clearing with trials of thin liquids via tsp x1 and cup sip x2. Throat clearing increased with thin liquid via straw x1 followed by immediate cough. Pt tolerated nectar-thickened liquid via cup x2 and puree via tsp x2 with adequate oral transit and no overt signs of airway compromise c/b no throat clear, cough, or change in vocal quality. Pt refused trial with solid. No further trials presented 2/2 pt lethargy. Pt not appropriate for diet advancement at this time. Skilled education provided regarding role of SLP, purpose of PO trials, continued diet recommendations, and ongoing ST plan of care. Pt verbalized understanding and agreement. ST to continue to monitor.    Assessment:     Fatou Lorenzo is a 75 y.o. female with an SLP diagnosis of Dysphagia and Dysphonia.     Goals:   Multidisciplinary Problems     SLP Goals        Problem: SLP " Goal    Goal Priority Disciplines Outcome   SLP Goal     SLP Ongoing, Progressing   Description: Speech Language Pathology Goals  Goals expected to be met 9/23  1. Pt will tolerate diet of mechanical soft solids and nectar-thickened liquids with no overt signs of airway compromise.  2. Pt will participate in ongoing swallow assessment to determine safest and least restrictive diet.                 Plan:     · Patient to be seen:  4 x/week   · Plan of Care expires:  10/16/20  · Plan of Care reviewed with:  patient   · SLP Follow-Up:  Yes       Discharge recommendations:  nursing facility, skilled   Barriers to Discharge:  None    Time Tracking:     SLP Treatment Date:   09/18/20  Speech Start Time:  0850  Speech Stop Time:  0901     Speech Total Time (min):  11 min    Billable Minutes: Treatment Swallowing Dysfunction 11    BURKE Perkins  09/18/2020

## 2020-09-18 NOTE — PROGRESS NOTES
[facility]  Critical Care - Surgery  Progress Note    Patient Name: Fatou Lorenzo  MRN: [id number]  Admission Date: 8/30/2020  Hospital Length of Stay: 18 days  Code Status: Full Code  Attending Provider: Antoni Waddell MD  Primary Care Provider: Ludin Rivas MD   Principal Problem: Acute respiratory failure with hypoxia    Subjective:     Hospital/ICU Course:  No notes on file    Interval History/Significant Events:   - Intermittent episodes of atrial fibrillation w/ RVR overnight  - Heparin drip continued  - Pain well-controlled  - Slightly disoriented to time  - Pressor requirements during the mid-morning due to hypotension from likely pericardial tamponade. Echocardiogram done w/ confirmation of pericardial effusion. Pericardiocentesis likely.    Follow-up For: Procedure(s) (LRB):  Valvuloplasty, Mitral (N/A)  REPAIR, TRICUSPID VALVE (N/A)  BAIN MAZE PROCEDURE (N/A)    Post-Operative Day: 9 Days Post-Op    Objective:     Vital Signs (Most Recent):  Temp: 97.9 °F (36.6 °C) (09/18/20 1500)  Pulse: (!) 133 (09/18/20 1500)  Resp: (!) 24 (09/18/20 1500)  BP: (!) 104/58 (09/18/20 1445)  SpO2: (!) 93 % (09/18/20 1500) Vital Signs (24h Range):  Temp:  [97.6 °F (36.4 °C)-98.1 °F (36.7 °C)] 97.9 °F (36.6 °C)  Pulse:  [] 133  Resp:  [15-34] 24  SpO2:  [89 %-100 %] 93 %  BP: ()/(35-76) 104/58  Arterial Line BP: (72-73)/(47-49) 72/47     Weight: 86.6 kg (191 lb)  Body mass index is 31.78 kg/m².      Intake/Output Summary (Last 24 hours) at 9/18/2020 1556  Last data filed at 9/18/2020 1500  Gross per 24 hour   Intake 865 ml   Output 590 ml   Net 275 ml       Physical Exam  Vitals signs and nursing note reviewed.   Constitutional:       General: She is not in acute distress.     Appearance: Normal appearance. She is well-developed. She is not diaphoretic.   HENT:      Head: Normocephalic and atraumatic.   Eyes:      General: No scleral icterus.  Neck:      Vascular: No JVD.      Comments:  RIJ in place  Cardiovascular:      Rate and Rhythm: Tachycardia present. Rhythm irregular.   Pulmonary:      Effort: No respiratory distress.      Breath sounds: No wheezing.      Comments: Pacer wires in place  Dressing c/d/i  Chest:      Chest wall: Tenderness present.   Abdominal:      General: There is no distension.      Palpations: Abdomen is soft.      Tenderness: There is no abdominal tenderness.   Genitourinary:     Comments: Yoo in place  Skin:     General: Skin is warm and dry.      Coloration: Skin is not jaundiced or pale.   Neurological:      Mental Status: She is alert.      Cranial Nerves: No cranial nerve deficit.      Comments: Following commands   Psychiatric:         Mood and Affect: Mood normal.         Behavior: Behavior normal.           Lines/Drains/Airways     Central Venous Catheter Line             Introducer with Double Lumen 09/09/20 right internal jugular 9 days          Drain                 Urethral Catheter 09/09/20 1512 Non-latex;Straight-tip;Temperature probe 14 Fr. 9 days          Arterial Line            Arterial Line 09/18/20 1345 Left Radial less than 1 day          Line                 Pacer Wires 09/09/20 2057 8 days          Peripheral Intravenous Line                 Peripheral IV - Single Lumen 09/14/20 0051 20 G Left;Posterior Forearm 4 days         Peripheral IV - Single Lumen 09/16/20 1809 18 G Anterior;Distal;Left Wrist 1 day                Significant Labs:    CBC/Anemia Profile:  Recent Labs   Lab 09/17/20  0330 09/18/20  0300 09/18/20  1341 09/18/20  1348   WBC 7.62 12.68 17.78*  --    HGB 7.8* 7.8* 7.2*  --    HCT 24.4* 24.9* 22.4* 23*    252 267  --    MCV 95 96 96  --    RDW 16.1* 16.6* 16.9*  --         Chemistries:  Recent Labs   Lab 09/17/20  0330  09/18/20  0300 09/18/20  1120 09/18/20  1341 09/18/20  1345     --  143  --   --  141   K 3.1*   < > 4.1 4.4  --  4.1   CL 96  --  98  --   --  102   CO2 35*  --  32*  --   --  25   BUN 41*  --  57*   --   --  62*   CREATININE 1.5*  --  1.9*  --   --  2.3*   CALCIUM 8.4*  --  8.4*  --   --  7.6*   ALBUMIN 2.8*  --  3.0*  --   --  2.5*   PROT 6.0  --  6.0  --   --  5.3*   BILITOT 2.5*  --  2.3*  --   --  2.0*   ALKPHOS 215*  --  263*  --   --  214*   ALT 30  --  46*  --   --  77*   AST 54*  --  77*  --   --  155*   MG 3.2*  --  3.2*  --  2.9*  --    PHOS 4.3  --  6.0*  --   --  6.7*    < > = values in this interval not displayed.       All pertinent labs within the past 24 hours have been reviewed.    Significant Imaging:  I have reviewed all pertinent imaging results/findings within the past 24 hours.    Assessment/Plan:     Severe mitral regurgitation  Fatou Lorenzo is a 75 y.o. female s/p MVr, TVr 9/9/2020. Case noteworthy for multiple pump runs (3) and RV hematoma due to retraction.     Neuro:  - Cleared for PO by speech  - Started on scheduled tylenol and PRN oxy, dilaudid  - patient more alert, oriented, and appropriate today as compared to prior exams. Still slightly disoriented to time.      CV:  S/p MVr, TVr 9/9/20. Case noteworthy for pump run x3, RV hematoma 2/2 retraction  - Patient in and out of atrial fibrillation overnight.  - Metop 25 BID  - Amio PO 200mg BID.  - MAP goal 60-80, SBP <140  - BMP this afternoon  - Pacer wires in place, set to back up rate 50   - Echocardiogram ordered     Pulm:  - Respiratory function continues to improve  - Currently on 1L NC, wean as tolerated  - OOB/IS     FEN/GI:  - Patient continues to respond to diuresis appropriately; continue lasix 40mg BID  - UOP nearly 1L over last 24hrs. Appx net even today  - Restart scheduled potassium  - Replace other lytes as needed  - Na stable, 143 today  - mechanical soft diet   - ppx: famotidine, miralax    Renal:  - Yoo in place for strict I/O  - Continue with aggressive diuresis   - lasix 40mg BID   - continue holding diuril   - BUN/Cr 57/1.9     Heme/Onc:  - H/H stable  - Heparin gtt for persistent a fib  - Start bridge to  warfarin     ID:  - Afebrile, WBC WNL  - periop antibiotics complete     Endo:  - no hx of DM  - ISS    PPx:  - famotidine, heparin gtt bridge to warfarin, SCDs     Dispo: continue ICU care; consider transfer to floor this afternoon if pt remains stable         Critical care was time spent personally by me on the following activities: development of treatment plan with patient or surrogate and bedside caregivers, discussions with consultants, evaluation of patient's response to treatment, examination of patient, ordering and performing treatments and interventions, ordering and review of laboratory studies, ordering and review of radiographic studies, pulse oximetry, re-evaluation of patient's condition.  This critical care time did not overlap with that of any other provider or involve time for any procedures.     Miguel Siu MD  Critical Care - Surgery  [facility]

## 2020-09-18 NOTE — PROCEDURES
"Fatou Lorenzo is a 75 y.o. female patient.    Temp: 98 °F (36.7 °C) (09/18/20 1100)  Pulse: 69 (09/18/20 1230)  Resp: 19 (09/18/20 1230)  BP: (!) 81/51 (09/18/20 1230)  SpO2: 99 % (09/18/20 1230)  Weight: 86.8 kg (191 lb 5.8 oz) (09/18/20 0500)  Height: 5' 5" (165.1 cm) (09/14/20 1145)       Arterial Line    Date/Time: 9/18/2020 1:42 PM  Location procedure was performed: [facility] CRITICAL CARE SURGERY  Performed by: Vance Smith MD  Authorized by: David Jones MD   Pre-op Diagnosis: hypotension  Post-operative diagnosis: hypotension  Consent Done: Emergent Situation  Preparation: Patient was prepped and draped in the usual sterile fashion.  Indications: multiple ABGs and hemodynamic monitoring  Location: left radial  Patient sedated: no  Sudhakar's test normal: yes  Needle gauge: 20  Seldinger technique: Seldinger technique used  Number of attempts: 2  Technical procedures used: ultrasound guided  Post-procedure: line sutured and dressing applied  Post-procedure CMS: normal  Patient tolerance: Patient tolerated the procedure well with no immediate complications          Vance Smith  9/18/2020  "

## 2020-09-18 NOTE — PLAN OF CARE
Problem: SLP Goal  Goal: SLP Goal  Description: Speech Language Pathology Goals  Goals expected to be met 9/23  1. Pt will tolerate diet of mechanical soft solids and nectar-thickened liquids with no overt signs of airway compromise.  2. Pt will participate in ongoing swallow assessment to determine safest and least restrictive diet.    Outcome: Ongoing, Progressing     Goals remain appropriate. Continue diet of mechanical soft solids and nectar-thickened liquids. ST to continue to monitor.    Zulay Colon, BURKE  9/18/2020

## 2020-09-18 NOTE — CONSULTS
[facility]  Interventional Cardiology  Consult Note    Patient Name: Fatou Lorenzo  MRN: [id number]  Admission Date: 8/30/2020  Hospital Length of Stay: 18 days  Code Status: Full Code   Attending Provider: Antoni Waddell MD  Consulting Provider: Harman Maynard MD  Primary Care Physician: Ludin Rivas MD  Principal Problem:Acute respiratory failure with hypoxia    Patient information was obtained from patient and past medical records.     Inpatient consult to Interventional Cardiology  Consult performed by: Harman Maynard MD  Consult ordered by: Miguel Siu MD        Subjective:     Chief Complaint:  sob     HPI:  Fatou Lorenzo is a 74 yo F who had MVR + TVR to 9/9 that was complicated by RV hematoma and subsequent pericardial effusion.  She has been treated in ICU and has had pressor requirement that was DC-ed 3 days ago.  She was being planned to be transitioned to the floor today, however, this morning she had acute decompensation with hypotension and tachycardia.  She has had frequent runs of atrial fibrillation since being admitted, and has intermittently been in AFib throughout the day.  She was restarted on epinephrine and Levophed and TTE was ordered to assess for pericardial tamponade.  It should be noted, however, when she 1st became hypotensive she was bradycardic.    When seen this evening with staff, she is alert and oriented for remained hypotensive.  Heart rate is in the 130s which is atrial fibrillation.  She is on Levophed at 0.02 and epinephrine at 0.05.  TTE demonstrated mild to moderate posterior pericardial effusion, with no significant mitral valve respiratory variation and no significant RA/RV diastolic collapse.  EF was 25% with biventricular failure and noted RV hematoma.  Denies any chest pain, however, she did have some epigastric pain earlier today when she a for the 1st time that precipitated some her events.  No other acute events. Reportedly has had  less urine output over the course of her day.    Past Medical History:   Diagnosis Date    Atrial fibrillation with RVR 8/24/2020    Cataract     Essential hypertension 8/31/2020    Glaucoma     Heart valve problem     Hyperlipidemia     Severe mitral regurgitation 8/24/2020       Past Surgical History:   Procedure Laterality Date    ANKLE SURGERY      BAIN MAZE PROCEDURE N/A 9/9/2020    Procedure: BAIN MAZE PROCEDURE;  Surgeon: Antoni Waddell MD;  Location: [facility] OR [address] [place] [address];  Service: Cardiothoracic;  Laterality: N/A;  MAZE     LEFT HEART CATHETERIZATION Left 8/25/2020    Procedure: Left heart cath, radial;  Surgeon: Marlee Carrillo MD;  Location: [facility] CATH LAB;  Service: Cardiology;  Laterality: Left;    lipoma removal      TRICUSPID VALVULOPLASTY N/A 9/9/2020    Procedure: REPAIR, TRICUSPID VALVE;  Surgeon: Antoni Waddell MD;  Location: [facility] OR [address] [place] [address];  Service: Cardiothoracic;  Laterality: N/A;       Review of patient's allergies indicates:  No Known Allergies    PTA Medications   Medication Sig    metoprolol tartrate (LOPRESSOR) 25 MG tablet Take 1 tablet (25 mg total) by mouth 3 (three) times daily.    amiodarone (PACERONE) 400 MG tablet Take two tablets by mouth twice daily for twelve days, then take one tablet by mouth daily.    apixaban (ELIQUIS) 5 mg Tab Take 1 tablet (5 mg total) by mouth 2 (two) times daily.    furosemide (LASIX) 40 MG tablet Take 1 tablet (40 mg total) by mouth 2 (two) times daily.    losartan (COZAAR) 50 MG tablet Take 0.5 tablets (25 mg total) by mouth once daily.    pravastatin (PRAVACHOL) 80 MG tablet Take 80 mg by mouth once daily.     Family History     Problem Relation (Age of Onset)    Cancer Mother    Cataracts Sister    No Known Problems Father, Brother, Maternal Aunt, Maternal Uncle, Paternal Aunt, Paternal Uncle, Maternal Grandmother, Maternal Grandfather, Paternal Grandmother, Paternal Grandfather        Tobacco Use    Smoking status: Never Smoker     Smokeless tobacco: Never Used   Substance and Sexual Activity    Alcohol use: No    Drug use: No    Sexual activity: Not Currently     Review of Systems   Constitution: Positive for malaise/fatigue.   HENT: Negative.    Eyes: Negative.    Cardiovascular: Negative.    Respiratory: Positive for shortness of breath.    Endocrine: Negative.    Skin: Negative.    Musculoskeletal: Negative.    Gastrointestinal: Negative.    Genitourinary: Negative.    Neurological: Negative.    Psychiatric/Behavioral: Negative.      Objective:     Vital Signs (Most Recent):  Temp: 97.9 °F (36.6 °C) (09/18/20 1500)  Pulse: (!) 136 (09/18/20 1715)  Resp: 19 (09/18/20 1715)  BP: (!) 82/51 (09/18/20 1700)  SpO2: 95 % (09/18/20 1545) Vital Signs (24h Range):  Temp:  [97.6 °F (36.4 °C)-98.1 °F (36.7 °C)] 97.9 °F (36.6 °C)  Pulse:  [] 136  Resp:  [15-34] 19  SpO2:  [89 %-100 %] 95 %  BP: ()/(35-76) 82/51  Arterial Line BP: (72-73)/(47-49) 72/47     Weight: 86.6 kg (191 lb)  Body mass index is 31.78 kg/m².    SpO2: 95 %  O2 Device (Oxygen Therapy): nasal cannula      Intake/Output Summary (Last 24 hours) at 9/18/2020 1721  Last data filed at 9/18/2020 1700  Gross per 24 hour   Intake 820 ml   Output 500 ml   Net 320 ml       Lines/Drains/Airways     Central Venous Catheter Line             Introducer with Double Lumen 09/09/20 right internal jugular 9 days          Drain                 Urethral Catheter 09/09/20 1512 Non-latex;Straight-tip;Temperature probe 14 Fr. 9 days          Arterial Line            Arterial Line 09/18/20 1345 Left Radial less than 1 day          Line                 Pacer Wires 09/09/20 2057 8 days          Peripheral Intravenous Line                 Peripheral IV - Single Lumen 09/14/20 0051 20 G Left;Posterior Forearm 4 days         Peripheral IV - Single Lumen 09/16/20 1809 18 G Anterior;Distal;Left Wrist 1 day                Physical Exam   Constitutional: She is oriented to person, place, and time.  She appears distressed.   HENT:   Head: Normocephalic and atraumatic.   Eyes: Pupils are equal, round, and reactive to light. Conjunctivae are normal.   Neck: Normal range of motion. JVD present.   Cardiovascular:   No murmur heard.  Irregular rate and rhythm   Pulmonary/Chest: Effort normal and breath sounds normal.   Abdominal: Soft. Bowel sounds are normal.   Musculoskeletal: Normal range of motion.   Neurological: She is alert and oriented to person, place, and time.   Skin: Skin is warm and dry.       Significant Labs: All pertinent lab results from the last 24 hours have been reviewed.    Significant Imaging: reviewed    Assessment and Plan:     Acute on chronic diastolic (congestive) heart failure  She is exhibiting cardiogenic shock with low urinary output, hypotension and low mixed venous saturation (48.) However, her echocardiogram does not exhibit pericardial tamponade. She would benefit from ionotropic support.  -start dobutamine and continue epinephrine  -consider inh NO for RV support and lasix gtt, goal CVP<10  -re-check SVO2 q6 and repeat lactate at 2300  -monitor closely urine output        VTE Risk Mitigation (From admission, onward)         Ordered     heparin 25,000 units in dextrose 5% 250 mL (100 units/mL) infusion (heparin infusion - NO NOMOGRAM)  Continuous      09/17/20 0445     IP VTE HIGH RISK PATIENT  Once      09/09/20 2250     Place sequential compression device  Until discontinued      09/09/20 2250                Thank you for your consult. I will follow-up with patient. Please contact us if you have any additional questions.    Harman Maynard MD  Interventional Cardiology   [facility]

## 2020-09-18 NOTE — SUBJECTIVE & OBJECTIVE
Past Medical History:   Diagnosis Date    Atrial fibrillation with RVR 8/24/2020    Cataract     Essential hypertension 8/31/2020    Glaucoma     Heart valve problem     Hyperlipidemia     Severe mitral regurgitation 8/24/2020       Past Surgical History:   Procedure Laterality Date    ANKLE SURGERY      BAIN MAZE PROCEDURE N/A 9/9/2020    Procedure: BAIN MAZE PROCEDURE;  Surgeon: Antoni Waddell MD;  Location: [address];  Service: Cardiothoracic;  Laterality: N/A;  MAZE     LEFT HEART CATHETERIZATION Left 8/25/2020    Procedure: Left heart cath, radial;  Surgeon: Marlee Carrillo MD;  Location: [facility] CATH LAB;  Service: Cardiology;  Laterality: Left;    lipoma removal      TRICUSPID VALVULOPLASTY N/A 9/9/2020    Procedure: REPAIR, TRICUSPID VALVE;  Surgeon: Antoni Waddell MD;  Location: [address];  Service: Cardiothoracic;  Laterality: N/A;       Review of patient's allergies indicates:  No Known Allergies    PTA Medications   Medication Sig    metoprolol tartrate (LOPRESSOR) 25 MG tablet Take 1 tablet (25 mg total) by mouth 3 (three) times daily.    amiodarone (PACERONE) 400 MG tablet Take two tablets by mouth twice daily for twelve days, then take one tablet by mouth daily.    apixaban (ELIQUIS) 5 mg Tab Take 1 tablet (5 mg total) by mouth 2 (two) times daily.    furosemide (LASIX) 40 MG tablet Take 1 tablet (40 mg total) by mouth 2 (two) times daily.    losartan (COZAAR) 50 MG tablet Take 0.5 tablets (25 mg total) by mouth once daily.    pravastatin (PRAVACHOL) 80 MG tablet Take 80 mg by mouth once daily.     Family History     Problem Relation (Age of Onset)    Cancer Mother    Cataracts Sister    No Known Problems Father, Brother, Maternal Aunt, Maternal Uncle, Paternal Aunt, Paternal Uncle, Maternal Grandmother, Maternal Grandfather, Paternal Grandmother, Paternal Grandfather        Tobacco Use    Smoking status: Never Smoker    Smokeless tobacco: Never Used   Substance  and Sexual Activity    Alcohol use: No    Drug use: No    Sexual activity: Not Currently     Review of Systems   Constitution: Positive for malaise/fatigue.   HENT: Negative.    Eyes: Negative.    Cardiovascular: Negative.    Respiratory: Positive for shortness of breath.    Endocrine: Negative.    Skin: Negative.    Musculoskeletal: Negative.    Gastrointestinal: Negative.    Genitourinary: Negative.    Neurological: Negative.    Psychiatric/Behavioral: Negative.      Objective:     Vital Signs (Most Recent):  Temp: 97.9 °F (36.6 °C) (09/18/20 1500)  Pulse: (!) 136 (09/18/20 1715)  Resp: 19 (09/18/20 1715)  BP: (!) 82/51 (09/18/20 1700)  SpO2: 95 % (09/18/20 1545) Vital Signs (24h Range):  Temp:  [97.6 °F (36.4 °C)-98.1 °F (36.7 °C)] 97.9 °F (36.6 °C)  Pulse:  [] 136  Resp:  [15-34] 19  SpO2:  [89 %-100 %] 95 %  BP: ()/(35-76) 82/51  Arterial Line BP: (72-73)/(47-49) 72/47     Weight: 86.6 kg (191 lb)  Body mass index is 31.78 kg/m².    SpO2: 95 %  O2 Device (Oxygen Therapy): nasal cannula      Intake/Output Summary (Last 24 hours) at 9/18/2020 1721  Last data filed at 9/18/2020 1700  Gross per 24 hour   Intake 820 ml   Output 500 ml   Net 320 ml       Lines/Drains/Airways     Central Venous Catheter Line             Introducer with Double Lumen 09/09/20 right internal jugular 9 days          Drain                 Urethral Catheter 09/09/20 1512 Non-latex;Straight-tip;Temperature probe 14 Fr. 9 days          Arterial Line            Arterial Line 09/18/20 1345 Left Radial less than 1 day          Line                 Pacer Wires 09/09/20 2057 8 days          Peripheral Intravenous Line                 Peripheral IV - Single Lumen 09/14/20 0051 20 G Left;Posterior Forearm 4 days         Peripheral IV - Single Lumen 09/16/20 1809 18 G Anterior;Distal;Left Wrist 1 day                Physical Exam   Constitutional: She is oriented to person, place, and time. She appears distressed.   HENT:   Head:  Normocephalic and atraumatic.   Eyes: Pupils are equal, round, and reactive to light. Conjunctivae are normal.   Neck: Normal range of motion. JVD present.   Cardiovascular:   No murmur heard.  Irregular rate and rhythm   Pulmonary/Chest: Effort normal and breath sounds normal.   Abdominal: Soft. Bowel sounds are normal.   Musculoskeletal: Normal range of motion.   Neurological: She is alert and oriented to person, place, and time.   Skin: Skin is warm and dry.       Significant Labs: All pertinent lab results from the last 24 hours have been reviewed.    Significant Imaging: reviewed

## 2020-09-18 NOTE — NURSING
Pts blood pressure 60s/40s. Pt lethargic, able to arouse to voice. Pt placed in trendelenberg. Manual blood pressure taken, 60/42 noted. Dr. Ng and Dr. Smith notified. Levophed gtt started, iv fluids given. Will continue to monitor.

## 2020-09-18 NOTE — HPI
Fatou Lorenzo is a 74 yo F who had MVR + TVR to 9/9 that was complicated by RV hematoma and subsequent pericardial effusion.  She has been treated in ICU and has had pressor requirement that was DC-ed 3 days ago.  She was being planned to be transitioned to the floor today, however, this morning she had acute decompensation with hypotension and tachycardia.  She has had frequent runs of atrial fibrillation since being admitted, and has intermittently been in AFib throughout the day.  She was restarted on epinephrine and Levophed and TTE was ordered to assess for pericardial tamponade.  It should be noted, however, when she 1st became hypotensive she was bradycardic.    When seen this evening with staff, she is alert and oriented for remained hypotensive.  Heart rate is in the 130s which is atrial fibrillation.  She is on Levophed at 0.02 and epinephrine at 0.05.  TTE demonstrated mild to moderate posterior pericardial effusion, with no significant mitral valve respiratory variation and no significant RA/RV diastolic collapse.  EF was 25% with biventricular failure and noted RV hematoma.  Denies any chest pain, however, she did have some epigastric pain earlier today when she a for the 1st time that precipitated some her events.  No other acute events. Reportedly has had less urine output over the course of her day.

## 2020-09-18 NOTE — PROGRESS NOTES
MD Hernandez notified of patient uop decreasing  10-15cc/hr. Orders to start D51/2NS with 20K+ @ 75cc/hr, will carry out and continue to monitor.

## 2020-09-18 NOTE — SUBJECTIVE & OBJECTIVE
Interval History/Significant Events:   - Intermittent episodes of atrial fibrillation w/ RVR overnight  - Heparin drip continued  - Pain well-controlled  - Slightly disoriented to time  - Pressor requirements during the mid-morning due to hypotension from likely pericardial tamponade. Echocardiogram done w/ confirmation of pericardial effusion. Pericardiocentesis likely.    Follow-up For: Procedure(s) (LRB):  Valvuloplasty, Mitral (N/A)  REPAIR, TRICUSPID VALVE (N/A)  BAIN MAZE PROCEDURE (N/A)    Post-Operative Day: 9 Days Post-Op    Objective:     Vital Signs (Most Recent):  Temp: 97.9 °F (36.6 °C) (09/18/20 1500)  Pulse: (!) 133 (09/18/20 1500)  Resp: (!) 24 (09/18/20 1500)  BP: (!) 104/58 (09/18/20 1445)  SpO2: (!) 93 % (09/18/20 1500) Vital Signs (24h Range):  Temp:  [97.6 °F (36.4 °C)-98.1 °F (36.7 °C)] 97.9 °F (36.6 °C)  Pulse:  [] 133  Resp:  [15-34] 24  SpO2:  [89 %-100 %] 93 %  BP: ()/(35-76) 104/58  Arterial Line BP: (72-73)/(47-49) 72/47     Weight: 86.6 kg (191 lb)  Body mass index is 31.78 kg/m².      Intake/Output Summary (Last 24 hours) at 9/18/2020 1556  Last data filed at 9/18/2020 1500  Gross per 24 hour   Intake 865 ml   Output 590 ml   Net 275 ml       Physical Exam  Vitals signs and nursing note reviewed.   Constitutional:       General: She is not in acute distress.     Appearance: Normal appearance. She is well-developed. She is not diaphoretic.   HENT:      Head: Normocephalic and atraumatic.   Eyes:      General: No scleral icterus.  Neck:      Vascular: No JVD.      Comments: RIJ in place  Cardiovascular:      Rate and Rhythm: Tachycardia present. Rhythm irregular.   Pulmonary:      Effort: No respiratory distress.      Breath sounds: No wheezing.      Comments: Pacer wires in place  Dressing c/d/i  Chest:      Chest wall: Tenderness present.   Abdominal:      General: There is no distension.      Palpations: Abdomen is soft.      Tenderness: There is no abdominal tenderness.    Genitourinary:     Comments: Yoo in place  Skin:     General: Skin is warm and dry.      Coloration: Skin is not jaundiced or pale.   Neurological:      Mental Status: She is alert.      Cranial Nerves: No cranial nerve deficit.      Comments: Following commands   Psychiatric:         Mood and Affect: Mood normal.         Behavior: Behavior normal.           Lines/Drains/Airways     Central Venous Catheter Line             Introducer with Double Lumen 09/09/20 right internal jugular 9 days          Drain                 Urethral Catheter 09/09/20 1512 Non-latex;Straight-tip;Temperature probe 14 Fr. 9 days          Arterial Line            Arterial Line 09/18/20 1345 Left Radial less than 1 day          Line                 Pacer Wires 09/09/20 2057 8 days          Peripheral Intravenous Line                 Peripheral IV - Single Lumen 09/14/20 0051 20 G Left;Posterior Forearm 4 days         Peripheral IV - Single Lumen 09/16/20 1809 18 G Anterior;Distal;Left Wrist 1 day                Significant Labs:    CBC/Anemia Profile:  Recent Labs   Lab 09/17/20  0330 09/18/20  0300 09/18/20  1341 09/18/20  1348   WBC 7.62 12.68 17.78*  --    HGB 7.8* 7.8* 7.2*  --    HCT 24.4* 24.9* 22.4* 23*    252 267  --    MCV 95 96 96  --    RDW 16.1* 16.6* 16.9*  --         Chemistries:  Recent Labs   Lab 09/17/20  0330  09/18/20  0300 09/18/20  1120 09/18/20  1341 09/18/20  1345     --  143  --   --  141   K 3.1*   < > 4.1 4.4  --  4.1   CL 96  --  98  --   --  102   CO2 35*  --  32*  --   --  25   BUN 41*  --  57*  --   --  62*   CREATININE 1.5*  --  1.9*  --   --  2.3*   CALCIUM 8.4*  --  8.4*  --   --  7.6*   ALBUMIN 2.8*  --  3.0*  --   --  2.5*   PROT 6.0  --  6.0  --   --  5.3*   BILITOT 2.5*  --  2.3*  --   --  2.0*   ALKPHOS 215*  --  263*  --   --  214*   ALT 30  --  46*  --   --  77*   AST 54*  --  77*  --   --  155*   MG 3.2*  --  3.2*  --  2.9*  --    PHOS 4.3  --  6.0*  --   --  6.7*    < > = values in  this interval not displayed.       All pertinent labs within the past 24 hours have been reviewed.    Significant Imaging:  I have reviewed all pertinent imaging results/findings within the past 24 hours.

## 2020-09-18 NOTE — EICU
Intervention Initiated From:  Bedside    Pablo Communicated with Bedside Nurse regarding:  Time-Out    Comments:   eLERT received for time out for ultrasound guided pericardiocentesis. Dr ADRIAN Grajeda at bedside to perform procedure. ICU RN Savannah and Sherrill at bedside Time out performed. After better visualization with the ultrasound probe, Dr Grajeda decided a different approach and procedure was not performed.    Time in: 1525  Time out: 1538

## 2020-09-18 NOTE — ASSESSMENT & PLAN NOTE
Fatou Lorenzo is a 75 y.o. female s/p MVr, TVr 9/9/2020. Case noteworthy for multiple pump runs (3) and RV hematoma due to retraction.     Neuro:  - Cleared for PO by speech  - Started on scheduled tylenol and PRN oxy, dilaudid  - patient more alert, oriented, and appropriate today as compared to prior exams. Still slightly disoriented to time.      CV:  S/p MVr, TVr 9/9/20. Case noteworthy for pump run x3, RV hematoma 2/2 retraction  - Patient in and out of atrial fibrillation overnight.  - Metop 25 BID  - Amio PO 200mg BID.  - MAP goal 60-80, SBP <140  - BMP this afternoon  - Pacer wires in place, set to back up rate 50   - Echocardiogram ordered     Pulm:  - Respiratory function continues to improve  - Currently on 1L NC, wean as tolerated  - OOB/IS     FEN/GI:  - Patient continues to respond to diuresis appropriately; continue lasix 40mg BID  - UOP nearly 1L over last 24hrs. Appx net even today  - Restart scheduled potassium  - Replace other lytes as needed  - Na stable, 143 today  - mechanical soft diet   - ppx: famotidine, miralax    Renal:  - Yoo in place for strict I/O  - Continue with aggressive diuresis   - lasix 40mg BID   - continue holding diuril   - BUN/Cr 57/1.9     Heme/Onc:  - H/H stable  - Heparin gtt for persistent a fib  - Start bridge to warfarin     ID:  - Afebrile, WBC WNL  - periop antibiotics complete     Endo:  - no hx of DM  - ISS    PPx:  - famotidine, heparin gtt bridge to warfarin, SCDs     Dispo: continue ICU care; consider transfer to floor this afternoon if pt remains stable

## 2020-09-18 NOTE — PROGRESS NOTES
1300: Pt increasingly hypotensive SBP 60-70s; lethargic, mentating appropriately. MD notified and at bedside.    1315: Levophed drip initiated, epinephrine to be started per MD orders.    1415: MD Nicci at patient bedside ultrasounding patient's heart. STAT echo and chest xray to be completed.    1530: Decision made by MD Nicci for pericardiocentesis at bedside. Consent obtained from patient son, time out performed. Pericardiocentesis not performed per Dr. Waddell prefer patient to go to cath lab.    New orders received and implemented for patient at this time, continuing to titrate pressors for MAPs >60    Will continue to monitor patient closely.

## 2020-09-18 NOTE — PLAN OF CARE
[facility] is reviewing patient referral.        09/18/20 1020   Post-Acute Status   Post-Acute Authorization Placement   Post-Acute Placement Status Pending Post-Acute Clinical Review   Discharge Plan   Discharge Plan A Skilled Nursing Facility     Esthela Peralta LMSW   - Case Management

## 2020-09-18 NOTE — ASSESSMENT & PLAN NOTE
She is exhibiting cardiogenic shock with low urinary output, hypotension and low mixed venous saturation (48.) However, her echocardiogram does not exhibit pericardial tamponade. She would benefit from ionotropic support.  -start dobutamine and continue epinephrine  -consider inh NO for RV support and lasix gtt, goal CVP<10  -re-check SVO2 q6 and repeat lactate at 2300  -monitor closely urine output

## 2020-09-18 NOTE — PLAN OF CARE
No acute events this shift. AAOx4. Sleepy/drowsy. Afebrile. MAP 60-80. SBP < 140. HR: 79-110s, paced rhythm, in & out of A-fib.  1 L NC, SpO2 > 90%  Gtts: Heparin @ 1100 units/hr, PTT 60-80  UO: 15-30 cc/hr, team aware  CVP: 12-12, flat/level  Tolerates mechanical soft diet and nectar-thick liquids. Accuchecks Q6h. No BM this shift. OOBTC this AM.  Plan of care reviewed with pt. All questions and concerns addressed. All VSS at this time. WCTM.    Skin: midsternal steri strips CDI, old chest tube site dsg CDI, R wrist angiopuncture site dry and intact, heel boots on, sacral and heel foams applied, pillows placed under elbows, turned Q2h while in bed

## 2020-09-19 NOTE — ASSESSMENT & PLAN NOTE
-improved  -rvr on prior paroxysmal diagnosis  -secondary to  MVR + TVR vs sepsis vs volume depletion  -per cardiology

## 2020-09-19 NOTE — SUBJECTIVE & OBJECTIVE
Past Medical History:   Diagnosis Date    Atrial fibrillation with RVR 8/24/2020    Cataract     Essential hypertension 8/31/2020    Glaucoma     Heart valve problem     Hyperlipidemia     Severe mitral regurgitation 8/24/2020       Past Surgical History:   Procedure Laterality Date    ANKLE SURGERY      BAIN MAZE PROCEDURE N/A 9/9/2020    Procedure: BAIN MAZE PROCEDURE;  Surgeon: Antoni Waddell MD;  Location: [facility] OR [address] [place] [address];  Service: Cardiothoracic;  Laterality: N/A;  MAZE     LEFT HEART CATHETERIZATION Left 8/25/2020    Procedure: Left heart cath, radial;  Surgeon: Marlee Carrillo MD;  Location: [facility] CATH LAB;  Service: Cardiology;  Laterality: Left;    lipoma removal      TRICUSPID VALVULOPLASTY N/A 9/9/2020    Procedure: REPAIR, TRICUSPID VALVE;  Surgeon: Antoni Waddell MD;  Location: [address];  Service: Cardiothoracic;  Laterality: N/A;       Review of patient's allergies indicates:  No Known Allergies  Current Facility-Administered Medications   Medication Frequency    digoxin (LANOXIN) 250 mcg/mL (0.25 mg/mL) injection     0.9%  NaCl infusion (for blood administration) Q24H PRN    acetaminophen tablet 650 mg Q6H    amiodarone (CORDARONE) 360 mg/200 mL (1.8 mg/mL) infusion     amiodarone 360 mg/200 mL (1.8 mg/mL) infusion Continuous    amiodarone in dextrose 150 mg/100 mL (1.5 mg/mL) loading dose     amiodarone tablet 200 mg Daily    ascorbic acid (vitamin C) tablet 500 mg BID    aspirin chewable tablet 81 mg Daily    calcium carbonate 200 mg calcium (500 mg) chewable tablet 1,000 mg TID PRN    calcium chloride 100 mg/mL (10 %) injection     calcium gluconate 1g in dextrose 5% 100mL (ready to mix system) PRN    calcium gluconate 1g in dextrose 5% 100mL (ready to mix system) Once    calcium gluconate 2 g in dextrose 5 % 100 mL IVPB PRN    calcium gluconate 3 g in dextrose 5 % 100 mL IVPB PRN    dextrose 5 % and 0.45 % NaCl with KCl 20 mEq infusion Continuous     dextrose 50% injection 12.5 g PRN    dextrose 50% injection 25 g PRN    DOBUTamine 500mg in D5W 250mL infusion (premix) (NON-TITRATING) Continuous    EPINEPHrine (ADRENALIN) 5 mg in sodium chloride 0.9% 250 mL infusion Continuous    etomidate (AMIDATE) 2 mg/mL injection     famotidine tablet 20 mg Daily    furosemide injection 40 mg BID    glucagon (human recombinant) injection 1 mg PRN    heparin 25,000 units in dextrose 5% 250 mL (100 units/mL) infusion (heparin infusion - NO NOMOGRAM) Continuous    hydrALAZINE injection 10 mg Q8H PRN    HYDROmorphone injection 0.5 mg Q4H PRN    insulin aspart U-100 pen 0-5 Units Q6H PRN    lidocaine (PF) 10 mg/ml (1%) injection 100 mg Once    magnesium sulfate 2g in water 50mL IVPB (premix) PRN    magnesium sulfate 2g in water 50mL IVPB (premix) PRN    melatonin tablet 6 mg Nightly    metoprolol tartrate (LOPRESSOR) tablet 25 mg BID    nitric oxide gas Gas 10 ppm Continuous    norepinephrine 4 mg in dextrose 5% 250 mL infusion (premix) (titrating) Continuous    ondansetron injection 4 mg Q6H PRN    oxyCODONE immediate release tablet 5 mg Q4H PRN    piperacillin-tazobactam 4.5 g in sodium chloride 0.9% 100 mL IVPB (ready to mix system) Q12H    polyethylene glycol packet 17 g Daily    potassium chloride 20 mEq in 100 mL IVPB (FOR CENTRAL LINE ADMINISTRATION ONLY) PRN    potassium chloride 20 mEq in 100 mL IVPB (FOR CENTRAL LINE ADMINISTRATION ONLY) PRN    potassium chloride 40 mEq in 100 mL IVPB (FOR CENTRAL LINE ADMINISTRATION ONLY) PRN    potassium chloride packet 20 mEq BID    rocuronium 10 mg/mL injection     sennosides 8.8 mg/5 ml syrup 5 mL Daily    sodium chloride 3% nebulizer solution 4 mL Q6H WAKE    sodium phosphate 15 mmol in dextrose 5 % 250 mL IVPB PRN    sodium phosphate 20.01 mmol in dextrose 5 % 250 mL IVPB PRN    sodium phosphate 30 mmol in dextrose 5 % 250 mL IVPB PRN    succinylcholine (ANECTINE) 20 mg/mL injection     vancomycin  - pharmacy to dose pharmacy to manage frequency    vancomycin 1.75 g in 5 % dextrose 500 mL IVPB Once    vasopressin (PITRESSIN) 0.2 Units/mL in dextrose 5 % 100 mL infusion Continuous     Family History     Problem Relation (Age of Onset)    Cancer Mother    Cataracts Sister    No Known Problems Father, Brother, Maternal Aunt, Maternal Uncle, Paternal Aunt, Paternal Uncle, Maternal Grandmother, Maternal Grandfather, Paternal Grandmother, Paternal Grandfather        Tobacco Use    Smoking status: Never Smoker    Smokeless tobacco: Never Used   Substance and Sexual Activity    Alcohol use: No    Drug use: No    Sexual activity: Not Currently     Review of Systems   Unable to perform ROS: Mental status change     Objective:     Vital Signs (Most Recent):  Temp: (!) 94.3 °F (34.6 °C) (09/18/20 1915)  Pulse: (!) 143 (09/18/20 1915)  Resp: 20 (09/18/20 1913)  BP: (!) 89/51 (09/18/20 1900)  SpO2: 95 % (09/18/20 1545)  O2 Device (Oxygen Therapy): nasal cannula (09/18/20 1900) Vital Signs (24h Range):  Temp:  [94.3 °F (34.6 °C)-98.2 °F (36.8 °C)] 94.3 °F (34.6 °C)  Pulse:  [] 143  Resp:  [15-34] 20  SpO2:  [89 %-100 %] 95 %  BP: ()/(35-67) 89/51  Arterial Line BP: ()/(47-68) 107/68     Weight: 86.6 kg (191 lb) (09/18/20 1500)  Body mass index is 31.78 kg/m².  Body surface area is 1.99 meters squared.    I/O last 3 completed shifts:  In: 1325 [I.V.:1325]  Out: 1040 [Urine:1040]    Physical Exam  Constitutional:       Appearance: She is ill-appearing.   Eyes:      General: No scleral icterus.     Pupils: Pupils are equal, round, and reactive to light.   Neck:      Musculoskeletal: No neck rigidity.   Cardiovascular:      Rate and Rhythm: Tachycardia present.   Pulmonary:      Effort: No respiratory distress.      Breath sounds: No wheezing.   Abdominal:      General: There is no distension.      Palpations: Abdomen is soft.      Tenderness: There is no abdominal tenderness.   Musculoskeletal:       Right lower leg: No edema.      Left lower leg: No edema.   Skin:     Coloration: Skin is not jaundiced.      Findings: No lesion or rash.   Neurological:      Mental Status: Mental status is at baseline.         Significant Labs:  All labs within the past 24 hours have been reviewed.    Significant Imaging:  Labs: Reviewed  ECG: Reviewed  X-Ray: Reviewed

## 2020-09-19 NOTE — EICU
Rounding (Video Assessment):  No    Intervention Initiated From:  Bedside    Pablo Communicated with Bedside Nurse regarding:  Time-Out    Nurse Notified:  Yes    Doctor Notified:  Yes    Comments: Called from the bedside to assist with timeout for intubation to be done by Dr. Dodd with Dr. Grajeda.  Cardiology also at the bedside to drain pericardial effusion.

## 2020-09-19 NOTE — PLAN OF CARE
Plan of Care Note  Cardiothoracic Surgery    Fatou Lorenzo is a 75 y.o. female with tricuspid regurg and mitral regurg and afib s/p TVr, MVr, MAZE (9/9) requiring pericardial window and attempted (failed) cardioversion after decompensation (9/18)    Specific issues:   --increase po amio to 200 bid, continue gtt; start dig 125mcg qD; daily dig levels  --start tube feeds at 10cc/hr  --coumadin 2 today  --stay at 2.5 dobut and epi .06; ok to wean vaso to off  --monitor UOP  --monitor R pleural effusion  --q6h ABG  --d/c flotrac    Plan of care for patient was discussed with ICU staff including nurses, residents, and faculty and appropriate consulting services.    Will continue to monitor patient's hemodynamics, functionality, neuro status, fluid status and renal function, and labs and will adjust medications and fluids as necessary while monitoring appropriateness for de-escalation of support and monitoring and transport to stepdown unit.    Isaiah Zacarias MD  Cardiothoracic Surgery Fellow

## 2020-09-19 NOTE — ASSESSMENT & PLAN NOTE
-oliguric/anuric kdigo stage 2 [place] likely with ischemic atn probably due to septic vs cardiogenic shock evidenced by leukocytosis and afib with rvr  -RVR improved without significant improvement in bp  -urine output 1-4L per day until today  -xray without significant pulmonary edema, pleural effusions present  -elevated hco3 vbg suggests contraction alkalosis vs compensation from chronic respiratory acidosis  -with the large volumes of urine output until today I would think that she is more volume depleted than volume overloaded  -agree with using pressers setting of  MVR + TVR and depressed ef  -suspect though that she may benefit from 1L LR  -please call with any questions

## 2020-09-19 NOTE — ASSESSMENT & PLAN NOTE
-rvr on prior paroxysmal diagnosis  -secondary to  MVR + TVR vs sepsis vs volume depletion  -may be leading to cardiogenic shock  -per cardiology

## 2020-09-19 NOTE — CONSULTS
[facility]  Nephrology  Consult Note    Patient Name: Fatou Lorenzo  MRN: [id number]  Admission Date: 8/30/2020  Hospital Length of Stay: 18 days  Attending Provider: Antoni Waddell MD   Primary Care Physician: Ludin Rivas MD  Principal Problem:Acute respiratory failure with hypoxia    Inpatient consult to Nephrology  Consult performed by: Rashad Borrero MD  Consult ordered by: Danya Alejandre MD  Reason for consult: richmond  Assessment/Recommendations: Volume depletion, septic shock, and afib with rvr  Give pressers, fluid, rate control, broad spec abx        Subjective:     HPI: Fatou Lorenzo is a 76 yo F who had MVR + TVR to 9/9 that was complicated by RV hematoma and subsequent pericardial effusion.  She has been treated in ICU and has had pressor requirement that was DC-ed 3 days ago.  She was being planned to be transitioned to the floor today, however, this morning she had acute decompensation with hypotension and tachycardia.  She has had frequent runs of atrial fibrillation since being admitted, and has intermittently been in AFib throughout the day.  She was restarted on epinephrine and Levophed and TTE was ordered to assess for pericardial tamponade.  It should be noted, however, when she 1st became hypotensive she was bradycardic.     When seen this evening with staff, she is alert and oriented for remained hypotensive.  Heart rate is in the 130s which is atrial fibrillation.  She is on Levophed at 0.02 and epinephrine at 0.05.  TTE demonstrated mild to moderate posterior pericardial effusion, with no significant mitral valve respiratory variation and no significant RA/RV diastolic collapse.  EF was 25% with biventricular failure and noted RV hematoma.  Denies any chest pain, however, she did have some epigastric pain earlier today when she a for the 1st time that precipitated some her events.  No other acute events. Reportedly has had less urine output over the course of her  day.    Past Medical History:   Diagnosis Date    Atrial fibrillation with RVR 8/24/2020    Cataract     Essential hypertension 8/31/2020    Glaucoma     Heart valve problem     Hyperlipidemia     Severe mitral regurgitation 8/24/2020       Past Surgical History:   Procedure Laterality Date    ANKLE SURGERY      BAIN MAZE PROCEDURE N/A 9/9/2020    Procedure: BAIN MAZE PROCEDURE;  Surgeon: Antoni Waddell MD;  Location: [facility] OR [address] [place] [address];  Service: Cardiothoracic;  Laterality: N/A;  MAZE     LEFT HEART CATHETERIZATION Left 8/25/2020    Procedure: Left heart cath, radial;  Surgeon: Marlee Carrillo MD;  Location: [facility] CATH LAB;  Service: Cardiology;  Laterality: Left;    lipoma removal      TRICUSPID VALVULOPLASTY N/A 9/9/2020    Procedure: REPAIR, TRICUSPID VALVE;  Surgeon: Antoni Waddell MD;  Location: [address];  Service: Cardiothoracic;  Laterality: N/A;       Review of patient's allergies indicates:  No Known Allergies  Current Facility-Administered Medications   Medication Frequency    digoxin (LANOXIN) 250 mcg/mL (0.25 mg/mL) injection     0.9%  NaCl infusion (for blood administration) Q24H PRN    acetaminophen tablet 650 mg Q6H    amiodarone (CORDARONE) 360 mg/200 mL (1.8 mg/mL) infusion     amiodarone 360 mg/200 mL (1.8 mg/mL) infusion Continuous    amiodarone in dextrose 150 mg/100 mL (1.5 mg/mL) loading dose     amiodarone tablet 200 mg Daily    ascorbic acid (vitamin C) tablet 500 mg BID    aspirin chewable tablet 81 mg Daily    calcium carbonate 200 mg calcium (500 mg) chewable tablet 1,000 mg TID PRN    calcium chloride 100 mg/mL (10 %) injection     calcium gluconate 1g in dextrose 5% 100mL (ready to mix system) PRN    calcium gluconate 1g in dextrose 5% 100mL (ready to mix system) Once    calcium gluconate 2 g in dextrose 5 % 100 mL IVPB PRN    calcium gluconate 3 g in dextrose 5 % 100 mL IVPB PRN    dextrose 5 % and 0.45 % NaCl with KCl 20 mEq infusion Continuous     dextrose 50% injection 12.5 g PRN    dextrose 50% injection 25 g PRN    DOBUTamine 500mg in D5W 250mL infusion (premix) (NON-TITRATING) Continuous    EPINEPHrine (ADRENALIN) 5 mg in sodium chloride 0.9% 250 mL infusion Continuous    etomidate (AMIDATE) 2 mg/mL injection     famotidine tablet 20 mg Daily    furosemide injection 40 mg BID    glucagon (human recombinant) injection 1 mg PRN    heparin 25,000 units in dextrose 5% 250 mL (100 units/mL) infusion (heparin infusion - NO NOMOGRAM) Continuous    hydrALAZINE injection 10 mg Q8H PRN    HYDROmorphone injection 0.5 mg Q4H PRN    insulin aspart U-100 pen 0-5 Units Q6H PRN    lidocaine (PF) 10 mg/ml (1%) injection 100 mg Once    magnesium sulfate 2g in water 50mL IVPB (premix) PRN    magnesium sulfate 2g in water 50mL IVPB (premix) PRN    melatonin tablet 6 mg Nightly    metoprolol tartrate (LOPRESSOR) tablet 25 mg BID    nitric oxide gas Gas 10 ppm Continuous    norepinephrine 4 mg in dextrose 5% 250 mL infusion (premix) (titrating) Continuous    ondansetron injection 4 mg Q6H PRN    oxyCODONE immediate release tablet 5 mg Q4H PRN    piperacillin-tazobactam 4.5 g in sodium chloride 0.9% 100 mL IVPB (ready to mix system) Q12H    polyethylene glycol packet 17 g Daily    potassium chloride 20 mEq in 100 mL IVPB (FOR CENTRAL LINE ADMINISTRATION ONLY) PRN    potassium chloride 20 mEq in 100 mL IVPB (FOR CENTRAL LINE ADMINISTRATION ONLY) PRN    potassium chloride 40 mEq in 100 mL IVPB (FOR CENTRAL LINE ADMINISTRATION ONLY) PRN    potassium chloride packet 20 mEq BID    rocuronium 10 mg/mL injection     sennosides 8.8 mg/5 ml syrup 5 mL Daily    sodium chloride 3% nebulizer solution 4 mL Q6H WAKE    sodium phosphate 15 mmol in dextrose 5 % 250 mL IVPB PRN    sodium phosphate 20.01 mmol in dextrose 5 % 250 mL IVPB PRN    sodium phosphate 30 mmol in dextrose 5 % 250 mL IVPB PRN    succinylcholine (ANECTINE) 20 mg/mL injection      vancomycin - pharmacy to dose pharmacy to manage frequency    vancomycin 1.75 g in 5 % dextrose 500 mL IVPB Once    vasopressin (PITRESSIN) 0.2 Units/mL in dextrose 5 % 100 mL infusion Continuous     Family History     Problem Relation (Age of Onset)    Cancer Mother    Cataracts Sister    No Known Problems Father, Brother, Maternal Aunt, Maternal Uncle, Paternal Aunt, Paternal Uncle, Maternal Grandmother, Maternal Grandfather, Paternal Grandmother, Paternal Grandfather        Tobacco Use    Smoking status: Never Smoker    Smokeless tobacco: Never Used   Substance and Sexual Activity    Alcohol use: No    Drug use: No    Sexual activity: Not Currently     Review of Systems   Unable to perform ROS: Mental status change     Objective:     Vital Signs (Most Recent):  Temp: (!) 94.3 °F (34.6 °C) (09/18/20 1915)  Pulse: (!) 143 (09/18/20 1915)  Resp: 20 (09/18/20 1913)  BP: (!) 89/51 (09/18/20 1900)  SpO2: 95 % (09/18/20 1545)  O2 Device (Oxygen Therapy): nasal cannula (09/18/20 1900) Vital Signs (24h Range):  Temp:  [94.3 °F (34.6 °C)-98.2 °F (36.8 °C)] 94.3 °F (34.6 °C)  Pulse:  [] 143  Resp:  [15-34] 20  SpO2:  [89 %-100 %] 95 %  BP: ()/(35-67) 89/51  Arterial Line BP: ()/(47-68) 107/68     Weight: 86.6 kg (191 lb) (09/18/20 1500)  Body mass index is 31.78 kg/m².  Body surface area is 1.99 meters squared.    I/O last 3 completed shifts:  In: 1325 [I.V.:1325]  Out: 1040 [Urine:1040]    Physical Exam  Constitutional:       Appearance: She is ill-appearing.   Eyes:      General: No scleral icterus.     Pupils: Pupils are equal, round, and reactive to light.   Neck:      Musculoskeletal: No neck rigidity.   Cardiovascular:      Rate and Rhythm: Tachycardia present.   Pulmonary:      Effort: No respiratory distress.      Breath sounds: No wheezing.   Abdominal:      General: There is no distension.      Palpations: Abdomen is soft.      Tenderness: There is no abdominal tenderness.    Musculoskeletal:      Right lower leg: No edema.      Left lower leg: No edema.   Skin:     Coloration: Skin is not jaundiced.      Findings: No lesion or rash.   Neurological:      Mental Status: Mental status is at baseline.         Significant Labs:  All labs within the past 24 hours have been reviewed.    Significant Imaging:  Labs: Reviewed  ECG: Reviewed  X-Ray: Reviewed    Assessment/Plan:     Leukocytosis  -new  -central catheter present, remove or replace  -blood cultures, UA, broad spec abx, procalcitonin  -per icu    Atrial fibrillation with RVR  -rvr on prior paroxysmal diagnosis  -secondary to  MVR + TVR vs sepsis vs volume depletion  -may be leading to cardiogenic shock  -per cardiology    JONAH (acute kidney injury)  -oliguric kdigo stage 2 jonah likely with ischemic atn probably due to septic vs cardiogenic shock evidenced by leukocytosis and afib with rvr  -urine output 1-4L per day until today  -xray without significant pulmonary edema, pleural effusions present  -elevated hco3 vbg suggests contraction alkalosis vs compensation from chronic respiratory acidosis  -with the large volumes of urine output until today I would think that she is more volume depleted than volume overloaded  -suspect that she may benefit from careful fluid administration given above information  -if volume becomes an issue we would try 200mg iv furosemide q6h prior to RRT  -please call with any questions    Oliguria and anuria  -starting today, prior was producing large volumes of urine      Rashad Borrero MD  Nephrology  [facility]

## 2020-09-19 NOTE — RESPIRATORY THERAPY
Patient intubated with a 7.0 ETT inserted at the 23 cm stephany at the lip. Patient placed on ventilator on documented settings. ABG done. Will continue to monitor.

## 2020-09-19 NOTE — ASSESSMENT & PLAN NOTE
Fatou Lorenzo is a 75 y.o. female s/p MVr, TVr 9/9/2020. Case noteworthy for multiple pump runs (3) and RV hematoma due to retraction. Unstable overnight 9/18, required pericardial window for evac of posterior cardiac tamponade.     Neuro:  - Intubated and sedated  - Started on scheduled tylenol and PRN oxy, dilaudid      CV:  S/p MVr, TVr 9/9/20. Case noteworthy for pump run x3, RV hematoma 2/2 retraction  - Pt with Afib, required cardioversion 9/18. RRR this AM  - Metop 25 BID  - Amio PO 200mg BID, Amio gtt  - MAP goal 60-80, SBP <140  - Pacer wires in place, set to back up rate 50   - Keep Epi at 0.06  - Digoxin 0.125 daily     Pulm:  - Intubated  Vent Mode: A/C  Oxygen Concentration (%):  [50] 50  Resp Rate Total:  [16 br/min-18 br/min] 18 br/min  Vt Set:  [350 mL] 350 mL  PEEP/CPAP:  [5 cmH20] 5 cmH20  Mean Airway Pressure:  [8.3 wlS04-55 cmH20] 9.3 cmH20  - ABG q4     FEN/GI:  - Lasix 10 IV once this AM, lasix gtt at 20/hr  - Replace lytes as needed  - NPO  - ppx: famotidine, miralax  - Start trickle TFs    Renal:  - Yoo in place for strict I/O  - Continue with aggressive diuresis   - Lasix 10 once, lasix gtt at 20  - Trend BUN/Cr      Heme/Onc:  - H/H stable  - Heparin gtt for persistent a fib  - Coumadin 2 today     ID:  - Afebrile, WBC WNL  - periop antibiotics complete     Endo:  - no hx of DM  - ISS    PPx:  - famotidine, heparin gtt bridge to warfarin, SCDs     Dispo: continue ICU care

## 2020-09-19 NOTE — PROGRESS NOTES
CT surgery progress note     Patient reported to be hypotensive and back on vasopressor support at approx 1300.     Started on vasopressor support with marginal improvement  Electrolytes (calcium) replaced  Empiric antibiotics started  Transfused 2 units pRBC  Cardioverted, rebolused amiodarone, and loaded with digoxin with persistent   Atrial fibrillation.       TTE performed at bedside with findings concerning for RV impairment  From pericardial effusion. Patient intubated for airway protection.     Inferior aspect of sternal incision opened at bedside. Pericardial effusion drained and 28 Fr tube placed and connected to [facility]. Patient tolerated procedure well. ~200cc of hematoma evacuated.      A/P  Monitor renal function  Nephrology service consulted given oliguric state  - no immediate plans for HD  Continue supportive care  Keep in ICU

## 2020-09-19 NOTE — ASSESSMENT & PLAN NOTE
-new  -central catheter present, remove or replace  -blood cultures, UA, broad spec abx, procalcitonin  -per icu

## 2020-09-19 NOTE — ASSESSMENT & PLAN NOTE
-oliguric kdigo stage 2 richmond likely with ischemic atn probably due to septic vs cardiogenic shock evidenced by leukocytosis and afib with rvr  -urine output 1-4L per day until today  -xray without significant pulmonary edema, pleural effusions present  -elevated hco3 vbg suggests contraction alkalosis vs compensation from chronic respiratory acidosis  -with the large volumes of urine output until today I would think that she is more volume depleted than volume overloaded  -agree with using pressers setting of  MVR + TVR and depressed ef  -order UA  -suspect though that she may benefit from 1L LR  -if volume becomes an issue we would try 200mg iv furosemide q6h prior to RRT  -please call with any questions

## 2020-09-19 NOTE — SUBJECTIVE & OBJECTIVE
Interval History: no improvement in urine output though volume status either dry or euvolemic.    Review of patient's allergies indicates:  No Known Allergies  Current Facility-Administered Medications   Medication Frequency    0.9%  NaCl infusion (for blood administration) Q24H PRN    acetaminophen tablet 650 mg Q6H    amiodarone 360 mg/200 mL (1.8 mg/mL) infusion Continuous    amiodarone tablet 200 mg Daily    ascorbic acid (vitamin C) tablet 500 mg BID    aspirin chewable tablet 81 mg Daily    calcium carbonate 200 mg calcium (500 mg) chewable tablet 1,000 mg TID PRN    calcium gluconate 1g in dextrose 5% 100mL (ready to mix system) PRN    calcium gluconate 1g in dextrose 5% 100mL (ready to mix system) Once    calcium gluconate 2 g in dextrose 5 % 100 mL IVPB PRN    calcium gluconate 3 g in dextrose 5 % 100 mL IVPB PRN    chlorothiazide (DIURIL) 250 mg in dextrose 5 % 50 mL IVPB Once    dextrose 5 % and 0.45 % NaCl with KCl 20 mEq infusion Continuous    dextrose 50% injection 12.5 g PRN    dextrose 50% injection 25 g PRN    digoxin injection 125 mcg Once    DOBUTamine 500mg in D5W 250mL infusion (premix) (NON-TITRATING) Continuous    EPINEPHrine (ADRENALIN) 5 mg in sodium chloride 0.9% 250 mL infusion Continuous    famotidine tablet 20 mg Daily    furosemide injection 40 mg BID    glucagon (human recombinant) injection 1 mg PRN    heparin 25,000 units in dextrose 5% 250 mL (100 units/mL) infusion (heparin infusion - NO NOMOGRAM) Continuous    hydrALAZINE injection 10 mg Q8H PRN    HYDROmorphone injection 0.5 mg Q4H PRN    insulin aspart U-100 pen 0-5 Units Q6H PRN    lidocaine (PF) 10 mg/ml (1%) injection 100 mg Once    magnesium sulfate 2g in water 50mL IVPB (premix) PRN    magnesium sulfate 2g in water 50mL IVPB (premix) PRN    melatonin tablet 6 mg Nightly    nitric oxide gas Gas 10 ppm Continuous    norepinephrine 4 mg in dextrose 5% 250 mL infusion (premix) (titrating)  Continuous    ondansetron injection 4 mg Q6H PRN    oxyCODONE immediate release tablet 5 mg Q4H PRN    piperacillin-tazobactam 4.5 g in sodium chloride 0.9% 100 mL IVPB (ready to mix system) Q12H    polyethylene glycol packet 17 g Daily    potassium chloride 20 mEq in 100 mL IVPB (FOR CENTRAL LINE ADMINISTRATION ONLY) PRN    potassium chloride 20 mEq in 100 mL IVPB (FOR CENTRAL LINE ADMINISTRATION ONLY) PRN    potassium chloride 40 mEq in 100 mL IVPB (FOR CENTRAL LINE ADMINISTRATION ONLY) PRN    potassium chloride packet 20 mEq BID    propofol (DIPRIVAN) 10 mg/mL infusion Continuous    sennosides 8.8 mg/5 ml syrup 5 mL Daily    sodium chloride 3% nebulizer solution 4 mL Q6H WAKE    sodium phosphate 15 mmol in dextrose 5 % 250 mL IVPB PRN    sodium phosphate 20.01 mmol in dextrose 5 % 250 mL IVPB PRN    sodium phosphate 30 mmol in dextrose 5 % 250 mL IVPB PRN    vancomycin - pharmacy to dose pharmacy to manage frequency    vasopressin (PITRESSIN) 0.2 Units/mL in dextrose 5 % 100 mL infusion Continuous       Objective:     Vital Signs (Most Recent):  Temp: 97.7 °F (36.5 °C) (09/19/20 0732)  Pulse: (!) 122 (09/19/20 0732)  Resp: 17 (09/19/20 0732)  BP: 98/67 (09/19/20 0600)  SpO2: 99 % (09/19/20 0732)  O2 Device (Oxygen Therapy): ventilator (09/19/20 0732) Vital Signs (24h Range):  Temp:  [94.3 °F (34.6 °C)-98.2 °F (36.8 °C)] 97.7 °F (36.5 °C)  Pulse:  [] 122  Resp:  [16-34] 17  SpO2:  [63 %-100 %] 99 %  BP: ()/(35-84) 98/67  Arterial Line BP: ()/(47-84) 97/56     Weight: 93.2 kg (205 lb 7.5 oz) (09/19/20 0400)  Body mass index is 34.19 kg/m².  Body surface area is 2.07 meters squared.    I/O last 3 completed shifts:  In: 3284 [I.V.:2584; Blood:700]  Out: 630 [Urine:575; Chest Tube:55]    Physical Exam  Constitutional:       Appearance: She is ill-appearing.   Eyes:      General: No scleral icterus.     Pupils: Pupils are equal, round, and reactive to light.   Neck:       Musculoskeletal: No neck rigidity.   Cardiovascular:      Rate and Rhythm: Tachycardia present.   Pulmonary:      Comments: Intubated, bronchial breath sounds  Abdominal:      General: There is no distension.      Palpations: Abdomen is soft.      Tenderness: There is no abdominal tenderness.   Musculoskeletal:      Right lower leg: No edema.      Left lower leg: No edema.   Skin:     Coloration: Skin is not jaundiced.      Findings: No lesion or rash.   Neurological:      Mental Status: Mental status is at baseline.         Significant Labs:  All labs within the past 24 hours have been reviewed.     Significant Imaging:  Labs: Reviewed

## 2020-09-19 NOTE — PROGRESS NOTES
1750: MD Hernandez notified of patient MAPs currently 50s and current pressor requirements, orders to turn on vasopressin. Notified MD of patient uop 0 since 1600, no new orders at this time. Will continue to monitor closely.

## 2020-09-19 NOTE — PROGRESS NOTES
Pharmacokinetic Initial Assessment: IV Vancomycin    Assessment/Plan:    Initiate intravenous vancomycin with loading dose of 1750 mg once with subsequent doses when random concentrations are less than 20 mcg/mL  Desired empiric serum trough concentration is 15 to 20 mcg/mL  Draw vancomycin random level on 9/19 at 2000.  Pharmacy will continue to follow and monitor vancomycin.      Please contact pharmacy at extension [phone] with any questions regarding this assessment.     Thank you for the consult,   Santos Bustamante, PharmD, [facility]    Patient brief summary:  Fatou Lorenzo is a 75 y.o. female initiated on antimicrobial therapy with IV Vancomycin for empiric treatment.    Drug Allergies:   Review of patient's allergies indicates:  No Known Allergies    Actual Body Weight:   86.6 kg    Renal Function:   Estimated Creatinine Clearance: 23 mL/min (A) (based on SCr of 2.3 mg/dL (H)).,     Dialysis Method (if applicable):  N/A    CBC (last 72 hours):  Recent Labs   Lab Result Units 09/16/20  0320 09/17/20  0330 09/18/20  0300 09/18/20  1341 09/18/20  1849   WBC K/uL 7.80 7.62 12.68 17.78* 22.07*   Hemoglobin g/dL 7.6* 7.8* 7.8* 7.2* 7.5*   Hematocrit % 23.6* 24.4* 24.9* 22.4* 23.7*   Platelets K/uL 95* 164 252 267 328   Gran% % 76.0* 80.0* 76.0* 82.0*  --    Lymph% % 9.0* 13.0* 15.0* 5.0*  --    Mono% % 8.0 6.0 5.0 7.0  --    Eosinophil% % 0.0 0.0 1.0 0.0  --    Basophil% % 0.0 0.0 0.0 0.0  --    Differential Method  Manual Manual Manual Manual  --        Metabolic Panel (last 72 hours):  Recent Labs   Lab Result Units 09/15/20  2140 09/16/20  0320 09/16/20  0932 09/16/20  1623 09/16/20  2236 09/17/20  0330 09/17/20  1406 09/17/20  1626 09/17/20  2030 09/18/20  0300 09/18/20  1120 09/18/20  1341 09/18/20  1345   Sodium mmol/L  --  144  --   --   --  143  --   --   --  143  --   --  141   Potassium mmol/L 3.7 3.3* 4.3 3.3* 5.1 3.1* 3.1* 3.2* 3.0* 4.1 4.4  --  4.1   Chloride mmol/L  --  102  --   --   --  96  --   --   --   98  --   --  102   CO2 mmol/L  --  28  --   --   --  35*  --   --   --  32*  --   --  25   Glucose mg/dL  --  130*  --   --   --  129*  --   --   --  125*  --   --  150*   BUN, Bld mg/dL  --  44*  --   --   --  41*  --   --   --  57*  --   --  62*   Creatinine mg/dL  --  1.5*  --   --   --  1.5*  --   --   --  1.9*  --   --  2.3*   Albumin g/dL  --  2.5*  --   --   --  2.8*  --   --   --  3.0*  --   --  2.5*   Total Bilirubin mg/dL  --  2.9*  --   --   --  2.5*  --   --   --  2.3*  --   --  2.0*   Alkaline Phosphatase U/L  --  168*  --   --   --  215*  --   --   --  263*  --   --  214*   AST U/L  --  42*  --   --   --  54*  --   --   --  77*  --   --  155*   ALT U/L  --  21  --   --   --  30  --   --   --  46*  --   --  77*   Magnesium mg/dL  --  2.5  --   --   --  3.2*  --   --   --  3.2*  --  2.9*  --    Phosphorus mg/dL  --  4.2  --   --   --  4.3  --   --   --  6.0*  --   --  6.7*       Drug levels (last 3 results):  No results for input(s): VANCOMYCINRA, VANCOMYCINPE, VANCOMYCINTR in the last 72 hours.    Microbiologic Results:  Microbiology Results (last 7 days)     ** No results found for the last 168 hours. **

## 2020-09-19 NOTE — SUBJECTIVE & OBJECTIVE
Interval History/Significant Events:   Became hemodynamically unstable overnight with posterior cardiac tamponade, required pericardial window and hematoma evac. Remains on Epi, Amio gtt.     Follow-up For: Procedure(s) (LRB):  Valvuloplasty, Mitral (N/A)  REPAIR, TRICUSPID VALVE (N/A)  BAIN MAZE PROCEDURE (N/A)    Post-Operative Day: 9 Days Post-Op    Objective:     Vital Signs (Most Recent):  Temp: 98.6 °F (37 °C) (09/19/20 1229)  Pulse: 108 (09/19/20 1229)  Resp: 17 (09/19/20 0732)  BP: 103/60 (09/19/20 1100)  SpO2: 96 % (09/19/20 1229) Vital Signs (24h Range):  Temp:  [94.3 °F (34.6 °C)-98.6 °F (37 °C)] 98.6 °F (37 °C)  Pulse:  [] 108  Resp:  [16-31] 17  SpO2:  [63 %-100 %] 96 %  BP: ()/(36-84) 103/60  Arterial Line BP: ()/(43-84) 98/56     Weight: 93.2 kg (205 lb 7.5 oz)  Body mass index is 34.19 kg/m².      Intake/Output Summary (Last 24 hours) at 9/19/2020 1251  Last data filed at 9/19/2020 1100  Gross per 24 hour   Intake 2924 ml   Output 487 ml   Net 2437 ml       Physical Exam  Vitals signs and nursing note reviewed.   Constitutional:       General: She is not in acute distress.     Appearance: Normal appearance. She is well-developed. She is not diaphoretic.   HENT:      Head: Normocephalic and atraumatic.   Eyes:      General: No scleral icterus.  Neck:      Vascular: No JVD.      Comments: RIJ in place  Cardiovascular:      Rate and Rhythm: Tachycardia present. Rhythm irregular.   Pulmonary:      Effort: No respiratory distress.      Breath sounds: No wheezing.      Comments: Pacer wires in place  Dressing c/d/i  Chest:      Chest wall: Tenderness present.   Abdominal:      General: There is no distension.      Palpations: Abdomen is soft.      Tenderness: There is no abdominal tenderness.   Genitourinary:     Comments: Yoo in place  Skin:     General: Skin is warm and dry.      Coloration: Skin is not jaundiced or pale.   Neurological:      Mental Status: She is alert.      Cranial  Nerves: No cranial nerve deficit.      Comments: Following commands   Psychiatric:         Mood and Affect: Mood normal.         Behavior: Behavior normal.           Lines/Drains/Airways     Central Venous Catheter Line             Introducer with Double Lumen 09/09/20 right internal jugular 10 days    Percutaneous Central Line Insertion/Assessment - Triple Lumen  09/18/20 2200 right internal jugular less than 1 day          Drain                 Urethral Catheter 09/09/20 1512 Non-latex;Straight-tip;Temperature probe 14 Fr. 9 days         Chest Tube 09/18/20 2330 1 less than 1 day          Airway                 Airway - Non-Surgical 09/18/20 2217 Endotracheal Tube less than 1 day          Arterial Line            Arterial Line 09/18/20 1345 Left Radial less than 1 day          Line                 Pacer Wires 09/09/20 2057 9 days          Peripheral Intravenous Line                 Peripheral IV - Single Lumen 09/16/20 1809 18 G Anterior;Distal;Left Wrist 2 days                Significant Labs:    CBC/Anemia Profile:  Recent Labs   Lab 09/18/20 2237 09/19/20  0117 09/19/20  1210   WBC 20.58* 23.69* 22.13*   HGB 8.9* 9.1* 8.8*   HCT 27.6* 27.6* 26.5*    246 234   MCV 94 92 92   RDW 16.3* 16.6* 16.8*        Chemistries:  Recent Labs   Lab 09/18/20  1849 09/18/20 2111 09/18/20 2237 09/19/20  0117    138 136 136   K 4.1  4.1 4.1  4.1 4.1 4.0   CL 99 99 100 98   CO2 26 25 22* 25   BUN 68* 71* 65* 73*   CREATININE 2.7* 2.7* 2.7* 2.8*   CALCIUM 8.1* 11.1* 10.3 9.9   ALBUMIN 2.8* 2.8* 2.6* 2.7*   PROT 5.8* 5.6* 5.2* 5.6*   BILITOT 2.5* 2.6* 2.6* 3.2*   ALKPHOS 231* 218* 202* 203*   * 189* 181* 169*   * 450* 448* 340*   MG 3.2*  --  2.8* 2.8*   PHOS 7.1*  --  7.8* 6.7*       All pertinent labs within the past 24 hours have been reviewed.    Significant Imaging:  I have reviewed all pertinent imaging results/findings within the past 24 hours.

## 2020-09-19 NOTE — PROCEDURES
"Fatou Lorenzo is a 75 y.o. female patient.    Temp: 98.1 °F (36.7 °C) (09/18/20 2045)  Pulse: (!) 125 (09/18/20 2045)  Resp: 17 (09/18/20 2202)  BP: (!) 82/54 (09/18/20 2030)  SpO2: 100 % (09/18/20 2202)  Weight: 86.6 kg (191 lb) (09/18/20 1500)  Height: 5' 5" (165.1 cm) (09/18/20 1500)       Intubation    Date/Time: 9/18/2020 10:26 PM  Location procedure was performed: [facility] CRITICAL CARE SURGERY  Performed by: Yaw Murphy MD  Authorized by: Yaw Murphy MD   Pre-operative diagnosis: cardiogenic shock  Post-operative diagnosis: cardiogenic shock  Consent Done: Emergent Situation  Indications: respiratory distress,  airway protection and  hypoxemia  Intubation method: direct  Patient status: paralyzed (RSI)  Preoxygenation: bag valve mask  Sedatives: etomidate  Paralytic: succinylcholine  Laryngoscope size: Lechuga 2  Tube size: 7.0 mm  Tube type: cuffed  Number of attempts: 1  Cricoid pressure: no  Cords visualized: yes  Post-procedure assessment: chest rise and CO2 detector  Breath sounds: clear  Cuff inflated: yes  ETT to lip: 23 cm  Tube secured with: ETT mariee  Chest x-ray interpreted by radiologist.  Chest x-ray findings: endotracheal tube in appropriate position  Patient tolerance: Patient tolerated the procedure well with no immediate complications  Complications: No  Estimated blood loss (mL): 0          Yaw Murphy  9/18/2020  "

## 2020-09-19 NOTE — HPI
Fatou Lorenzo is a 76 yo F who had MVR + TVR to 9/9 that was complicated by RV hematoma and subsequent pericardial effusion.  She has been in the hospital since 8/31/2020 and had a complex hospital course complicated by a significant JONAH 9/18. She then developed pneumonia. He JONAH improved to baseline creat of 0.8-1 9/24. She required vats 10/5 procedure for empyema. She never had resolution of leukocytosis and has been on vanc and cefepime. On 10/14 her creat began to increase again in conjunction with elevated vancomycin levels. We could not find any episode of hypotension or afib w/ rvr around that time.  Her creat has been increasing daily since 10/14 and urine output has been decreasing for last 2 days. Nephrology consulted for JONAH.

## 2020-09-19 NOTE — PLAN OF CARE
See previous notes for shift events. Pt follows commands and withdraws from pain in all extremities. Anoop hugger applied. MAP > 60. HR: 80-150s, A-fib.  Vent settings: AC/VC, FiO2 50%, 5 PEEP, RR 16, , SpO2 > 100; SvO2 @ 0400: 74%  Gtts: Levo @ 0.16 mcg/kg/min, Dobutamine @ 2.5 mcg/kg/min, Propofol @ 30 mcg/kg/min, Amio @ 1 mg/hr, Nitric @ 10 ppm  UO: 0-25 cc/hr, 40 mg Lasix @ 250 mg Diuril given this shift  Chest tube output: 50 cc this shift, sanguinous  CVP: 22-14-15, flat/level  CTS MD notified of most recent labs. No new orders at this time.  Plan of care reviewed with family. All questions and concerns addressed. All VSS at this time. WCTM.    Skin: midsternal incision CHARMAINE with steri-strips CDI, chest tube dsg CDI, R groin site dsg CDI and ecchymotic, sacral and heel foams applied, heel boots on, pt turned Q2h

## 2020-09-19 NOTE — PROGRESS NOTES
Chronic lower back pain limiting activity.    [facility]  Critical Care - Surgery  Progress Note    Patient Name: Fatou Lorenzo  MRN: [id number]  Admission Date: 8/30/2020  Hospital Length of Stay: 19 days  Code Status: Full Code  Attending Provider: Antoni Waddell MD  Primary Care Provider: Ludin Rivas MD   Principal Problem: Acute respiratory failure with hypoxia    Subjective:     Hospital/ICU Course:  No notes on file    Interval History/Significant Events:   Became hemodynamically unstable overnight with posterior cardiac tamponade, required pericardial window and hematoma evac. Remains on Epi, Amio gtt.     Follow-up For: Procedure(s) (LRB):  Valvuloplasty, Mitral (N/A)  REPAIR, TRICUSPID VALVE (N/A)  BAIN MAZE PROCEDURE (N/A)    Post-Operative Day: 9 Days Post-Op    Objective:     Vital Signs (Most Recent):  Temp: 98.6 °F (37 °C) (09/19/20 1229)  Pulse: 108 (09/19/20 1229)  Resp: 17 (09/19/20 0732)  BP: 103/60 (09/19/20 1100)  SpO2: 96 % (09/19/20 1229) Vital Signs (24h Range):  Temp:  [94.3 °F (34.6 °C)-98.6 °F (37 °C)] 98.6 °F (37 °C)  Pulse:  [] 108  Resp:  [16-31] 17  SpO2:  [63 %-100 %] 96 %  BP: ()/(36-84) 103/60  Arterial Line BP: ()/(43-84) 98/56     Weight: 93.2 kg (205 lb 7.5 oz)  Body mass index is 34.19 kg/m².      Intake/Output Summary (Last 24 hours) at 9/19/2020 1251  Last data filed at 9/19/2020 1100  Gross per 24 hour   Intake 2924 ml   Output 487 ml   Net 2437 ml       Physical Exam  Vitals signs and nursing note reviewed.   Constitutional:       General: She is not in acute distress.     Appearance: Normal appearance. She is well-developed. She is not diaphoretic.   HENT:      Head: Normocephalic and atraumatic.   Eyes:      General: No scleral icterus.  Neck:      Vascular: No JVD.      Comments: RIJ in place  Cardiovascular:      Rate and Rhythm: Tachycardia present. Rhythm irregular.   Pulmonary:      Effort: No respiratory distress.      Breath sounds: No wheezing.      Comments: Pacer  wires in place  Dressing c/d/i  Chest:      Chest wall: Tenderness present.   Abdominal:      General: There is no distension.      Palpations: Abdomen is soft.      Tenderness: There is no abdominal tenderness.   Genitourinary:     Comments: Yoo in place  Skin:     General: Skin is warm and dry.      Coloration: Skin is not jaundiced or pale.   Neurological:      Mental Status: She is alert.      Cranial Nerves: No cranial nerve deficit.      Comments: Following commands   Psychiatric:         Mood and Affect: Mood normal.         Behavior: Behavior normal.           Lines/Drains/Airways     Central Venous Catheter Line             Introducer with Double Lumen 09/09/20 right internal jugular 10 days    Percutaneous Central Line Insertion/Assessment - Triple Lumen  09/18/20 2200 right internal jugular less than 1 day          Drain                 Urethral Catheter 09/09/20 1512 Non-latex;Straight-tip;Temperature probe 14 Fr. 9 days         Chest Tube 09/18/20 2330 1 less than 1 day          Airway                 Airway - Non-Surgical 09/18/20 2217 Endotracheal Tube less than 1 day          Arterial Line            Arterial Line 09/18/20 1345 Left Radial less than 1 day          Line                 Pacer Wires 09/09/20 2057 9 days          Peripheral Intravenous Line                 Peripheral IV - Single Lumen 09/16/20 1809 18 G Anterior;Distal;Left Wrist 2 days                Significant Labs:    CBC/Anemia Profile:  Recent Labs   Lab 09/18/20 2237 09/19/20  0117 09/19/20  1210   WBC 20.58* 23.69* 22.13*   HGB 8.9* 9.1* 8.8*   HCT 27.6* 27.6* 26.5*    246 234   MCV 94 92 92   RDW 16.3* 16.6* 16.8*        Chemistries:  Recent Labs   Lab 09/18/20  1849 09/18/20  2111 09/18/20  2237 09/19/20  0117    138 136 136   K 4.1  4.1 4.1  4.1 4.1 4.0   CL 99 99 100 98   CO2 26 25 22* 25   BUN 68* 71* 65* 73*   CREATININE 2.7* 2.7* 2.7* 2.8*   CALCIUM 8.1* 11.1* 10.3 9.9   ALBUMIN 2.8* 2.8* 2.6* 2.7*   PROT  5.8* 5.6* 5.2* 5.6*   BILITOT 2.5* 2.6* 2.6* 3.2*   ALKPHOS 231* 218* 202* 203*   * 189* 181* 169*   * 450* 448* 340*   MG 3.2*  --  2.8* 2.8*   PHOS 7.1*  --  7.8* 6.7*       All pertinent labs within the past 24 hours have been reviewed.    Significant Imaging:  I have reviewed all pertinent imaging results/findings within the past 24 hours.    Assessment/Plan:     Severe mitral regurgitation  Fatou Lorenzo is a 75 y.o. female s/p MVr, TVr 9/9/2020. Case noteworthy for multiple pump runs (3) and RV hematoma due to retraction. Unstable overnight 9/18, required pericardial window for evac of posterior cardiac tamponade.     Neuro:  - Intubated and sedated  - Started on scheduled tylenol and PRN oxy, dilaudid      CV:  S/p MVr, TVr 9/9/20. Case noteworthy for pump run x3, RV hematoma 2/2 retraction  - Pt with Afib, required cardioversion 9/18. RRR this AM  - Metop 25 BID  - Amio PO 200mg BID, Amio gtt  - MAP goal 60-80, SBP <140  - Pacer wires in place, set to back up rate 50   - Keep Epi at 0.06  - Digoxin 0.125 daily     Pulm:  - Intubated  Vent Mode: A/C  Oxygen Concentration (%):  [50] 50  Resp Rate Total:  [16 br/min-18 br/min] 18 br/min  Vt Set:  [350 mL] 350 mL  PEEP/CPAP:  [5 cmH20] 5 cmH20  Mean Airway Pressure:  [8.3 kbF89-56 cmH20] 9.3 cmH20  - ABG q4     FEN/GI:  - Lasix 10 IV once this AM, lasix gtt at 20/hr  - Replace lytes as needed  - NPO  - ppx: famotidine, miralax  - Start trickle TFs    Renal:  - Yoo in place for strict I/O  - Continue with aggressive diuresis   - Lasix 10 once, lasix gtt at 20  - Trend BUN/Cr      Heme/Onc:  - H/H stable  - Heparin gtt for persistent a fib  - Coumadin 2 today     ID:  - Afebrile, WBC WNL  - periop antibiotics complete     Endo:  - no hx of DM  - ISS    PPx:  - famotidine, heparin gtt bridge to warfarin, SCDs     Dispo: continue ICU care           Critical care was time spent personally by me on the following activities: development of treatment  plan with patient or surrogate and bedside caregivers, discussions with consultants, evaluation of patient's response to treatment, examination of patient, ordering and performing treatments and interventions, ordering and review of laboratory studies, ordering and review of radiographic studies, pulse oximetry, re-evaluation of patient's condition.  This critical care time did not overlap with that of any other provider or involve time for any procedures.     Albert Abrams MD  Critical Care - Surgery  [facility]

## 2020-09-19 NOTE — PROGRESS NOTES
Dr. Grajeda and Dr. Patterson at bedside at shift change regarding pt hypotension, A-fib (-150s), and increased vasopressor support. See flowsheets for vital signs. ABG obtained. SVO2 41. Labs sent. Orders to replace Ca; Vanc and Zosyn started imperically, and 2 units PRBCs transfused. Amio gtt restarted at 1 mg/hr. Pt continues with no urine output, nephrology consulted.    Orders for Digoxin and Amio bolus obtained after unsuccessful conversion x 3. Pt continued with requiring increased vasopressor support. Cardiology consulted. Repeat echo obtained. See results review. Pt intubated for airway protection and pericardial window performed at bedside by Andreina Grajeda, Hill, and Leonardo. Please see flowsheet for continued VS and monitoring. This RN at bedside throughout with charge and float RNs assisting. Family updated of all events by team. MARISOL.

## 2020-09-19 NOTE — PROGRESS NOTES
[facility]  Nephrology  Progress Note    Patient Name: Fatou Lorenzo  MRN: [id number]  Admission Date: 8/30/2020  Hospital Length of Stay: 19 days  Attending Provider: Antoni Waddell MD   Primary Care Physician: Ludin Rivas MD  Principal Problem:Acute respiratory failure with hypoxia    Subjective:     HPI: Fatou Lorenzo is a 76 yo F who had MVR + TVR to 9/9 that was complicated by RV hematoma and subsequent pericardial effusion.  She has been treated in ICU and has had pressor requirement that was DC-ed 3 days ago.  She was being planned to be transitioned to the floor today, however, this morning she had acute decompensation with hypotension and tachycardia.  She has had frequent runs of atrial fibrillation since being admitted, and has intermittently been in AFib throughout the day.  She was restarted on epinephrine and Levophed and TTE was ordered to assess for pericardial tamponade.  It should be noted, however, when she 1st became hypotensive she was bradycardic.     When seen this evening with staff, she is alert and oriented for remained hypotensive.  Heart rate is in the 130s which is atrial fibrillation.  She is on Levophed at 0.02 and epinephrine at 0.05.  TTE demonstrated mild to moderate posterior pericardial effusion, with no significant mitral valve respiratory variation and no significant RA/RV diastolic collapse.  EF was 25% with biventricular failure and noted RV hematoma.  Denies any chest pain, however, she did have some epigastric pain earlier today when she a for the 1st time that precipitated some her events.  No other acute events. Reportedly has had less urine output over the course of her day.    Interval History: no improvement in urine output though volume status either dry or euvolemic.    Review of patient's allergies indicates:  No Known Allergies  Current Facility-Administered Medications   Medication Frequency    0.9%  NaCl infusion (for blood  administration) Q24H PRN    acetaminophen tablet 650 mg Q6H    amiodarone 360 mg/200 mL (1.8 mg/mL) infusion Continuous    amiodarone tablet 200 mg Daily    ascorbic acid (vitamin C) tablet 500 mg BID    aspirin chewable tablet 81 mg Daily    calcium carbonate 200 mg calcium (500 mg) chewable tablet 1,000 mg TID PRN    calcium gluconate 1g in dextrose 5% 100mL (ready to mix system) PRN    calcium gluconate 1g in dextrose 5% 100mL (ready to mix system) Once    calcium gluconate 2 g in dextrose 5 % 100 mL IVPB PRN    calcium gluconate 3 g in dextrose 5 % 100 mL IVPB PRN    chlorothiazide (DIURIL) 250 mg in dextrose 5 % 50 mL IVPB Once    dextrose 5 % and 0.45 % NaCl with KCl 20 mEq infusion Continuous    dextrose 50% injection 12.5 g PRN    dextrose 50% injection 25 g PRN    digoxin injection 125 mcg Once    DOBUTamine 500mg in D5W 250mL infusion (premix) (NON-TITRATING) Continuous    EPINEPHrine (ADRENALIN) 5 mg in sodium chloride 0.9% 250 mL infusion Continuous    famotidine tablet 20 mg Daily    furosemide injection 40 mg BID    glucagon (human recombinant) injection 1 mg PRN    heparin 25,000 units in dextrose 5% 250 mL (100 units/mL) infusion (heparin infusion - NO NOMOGRAM) Continuous    hydrALAZINE injection 10 mg Q8H PRN    HYDROmorphone injection 0.5 mg Q4H PRN    insulin aspart U-100 pen 0-5 Units Q6H PRN    lidocaine (PF) 10 mg/ml (1%) injection 100 mg Once    magnesium sulfate 2g in water 50mL IVPB (premix) PRN    magnesium sulfate 2g in water 50mL IVPB (premix) PRN    melatonin tablet 6 mg Nightly    nitric oxide gas Gas 10 ppm Continuous    norepinephrine 4 mg in dextrose 5% 250 mL infusion (premix) (titrating) Continuous    ondansetron injection 4 mg Q6H PRN    oxyCODONE immediate release tablet 5 mg Q4H PRN    piperacillin-tazobactam 4.5 g in sodium chloride 0.9% 100 mL IVPB (ready to mix system) Q12H    polyethylene glycol packet 17 g Daily    potassium chloride 20  mEq in 100 mL IVPB (FOR CENTRAL LINE ADMINISTRATION ONLY) PRN    potassium chloride 20 mEq in 100 mL IVPB (FOR CENTRAL LINE ADMINISTRATION ONLY) PRN    potassium chloride 40 mEq in 100 mL IVPB (FOR CENTRAL LINE ADMINISTRATION ONLY) PRN    potassium chloride packet 20 mEq BID    propofol (DIPRIVAN) 10 mg/mL infusion Continuous    sennosides 8.8 mg/5 ml syrup 5 mL Daily    sodium chloride 3% nebulizer solution 4 mL Q6H WAKE    sodium phosphate 15 mmol in dextrose 5 % 250 mL IVPB PRN    sodium phosphate 20.01 mmol in dextrose 5 % 250 mL IVPB PRN    sodium phosphate 30 mmol in dextrose 5 % 250 mL IVPB PRN    vancomycin - pharmacy to dose pharmacy to manage frequency    vasopressin (PITRESSIN) 0.2 Units/mL in dextrose 5 % 100 mL infusion Continuous       Objective:     Vital Signs (Most Recent):  Temp: 97.7 °F (36.5 °C) (09/19/20 0732)  Pulse: (!) 122 (09/19/20 0732)  Resp: 17 (09/19/20 0732)  BP: 98/67 (09/19/20 0600)  SpO2: 99 % (09/19/20 0732)  O2 Device (Oxygen Therapy): ventilator (09/19/20 0732) Vital Signs (24h Range):  Temp:  [94.3 °F (34.6 °C)-98.2 °F (36.8 °C)] 97.7 °F (36.5 °C)  Pulse:  [] 122  Resp:  [16-34] 17  SpO2:  [63 %-100 %] 99 %  BP: ()/(35-84) 98/67  Arterial Line BP: ()/(47-84) 97/56     Weight: 93.2 kg (205 lb 7.5 oz) (09/19/20 0400)  Body mass index is 34.19 kg/m².  Body surface area is 2.07 meters squared.    I/O last 3 completed shifts:  In: 3284 [I.V.:2584; Blood:700]  Out: 630 [Urine:575; Chest Tube:55]    Physical Exam  Constitutional:       Appearance: She is ill-appearing.   Eyes:      General: No scleral icterus.     Pupils: Pupils are equal, round, and reactive to light.   Neck:      Musculoskeletal: No neck rigidity.   Cardiovascular:      Rate and Rhythm: Tachycardia present.   Pulmonary:      Comments: Intubated, bronchial breath sounds  Abdominal:      General: There is no distension.      Palpations: Abdomen is soft.      Tenderness: There is no abdominal  tenderness.   Musculoskeletal:      Right lower leg: No edema.      Left lower leg: No edema.   Skin:     Coloration: Skin is not jaundiced.      Findings: No lesion or rash.   Neurological:      Mental Status: Mental status is at baseline.         Significant Labs:  All labs within the past 24 hours have been reviewed.     Significant Imaging:  Labs: Reviewed    Assessment/Plan:     Leukocytosis  -new  -central catheter present, remove or replace  -blood cultures, UA, broad spec abx, procalcitonin  -per icu    Atrial fibrillation with RVR  -improved  -rvr on prior paroxysmal diagnosis  -secondary to  MVR + TVR vs sepsis vs volume depletion  -per cardiology    JONAH (acute kidney injury)  -oliguric/anuric kdigo stage 2 jonah likely with ischemic atn probably due to septic vs cardiogenic shock evidenced by leukocytosis and afib with rvr  -RVR improved without significant improvement in bp  -urine output 1-4L per day until today  -xray without significant pulmonary edema, pleural effusions present  -elevated hco3 vbg suggests contraction alkalosis vs compensation from chronic respiratory acidosis  -with the large volumes of urine output until today I would think that she is more volume depleted than volume overloaded  -agree with using pressers setting of  MVR + TVR and depressed ef  -suspect though that she may benefit from 1L LR  -please call with any questions    Oliguria and anuria  -starting 9/18/20, prior was producing large volumes of urine        Rashad Borrero MD  Nephrology  [facility]

## 2020-09-20 NOTE — ASSESSMENT & PLAN NOTE
-persistent  -central catheter present, remove or replace  -blood cultures, UA, broad spec abx, procalcitonin  -per icu

## 2020-09-20 NOTE — ASSESSMENT & PLAN NOTE
Fatou Lorenzo is a 75 y.o. female s/p MVr, TVr 9/9/2020. Case noteworthy for multiple pump runs (3) and RV hematoma due to retraction. Unstable overnight 9/18, required pericardial window for evac of posterior cardiac tamponade.     Neuro:  - Intubated and sedated  - Started on scheduled tylenol and PRN oxy, dilaudid      CV:  S/p MVr, TVr 9/9/20. Case noteworthy for pump run x3, RV hematoma 2/2 retraction  - Pt with Afib, required cardioversion 9/18. Tachy into 110s this AM with Afib.   - Inc to Metop 50 BID  - Amio PO 200mg BID, Amio gtt  - MAP goal 60-80, SBP <140  - Pacer wires in place, set to back up rate 50   - On Epi, Vaso (Epi can go as low as 0.04)  - Digoxin 0.125 daily     Pulm:  - Intubated  Vent Mode: A/C  Oxygen Concentration (%):  [40-50] 40  Resp Rate Total:  [16 br/min-19 br/min] 19 br/min  Vt Set:  [350 mL] 350 mL  PEEP/CPAP:  [5 cmH20] 5 cmH20  Mean Airway Pressure:  [7.6 cmH20-8.6 cmH20] 8.5 cmH20  - ABG q4     FEN/GI:  - Lasix 10 IV once this AM, lasix gtt at 20/hr  - Replace lytes as needed  - NPO  - ppx: famotidine, miralax  - Advance TFs as tolerated    Renal:  - Yoo in place for strict I/O  - Continue with aggressive diuresis   - Lasix 10 once, lasix gtt at 20  - Trend BUN/Cr      Heme/Onc:  - H/H stable  - Hold Coumadin today (supra at 4.9)     ID:  - Afebrile, WBC WNL  - periop antibiotics complete  - Procal elevated at 1.4 today, order daily Procals     Endo:  - no hx of DM  - ISS    PPx:  - famotidine, warfarin, SCDs     Dispo: continue ICU care

## 2020-09-20 NOTE — PLAN OF CARE
Plan of Care Note  Cardiothoracic Surgery    Fatou Lorenzo is a 75 y.o. female with tricuspid regurg and mitral regurg and afib s/p TVr, MVr, MAZE (9/9) requiring pericardial window and attempted (failed) cardioversion after decompensation (9/18)    Specific issues:   --continue po amio 200 bid, discontinue gtt; asa, dig 125  --hold coumadin today  --daily dig levels  --increase tube feeds to 20cc/hr  --d/c dobutamine  --vaso .04, don't wean epi below .04   --monitor UOP  --monitor R pleural effusion  --q6h ABG  --change zosyn to cefepime    Plan of care for patient was discussed with ICU staff including nurses, residents, and faculty and appropriate consulting services.    Will continue to monitor patient's hemodynamics, functionality, neuro status, fluid status and renal function, and labs and will adjust medications and fluids as necessary while monitoring appropriateness for de-escalation of support and monitoring and transport to stepdown unit.    Isaiah Zacarias MD  Cardiothoracic Surgery Fellow

## 2020-09-20 NOTE — PLAN OF CARE
"      SICU PLAN OF CARE NOTE    Dx: Acute respiratory failure with hypoxia    Shift Events: VSS, turned off dobutamine    Goals of Care: rest for next 24 hours to wean pressors and extubate    Neuro: follows commands, PERRLA    Vital Signs: BP (!) 92/56 (BP Location: Right arm, Patient Position: Lying)   Pulse 90   Temp 98.2 °F (36.8 °C)   Resp 18   Ht 5' 5" (1.651 m)   Wt 87.2 kg (192 lb 3.9 oz)   SpO2 97%   Breastfeeding No   BMI 31.99 kg/m²     Respiratory: A/C 40%/PEEP 5, Nitric 7    Diet: TF 20 mL/hr    Gtts: Amio 0.5, Epi 0.05, Lasix 20, Vaso 0.04, Propofol 30    Urine Output: 1370 cc/shift    Drains: pericardial drain in mid chest     Labs/Accuchecks: q 6hr    Skin: no new skin breakdown noted       "

## 2020-09-20 NOTE — PROGRESS NOTES
[facility]  Nephrology  Progress Note    Patient Name: Fatou Lorenzo  MRN: [id number]  Admission Date: 8/30/2020  Hospital Length of Stay: 20 days  Attending Provider: Antoni Waddell MD   Primary Care Physician: Ludin Rivas MD  Principal Problem:Acute respiratory failure with hypoxia    Subjective:     HPI: Fatou Lorenzo is a 76 yo F who had MVR + TVR to 9/9 that was complicated by RV hematoma and subsequent pericardial effusion.  She has been treated in ICU and has had pressor requirement that was DC-ed 3 days ago.  She was being planned to be transitioned to the floor today, however, this morning she had acute decompensation with hypotension and tachycardia.  She has had frequent runs of atrial fibrillation since being admitted, and has intermittently been in AFib throughout the day.  She was restarted on epinephrine and Levophed and TTE was ordered to assess for pericardial tamponade.  It should be noted, however, when she 1st became hypotensive she was bradycardic.     When seen this evening with staff, she is alert and oriented for remained hypotensive.  Heart rate is in the 130s which is atrial fibrillation.  She is on Levophed at 0.02 and epinephrine at 0.05.  TTE demonstrated mild to moderate posterior pericardial effusion, with no significant mitral valve respiratory variation and no significant RA/RV diastolic collapse.  EF was 25% with biventricular failure and noted RV hematoma.  Denies any chest pain, however, she did have some epigastric pain earlier today when she a for the 1st time that precipitated some her events.  No other acute events. Reportedly has had less urine output over the course of her day.    Interval History: 2.8 liters of urine output in last 24 hours. Basically net even over 24 hours. To get net negative decrease input.    Review of patient's allergies indicates:  No Known Allergies  Current Facility-Administered Medications   Medication Frequency    0.9%   NaCl infusion (for blood administration) Q24H PRN    acetaminophen tablet 650 mg Q6H    amiodarone 360 mg/200 mL (1.8 mg/mL) infusion Continuous    amiodarone tablet 200 mg BID    aspirin chewable tablet 81 mg Daily    calcium carbonate 200 mg calcium (500 mg) chewable tablet 1,000 mg TID PRN    calcium gluconate 1g in dextrose 5% 100mL (ready to mix system) PRN    calcium gluconate 1g in dextrose 5% 100mL (ready to mix system) Once    calcium gluconate 2 g in dextrose 5 % 100 mL IVPB PRN    calcium gluconate 3 g in dextrose 5 % 100 mL IVPB PRN    chlorothiazide (DIURIL) 250 mg in dextrose 5 % 50 mL IVPB Once    dextrose 5 % and 0.45 % NaCl with KCl 20 mEq infusion Continuous    dextrose 50% injection 12.5 g PRN    dextrose 50% injection 25 g PRN    digoxin injection 125 mcg Daily    DOBUTamine 500mg in D5W 250mL infusion (premix) (NON-TITRATING) Continuous    EPINEPHrine (ADRENALIN) 5 mg in sodium chloride 0.9% 250 mL infusion Continuous    famotidine tablet 20 mg Daily    furosemide (LASIX) 500 mg infusion (conc: 10 mg/mL) Continuous    glucagon (human recombinant) injection 1 mg PRN    hydrALAZINE injection 10 mg Q8H PRN    HYDROmorphone injection 0.5 mg Q4H PRN    insulin aspart U-100 pen 0-5 Units Q6H PRN    lidocaine (PF) 10 mg/ml (1%) injection 100 mg Once    magnesium sulfate 2g in water 50mL IVPB (premix) PRN    magnesium sulfate 2g in water 50mL IVPB (premix) PRN    melatonin tablet 6 mg Nightly    nitric oxide gas Gas 10 ppm Continuous    norepinephrine 4 mg in dextrose 5% 250 mL infusion (premix) (titrating) Continuous    ondansetron injection 4 mg Q6H PRN    oxyCODONE immediate release tablet 5 mg Q4H PRN    piperacillin-tazobactam 4.5 g in sodium chloride 0.9% 100 mL IVPB (ready to mix system) Q12H    polyethylene glycol packet 17 g Daily    potassium chloride 20 mEq in 100 mL IVPB (FOR CENTRAL LINE ADMINISTRATION ONLY) PRN    potassium chloride 20 mEq in 100 mL IVPB  (FOR CENTRAL LINE ADMINISTRATION ONLY) PRN    potassium chloride 40 mEq in 100 mL IVPB (FOR CENTRAL LINE ADMINISTRATION ONLY) PRN    potassium chloride packet 20 mEq BID    propofol (DIPRIVAN) 10 mg/mL infusion Continuous    sennosides 8.8 mg/5 ml syrup 5 mL Daily    sodium chloride 3% nebulizer solution 4 mL Q6H WAKE    sodium phosphate 15 mmol in dextrose 5 % 250 mL IVPB PRN    sodium phosphate 20.01 mmol in dextrose 5 % 250 mL IVPB PRN    sodium phosphate 30 mmol in dextrose 5 % 250 mL IVPB PRN    vancomycin - pharmacy to dose pharmacy to manage frequency    vasopressin (PITRESSIN) 0.2 Units/mL in dextrose 5 % 100 mL infusion Continuous       Objective:     Vital Signs (Most Recent):  Temp: 96.4 °F (35.8 °C) (09/20/20 0748)  Pulse: 101 (09/20/20 0915)  Resp: (!) 22 (09/20/20 0733)  BP: (!) 103/54 (09/20/20 0900)  SpO2: 97 % (09/20/20 0915)  O2 Device (Oxygen Therapy): ventilator (09/20/20 0900) Vital Signs (24h Range):  Temp:  [40.6 °F (4.8 °C)-98.8 °F (37.1 °C)] 96.4 °F (35.8 °C)  Pulse:  [] 101  Resp:  [17-22] 22  SpO2:  [95 %-99 %] 97 %  BP: ()/(52-82) 103/54  Arterial Line BP: ()/(46-95) 97/53     Weight: 87.2 kg (192 lb 3.9 oz) (09/20/20 0400)  Body mass index is 31.99 kg/m².  Body surface area is 2 meters squared.    I/O last 3 completed shifts:  In: 5550 [I.V.:4530; Blood:700; NG/GT:320]  Out: 3140 [Urine:2957; Chest Tube:183]    Physical Exam  Constitutional:       General: She is not in acute distress.  Eyes:      General: No scleral icterus.     Pupils: Pupils are equal, round, and reactive to light.   Neck:      Musculoskeletal: No neck rigidity.   Cardiovascular:      Rate and Rhythm: Normal rate.   Pulmonary:      Comments: Intubated, bronchial breath sounds  Abdominal:      General: There is no distension.      Palpations: Abdomen is soft.      Tenderness: There is no abdominal tenderness.   Musculoskeletal:      Right lower leg: No edema.      Left lower leg: No edema.    Skin:     Coloration: Skin is not jaundiced.      Findings: No lesion or rash.   Neurological:      Mental Status: Mental status is at baseline.         Significant Labs:  All labs within the past 24 hours have been reviewed.     Significant Imaging:  Labs: Reviewed    Assessment/Plan:     Leukocytosis  -persistent  -central catheter present, remove or replace  -blood cultures, UA, broad spec abx, procalcitonin  -per icu    Atrial fibrillation with RVR  -improved  -rvr on prior paroxysmal diagnosis  -secondary to  MVR + TVR vs sepsis vs volume depletion  -per cardiology    JONAH (acute kidney injury)  -oliguric/anuric kdigo stage 2 jonah likely with ischemic atn probably due to septic vs cardiogenic shock evidenced by leukocytosis and afib with rvr  -RVR improved without significant improvement in bp  -urine output 1-4L per day until jonah  -xray without significant pulmonary edema, pleural effusions present  -2.8L urine output over last 24 hours should be more than adequate  -continue current management and reduce inputs as much as possible    Oliguria and anuria  -resolved  -starting 9/18/20, prior was producing large volumes of urine          Rashad Borrero MD  Nephrology  [facility]

## 2020-09-20 NOTE — SUBJECTIVE & OBJECTIVE
Interval History/Significant Events:   Became hemodynamically unstable overnight with posterior cardiac tamponade, required pericardial window and hematoma evac. Remains on Epi, Amio gtt.     Follow-up For: Procedure(s) (LRB):  Valvuloplasty, Mitral (N/A)  REPAIR, TRICUSPID VALVE (N/A)  BAIN MAZE PROCEDURE (N/A)    Post-Operative Day: 9 Days Post-Op    Objective:     Vital Signs (Most Recent):  Temp: 96.4 °F (35.8 °C) (09/20/20 0748)  Pulse: 104 (09/20/20 1100)  Resp: (!) 22 (09/20/20 0733)  BP: 105/60 (09/20/20 1100)  SpO2: 99 % (09/20/20 1100) Vital Signs (24h Range):  Temp:  [40.6 °F (4.8 °C)-98.8 °F (37.1 °C)] 96.4 °F (35.8 °C)  Pulse:  [] 104  Resp:  [17-22] 22  SpO2:  [95 %-99 %] 99 %  BP: ()/(52-71) 105/60  Arterial Line BP: ()/(46-95) 90/50     Weight: 87.2 kg (192 lb 3.9 oz)  Body mass index is 31.99 kg/m².      Intake/Output Summary (Last 24 hours) at 9/20/2020 1225  Last data filed at 9/20/2020 1100  Gross per 24 hour   Intake 3086 ml   Output 3113 ml   Net -27 ml       Physical Exam  Vitals signs and nursing note reviewed.   Constitutional:       General: She is not in acute distress.     Appearance: Normal appearance. She is well-developed. She is not diaphoretic.   HENT:      Head: Normocephalic and atraumatic.   Eyes:      General: No scleral icterus.  Neck:      Vascular: No JVD.      Comments: RIJ in place  Cardiovascular:      Rate and Rhythm: Tachycardia present. Rhythm irregular.   Pulmonary:      Effort: No respiratory distress.      Breath sounds: No wheezing.      Comments: Pacer wires in place  Dressing c/d/i  Abdominal:      General: There is no distension.      Palpations: Abdomen is soft.      Tenderness: There is no abdominal tenderness.   Genitourinary:     Comments: Yoo in place  Skin:     General: Skin is warm and dry.      Coloration: Skin is not jaundiced or pale.   Neurological:      Mental Status: She is alert.      Cranial Nerves: No cranial nerve deficit.       Comments: Following commands   Psychiatric:         Mood and Affect: Mood normal.         Behavior: Behavior normal.           Lines/Drains/Airways     Central Venous Catheter Line             Introducer with Double Lumen 09/09/20 right internal jugular 11 days    Percutaneous Central Line Insertion/Assessment - Triple Lumen  09/18/20 2200 right internal jugular 1 day          Drain                 Urethral Catheter 09/09/20 1512 Non-latex;Straight-tip;Temperature probe 14 Fr. 10 days         Chest Tube 09/18/20 2330 1 1 day         NG/OG Tube 09/19/20 1000 Right mouth 1 day          Airway                 Airway - Non-Surgical 09/18/20 2217 Endotracheal Tube 1 day          Arterial Line            Arterial Line 09/18/20 1345 Left Radial 1 day          Line                 Pacer Wires 09/09/20 2057 10 days          Peripheral Intravenous Line                 Peripheral IV - Single Lumen 09/16/20 1809 18 G Anterior;Distal;Left Wrist 3 days                Significant Labs:    CBC/Anemia Profile:  Recent Labs   Lab 09/19/20 1712 09/20/20  0300 09/20/20  1131   WBC 24.20* 23.13* 24.83*   HGB 8.9* 8.6* 8.8*   HCT 26.5* 25.4* 26.1*    239 270   MCV 91 92 92   RDW 16.9* 16.8* 16.6*        Chemistries:  Recent Labs   Lab 09/18/20  2237 09/19/20  0117  09/19/20 1712 09/20/20  0300 09/20/20  0942 09/20/20  1131    136   < > 135*  --  136  --  136   K 4.1 4.0   < > 3.9   < > 3.5 3.6 3.7    98   < > 96  --  96  --  97   CO2 22* 25   < > 24  --  24  --  22*   BUN 65* 73*   < > 71*  --  66*  --  64*   CREATININE 2.7* 2.8*   < > 2.9*  --  2.7*  --  2.6*   CALCIUM 10.3 9.9   < > 9.0  --  8.4*  --  8.4*   ALBUMIN 2.6* 2.7*  --   --   --  2.4*  --   --    PROT 5.2* 5.6*  --   --   --  5.3*  --   --    BILITOT 2.6* 3.2*  --   --   --  3.3*  --   --    ALKPHOS 202* 203*  --   --   --  181*  --   --    * 169*  --   --   --  121*  --   --    * 340*  --   --   --  79*  --   --    MG 2.8* 2.8*   < > 2.6   --  2.5  --  2.4   PHOS 7.8* 6.7*   < > 6.1*  --  6.1*  --  5.4*    < > = values in this interval not displayed.       All pertinent labs within the past 24 hours have been reviewed.    Significant Imaging:  I have reviewed all pertinent imaging results/findings within the past 24 hours.

## 2020-09-20 NOTE — SUBJECTIVE & OBJECTIVE
Interval History: 2.8 liters of urine output in last 24 hours. Basically net even over 24 hours. To get net negative decrease input.    Review of patient's allergies indicates:  No Known Allergies  Current Facility-Administered Medications   Medication Frequency    0.9%  NaCl infusion (for blood administration) Q24H PRN    acetaminophen tablet 650 mg Q6H    amiodarone 360 mg/200 mL (1.8 mg/mL) infusion Continuous    amiodarone tablet 200 mg BID    aspirin chewable tablet 81 mg Daily    calcium carbonate 200 mg calcium (500 mg) chewable tablet 1,000 mg TID PRN    calcium gluconate 1g in dextrose 5% 100mL (ready to mix system) PRN    calcium gluconate 1g in dextrose 5% 100mL (ready to mix system) Once    calcium gluconate 2 g in dextrose 5 % 100 mL IVPB PRN    calcium gluconate 3 g in dextrose 5 % 100 mL IVPB PRN    chlorothiazide (DIURIL) 250 mg in dextrose 5 % 50 mL IVPB Once    dextrose 5 % and 0.45 % NaCl with KCl 20 mEq infusion Continuous    dextrose 50% injection 12.5 g PRN    dextrose 50% injection 25 g PRN    digoxin injection 125 mcg Daily    DOBUTamine 500mg in D5W 250mL infusion (premix) (NON-TITRATING) Continuous    EPINEPHrine (ADRENALIN) 5 mg in sodium chloride 0.9% 250 mL infusion Continuous    famotidine tablet 20 mg Daily    furosemide (LASIX) 500 mg infusion (conc: 10 mg/mL) Continuous    glucagon (human recombinant) injection 1 mg PRN    hydrALAZINE injection 10 mg Q8H PRN    HYDROmorphone injection 0.5 mg Q4H PRN    insulin aspart U-100 pen 0-5 Units Q6H PRN    lidocaine (PF) 10 mg/ml (1%) injection 100 mg Once    magnesium sulfate 2g in water 50mL IVPB (premix) PRN    magnesium sulfate 2g in water 50mL IVPB (premix) PRN    melatonin tablet 6 mg Nightly    nitric oxide gas Gas 10 ppm Continuous    norepinephrine 4 mg in dextrose 5% 250 mL infusion (premix) (titrating) Continuous    ondansetron injection 4 mg Q6H PRN    oxyCODONE immediate release tablet 5 mg Q4H PRN     piperacillin-tazobactam 4.5 g in sodium chloride 0.9% 100 mL IVPB (ready to mix system) Q12H    polyethylene glycol packet 17 g Daily    potassium chloride 20 mEq in 100 mL IVPB (FOR CENTRAL LINE ADMINISTRATION ONLY) PRN    potassium chloride 20 mEq in 100 mL IVPB (FOR CENTRAL LINE ADMINISTRATION ONLY) PRN    potassium chloride 40 mEq in 100 mL IVPB (FOR CENTRAL LINE ADMINISTRATION ONLY) PRN    potassium chloride packet 20 mEq BID    propofol (DIPRIVAN) 10 mg/mL infusion Continuous    sennosides 8.8 mg/5 ml syrup 5 mL Daily    sodium chloride 3% nebulizer solution 4 mL Q6H WAKE    sodium phosphate 15 mmol in dextrose 5 % 250 mL IVPB PRN    sodium phosphate 20.01 mmol in dextrose 5 % 250 mL IVPB PRN    sodium phosphate 30 mmol in dextrose 5 % 250 mL IVPB PRN    vancomycin - pharmacy to dose pharmacy to manage frequency    vasopressin (PITRESSIN) 0.2 Units/mL in dextrose 5 % 100 mL infusion Continuous       Objective:     Vital Signs (Most Recent):  Temp: 96.4 °F (35.8 °C) (09/20/20 0748)  Pulse: 101 (09/20/20 0915)  Resp: (!) 22 (09/20/20 0733)  BP: (!) 103/54 (09/20/20 0900)  SpO2: 97 % (09/20/20 0915)  O2 Device (Oxygen Therapy): ventilator (09/20/20 0900) Vital Signs (24h Range):  Temp:  [40.6 °F (4.8 °C)-98.8 °F (37.1 °C)] 96.4 °F (35.8 °C)  Pulse:  [] 101  Resp:  [17-22] 22  SpO2:  [95 %-99 %] 97 %  BP: ()/(52-82) 103/54  Arterial Line BP: ()/(46-95) 97/53     Weight: 87.2 kg (192 lb 3.9 oz) (09/20/20 0400)  Body mass index is 31.99 kg/m².  Body surface area is 2 meters squared.    I/O last 3 completed shifts:  In: 5550 [I.V.:4530; Blood:700; NG/GT:320]  Out: 3140 [Urine:2957; Chest Tube:183]    Physical Exam  Constitutional:       General: She is not in acute distress.  Eyes:      General: No scleral icterus.     Pupils: Pupils are equal, round, and reactive to light.   Neck:      Musculoskeletal: No neck rigidity.   Cardiovascular:      Rate and Rhythm: Normal rate.    Pulmonary:      Comments: Intubated, bronchial breath sounds  Abdominal:      General: There is no distension.      Palpations: Abdomen is soft.      Tenderness: There is no abdominal tenderness.   Musculoskeletal:      Right lower leg: No edema.      Left lower leg: No edema.   Skin:     Coloration: Skin is not jaundiced.      Findings: No lesion or rash.   Neurological:      Mental Status: Mental status is at baseline.         Significant Labs:  All labs within the past 24 hours have been reviewed.     Significant Imaging:  Labs: Reviewed

## 2020-09-20 NOTE — PLAN OF CARE
"      SICU PLAN OF CARE NOTE    Dx: Acute respiratory failure with hypoxia    Shift Events: started Vaso and titrated down on Epi    Goals of Care: rest for next 48-72 hours, diuresis    Neuro: follows commands on sedation vacation; PERRLA    Vital Signs: /71 (BP Location: Right arm, Patient Position: Lying)   Pulse 108   Temp 98.6 °F (37 °C)   Resp 17   Ht 5' 5" (1.651 m)   Wt 93.2 kg (205 lb 7.5 oz)   SpO2 97%   Breastfeeding No   BMI 34.19 kg/m²     Respiratory: A/C 40%/PEEP 5, Nitric 10    Diet: TF @ 10mL/hr    Gtts: Epi 0.06, Vaso 0.04, Dobutamine 2.5, Propofol 30, Lasix, Amio 1    Urine Output: 1182 cc/shift    Drains: pericardial chest drain    Labs/Accuchecks: labs and accuchecks q 6    Skin: no new skin breakdown       "

## 2020-09-20 NOTE — PROGRESS NOTES
Pharmacokinetic Assessment Follow Up: IV Vancomycin    Vancomycin Regimen Assessment and Plan:    Patient's random vancomycin level returned as 17.9 mg/mL.  - Will continue pulse dosing in the setting of JONAH. Nephrology following, and no current plans for RRT.  - Will give another dose of vancomycin 1000 mg IVPB once since random is <20.  - Will draw next random vancomycin level at 2230 on 9/20 unless plans change for RRT.    Drug levels (last 3 results):  Recent Labs   Lab Result Units 09/19/20  2128   Vancomycin, Random ug/mL 17.9       Pharmacy will continue to follow and monitor vancomycin.    Please contact pharmacy at extension [phone] for questions regarding this assessment.    Thank you for the consult,   Georgina Lindsey       Patient brief summary:  Fatou Lorenzo is a 75 y.o. female initiated on antimicrobial therapy with IV Vancomycin for treatment of empiric therapy.    The patient's current regimen is pulse dosing.    Drug Allergies:   Review of patient's allergies indicates:  No Known Allergies    Actual Body Weight:   93.2 kg    Renal Function:   Estimated Creatinine Clearance: 18.9 mL/min (A) (based on SCr of 2.9 mg/dL (H)).,     Dialysis Method (if applicable):  N/A

## 2020-09-20 NOTE — ASSESSMENT & PLAN NOTE
-oliguric/anuric kdigo stage 2 [place] likely with ischemic atn probably due to septic vs cardiogenic shock evidenced by leukocytosis and afib with rvr  -RVR improved without significant improvement in bp  -urine output 1-4L per day until [place]  -xray without significant pulmonary edema, pleural effusions present  -2.8L urine output over last 24 hours should be more than adequate  -continue current management and reduce inputs as much as possible

## 2020-09-20 NOTE — PROGRESS NOTES
[facility]  Critical Care - Surgery  Progress Note    Patient Name: Fatou Lorenzo  MRN: [id number]  Admission Date: 8/30/2020  Hospital Length of Stay: 20 days  Code Status: Full Code  Attending Provider: Antoni Waddell MD  Primary Care Provider: Ludin Rivas MD   Principal Problem: Acute respiratory failure with hypoxia    Subjective:     Hospital/ICU Course:  No notes on file    Interval History/Significant Events:   Became hemodynamically unstable overnight with posterior cardiac tamponade, required pericardial window and hematoma evac. Remains on Epi, Amio gtt.     Follow-up For: Procedure(s) (LRB):  Valvuloplasty, Mitral (N/A)  REPAIR, TRICUSPID VALVE (N/A)  BAIN MAZE PROCEDURE (N/A)    Post-Operative Day: 9 Days Post-Op    Objective:     Vital Signs (Most Recent):  Temp: 96.4 °F (35.8 °C) (09/20/20 0748)  Pulse: 104 (09/20/20 1100)  Resp: (!) 22 (09/20/20 0733)  BP: 105/60 (09/20/20 1100)  SpO2: 99 % (09/20/20 1100) Vital Signs (24h Range):  Temp:  [40.6 °F (4.8 °C)-98.8 °F (37.1 °C)] 96.4 °F (35.8 °C)  Pulse:  [] 104  Resp:  [17-22] 22  SpO2:  [95 %-99 %] 99 %  BP: ()/(52-71) 105/60  Arterial Line BP: ()/(46-95) 90/50     Weight: 87.2 kg (192 lb 3.9 oz)  Body mass index is 31.99 kg/m².      Intake/Output Summary (Last 24 hours) at 9/20/2020 1225  Last data filed at 9/20/2020 1100  Gross per 24 hour   Intake 3086 ml   Output 3113 ml   Net -27 ml       Physical Exam  Vitals signs and nursing note reviewed.   Constitutional:       General: She is not in acute distress.     Appearance: Normal appearance. She is well-developed. She is not diaphoretic.   HENT:      Head: Normocephalic and atraumatic.   Eyes:      General: No scleral icterus.  Neck:      Vascular: No JVD.      Comments: RIJ in place  Cardiovascular:      Rate and Rhythm: Tachycardia present. Rhythm irregular.   Pulmonary:      Effort: No respiratory distress.      Breath sounds: No wheezing.      Comments:  Pacer wires in place  Dressing c/d/i  Abdominal:      General: There is no distension.      Palpations: Abdomen is soft.      Tenderness: There is no abdominal tenderness.   Genitourinary:     Comments: Yoo in place  Skin:     General: Skin is warm and dry.      Coloration: Skin is not jaundiced or pale.   Neurological:      Mental Status: She is alert.      Cranial Nerves: No cranial nerve deficit.      Comments: Following commands   Psychiatric:         Mood and Affect: Mood normal.         Behavior: Behavior normal.           Lines/Drains/Airways     Central Venous Catheter Line             Introducer with Double Lumen 09/09/20 right internal jugular 11 days    Percutaneous Central Line Insertion/Assessment - Triple Lumen  09/18/20 2200 right internal jugular 1 day          Drain                 Urethral Catheter 09/09/20 1512 Non-latex;Straight-tip;Temperature probe 14 Fr. 10 days         Chest Tube 09/18/20 2330 1 1 day         NG/OG Tube 09/19/20 1000 Right mouth 1 day          Airway                 Airway - Non-Surgical 09/18/20 2217 Endotracheal Tube 1 day          Arterial Line            Arterial Line 09/18/20 1345 Left Radial 1 day          Line                 Pacer Wires 09/09/20 2057 10 days          Peripheral Intravenous Line                 Peripheral IV - Single Lumen 09/16/20 1809 18 G Anterior;Distal;Left Wrist 3 days                Significant Labs:    CBC/Anemia Profile:  Recent Labs   Lab 09/19/20  1712 09/20/20  0300 09/20/20  1131   WBC 24.20* 23.13* 24.83*   HGB 8.9* 8.6* 8.8*   HCT 26.5* 25.4* 26.1*    239 270   MCV 91 92 92   RDW 16.9* 16.8* 16.6*        Chemistries:  Recent Labs   Lab 09/18/20  2237 09/19/20  0117  09/19/20  1712  09/20/20  0300 09/20/20  0942 09/20/20  1131    136   < > 135*  --  136  --  136   K 4.1 4.0   < > 3.9   < > 3.5 3.6 3.7    98   < > 96  --  96  --  97   CO2 22* 25   < > 24  --  24  --  22*   BUN 65* 73*   < > 71*  --  66*  --  64*    CREATININE 2.7* 2.8*   < > 2.9*  --  2.7*  --  2.6*   CALCIUM 10.3 9.9   < > 9.0  --  8.4*  --  8.4*   ALBUMIN 2.6* 2.7*  --   --   --  2.4*  --   --    PROT 5.2* 5.6*  --   --   --  5.3*  --   --    BILITOT 2.6* 3.2*  --   --   --  3.3*  --   --    ALKPHOS 202* 203*  --   --   --  181*  --   --    * 169*  --   --   --  121*  --   --    * 340*  --   --   --  79*  --   --    MG 2.8* 2.8*   < > 2.6  --  2.5  --  2.4   PHOS 7.8* 6.7*   < > 6.1*  --  6.1*  --  5.4*    < > = values in this interval not displayed.       All pertinent labs within the past 24 hours have been reviewed.    Significant Imaging:  I have reviewed all pertinent imaging results/findings within the past 24 hours.    Assessment/Plan:     Severe mitral regurgitation  Fatou Lorenzo is a 75 y.o. female s/p MVr, TVr 9/9/2020. Case noteworthy for multiple pump runs (3) and RV hematoma due to retraction. Unstable overnight 9/18, required pericardial window for evac of posterior cardiac tamponade.     Neuro:  - Intubated and sedated  - Started on scheduled tylenol and PRN oxy, dilaudid      CV:  S/p MVr, TVr 9/9/20. Case noteworthy for pump run x3, RV hematoma 2/2 retraction  - Pt with Afib, required cardioversion 9/18. Tachy into 110s this AM with Afib.   - Inc to Metop 50 BID  - Amio PO 200mg BID, Amio gtt  - MAP goal 60-80, SBP <140  - Pacer wires in place, set to back up rate 50   - On Epi, Vaso (Epi can go as low as 0.04)  - Digoxin 0.125 daily     Pulm:  - Intubated  Vent Mode: A/C  Oxygen Concentration (%):  [40-50] 40  Resp Rate Total:  [16 br/min-19 br/min] 19 br/min  Vt Set:  [350 mL] 350 mL  PEEP/CPAP:  [5 cmH20] 5 cmH20  Mean Airway Pressure:  [7.6 cmH20-8.6 cmH20] 8.5 cmH20  - ABG q4     FEN/GI:  - Lasix 10 IV once this AM, lasix gtt at 20/hr  - Replace lytes as needed  - NPO  - ppx: famotidine, miralax  - Advance TFs as tolerated    Renal:  - Yoo in place for strict I/O  - Continue with aggressive diuresis   - Lasix 10  once, lasix gtt at 20  - Trend BUN/Cr      Heme/Onc:  - H/H stable  - Hold Coumadin today (supra at 4.9)     ID:  - Afebrile, WBC WNL  - periop antibiotics complete  - Procal elevated at 1.4 today, order daily Procals     Endo:  - no hx of DM  - ISS    PPx:  - famotidine, warfarin, SCDs     Dispo: continue ICU care         Critical care was time spent personally by me on the following activities: development of treatment plan with patient or surrogate and bedside caregivers, discussions with consultants, evaluation of patient's response to treatment, examination of patient, ordering and performing treatments and interventions, ordering and review of laboratory studies, ordering and review of radiographic studies, pulse oximetry, re-evaluation of patient's condition.  This critical care time did not overlap with that of any other provider or involve time for any procedures.     Albert Abrams MD  Critical Care - Surgery  [facility]

## 2020-09-21 NOTE — PLAN OF CARE
09/21/20 1107   Discharge Reassessment   Assessment Type Discharge Planning Reassessment   Provided patient/caregiver education on the expected discharge date and the discharge plan Yes   Do you have any problems affording any of your prescribed medications? No   Discharge Plan A Skilled Nursing Facility   Discharge Plan B Home Health   DME Needed Upon Discharge  other (see comments)  (TBD)       Mel Simons MPH, RN, CM  Ext. [phone]

## 2020-09-21 NOTE — PROGRESS NOTES
[facility]  Nephrology  Progress Note    Patient Name: Fatou Lorenzo  MRN: [id number]  Admission Date: 8/30/2020  Hospital Length of Stay: 21 days  Attending Provider: Antoni Waddell MD   Primary Care Physician: Ludin Rivas MD  Principal Problem:Acute respiratory failure with hypoxia    Subjective:     HPI: Fatou Lorenzo is a 76 yo F who had MVR + TVR to 9/9 that was complicated by RV hematoma and subsequent pericardial effusion.  She has been treated in ICU and has had pressor requirement that was DC-ed 3 days ago.  She was being planned to be transitioned to the floor today, however, this morning she had acute decompensation with hypotension and tachycardia.  She has had frequent runs of atrial fibrillation since being admitted, and has intermittently been in AFib throughout the day.  She was restarted on epinephrine and Levophed and TTE was ordered to assess for pericardial tamponade.  It should be noted, however, when she 1st became hypotensive she was bradycardic.     When seen this evening with staff, she is alert and oriented for remained hypotensive.  Heart rate is in the 130s which is atrial fibrillation.  She is on Levophed at 0.02 and epinephrine at 0.05.  TTE demonstrated mild to moderate posterior pericardial effusion, with no significant mitral valve respiratory variation and no significant RA/RV diastolic collapse.  EF was 25% with biventricular failure and noted RV hematoma.  Denies any chest pain, however, she did have some epigastric pain earlier today when she a for the 1st time that precipitated some her events.  No other acute events. Reportedly has had less urine output over the course of her day.    Interval History: Patient seen and examined at bedside, still intubated on lasix infusion. Cr improving compared to yesterday. UOP at 4 L in the past 24 hours.     Review of patient's allergies indicates:  No Known Allergies  Current Facility-Administered Medications    Medication Frequency    acetaminophen tablet 975 mg Q8H    amiodarone 360 mg/200 mL (1.8 mg/mL) infusion Continuous    amiodarone tablet 200 mg BID    aspirin chewable tablet 81 mg Daily    calcium carbonate 200 mg calcium (500 mg) chewable tablet 1,000 mg TID PRN    dexmedetomidine (PRECEDEX) 400mcg/100mL 0.9% NaCL infusion Continuous    dextrose 5 % and 0.45 % NaCl with KCl 20 mEq infusion Continuous    dextrose 50% injection 12.5 g PRN    dextrose 50% injection 25 g PRN    digoxin injection 125 mcg Daily    EPINEPHrine (ADRENALIN) 5 mg in sodium chloride 0.9% 250 mL infusion Continuous    famotidine tablet 20 mg Daily    furosemide (LASIX) 500 mg infusion (conc: 10 mg/mL) Continuous    glucagon (human recombinant) injection 1 mg PRN    hydrALAZINE injection 10 mg Q8H PRN    HYDROmorphone injection 0.5 mg Q4H PRN    insulin aspart U-100 pen 0-5 Units Q6H PRN    melatonin tablet 6 mg Nightly    nitric oxide gas Gas 3 ppm Continuous    ondansetron injection 4 mg Q6H PRN    oxyCODONE immediate release tablet 5 mg Q4H PRN    polyethylene glycol packet 17 g Daily    potassium chloride packet 20 mEq BID    propofol (DIPRIVAN) 10 mg/mL infusion Continuous    sennosides 8.8 mg/5 ml syrup 5 mL Daily    sodium chloride 3% nebulizer solution 4 mL Q6H WAKE    vasopressin (PITRESSIN) 0.2 Units/mL in dextrose 5 % 100 mL infusion Continuous       Objective:     Vital Signs (Most Recent):  Temp: 97.7 °F (36.5 °C) (09/21/20 1500)  Pulse: 90 (09/21/20 1500)  Resp: 15 (09/21/20 1304)  BP: 126/61 (09/21/20 1500)  SpO2: 100 % (09/21/20 1500)  O2 Device (Oxygen Therapy): ventilator (09/21/20 1500) Vital Signs (24h Range):  Temp:  [90.1 °F (32.3 °C)-98.6 °F (37 °C)] 97.7 °F (36.5 °C)  Pulse:  [] 90  Resp:  [15-19] 15  SpO2:  [97 %-100 %] 100 %  BP: ()/(55-74) 126/61  Arterial Line BP: ()/() 109/59     Weight: 88.6 kg (195 lb 5.2 oz) (09/21/20 0547)  Body mass index is 32.5  kg/m².  Body surface area is 2.02 meters squared.    I/O last 3 completed shifts:  In: 5514.2 [I.V.:4624.2; NG/GT:890]  Out: 5801 [Urine:5673; Chest Tube:128]    Physical Exam  Vitals signs and nursing note reviewed.   Constitutional:       Appearance: She is not ill-appearing or toxic-appearing.   HENT:      Head: Normocephalic and atraumatic.      Mouth/Throat:      Comments: Intubated, OGT in place  Cardiovascular:      Rate and Rhythm: Normal rate. Rhythm irregular.   Pulmonary:      Comments: Mechanically ventilated  See settings below  CT x1 in place with SS output  Sternal incision c/d/i  Pacer wires in place  Abdominal:      General: There is no distension.      Palpations: Abdomen is soft.      Tenderness: There is no abdominal tenderness.   Skin:     General: Skin is warm and dry.   Neurological:      Comments: Sedated  Follows commands when awake         Significant Labs:  CBC:   Recent Labs   Lab 09/21/20  0813   WBC 17.11*   RBC 2.94*   HGB 8.8*   HCT 26.9*      MCV 92   MCH 29.9   MCHC 32.7     CMP:   Recent Labs   Lab 09/21/20  0415 09/21/20  0813   *  135* 125*   CALCIUM 8.3*  8.3* 8.4*   ALBUMIN 2.4*  --    PROT 5.9*  --      137 137   K 3.1*  3.1* 3.6   CO2 26  26 26   CL 94*  94* 95   BUN 61*  61* 61*   CREATININE 2.2*  2.2* 2.1*   ALKPHOS 237*  --    *  --    AST 74*  --    BILITOT 3.2*  --      Coagulation:   Recent Labs   Lab 09/19/20  0117  09/21/20  0415   INR 2.7*   < > 3.7*   APTT 27.0  --   --     < > = values in this interval not displayed.     All labs within the past 24 hours have been reviewed.     Significant Imaging:  Labs: Reviewed  X-Ray: Reviewed    Assessment/Plan:     * Acute respiratory failure with hypoxia  Currently intubated   Vent Mode: A/C  Oxygen Concentration (%):  [40] 40  Resp Rate Total:  [16 br/min-20 br/min] 16 br/min  Vt Set:  [350 mL] 350 mL  PEEP/CPAP:  [5 cmH20] 5 cmH20  Mean Airway Pressure:  [8 cmH20-9.1 cmH20] 8  cmH20      Atrial fibrillation with RVR  -improved  -rvr on prior paroxysmal diagnosis  -secondary to  MVR + TVR vs sepsis vs volume depletion  -per cardiology    JONAH (acute kidney injury)  -oliguric/anuric kdigo stage 2 jonah likely with ischemic atn probably due to septic vs cardiogenic shock evidenced by leukocytosis and afib with rvr  -RVR improved without significant improvement in bp    - Strict I/O and chart   - daily weights    - cont lasix infusion  -urine output 4.1 L/24 hrs, 2 L this shift at the time of note   - renally dose all medications   - Avoid nephrotoxic medications, NSAIDs, IV contrast, ACE/ARB.  - Maintain MAP > 65  - Hb > 7 gm/dL   - Will follow closely   - Case discussed with Dr Beasley      Oliguria and anuria  -resolved  -starting 9/18/20, prior was producing large volumes of urine        Thank you for your consult. I will follow-up with patient. Please contact us if you have any additional questions.    Miquel Velazquez MD  Nephrology  [facility]

## 2020-09-21 NOTE — SUBJECTIVE & OBJECTIVE
Interval History: Patient seen and examined at bedside, still intubated on lasix infusion. Cr improving compared to yesterday. UOP at 4 L in the past 24 hours.     Review of patient's allergies indicates:  No Known Allergies  Current Facility-Administered Medications   Medication Frequency    acetaminophen tablet 975 mg Q8H    amiodarone 360 mg/200 mL (1.8 mg/mL) infusion Continuous    amiodarone tablet 200 mg BID    aspirin chewable tablet 81 mg Daily    calcium carbonate 200 mg calcium (500 mg) chewable tablet 1,000 mg TID PRN    dexmedetomidine (PRECEDEX) 400mcg/100mL 0.9% NaCL infusion Continuous    dextrose 5 % and 0.45 % NaCl with KCl 20 mEq infusion Continuous    dextrose 50% injection 12.5 g PRN    dextrose 50% injection 25 g PRN    digoxin injection 125 mcg Daily    EPINEPHrine (ADRENALIN) 5 mg in sodium chloride 0.9% 250 mL infusion Continuous    famotidine tablet 20 mg Daily    furosemide (LASIX) 500 mg infusion (conc: 10 mg/mL) Continuous    glucagon (human recombinant) injection 1 mg PRN    hydrALAZINE injection 10 mg Q8H PRN    HYDROmorphone injection 0.5 mg Q4H PRN    insulin aspart U-100 pen 0-5 Units Q6H PRN    melatonin tablet 6 mg Nightly    nitric oxide gas Gas 3 ppm Continuous    ondansetron injection 4 mg Q6H PRN    oxyCODONE immediate release tablet 5 mg Q4H PRN    polyethylene glycol packet 17 g Daily    potassium chloride packet 20 mEq BID    propofol (DIPRIVAN) 10 mg/mL infusion Continuous    sennosides 8.8 mg/5 ml syrup 5 mL Daily    sodium chloride 3% nebulizer solution 4 mL Q6H WAKE    vasopressin (PITRESSIN) 0.2 Units/mL in dextrose 5 % 100 mL infusion Continuous       Objective:     Vital Signs (Most Recent):  Temp: 97.7 °F (36.5 °C) (09/21/20 1500)  Pulse: 90 (09/21/20 1500)  Resp: 15 (09/21/20 1304)  BP: 126/61 (09/21/20 1500)  SpO2: 100 % (09/21/20 1500)  O2 Device (Oxygen Therapy): ventilator (09/21/20 1500) Vital Signs (24h Range):  Temp:  [90.1 °F (32.3  °C)-98.6 °F (37 °C)] 97.7 °F (36.5 °C)  Pulse:  [] 90  Resp:  [15-19] 15  SpO2:  [97 %-100 %] 100 %  BP: ()/(55-74) 126/61  Arterial Line BP: ()/() 109/59     Weight: 88.6 kg (195 lb 5.2 oz) (09/21/20 0547)  Body mass index is 32.5 kg/m².  Body surface area is 2.02 meters squared.    I/O last 3 completed shifts:  In: 5514.2 [I.V.:4624.2; NG/GT:890]  Out: 5801 [Urine:5673; Chest Tube:128]    Physical Exam  Vitals signs and nursing note reviewed.   Constitutional:       Appearance: She is not ill-appearing or toxic-appearing.   HENT:      Head: Normocephalic and atraumatic.      Mouth/Throat:      Comments: Intubated, OGT in place  Cardiovascular:      Rate and Rhythm: Normal rate. Rhythm irregular.   Pulmonary:      Comments: Mechanically ventilated  See settings below  CT x1 in place with SS output  Sternal incision c/d/i  Pacer wires in place  Abdominal:      General: There is no distension.      Palpations: Abdomen is soft.      Tenderness: There is no abdominal tenderness.   Skin:     General: Skin is warm and dry.   Neurological:      Comments: Sedated  Follows commands when awake         Significant Labs:  CBC:   Recent Labs   Lab 09/21/20  0813   WBC 17.11*   RBC 2.94*   HGB 8.8*   HCT 26.9*      MCV 92   MCH 29.9   MCHC 32.7     CMP:   Recent Labs   Lab 09/21/20  0415 09/21/20  0813   *  135* 125*   CALCIUM 8.3*  8.3* 8.4*   ALBUMIN 2.4*  --    PROT 5.9*  --      137 137   K 3.1*  3.1* 3.6   CO2 26  26 26   CL 94*  94* 95   BUN 61*  61* 61*   CREATININE 2.2*  2.2* 2.1*   ALKPHOS 237*  --    *  --    AST 74*  --    BILITOT 3.2*  --      Coagulation:   Recent Labs   Lab 09/19/20  0117  09/21/20 0415   INR 2.7*   < > 3.7*   APTT 27.0  --   --     < > = values in this interval not displayed.     All labs within the past 24 hours have been reviewed.     Significant Imaging:  Labs: Reviewed  X-Ray: Reviewed

## 2020-09-21 NOTE — PT/OT/SLP PROGRESS
Physical Therapy      Patient Name:  Fatou Lorenzo   MRN:  [id number]    Patient not seen today secondary to pt intubated and on increased pressor support since previous session. Will continue to monitor for improved medical status and appropriateness for early mobility intervention.     Janine Soto, PT

## 2020-09-21 NOTE — PROGRESS NOTES
"[facility]  Adult Nutrition  Progress Note    SUMMARY       Recommendations    1.) Change EN to Peptamen Intense VHP; begin at 10mL/hr and advance 5-10mL as tolerated to goal rate at 30mL/hr. EN provides 720kcal, 66gm of protein, 604mL of free water. Add free water per MD recommendations.   2.) If pt weaned off propfol, increase EN goal rate of Peptamen Intense VHP goal rate at 45mL/hr.     Goals: Pt to return to nutritionally adequate diet and meet > 75% EEN/EPN by RD follow up  Nutrition Goal Status: progressing towards goal  Communication of RD Recs: other (comment)(POC)    Reason for Assessment    Reason For Assessment: RD follow-up  Diagnosis: (Acute respiratory failure with hypoxia)  Relevant Medical History: afib w/ RVR, HTN, HLD  Interdisciplinary Rounds: did not attend  General Information Comments: Pt s/p Mahmood Maze. Pt s/p MVR, TVR on 9/9/2020. Pt sedated, intubated and on mechanical ventilation. Nsg staff reports pt is tolerated Isosource 1.5 at 20mL/hr. GI- LBM 9/20. NFPE completed 9/9 with no s/s of malnutrition.   Nutrition Discharge Planning: Adequate PO intake on low Na diet    Nutrition Risk Screen    Nutrition Risk Screen: dysphagia or difficulty swallowing    Nutrition/Diet History    Spiritual, Cultural Beliefs, Voodoo Practices, Values that Affect Care: no    Anthropometrics    Temp: 97.7 °F (36.5 °C)  Height Method: Stated  Height: 5' 5" (165.1 cm)  Height (inches): 65 in  Weight Method: Bed Scale  Weight: 88.6 kg (195 lb 5.2 oz)  Weight (lb): 195.33 lb  Ideal Body Weight (IBW), Female: 125 lb  % Ideal Body Weight, Female (lb): 152.8 %  BMI (Calculated): 32.5       Lab/Procedures/Meds    Pertinent Labs Reviewed: reviewed  Pertinent Labs Comments: WBC 18.67, Hgb 8.8, Hct 26.4, K 3.1, Chl 94, BUN 61, Cr 2.2, GFR 21.3, Glucose 135, Ca 8.3, Phosphorus 5.7  Pertinent Medications Reviewed: reviewed  Pertinent Medications Comments: Dogoxin, famotidine, piperacillin, polyethylene " glycol, potassium chloride, sennosides, furosemide    Physical Findings/Assessment     Edema 1+ generalized    Estimated/Assessed Needs    Weight Used For Calorie Calculations: 80 kg (176 lb 5.9 oz)  Energy Calorie Requirements (kcal): 880-1120  Energy Need Method: Kcal/kg(11-14kcal/kg)  Protein Requirements:  gm (1.5-1.8gm/kg IBW)  Weight Used For Protein Calculations: 56.8 kg (125 lb 3.5 oz)(IBW)  Fluid Requirements (mL): per MD or 1 mL/kcal     RDA Method (mL): 880         Nutrition Prescription Ordered    Current Diet Order: NPO (9/9)  Current Nutrition Support Formula Ordered: Isosource 1.5  Current Nutrition Support Rate Ordered: 20 (ml)  Current Nutrition Support Frequency Ordered: mL/hr    Evaluation of Received Nutrient/Fluid Intake    Enteral Calories (kcal): 720  Enteral Protein (gm): 32  Enteral (Free Water) Fluid (mL): 366  Other Calories (kcal): 411(Propofol at 15.6mL/hr)  Total Calories (kcal): 1131  Total Calories (kcal/kg): 10  % Kcal Needs: 100  % Protein Needs: 37  I/O: I: 2448; o: 4118; +3.7L since 3/7  Energy Calories Required: meeting needs  Protein Required: not meeting needs  Fluid Required: not meeting needs  Comments: LBM 9/20  Tolerance: tolerating  % Intake of Estimated Energy Needs: 100  % Meal Intake: 0    Nutrition Risk    Level of Risk/Frequency of Follow-up: high , 2x weekly    Assessment and Plan     Nutrition Problem  Inadequate energy intake     Related to (etiology):   Enteral nutrition     Signs and Symptoms (as evidenced by):   Enteral nutrition not meeting estimated energy needs.      Interventions(treatment strategy):  1.) Collaboration with other providers  2.) Enteral nutrition     Nutrition Diagnosis Status:   Ongoing      Monitor and Evaluation    Food and Nutrient Intake: energy intake, food and beverage intake, enteral nutrition intake  Food and Nutrient Adminstration: diet order, enteral and parenteral nutrition administration  Knowledge/Beliefs/Attitudes: food  and nutrition knowledge/skill  Anthropometric Measurements: weight, weight change  Biochemical Data, Medical Tests and Procedures: electrolyte and renal panel, gastrointestinal profile, glucose/endocrine profile  Nutrition-Focused Physical Findings: overall appearance, skin     Malnutrition Assessment                 Orbital Region (Subcutaneous Fat Loss): well nourished  Upper Arm Region (Subcutaneous Fat Loss): well nourished   [place] Region (Muscle Loss): well nourished  Clavicle Bone Region (Muscle Loss): well nourished  Clavicle and Acromion Bone Region (Muscle Loss): well nourished  Dorsal Hand (Muscle Loss): well nourished  Anterior Thigh Region (Muscle Loss): well nourished  Posterior Calf Region (Muscle Loss): well nourished                 Nutrition Follow-Up    RD Follow-up?: Yes

## 2020-09-21 NOTE — ASSESSMENT & PLAN NOTE
-oliguric/anuric kdigo stage 2 [place] likely with ischemic atn probably due to septic vs cardiogenic shock evidenced by leukocytosis and afib with rvr  -RVR improved without significant improvement in bp    - Strict I/O and chart   - daily weights    - cont lasix infusion  -urine output 4.1 L/24 hrs, 2 L this shift at the time of note   - renally dose all medications   - Avoid nephrotoxic medications, NSAIDs, IV contrast, ACE/ARB.  - Maintain MAP > 65  - Hb > 7 gm/dL   - Will follow closely   - Case discussed with Dr Beasley

## 2020-09-21 NOTE — PLAN OF CARE
NAEON. Pt. Afebrile. A. Fib sustained. Maintaining MAPs 65-80 with pressors. CVP 11.     Epicardial wires maintained.  Backup pacer set to 60 bpm, Ma 12, Mv 2.    Vent settings: A/C 40% fio2, 5 PEEP  iN.O. 3 ppm.     Neuro: PERRLA, follows commands, moves all extremities.     Gtts:   Lasix @ 20 mg/hour  Propofol @ 30 mcg/kg/min  Vaso @ 0.04 units/min   Epi @ 0.07 mcg/kg/min    TF @ 20 ml/hr     Yoo with adequate urine output of 200-300 mL/hr  Med Chest Tube to suction with minimal sanguineous drainage-- total 30 mL.    Skin: no new breakdown noted to sacrum, heels, or elbows. Foams in place.

## 2020-09-21 NOTE — PROGRESS NOTES
[facility]  Critical Care - Surgery  Progress Note    Patient Name: Fatou Lorenzo  MRN: [id number]  Admission Date: 8/30/2020  Hospital Length of Stay: 21 days  Code Status: Full Code  Attending Provider: Antoni Waddell MD  Primary Care Provider: Ludin Rivas MD   Principal Problem: Acute respiratory failure with hypoxia    Subjective:     Hospital/ICU Course:  No notes on file    Interval History/Significant Events: No significant clinical changes. Remains intubated sedated on vaso 0.04 and epi at 0.05. Follows commands when sedation is weaned. Still in a fib with HR in the lost 100s. Continues to diurese well and is net negative 1.4 L in the last 24 hours.     Follow-up For: Procedure(s) (LRB):  Valvuloplasty, Mitral (N/A)  REPAIR, TRICUSPID VALVE (N/A)  BAIN MAZE PROCEDURE (N/A)    Post-Operative Day: 12 Days Post-Op    Objective:     Vital Signs (Most Recent):  Temp: 97.7 °F (36.5 °C) (09/21/20 1500)  Pulse: 90 (09/21/20 1500)  Resp: 15 (09/21/20 1304)  BP: 126/61 (09/21/20 1500)  SpO2: 100 % (09/21/20 1500) Vital Signs (24h Range):  Temp:  [90.1 °F (32.3 °C)-98.6 °F (37 °C)] 97.7 °F (36.5 °C)  Pulse:  [] 90  Resp:  [15-19] 15  SpO2:  [97 %-100 %] 100 %  BP: ()/(55-74) 126/61  Arterial Line BP: ()/() 109/59     Weight: 88.6 kg (195 lb 5.2 oz)  Body mass index is 32.5 kg/m².      Intake/Output Summary (Last 24 hours) at 9/21/2020 1510  Last data filed at 9/21/2020 1500  Gross per 24 hour   Intake 2478.19 ml   Output 5008 ml   Net -2529.81 ml       Physical Exam  Vitals signs and nursing note reviewed.   Constitutional:       Appearance: She is not ill-appearing or toxic-appearing.   HENT:      Head: Normocephalic and atraumatic.      Mouth/Throat:      Comments: Intubated, OGT in place  Cardiovascular:      Rate and Rhythm: Normal rate. Rhythm irregular.   Pulmonary:      Comments: Mechanically ventilated  See settings below  CT x1 in place with SS output  Sternal  incision c/d/i  Pacer wires in place  Abdominal:      General: There is no distension.      Palpations: Abdomen is soft.      Tenderness: There is no abdominal tenderness.   Skin:     General: Skin is warm and dry.   Neurological:      Comments: Sedated  Follows commands when awake         Vents:  Vent Mode: A/C (09/21/20 1048)  Ventilator Initiated: Yes(chart correction) (09/18/20 2202)  Set Rate: 16 BPM (09/21/20 1048)  Vt Set: 350 mL (09/21/20 1048)  Pressure Support: 5 cmH20 (09/15/20 0113)  PEEP/CPAP: 5 cmH20 (09/21/20 1048)  Oxygen Concentration (%): 40 (09/21/20 1304)  Peak Airway Pressure: 18 cmH2O (09/21/20 1048)  Plateau Pressure: 17 cmH20 (09/21/20 0723)  Total Ve: 5.66 mL (09/21/20 1048)  Negative Inspiratory Force (cm H2O): -27 (09/15/20 0926)  F/VT Ratio<105 (RSBI): (!) 42.67 (09/21/20 0723)    Lines/Drains/Airways     Central Venous Catheter Line             Introducer with Double Lumen 09/09/20 right internal jugular 12 days    Percutaneous Central Line Insertion/Assessment - Triple Lumen  09/18/20 2200 right internal jugular 2 days          Drain                 Urethral Catheter 09/09/20 1512 Non-latex;Straight-tip;Temperature probe 14 Fr. 11 days         Chest Tube 09/18/20 2330 1 2 days         NG/OG Tube 09/19/20 1000 Right mouth 2 days          Airway                 Airway - Non-Surgical 09/18/20 2217 Endotracheal Tube 2 days          Arterial Line            Arterial Line 09/18/20 1345 Left Radial 3 days          Line                 Pacer Wires 09/09/20 2057 11 days                Significant Labs:    CBC/Anemia Profile:  Recent Labs   Lab 09/20/20  1721 09/21/20  0415 09/21/20  0813   WBC 22.14* 18.67*  18.67* 17.11*   HGB 8.8* 8.8*  8.8* 8.8*   HCT 25.6* 26.4*  26.4* 26.9*    283  283 301   MCV 91 91  91 92   RDW 16.6* 16.6*  16.6* 17.1*        Chemistries:  Recent Labs   Lab 09/20/20  0300  09/20/20  1721 09/20/20  2205 09/21/20  0415 09/21/20  0813      < > 136  --   137  137 137   K 3.5   < > 3.5 3.4*  3.4* 3.1*  3.1* 3.6   CL 96   < > 95  --  94*  94* 95   CO2 24   < > 24  --  26  26 26   BUN 66*   < > 58*  --  61*  61* 61*   CREATININE 2.7*   < > 2.4*  --  2.2*  2.2* 2.1*   CALCIUM 8.4*   < > 8.3*  --  8.3*  8.3* 8.4*   ALBUMIN 2.4*  --   --   --  2.4*  --    PROT 5.3*  --   --   --  5.9*  --    BILITOT 3.3*  --   --   --  3.2*  --    ALKPHOS 181*  --   --   --  237*  --    *  --   --   --  117*  --    AST 79*  --   --   --  74*  --    MG 2.5   < > 2.4  --  2.3  2.3 2.3   PHOS 6.1*   < > 5.2*  --  5.7*  5.7* 5.2*    < > = values in this interval not displayed.       Bilirubin:   Recent Labs   Lab 09/03/20  0553  09/18/20  2111 09/18/20  2237 09/19/20  0117 09/20/20  0300 09/21/20  0415   BILIDIR 0.5*  --   --   --   --   --   --    BILITOT 1.1*   < > 2.6* 2.6* 3.2* 3.3* 3.2*    < > = values in this interval not displayed.     Coagulation:   Recent Labs   Lab 09/21/20  0415   INR 3.7*     All pertinent labs within the past 24 hours have been reviewed.    Significant Imaging:  I have reviewed and interpreted all pertinent imaging results/findings within the past 24 hours.     09/21/2020 CXR  FINDINGS:   Postoperative changes are again noted in the thorax. Endotracheal tube tip lies at the level of the aortic arch, adequately above the anjel. Enteric tube has its distal aspect well distal to the GE junction, the tube tip projected inferior to the imaged volume. Right jugular origin vascular catheter tip lies near the junction of the superior vena cava and right atrium. Heart size and the appearance of the cardiomediastinal silhouette are unchanged since the examination referenced above. Lung zones are also stable, with no new areas of airspace consolidation or volume loss evident. Pleural fluid is present bilaterally, greater in volume on the right than the left but similar in volume on each side to the prior study. No pneumothorax        Assessment/Plan:      Severe mitral regurgitation  Fatou Lorenzo is a 75 y.o. female s/p MVr, TVr 9/9/2020. Case noteworthy for multiple pump runs (3) and RV hematoma due to retraction. Unstable overnight 9/18, required pericardial window for evac of posterior cardiac tamponade.     Neuro:  - Intubated and sedated  - Sedation: transition from propofol to precedex  - Pain control with scheduled tylenol and PRN dilaudid      CV:  S/p MVr, TVr 9/9/20. Case noteworthy for pump run x3, RV hematoma 2/2 retraction  - Persistent atrial fibrillation despite multiple boluses of amio gtt and attempted cardioversion. Currently rate controlled. Will continue on current regimen, amio gtt at 0.5, amio 200 PO BID, dig 0.125 mg.   - MAP goals 60-80, SBP <140  - Pacer wires in place, set to back up rate 50   - Continue on epi and vaso. Epi to be no lower than 0.04, okay to wean off vaso     Pulm:  - Re-intubated on 9/19 for bedside pericardial window  - Placed on Oneil currently at 3 ppm. Wean today with plan to be off by tomorrow am  - ABG q6 hrs and PRN  - Daily CXR  - pleural effusion on the right which has been present since post op. Currently leaving as is given on minimal vent settings and is anticoagulated     FEN/GI:  - Net negative 1.4L in the last 24 hours with ongoing diuresis  - Receiveing KCL scheduled 20 mEq BID  - Replace lytes as needed  - NPO  - ppx: famotidine, miralax  - Increase TFs to 30    Renal:  - Yoo in place for strict I/O  - Compromised renal function since surgery   - BUN/Cr relatively stable 58/2.4 -> 61/2.2  - Continuing with aggressive diuresis   - Lasix gtt at 20     Heme/Onc:  - H/H stable  - Anticoagulated for persistent atrial fibrillation   - PT/INR 38.9/3.7. Will hold coumadin today.      ID:  - Afebrile, WBC WNL  - Procal 1.4 -> 0.94  - D/C vanc/zosyn     Endo:  - no hx of DM  - ISS    PPx:  - famotidine, warfarin, SCDs     Dispo: continue ICU care      Critical secondary to Patient has a condition that poses  threat to life and bodily function: s/p MVr, TVr, MAZE now with pericardial effusion and heart failure s/p pericardial window     Critical care was time spent personally by me on the following activities: development of treatment plan with patient or surrogate and bedside caregivers, discussions with consultants, evaluation of patient's response to treatment, examination of patient, ordering and performing treatments and interventions, ordering and review of laboratory studies, ordering and review of radiographic studies, pulse oximetry, re-evaluation of patient's condition.  This critical care time did not overlap with that of any other provider or involve time for any procedures.     Madhuri Ng MD  Critical Care - Surgery  [facility]

## 2020-09-21 NOTE — PT/OT/SLP PROGRESS
Occupational Therapy      Patient Name:  Fatou Lorenzo   MRN:  [id number]    Patient not seen today secondary to reintubated and on increased pressors  . Will follow-up 9/23/20.    JANETT Eddy  9/21/2020

## 2020-09-21 NOTE — PT/OT/SLP PROGRESS
Speech Language Pathology  Discharge    Fatou Lorenzo  MRN: [id number]    Patient not seen today secondary to pt intubated at this time. Please re-consult when medically appropriate. No further ST warranted at this time.       Madhuri Humphries CCC-SLP  9/21/2020

## 2020-09-21 NOTE — SUBJECTIVE & OBJECTIVE
Interval History/Significant Events: No significant clinical changes. Remains intubated sedated on vaso 0.04 and epi at 0.05. Follows commands when sedation is weaned. Still in a fib with HR in the lost 100s. Continues to diurese well and is net negative 1.4 L in the last 24 hours.     Follow-up For: Procedure(s) (LRB):  Valvuloplasty, Mitral (N/A)  REPAIR, TRICUSPID VALVE (N/A)  BAIN MAZE PROCEDURE (N/A)    Post-Operative Day: 12 Days Post-Op    Objective:     Vital Signs (Most Recent):  Temp: 97.7 °F (36.5 °C) (09/21/20 1500)  Pulse: 90 (09/21/20 1500)  Resp: 15 (09/21/20 1304)  BP: 126/61 (09/21/20 1500)  SpO2: 100 % (09/21/20 1500) Vital Signs (24h Range):  Temp:  [90.1 °F (32.3 °C)-98.6 °F (37 °C)] 97.7 °F (36.5 °C)  Pulse:  [] 90  Resp:  [15-19] 15  SpO2:  [97 %-100 %] 100 %  BP: ()/(55-74) 126/61  Arterial Line BP: ()/() 109/59     Weight: 88.6 kg (195 lb 5.2 oz)  Body mass index is 32.5 kg/m².      Intake/Output Summary (Last 24 hours) at 9/21/2020 1510  Last data filed at 9/21/2020 1500  Gross per 24 hour   Intake 2478.19 ml   Output 5008 ml   Net -2529.81 ml       Physical Exam  Vitals signs and nursing note reviewed.   Constitutional:       Appearance: She is not ill-appearing or toxic-appearing.   HENT:      Head: Normocephalic and atraumatic.      Mouth/Throat:      Comments: Intubated, OGT in place  Cardiovascular:      Rate and Rhythm: Normal rate. Rhythm irregular.   Pulmonary:      Comments: Mechanically ventilated  See settings below  CT x1 in place with SS output  Sternal incision c/d/i  Pacer wires in place  Abdominal:      General: There is no distension.      Palpations: Abdomen is soft.      Tenderness: There is no abdominal tenderness.   Skin:     General: Skin is warm and dry.   Neurological:      Comments: Sedated  Follows commands when awake         Vents:  Vent Mode: A/C (09/21/20 1048)  Ventilator Initiated: Yes(chart correction) (09/18/20 2202)  Set Rate: 16 BPM  (09/21/20 1048)  Vt Set: 350 mL (09/21/20 1048)  Pressure Support: 5 cmH20 (09/15/20 0113)  PEEP/CPAP: 5 cmH20 (09/21/20 1048)  Oxygen Concentration (%): 40 (09/21/20 1304)  Peak Airway Pressure: 18 cmH2O (09/21/20 1048)  Plateau Pressure: 17 cmH20 (09/21/20 0723)  Total Ve: 5.66 mL (09/21/20 1048)  Negative Inspiratory Force (cm H2O): -27 (09/15/20 0926)  F/VT Ratio<105 (RSBI): (!) 42.67 (09/21/20 0723)    Lines/Drains/Airways     Central Venous Catheter Line             Introducer with Double Lumen 09/09/20 right internal jugular 12 days    Percutaneous Central Line Insertion/Assessment - Triple Lumen  09/18/20 2200 right internal jugular 2 days          Drain                 Urethral Catheter 09/09/20 1512 Non-latex;Straight-tip;Temperature probe 14 Fr. 11 days         Chest Tube 09/18/20 2330 1 2 days         NG/OG Tube 09/19/20 1000 Right mouth 2 days          Airway                 Airway - Non-Surgical 09/18/20 2217 Endotracheal Tube 2 days          Arterial Line            Arterial Line 09/18/20 1345 Left Radial 3 days          Line                 Pacer Wires 09/09/20 2057 11 days                Significant Labs:    CBC/Anemia Profile:  Recent Labs   Lab 09/20/20  1721 09/21/20  0415 09/21/20  0813   WBC 22.14* 18.67*  18.67* 17.11*   HGB 8.8* 8.8*  8.8* 8.8*   HCT 25.6* 26.4*  26.4* 26.9*    283  283 301   MCV 91 91  91 92   RDW 16.6* 16.6*  16.6* 17.1*        Chemistries:  Recent Labs   Lab 09/20/20  0300  09/20/20  1721 09/20/20  2205 09/21/20  0415 09/21/20  0813      < > 136  --  137  137 137   K 3.5   < > 3.5 3.4*  3.4* 3.1*  3.1* 3.6   CL 96   < > 95  --  94*  94* 95   CO2 24   < > 24  --  26  26 26   BUN 66*   < > 58*  --  61*  61* 61*   CREATININE 2.7*   < > 2.4*  --  2.2*  2.2* 2.1*   CALCIUM 8.4*   < > 8.3*  --  8.3*  8.3* 8.4*   ALBUMIN 2.4*  --   --   --  2.4*  --    PROT 5.3*  --   --   --  5.9*  --    BILITOT 3.3*  --   --   --  3.2*  --    ALKPHOS 181*  --   --    --  237*  --    *  --   --   --  117*  --    AST 79*  --   --   --  74*  --    MG 2.5   < > 2.4  --  2.3  2.3 2.3   PHOS 6.1*   < > 5.2*  --  5.7*  5.7* 5.2*    < > = values in this interval not displayed.       Bilirubin:   Recent Labs   Lab 09/03/20  0553  09/18/20  2111 09/18/20  2237 09/19/20  0117 09/20/20  0300 09/21/20  0415   BILIDIR 0.5*  --   --   --   --   --   --    BILITOT 1.1*   < > 2.6* 2.6* 3.2* 3.3* 3.2*    < > = values in this interval not displayed.     Coagulation:   Recent Labs   Lab 09/21/20  0415   INR 3.7*     All pertinent labs within the past 24 hours have been reviewed.    Significant Imaging:  I have reviewed and interpreted all pertinent imaging results/findings within the past 24 hours.     09/21/2020 CXR  FINDINGS:   Postoperative changes are again noted in the thorax. Endotracheal tube tip lies at the level of the aortic arch, adequately above the anjel. Enteric tube has its distal aspect well distal to the GE junction, the tube tip projected inferior to the imaged volume. Right jugular origin vascular catheter tip lies near the junction of the superior vena cava and right atrium. Heart size and the appearance of the cardiomediastinal silhouette are unchanged since the examination referenced above. Lung zones are also stable, with no new areas of airspace consolidation or volume loss evident. Pleural fluid is present bilaterally, greater in volume on the right than the left but similar in volume on each side to the prior study. No pneumothorax

## 2020-09-21 NOTE — ASSESSMENT & PLAN NOTE
Currently intubated   Vent Mode: A/C  Oxygen Concentration (%):  [40] 40  Resp Rate Total:  [16 br/min-20 br/min] 16 br/min  Vt Set:  [350 mL] 350 mL  PEEP/CPAP:  [5 cmH20] 5 cmH20  Mean Airway Pressure:  [8 cmH20-9.1 cmH20] 8 cmH20

## 2020-09-21 NOTE — PROGRESS NOTES
Fatou Lorenzo is a 75 y.o. female with tricuspid regurg and mitral regurg and afib s/p TVr, MVr, MAZE (9/9) requiring pericardial window and attempted (failed) cardioversion after decompensation (9/18)     Progressing    Plan:  -stop amio gtt  -continue po amio 200 bid, asa, dig 125  -daily dig levels  -increase tube feeds to goal  -vaso 0.04, keep epi at .04   -wean Oneil to off by tomorrow am   -monitor UOP  -recommend draining R pleural effusion  -continue pericardial drain   -keep in ICU

## 2020-09-21 NOTE — PLAN OF CARE
Recommendations     1.) Change EN to Peptamen Intense VHP; begin at 10mL/hr and advance 5-10mL as tolerated to goal rate at 30mL/hr. EN provides 720kcal, 66gm of protein, 604mL of free water. Add free water per MD recommendations.   2.) If pt weaned off propfol, increase EN goal rate of Peptamen Intense VHP goal rate at 45mL/hr.      Goals: Pt to return to nutritionally adequate diet and meet > 75% EEN/EPN by RD follow up  Nutrition Goal Status: progressing towards goal  Communication of RD Recs: other (comment)(POC)

## 2020-09-21 NOTE — ASSESSMENT & PLAN NOTE
Fatou Lorenzo is a 75 y.o. female s/p MVr, TVr 9/9/2020. Case noteworthy for multiple pump runs (3) and RV hematoma due to retraction. Unstable overnight 9/18, required pericardial window for evac of posterior cardiac tamponade.     Neuro:  - Intubated and sedated  - Sedation: transition from propofol to precedex  - Pain control with scheduled tylenol and PRN dilaudid      CV:  S/p MVr, TVr 9/9/20. Case noteworthy for pump run x3, RV hematoma 2/2 retraction  - Persistent atrial fibrillation despite multiple boluses of amio gtt and attempted cardioversion. Currently rate controlled. Will continue on current regimen, amio gtt at 0.5, amio 200 PO BID, dig 0.125 mg.   - MAP goals 60-80, SBP <140  - Pacer wires in place, set to back up rate 50   - Continue on epi and vaso. Epi to be no lower than 0.04, okay to wean off vaso     Pulm:  - Re-intubated on 9/19 for bedside pericardial window  - Placed on Oneil currently at 3 ppm. Wean today with plan to be off by tomorrow am  - ABG q6 hrs and PRN  - Daily CXR  - pleural effusion on the right which has been present since post op. Currently leaving as is given on minimal vent settings and is anticoagulated     FEN/GI:  - Net negative 1.4L in the last 24 hours with ongoing diuresis  - Receiveing KCL scheduled 20 mEq BID  - Replace lytes as needed  - NPO  - ppx: famotidine, miralax  - Increase TFs to 30    Renal:  - Yoo in place for strict I/O  - Compromised renal function since surgery   - BUN/Cr relatively stable 58/2.4 -> 61/2.2  - Continuing with aggressive diuresis   - Lasix gtt at 20     Heme/Onc:  - H/H stable  - Anticoagulated for persistent atrial fibrillation   - PT/INR 38.9/3.7. Will hold coumadin today.      ID:  - Afebrile, WBC WNL  - Procal 1.4 -> 0.94  - D/C vanc/zosyn     Endo:  - no hx of DM  - ISS    PPx:  - famotidine, warfarin, SCDs     Dispo: continue ICU care

## 2020-09-21 NOTE — NURSING
"Dx: Acute respiratory failure with hypoxia    Neuro: patient follows commands and opens eye to voice.   Vital Signs: /83 (BP Location: Right arm, Patient Position: Lying)   Pulse 92   Temp 97.9 °F (36.6 °C)   Resp 15   Ht 5' 5" (1.651 m)   Wt 88.6 kg (195 lb 5.2 oz)   SpO2 100%   Breastfeeding No   BMI 32.50 kg/m²   Cardiac: AFib   Resp: PAV+ 40%/5+ (to go back on rate at night-per MD Hernandez) Nitric @ 1  Diet: TF @ 20  Gtts: Epi @ 0.04, Lasix @ 20, Amio @ 0.5, Precedex @ 1.4  Urine Output: -350ml/hr  Drains: Mediastinal CT x1 with 20ml sanguineous   Labs/Accuchecks: see labs; accucheck Q6  Skin: midsternal incision with steri strips and sutures and tegaderm in place; small serosanguinous drainage. Turned Q2. Waffle inflated. Foam dsg in place. Heels elevated off bed.  Shift Events: No acute events throughout shift. Weaned nitric and weaned off of Vaso. Will continue to monitor.         "

## 2020-09-21 NOTE — PROGRESS NOTES
Pharmacokinetic Assessment Follow Up: IV Vancomycin    Vancomycin Regimen Assessment and Plan:    - Vancomycin random level (~22 hours) resulted at 24.4 mcg/mL, which is considered supratherapeutic  - Will hold vancomycin dose this evening and re-dose once level < 20 mcg/mL  - Will draw next random vancomycin level on 9/21 with AM labs      Drug levels (last 3 results):  Recent Labs   Lab Result Units 09/19/20  2128 09/20/20  2205   Vancomycin, Random ug/mL 17.9 24.4       Pharmacy will continue to follow and monitor vancomycin.    Please contact pharmacy at extension [phone] for questions regarding this assessment.    Thank you for the consultTiff       Patient brief summary:  Fatou Lorenzo is a 75 y.o. female initiated on antimicrobial therapy with IV Vancomycin for empiric therapy.      Drug Allergies:   Review of patient's allergies indicates:  No Known Allergies    Actual Body Weight:   87.2 kg    Renal Function:   Estimated Creatinine Clearance: 22.1 mL/min (A) (based on SCr of 2.4 mg/dL (H)).  Nephrology consulted and following for JONAH    Dialysis Method (if applicable):  N/A

## 2020-09-22 NOTE — NURSING
CVP 9. Per MD Hernandez Nitric turned off. OG with 350 residuals. Per MD Hernandez TF decreased to 20 cc/hr. WCTM.

## 2020-09-22 NOTE — PLAN OF CARE
"      SICU PLAN OF CARE NOTE    Dx: Acute respiratory failure with hypoxia    Shift Events: K replaced with 60 total mEq IV and standing 20mEq PO. Nitric off at 4AM. On/off cardene. TF advanced to 30cc/h and then decreased to 20cc/h due to high residuals. Full chlorhexidine bath given.     Goals of Care: MAP 65-90    Neuro: Arouses to Voice, Follows Commands, and Moves All Extremities    Vital Signs: BP (!) 124/58   Pulse 89   Temp 96.4 °F (35.8 °C)   Resp 16   Ht 5' 5" (1.651 m)   Wt 83.8 kg (184 lb 11.9 oz)   SpO2 98%   Breastfeeding No   BMI 30.74 kg/m² CVP: 10/10/9    Respiratory: Ventilator 40% and 5 PEEP    Diet: Tube Feeds    Gtts: Precedex, Epinephrine, Amiodarone, Lasix, and Nicardipine    Urine Output: Urinary Catheter 200 cc/hour    Drains: Chest Tube, total output 40 cc /  shift    Restraints no signs of injury noted. Order good until 1700.      Labs/Accuchecks: Accuchecks q6h.     Skin: Prophylactic foam to coccyx. Midline incision with steri strips at top CHARMAINE. Lower part of midline incision closed with sutures and covered with tegaderm. CT and pacer wire insertion site with dressing C/D/I. Heels elevated in boots throughout shift. Remains on overlay with waffle inflated and bed plugged in. T&R q2h, WCTM.        "

## 2020-09-22 NOTE — ASSESSMENT & PLAN NOTE
Currently intubated   Vent Mode: A/C  Oxygen Concentration (%):  [40] 40  Resp Rate Total:  [16 br/min-17 br/min] 16 br/min  Vt Set:  [350 mL] 350 mL  PEEP/CPAP:  [5 cmH20] 5 cmH20  Mean Airway Pressure:  [7.4 cmH20-8.9 cmH20] 8.2 cmH20

## 2020-09-22 NOTE — SUBJECTIVE & OBJECTIVE
Interval History/Significant Events: No acute events. Remains in rate controlled atrial fibrillation with HR currently in the 90s and is currently on epi at 0.04 and amio 0.5. Continuing to diurese with lasix gtt at 20 mg/hr and is net negative 3L in the last 24 hours. Tube feeds advanced to 30 cc/hr last night but had high residuals so this was decreased back down to 20 cc/hr.     Chest tube with 20 cc SS output in the last 24 hours    Follow-up For: Procedure(s) (LRB):  Valvuloplasty, Mitral (N/A)  REPAIR, TRICUSPID VALVE (N/A)  BAIN MAZE PROCEDURE (N/A)    Post-Operative Day: 13 Days Post-Op    Objective:     Vital Signs (Most Recent):  Temp: 97.3 °F (36.3 °C) (09/22/20 1000)  Pulse: 95 (09/22/20 1000)  Resp: 16 (09/22/20 0725)  BP: (!) 115/58 (09/22/20 1000)  SpO2: 99 % (09/22/20 1000) Vital Signs (24h Range):  Temp:  [96.3 °F (35.7 °C)-97.9 °F (36.6 °C)] 97.3 °F (36.3 °C)  Pulse:  [] 95  Resp:  [15-18] 16  SpO2:  [92 %-100 %] 99 %  BP: (103-135)/(54-83) 115/58  Arterial Line BP: ()/(50-66) 112/65     Weight: 83.8 kg (184 lb 11.9 oz)  Body mass index is 30.74 kg/m².      Intake/Output Summary (Last 24 hours) at 9/22/2020 1021  Last data filed at 9/22/2020 1000  Gross per 24 hour   Intake 2539.17 ml   Output 5720 ml   Net -3180.83 ml       Physical Exam  Vitals signs and nursing note reviewed.   Constitutional:       Appearance: She is not ill-appearing or toxic-appearing.   HENT:      Head: Normocephalic and atraumatic.      Mouth/Throat:      Comments: Intubated, OGT in place  Cardiovascular:      Rate and Rhythm: Normal rate. Rhythm irregular.   Pulmonary:      Comments: Mechanically ventilated  See settings below  CT x1 in place with SS output  Sternal incision c/d/i  Pacer wires in place  Abdominal:      General: There is no distension.      Palpations: Abdomen is soft.      Tenderness: There is no abdominal tenderness.   Skin:     General: Skin is warm and dry.   Neurological:      Comments:  Sedated  Follows commands when awake         Vents:  Vent Mode: A/C (09/22/20 0729)  Ventilator Initiated: Yes(chart correction) (09/18/20 2202)  Set Rate: 16 BPM (09/22/20 0729)  Vt Set: 350 mL (09/22/20 0729)  Pressure Support: 5 cmH20 (09/15/20 0113)  PEEP/CPAP: 5 cmH20 (09/22/20 0729)  Oxygen Concentration (%): 40 (09/22/20 0001)  Peak Airway Pressure: 23 cmH2O (09/22/20 0729)  Plateau Pressure: 18 cmH20 (09/22/20 0729)  Total Ve: 6 mL (09/22/20 0729)  Negative Inspiratory Force (cm H2O): -27 (09/15/20 0926)  F/VT Ratio<105 (RSBI): (!) 48.65 (09/21/20 2019)    Lines/Drains/Airways     Central Venous Catheter Line             Introducer with Double Lumen 09/09/20 right internal jugular 13 days    Percutaneous Central Line Insertion/Assessment - Triple Lumen  09/18/20 2200 right internal jugular 3 days          Drain                 Urethral Catheter 09/09/20 1512 Non-latex;Straight-tip;Temperature probe 14 Fr. 12 days         Chest Tube 09/18/20 2330 1 3 days         NG/OG Tube 09/19/20 1000 Right mouth 3 days          Airway                 Airway - Non-Surgical 09/18/20 2217 Endotracheal Tube 3 days          Arterial Line            Arterial Line 09/18/20 1345 Left Radial 3 days          Line                 Pacer Wires 09/09/20 2057 12 days                Significant Labs:    CBC/Anemia Profile:  Recent Labs   Lab 09/21/20 2007 09/22/20 0311 09/22/20  0830   WBC 11.52 10.93 9.95   HGB 9.1* 9.6* 9.4*   HCT 27.4* 29.5* 29.2*    283 292   MCV 91 92 93   RDW 17.2* 17.5* 17.7*        Chemistries:  Recent Labs   Lab 09/21/20  0415  09/21/20 2007 09/21/20 2205 09/22/20 0311 09/22/20  0830     137   < > 139  --  143 141   K 3.1*  3.1*   < > 3.5 3.5 3.5 3.7   CL 94*  94*   < > 96  --  96 97   CO2 26  26   < > 30*  --  30* 31*   BUN 61*  61*   < > 56*  --  51* 47*   CREATININE 2.2*  2.2*   < > 1.8*  --  1.7* 1.6*   CALCIUM 8.3*  8.3*   < > 8.3*  --  8.8 8.6*   ALBUMIN 2.4*  --   --   --  2.6*   --    PROT 5.9*  --   --   --  6.3  --    BILITOT 3.2*  --   --   --  2.6*  --    ALKPHOS 237*  --   --   --  252*  --    *  --   --   --  101*  --    AST 74*  --   --   --  55*  --    MG 2.3  2.3   < > 2.3  --  2.3  2.3 2.0   PHOS 5.7*  5.7*   < > 4.2  --  4.1 3.5    < > = values in this interval not displayed.       ABGs:   Recent Labs   Lab 09/22/20  0729   PH 7.543*   PCO2 39.8   HCO3 34.3*   POCSATURATED 97   BE 12     Coagulation:   Recent Labs   Lab 09/22/20  0311   INR 1.9*     All pertinent labs within the past 24 hours have been reviewed.    Significant Imaging:  I have reviewed and interpreted all pertinent imaging results/findings within the past 24 hours.     09/22/2020 CXR  FINDINGS:  Endotracheal tube terminates 3 cm proximal to the anjel.  Orogastric tube courses into the abdomen.  Right internal jugular central venous catheter is unchanged in position.  The lungs are well expanded.  In comparison with prior there has been no change.  There is a moderate right pleural effusion and a small left pleural effusion.  There are bibasilar airspace opacities.  The cardiac silhouette is enlarged, unchanged.  There are calcifications of the aortic arch.  No pneumothorax.  Visualized osseous structures demonstrate degenerative changes.  Median sternotomy wires are noted.  There are prosthetic cardiac valves.     Impression:     Moderate right pleural effusion and bibasilar opacities, unchanged

## 2020-09-22 NOTE — SUBJECTIVE & OBJECTIVE
Interval History: Patient seen and examined at bedside, no acute events overnight. 5.5 liters of urine documented with lasix infusion.      Review of patient's allergies indicates:  No Known Allergies  Current Facility-Administered Medications   Medication Frequency    acetaminophen tablet 975 mg Q8H    amiodarone tablet 200 mg BID    aspirin chewable tablet 81 mg Daily    calcium carbonate 200 mg calcium (500 mg) chewable tablet 1,000 mg TID PRN    dexmedetomidine (PRECEDEX) 400mcg/100mL 0.9% NaCL infusion Continuous    dextrose 5 % and 0.45 % NaCl with KCl 20 mEq infusion Continuous    dextrose 50% injection 12.5 g PRN    dextrose 50% injection 25 g PRN    digoxin injection 125 mcg Daily    EPINEPHrine (ADRENALIN) 5 mg in sodium chloride 0.9% 250 mL infusion Continuous    famotidine tablet 20 mg Daily    furosemide (LASIX) 500 mg infusion (conc: 10 mg/mL) Continuous    glucagon (human recombinant) injection 1 mg PRN    hydrALAZINE injection 10 mg Q8H PRN    HYDROmorphone injection 0.5 mg Q4H PRN    insulin aspart U-100 pen 0-5 Units Q6H PRN    melatonin tablet 6 mg Nightly    niCARdipine 40 mg/200 mL (0.2 mg/mL) infusion Continuous    ondansetron injection 4 mg Q6H PRN    oxyCODONE immediate release tablet 5 mg Q4H PRN    polyethylene glycol packet 17 g Daily    potassium chloride packet 20 mEq BID    sennosides 8.8 mg/5 ml syrup 5 mL Daily    sodium chloride 3% nebulizer solution 4 mL Q6H WAKE    warfarin split tablet 3 mg Daily       Objective:     Vital Signs (Most Recent):  Temp: 98.2 °F (36.8 °C) (09/22/20 1345)  Pulse: 99 (09/22/20 1353)  Resp: 16 (09/22/20 1353)  BP: 122/67 (09/22/20 1200)  SpO2: 97 % (09/22/20 1353)  O2 Device (Oxygen Therapy): ventilator (09/22/20 1353) Vital Signs (24h Range):  Temp:  [96.3 °F (35.7 °C)-98.2 °F (36.8 °C)] 98.2 °F (36.8 °C)  Pulse:  [] 99  Resp:  [16-28] 16  SpO2:  [92 %-100 %] 97 %  BP: (103-135)/(54-83) 122/67  Arterial Line BP:  ()/(48-70) 97/61     Weight: 83.8 kg (184 lb 11.9 oz) (09/22/20 0530)  Body mass index is 30.74 kg/m².  Body surface area is 1.96 meters squared.    I/O last 3 completed shifts:  In: 3588.2 [I.V.:2688.2; NG/GT:900]  Out: 8063 [Urine:7983; Chest Tube:80]    Physical Exam  Vitals signs and nursing note reviewed.   Constitutional:       Appearance: She is not ill-appearing or toxic-appearing.   HENT:      Head: Normocephalic and atraumatic.      Mouth/Throat:      Comments: Intubated, OGT in place  Cardiovascular:      Rate and Rhythm: Normal rate. Rhythm irregular.   Pulmonary:      Comments: Mechanically ventilated  See settings below  CT x1 in place with SS output  Sternal incision c/d/i  Pacer wires in place  Abdominal:      General: There is no distension.      Palpations: Abdomen is soft.      Tenderness: There is no abdominal tenderness.   Skin:     General: Skin is warm and dry.   Neurological:      Comments: Sedated  Follows commands when awake         Significant Labs:  CBC:   Recent Labs   Lab 09/22/20 0830   WBC 9.95   RBC 3.15*   HGB 9.4*   HCT 29.2*      MCV 93   MCH 29.8   MCHC 32.2     CMP:   Recent Labs   Lab 09/22/20 0311 09/22/20  0830 09/22/20  1130   GLU 99 101  --    CALCIUM 8.8 8.6*  --    ALBUMIN 2.6*  --   --    PROT 6.3  --   --     141  --    K 3.5 3.7 4.3   CO2 30* 31*  --    CL 96 97  --    BUN 51* 47*  --    CREATININE 1.7* 1.6*  --    ALKPHOS 252*  --   --    *  --   --    AST 55*  --   --    BILITOT 2.6*  --   --      Coagulation:   Recent Labs   Lab 09/19/20  0117  09/22/20 0311   INR 2.7*   < > 1.9*   APTT 27.0  --   --     < > = values in this interval not displayed.     LFTs:   Recent Labs   Lab 09/22/20 0311   *   AST 55*   ALKPHOS 252*   BILITOT 2.6*   PROT 6.3   ALBUMIN 2.6*     All labs within the past 24 hours have been reviewed.     Significant Imaging:  Labs: Reviewed  X-Ray: Reviewed

## 2020-09-22 NOTE — PROGRESS NOTES
[facility]  Critical Care - Surgery  Progress Note    Patient Name: Fatou Lorenzo  MRN: [id number]  Admission Date: 8/30/2020  Hospital Length of Stay: 22 days  Code Status: Full Code  Attending Provider: Antoni Waddell MD  Primary Care Provider: Ludin Rivas MD   Principal Problem: Acute respiratory failure with hypoxia    Subjective:     Hospital/ICU Course:  No notes on file    Interval History/Significant Events: No acute events. Remains in rate controlled atrial fibrillation with HR currently in the 90s and is currently on epi at 0.04 and amio 0.5. Continuing to diurese with lasix gtt at 20 mg/hr and is net negative 3L in the last 24 hours. Tube feeds advanced to 30 cc/hr last night but had high residuals so this was decreased back down to 20 cc/hr.     Chest tube with 20 cc SS output in the last 24 hours    Follow-up For: Procedure(s) (LRB):  Valvuloplasty, Mitral (N/A)  REPAIR, TRICUSPID VALVE (N/A)  BAIN MAZE PROCEDURE (N/A)    Post-Operative Day: 13 Days Post-Op    Objective:     Vital Signs (Most Recent):  Temp: 97.3 °F (36.3 °C) (09/22/20 1000)  Pulse: 95 (09/22/20 1000)  Resp: 16 (09/22/20 0725)  BP: (!) 115/58 (09/22/20 1000)  SpO2: 99 % (09/22/20 1000) Vital Signs (24h Range):  Temp:  [96.3 °F (35.7 °C)-97.9 °F (36.6 °C)] 97.3 °F (36.3 °C)  Pulse:  [] 95  Resp:  [15-18] 16  SpO2:  [92 %-100 %] 99 %  BP: (103-135)/(54-83) 115/58  Arterial Line BP: ()/(50-66) 112/65     Weight: 83.8 kg (184 lb 11.9 oz)  Body mass index is 30.74 kg/m².      Intake/Output Summary (Last 24 hours) at 9/22/2020 1021  Last data filed at 9/22/2020 1000  Gross per 24 hour   Intake 2539.17 ml   Output 5720 ml   Net -3180.83 ml       Physical Exam  Vitals signs and nursing note reviewed.   Constitutional:       Appearance: She is not ill-appearing or toxic-appearing.   HENT:      Head: Normocephalic and atraumatic.      Mouth/Throat:      Comments: Intubated, OGT in place  Cardiovascular:       Rate and Rhythm: Normal rate. Rhythm irregular.   Pulmonary:      Comments: Mechanically ventilated  See settings below  CT x1 in place with SS output  Sternal incision c/d/i  Pacer wires in place  Abdominal:      General: There is no distension.      Palpations: Abdomen is soft.      Tenderness: There is no abdominal tenderness.   Skin:     General: Skin is warm and dry.   Neurological:      Comments: Sedated  Follows commands when awake         Vents:  Vent Mode: A/C (09/22/20 0729)  Ventilator Initiated: Yes(chart correction) (09/18/20 2202)  Set Rate: 16 BPM (09/22/20 0729)  Vt Set: 350 mL (09/22/20 0729)  Pressure Support: 5 cmH20 (09/15/20 0113)  PEEP/CPAP: 5 cmH20 (09/22/20 0729)  Oxygen Concentration (%): 40 (09/22/20 0001)  Peak Airway Pressure: 23 cmH2O (09/22/20 0729)  Plateau Pressure: 18 cmH20 (09/22/20 0729)  Total Ve: 6 mL (09/22/20 0729)  Negative Inspiratory Force (cm H2O): -27 (09/15/20 0926)  F/VT Ratio<105 (RSBI): (!) 48.65 (09/21/20 2019)    Lines/Drains/Airways     Central Venous Catheter Line             Introducer with Double Lumen 09/09/20 right internal jugular 13 days    Percutaneous Central Line Insertion/Assessment - Triple Lumen  09/18/20 2200 right internal jugular 3 days          Drain                 Urethral Catheter 09/09/20 1512 Non-latex;Straight-tip;Temperature probe 14 Fr. 12 days         Chest Tube 09/18/20 2330 1 3 days         NG/OG Tube 09/19/20 1000 Right mouth 3 days          Airway                 Airway - Non-Surgical 09/18/20 2217 Endotracheal Tube 3 days          Arterial Line            Arterial Line 09/18/20 1345 Left Radial 3 days          Line                 Pacer Wires 09/09/20 2057 12 days                Significant Labs:    CBC/Anemia Profile:  Recent Labs   Lab 09/21/20 2007 09/22/20 0311 09/22/20  0830   WBC 11.52 10.93 9.95   HGB 9.1* 9.6* 9.4*   HCT 27.4* 29.5* 29.2*    283 292   MCV 91 92 93   RDW 17.2* 17.5* 17.7*        Chemistries:  Recent  Labs   Lab 09/21/20  0415  09/21/20 2007 09/21/20  2205 09/22/20  0311 09/22/20  0830     137   < > 139  --  143 141   K 3.1*  3.1*   < > 3.5 3.5 3.5 3.7   CL 94*  94*   < > 96  --  96 97   CO2 26  26   < > 30*  --  30* 31*   BUN 61*  61*   < > 56*  --  51* 47*   CREATININE 2.2*  2.2*   < > 1.8*  --  1.7* 1.6*   CALCIUM 8.3*  8.3*   < > 8.3*  --  8.8 8.6*   ALBUMIN 2.4*  --   --   --  2.6*  --    PROT 5.9*  --   --   --  6.3  --    BILITOT 3.2*  --   --   --  2.6*  --    ALKPHOS 237*  --   --   --  252*  --    *  --   --   --  101*  --    AST 74*  --   --   --  55*  --    MG 2.3  2.3   < > 2.3  --  2.3  2.3 2.0   PHOS 5.7*  5.7*   < > 4.2  --  4.1 3.5    < > = values in this interval not displayed.       ABGs:   Recent Labs   Lab 09/22/20  0729   PH 7.543*   PCO2 39.8   HCO3 34.3*   POCSATURATED 97   BE 12     Coagulation:   Recent Labs   Lab 09/22/20  0311   INR 1.9*     All pertinent labs within the past 24 hours have been reviewed.    Significant Imaging:  I have reviewed and interpreted all pertinent imaging results/findings within the past 24 hours.     09/22/2020 CXR  FINDINGS:  Endotracheal tube terminates 3 cm proximal to the anjel.  Orogastric tube courses into the abdomen.  Right internal jugular central venous catheter is unchanged in position.  The lungs are well expanded.  In comparison with prior there has been no change.  There is a moderate right pleural effusion and a small left pleural effusion.  There are bibasilar airspace opacities.  The cardiac silhouette is enlarged, unchanged.  There are calcifications of the aortic arch.  No pneumothorax.  Visualized osseous structures demonstrate degenerative changes.  Median sternotomy wires are noted.  There are prosthetic cardiac valves.     Impression:     Moderate right pleural effusion and bibasilar opacities, unchanged    Assessment/Plan:     Severe mitral regurgitation  Fatou CHAVA Lorenzo is a 75 y.o. female s/p MVr, TVr  9/9/2020. Case noteworthy for multiple pump runs (3) and RV hematoma due to retraction. Unstable overnight 9/18, required pericardial window for evac of posterior cardiac tamponade.     Neuro:  - Intubated and sedated  - Sedation: continue with precedex, daily sedation vacation  - Pain control with scheduled tylenol and PRN dilaudid      CV:  S/p MVr, TVr 9/9/20. Case noteworthy for pump run x3, RV hematoma 2/2 retraction  - Persistent atrial fibrillation despite multiple boluses of amio gtt and attempted cardioversion. Currently rate controlled.    - d/c amio gtt today and continue on PO amio and dig  - MAP goals 60-80, SBP <140   - epi at 0.04 this am, will slowly wean off today   - cardene as needed  - Pacer wires in place, set to back up rate 50   - ECHO today     Pulm:  - Re-intubated on 9/19 for bedside pericardial window  - Weaned off Oneil  - Daily CXR and ABG  - pleural effusion on the right which has been present since post op. Currently leaving as is given on minimal vent settings and is anticoagulated     FEN/GI:  - Net negative 3L in the last 24 hours  - Receiveing KCL scheduled 20 mEq BID  - Replace lytes as needed  - NPO, increase TFs to 30.   - ppx: famotidine, miralax    Renal:  - Yoo in place for strict I/O  - Compromised renal function since surgery   - BUN/Cr improved today from yesterday 61/2.2 -> 51/1.7  - Continuing with aggressive diuresis   - Lasix gtt at 20     Heme/Onc:  - H/H stable  - Anticoagulated for persistent atrial fibrillation   - PT/INR 20.7/1.9. Coumadin 3 mg today     ID:  - Afebrile, WBC WNL  - Procal 0.94 -> 0.62. will stop trending given continued to downtrend despite stopping abx     Endo:  - no hx of DM  - ISS    PPx:  - famotidine, warfarin, SCDs     Dispo: continue ICU care      Critical secondary to Patient has a condition that poses threat to life and bodily function: s/p MVr, TVr, MAZE     Critical care was time spent personally by me on the following activities:  development of treatment plan with patient or surrogate and bedside caregivers, discussions with consultants, evaluation of patient's response to treatment, examination of patient, ordering and performing treatments and interventions, ordering and review of laboratory studies, ordering and review of radiographic studies, pulse oximetry, re-evaluation of patient's condition.  This critical care time did not overlap with that of any other provider or involve time for any procedures.     Madhuri Ng MD  Critical Care - Surgery  [facility]

## 2020-09-22 NOTE — ASSESSMENT & PLAN NOTE
Fatou Lorenzo is a 75 y.o. female s/p MVr, TVr 9/9/2020. Case noteworthy for multiple pump runs (3) and RV hematoma due to retraction. Unstable overnight 9/18, required pericardial window for evac of posterior cardiac tamponade.     Neuro:  - Intubated and sedated  - Sedation: continue with precedex, daily sedation vacation  - Pain control with scheduled tylenol and PRN dilaudid      CV:  S/p MVr, TVr 9/9/20. Case noteworthy for pump run x3, RV hematoma 2/2 retraction  - Persistent atrial fibrillation despite multiple boluses of amio gtt and attempted cardioversion. Currently rate controlled.    - d/c amio gtt today and continue on PO amio and dig  - MAP goals 60-80, SBP <140   - epi at 0.04 this am, will slowly wean off today   - cardene as needed  - Pacer wires in place, set to back up rate 50      Pulm:  - Re-intubated on 9/19 for bedside pericardial window  - Weaned off Oneil  - Daily CXR and ABG  - pleural effusion on the right which has been present since post op. Currently leaving as is given on minimal vent settings and is anticoagulated     FEN/GI:  - Net negative 3L in the last 24 hours  - Receiveing KCL scheduled 20 mEq BID  - Replace lytes as needed  - NPO, increase TFs to 30.   - ppx: famotidine, miralax    Renal:  - Yoo in place for strict I/O  - Compromised renal function since surgery   - BUN/Cr improved today from yesterday 61/2.2 -> 51/1.7  - Continuing with aggressive diuresis   - Lasix gtt at 20     Heme/Onc:  - H/H stable  - Anticoagulated for persistent atrial fibrillation   - PT/INR 20.7/1.9. Coumadin 3 mg today     ID:  - Afebrile, WBC WNL  - Procal 0.94 -> 0.62. will stop trending given continued to downtrend despite stopping abx     Endo:  - no hx of DM  - ISS    PPx:  - famotidine, warfarin, SCDs     Dispo: continue ICU care

## 2020-09-22 NOTE — PROGRESS NOTES
1430: Pt extremely agitated, scratching and grabbing at RN and Echo tech. MD notified, propofol reordered. Propofol started at small dose, precedex decreased, pt in a more relaxed state.     1445: Switched to spontaneous vent settings by Dr. Ng. PAV+, 70% support, 40% FiO2, 5 PEEP; pt pulling TV in 300s.; sats 97% tolerating well.     1500: lasix decreased from 20mg/hr to 10mg/hr per Dr. Troy.

## 2020-09-22 NOTE — PROGRESS NOTES
[facility]  Nephrology  Progress Note    Patient Name: Fatou Lorenzo  MRN: [id number]  Admission Date: 8/30/2020  Hospital Length of Stay: 22 days  Attending Provider: Antoni Waddell MD   Primary Care Physician: Ludin Rivas MD  Principal Problem:Acute respiratory failure with hypoxia    Subjective:     HPI: Fatou Lorenzo is a 76 yo F who had MVR + TVR to 9/9 that was complicated by RV hematoma and subsequent pericardial effusion.  She has been treated in ICU and has had pressor requirement that was DC-ed 3 days ago.  She was being planned to be transitioned to the floor today, however, this morning she had acute decompensation with hypotension and tachycardia.  She has had frequent runs of atrial fibrillation since being admitted, and has intermittently been in AFib throughout the day.  She was restarted on epinephrine and Levophed and TTE was ordered to assess for pericardial tamponade.  It should be noted, however, when she 1st became hypotensive she was bradycardic.     When seen this evening with staff, she is alert and oriented for remained hypotensive.  Heart rate is in the 130s which is atrial fibrillation.  She is on Levophed at 0.02 and epinephrine at 0.05.  TTE demonstrated mild to moderate posterior pericardial effusion, with no significant mitral valve respiratory variation and no significant RA/RV diastolic collapse.  EF was 25% with biventricular failure and noted RV hematoma.  Denies any chest pain, however, she did have some epigastric pain earlier today when she a for the 1st time that precipitated some her events.  No other acute events. Reportedly has had less urine output over the course of her day.    Interval History: Patient seen and examined at bedside, no acute events overnight. 5.5 liters of urine documented with lasix infusion.      Review of patient's allergies indicates:  No Known Allergies  Current Facility-Administered Medications   Medication Frequency     acetaminophen tablet 975 mg Q8H    amiodarone tablet 200 mg BID    aspirin chewable tablet 81 mg Daily    calcium carbonate 200 mg calcium (500 mg) chewable tablet 1,000 mg TID PRN    dexmedetomidine (PRECEDEX) 400mcg/100mL 0.9% NaCL infusion Continuous    dextrose 5 % and 0.45 % NaCl with KCl 20 mEq infusion Continuous    dextrose 50% injection 12.5 g PRN    dextrose 50% injection 25 g PRN    digoxin injection 125 mcg Daily    EPINEPHrine (ADRENALIN) 5 mg in sodium chloride 0.9% 250 mL infusion Continuous    famotidine tablet 20 mg Daily    furosemide (LASIX) 500 mg infusion (conc: 10 mg/mL) Continuous    glucagon (human recombinant) injection 1 mg PRN    hydrALAZINE injection 10 mg Q8H PRN    HYDROmorphone injection 0.5 mg Q4H PRN    insulin aspart U-100 pen 0-5 Units Q6H PRN    melatonin tablet 6 mg Nightly    niCARdipine 40 mg/200 mL (0.2 mg/mL) infusion Continuous    ondansetron injection 4 mg Q6H PRN    oxyCODONE immediate release tablet 5 mg Q4H PRN    polyethylene glycol packet 17 g Daily    potassium chloride packet 20 mEq BID    sennosides 8.8 mg/5 ml syrup 5 mL Daily    sodium chloride 3% nebulizer solution 4 mL Q6H WAKE    warfarin split tablet 3 mg Daily       Objective:     Vital Signs (Most Recent):  Temp: 98.2 °F (36.8 °C) (09/22/20 1345)  Pulse: 99 (09/22/20 1353)  Resp: 16 (09/22/20 1353)  BP: 122/67 (09/22/20 1200)  SpO2: 97 % (09/22/20 1353)  O2 Device (Oxygen Therapy): ventilator (09/22/20 1353) Vital Signs (24h Range):  Temp:  [96.3 °F (35.7 °C)-98.2 °F (36.8 °C)] 98.2 °F (36.8 °C)  Pulse:  [] 99  Resp:  [16-28] 16  SpO2:  [92 %-100 %] 97 %  BP: (103-135)/(54-83) 122/67  Arterial Line BP: ()/(48-70) 97/61     Weight: 83.8 kg (184 lb 11.9 oz) (09/22/20 0530)  Body mass index is 30.74 kg/m².  Body surface area is 1.96 meters squared.    I/O last 3 completed shifts:  In: 3588.2 [I.V.:2688.2; NG/GT:900]  Out: 8063 [Urine:7983; Chest Tube:80]    Physical  Exam  Vitals signs and nursing note reviewed.   Constitutional:       Appearance: She is not ill-appearing or toxic-appearing.   HENT:      Head: Normocephalic and atraumatic.      Mouth/Throat:      Comments: Intubated, OGT in place  Cardiovascular:      Rate and Rhythm: Normal rate. Rhythm irregular.   Pulmonary:      Comments: Mechanically ventilated  See settings below  CT x1 in place with SS output  Sternal incision c/d/i  Pacer wires in place  Abdominal:      General: There is no distension.      Palpations: Abdomen is soft.      Tenderness: There is no abdominal tenderness.   Skin:     General: Skin is warm and dry.   Neurological:      Comments: Sedated  Follows commands when awake         Significant Labs:  CBC:   Recent Labs   Lab 09/22/20 0830   WBC 9.95   RBC 3.15*   HGB 9.4*   HCT 29.2*      MCV 93   MCH 29.8   MCHC 32.2     CMP:   Recent Labs   Lab 09/22/20  0311 09/22/20  0830 09/22/20  1130   GLU 99 101  --    CALCIUM 8.8 8.6*  --    ALBUMIN 2.6*  --   --    PROT 6.3  --   --     141  --    K 3.5 3.7 4.3   CO2 30* 31*  --    CL 96 97  --    BUN 51* 47*  --    CREATININE 1.7* 1.6*  --    ALKPHOS 252*  --   --    *  --   --    AST 55*  --   --    BILITOT 2.6*  --   --      Coagulation:   Recent Labs   Lab 09/19/20  0117  09/22/20 0311   INR 2.7*   < > 1.9*   APTT 27.0  --   --     < > = values in this interval not displayed.     LFTs:   Recent Labs   Lab 09/22/20 0311   *   AST 55*   ALKPHOS 252*   BILITOT 2.6*   PROT 6.3   ALBUMIN 2.6*     All labs within the past 24 hours have been reviewed.     Significant Imaging:  Labs: Reviewed  X-Ray: Reviewed    Assessment/Plan:     * Acute respiratory failure with hypoxia  Currently intubated   Vent Mode: A/C  Oxygen Concentration (%):  [40] 40  Resp Rate Total:  [16 br/min-17 br/min] 16 br/min  Vt Set:  [350 mL] 350 mL  PEEP/CPAP:  [5 cmH20] 5 cmH20  Mean Airway Pressure:  [7.4 cmH20-8.9 cmH20] 8.2 cmH20      Atrial fibrillation  with RVR  -improved  -rvr on prior paroxysmal diagnosis  -secondary to  MVR + TVR vs sepsis vs volume depletion  -per cardiology    JONAH (acute kidney injury)  -oliguric/anuric kdigo stage 2 jonah likely with ischemic atn probably due to septic vs cardiogenic shock evidenced by leukocytosis and afib with rvr  -RVR improved without significant improvement in bp    - Strict I/O and chart   - daily weights    - consider reducing lasix infusion to boluses to avoid prerenal JONAH and hypokalemia/alkalosis  - Cr improvin.9-->2.7-->2.4-->2.2-->1.9-->1.7-->1.6  - urine output 5.5 L/24 hrs,    - renally dose all medications   - Avoid nephrotoxic medications, NSAIDs, IV contrast, ACE/ARB.  - Maintain MAP > 65  - Hb > 7 gm/dL   - Will follow closely   - Case discussed with Dr Beasley      Oliguria and anuria  -resolved  -starting 20, prior was producing large volumes of urine        Thank you for your consult. I will follow-up with patient. Please contact us if you have any additional questions.    Miquel Velazquez MD  Nephrology  [facility]

## 2020-09-22 NOTE — ASSESSMENT & PLAN NOTE
-oliguric/anuric kdigo stage 2 jonah likely with ischemic atn probably due to septic vs cardiogenic shock evidenced by leukocytosis and afib with rvr  -RVR improved without significant improvement in bp    - Strict I/O and chart   - daily weights    - consider reducing lasix infusion to boluses to avoid prerenal JONAH and hypokalemia/alkalosis  - Cr improvin.9-->2.7-->2.4-->2.2-->1.9-->1.7-->1.6  - urine output 5.5 L/24 hrs,    - renally dose all medications   - Avoid nephrotoxic medications, NSAIDs, IV contrast, ACE/ARB.  - Maintain MAP > 65  - Hb > 7 gm/dL   - Will follow closely   - Case discussed with Dr Beasley

## 2020-09-23 NOTE — PROGRESS NOTES
[facility]  Critical Care - Surgery  Progress Note    Patient Name: Fatou Lorenzo  MRN: [id number]  Admission Date: 8/30/2020  Hospital Length of Stay: 23 days  Code Status: Full Code  Attending Provider: Antoni Waddell MD  Primary Care Provider: Ludin Rivas MD   Principal Problem: Acute respiratory failure with hypoxia    Subjective:     Hospital/ICU Course:  No notes on file    Interval History/Significant Events: Patient seen and examined this am. Intermittent bouts of afib overnight requiring amio gtt. Extubated successfully this am. Continue to monitor respiratory status. Plan for speech eval this am.     Follow-up For: Procedure(s) (LRB):  Valvuloplasty, Mitral (N/A)  REPAIR, TRICUSPID VALVE (N/A)  BAIN MAZE PROCEDURE (N/A)    Post-Operative Day: 14 Days Post-Op    Objective:     Vital Signs (Most Recent):  Temp: 99.3 °F (37.4 °C) (09/23/20 1000)  Pulse: (!) 117 (09/23/20 1000)  Resp: 20 (09/23/20 0825)  BP: (!) 104/58 (09/23/20 1000)  SpO2: 97 % (09/23/20 1000) Vital Signs (24h Range):  Temp:  [97.5 °F (36.4 °C)-99.5 °F (37.5 °C)] 99.3 °F (37.4 °C)  Pulse:  [] 117  Resp:  [16-28] 20  SpO2:  [91 %-100 %] 97 %  BP: ()/(54-83) 104/58  Arterial Line BP: ()/(47-70) 92/54     Weight: 83.5 kg (184 lb)  Body mass index is 30.62 kg/m².      Intake/Output Summary (Last 24 hours) at 9/23/2020 1037  Last data filed at 9/23/2020 1000  Gross per 24 hour   Intake 1734 ml   Output 4695 ml   Net -2961 ml       Physical Exam  Vitals signs and nursing note reviewed.   Constitutional:       Appearance: She is not ill-appearing or toxic-appearing.   HENT:      Head: Normocephalic and atraumatic.      Mouth/Throat:      Mouth: Mucous membranes are moist.   Cardiovascular:      Rate and Rhythm: Tachycardia present. Rhythm irregular.   Pulmonary:      Comments: Extubated successfully this am  CT x1 in place with SS output  Sternal incision c/d/i  Pacer wires in place  Abdominal:       General: There is no distension.      Palpations: Abdomen is soft.      Tenderness: There is no abdominal tenderness.   Musculoskeletal: Normal range of motion.   Skin:     General: Skin is warm and dry.   Neurological:      Comments: Off sedation  Following commands   Psychiatric:      Comments: lethargic         Vents:  Vent Mode: Spont (09/23/20 0719)  Ventilator Initiated: Yes(chart correction) (09/18/20 2202)  Set Rate: 16 BPM (09/23/20 0325)  Vt Set: 350 mL (09/23/20 0325)  Pressure Support: 5 cmH20 (09/23/20 0719)  PEEP/CPAP: 5 cmH20 (09/23/20 0719)  Oxygen Concentration (%): 40 (09/23/20 0719)  Peak Airway Pressure: 10 cmH2O (09/23/20 0719)  Plateau Pressure: 15 cmH20 (09/23/20 0325)  Total Ve: 7.12 mL (09/23/20 0719)  Negative Inspiratory Force (cm H2O): -27 (09/15/20 0926)  F/VT Ratio<105 (RSBI): (!) 54.44 (09/23/20 0719)    Lines/Drains/Airways     Central Venous Catheter Line             Introducer with Double Lumen 09/09/20 right internal jugular 14 days    Percutaneous Central Line Insertion/Assessment - Triple Lumen  09/18/20 2200 right internal jugular 4 days          Drain                 Urethral Catheter 09/09/20 1512 Non-latex;Straight-tip;Temperature probe 14 Fr. 13 days         Chest Tube 09/18/20 2330 1 4 days          Arterial Line            Arterial Line 09/18/20 1345 Left Radial 4 days          Line                 Pacer Wires 09/09/20 2057 13 days                Significant Labs:    CBC/Anemia Profile:  Recent Labs   Lab 09/22/20  0830 09/22/20 2000 09/23/20  0329   WBC 9.95 8.95 8.86   HGB 9.4* 9.6* 9.6*   HCT 29.2* 30.0* 30.4*    281 307   MCV 93 95 94   RDW 17.7* 17.8* 17.5*        Chemistries:  Recent Labs   Lab 09/22/20  0311 09/22/20  0830  09/22/20  2158 09/23/20  0323 09/23/20  0911    141  --   --  144  --    K 3.5 3.7   < > 4.2 3.3* 4.1   CL 96 97  --   --  99  --    CO2 30* 31*  --   --  28  --    BUN 51* 47*  --   --  53*  --    CREATININE 1.7* 1.6*  --   --  1.4   --    CALCIUM 8.8 8.6*  --   --  8.7  --    ALBUMIN 2.6*  --   --   --  2.5*  --    PROT 6.3  --   --   --  6.2  --    BILITOT 2.6*  --   --   --  1.9*  --    ALKPHOS 252*  --   --   --  321*  --    *  --   --   --  79*  --    AST 55*  --   --   --  52*  --    MG 2.3  2.3 2.0  --   --  2.1  --    PHOS 4.1 3.5  --   --  2.5*  --     < > = values in this interval not displayed.       All pertinent labs within the past 24 hours have been reviewed.    Significant Imaging:  I have reviewed all pertinent imaging results/findings within the past 24 hours.    Assessment/Plan:     Severe mitral regurgitation  Fatou Lorenzo is a 75 y.o. female s/p MVr, TVr 9/9/2020. Case noteworthy for multiple pump runs (3) and RV hematoma due to retraction. Unstable overnight 9/18, required pericardial window for evac of posterior cardiac tamponade.     Neuro:  - off sedation; following commands this am  - Pain control with scheduled tylenol and PRN dilaudid      CV:  S/p MVr, TVr 9/9/20. Case noteworthy for pump run x3, RV hematoma 2/2 retraction  - Persistent atrial fibrillation despite multiple boluses of amio gtt and attempted cardioversion. Currently in afib.   - continue on PO amio and dig  - MAP goals 60-80, SBP <140   - epi at 0.02 this am, plan to wean   - cardene as needed  - Pacer wires in place, set to back up rate 50      Pulm:  - successfully extubated this am; continue to monitor respiratory status  - currently on 5L NC; satting well  - Daily CXR and ABG  - pleural effusion on the right which has been present since post op.      FEN/GI:  - Net negative 3.2L in the last 24 hours  - Receiveing KCL scheduled 20 mEq BID  - Replace lytes as needed  - NPO  - plan for speech eval today to advance diet   - ppx: famotidine, miralax    Renal:  - Yoo in place for strict I/O; 5L UOP over last 24hrs  - Compromised renal function since surgery   - BUN/Cr improved today from yesterday 53/1.4 -> 47/1.6  - Continuing with  aggressive diuresis   - Lasix gtt at 5     Heme/Onc:  - H/H stable  - Anticoagulated for persistent atrial fibrillation   - PT/INR 47/1.4. Coumadin 3 mg today     ID:  - Afebrile, WBC WNL     Endo:  - no hx of DM  - ISS    PPx:  - famotidine, warfarin, SCDs     Dispo: continue ICU care; PT/OT eval         Critical care was time spent personally by me on the following activities: development of treatment plan with patient or surrogate and bedside caregivers, discussions with consultants, evaluation of patient's response to treatment, examination of patient, ordering and performing treatments and interventions, ordering and review of laboratory studies, ordering and review of radiographic studies, pulse oximetry, re-evaluation of patient's condition.  This critical care time did not overlap with that of any other provider or involve time for any procedures.     Dony Limon MD  Critical Care - Surgery  [facility]

## 2020-09-23 NOTE — SUBJECTIVE & OBJECTIVE
Interval History/Significant Events: Patient seen and examined this am. Intermittent bouts of afib overnight requiring amio gtt. Extubated successfully this am. Continue to monitor respiratory status. Plan for speech eval this am.     Follow-up For: Procedure(s) (LRB):  Valvuloplasty, Mitral (N/A)  REPAIR, TRICUSPID VALVE (N/A)  BAIN MAZE PROCEDURE (N/A)    Post-Operative Day: 14 Days Post-Op    Objective:     Vital Signs (Most Recent):  Temp: 99.3 °F (37.4 °C) (09/23/20 1000)  Pulse: (!) 117 (09/23/20 1000)  Resp: 20 (09/23/20 0825)  BP: (!) 104/58 (09/23/20 1000)  SpO2: 97 % (09/23/20 1000) Vital Signs (24h Range):  Temp:  [97.5 °F (36.4 °C)-99.5 °F (37.5 °C)] 99.3 °F (37.4 °C)  Pulse:  [] 117  Resp:  [16-28] 20  SpO2:  [91 %-100 %] 97 %  BP: ()/(54-83) 104/58  Arterial Line BP: ()/(47-70) 92/54     Weight: 83.5 kg (184 lb)  Body mass index is 30.62 kg/m².      Intake/Output Summary (Last 24 hours) at 9/23/2020 1037  Last data filed at 9/23/2020 1000  Gross per 24 hour   Intake 1734 ml   Output 4695 ml   Net -2961 ml       Physical Exam  Vitals signs and nursing note reviewed.   Constitutional:       Appearance: She is not ill-appearing or toxic-appearing.   HENT:      Head: Normocephalic and atraumatic.      Mouth/Throat:      Mouth: Mucous membranes are moist.   Cardiovascular:      Rate and Rhythm: Tachycardia present. Rhythm irregular.   Pulmonary:      Comments: Extubated successfully this am  CT x1 in place with SS output  Sternal incision c/d/i  Pacer wires in place  Abdominal:      General: There is no distension.      Palpations: Abdomen is soft.      Tenderness: There is no abdominal tenderness.   Musculoskeletal: Normal range of motion.   Skin:     General: Skin is warm and dry.   Neurological:      Comments: Off sedation  Following commands   Psychiatric:      Comments: lethargic         Vents:  Vent Mode: Spont (09/23/20 0719)  Ventilator Initiated: Yes(chart correction) (09/18/20  2202)  Set Rate: 16 BPM (09/23/20 0325)  Vt Set: 350 mL (09/23/20 0325)  Pressure Support: 5 cmH20 (09/23/20 0719)  PEEP/CPAP: 5 cmH20 (09/23/20 0719)  Oxygen Concentration (%): 40 (09/23/20 0719)  Peak Airway Pressure: 10 cmH2O (09/23/20 0719)  Plateau Pressure: 15 cmH20 (09/23/20 0325)  Total Ve: 7.12 mL (09/23/20 0719)  Negative Inspiratory Force (cm H2O): -27 (09/15/20 0926)  F/VT Ratio<105 (RSBI): (!) 54.44 (09/23/20 0719)    Lines/Drains/Airways     Central Venous Catheter Line             Introducer with Double Lumen 09/09/20 right internal jugular 14 days    Percutaneous Central Line Insertion/Assessment - Triple Lumen  09/18/20 2200 right internal jugular 4 days          Drain                 Urethral Catheter 09/09/20 1512 Non-latex;Straight-tip;Temperature probe 14 Fr. 13 days         Chest Tube 09/18/20 2330 1 4 days          Arterial Line            Arterial Line 09/18/20 1345 Left Radial 4 days          Line                 Pacer Wires 09/09/20 2057 13 days                Significant Labs:    CBC/Anemia Profile:  Recent Labs   Lab 09/22/20  0830 09/22/20 2000 09/23/20  0329   WBC 9.95 8.95 8.86   HGB 9.4* 9.6* 9.6*   HCT 29.2* 30.0* 30.4*    281 307   MCV 93 95 94   RDW 17.7* 17.8* 17.5*        Chemistries:  Recent Labs   Lab 09/22/20  0311 09/22/20  0830  09/22/20  2158 09/23/20  0323 09/23/20  0911    141  --   --  144  --    K 3.5 3.7   < > 4.2 3.3* 4.1   CL 96 97  --   --  99  --    CO2 30* 31*  --   --  28  --    BUN 51* 47*  --   --  53*  --    CREATININE 1.7* 1.6*  --   --  1.4  --    CALCIUM 8.8 8.6*  --   --  8.7  --    ALBUMIN 2.6*  --   --   --  2.5*  --    PROT 6.3  --   --   --  6.2  --    BILITOT 2.6*  --   --   --  1.9*  --    ALKPHOS 252*  --   --   --  321*  --    *  --   --   --  79*  --    AST 55*  --   --   --  52*  --    MG 2.3  2.3 2.0  --   --  2.1  --    PHOS 4.1 3.5  --   --  2.5*  --     < > = values in this interval not displayed.       All pertinent  labs within the past 24 hours have been reviewed.    Significant Imaging:  I have reviewed all pertinent imaging results/findings within the past 24 hours.

## 2020-09-23 NOTE — CARE UPDATE
SBT and extubation parameters passed. Pt following commands. Respiratory effort unlabored, however pt is  very anxious. NIF -25, , RSBI in 50's with encouragement and best effort. MD notified of results. Pt extubated per MD order to 5 L nasal cannula. SpO2 100%. Pt remains anxious. Respiratory effort appropriate. Will continue to monitor closely.

## 2020-09-23 NOTE — PLAN OF CARE
Problem: SLP Goal  Goal: SLP Goal  Description: Speech Language Pathology Goals  Goals expected to be met 9/30  1. Pt will participate in ongoing swallow assessment to determine safest and least restrictive diet.    Outcome: Ongoing, Progressing     Bedside swallow evaluation completed. ST rec continuation of NPO at this time. ST to continue to monitor.    BURKE Perkins  9/23/2020

## 2020-09-23 NOTE — PLAN OF CARE
Problem: Occupational Therapy Goal  Goal: Occupational Therapy Goal  Description: Goals to be met by: 10/7/2020      Patient will increase functional independence with ADLs by performing:    UE Dressing with Minimum Assistance.  Grooming while sitting EOB with Contact Guard Assistance.  Toileting from bedside commode with Max Assistance for hygiene and clothing management.   Toilet transfer to bedside commode with Max Assistance.  Supine <> Sit with minimum assistance in preparation for EOB/OOB functional activities.       9/23/2020 1346 by Sherly Ball OT  Outcome: Ongoing, Progressing    Sherly Ball, OTR/L  Pager: [phone]  9/23/2020

## 2020-09-23 NOTE — ASSESSMENT & PLAN NOTE
Fatou Lorenzo is a 75 y.o. female s/p MVr, TVr 9/9/2020. Case noteworthy for multiple pump runs (3) and RV hematoma due to retraction. Unstable overnight 9/18, required pericardial window for evac of posterior cardiac tamponade.     Neuro:  - off sedation; following commands this am  - Pain control with scheduled tylenol and PRN dilaudid      CV:  S/p MVr, TVr 9/9/20. Case noteworthy for pump run x3, RV hematoma 2/2 retraction  - Persistent atrial fibrillation despite multiple boluses of amio gtt and attempted cardioversion. Currently in afib.   - continue on PO amio and dig  - MAP goals 60-80, SBP <140   - epi at 0.02 this am, plan to wean   - cardene as needed  - Pacer wires in place, set to back up rate 50      Pulm:  - successfully extubated this am; continue to monitor respiratory status  - currently on 5L NC; satting well  - Daily CXR and ABG  - pleural effusion on the right which has been present since post op.      FEN/GI:  - Net negative 3.2L in the last 24 hours  - Receiveing KCL scheduled 20 mEq BID  - Replace lytes as needed  - NPO  - plan for speech eval today to advance diet   - ppx: famotidine, miralax    Renal:  - Yoo in place for strict I/O; 5L UOP over last 24hrs  - Compromised renal function since surgery   - BUN/Cr improved today from yesterday 53/1.4 -> 47/1.6  - Continuing with aggressive diuresis   - Lasix gtt at 5     Heme/Onc:  - H/H stable  - Anticoagulated for persistent atrial fibrillation   - PT/INR 47/1.4. Coumadin 3 mg today     ID:  - Afebrile, WBC WNL     Endo:  - no hx of DM  - ISS    PPx:  - famotidine, warfarin, SCDs     Dispo: continue ICU care; PT/OT eval

## 2020-09-23 NOTE — PROGRESS NOTES
[facility]  Nephrology  Progress Note    Patient Name: Fatou Lorenzo  MRN: [id number]  Admission Date: 8/30/2020  Hospital Length of Stay: 23 days  Attending Provider: Antoni Waddell MD   Primary Care Physician: Ludin Rivas MD  Principal Problem:Acute respiratory failure with hypoxia    Subjective:     HPI: Fatou Lorenzo is a 74 yo F who had MVR + TVR to 9/9 that was complicated by RV hematoma and subsequent pericardial effusion.  She has been treated in ICU and has had pressor requirement that was DC-ed 3 days ago.  She was being planned to be transitioned to the floor today, however, this morning she had acute decompensation with hypotension and tachycardia.  She has had frequent runs of atrial fibrillation since being admitted, and has intermittently been in AFib throughout the day.  She was restarted on epinephrine and Levophed and TTE was ordered to assess for pericardial tamponade.  It should be noted, however, when she 1st became hypotensive she was bradycardic.     When seen this evening with staff, she is alert and oriented for remained hypotensive.  Heart rate is in the 130s which is atrial fibrillation.  She is on Levophed at 0.02 and epinephrine at 0.05.  TTE demonstrated mild to moderate posterior pericardial effusion, with no significant mitral valve respiratory variation and no significant RA/RV diastolic collapse.  EF was 25% with biventricular failure and noted RV hematoma.  Denies any chest pain, however, she did have some epigastric pain earlier today when she a for the 1st time that precipitated some her events.  No other acute events. Reportedly has had less urine output over the course of her day.    Interval History: Patient seen and examined at bedside, extubated today. 5.2 liters voided overnight. Lasix infusion discontinued today.     Review of patient's allergies indicates:  No Known Allergies  Current Facility-Administered Medications   Medication Frequency     acetaminophen tablet 975 mg Q8H    amiodarone 360 mg/200 mL (1.8 mg/mL) infusion Continuous    amiodarone tablet 200 mg BID    aspirin chewable tablet 81 mg Daily    calcium carbonate 200 mg calcium (500 mg) chewable tablet 1,000 mg TID PRN    dextrose 5 % and 0.45 % NaCl with KCl 20 mEq infusion Continuous    dextrose 50% injection 12.5 g PRN    dextrose 50% injection 25 g PRN    digoxin injection 125 mcg Daily    EPINEPHrine (ADRENALIN) 5 mg in sodium chloride 0.9% 250 mL infusion Continuous    famotidine tablet 20 mg Daily    glucagon (human recombinant) injection 1 mg PRN    insulin aspart U-100 pen 0-5 Units Q6H PRN    lidocaine HCL 2% jelly Once    melatonin tablet 6 mg Nightly    niCARdipine 40 mg/200 mL (0.2 mg/mL) infusion Continuous    ondansetron injection 4 mg Q6H PRN    oxyCODONE immediate release tablet 5 mg Q4H PRN    polyethylene glycol packet 17 g Daily    sennosides 8.8 mg/5 ml syrup 5 mL Daily    sodium chloride 3% nebulizer solution 4 mL Q6H WAKE    warfarin split tablet 3 mg Daily       Objective:     Vital Signs (Most Recent):  Temp: 99.5 °F (37.5 °C) (09/23/20 1600)  Pulse: (!) 127 (09/23/20 1600)  Resp: 18 (09/23/20 1401)  BP: (!) 101/54 (09/23/20 1600)  SpO2: 99 % (09/23/20 1600)  O2 Device (Oxygen Therapy): nasal cannula (09/23/20 1600) Vital Signs (24h Range):  Temp:  [66.9 °F (19.4 °C)-99.5 °F (37.5 °C)] 99.5 °F (37.5 °C)  Pulse:  [] 127  Resp:  [16-21] 18  SpO2:  [83 %-100 %] 99 %  BP: ()/(54-77) 101/54  Arterial Line BP: ()/(47-70) 108/63     Weight: 83.5 kg (184 lb) (09/22/20 1415)  Body mass index is 30.62 kg/m².  Body surface area is 1.96 meters squared.    I/O last 3 completed shifts:  In: 3124.2 [I.V.:2054.2; NG/GT:1070]  Out: 7755 [Urine:7695; Chest Tube:60]    Physical Exam  Vitals signs and nursing note reviewed.   Constitutional:       Appearance: She is not ill-appearing or toxic-appearing.   HENT:      Head: Normocephalic and atraumatic.       Mouth/Throat:      Mouth: Mucous membranes are moist.   Cardiovascular:      Rate and Rhythm: Tachycardia present. Rhythm irregular.   Pulmonary:      Comments: Extubated successfully this am  CT x1 in place with SS output  Sternal incision c/d/i  Pacer wires in place  Abdominal:      General: There is no distension.      Palpations: Abdomen is soft.      Tenderness: There is no abdominal tenderness.   Musculoskeletal: Normal range of motion.   Skin:     General: Skin is warm and dry.   Neurological:      Comments: Off sedation  Following commands   Psychiatric:      Comments: lethargic         Significant Labs:  CBC:   Recent Labs   Lab 20  1526   WBC 9.60   RBC 3.10*   HGB 9.3*   HCT 30.0*      MCV 97   MCH 30.0   MCHC 31.0*     CMP:   Recent Labs   Lab 20  0323  20  1342   *  --  109   CALCIUM 8.7  --  8.1*   ALBUMIN 2.5*  --   --    PROT 6.2  --   --      --  145   K 3.3*   < > 3.4*   CO2 28  --  31*   CL 99  --  104   BUN 53*  --  50*   CREATININE 1.4  --  1.3   ALKPHOS 321*  --   --    ALT 79*  --   --    AST 52*  --   --    BILITOT 1.9*  --   --     < > = values in this interval not displayed.     All labs within the past 24 hours have been reviewed.     Significant Imaging:  Labs: Reviewed    Assessment/Plan:     * Acute respiratory failure with hypoxia  Extubated on , on nasal canula       Leukocytosis  - resolved     Atrial fibrillation with RVR  -improved  -rvr on prior paroxysmal diagnosis  -secondary to  MVR + TVR vs sepsis vs volume depletion  -per cardiology    JONAH (acute kidney injury)  -oliguric/anuric kdigo stage 2 jonah likely with ischemic atn probably due to septic vs cardiogenic shock evidenced by leukocytosis and afib with rvr  -RVR improved without significant improvement in bp    - Strict I/O and chart   - daily weights    - Lasix infusion discontinued  - Cr improvin.9-->2.7-->2.4-->2.2-->1.9-->1.7-->1.6-->1.3  - urine output 5.1 L/24 hrs,     - renally dose all medications   - Avoid nephrotoxic medications, NSAIDs, IV contrast, ACE/ARB.  - Maintain MAP > 65  - Hb > 7 gm/dL   - Will follow closely   - Case discussed with Dr Beasley          Thank you for your consult. I will follow-up with patient. Please contact us if you have any additional questions.    Miquel Velazquez MD  Nephrology  [facility]

## 2020-09-23 NOTE — PT/OT/SLP PROGRESS
Occupational Therapy   Co-Treatment with PT    Name: Fatou Lorenzo  MRN: [id number]  Admitting Diagnosis:  Acute respiratory failure with hypoxia  14 Days Post-Op    Recommendations:     Discharge Recommendations: nursing facility, skilled  Discharge Equipment Recommendations:  other (see comments)(TBD)  Barriers to discharge:  Other (Comment)(Pt requires increased assistance at current functional level)    Assessment:     Fatou Lorenzo is a 75 y.o. female with a medical diagnosis of Acute respiratory failure with hypoxia.  She presents with performance deficits affecting function are weakness, impaired endurance, impaired self care skills, impaired functional mobilty, gait instability, impaired balance, decreased upper extremity function, decreased lower extremity function, decreased safety awareness, impaired cardiopulmonary response to activity. Pt tolerated session well this date but presents with increased anxiety with mobility and EOB sitting. Pt extubated in AM and on 5L NC during session. Pt tolerated sitting EOB for ~8 mins with min A <>mod A for EOB sitting balance. Pt with low BP therefore further mobility deferred. Pt would benefit from continued skilled acute OT services in order to maximize independence and safety with ADLs and functional mobility to ensure safe return to PLOF in the least restrictive environment. OT recommending SNF placement once pt is medically appropriate for d/c.     Rehab Prognosis:  Good; patient would benefit from acute skilled OT services to address these deficits and reach maximum level of function.       Plan:     Patient to be seen 4 x/week to address the above listed problems via self-care/home management, therapeutic activities, therapeutic exercises, neuromuscular re-education  · Plan of Care Expires: 10/16/20  · Plan of Care Reviewed with: patient    Subjective     Pain/Comfort:  Pain Rating 1: 0/10  Pain Rating Post-Intervention 1: 0/10    Objective:     Communicated  with: RN prior to session.  Patient found HOB elevated with blood pressure cuff, central line, oxygen, vivas catheter, pulse ox (continuous), telemetry, chest tube upon OT entry to room. Pt agreeable to therapy session. Pt's son present.     General Precautions: Standard, fall, sternal   Orthopedic Precautions:N/A   Braces: N/A     Occupational Performance:     Bed Mobility:    · Patient completed Scooting/Bridging with maximal assistance and for anterior scooting towards EOB. total A x2 person for supine scooting towards HOB   · Patient completed Supine to Sit with maximal assistance and 2 persons  · Patient completed Sit to Supine with maximal assistance and 2 persons     Functional Mobility/Transfers:  · Not performed this date due to hypotension and increased anxiety sitting EOB.     EOB sitting balance:   · Pt tolerated sitting EOB for ~8 mins with min A. As pt fatigue pt then required mod A for sitting balance with postural cueing provided.     Activities of Daily Living:  · Grooming: moderate assistance pt washed face while sitting EOB using R UE. OT aassisted with brushing hair while sitting EOB.   · Lower Body Dressing: total assistance donning B  socks     [facility] 6 Click ADL: 7    Treatment & Education:   Pt educated on role of OT, POC, and goals for therapy.     POC was dicussed with patient/caregiver, who was included in its development and is in agreement with the identified goals and treatment plan.    Pt educated on importance of B UE strengthening when performing functional tasks sitting supported in bed and AAROM.    Time provided for therapeutic counseling and discussion of health disposition.    Educated on importance of EOB/OOB mobility, maintaining routine, sitting up in chair, and maximizing independence with ADLs during admission    Pt completed ADLs and functional mobility for treatment session as noted above    Pt/caregiver verbalized understanding and expressed no further  concerns/questions.   Updated communication board with level of assist required (max A x 2 person assistance for supine <> sit). Pt appropriate for transfers with therapy only at this time.     Patient left HOB elevated with all lines intact, call button in reach, RN notified and pt's son  presentEducation:      GOALS:   Multidisciplinary Problems     Occupational Therapy Goals        Problem: Occupational Therapy Goal    Goal Priority Disciplines Outcome Interventions   Occupational Therapy Goal     OT, PT/OT Ongoing, Progressing    Description: Goals to be met by: 10/7/2020      Patient will increase functional independence with ADLs by performing:    UE Dressing with Minimum Assistance.  Grooming while sitting EOB with Contact Guard Assistance.  Toileting from bedside commode with Max Assistance for hygiene and clothing management.   Toilet transfer to bedside commode with Max Assistance.  Supine <> Sit with minimum assistance in preparation for EOB/OOB functional activities.                        Time Tracking:     OT Date of Treatment: 09/23/20  OT Start Time: 1028  OT Stop Time: 1045  OT Total Time (min): 17 min    Billable Minutes:Therapeutic Activity 17 (co-treat with PT)    Sherly Ball OT  9/23/2020

## 2020-09-23 NOTE — PT/OT/SLP EVAL
Speech Language Pathology Evaluation  Bedside Swallow    Patient Name:  Fatou Lorenzo   MRN:  [id number]  Admitting Diagnosis: Acute respiratory failure with hypoxia    Recommendations:                 General Recommendations:  Dysphagia therapy  Diet recommendations:  NPO, NPO   Aspiration Precautions: Strict aspiration precautions   General Precautions: Standard, fall, sternal, NPO  Communication strategies:  provide increased time to answer and go to room if call light pushed    History:     Past Medical History:   Diagnosis Date    Atrial fibrillation with RVR 8/24/2020    Cataract     Essential hypertension 8/31/2020    Glaucoma     Heart valve problem     Hyperlipidemia     Severe mitral regurgitation 8/24/2020       Past Surgical History:   Procedure Laterality Date    ANKLE SURGERY      BAIN MAZE PROCEDURE N/A 9/9/2020    Procedure: BAIN MAZE PROCEDURE;  Surgeon: Antoni Waddell MD;  Location: [facility] OR [address] [place] [address];  Service: Cardiothoracic;  Laterality: N/A;  MAZE     LEFT HEART CATHETERIZATION Left 8/25/2020    Procedure: Left heart cath, radial;  Surgeon: Marlee Carrillo MD;  Location: [facility] CATH LAB;  Service: Cardiology;  Laterality: Left;    lipoma removal      TRICUSPID VALVULOPLASTY N/A 9/9/2020    Procedure: REPAIR, TRICUSPID VALVE;  Surgeon: Antoni Waddell MD;  Location: [facility] OR [address] [place] [address];  Service: Cardiothoracic;  Laterality: N/A;     Prior Intubation HX: 9/9-9/15. Intubated again 9/18-9/23.    Prior diet: reg/thin at baseline.    Pt well-known to  service. Previous bedside swallow eval completed 9/16 with mechanical soft solids/nectar-thick liquids recommended.    Subjective     Pt alert and awake upon SLP entry. Lethargy appreciated throughout session.    Pain/Comfort:  · Pain Rating 1: 0/10    Objective:     Oral Musculature Evaluation  · Oral Musculature: WFL  · Dentition: present and adequate  · Secretion Management: adequate  · Mucosal Quality: dry  · Mandibular Strength and  Mobility: WFL  · Oral Labial Strength and Mobility: impaired seal  · Lingual Strength and Mobility: WFL  · Velar Elevation: WFL  · Buccal Strength and Mobility: WFL  · Volitional Cough: not assessed  · Volitional Swallow: not assessed  · Voice Prior to PO Intake: severely dysphonic, hoarse    Bedside Swallow Eval:   Consistencies Assessed:  · Ice chip via tsp x1  · Puree via tsp x2     Oral Phase:   · Slight inability to open lips  · Slow oral transit time    Pharyngeal Phase:   · Immediate cough and throat clear following each trial of ice chip and puree  · Double swallows across consistencies    Compensatory Strategies  · None    Treatment: Pt with severe dysphonia c/b hoarseness and decreased vocal intensity. ST rec NPO at this time. Swallow assessment to be ongoing. Education provided regarding role of SLP, continued NPO, and ongoing ST plan of care. Pt nodded to demonstrate understanding. ST to continue to monitor.    Assessment:     Fatou Lorenzo is a 75 y.o. female with an SLP diagnosis of Dysphagia and Dysphonia.      Goals:   Multidisciplinary Problems     SLP Goals        Problem: SLP Goal    Goal Priority Disciplines Outcome   SLP Goal     SLP Ongoing, Progressing   Description: Speech Language Pathology Goals  Goals expected to be met 9/30  1. Pt will participate in ongoing swallow assessment to determine safest and least restrictive diet.                 Plan:     · Patient to be seen:  4 x/week   · Plan of Care expires:  10/23/20  · Plan of Care reviewed with:  patient   · SLP Follow-Up:  Yes       Discharge recommendations:  nursing facility, skilled   Barriers to Discharge:  Level of Skilled Assistance Needed      Time Tracking:     SLP Treatment Date:   09/23/20  Speech Start Time:  0238  Speech Stop Time:  0246     Speech Total Time (min):  8 min    Billable Minutes: Eval Swallow and Oral Function 8    BURKE Perkins  09/23/2020

## 2020-09-23 NOTE — PLAN OF CARE
VSS. Pt follows commands and moves all extremities. Afebrile, MAP 65-80, SBP < 140, HR 90-100s. A-fib. Lasix @ 10, Propofol @ 10, Precedex @ 1, Epi @ 0.03. SpO2 > 90% on ACVC 40/5. CVP 9 and 11. V wires connected to pacer. TF @ 30cc/hr, goal rate. CT output 10cc for entire shift. UOP trended. See flowsheet for additional details, will continue to monitor.     Skin: Bed plugged in, waffle inflated. No skin breakdown noted, foam dressings in place, heel boots intact, pt turned q2. SCDs on. Bed bath given.

## 2020-09-23 NOTE — PLAN OF CARE
VSS. Propofol, precedex gtts continued, pt much calmer, follows commands in all extremities. Lasix, epi gtts continued as well. MAPs > 65, < 80 per order. HR remains in a. Fib 80s-110s, v wires connected to pacer. Remains on spontaneous vent settings, sats 97%. CVP 8-10. TF advanced to goal of 30cc/hr. U/o ~150-350cc/hr. Minimal output from CT. Plan is to extubate tomorrow. POC reviewed with pt and sons. WCTM.     Skin: No skin breakdown noted. Foam dressings, heel boots and waffle mattress in place, bed plugged in and working properly.

## 2020-09-23 NOTE — PROGRESS NOTES
Pt extubated to 5 L Nasal Cannula by RT per MD order. Following extubation, pt is awake and alert, bilateral breath sounds noted. Restraints removed safely w/o injury. Pt's family brought to bedside, updated on the PoC for remainder of shift.

## 2020-09-23 NOTE — PLAN OF CARE
SW is following this Pt for DC planning needs. Discharge Disposition: SNF - pending patient progress (OSNF following); patient extubated today.    SW will continue to coordinate with patient, family, team and insurance to complete patient's discharge plan.    Esthela Peralta LMSW   - Case Management

## 2020-09-23 NOTE — PT/OT/SLP PROGRESS
"Physical Therapy Treatment    Patient Name:  Fatou Lorenzo   MRN:  [id number]    *co-treatment with OT   Recommendations:     Discharge Recommendations:  nursing facility, skilled   Discharge Equipment Recommendations: (TBD)   Barriers to discharge: Decreased caregiver support    Assessment:     Fatou Lorenzo is a 75 y.o. female admitted with a medical diagnosis of Acute respiratory failure with hypoxia.  She presents with the following impairments/functional limitations:  weakness, impaired endurance, impaired self care skills, impaired functional mobilty, gait instability, impaired cardiopulmonary response to activity, impaired cognition. Pt tolerated activity with minimum assistance required to sit EOB. Pt demo'd some delirium and paranoia during session. Pt with soft BP while sitting EOB, standing transfer deferred. Upon discharge, pt would benefit from continued skilled therapy intervention at skilled nursing facility to progress toward more independent mobility. At this time, pt would continue to benefit from acute skilled therapy intervention to address deficits and progress toward prior level of function.       Rehab Prognosis: Good; patient would benefit from acute skilled PT services to address these deficits and reach maximum level of function.    Recent Surgery: Procedure(s) (LRB):  Valvuloplasty, Mitral (N/A)  REPAIR, TRICUSPID VALVE (N/A)  BAIN MAZE PROCEDURE (N/A) 14 Days Post-Op    Plan:     During this hospitalization, patient to be seen 4 x/week to address the identified rehab impairments via gait training, therapeutic activities, therapeutic exercises, neuromuscular re-education and progress toward the following goals:    · Plan of Care Expires:  10/16/20    Subjective     Chief Complaint: Pt stating "please help me, they are trying to kill me"   Patient/Family Comments/goals: to get better and return home   Pain/Comfort:  · Pain Rating 1: 0/10  · Pain Rating Post-Intervention 1: " "0/10      Objective:     Communicated with RN prior to session.  Patient found HOB elevated with blood pressure cuff, central line, oxygen, vivas catheter, pulse ox (continuous), telemetry, chest tube upon PT entry to room.     General Precautions: Standard, fall   Orthopedic Precautions:N/A   Braces: N/A     Functional Mobility:  · Bed Mobility:     · Supine to Sit: maximal assistance of 2 persons  · Sit to Supine: maximal assistance of 2 persons      AM-PAC 6 CLICK MOBILITY  Turning over in bed (including adjusting bedclothes, sheets and blankets)?: 2  Sitting down on and standing up from a chair with arms (e.g., wheelchair, bedside commode, etc.): 2  Moving from lying on back to sitting on the side of the bed?: 2  Moving to and from a bed to a chair (including a wheelchair)?: 1  Need to walk in hospital room?: 1  Climbing 3-5 steps with a railing?: 1  Basic Mobility Total Score: 9       Therapeutic Activities and Exercises:   Pt sat EOB for ~8 mins with minimum assistance for static sitting balance, initially, regressed to require moderate assistance with posterior lean with increased fatigue. Session focused on strength and stabilization of trunk musculature. Pt difficult to redirect to task while sitting EOB, demo'd delirium and paranoia stating "help me, they are trying to kill me". Son present and assisted with calming efforts.   Pt educated on role of PT/POC. Pt verbalized understanding.   Pt encouraged to only perform OOB mobility with assistance from nursing/therapy. Pt agreeable.   Pt educated regarding 3/3 sternal precautions. Pt compliant throughout session.  Pt encouraged to perform bed level exercise with assistance from son, pt and son agreeable.     Patient left HOB elevated with all lines intact, call button in reach and RN notified..    GOALS:   Multidisciplinary Problems     Physical Therapy Goals        Problem: Physical Therapy Goal    Goal Priority Disciplines Outcome Goal Variances " Interventions   Physical Therapy Goal     PT, PT/OT Ongoing, Progressing     Description: Goals to be met by: 2020     Patient will increase functional independence with mobility by performin. Supine to sit with MInimal Assistance  2. Sit to stand transfer with Moderate Assistance  3. Bed to chair transfer with Moderate Assistance using LRAD  4. Sitting at edge of bed x10 minutes with Stand-by Assistance  5. Lower extremity exercise program x10 reps per handout, with assistance as needed                           Time Tracking:     PT Received On: 20  PT Start Time: 1028     PT Stop Time: 1045  PT Total Time (min): 17 min     Billable Minutes: Therapeutic Exercise 17 mins     Treatment Type: Treatment  PT/PTA: PT     PTA Visit Number: 0     Janine Soto, PT  2020

## 2020-09-23 NOTE — CONSULTS
Wound care consult received from nursing for assessment of sacrum. Patient is a 75 y.o. female that has a past medical history of Atrial fibrillation with RVR, Cataract, Glaucoma, Heart valve problem, Hyperlipidemia, and Severe mitral regurgitation. Patient presents to [facility] as a transfer for [facility] for recurrent shortness of breath. Patient admitted for Acute respiratory failure with hypoxia.     Upon assessment noted hyperkeratotic skin with dark discoloration to sacrum that extends to bilateral buttocks likely related to friction and shearing. No odor. No drainage. Recommend nursing to continue with pressure injury prevention interventions and use clear-critic-aid moisture barrier ointment to prevent breakdown of skin.     Nursing and MD team notified with recommendations.   Wound care to sign off. Please re-consult for any concerns [phone]    Sacrum/buttocks

## 2020-09-23 NOTE — PLAN OF CARE
Problem: Physical Therapy Goal  Goal: Physical Therapy Goal  Description: Goals to be met by: 2020     Patient will increase functional independence with mobility by performin. Supine to sit with MInimal Assistance  2. Sit to stand transfer with Moderate Assistance  3. Bed to chair transfer with Moderate Assistance using LRAD  4. Sitting at edge of bed x10 minutes with Stand-by Assistance  5. Lower extremity exercise program x10 reps per handout, with assistance as needed          Outcome: Ongoing, Progressing     Pt is progressing toward goals. All goals remain appropriate.    Janine Soto, PT, DPT  2020  [phone]

## 2020-09-23 NOTE — SUBJECTIVE & OBJECTIVE
Interval History: Patient seen and examined at bedside, extubated today. 5.2 liters voided overnight. Lasix infusion discontinued today.     Review of patient's allergies indicates:  No Known Allergies  Current Facility-Administered Medications   Medication Frequency    acetaminophen tablet 975 mg Q8H    amiodarone 360 mg/200 mL (1.8 mg/mL) infusion Continuous    amiodarone tablet 200 mg BID    aspirin chewable tablet 81 mg Daily    calcium carbonate 200 mg calcium (500 mg) chewable tablet 1,000 mg TID PRN    dextrose 5 % and 0.45 % NaCl with KCl 20 mEq infusion Continuous    dextrose 50% injection 12.5 g PRN    dextrose 50% injection 25 g PRN    digoxin injection 125 mcg Daily    EPINEPHrine (ADRENALIN) 5 mg in sodium chloride 0.9% 250 mL infusion Continuous    famotidine tablet 20 mg Daily    glucagon (human recombinant) injection 1 mg PRN    insulin aspart U-100 pen 0-5 Units Q6H PRN    lidocaine HCL 2% jelly Once    melatonin tablet 6 mg Nightly    niCARdipine 40 mg/200 mL (0.2 mg/mL) infusion Continuous    ondansetron injection 4 mg Q6H PRN    oxyCODONE immediate release tablet 5 mg Q4H PRN    polyethylene glycol packet 17 g Daily    sennosides 8.8 mg/5 ml syrup 5 mL Daily    sodium chloride 3% nebulizer solution 4 mL Q6H WAKE    warfarin split tablet 3 mg Daily       Objective:     Vital Signs (Most Recent):  Temp: 99.5 °F (37.5 °C) (09/23/20 1600)  Pulse: (!) 127 (09/23/20 1600)  Resp: 18 (09/23/20 1401)  BP: (!) 101/54 (09/23/20 1600)  SpO2: 99 % (09/23/20 1600)  O2 Device (Oxygen Therapy): nasal cannula (09/23/20 1600) Vital Signs (24h Range):  Temp:  [66.9 °F (19.4 °C)-99.5 °F (37.5 °C)] 99.5 °F (37.5 °C)  Pulse:  [] 127  Resp:  [16-21] 18  SpO2:  [83 %-100 %] 99 %  BP: ()/(54-77) 101/54  Arterial Line BP: ()/(47-70) 108/63     Weight: 83.5 kg (184 lb) (09/22/20 1415)  Body mass index is 30.62 kg/m².  Body surface area is 1.96 meters squared.    I/O last 3 completed  shifts:  In: 3124.2 [I.V.:2054.2; NG/GT:1070]  Out: 7755 [Urine:7695; Chest Tube:60]    Physical Exam  Vitals signs and nursing note reviewed.   Constitutional:       Appearance: She is not ill-appearing or toxic-appearing.   HENT:      Head: Normocephalic and atraumatic.      Mouth/Throat:      Mouth: Mucous membranes are moist.   Cardiovascular:      Rate and Rhythm: Tachycardia present. Rhythm irregular.   Pulmonary:      Comments: Extubated successfully this am  CT x1 in place with SS output  Sternal incision c/d/i  Pacer wires in place  Abdominal:      General: There is no distension.      Palpations: Abdomen is soft.      Tenderness: There is no abdominal tenderness.   Musculoskeletal: Normal range of motion.   Skin:     General: Skin is warm and dry.   Neurological:      Comments: Off sedation  Following commands   Psychiatric:      Comments: lethargic         Significant Labs:  CBC:   Recent Labs   Lab 09/23/20  1526   WBC 9.60   RBC 3.10*   HGB 9.3*   HCT 30.0*      MCV 97   MCH 30.0   MCHC 31.0*     CMP:   Recent Labs   Lab 09/23/20  0323  09/23/20  1342   *  --  109   CALCIUM 8.7  --  8.1*   ALBUMIN 2.5*  --   --    PROT 6.2  --   --      --  145   K 3.3*   < > 3.4*   CO2 28  --  31*   CL 99  --  104   BUN 53*  --  50*   CREATININE 1.4  --  1.3   ALKPHOS 321*  --   --    ALT 79*  --   --    AST 52*  --   --    BILITOT 1.9*  --   --     < > = values in this interval not displayed.     All labs within the past 24 hours have been reviewed.     Significant Imaging:  Labs: Reviewed

## 2020-09-24 PROBLEM — E87.0 HYPERNATREMIA: Status: ACTIVE | Noted: 2020-01-01

## 2020-09-24 PROBLEM — R34 OLIGURIA AND ANURIA: Status: RESOLVED | Noted: 2020-01-01 | Resolved: 2020-01-01

## 2020-09-24 NOTE — PLAN OF CARE
Recommendations    1. Continue current TF regimen of Impact Peptide 1.5 @ 40 mL/hr - meeting needs.     2. If able to advance diet, recommend regular diet with texture per SLP.     3. Please obtain new wt for accuracy.     4. RD following.     Goals: Pt to return to nutritionally adequate diet and meet > 75% EEN/EPN by RD follow up  Nutrition Goal Status: goal met

## 2020-09-24 NOTE — PT/OT/SLP PROGRESS
Occupational Therapy      Patient Name:  Fatou Lorenzo   MRN:  [id number]    Patient not seen on this date. However, per chart review pt continues to benefit from acute OT services. OT to follow up as POC allows.   Please continue progressive mobility and assistance with functional ADLs as appropriate. Will follow-up 09/25/2020.        JANETT Song  9/24/2020

## 2020-09-24 NOTE — PLAN OF CARE
Problem: Physical Therapy Goal  Goal: Physical Therapy Goal  Description: Goals to be met by: 2020     Patient will increase functional independence with mobility by performin. Supine to sit with MInimal Assistance  2. Sit to stand transfer with Moderate Assistance  3. Bed to chair transfer with Moderate Assistance using LRAD  4. Sitting at edge of bed x10 minutes with Stand-by Assistance  5. Lower extremity exercise program x10 reps per handout, with assistance as needed          Outcome: Ongoing, Progressing     Pt is progressing toward goals. All goals remain appropriate.    Janine Soto, PT, DPT  2020  [phone]

## 2020-09-24 NOTE — PROGRESS NOTES
"[facility]  Adult Nutrition  Progress Note    SUMMARY       Recommendations    1. Continue current TF regimen of Impact Peptide 1.5 @ 40 mL/hr - meeting needs.     2. If able to advance diet, recommend regular diet with texture per SLP.     3. Please obtain new wt for accuracy.     4. RD following.     Goals: Pt to return to nutritionally adequate diet and meet > 75% EEN/EPN by RD follow up  Nutrition Goal Status: goal met  Communication of RD Recs: (POC)    Reason for Assessment    Reason For Assessment: RD follow-up  Diagnosis: (Acute respiratory failure with hypoxia)  Relevant Medical History: afib w/ RVR, HTN, HLD  Interdisciplinary Rounds: did not attend  General Information Comments: S/p MVr, TVr 9/9. Pt extubated yesterday. Pt resting in bed with no family members at bedside. Pt reports tolerating TF with no N/V. NFPE completed 9/9, pt with no s/s of malnutrition. Noted wt loss since last assessment, possible scale error/fluid. Will continue to monitor energy intake and wt changes.   Nutrition Discharge Planning: unable to determine at this time    Nutrition Risk Screen    Nutrition Risk Screen: tube feeding or parenteral nutrition    Nutrition/Diet History    Spiritual, Cultural Beliefs, Scientologist Practices, Values that Affect Care: no  Factors Affecting Nutritional Intake: NPO    Anthropometrics    Temp: 98.1 °F (36.7 °C)  Height Method: Stated  Height: 5' 5" (165.1 cm)  Height (inches): 65 in  Weight Method: Bed Scale  Weight: 83.5 kg (184 lb)  Weight (lb): 184 lb  Ideal Body Weight (IBW), Female: 125 lb  % Ideal Body Weight, Female (lb): 147.2 %  BMI (Calculated): 30.6     Lab/Procedures/Meds    Pertinent Labs Reviewed: reviewed  Pertinent Labs Comments: Na 148, BUN 44, GFR 49.2, glucose 121, alk phos 315, T bili 1.5, AST 46, ALT 66  Pertinent Medications Reviewed: reviewed  Pertinent Medications Comments: acetaminophen, aspirin, melatontin, polyethlene glycol, " warfarin    Estimated/Assessed Needs    Weight Used For Calorie Calculations: 83.5 kg (184 lb 1.4 oz)  Energy Calorie Requirements (kcal): 1662 kcal/day  Energy Need Method: [place]-St Jeor(x 1.25)  Protein Requirements:  gm/day(1.0-1.2 gm/kg)  Weight Used For Protein Calculations: 83.5 kg (184 lb 1.4 oz)  Fluid Requirements (mL): per MD or 1 mL/kcal     RDA Method (mL): 1662    Nutrition Prescription Ordered    Current Diet Order: NPO  Current Nutrition Support Formula Ordered: Impact Peptide 1.5  Current Nutrition Support Rate Ordered: 40 (ml)  Current Nutrition Support Frequency Ordered: mL/hr    Evaluation of Received Nutrient/Fluid Intake    Enteral Calories (kcal): 1440  Enteral Protein (gm): 90  Enteral (Free Water) Fluid (mL): 739  % Kcal Needs: 86  % Protein Needs: 100  I/O: -1.45L x 24 hrs, -10.676L since 9/10  Energy Calories Required: meeting needs  Protein Required: meeting needs  Fluid Required: (per MD)  Comments: LBM 9/23  Tolerance: tolerating  % Intake of Estimated Energy Needs: 75 - 100 %  % Meal Intake: NPO    Nutrition Risk    Level of Risk/Frequency of Follow-up: (f/u 1 x wk)     Assessment and Plan    Nutrition Problem  Inadequate energy intake     Related to (etiology):   Inadequate enteral nutrition infusing      Signs and Symptoms (as evidenced by):   Enteral nutrition not meeting estimated energy needs.      Interventions(treatment strategy):  Collaboration of nutrition care with other providers  Enteral Nutrition      Nutrition Diagnosis Status:   Resolved      Monitor and Evaluation    Food and Nutrient Intake: energy intake, food and beverage intake, enteral nutrition intake  Food and Nutrient Adminstration: diet order, enteral and parenteral nutrition administration  Knowledge/Beliefs/Attitudes: food and nutrition knowledge/skill  Anthropometric Measurements: weight, weight change  Biochemical Data, Medical Tests and Procedures: electrolyte and renal panel, gastrointestinal  profile, glucose/endocrine profile  Nutrition-Focused Physical Findings: overall appearance, skin     Malnutrition Assessment                 Orbital Region (Subcutaneous Fat Loss): well nourished  Upper Arm Region (Subcutaneous Fat Loss): well nourished   [place] Region (Muscle Loss): well nourished  Clavicle Bone Region (Muscle Loss): well nourished  Clavicle and Acromion Bone Region (Muscle Loss): well nourished  Dorsal Hand (Muscle Loss): well nourished  Anterior Thigh Region (Muscle Loss): well nourished  Posterior Calf Region (Muscle Loss): well nourished                 Nutrition Follow-Up    RD Follow-up?: Yes

## 2020-09-24 NOTE — PROGRESS NOTES
Fatou Lorenzo is a 75 y.o. female with tricuspid regurg and mitral regurg and afib s/p TVr, MVr, MAZE (9/9) requiring pericardial window and attempted (failed) cardioversion after decompensation (9/18)     Progressing     Plan:  Remain on amio gtt, oral amio and dig  -daily dig levels  Increase tube feeds per ICU   Bowel care   D/c pericardial drain tmw  INR 2  Delirium precautions  Keep in ICU

## 2020-09-24 NOTE — PLAN OF CARE
SW met with patient at bedside regarding discharge planning of SNF. Patient agreeable to this plan.     Presented patient at least five facility options within their insurance network for post-acute placement. Educated patient  that referrals were sent to:  [facility] (preference)  2.   [facility]  3.   [facility]  4.   [facility]  5.   [facility]    Discharge Planner will follow up with patient regarding outcome of referrals. Educated patient  on the need for early discharge planning to reduce the possibility of financial responsibility once patient is medically stable for discharge.     Patient choice form was explained and signed by patient verbally and placed in patient's chart to uploaded to medical record.     SW requested  place PPD order.     SW completed LOCET/PASSR and uploaded 142 to .        09/24/20 1120   Post-Acute Status   Post-Acute Authorization Placement   Post-Acute Placement Status Referrals Sent   Discharge Plan   Discharge Plan A Skilled Nursing Facility   Discharge Plan B Home Health     Esthela Peralta LMSW   - Case Management

## 2020-09-24 NOTE — PT/OT/SLP PROGRESS
Physical Therapy Treatment    Patient Name:  Fatou Lorenzo   MRN:  [id number]    *Rehab tech present to assist   Recommendations:     Discharge Recommendations:  nursing facility, skilled   Discharge Equipment Recommendations: (TBD)   Barriers to discharge: Decreased caregiver support    Assessment:     Fatou Lorenzo is a 75 y.o. female admitted with a medical diagnosis of Acute respiratory failure with hypoxia.  She presents with the following impairments/functional limitations:  weakness, impaired endurance, impaired self care skills, impaired functional mobilty, gait instability, impaired cardiopulmonary response to activity. Pt tolerated activity with increased assistance required for sitting balance this session. Pt participated in 2x standing trials, demo'd delayed response and impaired initiation. Pt would continue to benefit from acute skilled therapy intervention to address deficits and progress toward prior level of function.       Rehab Prognosis: Good; patient would benefit from acute skilled PT services to address these deficits and reach maximum level of function.    Recent Surgery: Procedure(s) (LRB):  Valvuloplasty, Mitral (N/A)  REPAIR, TRICUSPID VALVE (N/A)  BAIN MAZE PROCEDURE (N/A) 15 Days Post-Op    Plan:     During this hospitalization, patient to be seen 4 x/week to address the identified rehab impairments via gait training, therapeutic activities, therapeutic exercises, neuromuscular re-education and progress toward the following goals:    · Plan of Care Expires:  10/16/20    Subjective     Chief Complaint: pt with no complaints verbalized   Patient/Family Comments/goals: to get better and return home   Pain/Comfort:  · Pain Rating 1: 0/10  · Pain Rating Post-Intervention 1: 0/10      Objective:     Communicated with RN prior to session.  Patient found HOB elevated with blood pressure cuff, central line, pulse ox (continuous), telemetry, oxygen, chest tube upon PT entry to room.     General  Precautions: Standard, fall, sternal   Orthopedic Precautions:N/A   Braces: N/A     Functional Mobility:  · Bed Mobility:     · Supine to Sit: maximal assistance  · Sit to Supine: total assistance  · Transfers:     · Sit to Stand: 2x from EOB with maximal assistance with hand-held assist, facilitation at posterior pelvis to promote hip extension and upright posture. Pt unable to come to full upright stance, demo'd excessive hip and knee flexion.       AM-PAC 6 CLICK MOBILITY  Turning over in bed (including adjusting bedclothes, sheets and blankets)?: 2  Sitting down on and standing up from a chair with arms (e.g., wheelchair, bedside commode, etc.): 2  Moving from lying on back to sitting on the side of the bed?: 2  Moving to and from a bed to a chair (including a wheelchair)?: 1  Need to walk in hospital room?: 1  Climbing 3-5 steps with a railing?: 1  Basic Mobility Total Score: 9       Therapeutic Activities and Exercises:   Pt sat EOB for 8 mins with maximum assistance for static sitting balance. Pt with excessive anterior lean throughout session despite cuing and reorientation to midline. Pt also with occasional L lateral lean. Pt did not consistently follow commands, demo'd increased response time and did not vocalize with out significant encouragement.   While pt sitting EOB, pt performed 5x B LAQ with cuing.   Pt educated on role of PT/POC. Pt verbalized understanding.   Pt encouraged to only perform OOB mobility with assistance from nursing/therapy. Pt agreeable.   Pt educated regarding 3/3 sternal precautions. Pt compliant throughout session.      Patient left with bed in chair position with all lines intact, call button in reach and RN notified..    GOALS:   Multidisciplinary Problems     Physical Therapy Goals        Problem: Physical Therapy Goal    Goal Priority Disciplines Outcome Goal Variances Interventions   Physical Therapy Goal     PT, PT/OT Ongoing, Progressing     Description: Goals to be met  by: 2020     Patient will increase functional independence with mobility by performin. Supine to sit with MInimal Assistance  2. Sit to stand transfer with Moderate Assistance  3. Bed to chair transfer with Moderate Assistance using LRAD  4. Sitting at edge of bed x10 minutes with Stand-by Assistance  5. Lower extremity exercise program x10 reps per handout, with assistance as needed                           Time Tracking:     PT Received On: 20  PT Start Time: 1330      PT Stop Time: 1354  PT Total Time (min): 24 min     Billable Minutes: Therapeutic Activity 24 mins     Treatment Type: Treatment  PT/PTA: PT     PTA Visit Number: 0     Janine Soto, PT  2020

## 2020-09-24 NOTE — ASSESSMENT & PLAN NOTE
Fatou Lorenzo is a 75 y.o. female s/p MVr, TVr 9/9/2020. Case noteworthy for multiple pump runs (3) and RV hematoma due to retraction. Unstable overnight 9/18, required pericardial window for evac of posterior cardiac tamponade.     Neuro:  - off sedation; following commands this am  - Pain control with scheduled tylenol and PRN dilaudid      CV:  S/p MVr, TVr 9/9/20. Case noteworthy for pump run x3, RV hematoma 2/2 retraction  - Persistent atrial fibrillation despite multiple boluses of amio gtt and attempted cardioversion. Cardioverted overnight with amio gtt   - plan to wean amio gtt; decreased to 0.5 this am  - MAP goals 60-80, SBP <140   - weaned from epi 9/24   - cardene as needed  - Pacer wires in place, set to back up rate 60     Pulm:  - successfully extubated 9/24; continue to monitor respiratory status  - currently on 4L NC; satting well  - Daily CXR and ABG  - pleural effusion on the right which has been present since post op.      FEN/GI:  - Net negative 1.4L in the last 24 hours  - Receiveing KCL scheduled 20 mEq BID  - Replace lytes as needed  - Tube feeds at 40mL  - speech eval 9/24 recommending NPO with strict aspiration precautions  - pt to remain NPO  - ppx: famotidine, miralax    Renal:  - d/c vivas today; continue to monitor I/Os; 2.3L UOP over last 24hrs  - Compromised renal function since surgery   - BUN/Cr improving; 44/1.1 from 50/1.3  - Hold lasix today     Heme/Onc:  - H/H stable  - Anticoagulated for persistent atrial fibrillation   - PT/INR 21.3/2.0. Coumadin 3 mg today  - Midline consult placed     ID:  - Afebrile, WBC WNL     Endo:  - no hx of DM  - ISS    PPx:  - famotidine, warfarin, SCDs     Dispo: continue ICU care; PT/OT eval

## 2020-09-24 NOTE — ASSESSMENT & PLAN NOTE
-oliguric/anuric kdigo stage 2 [place] likely with ischemic atn probably due to septic vs cardiogenic shock evidenced by leukocytosis and afib with rvr  -RVR improved without significant improvement in bp    - Strict I/O and chart   - daily weights    - Lasix infusion discontinued  - Cr improvin.9-->2.7-->2.4-->2.2-->1.9-->1.7-->1.6-->1.3-->1.1  - urine output 2.3 L/24 hrs,    - renally dose all medications   - Avoid nephrotoxic medications, NSAIDs, IV contrast, ACE/ARB.  - Maintain MAP > 65  - Hb > 7 gm/dL   - Case discussed with Dr Beasley

## 2020-09-24 NOTE — PROGRESS NOTES
[facility]  Nephrology  Progress Note    Patient Name: Fatou Lorenzo  MRN: [id number]  Admission Date: 8/30/2020  Hospital Length of Stay: 24 days  Attending Provider: Antoni Waddell MD   Primary Care Physician: Ludin Rivas MD  Principal Problem:Acute respiratory failure with hypoxia    Subjective:     HPI: Fatou Lorenzo is a 76 yo F who had MVR + TVR to 9/9 that was complicated by RV hematoma and subsequent pericardial effusion.  She has been treated in ICU and has had pressor requirement that was DC-ed 3 days ago.  She was being planned to be transitioned to the floor today, however, this morning she had acute decompensation with hypotension and tachycardia.  She has had frequent runs of atrial fibrillation since being admitted, and has intermittently been in AFib throughout the day.  She was restarted on epinephrine and Levophed and TTE was ordered to assess for pericardial tamponade.  It should be noted, however, when she 1st became hypotensive she was bradycardic.     When seen this evening with staff, she is alert and oriented for remained hypotensive.  Heart rate is in the 130s which is atrial fibrillation.  She is on Levophed at 0.02 and epinephrine at 0.05.  TTE demonstrated mild to moderate posterior pericardial effusion, with no significant mitral valve respiratory variation and no significant RA/RV diastolic collapse.  EF was 25% with biventricular failure and noted RV hematoma.  Denies any chest pain, however, she did have some epigastric pain earlier today when she a for the 1st time that precipitated some her events.  No other acute events. Reportedly has had less urine output over the course of her day.    Interval History: Patient seen and examined at bedside, on Nasal canula. UOP documented at 2.3 L over the past 24 hrs. Cr is now back to baseline at 1.1    Review of patient's allergies indicates:  No Known Allergies  Current Facility-Administered Medications   Medication  Frequency    acetaminophen tablet 975 mg Q8H    amiodarone 360 mg/200 mL (1.8 mg/mL) infusion Continuous    amiodarone tablet 200 mg BID    [START ON 9/25/2020] aspirin chewable tablet 81 mg Daily    calcium carbonate 200 mg calcium (500 mg) chewable tablet 1,000 mg TID PRN    dextrose 5 % and 0.45 % NaCl with KCl 20 mEq infusion Continuous    dextrose 50% injection 12.5 g PRN    dextrose 50% injection 25 g PRN    [START ON 9/25/2020] digoxin tablet 0.125 mg Daily    [START ON 9/25/2020] famotidine tablet 20 mg Daily    glucagon (human recombinant) injection 1 mg PRN    insulin aspart U-100 pen 0-5 Units Q6H PRN    melatonin tablet 6 mg Nightly    niCARdipine 40 mg/200 mL (0.2 mg/mL) infusion Continuous    ondansetron injection 4 mg Q6H PRN    oxyCODONE immediate release tablet 5 mg Q4H PRN    [START ON 9/25/2020] polyethylene glycol packet 17 g Daily    [START ON 9/25/2020] sennosides 8.8 mg/5 ml syrup 5 mL Daily    sodium chloride 3% nebulizer solution 4 mL Q6H WAKE    warfarin split tablet 3 mg Daily       Objective:     Vital Signs (Most Recent):  Temp: 98.1 °F (36.7 °C) (09/24/20 1315)  Pulse: (!) 115 (09/24/20 1315)  Resp: (!) 22 (09/24/20 0738)  BP: 102/61 (09/24/20 1300)  SpO2: (!) 91 % (09/24/20 1315)  O2 Device (Oxygen Therapy): nasal cannula (09/24/20 1300) Vital Signs (24h Range):  Temp:  [97.9 °F (36.6 °C)-99.5 °F (37.5 °C)] 98.1 °F (36.7 °C)  Pulse:  [] 115  Resp:  [18-22] 22  SpO2:  [83 %-100 %] 91 %  BP: ()/() 102/61  Arterial Line BP: ()/(50-77) 133/64     Weight: 83.5 kg (184 lb) (09/22/20 1415)  Body mass index is 30.62 kg/m².  Body surface area is 1.96 meters squared.    I/O last 3 completed shifts:  In: 1604 [I.V.:1124; NG/GT:480]  Out: 4506 [Urine:4470; Chest Tube:36]    Physical Exam  Vitals signs and nursing note reviewed.   Constitutional:       Appearance: She is not ill-appearing or toxic-appearing.   HENT:      Head: Normocephalic and  atraumatic.      Mouth/Throat:      Mouth: Mucous membranes are moist.   Cardiovascular:      Rate and Rhythm: Regular rhythm. Tachycardia present.   Pulmonary:      Comments: Extubated successfully this am  CT x1 in place with SS output  Sternal incision c/d/i  Pacer wires in place  Abdominal:      General: There is no distension.      Palpations: Abdomen is soft.      Tenderness: There is no abdominal tenderness.   Musculoskeletal: Normal range of motion.   Skin:     General: Skin is warm and dry.   Neurological:      Comments: Off sedation  Following commands   Psychiatric:      Comments: lethargic         Significant Labs:  CMP:   Recent Labs   Lab 20  0321 20   *  --    CALCIUM 8.9  --    ALBUMIN 2.5*  --    PROT 6.0  --    *  --    K 3.5 3.2*   CO2 29  --      --    BUN 44*  --    CREATININE 1.1  --    ALKPHOS 315*  --    ALT 66*  --    AST 46*  --    BILITOT 1.5*  --      Coagulation:   Recent Labs   Lab 20  0117  20   INR 2.7*   < > 2.0*   APTT 27.0  --   --     < > = values in this interval not displayed.     LFTs:   Recent Labs   Lab 20   ALT 66*   AST 46*   ALKPHOS 315*   BILITOT 1.5*   PROT 6.0   ALBUMIN 2.5*     All labs within the past 24 hours have been reviewed.     Significant Imaging:  Labs: Reviewed    Assessment/Plan:     Hypernatremia  Free water deficit at 2.1 L  Increase free water intake    Atrial fibrillation with RVR  - on amiodarone infusion    JONAH (acute kidney injury)  -oliguric/anuric kdigo stage 2 jonah likely with ischemic atn probably due to septic vs cardiogenic shock evidenced by leukocytosis and afib with rvr  -RVR improved without significant improvement in bp    - Strict I/O and chart   - daily weights    - Lasix infusion discontinued  - Cr improvin.9-->2.7-->2.4-->2.2-->1.9-->1.7-->1.6-->1.3-->1.1  - urine output 2.3 L/24 hrs,    - renally dose all medications   - Avoid nephrotoxic medications, NSAIDs, IV  contrast, ACE/ARB.  - Maintain MAP > 65  - Hb > 7 gm/dL   - Case discussed with Dr Beasley        Thank you for your consult. I will sign off. Please contact us if you have any additional questions.    Miquel Velazquez MD  Nephrology  [facility]

## 2020-09-24 NOTE — PLAN OF CARE
Problem: SLP Goal  Goal: SLP Goal  Description: Speech Language Pathology Goals  Goals expected to be met 9/30  1. Pt will participate in ongoing swallow assessment to determine safest and least restrictive diet.  Outcome: Ongoing, Progressing     Goals remain appropriate. Patient to remain NPO at this time.   Emily P. Abadie M.S., CCC-SLP  Speech Language Pathologist  [phone]  09/24/2020

## 2020-09-24 NOTE — SUBJECTIVE & OBJECTIVE
Interval History/Significant Events: Patient seen and examined this am. Patient remained in afib for majority of day yesterday requiring amio gtt. Plan to wean amio gtt, d/c vivas, continue to anticoagulate today. No new complaints at this time.     Follow-up For: Procedure(s) (LRB):  Valvuloplasty, Mitral (N/A)  REPAIR, TRICUSPID VALVE (N/A)  BAIN MAZE PROCEDURE (N/A)    Post-Operative Day: 15 Days Post-Op    Objective:     Vital Signs (Most Recent):  Temp: 98.1 °F (36.7 °C) (09/24/20 0834)  Pulse: 96 (09/24/20 1015)  Resp: (!) 22 (09/24/20 0738)  BP: (!) 216/110 (09/24/20 1000)  SpO2: 97 % (09/24/20 1015) Vital Signs (24h Range):  Temp:  [97.9 °F (36.6 °C)-99.5 °F (37.5 °C)] 98.1 °F (36.7 °C)  Pulse:  [] 96  Resp:  [18-22] 22  SpO2:  [83 %-100 %] 97 %  BP: ()/() 216/110  Arterial Line BP: ()/(50-77) 119/58     Weight: 83.5 kg (184 lb)  Body mass index is 30.62 kg/m².      Intake/Output Summary (Last 24 hours) at 9/24/2020 1032  Last data filed at 9/24/2020 1000  Gross per 24 hour   Intake 1001 ml   Output 2021 ml   Net -1020 ml       Physical Exam  Vitals signs and nursing note reviewed.   Constitutional:       Appearance: She is not ill-appearing or toxic-appearing.   HENT:      Head: Normocephalic and atraumatic.      Mouth/Throat:      Mouth: Mucous membranes are moist.   Cardiovascular:      Rate and Rhythm: Regular rhythm. Tachycardia present.   Pulmonary:      Comments: Extubated successfully this am  CT x1 in place with SS output  Sternal incision c/d/i  Pacer wires in place  Abdominal:      General: There is no distension.      Palpations: Abdomen is soft.      Tenderness: There is no abdominal tenderness.   Musculoskeletal: Normal range of motion.   Skin:     General: Skin is warm and dry.   Neurological:      Comments: Off sedation  Following commands   Psychiatric:      Comments: lethargic           Lines/Drains/Airways     Central Venous Catheter Line             Introducer with  Double Lumen 09/09/20 right internal jugular 15 days    Percutaneous Central Line Insertion/Assessment - Triple Lumen  09/18/20 2200 right internal jugular 5 days          Drain                 Urethral Catheter 09/09/20 1512 Non-latex;Straight-tip;Temperature probe 14 Fr. 14 days         Chest Tube 09/18/20 2330 1 5 days         NG/OG Tube 09/23/20 1759 Left nostril less than 1 day          Arterial Line            Arterial Line 09/18/20 1345 Left Radial 5 days          Line                 Pacer Wires 09/09/20 2057 14 days                Significant Labs:    CBC/Anemia Profile:  Recent Labs   Lab 09/23/20  0329 09/23/20  1526 09/24/20  0321   WBC 8.86 9.60 9.47   HGB 9.6* 9.3* 9.5*   HCT 30.4* 30.0* 30.5*    273 296   MCV 94 97 97   RDW 17.5* 17.5* 17.5*        Chemistries:  Recent Labs   Lab 09/23/20 0323 09/23/20  1342 09/23/20 2204 09/24/20 0321 09/24/20  0825     --  145  --  148*  --    K 3.3*   < > 3.4* 4.0 3.5 3.2*   CL 99  --  104  --  104  --    CO2 28  --  31*  --  29  --    BUN 53*  --  50*  --  44*  --    CREATININE 1.4  --  1.3  --  1.1  --    CALCIUM 8.7  --  8.1*  --  8.9  --    ALBUMIN 2.5*  --   --   --  2.5*  --    PROT 6.2  --   --   --  6.0  --    BILITOT 1.9*  --   --   --  1.5*  --    ALKPHOS 321*  --   --   --  315*  --    ALT 79*  --   --   --  66*  --    AST 52*  --   --   --  46*  --    MG 2.1  --   --   --  2.4  --    PHOS 2.5*  --   --   --  2.7  --     < > = values in this interval not displayed.       All pertinent labs within the past 24 hours have been reviewed.    Significant Imaging:  I have reviewed all pertinent imaging results/findings within the past 24 hours.

## 2020-09-24 NOTE — PROGRESS NOTES
[facility]  Critical Care - Surgery  Progress Note    Patient Name: Fatou Lorenzo  MRN: [id number]  Admission Date: 8/30/2020  Hospital Length of Stay: 24 days  Code Status: Full Code  Attending Provider: Antoni Waddell MD  Primary Care Provider: Ludin Rivas MD   Principal Problem: Acute respiratory failure with hypoxia    Subjective:     Hospital/ICU Course:  No notes on file    Interval History/Significant Events: Patient seen and examined this am. Successfully extubated yesterday, but was unable to pass swallow so kevin tube placed later in the day. Missed taking amio and started to become more tachycardic to the 130s. Started back on amio gtt at 1, but was not loaded. Remains in a fib this am, but is rate controlled. Renal function continues to improve as well after holding lasix yesterday, but is net positive 1400 cc yesterday.    Follow-up For: Procedure(s) (LRB):  Valvuloplasty, Mitral (N/A)  REPAIR, TRICUSPID VALVE (N/A)  BAIN MAZE PROCEDURE (N/A)    Post-Operative Day: 15 Days Post-Op    Objective:     Vital Signs (Most Recent):  Temp: 98.1 °F (36.7 °C) (09/24/20 0834)  Pulse: 96 (09/24/20 1015)  Resp: (!) 22 (09/24/20 0738)  BP: (!) 216/110 (09/24/20 1000)  SpO2: 97 % (09/24/20 1015) Vital Signs (24h Range):  Temp:  [97.9 °F (36.6 °C)-99.5 °F (37.5 °C)] 98.1 °F (36.7 °C)  Pulse:  [] 96  Resp:  [18-22] 22  SpO2:  [83 %-100 %] 97 %  BP: ()/() 216/110  Arterial Line BP: ()/(50-77) 119/58     Weight: 83.5 kg (184 lb)  Body mass index is 30.62 kg/m².      Intake/Output Summary (Last 24 hours) at 9/24/2020 1032  Last data filed at 9/24/2020 1000  Gross per 24 hour   Intake 1001 ml   Output 2021 ml   Net -1020 ml       Physical Exam  Vitals signs and nursing note reviewed.   Constitutional:       Appearance: She is not ill-appearing or toxic-appearing.   HENT:      Head: Normocephalic and atraumatic.      Mouth/Throat:      Mouth: Mucous membranes are moist.    Cardiovascular:      Rate and Rhythm: Regular rhythm. Tachycardia present.   Pulmonary:      Comments: Extubated successfully this am  CT x1 in place with SS output  Sternal incision c/d/i  Pacer wires in place  Abdominal:      General: There is no distension.      Palpations: Abdomen is soft.      Tenderness: There is no abdominal tenderness.   Musculoskeletal: Normal range of motion.   Skin:     General: Skin is warm and dry.   Neurological:      Comments: Off sedation  Following commands   Psychiatric:      Comments: lethargic           Lines/Drains/Airways     Central Venous Catheter Line             Introducer with Double Lumen 09/09/20 right internal jugular 15 days    Percutaneous Central Line Insertion/Assessment - Triple Lumen  09/18/20 2200 right internal jugular 5 days          Drain                 Urethral Catheter 09/09/20 1512 Non-latex;Straight-tip;Temperature probe 14 Fr. 14 days         Chest Tube 09/18/20 2330 1 5 days         NG/OG Tube 09/23/20 1759 Left nostril less than 1 day          Arterial Line            Arterial Line 09/18/20 1345 Left Radial 5 days          Line                 Pacer Wires 09/09/20 2057 14 days                Significant Labs:    CBC/Anemia Profile:  Recent Labs   Lab 09/23/20  0329 09/23/20  1526 09/24/20  0321   WBC 8.86 9.60 9.47   HGB 9.6* 9.3* 9.5*   HCT 30.4* 30.0* 30.5*    273 296   MCV 94 97 97   RDW 17.5* 17.5* 17.5*        Chemistries:  Recent Labs   Lab 09/23/20  0323  09/23/20  1342 09/23/20  2204 09/24/20  0321 09/24/20  0825     --  145  --  148*  --    K 3.3*   < > 3.4* 4.0 3.5 3.2*   CL 99  --  104  --  104  --    CO2 28  --  31*  --  29  --    BUN 53*  --  50*  --  44*  --    CREATININE 1.4  --  1.3  --  1.1  --    CALCIUM 8.7  --  8.1*  --  8.9  --    ALBUMIN 2.5*  --   --   --  2.5*  --    PROT 6.2  --   --   --  6.0  --    BILITOT 1.9*  --   --   --  1.5*  --    ALKPHOS 321*  --   --   --  315*  --    ALT 79*  --   --   --  66*  --     AST 52*  --   --   --  46*  --    MG 2.1  --   --   --  2.4  --    PHOS 2.5*  --   --   --  2.7  --     < > = values in this interval not displayed.       All pertinent labs within the past 24 hours have been reviewed.    Significant Imaging:  I have reviewed all pertinent imaging results/findings within the past 24 hours.    Assessment/Plan:     Severe mitral regurgitation  Fatou Lorenzo is a 75 y.o. female s/p MVr, TVr 9/9/2020. Case noteworthy for multiple pump runs (3) and RV hematoma due to retraction. Unstable overnight 9/18, required pericardial window for evac of posterior cardiac tamponade.     Neuro:  - off sedation; following commands this am  - Pain control with scheduled tylenol and PRN dilaudid      CV:  S/p MVr, TVr 9/9/20. Case noteworthy for pump run x3, RV hematoma 2/2 retraction  - Persistent atrial fibrillation despite multiple boluses of amio gtt and attempted cardioversion. Cardioverted overnight with amio gtt   - plan to wean amio gtt; decreased to 0.5 this am  - MAP goals 60-80, SBP <140   - weaned from epi 9/24   - cardene as needed  - Pacer wires in place, set to back up rate 60     Pulm:  - successfully extubated 9/24; continue to monitor respiratory status  - currently on 4L NC; satting well  - Daily CXR and ABG  - pleural effusion on the right which has been present since post op.      FEN/GI:  - Net negative 1.4L in the last 24 hours  - Receiveing KCL scheduled 20 mEq BID  - Replace lytes as needed  - Tube feeds at 40mL  - speech eval 9/24 recommending NPO with strict aspiration precautions  - pt to remain NPO  - ppx: famotidine, miralax    Renal:  - d/c vivas today; continue to monitor I/Os; 2.3L UOP over last 24hrs  - Compromised renal function since surgery   - BUN/Cr improving; 44/1.1 from 50/1.3  - Hold lasix today     Heme/Onc:  - H/H stable  - Anticoagulated for persistent atrial fibrillation   - PT/INR 21.3/2.0. Coumadin 3 mg today  - Midline consult placed     ID:  -  Afebrile, WBC WNL     Endo:  - no hx of DM  - ISS    PPx:  - famotidine, warfarin, SCDs     Dispo: continue ICU care; PT/OT eval         Critical care was time spent personally by me on the following activities: development of treatment plan with patient or surrogate and bedside caregivers, discussions with consultants, evaluation of patient's response to treatment, examination of patient, ordering and performing treatments and interventions, ordering and review of laboratory studies, ordering and review of radiographic studies, pulse oximetry, re-evaluation of patient's condition.  This critical care time did not overlap with that of any other provider or involve time for any procedures.     Dony Limon MD  Critical Care - Surgery  [facility]

## 2020-09-24 NOTE — PLAN OF CARE
09/24/20 1035   Discharge Reassessment   Assessment Type Discharge Planning Reassessment   Provided patient/caregiver education on the expected discharge date and the discharge plan Yes   Do you have any problems affording any of your prescribed medications? No   Discharge Plan A Skilled Nursing Facility   Discharge Plan B Home Health   DME Needed Upon Discharge  other (see comments)  (TBD)       Mel Simons MPH, RN, CM  Ext. [phone]

## 2020-09-24 NOTE — PT/OT/SLP PROGRESS
Speech Language Pathology Treatment    Patient Name:  Fatou Lorenzo   MRN:  [id number]  Admitting Diagnosis: Acute respiratory failure with hypoxia    Recommendations:                 General Recommendations:  ongoing swallow assessment  Diet recommendations:  NPO, Liquid Diet Level: NPO   Aspiration Precautions: Strict aspiration precautions   General Precautions: Standard, fall  Communication strategies:  none    Subjective     Patient lethargic.     Objective:     Has the patient been evaluated by SLP for swallowing?   Yes  Keep patient NPO? Yes   Current Respiratory Status: nasal cannula      Patient administered ice chip trials x2 along with puree trials x2.  Patient with delayed oral prep/clearance and delayed a-p transport. Patient with consistent throat clears and cough x1 for all trials,  along with multiple swallows post puree.  Patient continues with overt signs of airway compromise with all consistencies. Patient to remain NPO at this time. Skilled education was provided to patient re: diet recs, standard aspiration precautions of which to follow, and discharge ST plan of care.     Assessment:     Fatou Lorenzo is a 75 y.o. female with an SLP diagnosis of Dysphagia.      Goals:   Multidisciplinary Problems     SLP Goals        Problem: SLP Goal    Goal Priority Disciplines Outcome   SLP Goal     SLP Ongoing, Progressing   Description: Speech Language Pathology Goals  Goals expected to be met 9/30  1. Pt will participate in ongoing swallow assessment to determine safest and least restrictive diet.                   Plan:     · Patient to be seen:  4 x/week   · Plan of Care expires:  10/23/20  · Plan of Care reviewed with:  patient   · SLP Follow-Up:  Yes       Discharge recommendations:  nursing facility, skilled   Barriers to Discharge:  None    Time Tracking:     SLP Treatment Date:   09/24/20  Speech Start Time:  0820  Speech Stop Time:  0830     Speech Total Time (min):  10 min    Billable Minutes:  Treatment Swallowing Dysfunction 10    Emily Abadie, CCC-SLP  09/24/2020

## 2020-09-24 NOTE — ASSESSMENT & PLAN NOTE
-non oliguric kdigo stage 2 [place] likely multifactorial ATN from vancomycin toxicity, infection, low effective arterial blood volume  -ua with spec grav 1010, prot 1+, blood 3+, Sang 30  -vivas present  -urine microscopy with frequent muddy brown casts  -recommend avoiding hypotension and if using vanco to keep within recommended levels  -would not recommend furosemide at this time, limits volume in to reduce need for diuresis

## 2020-09-24 NOTE — SUBJECTIVE & OBJECTIVE
Interval History: Patient seen and examined at bedside, on Nasal canula. UOP documented at 2.3 L over the past 24 hrs. Cr is now back to baseline at 1.1    Review of patient's allergies indicates:  No Known Allergies  Current Facility-Administered Medications   Medication Frequency    acetaminophen tablet 975 mg Q8H    amiodarone 360 mg/200 mL (1.8 mg/mL) infusion Continuous    amiodarone tablet 200 mg BID    [START ON 9/25/2020] aspirin chewable tablet 81 mg Daily    calcium carbonate 200 mg calcium (500 mg) chewable tablet 1,000 mg TID PRN    dextrose 5 % and 0.45 % NaCl with KCl 20 mEq infusion Continuous    dextrose 50% injection 12.5 g PRN    dextrose 50% injection 25 g PRN    [START ON 9/25/2020] digoxin tablet 0.125 mg Daily    [START ON 9/25/2020] famotidine tablet 20 mg Daily    glucagon (human recombinant) injection 1 mg PRN    insulin aspart U-100 pen 0-5 Units Q6H PRN    melatonin tablet 6 mg Nightly    niCARdipine 40 mg/200 mL (0.2 mg/mL) infusion Continuous    ondansetron injection 4 mg Q6H PRN    oxyCODONE immediate release tablet 5 mg Q4H PRN    [START ON 9/25/2020] polyethylene glycol packet 17 g Daily    [START ON 9/25/2020] sennosides 8.8 mg/5 ml syrup 5 mL Daily    sodium chloride 3% nebulizer solution 4 mL Q6H WAKE    warfarin split tablet 3 mg Daily       Objective:     Vital Signs (Most Recent):  Temp: 98.1 °F (36.7 °C) (09/24/20 1315)  Pulse: (!) 115 (09/24/20 1315)  Resp: (!) 22 (09/24/20 0738)  BP: 102/61 (09/24/20 1300)  SpO2: (!) 91 % (09/24/20 1315)  O2 Device (Oxygen Therapy): nasal cannula (09/24/20 1300) Vital Signs (24h Range):  Temp:  [97.9 °F (36.6 °C)-99.5 °F (37.5 °C)] 98.1 °F (36.7 °C)  Pulse:  [] 115  Resp:  [18-22] 22  SpO2:  [83 %-100 %] 91 %  BP: ()/() 102/61  Arterial Line BP: ()/(50-77) 133/64     Weight: 83.5 kg (184 lb) (09/22/20 1415)  Body mass index is 30.62 kg/m².  Body surface area is 1.96 meters squared.    I/O last 3  completed shifts:  In: 1604 [I.V.:1124; NG/GT:480]  Out: 4506 [Urine:4470; Chest Tube:36]    Physical Exam  Vitals signs and nursing note reviewed.   Constitutional:       Appearance: She is not ill-appearing or toxic-appearing.   HENT:      Head: Normocephalic and atraumatic.      Mouth/Throat:      Mouth: Mucous membranes are moist.   Cardiovascular:      Rate and Rhythm: Regular rhythm. Tachycardia present.   Pulmonary:      Comments: Extubated successfully this am  CT x1 in place with SS output  Sternal incision c/d/i  Pacer wires in place  Abdominal:      General: There is no distension.      Palpations: Abdomen is soft.      Tenderness: There is no abdominal tenderness.   Musculoskeletal: Normal range of motion.   Skin:     General: Skin is warm and dry.   Neurological:      Comments: Off sedation  Following commands   Psychiatric:      Comments: lethargic         Significant Labs:  CMP:   Recent Labs   Lab 09/24/20  0321 09/24/20  0825   *  --    CALCIUM 8.9  --    ALBUMIN 2.5*  --    PROT 6.0  --    *  --    K 3.5 3.2*   CO2 29  --      --    BUN 44*  --    CREATININE 1.1  --    ALKPHOS 315*  --    ALT 66*  --    AST 46*  --    BILITOT 1.5*  --      Coagulation:   Recent Labs   Lab 09/19/20  0117  09/24/20  0321   INR 2.7*   < > 2.0*   APTT 27.0  --   --     < > = values in this interval not displayed.     LFTs:   Recent Labs   Lab 09/24/20  0321   ALT 66*   AST 46*   ALKPHOS 315*   BILITOT 1.5*   PROT 6.0   ALBUMIN 2.5*     All labs within the past 24 hours have been reviewed.     Significant Imaging:  Labs: Reviewed

## 2020-09-24 NOTE — PLAN OF CARE
"Dx: Acute respiratory failure with hypoxia    Neuro: Follows Commands, Moves All Extremities, and Confused    Vital Signs: /76 (BP Location: Right arm, Patient Position: Lying)   Pulse 101   Temp 97.9 °F (36.6 °C) (Core Bladder)   Resp 18   Ht 5' 5" (1.651 m)   Wt 83.5 kg (184 lb)   SpO2 98%   Breastfeeding No   BMI 30.62 kg/m²     Cardiovascular: A.fib, HR  this shift. Amio at 0.5. SCDs on and working.     Respiratory: 4L NC, SpO2-98%    Diet: NPO and Tube Feeds @40- goal    Gtts: Amiodarone    Urine Output: Urinary Catheter 810  cc/shift    Drains: Chest Tube, total output 40 cc /  shift     Labs/Accuchecks: labs reviewed and trended.    Skin: wound care performed per order set. No new skin breakdown noted this shift. Patient turned Q2H this shift. Bed plugged in and functioning, waffle mattress inflated, heel floated. Sacral foam off r/t incontinence. Patient free from falls this shift.     Shift Events: SLP/PT/OT at bedside this shift. Amio decreased to 0.5 at 0800. 200cc water bolus started this shift., TF at 40-goal, patient tolerating well, no nausea/vomiting this shift.TB test given to right forearm-marked with tegaderm. POC reviewed with patient and family at bedside this shift. No comments/ questions at this time. Will continue to monitor.            "

## 2020-09-24 NOTE — PLAN OF CARE
POC reviewed with pt. VSS. Pt follows commands and moves all extremities. Afebrile, MAP > 60, SBP < 140, HR 90-120s. A-fib. SpO2 > 90% on 3L NC. CVP 9-11. V wires connected to pacer. Amio @ 1. TF @ 30cc/hr, goal rate. CT output 10cc for entire shift. UOP trended. Labs replaced. See flowsheet for additional details, will continue to monitor.      Skin: Bed plugged in, waffle inflated. No skin breakdown noted, foam dressings in place, heel boots intact, pt turned q2. SCDs on. Partial bath given.

## 2020-09-25 NOTE — PLAN OF CARE
Problem: SLP Goal  Goal: SLP Goal  Description: Speech Language Pathology Goals  Goals expected to be met 9/30  1. Pt will participate in ongoing swallow assessment to determine safest and least restrictive diet.  Outcome: Ongoing, Progressing     Goals remain appropriate.   Emily P. Abadie M.S., CCC-SLP  Speech Language Pathologist  [phone]  09/25/2020

## 2020-09-25 NOTE — ASSESSMENT & PLAN NOTE
Fatou Lorenzo is a 75 y.o. female s/p MVr, TVr 9/9/2020. Case noteworthy for multiple pump runs (3) and RV hematoma due to retraction. Unstable overnight 9/18, required pericardial window for evac of posterior cardiac tamponade.     Neuro:  - Pain well controlled with alejandra tylenol and PRN oxy (no oxy since 9/23)  - Still drowsy, but is interactive and appropriate      CV:  S/p MVr, TVr 9/9/20. S/pbedside pericardial window 9/18  - Converted back into sinus rhythm   - amio gtt off today   - PO amio 200 mg BID  - Pacer wires in place, set to back up rate 60  - Chest tube to suction. Will leave today in light of elevated INR     Pulm:  - successfully extubated 9/24; continue to monitor respiratory status  - currently on 4L NC; satting well  - Daily CXR, ABG PRN  - pleural effusion on the right which has been present since post op.      FEN/GI:  - Net positive 800 cc over last 24 hours. Holding off on diuresing given kidney function is finally normalizing  - Giving 20 mEq KCl via kevin tube and 15 mmol K-phos IVP  -  TID for hypernatremia 147  - Replace other lytes as needed  - Tube feeds at 40mL  - Daily speech eval. Chips for comfort, otherwise strict NPO  - ppx: famotidine, miralax    Renal:  - Yoo out today, may place pure-wick  - Holding off diuresis given renal function essential normal now  - Continue to trend BUN/Cr  - Nephrology has signed off     Heme/Onc:  - H/H stable  - Anticoagulated for persistent atrial fibrillation   - PT/INR 41.3/4. Hold coumadin today  - Midline consult placed. If able to be placed, will remove CVC     ID:  - Afebrile, WBC WNL  - Will de-line as able     Endo:  - no hx of DM  - ISS    PPx:  - famotidine, warfarin (on hold for tonight), SCDs     Dispo: continue ICU care

## 2020-09-25 NOTE — PLAN OF CARE
Patient has been accepted pending auth to [facility]    Patient is currently being reviewed by: 1. OSRICARDO 2. Guru Christiansen 3. Azeb Young     will continue to coordinate with patient, family, team and insurance to complete patient's discharge plan.       09/25/20 0991   Post-Acute Status   Post-Acute Authorization Placement   Post-Acute Placement Status Pending Post-Acute Medical Review   Discharge Plan   Discharge Plan A Skilled Nursing Facility     Esthela Peralta LMSW   - Case Management

## 2020-09-25 NOTE — PROGRESS NOTES
Fatou Lorenzo is a 75 y.o. female with tricuspid regurg and mitral regurg and afib s/p TVr, MVr, MAZE (9/9) requiring pericardial window and attempted (failed) cardioversion after decompensation (9/18)     Progressing     Plan:  amio gtt off today  Continue enteral feeds at 40   D/c pericardial drain   D/c vivas  INR 4, warfarin 2mg tonight  Delirium precautions  Increase activity   Keep in ICU

## 2020-09-25 NOTE — PT/OT/SLP PROGRESS
Speech Language Pathology Treatment    Patient Name:  Fatou Lorenzo   MRN:  [id number]  Admitting Diagnosis: Acute respiratory failure with hypoxia    Recommendations:                 General Recommendations:  ongoing swallow assessment  Diet recommendations:  NPO, Liquid Diet Level: NPO   Aspiration Precautions: Strict aspiration precautions   General Precautions: Standard, fall  Communication strategies:  none    Subjective     Patient lethargic. Son present for session.      Objective:     Has the patient been evaluated by SLP for swallowing?   Yes  Keep patient NPO? No   Current Respiratory Status: nasal cannula      Patient administered ice chip trials x2, thin liquids via tsp x1,  along with puree trials x2.  Patient with delayed oral prep/clearance and delayed a-p transport. Patient with cough x1 for thin liquid trial,  along with multiple swallows post puree.  Patient continues with overt signs of airway compromise with all consistencies. Patient to remain NPO at this time. SLP cleared patient for ice chips sparingly for pleasure. Educated patient on trying to manage secretions orally as patient consistently suction oral cavity with yankaur.  Skilled education was provided to patient and son re: diet recs, standard aspiration precautions of which to follow, and discharge ST plan of care.     Assessment:     Fatou Lorenzo is a 75 y.o. female with an SLP diagnosis of Dysphagia.      Goals:   Multidisciplinary Problems     SLP Goals        Problem: SLP Goal    Goal Priority Disciplines Outcome   SLP Goal     SLP Ongoing, Progressing   Description: Speech Language Pathology Goals  Goals expected to be met 9/30  1. Pt will participate in ongoing swallow assessment to determine safest and least restrictive diet.                   Plan:     · Patient to be seen:  4 x/week   · Plan of Care expires:  10/23/20  · Plan of Care reviewed with:  patient   · SLP Follow-Up:  Yes       Discharge recommendations:  nursing  facility, skilled   Barriers to Discharge:  None    Time Tracking:     SLP Treatment Date:   09/25/20  Speech Start Time:  0830  Speech Stop Time:  0843     Speech Total Time (min):  13 min    Billable Minutes: Treatment Swallowing Dysfunction 5 and Seld Care/Home Management Training 8    Emily Abadie, CCC-SLP  09/25/2020

## 2020-09-25 NOTE — PLAN OF CARE
"      SICU PLAN OF CARE NOTE    Dx: Acute respiratory failure with hypoxia    Shift Events: no acute events overnight.     Goals of Care: wean o2 & amio gtt    Neuro: AAO x4, Follows Commands, and Moves All Extremities    Vital Signs: /60 (BP Location: Right arm, Patient Position: Lying)   Pulse 79   Temp 97.9 °F (36.6 °C)   Resp (!) 22   Ht 5' 5" (1.651 m)   Wt 82.3 kg (181 lb 7 oz)   SpO2 99%   Breastfeeding No   BMI 30.19 kg/m²     Respiratory: Nasal Cannula 4L    Diet: NPO and Tube Feeds @40 cc/hr    Gtts: Amiodarone    Urine Output: Urinary Catheter  705 cc/shift    Drains: Chest Tube, total output 0 cc /  shift    Labs/Accuchecks: Q6h accuchecks    Skin: Patient turned Q2H. Waffle inflated, heel boots and sacral foam on. Midsternal incision CHARMAINE & CDI. R & L buttock with darkened areas, wound care assessed & directed to apply sween cream. No other skin breakdown noted.        "

## 2020-09-25 NOTE — PLAN OF CARE
Pt remains on 2L NC, sats 97%. All VSS at this time. Labs trended, lytes replaced. Yoo removed, external catheter in place, UO ~400ml. 1 BM. See flowsheets for I/Os and assessments.    Pt remains free from falls, injuries, skin breakdown. Turned q2h. Bed plugged in, waffle inflated, foams in place. POC reviewed.

## 2020-09-25 NOTE — PROGRESS NOTES
[facility]  Critical Care - Surgery  Progress Note    Patient Name: Fatou Lorenzo  MRN: [id number]  Admission Date: 8/30/2020  Hospital Length of Stay: 25 days  Code Status: Full Code  Attending Provider: Antoni Waddell MD  Primary Care Provider: Ludin Rivas MD   Principal Problem: Acute respiratory failure with hypoxia    Subjective:     Hospital/ICU Course:  No notes on file    Interval History/Significant Events: No acute events. Converted into sinus rhythm yesterday and has remained in sinus with HR in the 70s HDS. PT/INR 41.3/4. Net positive 870 cc yesterday. Continuing to hold off on diuresis as UOP 1480 cc yesterday and BUN/Cr 41/0.8 which is the best they have been since surgery.    Follow-up For: Procedure(s) (LRB):  Valvuloplasty, Mitral (N/A)  REPAIR, TRICUSPID VALVE (N/A)  BAIN MAZE PROCEDURE (N/A)    Post-Operative Day: 16 Days Post-Op    Objective:     Vital Signs (Most Recent):  Temp: 98.2 °F (36.8 °C) (09/25/20 1200)  Pulse: 83 (09/25/20 1200)  Resp: (!) 22 (09/25/20 0908)  BP: (!) 124/58 (09/25/20 1200)  SpO2: 100 % (09/25/20 1200) Vital Signs (24h Range):  Temp:  [97.7 °F (36.5 °C)-98.8 °F (37.1 °C)] 98.2 °F (36.8 °C)  Pulse:  [] 83  Resp:  [18-22] 22  SpO2:  [90 %-100 %] 100 %  BP: (102-178)/(51-91) 124/58  Arterial Line BP: (108-169)/(48-89) 129/58     Weight: 82.3 kg (181 lb 7 oz)  Body mass index is 30.19 kg/m².      Intake/Output Summary (Last 24 hours) at 9/25/2020 1237  Last data filed at 9/25/2020 1115  Gross per 24 hour   Intake 2402 ml   Output 1315 ml   Net 1087 ml       Physical Exam  Vitals signs and nursing note reviewed.   Constitutional:       Appearance: She is not ill-appearing or toxic-appearing.   HENT:      Head: Normocephalic and atraumatic.      Nose:      Comments: NGT in place  Neck:      Comments: RIJ CVC  Cardiovascular:      Rate and Rhythm: Normal rate and regular rhythm.   Pulmonary:      Effort: Pulmonary effort is normal.       Comments: CT x1 in place with SS output  Sternal incision c/d/i  Pacer wires in place  Abdominal:      General: There is no distension.      Palpations: Abdomen is soft.   Genitourinary:     Comments: Yoo in place  Skin:     General: Skin is warm and dry.   Neurological:      General: No focal deficit present.   Psychiatric:         Mood and Affect: Mood normal.         Behavior: Behavior normal.         Vents:  Vent Mode: Spont (09/23/20 0719)  Ventilator Initiated: Yes(chart correction) (09/18/20 2202)  Set Rate: 16 BPM (09/23/20 0325)  Vt Set: 350 mL (09/23/20 0325)  Pressure Support: 5 cmH20 (09/23/20 0719)  PEEP/CPAP: 5 cmH20 (09/23/20 0719)  Oxygen Concentration (%): 40 (09/23/20 0719)  Peak Airway Pressure: 10 cmH2O (09/23/20 0719)  Plateau Pressure: 15 cmH20 (09/23/20 0325)  Total Ve: 7.12 mL (09/23/20 0719)  Negative Inspiratory Force (cm H2O): -27 (09/15/20 0926)  F/VT Ratio<105 (RSBI): (!) 54.44 (09/23/20 0719)    Lines/Drains/Airways     Central Venous Catheter Line             Introducer with Double Lumen 09/09/20 right internal jugular 16 days    Percutaneous Central Line Insertion/Assessment - Triple Lumen  09/18/20 2200 right internal jugular 6 days          Drain                 Urethral Catheter 09/09/20 1512 Non-latex;Straight-tip;Temperature probe 14 Fr. 15 days         Chest Tube 09/18/20 2330 1 6 days         NG/OG Tube 09/23/20 1759 Left nostril 1 day          Arterial Line            Arterial Line 09/18/20 1345 Left Radial 6 days          Line                 Pacer Wires 09/09/20 2057 15 days          Peripheral Intravenous Line                 Peripheral IV - Single Lumen 09/24/20 1525 20 G;1 3/4 in Left Upper Arm less than 1 day                Significant Labs:    CBC/Anemia Profile:  Recent Labs   Lab 09/24/20  0321 09/24/20  1518 09/25/20  0326   WBC 9.47 9.33 9.61   HGB 9.5* 9.7* 9.4*   HCT 30.5* 32.0* 31.1*    295 310   MCV 97 98 99*   RDW 17.5* 17.4* 17.3*         Chemistries:  Recent Labs   Lab 09/24/20  0321 09/24/20  0825 09/24/20  1635 09/25/20  0326   *  --  147* 147*   K 3.5 3.2* 3.7 3.9     --  108 108   CO2 29  --  28 29   BUN 44*  --  37* 41*   CREATININE 1.1  --  0.9 0.8   CALCIUM 8.9  --  8.3* 8.5*   ALBUMIN 2.5*  --  2.4* 2.5*   PROT 6.0  --  5.9* 6.0   BILITOT 1.5*  --  1.3* 1.2*   ALKPHOS 315*  --  325* 388*   ALT 66*  --  61* 66*   AST 46*  --  46* 58*   MG 2.4  --   --  2.6   PHOS 2.7  --   --  2.5*       ABGs:   Recent Labs   Lab 09/24/20  0826   PH 7.428   PCO2 47.8*   HCO3 31.6*   POCSATURATED 99   BE 7     All pertinent labs within the past 24 hours have been reviewed.    Significant Imaging:  I have reviewed and interpreted all pertinent imaging results/findings within the past 24 hours.     09/25/2020 CXR  FINDINGS:  Feeding tube tip projects over the proximal stomach; it is been slightly withdrawn since the abdominal radiograph of 09/23/2020 at 18:34 hours when it projected over the mid stomach.     NG tube has been removed since 09/22/2020. No endotracheal tube identified. Central venous catheter, mitral and tricuspid annuloplasty rings remain in their usual locations. Sternal wires are intact and aligned, similar to 09/22/2020. Temporary epicardial pacing wires project over the inferior aspect of the cardiac silhouette.     I suspect compressive atelectasis in the retrocardiac regions of the left mid and lower lung zones with or without left pleural fluid.  I suspect a moderate quantity of dependent right pleural fluid similar to recent studies; this may cause some atelectasis of the right lower lobe.  Right middle lobe remains aerated.     I detect no pulmonary edema, pneumothorax, pneumomediastinum, pneumoperitoneum or significant osseous abnormality.     Impression:     Please see above.    Assessment/Plan:     Severe mitral regurgitation  Fatou Lorenzo is a 75 y.o. female s/p MVr, TVr 9/9/2020. Case noteworthy for multiple pump runs  (3) and RV hematoma due to retraction. Unstable overnight 9/18, required pericardial window for evac of posterior cardiac tamponade.     Neuro:  - Pain well controlled with alejandra tylenol and PRN oxy (no oxy since 9/23)  - Still drowsy, but is interactive and appropriate      CV:  S/p MVr, TVr 9/9/20. S/pbedside pericardial window 9/18  - Converted back into sinus rhythm   - amio gtt off today   - PO amio 200 mg BID  - Pacer wires in place, set to back up rate 60  - Chest tube to suction. Will leave today in light of elevated INR     Pulm:  - successfully extubated 9/24; continue to monitor respiratory status  - currently on 4L NC; satting well  - Daily CXR, ABG PRN  - pleural effusion on the right which has been present since post op.      FEN/GI:  - Net positive 800 cc over last 24 hours. Holding off on diuresing given kidney function is finally normalizing  - Giving 20 mEq KCl via kevin tube and 15 mmol K-phos IVP  -  TID for hypernatremia 147  - Replace other lytes as needed  - Tube feeds at 40mL  - Daily speech eval. Chips for comfort, otherwise strict NPO  - ppx: famotidine, miralax    Renal:  - Yoo out today, may place pure-wick  - Holding off diuresis given renal function essential normal now  - Continue to trend BUN/Cr  - Nephrology has signed off     Heme/Onc:  - H/H stable  - Anticoagulated for persistent atrial fibrillation   - PT/INR 41.3/4. Hold coumadin today  - Midline consult placed. If able to be placed, will remove CVC     ID:  - Afebrile, WBC WNL  - Will de-line as able     Endo:  - no hx of DM  - ISS    PPx:  - famotidine, warfarin (on hold for tonight), SCDs     Dispo: continue ICU care        Critical secondary to Patient has a condition that poses threat to life and bodily function: s/p MVr, TVr, MAZE     Critical care was time spent personally by me on the following activities: development of treatment plan with patient or surrogate and bedside caregivers, discussions with consultants,  evaluation of patient's response to treatment, examination of patient, ordering and performing treatments and interventions, ordering and review of laboratory studies, ordering and review of radiographic studies, pulse oximetry, re-evaluation of patient's condition.  This critical care time did not overlap with that of any other provider or involve time for any procedures.     Madhuri Ng MD  Critical Care - Surgery  [facility]

## 2020-09-26 NOTE — SUBJECTIVE & OBJECTIVE
Interval History/Significant Events: No acute events. Remains in sinus rhythm with HR in the 70-80s. Does not seem to be sleeping much, but she seems to be mentating quite clearly. Tolerating tube feeds at 40. INR today 3.8, chest tube with 40 cc output total. Renal function continues to be essentially normal BUN/Cr 42/0.8 with 1545 cc UOP total in the last 24 hours. Yoo did have to be replaced however due to retention.     Follow-up For: Procedure(s) (LRB):  Valvuloplasty, Mitral (N/A)  REPAIR, TRICUSPID VALVE (N/A)  BAIN MAZE PROCEDURE (N/A)    Post-Operative Day: 17 Days Post-Op    Objective:     Vital Signs (Most Recent):  Temp: 98 °F (36.7 °C) (09/26/20 1500)  Pulse: 86 (09/26/20 1600)  Resp: 16 (09/26/20 1346)  BP: (!) 106/54 (09/26/20 1600)  SpO2: 99 % (09/26/20 1600) Vital Signs (24h Range):  Temp:  [97.8 °F (36.6 °C)-98 °F (36.7 °C)] 98 °F (36.7 °C)  Pulse:  [80-91] 86  Resp:  [16-17] 16  SpO2:  [85 %-100 %] 99 %  BP: (106-135)/(54-82) 106/54  Arterial Line BP: (114-142)/(50-65) 135/60     Weight: 80 kg (176 lb 5.9 oz)  Body mass index is 29.35 kg/m².      Intake/Output Summary (Last 24 hours) at 9/26/2020 1725  Last data filed at 9/26/2020 1500  Gross per 24 hour   Intake 2030 ml   Output 1845 ml   Net 185 ml       Physical Exam    Vents:  Vent Mode: Spont (09/23/20 0719)  Ventilator Initiated: Yes(chart correction) (09/18/20 2202)  Set Rate: 16 BPM (09/23/20 0325)  Vt Set: 350 mL (09/23/20 0325)  Pressure Support: 5 cmH20 (09/23/20 0719)  PEEP/CPAP: 5 cmH20 (09/23/20 0719)  Oxygen Concentration (%): 40 (09/23/20 0719)  Peak Airway Pressure: 10 cmH2O (09/23/20 0719)  Plateau Pressure: 15 cmH20 (09/23/20 0325)  Total Ve: 7.12 mL (09/23/20 0719)  Negative Inspiratory Force (cm H2O): -27 (09/15/20 0926)  F/VT Ratio<105 (RSBI): (!) 54.44 (09/23/20 0719)    Lines/Drains/Airways     Central Venous Catheter Line             Introducer with Double Lumen 09/09/20 right internal jugular 17 days    Percutaneous  Central Line Insertion/Assessment - Triple Lumen  09/18/20 2200 right internal jugular 7 days          Drain                 Chest Tube 09/18/20 2330 1 7 days         NG/OG Tube 09/23/20 1759 Left nostril 2 days         Urethral Catheter 09/25/20 1849 less than 1 day          Arterial Line            Arterial Line 09/18/20 1345 Left Radial 8 days          Line                 Pacer Wires 09/09/20 2057 16 days          Peripheral Intravenous Line                 Peripheral IV - Single Lumen 09/24/20 1525 20 G;1 3/4 in Left Upper Arm 2 days                Significant Labs:    CBC/Anemia Profile:  Recent Labs   Lab 09/25/20  1707 09/26/20  0330 09/26/20  1603   WBC 10.01 10.95 12.77*   HGB 9.2* 9.5* 9.4*   HCT 31.2* 32.7* 32.4*    362* 345   * 101* 101*   RDW 17.7* 17.4* 17.5*        Chemistries:  Recent Labs   Lab 09/25/20  0326 09/26/20  0330   * 148*   K 3.9 3.7    111*   CO2 29 31*   BUN 41* 42*   CREATININE 0.8 0.8   CALCIUM 8.5* 8.8   ALBUMIN 2.5* 2.7*   PROT 6.0 6.3   BILITOT 1.2* 1.4*   ALKPHOS 388* 327*   ALT 66* 58*   AST 58* 35   MG 2.6 2.8*   PHOS 2.5* 2.6*       ABGs: No results for input(s): PH, PCO2, HCO3, POCSATURATED, BE in the last 48 hours.  Coagulation:   Recent Labs   Lab 09/26/20  0330   INR 3.8*     All pertinent labs within the past 24 hours have been reviewed.    Significant Imaging:  I have reviewed and interpreted all pertinent imaging results/findings within the past 24 hours.

## 2020-09-26 NOTE — PLAN OF CARE
"      SICU PLAN OF CARE NOTE    Dx: Acute respiratory failure with hypoxia    Shift Events: VSS overnight, no acute events. Remains on 2L NC, INR 3.8 this AM    Neuro: AAO x4, Follows Commands and Moves All Extremities    Vital Signs: /63 (BP Location: Right arm, Patient Position: Lying)   Pulse 85   Temp 97.8 °F (36.6 °C) (Oral)   Resp 17   Ht 5' 5" (1.651 m)   Wt 82.3 kg (181 lb 7 oz)   SpO2 96%   Breastfeeding No   BMI 30.19 kg/m²     Respiratory: Nasal Cannula 2L    Diet: Tube Feeds    Gtts: None    Urine Output: Urinary Catheter 770 cc/shift    Drains: CT w/ 20mL of output    Labs/Accuchecks: Accuchecks q6h    Skin: No skin breakdown noted. Pt turned q2h. Chest tube dressing replaced. Bed plugged in, heel boots in place.        "

## 2020-09-26 NOTE — ASSESSMENT & PLAN NOTE
Fatou Lorenzo is a 75 y.o. female s/p MVr, TVr 9/9/2020. Case noteworthy for multiple pump runs (3) and RV hematoma due to retraction. Unstable overnight 9/18, required pericardial window for evac of posterior cardiac tamponade.     Neuro:  - Pain well controlled. Transition to PRN tylenol, dc'd oxy  - Mentation improved significantly. Does not seem to be sleeping much. Will schedule melatonin tonight      CV:  S/p MVr, TVr 9/9/20. S/pbedside pericardial window 9/18  - Remains in sinus rhythm. Continue amio 200 mg per NGT BID  - Pacer wires in place, set to back up rate 60 (may need to drop lower if starts to compete)  - Chest tube to suction. Will leave today as still has elevated INR     Pulm:  - successfully extubated 9/24  - Breathing comfortably on 2L NC  - Goal O2 sats 90%, wean as tolerated  - Encourage good pulmonary hygiene, OOB, IS  - HOB >30 deg     FEN/GI:  - Net positive only 300 cc in the last 24 hours. Kidneys seem to be regulating fluid status quite well now  - Having to replace KCl and Phos daily. 20 mEq KCl via kevin tube and 15 mmol K-phos IVP today  -  TID for hypernatremia 148  - Replace other lytes as needed  - Tube feeds at 40mL  - Daily speech eval. Chips for comfort, otherwise strict NPO  - ppx: famotidine, miralax    Renal:  - Vivas replaced yesterday due to urinary retention  - JONAH has resolved. Will continue to trend renal function, but will continue to hold off on diuresis     Heme/Onc:  - H/H stable  - Anticoagulated for persistent atrial fibrillation   - PT/INR 39.8/3.8. Hold coumadin again today (second day holding)     ID:  - Afebrile, WBC WNL  - Hold off on removing CVC and introducer while INR supratherapeutic  - Will need to try to get vivas out soon again     Endo:  - no hx of DM  - ISS    PPx:  - famotidine, warfarin (on hold again for tonight), SCDs     Dispo: Step down

## 2020-09-26 NOTE — PROGRESS NOTES
[facility]  Critical Care - Surgery  Progress Note    Patient Name: Fatou Lorenzo  MRN: [id number]  Admission Date: 8/30/2020  Hospital Length of Stay: 26 days  Code Status: Full Code  Attending Provider: Antoni Waddell MD  Primary Care Provider: Ludin Rivas MD   Principal Problem: Acute respiratory failure with hypoxia    Subjective:     Hospital/ICU Course:  No notes on file    Interval History/Significant Events: No acute events. Remains in sinus rhythm with HR in the 70-80s. Does not seem to be sleeping much, but she seems to be mentating quite clearly. Tolerating tube feeds at 40. INR today 3.8, chest tube with 40 cc output total. Renal function continues to be essentially normal BUN/Cr 42/0.8 with 1545 cc UOP total in the last 24 hours. Yoo did have to be replaced however due to retention.     Follow-up For: Procedure(s) (LRB):  Valvuloplasty, Mitral (N/A)  REPAIR, TRICUSPID VALVE (N/A)  BAIN MAZE PROCEDURE (N/A)    Post-Operative Day: 17 Days Post-Op    Objective:     Vital Signs (Most Recent):  Temp: 98 °F (36.7 °C) (09/26/20 1500)  Pulse: 86 (09/26/20 1600)  Resp: 16 (09/26/20 1346)  BP: (!) 106/54 (09/26/20 1600)  SpO2: 99 % (09/26/20 1600) Vital Signs (24h Range):  Temp:  [97.8 °F (36.6 °C)-98 °F (36.7 °C)] 98 °F (36.7 °C)  Pulse:  [80-91] 86  Resp:  [16-17] 16  SpO2:  [85 %-100 %] 99 %  BP: (106-135)/(54-82) 106/54  Arterial Line BP: (114-142)/(50-65) 135/60     Weight: 80 kg (176 lb 5.9 oz)  Body mass index is 29.35 kg/m².      Intake/Output Summary (Last 24 hours) at 9/26/2020 1725  Last data filed at 9/26/2020 1500  Gross per 24 hour   Intake 2030 ml   Output 1845 ml   Net 185 ml       Physical Exam    Vents:  Vent Mode: Spont (09/23/20 0719)  Ventilator Initiated: Yes(chart correction) (09/18/20 2202)  Set Rate: 16 BPM (09/23/20 0325)  Vt Set: 350 mL (09/23/20 0325)  Pressure Support: 5 cmH20 (09/23/20 0719)  PEEP/CPAP: 5 cmH20 (09/23/20 0719)  Oxygen Concentration (%): 40  (09/23/20 0719)  Peak Airway Pressure: 10 cmH2O (09/23/20 0719)  Plateau Pressure: 15 cmH20 (09/23/20 0325)  Total Ve: 7.12 mL (09/23/20 0719)  Negative Inspiratory Force (cm H2O): -27 (09/15/20 0926)  F/VT Ratio<105 (RSBI): (!) 54.44 (09/23/20 0719)    Lines/Drains/Airways     Central Venous Catheter Line             Introducer with Double Lumen 09/09/20 right internal jugular 17 days    Percutaneous Central Line Insertion/Assessment - Triple Lumen  09/18/20 2200 right internal jugular 7 days          Drain                 Chest Tube 09/18/20 2330 1 7 days         NG/OG Tube 09/23/20 1759 Left nostril 2 days         Urethral Catheter 09/25/20 1849 less than 1 day          Arterial Line            Arterial Line 09/18/20 1345 Left Radial 8 days          Line                 Pacer Wires 09/09/20 2057 16 days          Peripheral Intravenous Line                 Peripheral IV - Single Lumen 09/24/20 1525 20 G;1 3/4 in Left Upper Arm 2 days                Significant Labs:    CBC/Anemia Profile:  Recent Labs   Lab 09/25/20  1707 09/26/20  0330 09/26/20  1603   WBC 10.01 10.95 12.77*   HGB 9.2* 9.5* 9.4*   HCT 31.2* 32.7* 32.4*    362* 345   * 101* 101*   RDW 17.7* 17.4* 17.5*        Chemistries:  Recent Labs   Lab 09/25/20  0326 09/26/20  0330   * 148*   K 3.9 3.7    111*   CO2 29 31*   BUN 41* 42*   CREATININE 0.8 0.8   CALCIUM 8.5* 8.8   ALBUMIN 2.5* 2.7*   PROT 6.0 6.3   BILITOT 1.2* 1.4*   ALKPHOS 388* 327*   ALT 66* 58*   AST 58* 35   MG 2.6 2.8*   PHOS 2.5* 2.6*       ABGs: No results for input(s): PH, PCO2, HCO3, POCSATURATED, BE in the last 48 hours.  Coagulation:   Recent Labs   Lab 09/26/20  0330   INR 3.8*     All pertinent labs within the past 24 hours have been reviewed.    Significant Imaging:  I have reviewed and interpreted all pertinent imaging results/findings within the past 24 hours.       Assessment/Plan:     Severe mitral regurgitation  Fatou Lorenzo is a 75 y.o. female  s/p MVr, TVr 9/9/2020. Case noteworthy for multiple pump runs (3) and RV hematoma due to retraction. Unstable overnight 9/18, required pericardial window for evac of posterior cardiac tamponade.     Neuro:  - Pain well controlled. Transition to PRN tylenol, dc'd oxy  - Mentation improved significantly. Does not seem to be sleeping much. Will schedule melatonin tonight      CV:  S/p MVr, TVr 9/9/20. S/pbedside pericardial window 9/18  - Remains in sinus rhythm. Continue amio 200 mg per NGT BID  - Pacer wires in place, set to back up rate 60 (may need to drop lower if starts to compete)  - Chest tube to suction. Will leave today as still has elevated INR     Pulm:  - successfully extubated 9/24  - Breathing comfortably on 2L NC  - Goal O2 sats 90%, wean as tolerated  - Encourage good pulmonary hygiene, OOB, IS  - HOB >30 deg     FEN/GI:  - Net positive only 300 cc in the last 24 hours. Kidneys seem to be regulating fluid status quite well now  - Having to replace KCl and Phos daily. 20 mEq KCl via kevin tube and 15 mmol K-phos IVP today  -  TID for hypernatremia 148  - Replace other lytes as needed  - Tube feeds at 40mL  - Daily speech eval. Chips for comfort, otherwise strict NPO  - ppx: famotidine, miralax    Renal:  - Vivas replaced yesterday due to urinary retention  - JONAH has resolved. Will continue to trend renal function, but will continue to hold off on diuresis     Heme/Onc:  - H/H stable  - Anticoagulated for persistent atrial fibrillation   - PT/INR 39.8/3.8. Hold coumadin again today (second day holding)     ID:  - Afebrile, WBC WNL  - Hold off on removing CVC and introducer while INR supratherapeutic  - Will need to try to get vivas out soon again     Endo:  - no hx of DM  - ISS    PPx:  - famotidine, warfarin (on hold again for tonight), SCDs     Dispo: Step down      Critical care was time spent personally by me on the following activities: development of treatment plan with patient or surrogate  and bedside caregivers, discussions with consultants, evaluation of patient's response to treatment, examination of patient, ordering and performing treatments and interventions, ordering and review of laboratory studies, ordering and review of radiographic studies, pulse oximetry, re-evaluation of patient's condition.  This critical care time did not overlap with that of any other provider or involve time for any procedures.     Madhuri Ng MD  Critical Care - Surgery  [facility]

## 2020-09-26 NOTE — PLAN OF CARE
CTS at the bedside to discuss the plan of care regarding the patient INR lab, nutrition, and continuation management of hemodynamics.  All questions and concerns were answered and addressed.  Patient demonstrated understanding to the plan of care.

## 2020-09-26 NOTE — PROGRESS NOTES
Fatou Lorenzo is a 75 y.o. female with tricuspid regurg and mitral regurg and afib s/p TVr, MVr, MAZE (9/9) requiring pericardial window and attempted (failed) cardioversion after decompensation (9/18)     A/P     Continue enteral feeds at 40   Cont pericardial drain for now  Hold warfarin  Delirium precautions  Increase activity   Keep vivas given retention   Okay to floor

## 2020-09-27 NOTE — ASSESSMENT & PLAN NOTE
Fatou Lorenzo is a 75 y.o. female s/p MVr, TVr 9/9/2020. Case noteworthy for multiple pump runs (3) and RV hematoma due to retraction. Unstable overnight 9/18, required pericardial window for evac of posterior cardiac tamponade.     Neuro:  - Pain well controlled. PRN oxy  - Was able to get some sleep yesterday after receiving melatonin, but overall still is not sleeping very well. Continue with scheduled melatonin      CV:  S/p MVr, TVr 9/9/20. S/p bedside pericardial window 9/18  - Went back into a fib today. Received metop 5 mg IV x4 but remained in a fib. HDS so will just continue amio 200 for now  - Pacer wires in place, set to back up rate 60  - Chest tube to suction. Pull this today     Pulm:  - successfully extubated 9/24  - Breathing comfortably on 2L NC  - Goal O2 sats 90%, wean as tolerated  - Encourage good pulmonary hygiene, OOB, IS  - HOB >30 deg     FEN/GI:  - Net positive 300 cc in the last 24 hours, but did not get free water flushes  - Now hypernatremic with Na 152. Increase FWF to 300 q4 hrs  - Replace other lytes as needed  - Tube feeds at 40mL. Okay to decrease rate or pause if not tolerating FWF very well  - Daily speech eval. Chips for comfort, otherwise strict NPO  - ppx: famotidine, miralax    Renal:  - Continue vivas for now. Will try voiding trial again likely tomorrow  - In the mean time, continue to hold off on diuresis given normal renal function. If continues to be net positive daily, may need to start diuresing again     Heme/Onc:  - H/H stable  - Anticoagulated for persistent atrial fibrillation   - PT/INR 29.4/2.8   - coumadin held for the last 2 days, will restart tonight at 2     ID:  - Afebrile, WBC WNL  - Okay to remove CVC if has other access  - Will need to try to get vivas out soon again     Endo:  - no hx of DM  - ISS    PPx:  - famotidine, coumadin, SCDs     Dispo: Step down

## 2020-09-27 NOTE — PLAN OF CARE
VSS overnight. PT remains AAOx4 and on 2L NC. Sat 98%. MD ordered to remove art line this AM. Lytes replaced per MD order. POC reviewed w/ pt. Pt turned q2h, bed plugged in and inflated. No new skin breakdown noted.

## 2020-09-27 NOTE — PROGRESS NOTES
[facility]  Critical Care - Surgery  Progress Note    Patient Name: Fatou Lorenzo  MRN: [id number]  Admission Date: 8/30/2020  Hospital Length of Stay: 27 days  Code Status: Full Code  Attending Provider: Antoni Waddell MD  Primary Care Provider: Ludin Rivas MD   Principal Problem: Acute respiratory failure with hypoxia    Subjective:     Hospital/ICU Course:  No notes on file    Interval History/Significant Events: No acute events. Stayed on the floor as was unable to pull art line for INR of 3.8. INR only 2.8 this am however so removed. Appears to have missed FWF yesterday so Na 152 now from 148.    Follow-up For: Procedure(s) (LRB):  Valvuloplasty, Mitral (N/A)  REPAIR, TRICUSPID VALVE (N/A)  BAIN MAZE PROCEDURE (N/A)    Post-Operative Day: 18 Days Post-Op    Objective:     Vital Signs (Most Recent):  Temp: 97.8 °F (36.6 °C) (09/27/20 0700)  Pulse: 84 (09/27/20 1000)  Resp: 18 (09/27/20 0930)  BP: (!) 120/58 (09/27/20 1000)  SpO2: 97 % (09/27/20 1000) Vital Signs (24h Range):  Temp:  [97.8 °F (36.6 °C)-98.1 °F (36.7 °C)] 97.8 °F (36.6 °C)  Pulse:  [81-99] 84  Resp:  [16-20] 18  SpO2:  [85 %-100 %] 97 %  BP: (106-143)/(51-74) 120/58  Arterial Line BP: (104-138)/(48-64) 128/59     Weight: 80 kg (176 lb 5.9 oz)  Body mass index is 29.35 kg/m².      Intake/Output Summary (Last 24 hours) at 9/27/2020 1051  Last data filed at 9/27/2020 1000  Gross per 24 hour   Intake 2000 ml   Output 1920 ml   Net 80 ml       Physical Exam  Vitals signs and nursing note reviewed.   Constitutional:       Appearance: She is not ill-appearing or toxic-appearing.   HENT:      Head: Normocephalic and atraumatic.      Nose:      Comments: NGT in place  Neck:      Comments: RI CVC  Cardiovascular:      Rate and Rhythm: Normal rate and regular rhythm.   Pulmonary:      Effort: Pulmonary effort is normal.      Comments: CT x1 in place with SS output  Sternal incision c/d/i  Pacer wires in place  Abdominal:       General: There is no distension.      Palpations: Abdomen is soft.   Genitourinary:     Comments: Yoo in place  Skin:     General: Skin is warm and dry.   Neurological:      General: No focal deficit present.   Psychiatric:         Mood and Affect: Mood normal.         Behavior: Behavior normal.         Vents:  Vent Mode: Spont (09/23/20 0719)  Ventilator Initiated: Yes(chart correction) (09/18/20 2202)  Set Rate: 16 BPM (09/23/20 0325)  Vt Set: 350 mL (09/23/20 0325)  Pressure Support: 5 cmH20 (09/23/20 0719)  PEEP/CPAP: 5 cmH20 (09/23/20 0719)  Oxygen Concentration (%): 40 (09/23/20 0719)  Peak Airway Pressure: 10 cmH2O (09/23/20 0719)  Plateau Pressure: 15 cmH20 (09/23/20 0325)  Total Ve: 7.12 mL (09/23/20 0719)  Negative Inspiratory Force (cm H2O): -27 (09/15/20 0926)  F/VT Ratio<105 (RSBI): (!) 54.44 (09/23/20 0719)    Lines/Drains/Airways     Central Venous Catheter Line             Introducer with Double Lumen 09/09/20 right internal jugular 18 days    Percutaneous Central Line Insertion/Assessment - Triple Lumen  09/18/20 2200 right internal jugular 8 days          Drain                 Chest Tube 09/18/20 2330 1 8 days         NG/OG Tube 09/23/20 1759 Left nostril 3 days         Urethral Catheter 09/25/20 1849 1 day          Line                 Pacer Wires 09/09/20 2057 17 days          Peripheral Intravenous Line                 Peripheral IV - Single Lumen 09/24/20 1525 20 G;1 3/4 in Left Upper Arm 2 days                Significant Labs:    CBC/Anemia Profile:  Recent Labs   Lab 09/26/20  0330 09/26/20  1603 09/27/20  0315   WBC 10.95 12.77* 12.09   HGB 9.5* 9.4* 9.3*   HCT 32.7* 32.4* 32.0*   * 345 361*   * 101* 101*   RDW 17.4* 17.5* 17.5*        Chemistries:  Recent Labs   Lab 09/26/20  0330 09/27/20  0315   * 152*   K 3.7 3.6   * 116*   CO2 31* 28   BUN 42* 39*   CREATININE 0.8 0.7   CALCIUM 8.8 8.7   ALBUMIN 2.7* 2.7*   PROT 6.3 6.0   BILITOT 1.4* 1.3*   ALKPHOS 327* 282*    ALT 58* 47*   AST 35 27   MG 2.8* 2.9*   PHOS 2.6* 2.7       Bilirubin:   Recent Labs   Lab 09/03/20  0553  09/24/20  0321 09/24/20  1635 09/25/20  0326 09/26/20  0330 09/27/20  0315   BILIDIR 0.5*  --   --   --   --   --   --    BILITOT 1.1*   < > 1.5* 1.3* 1.2* 1.4* 1.3*    < > = values in this interval not displayed.     Coagulation:   Recent Labs   Lab 09/27/20  0315   INR 2.8*  2.8*     All pertinent labs within the past 24 hours have been reviewed.    Significant Imaging:  I have reviewed and interpreted all pertinent imaging results/findings within the past 24 hours.     09/27/2020 CXR  FINDINGS:  Feeding tube tip projects over the proximal stomach; it is been slightly withdrawn since the abdominal radiograph of 09/23/2020 at 18:34 hours when it projected over the mid stomach.     NG tube has been removed since 09/22/2020. No endotracheal tube identified. Central venous catheter, mitral and tricuspid annuloplasty rings remain in their usual locations. Sternal wires are intact and aligned, similar to 09/22/2020. Temporary epicardial pacing wires project over the inferior aspect of the cardiac silhouette.     I suspect compressive atelectasis in the retrocardiac regions of the left mid and lower lung zones with or without left pleural fluid.  I suspect a moderate quantity of dependent right pleural fluid similar to recent studies; this may cause some atelectasis of the right lower lobe.  Right middle lobe remains aerated.     I detect no pulmonary edema, pneumothorax, pneumomediastinum, pneumoperitoneum or significant osseous abnormality.     Impression:     Please see above.    Assessment/Plan:     Severe mitral regurgitation  Fatou Lorenzo is a 75 y.o. female s/p MVr, TVr 9/9/2020. Case noteworthy for multiple pump runs (3) and RV hematoma due to retraction. Unstable overnight 9/18, required pericardial window for evac of posterior cardiac tamponade.     Neuro:  - Pain well controlled. PRN oxy  - Was able  to get some sleep yesterday after receiving melatonin, but overall still is not sleeping very well. Continue with scheduled melatonin      CV:  S/p MVr, TVr 9/9/20. S/p bedside pericardial window 9/18  - Went back into a fib today. Received metop 5 mg IV x4 but remained in a fib. HDS so will just continue amio 200 for now  - Pacer wires in place, set to back up rate 60  - Chest tube to suction. Pull this today     Pulm:  - successfully extubated 9/24  - Breathing comfortably on 2L NC  - Goal O2 sats 90%, wean as tolerated  - Encourage good pulmonary hygiene, OOB, IS  - HOB >30 deg     FEN/GI:  - Net positive 300 cc in the last 24 hours, but did not get free water flushes  - Now hypernatremic with Na 152. Increase FWF to 300 q4 hrs  - Replace other lytes as needed  - Tube feeds at 40mL. Okay to decrease rate or pause if not tolerating FWF very well  - Daily speech eval. Chips for comfort, otherwise strict NPO  - ppx: famotidine, miralax    Renal:  - Continue vivas for now. Will try voiding trial again likely tomorrow  - In the mean time, continue to hold off on diuresis given normal renal function. If continues to be net positive daily, may need to start diuresing again     Heme/Onc:  - H/H stable  - Anticoagulated for persistent atrial fibrillation   - PT/INR 29.4/2.8   - coumadin held for the last 2 days, will restart tonight at 2     ID:  - Afebrile, WBC WNL  - Okay to remove CVC if has other access  - Will need to try to get vivas out soon again     Endo:  - no hx of DM  - ISS    PPx:  - famotidine, coumadin, SCDs     Dispo: Step down         Critical care was time spent personally by me on the following activities: development of treatment plan with patient or surrogate and bedside caregivers, discussions with consultants, evaluation of patient's response to treatment, examination of patient, ordering and performing treatments and interventions, ordering and review of laboratory studies, ordering and review of  radiographic studies, pulse oximetry, re-evaluation of patient's condition.  This critical care time did not overlap with that of any other provider or involve time for any procedures.     Madhuri Ng MD  Critical Care - Surgery  [facility]

## 2020-09-27 NOTE — PT/OT/SLP PROGRESS
Physical Therapy Treatment    Patient Name:  Fatou Lorenzo   MRN:  [id number]    Recommendations:     Discharge Recommendations:  nursing facility, skilled   Discharge Equipment Recommendations: other (see comments)(TBD)   Barriers to discharge: Inaccessible home and Decreased caregiver support    Assessment:     Fatou Lorenzo is a 75 y.o. female admitted with a medical diagnosis of Acute respiratory failure with hypoxia.  She presents with the following impairments/functional limitations:  weakness, impaired endurance, impaired self care skills, impaired functional mobilty, gait instability, impaired balance, decreased safety awareness, decreased ROM, decreased upper extremity function, edema, impaired cardiopulmonary response to activity, impaired skin . Treatment limited to there ex in supine only today, as Patient was getting ready to be transported to the 3rd floor. Patient fatigues very easily with min exertion and O2 sats dropped to 88%.to 92%.    Rehab Prognosis: Fair; patient would benefit from acute skilled PT services to address these deficits and reach maximum level of function.    Recent Surgery: Procedure(s) (LRB):  Valvuloplasty, Mitral (N/A)  REPAIR, TRICUSPID VALVE (N/A)  BAIN MAZE PROCEDURE (N/A) 18 Days Post-Op    Plan:     During this hospitalization, patient to be seen 4 x/week to address the identified rehab impairments via gait training, therapeutic activities, therapeutic exercises, neuromuscular re-education and progress toward the following goals:    · Plan of Care Expires:  10/16/20    Subjective     Chief Complaint: fatigue  Patient/Family Comments/goals: none stated  Pain/Comfort:  · Pain Rating 1: 0/10  · Pain Rating Post-Intervention 1: 0/10      Objective:     Communicated with NSG prior to session.  Patient found HOB elevated with blood pressure cuff, central line, NG tube, telemetry, SCD, pulse ox (continuous), oxygen upon PT entry to room.     General Precautions: Standard, fall    Orthopedic Precautions:N/A   Braces: N/A     Functional Mobility:  · Bed Mobility:     · Scooting: total assistance      AM-PAC 6 CLICK MOBILITY  Turning over in bed (including adjusting bedclothes, sheets and blankets)?: 2  Sitting down on and standing up from a chair with arms (e.g., wheelchair, bedside commode, etc.): 2  Moving from lying on back to sitting on the side of the bed?: 2  Moving to and from a bed to a chair (including a wheelchair)?: 1  Need to walk in hospital room?: 1  Climbing 3-5 steps with a railing?: 1  Basic Mobility Total Score: 9       Therapeutic Activities and Exercises:   AROM/AAROM in supine x 20 reps on all available planes of motion, with rest breaks between sets.    Patient left HOB elevated with all lines intact and call button in reach..    GOALS:   Multidisciplinary Problems     Physical Therapy Goals        Problem: Physical Therapy Goal    Goal Priority Disciplines Outcome Goal Variances Interventions   Physical Therapy Goal     PT, PT/OT Ongoing, Progressing     Description: Goals to be met by: 2020     Patient will increase functional independence with mobility by performin. Supine to sit with MInimal Assistance  2. Sit to stand transfer with Moderate Assistance  3. Bed to chair transfer with Moderate Assistance using LRAD  4. Sitting at edge of bed x10 minutes with Stand-by Assistance  5. Lower extremity exercise program x10 reps per handout, with assistance as needed                           Time Tracking:     PT Received On: 20  PT Start Time: 1501     PT Stop Time: 1514  PT Total Time (min): 13 min     Billable Minutes: Therapeutic Exercise 13    Treatment Type: Treatment  PT/PTA: PTA     PTA Visit Number: Felipe     Josemanuel Gregory PTA  2020

## 2020-09-27 NOTE — PROGRESS NOTES
Fatou Lorenzo is a 75 y.o. female with tricuspid regurg and mitral regurg and afib s/p TVr, MVr, MAZE (9/9) requiring pericardial window and attempted cardioversion after decompensation (9/18)     COSME overnight    A/P  Progressing    Continue enteral feeds at 40   D/c pericardial drain for now  2mg warfarin today  Delirium precautions  Increase activity   Keep vivas given retention   Okay to floor

## 2020-09-27 NOTE — SUBJECTIVE & OBJECTIVE
Interval History/Significant Events: No acute events. Stayed on the floor as was unable to pull art line for INR of 3.8. INR only 2.8 this am however so removed. Appears to have missed FWF yesterday so Na 152 now from 148.    Follow-up For: Procedure(s) (LRB):  Valvuloplasty, Mitral (N/A)  REPAIR, TRICUSPID VALVE (N/A)  BAIN MAZE PROCEDURE (N/A)    Post-Operative Day: 18 Days Post-Op    Objective:     Vital Signs (Most Recent):  Temp: 97.8 °F (36.6 °C) (09/27/20 0700)  Pulse: 84 (09/27/20 1000)  Resp: 18 (09/27/20 0930)  BP: (!) 120/58 (09/27/20 1000)  SpO2: 97 % (09/27/20 1000) Vital Signs (24h Range):  Temp:  [97.8 °F (36.6 °C)-98.1 °F (36.7 °C)] 97.8 °F (36.6 °C)  Pulse:  [81-99] 84  Resp:  [16-20] 18  SpO2:  [85 %-100 %] 97 %  BP: (106-143)/(51-74) 120/58  Arterial Line BP: (104-138)/(48-64) 128/59     Weight: 80 kg (176 lb 5.9 oz)  Body mass index is 29.35 kg/m².      Intake/Output Summary (Last 24 hours) at 9/27/2020 1051  Last data filed at 9/27/2020 1000  Gross per 24 hour   Intake 2000 ml   Output 1920 ml   Net 80 ml       Physical Exam  Vitals signs and nursing note reviewed.   Constitutional:       Appearance: She is not ill-appearing or toxic-appearing.   HENT:      Head: Normocephalic and atraumatic.      Nose:      Comments: NGT in place  Neck:      Comments: RIJ CVC  Cardiovascular:      Rate and Rhythm: Normal rate and regular rhythm.   Pulmonary:      Effort: Pulmonary effort is normal.      Comments: CT x1 in place with SS output  Sternal incision c/d/i  Pacer wires in place  Abdominal:      General: There is no distension.      Palpations: Abdomen is soft.   Genitourinary:     Comments: Yoo in place  Skin:     General: Skin is warm and dry.   Neurological:      General: No focal deficit present.   Psychiatric:         Mood and Affect: Mood normal.         Behavior: Behavior normal.         Vents:  Vent Mode: Spont (09/23/20 0719)  Ventilator Initiated: Yes(chart correction) (09/18/20 2202)  Set  Rate: 16 BPM (09/23/20 0325)  Vt Set: 350 mL (09/23/20 0325)  Pressure Support: 5 cmH20 (09/23/20 0719)  PEEP/CPAP: 5 cmH20 (09/23/20 0719)  Oxygen Concentration (%): 40 (09/23/20 0719)  Peak Airway Pressure: 10 cmH2O (09/23/20 0719)  Plateau Pressure: 15 cmH20 (09/23/20 0325)  Total Ve: 7.12 mL (09/23/20 0719)  Negative Inspiratory Force (cm H2O): -27 (09/15/20 0926)  F/VT Ratio<105 (RSBI): (!) 54.44 (09/23/20 0719)    Lines/Drains/Airways     Central Venous Catheter Line             Introducer with Double Lumen 09/09/20 right internal jugular 18 days    Percutaneous Central Line Insertion/Assessment - Triple Lumen  09/18/20 2200 right internal jugular 8 days          Drain                 Chest Tube 09/18/20 2330 1 8 days         NG/OG Tube 09/23/20 1759 Left nostril 3 days         Urethral Catheter 09/25/20 1849 1 day          Line                 Pacer Wires 09/09/20 2057 17 days          Peripheral Intravenous Line                 Peripheral IV - Single Lumen 09/24/20 1525 20 G;1 3/4 in Left Upper Arm 2 days                Significant Labs:    CBC/Anemia Profile:  Recent Labs   Lab 09/26/20  0330 09/26/20  1603 09/27/20 0315   WBC 10.95 12.77* 12.09   HGB 9.5* 9.4* 9.3*   HCT 32.7* 32.4* 32.0*   * 345 361*   * 101* 101*   RDW 17.4* 17.5* 17.5*        Chemistries:  Recent Labs   Lab 09/26/20  0330 09/27/20  0315   * 152*   K 3.7 3.6   * 116*   CO2 31* 28   BUN 42* 39*   CREATININE 0.8 0.7   CALCIUM 8.8 8.7   ALBUMIN 2.7* 2.7*   PROT 6.3 6.0   BILITOT 1.4* 1.3*   ALKPHOS 327* 282*   ALT 58* 47*   AST 35 27   MG 2.8* 2.9*   PHOS 2.6* 2.7       Bilirubin:   Recent Labs   Lab 09/03/20  0553  09/24/20  0321 09/24/20  1635 09/25/20  0326 09/26/20  0330 09/27/20 0315   BILIDIR 0.5*  --   --   --   --   --   --    BILITOT 1.1*   < > 1.5* 1.3* 1.2* 1.4* 1.3*    < > = values in this interval not displayed.     Coagulation:   Recent Labs   Lab 09/27/20 0315   INR 2.8*  2.8*     All pertinent  labs within the past 24 hours have been reviewed.    Significant Imaging:  I have reviewed and interpreted all pertinent imaging results/findings within the past 24 hours.     09/27/2020 CXR  FINDINGS:  Feeding tube tip projects over the proximal stomach; it is been slightly withdrawn since the abdominal radiograph of 09/23/2020 at 18:34 hours when it projected over the mid stomach.     NG tube has been removed since 09/22/2020. No endotracheal tube identified. Central venous catheter, mitral and tricuspid annuloplasty rings remain in their usual locations. Sternal wires are intact and aligned, similar to 09/22/2020. Temporary epicardial pacing wires project over the inferior aspect of the cardiac silhouette.     I suspect compressive atelectasis in the retrocardiac regions of the left mid and lower lung zones with or without left pleural fluid.  I suspect a moderate quantity of dependent right pleural fluid similar to recent studies; this may cause some atelectasis of the right lower lobe.  Right middle lobe remains aerated.     I detect no pulmonary edema, pneumothorax, pneumomediastinum, pneumoperitoneum or significant osseous abnormality.     Impression:     Please see above.

## 2020-09-27 NOTE — NURSING
Pt oriented to room and unit. Bed in lowest position with siderails up x2. Call bell within reach; pt instructed to call for assistance.  Pt updated with POC. Tele applied V/s taken. Assessment done. Pt is on tube feeding but does not wish to have it started stated just before she came down she had vomited everywhere Is also suppose to get 300cc water bolus  q 4 hours for elevated sodium.  [place] is in place. Skin with some bruises. Bottom with some redness turned and repositioned. O2 2L NC on. Pt can communicate needs if asked. Yoo remains in due to retention. Pt does have external pacer set at 60.  Family is at bedside.

## 2020-09-28 PROBLEM — J38.01 VOCAL FOLD PARESIS, RIGHT: Status: ACTIVE | Noted: 2020-01-01

## 2020-09-28 PROBLEM — Z98.890 S/P MVR (MITRAL VALVE REPAIR): Status: ACTIVE | Noted: 2020-01-01

## 2020-09-28 PROBLEM — Z98.890 S/P TVR (TRICUSPID VALVE REPAIR): Status: ACTIVE | Noted: 2020-01-01

## 2020-09-28 NOTE — NURSING
K replacement ordered for tonight by TAI Anderson.  Notified NP that pt won't be able to swallow those big pills, pt will need powder form. NP gave order to change K Chlor replacement to 40mEq PO powder.

## 2020-09-28 NOTE — CONSULTS
Inpatient consult to Physical Medicine Rehab  Consult performed by: Ashley Bateman NP  Consult ordered by: Antoni Waddell MD  Reason for consult: Assess rehab needs      Reviewed patient history and current admission.  Rehab team following.  Full consult to follow.    CAYLA Madera, FNP-C  Physical Medicine & Rehabilitation   09/28/2020  Spectralink: [id number]

## 2020-09-28 NOTE — PT/OT/SLP PROGRESS
Speech Language Pathology Treatment    Patient Name:  Fatou Lorenzo   MRN:  [id number]  Admitting Diagnosis: Acute respiratory failure with hypoxia    Recommendations:                 General Recommendations:  ENT evaluation and Dysphagia therapy  Diet recommendations:  Regular, Liquid Diet Level: Thin   Aspiration Precautions: 1 bite/sip at a time, Alternating bites/sips, Small bites/sips and Standard aspiration precautions   General Precautions: Standard, fall  Communication strategies:  none    Subjective     Patient awake and alert.     Objective:     Has the patient been evaluated by SLP for swallowing?   Yes  Keep patient NPO? No   Current Respiratory Status: nasal cannula      Patient seen for ongoing swallow assessment. Patient tolerated thin liquids via tsp cup and straw over 6oz, puree trials x4, and regular solid trials x3 with no overt signs of airway compromise. Oral phase of swallow appearing WFL.  Severe dysphonia/intermittent aphonia appreciated.  Patient would benefit from ENT evaluation given multiple intubations and persistent severe dysphonia.  Skilled education was provided to patient  re: diet recs, standard aspiration precautions of which to follow, and ongoing ST plan of care.    Assessment:     Fatou Lorenzo is a 75 y.o. female with an SLP diagnosis of resolving dysphagia and severe dysphonia/intermittent  Aphonia.   SLP to follow up x1 to ensure diet tolerance.     Goals:   Multidisciplinary Problems     SLP Goals        Problem: SLP Goal    Goal Priority Disciplines Outcome   SLP Goal     SLP Ongoing, Progressing   Description: Speech Language Pathology Goals  Goals expected to be met 9/30  1. Pt will tolerate a regular diet and thin liquids with no overt signs of airway compromise.                    Plan:     · Patient to be seen:  4 x/week   · Plan of Care expires:  10/23/20  · Plan of Care reviewed with:  patient   · SLP Follow-Up:  Yes       Discharge recommendations:  nursing  facility, skilled   Barriers to Discharge:  None    Time Tracking:     SLP Treatment Date:   09/28/20  Speech Start Time:  1010  Speech Stop Time:  1024     Speech Total Time (min):  14 min    Billable Minutes: Treatment Swallowing Dysfunction 6 and Seld Care/Home Management Training 8    Emily Abadie, CCC-SLP  09/28/2020

## 2020-09-28 NOTE — NURSING
Received call from speech therapist, Janine. She stated that pt is cleared for food and thin liquids but to maintain aspiration precautions. No need for replacement of NG tube.

## 2020-09-28 NOTE — ASSESSMENT & PLAN NOTE
- WBC trending up  - No fever or obvious signs of infection   - Urinalysis and chest xray today

## 2020-09-28 NOTE — HPI
75F with history of mitral valve regurgitation s/p open mitral valve repair, TC valve repair on 9/9/2020. Since surgery, she has had multiple re-intubations secondary to needing bedside procedures or for hypoxemic respiratory failure. Last extubated 9/23/20 after 4 days of intubation. She relates severe hoarseness after her initial surgery that has not improved. She also states she has had some dysphagia. She was cleared for a regular diet with thin liquids today by SLP. She denies any history of head and neck surgery in the past. She denies any difficulty breathing at the moment.

## 2020-09-28 NOTE — PT/OT/SLP PROGRESS
Occupational Therapy   Treatment    Name: Fatou Lorenzo  MRN: [id number]  Admitting Diagnosis:  Acute respiratory failure with hypoxia  19 Days Post-Op    Recommendations:     Discharge Recommendations: rehabilitation facility  Discharge Equipment Recommendations:  other (see comments)(TBD)  Barriers to discharge:  Other (Comment)(pt required increased A at current functioning level)    Assessment:     Fatou Lorenzo is a 75 y.o. female with a medical diagnosis of Acute respiratory failure with hypoxia.  She presents with the following performance deficits affecting function: weakness, impaired balance, impaired endurance, impaired self care skills, impaired functional mobilty, impaired cardiopulmonary response to activity, decreased upper extremity function. Pt tolerated session well this date as noted in pt's improved ability to tolerate and perform static and dynamic sitting EOB. Pt sat EOB with SBA-CGA for balance while completing functional tasks. Pt with improved cardiopulmonary response to ax as noted in pt's vitals remaining stable throughout the session. Pt's HR increased to the 120's, which has been normal throughout the day. Pt with no reports of dizziness while EOB, responding well to increased time. OT changing disposition recommendation to inpatient rehab facility post acute due to pt's recent progress. Pt is an excellent candidate for inpatient rehabilitation, as pt has a qualifying diagnosis, displays good activity tolerance, has experienced decline from PLOF, has good family support, and is motivated to participate in therapy.    Rehab Prognosis:  Good; patient would benefit from acute skilled OT services to address these deficits and reach maximum level of function.       Plan:     Patient to be seen 4 x/week to address the above listed problems via self-care/home management, therapeutic activities, therapeutic exercises, neuromuscular re-education  · Plan of Care Expires: 10/16/20  · Plan of Care  Reviewed with: patient    Subjective     Pain/Comfort:  · Pain Rating 1: 0/10  · Pain Rating Post-Intervention 1: 0/10    Objective:     Communicated with: RN prior to session.  Patient found HOB elevated with telemetry, vivas catheter upon OT entry to room.    General Precautions: Standard, fall, sternal   Orthopedic Precautions:N/A   Braces: N/A     Occupational Performance:     Bed Mobility:    · Patient completed Rolling/Turning to Right with moderate assistance  · Patient completed Scooting/Bridging with maximal assistance for posterior positioning in bed in supine   · Patient completed Supine to Sit with maximal assistance for trunk elevation and BLE placement over bed  · Patient completed Sit to Supine with maximal assistance for trunk and BLE placement into bed     Functional Mobility/Transfers:  · NT due to increased height and bed malfunction (side rails are not able to be removed); RN aware     Activities of Daily Living:  · Feeding:  stand by assistance to reach for cup bring to mouth and drink while seated EOB  · Helped administer meds with RN present to address feeding, fine motor, sitting balance, and hand grasp   · Cough noted after each swallow; SLP saw pt and cleared pt for diet prior to beginning session   · Grooming: stand by assistance and contact guard assistance for balance while combing hair and washing face EOB  · Toileting: dependence wuth use of vivas for voiding       [facility] 6 Click ADL: 14    Treatment & Education:  - Role of OT/ OT POC  - Self care safety/ independence  - Functional transfer/ mobility safety  - Bed mobility safety  - Pursed lip breathing  - Energy conservation techniques such as taking rest breaks as needed  - Pt sat EOB for 20 minutes during nursing care, meds administration, and grooming routine. Pt also performed x15 of chest press, forward punches, and forward roll to reduce stiffness near surgical site. Pt tolerated well.     Additional staff present: Tech to  provide assistance for sitting EOB balance and bed mobility     Patient left HOB elevated with all lines intact, call button in reach and RN notifiedEducation:      GOALS:   Multidisciplinary Problems     Occupational Therapy Goals        Problem: Occupational Therapy Goal    Goal Priority Disciplines Outcome Interventions   Occupational Therapy Goal     OT, PT/OT Ongoing, Progressing    Description: Goals to be met by: 10/7/2020      Patient will increase functional independence with ADLs by performing:    UE Dressing with Minimum Assistance.  Grooming while sitting EOB with Contact Guard Assistance.- met on 9/28  Toileting from bedside commode with Max Assistance for hygiene and clothing management.   Toilet transfer to bedside commode with Max Assistance.  Supine <> Sit with minimum assistance in preparation for EOB/OOB functional activities.- progressing  Added goal: Pt to tolerate static standing for ~1 minute with min A while competing a functional task.                         Time Tracking:     OT Date of Treatment: 09/28/20  OT Start Time: 1110  OT Stop Time: 1156  OT Total Time (min): 46 min    Billable Minutes:Self Care/Home Management 30  Therapeutic Activity 16    Chitra Quiroga OT  9/28/2020

## 2020-09-28 NOTE — CONSULTS
[facility]  Otorhinolaryngology-Head & Neck Surgery  Consult Note    Patient Name: Fatou Lorenzo  MRN: [id number]  Code Status: Full Code  Admission Date: 8/30/2020  Hospital Length of Stay: 28 days  Attending Physician: Antoni Waddell MD  Primary Care Provider: Ludin Rivas MD    Patient information was obtained from patient and past medical records.     Inpatient consult to ENT  Consult performed by: Harpreet Guillen MD  Consult ordered by: Vania Anderson NP        Subjective:     Chief Complaint/Reason for Admission: cardiac surgery, dysphonia    History of Present Illness: 75F with history of mitral valve regurgitation s/p open mitral valve repair, TC valve repair on 9/9/2020. Since surgery, she has had multiple re-intubations secondary to needing bedside procedures or for hypoxemic respiratory failure. Last extubated 9/23/20 after 4 days of intubation. She relates severe hoarseness after her initial surgery that has not improved. She also states she has had some dysphagia. She was cleared for a regular diet with thin liquids today by SLP. She denies any history of head and neck surgery in the past. She denies any difficulty breathing at the moment.     Medications:  Continuous Infusions:  Scheduled Meds:   amiodarone  200 mg Per NG tube BID    apixaban  5 mg Oral BID    aspirin  81 mg Per NG tube Daily    digoxin  0.125 mg Per NG tube Daily    famotidine  20 mg Per NG tube Daily    melatonin  6 mg Per NG tube Nightly    sodium chloride 3%  4 mL Nebulization Q6H WAKE     PRN Meds:acetaminophen, calcium carbonate, dextrose 50%, dextrose 50%, glucagon (human recombinant), insulin aspart U-100, ondansetron     No current facility-administered medications on file prior to encounter.      Current Outpatient Medications on File Prior to Encounter   Medication Sig    metoprolol tartrate (LOPRESSOR) 25 MG tablet Take 1 tablet (25 mg total) by mouth 3 (three) times daily.     amiodarone (PACERONE) 400 MG tablet Take two tablets by mouth twice daily for twelve days, then take one tablet by mouth daily.    apixaban (ELIQUIS) 5 mg Tab Take 1 tablet (5 mg total) by mouth 2 (two) times daily.    furosemide (LASIX) 40 MG tablet Take 1 tablet (40 mg total) by mouth 2 (two) times daily.    losartan (COZAAR) 50 MG tablet Take 0.5 tablets (25 mg total) by mouth once daily.    pravastatin (PRAVACHOL) 80 MG tablet Take 80 mg by mouth once daily.       Review of patient's allergies indicates:  No Known Allergies    Past Medical History:   Diagnosis Date    Atrial fibrillation with RVR 8/24/2020    Cataract     Essential hypertension 8/31/2020    Glaucoma     Heart valve problem     Hyperlipidemia     Severe mitral regurgitation 8/24/2020     Past Surgical History:   Procedure Laterality Date    ANKLE SURGERY      BAIN MAZE PROCEDURE N/A 9/9/2020    Procedure: BAIN MAZE PROCEDURE;  Surgeon: Antoni Waddell MD;  Location: [address];  Service: Cardiothoracic;  Laterality: N/A;  MAZE     LEFT HEART CATHETERIZATION Left 8/25/2020    Procedure: Left heart cath, radial;  Surgeon: Marlee Carrillo MD;  Location: [facility] CATH LAB;  Service: Cardiology;  Laterality: Left;    lipoma removal      TRICUSPID VALVULOPLASTY N/A 9/9/2020    Procedure: REPAIR, TRICUSPID VALVE;  Surgeon: Antoni Waddell MD;  Location: [address];  Service: Cardiothoracic;  Laterality: N/A;     Family History     Problem Relation (Age of Onset)    Cancer Mother    Cataracts Sister    No Known Problems Father, Brother, Maternal Aunt, Maternal Uncle, Paternal Aunt, Paternal Uncle, Maternal Grandmother, Maternal Grandfather, Paternal Grandmother, Paternal Grandfather        Tobacco Use    Smoking status: Never Smoker    Smokeless tobacco: Never Used   Substance and Sexual Activity    Alcohol use: No    Drug use: No    Sexual activity: Not Currently     Review of Systems   Constitutional: Positive for  fatigue. Negative for chills and fever.   HENT: Positive for trouble swallowing and voice change. Negative for ear pain, facial swelling, nosebleeds, postnasal drip, sinus pressure and sore throat.    Eyes: Negative for pain.   Respiratory: Negative for shortness of breath and stridor.    Cardiovascular: Negative for chest pain.   Gastrointestinal: Negative.    Endocrine: Negative.    Genitourinary: Negative.    Musculoskeletal: Negative.    Skin: Negative.    Neurological: Negative.      Objective:     Vital Signs (Most Recent):  Temp: 98.6 °F (37 °C) (09/28/20 1440)  Pulse: 99 (09/28/20 1456)  Resp: 18 (09/28/20 1440)  BP: 110/76 (09/28/20 1440)  SpO2: (!) 92 % (09/28/20 1440) Vital Signs (24h Range):  Temp:  [97.7 °F (36.5 °C)-99.6 °F (37.6 °C)] 98.6 °F (37 °C)  Pulse:  [] 99  Resp:  [18-20] 18  SpO2:  [92 %-99 %] 92 %  BP: (106-139)/(57-80) 110/76     Weight: 80 kg (176 lb 5.9 oz)  Body mass index is 29.35 kg/m².        Physical Exam  Constitutional:       Appearance: Normal appearance.   HENT:      Head: Normocephalic and atraumatic.      Right Ear: External ear normal.      Left Ear: External ear normal.      Nose: Nose normal.      Mouth/Throat:      Mouth: Mucous membranes are moist.      Pharynx: Oropharynx is clear. No oropharyngeal exudate.   Eyes:      Extraocular Movements: Extraocular movements intact.      Pupils: Pupils are equal, round, and reactive to light.   Neck:      Musculoskeletal: Normal range of motion. No neck rigidity.   Cardiovascular:      Rate and Rhythm: Normal rate.   Pulmonary:      Effort: Pulmonary effort is normal. No respiratory distress.      Breath sounds: No stridor.   Musculoskeletal: Normal range of motion.   Lymphadenopathy:      Cervical: No cervical adenopathy.   Skin:     General: Skin is warm and dry.      Capillary Refill: Capillary refill takes less than 2 seconds.   Neurological:      General: No focal deficit present.      Mental Status: She is alert and  oriented to person, place, and time.      Cranial Nerves: No cranial nerve deficit.      Sensory: No sensory deficit.   Psychiatric:         Mood and Affect: Mood normal.     Voice: severe breathy dysphonia    Significant Labs:  BMP:   Recent Labs   Lab 09/28/20  0526   *   *   CO2 30*   BUN 36*   CREATININE 0.8   CALCIUM 8.9   MG 3.1*     CBC:   Recent Labs   Lab 09/28/20  0526   WBC 19.87*   RBC 3.26*   HGB 9.6*   HCT 33.3*   *   *   MCH 29.4   MCHC 28.8*       Significant Diagnostics:  I have reviewed and interpreted all pertinent imaging results/findings within the past 24 hours.       Flexible Fiberoptic Laryngoscopy    After verbal consent was obtained, the left naris was anesthetized with topical lidocaine and neosynephrine. The flexible laryngoscope was introduced with the following findings:  Nasal cavity: no masses or lesions, pink mucosa, no purulence  Nasopharynx: no masses or lesions, millie wnl  Oropharynx: no masses or lesions, tonsils WNL, BOT WNL  Larynx and Hypopharynx: right true vocal fold immobile, left freely mobile. No masses   The patient tolerated the procedure well.       Assessment/Plan:     Vocal fold paresis, right  75F with R TVF paresis. Possibly secondary to cardiac surgery vs. Intubation paresis. Voice severely breathy, but patient is cleared for reg diet and thin liquids per SLP. Discussed options with patient, including injection augmentation in OR vs. Observation with outpatient follow-up. Patient and son strongly prefer outpatient follow-up given her complex postoperative course and recent major surgery.     -- will have pt follow-up with Dr. Mendoza, laryngologist, in 4-6 weeks.   -- call with questions      VTE Risk Mitigation (From admission, onward)         Ordered     apixaban tablet 5 mg  2 times daily      09/28/20 1417     IP VTE HIGH RISK PATIENT  Once      09/09/20 2250     Place sequential compression device  Until discontinued      09/09/20  8218                Thank you for your consult. I will sign off. Please contact us if you have any additional questions.    Harpreet Guillen MD  Otorhinolaryngology-Head & Neck Surgery  [facility]

## 2020-09-28 NOTE — SUBJECTIVE & OBJECTIVE
Interval History: MAKENZIE tube removed yesterday.  Speech therapy states patient can eat.  Consulted placed to ENT for multiple intubations and persistent severe dysphonia.  Generalized weakness, will attempt to find rehab placement    Review of Systems   Constitution: Positive for malaise/fatigue. Negative for decreased appetite and fever.   HENT:        Change in voice and difficulty swallowing   Cardiovascular: Negative for chest pain, claudication, dyspnea on exertion, irregular heartbeat, leg swelling, palpitations and syncope.   Respiratory: Negative for cough and shortness of breath.    Hematologic/Lymphatic: Negative for bleeding problem.   Skin: Negative for rash.   Musculoskeletal: Negative for arthritis and myalgias.   Gastrointestinal: Negative for abdominal pain, diarrhea, melena, nausea and vomiting.   Genitourinary: Negative for dysuria.   Neurological: Negative for headaches, paresthesias and seizures.     Medications:  Continuous Infusions:  Scheduled Meds:   amiodarone  200 mg Per NG tube BID    apixaban  5 mg Oral BID    aspirin  81 mg Per NG tube Daily    digoxin  0.125 mg Per NG tube Daily    famotidine  20 mg Per NG tube Daily    melatonin  6 mg Per NG tube Nightly    sodium chloride 3%  4 mL Nebulization Q6H WAKE     PRN Meds:acetaminophen, calcium carbonate, dextrose 50%, dextrose 50%, glucagon (human recombinant), insulin aspart U-100, ondansetron     Objective:     Vital Signs (Most Recent):  Temp: 98.6 °F (37 °C) (09/28/20 1440)  Pulse: 99 (09/28/20 1456)  Resp: 18 (09/28/20 1440)  BP: 110/76 (09/28/20 1440)  SpO2: (!) 92 % (09/28/20 1440) Vital Signs (24h Range):  Temp:  [97.7 °F (36.5 °C)-99.6 °F (37.6 °C)] 98.6 °F (37 °C)  Pulse:  [] 99  Resp:  [18-20] 18  SpO2:  [92 %-99 %] 92 %  BP: (106-139)/(57-80) 110/76     Weight: 80 kg (176 lb 5.9 oz)  Body mass index is 29.35 kg/m².    SpO2: (!) 92 %  O2 Device (Oxygen Therapy): room air    Intake/Output - Last 3 Shifts       09/26  0700 - 09/27 0659 09/27 0700 - 09/28 0659 09/28 0700 - 09/29 0659    I.V. (mL/kg)       NG/GT 1930 940     IV Piggyback 250      Total Intake(mL/kg) 2180 (27.3) 940 (11.8)     Urine (mL/kg/hr) 1915 (1) 1510 (0.8)     Stool 0 0     Chest Tube 10      Total Output 1925 1510     Net +255 -570            Stool Occurrence 2 x 2 x           Lines/Drains/Airways     Central Venous Catheter Line             Introducer with Double Lumen 09/09/20 right internal jugular 19 days    Percutaneous Central Line Insertion/Assessment - Triple Lumen  09/18/20 2200 right internal jugular 9 days          Drain                 Urethral Catheter 09/25/20 1849 2 days          Line                 Pacer Wires 09/09/20 2057 18 days          Peripheral Intravenous Line                 Peripheral IV - Single Lumen 09/24/20 1525 20 G;1 3/4 in Left Upper Arm 4 days                Physical Exam  Constitutional:       Appearance: She is well-developed.   Cardiovascular:      Rate and Rhythm: Normal rate and regular rhythm.      Heart sounds: Normal heart sounds.   Pulmonary:      Effort: Pulmonary effort is normal.      Breath sounds: Normal breath sounds.   Abdominal:      Palpations: Abdomen is soft.   Musculoskeletal: Normal range of motion.   Skin:     General: Skin is warm and dry.      Comments: Sternal Incision CDI   Neurological:      Mental Status: She is alert and oriented to person, place, and time.         Significant Labs:  BMP:   Recent Labs   Lab 09/28/20  0526   *   *   K 3.6   *   CO2 30*   BUN 36*   CREATININE 0.8   CALCIUM 8.9   MG 3.1*     CBC:   Recent Labs   Lab 09/28/20  0526   WBC 19.87*   RBC 3.26*   HGB 9.6*   HCT 33.3*   *   *   MCH 29.4   MCHC 28.8*     CMP:   Recent Labs   Lab 09/28/20  0526   *   CALCIUM 8.9   ALBUMIN 2.6*   PROT 6.1   *   K 3.6   CO2 30*   *   BUN 36*   CREATININE 0.8   ALKPHOS 285*   ALT 45*   AST 31   BILITOT 1.7*     Coagulation:   Recent Labs    Lab 09/28/20  0526   INR 2.2*       Significant Diagnostics:  I have reviewed and interpreted all pertinent imaging results/findings within the past 24 hours.

## 2020-09-28 NOTE — SUBJECTIVE & OBJECTIVE
Medications:  Continuous Infusions:  Scheduled Meds:   amiodarone  200 mg Per NG tube BID    apixaban  5 mg Oral BID    aspirin  81 mg Per NG tube Daily    digoxin  0.125 mg Per NG tube Daily    famotidine  20 mg Per NG tube Daily    melatonin  6 mg Per NG tube Nightly    sodium chloride 3%  4 mL Nebulization Q6H WAKE     PRN Meds:acetaminophen, calcium carbonate, dextrose 50%, dextrose 50%, glucagon (human recombinant), insulin aspart U-100, ondansetron     No current facility-administered medications on file prior to encounter.      Current Outpatient Medications on File Prior to Encounter   Medication Sig    metoprolol tartrate (LOPRESSOR) 25 MG tablet Take 1 tablet (25 mg total) by mouth 3 (three) times daily.    amiodarone (PACERONE) 400 MG tablet Take two tablets by mouth twice daily for twelve days, then take one tablet by mouth daily.    apixaban (ELIQUIS) 5 mg Tab Take 1 tablet (5 mg total) by mouth 2 (two) times daily.    furosemide (LASIX) 40 MG tablet Take 1 tablet (40 mg total) by mouth 2 (two) times daily.    losartan (COZAAR) 50 MG tablet Take 0.5 tablets (25 mg total) by mouth once daily.    pravastatin (PRAVACHOL) 80 MG tablet Take 80 mg by mouth once daily.       Review of patient's allergies indicates:  No Known Allergies    Past Medical History:   Diagnosis Date    Atrial fibrillation with RVR 8/24/2020    Cataract     Essential hypertension 8/31/2020    Glaucoma     Heart valve problem     Hyperlipidemia     Severe mitral regurgitation 8/24/2020     Past Surgical History:   Procedure Laterality Date    ANKLE SURGERY      BAIN MAZE PROCEDURE N/A 9/9/2020    Procedure: BAIN MAZE PROCEDURE;  Surgeon: Antoni Waddell MD;  Location: [facility] OR [address] [place] [address];  Service: Cardiothoracic;  Laterality: N/A;  MAZE     LEFT HEART CATHETERIZATION Left 8/25/2020    Procedure: Left heart cath, radial;  Surgeon: Marlee Carrillo MD;  Location: [facility] CATH LAB;  Service: Cardiology;  Laterality:  Left;    lipoma removal      TRICUSPID VALVULOPLASTY N/A 9/9/2020    Procedure: REPAIR, TRICUSPID VALVE;  Surgeon: Antoni Waddell MD;  Location: [facility] OR [address] [place] [address];  Service: Cardiothoracic;  Laterality: N/A;     Family History     Problem Relation (Age of Onset)    Cancer Mother    Cataracts Sister    No Known Problems Father, Brother, Maternal Aunt, Maternal Uncle, Paternal Aunt, Paternal Uncle, Maternal Grandmother, Maternal Grandfather, Paternal Grandmother, Paternal Grandfather        Tobacco Use    Smoking status: Never Smoker    Smokeless tobacco: Never Used   Substance and Sexual Activity    Alcohol use: No    Drug use: No    Sexual activity: Not Currently     Review of Systems   Constitutional: Positive for fatigue. Negative for chills and fever.   HENT: Positive for trouble swallowing and voice change. Negative for ear pain, facial swelling, nosebleeds, postnasal drip, sinus pressure and sore throat.    Eyes: Negative for pain.   Respiratory: Negative for shortness of breath and stridor.    Cardiovascular: Negative for chest pain.   Gastrointestinal: Negative.    Endocrine: Negative.    Genitourinary: Negative.    Musculoskeletal: Negative.    Skin: Negative.    Neurological: Negative.      Objective:     Vital Signs (Most Recent):  Temp: 98.6 °F (37 °C) (09/28/20 1440)  Pulse: 99 (09/28/20 1456)  Resp: 18 (09/28/20 1440)  BP: 110/76 (09/28/20 1440)  SpO2: (!) 92 % (09/28/20 1440) Vital Signs (24h Range):  Temp:  [97.7 °F (36.5 °C)-99.6 °F (37.6 °C)] 98.6 °F (37 °C)  Pulse:  [] 99  Resp:  [18-20] 18  SpO2:  [92 %-99 %] 92 %  BP: (106-139)/(57-80) 110/76     Weight: 80 kg (176 lb 5.9 oz)  Body mass index is 29.35 kg/m².        Physical Exam  Constitutional:       Appearance: Normal appearance.   HENT:      Head: Normocephalic and atraumatic.      Right Ear: External ear normal.      Left Ear: External ear normal.      Nose: Nose normal.      Mouth/Throat:      Mouth: Mucous membranes are  moist.      Pharynx: Oropharynx is clear. No oropharyngeal exudate.   Eyes:      Extraocular Movements: Extraocular movements intact.      Pupils: Pupils are equal, round, and reactive to light.   Neck:      Musculoskeletal: Normal range of motion. No neck rigidity.   Cardiovascular:      Rate and Rhythm: Normal rate.   Pulmonary:      Effort: Pulmonary effort is normal. No respiratory distress.      Breath sounds: No stridor.   Musculoskeletal: Normal range of motion.   Lymphadenopathy:      Cervical: No cervical adenopathy.   Skin:     General: Skin is warm and dry.      Capillary Refill: Capillary refill takes less than 2 seconds.   Neurological:      General: No focal deficit present.      Mental Status: She is alert and oriented to person, place, and time.      Cranial Nerves: No cranial nerve deficit.      Sensory: No sensory deficit.   Psychiatric:         Mood and Affect: Mood normal.     Voice: severe breathy dysphonia    Significant Labs:  BMP:   Recent Labs   Lab 09/28/20  0526   *   *   CO2 30*   BUN 36*   CREATININE 0.8   CALCIUM 8.9   MG 3.1*     CBC:   Recent Labs   Lab 09/28/20  0526   WBC 19.87*   RBC 3.26*   HGB 9.6*   HCT 33.3*   *   *   MCH 29.4   MCHC 28.8*       Significant Diagnostics:  I have reviewed and interpreted all pertinent imaging results/findings within the past 24 hours.       Flexible Fiberoptic Laryngoscopy    After verbal consent was obtained, the left naris was anesthetized with topical lidocaine and neosynephrine. The flexible laryngoscope was introduced with the following findings:  Nasal cavity: no masses or lesions, pink mucosa, no purulence  Nasopharynx: no masses or lesions, millie wnl  Oropharynx: no masses or lesions, tonsils WNL, BOT WNL  Larynx and Hypopharynx: right true vocal fold immobile, left freely mobile. No masses   The patient tolerated the procedure well.

## 2020-09-28 NOTE — PLAN OF CARE
09/28/20 1413   Discharge Reassessment   Assessment Type Discharge Planning Reassessment   Provided patient/caregiver education on the expected discharge date and the discharge plan Yes   Discharge Plan A Rehab   Discharge Plan B Skilled Nursing Facility   DME Needed Upon Discharge  none   Anticipated Discharge Disposition Rehab     Pt stepped down from [id number].    Pt s/p TVr. MVr and MAZE  On 9/9.    Anticipate d/c with rehab when medically ready. Current recs are SNF but patient may progress now that she is stepped down.  SW/CM will continue to follow and assist with d/c planning. Patient will have help from family at d/c.    Julie Haase RN  Case Management [phone]

## 2020-09-28 NOTE — PLAN OF CARE
Problem: Occupational Therapy Goal  Goal: Occupational Therapy Goal  Description: Goals to be met by: 10/7/2020      Patient will increase functional independence with ADLs by performing:    UE Dressing with Minimum Assistance.  Grooming while sitting EOB with Contact Guard Assistance.- met on 9/28  Toileting from bedside commode with Max Assistance for hygiene and clothing management.   Toilet transfer to bedside commode with Max Assistance.  Supine <> Sit with minimum assistance in preparation for EOB/OOB functional activities.- progressing  Added goal: Pt to tolerate static standing for ~1 minute with min A while competing a functional task.        Outcome: Ongoing, Progressing     Pt progressing well towards OT goals. 1 goal added and one goal met this date. OT changing disposition recommendation to inpatient rehab facility to assist pt in reaching goals of returning to her independence.     Chitra Quiroga OTR/L  9/28/20

## 2020-09-28 NOTE — NURSING
Notified Vania Anderson NP that pt did not have NG tube in at this time (2 came out over night) and we were working on locating another [place] tube so she can get her tube feeds and meds.  Pt is scheduled to have swallow eval today.

## 2020-09-28 NOTE — PLAN OF CARE
Plan of care discussed with patient.  Patient sat at side of bed with therapy. Pt safe to swallow regular foods and thin liquids per speech, so NG tube didn't need to be replaced. Yoo discontinued per order (removed at 1430).Continuing to encourage sternal precautions, IS, and mobility. Patient has no complaints of pain. Discussed medications and care. Patient has no questions at this time. 10cc NS to cordis continually. Will continue to monitor.

## 2020-09-28 NOTE — ASSESSMENT & PLAN NOTE
75F with R TVF paresis. Possibly secondary to cardiac surgery vs. Intubation paresis. Voice severely breathy, but patient is cleared for reg diet and thin liquids per SLP. Discussed options with patient, including injection augmentation in OR vs. Observation with outpatient follow-up. Patient and son strongly prefer outpatient follow-up given her complex postoperative course and recent major surgery.     -- will have pt follow-up with Dr. Mendoza, laryngologist, in 4-6 weeks.   -- call with questions

## 2020-09-28 NOTE — NURSING
[place] tube has become dislodged again. [place] tube removed. Ordered telesitter to monitor pt and ensure that she is not pulling on tubes. Pt states that she did not pull on her tube. Tube feedings on hold. Dr. Hernandez directed to replace kevin tube. Unable to locate kevin tube on unit and unable to order kevin tube through epic. Called [facility], they stated that they do not have kevin tubes. Will continue to look for a kevin tube.

## 2020-09-28 NOTE — ASSESSMENT & PLAN NOTE
- Sternal precautions   - PT/OT; currently recommending rehab   - Continue Amiodarone and Digoxin  - Ordered daily digoxin levels with lab draws   - Continue ASA  - Stop Warfarin  - Start Eliquis 5 mg BID  - ENT consult today for multiple intubations and persistent severe dysphonia  - Speech recommending regular diet with thin liquids

## 2020-09-28 NOTE — PLAN OF CARE
Problem: SLP Goal  Goal: SLP Goal  Description: Speech Language Pathology Goals  Goals expected to be met 9/30  1. Pt will tolerate a regular diet and thin liquids with no overt signs of airway compromise.   Outcome: Ongoing, Progressing     Goals updated  Emily P. Abadie M.S., CCC-SLP  Speech Language Pathologist  [phone]  09/28/2020

## 2020-09-28 NOTE — CARE UPDATE
Rapid Response Nurse Chart Check     Chart check completed, abnormal VS noted. bedside RN, Aaron contacted. No concerns verbalized at this time. Instructed to call [phone] for further concerns or assistance.

## 2020-09-28 NOTE — NURSING
When attempting to flush NG tube, liquid is coming out of pts nose. Dr. Hernandez notified. Dr. Hernandez came to bedside. Tube was not in proper place, and was only advanced 10 cm. Dr. Hernandez removed tube and replaced with new tube advanced to 60 cm. KUB ordered to verify placement.     While Dr. Hernandez was at bedside, notified her of pts dark urine that potentially had blood in it, along with her heart rate sustaining in 120s and occassionally increasing to 130s. No directives from MD at this time.

## 2020-09-28 NOTE — PLAN OF CARE
Pt free of falls and injury. Fall precautions remain in place. PT remains bedbound. Skin is clean, dry, and intact. Pt turned Q2. Pt weaned to room air. A-fib on telemetry. Yoo catheter remains in place. [place] tube remains out of place. Attempting to locate another tube to insert. Plan of care reviewed with pt. Pt resting comfortably. Pt VSS, no distress, will continue to monitor.

## 2020-09-28 NOTE — PROGRESS NOTES
[facility]  Cardiothoracic Surgery  Progress Note    Patient Name: Fatou Lorenzo  MRN: [id number]  Admission Date: 8/30/2020  Hospital Length of Stay: 28 days  Code Status: Full Code   Attending Physician: Antoni Waddell MD   Referring Provider: Self, Aaareferral  Principal Problem:Acute respiratory failure with hypoxia    Subjective:     Post-Op Info:  Procedure(s) (LRB):  Valvuloplasty, Mitral (N/A)  REPAIR, TRICUSPID VALVE (N/A)  BAIN MAZE PROCEDURE (N/A)   19 Days Post-Op     Interval History: MAKENZIE tube removed yesterday.  Speech therapy states patient can eat.  Consulted placed to ENT for multiple intubations and persistent severe dysphonia.  Generalized weakness, will attempt to find rehab placement    Review of Systems   Constitution: Positive for malaise/fatigue. Negative for decreased appetite and fever.   HENT:        Change in voice and difficulty swallowing   Cardiovascular: Negative for chest pain, claudication, dyspnea on exertion, irregular heartbeat, leg swelling, palpitations and syncope.   Respiratory: Negative for cough and shortness of breath.    Hematologic/Lymphatic: Negative for bleeding problem.   Skin: Negative for rash.   Musculoskeletal: Negative for arthritis and myalgias.   Gastrointestinal: Negative for abdominal pain, diarrhea, melena, nausea and vomiting.   Genitourinary: Negative for dysuria.   Neurological: Negative for headaches, paresthesias and seizures.     Medications:  Continuous Infusions:  Scheduled Meds:   amiodarone  200 mg Per NG tube BID    apixaban  5 mg Oral BID    aspirin  81 mg Per NG tube Daily    digoxin  0.125 mg Per NG tube Daily    famotidine  20 mg Per NG tube Daily    melatonin  6 mg Per NG tube Nightly    sodium chloride 3%  4 mL Nebulization Q6H WAKE     PRN Meds:acetaminophen, calcium carbonate, dextrose 50%, dextrose 50%, glucagon (human recombinant), insulin aspart U-100, ondansetron     Objective:     Vital Signs (Most  Recent):  Temp: 98.6 °F (37 °C) (09/28/20 1440)  Pulse: 99 (09/28/20 1456)  Resp: 18 (09/28/20 1440)  BP: 110/76 (09/28/20 1440)  SpO2: (!) 92 % (09/28/20 1440) Vital Signs (24h Range):  Temp:  [97.7 °F (36.5 °C)-99.6 °F (37.6 °C)] 98.6 °F (37 °C)  Pulse:  [] 99  Resp:  [18-20] 18  SpO2:  [92 %-99 %] 92 %  BP: (106-139)/(57-80) 110/76     Weight: 80 kg (176 lb 5.9 oz)  Body mass index is 29.35 kg/m².    SpO2: (!) 92 %  O2 Device (Oxygen Therapy): room air    Intake/Output - Last 3 Shifts       09/26 0700 - 09/27 0659 09/27 0700 - 09/28 0659 09/28 0700 - 09/29 0659    I.V. (mL/kg)       NG/GT 1930 940     IV Piggyback 250      Total Intake(mL/kg) 2180 (27.3) 940 (11.8)     Urine (mL/kg/hr) 1915 (1) 1510 (0.8)     Stool 0 0     Chest Tube 10      Total Output 1925 1510     Net +255 -570            Stool Occurrence 2 x 2 x           Lines/Drains/Airways     Central Venous Catheter Line             Introducer with Double Lumen 09/09/20 right internal jugular 19 days    Percutaneous Central Line Insertion/Assessment - Triple Lumen  09/18/20 2200 right internal jugular 9 days          Drain                 Urethral Catheter 09/25/20 1849 2 days          Line                 Pacer Wires 09/09/20 2057 18 days          Peripheral Intravenous Line                 Peripheral IV - Single Lumen 09/24/20 1525 20 G;1 3/4 in Left Upper Arm 4 days                Physical Exam  Constitutional:       Appearance: She is well-developed.   Cardiovascular:      Rate and Rhythm: Normal rate and regular rhythm.      Heart sounds: Normal heart sounds.   Pulmonary:      Effort: Pulmonary effort is normal.      Breath sounds: Normal breath sounds.   Abdominal:      Palpations: Abdomen is soft.   Musculoskeletal: Normal range of motion.   Skin:     General: Skin is warm and dry.      Comments: Sternal Incision CDI   Neurological:      Mental Status: She is alert and oriented to person, place, and time.         Significant Labs:  BMP:    Recent Labs   Lab 09/28/20  0526   *   *   K 3.6   *   CO2 30*   BUN 36*   CREATININE 0.8   CALCIUM 8.9   MG 3.1*     CBC:   Recent Labs   Lab 09/28/20 0526   WBC 19.87*   RBC 3.26*   HGB 9.6*   HCT 33.3*   *   *   MCH 29.4   MCHC 28.8*     CMP:   Recent Labs   Lab 09/28/20  0526   *   CALCIUM 8.9   ALBUMIN 2.6*   PROT 6.1   *   K 3.6   CO2 30*   *   BUN 36*   CREATININE 0.8   ALKPHOS 285*   ALT 45*   AST 31   BILITOT 1.7*     Coagulation:   Recent Labs   Lab 09/28/20 0526   INR 2.2*       Significant Diagnostics:  I have reviewed and interpreted all pertinent imaging results/findings within the past 24 hours.    Assessment/Plan:     S/P TVR (tricuspid valve repair)  See s/p MVr    S/P MVR (mitral valve repair)  - Sternal precautions   - PT/OT; currently recommending rehab   - Continue Amiodarone and Digoxin  - Ordered daily digoxin levels with lab draws   - Continue ASA  - Stop Warfarin  - Start Eliquis 5 mg BID  - ENT consult today for multiple intubations and persistent severe dysphonia  - Speech recommending regular diet with thin liquids     Hypernatremia  - Encourage water intake   - Not on diuresis currently      Hypokalemia  - Replacements scheduled today  - BMP daily to monitor trends    Leukocytosis  - WBC trending up  - No fever or obvious signs of infection   - Urinalysis and chest xray today       Severe mitral regurgitation  Fatou Lorenzo is a 75 y.o. female that (in part)  has a past medical history of Atrial fibrillation with RVR, Cataract, Glaucoma, Heart valve problem, Hyperlipidemia, and Severe mitral regurgitation. CTS consulted to evaluate for MR. Moderate TR and left atrial thrombus also noted on echo. EF 60%. Patient was seen ~8 years ago by Dr. Waddell and at that time valve did not require surgical intervention.     Patient was transferred from [facility] where she presented with SOB. Symptoms started in march and have  progressively gotten worse. States that many of her appointments this year were pushed back due to the COVID pandemic. Reports since hospitalization only able to walk around room before developing SOB. States prior to admission she could performs ADLs at a slow pace. Endorses swelling to b/l upper extremities but denies LE edema. Energy level has been low and patient can develop dizziness with ambulation or lifting head from pillow. Endorses orthopnea. Reports having frequent palpitations with chest burning/pressure during these episodes. No prior strokes, seizurs, stents, or surgeries to chest. Never smoked. No drinking history.     Currently on Eliquis 5 BID for left atrial thrombus. Also on Imdur for BP control. Continue with medical optimization. If patient medically stable, can follow up in clinic in ~1week to finalize surgical plan.     Dr. Waddell to review and staff.     Paroxysmal atrial fibrillation  - Continue amiodarone   - Continue Digoxin           Vania Anderson NP  Cardiothoracic Surgery  [facility]

## 2020-09-28 NOTE — NURSING
CISCO has resulted, called Dr. Hernandez to verify placement and put in orders to use NG tube. Dr. Hernandez directed to give 9/27 evening medications including missed dose of coumadin from afternoon.

## 2020-09-29 PROBLEM — R53.81 DEBILITY: Status: ACTIVE | Noted: 2020-01-01

## 2020-09-29 NOTE — PROGRESS NOTES
[facility]  Cardiothoracic Surgery  Progress Note    Patient Name: Fatou Lorenzo  MRN: [id number]  Admission Date: 8/30/2020  Hospital Length of Stay: 29 days  Code Status: Full Code   Attending Physician: Antoni Waddell MD   Referring Provider: Self, Aaareferral  Principal Problem:Acute respiratory failure with hypoxia    Subjective:     Post-Op Info:  Procedure(s) (LRB):  Valvuloplasty, Mitral (N/A)  REPAIR, TRICUSPID VALVE (N/A)  BAIN MAZE PROCEDURE (N/A)   20 Days Post-Op     Interval History: Patient remains with weakness and poor physical progression.  continues to work with PT/OT    Review of Systems   Constitution: Negative for decreased appetite, fever and malaise/fatigue.   HENT: Positive for hoarse voice.    Cardiovascular: Negative for chest pain, claudication, dyspnea on exertion, irregular heartbeat, leg swelling, palpitations and syncope.   Respiratory: Negative for cough and shortness of breath.    Hematologic/Lymphatic: Negative for bleeding problem.   Skin: Negative for rash.   Musculoskeletal: Positive for muscle weakness. Negative for arthritis and myalgias.   Gastrointestinal: Negative for abdominal pain, diarrhea, melena, nausea and vomiting.   Genitourinary: Negative for dysuria.   Neurological: Positive for weakness. Negative for headaches, paresthesias and seizures.     Medications:  Continuous Infusions:  Scheduled Meds:   amiodarone  200 mg Oral BID    apixaban  5 mg Oral BID    aspirin  81 mg Oral Daily    digoxin  0.125 mg Oral Daily    famotidine  20 mg Oral Daily    melatonin  6 mg Oral Nightly    potassium chloride  20 mEq Oral BID    sodium chloride 3%  4 mL Nebulization Q6H WAKE     PRN Meds:acetaminophen, calcium carbonate, dextrose 50%, dextrose 50%, glucagon (human recombinant), insulin aspart U-100, ondansetron, sodium chloride 3%     Objective:     Vital Signs (Most Recent):  Temp: 97.3 °F (36.3 °C) (09/29/20 1522)  Pulse: 101 (09/29/20  1522)  Resp: 18 (09/29/20 1522)  BP: (!) 113/58 (09/29/20 1522)  SpO2: (!) 93 % (09/29/20 1522) Vital Signs (24h Range):  Temp:  [96 °F (35.6 °C)-98.4 °F (36.9 °C)] 97.3 °F (36.3 °C)  Pulse:  [] 101  Resp:  [16-24] 18  SpO2:  [90 %-98 %] 93 %  BP: (110-171)/(52-95) 113/58     Weight: 79 kg (174 lb 2.6 oz)  Body mass index is 28.98 kg/m².    SpO2: (!) 93 %  O2 Device (Oxygen Therapy): nasal cannula    Intake/Output - Last 3 Shifts       09/27 0700 - 09/28 0659 09/28 0700 - 09/29 0659 09/29 0700 - 09/30 0659    P.O.  240     NG/      IV Piggyback       Total Intake(mL/kg) 940 (11.8) 240 (3)     Urine (mL/kg/hr) 1510 (0.8) 0 (0)     Stool 0      Chest Tube       Total Output 1510 0     Net -570 +240            Urine Occurrence  0 x     Stool Occurrence 2 x            Lines/Drains/Airways     Central Venous Catheter Line             Introducer with Double Lumen 09/09/20 right internal jugular 20 days    Percutaneous Central Line Insertion/Assessment - Triple Lumen  09/18/20 2200 right internal jugular 10 days          Line                 Pacer Wires 09/09/20 2057 19 days          Peripheral Intravenous Line                 Peripheral IV - Single Lumen 09/24/20 1525 20 G;1 3/4 in Left Upper Arm 5 days                Physical Exam  Constitutional:       Appearance: She is well-developed.   Cardiovascular:      Rate and Rhythm: Normal rate and regular rhythm.      Heart sounds: Normal heart sounds.   Pulmonary:      Effort: Pulmonary effort is normal.      Breath sounds: Normal breath sounds.   Abdominal:      Palpations: Abdomen is soft.   Musculoskeletal: Normal range of motion.   Skin:     General: Skin is warm and dry.      Comments: Sternal Incision CDI   Neurological:      Mental Status: She is alert and oriented to person, place, and time.         Significant Labs:  BMP:   Recent Labs   Lab 09/29/20  0458   GLU 97   *   K 3.2*   *   CO2 26   BUN 35*   CREATININE 0.8   CALCIUM 9.0   MG 3.3*      CBC:   Recent Labs   Lab 09/29/20  0458   WBC 14.36*   RBC 3.06*   HGB 9.1*   HCT 31.2*   *   *   MCH 29.7   MCHC 29.2*     CMP:   Recent Labs   Lab 09/29/20  0458   GLU 97   CALCIUM 9.0   ALBUMIN 2.4*   PROT 6.1   *   K 3.2*   CO2 26   *   BUN 35*   CREATININE 0.8   ALKPHOS 238*   ALT 38   AST 25   BILITOT 1.9*     Coagulation:   Recent Labs   Lab 09/28/20  0526   INR 2.2*       Significant Diagnostics:  I have reviewed and interpreted all pertinent imaging results/findings within the past 24 hours.    Assessment/Plan:     S/P TVR (tricuspid valve repair)  See s/p MVr    S/P MVR (mitral valve repair)  - Sternal precautions   - PT/OT; currently recommending rehab   - Continue Amiodarone and Digoxin  - Ordered daily digoxin levels with lab draws   - Continue ASA  - Stop Warfarin  - Start Eliquis 5 mg BID  - ENT consult today for multiple intubations and persistent severe dysphonia-patient declines inpatient microlaryngoscopy/possible vocal fold injection augmentation. Recommends follow up with Dr. Mendoza in 4-6 weeks.  - Speech recommending regular diet with thin liquids     Hypernatremia  - Encourage water intake   - Not on diuresis currently    Hypokalemia  - Replacements scheduled today  - BMP daily to monitor trends    Leukocytosis  - WBC trending down  - No fever or obvious signs of infection   - Urinalysis shows increased RBC, WBC, and leukocytes; culture sent.  Will start antibiotics    Paroxysmal atrial fibrillation  - Continue amiodarone   - Continue Digoxin           Vania Anderson NP  Cardiothoracic Surgery  [facility]

## 2020-09-29 NOTE — CONSULTS
[facility]  Physical Medicine & Rehab  Consult Note    Patient Name: Fatou Lorenzo  MRN: [id number]  Admission Date: 8/30/2020  Hospital Length of Stay: 29 days  Attending Physician: Antoni Waddell MD     Inpatient consult to Physical Medicine & Rehabilitation  Consult performed by: Delores Lr NP  Consult requested by:  Antoni Waddell MD    Reason for Consult:  assess rehabilitation needs  Consults  Subjective:     Principal Problem: Acute respiratory failure with hypoxia    HPI: Per chart review, Fatou Lorenzo is a 75-year-old female with PMHx of Atrial fibrillation with RVR, Cataract, Glaucoma, Heart valve problem, Hyperlipidemia, severe mitral regurgitation, s/p Ankle surgery.  Patient presented to [facility] on 8/30 with SOB. [place] [facility] [place] of severe MR and LA thrombus. Now s/p TVR and MVR on 9/9/20. Hospital course further complicated by leukocytosis, hypokalemia, hypernatremia, severe dysphonia. On sternal precautions.     Functional History: Patient lives in [place] with son in a single story home with no steps to enter.  Prior to admission, (I) with ADLs and mobility. DME: shower chair.     Hospital Course: 9/27: PT-BM TA.   09/28/2020: Bed mobility Max-ModA.  No sit to stand and transfers.   Feed/groom SBA. Toilet dependent.     Past Medical History:   Diagnosis Date    Atrial fibrillation with RVR 8/24/2020    Cataract     Essential hypertension 8/31/2020    Glaucoma     Heart valve problem     Hyperlipidemia     Severe mitral regurgitation 8/24/2020     Past Surgical History:   Procedure Laterality Date    ANKLE SURGERY      BAIN MAZE PROCEDURE N/A 9/9/2020    Procedure: BAIN MAZE PROCEDURE;  Surgeon: Antoni Waddell MD;  Location: [facility] OR [address] [place] [address];  Service: Cardiothoracic;  Laterality: N/A;  MAZE     LEFT HEART CATHETERIZATION Left 8/25/2020    Procedure: Left heart cath, radial;  Surgeon: Marlee Carrillo MD;  Location: [facility] CATH  LAB;  Service: Cardiology;  Laterality: Left;    lipoma removal      TRICUSPID VALVULOPLASTY N/A 9/9/2020    Procedure: REPAIR, TRICUSPID VALVE;  Surgeon: Antoni Waddell MD;  Location: [facility] OR [address] [place] [address];  Service: Cardiothoracic;  Laterality: N/A;     Review of patient's allergies indicates:  No Known Allergies    Scheduled Medications:    amiodarone  200 mg Oral BID    apixaban  5 mg Oral BID    aspirin  81 mg Oral Daily    digoxin  0.125 mg Oral Daily    famotidine  20 mg Oral Daily    melatonin  6 mg Oral Nightly    potassium chloride  20 mEq Oral BID    sodium chloride 3%  4 mL Nebulization Q6H WAKE       PRN Medications: acetaminophen, calcium carbonate, dextrose 50%, dextrose 50%, glucagon (human recombinant), insulin aspart U-100, ondansetron, sodium chloride 3%    Family History     Problem Relation (Age of Onset)    Cancer Mother    Cataracts Sister    No Known Problems Father, Brother, Maternal Aunt, Maternal Uncle, Paternal Aunt, Paternal Uncle, Maternal Grandmother, Maternal Grandfather, Paternal Grandmother, Paternal Grandfather        Tobacco Use    Smoking status: Never Smoker    Smokeless tobacco: Never Used   Substance and Sexual Activity    Alcohol use: No    Drug use: No    Sexual activity: Not Currently     Review of Systems   Constitutional: Positive for activity change. Negative for fatigue and fever.   HENT: Positive for voice change. Negative for trouble swallowing.    Eyes: Negative for photophobia and visual disturbance.   Respiratory: Negative for cough and shortness of breath.    Cardiovascular: Negative for chest pain and palpitations.   Gastrointestinal: Negative for nausea and vomiting.   Genitourinary: Negative for difficulty urinating and flank pain.   Musculoskeletal: Positive for gait problem. Negative for arthralgias.   Skin: Negative for color change and rash.   Neurological: Positive for speech difficulty and weakness.   Psychiatric/Behavioral: Negative for  agitation and confusion.     Objective:     Vital Signs (Most Recent):  Temp: 98 °F (36.7 °C) (09/29/20 1144)  Pulse: 87 (09/29/20 1144)  Resp: 18 (09/29/20 1144)  BP: 120/65 (09/29/20 1144)  SpO2: (!) 90 % (09/29/20 1144)    Vital Signs (24h Range):  Temp:  [96 °F (35.6 °C)-98.6 °F (37 °C)] 98 °F (36.7 °C)  Pulse:  [] 87  Resp:  [16-24] 18  SpO2:  [90 %-98 %] 90 %  BP: (110-171)/(52-95) 120/65     Body mass index is 28.98 kg/m².    Physical Exam  Vitals signs reviewed.   Constitutional:       Appearance: She is well-developed.      Comments: Son at bedside    HENT:      Head: Normocephalic and atraumatic.   Eyes:      General:         Right eye: No discharge.         Left eye: No discharge.   Neck:      Musculoskeletal: Neck supple.   Cardiovascular:      Rate and Rhythm: Normal rate.   Pulmonary:      Effort: Pulmonary effort is normal. No respiratory distress.   Abdominal:      Palpations: Abdomen is soft.      Tenderness: There is no abdominal tenderness.   Musculoskeletal:         General: No deformity.   Skin:     General: Skin is warm and dry.   midsternal c/d/i   Neurological:      Mental Status: She is alert and oriented to person, place, and time.      Motor: Weakness present.      Comments: Severe dysphonia-communicates via writing   Psychiatric:         Behavior: Behavior normal.         Cognition and Memory: Cognition is not impaired.       Diagnostic Results:   Labs: Reviewed  X-Ray: Reviewed    Assessment/Plan:     * Acute respiratory failure with hypoxia  -improved, on NC    Debility  See hospital course for functional status.      Recommendations  -  Encourage mobility, OOB in chair, and early ambulation as appropriate  -  PT/OT evaluate and treat  -  Pain management  -  DVT prophylaxis if appropriate   -  Monitor for and prevent skin breakdown and pressure ulcers  · Early mobility, repositioning/weight shifting every 20-30 minutes when sitting, turn patient every 2 hours, proper  mattress/overlay and chair cushioning, pressure relief/heel protector boots    Leukocytosis  -downtrending today     Severe mitral regurgitation  - Moderate TR and left atrial thrombus also noted on echo. EF 60%  -now s/p MVR and TVR on 9/9 w/ CTS  -on sternal precautions    Paroxysmal atrial fibrillation  -on Eliquis     Participating with therapy. Will follow progress and discuss with PM&R staff for post acute care recommendation.      Thank you for your consult.     Delores Lr NP  Department of Physical Medicine & Rehab  [facility]

## 2020-09-29 NOTE — SUBJECTIVE & OBJECTIVE
Past Medical History:   Diagnosis Date    Atrial fibrillation with RVR 8/24/2020    Cataract     Essential hypertension 8/31/2020    Glaucoma     Heart valve problem     Hyperlipidemia     Severe mitral regurgitation 8/24/2020     Past Surgical History:   Procedure Laterality Date    ANKLE SURGERY      BAIN MAZE PROCEDURE N/A 9/9/2020    Procedure: BAIN MAZE PROCEDURE;  Surgeon: Antoni Waddell MD;  Location: [facility] OR [address] [place] [address];  Service: Cardiothoracic;  Laterality: N/A;  MAZE     LEFT HEART CATHETERIZATION Left 8/25/2020    Procedure: Left heart cath, radial;  Surgeon: Marlee Carrillo MD;  Location: [facility] CATH LAB;  Service: Cardiology;  Laterality: Left;    lipoma removal      TRICUSPID VALVULOPLASTY N/A 9/9/2020    Procedure: REPAIR, TRICUSPID VALVE;  Surgeon: Antoni Waddell MD;  Location: [address];  Service: Cardiothoracic;  Laterality: N/A;     Review of patient's allergies indicates:  No Known Allergies    Scheduled Medications:    amiodarone  200 mg Oral BID    apixaban  5 mg Oral BID    aspirin  81 mg Oral Daily    digoxin  0.125 mg Oral Daily    famotidine  20 mg Oral Daily    melatonin  6 mg Oral Nightly    potassium chloride  20 mEq Oral BID    sodium chloride 3%  4 mL Nebulization Q6H WAKE       PRN Medications: acetaminophen, calcium carbonate, dextrose 50%, dextrose 50%, glucagon (human recombinant), insulin aspart U-100, ondansetron, sodium chloride 3%    Family History     Problem Relation (Age of Onset)    Cancer Mother    Cataracts Sister    No Known Problems Father, Brother, Maternal Aunt, Maternal Uncle, Paternal Aunt, Paternal Uncle, Maternal Grandmother, Maternal Grandfather, Paternal Grandmother, Paternal Grandfather        Tobacco Use    Smoking status: Never Smoker    Smokeless tobacco: Never Used   Substance and Sexual Activity    Alcohol use: No    Drug use: No    Sexual activity: Not Currently     Review of Systems   Constitutional: Positive for  activity change. Negative for fatigue and fever.   HENT: Positive for voice change. Negative for trouble swallowing.    Eyes: Negative for photophobia and visual disturbance.   Respiratory: Negative for cough and shortness of breath.    Cardiovascular: Negative for chest pain and palpitations.   Gastrointestinal: Negative for nausea and vomiting.   Genitourinary: Negative for difficulty urinating and flank pain.   Musculoskeletal: Positive for gait problem. Negative for arthralgias.   Skin: Negative for color change and rash.   Neurological: Positive for speech difficulty and weakness.   Psychiatric/Behavioral: Negative for agitation and confusion.     Objective:     Vital Signs (Most Recent):  Temp: 98 °F (36.7 °C) (09/29/20 1144)  Pulse: 87 (09/29/20 1144)  Resp: 18 (09/29/20 1144)  BP: 120/65 (09/29/20 1144)  SpO2: (!) 90 % (09/29/20 1144)    Vital Signs (24h Range):  Temp:  [96 °F (35.6 °C)-98.6 °F (37 °C)] 98 °F (36.7 °C)  Pulse:  [] 87  Resp:  [16-24] 18  SpO2:  [90 %-98 %] 90 %  BP: (110-171)/(52-95) 120/65     Body mass index is 28.98 kg/m².    Physical Exam  Vitals signs reviewed.   Constitutional:       Appearance: She is well-developed.      Comments: Son at bedside    HENT:      Head: Normocephalic and atraumatic.   Eyes:      General:         Right eye: No discharge.         Left eye: No discharge.   Neck:      Musculoskeletal: Neck supple.   Cardiovascular:      Rate and Rhythm: Normal rate.   Pulmonary:      Effort: Pulmonary effort is normal. No respiratory distress.   Abdominal:      Palpations: Abdomen is soft.      Tenderness: There is no abdominal tenderness.   Musculoskeletal:         General: No deformity.   Skin:     General: Skin is warm and dry.   Neurological:      Mental Status: She is alert and oriented to person, place, and time.      Motor: Weakness present.      Comments: Severe dysphonia-communicates via writing   Psychiatric:         Behavior: Behavior normal.         Cognition  and Memory: Cognition is not impaired.       NEUROLOGICAL EXAMINATION:     MENTAL STATUS   Oriented to person, place, and time.       Diagnostic Results:   Labs: Reviewed  X-Ray: Reviewed

## 2020-09-29 NOTE — PROGRESS NOTES
Pt bladder scanned due to no urine output since vivas removal at 14:30. 230ml found on bladder scan. MD Huntley notified. RN ordered to in and out cath in one hour if patient still hasn't voided.

## 2020-09-29 NOTE — SUBJECTIVE & OBJECTIVE
Interval History: Patient remains with weakness and poor physical progression.  continues to work with PT/OT    Review of Systems   Constitution: Negative for decreased appetite, fever and malaise/fatigue.   HENT: Positive for hoarse voice.    Cardiovascular: Negative for chest pain, claudication, dyspnea on exertion, irregular heartbeat, leg swelling, palpitations and syncope.   Respiratory: Negative for cough and shortness of breath.    Hematologic/Lymphatic: Negative for bleeding problem.   Skin: Negative for rash.   Musculoskeletal: Positive for muscle weakness. Negative for arthritis and myalgias.   Gastrointestinal: Negative for abdominal pain, diarrhea, melena, nausea and vomiting.   Genitourinary: Negative for dysuria.   Neurological: Positive for weakness. Negative for headaches, paresthesias and seizures.     Medications:  Continuous Infusions:  Scheduled Meds:   amiodarone  200 mg Oral BID    apixaban  5 mg Oral BID    aspirin  81 mg Oral Daily    digoxin  0.125 mg Oral Daily    famotidine  20 mg Oral Daily    melatonin  6 mg Oral Nightly    potassium chloride  20 mEq Oral BID    sodium chloride 3%  4 mL Nebulization Q6H WAKE     PRN Meds:acetaminophen, calcium carbonate, dextrose 50%, dextrose 50%, glucagon (human recombinant), insulin aspart U-100, ondansetron, sodium chloride 3%     Objective:     Vital Signs (Most Recent):  Temp: 97.3 °F (36.3 °C) (09/29/20 1522)  Pulse: 101 (09/29/20 1522)  Resp: 18 (09/29/20 1522)  BP: (!) 113/58 (09/29/20 1522)  SpO2: (!) 93 % (09/29/20 1522) Vital Signs (24h Range):  Temp:  [96 °F (35.6 °C)-98.4 °F (36.9 °C)] 97.3 °F (36.3 °C)  Pulse:  [] 101  Resp:  [16-24] 18  SpO2:  [90 %-98 %] 93 %  BP: (110-171)/(52-95) 113/58     Weight: 79 kg (174 lb 2.6 oz)  Body mass index is 28.98 kg/m².    SpO2: (!) 93 %  O2 Device (Oxygen Therapy): nasal cannula    Intake/Output - Last 3 Shifts       09/27 0700 - 09/28 0659 09/28 0700 - 09/29 0659 09/29 0700 - 09/30 0659     P.O.  240     NG/      IV Piggyback       Total Intake(mL/kg) 940 (11.8) 240 (3)     Urine (mL/kg/hr) 1510 (0.8) 0 (0)     Stool 0      Chest Tube       Total Output 1510 0     Net -570 +240            Urine Occurrence  0 x     Stool Occurrence 2 x            Lines/Drains/Airways     Central Venous Catheter Line             Introducer with Double Lumen 09/09/20 right internal jugular 20 days    Percutaneous Central Line Insertion/Assessment - Triple Lumen  09/18/20 2200 right internal jugular 10 days          Line                 Pacer Wires 09/09/20 2057 19 days          Peripheral Intravenous Line                 Peripheral IV - Single Lumen 09/24/20 1525 20 G;1 3/4 in Left Upper Arm 5 days                Physical Exam  Constitutional:       Appearance: She is well-developed.   Cardiovascular:      Rate and Rhythm: Normal rate and regular rhythm.      Heart sounds: Normal heart sounds.   Pulmonary:      Effort: Pulmonary effort is normal.      Breath sounds: Normal breath sounds.   Abdominal:      Palpations: Abdomen is soft.   Musculoskeletal: Normal range of motion.   Skin:     General: Skin is warm and dry.      Comments: Sternal Incision CDI   Neurological:      Mental Status: She is alert and oriented to person, place, and time.         Significant Labs:  BMP:   Recent Labs   Lab 09/29/20  0458   GLU 97   *   K 3.2*   *   CO2 26   BUN 35*   CREATININE 0.8   CALCIUM 9.0   MG 3.3*     CBC:   Recent Labs   Lab 09/29/20 0458   WBC 14.36*   RBC 3.06*   HGB 9.1*   HCT 31.2*   *   *   MCH 29.7   MCHC 29.2*     CMP:   Recent Labs   Lab 09/29/20 0458   GLU 97   CALCIUM 9.0   ALBUMIN 2.4*   PROT 6.1   *   K 3.2*   CO2 26   *   BUN 35*   CREATININE 0.8   ALKPHOS 238*   ALT 38   AST 25   BILITOT 1.9*     Coagulation:   Recent Labs   Lab 09/28/20  0526   INR 2.2*       Significant Diagnostics:  I have reviewed and interpreted all pertinent imaging results/findings within  the past 24 hours.

## 2020-09-29 NOTE — PLAN OF CARE
Pt remained free of falls, injury, trauma, and skin breakdown during my shift. VSS. Aox4. Pacer wires isolated and secured. Sternal and fall precautions maintained. Pt urinated post vivas removal; unable to measure--mixed with stool. Pt up to bed side commode with three person assist; pt very weak. Plan of care reviewed with patient and education provided; pt verbalized understanding. No patient complaints at this time. Will continue to monitor.

## 2020-09-29 NOTE — PROGRESS NOTES
Pt with request for CPAP and inability to cough up secretions. Saturations 92% on room air. Additional respiratory treatment ordered per MD Huntley. O2 applied at 2L with sats 94-96%. Pt currently resting quietly. Will continue to monitor.

## 2020-09-29 NOTE — HOSPITAL COURSE
9/27: PT-BM TA.   09/28/2020: Bed mobility Max-ModA.  No sit to stand and transfers.   Feed/groom SBA. Toilet dependent.   9/29: Max x 2ppl-MaxA. Feed setupA. LBD MaxA. Toilet dependent. No gait.   9/30: BM Max 2 ppl-MaxA. Sit to stand Max 2 ppl-ModA x 2ppl. UBD ModA. Toilet TA.   10/2: BM Max-mod x 2 ppl. Sit to stand ModA. Trxs MaxA. Gait: MaxA: to take a few steps during stand pivot transfer. Feed setupA. LBD TA. Toilet dependent. SLP diagnosis of severe Dysphonia and intermittent Aphonia.

## 2020-09-29 NOTE — NURSING
Spoke with TAI Anderson about UA ordered on pt. Pt unable to get on toilet or commode. UA will need to be obtained via purwick or in/out cath. NP stated purwick would be ok.     Also spoke with NP about external pacer, pt hasn't needed pacing from pacer since coming to the floor. NP ordered that external pacer could be disconnected and wires isolated and secured.

## 2020-09-29 NOTE — ASSESSMENT & PLAN NOTE
- Moderate TR and left atrial thrombus also noted on echo. EF 60%  -now s/p MVR and TVR on 9/9 w/ CTS  -on sternal precautions

## 2020-09-29 NOTE — PT/OT/SLP PROGRESS
Physical Therapy Treatment    Patient Name:  Fatou Lorenzo   MRN:  [id number]    Recommendations:     Discharge Recommendations:  rehabilitation facility   Discharge Equipment Recommendations: (TBD)   Barriers to discharge: Patient requires increased assistance at current functional level    Assessment:     Fatou Lorenzo is a 75 y.o. female admitted with a medical diagnosis of Acute respiratory failure with hypoxia.  She presents with the following impairments/functional limitations:  weakness, impaired balance, impaired endurance, impaired cardiopulmonary response to activity, impaired self care skills, decreased coordination, impaired functional mobilty, decreased lower extremity function, gait instability. Patient tolerated session well, performing bed mobility and balance training at EOB with assist. Pt able to perform LE exercises at EOB to improve LE strength/ROM and dynamic sitting balance. Also, pt was seated EOB for increased time this date to improve upright tolerance. Pt bed was too large and too elevated to allow for safe transfers or OOB activities, but after session pt was transferred to regular bed. Patient would benefit from acute skilled PT services to address current deficits and increase functional mobility.     Rehab Prognosis: Good; patient would benefit from acute skilled PT services to address these deficits and reach maximum level of function.    Recent Surgery: Procedure(s) (LRB):  Valvuloplasty, Mitral (N/A)  REPAIR, TRICUSPID VALVE (N/A)  BAIN MAZE PROCEDURE (N/A) 20 Days Post-Op    Plan:     During this hospitalization, patient to be seen 4 x/week to address the identified rehab impairments via gait training, therapeutic activities, therapeutic exercises, neuromuscular re-education and progress toward the following goals:    · Plan of Care Expires:  10/16/20    Subjective     Chief Complaint: Bed was ill-fitting and too high off ground (bariatric bed).   Patient/Family Comments/goals:  Patient amenable to therapy and pleasant throughout.  Pain/Comfort:  · Pain Rating 1: 0/10      Objective:     Communicated with nurse prior to session.  Patient found supine with telemetry, central line, peripheral IV, PureWick upon PT entry to room.     General Precautions: Standard, fall, sternal   Orthopedic Precautions:N/A   Braces: N/A     Functional Mobility:  · Bed Mobility:     · Rolling Left: moderate assistance and of 2 persons for trunk elevation and BLE management   · Scooting: total assistance and of 2 persons in supine with draw sheet to the HOB   · Supine to Sit: maximal assistance and of 2 persons for trunk elevation and BLE management during transfer  · Sit to Supine: maximal assistance for trunk and LE placement upon return to bed   · Pt hugged pillow during transfers with cues provided for sternal precautions to prevent pulling and pushing with BUE   · Balance:   · Static: seated EOB requiring CGA-Niko   · Dynamic: seated EOB requiring minimal assistance throughout   · Pt demo intermittent fatigue, requiring increased cuing for trunk stabilization and to prevent reaching for siderail and for postural corrections  · Pt cued to use trunk for balance, for symptom awareness, and for pursed lip breathing       AM-PAC 6 CLICK MOBILITY  Turning over in bed (including adjusting bedclothes, sheets and blankets)?: 2  Sitting down on and standing up from a chair with arms (e.g., wheelchair, bedside commode, etc.): 2  Moving from lying on back to sitting on the side of the bed?: 2  Moving to and from a bed to a chair (including a wheelchair)?: 1  Need to walk in hospital room?: 1  Climbing 3-5 steps with a railing?: 1  Basic Mobility Total Score: 9       Therapeutic Activities and Exercises:  Reviewed sternal precautions. Pt verbalized understanding.  Pt educated on role of PT and PT POC. Pt verbalized understanding.   Pt educated on the effects of bed rest and the importance of upright positioning. Pt  encouraged to sit up in bed with staff assist as tolerated. Pt verbalized understanding.  Supported pt while seated EOB for static and dynamic balance training, and for upright tolerance.   Provided tactile and verbal cues to facilitate trunk control, to prevent excessive posterior and lateral leaning (R>L), and to prevent thoracic and lumbar flexion with LE movement  Pt completed marches and ankle pump exercises x20 reps with BLE while seated EOB. Provided visual, verbal, and tactile cues for form, sequencing and timing.  Pt oriented to call bell and instructed to call for staff assist with standing tasks/transfers. Pt verbalized understanding.     Patient left supine with all lines intact, call button in reach, nurse notified and nurse present..    GOALS:   Multidisciplinary Problems     Physical Therapy Goals        Problem: Physical Therapy Goal    Goal Priority Disciplines Outcome Goal Variances Interventions   Physical Therapy Goal     PT, PT/OT Ongoing, Progressing     Description: Goals to be met by: 2020     Patient will increase functional independence with mobility by performin. Supine to sit with MInimal Assistance  2. Sit to stand transfer with Moderate Assistance  3. Bed to chair transfer with Moderate Assistance using LRAD  4. Sitting at edge of bed x10 minutes with Stand-by Assistance  5. Lower extremity exercise program x10 reps per handout, with assistance as needed                           Time Tracking:     PT Received On: 20  PT Start Time: 1410     PT Stop Time: 1437  PT Total Time (min): 27 min     Billable Minutes: Therapeutic Activity 23  Co-treat with OT    Treatment Type: Treatment  PT/PTA: PT     PTA Visit Number: 0     Jeremie Sykes, [facility]  2020

## 2020-09-29 NOTE — PT/OT/SLP PROGRESS
Occupational Therapy   Treatment    Name: Fatou Lorenzo  MRN: [id number]  Admitting Diagnosis:  Acute respiratory failure with hypoxia  20 Days Post-Op    Recommendations:     Discharge Recommendations: rehabilitation facility  Discharge Equipment Recommendations:  (TBD pending progress)  Barriers to discharge:  Other (Comment)(pt required increased A at current functioning level)    Assessment:     Fatou Lorenzo is a 75 y.o. female with a medical diagnosis of Acute respiratory failure with hypoxia.  She presents with the following performance deficits affecting function: weakness, impaired endurance, impaired functional mobilty, impaired cardiopulmonary response to activity, impaired balance, impaired self care skills, impaired coordination. Pt tolerated session fairly well this date with the ability to perform seated ADLs EOB with CGA-min A for sitting balance once verbal cues provided for postural control. Pt required max A for LB dressing seated EOB due to impaired sitting balance to bend forward and perform figure 4 to don socks. Pt with WFL  strength pull bilateral socks once over ankle. Pt continues to present with good tolerance to static and dynamic sitting balance EOB. Pt continues to progress towards OT goals. After session, pt about to transfer to new bed which will be a good help in attempting out of bed mobility during further sessions. The next session will focus on standing ADLs with a possible transfer to chair pending medical stability and patient participation. OT POC remains appropriate for patient on this date. Pt will continue to benefit from skilled OT to address the deficits affecting her occupational performance.    Rehab Prognosis:  Good; patient would benefit from acute skilled OT services to address these deficits and reach maximum level of function.       Plan:     Patient to be seen 4 x/week to address the above listed problems via self-care/home management, therapeutic exercises,  therapeutic groups, neuromuscular re-education  · Plan of Care Expires: 10/16/20  · Plan of Care Reviewed with: patient    Subjective     Pain/Comfort:  · Pain Rating 1: 0/10  · Pain Rating Post-Intervention 1: 0/10    Objective:     Communicated with: RN prior to session.  Patient found HOB elevated with telemetry, central line, peripheral IV, PureWick upon OT entry to room.    General Precautions: Standard, fall, sternal   Orthopedic Precautions:N/A   Braces: N/A     Occupational Performance:     Bed Mobility:    · Patient completed Rolling/Turning to Left with  maximal assistance  · Patient completed Scooting/Bridging with maximal assistance and 2 persons  · Patient completed Supine to Sit with maximal assistance and 2 persons  · Patient completed Sit to Supine with maximal assistance and 2 persons     Functional Mobility/Transfers:  · None performed this date; pt pending transfer to new bed: RN and techs present to assist with task    Activities of Daily Living:  · Feeding:  set up A to grasp cup, bring to mouth, and drink with HOB elevated; no BUE deficits noted  · Upper Body Dressing: minimum assistance due to lines to don gown seated EOB; CGA for dynamic sitting balance  · Lower Body Dressing: maximal assistance to don B/L socks seated EOB; pt able to cross legs with therapist A to maintain position to don socks  · Toileting: dependence with use of purewick for voiding       AMPAC 6 Click ADL: 14    Treatment & Education:  - Role of OT/ OT POC  - Self care safety/ independence  - Functional transfer/ mobility safety  - Bed mobility safety  - Importance of sitting EOB during functional tasks  - POC for next session     Patient left HOB elevated with all lines intact, call button in reach, RN notified and RN and PCT presentEducation:      GOALS:   Multidisciplinary Problems     Occupational Therapy Goals        Problem: Occupational Therapy Goal    Goal Priority Disciplines Outcome Interventions   Occupational  Therapy Goal     OT, PT/OT Ongoing, Progressing    Description: Goals to be met by: 10/7/2020      Patient will increase functional independence with ADLs by performing:    UE Dressing with Minimum Assistance.  Grooming while sitting EOB with Contact Guard Assistance.- met on 9/28  Toileting from bedside commode with Max Assistance for hygiene and clothing management.   Toilet transfer to bedside commode with Max Assistance.  Supine <> Sit with minimum assistance in preparation for EOB/OOB functional activities.- progressing  Added goal: Pt to tolerate static standing for ~1 minute with min A while competing a functional task.                         Time Tracking:     OT Date of Treatment: 09/29/20  OT Start Time: 1410  OT Stop Time: 1436  OT Total Time (min): 26 min Co tx with PT due to pt's impaired ax tolerance     Billable Minutes:Self Care/Home Management 23    Chitra Quiroga OT  9/29/2020

## 2020-09-29 NOTE — PLAN OF CARE
Plan of care discussed with patient.  Patient unable to ambulate, pt working with therapy.Continuing to encourage sternal precautions, IS, and activity. Patient has no complaints of pain. Discussed medications and care. Patient has no questions at this time. Will continue to monitor.

## 2020-09-29 NOTE — HPI
Per chart review, Fatou Lorenzo is a 75-year-old female with PMHx of Atrial fibrillation with RVR, Cataract, Glaucoma, Heart valve problem, Hyperlipidemia, severe mitral regurgitation, s/p Ankle surgery.  Patient presented to [facility] on 8/30 with SOB. [place] [facility] [place] and LA thrombus. Now s/p TVR and MVR on 9/9/20. Hospital course further complicated by leukocytosis, hypokalemia, hypernatremia, severe dysphonia. On sternal precautions.     Functional History: Patient lives in [place] with son in a single story home with no steps to enter.  Prior to admission, (I) with ADLs and mobility. DME: shower chair.

## 2020-09-29 NOTE — NURSING
Spoke with TAI Anderson about accuchecks and BID CBC's. NP ordered that CBC could be just daily and accuchecks were no longer needed.

## 2020-09-30 NOTE — NURSING
Central line removed per order. Tolerated well without any complication. Sutures removed. Occlusive dressing and gauze applied. Pressure held for 5 minutes. No hematoma or bleeding noted.  Educated patient to lay flat for 30 minutes. Informed family and patient of any warning signs and when to notify nurse. Will continue to monitor.

## 2020-09-30 NOTE — ASSESSMENT & PLAN NOTE
-ENT consulted  -patient declines inpatient microlaryngoscopy/possible vocal fold injection augmentation. Recommends follow up with Dr. Mendoza in 4-6 weeks

## 2020-09-30 NOTE — PLAN OF CARE
Pt remained free from falls/trauma/injuries. No complaints of CP/SOB/discomfort. Sternal precautions still in place. VSS. Fall bundle in place. POC explained, no questions at this time. Pt tolerating care.

## 2020-09-30 NOTE — ASSESSMENT & PLAN NOTE
- ENT consulted, patient declines inpatient microlaryngoscopy/possible vocal fold injection augmentation. Follow up as outpatient.  - Voice hoariness improving

## 2020-09-30 NOTE — PLAN OF CARE
Problem: Occupational Therapy Goal  Goal: Occupational Therapy Goal  Description: Goals to be met by: 10/7/2020      Patient will increase functional independence with ADLs by performing:    UE Dressing with Minimum Assistance.  Grooming while sitting EOB with Contact Guard Assistance.- met on 9/28  Toileting from bedside commode with Max Assistance for hygiene and clothing management.   Toilet transfer to bedside commode with Max Assistance.  Supine <> Sit with minimum assistance in preparation for EOB/OOB functional activities.- progressing  Added goal: Pt to tolerate static standing for ~1 minute with min A while competing a functional task.        Outcome: Ongoing, Progressing     Sherly Ball OTR/L  Pager: [phone]  9/30/2020

## 2020-09-30 NOTE — PT/OT/SLP PROGRESS
Physical Therapy Treatment    Patient Name:  Fatou Lorenzo   MRN:  [id number]    Recommendations:     Discharge Recommendations:  nursing facility, skilled   Discharge Equipment Recommendations: (TBD)   Barriers to discharge: Patient requires increased assistance at current functional level    Assessment:     Fatou Lorenzo is a 75 y.o. female admitted with a medical diagnosis of Acute respiratory failure with hypoxia.  She presents with the following impairments/functional limitations:  weakness, impaired endurance, decreased safety awareness, impaired balance, impaired cardiopulmonary response to activity, impaired self care skills, decreased coordination, impaired functional mobilty, gait instability, decreased lower extremity function. Patient improving functional mobility and upright tolerance this date by completing bed mobility, sit<>stand transfers and balance training (EOB and standing) with assist. Pt able to balance seated at EOB with CGA-Niko. However, pt fatigued rapidly in upright position and demo'd increased SOB limiting further progression of functional mobility. Patient would benefit from acute skilled PT services to address current deficits and increase functional mobility.     Rehab Prognosis: Good; patient would benefit from acute skilled PT services to address these deficits and reach maximum level of function.      Recent Surgery: Procedure(s) (LRB):  Valvuloplasty, Mitral (N/A)  REPAIR, TRICUSPID VALVE (N/A)  BAIN MAZE PROCEDURE (N/A) 21 Days Post-Op    Plan:     During this hospitalization, patient to be seen 4 x/week to address the identified rehab impairments via gait training, therapeutic activities, therapeutic exercises, neuromuscular re-education and progress toward the following goals:    · Plan of Care Expires:  10/16/20    Subjective     Chief Complaint: fatigue after exertion   Patient/Family Comments/goals: Patient amenable to therapy and pleasant throughout.   Pain/Comfort:  Pain  Rating 1: 0/10      Objective:     Communicated with nurse prior to session.  Patient found supine with telemetry, peripheral IV, PureWick and daughter-in-law present upon PT entry to room.     General Precautions: Standard, fall, sternal   Orthopedic Precautions:N/A   Braces: N/A     Functional Mobility:  · Bed Mobility:     · Supine to Sit: maximal assistance for trunk elevation and BLE management during transfer  · Sit to Supine: maximal assistance for trunk and LE placement upon return to bed   · Pt hugged pillow during transfers with cues provided for sternal precautions to prevent pulling and pushing with BUE   · Scooting:   · Seated EOB: max assistance for anterior scooting towards EOB with cues provided for proper technique   · Supine: total assist and of 2 persons using draw sheet for scooting towards HOB   · Transfers:   · Sit<>Stand: from EOB x3 reps   · Reps 1-2: max assistance and of 2 persons with no AD  · Rep 3: moderate assistance and of 2 persons with no AD  · Cued pt for proper foot placement, wide AVEL, proper technique and for sternal precautions to prevent use of BUE  · Tactile cues provided to facilitate hip and knee extension  · Balance:    · Seated EOB: CGA-Niko   · Cued pt for sternal precautions to prevent pushing down on bed with BUE   · Standing x3 bouts: max assistance and of 2 persons with no AD   · Pt demo'd increased hip flexion, anterior trunk lean, increased knee flexion and posterior weight shift throughout   · Verbal cues provided for upright posture, glute activation, and symptom awareness   · Tactile cues provided to facilitate hip and knee extension  · After final bout, pt demo'd SOB and recovered sitting EOB. SpO2 was 95%.        AM-PAC 6 CLICK MOBILITY  Turning over in bed (including adjusting bedclothes, sheets and blankets)?: 2  Sitting down on and standing up from a chair with arms (e.g., wheelchair, bedside commode, etc.): 2  Moving from lying on back to sitting on the  side of the bed?: 2  Moving to and from a bed to a chair (including a wheelchair)?: 1  Need to walk in hospital room?: 1  Climbing 3-5 steps with a railing?: 1  Basic Mobility Total Score: 9       Therapeutic Activities and Exercises:  Reviewed sternal precautions. Pt verbalized understanding.  Pt educated on role of PT and PT POC. Pt verbalized understanding.   Pt educated on the effects of bed rest and the importance of upright positioning. Pt encouraged to sit up in bed with staff assist as tolerated. Pt verbalized understanding.  Provided support and cues in sitting and standing to facilitate postural control and upright tolerance while maintaining sternal precautions.  After session, daughter-in-law notified us that pt wanted to put on O2. Nurse was notified and came to bedside. Per RN, pt had refused to wear nasal canula earlier today and we had been told by daughter-in-law that O2 had been discontinued. O2 re-established and pt educated to keep O2 on until discontinued.  Pt oriented to call bell and instructed to call for staff assist with standing tasks/transfers. Pt verbalized understanding.     Patient left supine with all lines intact, call button in reach, nurse notified and nurse present..    GOALS:   Multidisciplinary Problems     Physical Therapy Goals        Problem: Physical Therapy Goal    Goal Priority Disciplines Outcome Goal Variances Interventions   Physical Therapy Goal     PT, PT/OT Ongoing, Progressing     Description: Goals to be met by: 2020     Patient will increase functional independence with mobility by performin. Supine to sit with MInimal Assistance  2. Sit to stand transfer with Moderate Assistance  3. Bed to chair transfer with Moderate Assistance using LRAD  4. Sitting at edge of bed x10 minutes with Stand-by Assistance  5. Lower extremity exercise program x10 reps per handout, with assistance as needed                           Time Tracking:     PT Received On:  09/30/20  PT Start Time: 1415     PT Stop Time: 1444  PT Total Time (min): 29 min     Billable Minutes: Therapeutic Activity 8 and Neuromuscular Re-education 15  Co-treat with OT    Treatment Type: Treatment  PT/PTA: PT     PTA Visit Number: 0     Jeremie Sykes, SPT  09/30/2020

## 2020-09-30 NOTE — PLAN OF CARE
09/30/20 0959   Post-Acute Status   Post-Acute Authorization Placement   Post-Acute Placement Status Awaiting Internal Medical Clearance     Pt under review with multiple facilities including [facility] and [facility] (family's first choice, pending progress with therapy) pending medical clearance.  Will continue to follow.    Ary Schmidt LMSW  [facility]  [id number]

## 2020-09-30 NOTE — PT/OT/SLP PROGRESS
Occupational Therapy   Treatment    Name: Fatou Lorenzo  MRN: [id number]  Admitting Diagnosis:  Acute respiratory failure with hypoxia  21 Days Post-Op    Recommendations:     Discharge Recommendations: nursing facility, skilled  Discharge Equipment Recommendations:  (TBD pending progress)  Barriers to discharge:  Other (Comment)(Pt requires increased assistance at current functional level)    Assessment:     Fatou Lorenzo is a 75 y.o. female with a medical diagnosis of Acute respiratory failure with hypoxia.  She presents with performance deficits affecting function are weakness, impaired endurance, impaired self care skills, impaired functional mobilty, gait instability, impaired balance, decreased upper extremity function, decreased lower extremity function, pain, impaired cardiopulmonary response to activity, impaired coordination. Pt tolerated session well this date and highly motivated to participate. Pt reported dizziness upon sitting EOB and required increased time for recovery during sit<>stand trials. Pt performed bed mobility with max A and sit<>stand transfers with max A x 2 persons. Pt reported feeling SOB during therapy session while on room air but SpO2 >90% while sitting EOB. After assisting pt back to bed, pt continued feeling SOB and SpO2 at 78% while on room air and sitting supported in bed. RN notified and pt was placed on 2.5L. Pt's SpO2 increased to 90% with very cues for controlled pursed lip breathing. Pt would benefit from continued skilled acute OT services in order to maximize independence and safety with ADLs and functional mobility to ensure safe return to PLOF in the least restrictive environment. OT recommending SNF placement once pt is medically appropriate for d/c.     Rehab Prognosis:  Good; patient would benefit from acute skilled OT services to address these deficits and reach maximum level of function.       Plan:     Patient to be seen 4 x/week to address the above listed  problems via self-care/home management, therapeutic activities, therapeutic exercises, neuromuscular re-education  · Plan of Care Expires: 10/16/20  · Plan of Care Reviewed with: patient(daughter-in-law)    Subjective     Pain/Comfort:  · Pain Rating 1: 0/10  · Pain Rating Post-Intervention 1: 0/10    Objective:     Communicated with: RN prior to session.  Patient found HOB elevated with telemetry, PureWick, peripheral IV(Pt found with oxygen off) upon OT entry to room. Pt agreeable to therapy session. Pt's daughter-in-law present during therapy session.     General Precautions: Standard, fall, sternal   Orthopedic Precautions:N/A   Braces: N/A     Occupational Performance:     Bed Mobility:    · Patient completed Scooting/Bridging with maximal assistance for anterior scooting towards EOB - verbal cues for technique with demonstration provided for anterior scooting   · Max A x 2 persons for supine scooting towards HOB   · Patient completed Supine to Sit with maximal assistance and 2 persons, pt was able to bringing B LE close to EOB but overall required max A for B LE management and trunk elevation.    · Patient completed Sit to Supine with maximal assistance and 2 persons for trunk descent and bringing B LEs into bed.     Functional Mobility/Transfers:  · Patient completed x 3 trials Sit <> Stand Transfer from EOB with moderate assistance <> maximal assistance  x2 persons  with  no assistive device - pt maintained standing <30 sec each trial   · 1st trial: max A x 2 persons with B knees and feet blocked to prevent buckling.    · Pt required verbal cues for technique and body mechanics  · facilitation provided at hips  · Increased forward flexed posture with verbal cues for forward gaze and upright posture.   ·  2nd trial: mod A x 2 persons with B knee blocked to prevent buckling   · improved use of momentum to stand   · Increased forward flexion with facilitation provided at hips and verbal cues for upright  posture.   ·  3rd trial: max A x 2 person with B knees and feet blocked to prevent buckling  · increased fatigue noted as pt was unable to achieve full upright posture  · Facilitation provided at hips for extension and verbal cues for upright posture   · Pt required to sit back on bed due to fatigue   · Verbal cues provided for pursed lip breathing.     Activities of Daily Living:  · Upper Body Dressing: moderate assistance to don/doff gown like robe while sitting EOB   · Toileting: total assistance for hygiene in standing and purewick placement once returned to supported sitting     [facility] 6 Click ADL: 13    Treatment & Education:   Pt educated on role of OT, POC, and goals for therapy.     POC was dicussed with patient/caregiver, who was included in its development and is in agreement with the identified goals and treatment plan.   Pt verbalized 1/3 sternal precautions  o Post-op sternal precautions, including no lifting > 5 lbs, pulling or pushing with BUEs; Modifying daily activities and functional mobility tasks to maintain sternal precautions appropriately; Importance of participation in therapy and engaging in increased OOB mobility with assistance to improve endurance. Pt verbalized understanding and demonstrated understanding by adhering to precautions with use of cardiac pillow throughout session.    Pt educated on the following BUE therex in order to improved BUE strength for self-care tasks: chest press, bicep curls, and shoulder flexion/extension.   RN notified of pt reporting SOB after therapy session    Pt completed ADLs and functional mobility for treatment session as noted above    Pt/caregiver verbalized understanding and expressed no further concerns/questions.   Updated communication board    Patient left HOB elevated with all lines intact, call button in reach, RN notified and pt's daughter-in-law  presentEducation:      GOALS:   Multidisciplinary Problems     Occupational Therapy Goals         Problem: Occupational Therapy Goal    Goal Priority Disciplines Outcome Interventions   Occupational Therapy Goal     OT, PT/OT Ongoing, Progressing    Description: Goals to be met by: 10/7/2020      Patient will increase functional independence with ADLs by performing:    UE Dressing with Minimum Assistance.  Grooming while sitting EOB with Contact Guard Assistance.- met on 9/28  Toileting from bedside commode with Max Assistance for hygiene and clothing management.   Toilet transfer to bedside commode with Max Assistance.  Supine <> Sit with minimum assistance in preparation for EOB/OOB functional activities.- progressing  Added goal: Pt to tolerate static standing for ~1 minute with min A while competing a functional task.                         Time Tracking:     OT Date of Treatment: 09/30/20  OT Start Time: 1418  OT Stop Time: 1448  OT Total Time (min): 30 min (co-treat with PT)    Billable Minutes:Therapeutic Activity 15  Therapeutic Exercise 15    Sherly Ball OT  9/30/2020

## 2020-09-30 NOTE — PROGRESS NOTES
[facility]  Physical Medicine & Rehab  Progress Note    Patient Name: Fatou Lorenzo  MRN: [id number]  Admission Date: 8/30/2020  Length of Stay: 30 days  Attending Physician: Antoni Waddell MD    Subjective:     Principal Problem:Acute respiratory failure with hypoxia    Hospital Course:   9/27: PT-BM TA.   09/28/2020: Bed mobility Max-ModA.  No sit to stand and transfers.   Feed/groom SBA. Toilet dependent.   9/29: Max x 2ppl-MaxA. Feed setupA. LBD MaxA. Toilet dependent. No gait.     Interval History 9/30/2020:  Patient is seen for follow-up PM&R evaluation and recommendations: Voice quality with some improvements from yesterday.     HPI, Past Medical, Family, and Social History remains the same as documented in the initial encounter.    Scheduled Medications:    amiodarone  200 mg Oral BID    apixaban  5 mg Oral BID    aspirin  81 mg Oral Daily    digoxin  0.125 mg Oral Daily    famotidine  20 mg Oral Daily    melatonin  6 mg Oral Nightly    potassium chloride  20 mEq Oral BID    sulfamethoxazole-trimethoprim 800-160mg  1 tablet Oral BID       Diagnostic Results: Labs: Reviewed    PRN Medications: acetaminophen, calcium carbonate, dextrose 50%, ondansetron, sodium chloride 3%    Review of Systems   Constitutional: Positive for activity change. Negative for fatigue and fever.   HENT: Positive for voice change. Negative for trouble swallowing.    Eyes: Negative for photophobia and visual disturbance.   Respiratory: Negative for cough and shortness of breath.    Cardiovascular: Negative for chest pain and palpitations.   Gastrointestinal: Negative for nausea and vomiting.   Genitourinary: Negative for difficulty urinating and flank pain.   Musculoskeletal: Positive for gait problem. Negative for arthralgias.   Skin: Negative for color change and rash.   Neurological: Positive for speech difficulty and weakness.   Psychiatric/Behavioral: Negative for agitation and confusion.      Objective:     Vital Signs (Most Recent):  Temp: 97.5 °F (36.4 °C) (09/30/20 0836)  Pulse: 107 (09/30/20 0836)  Resp: 18 (09/30/20 0836)  BP: (!) 118/57 (09/30/20 0836)  SpO2: 98 % (09/30/20 0836)    Vital Signs (24h Range):  Temp:  [97.1 °F (36.2 °C)-98 °F (36.7 °C)] 97.5 °F (36.4 °C)  Pulse:  [] 107  Resp:  [16-20] 18  SpO2:  [92 %-100 %] 98 %  BP: (113-136)/(56-65) 118/57     Physical Exam  Vitals signs reviewed.   Constitutional:       Appearance: She is well-developed.      Comments: DIL at bedside    HENT:      Head: Normocephalic and atraumatic.   Eyes:      General:         Right eye: No discharge.         Left eye: No discharge.   Neck:      Musculoskeletal: Neck supple.   Cardiovascular:      Rate and Rhythm: Normal rate.   Pulmonary:      Effort: Pulmonary effort is normal. No respiratory distress.   Abdominal:      Palpations: Abdomen is soft.      Tenderness: There is no abdominal tenderness.   Musculoskeletal:         General: No deformity.   Skin:     General: Skin is warm and dry.      Comments: midsternal incision c/d/i    Neurological:      Mental Status: She is alert and oriented to person, place, and time.      Motor: Weakness present.      Comments: + dysphonia but comprehensible    Psychiatric:         Behavior: Behavior normal.         Cognition and Memory: Cognition is not impaired.     Assessment/Plan:      * Acute respiratory failure with hypoxia  -improved, on NC    Debility  See hospital course for functional status.      Recommendations  -  Encourage mobility, OOB in chair, and early ambulation as appropriate  -  PT/OT evaluate and treat  -  Pain management  -  DVT prophylaxis if appropriate   -  Monitor for and prevent skin breakdown and pressure ulcers  · Early mobility, repositioning/weight shifting every 20-30 minutes when sitting, turn patient every 2 hours, proper mattress/overlay and chair cushioning, pressure relief/heel protector boots    Vocal fold paresis,  right  -ENT consulted  -patient declines inpatient microlaryngoscopy/possible vocal fold injection augmentation. Recommends follow up with Dr. Mendoza in 4-6 weeks    Hypernatremia  -primary managing     Severe mitral regurgitation  - Moderate TR and left atrial thrombus also noted on echo. EF 60%  -now s/p MVR and TVR on 9/9 w/ CTS  -on sternal precautions    Paroxysmal atrial fibrillation  -on Eliquis     Reviewed case with Collaborative MD, Dr. Barreto. Participating with therapy. Will follow progress for post acute care recommendation.      Delores Lr NP  Department of Physical Medicine & Rehab   [facility]

## 2020-09-30 NOTE — SUBJECTIVE & OBJECTIVE
Interval History 9/30/2020:  Patient is seen for follow-up PM&R evaluation and recommendations: Voice quality with some improvements from yesterday.     HPI, Past Medical, Family, and Social History remains the same as documented in the initial encounter.    Scheduled Medications:    amiodarone  200 mg Oral BID    apixaban  5 mg Oral BID    aspirin  81 mg Oral Daily    digoxin  0.125 mg Oral Daily    famotidine  20 mg Oral Daily    melatonin  6 mg Oral Nightly    potassium chloride  20 mEq Oral BID    sulfamethoxazole-trimethoprim 800-160mg  1 tablet Oral BID       Diagnostic Results: Labs: Reviewed    PRN Medications: acetaminophen, calcium carbonate, dextrose 50%, ondansetron, sodium chloride 3%    Review of Systems   Constitutional: Positive for activity change. Negative for fatigue and fever.   HENT: Positive for voice change. Negative for trouble swallowing.    Eyes: Negative for photophobia and visual disturbance.   Respiratory: Negative for cough and shortness of breath.    Cardiovascular: Negative for chest pain and palpitations.   Gastrointestinal: Negative for nausea and vomiting.   Genitourinary: Negative for difficulty urinating and flank pain.   Musculoskeletal: Positive for gait problem. Negative for arthralgias.   Skin: Negative for color change and rash.   Neurological: Positive for speech difficulty and weakness.   Psychiatric/Behavioral: Negative for agitation and confusion.     Objective:     Vital Signs (Most Recent):  Temp: 97.5 °F (36.4 °C) (09/30/20 0836)  Pulse: 107 (09/30/20 0836)  Resp: 18 (09/30/20 0836)  BP: (!) 118/57 (09/30/20 0836)  SpO2: 98 % (09/30/20 0836)    Vital Signs (24h Range):  Temp:  [97.1 °F (36.2 °C)-98 °F (36.7 °C)] 97.5 °F (36.4 °C)  Pulse:  [] 107  Resp:  [16-20] 18  SpO2:  [92 %-100 %] 98 %  BP: (113-136)/(56-65) 118/57     Physical Exam  Vitals signs reviewed.   Constitutional:       Appearance: She is well-developed.      Comments: DIL at bedside     HENT:      Head: Normocephalic and atraumatic.   Eyes:      General:         Right eye: No discharge.         Left eye: No discharge.   Neck:      Musculoskeletal: Neck supple.   Cardiovascular:      Rate and Rhythm: Normal rate.   Pulmonary:      Effort: Pulmonary effort is normal. No respiratory distress.   Abdominal:      Palpations: Abdomen is soft.      Tenderness: There is no abdominal tenderness.   Musculoskeletal:         General: No deformity.   Skin:     General: Skin is warm and dry.      Comments: midsternal incision c/d/i    Neurological:      Mental Status: She is alert and oriented to person, place, and time.      Motor: Weakness present.      Comments: + dysphonia but comprehensible    Psychiatric:         Behavior: Behavior normal.         Cognition and Memory: Cognition is not impaired.       NEUROLOGICAL EXAMINATION:     MENTAL STATUS   Oriented to person, place, and time.

## 2020-09-30 NOTE — PROGRESS NOTES
"[facility]  Adult Nutrition  Progress Note    SUMMARY       Recommendations  1. Continue cardiac diet + Boost Plus TID as tolerated. Encourage PO intake.   2. RD to monitor.    Goals: Adequate PO intake to meet > 75% EEN/EPN by RD follow up  Nutrition Goal Status: new  Communication of RD Recs: (POC)    Reason for Assessment    Reason For Assessment: RD follow-up  Diagnosis: (Acute respiratory failure with hypoxia)  Relevant Medical History: afib w/ RVR, HTN, HLD  Interdisciplinary Rounds: did not attend  General Information Comments: Pt resting in bed with family at bedside this afternoon, reports decreased appetite. Per family, pt consuming bites of meals and has had 1 Boost so far this morning. Encouraged increased intake of meals + ONS as tolerated. Pt receiving chocolate Boost, would like strawberry instead - flavor changed per pt request. Large wt variations noted this admission, potentially fluid related. Admit wt 177#, current wt 174#. NFPE updated today with moderate temporal wasting, age appropriate wasting otherwise. Pt is at risk for malnutrition due to decreased intake.  Nutrition Discharge Planning: Adequate PO intake on cardiac diet    Nutrition Risk Screen    Nutrition Risk Screen: no indicators present    Nutrition/Diet History    Spiritual, Cultural Beliefs, Adventism Practices, Values that Affect Care: no  Factors Affecting Nutritional Intake: NPO    Anthropometrics    Temp: 97.4 °F (36.3 °C)  Height Method: Stated  Height: 5' 5" (165.1 cm)  Height (inches): 65 in  Weight Method: Bed Scale  Weight: 79 kg (174 lb 2.6 oz)  Weight (lb): 174.17 lb  Ideal Body Weight (IBW), Female: 125 lb  % Ideal Body Weight, Female (lb): 147.2 %  BMI (Calculated): 29    Lab/Procedures/Meds    Pertinent Labs Reviewed: reviewed  Pertinent Labs Comments: Na 157, BUN 34, Mg 3.3, Alb 2.3, , ALT 95  Pertinent Medications Reviewed: reviewed  Pertinent Medications Comments: amiodarone, " famotidine    Estimated/Assessed Needs    Weight Used For Calorie Calculations: 79 kg (174 lb 2.6 oz)  Energy Calorie Requirements (kcal): 1607 kcal/day  Energy Need Method: [place]-St Jeor(x 1.25 PAL)  Protein Requirements: 79-95 g/day(1-1.2 g/kg)  Weight Used For Protein Calculations: 79 kg (174 lb 2.6 oz)  Fluid Requirements (mL): per MD or 1 mL/kcal     RDA Method (mL): 1607    Nutrition Prescription Ordered    Current Diet Order: Cardiac  Current Nutrition Support Formula Ordered: Other (Comment)(discontinued)  Current Nutrition Support Rate Ordered: 0 (ml)  Current Nutrition Support Frequency Ordered: mL/hr    Evaluation of Received Nutrient/Fluid Intake    Enteral Calories (kcal): 0  Enteral Protein (gm): 0  Enteral (Free Water) Fluid (mL): 0  Other Calories (kcal): 0  Total Calories (kcal): 0  Total Calories (kcal/kg): 0  % Kcal Needs: -  % Protein Needs: -  I/O: -8L since 9/16, -3.8L since admit  Energy Calories Required: meeting needs  Protein Required: meeting needs  Fluid Required: (per MD)  Comments: LBM 9/30  Tolerance: tolerating  % Intake of Estimated Energy Needs: 25%  % Meal Intake: 25%    Nutrition Risk    Level of Risk/Frequency of Follow-up: (f/u 1 x wk)     Assessment and Plan    Nutrition Problem  Inadequate energy intake    Related to (etiology):   Decreased appetite    Signs and Symptoms (as evidenced by):   Pt consuming 25% of meals    Interventions (treatment strategy):  Collaboration with other providers  Commercial beverage    Nutrition Diagnosis Status:   New    Monitor and Evaluation    Food and Nutrient Intake: energy intake, food and beverage intake  Food and Nutrient Adminstration: diet order  Knowledge/Beliefs/Attitudes: food and nutrition knowledge/skill  Anthropometric Measurements: weight, weight change  Biochemical Data, Medical Tests and Procedures: electrolyte and renal panel, gastrointestinal profile, glucose/endocrine profile  Nutrition-Focused Physical Findings: overall  appearance, skin     Malnutrition Assessment  Orbital Region (Subcutaneous Fat Loss): well nourished  Upper Arm Region (Subcutaneous Fat Loss): mild depletion   [place] Region (Muscle Loss): moderate depletion  Clavicle Bone Region (Muscle Loss): mild depletion  Clavicle and Acromion Bone Region (Muscle Loss): well nourished  Dorsal Hand (Muscle Loss): mild depletion  Anterior Thigh Region (Muscle Loss): well nourished  Posterior Calf Region (Muscle Loss): well nourished     Nutrition Follow-Up    RD Follow-up?: Yes

## 2020-09-30 NOTE — PLAN OF CARE
Pt free of falls/trauma/injuries. Patient states NC makes nose dry, refused nasal cannula this morning. NC used again after working with PT/OT. Patient stood with PT/OT, still requires weight shift assistance in the bed. Patient bathed today by RN and PCT. Purwick in place, urine output red in color. NP informed that urine is red and dark. Pacer wires removed today by NP. Central line removed, PIV placed. Continuous fluids for rehydration started. Family at bedside with patient. Patient initially refused morning medications, states she does not like the taste of the medications crushed in food. After discussing importance of taking medications with patient and family member, patient agreed to take medications crushed in apple sauce. Performed passive range of motion with patient. Patient able to whisper words, following with speech therapy. Patient repositioned Q2h. Discussed plan of care with patient and family.

## 2020-09-30 NOTE — SUBJECTIVE & OBJECTIVE
Interval History: increasing voice today, patient enthusiastic about PT today and increasing her strength.  Encouraged to increase oral intake      Review of Systems   Constitution: Positive for malaise/fatigue. Negative for decreased appetite and fever.   Cardiovascular: Negative for chest pain, claudication, dyspnea on exertion, irregular heartbeat, leg swelling, palpitations and syncope.   Respiratory: Negative for cough and shortness of breath.    Hematologic/Lymphatic: Negative for bleeding problem.   Skin: Negative for rash.   Musculoskeletal: Negative for arthritis and myalgias.   Gastrointestinal: Negative for abdominal pain, diarrhea, melena, nausea and vomiting.   Genitourinary: Negative for dysuria.   Neurological: Positive for weakness. Negative for headaches, paresthesias and seizures.     Medications:  Continuous Infusions:   dextrose 5 % and 0.45 % NaCl       Scheduled Meds:   apixaban  5 mg Oral BID    aspirin  81 mg Oral Daily    digoxin  0.125 mg Oral Daily    famotidine  20 mg Oral Daily    melatonin  6 mg Oral Nightly    potassium chloride  20 mEq Oral BID    sulfamethoxazole-trimethoprim 800-160mg  1 tablet Oral BID     PRN Meds:acetaminophen, calcium carbonate, dextrose 50%, ondansetron, sodium chloride 3%     Objective:     Vital Signs (Most Recent):  Temp: 97.5 °F (36.4 °C) (09/30/20 0836)  Pulse: 107 (09/30/20 0836)  Resp: 18 (09/30/20 0836)  BP: (!) 118/57 (09/30/20 0836)  SpO2: 98 % (09/30/20 0836) Vital Signs (24h Range):  Temp:  [97.1 °F (36.2 °C)-97.7 °F (36.5 °C)] 97.5 °F (36.4 °C)  Pulse:  [] 107  Resp:  [16-20] 18  SpO2:  [92 %-100 %] 98 %  BP: (113-136)/(56-61) 118/57     Weight: 79 kg (174 lb 2.6 oz)  Body mass index is 28.98 kg/m².    SpO2: 98 %  O2 Device (Oxygen Therapy): room air    Intake/Output - Last 3 Shifts       09/28 0700 - 09/29 0659 09/29 0700 - 09/30 0659 09/30 0700 - 10/01 0659    P.O. 240 740 240    I.V. (mL/kg)  120 (1.5)     NG/GT       Total  Intake(mL/kg) 240 (3) 860 (10.9) 240 (3)    Urine (mL/kg/hr) 300 (0.2) 1000 (0.5)     Stool       Total Output 300 1000     Net -60 -140 +240           Urine Occurrence 0 x      Stool Occurrence   2 x          Lines/Drains/Airways     Central Venous Catheter Line             Introducer with Double Lumen 09/09/20 right internal jugular 21 days    Percutaneous Central Line Insertion/Assessment - Triple Lumen  09/18/20 2200 right internal jugular 11 days          Line                 Pacer Wires 09/09/20 2057 20 days                Physical Exam  Constitutional:       Appearance: She is well-developed.   Cardiovascular:      Rate and Rhythm: Normal rate and regular rhythm.      Heart sounds: Normal heart sounds.   Pulmonary:      Effort: Pulmonary effort is normal.      Breath sounds: Normal breath sounds.   Abdominal:      Palpations: Abdomen is soft.   Musculoskeletal: Normal range of motion.   Skin:     General: Skin is warm and dry.      Comments: Sternal Incision CDI   Neurological:      Mental Status: She is alert and oriented to person, place, and time.         Significant Labs:  BMP:   Recent Labs   Lab 09/30/20  0449   GLU 90   *   K 4.0   *   CO2 24   BUN 34*   CREATININE 0.8   CALCIUM 8.8   MG 3.3*     CBC:   Recent Labs   Lab 09/30/20  0449   WBC 13.39*   RBC 2.98*   HGB 8.7*   HCT 30.3*   *   *   MCH 29.2   MCHC 28.7*     CMP:   Recent Labs   Lab 09/30/20  0449   GLU 90   CALCIUM 8.8   ALBUMIN 2.3*   PROT 5.9*   *   K 4.0   CO2 24   *   BUN 34*   CREATININE 0.8   ALKPHOS 335*   ALT 95*   *   BILITOT 1.8*       Significant Diagnostics:  I have reviewed and interpreted all pertinent imaging results/findings within the past 24 hours.

## 2020-09-30 NOTE — ASSESSMENT & PLAN NOTE
- Encourage water intake   - Not on diuresis currently  - Will infuse D5 0.45% at 100ml/hr for 3 hours

## 2020-09-30 NOTE — PROGRESS NOTES
[facility]  Cardiothoracic Surgery  Progress Note    Patient Name: Fatou Lorenzo  MRN: [id number]  Admission Date: 8/30/2020  Hospital Length of Stay: 30 days  Code Status: Full Code   Attending Physician: Antoni Waddell MD   Referring Provider: Self, Aaareferral  Principal Problem:Acute respiratory failure with hypoxia    Subjective:     Post-Op Info:  Procedure(s) (LRB):  Valvuloplasty, Mitral (N/A)  REPAIR, TRICUSPID VALVE (N/A)  BAIN MAZE PROCEDURE (N/A)   21 Days Post-Op     Interval History: increasing voice today, patient enthusiastic about PT today and increasing her strength.  Encouraged to increase oral intake      Review of Systems   Constitution: Positive for malaise/fatigue. Negative for decreased appetite and fever.   Cardiovascular: Negative for chest pain, claudication, dyspnea on exertion, irregular heartbeat, leg swelling, palpitations and syncope.   Respiratory: Negative for cough and shortness of breath.    Hematologic/Lymphatic: Negative for bleeding problem.   Skin: Negative for rash.   Musculoskeletal: Negative for arthritis and myalgias.   Gastrointestinal: Negative for abdominal pain, diarrhea, melena, nausea and vomiting.   Genitourinary: Negative for dysuria.   Neurological: Positive for weakness. Negative for headaches, paresthesias and seizures.     Medications:  Continuous Infusions:   dextrose 5 % and 0.45 % NaCl       Scheduled Meds:   apixaban  5 mg Oral BID    aspirin  81 mg Oral Daily    digoxin  0.125 mg Oral Daily    famotidine  20 mg Oral Daily    melatonin  6 mg Oral Nightly    potassium chloride  20 mEq Oral BID    sulfamethoxazole-trimethoprim 800-160mg  1 tablet Oral BID     PRN Meds:acetaminophen, calcium carbonate, dextrose 50%, ondansetron, sodium chloride 3%     Objective:     Vital Signs (Most Recent):  Temp: 97.5 °F (36.4 °C) (09/30/20 0836)  Pulse: 107 (09/30/20 0836)  Resp: 18 (09/30/20 0836)  BP: (!) 118/57 (09/30/20 0836)  SpO2: 98 %  (09/30/20 0836) Vital Signs (24h Range):  Temp:  [97.1 °F (36.2 °C)-97.7 °F (36.5 °C)] 97.5 °F (36.4 °C)  Pulse:  [] 107  Resp:  [16-20] 18  SpO2:  [92 %-100 %] 98 %  BP: (113-136)/(56-61) 118/57     Weight: 79 kg (174 lb 2.6 oz)  Body mass index is 28.98 kg/m².    SpO2: 98 %  O2 Device (Oxygen Therapy): room air    Intake/Output - Last 3 Shifts       09/28 0700 - 09/29 0659 09/29 0700 - 09/30 0659 09/30 0700 - 10/01 0659    P.O. 240 740 240    I.V. (mL/kg)  120 (1.5)     NG/GT       Total Intake(mL/kg) 240 (3) 860 (10.9) 240 (3)    Urine (mL/kg/hr) 300 (0.2) 1000 (0.5)     Stool       Total Output 300 1000     Net -60 -140 +240           Urine Occurrence 0 x      Stool Occurrence   2 x          Lines/Drains/Airways     Central Venous Catheter Line             Introducer with Double Lumen 09/09/20 right internal jugular 21 days    Percutaneous Central Line Insertion/Assessment - Triple Lumen  09/18/20 2200 right internal jugular 11 days          Line                 Pacer Wires 09/09/20 2057 20 days                Physical Exam  Constitutional:       Appearance: She is well-developed.   Cardiovascular:      Rate and Rhythm: Normal rate and regular rhythm.      Heart sounds: Normal heart sounds.   Pulmonary:      Effort: Pulmonary effort is normal.      Breath sounds: Normal breath sounds.   Abdominal:      Palpations: Abdomen is soft.   Musculoskeletal: Normal range of motion.   Skin:     General: Skin is warm and dry.      Comments: Sternal Incision CDI   Neurological:      Mental Status: She is alert and oriented to person, place, and time.         Significant Labs:  BMP:   Recent Labs   Lab 09/30/20  0449   GLU 90   *   K 4.0   *   CO2 24   BUN 34*   CREATININE 0.8   CALCIUM 8.8   MG 3.3*     CBC:   Recent Labs   Lab 09/30/20  0449   WBC 13.39*   RBC 2.98*   HGB 8.7*   HCT 30.3*   *   *   MCH 29.2   MCHC 28.7*     CMP:   Recent Labs   Lab 09/30/20  0449   GLU 90   CALCIUM 8.8    ALBUMIN 2.3*   PROT 5.9*   *   K 4.0   CO2 24   *   BUN 34*   CREATININE 0.8   ALKPHOS 335*   ALT 95*   *   BILITOT 1.8*       Significant Diagnostics:  I have reviewed and interpreted all pertinent imaging results/findings within the past 24 hours.    Assessment/Plan:     Debility  - Continue with PT/OT  - Attempting to find rehab vs skill nursing placement    S/P TVR (tricuspid valve repair)  See s/p MVr    S/P MVR (mitral valve repair)  - Sternal precautions   - PT/OT; currently recommending rehab   - Continue Digoxin but will stop amiodarone secondary to elevated liver enzymes  - Ordered daily digoxin levels with lab draws   - Continue ASA  - Stop Warfarin  - Start Eliquis 5 mg BID  - Speech recommending regular diet with thin liquids   - Encourage ambulation  - Encourage IS  - Stopping fluid restriction     Vocal fold paresis, right  - ENT consulted, patient declines inpatient microlaryngoscopy/possible vocal fold injection augmentation. Follow up as outpatient.  - Voice hoariness improving    Hypernatremia  - Encourage water intake   - Not on diuresis currently  - Will infuse D5 0.45% at 100ml/hr for 3 hours       Hypokalemia  - Replacements scheduled today  - BMP daily to monitor trends    Leukocytosis  - WBC trending down  - No fever or obvious signs of infection     Paroxysmal atrial fibrillation  - Stopped amiodarone with increase in liver enzymes   - Continue Digoxin           Vania Anderson NP  Cardiothoracic Surgery  [facility]

## 2020-09-30 NOTE — ASSESSMENT & PLAN NOTE
- Sternal precautions   - PT/OT; currently recommending rehab   - Continue Digoxin but will stop amiodarone secondary to elevated liver enzymes  - Ordered daily digoxin levels with lab draws   - Continue ASA  - Stop Warfarin  - Start Eliquis 5 mg BID  - Speech recommending regular diet with thin liquids   - Encourage ambulation  - Encourage IS  - Stopping fluid restriction

## 2020-09-30 NOTE — PLAN OF CARE
Recommendations  1. Continue cardiac diet + Boost Plus TID as tolerated. Encourage PO intake.   2. RD to monitor.     Goals: Adequate PO intake to meet > 75% EEN/EPN by RD follow up  Nutrition Goal Status: new  Communication of RD Recs: (POC)

## 2020-10-01 NOTE — PROGRESS NOTES
10/01/20 1203 10/01/20 1215   Vital Signs   Pulse (!) 126 (!) 129   Resp 18  --    SpO2 97 %  --    BP  --  125/79   MAP (mmHg)  --  96     Patient's HR sustaining >120 bpm. EKG collected: Afib RVR with PVCs. TAI Caro notified. NP to come to bedside to assess.    Also, discussed with TAI Caro concerns from PT/OT. Therapists notified RN that patient's toes look purple. Toes assessed, cold to touch, pulses palpable, no complaints of pain, no numbness or tingling. TAI Caro to assess when at bedside.

## 2020-10-01 NOTE — PROGRESS NOTES
[facility]  Physical Medicine & Rehab  Progress Note    Patient Name: Fatou Lorenzo  MRN: [id number]  Admission Date: 8/30/2020  Length of Stay: 31 days  Attending Physician: Antoni Waddell MD    Subjective:     Principal Problem:Acute respiratory failure with hypoxia    Hospital Course:   9/27: PT-BM TA.   09/28/2020: Bed mobility Max-ModA.  No sit to stand and transfers.   Feed/groom SBA. Toilet dependent.   9/29: Max x 2ppl-MaxA. Feed setupA. LBD MaxA. Toilet dependent. No gait.   9/30: BM Max 2 ppl-MaxA. Sit to stand Max 2 ppl-ModA x 2ppl. UBD ModA. Toilet TA.     Interval History 10/1/2020:  Patient is seen for follow-up PM&R evaluation and recommendations: Participating w/ therapy. Dyspnea noted on exam today.    HPI, Past Medical, Family, and Social History remains the same as documented in the initial encounter.    Scheduled Medications:    apixaban  5 mg Oral BID    aspirin  81 mg Oral Daily    digoxin  0.125 mg Oral Daily    famotidine  20 mg Oral Daily    melatonin  6 mg Oral Nightly    potassium chloride  20 mEq Oral BID    sulfamethoxazole-trimethoprim 800-160mg  1 tablet Oral BID       Diagnostic Results: Labs: Reviewed    PRN Medications: acetaminophen, calcium carbonate, dextrose 50%, ondansetron, sodium chloride 3%    Review of Systems   Constitutional: Positive for activity change and fatigue.   Respiratory: Positive for shortness of breath.    Musculoskeletal: Positive for gait problem.   Neurological: Positive for weakness.     Objective:     Vital Signs (Most Recent):  Temp: 98 °F (36.7 °C) (10/01/20 0802)  Pulse: 104 (10/01/20 0915)  Resp: (!) 26 (10/01/20 0915)  BP: 114/61 (10/01/20 0802)  SpO2: 95 % (10/01/20 0915)    Vital Signs (24h Range):  Temp:  [97.4 °F (36.3 °C)-98.3 °F (36.8 °C)] 98 °F (36.7 °C)  Pulse:  [] 104  Resp:  [14-26] 26  SpO2:  [91 %-97 %] 95 %  BP: (114-141)/(58-76) 114/61     Physical Exam  Vitals signs reviewed.   Constitutional:        General: She is not in acute distress.     Appearance: She is well-developed.      Comments: Appears fatigued   HENT:      Head: Normocephalic and atraumatic.   Eyes:      General:         Right eye: No discharge.         Left eye: No discharge.   Neck:      Musculoskeletal: Neck supple.   Cardiovascular:      Rate and Rhythm: Normal rate.   Pulmonary:      Effort: Pulmonary effort is normal. No respiratory distress.      Comments: On NC  Labored breathing noted  Abdominal:      Palpations: Abdomen is soft.      Tenderness: There is no abdominal tenderness.   Musculoskeletal:         General: No deformity.   Skin:     General: Skin is warm and dry.      Comments: midsternal incision c/d/i    Neurological:      Mental Status: She is alert and oriented to person, place, and time.      Motor: Weakness and tremor present.      Comments: + dysphonia, communicated via writing    Psychiatric:         Behavior: Behavior normal.         Cognition and Memory: Cognition is not impaired.     Assessment/Plan:      * Acute respiratory failure with hypoxia  -improved, on NC    Debility  See hospital course for functional status.      Recommendations  -  Encourage mobility, OOB in chair, and early ambulation as appropriate  -  PT/OT evaluate and treat  -  Pain management  -  DVT prophylaxis if appropriate   -  Monitor for and prevent skin breakdown and pressure ulcers  · Early mobility, repositioning/weight shifting every 20-30 minutes when sitting, turn patient every 2 hours, proper mattress/overlay and chair cushioning, pressure relief/heel protector boots    Vocal fold paresis, right  -ENT consulted  -patient declines inpatient microlaryngoscopy/possible vocal fold injection augmentation. Recommends follow up with Dr. Mendoza in 4-6 weeks    Hypernatremia  -primary managing     Severe mitral regurgitation  - Moderate TR and left atrial thrombus also noted on echo. EF 60%  -now s/p MVR and TVR on 9/9 w/ CTS  -on sternal  precautions    Paroxysmal atrial fibrillation  -on Eliquis     Reviewed case with Collaborative MD, Dr. Mclain. PAC recommendation: Inpatient Rehab pending medical stability.         Delores Lr NP  Department of Physical Medicine & Rehab   [facility]

## 2020-10-01 NOTE — PLAN OF CARE
Problem: Occupational Therapy Goal  Goal: Occupational Therapy Goal  Description: Goals to be met by: 10/7/2020      Patient will increase functional independence with ADLs by performing:    UE Dressing with Minimum Assistance.  Grooming while sitting EOB with Contact Guard Assistance.- met on 9/28  Toileting from bedside commode with Max Assistance for hygiene and clothing management.   Toilet transfer to bedside commode with Max Assistance.  Supine <> Sit with minimum assistance in preparation for EOB/OOB functional activities.- progressing  Added goal: Pt to tolerate static standing for ~1 minute with min A while competing a functional task.        Outcome: Ongoing, Progressing     No goals me this date. OT POC remains appropriate for patient on this date. Pt will continue to benefit from skilled OT to address the deficits affecting her occupational performance.     Chitra Quiroga OTR/L  10/1/20

## 2020-10-01 NOTE — SUBJECTIVE & OBJECTIVE
Interval History 10/1/2020:  Patient is seen for follow-up PM&R evaluation and recommendations: Participating w/ therapy. Dyspnea noted on exam today.    HPI, Past Medical, Family, and Social History remains the same as documented in the initial encounter.    Scheduled Medications:    apixaban  5 mg Oral BID    aspirin  81 mg Oral Daily    digoxin  0.125 mg Oral Daily    famotidine  20 mg Oral Daily    melatonin  6 mg Oral Nightly    potassium chloride  20 mEq Oral BID    sulfamethoxazole-trimethoprim 800-160mg  1 tablet Oral BID       Diagnostic Results: Labs: Reviewed    PRN Medications: acetaminophen, calcium carbonate, dextrose 50%, ondansetron, sodium chloride 3%    Review of Systems   Constitutional: Positive for activity change and fatigue.   Respiratory: Positive for shortness of breath.    Musculoskeletal: Positive for gait problem.   Neurological: Positive for weakness.     Objective:     Vital Signs (Most Recent):  Temp: 98 °F (36.7 °C) (10/01/20 0802)  Pulse: 104 (10/01/20 0915)  Resp: (!) 26 (10/01/20 0915)  BP: 114/61 (10/01/20 0802)  SpO2: 95 % (10/01/20 0915)    Vital Signs (24h Range):  Temp:  [97.4 °F (36.3 °C)-98.3 °F (36.8 °C)] 98 °F (36.7 °C)  Pulse:  [] 104  Resp:  [14-26] 26  SpO2:  [91 %-97 %] 95 %  BP: (114-141)/(58-76) 114/61     Physical Exam  Vitals signs reviewed.   Constitutional:       General: She is not in acute distress.     Appearance: She is well-developed.      Comments: Appears fatigued   HENT:      Head: Normocephalic and atraumatic.   Eyes:      General:         Right eye: No discharge.         Left eye: No discharge.   Neck:      Musculoskeletal: Neck supple.   Cardiovascular:      Rate and Rhythm: Normal rate.   Pulmonary:      Effort: Pulmonary effort is normal. No respiratory distress.      Comments: On NC  Labored breathing noted  Abdominal:      Palpations: Abdomen is soft.      Tenderness: There is no abdominal tenderness.   Musculoskeletal:          General: No deformity.   Skin:     General: Skin is warm and dry.      Comments: midsternal incision c/d/i    Neurological:      Mental Status: She is alert and oriented to person, place, and time.      Motor: Weakness and tremor present.      Comments: + dysphonia, communicated via writing    Psychiatric:         Behavior: Behavior normal.         Cognition and Memory: Cognition is not impaired.       NEUROLOGICAL EXAMINATION:     MENTAL STATUS   Oriented to person, place, and time.      none

## 2020-10-01 NOTE — PROGRESS NOTES
[facility]  Cardiothoracic Surgery  Progress Note    Patient Name: Fatou Lorenzo  MRN: [id number]  Admission Date: 8/30/2020  Hospital Length of Stay: 31 days  Code Status: Full Code   Attending Physician: Antoni Waddell MD   Referring Provider: Self, Aaareferral  Principal Problem:Acute respiratory failure with hypoxia    Subjective:     Post-Op Info:  Procedure(s) (LRB):  Valvuloplasty, Mitral (N/A)  REPAIR, TRICUSPID VALVE (N/A)  BAIN MAZE PROCEDURE (N/A)   22 Days Post-Op     Interval History: Complains of weakness.  Continuing to work with PT/OT    Review of Systems   Constitution: Negative for decreased appetite, fever and malaise/fatigue.   Cardiovascular: Negative for chest pain, claudication, dyspnea on exertion, irregular heartbeat, leg swelling, palpitations and syncope.   Respiratory: Negative for cough and shortness of breath.    Hematologic/Lymphatic: Negative for bleeding problem.   Skin: Negative for rash.   Musculoskeletal: Negative for arthritis and myalgias.   Gastrointestinal: Negative for abdominal pain, diarrhea, melena, nausea and vomiting.   Genitourinary: Negative for dysuria.   Neurological: Negative for headaches, paresthesias and seizures.     Medications:  Continuous Infusions:   dextrose 5 % and 0.45 % NaCl 100 mL/hr at 10/01/20 1545     Scheduled Meds:   apixaban  5 mg Oral BID    aspirin  81 mg Oral Daily    digoxin  0.125 mg Oral Daily    famotidine  20 mg Oral Daily    melatonin  6 mg Oral Nightly    potassium chloride  20 mEq Oral BID    sulfamethoxazole-trimethoprim 800-160mg  1 tablet Oral BID     PRN Meds:acetaminophen, calcium carbonate, dextrose 50%, ondansetron, sodium chloride 3%     Objective:     Vital Signs (Most Recent):  Temp: 98.6 °F (37 °C) (10/01/20 1621)  Pulse: 102 (10/01/20 1621)  Resp: 17 (10/01/20 1621)  BP: 117/77 (10/01/20 1621)  SpO2: 97 % (10/01/20 1621) Vital Signs (24h Range):  Temp:  [98 °F (36.7 °C)-98.6 °F (37 °C)] 98.6 °F  (37 °C)  Pulse:  [] 102  Resp:  [14-26] 17  SpO2:  [91 %-97 %] 97 %  BP: (114-134)/(58-79) 117/77     Weight: 79 kg (174 lb 2.6 oz)  Body mass index is 28.98 kg/m².    SpO2: 97 %  O2 Device (Oxygen Therapy): nasal cannula w/ humidification    Intake/Output - Last 3 Shifts       09/29 0700 - 09/30 0659 09/30 0700 - 10/01 0659 10/01 0700 - 10/02 0659    P.O. 740 600     I.V. (mL/kg) 120 (1.5) 243.3 (3.1)     Total Intake(mL/kg) 860 (10.9) 843.3 (10.7)     Urine (mL/kg/hr) 1000 (0.5) 850 (0.4)     Stool  0     Total Output 1000 850     Net -140 -6.7            Stool Occurrence  3 x 1 x          Lines/Drains/Airways     Peripheral Intravenous Line                 Peripheral IV - Single Lumen 09/30/20 1248 20 G Anterior;Left;Proximal Forearm 1 day                Physical Exam  Constitutional:       Appearance: She is well-developed.   Cardiovascular:      Rate and Rhythm: Normal rate and regular rhythm.      Heart sounds: Normal heart sounds.   Pulmonary:      Effort: Pulmonary effort is normal.      Breath sounds: Normal breath sounds.   Abdominal:      Palpations: Abdomen is soft.   Musculoskeletal: Normal range of motion.   Skin:     General: Skin is warm and dry.      Comments: Sternal Incision CDI   Neurological:      Mental Status: She is alert and oriented to person, place, and time.         Significant Labs:  BMP:   Recent Labs   Lab 10/01/20  0622   GLU 96   *   K 3.9   *   CO2 21*   BUN 29*   CREATININE 0.8   CALCIUM 8.5*   MG 3.2*     CBC:   Recent Labs   Lab 10/01/20  0622   WBC 14.86*   RBC 3.16*   HGB 9.3*   HCT 31.7*   *   *   MCH 29.4   MCHC 29.3*     CMP:   Recent Labs   Lab 10/01/20  0622   GLU 96   CALCIUM 8.5*   ALBUMIN 2.2*   PROT 5.9*   *   K 3.9   CO2 21*   *   BUN 29*   CREATININE 0.8   ALKPHOS 343*   ALT 88*   AST 53*   BILITOT 1.8*       Significant Diagnostics:  I have reviewed and interpreted all pertinent imaging results/findings within the past 24  hours.    Assessment/Plan:     Debility  - Continue with PT/OT  - Attempting to find rehab vs skill nursing placement    S/P TVR (tricuspid valve repair)  See s/p MVr    S/P MVR (mitral valve repair)  - Sternal precautions   - PT/OT; currently recommending rehab   - Continue Digoxin but will stop amiodarone secondary to elevated liver enzymes  - Ordered daily digoxin levels with lab draws   - Continue ASA  - Stop Warfarin  - Start Eliquis 5 mg BID  - Speech recommending regular diet with thin liquids   - Encourage ambulation  - Encourage IS  - Stopping fluid restriction   - Complains about depression; will consult psychiatry     Vocal fold paresis, right  - ENT consulted, patient declines inpatient microlaryngoscopy/possible vocal fold injection augmentation. Follow up as outpatient.  - Voice hoariness improving    Hypernatremia  - Encourage water intake   - Not on diuresis currently  - Will infuse D5 0.45% at 100ml/hr for 5 hours     Hypokalemia  - Replacements scheduled today  - BMP daily to monitor trends    Leukocytosis  - WBC trending down  - No fever or obvious signs of infection     Paroxysmal atrial fibrillation  - Stopped amiodarone with increase in liver enzymes   - Continue Digoxin           Vania Anderson, TAI  Cardiothoracic Surgery  [facility]

## 2020-10-01 NOTE — PLAN OF CARE
Plan of care discussed with patient. Patient is free of fall/trauma/injury. Denies CP or SOB. Patient remains in afib. PT and OT following; patient up to chair once during shift. Voice/hoarseness improving. Incision sites remain CDI. UA collected via intermittent catheterization; urine is dark red. CXR ordered; results showed increased right pleural effusion compared to previous CXR on 9/28/20. IV fluids initiated.  All questions addressed. Will continue to monitor.

## 2020-10-01 NOTE — PT/OT/SLP PROGRESS
Occupational Therapy   Treatment    Name: Fatou Lorenzo  MRN: [id number]  Admitting Diagnosis:  Acute respiratory failure with hypoxia  22 Days Post-Op    Recommendations:     Discharge Recommendations: nursing facility, skilled  Discharge Equipment Recommendations:  (TBD pending progress)  Barriers to discharge:  Other (Comment)(pt required increased A at current functioning level)    Assessment:     Fatou Lorenzo is a 75 y.o. female with a medical diagnosis of Acute respiratory failure with hypoxia.  She presents with the following performance deficits affecting function are weakness, impaired balance, impaired endurance, impaired self care skills, impaired cardiopulmonary response to activity, impaired functional mobilty, decreased lower extremity function, decreased upper extremity function, decreased coordination. Pt tolerated session fairly well this date with impaired endurance, decreased motivation, and complaints of nausea being her main functional limitations. Pt required increased encouragement to transfer to chair due to being nauseated and very weak. Encouragement provided and benefits of sitting up in chair explained; pt verbalized understanding and agreed to sit in chair as tolerated. Pt advanced to stand pivot transfer with max A and no AD. OT POC remains appropriate for patient on this date. Pt will continue to benefit from skilled OT to address the deficits affecting her occupational performance.     Rehab Prognosis:  Good; patient would benefit from acute skilled OT services to address these deficits and reach maximum level of function.       Plan:     Patient to be seen 4 x/week to address the above listed problems via therapeutic activities, therapeutic exercises, self-care/home management  · Plan of Care Expires: 10/16/20  · Plan of Care Reviewed with: patient    Subjective     Pain/Comfort:  · Pain Rating 1: 0/10  · Pain Rating Post-Intervention 1: 0/10    Objective:     Communicated with: RN  prior to session.  Patient found HOB elevated with telemetry, PureWick, peripheral IV upon OT entry to room.    General Precautions: Standard, fall, sternal   Orthopedic Precautions:N/A   Braces: N/A     Occupational Performance:     Bed Mobility:    · Patient completed Rolling/Turning to Left with  maximal assistance and draw sheet   · Patient completed Scooting/Bridging with maximal assistance for anterior hip scooting EOB  · Patient completed Supine to Sit with moderate assistance and 2 persons with A for RLE movement and trunk control to reach upright sitting      Functional Mobility/Transfers:  · Patient completed Sit <> Stand Transfer from bed with moderate assistance  with  no assistive device   · Patient completed Bed <> Chair Transfer using Stand Pivot technique with maximal assistance with no assistive device  · OT present to guide hips safely onto chair and set up chair for safe decent   · Functional Mobility: Not tested this date due to impaired standing balance     Activities of Daily Living:  · Feeding:  set up A to grasp water swab and place into mouth with RUE while seated in chair  · Lower Body Dressing: total assistance in supine due to fatigue to don socks  · Toileting: dependence with use of purewick for voiding; remained in place for EOB sitting and transferring to chair      [facility] 6 Click ADL: 13    Treatment & Education:  - Role of OT/ OT POC  - Self care safety/ independence  - Functional transfer/ mobility safety  - Bed mobility safety using sternal precautions   - Pursed lip breathing; 02 read 95% while seated EOB with 02 donned   - Importance of sitting up in chair as tolerated throughout the day to prevent pneumonia and aid in phlegm production to clear lungs   - Energy conservation techniques such as taking rest breaks as needed  - Pt sat EOB with brief moment of CGA, primarily required mod-max A due to extensive posterior lean. Pt with forward flexed posture and downward gaze requiring  verbal cues for postural control. While EOB, pt underwent nursing care and prepare for transfer to chair. During those tasks, OT assisted in posture corrections and provided motivation and therapeutic listening. Pt with reports of being depressed. RN aware.   - Pt required verbal cues to adhere to sternal precautions throughout the session.    Patient left up in chair with all lines intact, call button in reach and RN notifiedEducation:      GOALS:   Multidisciplinary Problems     Occupational Therapy Goals        Problem: Occupational Therapy Goal    Goal Priority Disciplines Outcome Interventions   Occupational Therapy Goal     OT, PT/OT Ongoing, Progressing    Description: Goals to be met by: 10/7/2020      Patient will increase functional independence with ADLs by performing:    UE Dressing with Minimum Assistance.  Grooming while sitting EOB with Contact Guard Assistance.- met on 9/28  Toileting from bedside commode with Max Assistance for hygiene and clothing management.   Toilet transfer to bedside commode with Max Assistance.  Supine <> Sit with minimum assistance in preparation for EOB/OOB functional activities.- progressing  Added goal: Pt to tolerate static standing for ~1 minute with min A while competing a functional task.                         Time Tracking:     OT Date of Treatment: 10/01/20  OT Start Time: 1140  OT Stop Time: 1207  OT Total Time (min): 27 min co tx with PT for skilled assistance for functional tasks     Billable Minutes:Self Care/Home Management 8  Neuromuscular Re-education 18    Chitra Quiroga OT  10/1/2020

## 2020-10-01 NOTE — PROGRESS NOTES
Pt refused all night time meds. Attempted to re-educate pt on why it is important to take our meds, with no success. Notified CTS. Will continue to monitor.

## 2020-10-01 NOTE — CONSULTS
10/1/2020 5:48 PM   Fatou Lorenzo   1945   [id number]      PSYCHIATRY CONSULT DEFERRED       Consult received and will fully evaluate this patient tomorrow as it is non urgent and consulting team ok with this plan.    -Please contact ON CALL psychiatry service for any acute issues that may arise. Thank you.    Soila Sanchez  Department of Psychiatry   [facility]  10/1/2020 5:48 PM

## 2020-10-01 NOTE — PLAN OF CARE
10/01/20 1025   Discharge Reassessment   Assessment Type Discharge Planning Reassessment   Provided patient/caregiver education on the expected discharge date and the discharge plan Yes   Do you have any problems affording any of your prescribed medications? No   Discharge Plan A Rehab   Discharge Plan B Skilled Nursing Facility   DME Needed Upon Discharge  none   Anticipated Discharge Disposition Rehab     Spoke with patient's son, Carter ([phone]). He states his mother has been confused since last night and asked if someone can call him to discuss. Spoke with NP Vania and she will call him later today.    Discussed rehab v SNF with Carter. They would prefer rehab and [facility]. They are okay with me sending additional rehab referrals. SNF would be plan B.     Julie Haase RN  Case Management [phone]

## 2020-10-01 NOTE — PT/OT/SLP PROGRESS
"Speech Language Pathology Treatment    Patient Name:  Fatou Lorenzo   MRN:  [id number]  Admitting Diagnosis: Acute respiratory failure with hypoxia    Recommendations:                 General Recommendations:  Voice therapy  Diet recommendations:  Regular, Liquid Diet Level: Thin   Aspiration Precautions: 1 bite/sip at a time, Alternating bites/sips, Small bites/sips and Standard aspiration precautions   General Precautions: Standard, fall, sternal  Communication strategies:  none    Subjective     Pt alert and awake upon SLP entry. Lethargy noted.   "I'm just weak and tired."    Objective:     Has the patient been evaluated by SLP for swallowing?   Yes  Keep patient NPO? No   Current Respiratory Status: nasal cannula      Pt seen for follow up visit to ensure diet tolerance. Alertness maintained t/o session with no cueing required to attend to task despite lethargy. Pt attempted to sustain vowel sounds with persistent dysphonia and intermittent aphonia evident. HOB elevated for PO trials. Thin liquids via straw x3 tolerated with no overt signs of airway compromise. No cough, throat clear, or wet vocal quality appreciated. Pt refused trials with solids. Pt safe to continue diet of regular solids and thin liquids. ST rec voice therapy to address persistent dysphonia. Education provided regarding role of SLP, continued diet recommendations, and ongoing ST plan of care. Pt verbalized understanding and agreement. ST to continue to monitor. Recommend reinforcement.    Assessment:     Fatou Lorenzo is a 75 y.o. female with an SLP diagnosis of severe Dysphonia and intermittent Aphonia.    Goals:   Multidisciplinary Problems     SLP Goals        Problem: SLP Goal    Goal Priority Disciplines Outcome   SLP Goal     SLP Ongoing, Progressing   Description: Speech Language Pathology Goals  Goals expected to be met 10/8  1. Pt will tolerate a regular diet and thin liquids with no overt signs of airway compromise.   2. Pt will " complete vocal adduction exercises x10 per session with min A.                 Plan:     · Patient to be seen:  2 x/week   · Plan of Care expires:  10/23/20  · Plan of Care reviewed with:  patient   · SLP Follow-Up:  Yes       Discharge recommendations:  nursing facility, skilled   Barriers to Discharge:  None    Time Tracking:     SLP Treatment Date:   10/01/20  Speech Start Time:  1030  Speech Stop Time:  1038     Speech Total Time (min):  8 min    Billable Minutes: Treatment Swallowing Dysfunction 8    BURKE Perkins  10/01/2020

## 2020-10-01 NOTE — SUBJECTIVE & OBJECTIVE
Interval History: Complains of weakness.  Continuing to work with PT/OT    Review of Systems   Constitution: Negative for decreased appetite, fever and malaise/fatigue.   Cardiovascular: Negative for chest pain, claudication, dyspnea on exertion, irregular heartbeat, leg swelling, palpitations and syncope.   Respiratory: Negative for cough and shortness of breath.    Hematologic/Lymphatic: Negative for bleeding problem.   Skin: Negative for rash.   Musculoskeletal: Negative for arthritis and myalgias.   Gastrointestinal: Negative for abdominal pain, diarrhea, melena, nausea and vomiting.   Genitourinary: Negative for dysuria.   Neurological: Negative for headaches, paresthesias and seizures.     Medications:  Continuous Infusions:   dextrose 5 % and 0.45 % NaCl 100 mL/hr at 10/01/20 1545     Scheduled Meds:   apixaban  5 mg Oral BID    aspirin  81 mg Oral Daily    digoxin  0.125 mg Oral Daily    famotidine  20 mg Oral Daily    melatonin  6 mg Oral Nightly    potassium chloride  20 mEq Oral BID    sulfamethoxazole-trimethoprim 800-160mg  1 tablet Oral BID     PRN Meds:acetaminophen, calcium carbonate, dextrose 50%, ondansetron, sodium chloride 3%     Objective:     Vital Signs (Most Recent):  Temp: 98.6 °F (37 °C) (10/01/20 1621)  Pulse: 102 (10/01/20 1621)  Resp: 17 (10/01/20 1621)  BP: 117/77 (10/01/20 1621)  SpO2: 97 % (10/01/20 1621) Vital Signs (24h Range):  Temp:  [98 °F (36.7 °C)-98.6 °F (37 °C)] 98.6 °F (37 °C)  Pulse:  [] 102  Resp:  [14-26] 17  SpO2:  [91 %-97 %] 97 %  BP: (114-134)/(58-79) 117/77     Weight: 79 kg (174 lb 2.6 oz)  Body mass index is 28.98 kg/m².    SpO2: 97 %  O2 Device (Oxygen Therapy): nasal cannula w/ humidification    Intake/Output - Last 3 Shifts       09/29 0700 - 09/30 0659 09/30 0700 - 10/01 0659 10/01 0700 - 10/02 0659    P.O. 740 600     I.V. (mL/kg) 120 (1.5) 243.3 (3.1)     Total Intake(mL/kg) 860 (10.9) 843.3 (10.7)     Urine (mL/kg/hr) 1000 (0.5) 850 (0.4)      Stool  0     Total Output 1000 850     Net -140 -6.7            Stool Occurrence  3 x 1 x          Lines/Drains/Airways     Peripheral Intravenous Line                 Peripheral IV - Single Lumen 09/30/20 1248 20 G Anterior;Left;Proximal Forearm 1 day                Physical Exam  Constitutional:       Appearance: She is well-developed.   Cardiovascular:      Rate and Rhythm: Normal rate and regular rhythm.      Heart sounds: Normal heart sounds.   Pulmonary:      Effort: Pulmonary effort is normal.      Breath sounds: Normal breath sounds.   Abdominal:      Palpations: Abdomen is soft.   Musculoskeletal: Normal range of motion.   Skin:     General: Skin is warm and dry.      Comments: Sternal Incision CDI   Neurological:      Mental Status: She is alert and oriented to person, place, and time.         Significant Labs:  BMP:   Recent Labs   Lab 10/01/20  0622   GLU 96   *   K 3.9   *   CO2 21*   BUN 29*   CREATININE 0.8   CALCIUM 8.5*   MG 3.2*     CBC:   Recent Labs   Lab 10/01/20  0622   WBC 14.86*   RBC 3.16*   HGB 9.3*   HCT 31.7*   *   *   MCH 29.4   MCHC 29.3*     CMP:   Recent Labs   Lab 10/01/20  0622   GLU 96   CALCIUM 8.5*   ALBUMIN 2.2*   PROT 5.9*   *   K 3.9   CO2 21*   *   BUN 29*   CREATININE 0.8   ALKPHOS 343*   ALT 88*   AST 53*   BILITOT 1.8*       Significant Diagnostics:  I have reviewed and interpreted all pertinent imaging results/findings within the past 24 hours.

## 2020-10-01 NOTE — PT/OT/SLP PROGRESS
"Physical Therapy Treatment    Patient Name:  Fatou Lorenzo   MRN:  [id number]    Recommendations:     Discharge Recommendations:  nursing facility, skilled   Discharge Equipment Recommendations: (TBD)   Barriers to discharge: Inaccessible home and Decreased caregiver support    Assessment:     Fatou Lorenzo is a 75 y.o. female admitted with a medical diagnosis of Acute respiratory failure with hypoxia.  Pt is s/p Mitral valvuloplasty, as of 9/9/20.  Review of sternal precautions with pt, though pt remains non-compliant.     Currently, pt requires MaxA of 1 person to facilitate stand pivot transfer, bed-->chair, without use of AD for support.  Pt is a complicated case, inhibited by fear, poor motivation, noted depressed attitude, and verbalized sadness - discussed with nsg.  Pt presents with B purple discoloration of toes - discussed with nsg.    She presents with the following impairments/functional limitations:  weakness, impaired endurance, impaired self care skills, impaired functional mobilty, gait instability, impaired balance, impaired cognition, decreased upper extremity function, decreased safety awareness, orthopedic precautions.    Rehab Prognosis: Fair; patient would benefit from acute skilled PT services to address these deficits and reach maximum level of function.    Recent Surgery: Procedure(s) (LRB):  Valvuloplasty, Mitral (N/A)  REPAIR, TRICUSPID VALVE (N/A)  BAIN MAZE PROCEDURE (N/A) 22 Days Post-Op    Plan:     During this hospitalization, patient to be seen 5 x/week to address the identified rehab impairments via gait training, therapeutic exercises, neuromuscular re-education and progress toward the following goals:    · Plan of Care Expires:  10/14/20    Subjective     Chief Complaint: B foot pain  Patient/Family Comments/goals: "Yes" - feeling depressed  Pain/Comfort:  · Pain Rating 1: 5/10  · Location - Side 1: Bilateral  · Location 1: foot  · Pain Addressed 1: Pre-medicate for activity, Nurse " notified, Cessation of Activity      Objective:     Communicated with nursing prior to session.  Patient found supine with peripheral IV, PureWick, telemetry upon PT entry to room.     General Precautions: Standard, fall, sternal   Orthopedic Precautions:Full weight bearing, RUE partial weight bearing, LUE partial weight bearing   Braces: N/A     Functional Mobility:  · Bed Mobility:     · Scooting: MaxA: to scoot ant sitting EOB, with fear of falling anteriorly and significant post lean  · Supine to Sit: ModA x 2 persons: able to lower L LE off bed    · Transfers:     · Sit to Stand:  ModA: with time taken to ed pt on not using UEs and anterior wt shifting with no AD  · Bed to Chair: maximal assistance with  no AD  using  Stand Pivot - verbal cuing throughout to sequence stepping, B kn buckling, ant leaning on PT    · Gait: MaxA: to take a few steps during stand pivot transfer    · Balance: MaxA: dynamic standing balance without AD; Mod-MaxA: static sitting balance EOB with 2 sec episodes of trunk stability with CGA; post lean and pt giving up to fall BWDs      AM-PAC 6 CLICK MOBILITY  Turning over in bed (including adjusting bedclothes, sheets and blankets)?: 2  Sitting down on and standing up from a chair with arms (e.g., wheelchair, bedside commode, etc.): 2  Moving from lying on back to sitting on the side of the bed?: 2  Moving to and from a bed to a chair (including a wheelchair)?: 2  Need to walk in hospital room?: 2  Climbing 3-5 steps with a railing?: 1  Basic Mobility Total Score: 11       Therapeutic Activities and Exercises:   Whiteboard updated    Review of sternal precautions: 2/3 verbalized with time taken for ed    Patient left up in chair with all lines intact, call button in reach and nursing notified.    GOALS:   Multidisciplinary Problems     Physical Therapy Goals        Problem: Physical Therapy Goal    Goal Priority Disciplines Outcome Goal Variances Interventions   Physical Therapy Goal      PT, PT/OT Ongoing, Progressing     Description: Goals to be met by: 2020     Patient will increase functional independence with mobility by performin. Supine to sit with Minimal Assistance x 1 person.  2. Sit to stand transfer with Moderate Assistance. - GOAL MET  REVISED: Min A  3. Bed to chair transfer with Moderate Assistance.  4. Sitting at edge of bed x10 minutes with CGA.  5. Lower extremity exercise program x 10 reps per handout, with assistance as needed.                           Time Tracking:     PT Received On: 10/01/20  PT Start Time: 1231     PT Stop Time: 1259  PT Total Time (min): 28 min     Billable Minutes: Therapeutic Activity 24    Treatment Type: Treatment  PT/PTA: PT     PTA Visit Number: 0     Angelina Mitchell, PT  10/01/2020

## 2020-10-01 NOTE — PROGRESS NOTES
Spoke to pt son. Son requested pt phone be stored at nurse's station d/t pt calling excessively calling family during the night.

## 2020-10-01 NOTE — PLAN OF CARE
Pt remained free from falls/trauma/injuries. No complaints of CP/SOB/discomfort. VSS. Fall bundle in place. POC explained, no questions at this time.

## 2020-10-01 NOTE — PLAN OF CARE
Problem: Physical Therapy Goal  Goal: Physical Therapy Goal  Description: Goals to be met by: 2020     Patient will increase functional independence with mobility by performin. Supine to sit with Minimal Assistance x 1 person.  2. Sit to stand transfer with Moderate Assistance. - GOAL MET  REVISED: Min A  3. Bed to chair transfer with Moderate Assistance.  4. Sitting at edge of bed x10 minutes with CGA.  5. Lower extremity exercise program x 10 reps per handout, with assistance as needed.    Outcome: Ongoing, Progressing     Pt achieved 1 goal.

## 2020-10-01 NOTE — NURSING
Spoke with multiple members of the patient's family and on call for CTS, patient appears very nervous and agitated, she states that she wants to go home. She is refusing all of her medications because she does not like the way that they taste. Spoke with her son and she wants him to come and pick her up. Will attempt again to administer medications in apple sauce without crushing so that they are easier to swallow and she doesn't have to deal with the taste of the medications and continue to monitor.

## 2020-10-01 NOTE — PLAN OF CARE
Problem: SLP Goal  Goal: SLP Goal  Description: Speech Language Pathology Goals  Goals expected to be met 10/8  1. Pt will tolerate a regular diet and thin liquids with no overt signs of airway compromise.   2. Pt will complete vocal adduction exercises x10 per session with min A.    Outcome: Ongoing, Progressing     POC updated. Continue diet of regular solids and thin liquids. ST to continue to monitor.    BURKE Perkins  10/1/2020

## 2020-10-02 PROBLEM — F43.23 ADJUSTMENT DISORDER WITH MIXED ANXIETY AND DEPRESSED MOOD: Status: ACTIVE | Noted: 2020-01-01

## 2020-10-02 NOTE — NURSING
Notified by TAI Anderson that ASA and Eliquis is to be held this AM for IR procedure later today. NP stated OK for pt to receive eliquis tonight as scheduled.

## 2020-10-02 NOTE — PROGRESS NOTES
[facility]  Cardiothoracic Surgery  Progress Note    Patient Name: Fatou Lorenzo  MRN: [id number]  Admission Date: 8/30/2020  Hospital Length of Stay: 32 days  Code Status: Full Code   Attending Physician: Antoni Waddell MD   Referring Provider: Self, Aaareferral  Principal Problem:Acute respiratory failure with hypoxia  Subjective:     Post-Op Info:  Procedure(s) (LRB):  Valvuloplasty, Mitral (N/A)  REPAIR, TRICUSPID VALVE (N/A)  BAIN MAZE PROCEDURE (N/A)   23 Days Post-Op     Interval History: increased weakness, urine culture growing enterobacter cloacae with extremely bloody urine and increasing WBC, will consult infectious disease for antibiotic recommendations and change to Levaquin 750 IVPB. Continues with a fib on the monitor however patient with increasing LFT's on amiodarone, will add metoprolol for rate control. Chest xray showing increasing right pleural effusion to IR for drainage today, upon returning from IR, patient was found with sats of 82% on 15L. Patient will be transferred to ICU.     Review of Systems   Constitution: Positive for malaise/fatigue.   Cardiovascular: Positive for dyspnea on exertion. Negative for chest pain, claudication, irregular heartbeat, leg swelling and palpitations.   Respiratory: Positive for shortness of breath. Negative for cough.    Hematologic/Lymphatic: Negative for bleeding problem.   Gastrointestinal: Negative for abdominal pain.   Genitourinary: Negative for dysuria.   Neurological: Positive for weakness. Negative for headaches.     Medications:  Continuous Infusions:  Scheduled Meds:   apixaban  5 mg Oral BID    aspirin  81 mg Oral Daily    digoxin  0.125 mg Oral Daily    famotidine  20 mg Oral Daily    levoFLOXacin  750 mg Intravenous Q24H    melatonin  6 mg Oral Nightly    potassium chloride  20 mEq Oral BID    sulfamethoxazole-trimethoprim 800-160mg  1 tablet Oral BID     PRN Meds:acetaminophen, calcium carbonate, dextrose 50%,  ondansetron, sodium chloride 3%     Objective:     Vital Signs (Most Recent):  Temp: 98.6 °F (37 °C) (10/02/20 1423)  Pulse: (!) 112 (10/02/20 1528)  Resp: (!) 24 (10/02/20 1500)  BP: (!) 150/65 (10/02/20 1423)  SpO2: 96 % (10/02/20 1500) Vital Signs (24h Range):  Temp:  [97.7 °F (36.5 °C)-98.6 °F (37 °C)] 98.6 °F (37 °C)  Pulse:  [] 112  Resp:  [16-30] 24  SpO2:  [84 %-99 %] 96 %  BP: ()/(55-77) 150/65     Weight: 79 kg (174 lb 2.6 oz)  Body mass index is 28.98 kg/m².    SpO2: 96 %  O2 Device (Oxygen Therapy): venti mask    Intake/Output - Last 3 Shifts       09/30 0700 - 10/01 0659 10/01 0700 - 10/02 0659 10/02 0700 - 10/03 0659    P.O. 600 240     I.V. (mL/kg) 243.3 (3.1)      Total Intake(mL/kg) 843.3 (10.7) 240 (3)     Urine (mL/kg/hr) 850 (0.4) 1500 (0.8)     Other   60    Stool 0      Total Output 850 1500 60    Net -6.7 -1260 -60           Stool Occurrence 3 x 1 x           Lines/Drains/Airways     Peripheral Intravenous Line                 Peripheral IV - Single Lumen 09/30/20 1248 20 G Anterior;Left;Proximal Forearm 2 days                Physical Exam  Constitutional:       Appearance: She is well-developed.   Cardiovascular:      Rate and Rhythm: Normal rate and regular rhythm.      Heart sounds: Normal heart sounds.   Pulmonary:      Effort: Pulmonary effort is normal.      Breath sounds: Normal breath sounds.   Abdominal:      Palpations: Abdomen is soft.   Musculoskeletal: Normal range of motion.   Skin:     General: Skin is warm and dry.      Comments: Sternal Incision CDI   Neurological:      Mental Status: She is alert and oriented to person, place, and time.         Significant Labs:  BMP:   Recent Labs   Lab 10/02/20  0434   GLU 94   *   K 4.2   *   CO2 21*   BUN 28*   CREATININE 0.8   CALCIUM 8.5*   MG 3.1*     CBC:   Recent Labs   Lab 10/02/20  0434   WBC 20.86*   RBC 3.24*   HGB 9.6*   HCT 32.6*      *   MCH 29.6   MCHC 29.4*     CMP:   Recent Labs   Lab  10/02/20  0434   GLU 94   CALCIUM 8.5*   ALBUMIN 2.1*   PROT 6.0   *   K 4.2   CO2 21*   *   BUN 28*   CREATININE 0.8   ALKPHOS 318*   ALT 72*   AST 45*   BILITOT 1.9*     Coagulation: No results for input(s): PT, INR, APTT in the last 48 hours.    Significant Diagnostics:  I have reviewed and interpreted all pertinent imaging results/findings within the past 24 hours.    Assessment/Plan:     Debility  - Continue with PT/OT  - Attempting to find rehab vs skill nursing placement    S/P TVR (tricuspid valve repair)  See s/p MVr    S/P MVR (mitral valve repair)  - Sternal precautions   - PT/OT; currently recommending rehab   - Continue Digoxin but will stop amiodarone secondary to elevated liver enzymes  - Ordered daily digoxin levels with lab draws   - Continue ASA  - Stop Warfarin  - Start Eliquis 5 mg BID  - Speech recommending regular diet with thin liquids   - Encourage ambulation  - Encourage IS  - Stopping fluid restriction   - To IR for pleural effusion drainage; holding Eliquis until IR procedure      Vocal fold paresis, right  - ENT consulted, patient declines inpatient microlaryngoscopy/possible vocal fold injection augmentation. Follow up as outpatient.  - Voice hoariness improving    Hypernatremia  - Encourage water intake   - Not on diuresis currently    Hypokalemia  - Replacements scheduled today  - BMP daily to monitor trends    Leukocytosis  - WBC trending up to 20 from 14  - No fever or obvious signs of infection   - UTI currently on Bactrim   - Levaquin 750 MG IVPB daily    Paroxysmal atrial fibrillation  - Stopped amiodarone with increase in liver enzymes   - Continue Digoxin   - Adding Metoprolol 25 mg BID      Vania Anderson NP  Cardiothoracic Surgery  [facility]

## 2020-10-02 NOTE — CARE UPDATE
Arrived to patient room for assessment and pt seem to be cold in mild WOB  saturation was 85% on 5L NC,HR was 133bpm.she was switched to 50% venti mask and the saturation remained in the 80's. Then place on a 100% 15L NRB mask and sat increased to to 90% only. Rapid response and MD was contacted.Chest x-ray was obtained. Patient is stable and will continue to monitor.

## 2020-10-02 NOTE — NURSING
Patient WBC trending back up 14--> 20. Positive urine culture resulting in Enterobacter cloacae. Patient on PO bactrim. Also Mag 3.1 and Sodium 153. CTS called. Dr. Nicci Ng notified, said will notify NP to follow up.

## 2020-10-02 NOTE — CONSULTS
[facility]  Psychiatry  Consult Note    Patient Name: Fatou Lorenzo  MRN: [id number]   Code Status: Full Code  Admission Date: 8/30/2020  Hospital Length of Stay: 32 days  Attending Physician: Antoni Waddell MD  Primary Care Provider: Ludin Rivas MD    Current Legal Status: Uncontested    Patient information was obtained from patient, relative(s) and ER records.   Consults  Subjective:     Principal Problem:Acute respiratory failure with hypoxia    Chief Complaint:  SOB, MVR, TVR    HPI: Fatou Lorenzo is a 75 y.o. female with a past psychiatric history of anxiety who presented to [facility] due to Acute respiratory failure with hypoxia. Psychiatry was consulted to address the patient's symptoms of depression.     Per Primary MD:  Fatou Lorenzo is a 75 y.o. female that has a past medical history of Atrial fibrillation with RVR, Cataract, Glaucoma, Heart valve problem, Hyperlipidemia, and Severe mitral regurgitation.  has a past surgical history that includes Ankle surgery; lipoma removal; and Left heart catheterization (Left, 8/25/2020). Presents to [facility] complaining of recurrent shortness of breath.  Patient states it feels like she is having heart failure again.  She was discharged presumably on August 27th (no discharge summary available at this time), 4 days ago for acute respiratory failure with hypoxia secondary to CHF exacerbation.  The patient is typically followed at [place].  Reports 8 years ago was referred to Dr. Waddell, but valvular regurgitation not bad enough at that time to do surgery.  However see has severe tricuspid and mitral valve disease which was confirmed during her previous admission by echocardiogram.  She is having dyspnea with exertion, shortness of breath at rest, and orthopnea.  Worsened with exertion.      Patient now s/p open mitral valve repair, TC valve repair on 9/9/2020. Patient has had multiple  re-intubations for hypoxemic respiratory failure resulting in severe hoarseness and some dysphagia. Patient currently being seen by PT/OT/PM&R for weakness, impaired balance, impaired endurance, impaired cardiopulmonary response to activity, impaired self care skills, decreased coordination, impaired functional mobilty, decreased lower extremity function, gait instability. Per primary team, patient has largely had minimal improvement with rehab to this point and has been very anxious lately and refusing some treatment options. CM spoke with son recently who voiced that patient sounds confused on phone.      Per C-L Psych MD:  When seen today, patient calm and cooperative with interview. AAOx4, son at bedside provides additional collateral. Low volume with some hoarseness noted but able to fully participate in interview. Endorses extensive cardiovascular course over the last few months/years, prior to current hospital presentation. Now endorses having been in a hosptial seting for past few weeks. During this time, endorses worsening depressive symptoms including constant low mood, anhedonia, poor sleep, low energy level, poor appetite, and hopelessness. Endorses worsening hopelessness and feeling like life is not worth living anymore, but denies any active suicidal plan or intent. Denies any previous suicide attempts or past psychiatric admissions. States she has felt like this in the past and was previously started by PCP on Lexapro (unknown dosage). Endorses it being effective and was on it for 5 years, before stopping it 2 years ago. Endorses symptoms of generalized anxiety disorder including easy fatiguability, sleep disturbances, restlessness, and irritability. Endorses having flashbacks, nightmares and hypervigilance related to initially resuscitation attempts made in the past. Denies any AVH or other hallucinations.. Patient does not demonstrate paranoia, delusions, disorganized thinking or disorganized  behavior. Denies any HI. Denies any symptoms of stefany now or in the past. States now the plan is to hopefully attend rehab on Monday which she is optimistic about.    Collateral:   Son at bedside, provides collateral integrated above    Psychiatric Review of Systems-is patient experiencing or having changes in  sleep: yes, poor  appetite: yes, decreased  weight: yes, loss  energy/anergy: yes  interest/pleasure/anhedonia: yes  somatic symptoms: no  anxiety/panic: yes  guilty/hopelessness: yes  concentration: no  S.I.B.s/risky behavior: no  any drugs: no  alcohol: no     Past Psychiatric History:  Previous Medication Trials: yes, lexapro  Previous Psychiatric Hospitalizations: no   Previous Suicide Attempts: no   History of Violence: no  Outpatient Psychiatrist: no    Social History:  Marital Status:   Children: 3   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  Education: did not assess  Special Ed: unknown  Housing Status: with son in [place]  History of phys/sexual abuse: unknown  Access to gun: no    Substance Abuse History:  Recreational Drugs: denies  Use of Alcohol: occasional, social use  Rehab History: no   Tobacco Use: no    Legal History:  Past Charges/Incarcerations: no   Pending charges: no     Family Psychiatric History:   Denies    Psychosocial Stressors: health  Functioning Relationships: good support system and good relationship with children        Hospital Course: No notes on file         Patient History           Medical as of 10/2/2020     Past Medical History     Diagnosis Date Comments Source    Atrial fibrillation with RVR 8/24/2020 -- Provider    Cataract -- -- Provider    Essential hypertension 8/31/2020 -- Provider    Glaucoma -- -- Provider    Heart valve problem -- -- Provider    Hyperlipidemia -- -- Provider    Severe mitral regurgitation 8/24/2020 -- Provider          Pertinent Negatives     Diagnosis Date Noted Comments Source    Amblyopia 08/24/2012 -- Provider     Arthritis 08/24/2012 -- Provider    Diabetes mellitus 08/24/2012 -- Provider    Diabetes mellitus 06/30/2020 -- Provider    Diabetic retinopathy 08/24/2012 -- Provider    Diabetic retinopathy 06/30/2020 -- Provider    Macular degeneration 08/24/2012 -- Provider    Retinal detachment 08/24/2012 -- Provider    Sickle cell anemia 06/30/2020 -- Provider    Sickle cell trait 06/30/2020 -- Provider    Strabismus 08/24/2012 -- Provider    Uveitis 08/24/2012 -- Provider                  Surgical as of 10/2/2020     Past Surgical History     Procedure Laterality Date Comments Source    ANKLE SURGERY -- -- -- Provider    lipoma removal [Other] -- -- -- Provider    LEFT HEART CATHETERIZATION Left 8/25/2020 Procedure: Left heart cath, radial;  Surgeon: Marlee Carrillo MD;  Location: [facility] CATH LAB;  Service: Cardiology;  Laterality: Left; Provider    TRICUSPID VALVULOPLASTY N/A 9/9/2020 Procedure: REPAIR, TRICUSPID VALVE;  Surgeon: Antoni Waddell MD;  Location: [facility] OR [address] [place] [address];  Service: Cardiothoracic;  Laterality: N/A; Provider    BAIN MAZE PROCEDURE N/A 9/9/2020 Procedure: BAIN MAZE PROCEDURE;  Surgeon: Antoni Waddell MD;  Location: [facility] OR [address] [place] [address];  Service: Cardiothoracic;  Laterality: N/A;  MAZE  Provider                  Family as of 10/2/2020     Problem Relation Name Age of Onset Comments Source    Cancer Mother -- -- -- Provider    Cataracts Sister -- -- -- Provider    No Known Problems Father -- -- -- Provider    No Known Problems Brother -- -- -- Provider    No Known Problems Maternal Aunt -- -- -- Provider    No Known Problems Maternal Uncle -- -- -- Provider    No Known Problems Paternal Aunt -- -- -- Provider    No Known Problems Paternal Uncle -- -- -- Provider    No Known Problems Maternal Grandmother -- -- -- Provider    No Known Problems Maternal Grandfather -- -- -- Provider    No Known Problems Paternal Grandmother -- -- -- Provider    No Known Problems Paternal Grandfather -- -- -- Provider     Amblyopia Neg Hx -- -- -- Provider    Blindness Neg Hx -- -- -- Provider    Diabetes Neg Hx -- -- -- Provider    Glaucoma Neg Hx -- -- -- Provider    Hypertension Neg Hx -- -- -- Provider    Macular degeneration Neg Hx -- -- -- Provider    Retinal detachment Neg Hx -- -- -- Provider    Strabismus Neg Hx -- -- -- Provider    Stroke Neg Hx -- -- -- Provider    Thyroid disease Neg Hx -- -- -- Provider    Breast cancer Neg Hx -- -- -- Provider    Colon cancer Neg Hx -- -- -- Provider    Ovarian cancer Neg Hx -- -- -- Provider            Tobacco Use as of 10/2/2020     Smoking Status Smoking Start Date Smoking Quit Date Packs/Day Years Used    Never Smoker -- -- -- --    Types Comments Smokeless Tobacco Status Smokeless Tobacco Quit Date Source    -- -- Never Used -- Provider            Alcohol Use as of 10/2/2020     Alcohol Use Drinks/Week Alcohol/Week Comments Source    No   -- -- Provider    Frequency Typical Drinks Binge Drinking        -- -- --              Drug Use as of 10/2/2020     Drug Use Types Frequency Comments Source    No -- -- -- Provider            Sexual Activity as of 10/2/2020     Sexually Active Birth Control Partners Comments Source    Not Currently -- -- -- Provider            Activities of Daily Living as of 10/2/2020    None           Social Documentation as of 10/2/2020    None           Occupational as of 10/2/2020    None           Socioeconomic as of 10/2/2020     Marital Status Spouse Name Number of Children Years Education Education Level Preferred Language Ethnicity Race Source     -- -- -- -- English /White White --    Financial Resource Strain Food Insecurity: Worry Food Insecurity: Inability Transportation Needs: Medical Transportation Needs: Non-medical    -- -- -- -- --            Pertinent History     Question Response Comments    Lives with -- --    Place in Birth Order -- --    Lives in -- --    Number of Siblings -- --    Raised by -- --    Legal Involvement -- --     Childhood Trauma -- --    Criminal History of -- --    Financial Status -- --    Highest Level of Education -- --    Does patient have access to a firearm? -- --     Service -- --    Primary Leisure Activity -- --    Spirituality -- --        Past Medical History:   Diagnosis Date    Atrial fibrillation with RVR 8/24/2020    Cataract     Essential hypertension 8/31/2020    Glaucoma     Heart valve problem     Hyperlipidemia     Severe mitral regurgitation 8/24/2020     Past Surgical History:   Procedure Laterality Date    ANKLE SURGERY      BAIN MAZE PROCEDURE N/A 9/9/2020    Procedure: BAIN MAZE PROCEDURE;  Surgeon: Antoni Waddell MD;  Location: [facility] OR [address] [place] [address];  Service: Cardiothoracic;  Laterality: N/A;  MAZE     LEFT HEART CATHETERIZATION Left 8/25/2020    Procedure: Left heart cath, radial;  Surgeon: Marlee Carrillo MD;  Location: [facility] CATH LAB;  Service: Cardiology;  Laterality: Left;    lipoma removal      TRICUSPID VALVULOPLASTY N/A 9/9/2020    Procedure: REPAIR, TRICUSPID VALVE;  Surgeon: Antoni Waddell MD;  Location: [facility] OR [address] [place] [address];  Service: Cardiothoracic;  Laterality: N/A;     Family History     Problem Relation (Age of Onset)    Cancer Mother    Cataracts Sister    No Known Problems Father, Brother, Maternal Aunt, Maternal Uncle, Paternal Aunt, Paternal Uncle, Maternal Grandmother, Maternal Grandfather, Paternal Grandmother, Paternal Grandfather        Tobacco Use    Smoking status: Never Smoker    Smokeless tobacco: Never Used   Substance and Sexual Activity    Alcohol use: No    Drug use: No    Sexual activity: Not Currently     Review of patient's allergies indicates:  No Known Allergies    No current facility-administered medications on file prior to encounter.      Current Outpatient Medications on File Prior to Encounter   Medication Sig    metoprolol tartrate (LOPRESSOR) 25 MG tablet Take 1 tablet (25 mg total) by mouth 3 (three) times daily.    amiodarone  "(PACERONE) 400 MG tablet Take two tablets by mouth twice daily for twelve days, then take one tablet by mouth daily.    apixaban (ELIQUIS) 5 mg Tab Take 1 tablet (5 mg total) by mouth 2 (two) times daily.    furosemide (LASIX) 40 MG tablet Take 1 tablet (40 mg total) by mouth 2 (two) times daily.    losartan (COZAAR) 50 MG tablet Take 0.5 tablets (25 mg total) by mouth once daily.    pravastatin (PRAVACHOL) 80 MG tablet Take 80 mg by mouth once daily.     Psychotherapeutics (From admission, onward)    None        Review of Systems  Strengths and Liabilities: Strength: Patient accepts guidance/feedback, Strength: Patient is expressive/articulate., Strength: Patient is intelligent., Strength: Patient is motivated for change., Liability: Patient has poor health.    Objective:     Vital Signs (Most Recent):  Temp: 98 °F (36.7 °C) (10/02/20 0900)  Pulse: (!) 115 (10/02/20 1300)  Resp: (!) 25 (10/02/20 1300)  BP: (!) 140/56 (10/02/20 1300)  SpO2: (!) 94 % (10/02/20 1300) Vital Signs (24h Range):  Temp:  [97.7 °F (36.5 °C)-98.6 °F (37 °C)] 98 °F (36.7 °C)  Pulse:  [] 115  Resp:  [16-30] 25  SpO2:  [92 %-99 %] 94 %  BP: ()/(56-77) 140/56     Height: 5' 5" (165.1 cm)  Weight: 79 kg (174 lb 2.6 oz)  Body mass index is 28.98 kg/m².      Intake/Output Summary (Last 24 hours) at 10/2/2020 1319  Last data filed at 10/2/2020 1231  Gross per 24 hour   Intake --   Output 1560 ml   Net -1560 ml       Physical Exam      Mental Status Exam:  Appearance: age appropriate, nasal cannula in place, midsternal incision noted  Level of Consciousness: awake, alert  Behavior/Cooperation: normal, cooperative  Psychomotor: unremarkable   Speech: very soft, hoarse voice  Language: english, fluid  Orientation: person, place, situation, time/date, month of year, year  Attention Span/Concentration: intact  Memory: Grossly intact  Mood: "depressed"  Affect: constricted  Thought Process: linear, normal and logical  Associations: normal " and logical  Thought Content: +passive suicidal ideation, denies any active plan or intent, no homicidality, delusions, or paranoia  Fund of Knowledge: Aware of current events  Insight: fair  Judgment: fair    Significant Labs:   Last 24 Hours:   Recent Lab Results       10/02/20  0434   10/01/20  1618        Albumin 2.1       Alkaline Phosphatase 318       ALT 72       Anion Gap 9       Appearance, UA   Cloudy     AST 45       Bacteria, UA   Many     Baso # 0.03       Basophil% 0.1       Bilirubin (UA)   Negative     BILIRUBIN TOTAL 1.9  Comment:  For infants and newborns, interpretation of results should be based  on gestational age, weight and in agreement with clinical  observations.  Premature Infant recommended reference ranges:  Up to 24 hours.............<8.0 mg/dL  Up to 48 hours............<12.0 mg/dL  3-5 days..................<15.0 mg/dL  6-29 days.................<15.0 mg/dL         BUN, Bld 28       Calcium 8.5       Chloride 123       CO2 21       Color, UA   Red     Creatinine 0.8       Differential Method Automated       Digoxin Lvl 1.4  Comment:  Toxic:  Adult: >2.5 ng/mL, Pediatric: >3.0 ng/mL         eGFR if  >60.0       eGFR if non  >60.0  Comment:  Calculation used to obtain the estimated glomerular filtration  rate (eGFR) is the CKD-EPI equation.          Eos # 0.0       Eosinophil% 0.0       Glucose 94       Glucose, UA   Negative     Gran # (ANC) 17.2       Gran% 82.4       Hematocrit 32.6       Hemoglobin 9.6       Hyaline Casts, UA   0     Immature Grans (Abs) 0.31  Comment:  Mild elevation in immature granulocytes is non specific and   can be seen in a variety of conditions including stress response,   acute inflammation, trauma and pregnancy. Correlation with other   laboratory and clinical findings is essential.         Immature Granulocytes 1.5       Ketones, UA   Negative     Leukocytes, UA   Trace     Lymph # 1.6       Lymph% 7.7       Magnesium 3.1        MCH 29.6       MCHC 29.4              Microscopic Comment   SEE COMMENT  Comment:  Other formed elements not mentioned in the report are not   present in the microscopic examination.        Mono # 1.7       Mono% 8.3       MPV 12.2       NITRITE UA   Negative     nRBC 0       Occult Blood UA   2+     pH, UA   5.0     Phosphorus 3.7       Platelets 337       Potassium 4.2       PROTEIN TOTAL 6.0       Protein, UA   2+  Comment:  Recommend a 24 hour urine protein or a urine   protein/creatinine ratio if globulin induced proteinuria is  clinically suspected.       RBC 3.24       RBC, UA   >100     RDW 17.7       Sodium 153       Specific Gravity, UA   1.020     Specimen UA   Urine, Catheterized     WBC, UA   >100     WBC 20.86             Significant Imaging: I have reviewed all pertinent imaging results/findings within the past 24 hours.    Assessment/Plan:     Adjustment disorder with mixed anxiety and depressed mood  Impression    Patient is a 75 year old female with past psychiatric history of depression who initially presented with acute respiratory failure with hypoxia, now s/p MVR and TVR with psychiatry consulted for depression. When seen today, patient calm and cooperative with interview. Endorses multiple symptoms of depression and anxiety, in the context of extended hospital course and poor physical health. Endorses history of depression in the past with significant improvement on Lexapro. Given current QTC(496) as well as poor sleep and appetite, will start Remeron 7.5 mg instead.     Adjustment disorder with mixed anxiety and depressed mood  R/o PTSD    Recommendations  Psych meds  · Scheduled- Start remeron 7.5 mg PO nightly  · No PRN recommendations at this point    Follow up  · Will follow with pt will in house  · Pt currently does not meet criteria nor benefit from from involuntary inpatient psychiatric admission.     -Please contact ON CALL psychiatry resident for any acute issues that may  arise.               Case discussed with staff psychiatrist, Dr. Winkler    Total Time:  60 minutes      Silvestre Willis MD   Psychiatry  [facility]

## 2020-10-02 NOTE — NURSING
Pt arrives to IR for thoracentesis. Pt arrives on 3L nc, sats anywhere from upper 80s-mid 90s. Pt occasionally tachycardic. Pt reports hx of afib. MD, Np, and Pa at the bedside.

## 2020-10-02 NOTE — CARE UPDATE
Rapid Response Nurse Chart Check     Chart check completed, abnormal VS noted. Bedside RNDivina contacted. No concerns verbalized at this time. Reassessing VS. Instructed to call [phone] for further concerns or assistance.

## 2020-10-02 NOTE — ASSESSMENT & PLAN NOTE
Impression    Patient is a 75 year old female with past psychiatric history of depression who initially presented with acute respiratory failure with hypoxia, now s/p MVR and TVR with psychiatry consulted for depression. When seen today, patient calm and cooperative with interview. Endorses multiple symptoms of depression and anxiety, in the context of extended hospital course and poor physical health. Endorses history of depression in the past with significant improvement on Lexapro. Given current QTC(496) as well as poor sleep and appetite, will start Remeron 7.5 mg instead.     Adjustment disorder with mixed anxiety and depressed mood  R/o PTSD    Recommendations  Psych meds  · Scheduled- Start remeron 7.5 mg PO nightly  · No PRN recommendations at this point    Follow up  · Will follow with pt will in house  · Pt currently does not meet criteria nor benefit from from involuntary inpatient psychiatric admission.     -Please contact ON CALL psychiatry resident for any acute issues that may arise.

## 2020-10-02 NOTE — NURSING
Pt taken to IR. Pt AAOx4. Pt stable. No complaints of pain or signs of distress. Left per stretcher with transport.

## 2020-10-02 NOTE — PT/OT/SLP PROGRESS
Occupational Therapy Missed Visit       Patient Name:  Fatou Lorenzo   MRN:  [id number]    Patient not seen today secondary RN hold due to decreased breath sounds in R lung and SpO2 in low 80's post thora-centesis. Rapid response team at bedside. RN recommending holding OT/PT on this date. Will follow-up on next scheduled date per POC.      Chitra Quiroga OT  10/2/2020

## 2020-10-02 NOTE — SUBJECTIVE & OBJECTIVE
Interval History: increased weakness, urine culture growing enterobacter cloacae with extremely bloody urine and increasing WBC, will consult infectious disease for antibiotic recommendations and change to Levaquin 750 IVPB. Continues with a fib on the monitor however patient with increasing LFT's on amiodarone, will add metoprolol for rate control. Chest xray showing increasing right pleural effusion to IR for drainage today, upon returning from IR, patient was found with sats of 82% on 15L. Patient will be transferred to ICU.     Review of Systems   Constitution: Positive for malaise/fatigue.   Cardiovascular: Positive for dyspnea on exertion. Negative for chest pain, claudication, irregular heartbeat, leg swelling and palpitations.   Respiratory: Positive for shortness of breath. Negative for cough.    Hematologic/Lymphatic: Negative for bleeding problem.   Gastrointestinal: Negative for abdominal pain.   Genitourinary: Negative for dysuria.   Neurological: Positive for weakness. Negative for headaches.     Medications:  Continuous Infusions:  Scheduled Meds:   apixaban  5 mg Oral BID    aspirin  81 mg Oral Daily    digoxin  0.125 mg Oral Daily    famotidine  20 mg Oral Daily    levoFLOXacin  750 mg Intravenous Q24H    melatonin  6 mg Oral Nightly    potassium chloride  20 mEq Oral BID    sulfamethoxazole-trimethoprim 800-160mg  1 tablet Oral BID     PRN Meds:acetaminophen, calcium carbonate, dextrose 50%, ondansetron, sodium chloride 3%     Objective:     Vital Signs (Most Recent):  Temp: 98.6 °F (37 °C) (10/02/20 1423)  Pulse: (!) 112 (10/02/20 1528)  Resp: (!) 24 (10/02/20 1500)  BP: (!) 150/65 (10/02/20 1423)  SpO2: 96 % (10/02/20 1500) Vital Signs (24h Range):  Temp:  [97.7 °F (36.5 °C)-98.6 °F (37 °C)] 98.6 °F (37 °C)  Pulse:  [] 112  Resp:  [16-30] 24  SpO2:  [84 %-99 %] 96 %  BP: ()/(55-77) 150/65     Weight: 79 kg (174 lb 2.6 oz)  Body mass index is 28.98 kg/m².    SpO2: 96 %  O2  Device (Oxygen Therapy): venti mask    Intake/Output - Last 3 Shifts       09/30 0700 - 10/01 0659 10/01 0700 - 10/02 0659 10/02 0700 - 10/03 0659    P.O. 600 240     I.V. (mL/kg) 243.3 (3.1)      Total Intake(mL/kg) 843.3 (10.7) 240 (3)     Urine (mL/kg/hr) 850 (0.4) 1500 (0.8)     Other   60    Stool 0      Total Output 850 1500 60    Net -6.7 -1260 -60           Stool Occurrence 3 x 1 x           Lines/Drains/Airways     Peripheral Intravenous Line                 Peripheral IV - Single Lumen 09/30/20 1248 20 G Anterior;Left;Proximal Forearm 2 days                Physical Exam  Constitutional:       Appearance: She is well-developed.   Cardiovascular:      Rate and Rhythm: Normal rate and regular rhythm.      Heart sounds: Normal heart sounds.   Pulmonary:      Effort: Pulmonary effort is normal.      Breath sounds: Normal breath sounds.   Abdominal:      Palpations: Abdomen is soft.   Musculoskeletal: Normal range of motion.   Skin:     General: Skin is warm and dry.      Comments: Sternal Incision CDI   Neurological:      Mental Status: She is alert and oriented to person, place, and time.         Significant Labs:  BMP:   Recent Labs   Lab 10/02/20  0434   GLU 94   *   K 4.2   *   CO2 21*   BUN 28*   CREATININE 0.8   CALCIUM 8.5*   MG 3.1*     CBC:   Recent Labs   Lab 10/02/20  0434   WBC 20.86*   RBC 3.24*   HGB 9.6*   HCT 32.6*      *   MCH 29.6   MCHC 29.4*     CMP:   Recent Labs   Lab 10/02/20  0434   GLU 94   CALCIUM 8.5*   ALBUMIN 2.1*   PROT 6.0   *   K 4.2   CO2 21*   *   BUN 28*   CREATININE 0.8   ALKPHOS 318*   ALT 72*   AST 45*   BILITOT 1.9*     Coagulation: No results for input(s): PT, INR, APTT in the last 48 hours.    Significant Diagnostics:  I have reviewed and interpreted all pertinent imaging results/findings within the past 24 hours.

## 2020-10-02 NOTE — SUBJECTIVE & OBJECTIVE
Patient History           Medical as of 10/2/2020     Past Medical History     Diagnosis Date Comments Source    Atrial fibrillation with RVR 8/24/2020 -- Provider    Cataract -- -- Provider    Essential hypertension 8/31/2020 -- Provider    Glaucoma -- -- Provider    Heart valve problem -- -- Provider    Hyperlipidemia -- -- Provider    Severe mitral regurgitation 8/24/2020 -- Provider          Pertinent Negatives     Diagnosis Date Noted Comments Source    Amblyopia 08/24/2012 -- Provider    Arthritis 08/24/2012 -- Provider    Diabetes mellitus 08/24/2012 -- Provider    Diabetes mellitus 06/30/2020 -- Provider    Diabetic retinopathy 08/24/2012 -- Provider    Diabetic retinopathy 06/30/2020 -- Provider    Macular degeneration 08/24/2012 -- Provider    Retinal detachment 08/24/2012 -- Provider    Sickle cell anemia 06/30/2020 -- Provider    Sickle cell trait 06/30/2020 -- Provider    Strabismus 08/24/2012 -- Provider    Uveitis 08/24/2012 -- Provider                  Surgical as of 10/2/2020     Past Surgical History     Procedure Laterality Date Comments Source    ANKLE SURGERY -- -- -- Provider    lipoma removal [Other] -- -- -- Provider    LEFT HEART CATHETERIZATION Left 8/25/2020 Procedure: Left heart cath, radial;  Surgeon: Marlee Carrillo MD;  Location: [facility] CATH LAB;  Service: Cardiology;  Laterality: Left; Provider    TRICUSPID VALVULOPLASTY N/A 9/9/2020 Procedure: REPAIR, TRICUSPID VALVE;  Surgeon: Antoni Waddell MD;  Location: [address];  Service: Cardiothoracic;  Laterality: N/A; Provider    BAIN MAZE PROCEDURE N/A 9/9/2020 Procedure: BAIN MAZE PROCEDURE;  Surgeon: Antoni Waddell MD;  Location: [address];  Service: Cardiothoracic;  Laterality: N/A;  MAZE  Provider                  Family as of 10/2/2020     Problem Relation Name Age of Onset Comments Source    Cancer Mother -- -- -- Provider    Cataracts Sister -- -- -- Provider    No Known Problems Father -- -- -- Provider    No  Known Problems Brother -- -- -- Provider    No Known Problems Maternal Aunt -- -- -- Provider    No Known Problems Maternal Uncle -- -- -- Provider    No Known Problems Paternal Aunt -- -- -- Provider    No Known Problems Paternal Uncle -- -- -- Provider    No Known Problems Maternal Grandmother -- -- -- Provider    No Known Problems Maternal Grandfather -- -- -- Provider    No Known Problems Paternal Grandmother -- -- -- Provider    No Known Problems Paternal Grandfather -- -- -- Provider    Amblyopia Neg Hx -- -- -- Provider    Blindness Neg Hx -- -- -- Provider    Diabetes Neg Hx -- -- -- Provider    Glaucoma Neg Hx -- -- -- Provider    Hypertension Neg Hx -- -- -- Provider    Macular degeneration Neg Hx -- -- -- Provider    Retinal detachment Neg Hx -- -- -- Provider    Strabismus Neg Hx -- -- -- Provider    Stroke Neg Hx -- -- -- Provider    Thyroid disease Neg Hx -- -- -- Provider    Breast cancer Neg Hx -- -- -- Provider    Colon cancer Neg Hx -- -- -- Provider    Ovarian cancer Neg Hx -- -- -- Provider            Tobacco Use as of 10/2/2020     Smoking Status Smoking Start Date Smoking Quit Date Packs/Day Years Used    Never Smoker -- -- -- --    Types Comments Smokeless Tobacco Status Smokeless Tobacco Quit Date Source    -- -- Never Used -- Provider            Alcohol Use as of 10/2/2020     Alcohol Use Drinks/Week Alcohol/Week Comments Source    No   -- -- Provider    Frequency Typical Drinks Binge Drinking        -- -- --              Drug Use as of 10/2/2020     Drug Use Types Frequency Comments Source    No -- -- -- Provider            Sexual Activity as of 10/2/2020     Sexually Active Birth Control Partners Comments Source    Not Currently -- -- -- Provider            Activities of Daily Living as of 10/2/2020    None           Social Documentation as of 10/2/2020    None           Occupational as of 10/2/2020    None           Socioeconomic as of 10/2/2020     Marital Status Spouse Name Number of  Children Years Education Education Level Preferred Language Ethnicity Race Source     -- -- -- -- English /White White --    Financial Resource Strain Food Insecurity: Worry Food Insecurity: Inability Transportation Needs: Medical Transportation Needs: Non-medical    -- -- -- -- --            Pertinent History     Question Response Comments    Lives with -- --    Place in Birth Order -- --    Lives in -- --    Number of Siblings -- --    Raised by -- --    Legal Involvement -- --    Childhood Trauma -- --    Criminal History of -- --    Financial Status -- --    Highest Level of Education -- --    Does patient have access to a firearm? -- --     Service -- --    Primary Leisure Activity -- --    Spirituality -- --        Past Medical History:   Diagnosis Date    Atrial fibrillation with RVR 8/24/2020    Cataract     Essential hypertension 8/31/2020    Glaucoma     Heart valve problem     Hyperlipidemia     Severe mitral regurgitation 8/24/2020     Past Surgical History:   Procedure Laterality Date    ANKLE SURGERY      BAIN MAZE PROCEDURE N/A 9/9/2020    Procedure: BAIN MAZE PROCEDURE;  Surgeon: Antoni Waddell MD;  Location: [address];  Service: Cardiothoracic;  Laterality: N/A;  MAZE     LEFT HEART CATHETERIZATION Left 8/25/2020    Procedure: Left heart cath, radial;  Surgeon: Marlee Carrillo MD;  Location: [facility] CATH LAB;  Service: Cardiology;  Laterality: Left;    lipoma removal      TRICUSPID VALVULOPLASTY N/A 9/9/2020    Procedure: REPAIR, TRICUSPID VALVE;  Surgeon: Antoni Waddell MD;  Location: [address];  Service: Cardiothoracic;  Laterality: N/A;     Family History     Problem Relation (Age of Onset)    Cancer Mother    Cataracts Sister    No Known Problems Father, Brother, Maternal Aunt, Maternal Uncle, Paternal Aunt, Paternal Uncle, Maternal Grandmother, Maternal Grandfather, Paternal Grandmother, Paternal Grandfather        Tobacco Use    Smoking  "status: Never Smoker    Smokeless tobacco: Never Used   Substance and Sexual Activity    Alcohol use: No    Drug use: No    Sexual activity: Not Currently     Review of patient's allergies indicates:  No Known Allergies    No current facility-administered medications on file prior to encounter.      Current Outpatient Medications on File Prior to Encounter   Medication Sig    metoprolol tartrate (LOPRESSOR) 25 MG tablet Take 1 tablet (25 mg total) by mouth 3 (three) times daily.    amiodarone (PACERONE) 400 MG tablet Take two tablets by mouth twice daily for twelve days, then take one tablet by mouth daily.    apixaban (ELIQUIS) 5 mg Tab Take 1 tablet (5 mg total) by mouth 2 (two) times daily.    furosemide (LASIX) 40 MG tablet Take 1 tablet (40 mg total) by mouth 2 (two) times daily.    losartan (COZAAR) 50 MG tablet Take 0.5 tablets (25 mg total) by mouth once daily.    pravastatin (PRAVACHOL) 80 MG tablet Take 80 mg by mouth once daily.     Psychotherapeutics (From admission, onward)    None        Review of Systems  Strengths and Liabilities: Strength: Patient accepts guidance/feedback, Strength: Patient is expressive/articulate., Strength: Patient is intelligent., Strength: Patient is motivated for change., Liability: Patient has poor health.    Objective:     Vital Signs (Most Recent):  Temp: 98 °F (36.7 °C) (10/02/20 0900)  Pulse: (!) 115 (10/02/20 1300)  Resp: (!) 25 (10/02/20 1300)  BP: (!) 140/56 (10/02/20 1300)  SpO2: (!) 94 % (10/02/20 1300) Vital Signs (24h Range):  Temp:  [97.7 °F (36.5 °C)-98.6 °F (37 °C)] 98 °F (36.7 °C)  Pulse:  [] 115  Resp:  [16-30] 25  SpO2:  [92 %-99 %] 94 %  BP: ()/(56-77) 140/56     Height: 5' 5" (165.1 cm)  Weight: 79 kg (174 lb 2.6 oz)  Body mass index is 28.98 kg/m².      Intake/Output Summary (Last 24 hours) at 10/2/2020 1319  Last data filed at 10/2/2020 1231  Gross per 24 hour   Intake --   Output 1560 ml   Net -1560 ml       Physical Exam    " "  Mental Status Exam:  Appearance: age appropriate, nasal cannula in place, midsternal incision noted  Level of Consciousness: awake, alert  Behavior/Cooperation: normal, cooperative  Psychomotor: unremarkable   Speech: very soft, hoarse voice  Language: english, fluid  Orientation: person, place, situation, time/date, month of year, year  Attention Span/Concentration: intact  Memory: Grossly intact  Mood: "depressed"  Affect: constricted  Thought Process: linear, normal and logical  Associations: normal and logical  Thought Content: +passive suicidal ideation, denies any active plan or intent, no homicidality, delusions, or paranoia  Fund of Knowledge: Aware of current events  Insight: fair  Judgment: fair    Significant Labs:   Last 24 Hours:   Recent Lab Results       10/02/20  0434   10/01/20  1618        Albumin 2.1       Alkaline Phosphatase 318       ALT 72       Anion Gap 9       Appearance, UA   Cloudy     AST 45       Bacteria, UA   Many     Baso # 0.03       Basophil% 0.1       Bilirubin (UA)   Negative     BILIRUBIN TOTAL 1.9  Comment:  For infants and newborns, interpretation of results should be based  on gestational age, weight and in agreement with clinical  observations.  Premature Infant recommended reference ranges:  Up to 24 hours.............<8.0 mg/dL  Up to 48 hours............<12.0 mg/dL  3-5 days..................<15.0 mg/dL  6-29 days.................<15.0 mg/dL         BUN, Bld 28       Calcium 8.5       Chloride 123       CO2 21       Color, UA   Red     Creatinine 0.8       Differential Method Automated       Digoxin Lvl 1.4  Comment:  Toxic:  Adult: >2.5 ng/mL, Pediatric: >3.0 ng/mL         eGFR if  >60.0       eGFR if non  >60.0  Comment:  Calculation used to obtain the estimated glomerular filtration  rate (eGFR) is the CKD-EPI equation.          Eos # 0.0       Eosinophil% 0.0       Glucose 94       Glucose, UA   Negative     Gran # (ANC) 17.2       " Gran% 82.4       Hematocrit 32.6       Hemoglobin 9.6       Hyaline Casts, UA   0     Immature Grans (Abs) 0.31  Comment:  Mild elevation in immature granulocytes is non specific and   can be seen in a variety of conditions including stress response,   acute inflammation, trauma and pregnancy. Correlation with other   laboratory and clinical findings is essential.         Immature Granulocytes 1.5       Ketones, UA   Negative     Leukocytes, UA   Trace     Lymph # 1.6       Lymph% 7.7       Magnesium 3.1       MCH 29.6       MCHC 29.4              Microscopic Comment   SEE COMMENT  Comment:  Other formed elements not mentioned in the report are not   present in the microscopic examination.        Mono # 1.7       Mono% 8.3       MPV 12.2       NITRITE UA   Negative     nRBC 0       Occult Blood UA   2+     pH, UA   5.0     Phosphorus 3.7       Platelets 337       Potassium 4.2       PROTEIN TOTAL 6.0       Protein, UA   2+  Comment:  Recommend a 24 hour urine protein or a urine   protein/creatinine ratio if globulin induced proteinuria is  clinically suspected.       RBC 3.24       RBC, UA   >100     RDW 17.7       Sodium 153       Specific Gravity, UA   1.020     Specimen UA   Urine, Catheterized     WBC, UA   >100     WBC 20.86             Significant Imaging: I have reviewed all pertinent imaging results/findings within the past 24 hours.

## 2020-10-02 NOTE — SUBJECTIVE & OBJECTIVE
Past Medical History:   Diagnosis Date    Atrial fibrillation with RVR 8/24/2020    Cataract     Essential hypertension 8/31/2020    Glaucoma     Heart valve problem     Hyperlipidemia     Severe mitral regurgitation 8/24/2020       Past Surgical History:   Procedure Laterality Date    ANKLE SURGERY      BAIN MAZE PROCEDURE N/A 9/9/2020    Procedure: BAIN MAZE PROCEDURE;  Surgeon: Antoni Waddell MD;  Location: [address];  Service: Cardiothoracic;  Laterality: N/A;  MAZE     LEFT HEART CATHETERIZATION Left 8/25/2020    Procedure: Left heart cath, radial;  Surgeon: Marlee Carrillo MD;  Location: [facility] CATH LAB;  Service: Cardiology;  Laterality: Left;    lipoma removal      TRICUSPID VALVULOPLASTY N/A 9/9/2020    Procedure: REPAIR, TRICUSPID VALVE;  Surgeon: Antoni Waddell MD;  Location: [address];  Service: Cardiothoracic;  Laterality: N/A;       Review of patient's allergies indicates:  No Known Allergies    Medications:  Medications Prior to Admission   Medication Sig    metoprolol tartrate (LOPRESSOR) 25 MG tablet Take 1 tablet (25 mg total) by mouth 3 (three) times daily.    amiodarone (PACERONE) 400 MG tablet Take two tablets by mouth twice daily for twelve days, then take one tablet by mouth daily.    apixaban (ELIQUIS) 5 mg Tab Take 1 tablet (5 mg total) by mouth 2 (two) times daily.    furosemide (LASIX) 40 MG tablet Take 1 tablet (40 mg total) by mouth 2 (two) times daily.    losartan (COZAAR) 50 MG tablet Take 0.5 tablets (25 mg total) by mouth once daily.    pravastatin (PRAVACHOL) 80 MG tablet Take 80 mg by mouth once daily.     Antibiotics (From admission, onward)    Start     Stop Route Frequency Ordered    09/29/20 2100  sulfamethoxazole-trimethoprim 800-160mg per tablet 1 tablet      10/02 2059 Oral 2 times daily 09/29/20 1610        Antifungals (From admission, onward)    None        Antivirals (From admission, onward)    None           Immunization History    Administered Date(s) Administered    PPD Test 09/24/2020       Family History     Problem Relation (Age of Onset)    Cancer Mother    Cataracts Sister    No Known Problems Father, Brother, Maternal Aunt, Maternal Uncle, Paternal Aunt, Paternal Uncle, Maternal Grandmother, Maternal Grandfather, Paternal Grandmother, Paternal Grandfather        Social History     Socioeconomic History    Marital status:      Spouse name: Not on file    Number of children: Not on file    Years of education: Not on file    Highest education level: Not on file   Occupational History    Not on file   Social Needs    Financial resource strain: Not on file    Food insecurity     Worry: Not on file     Inability: Not on file    Transportation needs     Medical: Not on file     Non-medical: Not on file   Tobacco Use    Smoking status: Never Smoker    Smokeless tobacco: Never Used   Substance and Sexual Activity    Alcohol use: No    Drug use: No    Sexual activity: Not Currently   Lifestyle    Physical activity     Days per week: Not on file     Minutes per session: Not on file    Stress: Not on file   Relationships    Social connections     Talks on phone: Not on file     Gets together: Not on file     Attends Confucianism service: Not on file     Active member of club or organization: Not on file     Attends meetings of clubs or organizations: Not on file     Relationship status: Not on file   Other Topics Concern    Not on file   Social History Narrative    Not on file     Review of Systems   Constitutional: Positive for activity change and fatigue. Negative for chills and fever.   Respiratory: Positive for cough and shortness of breath. Negative for choking and stridor.    Cardiovascular: Negative for chest pain, palpitations and leg swelling.   Gastrointestinal: Negative for abdominal distention, abdominal pain, anal bleeding, diarrhea, nausea and vomiting.   Genitourinary: Positive for difficulty urinating.  Negative for dysuria, flank pain and pelvic pain.   Musculoskeletal: Negative for arthralgias, back pain, myalgias and neck pain.   Skin: Positive for wound (midline incision). Negative for color change and pallor.   Neurological: Negative for dizziness, seizures and headaches.   Psychiatric/Behavioral: Negative for agitation, behavioral problems and decreased concentration.   All other systems reviewed and are negative.    Objective:     Vital Signs (Most Recent):  Temp: 98 °F (36.7 °C) (10/02/20 0900)  Pulse: (!) 111 (10/02/20 0900)  Resp: 18 (10/02/20 0900)  BP: 115/63 (10/02/20 0900)  SpO2: 95 % (10/02/20 0900) Vital Signs (24h Range):  Temp:  [97.7 °F (36.5 °C)-98.6 °F (37 °C)] 98 °F (36.7 °C)  Pulse:  [] 111  Resp:  [16-20] 18  SpO2:  [92 %-99 %] 95 %  BP: ()/(58-79) 115/63     Weight: 79 kg (174 lb 2.6 oz)  Body mass index is 28.98 kg/m².    Estimated Creatinine Clearance: 63.1 mL/min (based on SCr of 0.8 mg/dL).    Physical Exam  Constitutional:       Appearance: She is well-developed. She is ill-appearing.   HENT:      Nose: Nose normal.   Eyes:      General: No scleral icterus.  Neck:      Musculoskeletal: Normal range of motion.   Cardiovascular:      Rate and Rhythm: Normal rate and regular rhythm.      Heart sounds: Murmur present.   Pulmonary:      Effort: Pulmonary effort is normal.      Breath sounds: Normal breath sounds. No wheezing or rhonchi.      Comments: On NC  Abdominal:      General: There is no distension.      Palpations: Abdomen is soft. There is no mass.      Tenderness: There is abdominal tenderness (suprapubic).   Musculoskeletal: Normal range of motion.         General: No swelling or tenderness.      Right lower leg: No edema.      Left lower leg: No edema.   Skin:     General: Skin is warm and dry.      Findings: No bruising, erythema or lesion.      Comments: Sternal Incision CDI   Neurological:      Mental Status: She is alert and oriented to person, place, and time.  Mental status is at baseline.         Significant Labs: All pertinent labs within the past 24 hours have been reviewed.    Significant Imaging: I have reviewed all pertinent imaging results/findings within the past 24 hours.

## 2020-10-02 NOTE — HPI
Pt is a 75 y.o. female w/ pmhx of Atrial fibrillation with RVR, HLD and severe mitral regurgitation.  Pt w/ recent left heart catheterization 8/25/2020 who presented w/ complaints of recurrent shortness of breath.  Transesophageal echocardiogram done recently w/ left atrial thrombus.  L heart angiography w/ nonobstructive CAD.   Upon admission, pt required BiPAP due to hypoxemia not responsive to nasal cannula.      [place] [facility] [place] evaluation given recurrent admission despite compliance, BP control and diuresis.  Pt seen by Dr. Waddell who recommended MV repair.  Pt underwent MV repair and TV annuloplasty on 9/9. Pt course c/b reintuabtion and pericardial window for evacuation of posterior cardiac tamponade on 9/18.  Pt extubated on 9/24.  Developed increasing white count on 9/28.  UA done concerning for infection.  Urine cxs positive for E.Cloacae.  Pt started on Bactrim.  Chest Xray from 10/1- Since September 28, 2020, increased large right pleural effusion.  Increased diffuse right airspace opacities.  X ray abdomen 9/23 w/ Probable bilateral nephrolithiasis.  ID initially consulted on 10/2 for UTI.  Patient also noted to have large right loculated pleural effusion and underwent IR thoracentesis (no cx data) and subsequently VATS procedure with limited decortication by CTS on 10/5.  Cultures positive for E coli.  Brochoscopy with BAL on 10/7 with yeast.  She was treated with 7 days of cefepime for E. Cloacae in urine and E coli from pleural effusion, followed by transition to IV ceftriaxone with plan for 4 weeks of antibiotics after VATS procedure (ceftriaxone while inpatient with transition to oral Augmentin if d/c).      ID now reconsulted for rising WBC  11.8 >>34.  Repeat blood cultures pending.  Repeat CXR pending.  Afebrile.

## 2020-10-02 NOTE — PROGRESS NOTES
[facility]  Physical Medicine & Rehab  Progress Note    Patient Name: Fatou Lorenzo  MRN: [id number]  Admission Date: 8/30/2020  Length of Stay: 32 days  Attending Physician: Antoni Waddell MD    Subjective:     Principal Problem:Acute respiratory failure with hypoxia    Hospital Course:   9/27: PT-BM TA.   09/28/2020: Bed mobility Max-ModA.  No sit to stand and transfers.   Feed/groom SBA. Toilet dependent.   9/29: Max x 2ppl-MaxA. Feed setupA. LBD MaxA. Toilet dependent. No gait.   9/30: BM Max 2 ppl-MaxA. Sit to stand Max 2 ppl-ModA x 2ppl. UBD ModA. Toilet TA.   10/2: BM Max-mod x 2 ppl. Sit to stand ModA. Trxs MaxA. Gait: MaxA: to take a few steps during stand pivot transfer. Feed setupA. LBD TA. Toilet dependent. SLP diagnosis of severe Dysphonia and intermittent Aphonia.    Interval History 10/2/2020:  Patient is seen for follow-up PM&R evaluation and recommendations: Participating w/ therapy. Tachypnea resolved.     HPI, Past Medical, Family, and Social History remains the same as documented in the initial encounter.    Scheduled Medications:    apixaban  5 mg Oral BID    aspirin  81 mg Oral Daily    digoxin  0.125 mg Oral Daily    famotidine  20 mg Oral Daily    melatonin  6 mg Oral Nightly    potassium chloride  20 mEq Oral BID    sulfamethoxazole-trimethoprim 800-160mg  1 tablet Oral BID       Diagnostic Results: Labs: Reviewed    PRN Medications: acetaminophen, calcium carbonate, dextrose 50%, ondansetron, sodium chloride 3%    Review of Systems   Constitutional: Positive for activity change and fatigue.   Respiratory: Negative for shortness of breath.    Musculoskeletal: Positive for gait problem.   Neurological: Positive for weakness.   Psychiatric/Behavioral: The patient is nervous/anxious.      Objective:     Vital Signs (Most Recent):  Temp: 98 °F (36.7 °C) (10/02/20 0900)  Pulse: (!) 111 (10/02/20 0900)  Resp: 18 (10/02/20 0900)  BP: 115/63 (10/02/20 0900)  SpO2: 95 %  (10/02/20 0900)    Vital Signs (24h Range):  Temp:  [97.7 °F (36.5 °C)-98.6 °F (37 °C)] 98 °F (36.7 °C)  Pulse:  [] 111  Resp:  [16-20] 18  SpO2:  [92 %-99 %] 95 %  BP: ()/(58-79) 115/63     Physical Exam  Vitals signs reviewed.   Constitutional:       General: She is not in acute distress.     Appearance: She is well-developed.      Comments: Appears fatigued   HENT:      Head: Normocephalic and atraumatic.   Eyes:      General:         Right eye: No discharge.         Left eye: No discharge.   Neck:      Musculoskeletal: Neck supple.   Cardiovascular:      Rate and Rhythm: Normal rate.   Pulmonary:      Effort: Pulmonary effort is normal. No respiratory distress.      Comments: On NC    Abdominal:      Palpations: Abdomen is soft.      Tenderness: There is no abdominal tenderness.   Musculoskeletal:         General: No deformity.   Skin:     General: Skin is warm and dry.      Comments: midsternal incision c/d/i    Neurological:      Mental Status: She is alert and oriented to person, place, and time.      Motor: Weakness and tremor present.      Comments: + dysphonia, communicated via writing    Psychiatric:         Mood and Affect: Mood is anxious.         Behavior: Behavior normal.         Cognition and Memory: Cognition is not impaired.   Assessment/Plan:      * Acute respiratory failure with hypoxia  -improved, on NC  -tachypnea on 10/1 due to anxiety  -now improved on 10/2    Debility  See hospital course for functional status.      Recommendations  -  Encourage mobility, OOB in chair, and early ambulation as appropriate  -  PT/OT evaluate and treat  -  Pain management  -  DVT prophylaxis if appropriate   -  Monitor for and prevent skin breakdown and pressure ulcers  · Early mobility, repositioning/weight shifting every 20-30 minutes when sitting, turn patient every 2 hours, proper mattress/overlay and chair cushioning, pressure relief/heel protector boots    Vocal fold paresis, right  -ENT  consulted  -patient declines inpatient microlaryngoscopy/possible vocal fold injection augmentation. Recommends follow up with Dr. Mendoza in 4-6 weeks    Hypernatremia  -primary managing     Leukocytosis   -uptrending on 10/2    Severe mitral regurgitation  - Moderate TR and left atrial thrombus also noted on echo. EF 60%  -now s/p MVR and TVR on 9/9 w/ CTS  -on sternal precautions    Paroxysmal atrial fibrillation  -on Eliquis     Reviewed case with Collaborative MD, Dr. Mclain. PAC recommendation: Inpatient Rehab.         Delores Lr NP  Department of Physical Medicine & Rehab   [facility]

## 2020-10-02 NOTE — PLAN OF CARE
Patient is AO x 4; VSS; assist x2. A fib on telemetry. D 5% 1/2 NS d/c.  Dark red urine output via external catheter, urine culture positive for Enterobacter cloacae. No falls; no injuries; no acute incidents experienced.  Sternal precaution education reinforced.  PT/OT and Speech following.  No complaint of CP/SOB/discomfort. Plan of care reviewed with patient. No questions at this time.  Will continue to monitor.   98.4

## 2020-10-02 NOTE — CARE UPDATE
"RAPID RESPONSE NURSE PROACTIVE ROUNDING NOTE     Time of Visit: 1415    Admit Date: 2020  LOS: 32  Code Status: Full Code   Date of Visit: 10/02/2020  : 1945  Age: 75 y.o.  Sex: female  Race: White  Bed: 322/322 A:   MRN: [id number]  Was the patient discharged from an ICU this admission? no   Was the patient discharged from a PACU within last 24 hours?  no  Did the patient receive conscious sedation/general anesthesia in last 24 hours?  yes  Was the patient in the ED within the past 24 hours?  no  Was the patient started on NIPPV within the past 24 hours?  no  Attending Physician: Antoni Waddell MD  Primary Service: Networked reference to record PCT     ASSESSMENT     Notified by Epic patient alert.  Reason for alert: Hypoxia    Diagnosis: Acute respiratory failure with hypoxia    Abnormal Vital Signs: BP (!) 150/65   Pulse 99   Temp 98.6 °F (37 °C) (Axillary)   Resp (!) 22   Ht 5' 5" (1.651 m)   Wt 79 kg (174 lb 2.6 oz)   SpO2 (!) 85%   Breastfeeding No   BMI 28.98 kg/m²      Clinical Issues: Respiratory    Patient  has a past medical history of Atrial fibrillation with RVR, Cataract, Essential hypertension, Glaucoma, Heart valve problem, Hyperlipidemia, and Severe mitral regurgitation.      SpO2 90% on 15 L NRB. Pt s/p thoracentesis. Concern for pneumothorax. CXR obtained. Primary team at bedside. Pt stable on NRB. Awaiting CXR results/      INTERVENTIONS/ RECOMMENDATIONS     CXR, monitoring respiratory status.     Discussed plan of care with RNSofie.    PHYSICIAN ESCALATION     Yes/No  yes    Orders received and case discussed with TAI Anderson.    Disposition: Remain in room 322 A.    FOLLOW-UP     Call back the Rapid Response Nurse, Marley Palacios RN at [phone] for additional questions or concerns.          "

## 2020-10-02 NOTE — SUBJECTIVE & OBJECTIVE
Interval History 10/2/2020:  Patient is seen for follow-up PM&R evaluation and recommendations: Participating w/ therapy. Tachypnea resolved.     HPI, Past Medical, Family, and Social History remains the same as documented in the initial encounter.    Scheduled Medications:    apixaban  5 mg Oral BID    aspirin  81 mg Oral Daily    digoxin  0.125 mg Oral Daily    famotidine  20 mg Oral Daily    melatonin  6 mg Oral Nightly    potassium chloride  20 mEq Oral BID    sulfamethoxazole-trimethoprim 800-160mg  1 tablet Oral BID       Diagnostic Results: Labs: Reviewed    PRN Medications: acetaminophen, calcium carbonate, dextrose 50%, ondansetron, sodium chloride 3%    Review of Systems   Constitutional: Positive for activity change and fatigue.   Respiratory: Negative for shortness of breath.    Musculoskeletal: Positive for gait problem.   Neurological: Positive for weakness.   Psychiatric/Behavioral: The patient is nervous/anxious.      Objective:     Vital Signs (Most Recent):  Temp: 98 °F (36.7 °C) (10/02/20 0900)  Pulse: (!) 111 (10/02/20 0900)  Resp: 18 (10/02/20 0900)  BP: 115/63 (10/02/20 0900)  SpO2: 95 % (10/02/20 0900)    Vital Signs (24h Range):  Temp:  [97.7 °F (36.5 °C)-98.6 °F (37 °C)] 98 °F (36.7 °C)  Pulse:  [] 111  Resp:  [16-20] 18  SpO2:  [92 %-99 %] 95 %  BP: ()/(58-79) 115/63     Physical Exam  Vitals signs reviewed.   Constitutional:       General: She is not in acute distress.     Appearance: She is well-developed.      Comments: Appears fatigued   HENT:      Head: Normocephalic and atraumatic.   Eyes:      General:         Right eye: No discharge.         Left eye: No discharge.   Neck:      Musculoskeletal: Neck supple.   Cardiovascular:      Rate and Rhythm: Normal rate.   Pulmonary:      Effort: Pulmonary effort is normal. No respiratory distress.      Comments: On NC    Abdominal:      Palpations: Abdomen is soft.      Tenderness: There is no abdominal tenderness.    Musculoskeletal:         General: No deformity.   Skin:     General: Skin is warm and dry.      Comments: midsternal incision c/d/i    Neurological:      Mental Status: She is alert and oriented to person, place, and time.      Motor: Weakness and tremor present.      Comments: + dysphonia, communicated via writing    Psychiatric:         Mood and Affect: Mood is anxious.         Behavior: Behavior normal.         Cognition and Memory: Cognition is not impaired.       NEUROLOGICAL EXAMINATION:     MENTAL STATUS   Oriented to person, place, and time.

## 2020-10-02 NOTE — HPI
Fatou Lorenzo is a 75 y.o. female with a past psychiatric history of anxiety who presented to [facility] due to Acute respiratory failure with hypoxia. Psychiatry was consulted to address the patient's symptoms of depression.     Per Primary MD:  Fatou Lorenzo is a 75 y.o. female that has a past medical history of Atrial fibrillation with RVR, Cataract, Glaucoma, Heart valve problem, Hyperlipidemia, and Severe mitral regurgitation.  has a past surgical history that includes Ankle surgery; lipoma removal; and Left heart catheterization (Left, 8/25/2020). Presents to [facility] complaining of recurrent shortness of breath.  Patient states it feels like she is having heart failure again.  She was discharged presumably on August 27th (no discharge summary available at this time), 4 days ago for acute respiratory failure with hypoxia secondary to CHF exacerbation.  The patient is typically followed at [facility].  Reports 8 years ago was referred to Dr. Waddell, but valvular regurgitation not bad enough at that time to do surgery.  However see has severe tricuspid and mitral valve disease which was confirmed during her previous admission by echocardiogram.  She is having dyspnea with exertion, shortness of breath at rest, and orthopnea.  Worsened with exertion.      Patient now s/p open mitral valve repair, TC valve repair on 9/9/2020. Patient has had multiple re-intubations for hypoxemic respiratory failure resulting in severe hoarseness and some dysphagia. Patient currently being seen by PT/OT/PM&R for weakness, impaired balance, impaired endurance, impaired cardiopulmonary response to activity, impaired self care skills, decreased coordination, impaired functional mobilty, decreased lower extremity function, gait instability. Per primary team, patient has largely had minimal improvement with rehab to this point and has been very anxious lately and refusing some treatment  options. CM spoke with son recently who voiced that patient sounds confused on phone.      Per C-L Psych MD:  When seen today, patient calm and cooperative with interview. AAOx4, son at bedside provides additional collateral. Low volume with some hoarseness noted but able to fully participate in interview. Endorses extensive cardiovascular course over the last few months/years, prior to current hospital presentation. Now endorses having been in a hosptial seting for past few weeks. During this time, endorses worsening depressive symptoms including constant low mood, anhedonia, poor sleep, low energy level, poor appetite, and hopelessness. Endorses worsening hopelessness and feeling like life is not worth living anymore, but denies any active suicidal plan or intent. Denies any previous suicide attempts or past psychiatric admissions. States she has felt like this in the past and was previously started by PCP on Lexapro (unknown dosage). Endorses it being effective and was on it for 5 years, before stopping it 2 years ago. Endorses symptoms of generalized anxiety disorder including easy fatiguability, sleep disturbances, restlessness, and irritability. Endorses having flashbacks, nightmares and hypervigilance related to initially resuscitation attempts made in the past. Denies any AVH or other hallucinations.. Patient does not demonstrate paranoia, delusions, disorganized thinking or disorganized behavior. Denies any HI. Denies any symptoms of stefany now or in the past. States now the plan is to hopefully attend rehab on Monday which she is optimistic about.    Collateral:   Son at bedside, provides collateral integrated above    Psychiatric Review of Systems-is patient experiencing or having changes in  sleep: yes, poor  appetite: yes, decreased  weight: yes, loss  energy/anergy: yes  interest/pleasure/anhedonia: yes  somatic symptoms: no  anxiety/panic: yes  guilty/hopelessness: yes  concentration: no  S.I.B.s/risky  behavior: no  any drugs: no  alcohol: no     Past Psychiatric History:  Previous Medication Trials: yes, lexapro  Previous Psychiatric Hospitalizations: no   Previous Suicide Attempts: no   History of Violence: no  Outpatient Psychiatrist: no    Social History:  Marital Status:   Children: 3   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

## 2020-10-02 NOTE — CONSULTS
[facility]  Infectious Disease  Consult Note    Patient Name: Fatou Lorenzo  MRN: [id number]  Admission Date: 8/30/2020  Hospital Length of Stay: 32 days  Attending Physician: Antoni Waddell MD  Primary Care Provider: Ludin Rivas MD     Isolation Status: No active isolations    Patient information was obtained from patient, past medical records and ER records.      Inpatient consult to Infectious Diseases  Consult performed by: Vance Singh PA-C  Consult ordered by: Vaina Anderson NP        Assessment/Plan:     Leukocytosis  Pt is a 75 y.o. female w/ pmhx of Atrial fibrillation with RVR, HLD and Severe mitral regurgitation.  Pt w/ recent left heart catheterization 8/25/2020 who presented w/ complaints of recurrent shortness of breath.  Transesophageal echocardiogram done recently w/ left atrial thrombus.  L heart angiography w/ nonobstructive CAD.   Upon admission, pt required BiPAP due to hypoxemia not responsive to nasal cannula.    Pt transferred to [facility] for CT surgery evaluation given recurrent admission despite compliance, BP control and diuresis.  Pt seen by Dr. Waddell who recommended MV repair.  Pt underwent MV repair and TV annuloplasty on 9/9. Pt course c/b reintuabtion and pericardial window for evacuation of posterior cardiac tamponade on 9/18.  Pt extubated on 9/24.  Developed increasing white count on 9/28.  UA done concerning for infection.  Urine cxs positive for E.Cloacae.  Pt started on Bactrim.  Chest Xray from 10/1- Since September 28, 2020, increased large right pleural effusion.  Increased diffuse right airspace opacities.  X ray abdomen 9/23 w/ Probable bilateral nephrolithiasis.      Pt underwent IR thoracentesis- not sent for cx and counts not sent    ID consulted for positive urine culture.      Plan  -Discontinue Bactrim and Levaquin  -Start on Vanc.  Pharmacy to dose.  Trough goal 15-20.   -Start on Cefepime 2g q8h   -Cefepime will cover for  UTI-e.cloacae and potential resp infection.  -Recommend resp cxs  -Agree with further drainage of pleural fluid  -Please get pleural fluid for counts as well as for cxs- gram stain, aerobic, anaerobic, afb and fungal.  -Will follow cxs and tailor abx accordingly.   -Spoke w/ CTS team and ID staff about pt and abx plan.    -ID will continue to follow        Thank you for your consult. I will follow-up with patient. Please contact us if you have any additional questions.    Vance Singh PA-C  Infectious Disease  [facility]    Subjective:     Principal Problem: Acute respiratory failure with hypoxia    HPI: Pt is a 75 y.o. female w/ pmhx of Atrial fibrillation with RVR, HLD and Severe mitral regurgitation.  Pt w/ recent left heart catheterization 8/25/2020 who presented w/ complaints of recurrent shortness of breath.  Transesophageal echocardiogram done recently w/ left atrial thrombus.  L heart angiography w/ nonobstructive CAD.   Upon admission, pt required BiPAP due to hypoxemia not responsive to nasal cannula.    Pt transferred to [facility] for [place] surgery evaluation given recurrent admission despite compliance, BP control and diuresis.  Pt seen by Dr. Waddell who recommended MV repair.  Pt underwent MV repair and TV annuloplasty on 9/9. Pt course c/b reintuabtion and pericardial window for evacuation of posterior cardiac tamponade on 9/18.  Pt extubated on 9/24.  Developed increasing white count on 9/28.  UA done concerning for infection.  Urine cxs positive for E.Cloacae.  Pt started on Bactrim.  Chest Xray from 10/1- Since September 28, 2020, increased large right pleural effusion.  Increased diffuse right airspace opacities.  X ray abdomen 9/23 w/ Probable bilateral nephrolithiasis.    ID consulted for positive urine culture.        Past Medical History:   Diagnosis Date    Atrial fibrillation with RVR 8/24/2020    Cataract     Essential hypertension 8/31/2020    Glaucoma      Heart valve problem     Hyperlipidemia     Severe mitral regurgitation 8/24/2020       Past Surgical History:   Procedure Laterality Date    ANKLE SURGERY      BAIN MAZE PROCEDURE N/A 9/9/2020    Procedure: BAIN MAZE PROCEDURE;  Surgeon: Antoni Waddell MD;  Location: [address];  Service: Cardiothoracic;  Laterality: N/A;  MAZE     LEFT HEART CATHETERIZATION Left 8/25/2020    Procedure: Left heart cath, radial;  Surgeon: Marlee Carrillo MD;  Location: [facility] CATH LAB;  Service: Cardiology;  Laterality: Left;    lipoma removal      TRICUSPID VALVULOPLASTY N/A 9/9/2020    Procedure: REPAIR, TRICUSPID VALVE;  Surgeon: Antoni Waddell MD;  Location: [address];  Service: Cardiothoracic;  Laterality: N/A;       Review of patient's allergies indicates:  No Known Allergies    Medications:  Medications Prior to Admission   Medication Sig    metoprolol tartrate (LOPRESSOR) 25 MG tablet Take 1 tablet (25 mg total) by mouth 3 (three) times daily.    amiodarone (PACERONE) 400 MG tablet Take two tablets by mouth twice daily for twelve days, then take one tablet by mouth daily.    apixaban (ELIQUIS) 5 mg Tab Take 1 tablet (5 mg total) by mouth 2 (two) times daily.    furosemide (LASIX) 40 MG tablet Take 1 tablet (40 mg total) by mouth 2 (two) times daily.    losartan (COZAAR) 50 MG tablet Take 0.5 tablets (25 mg total) by mouth once daily.    pravastatin (PRAVACHOL) 80 MG tablet Take 80 mg by mouth once daily.     Antibiotics (From admission, onward)    Start     Stop Route Frequency Ordered    09/29/20 2100  sulfamethoxazole-trimethoprim 800-160mg per tablet 1 tablet      10/02 2059 Oral 2 times daily 09/29/20 1610        Antifungals (From admission, onward)    None        Antivirals (From admission, onward)    None           Immunization History   Administered Date(s) Administered    PPD Test 09/24/2020       Family History     Problem Relation (Age of Onset)    Cancer Mother    Cataracts Sister     No Known Problems Father, Brother, Maternal Aunt, Maternal Uncle, Paternal Aunt, Paternal Uncle, Maternal Grandmother, Maternal Grandfather, Paternal Grandmother, Paternal Grandfather        Social History     Socioeconomic History    Marital status:      Spouse name: Not on file    Number of children: Not on file    Years of education: Not on file    Highest education level: Not on file   Occupational History    Not on file   Social Needs    Financial resource strain: Not on file    Food insecurity     Worry: Not on file     Inability: Not on file    Transportation needs     Medical: Not on file     Non-medical: Not on file   Tobacco Use    Smoking status: Never Smoker    Smokeless tobacco: Never Used   Substance and Sexual Activity    Alcohol use: No    Drug use: No    Sexual activity: Not Currently   Lifestyle    Physical activity     Days per week: Not on file     Minutes per session: Not on file    Stress: Not on file   Relationships    Social connections     Talks on phone: Not on file     Gets together: Not on file     Attends [employer] service: Not on file     Active member of club or organization: Not on file     Attends meetings of clubs or organizations: Not on file     Relationship status: Not on file   Other Topics Concern    Not on file   Social History Narrative    Not on file     Review of Systems   Constitutional: Positive for activity change and fatigue. Negative for chills and fever.   Respiratory: Positive for cough and shortness of breath. Negative for choking and stridor.    Cardiovascular: Negative for chest pain, palpitations and leg swelling.   Gastrointestinal: Negative for abdominal distention, abdominal pain, anal bleeding, diarrhea, nausea and vomiting.   Genitourinary: Positive for difficulty urinating. Negative for dysuria, flank pain and pelvic pain.   Musculoskeletal: Negative for arthralgias, back pain, myalgias and neck pain.   Skin: Positive for wound  (midline incision). Negative for color change and pallor.   Neurological: Negative for dizziness, seizures and headaches.   Psychiatric/Behavioral: Negative for agitation, behavioral problems and decreased concentration.   All other systems reviewed and are negative.    Objective:     Vital Signs (Most Recent):  Temp: 98 °F (36.7 °C) (10/02/20 0900)  Pulse: (!) 111 (10/02/20 0900)  Resp: 18 (10/02/20 0900)  BP: 115/63 (10/02/20 0900)  SpO2: 95 % (10/02/20 0900) Vital Signs (24h Range):  Temp:  [97.7 °F (36.5 °C)-98.6 °F (37 °C)] 98 °F (36.7 °C)  Pulse:  [] 111  Resp:  [16-20] 18  SpO2:  [92 %-99 %] 95 %  BP: ()/(58-79) 115/63     Weight: 79 kg (174 lb 2.6 oz)  Body mass index is 28.98 kg/m².    Estimated Creatinine Clearance: 63.1 mL/min (based on SCr of 0.8 mg/dL).    Physical Exam  Constitutional:       Appearance: She is well-developed. She is ill-appearing.   HENT:      Nose: Nose normal.   Eyes:      General: No scleral icterus.  Neck:      Musculoskeletal: Normal range of motion.   Cardiovascular:      Rate and Rhythm: Normal rate and regular rhythm.      Heart sounds: Murmur present.   Pulmonary:      Effort: Pulmonary effort is normal.      Breath sounds: Normal breath sounds. No wheezing or rhonchi.      Comments: On NC  Abdominal:      General: There is no distension.      Palpations: Abdomen is soft. There is no mass.      Tenderness: There is abdominal tenderness (suprapubic).   Musculoskeletal: Normal range of motion.         General: No swelling or tenderness.      Right lower leg: No edema.      Left lower leg: No edema.   Skin:     General: Skin is warm and dry.      Findings: No bruising, erythema or lesion.      Comments: Sternal Incision CDI   Neurological:      Mental Status: She is alert and oriented to person, place, and time. Mental status is at baseline.         Significant Labs: All pertinent labs within the past 24 hours have been reviewed.    Significant Imaging: I have  reviewed all pertinent imaging results/findings within the past 24 hours.

## 2020-10-02 NOTE — PLAN OF CARE
Plan of care discussed with patient. Patient is free of fall/trauma/injury. Pt had thoracentesis, 60cc removed from R lung. Chest tube to be placed in ICU due to hypoxia on return to unit from thoracentesis. IV levaquin started per order.  All questions addressed. Will continue to monitor

## 2020-10-02 NOTE — NURSING
Pt returned from IR and pt's O2 sat found to be in the 80's on 3L NC (found by RT) sats only increased to mid 80's on 15L nonrebreather. Rapid RT called by floor RT for additional support. Pt has increased work of breathing and absent breath sounds on the R lung. Charge RN notified and Monica NP. NP ordered STAT CXR. Pt transitioned to 15L 40% venti mask with O2 sats in the 90's. Pt on continuous pulse ox. Pt to be transferred to ICU for chest tube and closer monitoring.

## 2020-10-02 NOTE — RESPIRATORY THERAPY
Rapid Response Respiratory Therapy Proactive Rounding Note      Time of visit: 1355    Code Status: Full Code   : 1945  Bed: 322/322 A:   MRN: [id number]    SITUATION     Evaluated patient for: SOB increased WOB Increased oxygen needs    BACKGROUND     Patient has a past medical history of Atrial fibrillation with RVR, Cataract, Essential hypertension, Glaucoma, Heart valve problem, Hyperlipidemia, and Severe mitral regurgitation.    ASSESSMENT/INTERVENTIONS     Upon arrival in room pt alert and awake. Pt WOB increased with abdominal and accessory muscle use. SPO2 was 80%. RRT and nurse at bedside. RRT placed pt on NRB prior to my arrival. Pt has been on NC 3L earlier today. RRT ADRIAN Wood contacted TAI Anderson on floor. Chest xray was obtained. NP contacted MD team. Monitored pt until pt status improved. Weaned oxygen to venti mask 15L 40%. Informed Monica, NP pt Spo2 has returned to 96-97% on the venti mask. Pt stated the flow any lower than the 15L 40% venti mask was not enough to help her WOB.    Pulse: 112 Respiratory rate: 24 Temperature: Temp: 98.6 °F (37 °C) BP: BP: (!) 150/65 SpO2: 96%  Level of Consciousness: Level of Consciousness (AVPU): alert  Respiratory Effort: Respiratory Effort: Mild, Labored Expansion/Accessory Muscle Usage: Expansion/Accessory Muscles/Retractions: accessory muscle use, abdominal muscle use  All Lung Field Breath Sounds: All Lung Fields Breath Sounds: Anterior:, Lateral:  PRISCA Breath Sounds: diminished, clear  LLL Breath Sounds: diminished, clear  RUL Breath Sounds: diminished  RML Breath Sounds: absent  RLL Breath Sounds: absent  O2 Device/Concentration: venti mask 15L 40%  Most recent blood gas: No results for input(s): PH, PCO2, PO2, HCO3, POCSATURATED, BE in the last 72 hours.  NIPPV: No Surgical airway: No  ETCO2 monitored:    Ambu at bedside: Ambu bag with the patient?: Yes, Adult Ambu      RECOMMENDATIONS     TAI Anderson and MD team are following up with family and pt  on next steps for POC.    ESCALATION      Physician Escalation (Yes/No) yes    Care discussed with: Britt. nurse  Discussed plan of care primary RT, ADRIAN Wood RRT and CHRISTIAN Mace RRT     FOLLOW-UP     Please call back the Rapid Response RT, Zarina Snyder, CRT at x [phone] for any questions or concerns.

## 2020-10-02 NOTE — PT/OT/SLP PROGRESS
Physical Therapy      Patient Name:  Fatou Lorenzo   MRN:  [id number]    Patient not seen today secondary to (Per RN, post-thoracentesis pt demo decreased breath sounds in R lung and SpO2 in low 80s. Rapid response team at bedside. Hold PT/OT today.). Will follow-up at next scheduled session as able.    Jeremie Sykes, SPT

## 2020-10-02 NOTE — PROCEDURES
Radiology Post-Procedure Note    Pre Op Diagnosis: right sided pleural effusion  Post Op Diagnosis: Same    Procedure: ultrasound guided thoracentesis    Procedure performed by: Ramin Roche MD     Written Informed Consent Obtained: Yes  Specimen Removed: YES angel fluid  Estimated Blood Loss: Minimal    Findings:   Innumerable loculations noted on the right side. Successful right sided thoracentesis.    Patient tolerated procedure well.    CAYLA Medina, FNP  Interventional Radiology  [phone] clinic

## 2020-10-02 NOTE — ASSESSMENT & PLAN NOTE
Pt is a 75 y.o. female w/ pmhx of Atrial fibrillation with RVR, HLD and Severe mitral regurgitation.  Pt w/ recent left heart catheterization 8/25/2020 who presented w/ complaints of recurrent shortness of breath.  Transesophageal echocardiogram done recently w/ left atrial thrombus.  L heart angiography w/ nonobstructive CAD.   Upon admission, pt required BiPAP due to hypoxemia not responsive to nasal cannula.    Pt transferred to [facility] for CT surgery evaluation given recurrent admission despite compliance, BP control and diuresis.  Pt seen by Dr. Waddell who recommended MV repair.  Pt underwent MV repair and TV annuloplasty on 9/9. Pt course c/b reintuabtion and pericardial window for evacuation of posterior cardiac tamponade on 9/18.  Pt extubated on 9/24.  Developed increasing white count on 9/28.  UA done concerning for infection.  Urine cxs positive for E.Cloacae.  Pt started on Bactrim.  Chest Xray from 10/1- Since September 28, 2020, increased large right pleural effusion.  Increased diffuse right airspace opacities.  X ray abdomen 9/23 w/ Probable bilateral nephrolithiasis.      Pt underwent IR thoracentesis- not sent for cx and counts not sent    ID consulted for positive urine culture.      Plan  -Discontinue Bactrim and Levaquin  -Start on Vanc.  Pharmacy to dose.  Trough goal 15-20.   -Start on Cefepime 2g q8h   -Cefepime will cover for UTI-e.cloacae and potential resp infection.  -Recommend resp cxs  -Agree with further drainage of pleural fluid  -Please get pleural fluid for counts as well as for cxs- gram stain, aerobic, anaerobic, afb and fungal.  -Will follow cxs and tailor abx accordingly.   -Spoke w/ CTS team and ID staff about pt and plan.    -ID will continue to follow

## 2020-10-02 NOTE — H&P
Inpatient Radiology Pre-procedure Note    History of Present Illness:  Fatou Lorenzo is a 75 y.o. female who presents for ultrasound guided thoracentesis.  Admission H&P reviewed.  Past Medical History:   Diagnosis Date    Atrial fibrillation with RVR 8/24/2020    Cataract     Essential hypertension 8/31/2020    Glaucoma     Heart valve problem     Hyperlipidemia     Severe mitral regurgitation 8/24/2020     Past Surgical History:   Procedure Laterality Date    ANKLE SURGERY      BAIN MAZE PROCEDURE N/A 9/9/2020    Procedure: BAIN MAZE PROCEDURE;  Surgeon: Antoni Waddell MD;  Location: [facility] OR [address] [place] [address];  Service: Cardiothoracic;  Laterality: N/A;  MAZE     LEFT HEART CATHETERIZATION Left 8/25/2020    Procedure: Left heart cath, radial;  Surgeon: Marlee Carrillo MD;  Location: [facility] CATH LAB;  Service: Cardiology;  Laterality: Left;    lipoma removal      TRICUSPID VALVULOPLASTY N/A 9/9/2020    Procedure: REPAIR, TRICUSPID VALVE;  Surgeon: Antoni Waddell MD;  Location: [facility] OR [address] [place] [address];  Service: Cardiothoracic;  Laterality: N/A;       Review of Systems:   As documented in primary team H&P    Home Meds:   Prior to Admission medications    Medication Sig Start Date End Date Taking? Authorizing Provider   metoprolol tartrate (LOPRESSOR) 25 MG tablet Take 1 tablet (25 mg total) by mouth 3 (three) times daily. 8/27/20 8/27/21 Yes Ayan Hill MD   amiodarone (PACERONE) 400 MG tablet Take two tablets by mouth twice daily for twelve days, then take one tablet by mouth daily. 8/27/20   Ayan Hill MD   apixaban (ELIQUIS) 5 mg Tab Take 1 tablet (5 mg total) by mouth 2 (two) times daily. 8/27/20   Ayan Hill MD   furosemide (LASIX) 40 MG tablet Take 1 tablet (40 mg total) by mouth 2 (two) times daily. 8/27/20 8/27/21  Ayan Hill MD   losartan (COZAAR) 50 MG tablet Take 0.5 tablets (25 mg total) by mouth once daily. 8/27/20   Ayan Hill MD   pravastatin (PRAVACHOL) 80 MG tablet Take 80  mg by mouth once daily.    Historical Provider     Scheduled Meds:    apixaban  5 mg Oral BID    aspirin  81 mg Oral Daily    digoxin  0.125 mg Oral Daily    famotidine  20 mg Oral Daily    melatonin  6 mg Oral Nightly    potassium chloride  20 mEq Oral BID    sulfamethoxazole-trimethoprim 800-160mg  1 tablet Oral BID     Continuous Infusions:   PRN Meds:acetaminophen, calcium carbonate, dextrose 50%, ondansetron, sodium chloride 3%  Anticoagulants/Antiplatelets: aspirin and apixaban    Allergies: Review of patient's allergies indicates:  No Known Allergies  Sedation Hx: have not been any systemic reactions    Labs:  Recent Labs   Lab 09/28/20  0526   INR 2.2*       Recent Labs   Lab 10/02/20  0434   WBC 20.86*   HGB 9.6*   HCT 32.6*   *         Recent Labs   Lab 10/02/20  0434   GLU 94   *   K 4.2   *   CO2 21*   BUN 28*   CREATININE 0.8   CALCIUM 8.5*   MG 3.1*   ALT 72*   AST 45*   ALBUMIN 2.1*   BILITOT 1.9*         Vitals:  Temp: 98 °F (36.7 °C) (10/02/20 0900)  Pulse: 108 (10/02/20 1140)  Resp: 18 (10/02/20 0900)  BP: 115/63 (10/02/20 0900)  SpO2: 95 % (10/02/20 0900)     Physical Exam:  ASA: 3  Mallampati: n/a    General: no acute distress  Mental Status: alert and oriented to person, place and time  HEENT: normocephalic, atraumatic  Chest: labored breathing  Heart: tahcycardia  Abdomen: nondistended  Extremity: moves all extremities    Plan: ultrasound guided thoracentesis  Sedation Plan: local    CAYLA Medina, JESSICAP  Interventional Radiology  [phone] [facility]

## 2020-10-02 NOTE — PLAN OF CARE
60cc drained from R lung. Pt tolerated w/o incident. She remained with elevated HR, sats 95% on 3L nc. Site to back dressed, CDI. Chest xray to be done stat. Report called to primary nurse.

## 2020-10-03 NOTE — HPI
Patient is a 74 yo F s/p MVr, Tvr, MAZE on 9/9/20. Case was noteworthy for 3 pump runs and development of a RV hematoma2/2 retraction. Post operatively she was admitted to the ICU and had a protracted course. She was extubated 9/15. Became hemodynamically unstable 9/18 and had to be reintubated along with performing a bedside pericardial window for cardiac tamponade. Around this time she also went into afib, but notably she does have a history of afib. Other significant events in her post op course included severe JONAH, never went on dialysis, but required aggressive diuresis for volume overload. She was stepped down 9/27. She represents to SICU due to increased O2 requirement. This morning she was on 3L NC but necessitated 15L ventimask to keep her saturations above 90. Chest films showed worsening right sided pleural effusion. She was sent to IR for thoracentesis but ultrasound imaging showed significant loculations so the procedure was never performed.

## 2020-10-03 NOTE — CONSULTS
[facility]  Cardiothoracic Surgery  Consult Note    Patient Name: Fatou Lorenzo  MRN: [id number]  Admission Date: 8/30/2020  Attending Physician: Antoni Waddell MD  Referring Provider: Self, Aaareferral    Patient information was obtained from patient, past medical records and ER records.     Inpatient consult to Cardiothoracic Surgery  Consult performed by: Caitlin Lau MD  Consult ordered by: Albert Abrams MD  Reason for consult: R pleural effusion   Assessment/Recommendations: 76 yo female with complex right sided pleural effusion s/p Mvr, Tvr, MAZE 9/09/2020, multiloculated and not amendable to IR drainage.     - Will plan for right sided VATs with mechanical and/or chemical pleurodesis, possible thoracotomy on Monday.  - Hold eliquis   - Will obtain consent tomorrow.   - COVID today   - Will coordinate with ENT for vocal cord medialization/injection        Subjective:     Chief Complaint/Reason for Admission: Right sided pleural effusion    History of Present Illness: 76 yo female with extensive cardiac hx s/p Mvr, Tvr, MAZE 9/09/2020 with complicated post-operative course including protracted ICU stay, reintubation, pericardial window for cardiac tamponade, new arrythmia, JONAH. She was eventually stepped down but readmitted to SICU shortly after for increasing oxygen requirements, found to have worsening right sided pleural effusion. IR US showed multiple loculations; thoracentesis able to drain 60 cc of fluid. CT consulted for management as pleural effusion not amendable to drainage.     No current facility-administered medications on file prior to encounter.      Current Outpatient Medications on File Prior to Encounter   Medication Sig    metoprolol tartrate (LOPRESSOR) 25 MG tablet Take 1 tablet (25 mg total) by mouth 3 (three) times daily.    amiodarone (PACERONE) 400 MG tablet Take two tablets by mouth twice daily for twelve days, then take one tablet by mouth daily.     apixaban (ELIQUIS) 5 mg Tab Take 1 tablet (5 mg total) by mouth 2 (two) times daily.    furosemide (LASIX) 40 MG tablet Take 1 tablet (40 mg total) by mouth 2 (two) times daily.    losartan (COZAAR) 50 MG tablet Take 0.5 tablets (25 mg total) by mouth once daily.    pravastatin (PRAVACHOL) 80 MG tablet Take 80 mg by mouth once daily.       Review of patient's allergies indicates:  No Known Allergies    Past Medical History:   Diagnosis Date    Atrial fibrillation with RVR 8/24/2020    Cataract     Essential hypertension 8/31/2020    Glaucoma     Heart valve problem     Hyperlipidemia     Severe mitral regurgitation 8/24/2020     Past Surgical History:   Procedure Laterality Date    ANKLE SURGERY      BAIN MAZE PROCEDURE N/A 9/9/2020    Procedure: BAIN MAZE PROCEDURE;  Surgeon: Antoni Waddell MD;  Location: [address];  Service: Cardiothoracic;  Laterality: N/A;  MAZE     LEFT HEART CATHETERIZATION Left 8/25/2020    Procedure: Left heart cath, radial;  Surgeon: Marlee Carrillo MD;  Location: [facility] CATH LAB;  Service: Cardiology;  Laterality: Left;    lipoma removal      TRICUSPID VALVULOPLASTY N/A 9/9/2020    Procedure: REPAIR, TRICUSPID VALVE;  Surgeon: Antoni Waddell MD;  Location: [address];  Service: Cardiothoracic;  Laterality: N/A;     Family History     Problem Relation (Age of Onset)    Cancer Mother    Cataracts Sister    No Known Problems Father, Brother, Maternal Aunt, Maternal Uncle, Paternal Aunt, Paternal Uncle, Maternal Grandmother, Maternal Grandfather, Paternal Grandmother, Paternal Grandfather        Tobacco Use    Smoking status: Never Smoker    Smokeless tobacco: Never Used   Substance and Sexual Activity    Alcohol use: No    Drug use: No    Sexual activity: Not Currently     Review of Systems   HENT: Positive for trouble swallowing and voice change.    Respiratory: Positive for shortness of breath. Negative for chest tightness.    Cardiovascular: Negative  for chest pain and leg swelling.     Objective:     Vital Signs (Most Recent):  Temp: 97.7 °F (36.5 °C) (10/03/20 0700)  Pulse: 81 (10/03/20 1030)  Resp: 20 (10/03/20 1030)  BP: (!) 118/55 (10/03/20 1000)  SpO2: 100 % (10/03/20 1030) Vital Signs (24h Range):  Temp:  [97.7 °F (36.5 °C)-98.6 °F (37 °C)] 97.7 °F (36.5 °C)  Pulse:  [] 81  Resp:  [10-41] 20  SpO2:  [84 %-100 %] 100 %  BP: ()/(51-85) 118/55     Weight: 79 kg (174 lb 2.6 oz)  Body mass index is 28.98 kg/m².    SpO2: 100 %  O2 Device (Oxygen Therapy): High Flow nasal Cannula     Intake/Output - Last 3 Shifts       10/01 0700 - 10/02 0659 10/02 0700 - 10/03 0659 10/03 0700 - 10/04 0659    P.O. 240 365     I.V. (mL/kg)       IV Piggyback  150     Total Intake(mL/kg) 240 (3) 515 (6.5)     Urine (mL/kg/hr) 1500 (0.8) 500 (0.3) 50 (0.2)    Other  60     Stool       Total Output 1500 560 50    Net -1260 -45 -50           Urine Occurrence  1 x 1 x    Stool Occurrence 1 x             Lines/Drains/Airways     Drain            Female External Urinary Catheter 10/03/20 0705 less than 1 day          Peripheral Intravenous Line                 Peripheral IV - Single Lumen 09/30/20 1248 20 G Anterior;Left;Proximal Forearm 2 days                  Physical Exam  Vitals signs and nursing note reviewed.   Constitutional:       Appearance: Normal appearance.   HENT:      Head: Normocephalic and atraumatic.   Cardiovascular:      Rate and Rhythm: Normal rate and regular rhythm.   Pulmonary:      Effort: Pulmonary effort is normal. No respiratory distress.      Breath sounds: No wheezing.   Abdominal:      General: Abdomen is flat.      Palpations: Abdomen is soft.   Musculoskeletal:      Right lower leg: No edema.      Left lower leg: No edema.   Skin:     General: Skin is warm and dry.      Comments: Midline sternal incision with retention sutures in place, c/d/i without s/s of infection or drainage   Neurological:      General: No focal deficit present.       Mental Status: She is alert and oriented to person, place, and time.   Psychiatric:         Mood and Affect: Mood normal.         Behavior: Behavior normal.         Significant Labs:  CBC:   Recent Labs   Lab 10/03/20  0303   WBC 25.49*   RBC 2.80*   HGB 8.2*   HCT 28.2*      *   MCH 29.3   MCHC 29.1*     CMP:   Recent Labs   Lab 10/03/20  0303   GLU 81   CALCIUM 8.4*   ALBUMIN 1.8*   PROT 5.5*   *   K 4.5   CO2 23   *   BUN 31*   CREATININE 0.8   ALKPHOS 316*   ALT 72*   AST 41*   BILITOT 1.9*       Significant Diagnostics:  I have reviewed all pertinent imaging results/findings within the past 24 hours.    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] 09/28/2020 No    S/P MVR (mitral valve repair) [Z98.890] 09/28/2020 Not Applicable    S/P TVR (tricuspid valve repair) [Z98.890] 09/28/2020 Not Applicable    Hypernatremia [E87.0] 09/24/2020 No    Hypokalemia [E87.6]  No    Pericardial effusion with cardiac tamponade [I31.3, I31.4]  No    JONAH (acute kidney injury) [N17.9] 09/18/2020 Unknown    Atrial fibrillation with RVR [I48.91] 09/18/2020 Unknown    Leukocytosis [D72.829] 09/18/2020 Unknown    Essential hypertension [I10] 08/31/2020 Yes    Other hyperlipidemia [E78.49] 08/31/2020 Yes    Coronary artery disease involving native coronary artery of native heart without angina pectoris [I25.10] 08/31/2020 Yes    Acute on chronic diastolic (congestive) heart failure [I50.33] 08/31/2020 Yes    Severe mitral regurgitation [I34.0] 08/24/2020 Yes    Left atrial thrombus [I51.3] 08/24/2020 Yes    Paroxysmal atrial fibrillation  [I48.0] 08/24/2020 Yes      Problems Resolved During this Admission:    Diagnosis Date Noted Date Resolved POA    Oliguria and anuria [R34] 09/18/2020 09/24/2020 No    SOB (shortness of breath) [R06.02] 08/23/2020 09/03/2020 Yes       Thank you for your consult. I will follow-up with patient. Please contact us if you have any additional questions.    Caitlin Lau MD  Cardiothoracic Surgery  [facility]

## 2020-10-03 NOTE — PROGRESS NOTES
[facility]  Critical Care - Surgery  Progress Note    Patient Name: Fatou Lorenzo  MRN: [id number]  Admission Date: 8/30/2020  Hospital Length of Stay: 33 days  Code Status: Full Code  Attending Provider: Antoni Waddell MD  Primary Care Provider: Ludin Rivas MD   Principal Problem: Acute respiratory failure with hypoxia    Subjective:     Hospital/ICU Course:  No notes on file    Interval History/Significant Events: No acute events. She continues to have hoarse voice, now amenable to ENT injection. CXR demonstrates stable large R pleural effusion. On 6L HFNC.     Follow-up For: Procedure(s) (LRB):  Valvuloplasty, Mitral (N/A)  REPAIR, TRICUSPID VALVE (N/A)  BAIN MAZE PROCEDURE (N/A)    Post-Operative Day: 24 Days Post-Op      Objective:     Vital Signs (Most Recent):  Temp: 97.7 °F (36.5 °C) (10/03/20 0700)  Pulse: 86 (10/03/20 0945)  Resp: (!) 24 (10/03/20 0945)  BP: (!) 128/58 (10/03/20 0900)  SpO2: 98 % (10/03/20 0945) Vital Signs (24h Range):  Temp:  [97.7 °F (36.5 °C)-98.6 °F (37 °C)] 97.7 °F (36.5 °C)  Pulse:  [] 86  Resp:  [10-41] 24  SpO2:  [84 %-98 %] 98 %  BP: ()/(51-85) 128/58     Weight: 79 kg (174 lb 2.6 oz)  Body mass index is 28.98 kg/m².      Intake/Output Summary (Last 24 hours) at 10/3/2020 1011  Last data filed at 10/3/2020 0900  Gross per 24 hour   Intake 515 ml   Output 610 ml   Net -95 ml       Physical Exam  Vitals signs and nursing note reviewed.   Constitutional:       Appearance: She is well-developed. She is not diaphoretic.   HENT:      Head: Normocephalic and atraumatic.   Eyes:      Conjunctiva/sclera: Conjunctivae normal.   Neck:      Musculoskeletal: Normal range of motion and neck supple.   Cardiovascular:      Rate and Rhythm: Regular rhythm. Tachycardia present.      Comments: Midline sternotomy with clean, intact, nonerythematous incision  Chest tube sites with clean, dry bandages  Pulmonary:      Breath sounds: No wheezing.      Comments:  Right lower lung field with decreased breath sounds  Left lung clear to ausculation in all fields  Tachypneic  Abdominal:      General: There is no distension.      Palpations: Abdomen is soft.      Tenderness: There is no abdominal tenderness.   Musculoskeletal: Normal range of motion.   Skin:     General: Skin is warm and dry.   Neurological:      General: No focal deficit present.      Mental Status: She is alert and oriented to person, place, and time.         Vents:  Vent Mode: Spont (09/23/20 0719)  Ventilator Initiated: Yes(chart correction) (09/18/20 2202)  Set Rate: 16 BPM (09/23/20 0325)  Vt Set: 350 mL (09/23/20 0325)  Pressure Support: 5 cmH20 (09/23/20 0719)  PEEP/CPAP: 5 cmH20 (09/23/20 0719)  Oxygen Concentration (%): 50 (10/02/20 2107)  Peak Airway Pressure: 10 cmH2O (09/23/20 0719)  Plateau Pressure: 15 cmH20 (09/23/20 0325)  Total Ve: 7.12 mL (09/23/20 0719)  Negative Inspiratory Force (cm H2O): -27 (09/15/20 0926)  F/VT Ratio<105 (RSBI): (!) 54.44 (09/23/20 0719)    Lines/Drains/Airways     Peripheral Intravenous Line                 Peripheral IV - Single Lumen 09/30/20 1248 20 G Anterior;Left;Proximal Forearm 2 days                Significant Labs:    CBC/Anemia Profile:  Recent Labs   Lab 10/02/20  0434 10/03/20  0303   WBC 20.86* 25.49*   HGB 9.6* 8.2*   HCT 32.6* 28.2*    289   * 101*   RDW 17.7* 17.7*        Chemistries:  Recent Labs   Lab 10/02/20  0434 10/03/20  0303   * 151*   K 4.2 4.5   * 123*   CO2 21* 23   BUN 28* 31*   CREATININE 0.8 0.8   CALCIUM 8.5* 8.4*   ALBUMIN 2.1* 1.8*   PROT 6.0 5.5*   BILITOT 1.9* 1.9*   ALKPHOS 318* 316*   ALT 72* 72*   AST 45* 41*   MG 3.1* 2.9*   PHOS 3.7 3.5           Significant Imaging:  I have reviewed all pertinent imaging results/findings within the past 24 hours.    Assessment/Plan:     Severe mitral regurgitation  Fatou Lorenzo is a 75 y.o. female s/p MVr, TVr 9/9/2020. Case noteworthy for multiple pump runs (3) and RV  hematoma due to retraction. Unstable overnight 9/18, required pericardial window for evac of posterior cardiac tamponade.     Neuro:  - Pain control prn with acetaminophen  - ENT previously consulted for vocal cord injection, will discuss again now that patient is amenable      CV:  S/p MVr, TVr 9/9/20. S/p bedside pericardial window 9/18, afib  - Sternal precautions   - PT/OT; currently recommending rehab   - Continue Digoxin but will stop amiodarone secondary to elevated liver enzymes  - Ordered daily digoxin levels with lab draws   - Continue ASA  - Stop Warfarin  - Start Eliquis 5 mg BID  - Speech recommending regular diet with thin liquids   - Encourage ambulation  - Encourage IS  - Stopping fluid restriction   - Hold Eliquis pending interventions with Thoracic and ENT     Pulm: Loculated Right Pleural Effusion  - On 6L ventimask, will attempt to wean  - Goal O2 sats 90%, wean as tolerated  - Encourage good pulmonary hygiene, OOB, IS  - HOB >30 deg  - Daily CXR  - Consult Thoracic Surgery for R VATS      FEN/GI:  - Daily labs  - Replace other lytes as needed  - Cardiac thin diet  - Speech consulted  - ppx: famotidine    Renal:  - Daily labs  - No vivas  - UTI - treating with vanc and cefepime for enterobacter cloacea    Heme/Onc:  - Daily CBC  - Anticoagulation converted to eliquis; hold Eliquis with upcoming interventions     ID: Leukocytosis  - ID consulted  - vanc/cefepime for E. Cloacae in urine  - trend WBC     Endo:  - no hx of DM  - ISS    PPx:  - famotidine, SCDs     Dispo: Continue SICU; consults to Thoracic and ENT         Critical care was time spent personally by me on the following activities: development of treatment plan with patient or surrogate and bedside caregivers, discussions with consultants, evaluation of patient's response to treatment, examination of patient, ordering and performing treatments and interventions, ordering and review of laboratory studies, ordering and review of  radiographic studies, pulse oximetry, re-evaluation of patient's condition.  This critical care time did not overlap with that of any other provider or involve time for any procedures.     Albert Abrams MD  Critical Care - Surgery  [facility]

## 2020-10-03 NOTE — NURSING
1645 Notified by Charge RN that ICU room available. Prepared pt for transfer.    1710 Called to SICU to give report.  transferred to ICU RN, no answer.    1725 Called to SICU to give report.  transferred to ICU RN, no answer.    1732 Called to SICU to give report. Notified  that RN didn't answer previous times.  transferred to ICU charge RN, no answer.    1739 Notified charge nurse on CSU, she called to ICU. Was told by SICU  that the nurses were busy. Gave  my number and was given the ICU RN's number.    1804 Called number given by ICU, as no call had been received. RN stated that she wasn't the one getting the pt and I had been given the wrong number. She supplied another ICU RN's number. Called that number, no response.     1811 CSU Charge RN called SICU again and was given another number.     1815 Gave report to Aaron GARCIA in SICU.

## 2020-10-03 NOTE — ASSESSMENT & PLAN NOTE
Fatou Lorenzo is a 75 y.o. female s/p MVr, TVr 9/9/2020. Case noteworthy for multiple pump runs (3) and RV hematoma due to retraction. Unstable overnight 9/18, required pericardial window for evac of posterior cardiac tamponade.     Neuro:  - Pain control prn with acetaminophen      CV:  S/p MVr, TVr 9/9/20. S/p bedside pericardial window 9/18, afib  - Sternal precautions   - PT/OT; currently recommending rehab   - Continue Digoxin but will stop amiodarone secondary to elevated liver enzymes  - Ordered daily digoxin levels with lab draws   - Continue ASA  - Stop Warfarin  - Start Eliquis 5 mg BID  - Speech recommending regular diet with thin liquids   - Encourage ambulation  - Encourage IS  - Stopping fluid restriction   - Restart eliquis since no IR procedure completed     Pulm: Loculated Right Pleural Effusion  - On 15L ventimask, will attempt to wean  - Goal O2 sats 90%, wean as tolerated  - Encourage good pulmonary hygiene, OOB, IS  - HOB >30 deg  - Daily CXR     FEN/GI:  - Daily labs  - Replace other lytes as needed  - Cardiac thin diet  - Speech consulted  - ppx: famotidine    Renal:  - Daily labs  - No vivas  - UTI - treating with vanc and cefepime for enterobacter cloacea    Heme/Onc:  - Daily CBC  - Anticoagulation converted to eliquis  - Restart eliquis tonight given no IR procedure performed     ID: Leukocytosis  - ID consulted  - vanc/cefepime for E. Cloacae in urine  - trend WBC     Endo:  - no hx of DM  - ISS    PPx:  - famotidine, SCDs     Dispo: Attempt to wean O2 requirement and will consider diuresis. Will hold off on CT given severe loculation and patient is on anticoagulation.

## 2020-10-03 NOTE — PLAN OF CARE
"      SICU PLAN OF CARE NOTE    Dx: Acute respiratory failure with hypoxia    Shift Events: weaned to 6L HFNC    Goals of Care: Wean O2, transfer to stepdown possibly    Neuro: AAO x4, Follows Commands, and Moves All Extremities    Vital Signs: /73   Pulse 91   Temp 97.8 °F (36.6 °C) (Oral)   Resp (!) 25   Ht 5' 5" (1.651 m)   Wt 79 kg (174 lb 2.6 oz)   SpO2 97%   Breastfeeding No   BMI 28.98 kg/m²     Respiratory: 6 L HFNC    Diet: Regular Diet    Gtts: none    Urine Output: Incontinent 200 cc + 2 unmeasured    Skin: Heel foams and sacral foam in place, blanchable pinkness on sacrum, q2 turns and weight shifts, waffle mattress inflated and in use, see flowsheet for wound and full assessment data      "

## 2020-10-03 NOTE — HPI
Patient is a 76 yo F s/p MVr, Tvr, MAZE on 9/9/20. Case was noteworthy for 3 pump runs and development of a RV hematoma2/2 retraction. Post operatively she was admitted to the ICU and had a protracted course. She was extubated 9/15. Became hemodynamically unstable 9/18 and had to be reintubated along with performing a bedside pericardial window for cardiac tamponade. Around this time she also went into afib, but notably she does have a history of afib. Other significant events in her post op course included severe JONAH, never went on dialysis, but required aggressive diuresis for volume overload. She was stepped down 9/27. She represents to SICU due to increased O2 requirement. This morning she was on 3L NC but necessitated 15L ventimask to keep her saturations above 90. Chest films showed worsening right sided pleural effusion. She was sent to IR for thoracentesis but ultrasound imaging showed significant loculations. Thoracentesis was performed in the largest pocket obtaining 60 cc of fluid.

## 2020-10-03 NOTE — ASSESSMENT & PLAN NOTE
Pt is a 75 y.o. female w/ pmhx of Atrial fibrillation with RVR, HLD and Severe mitral regurgitation.  Pt w/ recent left heart catheterization 8/25/2020 who presented w/ complaints of recurrent shortness of breath.  Transesophageal echocardiogram done recently w/ left atrial thrombus.  L heart angiography w/ nonobstructive CAD.   Upon admission, pt required BiPAP due to hypoxemia not responsive to nasal cannula.    [facility] surgery evaluation given recurrent admission despite compliance, BP control and diuresis.  Pt seen by Dr. Waddell who recommended MV repair.  Pt underwent MV repair and TV annuloplasty on 9/9. Pt course c/b reintuabtion and pericardial window for evacuation of posterior cardiac tamponade on 9/18.  Pt extubated on 9/24.  Developed increasing white count on 9/28.  UA done concerning for infection.  Urine cxs positive for E.Cloacae.  Pt started on Bactrim.  Chest Xray from 10/1- Since September 28, 2020, increased large right pleural effusion.  Increased diffuse right airspace opacities.  X ray abdomen 9/23 w/ Probable bilateral nephrolithiasis.      Pt underwent IR thoracentesis- not sent for cx and counts not sent.  Per report innumerable loculations     ID consulted for positive urine culture w/ E. Cloacae.      Plan  -Continue on Vanc.  Pharmacy to dose.  Trough goal 15-20.   -Continue on Cefepime 2g q8h   -Cefepime will cover for UTI-e.cloacae and potential resp infection.  -Recommend resp cxs  -VATS procedure anticipated on Monday-please get pleural fluid for counts as well as for cxs- gram stain, aerobic, anaerobic, afb and fungal.  -Will follow cxs and tailor abx accordingly.   -Spoke w/ CTS team and ID staff about pt and plan.    -ID will continue to follow

## 2020-10-03 NOTE — SUBJECTIVE & OBJECTIVE
Follow-up For: Procedure(s) (LRB):  Valvuloplasty, Mitral (N/A)  REPAIR, TRICUSPID VALVE (N/A)  BAIN MAZE PROCEDURE (N/A)    Post-Operative Day: 23 Days Post-Op     Past Medical History:   Diagnosis Date    Atrial fibrillation with RVR 8/24/2020    Cataract     Essential hypertension 8/31/2020    Glaucoma     Heart valve problem     Hyperlipidemia     Severe mitral regurgitation 8/24/2020       Past Surgical History:   Procedure Laterality Date    ANKLE SURGERY      BAIN MAZE PROCEDURE N/A 9/9/2020    Procedure: BAIN MAZE PROCEDURE;  Surgeon: Antoni Waddell MD;  Location: [facility] OR [address] [place] [address];  Service: Cardiothoracic;  Laterality: N/A;  MAZE     LEFT HEART CATHETERIZATION Left 8/25/2020    Procedure: Left heart cath, radial;  Surgeon: Marlee Carrillo MD;  Location: [facility] CATH LAB;  Service: Cardiology;  Laterality: Left;    lipoma removal      TRICUSPID VALVULOPLASTY N/A 9/9/2020    Procedure: REPAIR, TRICUSPID VALVE;  Surgeon: Antoni Waddell MD;  Location: [facility] OR [address] [place] [address];  Service: Cardiothoracic;  Laterality: N/A;       Review of patient's allergies indicates:  No Known Allergies    Family History     Problem Relation (Age of Onset)    Cancer Mother    Cataracts Sister    No Known Problems Father, Brother, Maternal Aunt, Maternal Uncle, Paternal Aunt, Paternal Uncle, Maternal Grandmother, Maternal Grandfather, Paternal Grandmother, Paternal Grandfather        Tobacco Use    Smoking status: Never Smoker    Smokeless tobacco: Never Used   Substance and Sexual Activity    Alcohol use: No    Drug use: No    Sexual activity: Not Currently      Review of Systems   Constitutional: Negative for chills, diaphoresis and fever.   HENT: Negative for congestion and sore throat.    Respiratory: Positive for shortness of breath. Negative for wheezing.    Cardiovascular: Negative for chest pain and leg swelling.   Gastrointestinal: Negative for abdominal distention and abdominal pain.   Genitourinary: Negative  for dysuria and frequency.   Musculoskeletal: Negative for arthralgias and myalgias.   Skin: Negative for color change and wound.   Neurological: Negative for light-headedness and headaches.   Psychiatric/Behavioral: Negative for confusion and hallucinations.     Objective:     Vital Signs (Most Recent):  Temp: 98.3 °F (36.8 °C) (10/02/20 1851)  Pulse: 95 (10/02/20 1851)  Resp: (!) 32 (10/02/20 1851)  BP: 123/66 (10/02/20 1851)  SpO2: 95 % (10/02/20 1851) Vital Signs (24h Range):  Temp:  [97.7 °F (36.5 °C)-98.6 °F (37 °C)] 98.3 °F (36.8 °C)  Pulse:  [] 95  Resp:  [16-32] 32  SpO2:  [84 %-99 %] 95 %  BP: ()/(55-77) 123/66     Weight: 79 kg (174 lb 2.6 oz)  Body mass index is 28.98 kg/m².      Intake/Output Summary (Last 24 hours) at 10/2/2020 1930  Last data filed at 10/2/2020 1700  Gross per 24 hour   Intake 390 ml   Output 1060 ml   Net -670 ml       Physical Exam  Vitals signs and nursing note reviewed.   Constitutional:       Appearance: She is well-developed. She is not diaphoretic.   HENT:      Head: Normocephalic and atraumatic.   Eyes:      Conjunctiva/sclera: Conjunctivae normal.   Neck:      Musculoskeletal: Normal range of motion and neck supple.   Cardiovascular:      Rate and Rhythm: Regular rhythm. Tachycardia present.      Comments: Midline sternotomy with clean, intact, nonerythematous incision  Chest tube sites with clean, dry bandages  Pulmonary:      Breath sounds: No wheezing.      Comments: Right lower lung field with decreased breath sounds  Left lung clear to ausculation in all fields  Tachypneic  Abdominal:      General: There is no distension.      Palpations: Abdomen is soft.      Tenderness: There is no abdominal tenderness.   Musculoskeletal: Normal range of motion.   Skin:     General: Skin is warm and dry.   Neurological:      General: No focal deficit present.      Mental Status: She is alert and oriented to person, place, and time.         Vents:  Vent Mode: Spont (09/23/20  0719)  Ventilator Initiated: Yes(chart correction) (09/18/20 2202)  Set Rate: 16 BPM (09/23/20 0325)  Vt Set: 350 mL (09/23/20 0325)  Pressure Support: 5 cmH20 (09/23/20 0719)  PEEP/CPAP: 5 cmH20 (09/23/20 0719)  Oxygen Concentration (%): 40 (10/02/20 1851)  Peak Airway Pressure: 10 cmH2O (09/23/20 0719)  Plateau Pressure: 15 cmH20 (09/23/20 0325)  Total Ve: 7.12 mL (09/23/20 0719)  Negative Inspiratory Force (cm H2O): -27 (09/15/20 0926)  F/VT Ratio<105 (RSBI): (!) 54.44 (09/23/20 0719)    Lines/Drains/Airways     Peripheral Intravenous Line                 Peripheral IV - Single Lumen 09/30/20 1248 20 G Anterior;Left;Proximal Forearm 2 days                Significant Labs:    CBC/Anemia Profile:  Recent Labs   Lab 10/01/20  0622 10/02/20  0434   WBC 14.86* 20.86*   HGB 9.3* 9.6*   HCT 31.7* 32.6*   * 337   * 101*   RDW 17.7* 17.7*        Chemistries:  Recent Labs   Lab 10/01/20  0622 10/02/20  0434   * 153*   K 3.9 4.2   * 123*   CO2 21* 21*   BUN 29* 28*   CREATININE 0.8 0.8   CALCIUM 8.5* 8.5*   ALBUMIN 2.2* 2.1*   PROT 5.9* 6.0   BILITOT 1.8* 1.9*   ALKPHOS 343* 318*   ALT 88* 72*   AST 53* 45*   MG 3.2* 3.1*   PHOS 4.5 3.7       ABGs: No results for input(s): PH, PCO2, HCO3, POCSATURATED, BE in the last 48 hours.    Significant Imaging: CXR: I have reviewed all pertinent results/findings within the past 24 hours and my personal findings are:  Increasing right sided pulmonary effusion

## 2020-10-03 NOTE — ASSESSMENT & PLAN NOTE
Fatou Lorenzo is a 75 y.o. female s/p MVr, TVr 9/9/2020. Case noteworthy for multiple pump runs (3) and RV hematoma due to retraction. Unstable overnight 9/18, required pericardial window for evac of posterior cardiac tamponade.     Neuro:  - Pain control prn with acetaminophen  - ENT previously consulted for vocal cord injection, will discuss again now that patient is amenable      CV:  S/p MVr, TVr 9/9/20. S/p bedside pericardial window 9/18, afib  - Sternal precautions   - PT/OT; currently recommending rehab   - Continue Digoxin but will stop amiodarone secondary to elevated liver enzymes  - Ordered daily digoxin levels with lab draws   - Continue ASA  - Stop Warfarin  - Start Eliquis 5 mg BID  - Speech recommending regular diet with thin liquids   - Encourage ambulation  - Encourage IS  - Stopping fluid restriction   - Hold Eliquis pending interventions with Thoracic and ENT     Pulm: Loculated Right Pleural Effusion  - On 6L ventimask, will attempt to wean  - Goal O2 sats 90%, wean as tolerated  - Encourage good pulmonary hygiene, OOB, IS  - HOB >30 deg  - Daily CXR  - Consult Thoracic Surgery for R VATS      FEN/GI:  - Daily labs  - Replace other lytes as needed  - Cardiac thin diet  - Speech consulted  - ppx: famotidine    Renal:  - Daily labs  - No vivas  - UTI - treating with vanc and cefepime for enterobacter cloacea    Heme/Onc:  - Daily CBC  - Anticoagulation converted to eliquis; hold Eliquis with upcoming interventions     ID: Leukocytosis  - ID consulted  - vanc/cefepime for E. Cloacae in urine  - trend WBC     Endo:  - no hx of DM  - ISS    PPx:  - famotidine, SCDs     Dispo: Continue SICU; consults to Thoracic and ENT

## 2020-10-03 NOTE — PLAN OF CARE
SICU PLAN OF CARE NOTE     Dx: Acute respiratory failure with hypoxia     Shift Events: VSS at this time. Afebrile. MAP >65. O2 weaned to 5 L HFNC. Heparin gtt started per MD order. Patient noted to have bloody urine output. Dr. Waddell and Dr. Terrazas aware. Hold Eliquis per MD order. PICC team placed PIV by ultrasound. Plan for VATS procedure on Monday. Plan of care reviewed with patient and family. Questions and concerns addressed. WCTM.      Goals of Care: Wean O2     Neuro: AAO x4, Follows Commands, and Moves All Extremities     Respiratory: 5 L High-Flow Nasal Cannula     Diet: Regular Diet     Gtts: none     Urine Output: External Urinary Catheter 200 cc/ shift + 1 unmeasured urine occurrence      Skin: Heel foams and sacral foam in place. Clear moisture barrier ointment (purple top) applied to blanchable pinkness on sacrum. Patient turned q2 hours. Waffle mattress inflated and in use. See flowsheet for wound and full assessment data.

## 2020-10-03 NOTE — H&P
[facility]  Critical Care - Surgery  History & Physical    Patient Name: Fatou Lorenzo  MRN: [id number]  Admission Date: 8/30/2020  Code Status: Full Code  Attending Physician: Antoni Waddell MD   Primary Care Provider: Ludin Rivas MD   Principal Problem: Acute respiratory failure with hypoxia    Subjective:     HPI:  Patient is a 76 yo F s/p MVr, Tvr, MAZE on 9/9/20. Case was noteworthy for 3 pump runs and development of a RV hematoma2/2 retraction. Post operatively she was admitted to the ICU and had a protracted course. She was extubated 9/15. Became hemodynamically unstable 9/18 and had to be reintubated along with performing a bedside pericardial window for cardiac tamponade. Around this time she also went into afib, but notably she does have a history of afib. Other significant events in her post op course included severe JONAH, never went on dialysis, but required aggressive diuresis for volume overload. She was stepped down 9/27. She represents to SICU due to increased O2 requirement. This morning she was on 3L NC but necessitated 15L ventimask to keep her saturations above 90. Chest films showed worsening right sided pleural effusion. She was sent to IR for thoracentesis but ultrasound imaging showed significant loculations. Thoracentesis was performed in the largest pocket obtaining 60 cc of fluid.    Hospital/ICU Course:  No notes on file    Follow-up For: Procedure(s) (LRB):  Valvuloplasty, Mitral (N/A)  REPAIR, TRICUSPID VALVE (N/A)  BAIN MAZE PROCEDURE (N/A)    Post-Operative Day: 23 Days Post-Op     Past Medical History:   Diagnosis Date    Atrial fibrillation with RVR 8/24/2020    Cataract     Essential hypertension 8/31/2020    Glaucoma     Heart valve problem     Hyperlipidemia     Severe mitral regurgitation 8/24/2020       Past Surgical History:   Procedure Laterality Date    ANKLE SURGERY      BAIN MAZE PROCEDURE N/A 9/9/2020    Procedure: BAIN MAZE PROCEDURE;   Surgeon: Antoni Waddell MD;  [facility];  Service: Cardiothoracic;  Laterality: N/A;  MAZE     LEFT HEART CATHETERIZATION Left 8/25/2020    Procedure: Left heart cath, radial;  Surgeon: Marlee Carrillo MD;  Location: [facility] CATH LAB;  Service: Cardiology;  Laterality: Left;    lipoma removal      TRICUSPID VALVULOPLASTY N/A 9/9/2020    Procedure: REPAIR, TRICUSPID VALVE;  Surgeon: Antoni Waddell MD;  [facility];  Service: Cardiothoracic;  Laterality: N/A;       Review of patient's allergies indicates:  No Known Allergies    Family History     Problem Relation (Age of Onset)    Cancer Mother    Cataracts Sister    No Known Problems Father, Brother, Maternal Aunt, Maternal Uncle, Paternal Aunt, Paternal Uncle, Maternal Grandmother, Maternal Grandfather, Paternal Grandmother, Paternal Grandfather        Tobacco Use    Smoking status: Never Smoker    Smokeless tobacco: Never Used   Substance and Sexual Activity    Alcohol use: No    Drug use: No    Sexual activity: Not Currently      Review of Systems   Constitutional: Negative for chills, diaphoresis and fever.   HENT: Negative for congestion and sore throat.    Respiratory: Positive for shortness of breath. Negative for wheezing.    Cardiovascular: Negative for chest pain and leg swelling.   Gastrointestinal: Negative for abdominal distention and abdominal pain.   Genitourinary: Negative for dysuria and frequency.   Musculoskeletal: Negative for arthralgias and myalgias.   Skin: Negative for color change and wound.   Neurological: Negative for light-headedness and headaches.   Psychiatric/Behavioral: Negative for confusion and hallucinations.     Objective:     Vital Signs (Most Recent):  Temp: 98.3 °F (36.8 °C) (10/02/20 1851)  Pulse: 95 (10/02/20 1851)  Resp: (!) 32 (10/02/20 1851)  BP: 123/66 (10/02/20 1851)  SpO2: 95 % (10/02/20 1851) Vital Signs (24h Range):  Temp:  [97.7 °F (36.5 °C)-98.6 °F (37 °C)] 98.3 °F (36.8  °C)  Pulse:  [] 95  Resp:  [16-32] 32  SpO2:  [84 %-99 %] 95 %  BP: ()/(55-77) 123/66     Weight: 79 kg (174 lb 2.6 oz)  Body mass index is 28.98 kg/m².      Intake/Output Summary (Last 24 hours) at 10/2/2020 1930  Last data filed at 10/2/2020 1700  Gross per 24 hour   Intake 390 ml   Output 1060 ml   Net -670 ml       Physical Exam  Vitals signs and nursing note reviewed.   Constitutional:       Appearance: She is well-developed. She is not diaphoretic.   HENT:      Head: Normocephalic and atraumatic.   Eyes:      Conjunctiva/sclera: Conjunctivae normal.   Neck:      Musculoskeletal: Normal range of motion and neck supple.   Cardiovascular:      Rate and Rhythm: Regular rhythm. Tachycardia present.      Comments: Midline sternotomy with clean, intact, nonerythematous incision  Chest tube sites with clean, dry bandages  Pulmonary:      Breath sounds: No wheezing.      Comments: Right lower lung field with decreased breath sounds  Left lung clear to ausculation in all fields  Tachypneic  Abdominal:      General: There is no distension.      Palpations: Abdomen is soft.      Tenderness: There is no abdominal tenderness.   Musculoskeletal: Normal range of motion.   Skin:     General: Skin is warm and dry.   Neurological:      General: No focal deficit present.      Mental Status: She is alert and oriented to person, place, and time.         Vents:  Vent Mode: Spont (09/23/20 0719)  Ventilator Initiated: Yes(chart correction) (09/18/20 2202)  Set Rate: 16 BPM (09/23/20 0325)  Vt Set: 350 mL (09/23/20 0325)  Pressure Support: 5 cmH20 (09/23/20 0719)  PEEP/CPAP: 5 cmH20 (09/23/20 0719)  Oxygen Concentration (%): 40 (10/02/20 1851)  Peak Airway Pressure: 10 cmH2O (09/23/20 0719)  Plateau Pressure: 15 cmH20 (09/23/20 0325)  Total Ve: 7.12 mL (09/23/20 0719)  Negative Inspiratory Force (cm H2O): -27 (09/15/20 0926)  F/VT Ratio<105 (RSBI): (!) 54.44 (09/23/20 0719)    Lines/Drains/Airways     Peripheral  Intravenous Line                 Peripheral IV - Single Lumen 09/30/20 1248 20 G Anterior;Left;Proximal Forearm 2 days                Significant Labs:    CBC/Anemia Profile:  Recent Labs   Lab 10/01/20  0622 10/02/20  0434   WBC 14.86* 20.86*   HGB 9.3* 9.6*   HCT 31.7* 32.6*   * 337   * 101*   RDW 17.7* 17.7*        Chemistries:  Recent Labs   Lab 10/01/20  0622 10/02/20  0434   * 153*   K 3.9 4.2   * 123*   CO2 21* 21*   BUN 29* 28*   CREATININE 0.8 0.8   CALCIUM 8.5* 8.5*   ALBUMIN 2.2* 2.1*   PROT 5.9* 6.0   BILITOT 1.8* 1.9*   ALKPHOS 343* 318*   ALT 88* 72*   AST 53* 45*   MG 3.2* 3.1*   PHOS 4.5 3.7       ABGs: No results for input(s): PH, PCO2, HCO3, POCSATURATED, BE in the last 48 hours.    Significant Imaging: CXR: I have reviewed all pertinent results/findings within the past 24 hours and my personal findings are:  Increasing right sided pulmonary effusion    Assessment/Plan:     Severe mitral regurgitation  Fatou Lorenzo is a 75 y.o. female s/p MVr, TVr 9/9/2020. Case noteworthy for multiple pump runs (3) and RV hematoma due to retraction. Unstable overnight 9/18, required pericardial window for evac of posterior cardiac tamponade.     Neuro:  - Pain control prn with acetaminophen      CV:  S/p MVr, TVr 9/9/20. S/p bedside pericardial window 9/18, afib  - Sternal precautions   - PT/OT; currently recommending rehab   - Continue Digoxin but will stop amiodarone secondary to elevated liver enzymes  - Ordered daily digoxin levels with lab draws   - Continue ASA  - Stop Warfarin  - Start Eliquis 5 mg BID  - Speech recommending regular diet with thin liquids   - Encourage ambulation  - Encourage IS  - Stopping fluid restriction   - Will speak to CTS about restarting eliquis      Pulm: Loculated Right Pleural Effusion  - On 15L ventimask, will attempt to wean  - Goal O2 sats 90%, wean as tolerated  - Encourage good pulmonary hygiene, OOB, IS  - HOB >30 deg  - Daily CXR     FEN/GI:  -  Daily labs  - Replace other lytes as needed  - Cardiac thin diet  - Speech consulted  - ppx: famotidine    Renal:  - Daily labs  - No vivas  - UTI - treating with vanc and cefepime for enterobacter cloacea    Heme/Onc:  - Daily CBC  - Anticoagulation converted to eliquis     ID: Leukocytosis  - ID consulted  - vanc/cefepime for E. Cloacae in urine  - trend WBC     Endo:  - no hx of DM  - ISS    PPx:  - famotidine, SCDs     Dispo: Attempt to wean O2 requirement and will consider diuresis. Will hold off on CT given severe loculation and patient is on anticoagulation.            Critical care was time spent personally by me on the following activities: development of treatment plan with patient or surrogate and bedside caregivers, discussions with consultants, evaluation of patient's response to treatment, examination of patient, ordering and performing treatments and interventions, ordering and review of laboratory studies, ordering and review of radiographic studies, pulse oximetry, re-evaluation of patient's condition.  This critical care time did not overlap with that of any other provider or involve time for any procedures.     Miguel Huntley MD  Critical Care - Surgery  [facility]

## 2020-10-03 NOTE — SUBJECTIVE & OBJECTIVE
Interval History: Pt afebrile. White count-25.  CTS consulted w/ plans for VATS on Monday.  PT tolerating cefepime w/o problem.      Review of Systems   Constitutional: Positive for activity change and fatigue. Negative for chills and fever.   Respiratory: Positive for cough and shortness of breath. Negative for choking and stridor.    Cardiovascular: Negative for chest pain, palpitations and leg swelling.   Gastrointestinal: Negative for abdominal distention, abdominal pain, anal bleeding, diarrhea, nausea and vomiting.   Genitourinary: Positive for difficulty urinating. Negative for dysuria, flank pain and pelvic pain.   Musculoskeletal: Negative for arthralgias, back pain, myalgias and neck pain.   Skin: Positive for wound (midline incision). Negative for color change and pallor.   Neurological: Negative for dizziness, seizures and headaches.   Psychiatric/Behavioral: Negative for agitation, behavioral problems and decreased concentration.   All other systems reviewed and are negative.    Objective:     Vital Signs (Most Recent):  Temp: 98 °F (36.7 °C) (10/03/20 1500)  Pulse: 92 (10/03/20 1830)  Resp: (!) 22 (10/03/20 1830)  BP: 107/67 (10/03/20 1800)  SpO2: 96 % (10/03/20 1830) Vital Signs (24h Range):  Temp:  [97.7 °F (36.5 °C)-98.3 °F (36.8 °C)] 98 °F (36.7 °C)  Pulse:  [81-99] 92  Resp:  [10-41] 22  SpO2:  [90 %-100 %] 96 %  BP: ()/(51-85) 107/67     Weight: 79 kg (174 lb 2.6 oz)  Body mass index is 28.98 kg/m².    Estimated Creatinine Clearance: 63.1 mL/min (based on SCr of 0.8 mg/dL).    Physical Exam  Constitutional:       Appearance: She is well-developed. She is ill-appearing.   HENT:      Nose: Nose normal.   Eyes:      General: No scleral icterus.  Neck:      Musculoskeletal: Normal range of motion.   Cardiovascular:      Rate and Rhythm: Normal rate and regular rhythm.      Heart sounds: Murmur present.   Pulmonary:      Effort: Pulmonary effort is normal.      Breath sounds: Normal breath  sounds. No wheezing or rhonchi.      Comments: On NC  Abdominal:      General: There is no distension.      Palpations: Abdomen is soft. There is no mass.      Tenderness: There is abdominal tenderness (suprapubic).   Musculoskeletal: Normal range of motion.         General: No swelling or tenderness.      Right lower leg: No edema.      Left lower leg: No edema.   Skin:     General: Skin is warm and dry.      Findings: No bruising, erythema or lesion.      Comments: Sternal Incision CDI   Neurological:      Mental Status: She is alert and oriented to person, place, and time. Mental status is at baseline.         Significant Labs: All pertinent labs within the past 24 hours have been reviewed.    Significant Imaging: I have reviewed all pertinent imaging results/findings within the past 24 hours.

## 2020-10-03 NOTE — SUBJECTIVE & OBJECTIVE
Interval History/Significant Events: No acute events. She continues to have hoarse voice, now amenable to ENT injection. CXR demonstrates stable large R pleural effusion. On 6L HFNC.     Follow-up For: Procedure(s) (LRB):  Valvuloplasty, Mitral (N/A)  REPAIR, TRICUSPID VALVE (N/A)  BAIN MAZE PROCEDURE (N/A)    Post-Operative Day: 24 Days Post-Op      Objective:     Vital Signs (Most Recent):  Temp: 97.7 °F (36.5 °C) (10/03/20 0700)  Pulse: 86 (10/03/20 0945)  Resp: (!) 24 (10/03/20 0945)  BP: (!) 128/58 (10/03/20 0900)  SpO2: 98 % (10/03/20 0945) Vital Signs (24h Range):  Temp:  [97.7 °F (36.5 °C)-98.6 °F (37 °C)] 97.7 °F (36.5 °C)  Pulse:  [] 86  Resp:  [10-41] 24  SpO2:  [84 %-98 %] 98 %  BP: ()/(51-85) 128/58     Weight: 79 kg (174 lb 2.6 oz)  Body mass index is 28.98 kg/m².      Intake/Output Summary (Last 24 hours) at 10/3/2020 1011  Last data filed at 10/3/2020 0900  Gross per 24 hour   Intake 515 ml   Output 610 ml   Net -95 ml       Physical Exam  Vitals signs and nursing note reviewed.   Constitutional:       Appearance: She is well-developed. She is not diaphoretic.   HENT:      Head: Normocephalic and atraumatic.   Eyes:      Conjunctiva/sclera: Conjunctivae normal.   Neck:      Musculoskeletal: Normal range of motion and neck supple.   Cardiovascular:      Rate and Rhythm: Regular rhythm. Tachycardia present.      Comments: Midline sternotomy with clean, intact, nonerythematous incision  Chest tube sites with clean, dry bandages  Pulmonary:      Breath sounds: No wheezing.      Comments: Right lower lung field with decreased breath sounds  Left lung clear to ausculation in all fields  Tachypneic  Abdominal:      General: There is no distension.      Palpations: Abdomen is soft.      Tenderness: There is no abdominal tenderness.   Musculoskeletal: Normal range of motion.   Skin:     General: Skin is warm and dry.   Neurological:      General: No focal deficit present.      Mental Status: She is  alert and oriented to person, place, and time.         Vents:  Vent Mode: Spont (09/23/20 0719)  Ventilator Initiated: Yes(chart correction) (09/18/20 2202)  Set Rate: 16 BPM (09/23/20 0325)  Vt Set: 350 mL (09/23/20 0325)  Pressure Support: 5 cmH20 (09/23/20 0719)  PEEP/CPAP: 5 cmH20 (09/23/20 0719)  Oxygen Concentration (%): 50 (10/02/20 2107)  Peak Airway Pressure: 10 cmH2O (09/23/20 0719)  Plateau Pressure: 15 cmH20 (09/23/20 0325)  Total Ve: 7.12 mL (09/23/20 0719)  Negative Inspiratory Force (cm H2O): -27 (09/15/20 0926)  F/VT Ratio<105 (RSBI): (!) 54.44 (09/23/20 0719)    Lines/Drains/Airways     Peripheral Intravenous Line                 Peripheral IV - Single Lumen 09/30/20 1248 20 G Anterior;Left;Proximal Forearm 2 days                Significant Labs:    CBC/Anemia Profile:  Recent Labs   Lab 10/02/20  0434 10/03/20  0303   WBC 20.86* 25.49*   HGB 9.6* 8.2*   HCT 32.6* 28.2*    289   * 101*   RDW 17.7* 17.7*        Chemistries:  Recent Labs   Lab 10/02/20  0434 10/03/20  0303   * 151*   K 4.2 4.5   * 123*   CO2 21* 23   BUN 28* 31*   CREATININE 0.8 0.8   CALCIUM 8.5* 8.4*   ALBUMIN 2.1* 1.8*   PROT 6.0 5.5*   BILITOT 1.9* 1.9*   ALKPHOS 318* 316*   ALT 72* 72*   AST 45* 41*   MG 3.1* 2.9*   PHOS 3.7 3.5           Significant Imaging:  I have reviewed all pertinent imaging results/findings within the past 24 hours.

## 2020-10-03 NOTE — PROGRESS NOTES
[facility]  Psychiatry  Progress Note    Patient Name: Fatou Lorenzo  MRN: [id number]   Code Status: Full Code  Admission Date: 8/30/2020  Hospital Length of Stay: 33 days  Expected Discharge Date: 10/7/2020  Attending Physician: nAtoni Waddell MD  Primary Care Provider: Ludin Rivas MD    Current Legal Status: None    Patient information was obtained from patient and ER records.       Subjective:     Patient is a 75 y.o., female, presents with:    Principal Problem:Acute respiratory failure with hypoxia    Chief Complaint: Depression, anxiety    HPI: Fatou Lorenzo is a 75 y.o. female with a past psychiatric history of anxiety who presented to [facility] [place], [place] due to Acute respiratory failure with hypoxia. Psychiatry was consulted to address the patient's symptoms of depression.     Per Primary MD:  Fatou Lorenzo is a 75 y.o. female that has a past medical history of Atrial fibrillation with RVR, Cataract, Glaucoma, Heart valve problem, Hyperlipidemia, and Severe mitral regurgitation.  has a past surgical history that includes Ankle surgery; lipoma removal; and Left heart catheterization (Left, 8/25/2020). Presents to [facility] complaining of recurrent shortness of breath.  Patient states it feels like she is having heart failure again.  She was discharged presumably on August 27th (no discharge summary available at this time), 4 days ago for acute respiratory failure with hypoxia secondary to CHF exacerbation.  The patient is typically followed at [place].  Reports 8 years ago was referred to Dr. Waddell, but valvular regurgitation not bad enough at that time to do surgery.  However see has severe tricuspid and mitral valve disease which was confirmed during her previous admission by echocardiogram.  She is having dyspnea with exertion, shortness of breath at rest, and orthopnea.  Worsened with exertion.      Patient now s/p open mitral valve  repair, TC valve repair on 9/9/2020. Patient has had multiple re-intubations for hypoxemic respiratory failure resulting in severe hoarseness and some dysphagia. Patient currently being seen by PT/OT/PM&R for weakness, impaired balance, impaired endurance, impaired cardiopulmonary response to activity, impaired self care skills, decreased coordination, impaired functional mobilty, decreased lower extremity function, gait instability. Per primary team, patient has largely had minimal improvement with rehab to this point and has been very anxious lately and refusing some treatment options. CM spoke with son recently who voiced that patient sounds confused on phone.      Per C-L Psych MD:  When seen today, patient calm and cooperative with interview. AAOx4, son at bedside provides additional collateral. Low volume with some hoarseness noted but able to fully participate in interview. Endorses extensive cardiovascular course over the last few months/years, prior to current hospital presentation. Now endorses having been in a hosptial seting for past few weeks. During this time, endorses worsening depressive symptoms including constant low mood, anhedonia, poor sleep, low energy level, poor appetite, and hopelessness. Endorses worsening hopelessness and feeling like life is not worth living anymore, but denies any active suicidal plan or intent. Denies any previous suicide attempts or past psychiatric admissions. States she has felt like this in the past and was previously started by PCP on Lexapro (unknown dosage). Endorses it being effective and was on it for 5 years, before stopping it 2 years ago. Endorses symptoms of generalized anxiety disorder including easy fatiguability, sleep disturbances, restlessness, and irritability. Endorses having flashbacks, nightmares and hypervigilance related to initially resuscitation attempts made in the past. Denies any AVH or other hallucinations.. Patient does not demonstrate  paranoia, delusions, disorganized thinking or disorganized behavior. Denies any HI. Denies any symptoms of stefany now or in the past. States now the plan is to hopefully attend rehab on Monday which she is optimistic about.    Collateral:   Son at bedside, provides collateral integrated above    Psychiatric Review of Systems-is patient experiencing or having changes in  sleep: yes, poor  appetite: yes, decreased  weight: yes, loss  energy/anergy: yes  interest/pleasure/anhedonia: yes  somatic symptoms: no  anxiety/panic: yes  guilty/hopelessness: yes  concentration: no  S.I.B.s/risky behavior: no  any drugs: no  alcohol: no     Past Psychiatric History:  Previous Medication Trials: yes, lexapro  Previous Psychiatric Hospitalizations: no   Previous Suicide Attempts: no   History of Violence: no  Outpatient Psychiatrist: no    Social History:  Marital Status:   Children: 3   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  Education: did not assess  Special Ed: unknown  Housing Status: with son in [facility]  History of phys/sexual abuse: unknown  Access to gun: no    Substance Abuse History:  Recreational Drugs: denies  Use of Alcohol: occasional, social use  Rehab History: no   Tobacco Use: no    Legal History:  Past Charges/Incarcerations: no   Pending charges: no     Family Psychiatric History:   Denies    Psychosocial Stressors: health  Functioning Relationships: good support system and good relationship with children        Hospital Course    Interval History:  10/3  This morning the patient is seen lying up right in bed and is suctioning saliva from her mouth with her son at bedside rubbing her feet. Due to her vocal cord swelling she is mostly unable to speak; she tries to whisper to get words out or just mouth words, so her interview is limited mostly to yes/no questions. She is able to nod that she is feeling sad, depressed, and anxious. She nods yes to feelings of hopelessness and wishing  "she would not be here anymore. When asked if she wants to kill herself, she adamantly shakes her head no. She shakes her head no when asking about AVH. When asked if her depression, anxiety, and hopelessness are largely related to being in the hospital, she nods yes. She has not been sleeping well at night and has poor PO intake as well due to her dysphagia. She is amenable to trying mirtazapine for mood and sleep.    Family History     Problem Relation (Age of Onset)    Cancer Mother    Cataracts Sister    No Known Problems Father, Brother, Maternal Aunt, Maternal Uncle, Paternal Aunt, Paternal Uncle, Maternal Grandmother, Maternal Grandfather, Paternal Grandmother, Paternal Grandfather        Tobacco Use    Smoking status: Never Smoker    Smokeless tobacco: Never Used   Substance and Sexual Activity    Alcohol use: No    Drug use: No    Sexual activity: Not Currently     Psychotherapeutics (From admission, onward)    None        Medical Review Of Systems:  Complete review of systems performed covering Constitutional, Eyes, ENT/Mouth, Cardiovascular, Respiratory, Gastrointestinal, Genitourinary, Musculoskeletal, Skin, Neurologic, Endocrine, Heme/Lymph, and Allergy/Immune.     Complete review of systems was negative with the exception of the following positive symptoms: throat pain, dysphagia, difficulty speaking      Objective:     Vital Signs (Most Recent):  Temp: 97.7 °F (36.5 °C) (10/03/20 0700)  Pulse: 86 (10/03/20 0945)  Resp: (!) 24 (10/03/20 0945)  BP: (!) 128/58 (10/03/20 0900)  SpO2: 98 % (10/03/20 0945) Vital Signs (24h Range):  Temp:  [97.7 °F (36.5 °C)-98.6 °F (37 °C)] 97.7 °F (36.5 °C)  Pulse:  [] 86  Resp:  [10-41] 24  SpO2:  [84 %-98 %] 98 %  BP: ()/(51-85) 128/58     Height: 5' 5" (165.1 cm)  Weight: 79 kg (174 lb 2.6 oz)  Body mass index is 28.98 kg/m².      Intake/Output Summary (Last 24 hours) at 10/3/2020 1015  Last data filed at 10/3/2020 0900  Gross per 24 hour   Intake 515 ml " "  Output 610 ml   Net -95 ml     Mental Status Exam:  Appearance: lying in bed, fatigued, in hospital gown  MSK: no abnormal involuntary movements  Level of Consciousness: alert, awake  Behavior/Cooperation: normal, cooperative  Psychomotor: unremarkable and within normal limits   Speech: whispered with difficulty  Language: english, fluid  Orientation: grossly intact  Attention Span/Concentration: intact  Memory: Intact  Mood: "depressed and sad"  Affect: dysphoric  Thought Process: linear, normal and logical  Associations: normal and logical  Thought Content: thought of wanting to die and hoplessness, denies wanting to kill her self (no inten, no plan); -HI/[facility]  Fund of Knowledge: Aware of current events and Vocabulary appropriate   Abstraction: did not assess  Insight: good  Judgment: good     Significant Labs:   Last 24 Hours:   Recent Lab Results       10/03/20  0303        Procalcitonin 0.24  Comment:  A concentration < 0.25 ng/mL represents a low risk bacterial   infection.  Procalcitonin may not be accurate among patients with localized   infection, recent trauma or major surgery, immunosuppressed state,   invasive fungal infection, renal dysfunction. Decisions regarding   initiation or continuation of antibiotic therapy should not be based   solely on procalcitonin levels.       Albumin 1.8     Alkaline Phosphatase 316     ALT 72     Anion Gap 5     Aniso Slight     AST 41     Baso # 0.03     Basophil% 0.1     BILIRUBIN TOTAL 1.9  Comment:  For infants and newborns, interpretation of results should be based  on gestational age, weight and in agreement with clinical  observations.  Premature Infant recommended reference ranges:  Up to 24 hours.............<8.0 mg/dL  Up to 48 hours............<12.0 mg/dL  3-5 days..................<15.0 mg/dL  6-29 days.................<15.0 mg/dL       BUN, Bld 31     Kenneth Cells Occasional     Calcium 8.4     Chloride 123     CO2 23     Creatinine 0.8     Differential " Method Automated     Digoxin Lvl 1.5  Comment:  Toxic:  Adult: >2.5 ng/mL, Pediatric: >3.0 ng/mL       eGFR if African American >60.0     eGFR if non  >60.0  Comment:  Calculation used to obtain the estimated glomerular filtration  rate (eGFR) is the CKD-EPI equation.        Eos # 0.0     Eosinophil% 0.0     Glucose 81     Gran # (ANC) 21.5     Gran% 84.4     Hematocrit 28.2     Hemoglobin 8.2     Hypo Occasional     Immature Grans (Abs) 0.37  Comment:  Mild elevation in immature granulocytes is non specific and   can be seen in a variety of conditions including stress response,   acute inflammation, trauma and pregnancy. Correlation with other   laboratory and clinical findings is essential.       Immature Granulocytes 1.5     Lymph # 1.6     Lymph% 6.3     Magnesium 2.9     MCH 29.3     MCHC 29.1          Mono # 2.0     Mono% 7.7     MPV 11.9     nRBC 0     Phosphorus 3.5     Platelet Estimate Appears normal     Platelets 289     Poik Slight     Poly Occasional     Potassium 4.5     PROTEIN TOTAL 5.5     RBC 2.80     RDW 17.7     Sodium 151     WBC 25.49           Significant Imaging: I have reviewed all pertinent imaging results/findings within the past 24 hours.       Scheduled Medications:   aspirin  81 mg Oral Daily    ceFEPime (MAXIPIME) IVPB  2 g Intravenous Q8H    digoxin  0.125 mg Oral Daily    famotidine  20 mg Oral Daily    melatonin  6 mg Oral Nightly    metoprolol tartrate  25 mg Oral BID    potassium chloride  20 mEq Oral BID    vancomycin (VANCOCIN) IVPB  2,000 mg Intravenous Q24H       PRN Medications:  acetaminophen, calcium carbonate, dextrose 50%, ondansetron, sodium chloride 3%, Pharmacy to dose Vancomycin consult **AND** vancomycin - pharmacy to dose    Review of patient's allergies indicates:  No Known Allergies    Assessment/Plan:     Adjustment disorder with mixed anxiety and depressed mood  Impression    Patient is a 75 year old female with past psychiatric  history of depression who initially presented with acute respiratory failure with hypoxia, now s/p MVR and TVR with psychiatry consulted for depression. When seen today, patient calm and cooperative with interview. Endorses multiple symptoms of depression and anxiety, in the context of extended hospital course and poor physical health. Endorses history of depression in the past with significant improvement on Lexapro. Given current QTC(496) as well as poor sleep and appetite, will start Remeron 7.5 mg instead.     Adjustment disorder with mixed anxiety and depressed mood  R/o PTSD    RECOMMENDATION(S)      1. Scheduled Medication(s):  Start remeron 7.5 mg PO nightly    2. PRN Medication(s):  None    3.  Monitor:  Please monitor QTc    4. Legal Status/Precaution(s):  Pt currently does not meet criteria nor benefit from from involuntary inpatient psychiatric admission.     Psychiatry will continue to follow.    Need for Continued Hospitalization:  No need for inpatient psychiatric hospitalization. Continue medical care as per the primary team.    Anticipated Disposition:  Home or Self Care    Total time:  25 with greater than 50% of this time spent in counseling and/or coordination of care.       Danya Middleton MD   Psychiatry  [facility]

## 2020-10-03 NOTE — SUBJECTIVE & OBJECTIVE
Interval History:  10/3  This morning the patient is seen lying up right in bed and is suctioning saliva from her mouth with her son at bedside rubbing her feet. Due to her vocal cord swelling she is mostly unable to speak; she tries to whisper to get words out or just mouth words, so her interview is limited mostly to yes/no questions. She is able to nod that she is feeling sad, depressed, and anxious. She nods yes to feelings of hopelessness and wishing she would not be here anymore. When asked if she wants to kill herself, she adamantly shakes her head no. She shakes her head no when asking about AVH. When asked if her depression, anxiety, and hopelessness are largely related to being in the hospital, she nods yes. She has not been sleeping well at night and has poor PO intake as well due to her dysphagia. She is amenable to trying mirtazapine for mood and sleep.    Family History     Problem Relation (Age of Onset)    Cancer Mother    Cataracts Sister    No Known Problems Father, Brother, Maternal Aunt, Maternal Uncle, Paternal Aunt, Paternal Uncle, Maternal Grandmother, Maternal Grandfather, Paternal Grandmother, Paternal Grandfather        Tobacco Use    Smoking status: Never Smoker    Smokeless tobacco: Never Used   Substance and Sexual Activity    Alcohol use: No    Drug use: No    Sexual activity: Not Currently     Psychotherapeutics (From admission, onward)    None        Medical Review Of Systems:  Complete review of systems performed covering Constitutional, Eyes, ENT/Mouth, Cardiovascular, Respiratory, Gastrointestinal, Genitourinary, Musculoskeletal, Skin, Neurologic, Endocrine, Heme/Lymph, and Allergy/Immune.     Complete review of systems was negative with the exception of the following positive symptoms: throat pain, dysphagia, difficulty speaking      Objective:     Vital Signs (Most Recent):  Temp: 97.7 °F (36.5 °C) (10/03/20 0700)  Pulse: 86 (10/03/20 0945)  Resp: (!) 24 (10/03/20 0945)  BP:  "(!) 128/58 (10/03/20 0900)  SpO2: 98 % (10/03/20 0945) Vital Signs (24h Range):  Temp:  [97.7 °F (36.5 °C)-98.6 °F (37 °C)] 97.7 °F (36.5 °C)  Pulse:  [] 86  Resp:  [10-41] 24  SpO2:  [84 %-98 %] 98 %  BP: ()/(51-85) 128/58     Height: 5' 5" (165.1 cm)  Weight: 79 kg (174 lb 2.6 oz)  Body mass index is 28.98 kg/m².      Intake/Output Summary (Last 24 hours) at 10/3/2020 1015  Last data filed at 10/3/2020 0900  Gross per 24 hour   Intake 515 ml   Output 610 ml   Net -95 ml     Mental Status Exam:  Appearance: lying in bed, fatigued, in hospital gown  MSK: no abnormal involuntary movements  Level of Consciousness: alert, awake  Behavior/Cooperation: normal, cooperative  Psychomotor: unremarkable and within normal limits   Speech: whispered with difficulty  Language: english, fluid  Orientation: grossly intact  Attention Span/Concentration: intact  Memory: Intact  Mood: "depressed and sad"  Affect: dysphoric  Thought Process: linear, normal and logical  Associations: normal and logical  Thought Content: thought of wanting to die and hoplessness, denies wanting to kill her self (no inten, no plan); -HI/[facility]  Fund of Knowledge: Aware of current events and Vocabulary appropriate   Abstraction: did not assess  Insight: good  Judgment: good     Significant Labs:   Last 24 Hours:   Recent Lab Results       10/03/20  0303        Procalcitonin 0.24  Comment:  A concentration < 0.25 ng/mL represents a low risk bacterial   infection.  Procalcitonin may not be accurate among patients with localized   infection, recent trauma or major surgery, immunosuppressed state,   invasive fungal infection, renal dysfunction. Decisions regarding   initiation or continuation of antibiotic therapy should not be based   solely on procalcitonin levels.       Albumin 1.8     Alkaline Phosphatase 316     ALT 72     Anion Gap 5     Aniso Slight     AST 41     Baso # 0.03     Basophil% 0.1     BILIRUBIN TOTAL 1.9  Comment:  For infants and " newborns, interpretation of results should be based  on gestational age, weight and in agreement with clinical  observations.  Premature Infant recommended reference ranges:  Up to 24 hours.............<8.0 mg/dL  Up to 48 hours............<12.0 mg/dL  3-5 days..................<15.0 mg/dL  6-29 days.................<15.0 mg/dL       BUN, Bld 31     Alma Cells Occasional     Calcium 8.4     Chloride 123     CO2 23     Creatinine 0.8     Differential Method Automated     Digoxin Lvl 1.5  Comment:  Toxic:  Adult: >2.5 ng/mL, Pediatric: >3.0 ng/mL       eGFR if African American >60.0     eGFR if non  >60.0  Comment:  Calculation used to obtain the estimated glomerular filtration  rate (eGFR) is the CKD-EPI equation.        Eos # 0.0     Eosinophil% 0.0     Glucose 81     Gran # (ANC) 21.5     Gran% 84.4     Hematocrit 28.2     Hemoglobin 8.2     Hypo Occasional     Immature Grans (Abs) 0.37  Comment:  Mild elevation in immature granulocytes is non specific and   can be seen in a variety of conditions including stress response,   acute inflammation, trauma and pregnancy. Correlation with other   laboratory and clinical findings is essential.       Immature Granulocytes 1.5     Lymph # 1.6     Lymph% 6.3     Magnesium 2.9     MCH 29.3     MCHC 29.1          Mono # 2.0     Mono% 7.7     MPV 11.9     nRBC 0     Phosphorus 3.5     Platelet Estimate Appears normal     Platelets 289     Poik Slight     Poly Occasional     Potassium 4.5     PROTEIN TOTAL 5.5     RBC 2.80     RDW 17.7     Sodium 151     WBC 25.49           Significant Imaging: I have reviewed all pertinent imaging results/findings within the past 24 hours.

## 2020-10-03 NOTE — PROGRESS NOTES
[facility]  Infectious Disease  Progress Note    Patient Name: Fatou Lorenzo  MRN: [id number]  Admission Date: 8/30/2020  Length of Stay: 33 days  Attending Physician: Antoni Waddell MD  Primary Care Provider: Ludin Rivas MD    Isolation Status: No active isolations  Assessment/Plan:      Leukocytosis  Pt is a 75 y.o. female w/ pmhx of Atrial fibrillation with RVR, HLD and Severe mitral regurgitation.  Pt w/ recent left heart catheterization 8/25/2020 who presented w/ complaints of recurrent shortness of breath.  Transesophageal echocardiogram done recently w/ left atrial thrombus.  L heart angiography w/ nonobstructive CAD.   Upon admission, pt required BiPAP due to hypoxemia not responsive to nasal cannula.    Pt transferred to [facility] [place] surgery evaluation given recurrent admission despite compliance, BP control and diuresis.  Pt seen by Dr. Waddell who recommended MV repair.  Pt underwent MV repair and TV annuloplasty on 9/9. Pt course c/b reintuabtion and pericardial window for evacuation of posterior cardiac tamponade on 9/18.  Pt extubated on 9/24.  Developed increasing white count on 9/28.  UA done concerning for infection.  Urine cxs positive for E.Cloacae.  Pt started on Bactrim.  Chest Xray from 10/1- Since September 28, 2020, increased large right pleural effusion.  Increased diffuse right airspace opacities.  X ray abdomen 9/23 w/ Probable bilateral nephrolithiasis.      Pt underwent IR thoracentesis- not sent for cx and counts not sent.  Per report innumerable loculations     ID consulted for positive urine culture w/ E. Cloacae.      Plan  -Continue on Vanc.  Pharmacy to dose.  Trough goal 15-20.   -Continue on Cefepime 2g q8h   -Cefepime will cover for UTI-e.cloacae and potential resp infection.  -Recommend resp cxs  -VATS procedure anticipated on Monday-please get pleural fluid for counts as well as for cxs- gram stain, aerobic, anaerobic, afb and fungal.  -Will follow cxs  and tailor abx accordingly.   -Spoke w/ CTS team and ID staff about pt and plan.    -ID will continue to follow      Thank you for your consult. I will follow-up with patient. Please contact us if you have any additional questions.    Vance Singh PA-C  Infectious Disease  [facility]    Subjective:     Principal Problem:Acute respiratory failure with hypoxia    HPI: Pt is a 75 y.o. female w/ pmhx of Atrial fibrillation with RVR, HLD and Severe mitral regurgitation.  Pt w/ recent left heart catheterization 8/25/2020 who presented w/ complaints of recurrent shortness of breath.  Transesophageal echocardiogram done recently w/ left atrial thrombus.  L heart angiography w/ nonobstructive CAD.   Upon admission, pt required BiPAP due to hypoxemia not responsive to nasal cannula.    Pt transferred to [facility] for CT surgery evaluation given recurrent admission despite compliance, BP control and diuresis.  Pt seen by Dr. Waddell who recommended MV repair.  Pt underwent MV repair and TV annuloplasty on 9/9. Pt course c/b reintuabtion and pericardial window for evacuation of posterior cardiac tamponade on 9/18.  Pt extubated on 9/24.  Developed increasing white count on 9/28.  UA done concerning for infection.  Urine cxs positive for E.Cloacae.  Pt started on Bactrim.  Chest Xray from 10/1- Since September 28, 2020, increased large right pleural effusion.  Increased diffuse right airspace opacities.  X ray abdomen 9/23 w/ Probable bilateral nephrolithiasis.    ID consulted for positive urine culture.      Interval History: Pt afebrile. White count-25.  CTS consulted w/ plans for VATS on Monday.  PT tolerating cefepime w/o problem.      Review of Systems   Constitutional: Positive for activity change and fatigue. Negative for chills and fever.   Respiratory: Positive for cough and shortness of breath. Negative for choking and stridor.    Cardiovascular: Negative for chest pain, palpitations and leg  swelling.   Gastrointestinal: Negative for abdominal distention, abdominal pain, anal bleeding, diarrhea, nausea and vomiting.   Genitourinary: Positive for difficulty urinating. Negative for dysuria, flank pain and pelvic pain.   Musculoskeletal: Negative for arthralgias, back pain, myalgias and neck pain.   Skin: Positive for wound (midline incision). Negative for color change and pallor.   Neurological: Negative for dizziness, seizures and headaches.   Psychiatric/Behavioral: Negative for agitation, behavioral problems and decreased concentration.   All other systems reviewed and are negative.    Objective:     Vital Signs (Most Recent):  Temp: 98 °F (36.7 °C) (10/03/20 1500)  Pulse: 92 (10/03/20 1830)  Resp: (!) 22 (10/03/20 1830)  BP: 107/67 (10/03/20 1800)  SpO2: 96 % (10/03/20 1830) Vital Signs (24h Range):  Temp:  [97.7 °F (36.5 °C)-98.3 °F (36.8 °C)] 98 °F (36.7 °C)  Pulse:  [81-99] 92  Resp:  [10-41] 22  SpO2:  [90 %-100 %] 96 %  BP: ()/(51-85) 107/67     Weight: 79 kg (174 lb 2.6 oz)  Body mass index is 28.98 kg/m².    Estimated Creatinine Clearance: 63.1 mL/min (based on SCr of 0.8 mg/dL).    Physical Exam  Constitutional:       Appearance: She is well-developed. She is ill-appearing.   HENT:      Nose: Nose normal.   Eyes:      General: No scleral icterus.  Neck:      Musculoskeletal: Normal range of motion.   Cardiovascular:      Rate and Rhythm: Normal rate and regular rhythm.      Heart sounds: Murmur present.   Pulmonary:      Effort: Pulmonary effort is normal.      Breath sounds: Normal breath sounds. No wheezing or rhonchi.      Comments: On NC  Abdominal:      General: There is no distension.      Palpations: Abdomen is soft. There is no mass.      Tenderness: There is abdominal tenderness (suprapubic).   Musculoskeletal: Normal range of motion.         General: No swelling or tenderness.      Right lower leg: No edema.      Left lower leg: No edema.   Skin:     General: Skin is warm and  dry.      Findings: No bruising, erythema or lesion.      Comments: Sternal Incision CDI   Neurological:      Mental Status: She is alert and oriented to person, place, and time. Mental status is at baseline.         Significant Labs: All pertinent labs within the past 24 hours have been reviewed.    Significant Imaging: I have reviewed all pertinent imaging results/findings within the past 24 hours.

## 2020-10-03 NOTE — PROGRESS NOTES
ENT Update:     SICU called ENT today as now patient is interested in inpatient microsuspension laryngoscopy with possible vocal fold injection augmentation.      Will discuss with staff laryngologist, Dr. Mendoza, on Monday.

## 2020-10-03 NOTE — NURSING
Dr. Abrams and Dr. Colon made aware of patient's left foot cooler than right foot, dusky in appearance, and no longer able to palpate pulse DP or PT. Doppler pressures able to be assessed in both feet. No other changes in patient assessment. After warming feet with heat pack and placing socks on patient able to palpate very thready DP and PT. Doppler pulse very loud and pronounced. Heparin gtt started per MD order. [facility] site closely.

## 2020-10-03 NOTE — PLAN OF CARE
Plan of Care Note  Cardiothoracic Surgery    Fatou Lorenzo is a 75 y.o. female s/p TVr, MVr, MAZE, (9/19) complicated by arrhythmias and respiratory problems.    Loculated R pleural effusion    Specific issues:  --consult thoracic surgery for R VATS drainage; consider ENT coordination for vocal cord medialization  --hold eliquis for Monday procedure; start hep gtt  --consider increase metoprolol  --monitor dig levels  --continue abx and monitor leukocytosis  --wean O2 as tolerated    Plan of care for patient was discussed with ICU staff including nurses, residents, and faculty and appropriate consulting services.    Will continue to monitor patient's hemodynamics, functionality, neuro status, fluid status and renal function, and labs and will adjust medications and fluids as necessary while monitoring appropriateness for de-escalation of support and monitoring and transport to stepdown unit.    Isaiah Zacarias MD  Cardiothoracic Surgery Fellow

## 2020-10-03 NOTE — NURSING TRANSFER
Nursing Transfer Note      10/2/2020     Transfer To: [id number]    Transfer via bed    Transfer with  to O2, cardiac monitoring    Transported by Britt RN, Oneyda RN, and Rigoberto RN    Medicines sent: Vanc    Chart send with patient: Yes    Notified: son (with patient)    Patient reassessed at: 10/2/20 1368     Upon arrival to floor: cardiac monitor applied, patient oriented to room, call bell in reach and bed in lowest position

## 2020-10-03 NOTE — PT/OT/SLP DISCHARGE
Physical Therapy Discharge Summary    Name: Fatou Lorenzo  MRN: [id number]   Principal Problem: Acute respiratory failure with hypoxia     Patient Discharged from acute Physical Therapy on 10/2/2020.  Please refer to prior PT noted date on 10/1/2020 for functional status.     Assessment:     Patient was discharged unexpectedly.  Information required to complete an accurate discharge summary is unknown.  Refer to therapy initial evaluation and last progress note for initial and most recent functional status and goal achievement.  Recommendations made may be found in medical record.   Transferred to ICU 2* decline in respiratory status following thoracentesis.     Objective:     GOALS:   Multidisciplinary Problems     Physical Therapy Goals        Problem: Physical Therapy Goal    Goal Priority Disciplines Outcome Goal Variances Interventions   Physical Therapy Goal     PT, PT/OT Ongoing, Progressing     Description: Goals to be met by: 2020     Patient will increase functional independence with mobility by performin. Supine to sit with Minimal Assistance x 1 person.  2. Sit to stand transfer with Moderate Assistance. - GOAL MET  REVISED: Min A  3. Bed to chair transfer with Moderate Assistance.  4. Sitting at edge of bed x10 minutes with CGA.  5. Lower extremity exercise program x 10 reps per handout, with assistance as needed.                           Reasons for Discontinuation of Therapy Services  Transfer to alternate level of care. and Patient is unable to continue work toward goals because of medical or psychosocial complications.      Plan:     Patient Discharged to: SICU. PT order discontinued at this time. Will require new PT orders when pt medically appropriate for PT re-evaluation and treatment.     Jacquelin Cheung, PT, DPT   10/3/2020  [phone]

## 2020-10-03 NOTE — ASSESSMENT & PLAN NOTE
Impression    Patient is a 75 year old female with past psychiatric history of depression who initially presented with acute respiratory failure with hypoxia, now s/p MVR and TVR with psychiatry consulted for depression. When seen today, patient calm and cooperative with interview. Endorses multiple symptoms of depression and anxiety, in the context of extended hospital course and poor physical health. Endorses history of depression in the past with significant improvement on Lexapro. Given current QTC(496) as well as poor sleep and appetite, will start Remeron 7.5 mg instead.     Adjustment disorder with mixed anxiety and depressed mood  R/o PTSD    RECOMMENDATION(S)      1. Scheduled Medication(s):  Start remeron 7.5 mg PO nightly    2. PRN Medication(s):  None    3.  Monitor:  Please monitor QTc    4. Legal Status/Precaution(s):  Pt currently does not meet criteria nor benefit from from involuntary inpatient psychiatric admission.     Psychiatry will continue to follow.    Danya Middleton MD  [facility]

## 2020-10-04 NOTE — PROGRESS NOTES
ENT Update:     Patient scheduled for VATS tomorrow with CT surgery for loculated right pleural effusion.    Discussed with Dr. Mendoza, who will be available for combined case to perform microlaryngoscopy with possible vocal fold injection augmentation.     Patient seen at bedside and wishes to proceed. Also discussed procedure with patient's son, Carter Lorenzo, over the phone. All questions answered.     Consent obtained.     Call ENT with questions.

## 2020-10-04 NOTE — PROGRESS NOTES
Pharmacokinetic Assessment Follow Up: IV Vancomycin    Vancomycin Regimen Assessment and Plan:  - 21.5-hour vancomycin level resulted at 22.7 mcg/mL; not considered supratherapeutic because level was drawn early (goal: 15-20 mcg/mL)  - Serum creatinine has been stable (0.8-0.9 mg/dL)  - Will change to vancomycin 1750 mg IV every 24 hours given anticipation of some drug accumulation  - Will schedule next vancomycin level prior to the third dose of new regimen on 10/7 at 1730    Drug levels (last 3 results):  Recent Labs   Lab Result Units 10/04/20  1739   Vancomycin-Trough ug/mL 22.7*       Pharmacy will continue to follow and monitor vancomycin.    Please contact pharmacy at extension [phone] for questions regarding this assessment.    Thank you for the consult,   Tiff Valencia       Patient brief summary:  Fatou Lorenzo is a 75 y.o. female initiated on antimicrobial therapy with IV Vancomycin for treatment of lower respiratory infection      Drug Allergies:   Review of patient's allergies indicates:  No Known Allergies    Actual Body Weight:   79 kg    Renal Function:   Estimated Creatinine Clearance: 56.1 mL/min (based on SCr of 0.9 mg/dL).    Dialysis Method (if applicable):  N/A

## 2020-10-04 NOTE — ASSESSMENT & PLAN NOTE
Fatou Lorenzo is a 75 y.o. female s/p MVr, TVr 9/9/2020. Case noteworthy for multiple pump runs (3) and RV hematoma due to retraction. Unstable overnight 9/18, required pericardial window for evac of posterior cardiac tamponade.     Neuro:  - Pain control prn with acetaminophen  - ENT previously consulted for vocal cord injection, will discuss again now that patient is amenable      CV:  S/p MVr, TVr 9/9/20. S/p bedside pericardial window 9/18, afib  - Sternal precautions   - PT/OT; currently recommending rehab   - Continue Digoxin but will stop amiodarone secondary to elevated liver enzymes  - Ordered daily digoxin levels with lab draws   - Continue ASA  - Stop Warfarin  - Start Eliquis 5 mg BID  - Speech recommending regular diet with thin liquids   - Encourage ambulation  - Encourage IS  - Stopping fluid restriction   - Hold Eliquis pending interventions with Thoracic and ENT     Pulm: Loculated Right Pleural Effusion  - On 6L ventimask, will attempt to wean  - Goal O2 sats 90%, wean as tolerated  - Encourage good pulmonary hygiene, OOB, IS  - HOB >30 deg  - Daily CXR  - Consult Thoracic Surgery for R VATS, plan for OR tomorrow      FEN/GI:  - Daily labs  - Replace other lytes as needed  - Cardiac thin diet, NPO at MN  - Speech consulted  - ppx: famotidine    Renal:  - Daily labs  - No vivas  - UTI - treating with vanc and cefepime for enterobacter cloacea    Heme/Onc:  - Daily CBC  - Anticoagulation converted to eliquis; hold Eliquis with upcoming interventions  - Bridged with Hep gtt, will hold at MN     ID: Leukocytosis  - ID consulted  - vanc/cefepime for E. Cloacae in urine  - trend WBC     Endo:  - no hx of DM  - ISS    PPx:  - famotidine, SCDs     Dispo: step down to CTSU

## 2020-10-04 NOTE — PLAN OF CARE
"Dx: Acute respiratory failure with hypoxia    Neuro: AAO x4, Follows Commands, and Moves All Extremities    Vital Signs: BP (!) 103/51 (BP Location: Right arm, Patient Position: Lying)   Pulse 81   Temp 97.6 °F (36.4 °C) (Oral)   Resp (!) 27   Ht 5' 5" (1.651 m)   Wt 79 kg (174 lb 2.6 oz)   SpO2 (!) 94%   Breastfeeding No   BMI 28.98 kg/m² , 5L HFNC    Diet: Cardiac Diet    Gtts: Heparin    Urine Output: external catheter 50 cc/shift    Labs/Accuchecks: labs reviewed and trended. Replacements given as needed.    Skin: Patient repositioned Q2H this shift, no new skin breakdown noted. Wound care performed per order. Bed plugged in and inflated, waffle mattress inflated, heel floated off of bed.      Shift Events: heparin gtt titrated per MD Alejandre order. VATS procedure Monday. POC reviewed with patient, no questions/concerns at this time. Will continue to monitor.            "

## 2020-10-04 NOTE — PLAN OF CARE
"      SICU PLAN OF CARE NOTE    Dx: Acute respiratory failure with hypoxia    Shift Events: Possible aspiration with water, required NRB 15L to recover. On HFNC at 7L, weaning as tolerated. NPO, speech reconsulted. IVF started for hydration, urine dark brown - team aware, resent BMP.     Goals of Care: VATS/ENT procedure in AM, consents signed. Heparin gtt to be turned off at midnight. Eloquis held.     Neuro: AAO x4, Follows Commands, and Moves All Extremities    Vital Signs: BP 98/64 (BP Location: Right arm, Patient Position: Lying)   Pulse 80   Temp 97.8 °F (36.6 °C) (Oral)   Resp (!) 27   Ht 5' 5" (1.651 m)   Wt 79 kg (174 lb 2.6 oz)   SpO2 (!) 93%   Breastfeeding No   BMI 28.98 kg/m²     Respiratory: HFNC    Diet: NPO    Gtts: Heparin    Urine Output: Incontinent 5-50 cc/hour     Labs/Accuchecks: Daily labs.    Skin: Skin intact with no breakdown present. Bilateral heel foams and sacral foam in place. Turning q2h to prevent breakdown.        "

## 2020-10-04 NOTE — PROGRESS NOTES
[facility]  Critical Care - Surgery  Progress Note    Patient Name: Fatou Lorenzo  MRN: [id number]  Admission Date: 8/30/2020  Hospital Length of Stay: 34 days  Code Status: Full Code  Attending Provider: Antoni Waddell MD  Primary Care Provider: Ludin Rivas MD   Principal Problem: Acute respiratory failure with hypoxia    Subjective:     Hospital/ICU Course:  No notes on file    Interval History/Significant Events: No acute events. Plan for OR tomorrow with thoracic, ENT. Given Benadryl for sleep overnight.     Follow-up For: Procedure(s) (LRB):  Valvuloplasty, Mitral (N/A)  REPAIR, TRICUSPID VALVE (N/A)  BAIN MAZE PROCEDURE (N/A)    Post-Operative Day: 25 Days Post-Op      Objective:     Vital Signs (Most Recent):  Temp: 97.6 °F (36.4 °C) (10/04/20 0300)  Pulse: 81 (10/04/20 0830)  Resp: 15 (10/04/20 0830)  BP: (!) 116/56 (10/04/20 0800)  SpO2: 96 % (10/04/20 0830) Vital Signs (24h Range):  Temp:  [97.6 °F (36.4 °C)-98 °F (36.7 °C)] 97.6 °F (36.4 °C)  Pulse:  [79-99] 81  Resp:  [15-38] 15  SpO2:  [90 %-100 %] 96 %  BP: ()/(7-67) 116/56     Weight: 79 kg (174 lb 2.6 oz)  Body mass index is 28.98 kg/m².      Intake/Output Summary (Last 24 hours) at 10/4/2020 0927  Last data filed at 10/4/2020 0500  Gross per 24 hour   Intake 1161 ml   Output 200 ml   Net 961 ml       Physical Exam  Vitals signs and nursing note reviewed.   Constitutional:       Appearance: She is well-developed. She is not diaphoretic.   HENT:      Head: Normocephalic and atraumatic.   Eyes:      Conjunctiva/sclera: Conjunctivae normal.   Neck:      Musculoskeletal: Normal range of motion and neck supple.   Cardiovascular:      Rate and Rhythm: Regular rhythm. Tachycardia present.      Comments: Midline sternotomy with clean, intact, nonerythematous incision  Chest tube sites with clean, dry bandages  Pulmonary:      Breath sounds: No wheezing.      Comments: Right lower lung field with decreased breath sounds  Left  lung clear to ausculation in all fields  Abdominal:      General: There is no distension.      Palpations: Abdomen is soft.      Tenderness: There is no abdominal tenderness.   Musculoskeletal: Normal range of motion.   Skin:     General: Skin is warm and dry.   Neurological:      General: No focal deficit present.      Mental Status: She is alert and oriented to person, place, and time.         Vents:      Lines/Drains/Airways     Drain            Female External Urinary Catheter 10/03/20 0705 1 day          Peripheral Intravenous Line                 Peripheral IV - Single Lumen 10/03/20 1437 20 G;1 3/4 in Left Upper Arm less than 1 day         Peripheral IV - Single Lumen 10/04/20 0237 20 G Left Antecubital less than 1 day                Significant Labs:    CBC/Anemia Profile:  Recent Labs   Lab 10/03/20  0303 10/04/20  0449   WBC 25.49* 24.91*   HGB 8.2* 7.8*   HCT 28.2* 27.0*    266   * 102*   RDW 17.7* 17.8*        Chemistries:  Recent Labs   Lab 10/03/20  0303 10/04/20  0449   * 151*   K 4.5 4.4   * 122*   CO2 23 19*   BUN 31* 38*   CREATININE 0.8 0.9   CALCIUM 8.4* 8.8   ALBUMIN 1.8* 1.7*   PROT 5.5* 5.7*   BILITOT 1.9* 1.9*   ALKPHOS 316* 358*   ALT 72* 87*   AST 41* 70*   MG 2.9* 2.9*   PHOS 3.5 3.6           Significant Imaging:  I have reviewed all pertinent imaging results/findings within the past 24 hours.    Assessment/Plan:     Severe mitral regurgitation  Fatou Lorenzo is a 75 y.o. female s/p MVr, TVr 9/9/2020. Case noteworthy for multiple pump runs (3) and RV hematoma due to retraction. Unstable overnight 9/18, required pericardial window for evac of posterior cardiac tamponade.     Neuro:  - Pain control prn with acetaminophen  - ENT previously consulted for vocal cord injection, will discuss again now that patient is amenable      CV:  S/p MVr, TVr 9/9/20. S/p bedside pericardial window 9/18, afib  - Sternal precautions   - PT/OT; currently recommending rehab   -  Continue Digoxin but will stop amiodarone secondary to elevated liver enzymes  - Ordered daily digoxin levels with lab draws   - Continue ASA  - Stop Warfarin  - Start Eliquis 5 mg BID  - Speech recommending regular diet with thin liquids   - Encourage ambulation  - Encourage IS  - Stopping fluid restriction   - Hold Eliquis pending interventions with Thoracic and ENT     Pulm: Loculated Right Pleural Effusion  - On 6L ventimask, will attempt to wean  - Goal O2 sats 90%, wean as tolerated  - Encourage good pulmonary hygiene, OOB, IS  - HOB >30 deg  - Daily CXR  - Consult Thoracic Surgery for R VATS, plan for OR tomorrow      FEN/GI:  - Daily labs  - Replace other lytes as needed  - Cardiac thin diet, NPO at MN  - Speech consulted  - ppx: famotidine    Renal:  - Daily labs  - No vivas  - UTI - treating with vanc and cefepime for enterobacter cloacea    Heme/Onc:  - Daily CBC  - Anticoagulation converted to eliquis; hold Eliquis with upcoming interventions  - Bridged with Hep gtt, will hold at MN     ID: Leukocytosis  - ID consulted  - vanc/cefepime for E. Cloacae in urine  - trend WBC     Endo:  - no hx of DM  - ISS    PPx:  - famotidine, SCDs     Dispo: step down to CTSU           Critical care was time spent personally by me on the following activities: development of treatment plan with patient or surrogate and bedside caregivers, discussions with consultants, evaluation of patient's response to treatment, examination of patient, ordering and performing treatments and interventions, ordering and review of laboratory studies, ordering and review of radiographic studies, pulse oximetry, re-evaluation of patient's condition.  This critical care time did not overlap with that of any other provider or involve time for any procedures.     Albert Abrams MD  Critical Care - Surgery  [facility]

## 2020-10-04 NOTE — SUBJECTIVE & OBJECTIVE
Interval History/Significant Events: No acute events. Plan for OR tomorrow with thoracic, ENT. Given Benadryl for sleep overnight.     Follow-up For: Procedure(s) (LRB):  Valvuloplasty, Mitral (N/A)  REPAIR, TRICUSPID VALVE (N/A)  BAIN MAZE PROCEDURE (N/A)    Post-Operative Day: 25 Days Post-Op      Objective:     Vital Signs (Most Recent):  Temp: 97.6 °F (36.4 °C) (10/04/20 0300)  Pulse: 81 (10/04/20 0830)  Resp: 15 (10/04/20 0830)  BP: (!) 116/56 (10/04/20 0800)  SpO2: 96 % (10/04/20 0830) Vital Signs (24h Range):  Temp:  [97.6 °F (36.4 °C)-98 °F (36.7 °C)] 97.6 °F (36.4 °C)  Pulse:  [79-99] 81  Resp:  [15-38] 15  SpO2:  [90 %-100 %] 96 %  BP: ()/(7-67) 116/56     Weight: 79 kg (174 lb 2.6 oz)  Body mass index is 28.98 kg/m².      Intake/Output Summary (Last 24 hours) at 10/4/2020 0927  Last data filed at 10/4/2020 0500  Gross per 24 hour   Intake 1161 ml   Output 200 ml   Net 961 ml       Physical Exam  Vitals signs and nursing note reviewed.   Constitutional:       Appearance: She is well-developed. She is not diaphoretic.   HENT:      Head: Normocephalic and atraumatic.   Eyes:      Conjunctiva/sclera: Conjunctivae normal.   Neck:      Musculoskeletal: Normal range of motion and neck supple.   Cardiovascular:      Rate and Rhythm: Regular rhythm. Tachycardia present.      Comments: Midline sternotomy with clean, intact, nonerythematous incision  Chest tube sites with clean, dry bandages  Pulmonary:      Breath sounds: No wheezing.      Comments: Right lower lung field with decreased breath sounds  Left lung clear to ausculation in all fields  Abdominal:      General: There is no distension.      Palpations: Abdomen is soft.      Tenderness: There is no abdominal tenderness.   Musculoskeletal: Normal range of motion.   Skin:     General: Skin is warm and dry.   Neurological:      General: No focal deficit present.      Mental Status: She is alert and oriented to person, place, and time.          Vents:      Lines/Drains/Airways     Drain            Female External Urinary Catheter 10/03/20 0705 1 day          Peripheral Intravenous Line                 Peripheral IV - Single Lumen 10/03/20 1437 20 G;1 3/4 in Left Upper Arm less than 1 day         Peripheral IV - Single Lumen 10/04/20 0237 20 G Left Antecubital less than 1 day                Significant Labs:    CBC/Anemia Profile:  Recent Labs   Lab 10/03/20  0303 10/04/20  0449   WBC 25.49* 24.91*   HGB 8.2* 7.8*   HCT 28.2* 27.0*    266   * 102*   RDW 17.7* 17.8*        Chemistries:  Recent Labs   Lab 10/03/20  0303 10/04/20  0449   * 151*   K 4.5 4.4   * 122*   CO2 23 19*   BUN 31* 38*   CREATININE 0.8 0.9   CALCIUM 8.4* 8.8   ALBUMIN 1.8* 1.7*   PROT 5.5* 5.7*   BILITOT 1.9* 1.9*   ALKPHOS 316* 358*   ALT 72* 87*   AST 41* 70*   MG 2.9* 2.9*   PHOS 3.5 3.6           Significant Imaging:  I have reviewed all pertinent imaging results/findings within the past 24 hours.

## 2020-10-04 NOTE — ANESTHESIA PREPROCEDURE EVALUATION
[facility]  Anesthesia Pre-Operative Evaluation     Patient Name: Fatou Lorenzo  YOB: 1945  MRN: [id number]  CSN: [id number]       Admit Date: 8/30/2020   Admit Team: Networked reference to record PCT   Hospital Day: 37  Date of Procedure: 10/5/2020  Anesthesia: General Procedure: Procedure(s) (LRB):  VATS, WITH PLEURODESIS, possible thoracotomy. Mechanical vs. Chemical (Right)  LARYNGOSCOPY, MICRO SUSPENSION WITH AGUMENTATION (N/A)  Pre-Operative Diagnosis: Respiratory distress [R06.03]  S/P MVR (mitral valve repair) [Z98.890]  Proceduralist:Surgeon(s) and Role:  Panel 1:     * Jeremy Shine MD - Primary  Panel 2:     * Yfn Mendoza MD - Primary  Code Status: Full Code   Advanced Directive: Not Received  Isolation Precautions: No active isolations  Capacity: Full capacity     SUBJECTIVE:   Fatou Lorenzo is a 75 y.o. female who  has a past medical history of Atrial fibrillation with RVR (8/24/2020), Cataract, Essential hypertension (8/31/2020), Glaucoma, Heart valve problem, Hyperlipidemia, and Severe mitral regurgitation (8/24/2020).  Patient is a 76 yo F s/p MVr, Tvr, MAZE on 9/9/20. Case was noteworthy for 3 pump runs and development of a RV hematoma2/2 retraction. Post operatively she was admitted to the ICU and had a protracted course. She was extubated 9/15. Became hemodynamically unstable 9/18 and had to be reintubated along with performing a bedside pericardial window for cardiac tamponade. Around this time she also went into afib, but notably she does have a history of afib. Other significant events in her post op course included severe JONAH, never went on dialysis, but required aggressive diuresis for volume overload. She was stepped down 9/27. She represents to SICU due to increased O2 requirement. This morning she was on 3L NC but necessitated 15L ventimask to keep her saturations above 90. Chest films showed worsening right sided pleural effusion. She was  sent to IR for thoracentesis but ultrasound imaging showed significant loculations. Thoracentesis was performed in the largest pocket obtaining 60 cc of fluid.                          Fatou Lorenzo is a 75 y.o. female w/ a significant PMHx of HTN, pAFib, CAD and MR s/p MVr, TVr 9/9/2020 c/b multiple pump runs, RV hematoma due to retraction, and posterior cardiac tamponade on 9/18 requiring bedside pericardial window. Pt with Right loculated effusion. On ventimask currently. In Afib, intermittently rate controlled.  SICU requesting formal CHERY eval while patient under anesthesia for monitoring.    Patient now presents for the above procedure(s).    Prev airway:  Mask Ventilation: Easy; Intubated: Postinduction; Blade: Lechuga #2; Airway Device Size: 7.0; Cuff Inflation: Minimal occlusive pressure; Placement Verified By: Capnometry; Complicating Factors: None; Intubation Findings: Bilateral breath sounds; Securment: Lips; Complications: None  Hospital LOS: 35 days  ICU LOS: 2d 13h    Revised cardiac risk index (RCRI) score is 4.    she has a current medication list which includes the following long-term medication(s): amiodarone, furosemide, losartan, and metoprolol tartrate.   ALLERGIES:   Review of patient's allergies indicates:  No Known Allergies  LDA:   AIRWAY:   Oxygen Concentration (%):  [100] 100 * No LDAs found *      Lines/Drains/Airways     Drain            Female External Urinary Catheter 10/03/20 0705 2 days          Peripheral Intravenous Line                 Peripheral IV - Single Lumen 10/03/20 1437 20 G;1 3/4 in Left Upper Arm 1 day         Peripheral IV - Single Lumen 10/04/20 0237 20 G Left Antecubital 1 day               Anesthesia Evaluation      Airway   Mallampati: I  TM distance: Normal, at least 6 cm  Neck ROM: Normal ROM  Dental    (+) In tact    Pulmonary    Cardiovascular   (+) hypertension, valvular problems/murmurs (s/p repair) MR, CAD, dysrhythmias, CHF,     Rate: Tachycardia     Neuro/Psych      GI/Hepatic/Renal    (+) chronic renal disease (JONAH),     Endo/Other    Abdominal                  MEDICATIONS:     Current Outpatient Medications on File Prior to Encounter   Medication Sig Dispense Refill Last Dose    metoprolol tartrate (LOPRESSOR) 25 MG tablet Take 1 tablet (25 mg total) by mouth 3 (three) times daily. 90 tablet 1 9/9/2020 at 0900    amiodarone (PACERONE) 400 MG tablet Take two tablets by mouth twice daily for twelve days, then take one tablet by mouth daily. 42 tablet 0     apixaban (ELIQUIS) 5 mg Tab Take 1 tablet (5 mg total) by mouth 2 (two) times daily. 60 tablet 1     furosemide (LASIX) 40 MG tablet Take 1 tablet (40 mg total) by mouth 2 (two) times daily. 60 tablet 1     losartan (COZAAR) 50 MG tablet Take 0.5 tablets (25 mg total) by mouth once daily. 30 tablet 1     pravastatin (PRAVACHOL) 80 MG tablet Take 80 mg by mouth once daily.         Inpatient Medications:  Antibiotics (From admission, onward)    Start     Stop Route Frequency Ordered    10/05/20 1830  vancomycin 1.75 g in 5 % dextrose 500 mL IVPB      -- IV Every 24 hours (non-standard times) 10/04/20 1850    10/02/20 1830  ceFEPIme injection 2 g      -- IV Every 8 hours (non-standard times) 10/02/20 1720    10/02/20 1816  vancomycin - pharmacy to dose  (vancomycin IVPB)      -- IV pharmacy to manage frequency 10/02/20 1720        VTE Risk Mitigation (From admission, onward)         Ordered     heparin 25,000 units in dextrose 5% 250 mL (100 units/mL) infusion (heparin infusion - NO NOMOGRAM)  Continuous      10/04/20 0639     IP VTE HIGH RISK PATIENT  Once      09/09/20 2250     Place sequential compression device  Until discontinued      09/09/20 2250               albuterol-ipratropium  3 mL Nebulization Q6H    aspirin  81 mg Oral Daily    ceFEPime (MAXIPIME) IVPB  2 g Intravenous Q8H    digoxin  0.125 mg Oral Daily    famotidine  20 mg Oral Daily    melatonin  6 mg Oral Nightly    metoprolol  tartrate  25 mg Oral BID    potassium chloride  20 mEq Oral BID    sodium chloride 3%  4 mL Nebulization Q6H    vancomycin (VANCOCIN) IVPB  1,750 mg Intravenous Q24H       Current Facility-Administered Medications   Medication Dose Route Frequency Provider Last Rate Last Dose    acetaminophen oral solution 650 mg  650 mg Oral Q6H PRN Vania Anderson NP   650 mg at 09/29/20 2132    albuterol-ipratropium 2.5 mg-0.5 mg/3 mL nebulizer solution 3 mL  3 mL Nebulization Q6H Clement Terrazas MD   3 mL at 10/05/20 0745    aspirin chewable tablet 81 mg  81 mg Oral Daily Vania Anderson NP   81 mg at 10/04/20 0841    calcium carbonate 200 mg calcium (500 mg) chewable tablet 1,000 mg  1,000 mg Oral TID PRN Madhuri Ng MD   1,000 mg at 09/18/20 1202    ceFEPIme injection 2 g  2 g Intravenous Q8H Vance Singh PA-C   2 g at 10/05/20 0321    dextrose 5 % infusion   Intravenous Continuous Isaiah Zacarias  mL/hr at 10/05/20 0800      dextrose 50% injection 25 g  25 g Intravenous PRN Jj Patterson MD        digoxin tablet 0.125 mg  0.125 mg Oral Daily Vania Anderson NP   0.125 mg at 10/04/20 0842    famotidine tablet 20 mg  20 mg Oral Daily Vania Anderson NP   20 mg at 10/04/20 0843    heparin 25,000 units in dextrose 5% 250 mL (100 units/mL) infusion (heparin infusion - NO NOMOGRAM)  400 Units/hr Intravenous Continuous Albert Abrams MD   Stopped at 10/05/20 0000    melatonin tablet 6 mg  6 mg Oral Nightly Vania Anderson NP   Stopped at 10/04/20 2100    metoprolol tartrate (LOPRESSOR) tablet 25 mg  25 mg Oral BID Vania Anderson NP   Stopped at 10/04/20 2100    ondansetron injection 4 mg  4 mg Intravenous Q6H PRN Antoni Waddell MD   4 mg at 10/01/20 1303    potassium chloride packet 20 mEq  20 mEq Oral BID Vania Anderson NP   Stopped at 10/04/20 2100    sodium chloride 3% nebulizer solution 4 mL  4 mL Nebulization Q6H Clement Terrazas MD   4 mL  at 10/05/20 0745    vancomycin - pharmacy to dose   Intravenous pharmacy to manage frequency Vance Singh PA-C        vancomycin 1.75 g in 5 % dextrose 500 mL IVPB  1,750 mg Intravenous Q24H Antoni Waddell MD              History:     Active Hospital Problems    Diagnosis  POA    *Acute respiratory failure with hypoxia [J96.01]  Yes    Pleural effusion [J90]  No    Adjustment disorder with mixed anxiety and depressed mood [F43.23]  Unknown    Debility [R53.81]  Unknown    Vocal fold paresis, 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]  No    S/P MVR (mitral valve repair) [Z98.890]  Not Applicable    S/P TVR (tricuspid valve repair) [Z98.890]  Not Applicable    Hypernatremia [E87.0]  No    Hypokalemia [E87.6]  No    Pericardial effusion with cardiac tamponade [I31.3, I31.4]  No    JONAH (acute kidney injury) [N17.9]  Unknown    Atrial fibrillation with RVR [I48.91]  Unknown    Leukocytosis [D72.829]  Unknown    Essential hypertension [I10]  Yes    Other hyperlipidemia [E78.49]  Yes    Coronary artery disease involving native coronary artery of native heart without angina pectoris [I25.10]  Yes    Acute on chronic diastolic (congestive) heart failure [I50.33]  Yes    Severe mitral regurgitation [I34.0]  Yes    Left atrial thrombus [I51.3]  Yes    Paroxysmal atrial fibrillation [I48.0]  Yes      Resolved Hospital Problems    Diagnosis Date Resolved POA    Oliguria and anuria [R34] 09/24/2020 No    SOB (shortness of breath) [R06.02] 09/03/2020 Yes     Surgical History:    has a past surgical history that includes Ankle surgery; lipoma removal; Left heart catheterization (Left, 8/25/2020); Tricuspid valvuloplasty (N/A, 9/9/2020); and Mahmood maze procedure (N/A, 9/9/2020).   Social History:    reports previously being sexually active.  reports that she has never smoked. She has never used smokeless tobacco. She reports that she does not drink alcohol or use drugs.    Vitals:    10/05/20 0600 10/05/20 0700  10/05/20 0745 10/05/20 0800   BP: (!) 124/58 (!) 123/58  (!) 124/53   BP Location:  Right arm  Right arm   Patient Position:  Lying  Lying   Pulse: 84 84 82 84   Resp: (!) 26 (!) 46 (!) 22 (!) 46   Temp:  36.5 °C (97.7 °F)     TempSrc:  Oral     SpO2: (!) 93% (!) 93% (!) 94% (!) 93%   Weight:       Height:         Vital Signs Range (Last 24H):  Temp:  [36.5 °C (97.7 °F)-36.6 °C (97.9 °F)]   Pulse:  [65-94]   Resp:  [15-51]   BP: ()/(50-70)   SpO2:  [90 %-96 %]     Body mass index is 28.98 kg/m².  Wt Readings from Last 4 Encounters:   09/29/20 79 kg (174 lb 2.6 oz)   09/02/20 77.2 kg (170 lb 3.1 oz)   08/26/20 80.7 kg (177 lb 14.6 oz)   02/21/20 84.8 kg (187 lb)        Intake/Output - Last 3 Shifts       10/03 0700 - 10/04 0659 10/04 0700 - 10/05 0659 10/05 0700 - 10/06 0659    P.O.       I.V. (mL/kg) 1161 (14.7) 407.2 (5.2)     IV Piggyback       Total Intake(mL/kg) 1161 (14.7) 407.2 (5.2)     Urine (mL/kg/hr) 250 (0.1) 515 (0.3)     Other       Stool  0     Total Output 250 515     Net +911 -107.8            Urine Occurrence 2 x 1 x     Stool Occurrence  2 x         Lab Results   Component Value Date    WBC 27.16 (H) 10/05/2020    HGB 7.3 (L) 10/05/2020    HCT 24.5 (L) 10/05/2020     10/05/2020     (H) 10/04/2020    K 4.5 10/04/2020     (H) 10/04/2020    CREATININE 0.9 10/04/2020    BUN 38 (H) 10/04/2020    CO2 19 (L) 10/04/2020    GLU 77 10/04/2020    CALCIUM 8.3 (L) 10/04/2020    MG 2.9 (H) 10/04/2020    PHOS 3.6 10/04/2020    ALKPHOS 358 (H) 10/04/2020    ALT 87 (H) 10/04/2020    AST 70 (H) 10/04/2020    ALBUMIN 1.7 (L) 10/04/2020    INR 2.2 (H) 09/28/2020    APTT 61.8 (H) 10/05/2020    TROPONINI 0.017 08/31/2020     (H) 08/30/2020     Recent Results (from the past 12 hour(s))   POCT glucose    Collection Time: 10/04/20 10:11 PM   Result Value Ref Range    POCT Glucose 145 (H) 70 - 110 mg/dL   POCT glucose    Collection Time: 10/05/20 12:05 AM   Result Value Ref Range    POCT  Glucose >500 (H) 70 - 110 mg/dL   POCT glucose    Collection Time: 10/05/20  1:25 AM   Result Value Ref Range    POCT Glucose 129 (H) 70 - 110 mg/dL   Digoxin level    Collection Time: 10/05/20  3:21 AM   Result Value Ref Range    Digoxin Lvl SEE COMMENT 0.8 - 2.0 ng/mL   CBC auto differential    Collection Time: 10/05/20  4:45 AM   Result Value Ref Range    WBC 27.16 (H) 3.90 - 12.70 K/uL    RBC 2.46 (L) 4.00 - 5.40 M/uL    Hemoglobin 7.3 (L) 12.0 - 16.0 g/dL    Hematocrit 24.5 (L) 37.0 - 48.5 %    Mean Corpuscular Volume 100 (H) 82 - 98 fL    Mean Corpuscular Hemoglobin 29.7 27.0 - 31.0 pg    Mean Corpuscular Hemoglobin Conc 29.8 (L) 32.0 - 36.0 g/dL    RDW 18.1 (H) 11.5 - 14.5 %    Platelets 218 150 - 350 K/uL    MPV 13.3 (H) 9.2 - 12.9 fL    Immature Granulocytes 3.2 (H) 0.0 - 0.5 %    Gran # (ANC) 23.4 (H) 1.8 - 7.7 K/uL    Immature Grans (Abs) 0.86 (H) 0.00 - 0.04 K/uL    Lymph # 1.5 1.0 - 4.8 K/uL    Mono # 1.4 (H) 0.3 - 1.0 K/uL    Eos # 0.0 0.0 - 0.5 K/uL    Baso # 0.05 0.00 - 0.20 K/uL    nRBC 0 0 /100 WBC    Gran% 86.0 (H) 38.0 - 73.0 %    Lymph% 5.4 (L) 18.0 - 48.0 %    Mono% 5.1 4.0 - 15.0 %    Eosinophil% 0.1 0.0 - 8.0 %    Basophil% 0.2 0.0 - 1.9 %    Platelet Estimate Appears normal     Aniso Slight     Poik Slight     Poly Occasional     Hypo Occasional     Kenneth Cells Occasional     Basophilic Stippling Occasional     Toxic Granulation Present     Differential Method Automated    Digoxin level    Collection Time: 10/05/20  4:45 AM   Result Value Ref Range    Digoxin Lvl 1.4 0.8 - 2.0 ng/mL   APTT    Collection Time: 10/05/20  4:45 AM   Result Value Ref Range    aPTT 61.8 (H) 21.0 - 32.0 sec     Recent Labs   Lab 10/03/20  0303 10/04/20  0449 10/04/20  1441 10/05/20  0445   WBC 25.49* 24.91*  --  27.16*   HGB 8.2* 7.8*  --  7.3*   HCT 28.2* 27.0*  --  24.5*    266  --  218   * 151* 151*  --    K 4.5 4.4 4.5  --    CREATININE 0.8 0.9 0.9  --    GLU 81 90 77  --      No LMP recorded.  Patient is postmenopausal.    EKG:   Results for orders placed or performed during the hospital encounter of 08/30/20   EKG 12-lead    Collection Time: 10/01/20 12:20 PM    Narrative    Test Reason : I48.91,    Vent. Rate : 110 BPM     Atrial Rate : 000 BPM     P-R Int : 000 ms          QRS Dur : 090 ms      QT Int : 366 ms       P-R-T Axes : 000 012 180 degrees     QTc Int : 495 ms    Atrial fibrillation with rapid ventricular response with premature  ventricular or aberrantly conducted complexes  ST and T wave abnormality, consider inferolateral ischemia  Abnormal ECG  When compared with ECG of 11-SEP-2020 00:57,  Atrial fibrillation has replaced Junctional rhythm    Confirmed by DARY CONTRERAS MD ([id number]) on 10/1/2020 12:38:29 PM    Referred By: [email or web address]   SELF           Confirmed By:DARY CONTRERAS MD     TTE: 9/22/2020  Results for orders placed or performed during the hospital encounter of 08/30/20   Echo Color Flow Doppler? Yes   Result Value Ref Range    BSA 1.96 m2    LVIDd 4.60 3.5 - 6.0 cm    IVS 1.10 0.6 - 1.1 cm    Posterior Wall 1.08 0.6 - 1.1 cm    LVIDs 3.90 (A) 2.1 - 4.0 cm    FS 15 28 - 44 %    Sinus 2.83 cm    STJ 2.51 cm    Ascending aorta 3.15 cm    LV mass 178.92 g    LA size 4.01 cm    Left Ventricle Relative Wall Thickness 0.47 cm    AV mean gradient 3 mmHg    AV valve area 1.69 cm2    AV Velocity Ratio 0.62     AV index (prosthetic) 0.54     LVOT diameter 2.00 cm    LVOT area 3.1 cm2    LVOT peak becca 0.63 m/s    LVOT peak VTI 6.99 cm    Ao peak becca 1.01 m/s    Ao VTI 12.97 cm    LVOT stroke volume 21.95 cm3    AV peak gradient 4 mmHg    LV Systolic Volume 78.18 mL    LV Systolic Volume Index 40.9 mL/m2    LV Diastolic Volume 83.97 mL    LV Diastolic Volume Index 43.98 mL/m2    LV Mass Index 94 g/m2    Right Atrial Pressure (from IVC) 15 mmHg    Narrative    · Atrial fibrillation observed.  · Moderately decreased left ventricular systolic function. The estimated   ejection fraction is  probably upper 30s low 40s but better than last week.  · Global hypokinetic wall motion.  · There is left ventricular concentric remodeling.  · Low normal right ventricular systolic function.  · Mild left atrial enlargement.  · Elevated central venous pressure (15 mmHg).  · Trivial anterior pericardial effusion.        No results found for this or any previous visit.  CHERY:  No results found for this or any previous visit.  Stress Test:  No results found for this or any previous visit.   [facility]:  Results for orders placed during the hospital encounter of 08/23/20   Cardiac catheterization    Narrative · LVEDP (Pre): 7  · Estimated blood loss: <50 mL  · Non-obstructive CAD.     Left main:  No significant stenosis    Lad:  Mid 40-3% stenosis    Circumflex luminal irregularities.  Ostial 10-20% stenosis    RCA proximal 20% stenosis    Access:  Right radial artery access was obtained    Assessment plan    Continue amiodarone drip.  Switch to oral amiodarone in the morning.    Start Eliquis 5 mg b.i.d. in a.m..  Stop heparin drip when starting   Eliquis.    Refer to Dr. Waddell for mitral valve repair/replacement as an outpatient.    Follow-up in Cardiology Clinic          A pre-sedation assessment was performed immediately prior to sedation   being administered to the patient. I certify that I was present for   catheter insertion, catheter manipulation, angiography, and angiographic   interpretation of this patient.    Procedure Log documented by Documenter: Verenice Liao and verified by   Marlee Carrillo MD.    Date: 8/25/2020  Time: 1:22 PM        PFT:  No results found for: FEV1, FVC, TFL8UCV, TLC, DLCO     Anesthesia Evaluation    I have reviewed the Patient Summary Reports.    I have reviewed the Nursing Notes. I have reviewed the NPO Status.   I have reviewed the Medications.     Review of Systems  Anesthesia Hx:  No problems with previous Anesthesia  History of prior surgery of interest to airway management or  planning: Denies Family Hx of Anesthesia complications.   Denies Personal Hx of Anesthesia complications.   Social:  Non-Smoker, No Alcohol Use    Hematology/Oncology:         -- Anemia:   EENT/Dental:EENT/Dental Normal   Cardiovascular:   Hypertension Valvular problems/Murmurs (s/p repair), MR CAD  Dysrhythmias atrial fibrillation CHF    Pulmonary:   Right loculated effusion   Renal/:   Chronic Renal Disease (JONAH)    Hepatic/GI:  Hepatic/GI Normal    Neurological:  Neurology Normal    Endocrine:  Endocrine Normal    Psych:   anxiety depression          Physical Exam  General:  Well nourished    Airway/Jaw/Neck:  Airway Findings: Mouth Opening: Normal Tongue: Normal  General Airway Assessment: Adult, Good  Mallampati: I  TM Distance: Normal, at least 6 cm  Jaw/Neck Findings:  Neck ROM: Normal ROM     Eyes/Ears/Nose:  EYES/EARS/NOSE FINDINGS: Normal   Dental:  Dental Findings: In tact   Chest/Lungs:  Chest/Lungs Clear    Heart/Vascular:  Heart Findings: Rate: Tachycardia  Rhythm: Irregularly Irregular        Mental Status:  Mental Status Findings:  Cooperative, Alert and Oriented         Anesthesia Plan  Type of Anesthesia, risks & benefits discussed:  Anesthesia Type:  general  Patient's Preference:   Intra-op Monitoring Plan: standard ASA monitors and arterial line  Intra-op Monitoring Plan Comments:   Post Op Pain Control Plan: per primary service following discharge from PACU, IV/PO Opioids PRN and multimodal analgesia  Post Op Pain Control Plan Comments:   Induction:   IV  Beta Blocker:  Patient is on a Beta-Blocker and has received one dose within the past 24 hours (No further documentation required).       Informed Consent: Patient representative understands risks and agrees with Anesthesia plan.  Questions answered. Anesthesia consent signed with patient representative.  ASA Score: 4     Day of Surgery Review of History & Physical:    H&P update referred to the provider.         Ready For Surgery From  Anesthesia Perspective.

## 2020-10-04 NOTE — PLAN OF CARE
Plan of Care Note  Cardiothoracic Surgery    Fatou Lorenzo is a 75 y.o. female s/p TVr, MVr, MAZE, (9/19) complicated by arrhythmias and respiratory problems.    Loculated R pleural effusion    Specific issues:  --continue to hold eliquis for Monday procedure; continue hep gtt and stop at midnight  --continue metoprolol  --monitor dig levels  --keep track of UOP and monitor renal fucntion  --continue abx and monitor leukocytosis  --wean O2 as tolerated    Plan of care for patient was discussed with ICU staff including nurses, residents, and faculty and appropriate consulting services.    Will continue to monitor patient's hemodynamics, functionality, neuro status, fluid status and renal function, and labs and will adjust medications and fluids as necessary while monitoring appropriateness for de-escalation of support and monitoring and transport to stepdown unit.    Isaiah Zacarias MD  Cardiothoracic Surgery Fellow

## 2020-10-05 PROBLEM — J90 PLEURAL EFFUSION: Status: ACTIVE | Noted: 2020-01-01

## 2020-10-05 NOTE — SUBJECTIVE & OBJECTIVE
Interval History: NAEON. Ready for OR    Medications:  Continuous Infusions:   dextrose 5 % 100 mL/hr at 10/05/20 0500    heparin (porcine) in 5 % dex Stopped (10/05/20 0000)     Scheduled Meds:   albuterol-ipratropium  3 mL Nebulization Q6H    aspirin  81 mg Oral Daily    ceFEPime (MAXIPIME) IVPB  2 g Intravenous Q8H    digoxin  0.125 mg Oral Daily    famotidine  20 mg Oral Daily    melatonin  6 mg Oral Nightly    metoprolol tartrate  25 mg Oral BID    potassium chloride  20 mEq Oral BID    sodium chloride 3%  4 mL Nebulization Q6H    vancomycin (VANCOCIN) IVPB  1,750 mg Intravenous Q24H     PRN Meds:acetaminophen, calcium carbonate, dextrose 50%, ondansetron, Pharmacy to dose Vancomycin consult **AND** vancomycin - pharmacy to dose     Review of patient's allergies indicates:  No Known Allergies  Objective:     Vital Signs (24h Range):  Temp:  [97.8 °F (36.6 °C)-97.9 °F (36.6 °C)] 97.8 °F (36.6 °C)  Pulse:  [65-94] 82  Resp:  [15-51] 31  SpO2:  [90 %-96 %] 93 %  BP: ()/(50-70) 116/55       Lines/Drains/Airways     Drain            Female External Urinary Catheter 10/03/20 0705 2 days          Peripheral Intravenous Line                 Peripheral IV - Single Lumen 10/03/20 1437 20 G;1 3/4 in Left Upper Arm 1 day         Peripheral IV - Single Lumen 10/04/20 0237 20 G Left Antecubital 1 day                Physical Exam  Awake, alert  EOMI  NCAT  Voice breathy, weak    Significant Labs:  BMP:   Recent Labs   Lab 10/04/20  0449 10/04/20  1441   GLU 90 77   * 123*   CO2 19* 19*   BUN 38* 38*   CREATININE 0.9 0.9   CALCIUM 8.8 8.3*   MG 2.9*  --      CBC:   Recent Labs   Lab 10/05/20  0445   WBC 27.16*   RBC 2.46*   HGB 7.3*   HCT 24.5*      *   MCH 29.7   MCHC 29.8*       Significant Diagnostics:  I have reviewed and interpreted all pertinent imaging results/findings within the past 24 hours.

## 2020-10-05 NOTE — ANESTHESIA PROCEDURE NOTES
Arterial    Diagnosis: Respiratory distress    Patient location during procedure: done in OR  Procedure start time: 10/5/2020 2:31 PM  Timeout: 10/5/2020 2:30 PM  Procedure end time: 10/5/2020 2:40 PM    Staffing  Authorizing Provider: Dennys Sofia MD  Performing Provider: Bernarda Fontana MD    Anesthesiologist was present at the time of the procedure.    Preanesthetic Checklist  Completed: patient identified, site marked, surgical consent, pre-op evaluation, timeout performed, IV checked, risks and benefits discussed, monitors and equipment checked and anesthesia consent givenArterial  Skin Prep: chlorhexidine gluconate  Local Infiltration: lidocaine  Orientation: right  Location: dorsalis pedis  Catheter Size: 22 G  Catheter placement by Ultrasound guidance. Heme positive aspiration all ports.  Vessel Caliber: small, patent, compressibility normal  Vascular Doppler:  not done  Needle advanced into vessel with real time Ultrasound guidance.  Guidewire confirmed in vessel.  Sterile sheath used.Insertion Attempts: 1  Assessment  Dressing: secured with tape and tegaderm and tegaderm  Patient: Tolerated well

## 2020-10-05 NOTE — ASSESSMENT & PLAN NOTE
Impression    Patient is a 75 year old female with past psychiatric history of depression who initially presented with acute respiratory failure with hypoxia, now s/p MVR and TVR with psychiatry consulted for depression. When seen today, patient calm and cooperative with interview. Endorses multiple symptoms of depression and anxiety, in the context of extended hospital course and poor physical health. Endorses history of depression in the past with significant improvement on Lexapro. Given current QTC(496) as well as poor sleep and appetite, will start Remeron 7.5 mg instead.     Adjustment disorder with mixed anxiety and depressed mood  R/o PTSD    RECOMMENDATION(S)      1. Scheduled Medication(s):  Start remeron 7.5 mg PO nightly    2. PRN Medication(s):  None    3.  Monitor:  Please monitor QTc    4. Legal Status/Precaution(s):  Pt currently does not meet criteria nor benefit from from involuntary inpatient psychiatric admission.     Psychiatry will continue to follow.

## 2020-10-05 NOTE — BRIEF OP NOTE
[facility]  Brief Operative Note    SUMMARY     Surgery Date: 10/5/2020     Surgeon(s) and Role:  Panel 1:     * Jeremy Shine MD - Primary     * Juanjose Casillas MD - Resident - Assisting  Panel 2:     * Yfn Mendoza MD - Primary     * Harpreet Guillen MD - Resident - Assisting    Pre-op Diagnosis:  Respiratory distress [R06.03]  S/P MVR (mitral valve repair) [Z98.890]    Post-op Diagnosis:  Post-Op Diagnosis Codes:     * Respiratory distress [R06.03]     * S/P MVR (mitral valve repair) [Z98.890]    Procedure(s) (LRB):  LARYNGOSCOPY, MICRO SUSPENSION WITH AUGMENTATION (N/A)  VATS, WITH DECORTICATION, LUNG (Right)    Anesthesia: General    Description of Procedure: Right VATS, drainage of multiloculated pleural effusion (<1L)  Limited decortication of right middle lobe   28Fr chest tubes x2     Laryngoscopy and intervention per ENT team    Description of the findings of the procedure: Multiloculated serosanguineous pleural effusion     Estimated Blood Loss: 150mL    Estimated Blood Loss has been documented.         Specimens:   Specimen (12h ago, onward)    None          [id number]

## 2020-10-05 NOTE — PROGRESS NOTES
[facility]  Thoracic Surgery  Progress Note    Subjective:     History of Present Illness:  Patient is a 76 yo F s/p MVr, Tvr, MAZE on 9/9/20. Case was noteworthy for 3 pump runs and development of a RV hematoma2/2 retraction. Post operatively she was admitted to the ICU and had a protracted course. She was extubated 9/15. Became hemodynamically unstable 9/18 and had to be reintubated along with performing a bedside pericardial window for cardiac tamponade. Around this time she also went into afib, but notably she does have a history of afib. Other significant events in her post op course included severe JONAH, never went on dialysis, but required aggressive diuresis for volume overload. She was stepped down 9/27. She represents to SICU due to increased O2 requirement. This morning she was on 3L NC but necessitated 15L ventimask to keep her saturations above 90. Chest films showed worsening right sided pleural effusion. She was sent to IR for thoracentesis but ultrasound imaging showed significant loculations. Thoracentesis was performed in the largest pocket obtaining 60 cc of fluid.    Post-Op Info:  Procedure(s) (LRB):  Valvuloplasty, Mitral (N/A)  REPAIR, TRICUSPID VALVE (N/A)  BAIN MAZE PROCEDURE (N/A)   26 Days Post-Op     Interval History: No acute changes  On 8L HFNC   NPO for OR    Medications:  Continuous Infusions:   dextrose 5 % 100 mL/hr at 10/05/20 0700    heparin (porcine) in 5 % dex Stopped (10/05/20 0000)     Scheduled Meds:   albuterol-ipratropium  3 mL Nebulization Q6H    aspirin  81 mg Oral Daily    ceFEPime (MAXIPIME) IVPB  2 g Intravenous Q8H    digoxin  0.125 mg Oral Daily    famotidine  20 mg Oral Daily    melatonin  6 mg Oral Nightly    metoprolol tartrate  25 mg Oral BID    potassium chloride  20 mEq Oral BID    sodium chloride 3%  4 mL Nebulization Q6H    vancomycin (VANCOCIN) IVPB  1,750 mg Intravenous Q24H     PRN Meds:acetaminophen, calcium carbonate,  dextrose 50%, ondansetron, Pharmacy to dose Vancomycin consult **AND** vancomycin - pharmacy to dose     Review of patient's allergies indicates:  No Known Allergies  Objective:     Vital Signs (Most Recent):  Temp: 97.7 °F (36.5 °C) (10/05/20 0700)  Pulse: 82 (10/05/20 0745)  Resp: (!) 22 (10/05/20 0745)  BP: (!) 123/58 (10/05/20 0700)  SpO2: (!) 94 % (10/05/20 0745) Vital Signs (24h Range):  Temp:  [97.7 °F (36.5 °C)-97.9 °F (36.6 °C)] 97.7 °F (36.5 °C)  Pulse:  [65-94] 82  Resp:  [15-51] 22  SpO2:  [90 %-96 %] 94 %  BP: ()/(50-70) 123/58     Intake/Output - Last 3 Shifts       10/03 0700 - 10/04 0659 10/04 0700 - 10/05 0659 10/05 0700 - 10/06 0659    P.O.       I.V. (mL/kg) 1161 (14.7) 407.2 (5.2)     IV Piggyback       Total Intake(mL/kg) 1161 (14.7) 407.2 (5.2)     Urine (mL/kg/hr) 250 (0.1) 515 (0.3)     Other       Stool  0     Total Output 250 515     Net +911 -107.8            Urine Occurrence 2 x 1 x     Stool Occurrence  2 x           SpO2: (!) 94 %  O2 Device (Oxygen Therapy): High Flow nasal Cannula    Physical Exam  Vitals signs and nursing note reviewed.   Constitutional:       Appearance: She is well-developed. She is not diaphoretic.   HENT:      Head: Normocephalic and atraumatic.   Eyes:      Conjunctiva/sclera: Conjunctivae normal.   Neck:      Musculoskeletal: Normal range of motion and neck supple.   Cardiovascular:      Rate and Rhythm: Regular rhythm. Tachycardia present.      Comments: Midline sternotomy with clean, intact, nonerythematous incision  Chest tube sites with clean, dry bandages  Pulmonary:      Breath sounds: No wheezing.      Comments: Right lower lung field with decreased breath sounds  Left lung clear to ausculation in all fields  Abdominal:      General: There is no distension.      Palpations: Abdomen is soft.      Tenderness: There is no abdominal tenderness.   Musculoskeletal: Normal range of motion.   Skin:     General: Skin is warm and dry.   Neurological:       General: No focal deficit present.      Mental Status: She is alert and oriented to person, place, and time.         Significant Labs:  ABGs:   Recent Labs   Lab 10/04/20  1029   PH 7.312*   PCO2 38.3   PO2 67*   HCO3 19.3*   POCSATURATED 91*   BE -7     Amylase: No results for input(s): AMYLASE in the last 48 hours.  BMP:   Recent Labs   Lab 10/04/20  0449 10/04/20  1441   GLU 90 77   * 151*   K 4.4 4.5   * 123*   CO2 19* 19*   BUN 38* 38*   CREATININE 0.9 0.9   CALCIUM 8.8 8.3*   MG 2.9*  --      Cardiac markers: No results for input(s): CKMB, CPKMB, TROPONINT, TROPONINI, MYOGLOBIN in the last 48 hours.  CBC:   Recent Labs   Lab 10/05/20  0445   WBC 27.16*   RBC 2.46*   HGB 7.3*   HCT 24.5*      *   MCH 29.7   MCHC 29.8*       Significant Diagnostics:  I have reviewed all pertinent imaging results/findings within the past 24 hours.    VTE Risk Mitigation (From admission, onward)         Ordered     heparin 25,000 units in dextrose 5% 250 mL (100 units/mL) infusion (heparin infusion - NO NOMOGRAM)  Continuous      10/04/20 0639     IP VTE HIGH RISK PATIENT  Once      09/09/20 2250     Place sequential compression device  Until discontinued      09/09/20 2250              Assessment/Plan:     Pleural effusion  76 yo female with complex right sided pleural effusion s/p Mvr, Tvr, MAZE 9/09/2020, multiloculated and not amendable to IR drainage.      - Will plan for right VATS drainage of effusion, limited decortication, possible pleurodesis today   - Hold naticoagulation  - Keep NPO   - ENT procedure today also        Juanjose Casillas MD  Thoracic Surgery  [facility]

## 2020-10-05 NOTE — SUBJECTIVE & OBJECTIVE
Patient History           Medical as of 10/5/2020     Past Medical History     Diagnosis Date Comments Source    Atrial fibrillation with RVR 8/24/2020 -- Provider    Cataract -- -- Provider    Essential hypertension 8/31/2020 -- Provider    Glaucoma -- -- Provider    Heart valve problem -- -- Provider    Hyperlipidemia -- -- Provider    Severe mitral regurgitation 8/24/2020 -- Provider          Pertinent Negatives     Diagnosis Date Noted Comments Source    Amblyopia 08/24/2012 -- Provider    Arthritis 08/24/2012 -- Provider    Diabetes mellitus 08/24/2012 -- Provider    Diabetes mellitus 06/30/2020 -- Provider    Diabetic retinopathy 08/24/2012 -- Provider    Diabetic retinopathy 06/30/2020 -- Provider    Macular degeneration 08/24/2012 -- Provider    Retinal detachment 08/24/2012 -- Provider    Sickle cell anemia 06/30/2020 -- Provider    Sickle cell trait 06/30/2020 -- Provider    Strabismus 08/24/2012 -- Provider    Uveitis 08/24/2012 -- Provider                  Surgical as of 10/5/2020     Past Surgical History     Procedure Laterality Date Comments Source    ANKLE SURGERY -- -- -- Provider    lipoma removal [Other] -- -- -- Provider    LEFT HEART CATHETERIZATION Left 8/25/2020 Procedure: Left heart cath, radial;  Surgeon: Marlee Carrillo MD;  Location: [facility] CATH LAB;  Service: Cardiology;  Laterality: Left; Provider    TRICUSPID VALVULOPLASTY N/A 9/9/2020 Procedure: REPAIR, TRICUSPID VALVE;  Surgeon: Antoni Waddell MD;  Location: [address];  Service: Cardiothoracic;  Laterality: N/A; Provider    BAIN MAZE PROCEDURE N/A 9/9/2020 Procedure: BAIN MAZE PROCEDURE;  Surgeon: Antoni Waddell MD;  Location: [address];  Service: Cardiothoracic;  Laterality: N/A;  MAZE  Provider                  Family as of 10/5/2020     Problem Relation Name Age of Onset Comments Source    Cancer Mother -- -- -- Provider    Cataracts Sister -- -- -- Provider    No Known Problems Father -- -- -- Provider    No  Known Problems Brother -- -- -- Provider    No Known Problems Maternal Aunt -- -- -- Provider    No Known Problems Maternal Uncle -- -- -- Provider    No Known Problems Paternal Aunt -- -- -- Provider    No Known Problems Paternal Uncle -- -- -- Provider    No Known Problems Maternal Grandmother -- -- -- Provider    No Known Problems Maternal Grandfather -- -- -- Provider    No Known Problems Paternal Grandmother -- -- -- Provider    No Known Problems Paternal Grandfather -- -- -- Provider    Amblyopia Neg Hx -- -- -- Provider    Blindness Neg Hx -- -- -- Provider    Diabetes Neg Hx -- -- -- Provider    Glaucoma Neg Hx -- -- -- Provider    Hypertension Neg Hx -- -- -- Provider    Macular degeneration Neg Hx -- -- -- Provider    Retinal detachment Neg Hx -- -- -- Provider    Strabismus Neg Hx -- -- -- Provider    Stroke Neg Hx -- -- -- Provider    Thyroid disease Neg Hx -- -- -- Provider    Breast cancer Neg Hx -- -- -- Provider    Colon cancer Neg Hx -- -- -- Provider    Ovarian cancer Neg Hx -- -- -- Provider            Tobacco Use as of 10/5/2020     Smoking Status Smoking Start Date Smoking Quit Date Packs/Day Years Used    Never Smoker -- -- -- --    Types Comments Smokeless Tobacco Status Smokeless Tobacco Quit Date Source    -- -- Never Used -- Provider            Alcohol Use as of 10/5/2020     Alcohol Use Drinks/Week Alcohol/Week Comments Source    No   -- -- Provider    Frequency Typical Drinks Binge Drinking        -- -- --              Drug Use as of 10/5/2020     Drug Use Types Frequency Comments Source    No -- -- -- Provider            Sexual Activity as of 10/5/2020     Sexually Active Birth Control Partners Comments Source    Not Currently -- -- -- Provider            Activities of Daily Living as of 10/5/2020    None           Social Documentation as of 10/5/2020    None           Occupational as of 10/5/2020    None           Socioeconomic as of 10/5/2020     Marital Status Spouse Name Number of  Children Years Education Education Level Preferred Language Ethnicity Race Source     -- -- -- -- English /White White --    Financial Resource Strain Food Insecurity: Worry Food Insecurity: Inability Transportation Needs: Medical Transportation Needs: Non-medical    -- -- -- -- --            Pertinent History     Question Response Comments    Lives with -- --    Place in Birth Order -- --    Lives in -- --    Number of Siblings -- --    Raised by -- --    Legal Involvement -- --    Childhood Trauma -- --    Criminal History of -- --    Financial Status -- --    Highest Level of Education -- --    Does patient have access to a firearm? -- --     Service -- --    Primary Leisure Activity -- --    Spirituality -- --          Family History     Problem Relation (Age of Onset)    Cancer Mother    Cataracts Sister    No Known Problems Father, Brother, Maternal Aunt, Maternal Uncle, Paternal Aunt, Paternal Uncle, Maternal Grandmother, Maternal Grandfather, Paternal Grandmother, Paternal Grandfather        Tobacco Use    Smoking status: Never Smoker    Smokeless tobacco: Never Used   Substance and Sexual Activity    Alcohol use: No    Drug use: No    Sexual activity: Not Currently     Psychotherapeutics (From admission, onward)    None        Medical Review Of Systems:  Complete review of systems performed covering Constitutional, Eyes, ENT/Mouth, Cardiovascular, Respiratory, Gastrointestinal, Genitourinary, Musculoskeletal, Skin, Neurologic, Endocrine, Heme/Lymph, and Allergy/Immune.     Complete review of systems was negative with the exception of the following positive symptoms: throat pain, dysphagia, difficulty speaking      Objective:     Vital Signs (Most Recent):  Temp: 97.7 °F (36.5 °C) (10/05/20 1100)  Pulse: 83 (10/05/20 1100)  Resp: (!) 45 (10/05/20 1100)  BP: (!) 123/59 (10/05/20 1100)  SpO2: (!) 93 % (10/05/20 1100) Vital Signs (24h Range):  Temp:  [97.7 °F (36.5 °C)-97.8 °F (36.6  "°C)] 97.7 °F (36.5 °C)  Pulse:  [65-94] 83  Resp:  [15-53] 45  SpO2:  [90 %-95 %] 93 %  BP: ()/(50-70) 123/59     Height: 5' 5" (165.1 cm)  Weight: 79 kg (174 lb 2.6 oz)  Body mass index is 28.98 kg/m².      Intake/Output Summary (Last 24 hours) at 10/5/2020 1149  Last data filed at 10/5/2020 1100  Gross per 24 hour   Intake 407.2 ml   Output 565 ml   Net -157.8 ml     Mental Status Exam:  Appearance: lying in bed, fatigued, in hospital gown  MSK: no abnormal involuntary movements  Level of Consciousness: alert, awake  Behavior/Cooperation: normal, cooperative  Psychomotor: unremarkable and within normal limits   Speech: whispered with difficulty  Language: english, fluid  Orientation: grossly intact  Attention Span/Concentration: intact  Memory: Intact  Mood: "bad"  Affect: dysphoric  Thought Process: linear, normal and logical  Associations: normal and logical  Thought Content: thought of wanting to die and hoplessness, denies wanting to kill her self (no inten, no plan); -[facility]  Fund of Knowledge: Aware of current events and Vocabulary appropriate   Abstraction: did not assess  Insight: good  Judgment: good     Significant Labs:   Last 24 Hours:   Recent Lab Results       10/05/20  0830   10/05/20  0445   10/05/20  0321   10/05/20  0125   10/05/20  0005        Albumin 1.5             Alkaline Phosphatase 309             ALT 62             Anion Gap 7             Aniso   Slight           aPTT   61.8  Comment:  aPTT therapeutic range = 39-69 seconds           AST 30             Baso #   0.05           Basophilic Stippling   Occasional           Basophil%   0.2           BILIRUBIN TOTAL 1.7  Comment:  For infants and newborns, interpretation of results should be based  on gestational age, weight and in agreement with clinical  observations.  Premature Infant recommended reference ranges:  Up to 24 hours.............<8.0 mg/dL  Up to 48 hours............<12.0 mg/dL  3-5 days..................<15.0 mg/dL  6-29 " days.................<15.0 mg/dL               BUN, Bld 33             Freeburn Cells   Occasional           Calcium 8.6             Chloride 118             CO2 20             Creatinine 0.8             Differential Method   Automated           Digoxin Lvl   1.4  Comment:  Toxic:  Adult: >2.5 ng/mL, Pediatric: >3.0 ng/mL   SEE COMMENT  Comment:  Toxic:  Adult: >2.5 ng/mL, Pediatric: >3.0 ng/mL  Do not report  Corrected result; previously reported as 1.1 on 10/05/2020 at 04:21.  [C]         eGFR if  >60.0             eGFR if non  >60.0  Comment:  Calculation used to obtain the estimated glomerular filtration  rate (eGFR) is the CKD-EPI equation.                Eos #   0.0           Eosinophil%   0.1           Glucose 112             Gran # (ANC)   23.4           Gran%   86.0           Hematocrit   24.5           Hemoglobin   7.3           Hypo   Occasional           Immature Grans (Abs)   0.86  Comment:  Mild elevation in immature granulocytes is non specific and   can be seen in a variety of conditions including stress response,   acute inflammation, trauma and pregnancy. Correlation with other   laboratory and clinical findings is essential.             Immature Granulocytes   3.2           Lymph #   1.5           Lymph%   5.4           Magnesium 2.7             MCH   29.7           MCHC   29.8           MCV   100           Mono #   1.4           Mono%   5.1           MPV   13.3           nRBC   0           Phosphorus 3.3             Platelet Estimate   Appears normal           Platelets   218           POCT Glucose       129 >500     Poik   Slight           Poly   Occasional           Potassium 4.2             PROTEIN TOTAL 5.5             RBC   2.46           RDW   18.1           Sodium 145             Toxic Granulation   Present           Vancomycin-Trough               WBC   27.16                            10/04/20  2211   10/04/20  1739   10/04/20  1441        Albumin            Alkaline Phosphatase           ALT           Anion Gap     9     Aniso           aPTT   69.0  Comment:  aPTT therapeutic range = 39-69 seconds       AST           Baso #           Basophilic Stippling           Basophil%           BILIRUBIN TOTAL           BUN, Bld     38     [place] Cells           Calcium     8.3     Chloride     123     CO2     19     Creatinine     0.9     Differential Method           Digoxin Lvl           eGFR if      >60.0     eGFR if non      >60.0  Comment:  Calculation used to obtain the estimated glomerular filtration  rate (eGFR) is the CKD-EPI equation.        Eos #           Eosinophil%           Glucose     77     Gran # (ANC)           Gran%           Hematocrit           Hemoglobin           Hypo           Immature Grans (Abs)           Immature Granulocytes           Lymph #           Lymph%           Magnesium           MCH           MCHC           MCV           Mono #           Mono%           MPV           nRBC           Phosphorus           Platelet Estimate           Platelets           POCT Glucose 145         Poik           Poly           Potassium     4.5     PROTEIN TOTAL           RBC           RDW           Sodium     151     Toxic Granulation           Vancomycin-Trough   22.7       WBC                 Significant Imaging: I have reviewed all pertinent imaging results/findings within the past 24 hours.

## 2020-10-05 NOTE — BRIEF OP NOTE
[facility]  Brief Operative Note    SUMMARY     Surgery Date: 10/5/2020     Surgeon(s) and Role:  Panel 1:     * Jeremy Shine MD - Primary     * Juanjose Casillas MD - Resident - Assisting  Panel 2:     * Yfn Mendoza MD - Primary     * Harpreet Guillen MD - Resident - Assisting        Pre-op Diagnosis:  Respiratory distress [R06.03]  S/P MVR (mitral valve repair) [Z98.890]    Post-op Diagnosis:  Post-Op Diagnosis Codes:     * Respiratory distress [R06.03]     * S/P MVR (mitral valve repair) [Z98.890]    Procedure(s) (LRB):  LARYNGOSCOPY, MICRO SUSPENSION WITH AUGMENTATION (N/A)  VATS, WITH DECORTICATION, LUNG (Right)    Anesthesia: General    Description of Procedure: right TVF injection aug with 0.25mL restylane    Description of the findings of the procedure: right TVF denervation atrophy    Estimated Blood Loss: * No values recorded between 10/5/2020  3:01 PM and 10/5/2020  4:47 PM *    Estimated Blood Loss has been documented.         Specimens:   Specimen (12h ago, onward)    None          [id number]

## 2020-10-05 NOTE — PT/OT/SLP PROGRESS
Occupational Therapy      Patient Name:  Fatou Lorenzo   MRN:  [id number]    Patient not seen today secondary to refusal 2* going for VATS at noon. Will follow-up 10/6/20.    JANETT Eddy  10/5/2020

## 2020-10-05 NOTE — EICU
Rounding (Video Assessment):  Yes    Intervention Initiated From:  Bedside    Pablo Communicated with Bedside Nurse regarding:  Time-Out    Nurse Notified:  Yes    Doctor Notified: Dr. Troy and Dr. Ng @ bedside  Yes    Comments: eLERT called for emergent insertion of IJ, TLC. Privileges on Dr. Troy and Dr. Ng verified.   1829 US used to verify location left IJ. Area cleansed with Chlora prep  1832 Sterile drape applied. US used to verify location  1835 US used located left IJ. Needle inserted, blood return, Manotry checked.   1836 Cannula in place. Glide wire inserted not ectopies noted. VSS.  1838 Needle removed. Glide wire verified with US  1839 Small puncture at entry site.  1840 Dilator # 1 inserted then removed.  1840 TLC 7 [place] 20 cm inserted. Glide wire removed through distal port intact.   1841 All ports aspirated and flushed with normal saline.   1843 Biopatch applied. Sutured down. Applied Tegaderm pants/shirt. Patient tolerated. well.

## 2020-10-05 NOTE — OP NOTE
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    ASSISTANT: ANGI Guillen MD    ANESTHESIA: General.    BLOOD LOSS: Less than 5 mL.    SPECIMENS:  None.    COMPLICATIONS: None.    FINDINGS: Mobile bilateral cricoarytenoid joints. Right true vocal fold denervation atrophy, ventricular edema    CONDITION: Stable.    INDICATIONS FOR PROCEDURE:     PROCEDURE IN DETAIL:   General endotracheal anesthesia was obtained using a double lumen endotracheal tube which was secured to the left lower lip. The thoracic surgery service proceeded with their portion of the procedure. Please see their note for details. At the conclusion of the thoracic portion of the procedure, the endotracheal tube was exchanged over a catheter to a 6-0 cuffed tube per the anesthesia service. The eyes were protected with moist sponges. The teeth and/or upper alveolar ridge was protected using a reinforced mouthguard and/or moist sponges, as appropriate. The bed was rotated 90 degrees. A final timeout was performed for verification purposes. The Ossoff-Pilling laryngoscope was inserted into the oral cavity and was utilized to expose the larynx. The patient was suspended from the [place]. Magnified laryngeal endoscopy was carried out using a 0 degree Gallegos romelia telescope connected to a video monitor. Findings were as noted above.    Under telescopic visualization, a tracheal injector needle was used to inject 0.25 mL of Restylane into the right paraglottic space with good medialization of the infraglottic true vocal fold.     With this, the procedure was brought to completion. The larynx was topically anesthetized with 3 mL of 4% lidocaine. All equipment was removed. The patient was turned back over to the anesthesiology team and general surgery team for  transport back to the ICU. The patient tolerated the procedure well without complications. All needle, sponge, and instrument counts were correct at the completion of the case.    ATTESTATION:  Dr Mendoza was present and participated in all parts of the procedure.

## 2020-10-05 NOTE — PROGRESS NOTES
[facility]  Psychiatry  Progress Note    Patient Name: Fatou Lorenzo  MRN: [id number]   Code Status: Full Code  Admission Date: 8/30/2020  Hospital Length of Stay: 35 days  Expected Discharge Date: 10/9/2020  Attending Physician: Antoni Waddell MD  Primary Care Provider: Ludin Rivas MD    Current Legal Status: Uncontested    Patient information was obtained from patient, relative(s) and ER records.       Subjective:     Patient is a 75 y.o., female, presents with:    Principal Problem:Acute respiratory failure with hypoxia    Chief Complaint: Depresssion    HPI: Fatou Lorenzo is a 75 y.o. female with a past psychiatric history of anxiety who presented to [facility] due to Acute respiratory failure with hypoxia. Psychiatry was consulted to address the patient's symptoms of depression.     Per Primary MD:  Fatou Lorenzo is a 75 y.o. female that has a past medical history of Atrial fibrillation with RVR, Cataract, Glaucoma, Heart valve problem, Hyperlipidemia, and Severe mitral regurgitation.  has a past surgical history that includes Ankle surgery; lipoma removal; and Left heart catheterization (Left, 8/25/2020). Presents to [facility] complaining of recurrent shortness of breath.  Patient states it feels like she is having heart failure again.  She was discharged presumably on August 27th (no discharge summary available at this time), 4 days ago for acute respiratory failure with hypoxia secondary to CHF exacerbation.  The patient is typically followed at [facility].  Reports 8 years ago was referred to Dr. Waddell, but valvular regurgitation not bad enough at that time to do surgery.  However see has severe tricuspid and mitral valve disease which was confirmed during her previous admission by echocardiogram.  She is having dyspnea with exertion, shortness of breath at rest, and orthopnea.  Worsened with exertion.      Patient now s/p open mitral  valve repair, TC valve repair on 9/9/2020. Patient has had multiple re-intubations for hypoxemic respiratory failure resulting in severe hoarseness and some dysphagia. Patient currently being seen by PT/OT/PM&R for weakness, impaired balance, impaired endurance, impaired cardiopulmonary response to activity, impaired self care skills, decreased coordination, impaired functional mobilty, decreased lower extremity function, gait instability. Per primary team, patient has largely had minimal improvement with rehab to this point and has been very anxious lately and refusing some treatment options. CM spoke with son recently who voiced that patient sounds confused on phone.      Per C-L Psych MD:  When seen today, patient calm and cooperative with interview. AAOx4, son at bedside provides additional collateral. Low volume with some hoarseness noted but able to fully participate in interview. Endorses extensive cardiovascular course over the last few months/years, prior to current hospital presentation. Now endorses having been in a hosptial seting for past few weeks. During this time, endorses worsening depressive symptoms including constant low mood, anhedonia, poor sleep, low energy level, poor appetite, and hopelessness. Endorses worsening hopelessness and feeling like life is not worth living anymore, but denies any active suicidal plan or intent. Denies any previous suicide attempts or past psychiatric admissions. States she has felt like this in the past and was previously started by PCP on Lexapro (unknown dosage). Endorses it being effective and was on it for 5 years, before stopping it 2 years ago. Endorses symptoms of generalized anxiety disorder including easy fatiguability, sleep disturbances, restlessness, and irritability. Endorses having flashbacks, nightmares and hypervigilance related to initially resuscitation attempts made in the past. Denies any AVH or other hallucinations.. Patient does not  demonstrate paranoia, delusions, disorganized thinking or disorganized behavior. Denies any HI. Denies any symptoms of stefany now or in the past. States now the plan is to hopefully attend rehab on Monday which she is optimistic about.    Collateral:   Son at bedside, provides collateral integrated above    Psychiatric Review of Systems-is patient experiencing or having changes in  sleep: yes, poor  appetite: yes, decreased  weight: yes, loss  energy/anergy: yes  interest/pleasure/anhedonia: yes  somatic symptoms: no  anxiety/panic: yes  guilty/hopelessness: yes  concentration: no  S.I.B.s/risky behavior: no  any drugs: no  alcohol: no     Past Psychiatric History:  Previous Medication Trials: yes, lexapro  Previous Psychiatric Hospitalizations: no   Previous Suicide Attempts: no   History of Violence: no  Outpatient Psychiatrist: no    Social History:  Marital Status:   Children: 3   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  Education: did not assess  Special Ed: unknown  Housing Status: with son in [facility]  History of phys/sexual abuse: unknown  Access to gun: no    Substance Abuse History:  Recreational Drugs: denies  Use of Alcohol: occasional, social use  Rehab History: no   Tobacco Use: no    Legal History:  Past Charges/Incarcerations: no   Pending charges: no     Family Psychiatric History:   Denies    Psychosocial Stressors: health  Functioning Relationships: good support system and good relationship with children        Hospital Course: 10/3  This morning the patient is seen lying up right in bed and is suctioning saliva from her mouth with her son at bedside rubbing her feet. Due to her vocal cord swelling she is mostly unable to speak; she tries to whisper to get words out or just mouth words, so her interview is limited mostly to yes/no questions. She is able to nod that she is feeling sad, depressed, and anxious. She nods yes to feelings of hopelessness and wishing she would  "not be here anymore. When asked if she wants to kill herself, she adamantly shakes her head no. She shakes her head no when asking about AVH. When asked if her depression, anxiety, and hopelessness are largely related to being in the hospital, she nods yes. She has not been sleeping well at night and has poor PO intake as well due to her dysphagia. She is amenable to trying mirtazapine for mood and sleep.    10/5/20  Chart reviewed. VSS. No acute events overnight. No psychiatric PRNs required. Since last seen, patient has not been started on mirtazapine. When seen today, no distress noted, patient agreeable and cooperative with interview, but anxious due to scheduled procedure 1 hour after interview. Interview somewhat limited due to speech difficulties. Patient states she is feeling "bad" this morning. Patient reports sleeping yesterday after having received PRN benedryl. Endorses appetite but states she has been kept NPO due to planned provedure. No somatic complaints. Denies any suicidal ideation, but continues to endorse hopelessness, low mood and anxiety.            Patient History           Medical as of 10/5/2020     Past Medical History     Diagnosis Date Comments Source    Atrial fibrillation with RVR 8/24/2020 -- Provider    Cataract -- -- Provider    Essential hypertension 8/31/2020 -- Provider    Glaucoma -- -- Provider    Heart valve problem -- -- Provider    Hyperlipidemia -- -- Provider    Severe mitral regurgitation 8/24/2020 -- Provider          Pertinent Negatives     Diagnosis Date Noted Comments Source    Amblyopia 08/24/2012 -- Provider    Arthritis 08/24/2012 -- Provider    Diabetes mellitus 08/24/2012 -- Provider    Diabetes mellitus 06/30/2020 -- Provider    Diabetic retinopathy 08/24/2012 -- Provider    Diabetic retinopathy 06/30/2020 -- Provider    Macular degeneration 08/24/2012 -- Provider    Retinal detachment 08/24/2012 -- Provider    Sickle cell anemia 06/30/2020 -- Provider    Sickle " cell trait 06/30/2020 -- Provider    Strabismus 08/24/2012 -- Provider    Uveitis 08/24/2012 -- Provider                  Surgical as of 10/5/2020     Past Surgical History     Procedure Laterality Date Comments Source    ANKLE SURGERY -- -- -- Provider    lipoma removal [Other] -- -- -- Provider    LEFT HEART CATHETERIZATION Left 8/25/2020 Procedure: Left heart cath, radial;  Surgeon: Marlee Carrillo MD;  Location: [facility] CATH LAB;  Service: Cardiology;  Laterality: Left; Provider    TRICUSPID VALVULOPLASTY N/A 9/9/2020 Procedure: REPAIR, TRICUSPID VALVE;  Surgeon: Antoni Waddell MD;  Location: [facility] OR [address] [place] [address];  Service: Cardiothoracic;  Laterality: N/A; Provider    BAIN MAZE PROCEDURE N/A 9/9/2020 Procedure: BAIN MAZE PROCEDURE;  Surgeon: Antoni Waddell MD;  Location: [facility] OR [address] [place] [address];  Service: Cardiothoracic;  Laterality: N/A;  MAZE  Provider                  Family as of 10/5/2020     Problem Relation Name Age of Onset Comments Source    Cancer Mother -- -- -- Provider    Cataracts Sister -- -- -- Provider    No Known Problems Father -- -- -- Provider    No Known Problems Brother -- -- -- Provider    No Known Problems Maternal Aunt -- -- -- Provider    No Known Problems Maternal Uncle -- -- -- Provider    No Known Problems Paternal Aunt -- -- -- Provider    No Known Problems Paternal Uncle -- -- -- Provider    No Known Problems Maternal Grandmother -- -- -- Provider    No Known Problems Maternal Grandfather -- -- -- Provider    No Known Problems Paternal Grandmother -- -- -- Provider    No Known Problems Paternal Grandfather -- -- -- Provider    Amblyopia Neg Hx -- -- -- Provider    Blindness Neg Hx -- -- -- Provider    Diabetes Neg Hx -- -- -- Provider    Glaucoma Neg Hx -- -- -- Provider    Hypertension Neg Hx -- -- -- Provider    Macular degeneration Neg Hx -- -- -- Provider    Retinal detachment Neg Hx -- -- -- Provider    Strabismus Neg Hx -- -- -- Provider    Stroke Neg Hx -- -- -- Provider     Thyroid disease Neg Hx -- -- -- Provider    Breast cancer Neg Hx -- -- -- Provider    Colon cancer Neg Hx -- -- -- Provider    Ovarian cancer Neg Hx -- -- -- Provider            Tobacco Use as of 10/5/2020     Smoking Status Smoking Start Date Smoking Quit Date Packs/Day Years Used    Never Smoker -- -- -- --    Types Comments Smokeless Tobacco Status Smokeless Tobacco Quit Date Source    -- -- Never Used -- Provider            Alcohol Use as of 10/5/2020     Alcohol Use Drinks/Week Alcohol/Week Comments Source    No   -- -- Provider    Frequency Typical Drinks Binge Drinking        -- -- --              Drug Use as of 10/5/2020     Drug Use Types Frequency Comments Source    No -- -- -- Provider            Sexual Activity as of 10/5/2020     Sexually Active Birth Control Partners Comments Source    Not Currently -- -- -- Provider            Activities of Daily Living as of 10/5/2020    None           Social Documentation as of 10/5/2020    None           Occupational as of 10/5/2020    None           Socioeconomic as of 10/5/2020     Marital Status Spouse Name Number of Children Years Education Education Level Preferred Language Ethnicity Race Source     -- -- -- -- English /White White --    Financial Resource Strain Food Insecurity: Worry Food Insecurity: Inability Transportation Needs: Medical Transportation Needs: Non-medical    -- -- -- -- --            Pertinent History     Question Response Comments    Lives with -- --    Place in Birth Order -- --    Lives in -- --    Number of Siblings -- --    Raised by -- --    Legal Involvement -- --    Childhood Trauma -- --    Criminal History of -- --    Financial Status -- --    Highest Level of Education -- --    Does patient have access to a firearm? -- --     Service -- --    Primary Leisure Activity -- --    Spirituality -- --          Family History     Problem Relation (Age of Onset)    Cancer Mother    Cataracts Sister    No Known  "Problems Father, Brother, Maternal Aunt, Maternal Uncle, Paternal Aunt, Paternal Uncle, Maternal Grandmother, Maternal Grandfather, Paternal Grandmother, Paternal Grandfather        Tobacco Use    Smoking status: Never Smoker    Smokeless tobacco: Never Used   Substance and Sexual Activity    Alcohol use: No    Drug use: No    Sexual activity: Not Currently     Psychotherapeutics (From admission, onward)    None        Medical Review Of Systems:  Complete review of systems performed covering Constitutional, Eyes, ENT/Mouth, Cardiovascular, Respiratory, Gastrointestinal, Genitourinary, Musculoskeletal, Skin, Neurologic, Endocrine, Heme/Lymph, and Allergy/Immune.     Complete review of systems was negative with the exception of the following positive symptoms: throat pain, dysphagia, difficulty speaking      Objective:     Vital Signs (Most Recent):  Temp: 97.7 °F (36.5 °C) (10/05/20 1100)  Pulse: 83 (10/05/20 1100)  Resp: (!) 45 (10/05/20 1100)  BP: (!) 123/59 (10/05/20 1100)  SpO2: (!) 93 % (10/05/20 1100) Vital Signs (24h Range):  Temp:  [97.7 °F (36.5 °C)-97.8 °F (36.6 °C)] 97.7 °F (36.5 °C)  Pulse:  [65-94] 83  Resp:  [15-53] 45  SpO2:  [90 %-95 %] 93 %  BP: ()/(50-70) 123/59     Height: 5' 5" (165.1 cm)  Weight: 79 kg (174 lb 2.6 oz)  Body mass index is 28.98 kg/m².      Intake/Output Summary (Last 24 hours) at 10/5/2020 1149  Last data filed at 10/5/2020 1100  Gross per 24 hour   Intake 407.2 ml   Output 565 ml   Net -157.8 ml     Mental Status Exam:  Appearance: lying in bed, fatigued, in hospital gown  MSK: no abnormal involuntary movements  Level of Consciousness: alert, awake  Behavior/Cooperation: normal, cooperative  Psychomotor: unremarkable and within normal limits   Speech: whispered with difficulty  Language: english, fluid  Orientation: grossly intact  Attention Span/Concentration: intact  Memory: Intact  Mood: "bad"  Affect: dysphoric  Thought Process: linear, normal and " logical  Associations: normal and logical  Thought Content: thought of wanting to die and hoplessness, denies wanting to kill her self (no inten, no plan); -HI/AVH  Fund of Knowledge: Aware of current events and Vocabulary appropriate   Abstraction: did not assess  Insight: good  Judgment: good     Significant Labs:   Last 24 Hours:   Recent Lab Results       10/05/20  0830   10/05/20  0445   10/05/20  0321   10/05/20  0125   10/05/20  0005        Albumin 1.5             Alkaline Phosphatase 309             ALT 62             Anion Gap 7             Aniso   Slight           aPTT   61.8  Comment:  aPTT therapeutic range = 39-69 seconds           AST 30             Baso #   0.05           Basophilic Stippling   Occasional           Basophil%   0.2           BILIRUBIN TOTAL 1.7  Comment:  For infants and newborns, interpretation of results should be based  on gestational age, weight and in agreement with clinical  observations.  Premature Infant recommended reference ranges:  Up to 24 hours.............<8.0 mg/dL  Up to 48 hours............<12.0 mg/dL  3-5 days..................<15.0 mg/dL  6-29 days.................<15.0 mg/dL               BUN, Bld 33             Kenneth Cells   Occasional           Calcium 8.6             Chloride 118             CO2 20             Creatinine 0.8             Differential Method   Automated           Digoxin Lvl   1.4  Comment:  Toxic:  Adult: >2.5 ng/mL, Pediatric: >3.0 ng/mL   SEE COMMENT  Comment:  Toxic:  Adult: >2.5 ng/mL, Pediatric: >3.0 ng/mL  Do not report  Corrected result; previously reported as 1.1 on 10/05/2020 at 04:21.  [C]         eGFR if  >60.0             eGFR if non  >60.0  Comment:  Calculation used to obtain the estimated glomerular filtration  rate (eGFR) is the CKD-EPI equation.                Eos #   0.0           Eosinophil%   0.1           Glucose 112             Gran # (ANC)   23.4           Gran%   86.0           Hematocrit    24.5           Hemoglobin   7.3           Hypo   Occasional           Immature Grans (Abs)   0.86  Comment:  Mild elevation in immature granulocytes is non specific and   can be seen in a variety of conditions including stress response,   acute inflammation, trauma and pregnancy. Correlation with other   laboratory and clinical findings is essential.             Immature Granulocytes   3.2           Lymph #   1.5           Lymph%   5.4           Magnesium 2.7             MCH   29.7           MCHC   29.8           MCV   100           Mono #   1.4           Mono%   5.1           MPV   13.3           nRBC   0           Phosphorus 3.3             Platelet Estimate   Appears normal           Platelets   218           POCT Glucose       129 >500     Poik   Slight           Poly   Occasional           Potassium 4.2             PROTEIN TOTAL 5.5             RBC   2.46           RDW   18.1           Sodium 145             Toxic Granulation   Present           Vancomycin-Trough               WBC   27.16                            10/04/20  2211   10/04/20  1739   10/04/20  1441        Albumin           Alkaline Phosphatase           ALT           Anion Gap     9     Aniso           aPTT   69.0  Comment:  aPTT therapeutic range = 39-69 seconds       AST           Baso #           Basophilic Stippling           Basophil%           BILIRUBIN TOTAL           BUN, Bld     38     [place]anton Cells           Calcium     8.3     Chloride     123     CO2     19     Creatinine     0.9     Differential Method           Digoxin Lvl           eGFR if      >60.0     eGFR if non      >60.0  Comment:  Calculation used to obtain the estimated glomerular filtration  rate (eGFR) is the CKD-EPI equation.        Eos #           Eosinophil%           Glucose     77     Gran # (ANC)           Gran%           Hematocrit           Hemoglobin           Hypo           Immature Grans (Abs)           Immature Granulocytes            Lymph #           Lymph%           Magnesium           MCH           MCHC           MCV           Mono #           Mono%           MPV           nRBC           Phosphorus           Platelet Estimate           Platelets           POCT Glucose 145         Poik           Poly           Potassium     4.5     PROTEIN TOTAL           RBC           RDW           Sodium     151     Toxic Granulation           Vancomycin-Trough   22.7       WBC                 Significant Imaging: I have reviewed all pertinent imaging results/findings within the past 24 hours.       Scheduled Medications:   albuterol-ipratropium  3 mL Nebulization Q6H    [START ON 10/6/2020] aspirin  81 mg Oral Daily    ceFEPime (MAXIPIME) IVPB  2 g Intravenous Q8H    [START ON 10/6/2020] digoxin  0.125 mg Oral Daily    famotidine  20 mg Oral Daily    melatonin  6 mg Oral Nightly    metoprolol tartrate  25 mg Oral BID    potassium chloride  20 mEq Oral BID    sodium chloride 3%  4 mL Nebulization Q6H    vancomycin (VANCOCIN) IVPB  1,750 mg Intravenous Q24H       PRN Medications:  acetaminophen, calcium carbonate, dextrose 50%, ondansetron, Pharmacy to dose Vancomycin consult **AND** vancomycin - pharmacy to dose    Review of patient's allergies indicates:  No Known Allergies    Assessment/Plan:     Adjustment disorder with mixed anxiety and depressed mood  Impression    Patient is a 75 year old female with past psychiatric history of depression who initially presented with acute respiratory failure with hypoxia, now s/p MVR and TVR with psychiatry consulted for depression. When seen today, patient calm and cooperative with interview. Endorses multiple symptoms of depression and anxiety, in the context of extended hospital course and poor physical health. Endorses history of depression in the past with significant improvement on Lexapro. Given current QTC(496) as well as poor sleep and appetite, will start Remeron 7.5 mg instead.     Adjustment  disorder with mixed anxiety and depressed mood  R/o PTSD    RECOMMENDATION(S)      1. Scheduled Medication(s):  Start remeron 7.5 mg PO nightly    2. PRN Medication(s):  None    3.  Monitor:  Please monitor QTc    4. Legal Status/Precaution(s):  Pt currently does not meet criteria nor benefit from from involuntary inpatient psychiatric admission.     Psychiatry will continue to follow.             Need for Continued Hospitalization:  No need for inpatient psychiatric hospitalization. Continue medical care as per the primary team.    Anticipated Disposition:  Still a Patient    Total time:  25 with greater than 50% of this time spent in counseling and/or coordination of care.       Silvestre Willis MD   Psychiatry  [facility]

## 2020-10-05 NOTE — HOSPITAL COURSE
"10/3  This morning the patient is seen lying up right in bed and is suctioning saliva from her mouth with her son at bedside rubbing her feet. Due to her vocal cord swelling she is mostly unable to speak; she tries to whisper to get words out or just mouth words, so her interview is limited mostly to yes/no questions. She is able to nod that she is feeling sad, depressed, and anxious. She nods yes to feelings of hopelessness and wishing she would not be here anymore. When asked if she wants to kill herself, she adamantly shakes her head no. She shakes her head no when asking about AVH. When asked if her depression, anxiety, and hopelessness are largely related to being in the hospital, she nods yes. She has not been sleeping well at night and has poor PO intake as well due to her dysphagia. She is amenable to trying mirtazapine for mood and sleep.    10/5/20  Chart reviewed. VSS. No acute events overnight. No psychiatric PRNs required. Since last seen, patient has not been started on mirtazapine. When seen today, no distress noted, patient agreeable and cooperative with interview, but anxious due to scheduled procedure 1 hour after interview. Interview somewhat limited due to speech difficulties. Patient states she is feeling "bad" this morning. Patient reports sleeping yesterday after having received PRN benedryl. Endorses appetite but states she has been kept NPO due to planned provedure. No somatic complaints. Denies any suicidal ideation, but continues to endorse hopelessness, low mood and anxiety.     10/11/  Patient greeted at bedside.  Agreeable to interview.  States that her mood is "good."  States that appetite is diminished from baseline, no concerns regarding sleeping.  She affirms that she had thoughts about death earlier, but denies any current desire to die.  Denies SI/HI/AVH at this time.  States that issues with anxiety persist.  She states that she was looking forward to going outside later " today.    10/12/2020  Chart reviewed. No acute events overnight. No psychiatric PRNs required. Patient has been compliant with medications. Tolerating medications without adverse side effects. When seen today, patient agreeable and cooperative with interview. Limited ability to participate due to vocal cord paralysis, mostly nods and shakes head. Does endorse taking mirtazapine yesterday evening and endorses sleep with it. Denies any suicidal ideation currently. Denies HI or AVH.     10/13/2020  Chart reviewed. No acute events overnight. No psychiatric PRNs required. Patient has been compliant with medications. Tolerating medications without adverse side effects. When seen today, patient agreeable and cooperative with interview. Limited ability to participate due to vocal cord paralysis, mostly nods and shakes head. Daughter in law at bedside. AAOx3. Does endorse taking mirtazapine yesterday evening and endorses sleep with it, though minimally improved. Endorses passive suicidal ideation now but denies any active plan. Denies HI or AVH. Daughter in law notes some confusion in the past, but improved when seen today.

## 2020-10-05 NOTE — ASSESSMENT & PLAN NOTE
76 yo female with complex right sided pleural effusion s/p Mvr, Tvr, MAZE 9/09/2020, multiloculated and not amendable to IR drainage.      - Will plan for right VATS drainage of effusion, limited decortication, possible pleurodesis today   - Hold naticoagulation  - Keep NPO   - ENT procedure today also

## 2020-10-05 NOTE — PLAN OF CARE
10/05/20 1129   Discharge Reassessment   Assessment Type Discharge Planning Reassessment   Provided patient/caregiver education on the expected discharge date and the discharge plan Yes   Do you have any problems affording any of your prescribed medications? No   Discharge Plan A Skilled Nursing Facility   Discharge Plan B Home Health   DME Needed Upon Discharge  other (see comments)  (TBD)   Anticipated Discharge Disposition SNF       Mel Simons MPH, RN, CM  Ext. [phone]

## 2020-10-05 NOTE — PROGRESS NOTES
[facility]  Infectious Disease  Progress Note    Patient Name: Fatou Lorenzo  MRN: [id number]  Admission Date: 8/30/2020  Length of Stay: 35 days  Attending Physician: Antoni Waddell MD  Primary Care Provider: Ludin Rivas MD    Isolation Status: No active isolations  Assessment/Plan:      Leukocytosis  Pt is a 75 y.o. female w/ pmhx of Atrial fibrillation with RVR, HLD and Severe mitral regurgitation.  Pt w/ recent left heart catheterization 8/25/2020 who presented w/ complaints of recurrent shortness of breath.  Transesophageal echocardiogram done recently w/ left atrial thrombus.  L heart angiography w/ nonobstructive CAD.   Upon admission, pt required BiPAP due to hypoxemia not responsive to nasal cannula.    Pt transferred to [facility] for CT surgery evaluation given recurrent admission despite compliance, BP control and diuresis.  Pt seen by Dr. Waddell who recommended MV repair.  Pt underwent MV repair and TV annuloplasty on 9/9. Pt course c/b reintuabtion and pericardial window for evacuation of posterior cardiac tamponade on 9/18.  Pt extubated on 9/24.  Developed increasing white count on 9/28.  UA done concerning for infection.  Urine cxs positive for E.Cloacae.  Pt started on Bactrim.  Chest Xray from 10/1- Since September 28, 2020, increased large right pleural effusion.  Increased diffuse right airspace opacities.  X ray abdomen 9/23 w/ Probable bilateral nephrolithiasis.      Pt underwent IR thoracentesis- not sent for cx and counts not sent.  Per report innumerable loculations     ID consulted for positive urine culture w/ E. Cloacae.      Plan  -Continue on Vanc.  Pharmacy to dose.  Trough goal 15-20.   -Continue on Cefepime 2g q8h   -Cefepime will cover for UTI-e.cloacae and potential resp infection.  -Recommend resp cxs  -VATS procedure today-cxs obtained.  -Spoke w/ CTS team and ID staff about pt and plan.    -ID will continue to follow        Thank you for your consult. I  will follow-up with patient. Please contact us if you have any additional questions.    Vance Singh PA-C  Infectious Disease  [facility]    Subjective:     Principal Problem:Acute respiratory failure with hypoxia    HPI: Pt is a 75 y.o. female w/ pmhx of Atrial fibrillation with RVR, HLD and Severe mitral regurgitation.  Pt w/ recent left heart catheterization 8/25/2020 who presented w/ complaints of recurrent shortness of breath.  Transesophageal echocardiogram done recently w/ left atrial thrombus.  L heart angiography w/ nonobstructive CAD.   Upon admission, pt required BiPAP due to hypoxemia not responsive to nasal cannula.    Pt transferred to [facility] for CT surgery evaluation given recurrent admission despite compliance, BP control and diuresis.  Pt seen by Dr. Waddell who recommended MV repair.  Pt underwent MV repair and TV annuloplasty on 9/9. Pt course c/b reintuabtion and pericardial window for evacuation of posterior cardiac tamponade on 9/18.  Pt extubated on 9/24.  Developed increasing white count on 9/28.  UA done concerning for infection.  Urine cxs positive for E.Cloacae.  Pt started on Bactrim.  Chest Xray from 10/1- Since September 28, 2020, increased large right pleural effusion.  Increased diffuse right airspace opacities.  X ray abdomen 9/23 w/ Probable bilateral nephrolithiasis.    ID consulted for positive urine culture.      Interval History: Pt afebrile. White count-27.16.  CTS consulted w/ plans for VATS today.  PT tolerating cefepime w/o problem.  Chest x ray w/ increasing pleural fluid.      Review of Systems   Constitutional: Positive for activity change and fatigue. Negative for chills and fever.   Respiratory: Positive for cough and shortness of breath. Negative for choking and stridor.    Cardiovascular: Negative for chest pain, palpitations and leg swelling.   Gastrointestinal: Negative for abdominal distention, abdominal pain, anal bleeding, diarrhea,  nausea and vomiting.   Genitourinary: Positive for difficulty urinating. Negative for dysuria, flank pain and pelvic pain.   Musculoskeletal: Negative for arthralgias, back pain, myalgias and neck pain.   Skin: Positive for wound (midline incision). Negative for color change and pallor.   Neurological: Negative for dizziness, seizures and headaches.   Psychiatric/Behavioral: Negative for agitation, behavioral problems and decreased concentration.   All other systems reviewed and are negative.    Objective:     Vital Signs (Most Recent):  Temp: 97.7 °F (36.5 °C) (10/05/20 0700)  Pulse: 86 (10/05/20 0900)  Resp: (!) 53 (10/05/20 0900)  BP: (!) 145/59 (10/05/20 0900)  SpO2: (!) 92 % (10/05/20 0900) Vital Signs (24h Range):  Temp:  [97.7 °F (36.5 °C)-97.9 °F (36.6 °C)] 97.7 °F (36.5 °C)  Pulse:  [65-94] 86  Resp:  [15-53] 53  SpO2:  [90 %-95 %] 92 %  BP: ()/(50-70) 145/59     Weight: 79 kg (174 lb 2.6 oz)  Body mass index is 28.98 kg/m².    Estimated Creatinine Clearance: 63.1 mL/min (based on SCr of 0.8 mg/dL).    Physical Exam  Constitutional:       Appearance: She is well-developed. She is ill-appearing.   HENT:      Nose: Nose normal.   Eyes:      General: No scleral icterus.  Neck:      Musculoskeletal: Normal range of motion.   Cardiovascular:      Rate and Rhythm: Normal rate and regular rhythm.      Heart sounds: Murmur present.   Pulmonary:      Effort: Pulmonary effort is normal.      Breath sounds: Normal breath sounds. No wheezing or rhonchi.      Comments: On NC  Abdominal:      General: There is no distension.      Palpations: Abdomen is soft. There is no mass.      Tenderness: There is abdominal tenderness (suprapubic).   Musculoskeletal: Normal range of motion.         General: No swelling or tenderness.      Right lower leg: No edema.      Left lower leg: No edema.   Skin:     General: Skin is warm and dry.      Findings: No bruising, erythema or lesion.      Comments: Sternal Incision CDI    Neurological:      Mental Status: She is alert and oriented to person, place, and time. Mental status is at baseline.         Significant Labs: All pertinent labs within the past 24 hours have been reviewed.    Significant Imaging: I have reviewed all pertinent imaging results/findings within the past 24 hours.

## 2020-10-05 NOTE — ANESTHESIA PROCEDURE NOTES
Intubation  Performed by: Tiffanie Beckman CRNA  Authorized by: Dennys Sofia MD     Intubation:     Method of Intubation:  Other (see comments) (Cook exchange catheter)    Difficult Airway Encountered?: No      Airway Device Size:  6.0    Style/Cuff Inflation:  Cuffed (inflated to minimal occlusive pressure)    Inflation Amount (mL):  3    Placement Verified By:  Capnometry    Complicating Factors:  None    Findings Post-Intubation:  BS equal bilateral and atraumatic/condition of teeth unchanged

## 2020-10-05 NOTE — PROGRESS NOTES
[facility]  Critical Care - Surgery  Progress Note    Patient Name: Fatou Lorenzo  MRN: [id number]  Admission Date: 8/30/2020  Hospital Length of Stay: 35 days  Code Status: Full Code  Attending Provider: Antoni Waddell MD  Primary Care Provider: Ludin Rivas MD   Principal Problem: Acute respiratory failure with hypoxia    Subjective:     Hospital/ICU Course:  No notes on file    Interval History/Significant Events: No acute events. Heparin held at midnight and has also been kept NPO since then. Possible aspiration with water last night and has been on 8L HFNC. She is anxious about taking any PO meds this am and is requesting them IV or rectal    Follow-up For: Procedure(s) (LRB):  VATS, WITH PLEURODESIS, possible thoracotomy. Mechanical vs. Chemical (Right)  LARYNGOSCOPY, MICRO SUSPENSION WITH AUGMENTATION (N/A)    Post-Operative Day: Day of Surgery    Objective:     Vital Signs (Most Recent):  Temp: 97.7 °F (36.5 °C) (10/05/20 1100)  Pulse: 77 (10/05/20 1300)  Resp: (!) 30 (10/05/20 1300)  BP: (!) 153/63 (10/05/20 1200)  SpO2: (!) 91 % (10/05/20 1300) Vital Signs (24h Range):  Temp:  [97.7 °F (36.5 °C)-97.8 °F (36.6 °C)] 97.7 °F (36.5 °C)  Pulse:  [65-94] 77  Resp:  [15-53] 30  SpO2:  [90 %-95 %] 91 %  BP: ()/(50-70) 153/63     Weight: 79 kg (174 lb 2.6 oz)  Body mass index is 28.98 kg/m².      Intake/Output Summary (Last 24 hours) at 10/5/2020 1344  Last data filed at 10/5/2020 1100  Gross per 24 hour   Intake 407.2 ml   Output 465 ml   Net -57.8 ml       Physical Exam  Vitals signs and nursing note reviewed.   Constitutional:       Appearance: She is well-developed. She is not diaphoretic.   HENT:      Head: Normocephalic and atraumatic.   Eyes:      Conjunctiva/sclera: Conjunctivae normal.   Neck:      Musculoskeletal: Normal range of motion and neck supple.   Cardiovascular:      Rate and Rhythm: Regular rhythm. Tachycardia present.      Comments: Midline sternotomy with clean,  intact, nonerythematous incision  Chest tube sites with clean, dry bandages  Pulmonary:      Breath sounds: No wheezing.      Comments: RLL with dec sounds  Abdominal:      General: There is no distension.      Palpations: Abdomen is soft.      Tenderness: There is no abdominal tenderness.   Musculoskeletal: Normal range of motion.   Skin:     General: Skin is warm and dry.   Neurological:      General: No focal deficit present.      Mental Status: She is alert and oriented to person, place, and time.         Vents:  Vent Mode: Spont (09/23/20 0719)  Ventilator Initiated: Yes(chart correction) (09/18/20 2202)  Set Rate: 16 BPM (09/23/20 0325)  Vt Set: 350 mL (09/23/20 0325)  Pressure Support: 5 cmH20 (09/23/20 0719)  PEEP/CPAP: 5 cmH20 (09/23/20 0719)  Oxygen Concentration (%): 100 (10/04/20 0946)  Peak Airway Pressure: 10 cmH2O (09/23/20 0719)  Plateau Pressure: 15 cmH20 (09/23/20 0325)  Total Ve: 7.12 mL (09/23/20 0719)  Negative Inspiratory Force (cm H2O): -27 (09/15/20 0926)  F/VT Ratio<105 (RSBI): (!) 54.44 (09/23/20 0719)    Lines/Drains/Airways     Drain            Female External Urinary Catheter 10/03/20 0705 2 days          Peripheral Intravenous Line                 Peripheral IV - Single Lumen 10/03/20 1437 20 G;1 3/4 in Left Upper Arm 1 day         Peripheral IV - Single Lumen 10/04/20 0237 20 G Left Antecubital 1 day                Significant Labs:    CBC/Anemia Profile:  Recent Labs   Lab 10/04/20  0449 10/05/20  0445   WBC 24.91* 27.16*   HGB 7.8* 7.3*   HCT 27.0* 24.5*    218   * 100*   RDW 17.8* 18.1*        Chemistries:  Recent Labs   Lab 10/04/20  0449 10/04/20  1441 10/05/20  0830   * 151* 145   K 4.4 4.5 4.2   * 123* 118*   CO2 19* 19* 20*   BUN 38* 38* 33*   CREATININE 0.9 0.9 0.8   CALCIUM 8.8 8.3* 8.6*   ALBUMIN 1.7*  --  1.5*   PROT 5.7*  --  5.5*   BILITOT 1.9*  --  1.7*   ALKPHOS 358*  --  309*   ALT 87*  --  62*   AST 70*  --  30   MG 2.9*  --  2.7*   PHOS 3.6   --  3.3       ABGs:   Recent Labs   Lab 10/04/20  1029   PH 7.312*   PCO2 38.3   HCO3 19.3*   POCSATURATED 91*   BE -7     Coagulation:   Recent Labs   Lab 10/05/20  0445   APTT 61.8*     All pertinent labs within the past 24 hours have been reviewed.    Significant Imaging:  I have reviewed and interpreted all pertinent imaging results/findings within the past 24 hours.     10/05/2020 CXR  FINDINGS:  Increasing opacity in the right hemithorax since 10/04/2020 is observed, likely related predominantly to continued increase in the volume of pleural fluid on this side.  The volume of pleural fluid on the right has serially increased from 10/02/2020 at 12:53 p.m. to the present exam.  Appearance of the chest is otherwise unchanged since 10/04/2020.     Impression:       Assessment/Plan:     Severe mitral regurgitation  Fatou Lorenzo is a 75 y.o. female s/p MVr, TVr 9/9/2020. Case noteworthy for multiple pump runs (3) and RV hematoma due to retraction. Unstable overnight 9/18, required pericardial window for evac of posterior cardiac tamponade.     Neuro:  - Alert, but anxious  - Pain control prn with acetaminophen      CV:  S/p MVr, TVr 9/9/20. S/p bedside pericardial window 9/18, afib  - HDS off pressors   - She is currently refusing to take PO meds. Will transition aspirin to rectal and digoxin and metoprolol to IV. Will re-evaluate after the OR whether we can go back to PO  - Will need to discuss restarting anti-coagulation for a fib once back from the OR     Pulm: Loculated Right Pleural Effusion -> Planning for VATS today.   - On 8L ventimask  - Goal O2 sats 90%, wean as tolerated  - Encourage good pulmonary hygiene, OOB, IS  - HOB >30 deg  - Daily CXR     FEN/GI:  - Roughly net even (neg 100 cc) in the last 24 hours. Seems euvolemic  - Replace other lytes as needed  - NPO until after surgery  - Speech consulted and following  - famotidine and bowel regimen    Renal:  - Daily labs  - No vivas  - UTI - treating  with vanc and cefepime for enterobacter cloacea    Heme/Onc:  - H/H stable  - Heparin held since midnight. Will discuss restarting anticoagulation after surgery     ID: Leukocytosis  - ID consulted  - vanc/cefepime for E. Cloacae in urine  - trend WBC     Endo:  - no hx of DM  - ISS    PPx:  - famotidine, SCDs     Dispo: SICU      Critical secondary to Patient has a condition that poses threat to life and bodily function: MVr, TVr, ELBA     Critical care was time spent personally by me on the following activities: development of treatment plan with patient or surrogate and bedside caregivers, discussions with consultants, evaluation of patient's response to treatment, examination of patient, ordering and performing treatments and interventions, ordering and review of laboratory studies, ordering and review of radiographic studies, pulse oximetry, re-evaluation of patient's condition.  This critical care time did not overlap with that of any other provider or involve time for any procedures.     Madhuri Ng MD  Critical Care - Surgery  [facility]

## 2020-10-05 NOTE — SUBJECTIVE & OBJECTIVE
Interval History: No acute changes  On 8L HFNC   NPO for OR    Medications:  Continuous Infusions:   dextrose 5 % 100 mL/hr at 10/05/20 0700    heparin (porcine) in 5 % dex Stopped (10/05/20 0000)     Scheduled Meds:   albuterol-ipratropium  3 mL Nebulization Q6H    aspirin  81 mg Oral Daily    ceFEPime (MAXIPIME) IVPB  2 g Intravenous Q8H    digoxin  0.125 mg Oral Daily    famotidine  20 mg Oral Daily    melatonin  6 mg Oral Nightly    metoprolol tartrate  25 mg Oral BID    potassium chloride  20 mEq Oral BID    sodium chloride 3%  4 mL Nebulization Q6H    vancomycin (VANCOCIN) IVPB  1,750 mg Intravenous Q24H     PRN Meds:acetaminophen, calcium carbonate, dextrose 50%, ondansetron, Pharmacy to dose Vancomycin consult **AND** vancomycin - pharmacy to dose     Review of patient's allergies indicates:  No Known Allergies  Objective:     Vital Signs (Most Recent):  Temp: 97.7 °F (36.5 °C) (10/05/20 0700)  Pulse: 82 (10/05/20 0745)  Resp: (!) 22 (10/05/20 0745)  BP: (!) 123/58 (10/05/20 0700)  SpO2: (!) 94 % (10/05/20 0745) Vital Signs (24h Range):  Temp:  [97.7 °F (36.5 °C)-97.9 °F (36.6 °C)] 97.7 °F (36.5 °C)  Pulse:  [65-94] 82  Resp:  [15-51] 22  SpO2:  [90 %-96 %] 94 %  BP: ()/(50-70) 123/58     Intake/Output - Last 3 Shifts       10/03 0700 - 10/04 0659 10/04 0700 - 10/05 0659 10/05 0700 - 10/06 0659    P.O.       I.V. (mL/kg) 1161 (14.7) 407.2 (5.2)     IV Piggyback       Total Intake(mL/kg) 1161 (14.7) 407.2 (5.2)     Urine (mL/kg/hr) 250 (0.1) 515 (0.3)     Other       Stool  0     Total Output 250 515     Net +911 -107.8            Urine Occurrence 2 x 1 x     Stool Occurrence  2 x           SpO2: (!) 94 %  O2 Device (Oxygen Therapy): High Flow nasal Cannula    Physical Exam  Vitals signs and nursing note reviewed.   Constitutional:       Appearance: She is well-developed. She is not diaphoretic.   HENT:      Head: Normocephalic and atraumatic.   Eyes:      Conjunctiva/sclera: Conjunctivae  normal.   Neck:      Musculoskeletal: Normal range of motion and neck supple.   Cardiovascular:      Rate and Rhythm: Regular rhythm. Tachycardia present.      Comments: Midline sternotomy with clean, intact, nonerythematous incision  Chest tube sites with clean, dry bandages  Pulmonary:      Breath sounds: No wheezing.      Comments: Right lower lung field with decreased breath sounds  Left lung clear to ausculation in all fields  Abdominal:      General: There is no distension.      Palpations: Abdomen is soft.      Tenderness: There is no abdominal tenderness.   Musculoskeletal: Normal range of motion.   Skin:     General: Skin is warm and dry.   Neurological:      General: No focal deficit present.      Mental Status: She is alert and oriented to person, place, and time.         Significant Labs:  ABGs:   Recent Labs   Lab 10/04/20  1029   PH 7.312*   PCO2 38.3   PO2 67*   HCO3 19.3*   POCSATURATED 91*   BE -7     Amylase: No results for input(s): AMYLASE in the last 48 hours.  BMP:   Recent Labs   Lab 10/04/20  0449 10/04/20  1441   GLU 90 77   * 151*   K 4.4 4.5   * 123*   CO2 19* 19*   BUN 38* 38*   CREATININE 0.9 0.9   CALCIUM 8.8 8.3*   MG 2.9*  --      Cardiac markers: No results for input(s): CKMB, CPKMB, TROPONINT, TROPONINI, MYOGLOBIN in the last 48 hours.  CBC:   Recent Labs   Lab 10/05/20  0445   WBC 27.16*   RBC 2.46*   HGB 7.3*   HCT 24.5*      *   MCH 29.7   MCHC 29.8*       Significant Diagnostics:  I have reviewed all pertinent imaging results/findings within the past 24 hours.    VTE Risk Mitigation (From admission, onward)         Ordered     heparin 25,000 units in dextrose 5% 250 mL (100 units/mL) infusion (heparin infusion - NO NOMOGRAM)  Continuous      10/04/20 0639     IP VTE HIGH RISK PATIENT  Once      09/09/20 2250     Place sequential compression device  Until discontinued      09/09/20 2250

## 2020-10-05 NOTE — PLAN OF CARE
SICU PLAN OF CARE NOTE      Shift Events: no acute events over night. Heparin turned off at midnight. Plan remains for VATs /ENT procedure at noon today, consents signed. PT/DP's doppler q4. Pt updated on plan of care. Will continue to monitor.    Neuro: AAO x4 and Follows Commands    Respiratory: HFNC 8 L    Gtts: D5 @100cc/hr     Urine Output: pt voids per purwick 250cc. Urine is dark brown/red.    Accuchecks: spot checked, WNL    Skin: skin intact with no breakdown. All dressings remain clean, dry, and intact. Heel foams in place. Pt turned q2.

## 2020-10-05 NOTE — PT/OT/SLP DISCHARGE
Occupational Therapy Discharge Summary    Fatou Lorenzo  MRN: [id number]   Principal Problem: Acute respiratory failure with hypoxia      Patient Discharged from acute Occupational Therapy on 10/2/20.      Assessment:      Patient transferred to lower level of care secondary to increased O2 requirements    Objective:     GOALS:   Multidisciplinary Problems     Occupational Therapy Goals        Problem: Occupational Therapy Goal    Goal Priority Disciplines Outcome Interventions   Occupational Therapy Goal     OT, PT/OT Ongoing, Progressing    Description: Goals to be met by: 10/7/2020      Patient will increase functional independence with ADLs by performing:    UE Dressing with Minimum Assistance.  Grooming while sitting EOB with Contact Guard Assistance.- met on 9/28  Toileting from bedside commode with Max Assistance for hygiene and clothing management.   Toilet transfer to bedside commode with Max Assistance.  Supine <> Sit with minimum assistance in preparation for EOB/OOB functional activities.- progressing  Added goal: Pt to tolerate static standing for ~1 minute with min A while competing a functional task.                         Reasons for Discontinuation of Therapy Services  Transfer to alternate level of care.      Plan:     Patient Discharged to: ICU    JANETT Eddy  10/5/2020

## 2020-10-05 NOTE — ASSESSMENT & PLAN NOTE
Fatou Lorenzo is a 75 y.o. female s/p MVr, TVr 9/9/2020. Case noteworthy for multiple pump runs (3) and RV hematoma due to retraction. Unstable overnight 9/18, required pericardial window for evac of posterior cardiac tamponade.     Neuro:  - Alert, but anxious  - Pain control prn with acetaminophen      CV:  S/p MVr, TVr 9/9/20. S/p bedside pericardial window 9/18, afib  - HDS off pressors   - She is currently refusing to take PO meds. Will transition aspirin to rectal and digoxin and metoprolol to IV. Will re-evaluate after the OR whether we can go back to PO  - Will need to discuss restarting anti-coagulation for a fib once back from the OR     Pulm: Loculated Right Pleural Effusion -> Planning for VATS today.   - On 8L ventimask  - Goal O2 sats 90%, wean as tolerated  - Encourage good pulmonary hygiene, OOB, IS  - HOB >30 deg  - Daily CXR     FEN/GI:  - Roughly net even (neg 100 cc) in the last 24 hours. Seems euvolemic  - Replace other lytes as needed  - NPO until after surgery  - Speech consulted and following  - famotidine and bowel regimen    Renal:  - Daily labs  - No vivas  - UTI - treating with vanc and cefepime for enterobacter cloacea    Heme/Onc:  - H/H stable  - Heparin held since midnight. Will discuss restarting anticoagulation after surgery     ID: Leukocytosis  - ID consulted  - vanc/cefepime for E. Cloacae in urine  - trend WBC     Endo:  - no hx of DM  - ISS    PPx:  - famotidine, SCDs     Dispo: SICU

## 2020-10-05 NOTE — SUBJECTIVE & OBJECTIVE
Interval History/Significant Events: No acute events. Heparin held at midnight and has also been kept NPO since then. Possible aspiration with water last night and has been on 8L HFNC. She is anxious about taking any PO meds this am and is requesting them IV or rectal    Follow-up For: Procedure(s) (LRB):  VATS, WITH PLEURODESIS, possible thoracotomy. Mechanical vs. Chemical (Right)  LARYNGOSCOPY, MICRO SUSPENSION WITH AUGMENTATION (N/A)    Post-Operative Day: Day of Surgery    Objective:     Vital Signs (Most Recent):  Temp: 97.7 °F (36.5 °C) (10/05/20 1100)  Pulse: 77 (10/05/20 1300)  Resp: (!) 30 (10/05/20 1300)  BP: (!) 153/63 (10/05/20 1200)  SpO2: (!) 91 % (10/05/20 1300) Vital Signs (24h Range):  Temp:  [97.7 °F (36.5 °C)-97.8 °F (36.6 °C)] 97.7 °F (36.5 °C)  Pulse:  [65-94] 77  Resp:  [15-53] 30  SpO2:  [90 %-95 %] 91 %  BP: ()/(50-70) 153/63     Weight: 79 kg (174 lb 2.6 oz)  Body mass index is 28.98 kg/m².      Intake/Output Summary (Last 24 hours) at 10/5/2020 1344  Last data filed at 10/5/2020 1100  Gross per 24 hour   Intake 407.2 ml   Output 465 ml   Net -57.8 ml       Physical Exam  Vitals signs and nursing note reviewed.   Constitutional:       Appearance: She is well-developed. She is not diaphoretic.   HENT:      Head: Normocephalic and atraumatic.   Eyes:      Conjunctiva/sclera: Conjunctivae normal.   Neck:      Musculoskeletal: Normal range of motion and neck supple.   Cardiovascular:      Rate and Rhythm: Regular rhythm. Tachycardia present.      Comments: Midline sternotomy with clean, intact, nonerythematous incision  Chest tube sites with clean, dry bandages  Pulmonary:      Breath sounds: No wheezing.      Comments: RLL with dec sounds  Abdominal:      General: There is no distension.      Palpations: Abdomen is soft.      Tenderness: There is no abdominal tenderness.   Musculoskeletal: Normal range of motion.   Skin:     General: Skin is warm and dry.   Neurological:      General: No  focal deficit present.      Mental Status: She is alert and oriented to person, place, and time.         Vents:  Vent Mode: Spont (09/23/20 0719)  Ventilator Initiated: Yes(chart correction) (09/18/20 2202)  Set Rate: 16 BPM (09/23/20 0325)  Vt Set: 350 mL (09/23/20 0325)  Pressure Support: 5 cmH20 (09/23/20 0719)  PEEP/CPAP: 5 cmH20 (09/23/20 0719)  Oxygen Concentration (%): 100 (10/04/20 0946)  Peak Airway Pressure: 10 cmH2O (09/23/20 0719)  Plateau Pressure: 15 cmH20 (09/23/20 0325)  Total Ve: 7.12 mL (09/23/20 0719)  Negative Inspiratory Force (cm H2O): -27 (09/15/20 0926)  F/VT Ratio<105 (RSBI): (!) 54.44 (09/23/20 0719)    Lines/Drains/Airways     Drain            Female External Urinary Catheter 10/03/20 0705 2 days          Peripheral Intravenous Line                 Peripheral IV - Single Lumen 10/03/20 1437 20 G;1 3/4 in Left Upper Arm 1 day         Peripheral IV - Single Lumen 10/04/20 0237 20 G Left Antecubital 1 day                Significant Labs:    CBC/Anemia Profile:  Recent Labs   Lab 10/04/20  0449 10/05/20  0445   WBC 24.91* 27.16*   HGB 7.8* 7.3*   HCT 27.0* 24.5*    218   * 100*   RDW 17.8* 18.1*        Chemistries:  Recent Labs   Lab 10/04/20  0449 10/04/20  1441 10/05/20  0830   * 151* 145   K 4.4 4.5 4.2   * 123* 118*   CO2 19* 19* 20*   BUN 38* 38* 33*   CREATININE 0.9 0.9 0.8   CALCIUM 8.8 8.3* 8.6*   ALBUMIN 1.7*  --  1.5*   PROT 5.7*  --  5.5*   BILITOT 1.9*  --  1.7*   ALKPHOS 358*  --  309*   ALT 87*  --  62*   AST 70*  --  30   MG 2.9*  --  2.7*   PHOS 3.6  --  3.3       ABGs:   Recent Labs   Lab 10/04/20  1029   PH 7.312*   PCO2 38.3   HCO3 19.3*   POCSATURATED 91*   BE -7     Coagulation:   Recent Labs   Lab 10/05/20  0445   APTT 61.8*     All pertinent labs within the past 24 hours have been reviewed.    Significant Imaging:  I have reviewed and interpreted all pertinent imaging results/findings within the past 24 hours.     10/05/2020  CXR  FINDINGS:  Increasing opacity in the right hemithorax since 10/04/2020 is observed, likely related predominantly to continued increase in the volume of pleural fluid on this side.  The volume of pleural fluid on the right has serially increased from 10/02/2020 at 12:53 p.m. to the present exam.  Appearance of the chest is otherwise unchanged since 10/04/2020.     Impression:

## 2020-10-05 NOTE — ASSESSMENT & PLAN NOTE
Pt is a 75 y.o. female w/ pmhx of Atrial fibrillation with RVR, HLD and Severe mitral regurgitation.  Pt w/ recent left heart catheterization 8/25/2020 who presented w/ complaints of recurrent shortness of breath.  Transesophageal echocardiogram done recently w/ left atrial thrombus.  L heart angiography w/ nonobstructive CAD.   Upon admission, pt required BiPAP due to hypoxemia not responsive to nasal cannula.    Pt transferred to [facility] [place] surgery evaluation given recurrent admission despite compliance, BP control and diuresis.  Pt seen by Dr. Waddell who recommended MV repair.  Pt underwent MV repair and TV annuloplasty on 9/9. Pt course c/b reintuabtion and pericardial window for evacuation of posterior cardiac tamponade on 9/18.  Pt extubated on 9/24.  Developed increasing white count on 9/28.  UA done concerning for infection.  Urine cxs positive for E.Cloacae.  Pt started on Bactrim.  Chest Xray from 10/1- Since September 28, 2020, increased large right pleural effusion.  Increased diffuse right airspace opacities.  X ray abdomen 9/23 w/ Probable bilateral nephrolithiasis.      Pt underwent IR thoracentesis- not sent for cx and counts not sent.  Per report innumerable loculations     ID consulted for positive urine culture w/ E. Cloacae.      Plan  -Continue on Vanc.  Pharmacy to dose.  Trough goal 15-20.   -Continue on Cefepime 2g q8h   -Cefepime will cover for UTI-e.cloacae and potential resp infection.  -Recommend resp cxs  -VATS procedure today-cxs obtained.  -Spoke w/ CTS team and ID staff about pt and plan.    -ID will continue to follow

## 2020-10-05 NOTE — TRANSFER OF CARE
"Anesthesia Transfer of Care Note    Patient: Fatou Lorenzo    Procedure(s) Performed: Procedure(s) (LRB):  LARYNGOSCOPY, MICRO SUSPENSION WITH AUGMENTATION (N/A)  VATS, WITH DECORTICATION, LUNG (Right)    Patient location: ICU    Anesthesia Type: general    Transport from OR: Continuous ECG monitoring in transport. Continuos invasive BP monitoring in transport. Transported from OR intubated on 100% O2 by AMBU with adequate controlled ventilation. Upon arrival to PACU/ICU, patient attached to ventilator and auscultated to confirm bilateral breath sounds and adequate TV. Continuous SpO2 monitoring in transport    Post pain: adequate analgesia    Post assessment: no apparent anesthetic complications and tolerated procedure well    Post vital signs: stable    Level of consciousness: sedated    Nausea/Vomiting: no nausea/vomiting    Complications: none    Transfer of care protocol was followed      Last vitals:   Visit Vitals  BP (!) 153/63 (BP Location: Right arm, Patient Position: Lying)   Pulse 78   Temp 36.5 °C (97.7 °F) (Oral)   Resp 14   Ht 5' 5" (1.651 m)   Wt 79 kg (174 lb 2.6 oz)   SpO2 97%   Breastfeeding No   BMI 28.98 kg/m²     " Darlene MOSS --Reviewed that growth is caused by the cumulative effect of sun exposure over time and that it does not extend on to the cornea. Recommend UV protection to prevent progression and artificial tears prn for comfort. Return for continued monitoring.

## 2020-10-05 NOTE — ANESTHESIA PROCEDURE NOTES
Intubation  Performed by: Tiffanie Beckman CRNA  Authorized by: Bernarda Fontana MD     Intubation:     Induction:  Intravenous    Intubated:  Postinduction    Mask Ventilation:  Easy mask    Attempts:  1    Attempted By:  CRNA    Method of Intubation:  Direct    Blade:  Lechuga 2    Laryngeal View Grade: Grade I - full view of chords      Difficult Airway Encountered?: No      Complications:  None    Airway Device:  Double lumen tube left    Airway Device Size:  35F    Style/Cuff Inflation:  Cuffed (inflated to minimal occlusive pressure)    Inflation Amount (mL):  5    Tube secured:  25    Secured at:  The lips    Placement Verified By:  Capnometry    Complicating Factors:  None    Findings Post-Intubation:  BS equal bilateral and atraumatic/condition of teeth unchanged

## 2020-10-05 NOTE — ANESTHESIA PROCEDURE NOTES
Intubation  Performed by: Tiffanie Beckman CRNA  Authorized by: Dennys Sofia MD     Intubation:     Attempted By:  CRNA    Method of Intubation:  Other (see comments) (Cook exchange catheter)    Difficult Airway Encountered?: No      Complications:  None    Airway Device:  Oral endotracheal tube    Airway Device Size:  7.0    Style/Cuff Inflation:  Cuffed (inflated to minimal occlusive pressure)    Inflation Amount (mL):  3    Secured at:  The lips    Placement Verified By:  Capnometry    Complicating Factors:  None    Findings Post-Intubation:  BS equal bilateral and atraumatic/condition of teeth unchanged

## 2020-10-05 NOTE — ASSESSMENT & PLAN NOTE
75F with R TVF paresis. Possibly secondary to cardiac surgery vs. Intubation paresis.     -- to OR today for VATS (thoracic surg) plus microsuspension laryngoscopy, R TVF injection.   -- consent signed, in chart.

## 2020-10-05 NOTE — ANESTHESIA POSTPROCEDURE EVALUATION
Anesthesia Post Evaluation    Patient: Fatou Lorenzo    Procedure(s) Performed: Procedure(s) (LRB):  LARYNGOSCOPY, MICRO SUSPENSION WITH AUGMENTATION (N/A)  VATS, WITH DECORTICATION, LUNG (Right)    Final Anesthesia Type: general    Patient location during evaluation: ICU  Patient participation: No - Unable to Participate, Intubation  Level of consciousness: sedated  Post-procedure vital signs: reviewed and stable  Pain management: adequate  Airway patency: patent    PONV status at discharge: No PONV  Anesthetic complications: no      Cardiovascular status: hemodynamically stable  Respiratory status: ETT, intubated and ventilator  Hydration status: euvolemic  Follow-up not needed.          Vitals Value Taken Time   /57 10/05/20 1728   Temp  10/05/20 1728   Pulse 58 10/05/20 1727   Resp 16 10/05/20 1727   SpO2 98 % 10/05/20 1727   Vitals shown include unvalidated device data.      No case tracking events are documented in the log.      Pain/Sheila Score: Pain Rating Prior to Med Admin: 3 (10/4/2020  1:46 PM)  Pain Rating Post Med Admin: 2 (10/5/2020 11:41 AM)

## 2020-10-05 NOTE — PT/OT/SLP PROGRESS
Physical Therapy      Patient Name:  Fatou Lorenzo   MRN:  [id number]    Patient not seen today secondary to refusal 2* going for VATS at noon. Will follow-up 10/6/20.    Janine Soto PT

## 2020-10-05 NOTE — PT/OT/SLP PROGRESS
Speech Language Pathology      Fatou Lorenzo  MRN: [id number]    Patient not seen today 2' to NPO for procedure in AM and MARY in PM. ST will f/u for eval on 10/6/20.     Dwight Meneses CCC-SLP  Speech-Language Pathology  Pager: [phone]

## 2020-10-05 NOTE — PROGRESS NOTES
[facility]  Otorhinolaryngology-Head & Neck Surgery  Progress Note    Subjective:     Post-Op Info:  Procedure(s) (LRB):  Valvuloplasty, Mitral (N/A)  REPAIR, TRICUSPID VALVE (N/A)  BAIN MAZE PROCEDURE (N/A)   26 Days Post-Op  Hospital Day: 37     Interval History: NAEON. Ready for OR    Medications:  Continuous Infusions:   dextrose 5 % 100 mL/hr at 10/05/20 0500    heparin (porcine) in 5 % dex Stopped (10/05/20 0000)     Scheduled Meds:   albuterol-ipratropium  3 mL Nebulization Q6H    aspirin  81 mg Oral Daily    ceFEPime (MAXIPIME) IVPB  2 g Intravenous Q8H    digoxin  0.125 mg Oral Daily    famotidine  20 mg Oral Daily    melatonin  6 mg Oral Nightly    metoprolol tartrate  25 mg Oral BID    potassium chloride  20 mEq Oral BID    sodium chloride 3%  4 mL Nebulization Q6H    vancomycin (VANCOCIN) IVPB  1,750 mg Intravenous Q24H     PRN Meds:acetaminophen, calcium carbonate, dextrose 50%, ondansetron, Pharmacy to dose Vancomycin consult **AND** vancomycin - pharmacy to dose     Review of patient's allergies indicates:  No Known Allergies  Objective:     Vital Signs (24h Range):  Temp:  [97.8 °F (36.6 °C)-97.9 °F (36.6 °C)] 97.8 °F (36.6 °C)  Pulse:  [65-94] 82  Resp:  [15-51] 31  SpO2:  [90 %-96 %] 93 %  BP: ()/(50-70) 116/55       Lines/Drains/Airways     Drain            Female External Urinary Catheter 10/03/20 0705 2 days          Peripheral Intravenous Line                 Peripheral IV - Single Lumen 10/03/20 1437 20 G;1 3/4 in Left Upper Arm 1 day         Peripheral IV - Single Lumen 10/04/20 0237 20 G Left Antecubital 1 day                Physical Exam  Awake, alert  EOMI  NCAT  Voice breathy, weak    Significant Labs:  BMP:   Recent Labs   Lab 10/04/20  0449 10/04/20  1441   GLU 90 77   * 123*   CO2 19* 19*   BUN 38* 38*   CREATININE 0.9 0.9   CALCIUM 8.8 8.3*   MG 2.9*  --      CBC:   Recent Labs   Lab 10/05/20  0445   WBC 27.16*   RBC 2.46*   HGB 7.3*   HCT  24.5*      *   MCH 29.7   MCHC 29.8*       Significant Diagnostics:  I have reviewed and interpreted all pertinent imaging results/findings within the past 24 hours.    Assessment/Plan:     Vocal fold paresis, right  75F with R TVF paresis. Possibly secondary to cardiac surgery vs. Intubation paresis.     -- to OR today for VATS (thoracic surg) plus microsuspension laryngoscopy, R TVF injection.   -- consent signed, in chart.         Harpreet Guillen MD  Otorhinolaryngology-Head & Neck Surgery  [facility]

## 2020-10-05 NOTE — PROGRESS NOTES
CT surgery progress note    COSME overnight  NPO this am for OR    A/P  75F s/p MV repair, TV repair, and MAZE w/ ALISA resection     OR today for R VATS  Hold heparin gtt  Monitor dig levels  Continue Lopressor 25 BID  Continue empiric abx   Cont ICU

## 2020-10-05 NOTE — SUBJECTIVE & OBJECTIVE
Interval History: Pt afebrile. White count-27.16.  CTS consulted w/ plans for VATS today.  PT tolerating cefepime w/o problem.  Chest x ray w/ increasing pleural fluid.      Review of Systems   Constitutional: Positive for activity change and fatigue. Negative for chills and fever.   Respiratory: Positive for cough and shortness of breath. Negative for choking and stridor.    Cardiovascular: Negative for chest pain, palpitations and leg swelling.   Gastrointestinal: Negative for abdominal distention, abdominal pain, anal bleeding, diarrhea, nausea and vomiting.   Genitourinary: Positive for difficulty urinating. Negative for dysuria, flank pain and pelvic pain.   Musculoskeletal: Negative for arthralgias, back pain, myalgias and neck pain.   Skin: Positive for wound (midline incision). Negative for color change and pallor.   Neurological: Negative for dizziness, seizures and headaches.   Psychiatric/Behavioral: Negative for agitation, behavioral problems and decreased concentration.   All other systems reviewed and are negative.    Objective:     Vital Signs (Most Recent):  Temp: 97.7 °F (36.5 °C) (10/05/20 0700)  Pulse: 86 (10/05/20 0900)  Resp: (!) 53 (10/05/20 0900)  BP: (!) 145/59 (10/05/20 0900)  SpO2: (!) 92 % (10/05/20 0900) Vital Signs (24h Range):  Temp:  [97.7 °F (36.5 °C)-97.9 °F (36.6 °C)] 97.7 °F (36.5 °C)  Pulse:  [65-94] 86  Resp:  [15-53] 53  SpO2:  [90 %-95 %] 92 %  BP: ()/(50-70) 145/59     Weight: 79 kg (174 lb 2.6 oz)  Body mass index is 28.98 kg/m².    Estimated Creatinine Clearance: 63.1 mL/min (based on SCr of 0.8 mg/dL).    Physical Exam  Constitutional:       Appearance: She is well-developed. She is ill-appearing.   HENT:      Nose: Nose normal.   Eyes:      General: No scleral icterus.  Neck:      Musculoskeletal: Normal range of motion.   Cardiovascular:      Rate and Rhythm: Normal rate and regular rhythm.      Heart sounds: Murmur present.   Pulmonary:      Effort: Pulmonary  effort is normal.      Breath sounds: Normal breath sounds. No wheezing or rhonchi.      Comments: On NC  Abdominal:      General: There is no distension.      Palpations: Abdomen is soft. There is no mass.      Tenderness: There is abdominal tenderness (suprapubic).   Musculoskeletal: Normal range of motion.         General: No swelling or tenderness.      Right lower leg: No edema.      Left lower leg: No edema.   Skin:     General: Skin is warm and dry.      Findings: No bruising, erythema or lesion.      Comments: Sternal Incision CDI   Neurological:      Mental Status: She is alert and oriented to person, place, and time. Mental status is at baseline.         Significant Labs: All pertinent labs within the past 24 hours have been reviewed.    Significant Imaging: I have reviewed all pertinent imaging results/findings within the past 24 hours.

## 2020-10-06 NOTE — PT/OT/SLP PROGRESS
Physical Therapy      Patient Name:  Fatou Lorenzo   MRN:  [id number]    Patient not seen today secondary to pt intubated following VATS procedure yesterday. Will follow-up as pt is medically appropriate.    Janine Soto, PT

## 2020-10-06 NOTE — SUBJECTIVE & OBJECTIVE
Interval History: Remained intubated overnight  Hypotension requiring 1L Abumin, 2U PRBC, 2 FFP. On low dose epi gtt this AM  Right chest tubes 250cc SS output  Left pigtail catheter placed for pneumo    Medications:  Continuous Infusions:   dexmedetomidine (PRECEDEX) infusion Stopped (10/05/20 1911)    epinephrine 0.05 mcg/kg/min (10/06/20 0600)    heparin (porcine) in 5 % dex Stopped (10/05/20 0000)    propofoL 50 mcg/kg/min (10/06/20 0600)     Scheduled Meds:   albuterol-ipratropium  3 mL Nebulization Q6H    aspirin  81 mg Oral Daily    ceFEPime (MAXIPIME) IVPB  2 g Intravenous Q8H    digoxin  0.125 mg Oral Daily    famotidine  20 mg Oral Daily    melatonin  6 mg Oral Nightly    metoprolol tartrate  25 mg Oral BID    potassium chloride  20 mEq Oral BID    sodium chloride 3%  4 mL Nebulization Q6H    vancomycin (VANCOCIN) IVPB  1,750 mg Intravenous Q24H     PRN Meds:sodium chloride, sodium chloride, sodium chloride, acetaminophen, calcium carbonate, dextrose 50%, dextrose 50%, glucagon (human recombinant), insulin aspart U-100, ondansetron, Pharmacy to dose Vancomycin consult **AND** vancomycin - pharmacy to dose     Review of patient's allergies indicates:  No Known Allergies  Objective:     Vital Signs (Most Recent):  Temp: 98.2 °F (36.8 °C) (10/06/20 0645)  Pulse: 99 (10/06/20 0645)  Resp: (!) 30 (10/06/20 0315)  BP: 120/62 (10/06/20 0600)  SpO2: 99 % (10/06/20 0645) Vital Signs (24h Range):  Temp:  [92.1 °F (33.4 °C)-99.7 °F (37.6 °C)] 98.2 °F (36.8 °C)  Pulse:  [] 99  Resp:  [14-53] 30  SpO2:  [80 %-100 %] 99 %  BP: (109-153)/(53-68) 120/62  Arterial Line BP: ()/(45-69) 133/57     Intake/Output - Last 3 Shifts       10/04 0700 - 10/05 0659 10/05 0700 - 10/06 0659 10/06 0700 - 10/07 0659    I.V. (mL/kg) 407.2 (5.2) 5035 (58)     Blood  1911     Total Intake(mL/kg) 407.2 (5.2) 6946 (80)     Urine (mL/kg/hr) 515 (0.3) 1485 (0.7)     Other  1200     Stool 0 0     Chest Tube  305      Total Output 515 2990     Net -107.8 +3956            Urine Occurrence 1 x 1 x     Stool Occurrence 2 x 1 x           SpO2: 99 %  O2 Device (Oxygen Therapy): ventilator    Physical Exam  Vitals signs and nursing note reviewed.   Constitutional:       Appearance: She is well-developed. She is not diaphoretic.   HENT:      Head: Normocephalic and atraumatic.   Eyes:      Conjunctiva/sclera: Conjunctivae normal.   Neck:      Musculoskeletal: Normal range of motion and neck supple.   Cardiovascular:      Rate and Rhythm: Regular rhythm. Tachycardia present.      Comments: Midline sternotomy with clean, intact, nonerythematous incision  Chest tube sites with clean, dry bandages  Pulmonary:      Breath sounds: No wheezing.      Comments: Mechanically ventilated  Right chest tube x2 with SS output   Left pigtail with SS output  Abdominal:      General: There is no distension.      Palpations: Abdomen is soft.      Tenderness: There is no abdominal tenderness.   Musculoskeletal: Normal range of motion.   Skin:     General: Skin is warm and dry.   Neurological:      Mental Status: She is alert.      Comments: Sedated         Significant Labs:  ABGs:   Recent Labs   Lab 10/06/20  0624   PH 7.406   PCO2 35.0   PO2 123*   HCO3 22.0*   POCSATURATED 99   BE -3     Amylase: No results for input(s): AMYLASE in the last 48 hours.  BMP:   Recent Labs   Lab 10/06/20  0357   *      K 3.6   *   CO2 18*   BUN 28*   CREATININE 0.8   CALCIUM 8.3*   MG 2.2  2.2     Cardiac markers: No results for input(s): CKMB, CPKMB, TROPONINT, TROPONINI, MYOGLOBIN in the last 48 hours.  CBC:   Recent Labs   Lab 10/06/20  0357   WBC 22.77*  22.77*   RBC 2.00*  2.00*   HGB 5.8*  5.8*   HCT 19.4*  19.4*     175   MCV 97  97   MCH 29.0  29.0   MCHC 29.9*  29.9*     CMP:   Recent Labs   Lab 10/06/20  0357   *   CALCIUM 8.3*   ALBUMIN 2.9*   PROT 5.2*      K 3.6   CO2 18*   *   BUN 28*   CREATININE 0.8    ALKPHOS 185*   ALT 38   AST 33   BILITOT 2.1*     Coagulation:   Recent Labs   Lab 10/06/20  0357   INR 2.6*   APTT 58.4*       Significant Diagnostics:  I have reviewed all pertinent imaging results/findings within the past 24 hours.    VTE Risk Mitigation (From admission, onward)         Ordered     heparin 25,000 units in dextrose 5% 250 mL (100 units/mL) infusion (heparin infusion - NO NOMOGRAM)  Continuous      10/04/20 0639     IP VTE HIGH RISK PATIENT  Once      09/09/20 2250     Place sequential compression device  Until discontinued      09/09/20 2250

## 2020-10-06 NOTE — PT/OT/SLP PROGRESS
Speech Language Pathology      Fatou Lorenzo  MRN: [id number]    SLP to bedside. Pt with history of dysphagia, and dysphonia with documented right true vocal fold paresis. Pt remains intubated at this time without plans to extubate later this date. Speech service will continue to monitor and assess upon extubation as medically appropriate.     Sofie Bell MS, CCC-SLP  Speech Language Pathologist  Pager: [phone]  Date 10/6/2020

## 2020-10-06 NOTE — PROGRESS NOTES
CT surgery progress note     COSME overnight  Transfused blood and blood products given hypotension postop  L pigtail catheter placed for post op PTX       A/P  75F s/p MV repair, TV repair, and MAZE w/ ALISA resection on 09/09/20. Post op course complicated by a multiloculated R pleural effusion. Now POD1 from R VATS w/ decortication.     Good response to transfusion  Hold AC today  Monitor dig levels  Continue Lopressor 25 BID  Continue IV abx for E. Coli in pleural fluid  - follow cultures, de-escalate as able  Chest tubes per thoracic  Supportive care  Cont ICU

## 2020-10-06 NOTE — ASSESSMENT & PLAN NOTE
76 yo female with complex right sided pleural effusion s/p Mvr, Tvr, MAZE 9/09/2020, multiloculated and not amendable to IR drainage.   Now s/p right VATS, drainage of loculated effusion, limited decortication 10/5    Continue chest tubes to suction   Hold anticoagulation   Daily CXR  Wean vent, extubate if able

## 2020-10-06 NOTE — PROGRESS NOTES
[facility]  Critical Care - Surgery  Progress Note    Patient Name: Fatou Lorenzo  MRN: [id number]  Admission Date: 8/30/2020  Hospital Length of Stay: 36 days  Code Status: Full Code  Attending Provider: Antoni Waddell MD  Primary Care Provider: Ludin Rivas MD   Principal Problem: Acute respiratory failure with hypoxia    Subjective:     Hospital/ICU Course:  No notes on file    Interval History/Significant Events: Following surgery yesterday. Ms. Lorenzo became hypotensive with MAPs in the 40s. She was placed in trendelenburg and started on albumin bolus. A central line was emergently placed in the [facility] and she was started on epinephrine.     Post placement CXR showed a left pneumothorax and persistent right pleural effusion. A left midaxillary kandy catheter was placed with resolution of the pneumothorax.     Hgb this am 5.8/19.4. Received 2u pRBCs. In total, has received 4u pRBC and 4u FFP. On epi at 0.6 and bicarb gtt    Follow-up For: Procedure(s) (LRB):  LARYNGOSCOPY, MICRO SUSPENSION WITH AUGMENTATION (N/A)  VATS, WITH DECORTICATION, LUNG (Right)    Post-Operative Day: 1 Day Post-Op    Objective:     Vital Signs (Most Recent):  Temp: 98.8 °F (37.1 °C) (10/06/20 1233)  Pulse: (!) 123 (10/06/20 1245)  Resp: (!) 24 (10/06/20 1233)  BP: (!) 102/52 (10/06/20 1200)  SpO2: 100 % (10/06/20 1245) Vital Signs (24h Range):  Temp:  [92.1 °F (33.4 °C)-99.7 °F (37.6 °C)] 98.8 °F (37.1 °C)  Pulse:  [] 123  Resp:  [14-42] 24  SpO2:  [80 %-100 %] 100 %  BP: ()/(51-68) 102/52  Arterial Line BP: ()/(45-69) 132/56     Weight: 86.8 kg (191 lb 5.8 oz)  Body mass index is 31.84 kg/m².      Intake/Output Summary (Last 24 hours) at 10/6/2020 1302  Last data filed at 10/6/2020 1200  Gross per 24 hour   Intake 7178 ml   Output 3243 ml   Net 3935 ml       Physical Exam  Vitals signs and nursing note reviewed.   Constitutional:       Appearance: She is well-developed. She is not diaphoretic.    HENT:      Head: Normocephalic and atraumatic.   Eyes:      Conjunctiva/sclera: Conjunctivae normal.   Neck:      Musculoskeletal: Normal range of motion and neck supple.   Cardiovascular:      Rate and Rhythm: Tachycardia present. Rhythm irregular.      Comments: Midline sternotomy with clean, intact, nonerythematous incision  Chest tube sites with clean, dry bandages  Pulmonary:      Breath sounds: No wheezing.      Comments: 2 right sided chest tubes placed posteriorly draining SS fluid.  1 left sided mid-axillary kandy catheter  Mechanically ventilated, see settings below  Abdominal:      General: There is no distension.      Palpations: Abdomen is soft.      Tenderness: There is no abdominal tenderness.   Genitourinary:     Comments: Yoo in place with blood and dark sediment  Musculoskeletal: Normal range of motion.   Skin:     General: Skin is warm and dry.   Neurological:      General: No focal deficit present.      Mental Status: She is alert and oriented to person, place, and time.         Vents:  Vent Mode: A/C (10/06/20 1233)  Ventilator Initiated: Yes(chart correction) (09/18/20 2202)  Set Rate: 24 BPM (10/06/20 1233)  Vt Set: 375 mL (10/06/20 1233)  Pressure Support: 5 cmH20 (09/23/20 0719)  PEEP/CPAP: 5 cmH20 (10/06/20 1233)  Oxygen Concentration (%): 50 (10/06/20 1245)  Peak Airway Pressure: 22 cmH2O (10/06/20 1233)  Plateau Pressure: 11 cmH20 (10/06/20 1233)  Total Ve: 9.37 mL (10/06/20 1233)  Negative Inspiratory Force (cm H2O): -27 (09/15/20 0926)  F/VT Ratio<105 (RSBI): (!) 63.83 (10/06/20 1233)    Lines/Drains/Airways     Central Venous Catheter Line            Percutaneous Central Line Insertion/Assessment - Triple Lumen  10/05/20 1843 left internal jugular less than 1 day          Drain                 Chest Tube 10/05/20 1546 1 Right Pleural 28 Fr. less than 1 day         Chest Tube 10/05/20 1547 2 Right Pleural 28 Fr. less than 1 day         Chest Tube 10/05/20 2140 Left  Midaxillary;Pleural 7 Fr. less than 1 day         NG/OG Tube 10/05/20 1751 Braxton sump 16 Fr. Center mouth less than 1 day         Urethral Catheter 10/05/20 1824 Temperature probe 16 Fr. less than 1 day          Airway                 Airway - Non-Surgical 10/05/20 1702 Endotracheal Tube less than 1 day          Arterial Line            Arterial Line 10/05/20 1431 Right Pedal less than 1 day          Peripheral Intravenous Line                 Peripheral IV - Single Lumen 10/03/20 1437 20 G;1 3/4 in Left Upper Arm 2 days         Peripheral IV - Single Lumen 10/04/20 0237 20 G Left Antecubital 2 days                Significant Labs:    CBC/Anemia Profile:  Recent Labs   Lab 10/05/20  2244 10/06/20  0357 10/06/20  0726 10/06/20  1108   WBC 23.91* 22.77*  22.77*  --  19.59*   HGB 7.6* 5.8*  5.8* 8.8* 8.8*   HCT 24.5* 19.4*  19.4* 27.4* 27.3*    175  175  --  140*   MCV 97 97  97  --  93   RDW 17.0* 17.2*  17.2*  --  16.2*        Chemistries:  Recent Labs   Lab 10/05/20  2244 10/06/20  0357 10/06/20  1108    142 146*   K 3.9 3.6 4.3   * 112* 115*   CO2 21* 18* 23   BUN 29* 28* 28*   CREATININE 0.8 0.8 0.9   CALCIUM 8.7 8.3* 8.3*   ALBUMIN 2.5* 2.9* 2.9*   PROT 5.1* 5.2* 5.3*   BILITOT 2.9* 2.1* 3.5*   ALKPHOS 204* 185* 160*   ALT 46* 38 35   AST 41* 33 34   MG 2.3 2.2  2.2 2.2   PHOS 3.2 2.4*  2.4* 2.7       Bilirubin:   Recent Labs   Lab 10/05/20  0830 10/05/20  1826 10/05/20  2244 10/06/20  0357 10/06/20  1108   BILITOT 1.7* 1.9* 2.9* 2.1* 3.5*     Coagulation:   Recent Labs   Lab 10/06/20  1108   INR 1.6*   APTT 42.1*     All pertinent labs within the past 24 hours have been reviewed.    Significant Imaging:  I have reviewed and interpreted all pertinent imaging results/findings within the past 24 hours.     10/06/2020 CXR  X-ray Chest 1 View    Result Date: 10/6/2020  1.  Stable support hardware and cardiac findings. 2.  New and or intervally progressed left lower lobe retrocardiac opacity  that could relate to infiltrate and or atelectasis 3.  Interval progression and right lung opacification as noted that could relate to increasing infiltrate and or atelectasis and or fluid. Electronically signed by: Satish Marie Date:    10/06/2020 Time:    07:37    X-ray Chest 1 View    Result Date: 10/5/2020  Status post insertion of right-sided thoracostomy tubes with decreased right-sided pleural effusion. Additional support devices as above. New moderate left-sided pneumothorax with depression of the left hemidiaphragm and compressive atelectasis. Case discussed with JOSE Daniels on 10/05/2020 at 19:11. Electronically signed by: Martin Hernandez MD Date:    10/05/2020 Time:    19:14    X-ray Chest Ap Portable    Result Date: 10/5/2020  Decreased cardiomegaly. Stable opacification of the right lung parenchyma with bilateral thoracostomy tubes in place. Electronically signed by: Jeremie Plaza Date:    10/05/2020 Time:    23:22    X-ray Chest Ap Portable    Result Date: 10/5/2020  No significant improvement or detrimental changes compared to the previous examination. Electronically signed by: Jeremie Plaza Date:    10/05/2020 Time:    22:32      Assessment/Plan:     Severe mitral regurgitation  Fatou Lorenzo is a 75 y.o. female s/p MVr, TVr 9/9/2020. Case noteworthy for multiple pump runs (3) and RV hematoma due to retraction. Unstable overnight 9/18, required pericardial window for evac of posterior cardiac tamponade. Respiratory distress and so re-admitted to SICU on 10/2 now s/p VATS on 10/5.    Neuro:  - Sedation: Propofol gtt for RASS -2  - Pain: Fentanyl pushes PRN      CV:  S/p MVr, TVr 9/9/20. S/p bedside pericardial window 9/18, afib  - Remains on epi at 0.06. Maintain MAPs >65  - Transitioned meds to via NGT. Metoprolol 25 mg BID, digoxin 125 mcg qd  - Aspirin was held this am due to bleeding last night, may be able to give dose tonight as she is stable and then will restart tomorrow  - Continues to be in a  fib so will need to discuss timing for restarting anticoagulation     Pulm:   - Intubated and mechanically ventilated  - On relatively low vent settings. Will keep intubated for today  - Continues to have a right sided effusion vs hemothorax. Cultures from this are growing e coli with speciation pending. ID aware. Need to discuss with thoracic whether she will need to return to the OR or not  - Keep all chest tubes to suction for now  - Daily CXR and ABG PRN  - HOB >30 deg    FEN/GI:  - Net positive 3.7 L in the last 24 hours.   - Replace other lytes as needed  - Trickle feeds initiated  - famotidine and bowel regimen    Renal:  - Vivas replaced yesterday after the OR. Bloody dark sediment noted. This is clearing up today  - UTI - treating with vanc and cefepime for enterobacter cloacea  - Monitor BUN/Cr. Having good UOP.    Heme/Onc:  - H/H rechecked this afternoon and is stable from prior 8.8/27.3  - PT/INR also normalizing 17.6/1.6  - She is s/p      ID:   - ID following for enterobacter cloacae in the urine and now growing e coli in the pleural fluid from the OR on 10/5  - Recommending continuing on cefepime and vanc for now. Will follow up cultures and de-escalate as able  - Recommend vivas to come out as soon as possible     Endo:  - no hx of DM  - ISS    PPx:  - famotidine, SCDs     Dispo: SICU        Critical secondary to Patient has a condition that poses threat to life and bodily function: MVr, TVgenaro, ELBA     Critical care was time spent personally by me on the following activities: development of treatment plan with patient or surrogate and bedside caregivers, discussions with consultants, evaluation of patient's response to treatment, examination of patient, ordering and performing treatments and interventions, ordering and review of laboratory studies, ordering and review of radiographic studies, pulse oximetry, re-evaluation of patient's condition.  This critical care time did not overlap with that of  any other provider or involve time for any procedures.     Madhuri Ng MD  Critical Care - Surgery  [facility]

## 2020-10-06 NOTE — SUBJECTIVE & OBJECTIVE
Interval History/Significant Events: Following surgery yesterday. Ms. Lorenzo became hypotensive with MAPs in the 40s. She was placed in trendelenburg and started on albumin bolus. A central line was emergently placed in the [facility] and she was started on epinephrine.     Post placement CXR showed a left pneumothorax and persistent right pleural effusion. A left midaxillary kandy catheter was placed with resolution of the pneumothorax.     Hgb this am 5.8/19.4. Received 2u pRBCs. In total, has received 4u pRBC and 4u FFP. On epi at 0.6 and bicarb gtt    Follow-up For: Procedure(s) (LRB):  LARYNGOSCOPY, MICRO SUSPENSION WITH AUGMENTATION (N/A)  VATS, WITH DECORTICATION, LUNG (Right)    Post-Operative Day: 1 Day Post-Op    Objective:     Vital Signs (Most Recent):  Temp: 98.8 °F (37.1 °C) (10/06/20 1233)  Pulse: (!) 123 (10/06/20 1245)  Resp: (!) 24 (10/06/20 1233)  BP: (!) 102/52 (10/06/20 1200)  SpO2: 100 % (10/06/20 1245) Vital Signs (24h Range):  Temp:  [92.1 °F (33.4 °C)-99.7 °F (37.6 °C)] 98.8 °F (37.1 °C)  Pulse:  [] 123  Resp:  [14-42] 24  SpO2:  [80 %-100 %] 100 %  BP: ()/(51-68) 102/52  Arterial Line BP: ()/(45-69) 132/56     Weight: 86.8 kg (191 lb 5.8 oz)  Body mass index is 31.84 kg/m².      Intake/Output Summary (Last 24 hours) at 10/6/2020 1302  Last data filed at 10/6/2020 1200  Gross per 24 hour   Intake 7178 ml   Output 3243 ml   Net 3935 ml       Physical Exam  Vitals signs and nursing note reviewed.   Constitutional:       Appearance: She is well-developed. She is not diaphoretic.   HENT:      Head: Normocephalic and atraumatic.   Eyes:      Conjunctiva/sclera: Conjunctivae normal.   Neck:      Musculoskeletal: Normal range of motion and neck supple.   Cardiovascular:      Rate and Rhythm: Tachycardia present. Rhythm irregular.      Comments: Midline sternotomy with clean, intact, nonerythematous incision  Chest tube sites with clean, dry bandages  Pulmonary:      Breath sounds: No  wheezing.      Comments: 2 right sided chest tubes placed posteriorly draining SS fluid.  1 left sided mid-axillary kandy catheter  Mechanically ventilated, see settings below  Abdominal:      General: There is no distension.      Palpations: Abdomen is soft.      Tenderness: There is no abdominal tenderness.   Genitourinary:     Comments: Yoo in place with blood and dark sediment  Musculoskeletal: Normal range of motion.   Skin:     General: Skin is warm and dry.   Neurological:      General: No focal deficit present.      Mental Status: She is alert and oriented to person, place, and time.         Vents:  Vent Mode: A/C (10/06/20 1233)  Ventilator Initiated: Yes(chart correction) (09/18/20 2202)  Set Rate: 24 BPM (10/06/20 1233)  Vt Set: 375 mL (10/06/20 1233)  Pressure Support: 5 cmH20 (09/23/20 0719)  PEEP/CPAP: 5 cmH20 (10/06/20 1233)  Oxygen Concentration (%): 50 (10/06/20 1245)  Peak Airway Pressure: 22 cmH2O (10/06/20 1233)  Plateau Pressure: 11 cmH20 (10/06/20 1233)  Total Ve: 9.37 mL (10/06/20 1233)  Negative Inspiratory Force (cm H2O): -27 (09/15/20 0926)  F/VT Ratio<105 (RSBI): (!) 63.83 (10/06/20 1233)    Lines/Drains/Airways     Central Venous Catheter Line            Percutaneous Central Line Insertion/Assessment - Triple Lumen  10/05/20 1843 left internal jugular less than 1 day          Drain                 Chest Tube 10/05/20 1546 1 Right Pleural 28 Fr. less than 1 day         Chest Tube 10/05/20 1547 2 Right Pleural 28 Fr. less than 1 day         Chest Tube 10/05/20 2140 Left Midaxillary;Pleural 7 Fr. less than 1 day         NG/OG Tube 10/05/20 1751 Butler sump 16 Fr. Center mouth less than 1 day         Urethral Catheter 10/05/20 1824 Temperature probe 16 Fr. less than 1 day          Airway                 Airway - Non-Surgical 10/05/20 1702 Endotracheal Tube less than 1 day          Arterial Line            Arterial Line 10/05/20 1431 Right Pedal less than 1 day          Peripheral  Intravenous Line                 Peripheral IV - Single Lumen 10/03/20 1437 20 G;1 3/4 in Left Upper Arm 2 days         Peripheral IV - Single Lumen 10/04/20 0237 20 G Left Antecubital 2 days                Significant Labs:    CBC/Anemia Profile:  Recent Labs   Lab 10/05/20  2244 10/06/20  0357 10/06/20  0726 10/06/20  1108   WBC 23.91* 22.77*  22.77*  --  19.59*   HGB 7.6* 5.8*  5.8* 8.8* 8.8*   HCT 24.5* 19.4*  19.4* 27.4* 27.3*    175  175  --  140*   MCV 97 97  97  --  93   RDW 17.0* 17.2*  17.2*  --  16.2*        Chemistries:  Recent Labs   Lab 10/05/20  2244 10/06/20  0357 10/06/20  1108    142 146*   K 3.9 3.6 4.3   * 112* 115*   CO2 21* 18* 23   BUN 29* 28* 28*   CREATININE 0.8 0.8 0.9   CALCIUM 8.7 8.3* 8.3*   ALBUMIN 2.5* 2.9* 2.9*   PROT 5.1* 5.2* 5.3*   BILITOT 2.9* 2.1* 3.5*   ALKPHOS 204* 185* 160*   ALT 46* 38 35   AST 41* 33 34   MG 2.3 2.2  2.2 2.2   PHOS 3.2 2.4*  2.4* 2.7       Bilirubin:   Recent Labs   Lab 10/05/20  0830 10/05/20  1826 10/05/20  2244 10/06/20  0357 10/06/20  1108   BILITOT 1.7* 1.9* 2.9* 2.1* 3.5*     Coagulation:   Recent Labs   Lab 10/06/20  1108   INR 1.6*   APTT 42.1*     All pertinent labs within the past 24 hours have been reviewed.    Significant Imaging:  I have reviewed and interpreted all pertinent imaging results/findings within the past 24 hours.     10/06/2020 CXR  X-ray Chest 1 View    Result Date: 10/6/2020  1.  Stable support hardware and cardiac findings. 2.  New and or intervally progressed left lower lobe retrocardiac opacity that could relate to infiltrate and or atelectasis 3.  Interval progression and right lung opacification as noted that could relate to increasing infiltrate and or atelectasis and or fluid. Electronically signed by: Satish Marie Date:    10/06/2020 Time:    07:37    X-ray Chest 1 View    Result Date: 10/5/2020  Status post insertion of right-sided thoracostomy tubes with decreased right-sided pleural effusion.  Additional support devices as above. New moderate left-sided pneumothorax with depression of the left hemidiaphragm and compressive atelectasis. Case discussed with JOSE Daniels on 10/05/2020 at 19:11. Electronically signed by: Martin Hernandez MD Date:    10/05/2020 Time:    19:14    X-ray Chest Ap Portable    Result Date: 10/5/2020  Decreased cardiomegaly. Stable opacification of the right lung parenchyma with bilateral thoracostomy tubes in place. Electronically signed by: Jeremie Plaza Date:    10/05/2020 Time:    23:22    X-ray Chest Ap Portable    Result Date: 10/5/2020  No significant improvement or detrimental changes compared to the previous examination. Electronically signed by: Jeremie Plaza Date:    10/05/2020 Time:    22:32

## 2020-10-06 NOTE — ASSESSMENT & PLAN NOTE
Pt is a 75 y.o. female w/ pmhx of Atrial fibrillation with RVR, HLD and Severe mitral regurgitation.  Pt w/ recent left heart catheterization 8/25/2020 who presented w/ complaints of recurrent shortness of breath.  Transesophageal echocardiogram done recently w/ left atrial thrombus.  L heart angiography w/ nonobstructive CAD.       Pt transferred to [facility] for CT surgery evaluation and underwent MV repair and TV annuloplasty on 9/9. Pt course c/b reintuabtion and pericardial window for evacuation of posterior cardiac tamponade on 9/18.  Pt extubated on 9/24.  Developed increasing white count on 9/28.  UA done concerning for infection.  Urine cxs positive for E.Cloacae.  Pt started on Bactrim.  Chest Xray from 10/1- Since September 28, 2020, increased large right pleural effusion.  Increased diffuse right airspace opacities.  X ray abdomen 9/23 w/ Probable bilateral nephrolithiasis.  ID consulted for positive urine culture w/ E. Cloacae.      Pt underwent IR thoracentesis- not sent for cx and counts not sent.  Per report innumerable loculations. Pt s/p right VATS, drainage of loculated effusion, limited decortication on 10/5 by CTS.   CXs taken w/ E. Coli growing. Also underwent R vocal  Fold injection.  Currently on Vanc and Cefepime. Xray today w/ Interval progression and right lung variable opacification now bobbing the lower 1/2 of the right hemithorax that could relate to infiltrate and or atelectasis and or fluid.  Persistent loculated fluid peripherally about the upper 1/2 of the right hemithorax.    Plan  -Continue on Vanc.  Pharmacy to dose.  Trough goal 15-20.   -Continue on Cefepime 2g q8h   -Cefepime will cover for UTI-e.cloacae and cxs from vats  -Will follow cxs   -Spoke w/ CTS team and ID staff about pt and plan.      -ID will continue to follow

## 2020-10-06 NOTE — PROGRESS NOTES
[facility]  Infectious Disease  Progress Note    Patient Name: Fatou Lorenzo  MRN: [id number]  Admission Date: 8/30/2020  Length of Stay: 36 days  Attending Physician: Antoni Waddell MD  Primary Care Provider: Ludin Rivas MD    Isolation Status: No active isolations  Assessment/Plan:      Leukocytosis  Pt is a 75 y.o. female w/ pmhx of Atrial fibrillation with RVR, HLD and Severe mitral regurgitation.  Pt w/ recent left heart catheterization 8/25/2020 who presented w/ complaints of recurrent shortness of breath.  Transesophageal echocardiogram done recently w/ left atrial thrombus.  L heart angiography w/ nonobstructive CAD.       [place] [facility] [place] and underwent MV repair and TV annuloplasty on 9/9. Pt course c/b reintuabtion and pericardial window for evacuation of posterior cardiac tamponade on 9/18.  Pt extubated on 9/24.  Developed increasing white count on 9/28.  UA done concerning for infection.  Urine cxs positive for E.Cloacae.  Pt started on Bactrim.  Chest Xray from 10/1- Since September 28, 2020, increased large right pleural effusion.  Increased diffuse right airspace opacities.  X ray abdomen 9/23 w/ Probable bilateral nephrolithiasis.  ID consulted for positive urine culture w/ E. Cloacae.      Pt underwent IR thoracentesis- not sent for cx and counts not sent.  Per report innumerable loculations. Pt s/p right VATS, drainage of loculated effusion, limited decortication on 10/5 by CTS.   CXs taken w/ E. Coli growing. Also underwent R vocal  Fold injection.  Currently on Vanc and Cefepime. Xray today w/ Interval progression and right lung variable opacification now bobbing the lower 1/2 of the right hemithorax that could relate to infiltrate and or atelectasis and or fluid.  Persistent loculated fluid peripherally about the upper 1/2 of the right hemithorax.    Plan  -Continue on Vanc.  Pharmacy to dose.  Trough goal 15-20.   -Continue on Cefepime  2g q8h   -Cefepime will cover for UTI-e.cloacae and cxs from vats  -Will follow cxs   -Spoke w/ CTS team and ID staff about pt and plan.      -ID will continue to follow          Thank you for your consult. I will follow-up with patient. Please contact us if you have any additional questions.    Vance Singh PA-C  Infectious Disease  [facility]    Subjective:     Principal Problem:Acute respiratory failure with hypoxia    HPI: Pt is a 75 y.o. female w/ pmhx of Atrial fibrillation with RVR, HLD and Severe mitral regurgitation.  Pt w/ recent left heart catheterization 8/25/2020 who presented w/ complaints of recurrent shortness of breath.  Transesophageal echocardiogram done recently w/ left atrial thrombus.  L heart angiography w/ nonobstructive CAD.   Upon admission, pt required BiPAP due to hypoxemia not responsive to nasal cannula.    Pt transferred to [facility] [place] surgery evaluation given recurrent admission despite compliance, BP control and diuresis.  Pt seen by Dr. Waddell who recommended MV repair.  Pt underwent MV repair and TV annuloplasty on 9/9. Pt course c/b reintuabtion and pericardial window for evacuation of posterior cardiac tamponade on 9/18.  Pt extubated on 9/24.  Developed increasing white count on 9/28.  UA done concerning for infection.  Urine cxs positive for E.Cloacae.  Pt started on Bactrim.  Chest Xray from 10/1- Since September 28, 2020, increased large right pleural effusion.  Increased diffuse right airspace opacities.  X ray abdomen 9/23 w/ Probable bilateral nephrolithiasis.    ID consulted for positive urine culture.      Interval History: Pt afebrile. White count-27.16.  Pt s/p right VATS, drainage of loculated effusion, limited decortication on 10/5 by CTS.    CXs taken w/ E. Coli growing. Also underwent R vocal  Fold injection.  Currently on Vanc and Cefepime. Xray today w/ Interval progression and right lung variable opacification now bobbing the  lower 1/2 of the right hemithorax that could relate to infiltrate and or atelectasis and or fluid.  Persistent loculated fluid peripherally about the upper 1/2 of the right hemithorax.      Review of Systems   Unable to perform ROS: Intubated     Objective:     Vital Signs (Most Recent):  Temp: 98.8 °F (37.1 °C) (10/06/20 1005)  Pulse: 102 (10/06/20 1005)  Resp: (!) 25 (10/06/20 0731)  BP: (!) 100/51 (10/06/20 1005)  SpO2: 100 % (10/06/20 1005) Vital Signs (24h Range):  Temp:  [92.1 °F (33.4 °C)-99.7 °F (37.6 °C)] 98.8 °F (37.1 °C)  Pulse:  [] 102  Resp:  [14-45] 25  SpO2:  [80 %-100 %] 100 %  BP: (100-153)/(51-68) 100/51  Arterial Line BP: ()/(45-69) 130/54     Weight: 86.8 kg (191 lb 5.8 oz)  Body mass index is 31.84 kg/m².    Estimated Creatinine Clearance: 66.1 mL/min (based on SCr of 0.8 mg/dL).    Physical Exam  Constitutional:       Appearance: She is well-developed. She is ill-appearing.   HENT:      Nose: Nose normal.   Eyes:      General: No scleral icterus.  Neck:      Musculoskeletal: Normal range of motion.   Cardiovascular:      Rate and Rhythm: Normal rate and regular rhythm.      Heart sounds: Murmur present.   Pulmonary:      Effort: Pulmonary effort is normal.      Breath sounds: Normal breath sounds. No wheezing or rhonchi.      Comments: Intubated  Chest tubes in place  Abdominal:      General: There is no distension.      Palpations: Abdomen is soft. There is no mass.      Tenderness: There is abdominal tenderness (suprapubic).   Musculoskeletal: Normal range of motion.         General: No swelling or tenderness.      Right lower leg: No edema.      Left lower leg: No edema.   Skin:     General: Skin is warm and dry.      Findings: No bruising, erythema or lesion.      Comments: Sternal Incision CDI   Neurological:      Mental Status: She is alert and oriented to person, place, and time. Mental status is at baseline.         Significant Labs: All pertinent labs within the past 24  hours have been reviewed.    Significant Imaging: I have reviewed all pertinent imaging results/findings within the past 24 hours.

## 2020-10-06 NOTE — SUBJECTIVE & OBJECTIVE
Interval History: Kept intubated overnight. Found to have L PTX. Chest tube placed.     Medications:  Continuous Infusions:   dexmedetomidine (PRECEDEX) infusion Stopped (10/05/20 1911)    epinephrine 0.02 mcg/kg/min (10/06/20 0830)    propofoL 50 mcg/kg/min (10/06/20 0800)     Scheduled Meds:   albuterol-ipratropium  3 mL Nebulization Q6H    ceFEPime (MAXIPIME) IVPB  2 g Intravenous Q8H    digoxin  0.125 mg Oral Daily    famotidine  20 mg Oral Daily    melatonin  6 mg Oral Nightly    metoprolol tartrate  25 mg Oral BID    sodium chloride 3%  4 mL Nebulization Q6H    vancomycin (VANCOCIN) IVPB  1,750 mg Intravenous Q24H     PRN Meds:acetaminophen, calcium carbonate, dextrose 50%, dextrose 50%, glucagon (human recombinant), insulin aspart U-100, ondansetron     Review of patient's allergies indicates:  No Known Allergies  Objective:     Vital Signs (24h Range):  Temp:  [92.1 °F (33.4 °C)-99.7 °F (37.6 °C)] 98.6 °F (37 °C)  Pulse:  [] 102  Resp:  [14-53] 25  SpO2:  [80 %-100 %] 99 %  BP: (109-153)/(52-68) 115/60  Arterial Line BP: ()/(45-69) 130/54     Date 10/06/20 0700 - 10/07/20 0659   Shift 0897-8777 1049-4461 9313-4878 24 Hour Total   INTAKE   Shift Total(mL/kg)       OUTPUT   Urine(mL/kg/hr) 30   30   Chest Tube 142   142   Shift Total(mL/kg) 172(2)   172(2)   Weight (kg) 86.8 86.8 86.8 86.8     Lines/Drains/Airways     Central Venous Catheter Line            Percutaneous Central Line Insertion/Assessment - Triple Lumen  10/05/20 1843 left internal jugular less than 1 day          Drain                 Chest Tube 10/05/20 1546 1 Right Pleural 28 Fr. less than 1 day         Chest Tube 10/05/20 1547 2 Right Pleural 28 Fr. less than 1 day         Chest Tube 10/05/20 2140 Left Midaxillary;Pleural 7 Fr. less than 1 day         NG/OG Tube 10/05/20 1751 Mayes sump 16 Fr. Center mouth less than 1 day         Urethral Catheter 10/05/20 1824 Temperature probe 16 Fr. less than 1 day          Airway                  Airway - Non-Surgical 10/05/20 1702 Endotracheal Tube less than 1 day          Arterial Line            Arterial Line 10/05/20 1431 Right Pedal less than 1 day          Peripheral Intravenous Line                 Peripheral IV - Single Lumen 10/03/20 1437 20 G;1 3/4 in Left Upper Arm 2 days         Peripheral IV - Single Lumen 10/04/20 0237 20 G Left Antecubital 2 days                Physical Exam  Intubated, sedated  NCAt  Responds to commands    Significant Labs:  BMP:   Recent Labs   Lab 10/06/20  0357   *   *   CO2 18*   BUN 28*   CREATININE 0.8   CALCIUM 8.3*   MG 2.2  2.2     CBC:   Recent Labs   Lab 10/06/20  0357 10/06/20  0726   WBC 22.77*  22.77*  --    RBC 2.00*  2.00*  --    HGB 5.8*  5.8* 8.8*   HCT 19.4*  19.4* 27.4*     175  --    MCV 97  97  --    MCH 29.0  29.0  --    MCHC 29.9*  29.9*  --        Significant Diagnostics:  I have reviewed and interpreted all pertinent imaging results/findings within the past 24 hours.

## 2020-10-06 NOTE — PLAN OF CARE
OLTAC following. SW will continue to coordinate with patient, family, team and insurance to complete patient's discharge plan.      Esthela Peralta LMSW   - Case Management

## 2020-10-06 NOTE — PLAN OF CARE
Recommendations     1.) Initiate EN of Peptamen Intense VHP; begin at 10mL/hr and advance 5-10mL Q8hr as tolerated to goal rate at 25mL/hr. EN provides 600kcal, 55gm of protein, 504mL of free water. Add free water per MD recommendations. Once Propofol decrease, increase goal rate to 50mL/hr as tolerated.      Goals: Pt to meet >75% of estimated energy and protein needs over the course of 7 days.   Nutrition Goal Status: new  Communication of LO Recs: (POC)

## 2020-10-06 NOTE — PROGRESS NOTES
Pharmacokinetic Assessment Follow Up: IV Vancomycin    Vancomycin Regimen Assessment and Plan:  - 24-hour vancomycin level resulted at 28.9 mcg/mL, considered supratherapeutic (goal: 15-20 mcg/mL)  - Serum creatinine remains stable at 0.8-0.9 mg/dL but patient now with increasing vasopressor requirements  - Will discontinue scheduled vancomycin regimen and switch to vancomycin pulse dosing  - Last vancomycin dose was administered on 10/6 at ~1800  - Will scheduled next vancomycin random level on 10/8 with AM labs      Drug levels (last 3 results):  Recent Labs   Lab Result Units 10/04/20  1739 10/06/20  1735   Vancomycin-Trough ug/mL 22.7* 28.9*       Pharmacy will continue to follow and monitor vancomycin.    Please contact pharmacy at extension [phone] for questions regarding this assessment.    Thank you for the consult,   Tiff Valencia       Patient brief summary:  Fatou Lorenzo is a 75 y.o. female initiated on antimicrobial therapy with IV Vancomycin for treatment of lower respiratory infection      Drug Allergies:   Review of patient's allergies indicates:  No Known Allergies    Actual Body Weight:   86.8 kg    Renal Function:   Estimated Creatinine Clearance: 58.7 mL/min (based on SCr of 0.9 mg/dL).    Dialysis Method (if applicable):  N/A

## 2020-10-06 NOTE — PROGRESS NOTES
"[facility]  Adult Nutrition  Progress Note    SUMMARY       Recommendations    1.) Initiate EN of Peptamen Intense VHP; begin at 10mL/hr and advance 5-10mL Q8hr as tolerated to goal rate at 25mL/hr. EN provides 600kcal, 55gm of protein, 504mL of free water. Add free water per MD recommendations. Once Propofol decrease, increase goal rate to 50mL/hr as tolerated.     Goals: Pt to meet >75% of estimated energy and protein needs over the course of 7 days.   Nutrition Goal Status: new  Communication of RD Recs: (POC)    Reason for Assessment    Reason For Assessment: RD follow-up  Diagnosis: (Acute respiratory failure with hypoxia)  Relevant Medical History: afib w/ RVR, HTN, HLD  Interdisciplinary Rounds: did not attend  General Information Comments: Pt s/p MVr, Tvr, MAZE on 9/9/20. NFPE completed 9/30 with no s/s of malnutrition observed. Pt is intubated, sedated and on mechanical ventilation. GI- LBM 10/4. Nsg staff reports plan to start trickle TFs.   Nutrition Discharge Planning: Adequate PO intake on cardiac diet    Nutrition Risk Screen    Nutrition Risk Screen: no indicators present    Nutrition/Diet History    Spiritual, Cultural Beliefs, Sabianist Practices, Values that Affect Care: no  Factors Affecting Nutritional Intake: NPO, on mechanical ventilation    Anthropometrics    Temp: 98.6 °F (37 °C)  Height Method: Stated  Height: 5' 5" (165.1 cm)  Height (inches): 65 in  Weight Method: Bed Scale  Weight: 86.8 kg (191 lb 5.8 oz)  Weight (lb): 191.36 lb  Ideal Body Weight (IBW), Female: 125 lb  % Ideal Body Weight, Female (lb): 147.2 %  BMI (Calculated): 31.8       Lab/Procedures/Meds    Pertinent Labs Reviewed: reviewed  Pertinent Labs Comments: WBC 22.77, Hgb 5.8, Hct 19.4, Chl 112, BUN 20, Glucose 152, Ca 8.3, Phosphorus 2.4, Alb 2.9  Pertinent Medications Reviewed: reviewed  Pertinent Medications Comments: Albuterol, cefepime, digoxin, famotidine, vancomycin, potasssium chloride, " precedex    Physical Findings/Assessment     Edema 2+ generalized    Estimated/Assessed Needs    Weight Used For Calorie Calculations: 86 kg (189 lb 9.5 oz)  Energy Calorie Requirements (kcal): 946-1204  Energy Need Method: Kcal/kg(11-14kcal/kg)  Protein Requirements: 85-102gm (1.5-1.8gm/kg IBW)  Weight Used For Protein Calculations: 56.8 kg (125 lb 3.5 oz)(IBW)  Fluid Requirements (mL): per MD or 1 mL/kcal     RDA Method (mL): 946         Nutrition Prescription Ordered    Current Diet Order: NPO (10/5)    Evaluation of Received Nutrient/Fluid Intake    Enteral Calories (kcal): 0  Enteral Protein (gm): 0  Enteral (Free Water) Fluid (mL): 0  Other Calories (kcal): 562  Total Calories (kcal): 0  Total Calories (kcal/kg): 6  % Kcal Needs: 59  % Protein Needs: -  I/O: I: 6946; O: 2990; -564mL since 9/22  Energy Calories Required: not meeting needs  Protein Required: not meeting needs  Fluid Required: (per MD)  Comments: LBM 10/4  Tolerance: (RD to monitor)  % Intake of Estimated Energy Needs: 59  % Meal Intake: 0    Nutrition Risk    Level of Risk/Frequency of Follow-up: high, 2x weekly    Assessment and Plan        Nutrition Problem  Inadequate energy intake    Related to (etiology):   NPO    Signs and Symptoms (as evidenced by):   NPO    Interventions(treatment strategy):  1.) collaboration with other providers  2.) enteral nutrition    Nutrition Diagnosis Status:    new    Monitor and Evaluation    Food and Nutrient Intake: energy intake, food and beverage intake  Food and Nutrient Adminstration: diet order  Knowledge/Beliefs/Attitudes: food and nutrition knowledge/skill  Anthropometric Measurements: weight, weight change  Biochemical Data, Medical Tests and Procedures: electrolyte and renal panel, gastrointestinal profile, glucose/endocrine profile  Nutrition-Focused Physical Findings: overall appearance, skin     Malnutrition Assessment                 Orbital Region (Subcutaneous Fat Loss): well nourished  Upper  Arm Region (Subcutaneous Fat Loss): mild depletion   Iselin Region (Muscle Loss): moderate depletion  Clavicle Bone Region (Muscle Loss): mild depletion  Clavicle and Acromion Bone Region (Muscle Loss): well nourished  Dorsal Hand (Muscle Loss): mild depletion  Anterior Thigh Region (Muscle Loss): well nourished  Posterior Calf Region (Muscle Loss): well nourished                 Nutrition Follow-Up    RD Follow-up?: Yes

## 2020-10-06 NOTE — EICU
Intervention Initiated From:  Bedside    Pablo Communicated with Bedside Nurse regarding:  Time-Out      Comments: eLert received for timeout for left mid-axillary chest tube placement.  Completed & documented.  No complications during procedure.  VSS throughout.  No air leak noted.  No drainage present.  CXR orderd post-insertion.  No further needs or requests for eICU at this time.  eICU time in room [id number].

## 2020-10-06 NOTE — PLAN OF CARE
"      SICU PLAN OF CARE NOTE    Dx: Acute respiratory failure with hypoxia    Shift Events: Pt went to OR for Vats, CHERY, and vocal cord injection per ENT for vocal cord paralysis. 2 Units PRBCs given in OR for Hct 18. Once patient returned from the OR, she became hypotensive with MAPs in the 40s. MD, flomeghan, and charge at bedside. Levo gtt initiated and switched to Epi. Central line placed. Two 5% albumin given. 1 amp bicarb given post ABG. Yoo catheter inserted. Pt also hypothermic and warming blanket applied.    Neuro: Sedated, Follows Commands and Moves All Extremities    Vital Signs: BP (!) 153/63 (BP Location: Right arm, Patient Position: Lying)   Pulse 80   Temp (!) 92.5 °F (33.6 °C)   Resp (!) 23   Ht 5' 5" (1.651 m)   Wt 79 kg (174 lb 2.6 oz)   SpO2 (!) 93%   Breastfeeding No   BMI 28.98 kg/m²     Respiratory: Ventilator    Diet: NPO    Gtts: Propfol, Precedex, Epinephrine and MIVF    Urine Output: Urinary Catheter 275 cc/shift    Drains: Chest Tube, total output 50 cc /  shift                 Chest tube, total output 95 cc / shift    Restraints see flowsheet     Labs/Accuchecks: checked per order. See flowsheet.    Skin: midsternal incision with steri-strips. New CT sites on the right side. Skin otherwise intact. Waffle mattress inflated. Weight shift assistance provided q2h.         "

## 2020-10-06 NOTE — SUBJECTIVE & OBJECTIVE
Interval History: Pt afebrile. White count-27.16.  Pt s/p right VATS, drainage of loculated effusion, limited decortication on 10/5 by CTS.    CXs taken w/ E. Coli growing. Also underwent R vocal  Fold injection.  Currently on Vanc and Cefepime. Xray today w/ Interval progression and right lung variable opacification now bobbing the lower 1/2 of the right hemithorax that could relate to infiltrate and or atelectasis and or fluid.  Persistent loculated fluid peripherally about the upper 1/2 of the right hemithorax.      Review of Systems   Unable to perform ROS: Intubated     Objective:     Vital Signs (Most Recent):  Temp: 98.8 °F (37.1 °C) (10/06/20 1005)  Pulse: 102 (10/06/20 1005)  Resp: (!) 25 (10/06/20 0731)  BP: (!) 100/51 (10/06/20 1005)  SpO2: 100 % (10/06/20 1005) Vital Signs (24h Range):  Temp:  [92.1 °F (33.4 °C)-99.7 °F (37.6 °C)] 98.8 °F (37.1 °C)  Pulse:  [] 102  Resp:  [14-45] 25  SpO2:  [80 %-100 %] 100 %  BP: (100-153)/(51-68) 100/51  Arterial Line BP: ()/(45-69) 130/54     Weight: 86.8 kg (191 lb 5.8 oz)  Body mass index is 31.84 kg/m².    Estimated Creatinine Clearance: 66.1 mL/min (based on SCr of 0.8 mg/dL).    Physical Exam  Constitutional:       Appearance: She is well-developed. She is ill-appearing.   HENT:      Nose: Nose normal.   Eyes:      General: No scleral icterus.  Neck:      Musculoskeletal: Normal range of motion.   Cardiovascular:      Rate and Rhythm: Normal rate and regular rhythm.      Heart sounds: Murmur present.   Pulmonary:      Effort: Pulmonary effort is normal.      Breath sounds: Normal breath sounds. No wheezing or rhonchi.      Comments: Intubated  Chest tubes in place  Abdominal:      General: There is no distension.      Palpations: Abdomen is soft. There is no mass.      Tenderness: There is abdominal tenderness (suprapubic).   Musculoskeletal: Normal range of motion.         General: No swelling or tenderness.      Right lower leg: No edema.      Left  lower leg: No edema.   Skin:     General: Skin is warm and dry.      Findings: No bruising, erythema or lesion.      Comments: Sternal Incision CDI   Neurological:      Mental Status: She is alert and oriented to person, place, and time. Mental status is at baseline.         Significant Labs: All pertinent labs within the past 24 hours have been reviewed.    Significant Imaging: I have reviewed all pertinent imaging results/findings within the past 24 hours.

## 2020-10-06 NOTE — PLAN OF CARE
Chart reviewed. Patient intubated when attempted to see this AM.  psychiatry service will follow remotely. Please reconsult PRN for any additional concerns.       Silvestre Willis MD  [employer] [facility] PGY4  [facility]

## 2020-10-06 NOTE — PROGRESS NOTES
[facility]  Otorhinolaryngology-Head & Neck Surgery  Progress Note    Subjective:     Post-Op Info:  Procedure(s) (LRB):  LARYNGOSCOPY, MICRO SUSPENSION WITH AUGMENTATION (N/A)  VATS, WITH DECORTICATION, LUNG (Right)   1 Day Post-Op  Hospital Day: 38     Interval History: Kept intubated overnight. Found to have L PTX. Chest tube placed.     Medications:  Continuous Infusions:   dexmedetomidine (PRECEDEX) infusion Stopped (10/05/20 1911)    epinephrine 0.02 mcg/kg/min (10/06/20 0830)    propofoL 50 mcg/kg/min (10/06/20 0800)     Scheduled Meds:   albuterol-ipratropium  3 mL Nebulization Q6H    ceFEPime (MAXIPIME) IVPB  2 g Intravenous Q8H    digoxin  0.125 mg Oral Daily    famotidine  20 mg Oral Daily    melatonin  6 mg Oral Nightly    metoprolol tartrate  25 mg Oral BID    sodium chloride 3%  4 mL Nebulization Q6H    vancomycin (VANCOCIN) IVPB  1,750 mg Intravenous Q24H     PRN Meds:acetaminophen, calcium carbonate, dextrose 50%, dextrose 50%, glucagon (human recombinant), insulin aspart U-100, ondansetron     Review of patient's allergies indicates:  No Known Allergies  Objective:     Vital Signs (24h Range):  Temp:  [92.1 °F (33.4 °C)-99.7 °F (37.6 °C)] 98.6 °F (37 °C)  Pulse:  [] 102  Resp:  [14-53] 25  SpO2:  [80 %-100 %] 99 %  BP: (109-153)/(52-68) 115/60  Arterial Line BP: ()/(45-69) 130/54     Date 10/06/20 0700 - 10/07/20 0659   Shift 7631-1371 0286-4886 1052-2379 24 Hour Total   INTAKE   Shift Total(mL/kg)       OUTPUT   Urine(mL/kg/hr) 30   30   Chest Tube 142   142   Shift Total(mL/kg) 172(2)   172(2)   Weight (kg) 86.8 86.8 86.8 86.8     Lines/Drains/Airways     Central Venous Catheter Line            Percutaneous Central Line Insertion/Assessment - Triple Lumen  10/05/20 1843 left internal jugular less than 1 day          Drain                 Chest Tube 10/05/20 1546 1 Right Pleural 28 Fr. less than 1 day         Chest Tube 10/05/20 1547 2 Right Pleural 28 Fr.  less than 1 day         Chest Tube 10/05/20 2140 Left Midaxillary;Pleural 7 Fr. less than 1 day         NG/OG Tube 10/05/20 1751 Brown sump 16 Fr. Center mouth less than 1 day         Urethral Catheter 10/05/20 1824 Temperature probe 16 Fr. less than 1 day          Airway                 Airway - Non-Surgical 10/05/20 1702 Endotracheal Tube less than 1 day          Arterial Line            Arterial Line 10/05/20 1431 Right Pedal less than 1 day          Peripheral Intravenous Line                 Peripheral IV - Single Lumen 10/03/20 1437 20 G;1 3/4 in Left Upper Arm 2 days         Peripheral IV - Single Lumen 10/04/20 0237 20 G Left Antecubital 2 days                Physical Exam  Intubated, sedated  NCAt  Responds to commands    Significant Labs:  BMP:   Recent Labs   Lab 10/06/20  0357   *   *   CO2 18*   BUN 28*   CREATININE 0.8   CALCIUM 8.3*   MG 2.2  2.2     CBC:   Recent Labs   Lab 10/06/20  0357 10/06/20  0726   WBC 22.77*  22.77*  --    RBC 2.00*  2.00*  --    HGB 5.8*  5.8* 8.8*   HCT 19.4*  19.4* 27.4*     175  --    MCV 97  97  --    MCH 29.0  29.0  --    MCHC 29.9*  29.9*  --        Significant Diagnostics:  I have reviewed and interpreted all pertinent imaging results/findings within the past 24 hours.    Assessment/Plan:     Vocal fold paresis, right  75F with R TVF paresis. Possibly secondary to cardiac surgery vs. Intubation paresis. S/p R TVF injection 10/5/20    -- will follow peripherally, call with questions        Harpreet Guillen MD  Otorhinolaryngology-Head & Neck Surgery  [facility]

## 2020-10-06 NOTE — PROCEDURES
"Fatou Lorenzo is a 75 y.o. female patient.    Temp: (!) 93.2 °F (34 °C) (10/05/20 2100)  Pulse: 94 (10/05/20 2100)  Resp: (!) 28 (10/05/20 2045)  BP: 124/66 (10/05/20 2000)  SpO2: 100 % (10/05/20 2100)  Weight: 79 kg (174 lb 2.6 oz) (09/29/20 0800)  Height: 5' 5" (165.1 cm) (09/23/20 0719)       Central Line    Date/Time: 10/5/2020 9:50 PM  Performed by: Madhuri Ng MD  Authorized by: Marco Troy MD     Supervisor Name:  Dr. Marco Troy  Location procedure was performed:  [facility] SURGICAL ICU (SICU)  Assisting Provider:  Marco Troy MD  Pre-operative diagnosis:  Loculated pleural effusion  Post-operative diagnosis:  Same  Consent Done ?:  Emergent Situation  Time out complete?: Verified correct patient, procedure, equipment, staff, and site/side    Indications:  Vascular access, hemodynamic monitoring and med administration  Anesthesia:  General anesthesia  Preparation:  Skin prepped with ChloraPrep  Skin prep agent dried: Skin prep agent completely dried prior to procedure    Sterile barriers: All five maximal sterile barriers used - gloves, gown, cap, mask and large sterile sheet    Hand hygiene: Hand hygiene performed immediately prior to central venous catheter insertion    Location:  Left internal jugular  Catheter type:  Triple lumen  Catheter size:  7.5 Fr  Inserted Catheter Length (cm):  20  Ultrasound guidance: Yes    Vessel Caliber:  Large   patent  Comprressibility:  Normal  Needle advanced into vessel with real time ultrasound guidance.    Guidewire confirmed in vessel.    Steril sheath on probe.    Manometry: Yes    Number of attempts:  1  Securement:  Line sutured, chlorhexidine patch, sterile dressing applied and blood return through all ports  Specimens: No    Implants: No    XRay:  Placement verified by x-ray  Adverse Events:  Pneumothorax  Other Complications:  Patient was noted to have a left sided pneumothorax following placement of this line. However, she had also recently arrived to the " floor s/p VATS with vocal cord injection with ENT and feel that the line is not the likely cause of her pneumothorax given that on ultrasound, the tip of the needle was observed going directly into and not through the LIJ and was not more than 2 cm deep.   Patient was noted to have a left sided pneumothorax following placement of this line. However, she had also recently arrived to the floor s/p VATS with vocal cord injection with ENT and feel that the line is not the likely cause of her pneumothorax given that on ultrasound, the tip of the needle was observed going directly into and not through the LIJ and was not more than 2 cm deep.      Madhuri Ng MD  General Surgery, PGY-2  [phone]     Madhuri Ng  10/5/2020

## 2020-10-06 NOTE — ASSESSMENT & PLAN NOTE
Fatou Lorenzo is a 75 y.o. female s/p MVr, TVr 9/9/2020. Case noteworthy for multiple pump runs (3) and RV hematoma due to retraction. Unstable overnight 9/18, required pericardial window for evac of posterior cardiac tamponade. Respiratory distress and so re-admitted to SICU on 10/2 now s/p VATS on 10/5.    Neuro:  - Sedation: Propofol gtt for RASS -2  - Pain: Fentanyl pushes PRN      CV:  S/p MVr, TVr 9/9/20. S/p bedside pericardial window 9/18, afib  - Remains on epi at 0.06. Maintain MAPs >65  - Transitioned meds to via NGT. Metoprolol 25 mg BID, digoxin 125 mcg qd  - Aspirin was held this am due to bleeding last night, may be able to give dose tonight as she is stable and then will restart tomorrow  - Continues to be in a fib so will need to discuss timing for restarting anticoagulation     Pulm:   - Intubated and mechanically ventilated  - On relatively low vent settings. Will keep intubated for today  - Continues to have a right sided effusion vs hemothorax. Cultures from this are growing e coli with speciation pending. ID aware. Need to discuss with thoracic whether she will need to return to the OR or not  - Keep all chest tubes to suction for now  - Daily CXR and ABG PRN  - HOB >30 deg    FEN/GI:  - Net positive 3.7 L in the last 24 hours.   - Replace other lytes as needed  - Trickle feeds initiated  - famotidine and bowel regimen    Renal:  - Yoo replaced yesterday after the OR. Bloody dark sediment noted. This is clearing up today  - UTI - treating with vanc and cefepime for enterobacter cloacea  - Monitor BUN/Cr. Having good UOP.    Heme/Onc:  - H/H rechecked this afternoon and is stable from prior 8.8/27.3  - PT/INR also normalizing 17.6/1.6  - She is s/p      ID:   - ID following for enterobacter cloacae in the urine and now growing e coli in the pleural fluid from the OR on 10/5  - Recommending continuing on cefepime and vanc for now. Will follow up cultures and de-escalate as able  - Recommend  ortega to come out as soon as possible     Endo:  - no hx of DM  - ISS    PPx:  - famotidine, SCDs     Dispo: SICU

## 2020-10-06 NOTE — NURSING
"Dx: Acute respiratory failure with hypoxia    Neuro: follows commands and opens eyes to voice and touch. Moves all extremeties  Vital Signs: /62 (BP Location: Right arm, Patient Position: Lying)   Pulse 103   Temp 98.2 °F (36.8 °C) (Core Bladder)   Resp (!) 30   Ht 5' 5" (1.651 m)   Wt 86.8 kg (191 lb 5.8 oz)   SpO2 100%   Breastfeeding No   BMI 31.84 kg/m²   Cardiac: AFib 80-120s  Resp: AC/VC+ 24/375/50%/+5   Diet: NPO  Gtts: Epi @ 0.05, Propofol @ 50  Urine Output: UOP  ml/hr; dark brown color  Drains: R CTx2: serosanguineous   L CT: oozing around site throughout shift; 2 sutures place at separate times; output since 0530 serosanguineous 65ml   **monitor site**  Labs/Accuchecks: see labs; see ABGs  Skin: midsternal incision with anterior steristrips and superior sutures. Purulent drainage from old R sternal CT site MD Audi notified. Skin free from breakdown and tears. Turned Q2; Heels elevated off bed; Waffle inflated; foam dsg in place  Shift Events: 1 amp Bicarb; Bicarb gtt @ 75ml/hr from 0729-0373. L pneumothorax followed by L CT placement. 2 FFP. 1L albumin. 2 PRBC         "

## 2020-10-06 NOTE — SUBJECTIVE & OBJECTIVE
Review of Systems   Unable to perform ROS: Intubated     Physical Exam  Constitutional:       Appearance: She is well-developed.      Comments: Intubated and sedated   Neck:      Vascular: No JVD.      Trachea: No tracheal deviation.   Cardiovascular:      Rate and Rhythm: Normal rate and regular rhythm.   Pulmonary:      Effort: No respiratory distress.      Breath sounds: Normal breath sounds.      Comments: Vent Mode: A/C  Oxygen Concentration (%):  (60) 60  Resp Rate Total:  (16 br/min-41 br/min) 16 br/min  Vt Set:  (390 mL) 390 mL  PEEP/CPAP:  (5 cmH20) 5 cmH20  Mean Airway Pressure:  (8.8 ukE68-65 cmH20) 9.2 cmH20    Abdominal:      General: There is no distension.      Palpations: Abdomen is soft. There is no mass.      Tenderness: There is no abdominal tenderness. There is no guarding or rebound.      Comments: Tolerating full tube feeds via PEG   Skin:     General: Skin is warm and dry.   Neurological:      Mental Status: She is alert and oriented to person, place, and time.

## 2020-10-06 NOTE — PT/OT/SLP PROGRESS
Occupational Therapy      Patient Name:  Fatou Lorenzo   MRN:  [id number]    Pt is now s/p VATS procedure with lung decortication. Pt remains intubated this date. OT to check status at later day to determine if pt is medically appropriate for continued therapy services.     Jeny Rivera, JANETT  10/6/2020

## 2020-10-06 NOTE — ASSESSMENT & PLAN NOTE
75F with R TVF paresis. Possibly secondary to cardiac surgery vs. Intubation paresis. S/p R TVF injection 10/5/20    -- will follow peripherally, call with questions

## 2020-10-07 NOTE — SUBJECTIVE & OBJECTIVE
Interval History: No acute changes overnight  Remains intubated on minimal vent settings  100cc of thin SS output from right chest tubes  No signs of further bleeding    Medications:  Continuous Infusions:   dexmedetomidine (PRECEDEX) infusion Stopped (10/05/20 1911)    epinephrine 0.02 mcg/kg/min (10/07/20 0800)    propofoL 10 mcg/kg/min (10/07/20 0800)    sodium bicarbonate drip Stopped (10/07/20 0800)     Scheduled Meds:   albuterol-ipratropium  3 mL Nebulization Q6H    ceFEPime (MAXIPIME) IVPB  2 g Intravenous Q8H    digoxin  0.125 mg Per NG tube Daily    famotidine  20 mg Per NG tube Daily     PRN Meds:acetaminophen, calcium carbonate, calcium gluconate IVPB, calcium gluconate IVPB, calcium gluconate IVPB, dextrose 50%, dextrose 50%, fentaNYL, glucagon (human recombinant), insulin aspart U-100, magnesium sulfate IVPB, magnesium sulfate IVPB, metoprolol, metoprolol, ondansetron, potassium chloride in water **AND** potassium chloride in water **AND** potassium chloride in water, sodium phosphate IVPB, sodium phosphate IVPB, sodium phosphate IVPB, [CANCELED] Pharmacy to dose Vancomycin consult **AND** vancomycin - pharmacy to dose     Review of patient's allergies indicates:  No Known Allergies  Objective:     Vital Signs (Most Recent):  Temp: 99.3 °F (37.4 °C) (10/07/20 0819)  Pulse: (!) 129 (10/07/20 0819)  Resp: (!) 27 (10/07/20 0720)  BP: (!) 104/55 (10/07/20 0700)  SpO2: 99 % (10/07/20 0819) Vital Signs (24h Range):  Temp:  [97.7 °F (36.5 °C)-99.3 °F (37.4 °C)] 99.3 °F (37.4 °C)  Pulse:  [] 129  Resp:  [20-27] 27  SpO2:  [94 %-100 %] 99 %  BP: ()/(49-56) 104/55  Arterial Line BP: ()/(44-60) 101/48     Intake/Output - Last 3 Shifts       10/05 0700 - 10/06 0659 10/06 0700 - 10/07 0659 10/07 0700 - 10/08 0659    I.V. (mL/kg) 5035 (58) 2306.3 (26.6)     Blood 1911 232     NG/GT  120 20    Total Intake(mL/kg) 6946 (80) 2658.3 (30.6) 20 (0.2)    Urine (mL/kg/hr) 1485 (0.7) 1165 (0.6) 75  (0.5)    Other 1200      Stool 0 0     Chest Tube 305 213 44    Total Output 2990 1378 119    Net +3956 +1280.3 -99           Urine Occurrence 1 x      Stool Occurrence 1 x 1 x           SpO2: 99 %  O2 Device (Oxygen Therapy): ventilator    Physical Exam  Vitals signs and nursing note reviewed.   Constitutional:       Appearance: She is well-developed. She is not diaphoretic.   HENT:      Head: Normocephalic and atraumatic.   Eyes:      Conjunctiva/sclera: Conjunctivae normal.   Neck:      Musculoskeletal: Normal range of motion and neck supple.   Cardiovascular:      Rate and Rhythm: Regular rhythm. Tachycardia present.      Comments: Midline sternotomy with clean, intact, nonerythematous incision  Chest tube sites with clean, dry bandages  Pulmonary:      Breath sounds: No wheezing.      Comments: Mechanically ventilated  Right chest tube x2 with SS output   Left pigtail with SS output  Abdominal:      General: There is no distension.      Palpations: Abdomen is soft.      Tenderness: There is no abdominal tenderness.   Musculoskeletal: Normal range of motion.   Skin:     General: Skin is warm and dry.   Neurological:      Mental Status: She is alert.      Comments: Sedated         Significant Labs:  ABGs:   Recent Labs   Lab 10/07/20  0811   PH 7.426   PCO2 36.1   PO2 112*   HCO3 23.7*   POCSATURATED 99   BE -1     Amylase: No results for input(s): AMYLASE in the last 48 hours.  BMP:   Recent Labs   Lab 10/07/20  0400   GLU 96   *   K 4.2   *   CO2 22*   BUN 27*   CREATININE 0.9   CALCIUM 8.2*   MG 2.2     Cardiac markers: No results for input(s): CKMB, CPKMB, TROPONINT, TROPONINI, MYOGLOBIN in the last 48 hours.  CBC:   Recent Labs   Lab 10/07/20  0400   WBC 18.58*   RBC 3.14*   HGB 9.3*   HCT 29.6*   *   MCV 94   MCH 29.6   MCHC 31.4*     CMP:   Recent Labs   Lab 10/07/20  0400   GLU 96   CALCIUM 8.2*   ALBUMIN 2.5*   PROT 5.1*   *   K 4.2   CO2 22*   *   BUN 27*   CREATININE 0.9    ALKPHOS 183*   ALT 39   AST 49*   BILITOT 2.2*       Significant Diagnostics:  I have reviewed all pertinent imaging results/findings within the past 24 hours.    VTE Risk Mitigation (From admission, onward)         Ordered     IP VTE HIGH RISK PATIENT  Once      09/09/20 2250     Place sequential compression device  Until discontinued      09/09/20 2250

## 2020-10-07 NOTE — NURSING
Patient tachypenic and with low tidal volumes on SBT, started precedex. Discussed with Dr. Troy, will place patient back on rate and re-sedate.

## 2020-10-07 NOTE — EICU
Comments: called into room for timeout  for bronchoscopy, consent obtained per MD from son over the phone in chart, Dr Madhuri Ng with direct supervision by Dr Troy, labs and allergies confirmed, washout with sample for labs/cultures obtained, tolerated well, VSS, start time 1008, stop time 1015

## 2020-10-07 NOTE — PROGRESS NOTES
[facility]  Critical Care - Surgery  Progress Note    Patient Name: Fatou Lorenzo  MRN: [id number]  Admission Date: 8/30/2020  Hospital Length of Stay: 37 days  Code Status: Full Code  Attending Provider: Antoni Waddell MD  Primary Care Provider: Ludin Rivas MD   Principal Problem: Acute respiratory failure with hypoxia    Subjective:     Hospital/ICU Course:  No notes on file    Interval History/Significant Events: No acute events. Remains intubated and sedated on epi at 0.04 which is being weaned off. Did bronch this am as right opacification on the CXR appeared to be going along a fissure. BAL performed and cultures sent, but no significant findings. Thoracic dc'd one of the chest tubes this am    Follow-up For: Procedure(s) (LRB):  LARYNGOSCOPY, MICRO SUSPENSION WITH AUGMENTATION (N/A)  VATS, WITH DECORTICATION, LUNG (Right)    Post-Operative Day: 2 Days Post-Op    Objective:     Vital Signs (Most Recent):  Temp: 99.9 °F (37.7 °C) (10/07/20 1000)  Pulse: (!) 135 (10/07/20 1030)  Resp: (!) 27 (10/07/20 0720)  BP: (!) 98/53 (10/07/20 1000)  SpO2: 97 % (10/07/20 1030) Vital Signs (24h Range):  Temp:  [97.7 °F (36.5 °C)-99.9 °F (37.7 °C)] 99.9 °F (37.7 °C)  Pulse:  [] 135  Resp:  [20-27] 27  SpO2:  [94 %-100 %] 97 %  BP: ()/(49-68) 98/53  Arterial Line BP: ()/(32-80) 86/51     Weight: 86.8 kg (191 lb 5.8 oz)  Body mass index is 31.84 kg/m².      Intake/Output Summary (Last 24 hours) at 10/7/2020 1051  Last data filed at 10/7/2020 0900  Gross per 24 hour   Intake 2486.3 ml   Output 1205 ml   Net 1281.3 ml       Physical Exam  Vitals signs and nursing note reviewed.   Constitutional:       Appearance: She is well-developed. She is not diaphoretic.   HENT:      Head: Normocephalic and atraumatic.   Eyes:      Conjunctiva/sclera: Conjunctivae normal.   Neck:      Musculoskeletal: Normal range of motion and neck supple.   Cardiovascular:      Rate and Rhythm: Regular rhythm.  Tachycardia present.      Comments: Midline sternotomy with clean, intact, nonerythematous incision  Chest tube sites with clean, dry bandages  Pulmonary:      Comments: Mechanically ventilated  Right chest tube x1 with SS output   Left pigtail with SS output. No air leak noted  Abdominal:      General: There is no distension.      Palpations: Abdomen is soft.      Tenderness: There is no abdominal tenderness.   Skin:     General: Skin is warm and dry.   Neurological:      Comments: Sedated         Vents:  Vent Mode: (S) Spont (10/07/20 0819)  Ventilator Initiated: Yes(chart correction) (09/18/20 2202)  Set Rate: 20 BPM (10/07/20 0819)  Vt Set: 375 mL (10/07/20 0819)  Pressure Support: 10 cmH20 (10/07/20 0819)  PEEP/CPAP: 5 cmH20 (10/07/20 0819)  Oxygen Concentration (%): 40 (10/07/20 1030)  Peak Airway Pressure: 16 cmH2O (10/07/20 0819)  Plateau Pressure: 19 cmH20 (10/07/20 0819)  Total Ve: 9.56 mL (10/07/20 0819)  Negative Inspiratory Force (cm H2O): -27 (09/15/20 0926)  F/VT Ratio<105 (RSBI): (!) 50.76 (10/07/20 0406)    Lines/Drains/Airways     Central Venous Catheter Line            Percutaneous Central Line Insertion/Assessment - Triple Lumen  10/05/20 1843 left internal jugular 1 day          Drain                 Chest Tube 10/05/20 1546 1 Right Pleural 28 Fr. 1 day         Chest Tube 10/05/20 1547 2 Right Pleural 28 Fr. 1 day         Chest Tube 10/05/20 2140 Left Midaxillary;Pleural 7 Fr. 1 day         NG/OG Tube 10/05/20 1751 [place] sump 16 Fr. Center mouth 1 day         Urethral Catheter 10/05/20 1824 Temperature probe 16 Fr. 1 day          Airway                 Airway - Non-Surgical 10/05/20 1702 Endotracheal Tube 1 day          Arterial Line            Arterial Line 10/05/20 1431 Right Pedal 1 day          Peripheral Intravenous Line                 Peripheral IV - Single Lumen 10/04/20 0237 20 G Left Antecubital 3 days                Significant Labs:    CBC/Anemia Profile:  Recent Labs   Lab  10/06/20  2340 10/07/20  0400 10/07/20  0820   WBC 16.59* 18.58* 18.12*   HGB 9.0* 9.3* 9.5*   HCT 28.5* 29.6* 29.4*   * 127* 123*   MCV 95 94 95   RDW 16.3* 16.4* 16.6*        Chemistries:  Recent Labs   Lab 10/06/20  1108 10/06/20  2015 10/07/20  0400   * 144 147*   K 4.3 3.6 4.2   * 112* 114*   CO2 23 22* 22*   BUN 28* 27* 27*   CREATININE 0.9 0.9 0.9   CALCIUM 8.3* 8.2* 8.2*   ALBUMIN 2.9* 2.7* 2.5*   PROT 5.3* 5.2* 5.1*   BILITOT 3.5* 2.5* 2.2*   ALKPHOS 160* 181* 183*   ALT 35 40 39   AST 34 48* 49*   MG 2.2 2.3 2.2   PHOS 2.7 2.4* 2.4*       ABGs:   Recent Labs   Lab 10/07/20  0811   PH 7.426   PCO2 36.1   HCO3 23.7*   POCSATURATED 99   BE -1     Coagulation:   Recent Labs   Lab 10/07/20  0400   INR 1.4*   APTT 41.3*     All pertinent labs within the past 24 hours have been reviewed.    Significant Imaging:  I have reviewed and interpreted all pertinent imaging results/findings within the past 24 hours.     10/07/2020 CXR  FINDINGS:  Tip of endotracheal tube between thoracic inlet and anjel.  Similar position of left internal jugular approach central venous catheter.  Status post median sternotomy tip and side port of nasogastric tube beyond field of view examination.  Similar position of chest tubes and drains.  No definite enlarging the thorax is seen.  Some clearing of the right pleural effusion is suggested.  A small left pleural effusion may be present.  Persistent bilateral mid lower lung zone opacification.  Additional findings also are not substantially changed.     Impression:     As above.    Assessment/Plan:     Severe mitral regurgitation  Fatou Lorenzo is a 75 y.o. female s/p MVr, TVr 9/9/2020. Case noteworthy for multiple pump runs (3) and RV hematoma due to retraction. Unstable overnight 9/18, required pericardial window for evac of posterior cardiac tamponade. Respiratory distress and so re-admitted to SICU on 10/2 now s/p VATS on 10/5.    Neuro:  - Sedation: Transition to  precedex for SBT.   - Pain: Will continue with fentanyl pushes for now      CV:  S/p MVr, TVr 9/9/20. S/p bedside pericardial window 9/18  - Keep MAPs >65. Was off epi this am, but had to put back on while doing bronch. Will wean this off again  - pAF: once epi is dc'd can restart betablocker. Will hold for now and give digoxin 125 mcg qd  - Need to discuss timing to restart anticoagulation     Pulm:   - Intubated and mechanically ventilated  - On relatively low vent settings. Wean to extubate today  - Bronchoscopy and BAL done today to evaluate if there was any intraparenchymal process in the right lung. Tissue appeared healthy and there was not a significant amount of mucous  - Keep all chest tubes to suction for now  - Daily CXR and ABG PRN  - HOB >30 deg    FEN/GI:  - Net positive 1.3 L in the last 24 hours.   - Replace other lytes as needed  - Trickle feeds held in anticipation of extubation today  - famotidine and bowel regimen    Renal:  - Yoo in place. Will discuss removing this today  - Monitor BUN/Cr. Having good UOP.    Heme/Onc:  - H/H stable at 9.3/29.6  - PT/INR also normalizing 15.6/1.4     ID:   - ID following  - OR cultures form 10/5 with e coli resistant to ciprofloxacin, levofloxacin, tetracyclin, and bactrim  - UTI culture enterococcus cloacae essentially pansensitive except to nitrofurantoin  - Rocephin or Zosyn may be good options, but will await ID recommendations     Endo:  - no hx of DM  - ISS    PPx:  - famotidine, SCDs     Dispo: SICU      Critical secondary to Patient has a condition that poses threat to life and bodily function: s/p MVr, TVr, MAZE. Now s/p VATS     Critical care was time spent personally by me on the following activities: development of treatment plan with patient or surrogate and bedside caregivers, discussions with consultants, evaluation of patient's response to treatment, examination of patient, ordering and performing treatments and interventions, ordering and  review of laboratory studies, ordering and review of radiographic studies, pulse oximetry, re-evaluation of patient's condition.  This critical care time did not overlap with that of any other provider or involve time for any procedures.     Madhuri Ng MD  Critical Care - Surgery  [facility]

## 2020-10-07 NOTE — PT/OT/SLP PROGRESS
Physical Therapy      Patient Name:  Fatou Lorenzo   MRN:  [id number]    Patient not seen today secondary to (pt on hold due to being intubated and sedated.). Will follow-up at a later date..    Emily Blankenship, PT   10/7/2020

## 2020-10-07 NOTE — PLAN OF CARE
VSS today, patient in a fib, but more controlled this afternoon. Intermittent need for low dose epi r/t sedation. Bronchoscopy performed at bedside today and cultures sent to lab. Attempted SAT/SBT x2 today without success. Patient has low VC and after ~30 minutes becomes tachypenic with low tidal volumes, even while on precedex. Changed sedation from propofol to precedex. 500 ml LR given x1. IVP lasix given x1 for elevated CVP and low UOP. Patient remains in soft wrist restraints due to intermittent agitation where she attempts to pull ETT despite redirection. Plan to keep sedated overnight, will attempt SAT/SBT again in AM.     Plan of care discussed with patient's two sons.

## 2020-10-07 NOTE — PROGRESS NOTES
[facility]  Infectious Disease  Progress Note    Patient Name: Fatou Lorenzo  MRN: [id number]  Admission Date: 8/30/2020  Length of Stay: 37 days  Attending Physician: Antoni Waddell MD  Primary Care Provider: Ludin Rivas MD    Isolation Status: No active isolations  Assessment/Plan:      Leukocytosis  Pt is a 75 y.o. female w/ pmhx of Atrial fibrillation with RVR, HLD and Severe mitral regurgitation.  Pt w/ recent left heart catheterization 8/25/2020 who presented w/ complaints SOB.  Transesophageal echocardiogram done recently w/ left atrial thrombus.  L heart angiography w/ nonobstructive CAD.       Pt transferred to [facility] [place] surgery evaluation and underwent MV repair and TV annuloplasty on 9/9. Pt course c/b reintuabtion and pericardial window for evacuation of posterior cardiac tamponade on 9/18. Developed increasing leukocytosis on 9/28.    Urine cxs positive for E.Cloacae.  Pt started on Bactrim.  Chest Xray from 10/1- Since September 28, 2020, increased large right pleural effusion.  Increased diffuse right airspace opacities.  X ray abdomen 9/23 w/ Probable bilateral nephrolithiasis.  ID consulted for positive urine culture w/ E. Cloacae.      Pt underwent IR thoracentesis- not sent for cx and counts not sent.  Per report innumerable loculations. Pt s/p right VATS, drainage of loculated effusion, limited decortication on 10/5 by CTS.   CXs taken w/ E. Coli growing. Also underwent R vocal  Fold injection.  Currently on Vanc and Cefepime. leukocytosis down trended. Repeat CXR with some clearing of the right pleural effusion is suggested.  A small left pleural effusion may be present.  Bronchoscopy today with repeat cultures.      Plan  -Continue on Vanc.  Pharmacy to dose.  Trough goal 15-20.   -Continue on Cefepime 2g q8h   -Cefepime will cover for UTI-e.cloacae and cxs from vats . bronchscopy cultures today.   -repeat blood cultures if +fevers  - ID will continue to  follow      Thank you for your consult. I will follow-up with patient. Please contact us if you have any additional questions.    Rei Capone PA-C  Infectious Disease  [facility]  [phone]    Subjective:     Principal Problem:Acute respiratory failure with hypoxia    HPI: Pt is a 75 y.o. female w/ pmhx of Atrial fibrillation with RVR, HLD and Severe mitral regurgitation.  Pt w/ recent left heart catheterization 8/25/2020 who presented w/ complaints of recurrent shortness of breath.  Transesophageal echocardiogram done recently w/ left atrial thrombus.  L heart angiography w/ nonobstructive CAD.   Upon admission, pt required BiPAP due to hypoxemia not responsive to nasal cannula.    Pt transferred to [facility] for CT surgery evaluation given recurrent admission despite compliance, BP control and diuresis.  Pt seen by Dr. Waddell who recommended MV repair.  Pt underwent MV repair and TV annuloplasty on 9/9. Pt course c/b reintuabtion and pericardial window for evacuation of posterior cardiac tamponade on 9/18.  Pt extubated on 9/24.  Developed increasing white count on 9/28.  UA done concerning for infection.  Urine cxs positive for E.Cloacae.  Pt started on Bactrim.  Chest Xray from 10/1- Since September 28, 2020, increased large right pleural effusion.  Increased diffuse right airspace opacities.  X ray abdomen 9/23 w/ Probable bilateral nephrolithiasis.    ID consulted for positive urine culture.      Interval History: Pt afebrile. WBC trending downward.  Pt s/p right VATS, drainage of loculated effusion, limited decortication on 10/5 by CTS.    CXs taken w/ E. Coli .   Currently on Vanc and Cefepime. Xray today w/ improvement of effusions.   Underwent bronchoscopy with repeat cultures. Low grade temps today.       Review of Systems   Unable to perform ROS: Intubated     Objective:     Vital Signs (Most Recent):  Temp: 99.3 °F (37.4 °C) (10/07/20 1400)  Pulse: 100 (10/07/20 1400)  Resp: (!) 27  (10/07/20 0720)  BP: 121/60 (10/07/20 1400)  SpO2: 99 % (10/07/20 1400) Vital Signs (24h Range):  Temp:  [97.7 °F (36.5 °C)-100 °F (37.8 °C)] 99.3 °F (37.4 °C)  Pulse:  [] 100  Resp:  [20-27] 27  SpO2:  [83 %-100 %] 99 %  BP: ()/(49-68) 121/60  Arterial Line BP: ()/(32-80) 115/54     Weight: 86.8 kg (191 lb 5.8 oz)  Body mass index is 31.84 kg/m².    Estimated Creatinine Clearance: 58.7 mL/min (based on SCr of 0.9 mg/dL).    Physical Exam  Constitutional:       Appearance: She is well-developed. She is ill-appearing.   HENT:      Nose: Nose normal.   Eyes:      General: No scleral icterus.  Neck:      Musculoskeletal: Normal range of motion.   Cardiovascular:      Rate and Rhythm: Normal rate and regular rhythm.      Heart sounds: Murmur present.   Pulmonary:      Effort: Pulmonary effort is normal.      Breath sounds: Normal breath sounds. No wheezing or rhonchi.      Comments: Intubated  Chest tubes in place  Abdominal:      General: There is no distension.      Palpations: Abdomen is soft. There is no mass.      Tenderness: There is no abdominal tenderness.   Musculoskeletal: Normal range of motion.         General: No swelling or tenderness.      Right lower leg: No edema.      Left lower leg: No edema.   Skin:     General: Skin is warm and dry.      Findings: No bruising, erythema or lesion.      Comments: Sternal Incision CDI   Neurological:      Mental Status: She is alert and oriented to person, place, and time. Mental status is at baseline.         Significant Labs: All pertinent labs within the past 24 hours have been reviewed.    Significant Imaging: I have reviewed all pertinent imaging results/findings within the past 24 hours.

## 2020-10-07 NOTE — PT/OT/SLP PROGRESS
Occupational Therapy      Patient Name:  Fatou Lorenzo   MRN:  [id number]    Patient not seen today secondary to pt was intubated and sedated. Will follow-up tomorrow.    JANETT Chatterjee  10/7/2020

## 2020-10-07 NOTE — ASSESSMENT & PLAN NOTE
Pt is a 75 y.o. female w/ pmhx of Atrial fibrillation with RVR, HLD and Severe mitral regurgitation.  Pt w/ recent left heart catheterization 8/25/2020 who presented w/ complaints SOB.  Transesophageal echocardiogram done recently w/ left atrial thrombus.  L heart angiography w/ nonobstructive CAD.       Pt transferred to [facility] for [place] surgery evaluation and underwent MV repair and TV annuloplasty on 9/9. Pt course c/b reintuabtion and pericardial window for evacuation of posterior cardiac tamponade on 9/18. Developed increasing leukocytosis on 9/28.    Urine cxs positive for E.Cloacae.  Pt started on Bactrim.  Chest Xray from 10/1- Since September 28, 2020, increased large right pleural effusion.  Increased diffuse right airspace opacities.  X ray abdomen 9/23 w/ Probable bilateral nephrolithiasis.  ID consulted for positive urine culture w/ E. Cloacae.      Pt underwent IR thoracentesis- not sent for cx and counts not sent.  Per report innumerable loculations. Pt s/p right VATS, drainage of loculated effusion, limited decortication on 10/5 by CTS.   CXs taken w/ E. Coli growing. Also underwent R vocal  Fold injection.  Currently on Vanc and Cefepime. leukocytosis down trended. Repeat CXR with some clearing of the right pleural effusion is suggested.  A small left pleural effusion may be present.  Bronchoscopy today with repeat cultures.      Plan  -Continue on Vanc.  Pharmacy to dose.  Trough goal 15-20.   -Continue on Cefepime 2g q8h   -Cefepime will cover for UTI-e.cloacae and cxs from vats . bronchscopy cultures today.   -repeat blood cultures if +fevers  - ID will continue to follow

## 2020-10-07 NOTE — ASSESSMENT & PLAN NOTE
74 yo female with complex right sided pleural effusion s/p Mvr, Tvr, MAZE 9/09/2020, multiloculated and not amendable to IR drainage.   Now s/p right VATS, drainage of loculated effusion, limited decortication 10/5    Will remove posterior chest tube, keep anterior   Hold anticoagulation if able  Daily CXR  Wean vent, extubate if able

## 2020-10-07 NOTE — SUBJECTIVE & OBJECTIVE
Interval History: Pt afebrile. WBC trending downward.  Pt s/p right VATS, drainage of loculated effusion, limited decortication on 10/5 by CTS.    CXs taken w/ E. Coli .   Currently on Vanc and Cefepime. Xray today w/ improvement of effusions.   Underwent bronchoscopy with repeat cultures. Low grade temps today.       Review of Systems   Unable to perform ROS: Intubated     Objective:     Vital Signs (Most Recent):  Temp: 99.3 °F (37.4 °C) (10/07/20 1400)  Pulse: 100 (10/07/20 1400)  Resp: (!) 27 (10/07/20 0720)  BP: 121/60 (10/07/20 1400)  SpO2: 99 % (10/07/20 1400) Vital Signs (24h Range):  Temp:  [97.7 °F (36.5 °C)-100 °F (37.8 °C)] 99.3 °F (37.4 °C)  Pulse:  [] 100  Resp:  [20-27] 27  SpO2:  [83 %-100 %] 99 %  BP: ()/(49-68) 121/60  Arterial Line BP: ()/(32-80) 115/54     Weight: 86.8 kg (191 lb 5.8 oz)  Body mass index is 31.84 kg/m².    Estimated Creatinine Clearance: 58.7 mL/min (based on SCr of 0.9 mg/dL).    Physical Exam  Constitutional:       Appearance: She is well-developed. She is ill-appearing.   HENT:      Nose: Nose normal.   Eyes:      General: No scleral icterus.  Neck:      Musculoskeletal: Normal range of motion.   Cardiovascular:      Rate and Rhythm: Normal rate and regular rhythm.      Heart sounds: Murmur present.   Pulmonary:      Effort: Pulmonary effort is normal.      Breath sounds: Normal breath sounds. No wheezing or rhonchi.      Comments: Intubated  Chest tubes in place  Abdominal:      General: There is no distension.      Palpations: Abdomen is soft. There is no mass.      Tenderness: There is no abdominal tenderness.   Musculoskeletal: Normal range of motion.         General: No swelling or tenderness.      Right lower leg: No edema.      Left lower leg: No edema.   Skin:     General: Skin is warm and dry.      Findings: No bruising, erythema or lesion.      Comments: Sternal Incision CDI   Neurological:      Mental Status: She is alert and oriented to person,  place, and time. Mental status is at baseline.         Significant Labs: All pertinent labs within the past 24 hours have been reviewed.    Significant Imaging: I have reviewed all pertinent imaging results/findings within the past 24 hours.

## 2020-10-07 NOTE — PROGRESS NOTES
[facility]  Thoracic Surgery  Progress Note    Subjective:     History of Present Illness:  Patient is a 76 yo F s/p MVr, Tvr, MAZE on 9/9/20. Case was noteworthy for 3 pump runs and development of a RV hematoma2/2 retraction. Post operatively she was admitted to the ICU and had a protracted course. She was extubated 9/15. Became hemodynamically unstable 9/18 and had to be reintubated along with performing a bedside pericardial window for cardiac tamponade. Around this time she also went into afib, but notably she does have a history of afib. Other significant events in her post op course included severe JONAH, never went on dialysis, but required aggressive diuresis for volume overload. She was stepped down 9/27. She represents to SICU due to increased O2 requirement. This morning she was on 3L NC but necessitated 15L ventimask to keep her saturations above 90. Chest films showed worsening right sided pleural effusion. She was sent to IR for thoracentesis but ultrasound imaging showed significant loculations. Thoracentesis was performed in the largest pocket obtaining 60 cc of fluid.    Post-Op Info:  Procedure(s) (LRB):  LARYNGOSCOPY, MICRO SUSPENSION WITH AUGMENTATION (N/A)  VATS, WITH DECORTICATION, LUNG (Right)   2 Days Post-Op     Interval History: No acute changes overnight  Remains intubated on minimal vent settings  100cc of thin SS output from right chest tubes  No signs of further bleeding    Medications:  Continuous Infusions:   dexmedetomidine (PRECEDEX) infusion Stopped (10/05/20 1911)    epinephrine 0.02 mcg/kg/min (10/07/20 0800)    propofoL 10 mcg/kg/min (10/07/20 0800)    sodium bicarbonate drip Stopped (10/07/20 0800)     Scheduled Meds:   albuterol-ipratropium  3 mL Nebulization Q6H    ceFEPime (MAXIPIME) IVPB  2 g Intravenous Q8H    digoxin  0.125 mg Per NG tube Daily    famotidine  20 mg Per NG tube Daily     PRN Meds:acetaminophen, calcium carbonate, calcium gluconate  IVPB, calcium gluconate IVPB, calcium gluconate IVPB, dextrose 50%, dextrose 50%, fentaNYL, glucagon (human recombinant), insulin aspart U-100, magnesium sulfate IVPB, magnesium sulfate IVPB, metoprolol, metoprolol, ondansetron, potassium chloride in water **AND** potassium chloride in water **AND** potassium chloride in water, sodium phosphate IVPB, sodium phosphate IVPB, sodium phosphate IVPB, [CANCELED] Pharmacy to dose Vancomycin consult **AND** vancomycin - pharmacy to dose     Review of patient's allergies indicates:  No Known Allergies  Objective:     Vital Signs (Most Recent):  Temp: 99.3 °F (37.4 °C) (10/07/20 0819)  Pulse: (!) 129 (10/07/20 0819)  Resp: (!) 27 (10/07/20 0720)  BP: (!) 104/55 (10/07/20 0700)  SpO2: 99 % (10/07/20 0819) Vital Signs (24h Range):  Temp:  [97.7 °F (36.5 °C)-99.3 °F (37.4 °C)] 99.3 °F (37.4 °C)  Pulse:  [] 129  Resp:  [20-27] 27  SpO2:  [94 %-100 %] 99 %  BP: ()/(49-56) 104/55  Arterial Line BP: ()/(44-60) 101/48     Intake/Output - Last 3 Shifts       10/05 0700 - 10/06 0659 10/06 0700 - 10/07 0659 10/07 0700 - 10/08 0659    I.V. (mL/kg) 5035 (58) 2306.3 (26.6)     Blood 1911 232     NG/GT  120 20    Total Intake(mL/kg) 6946 (80) 2658.3 (30.6) 20 (0.2)    Urine (mL/kg/hr) 1485 (0.7) 1165 (0.6) 75 (0.5)    Other 1200      Stool 0 0     Chest Tube 305 213 44    Total Output 2990 1378 119    Net +3956 +1280.3 -99           Urine Occurrence 1 x      Stool Occurrence 1 x 1 x           SpO2: 99 %  O2 Device (Oxygen Therapy): ventilator    Physical Exam  Vitals signs and nursing note reviewed.   Constitutional:       Appearance: She is well-developed. She is not diaphoretic.   HENT:      Head: Normocephalic and atraumatic.   Eyes:      Conjunctiva/sclera: Conjunctivae normal.   Neck:      Musculoskeletal: Normal range of motion and neck supple.   Cardiovascular:      Rate and Rhythm: Regular rhythm. Tachycardia present.      Comments: Midline sternotomy with clean,  intact, nonerythematous incision  Chest tube sites with clean, dry bandages  Pulmonary:      Breath sounds: No wheezing.      Comments: Mechanically ventilated  Right chest tube x2 with SS output   Left pigtail with SS output  Abdominal:      General: There is no distension.      Palpations: Abdomen is soft.      Tenderness: There is no abdominal tenderness.   Musculoskeletal: Normal range of motion.   Skin:     General: Skin is warm and dry.   Neurological:      Mental Status: She is alert.      Comments: Sedated         Significant Labs:  ABGs:   Recent Labs   Lab 10/07/20  0811   PH 7.426   PCO2 36.1   PO2 112*   HCO3 23.7*   POCSATURATED 99   BE -1     Amylase: No results for input(s): AMYLASE in the last 48 hours.  BMP:   Recent Labs   Lab 10/07/20  0400   GLU 96   *   K 4.2   *   CO2 22*   BUN 27*   CREATININE 0.9   CALCIUM 8.2*   MG 2.2     Cardiac markers: No results for input(s): CKMB, CPKMB, TROPONINT, TROPONINI, MYOGLOBIN in the last 48 hours.  CBC:   Recent Labs   Lab 10/07/20  0400   WBC 18.58*   RBC 3.14*   HGB 9.3*   HCT 29.6*   *   MCV 94   MCH 29.6   MCHC 31.4*     CMP:   Recent Labs   Lab 10/07/20  0400   GLU 96   CALCIUM 8.2*   ALBUMIN 2.5*   PROT 5.1*   *   K 4.2   CO2 22*   *   BUN 27*   CREATININE 0.9   ALKPHOS 183*   ALT 39   AST 49*   BILITOT 2.2*       Significant Diagnostics:  I have reviewed all pertinent imaging results/findings within the past 24 hours.    VTE Risk Mitigation (From admission, onward)         Ordered     IP VTE HIGH RISK PATIENT  Once      09/09/20 2250     Place sequential compression device  Until discontinued      09/09/20 2250              Assessment/Plan:     Pleural effusion  76 yo female with complex right sided pleural effusion s/p Mvr, Tvr, MAZE 9/09/2020, multiloculated and not amendable to IR drainage.   Now s/p right VATS, drainage of loculated effusion, limited decortication 10/5    Will remove posterior chest tube, keep  anterior   Hold anticoagulation if able  Daily CXR  Wean vent, extubate if able         Juanjose Casillas MD  Thoracic Surgery  [facility]

## 2020-10-07 NOTE — PLAN OF CARE
SW is following this Pt for DC planning needs.  Discharge Disposition: LTAC - pending patient progress    SW will continue to coordinate with patient, family, team and insurance to complete patient's discharge plan.    Esthela Peralta LMSW   - Case Management

## 2020-10-07 NOTE — NURSING
Patient transferred on Immerse bed with assistance from RN x3 and RT x1. Tolerated without issue.

## 2020-10-07 NOTE — PT/OT/SLP PROGRESS
Speech Language Pathology  Discharge    Fatou Lorenzo  MRN: [id number]    Patient not seen today secondary to pt remains intubated and sedated at this time. Please re-consult ST when extubated and medically appropriate.     Madhuri Humphries CCC-SLP  10/7/2020

## 2020-10-07 NOTE — PLAN OF CARE
Pt remains intubated and sedated. Vent settings A/C 40% /peep 5 /rate 20. See ABGs.  Afebrile. Remains in Afib. Maintaining MAP > 65 with Epi. CVP 15.  Follows commands, moving all ext.    OGT in place with TF at 10 cc/hr.  Yoo in place with adequate UOP.  CTs in place with minimal output.     Gtts:  Propofol 50 mcg/kg/min  Epi 0.04 mcg/kg/min  Sodium bicarbonate 50 cc/hr    1 BM overnight.    Accu q6h.  Trending CBC q4h.    Skin: Pt turned q2h. Sacrum, heels, elbows intact. Foams in place.

## 2020-10-07 NOTE — ASSESSMENT & PLAN NOTE
Fatou Lorenzo is a 75 y.o. female s/p MVr, TVr 9/9/2020. Case noteworthy for multiple pump runs (3) and RV hematoma due to retraction. Unstable overnight 9/18, required pericardial window for evac of posterior cardiac tamponade. Respiratory distress and so re-admitted to SICU on 10/2 now s/p VATS on 10/5.    Neuro:  - Sedation: Transition to precedex for SBT.   - Pain: Will continue with fentanyl pushes for now      CV:  S/p MVr, TVr 9/9/20. S/p bedside pericardial window 9/18  - Keep MAPs >65. Was off epi this am, but had to put back on while doing bronch. Will wean this off again  - pAF: once epi is dc'd can restart betablocker. Will hold for now and give digoxin 125 mcg qd  - Need to discuss timing to restart anticoagulation     Pulm:   - Intubated and mechanically ventilated  - On relatively low vent settings. Wean to extubate today  - Bronchoscopy and BAL done today to evaluate if there was any intraparenchymal process in the right lung. Tissue appeared healthy and there was not a significant amount of mucous  - Keep all chest tubes to suction for now  - Daily CXR and ABG PRN  - HOB >30 deg    FEN/GI:  - Net positive 1.3 L in the last 24 hours.   - Replace other lytes as needed  - Trickle feeds held in anticipation of extubation today  - famotidine and bowel regimen    Renal:  - Yoo in place. Will discuss removing this today  - Monitor BUN/Cr. Having good UOP.    Heme/Onc:  - H/H stable at 9.3/29.6  - PT/INR also normalizing 15.6/1.4     ID:   - ID following  - OR cultures form 10/5 with e coli resistant to ciprofloxacin, levofloxacin, tetracyclin, and bactrim  - UTI culture enterococcus cloacae essentially pansensitive except to nitrofurantoin  - Rocephin or Zosyn may be good options, but will await ID recommendations     Endo:  - no hx of DM  - ISS    PPx:  - famotidine, SCDs     Dispo: SICU

## 2020-10-07 NOTE — SUBJECTIVE & OBJECTIVE
Interval History/Significant Events: No acute events. Remains intubated and sedated on epi at 0.04 which is being weaned off. Did bronch this am as right opacification on the CXR appeared to be going along a fissure. BAL performed and cultures sent, but no significant findings. Thoracic dc'd one of the chest tubes this am    Follow-up For: Procedure(s) (LRB):  LARYNGOSCOPY, MICRO SUSPENSION WITH AUGMENTATION (N/A)  VATS, WITH DECORTICATION, LUNG (Right)    Post-Operative Day: 2 Days Post-Op    Objective:     Vital Signs (Most Recent):  Temp: 99.9 °F (37.7 °C) (10/07/20 1000)  Pulse: (!) 135 (10/07/20 1030)  Resp: (!) 27 (10/07/20 0720)  BP: (!) 98/53 (10/07/20 1000)  SpO2: 97 % (10/07/20 1030) Vital Signs (24h Range):  Temp:  [97.7 °F (36.5 °C)-99.9 °F (37.7 °C)] 99.9 °F (37.7 °C)  Pulse:  [] 135  Resp:  [20-27] 27  SpO2:  [94 %-100 %] 97 %  BP: ()/(49-68) 98/53  Arterial Line BP: ()/(32-80) 86/51     Weight: 86.8 kg (191 lb 5.8 oz)  Body mass index is 31.84 kg/m².      Intake/Output Summary (Last 24 hours) at 10/7/2020 1051  Last data filed at 10/7/2020 0900  Gross per 24 hour   Intake 2486.3 ml   Output 1205 ml   Net 1281.3 ml       Physical Exam  Vitals signs and nursing note reviewed.   Constitutional:       Appearance: She is well-developed. She is not diaphoretic.   HENT:      Head: Normocephalic and atraumatic.   Eyes:      Conjunctiva/sclera: Conjunctivae normal.   Neck:      Musculoskeletal: Normal range of motion and neck supple.   Cardiovascular:      Rate and Rhythm: Regular rhythm. Tachycardia present.      Comments: Midline sternotomy with clean, intact, nonerythematous incision  Chest tube sites with clean, dry bandages  Pulmonary:      Comments: Mechanically ventilated  Right chest tube x1 with SS output   Left pigtail with SS output. No air leak noted  Abdominal:      General: There is no distension.      Palpations: Abdomen is soft.      Tenderness: There is no abdominal  tenderness.   Skin:     General: Skin is warm and dry.   Neurological:      Comments: Sedated         Vents:  Vent Mode: (S) Spont (10/07/20 0819)  Ventilator Initiated: Yes(chart correction) (09/18/20 2202)  Set Rate: 20 BPM (10/07/20 0819)  Vt Set: 375 mL (10/07/20 0819)  Pressure Support: 10 cmH20 (10/07/20 0819)  PEEP/CPAP: 5 cmH20 (10/07/20 0819)  Oxygen Concentration (%): 40 (10/07/20 1030)  Peak Airway Pressure: 16 cmH2O (10/07/20 0819)  Plateau Pressure: 19 cmH20 (10/07/20 0819)  Total Ve: 9.56 mL (10/07/20 0819)  Negative Inspiratory Force (cm H2O): -27 (09/15/20 0926)  F/VT Ratio<105 (RSBI): (!) 50.76 (10/07/20 0406)    Lines/Drains/Airways     Central Venous Catheter Line            Percutaneous Central Line Insertion/Assessment - Triple Lumen  10/05/20 1843 left internal jugular 1 day          Drain                 Chest Tube 10/05/20 1546 1 Right Pleural 28 Fr. 1 day         Chest Tube 10/05/20 1547 2 Right Pleural 28 Fr. 1 day         Chest Tube 10/05/20 2140 Left Midaxillary;Pleural 7 Fr. 1 day         NG/OG Tube 10/05/20 1751 [place] sump 16 Fr. Center mouth 1 day         Urethral Catheter 10/05/20 1824 Temperature probe 16 Fr. 1 day          Airway                 Airway - Non-Surgical 10/05/20 1702 Endotracheal Tube 1 day          Arterial Line            Arterial Line 10/05/20 1431 Right Pedal 1 day          Peripheral Intravenous Line                 Peripheral IV - Single Lumen 10/04/20 0237 20 G Left Antecubital 3 days                Significant Labs:    CBC/Anemia Profile:  Recent Labs   Lab 10/06/20  2340 10/07/20  0400 10/07/20  0820   WBC 16.59* 18.58* 18.12*   HGB 9.0* 9.3* 9.5*   HCT 28.5* 29.6* 29.4*   * 127* 123*   MCV 95 94 95   RDW 16.3* 16.4* 16.6*        Chemistries:  Recent Labs   Lab 10/06/20  1108 10/06/20  2015 10/07/20  0400   * 144 147*   K 4.3 3.6 4.2   * 112* 114*   CO2 23 22* 22*   BUN 28* 27* 27*   CREATININE 0.9 0.9 0.9   CALCIUM 8.3* 8.2* 8.2*   ALBUMIN  2.9* 2.7* 2.5*   PROT 5.3* 5.2* 5.1*   BILITOT 3.5* 2.5* 2.2*   ALKPHOS 160* 181* 183*   ALT 35 40 39   AST 34 48* 49*   MG 2.2 2.3 2.2   PHOS 2.7 2.4* 2.4*       ABGs:   Recent Labs   Lab 10/07/20  0811   PH 7.426   PCO2 36.1   HCO3 23.7*   POCSATURATED 99   BE -1     Coagulation:   Recent Labs   Lab 10/07/20  0400   INR 1.4*   APTT 41.3*     All pertinent labs within the past 24 hours have been reviewed.    Significant Imaging:  I have reviewed and interpreted all pertinent imaging results/findings within the past 24 hours.     10/07/2020 CXR  FINDINGS:  Tip of endotracheal tube between thoracic inlet and anjel.  Similar position of left internal jugular approach central venous catheter.  Status post median sternotomy tip and side port of nasogastric tube beyond field of view examination.  Similar position of chest tubes and drains.  No definite enlarging the thorax is seen.  Some clearing of the right pleural effusion is suggested.  A small left pleural effusion may be present.  Persistent bilateral mid lower lung zone opacification.  Additional findings also are not substantially changed.     Impression:     As above.

## 2020-10-08 NOTE — PLAN OF CARE
10/08/20 1044   Discharge Reassessment   Assessment Type Discharge Planning Reassessment   Provided patient/caregiver education on the expected discharge date and the discharge plan Yes   Do you have any problems affording any of your prescribed medications? No   Discharge Plan A Long-term acute care facility (LTAC)   Discharge Plan B Other  (TBD)   DME Needed Upon Discharge  other (see comments)  (TBD)   Anticipated Discharge Disposition Long Term       Mel Simons MPH, RN, CM  Ext. [phone]

## 2020-10-08 NOTE — SUBJECTIVE & OBJECTIVE
Interval History: Pt afebrile, white count downtrending.  Pleural fluid cx growing E. Coli.  [place] extubate pt today.  Resp cxs- NGTD.    Review of Systems   Unable to perform ROS: Intubated     Objective:     Vital Signs (Most Recent):  Temp: 99.7 °F (37.6 °C) (10/08/20 1815)  Pulse: 92 (10/08/20 1815)  Resp: 16 (10/08/20 1815)  BP: 125/69 (10/08/20 1800)  SpO2: 100 % (10/08/20 1815) Vital Signs (24h Range):  Temp:  [96.3 °F (35.7 °C)-100.2 °F (37.9 °C)] 99.7 °F (37.6 °C)  Pulse:  [] 92  Resp:  [14-25] 16  SpO2:  [95 %-100 %] 100 %  BP: (101-132)/(50-69) 125/69  Arterial Line BP: ()/(40-65) 137/46     Weight: 85 kg (187 lb 6.3 oz)  Body mass index is 31.18 kg/m².    Estimated Creatinine Clearance: 52.3 mL/min (based on SCr of 1 mg/dL).    Physical Exam  Constitutional:       Appearance: She is well-developed. She is ill-appearing.   HENT:      Nose: Nose normal.   Eyes:      General: No scleral icterus.  Neck:      Musculoskeletal: Normal range of motion.   Cardiovascular:      Rate and Rhythm: Normal rate and regular rhythm.      Heart sounds: Murmur present.   Pulmonary:      Effort: Pulmonary effort is normal.      Breath sounds: Normal breath sounds. No wheezing or rhonchi.      Comments: Intubated  Chest tubes in place  Abdominal:      General: There is no distension.      Palpations: Abdomen is soft. There is no mass.      Tenderness: There is no abdominal tenderness.   Musculoskeletal: Normal range of motion.         General: No swelling or tenderness.      Right lower leg: No edema.      Left lower leg: No edema.   Skin:     General: Skin is warm and dry.      Findings: No bruising, erythema or lesion.      Comments: Sternal Incision CDI   Neurological:      Mental Status: She is alert and oriented to person, place, and time. Mental status is at baseline.         Significant Labs: All pertinent labs within the past 24 hours have been reviewed.    Significant Imaging: I have reviewed all  pertinent imaging results/findings within the past 24 hours.

## 2020-10-08 NOTE — ASSESSMENT & PLAN NOTE
Fatou Lorenzo is a 75 y.o. female s/p MVr, TVr 9/9/2020. Case noteworthy for multiple pump runs (3) and RV hematoma due to retraction. Unstable overnight 9/18, required pericardial window for evac of posterior cardiac tamponade. Respiratory distress and so re-admitted to SICU on 10/2 now s/p VATS on 10/5.    Neuro:  - Sedation: Precedex. Will wean in anticipation of extubation today  - Pain: Fentanyl pushes for now.      CV:  S/p MVr, TVr 9/9/20. S/p bedside pericardial window 9/18  - Keep MAPs >60. Intermittently requires epi for low MAPs. This this time, thought to be related to sedation  - pAF: Continue digoxin 125 mcg. Currently in NSR - will restart beta blocker once consistently off epi   - Restart anti-coagulation: asa 81, hep gtt - goal 60-80     Pulm:   - Intubated and mechanically ventilated  - On relatively low vent settings. Wean to extubate today  - Bronch cultures negative  - Eugene catheter to suction. Will keep to suction and likely dc tomorrow  - Daily CXR and ABG PRN  - HOB >30 deg    FEN/GI:  - Net positive 168 cc in the last 24 hours   - Replace other lytes as needed  - Hold trickle feeds in anticipation of extubation  - Anticipate she will likely fail a swallow eval. Will pull OGT and place kevin tube prior to extubation  - famotidine and bowel regimen    Renal:  - If she is successfully extubated, will dc this  - BUN/Cr 31/1. UOP decreased yesterday and so given some lasix with good response   - Will give lasix 20 mg IV again today    Heme/Onc:  - H/H stable 9.3/29.6 -> 9.7/30.3  - No evidence of bleeding  - Will restart anticoagulation for pAF.    - hep gtt with aPTT goal of 60-80     ID:   - ID following  - OR cultures form 10/5 with e coli resistant to ciprofloxacin, levofloxacin, tetracyclin, and bactrim  - UTI culture enterococcus cloacae essentially pansensitive except to nitrofurantoin  - Will continue on cefepime and vanc per their recommendations     Endo:  - no hx of DM  -  ISS    PPx:  - famotidine, SCDs     Dispo: SICU

## 2020-10-08 NOTE — PROGRESS NOTES
Pharmacokinetic Assessment Follow Up: IV Vancomycin    Vancomycin Regimen Assessment and Plan:    - Vancomycin random level resulted supra-therapeutic as 32.1 mg/dl, goal 15-20 mg/dl.  Last vancomycin dose given on 10/6 at ~ 18:00.   - No additional vancomycin is required.   - A random level is ordered with AM labs tomorrow to assess clearance. Pharmacy to re-dose based on level and renal function.     Drug levels (last 3 results):  Recent Labs   Lab Result Units 10/06/20  1735 10/08/20  0320   Vancomycin, Random ug/mL  --  32.1   Vancomycin-Trough ug/mL 28.9*  --        Pharmacy will continue to follow and monitor vancomycin.    Please contact pharmacy at extension [phone] for questions regarding this assessment.    Thank you for the consult,   Jyothi León, PharmD, BCCCP       Patient brief summary:  Fatou Lorenzo is a 75 y.o. female initiated on antimicrobial therapy with IV Vancomycin for treatment of lower respiratory infection      Drug Allergies:   Review of patient's allergies indicates:  No Known Allergies    Actual Body Weight:   85 kg    Renal Function:   Estimated Creatinine Clearance: 52.3 mL/min (based on SCr of 1 mg/dL).    Dialysis Method (if applicable):  N/A

## 2020-10-08 NOTE — RESPIRATORY THERAPY
Pt extubated per MD order. RN and RRT at bedside. Pt tolerated extubation well and placed on HFNC at 25L/40%. Will continue to monitor.

## 2020-10-08 NOTE — PLAN OF CARE
SICU PLAN OF CARE NOTE     Dx: Acute respiratory failure with hypoxia     Shift Events: VSS at this time. High flow nasal cannula 25 L 40%. Afebrile. CVP 14, 12, 9. SBP >100 per Dr. Ng. Primarily in NSR throughout shift. After extubation, patient noted to be in rate controlled A Fib. 20 lasix given for low UOP with adequate response. Heparin gtt initiated for anticoagulation. Right chest tube removed per Dr. Casillas. Patient extubated at 1530 to high flow nasal cannula 25 L 40% FiO2. Patient tolerated procedure well. ABGs post-extubation WNL. MAKENZIE tube placed per Dr. Ng. Okay to use per abdomen xray. Tube feeds restarted at 10 cc/hr per Dr. Troy. Plan of care reviewed with family and patient. Questions and concerns addressed. WCTM.      Neuro: Follows Commands and Moves All Extremities, AAOx4     Respiratory: High Flow Nasal Cannula     Diet: NPO/Tube Feedings      Gtts: Heparin     Urine Output: Urinary Catheter 1140 cc/shift     Drains:   Right Chest Tube, total output 20 cc /  shift (REMOVED)  Left Chest tube, total output 0 cc / shift     Labs/Accuchecks: Accuchecks q 6 hours. Daily labs.      Skin: Midsternal incision with island border. Incision CDI with sutures and steri-strips. Island border changed. Skin otherwise intact. Patient on immerse bed. Weight shift assistance provided q 2 hours. Bed plugged in and working. Heels floated. Foams to sacrum and heels.

## 2020-10-08 NOTE — PT/OT/SLP PROGRESS
Physical Therapy      Patient Name:  Fatou Lorenzo   MRN:  [id number]    Patient not seen today secondary to (pt intubated and sedated and on hold). Will follow-up at a later date. .    Emily Blankenship, PT   10/8/2020

## 2020-10-08 NOTE — SUBJECTIVE & OBJECTIVE
Interval History/Significant Events: Did well on SBT yesterday, but was unable to participate in getting parameters so was placed back on a rate. Cultures from yesterdays' bronch are negative. Received lasix 20 for low UOP with good response.     Currently in NSR.   Right sided chest tube pulled today    Follow-up For: Procedure(s) (LRB):  LARYNGOSCOPY, MICRO SUSPENSION WITH AUGMENTATION (N/A)  VATS, WITH DECORTICATION, LUNG (Right)    Post-Operative Day: 3 Days Post-Op    Objective:     Vital Signs (Most Recent):  Temp: 96.6 °F (35.9 °C) (10/08/20 1000)  Pulse: 65 (10/08/20 1000)  Resp: 20 (10/08/20 0747)  BP: 132/60 (10/08/20 1000)  SpO2: 98 % (10/08/20 1000) Vital Signs (24h Range):  Temp:  [96.3 °F (35.7 °C)-100.2 °F (37.9 °C)] 96.6 °F (35.9 °C)  Pulse:  [] 65  Resp:  [20-28] 20  SpO2:  [83 %-100 %] 98 %  BP: (102-132)/(51-62) 132/60  Arterial Line BP: ()/(43-68) 146/53     Weight: 85 kg (187 lb 6.3 oz)  Body mass index is 31.18 kg/m².      Intake/Output Summary (Last 24 hours) at 10/8/2020 1048  Last data filed at 10/8/2020 1000  Gross per 24 hour   Intake 1612 ml   Output 1875 ml   Net -263 ml       Physical Exam  Vitals signs and nursing note reviewed.   Constitutional:       Appearance: She is well-developed. She is ill-appearing. She is not diaphoretic.   HENT:      Head: Normocephalic and atraumatic.   Eyes:      Conjunctiva/sclera: Conjunctivae normal.   Neck:      Musculoskeletal: Normal range of motion and neck supple.   Cardiovascular:      Rate and Rhythm: Normal rate and regular rhythm.      Comments: Midline sternotomy with clean, dry, and intact, without evidence of infection  Prior chest tubes sites with dressings in place, clean and dry  Pulmonary:      Comments: Mechanically ventilated  Left pigtail with SS output. No air leak noted  Abdominal:      General: There is no distension.      Palpations: Abdomen is soft.      Tenderness: There is no abdominal tenderness.   Skin:      General: Skin is warm and dry.   Neurological:      Comments: Sedated         Vents:  Vent Mode: A/C (10/08/20 0747)  Ventilator Initiated: Yes(chart correction) (09/18/20 2202)  Set Rate: 20 BPM (10/08/20 0747)  Vt Set: 375 mL (10/08/20 0747)  Pressure Support: 5 cmH20 (10/07/20 1518)  PEEP/CPAP: 5 cmH20 (10/08/20 0747)  Oxygen Concentration (%): 40 (10/08/20 1000)  Peak Airway Pressure: 21 cmH2O (10/08/20 0747)  Plateau Pressure: 19 cmH20 (10/08/20 0747)  Total Ve: 7.51 mL (10/08/20 0747)  Negative Inspiratory Force (cm H2O): -27 (09/15/20 0926)  F/VT Ratio<105 (RSBI): (!) 53.33 (10/08/20 0747)    Lines/Drains/Airways     Central Venous Catheter Line            Percutaneous Central Line Insertion/Assessment - Triple Lumen  10/05/20 1843 left internal jugular 2 days          Drain                 Chest Tube 10/05/20 2140 Left Midaxillary;Pleural 7 Fr. 2 days         NG/OG Tube 10/05/20 1751 [place] sump 16 Fr. Center mouth 2 days         Urethral Catheter 10/05/20 1824 Temperature probe 16 Fr. 2 days          Airway                 Airway - Non-Surgical 10/05/20 1702 Endotracheal Tube 2 days          Arterial Line            Arterial Line 10/05/20 1431 Right Pedal 2 days          Peripheral Intravenous Line                 Peripheral IV - Single Lumen 10/04/20 0237 20 G Left Antecubital 4 days                Significant Labs:    CBC/Anemia Profile:  Recent Labs   Lab 10/07/20  0400 10/07/20  0820 10/08/20  0320   WBC 18.58* 18.12* 15.46*   HGB 9.3* 9.5* 9.7*   HCT 29.6* 29.4* 30.3*   * 123* 114*   MCV 94 95 92   RDW 16.4* 16.6* 16.4*        Chemistries:  Recent Labs   Lab 10/06/20  2015 10/07/20  0400 10/08/20  0320    147* 146*   K 3.6 4.2 3.8   * 114* 114*   CO2 22* 22* 22*   BUN 27* 27* 31*   CREATININE 0.9 0.9 1.0   CALCIUM 8.2* 8.2* 8.2*   ALBUMIN 2.7* 2.5* 2.2*   PROT 5.2* 5.1* 5.1*   BILITOT 2.5* 2.2* 2.0*   ALKPHOS 181* 183* 194*   ALT 40 39 43   AST 48* 49* 44*   MG 2.3 2.2 2.1   PHOS 2.4*  2.4* 3.5       ABGs:   Recent Labs   Lab 10/08/20  0320   PH 7.442   PCO2 32.5*   HCO3 22.2*   POCSATURATED 99   BE -2     Coagulation:   Recent Labs   Lab 10/07/20  0400 10/08/20  0809   INR 1.4*  --    APTT 41.3* 40.0*     All pertinent labs within the past 24 hours have been reviewed.    Significant Imaging:  I have reviewed and interpreted all pertinent imaging results/findings within the past 24 hours.     10/08/2020 CXR  X-ray Chest 1 View    Result Date: 10/8/2020  As above Electronically signed by: Ayan Glynn MD Date:    10/08/2020 Time:    06:20

## 2020-10-08 NOTE — PROGRESS NOTES
"[facility]  Adult Nutrition  Progress Note    SUMMARY       Recommendations    1) Once medically appropriate change to Peptamen Intense VHP @ 10 mL/hr   2) If tolerated, advance to goal rate of 45 mL/hr of Peptamen Intense VHP to provide 1080 kcal, 99 gm protein, and 907 mL of free fluid with water flushes per MD recommendation   3) ADAT to Cardiac diet once medically appropriate   4) RD to monitor  Goals: Pt to meet 50% of energy needs by RD follow up  Nutrition Goal Status: new  Communication of RD Recs: (POC)    Reason for Assessment    Reason For Assessment: RD follow-up  Diagnosis: (Acute respiratory failure with hypoxia)  Relevant Medical History: afib w/ RVR, HTN, HLD  Interdisciplinary Rounds: did not attend  General Information Comments: Pt is intubated and on mechanical ventilation. NPO with no other means of nutrition at this time. Nurse reports that TF and propofol are turned off right now in preparation to extubate. Intentional 13# wt loss noted from previous RD note. NFPE completed 9/9 w/ no s/s of malnutrition. LBM 10/7; rounded and nondistended  Nutrition Discharge Planning: Adequate PO intake on cardiac diet    Nutrition Risk Screen    Nutrition Risk Screen: tube feeding or parenteral nutrition    Nutrition/Diet History    Spiritual, Cultural Beliefs, Catholic Practices, Values that Affect Care: no  Factors Affecting Nutritional Intake: NPO, on mechanical ventilation    Anthropometrics    Temp: 97.2 °F (36.2 °C)  Height Method: Stated  Height: 5' 5" (165.1 cm)  Height (inches): 65 in  Weight Method: Bed Scale  Weight: 85 kg (187 lb 6.3 oz)  Weight (lb): 187.39 lb  Ideal Body Weight (IBW), Female: 125 lb  % Ideal Body Weight, Female (lb): 147.2 %  BMI (Calculated): 31.2     Lab/Procedures/Meds    Pertinent Labs Reviewed: reviewed  Pertinent Labs Comments: WBC 15.46, RBC 3.29, Hgb 9.7, Hct 30.3, Na 146, Cl 114, CO2 22, BUN 31, GFR 55.2, Ca 8.2, Alkaline Phosphatase 194, Protein " total 5.1, Albumin 2.2, Bilirubin total 2.0, AST 44  Pertinent Medications Reviewed: reviewed  Pertinent Medications Comments: Albuterol, cefepime, digoxin, famotidine, vancomycin, potasssium chloride, precedex    Estimated/Assessed Needs    Weight Used For Calorie Calculations: 86 kg (189 lb 9.5 oz)  Energy Calorie Requirements (kcal): 946-1204  Energy Need Method: Kcal/kg(11-14kcal/kg)  Protein Requirements: 85-102gm (1.5-1.8gm/kg IBW)  Weight Used For Protein Calculations: 56.8 kg (125 lb 3.5 oz)(IBW)  Fluid Requirements (mL): per MD or 1 mL/kcal     RDA Method (mL): 946     Nutrition Prescription Ordered    Current Diet Order: NPO (10/5)  Current Nutrition Support Formula Ordered: Impact Peptide 1.5  Current Nutrition Support Rate Ordered: 10 (ml)  Current Nutrition Support Frequency Ordered: mL/hr    Evaluation of Received Nutrient/Fluid Intake    Enteral Calories (kcal): 0  Enteral Protein (gm): 0  Enteral (Free Water) Fluid (mL): 0  Other Calories (kcal): 0  Total Calories (kcal): 0  Total Calories (kcal/kg): 0  % Kcal Needs: -  % Protein Needs: -  I/O: I: 1,652 O: 1.499  Energy Calories Required: not meeting needs  Protein Required: not meeting needs  Fluid Required: (per MD)  Comments: LBM 10/4  Tolerance: (RD to monitor)  % Intake of Estimated Energy Needs: 0 - 25 %  % Meal Intake: NPO    Nutrition Risk    Level of Risk/Frequency of Follow-up: high     Assessment and Plan    Nutrition Problem  Inadequate energy intake    Related to (etiology)  NPO    Signs and Symptoms (as evidenced by)  NPO  Trickle feeds not running at this time    Intervention (treatment strategy)  Collaboration with other providers    Nutrition Diagnosis Status  New     Monitor and Evaluation    Food and Nutrient Intake: enteral nutrition intake  Food and Nutrient Adminstration: enteral and parenteral nutrition administration  Knowledge/Beliefs/Attitudes: food and nutrition knowledge/skill  Anthropometric Measurements: weight, weight  change, body mass index  Biochemical Data, Medical Tests and Procedures: electrolyte and renal panel, gastrointestinal profile, glucose/endocrine profile, inflammatory profile, lipid profile  Nutrition-Focused Physical Findings: overall appearance     Malnutrition Assessment    Orbital Region (Subcutaneous Fat Loss): well nourished  Upper Arm Region (Subcutaneous Fat Loss): mild depletion   Zoroastrian Region (Muscle Loss): moderate depletion  Clavicle Bone Region (Muscle Loss): mild depletion  Clavicle and Acromion Bone Region (Muscle Loss): well nourished  Dorsal Hand (Muscle Loss): mild depletion  Anterior Thigh Region (Muscle Loss): well nourished  Posterior Calf Region (Muscle Loss): well nourished     Nutrition Follow-Up    RD Follow-up?: Yes

## 2020-10-08 NOTE — PROCEDURES
[facility]  Bronchoscopy   Procedure Note    SUMMARY     Date of Procedure: 10/7/2020    Procedure: Right lung bronchoscopy with BAL    Provider: Madhuri Ng MD    Assisting Provider: Marco Troy    Pre-Procedure Diagnosis: Respiratory insufficiency    Post-Procedure Diagnosis: same    Indication: Fatou Lorenzo is a 75 y.o. female who underwent mitral and tricuspid valve repair with MAZE on 9/9. She had a complicated post operative course including requiring a bedside pericardial window and VATS for loculated pleural effusion. She continues to have respiratory insufficiency and has a CXR consistent with what could be intraparenchymal pathology so the decision was made to perform bronchoscopy.     Anesthesia: General Anesthesia    Technical Procedures Used: Fiberoptic bronchoscopy    Description of the Findings of the Procedure:     Patient was consented for the procedure with all risk and benefit of the procedure explained in detail.  Patient was given the opportunity to ask questions and all concerns were answered.  The bronchocope was inserted into the main airway via the endotracheal tube. An anatomical survey was done of the main airways and the subsegmental bronchus.  The findings are reported above.  A bronchialalveolar lavage was performed using aliquots of normal saline instilled into the airways then aspirated back.    Significant Surgical Tasks Conducted by the Assistant(s), if Applicable: N/A    Complications: None; patient tolerated the procedure well.    Estimated Blood Loss (EBL): None           Findings: Right lobe was without any acute pathology. There was some mucous noted in the distal bronchi of which a BAL was performed, however, no obvious intraparenchymal pathology    Specimens: Sent BAL       Condition: Good        Disposition: ICU - intubated and hemodynamically stable.     Madhuri Ng MD  General Surgery, PGY-2  [phone]

## 2020-10-08 NOTE — NURSING
Pt extubated to 25 L 40% Comfort [place] by RT per Dr. Troy's order. Following extubation, pt is AAO x4, bilateral breath sounds noted. Restraints removed safely w/o injury. Patient updated on the PoC for remainder of shift. VSS at this time.

## 2020-10-08 NOTE — PROGRESS NOTES
CT surgery progress note     COSME overnight      A/P  75F s/p MV repair, TV repair, and MAZE w/ ALISA resection on 09/09/20. Post op course complicated by a multiloculated R pleural effusion. Now POD3 from R VATS w/ decortication.      Start heparin gtt  Monitor dig levels  Continue Lopressor 25 BID  Continue IV abx for E. Coli in pleural fluid  Chest tube per thoracic  Replace OG w. NG  Supportive care  Cont ICU

## 2020-10-08 NOTE — NURSING
"Upon entering patient's room, patient stating "kill me" multiple times. Patient reassured by RN and acknowledged major improvement regarding extubation today. Medicine team updated and made aware. Psychiatry to be consulted and possibly start an anti-depressant. Patient safe in bed with side rails up x 3. Precedex gtt restarted at a low dose to calm patient. PRN pain medication given as well. Patient does not endorse the want to hurt herself, only to go home. Charge RN made aware as well. WCTM patient closely.   "

## 2020-10-08 NOTE — NURSING
Dr. Ng made aware of patient's MAP reaching 59-63 after MAKENZIE tube insertion. Made aware of patient's DBP in the 40s. Okay to keep SBP >100. WCTM closely.

## 2020-10-08 NOTE — PLAN OF CARE
SW is following this Pt for DC planning needs. Discharge Disposition: LTAC - pending patient progress; patient intubated/sedated.    SW will continue to coordinate with patient, family, team and insurance to complete patient's discharge plan.    Esthela Peralta LMSW   - Case Management

## 2020-10-08 NOTE — PROGRESS NOTES
[facility]  Infectious Disease  Progress Note    Patient Name: Fatou Lorenzo  MRN: [id number]  Admission Date: 8/30/2020  Length of Stay: 38 days  Attending Physician: Antoni Waddell MD  Primary Care Provider: Ludin Rivas MD    Isolation Status: No active isolations  Assessment/Plan:      Pleural effusion  S/P TVR (tricuspid valve repair)  S/P MVR (mitral valve repair)  See below    Leukocytosis  Pt is a 75 y.o. female w/ pmhx of Atrial fibrillation with RVR, HLD and Severe mitral regurgitation.  Pt w/ recent left heart catheterization 8/25/2020 who presented w/ complaints SOB.  Transesophageal echocardiogram done recently w/ left atrial thrombus.  L heart angiography w/ nonobstructive CAD.       Pt transferred to [facility] for [place] surgery evaluation and underwent MV repair and TV annuloplasty on 9/9. Pt course c/b reintuabtion and pericardial window for evacuation of posterior cardiac tamponade on 9/18. Developed increasing leukocytosis on 9/28.    Urine cxs positive for E.Cloacae.  Pt started on Bactrim.  Chest Xray from 10/1- Since September 28, 2020, increased large right pleural effusion.  Increased diffuse right airspace opacities.  X ray abdomen 9/23 w/ Probable bilateral nephrolithiasis.  ID consulted for positive urine culture w/ E. Cloacae.      Pt underwent IR thoracentesis- not sent for cx and counts not sent.  Per report innumerable loculations. Pt s/p right VATS, drainage of loculated effusion, limited decortication on 10/5 by CTS.   CXs taken w/ E. Coli growing. Also underwent R vocal  Fold injection.  Currently on Vanc and Cefepime. leukocytosis down trended. Repeat CXR with some clearing of the right pleural effusion is suggested.  A small left pleural effusion may be present.  Bronchoscopy today with repeat cultures.    Plan  -Can discontinue Vanc as no gpc growth  -Recommend discontinue Cefepime and start on Ceftriaxone 2 g q24h.    -Pt E. Cloacae uti likely with adequate  treatment given has received 7 days of Cefepime therapy.   -Will monitor pt w/ de-escalation of therapy  - If develops F. Please obtain blood cxs  -Will f/u resp cxs  - ID will continue to follow      Thank you for your consult. I will follow-up with patient. Please contact us if you have any additional questions.    Vance Singh PA-C  Infectious Disease  [facility]    Subjective:     Principal Problem:Acute respiratory failure with hypoxia    HPI: Pt is a 75 y.o. female w/ pmhx of Atrial fibrillation with RVR, HLD and Severe mitral regurgitation.  Pt w/ recent left heart catheterization 8/25/2020 who presented w/ complaints of recurrent shortness of breath.  Transesophageal echocardiogram done recently w/ left atrial thrombus.  L heart angiography w/ nonobstructive CAD.   Upon admission, pt required BiPAP due to hypoxemia not responsive to nasal cannula.    Pt transferred to [facility] for CT surgery evaluation given recurrent admission despite compliance, BP control and diuresis.  Pt seen by Dr. Waddell who recommended MV repair.  Pt underwent MV repair and TV annuloplasty on 9/9. Pt course c/b reintuabtion and pericardial window for evacuation of posterior cardiac tamponade on 9/18.  Pt extubated on 9/24.  Developed increasing white count on 9/28.  UA done concerning for infection.  Urine cxs positive for E.Cloacae.  Pt started on Bactrim.  Chest Xray from 10/1- Since September 28, 2020, increased large right pleural effusion.  Increased diffuse right airspace opacities.  X ray abdomen 9/23 w/ Probable bilateral nephrolithiasis.    ID consulted for positive urine culture.      Interval History: Pt afebrile, white count downtrending.  Pleural fluid cx growing E. Coli.  [place] extubate pt today.  Resp cxs- NGTD.    Review of Systems   Unable to perform ROS: Intubated     Objective:     Vital Signs (Most Recent):  Temp: 99.7 °F (37.6 °C) (10/08/20 1815)  Pulse: 92 (10/08/20 1815)  Resp: 16  (10/08/20 1815)  BP: 125/69 (10/08/20 1800)  SpO2: 100 % (10/08/20 1815) Vital Signs (24h Range):  Temp:  [96.3 °F (35.7 °C)-100.2 °F (37.9 °C)] 99.7 °F (37.6 °C)  Pulse:  [] 92  Resp:  [14-25] 16  SpO2:  [95 %-100 %] 100 %  BP: (101-132)/(50-69) 125/69  Arterial Line BP: ()/(40-65) 137/46     Weight: 85 kg (187 lb 6.3 oz)  Body mass index is 31.18 kg/m².    Estimated Creatinine Clearance: 52.3 mL/min (based on SCr of 1 mg/dL).    Physical Exam  Constitutional:       Appearance: She is well-developed. She is ill-appearing.   HENT:      Nose: Nose normal.   Eyes:      General: No scleral icterus.  Neck:      Musculoskeletal: Normal range of motion.   Cardiovascular:      Rate and Rhythm: Normal rate and regular rhythm.      Heart sounds: Murmur present.   Pulmonary:      Effort: Pulmonary effort is normal.      Breath sounds: Normal breath sounds. No wheezing or rhonchi.      Comments: Intubated  Chest tubes in place  Abdominal:      General: There is no distension.      Palpations: Abdomen is soft. There is no mass.      Tenderness: There is no abdominal tenderness.   Musculoskeletal: Normal range of motion.         General: No swelling or tenderness.      Right lower leg: No edema.      Left lower leg: No edema.   Skin:     General: Skin is warm and dry.      Findings: No bruising, erythema or lesion.      Comments: Sternal Incision CDI   Neurological:      Mental Status: She is alert and oriented to person, place, and time. Mental status is at baseline.         Significant Labs: All pertinent labs within the past 24 hours have been reviewed.    Significant Imaging: I have reviewed all pertinent imaging results/findings within the past 24 hours.

## 2020-10-08 NOTE — ASSESSMENT & PLAN NOTE
Pt is a 75 y.o. female w/ pmhx of Atrial fibrillation with RVR, HLD and Severe mitral regurgitation.  Pt w/ recent left heart catheterization 8/25/2020 who presented w/ complaints SOB.  Transesophageal echocardiogram done recently w/ left atrial thrombus.  L heart angiography w/ nonobstructive CAD.       Pt transferred to [facility] [place] surgery evaluation and underwent MV repair and TV annuloplasty on 9/9. Pt course c/b reintuabtion and pericardial window for evacuation of posterior cardiac tamponade on 9/18. Developed increasing leukocytosis on 9/28.    Urine cxs positive for E.Cloacae.  Pt started on Bactrim.  Chest Xray from 10/1- Since September 28, 2020, increased large right pleural effusion.  Increased diffuse right airspace opacities.  X ray abdomen 9/23 w/ Probable bilateral nephrolithiasis.  ID consulted for positive urine culture w/ E. Cloacae.      Pt underwent IR thoracentesis- not sent for cx and counts not sent.  Per report innumerable loculations. Pt s/p right VATS, drainage of loculated effusion, limited decortication on 10/5 by CTS.   CXs taken w/ E. Coli growing. Also underwent R vocal  Fold injection.  Currently on Vanc and Cefepime. leukocytosis down trended. Repeat CXR with some clearing of the right pleural effusion is suggested.  A small left pleural effusion may be present.  Bronchoscopy today with repeat cultures.    Plan  -Can discontinue Vanc as no gpc growth  -Recommend discontinue Cefepime and start on Ceftriaxone 2 g q24h.    -Pt E. Cloacae uti likely with adequate treatment given has received 7 days of Cefepime therapy.   -Will monitor pt w/ de-escalation of therapy  - If develops F. Please obtain blood cxs  -Will f/u resp cxs  - ID will continue to follow

## 2020-10-08 NOTE — PLAN OF CARE
Pt remains intubated and sedated. Vent settings A/C 40% /peep 5 /rate 20. See ABG.  Pt hypothermic in beginning of shift, emmanuel hugger applied. Remains in Afib-rate controlled. Epi restarted to maintain MAP > 65. CVP 14.  Follows commands, moving all ext.     OGT in place with TF at 10 cc/hr, 100cc residuals   Yoo in place with adequate UOP.  CTs in place with minimal output.      Gtts:  Precedex 1.4 mcg/kg/hr  Epi 0.02 mcg/kg/min        Accu q6h.       Skin: Pt turned q2h. Sacrum, heels, elbows intact. Foams in place. Restraints remain in place, no breakdown noted.

## 2020-10-08 NOTE — PROGRESS NOTES
[facility]  Critical Care - Surgery  Progress Note    Patient Name: Fatou Lorenzo  MRN: [id number]  Admission Date: 8/30/2020  Hospital Length of Stay: 38 days  Code Status: Full Code  Attending Provider: Antoni Waddell MD  Primary Care Provider: Ludin Rivas MD   Principal Problem: Acute respiratory failure with hypoxia    Subjective:     Hospital/ICU Course:  No notes on file    Interval History/Significant Events: Did well on SBT yesterday, but was unable to participate in getting parameters so was placed back on a rate. Cultures from yesterdays' bronch are negative. Received lasix 20 for low UOP with good response.     Currently in NSR.   Right sided chest tube pulled today    Follow-up For: Procedure(s) (LRB):  LARYNGOSCOPY, MICRO SUSPENSION WITH AUGMENTATION (N/A)  VATS, WITH DECORTICATION, LUNG (Right)    Post-Operative Day: 3 Days Post-Op    Objective:     Vital Signs (Most Recent):  Temp: 96.6 °F (35.9 °C) (10/08/20 1000)  Pulse: 65 (10/08/20 1000)  Resp: 20 (10/08/20 0747)  BP: 132/60 (10/08/20 1000)  SpO2: 98 % (10/08/20 1000) Vital Signs (24h Range):  Temp:  [96.3 °F (35.7 °C)-100.2 °F (37.9 °C)] 96.6 °F (35.9 °C)  Pulse:  [] 65  Resp:  [20-28] 20  SpO2:  [83 %-100 %] 98 %  BP: (102-132)/(51-62) 132/60  Arterial Line BP: ()/(43-68) 146/53     Weight: 85 kg (187 lb 6.3 oz)  Body mass index is 31.18 kg/m².      Intake/Output Summary (Last 24 hours) at 10/8/2020 1048  Last data filed at 10/8/2020 1000  Gross per 24 hour   Intake 1612 ml   Output 1875 ml   Net -263 ml       Physical Exam  Vitals signs and nursing note reviewed.   Constitutional:       Appearance: She is well-developed. She is ill-appearing. She is not diaphoretic.   HENT:      Head: Normocephalic and atraumatic.   Eyes:      Conjunctiva/sclera: Conjunctivae normal.   Neck:      Musculoskeletal: Normal range of motion and neck supple.   Cardiovascular:      Rate and Rhythm: Normal rate and regular rhythm.       Comments: Midline sternotomy with clean, dry, and intact, without evidence of infection  Prior chest tubes sites with dressings in place, clean and dry  Pulmonary:      Comments: Mechanically ventilated  Left pigtail with SS output. No air leak noted  Abdominal:      General: There is no distension.      Palpations: Abdomen is soft.      Tenderness: There is no abdominal tenderness.   Skin:     General: Skin is warm and dry.   Neurological:      Comments: Sedated         Vents:  Vent Mode: A/C (10/08/20 0747)  Ventilator Initiated: Yes(chart correction) (09/18/20 2202)  Set Rate: 20 BPM (10/08/20 0747)  Vt Set: 375 mL (10/08/20 0747)  Pressure Support: 5 cmH20 (10/07/20 1518)  PEEP/CPAP: 5 cmH20 (10/08/20 0747)  Oxygen Concentration (%): 40 (10/08/20 1000)  Peak Airway Pressure: 21 cmH2O (10/08/20 0747)  Plateau Pressure: 19 cmH20 (10/08/20 0747)  Total Ve: 7.51 mL (10/08/20 0747)  Negative Inspiratory Force (cm H2O): -27 (09/15/20 0926)  F/VT Ratio<105 (RSBI): (!) 53.33 (10/08/20 0747)    Lines/Drains/Airways     Central Venous Catheter Line            Percutaneous Central Line Insertion/Assessment - Triple Lumen  10/05/20 1843 left internal jugular 2 days          Drain                 Chest Tube 10/05/20 2140 Left Midaxillary;Pleural 7 Fr. 2 days         NG/OG Tube 10/05/20 1751 Gambell sump 16 Fr. Center mouth 2 days         Urethral Catheter 10/05/20 1824 Temperature probe 16 Fr. 2 days          Airway                 Airway - Non-Surgical 10/05/20 1702 Endotracheal Tube 2 days          Arterial Line            Arterial Line 10/05/20 1431 Right Pedal 2 days          Peripheral Intravenous Line                 Peripheral IV - Single Lumen 10/04/20 0237 20 G Left Antecubital 4 days                Significant Labs:    CBC/Anemia Profile:  Recent Labs   Lab 10/07/20  0400 10/07/20  0820 10/08/20  0320   WBC 18.58* 18.12* 15.46*   HGB 9.3* 9.5* 9.7*   HCT 29.6* 29.4* 30.3*   * 123* 114*   MCV 94 95 92    RDW 16.4* 16.6* 16.4*        Chemistries:  Recent Labs   Lab 10/06/20  2015 10/07/20  0400 10/08/20  0320    147* 146*   K 3.6 4.2 3.8   * 114* 114*   CO2 22* 22* 22*   BUN 27* 27* 31*   CREATININE 0.9 0.9 1.0   CALCIUM 8.2* 8.2* 8.2*   ALBUMIN 2.7* 2.5* 2.2*   PROT 5.2* 5.1* 5.1*   BILITOT 2.5* 2.2* 2.0*   ALKPHOS 181* 183* 194*   ALT 40 39 43   AST 48* 49* 44*   MG 2.3 2.2 2.1   PHOS 2.4* 2.4* 3.5       ABGs:   Recent Labs   Lab 10/08/20  0320   PH 7.442   PCO2 32.5*   HCO3 22.2*   POCSATURATED 99   BE -2     Coagulation:   Recent Labs   Lab 10/07/20  0400 10/08/20  0809   INR 1.4*  --    APTT 41.3* 40.0*     All pertinent labs within the past 24 hours have been reviewed.    Significant Imaging:  I have reviewed and interpreted all pertinent imaging results/findings within the past 24 hours.     10/08/2020 CXR  X-ray Chest 1 View    Result Date: 10/8/2020  As above Electronically signed by: Ayan Glynn MD Date:    10/08/2020 Time:    06:20      Assessment/Plan:     Severe mitral regurgitation  Fatou Lorenzo is a 75 y.o. female s/p MVr, TVr 9/9/2020. Case noteworthy for multiple pump runs (3) and RV hematoma due to retraction. Unstable overnight 9/18, required pericardial window for evac of posterior cardiac tamponade. Respiratory distress and so re-admitted to SICU on 10/2 now s/p VATS on 10/5.    Neuro:  - Sedation: Precedex. Will wean in anticipation of extubation today  - Pain: Fentanyl pushes for now.      CV:  S/p MVr, TVr 9/9/20. S/p bedside pericardial window 9/18  - Keep MAPs >60. Intermittently requires epi for low MAPs. This this time, thought to be related to sedation  - pAF: Continue digoxin 125 mcg. Currently in NSR - will restart beta blocker once consistently off epi   - Restart anti-coagulation: asa 81, hep gtt - goal 60-80     Pulm:   - Intubated and mechanically ventilated  - On relatively low vent settings. Wean to extubate today  - Bronch cultures negative  - Eugene catheter to  suction. Will keep to suction and likely dc tomorrow  - Daily CXR and ABG PRN  - HOB >30 deg    FEN/GI:  - Net positive 168 cc in the last 24 hours   - Replace other lytes as needed  - Hold trickle feeds in anticipation of extubation  - Anticipate she will likely fail a swallow eval. Will pull OGT and place kevin tube prior to extubation  - famotidine and bowel regimen    Renal:  - If she is successfully extubated, will dc this  - BUN/Cr 31/1. UOP decreased yesterday and so given some lasix with good response   - Will give lasix 20 mg IV again today    Heme/Onc:  - H/H stable 9.3/29.6 -> 9.7/30.3  - No evidence of bleeding  - Will restart anticoagulation for pAF.    - hep gtt with aPTT goal of 60-80     ID:   - ID following  - OR cultures form 10/5 with e coli resistant to ciprofloxacin, levofloxacin, tetracyclin, and bactrim  - UTI culture enterococcus cloacae essentially pansensitive except to nitrofurantoin  - Will continue on cefepime and vanc per their recommendations     Endo:  - no hx of DM  - ISS    PPx:  - famotidine, SCDs     Dispo: SICU      Critical secondary to Patient has a condition that poses threat to life and bodily function: MVr, TVr, MAZE; pericardial window, loculated pleural effusion     Critical care was time spent personally by me on the following activities: development of treatment plan with patient or surrogate and bedside caregivers, discussions with consultants, evaluation of patient's response to treatment, examination of patient, ordering and performing treatments and interventions, ordering and review of laboratory studies, ordering and review of radiographic studies, pulse oximetry, re-evaluation of patient's condition.  This critical care time did not overlap with that of any other provider or involve time for any procedures.     Madhuri Ng MD  Critical Care - Surgery  [facility]

## 2020-10-09 NOTE — ASSESSMENT & PLAN NOTE
Pt is a 75 y.o. female w/ pmhx of Atrial fibrillation with RVR, HLD and Severe mitral regurgitation.  Pt w/ recent left heart catheterization 8/25/2020 who presented w/ complaints SOB.  Transesophageal echocardiogram done recently w/ left atrial thrombus.  L heart angiography w/ nonobstructive CAD.       Pt transferred to [facility] [place] surgery evaluation and underwent MV repair and TV annuloplasty on 9/9. Pt course c/b reintuabtion and pericardial window for evacuation of posterior cardiac tamponade on 9/18. Developed increasing leukocytosis on 9/28.    Urine cxs positive for E.Cloacae.  Pt started on Bactrim.  Chest Xray from 10/1- Since September 28, 2020, increased large right pleural effusion.  Increased diffuse right airspace opacities.  X ray abdomen 9/23 w/ Probable bilateral nephrolithiasis.  ID consulted for positive urine culture w/ E. Cloacae.      Pt underwent IR thoracentesis- not sent for cx and counts not sent.  Per report innumerable loculations. Pt s/p right VATS, drainage of loculated effusion, limited decortication on 10/5 by CTS.   CXs taken w/ E. Coli growing. Also underwent R vocal  Fold injection.  Leukocytosis down trended. Repeat CXR with some clearing of the right pleural effusion is suggested.   BAL cxs w/ yeast-likely colonizer    Plan  -Continue on Ceftriaxone 2 g q24h.    -Pt E. Cloacae uti likely with adequate treatment given has received 7 days of Cefepime therapy.   - Recommend 4 weeks of abx from VATS.  Recommend  IV Ceftriaxone while inpt.  If discharged prior to completion of therapy would switch to PO Augmentin 875 BID for remainder of therapy.  (Est end date 11/2/20)  - ID f/u before completion of therapy in 2-4 weeks  - Pt abx regimen and duration discussed with SICU and ID staff   - ID will sign off. Please re-consult with any new infectious concerns.

## 2020-10-09 NOTE — SUBJECTIVE & OBJECTIVE
Interval History: Pt afebrile, white count decreasing.  Switched to Ceftriaxone yesterday.  Currently extubated.  Off of pressors.  Bal cxs w/ yeast    Review of Systems   Unable to perform ROS: Acuity of condition     Objective:     Vital Signs (Most Recent):  Temp: 98.8 °F (37.1 °C) (10/09/20 0500)  Pulse: 79 (10/09/20 0756)  Resp: (!) 23 (10/09/20 0811)  BP: (!) 119/55 (10/09/20 0500)  SpO2: 96 % (10/09/20 0756) Vital Signs (24h Range):  Temp:  [96.6 °F (35.9 °C)-99.7 °F (37.6 °C)] 98.8 °F (37.1 °C)  Pulse:  [65-93] 79  Resp:  [12-26] 23  SpO2:  [95 %-100 %] 96 %  BP: (101-132)/(50-84) 119/55  Arterial Line BP: (106-184)/(36-68) 147/50     Weight: 85 kg (187 lb 6.3 oz)  Body mass index is 31.18 kg/m².    Estimated Creatinine Clearance: 52.3 mL/min (based on SCr of 1 mg/dL).    Physical Exam  Constitutional:       Appearance: She is well-developed. She is ill-appearing.   HENT:      Nose: Nose normal.   Eyes:      General: No scleral icterus.  Neck:      Musculoskeletal: Normal range of motion.   Cardiovascular:      Rate and Rhythm: Normal rate and regular rhythm.      Heart sounds: Murmur present.   Pulmonary:      Effort: Pulmonary effort is normal.      Breath sounds: Normal breath sounds. No wheezing or rhonchi.      Comments: Extubated on comfort flow  Chest tubes removed  Abdominal:      General: There is no distension.      Palpations: Abdomen is soft. There is no mass.      Tenderness: There is no abdominal tenderness.   Musculoskeletal: Normal range of motion.         General: No swelling or tenderness.      Right lower leg: No edema.      Left lower leg: No edema.   Skin:     General: Skin is warm and dry.      Findings: No bruising, erythema or lesion.      Comments: Sternal Incision CDI   Neurological:      Mental Status: She is alert and oriented to person, place, and time. Mental status is at baseline.         Significant Labs: All pertinent labs within the past 24 hours have been  reviewed.    Significant Imaging: I have reviewed all pertinent imaging results/findings within the past 24 hours.

## 2020-10-09 NOTE — PLAN OF CARE
SICU PLAN OF CARE NOTE    Dx: Acute respiratory failure with hypoxia    Shift Events: VSS at this time. Pt currently appears calm. Comfort lokesh @ 20L 40%. O2 SATS >95. CVP 9, 10, 10. Tube feeds tolerated well, residuals <100. Heparin gtt increased by MD order.  Pt distressed/agitated throughout most of the night, pulling at lines/oxygen. Restraints continued at this time. Precedex gtt titrated. Continual reassurance, acknowledgement of anxiety provided. Will continue to monitor.     Neuro: Pt is able to answer questions appropriately. Follow commands and move upper/lower extremities bilaterally.     Respiratory: Comfort Lokesh    Gtts: Precedex and heparin    Urine Output: Yoo ~400 cc/shift    Drains: Left chest tube had Zero output throughout shift.     Labs/Accuchecks: Labs trended per order. K replaced. POCT Q6hr no coverage needed    Skin: No new pressure or device injuries noted. All dressings remain clean, dry, and intact. Mid sternal incision CDI, healing well, sutures/steri-strips intact island border dsg in place. Pt on immerse bed. Pt turned Q2hr.  No redness/breakdown noted to heels or sacrum, foams applied. SCD to left lower extremity . Foot drop boot rotated Q2hr.

## 2020-10-09 NOTE — PROGRESS NOTES
CT surgery progress note     COSME overnight   Extubated on HFNC     A/P  75F s/p MV repair, TV repair, and MAZE w/ ALISA resection on 09/09/20. Post op course complicated by a multiloculated R pleural effusion. Now POD4 from R VATS w/ decortication.      continue heparin gtt, pTT goal 60-80  Monitor dig levels  Restart Lopressor 25 BID  IV abx for E. Coli in pleural fluid  D/c R pigtail catheter  SLP eval  Supportive care  Delirium precautions  Increase activity   Cont ICU

## 2020-10-09 NOTE — PT/OT/SLP RE-EVAL
Physical Therapy Co- Re-evaluation and Co-treatment with OT    Patient Name:  Fatou Lorenzo   MRN:  [id number]    Recommendations:     Discharge Recommendations:  rehabilitation facility   Discharge Equipment Recommendations: (will determine DME needs closer to discharge)   Barriers to discharge: Decreased caregiver support family will not be able to assist at current functional level.     Assessment:     Fatou Lorenzo is a 75 y.o. female admitted with a medical diagnosis of Acute respiratory failure with hypoxia.  She presents with the following impairments/functional limitations:  weakness, impaired endurance, impaired functional mobilty, gait instability, impaired balance, decreased safety awareness, decreased lower extremity function, decreased coordination, impaired cognition. pt tolerated treatment well being able to sit on EOB with minimal assistance. Pt will benefit from skilled PT 5x/wk to progress physically. Pt will need inpt rehab when medically stable to maximize rehab potential. Pt was evaluated 9/5 with dx of acute respiratory failure with hypoxia. Pt is s/p VATS with decortication R lung 10/5/20.    SOCIAL:per son on phone. Pt son lives with her  in 1 story with slab entrance. Son works but pt daughter in law will be caregiver. Pt drives and works part time a [employer]. Pt owns shower chair.     Rehab Prognosis:  good; patient would benefit from acute skilled PT services to address these deficits and reach maximum level of function.      Recent Surgery: Procedure(s) (LRB):  LARYNGOSCOPY, MICRO SUSPENSION WITH AUGMENTATION (N/A)  VATS, WITH DECORTICATION, LUNG (Right) 4 Days Post-Op    Plan:     During this hospitalization, patient to be seen 5 x/week to address the above listed problems via gait training, therapeutic activities, therapeutic exercises, neuromuscular re-education  · Plan of Care Expires:  11/06/20   Plan of Care Reviewed with: patient    Subjective     Communicated  with nurse prior to session.  Patient found supine with arterial line, telemetry, pulse ox (continuous), blood pressure cuff, oxygen, central line, vivas catheter, SCD(comfort flow, CVP, Diogo Gilberto boot, multipodus boot) upon PT entry to room, agreeable to evaluation.      Chief Complaint: pt had no complaints during treatment.   Patient comments/goals:  To get better and go home.   Pain/Comfort:  · Pain Rating 1: 0/10  · Pain Rating Post-Intervention 1: 0/10    Patients cultural, spiritual, Faith conflicts given the current situation: no      Objective:     Patient found with: arterial line, telemetry, pulse ox (continuous), blood pressure cuff, oxygen, central line, vivas catheter, SCD(comfort flow, CVP, Diogo Gilberto boot, multipodus boot)     General Precautions: Standard, fall, sternal   Orthopedic Precautions :none  Braces:       Exams:  · Cognitive Exam:  Patient is oriented to Person, Place, Time and Situation  · RLE ROM: WFL  · RLE Strength: 3/5 for major muscle groups  · LLE ROM: WFL  · LLE Strength: 3/5 for major muscle groups    Functional Mobility:  · Bed Mobility: pt needed verbal cues for functional mobility with sternal precautions.     · Rolling Right: total assistance  · Supine to Sit: total assistance and of 2 persons  · Sit to Supine: total assistance and of 2 persons  ·   · Balance: pt sat on EOB x 10 min with Min assist. pt was in slumped posture with sitting.     PT facilitated trunk control and postural alignment as pt performed UE therapeutic exercises and grooming with OT. PT re-evaluation concentrated on LE ROM and strength and balance with sitting.     AM-PAC 6 CLICK MOBILITY  Total Score:8       Therapeutic Activities and Exercises:   pt received verbal instruction in role of PT and POC. Pt verbally expressed understanding of such.     Patient left supine with all lines intact, call button in reach, restraints reapplied at end of session and RN notified.    GOALS:   Multidisciplinary  Problems     Physical Therapy Goals        Problem: Physical Therapy Goal    Goal Priority Disciplines Outcome Goal Variances Interventions   Physical Therapy Goal     PT, PT/OT Ongoing, Progressing     Description: Goals to be met by: 2020     Patient will increase functional independence with mobility by performin. Supine to sit with Minimal Assistance x 1 person.-not met  2. Sit to stand transfer with Moderate Assistance.-not met  3. Bed to chair transfer with Moderate Assistance. -not met  4. Sitting at edge of bed x10 minutes with CGA.-not met  5. Lower extremity exercise program x 10 reps  with assistance as needed. -not met  6. Pt receive gait training ~ 10 ft with moderate assist- not met                           History:     Past Medical History:   Diagnosis Date    Atrial fibrillation with RVR 2020    Cataract     Essential hypertension 2020    Glaucoma     Heart valve problem     Hyperlipidemia     Severe mitral regurgitation 2020       Past Surgical History:   Procedure Laterality Date    ANKLE SURGERY      BAIN MAZE PROCEDURE N/A 2020    Procedure: BAIN MAZE PROCEDURE;  Surgeon: Antoni Waddell MD;  Location: [address];  Service: Cardiothoracic;  Laterality: N/A;  MAZE     LARYNGOSCOPY N/A 10/5/2020    Procedure: LARYNGOSCOPY, MICRO SUSPENSION WITH AUGMENTATION;  Surgeon: Yfn Mendoza MD;  Location: [address];  Service: ENT;  Laterality: N/A;    LEFT HEART CATHETERIZATION Left 2020    Procedure: Left heart cath, radial;  Surgeon: Marlee Carrillo MD;  Location: [facility] CATH LAB;  Service: Cardiology;  Laterality: Left;    lipoma removal      THORACOSCOPIC DECORTICATION OF LUNG Right 10/5/2020    Procedure: VATS, WITH DECORTICATION, LUNG;  Surgeon: Jeremy Shine MD;  Location: [address];  Service: Thoracic;  Laterality: Right;    TRICUSPID VALVULOPLASTY N/A 2020    Procedure: REPAIR, TRICUSPID VALVE;  Surgeon: Antoni JACK  MD Bairon;  Location: [facility] OR [address] [place] [address];  Service: Cardiothoracic;  Laterality: N/A;       Time Tracking:     PT Received On: 10/09/20  PT Start Time: 1101     PT Stop Time: 1123  PT Total Time (min): 22 min     Billable Minutes: Re-eval 10 min and Therapeutic Activity 12 min      Emily Blankenship, PT  10/09/2020

## 2020-10-09 NOTE — PT/OT/SLP RE-EVAL
Occupational Therapy   Re-evaluation    Name: Fatou Lorenzo  MRN: [id number]  Admitting Diagnosis:  Acute respiratory failure with hypoxia 4 Days Post-Op s/p VATS    Recommendations:     Discharge Recommendations: rehabilitation facility  Discharge Equipment Recommendations:  (TBD pending progress)  Barriers to discharge:  None    Assessment:     Fatou Lorenzo is a 75 y.o. female with a medical diagnosis of Acute respiratory failure with hypoxia.  She presents pleasant and agreeable to session.  Performance deficits affecting function are weakness, impaired functional mobilty, impaired cardiopulmonary response to activity, decreased safety awareness, gait instability, impaired endurance, impaired balance, impaired self care skills, decreased upper extremity function.      Rehab Prognosis:  Good; patient would benefit from acute skilled OT services to address these deficits and reach maximum level of function.       Plan:     Patient to be seen 5 x/week to address the above listed problems via self-care/home management, therapeutic activities, therapeutic exercises, neuromuscular re-education  · Plan of Care Expires: 11/08/20  · Plan of Care Reviewed with: patient    Subjective     Chief Complaint: denies  Patient/Family stated goals: to go home  Communicated with: RN prior to session.  Pain/Comfort:  · Pain Rating 1: 0/10  · Pain Rating Post-Intervention 1: 0/10    Objective:     Communicated with: RN prior to session.  Patient found supine with: blood pressure cuff, pulse ox (continuous), telemetry, peripheral IV, vivas catheter, central line(Comfort Flow, One[place]) upon OT entry to room.    General Precautions: Standard, fall, sternal   Orthopedic Precautions:N/A   Braces:       Occupational Performance:    Bed Mobility:    · Patient completed Supine to Sit with total assistance  · Patient completed Sit to Supine with total assistance and 2 persons    Functional Mobility/Transfers:  · Deferred    Activities of Daily  Living:  · Grooming: total assistance with [place] A for washing face  · Lower Body Dressing: total assistance      Cognitive/Visual Perceptual:  Lethargic, but oriented and following commands     Physical Exam:  Postural examination/scapula alignment:    -       Rounded shoulders; PPT  Sensation:    -       Intact  Upper Extremity Range of Motion:     -       Right Upper Extremity: AAROM WFL  -       Left Upper Extremity: AAROM WFL  Upper Extremity Strength:    -       Right Upper Extremity: 3-/5  -       Left Upper Extremity: 3-/5   Strength:    -       Right Upper Extremity: Fair  -       Left Upper Extremity: Fair      AMPA 6 Click:  [facility] Total Score: 7    Treatment & Education:Education:    Pt ed on OT POC  Pt re-ed on sternal precautions  Pt sat EOB x 10 min with minimal A while engaged in self-care and AAROM ex's  Pt required cueing to maintain neutral cervical alignment    Patient left supine with all lines intact, call button in reach and RN notified    GOALS:   Multidisciplinary Problems     Occupational Therapy Goals        Problem: Occupational Therapy Goal    Goal Priority Disciplines Outcome Interventions   Occupational Therapy Goal     OT, PT/OT Ongoing, Progressing    Description: Goals to be met by: 10/23/2020      Patient will increase functional independence with ADLs by performing:    UE Dressing with Minimum Assistance.  Grooming while sitting EOB with Contact Guard Assistance  Toileting from bedside commode with Max Assistance for hygiene and clothing management.   Toilet transfer to bedside commode with Max Assistance.  Supine <> Sit with minimum assistance in preparation for EOB/OOB functional activities  Pt to tolerate static standing for ~1 minute with min A while competing a functional task.                         History:     Past Medical History:   Diagnosis Date    Atrial fibrillation with RVR 8/24/2020    Cataract     Essential hypertension 8/31/2020    Glaucoma     Heart valve  problem     Hyperlipidemia     Severe mitral regurgitation 8/24/2020       Past Surgical History:   Procedure Laterality Date    ANKLE SURGERY      BAIN MAZE PROCEDURE N/A 9/9/2020    Procedure: BAIN MAZE PROCEDURE;  Surgeon: Antoni Waddell MD;  [facility];  Service: Cardiothoracic;  Laterality: N/A;  MAZE     LARYNGOSCOPY N/A 10/5/2020    Procedure: LARYNGOSCOPY, MICRO SUSPENSION WITH AUGMENTATION;  Surgeon: Yfn Mendoza MD;  Location: [facility] OR [address] [place] [address];  Service: ENT;  Laterality: N/A;    LEFT HEART CATHETERIZATION Left 8/25/2020    Procedure: Left heart cath, radial;  Surgeon: Marlee Carrillo MD;  Location: [facility] CATH LAB;  Service: Cardiology;  Laterality: Left;    lipoma removal      THORACOSCOPIC DECORTICATION OF LUNG Right 10/5/2020    Procedure: VATS, WITH DECORTICATION, LUNG;  Surgeon: Jeremy Shine MD;  Location: [facility] OR [address] [place] [address];  Service: Thoracic;  Laterality: Right;    TRICUSPID VALVULOPLASTY N/A 9/9/2020    Procedure: REPAIR, TRICUSPID VALVE;  Surgeon: Antoni Waddell MD;  Location: [facility] OR [address] [place] [address];  Service: Cardiothoracic;  Laterality: N/A;       Time Tracking:     OT Date of Treatment: 10/09/20  OT Start Time: 1101  OT Stop Time: 1124  OT Total Time (min): 23 min    Billable Minutes:Re-eval 10  Therapeutic Exercise 10    JANETT Eddy  10/9/2020

## 2020-10-09 NOTE — PROGRESS NOTES
Therapy with vancomycin complete and/or consult discontinued by provider.  Pharmacy will sign off, please re-consult as needed.    Jyothi León, PharmD, BCCCP  [id number]

## 2020-10-09 NOTE — PLAN OF CARE
Problem: Occupational Therapy Goal  Goal: Occupational Therapy Goal  Description: Goals to be met by: 10/23/2020      Patient will increase functional independence with ADLs by performing:    UE Dressing with Minimum Assistance.  Grooming while sitting EOB with Contact Guard Assistance  Toileting from bedside commode with Max Assistance for hygiene and clothing management.   Toilet transfer to bedside commode with Max Assistance.  Supine <> Sit with minimum assistance in preparation for EOB/OOB functional activities  Pt to tolerate static standing for ~1 minute with min A while competing a functional task.        Outcome: Ongoing, Progressing   OT re-eval completed, and above goals established. JANETT Eddy  10/9/2020

## 2020-10-09 NOTE — PLAN OF CARE
Problem: Physical Therapy Goal  Goal: Physical Therapy Goal  Description: Goals to be met by: 2020     Patient will increase functional independence with mobility by performin. Supine to sit with Minimal Assistance x 1 person.-not met  2. Sit to stand transfer with Moderate Assistance.-not met  3. Bed to chair transfer with Moderate Assistance. -not met  4. Sitting at edge of bed x10 minutes with CGA.-not met  5. Lower extremity exercise program x 10 reps  with assistance as needed. -not met  6. Pt receive gait training ~ 10 ft with moderate assist- not met          Outcome: Ongoing, Progressing   Re-evaluation completed and goals appropriate. Emily Blankenship, PT  10/9/2020

## 2020-10-09 NOTE — SUBJECTIVE & OBJECTIVE
Interval History/Significant Events: No significant clinical changes. Removed NGT last night and this has not yet been replaced. Per nursing reports, she seems very depressed and has been asking the nurses to kill her. She also told her son she is suffering. He is very concerned about this.     From the clinical standpoint, she is progressing although slowly. She is being weaned from comfort flow. Has a lot of mucous and noisy breathing,.    Follow-up For: Procedure(s) (LRB):  LARYNGOSCOPY, MICRO SUSPENSION WITH AUGMENTATION (N/A)  VATS, WITH DECORTICATION, LUNG (Right)    Post-Operative Day: 4 Days Post-Op    Objective:     Vital Signs (Most Recent):  Temp: 98.8 °F (37.1 °C) (10/09/20 1300)  Pulse: 88 (10/09/20 1300)  Resp: (!) 32 (10/09/20 1300)  BP: 120/66 (10/09/20 1300)  SpO2: (!) 93 % (10/09/20 1300) Vital Signs (24h Range):  Temp:  [97.3 °F (36.3 °C)-99.7 °F (37.6 °C)] 98.8 °F (37.1 °C)  Pulse:  [] 88  Resp:  [12-32] 32  SpO2:  [81 %-100 %] 93 %  BP: (105-155)/(53-84) 120/66  Arterial Line BP: (113-184)/(36-73) 173/63     Weight: 85 kg (187 lb 6.3 oz)  Body mass index is 31.18 kg/m².      Intake/Output Summary (Last 24 hours) at 10/9/2020 1322  Last data filed at 10/9/2020 1300  Gross per 24 hour   Intake 675 ml   Output 875 ml   Net -200 ml       Physical Exam  Vitals signs and nursing note reviewed.   Constitutional:       Appearance: She is well-developed. She is ill-appearing. She is not diaphoretic.   HENT:      Head: Normocephalic and atraumatic.   Eyes:      Conjunctiva/sclera: Conjunctivae normal.   Neck:      Musculoskeletal: Normal range of motion and neck supple.   Cardiovascular:      Rate and Rhythm: Normal rate and regular rhythm.      Comments: Midline sternotomy with clean, dry, and intact, without evidence of infection  Prior chest tubes sites with dressings in place, clean and dry  Pulmonary:      Comments: Breathing comfortably on comfort flow  No distress, but rhonchi present and she  has a wet cough.  Left pigtail with SS output. No air leak noted  Abdominal:      General: There is no distension.      Palpations: Abdomen is soft.      Tenderness: There is no abdominal tenderness.   Skin:     General: Skin is warm and dry.   Neurological:      Comments: Sedated   Psychiatric:      Comments: Depressed mood, flat affect         Vents:  Vent Mode: Spont (10/08/20 1525)  Ventilator Initiated: Yes(chart correction) (09/18/20 2202)  Set Rate: 20 BPM (10/08/20 0747)  Vt Set: 375 mL (10/08/20 0747)  Pressure Support: 10 cmH20 (10/08/20 1525)  PEEP/CPAP: 5 cmH20 (10/08/20 1525)  Oxygen Concentration (%): 40 (10/09/20 1200)  Peak Airway Pressure: 15 cmH2O (10/08/20 1525)  Plateau Pressure: 19 cmH20 (10/08/20 1525)  Total Ve: 5.3 mL (10/08/20 1525)  Negative Inspiratory Force (cm H2O): -21 (10/08/20 1452)  F/VT Ratio<105 (RSBI): (!) 70.71 (10/08/20 1258)    Lines/Drains/Airways     Central Venous Catheter Line            Percutaneous Central Line Insertion/Assessment - Triple Lumen  10/05/20 1843 left internal jugular 3 days          Drain                 Urethral Catheter 10/05/20 1824 Temperature probe 16 Fr. 3 days          Arterial Line            Arterial Line 10/05/20 1431 Right Pedal 3 days                Significant Labs:    CBC/Anemia Profile:  Recent Labs   Lab 10/08/20  0320 10/08/20  1917 10/09/20  0332   WBC 15.46*  --  11.88   HGB 9.7*  --  9.4*   HCT 30.3* 28* 30.6*   *  --  117*   MCV 92  --  94   RDW 16.4*  --  16.5*        Chemistries:  Recent Labs   Lab 10/08/20  0320 10/08/20  1642 10/09/20  0332   *  --  145   K 3.8 4.1 3.6   *  --  114*   CO2 22*  --  18*   BUN 31*  --  37*   CREATININE 1.0  --  1.0   CALCIUM 8.2*  --  7.9*   ALBUMIN 2.2*  --  1.9*   PROT 5.1*  --  4.9*   BILITOT 2.0*  --  1.3*   ALKPHOS 194*  --  281*   ALT 43  --  46*   AST 44*  --  51*   MG 2.1  --  2.0   PHOS 3.5  --  2.9       ABGs:   Recent Labs   Lab 10/08/20  1917   PH 7.432   PCO2 34.9*    HCO3 23.3*   POCSATURATED 98   BE -1     Coagulation:   Recent Labs   Lab 10/09/20  0505   APTT 45.7*     All pertinent labs within the past 24 hours have been reviewed.    Significant Imaging:  I have reviewed and interpreted all pertinent imaging results/findings within the past 24 hours.     10/09/2020 CXR  X-ray Chest 1 View    Result Date: 10/9/2020  As above Electronically signed by: Ayan Glynn MD Date:    10/09/2020 Time:    06:49

## 2020-10-09 NOTE — ASSESSMENT & PLAN NOTE
Fatou Lorenzo is a 75 y.o. female s/p MVr, TVr 9/9/2020. Case noteworthy for multiple pump runs (3) and RV hematoma due to retraction. Unstable overnight 9/18, required pericardial window for evac of posterior cardiac tamponade. Respiratory distress and so re-admitted to SICU on 10/2 now s/p VATS on 10/5.    Neuro:  - Awake and trying to interact. Expressing that she is suffering and would like to give up. Will start on lexapro today.   - Delirium precautions  - Pain control with PRN morphine      CV:  S/p MVr, TVr, MAZE 9/9/20. S/p bedside pericardial window 9/18  - Keep MAPs >60. Intermittently requires epi for low MAPs. This this time, thought to be related to sedation  - pAF: Continue digoxin 125 mcg. Currently in NSR - will restart beta blocker once consistently off epi   - Restart anti-coagulation: asa 81, hep gtt - goal 60-80     Pulm:   - loculated pleural effusion s/p VATS on 10/5  - Extubated yesterday and is currently doing well on high flow nasal cannula at 15 L  - Eugene catheter removed today  - Scheduled duo and saline nebs  - Daily CXR and ABG PRN  - HOB >30 deg    Renal:  - Yoo in place, will remove this today  - BUN/Cr 31/1 -> 37/1  - Good response to lasix yesterday. Will continue gentle diuresis today    FEN/GI:  - Net negative 1L in the last 24 hours with diuresis  - Replace other lytes as needed  - NGT was accidentally dislodged overnight. Was hoping speech would be able to evaluate her today, but unable to do so. Will need NGT replaced   - famotidine and bowel regimen    Heme/Onc:  - H/H stable 9.7/30.3 -> 9.4/30.6  - No evidence of bleeding  - hep gtt for pAF with aPTT goal of 60-80     ID:   - S/p 7 days of cefepime vanc for enterococcus cloacae UTI. Also has e coli growing from pleural cultures.  - BAL cultures felt to be colonization not infection  - Follow ID recommendations   - Needs 4 weeks course total. Recommended IV Ceftriaxone 2 mg q24 hrs while inpatient but would switch to PO  Augmentin 875 BID if discharged prior to completion of therapy.  (Est end date 11/2/20)      Endo:  - no hx of DM  - ISS    PPx:  - famotidine, SCD, hep gtt for pAF     Dispo: SICU

## 2020-10-09 NOTE — NURSING
"Pt refused to keep Comfort lokesh on, continually pulling it off, while becoming more agitated stating " she does not want the oxygen"  "kill me". O2 SATs 86-93 precedex gtt titrated for agitation/sedation/safety. Pt reassured till calm/stable. Shortly Upon re entering room pt pulled apart comfort lokesh cannula, pulled MAKENZIE tube out, and was pulling at central line. RT at bedside with new comfort flow set up. Restraints applied. CTS MD notified. Charge RN notified. Pt is safe in bed side rails up x3. O2 SAT now >95. Will continue to monitor.   "

## 2020-10-09 NOTE — PROGRESS NOTES
[facility]  Critical Care - Surgery  Progress Note    Patient Name: Fatou Lorenzo  MRN: [id number]  Admission Date: 8/30/2020  Hospital Length of Stay: 39 days  Code Status: Full Code  Attending Provider: Antoni Waddell MD  Primary Care Provider: Ludin Rivas MD   Principal Problem: Acute respiratory failure with hypoxia    Subjective:     Hospital/ICU Course:  No notes on file    Interval History/Significant Events: No significant clinical changes. Removed NGT last night and this has not yet been replaced. Per nursing reports, she seems very depressed and has been asking the nurses to kill her. She also told her son she is suffering. He is very concerned about this.     From the clinical standpoint, she is progressing although slowly. She is being weaned from comfort flow. Has a lot of mucous and noisy breathing,.    Follow-up For: Procedure(s) (LRB):  LARYNGOSCOPY, MICRO SUSPENSION WITH AUGMENTATION (N/A)  VATS, WITH DECORTICATION, LUNG (Right)    Post-Operative Day: 4 Days Post-Op    Objective:     Vital Signs (Most Recent):  Temp: 98.8 °F (37.1 °C) (10/09/20 1300)  Pulse: 88 (10/09/20 1300)  Resp: (!) 32 (10/09/20 1300)  BP: 120/66 (10/09/20 1300)  SpO2: (!) 93 % (10/09/20 1300) Vital Signs (24h Range):  Temp:  [97.3 °F (36.3 °C)-99.7 °F (37.6 °C)] 98.8 °F (37.1 °C)  Pulse:  [] 88  Resp:  [12-32] 32  SpO2:  [81 %-100 %] 93 %  BP: (105-155)/(53-84) 120/66  Arterial Line BP: (113-184)/(36-73) 173/63     Weight: 85 kg (187 lb 6.3 oz)  Body mass index is 31.18 kg/m².      Intake/Output Summary (Last 24 hours) at 10/9/2020 1322  Last data filed at 10/9/2020 1300  Gross per 24 hour   Intake 675 ml   Output 875 ml   Net -200 ml       Physical Exam  Vitals signs and nursing note reviewed.   Constitutional:       Appearance: She is well-developed. She is ill-appearing. She is not diaphoretic.   HENT:      Head: Normocephalic and atraumatic.   Eyes:      Conjunctiva/sclera: Conjunctivae  normal.   Neck:      Musculoskeletal: Normal range of motion and neck supple.   Cardiovascular:      Rate and Rhythm: Normal rate and regular rhythm.      Comments: Midline sternotomy with clean, dry, and intact, without evidence of infection  Prior chest tubes sites with dressings in place, clean and dry  Pulmonary:      Comments: Breathing comfortably on comfort flow  No distress, but rhonchi present and she has a wet cough.  Left pigtail with SS output. No air leak noted  Abdominal:      General: There is no distension.      Palpations: Abdomen is soft.      Tenderness: There is no abdominal tenderness.   Skin:     General: Skin is warm and dry.   Neurological:      Comments: Sedated   Psychiatric:      Comments: Depressed mood, flat affect         Vents:  Vent Mode: Spont (10/08/20 1525)  Ventilator Initiated: Yes(chart correction) (09/18/20 2202)  Set Rate: 20 BPM (10/08/20 0747)  Vt Set: 375 mL (10/08/20 0747)  Pressure Support: 10 cmH20 (10/08/20 1525)  PEEP/CPAP: 5 cmH20 (10/08/20 1525)  Oxygen Concentration (%): 40 (10/09/20 1200)  Peak Airway Pressure: 15 cmH2O (10/08/20 1525)  Plateau Pressure: 19 cmH20 (10/08/20 1525)  Total Ve: 5.3 mL (10/08/20 1525)  Negative Inspiratory Force (cm H2O): -21 (10/08/20 1452)  F/VT Ratio<105 (RSBI): (!) 70.71 (10/08/20 1258)    Lines/Drains/Airways     Central Venous Catheter Line            Percutaneous Central Line Insertion/Assessment - Triple Lumen  10/05/20 1843 left internal jugular 3 days          Drain                 Urethral Catheter 10/05/20 1824 Temperature probe 16 Fr. 3 days          Arterial Line            Arterial Line 10/05/20 1431 Right Pedal 3 days                Significant Labs:    CBC/Anemia Profile:  Recent Labs   Lab 10/08/20  0320 10/08/20  1917 10/09/20  0332   WBC 15.46*  --  11.88   HGB 9.7*  --  9.4*   HCT 30.3* 28* 30.6*   *  --  117*   MCV 92  --  94   RDW 16.4*  --  16.5*        Chemistries:  Recent Labs   Lab 10/08/20  0320  10/08/20  1642 10/09/20  0332   *  --  145   K 3.8 4.1 3.6   *  --  114*   CO2 22*  --  18*   BUN 31*  --  37*   CREATININE 1.0  --  1.0   CALCIUM 8.2*  --  7.9*   ALBUMIN 2.2*  --  1.9*   PROT 5.1*  --  4.9*   BILITOT 2.0*  --  1.3*   ALKPHOS 194*  --  281*   ALT 43  --  46*   AST 44*  --  51*   MG 2.1  --  2.0   PHOS 3.5  --  2.9       ABGs:   Recent Labs   Lab 10/08/20  1917   PH 7.432   PCO2 34.9*   HCO3 23.3*   POCSATURATED 98   BE -1     Coagulation:   Recent Labs   Lab 10/09/20  0505   APTT 45.7*     All pertinent labs within the past 24 hours have been reviewed.    Significant Imaging:  I have reviewed and interpreted all pertinent imaging results/findings within the past 24 hours.     10/09/2020 CXR  X-ray Chest 1 View    Result Date: 10/9/2020  As above Electronically signed by: Ayan Glynn MD Date:    10/09/2020 Time:    06:49      Assessment/Plan:     Severe mitral regurgitation  Fatou Lorenzo is a 75 y.o. female s/p MVr, TVr 9/9/2020. Case noteworthy for multiple pump runs (3) and RV hematoma due to retraction. Unstable overnight 9/18, required pericardial window for evac of posterior cardiac tamponade. Respiratory distress and so re-admitted to SICU on 10/2 now s/p VATS on 10/5.    Neuro:  - Awake and trying to interact. Expressing that she is suffering and would like to give up. Will start on lexapro today.   - Delirium precautions  - Pain control with PRN morphine      CV:  S/p MVr, TVr, MAZE 9/9/20. S/p bedside pericardial window 9/18  - Keep MAPs >60. Intermittently requires epi for low MAPs. This this time, thought to be related to sedation  - pAF: Continue digoxin 125 mcg. Currently in NSR - will restart beta blocker once consistently off epi   - Restart anti-coagulation: asa 81, hep gtt - goal 60-80     Pulm:   - loculated pleural effusion s/p VATS on 10/5  - Extubated yesterday and is currently doing well on high flow nasal cannula at 15 L  - Eugene catheter removed today  -  Scheduled duo and saline nebs  - Daily CXR and ABG PRN  - HOB >30 deg    Renal:  - Yoo in place, will remove this today  - BUN/Cr 31/1 -> 37/1  - Good response to lasix yesterday. Will continue gentle diuresis today    FEN/GI:  - Net negative 1L in the last 24 hours with diuresis  - Replace other lytes as needed  - NGT was accidentally dislodged overnight. Was hoping speech would be able to evaluate her today, but unable to do so. Will need NGT replaced   - famotidine and bowel regimen    Heme/Onc:  - H/H stable 9.7/30.3 -> 9.4/30.6  - No evidence of bleeding  - hep gtt for pAF with aPTT goal of 60-80     ID:   - S/p 7 days of cefepime vanc for enterococcus cloacae UTI. Also has e coli growing from pleural cultures.  - BAL cultures felt to be colonization not infection  - Follow ID recommendations   - Needs 4 weeks course total. Recommended IV Ceftriaxone 2 mg q24 hrs while inpatient but would switch to PO Augmentin 875 BID if discharged prior to completion of therapy.  (Est end date 11/2/20)      Endo:  - no hx of DM  - ISS    PPx:  - famotidine, SCD, hep gtt for pAF     Dispo: SICU        Critical secondary to Patient has a condition that poses threat to life and bodily function: s/p MVr, Mario, ELBA     Critical care was time spent personally by me on the following activities: development of treatment plan with patient or surrogate and bedside caregivers, discussions with consultants, evaluation of patient's response to treatment, examination of patient, ordering and performing treatments and interventions, ordering and review of laboratory studies, ordering and review of radiographic studies, pulse oximetry, re-evaluation of patient's condition.  This critical care time did not overlap with that of any other provider or involve time for any procedures.     Madhuri Ng MD  Critical Care - Surgery  [facility]

## 2020-10-10 NOTE — PLAN OF CARE
SICU PLAN OF CARE NOTE    Dx: Acute respiratory failure with hypoxia    Shift Events: No acute events over night. SAT goal maintained on comfort lokesh weaned to 15L 40%. Pt disoriented to time and situation. Pt follows commands and answers questions appropriately. Urine output >45cc/hr during most of the night, but dropped off to 30 the last 2hr, CTS MD notified, no new orders at this time. Plans remain to re-evaluate nutrition goals after speech eval this AM. Pt updated on plan of care. Will continue to monitor.    Skin: No new pressure or device injuries noted. All dressings remain clean, dry, and intact. Mid sternal incision CDI, healing well, sutures/steri-strips intact island border dsg in place. Pt on immerse bed. Pt turned Q2hr.  No redness/breakdown noted to heels or sacrum, foams applied. SCD to left lower extremity . Foot drop boot rotated Q2hr.

## 2020-10-10 NOTE — PROGRESS NOTES
CT surgery progress note     COSME overnight      A/P  75F s/p MV repair, TV repair, and MAZE w/ ALISA resection on 09/09/20. Post op course complicated by a multiloculated R pleural effusion. Now POD5 from R VATS w/ decortication. CXR stable w/ RLL effusion      continue heparin gtt, pTT goal 60-80  Monitor dig levels  Lopressor 25 BID  IV abx for E. Coli in pleural fluid  SLP eval  Supportive care  Delirium precautions  Increase activity   Cont ICU

## 2020-10-10 NOTE — PROGRESS NOTES
[facility]  Critical Care - Surgery  Progress Note    Patient Name: Fatou Lorenzo  MRN: [id number]  Admission Date: 8/30/2020  Hospital Length of Stay: 40 days  Code Status: Full Code  Attending Provider: Antoni Waddell MD  Primary Care Provider: Ludin Rivas MD   Principal Problem: Acute respiratory failure with hypoxia    Subjective:     Hospital/ICU Course:  No notes on file    Interval History/Significant Events: No acute events. Remains in NSR. Still feeling very depressed and this morning is telling me she is suffering. Still does not have much of a voice. Remains on 15L comfort flow.    Follow-up For: Procedure(s) (LRB):  LARYNGOSCOPY, MICRO SUSPENSION WITH AUGMENTATION (N/A)  VATS, WITH DECORTICATION, LUNG (Right)    Post-Operative Day: 5 Days Post-Op    Objective:     Vital Signs (Most Recent):  Temp: 98.4 °F (36.9 °C) (10/10/20 1000)  Pulse: 94 (10/10/20 1000)  Resp: (!) 24 (10/10/20 1000)  BP: (!) 156/71 (10/10/20 1000)  SpO2: 100 % (10/10/20 1000) Vital Signs (24h Range):  Temp:  [98.4 °F (36.9 °C)-99.1 °F (37.3 °C)] 98.4 °F (36.9 °C)  Pulse:  [] 94  Resp:  [13-39] 24  SpO2:  [81 %-100 %] 100 %  BP: (120-159)/(56-82) 156/71  Arterial Line BP: (115-196)/(46-73) 153/60     Weight: 85 kg (187 lb 6.3 oz)  Body mass index is 31.18 kg/m².      Intake/Output Summary (Last 24 hours) at 10/10/2020 1108  Last data filed at 10/10/2020 1000  Gross per 24 hour   Intake 498.45 ml   Output 980 ml   Net -481.55 ml       Physical Exam  Vitals signs and nursing note reviewed.   Constitutional:       Appearance: She is well-developed. She is ill-appearing. She is not diaphoretic.   HENT:      Head: Normocephalic and atraumatic.   Eyes:      Conjunctiva/sclera: Conjunctivae normal.   Neck:      Musculoskeletal: Normal range of motion and neck supple.   Cardiovascular:      Rate and Rhythm: Normal rate and regular rhythm.      Comments: Midline sternotomy with clean, dry, and intact, without  evidence of infection  Prior chest tubes sites with dressings in place, clean and dry  Pulmonary:      Comments: Breathing comfortably on comfort flow  No distress, but rhonchi present and she has a wet cough.  Abdominal:      General: There is no distension.      Palpations: Abdomen is soft.      Tenderness: There is no abdominal tenderness.   Skin:     General: Skin is warm and dry.   Psychiatric:      Comments: Depressed mood, flat affect         Vents:  Vent Mode: Spont (10/08/20 1525)  Ventilator Initiated: Yes(chart correction) (09/18/20 2202)  Set Rate: 20 BPM (10/08/20 0747)  Vt Set: 375 mL (10/08/20 0747)  Pressure Support: 10 cmH20 (10/08/20 1525)  PEEP/CPAP: 5 cmH20 (10/08/20 1525)  Oxygen Concentration (%): 40 (10/10/20 1000)  Peak Airway Pressure: 15 cmH2O (10/08/20 1525)  Plateau Pressure: 19 cmH20 (10/08/20 1525)  Total Ve: 5.3 mL (10/08/20 1525)  Negative Inspiratory Force (cm H2O): -21 (10/08/20 1452)  F/VT Ratio<105 (RSBI): (!) 70.71 (10/08/20 1258)    Lines/Drains/Airways     Central Venous Catheter Line            Percutaneous Central Line Insertion/Assessment - Triple Lumen  10/05/20 1843 left internal jugular 4 days          Drain                 Urethral Catheter 10/05/20 1824 Temperature probe 16 Fr. 4 days          Arterial Line            Arterial Line 10/05/20 1431 Right Pedal 4 days                Significant Labs:    CBC/Anemia Profile:  Recent Labs   Lab 10/08/20  1917 10/09/20  0332 10/10/20  0411   WBC  --  11.88 17.67*  17.67*   HGB  --  9.4* 9.4*  9.4*   HCT 28* 30.6* 30.7*  30.7*   PLT  --  117* 144*  144*   MCV  --  94 95  95   RDW  --  16.5* 16.7*  16.7*        Chemistries:  Recent Labs   Lab 10/08/20  1642 10/09/20  0332 10/10/20  0411   NA  --  145 147*   K 4.1 3.6 3.8   CL  --  114* 117*   CO2  --  18* 18*   BUN  --  37* 38*   CREATININE  --  1.0 1.0   CALCIUM  --  7.9* 8.1*   ALBUMIN  --  1.9* 1.9*   PROT  --  4.9* 5.2*   BILITOT  --  1.3* 1.2*   ALKPHOS  --  281* 294*    ALT  --  46* 44   AST  --  51* 47*   MG  --  2.0 2.1   PHOS  --  2.9 3.1       ABGs:   Recent Labs   Lab 10/08/20  1917   PH 7.432   PCO2 34.9*   HCO3 23.3*   POCSATURATED 98   BE -1     Coagulation:   Recent Labs   Lab 10/10/20  0411   APTT 56.5*     All pertinent labs within the past 24 hours have been reviewed.    Significant Imaging:  I have reviewed and interpreted all pertinent imaging results/findings within the past 24 hours.     10/10/2020 CXR  X-ray Chest Ap Portable    Result Date: 10/10/2020  As above. Electronically signed by: Jai Pérez MD Date:    10/10/2020 Time:    08:27  .    Assessment/Plan:     Severe mitral regurgitation  Fatou Lorenzo is a 75 y.o. female s/p MVr, TVr 9/9/2020. Case noteworthy for multiple pump runs (3) and RV hematoma due to retraction. Unstable overnight 9/18, required pericardial window for evac of posterior cardiac tamponade. Respiratory distress and so re-admitted to SICU on 10/2 now s/p VATS on 10/5.    Neuro:  - Awake and trying to interact. Expressing that she is suffering and would like to give up. Would like to start on an anti-depressant, but she is unable to take NPO and does not want an NGT at this time  - Delirium precautions  - Pain control with PRN morphine      CV:  S/p MVr, TVr, MAZE 9/9/20. S/p bedside pericardial window 9/18  - Keep MAPs >60. Intermittently requires epi for low MAPs. This this time, thought to be related to sedation  - pAF: digoxin 125 mcg, start metop 2.5 mg IV q6hrs   - Anti-coagulation: asa 81, hep gtt - goal 60-80  - labetalol IV PRN for SBP > 60     Pulm:   - Loculated pleural effusion s/p VATS on 10/5  - Extubated 10/9. Breathing comfortably on 15 L  - Scheduled duo and saline nebs  - Daily CXR and ABG PRN  - HOB >30 deg    Renal:  - Remove vivas  - BUN/Cr 37/1 -> 38/1  - Good response to diuresis. UOP dropping off but has good renal function. Will hold off on further diuresis today    FEN/GI:  - Net negative just over 500 cc  yesterday  - Replace other lytes as needed  - Currently does not have enteral access, but she is telling me she is suffering. Deferring NGT for now  - Famotidine and bowel regimen    Heme/Onc:  - H/H stable 9.4/30.6 -> 9.4/30.7  - No evidence of bleeding  - hep gtt for pAF with aPTT goal of 60-80     ID:   - S/p 7 days of cefepime vanc for enterococcus cloacae UTI. Also has e coli growing from pleural cultures.  - BAL cultures felt to be colonization not infection  - Follow ID recommendations   - Needs 4 weeks course total. Recommended IV Ceftriaxone 2 mg q24 hrs while inpatient but would switch to PO Augmentin 875 BID if discharged prior to completion of therapy.  (Est end date 11/2/20)      Endo:  - no hx of DM  - ISS    PPx:  - famotidine, SCD, hep gtt for pAF     Dispo: SICU      Critical secondary to Patient has a condition that poses threat to life and bodily function: s/p MVr, TVr, ELBA     Critical care was time spent personally by me on the following activities: development of treatment plan with patient or surrogate and bedside caregivers, discussions with consultants, evaluation of patient's response to treatment, examination of patient, ordering and performing treatments and interventions, ordering and review of laboratory studies, ordering and review of radiographic studies, pulse oximetry, re-evaluation of patient's condition.  This critical care time did not overlap with that of any other provider or involve time for any procedures.     Madhuri Ng MD  Critical Care - Surgery  [facility]

## 2020-10-10 NOTE — SUBJECTIVE & OBJECTIVE
Interval History/Significant Events: No acute events. Remains in NSR. Still feeling very depressed and this morning is telling me she is suffering. Still does not have much of a voice. Remains on 15L comfort flow.    Follow-up For: Procedure(s) (LRB):  LARYNGOSCOPY, MICRO SUSPENSION WITH AUGMENTATION (N/A)  VATS, WITH DECORTICATION, LUNG (Right)    Post-Operative Day: 5 Days Post-Op    Objective:     Vital Signs (Most Recent):  Temp: 98.4 °F (36.9 °C) (10/10/20 1000)  Pulse: 94 (10/10/20 1000)  Resp: (!) 24 (10/10/20 1000)  BP: (!) 156/71 (10/10/20 1000)  SpO2: 100 % (10/10/20 1000) Vital Signs (24h Range):  Temp:  [98.4 °F (36.9 °C)-99.1 °F (37.3 °C)] 98.4 °F (36.9 °C)  Pulse:  [] 94  Resp:  [13-39] 24  SpO2:  [81 %-100 %] 100 %  BP: (120-159)/(56-82) 156/71  Arterial Line BP: (115-196)/(46-73) 153/60     Weight: 85 kg (187 lb 6.3 oz)  Body mass index is 31.18 kg/m².      Intake/Output Summary (Last 24 hours) at 10/10/2020 1108  Last data filed at 10/10/2020 1000  Gross per 24 hour   Intake 498.45 ml   Output 980 ml   Net -481.55 ml       Physical Exam  Vitals signs and nursing note reviewed.   Constitutional:       Appearance: She is well-developed. She is ill-appearing. She is not diaphoretic.   HENT:      Head: Normocephalic and atraumatic.   Eyes:      Conjunctiva/sclera: Conjunctivae normal.   Neck:      Musculoskeletal: Normal range of motion and neck supple.   Cardiovascular:      Rate and Rhythm: Normal rate and regular rhythm.      Comments: Midline sternotomy with clean, dry, and intact, without evidence of infection  Prior chest tubes sites with dressings in place, clean and dry  Pulmonary:      Comments: Breathing comfortably on comfort flow  No distress, but rhonchi present and she has a wet cough.  Abdominal:      General: There is no distension.      Palpations: Abdomen is soft.      Tenderness: There is no abdominal tenderness.   Skin:     General: Skin is warm and dry.   Psychiatric:       Comments: Depressed mood, flat affect         Vents:  Vent Mode: Spont (10/08/20 1525)  Ventilator Initiated: Yes(chart correction) (09/18/20 2202)  Set Rate: 20 BPM (10/08/20 0747)  Vt Set: 375 mL (10/08/20 0747)  Pressure Support: 10 cmH20 (10/08/20 1525)  PEEP/CPAP: 5 cmH20 (10/08/20 1525)  Oxygen Concentration (%): 40 (10/10/20 1000)  Peak Airway Pressure: 15 cmH2O (10/08/20 1525)  Plateau Pressure: 19 cmH20 (10/08/20 1525)  Total Ve: 5.3 mL (10/08/20 1525)  Negative Inspiratory Force (cm H2O): -21 (10/08/20 1452)  F/VT Ratio<105 (RSBI): (!) 70.71 (10/08/20 1258)    Lines/Drains/Airways     Central Venous Catheter Line            Percutaneous Central Line Insertion/Assessment - Triple Lumen  10/05/20 1843 left internal jugular 4 days          Drain                 Urethral Catheter 10/05/20 1824 Temperature probe 16 Fr. 4 days          Arterial Line            Arterial Line 10/05/20 1431 Right Pedal 4 days                Significant Labs:    CBC/Anemia Profile:  Recent Labs   Lab 10/08/20  1917 10/09/20  0332 10/10/20  0411   WBC  --  11.88 17.67*  17.67*   HGB  --  9.4* 9.4*  9.4*   HCT 28* 30.6* 30.7*  30.7*   PLT  --  117* 144*  144*   MCV  --  94 95  95   RDW  --  16.5* 16.7*  16.7*        Chemistries:  Recent Labs   Lab 10/08/20  1642 10/09/20  0332 10/10/20  0411   NA  --  145 147*   K 4.1 3.6 3.8   CL  --  114* 117*   CO2  --  18* 18*   BUN  --  37* 38*   CREATININE  --  1.0 1.0   CALCIUM  --  7.9* 8.1*   ALBUMIN  --  1.9* 1.9*   PROT  --  4.9* 5.2*   BILITOT  --  1.3* 1.2*   ALKPHOS  --  281* 294*   ALT  --  46* 44   AST  --  51* 47*   MG  --  2.0 2.1   PHOS  --  2.9 3.1       ABGs:   Recent Labs   Lab 10/08/20  1917   PH 7.432   PCO2 34.9*   HCO3 23.3*   POCSATURATED 98   BE -1     Coagulation:   Recent Labs   Lab 10/10/20  0411   APTT 56.5*     All pertinent labs within the past 24 hours have been reviewed.    Significant Imaging:  I have reviewed and interpreted all pertinent imaging  results/findings within the past 24 hours.     10/10/2020 CXR  X-ray Chest Ap Portable    Result Date: 10/10/2020  As above. Electronically signed by: Jai Pérez MD Date:    10/10/2020 Time:    08:27  .

## 2020-10-10 NOTE — PT/OT/SLP EVAL
Speech Language Pathology Evaluation  Bedside Swallow    Patient Name:  Fatou Lorenzo   MRN:  [id number]  Admitting Diagnosis: Acute respiratory failure with hypoxia    Recommendations:                General Recommendations:  ongoing swallow assessment   Diet recommendations:  NPO, NPO   Aspiration Precautions: Consider alternate means of nutrition, Frequent oral care, Ice chips sparingly and Strict aspiration precautions   General Precautions: Standard, fall, NPO, aspiration    History:     Past Medical History:   Diagnosis Date    Atrial fibrillation with RVR 8/24/2020    Cataract     Essential hypertension 8/31/2020    Glaucoma     Heart valve problem     Hyperlipidemia     Severe mitral regurgitation 8/24/2020       Past Surgical History:   Procedure Laterality Date    ANKLE SURGERY      BAIN MAZE PROCEDURE N/A 9/9/2020    Procedure: BAIN MAZE PROCEDURE;  Surgeon: Antoni Waddell MD;  Location: [facility] OR [address] [place] [address];  Service: Cardiothoracic;  Laterality: N/A;  MAZE     LARYNGOSCOPY N/A 10/5/2020    Procedure: LARYNGOSCOPY, MICRO SUSPENSION WITH AUGMENTATION;  Surgeon: Yfn Mendoza MD;  Location: [address];  Service: ENT;  Laterality: N/A;    LEFT HEART CATHETERIZATION Left 8/25/2020    Procedure: Left heart cath, radial;  Surgeon: Marlee Carrillo MD;  Location: [facility] CATH LAB;  Service: Cardiology;  Laterality: Left;    lipoma removal      THORACOSCOPIC DECORTICATION OF LUNG Right 10/5/2020    Procedure: VATS, WITH DECORTICATION, LUNG;  Surgeon: Jeremy Shine MD;  Location: [address];  Service: Thoracic;  Laterality: Right;    TRICUSPID VALVULOPLASTY N/A 9/9/2020    Procedure: REPAIR, TRICUSPID VALVE;  Surgeon: Antoni Waddell MD;  Location: [address];  Service: Cardiothoracic;  Laterality: N/A;       Prior Intubation HX:  Extubated 10/8, was also intubated 9/18-9/23       Chest X-Rays: 10/10 Left IJ catheter terminates in the SVC/right atrium.  Left-sided chest tube  "has been removed.  Moderate right, small left pleural effusions, similar to prior study.  Bibasilar atelectasis or consolidation noted.  Perihilar interstitial prominence noted.  There is resolution of left mid lung zone consolidation.  No pneumothorax.  Cardiac silhouette is enlarged, postoperative changes noted.    Pt was previously being followed by  service for vocal exercises for severe dysphonia, R VF paresis. As of 10/1, pt was last seen by ST service & was on a regular diet & receiving voice therapy, prior to intubation.     Subjective   Awake, extremely weak.     Pain/Comfort:  · Pain Rating 1: (no rating or location provided, pt reports "always in pain")  · Pain Rating Post-Intervention 2: (same)    Objective:     Oral Musculature Evaluation  · Oral Musculature: general weakness  · Dentition: present and adequate  · Secretion Management: adequate  · Mucosal Quality: dry  · Mandibular Strength and Mobility: WFL  · Oral Labial Strength and Mobility: WFL  · Lingual Strength and Mobility: WFL  · Velar Elevation: WFL  · Buccal Strength and Mobility: WFL  · Volitional Cough: unable to produce adequate cough, very weak  · Volitional Swallow: fair  · Voice Prior to PO Intake: breathy, weak, low intensity    Bedside Swallow Eval:   Consistencies Assessed:  · Thin liquids ice chips x3, thin via spoon x2  · Puree 1/2 tsp x1     Oral Phase:   · WFL    Pharyngeal Phase:   · Weak delayed coughing following puree   · delayed swallow initiation with all trials   · multiple spontaneous swallows with all trials    SLP educated pt on all recs, precautions, risks & s/s aspiration, role of SLP, POC, etc. Pt verbalized understanding yet may require reinforcement to follow.       Assessment:     Fatou Lorenzo is a 75 y.o. female with an SLP diagnosis of Dysphagia and Dysphonia.  She presents with risk of aspiration.    Goals:   Multidisciplinary Problems     SLP Goals        Problem: SLP Goal    Goal Priority Disciplines " Outcome   SLP Goal     SLP Ongoing, Progressing   Description: Speech Language Pathology Goals  Goals expected to be met by 10/17  1. Ongoing swallow assessment                    Plan:     · Patient to be seen:  4 x/week   · Plan of Care expires:  11/08/20  · Plan of Care reviewed with:  patient   · SLP Follow-Up:  Yes       Discharge recommendations:  (tbd)     Time Tracking:     SLP Treatment Date:   10/10/20  Speech Start Time:  1300  Speech Stop Time:  1316     Speech Total Time (min):  16 min    Billable Minutes: Eval Swallow and Oral Function 8 and Seld Care/Home Management Training 8    Claudia Keen CCC-SLP  10/10/2020

## 2020-10-10 NOTE — PLAN OF CARE
"      SICU PLAN OF CARE NOTE    Dx: Acute respiratory failure with hypoxia    Shift Events: COSME. Pt failed swallow study and refused MAKENZIE tube placement to Dr. Ng. Psych consulted.     Neuro: Follows Commands, Moves All Extremities and Confused    Vital Signs: BP (!) 148/67 (BP Location: Right arm, Patient Position: Lying)   Pulse 77   Temp 98.2 °F (36.8 °C) (Core Bladder)   Resp 14   Ht 5' 5" (1.651 m)   Wt 85 kg (187 lb 6.3 oz)   SpO2 100%   Breastfeeding No   BMI 31.18 kg/m²     Respiratory: Nasal Cannula    Diet: NPO    Gtts: Heparin    Urine Output: Urinary Catheter 375 cc/shift    Labs/Accuchecks: checked per order. See flowsheet.    Skin: Midsternal incision with steri strips and sutures covered with island border. Old CT sites with gauze and a tegaderm. Bottom intact. Weight shift assistance provided. Foams applied to pressure points. Pt on Immerse bed.       "

## 2020-10-10 NOTE — PLAN OF CARE
Problem: SLP Goal  Goal: SLP Goal  Description: Speech Language Pathology Goals  Goals expected to be met by 10/17  1. Ongoing swallow assessment   Outcome: Ongoing, Progressing    Pt was seen today for ST session.

## 2020-10-10 NOTE — ASSESSMENT & PLAN NOTE
Fatou Lorenzo is a 75 y.o. female s/p MVr, TVr 9/9/2020. Case noteworthy for multiple pump runs (3) and RV hematoma due to retraction. Unstable overnight 9/18, required pericardial window for evac of posterior cardiac tamponade. Respiratory distress and so re-admitted to SICU on 10/2 now s/p VATS on 10/5.    Neuro:  - Awake and trying to interact. Expressing that she is suffering and would like to give up. Would like to start on an anti-depressant, but she is unable to take NPO and does not want an NGT at this time  - Delirium precautions  - Pain control with PRN morphine      CV:  S/p MVr, TVr, MAZE 9/9/20. S/p bedside pericardial window 9/18  - Keep MAPs >60. Intermittently requires epi for low MAPs. This this time, thought to be related to sedation  - pAF: digoxin 125 mcg, start metop 2.5 mg IV q6hrs   - Anti-coagulation: asa 81, hep gtt - goal 60-80  - labetalol IV PRN for SBP > 60     Pulm:   - Loculated pleural effusion s/p VATS on 10/5  - Extubated 10/9. Breathing comfortably on 15 L  - Scheduled duo and saline nebs  - Daily CXR and ABG PRN  - HOB >30 deg    Renal:  - Remove vivas  - BUN/Cr 37/1 -> 38/1  - Good response to diuresis. UOP dropping off but has good renal function. Will hold off on further diuresis today    FEN/GI:  - Net negative just over 500 cc yesterday  - Replace other lytes as needed  - Currently does not have enteral access, but she is telling me she is suffering. Deferring NGT for now  - Famotidine and bowel regimen    Heme/Onc:  - H/H stable 9.4/30.6 -> 9.4/30.7  - No evidence of bleeding  - hep gtt for pAF with aPTT goal of 60-80     ID:   - S/p 7 days of cefepime vanc for enterococcus cloacae UTI. Also has e coli growing from pleural cultures.  - BAL cultures felt to be colonization not infection  - Follow ID recommendations   - Needs 4 weeks course total. Recommended IV Ceftriaxone 2 mg q24 hrs while inpatient but would switch to PO Augmentin 875 BID if discharged prior to completion  of therapy.  (Est end date 11/2/20)      Endo:  - no hx of DM  - ISS    PPx:  - famotidine, SCD, hep gtt for pAF     Dispo: SICU

## 2020-10-11 NOTE — PLAN OF CARE
NAEON. VSS. Patient afebrile. NSR. Maintaining MAP > 65 and SBP < 180.   Sats 100% on 6 L HF NC.   CVP 9.     Neuro: PERRLA, oriented x 4, follows commands, and moves extremities.     Gtts:  Heparin @ 1000 units/hr     Purewick in place to continuous suction 60 mmHg.     Skin: Island border dressing removed from midline sternal incision, and painted with iodine. No skin breakdown noted to sacrum, heels, or elbows. Foams and heel offloading device in place. Patient turned q2h to prevent skin breakdown.

## 2020-10-11 NOTE — PLAN OF CARE
"      SICU PLAN OF CARE NOTE    Dx: Acute respiratory failure with hypoxia    Shift Events: Sunshine therapy this AM and pt remained in cardiac chair x4 hours. MAKENZIE tube inserted per Dr. Ng and placement verified per X-ray. Tube feedings restarted and pt tolerating well. Psychiatry added a new psych medicine in conjunction with the Lexapro.     Neuro: AAO x4, Follows Commands and Moves All Extremities    Vital Signs: BP (!) 142/63 (BP Location: Right arm, Patient Position: Lying)   Pulse 77   Temp 97.8 °F (36.6 °C) (Oral)   Resp 16   Ht 5' 5" (1.651 m)   Wt 89 kg (196 lb 3.4 oz)   SpO2 100%   Breastfeeding No   BMI 32.65 kg/m²     Respiratory: Room Air    Diet: Tube Feeds    Gtts: Heparin    Urine Output: Urinary Catheter 295 cc/shift     Drains: TF infusing through makenzie tube of the R nare @ 20cc/hr     Labs/Accuchecks: checked per MD order. See flowsheet.    Skin: Mid-sternal incision open to air with sutures and steri strips. Foams on pressure points and sacrum. Weight shift assistance provided. Pt on Immerse bed.       "

## 2020-10-11 NOTE — SUBJECTIVE & OBJECTIVE
Interval History/Significant Events: Feeling very depressed yesterday and was asking to be let go. Did not want to go outside or have ngt placed. Looked at photos with nurse yesterday which lifted her spirits. Went outside and was amenable to kevin placement today.     Psych visited her as well and recommended mirtazepine    Follow-up For: Procedure(s) (LRB):  LARYNGOSCOPY, MICRO SUSPENSION WITH AUGMENTATION (N/A)  VATS, WITH DECORTICATION, LUNG (Right)    Post-Operative Day: 6 Days Post-Op    Objective:     Vital Signs (Most Recent):  Temp: 97.8 °F (36.6 °C) (10/11/20 1500)  Pulse: 75 (10/11/20 1847)  Resp: (!) 24 (10/11/20 1847)  BP: (!) 144/59 (10/11/20 1800)  SpO2: 99 % (10/11/20 1847) Vital Signs (24h Range):  Temp:  [97.6 °F (36.4 °C)-98.4 °F (36.9 °C)] 97.8 °F (36.6 °C)  Pulse:  [] 75  Resp:  [11-36] 24  SpO2:  [93 %-100 %] 99 %  BP: (129-175)/(56-74) 144/59     Weight: 89 kg (196 lb 3.4 oz)  Body mass index is 32.65 kg/m².      Intake/Output Summary (Last 24 hours) at 10/11/2020 1855  Last data filed at 10/11/2020 1800  Gross per 24 hour   Intake 2094 ml   Output 640 ml   Net 1454 ml       Physical Exam  Vitals signs and nursing note reviewed.   Constitutional:       Appearance: She is well-developed. She is ill-appearing. She is not diaphoretic.   HENT:      Head: Normocephalic and atraumatic.   Eyes:      Conjunctiva/sclera: Conjunctivae normal.   Neck:      Musculoskeletal: Normal range of motion and neck supple.   Cardiovascular:      Rate and Rhythm: Normal rate and regular rhythm.      Comments: Midline sternotomy with clean, dry, and intact, without evidence of infection  Prior chest tubes sites with dressings in place, clean and dry  Pulmonary:      Comments: Breathing comfortably on comfort flow  No distress, but rhonchi present and she has a wet cough.  Abdominal:      General: There is no distension.      Palpations: Abdomen is soft.      Tenderness: There is no abdominal tenderness.   Skin:      General: Skin is warm and dry.   Psychiatric:      Comments: Depressed mood, flat affect         Vents:  Vent Mode: Spont (10/08/20 1525)  Ventilator Initiated: Yes(chart correction) (09/18/20 2202)  Set Rate: 20 BPM (10/08/20 0747)  Vt Set: 375 mL (10/08/20 0747)  Pressure Support: 10 cmH20 (10/08/20 1525)  PEEP/CPAP: 5 cmH20 (10/08/20 1525)  Oxygen Concentration (%): 40 (10/10/20 1100)  Peak Airway Pressure: 15 cmH2O (10/08/20 1525)  Plateau Pressure: 19 cmH20 (10/08/20 1525)  Total Ve: 5.3 mL (10/08/20 1525)  Negative Inspiratory Force (cm H2O): -21 (10/08/20 1452)  F/VT Ratio<105 (RSBI): (!) 70.71 (10/08/20 1258)    Lines/Drains/Airways     Central Venous Catheter Line            Percutaneous Central Line Insertion/Assessment - Triple Lumen  10/05/20 1843 left internal jugular 6 days          Drain                 NG/OG Tube 10/11/20 1300 Right nostril less than 1 day         Urethral Catheter 10/11/20 0550 16 Fr. less than 1 day                Significant Labs:    CBC/Anemia Profile:  Recent Labs   Lab 10/10/20  0411 10/11/20  0415   WBC 17.67*  17.67* 18.86*   HGB 9.4*  9.4* 9.2*   HCT 30.7*  30.7* 30.8*   *  144* 174   MCV 95  95 97   RDW 16.7*  16.7* 17.2*        Chemistries:  Recent Labs   Lab 10/10/20  0411 10/11/20  0415   * 147*   K 3.8 3.7   * 115*   CO2 18* 23   BUN 38* 43*   CREATININE 1.0 1.1   CALCIUM 8.1* 8.3*   ALBUMIN 1.9* 2.0*   PROT 5.2* 5.3*   BILITOT 1.2* 1.0   ALKPHOS 294* 272*   ALT 44 43   AST 47* 37   MG 2.1 2.1   PHOS 3.1 3.7       Coagulation:   Recent Labs   Lab 10/11/20  0415   APTT 74.3*     All pertinent labs within the past 24 hours have been reviewed.    Significant Imaging:  I have reviewed and interpreted all pertinent imaging results/findings within the past 24 hours.

## 2020-10-11 NOTE — PROGRESS NOTES
[facility]  Psychiatry  Progress Note    Patient Name: Fatou Lorenzo  MRN: [id number]   Code Status: Full Code  Admission Date: 8/30/2020  Hospital Length of Stay: 41 days  Expected Discharge Date: 10/13/2020  Attending Physician: Antoni Waddell MD  Primary Care Provider: Ludin Rivas MD    Current Legal Status: Uncontested    Patient information was obtained from patient and ER records.       Subjective:     Patient is a 75 y.o., female, presents with:    Principal Problem:Acute respiratory failure with hypoxia    Chief Complaint: Depression    HPI: Fatou Lorenzo is a 75 y.o. female with a past psychiatric history of anxiety who presented to [facility] due to Acute respiratory failure with hypoxia. Psychiatry was consulted to address the patient's symptoms of depression.     Per Primary MD:  Fatou Lorenzo is a 75 y.o. female that has a past medical history of Atrial fibrillation with RVR, Cataract, Glaucoma, Heart valve problem, Hyperlipidemia, and Severe mitral regurgitation.  has a past surgical history that includes Ankle surgery; lipoma removal; and Left heart catheterization (Left, 8/25/2020). Presents to [facility] complaining of recurrent shortness of breath.  Patient states it feels like she is having heart failure again.  She was discharged presumably on August 27th (no discharge summary available at this time), 4 days ago for acute respiratory failure with hypoxia secondary to CHF exacerbation.  The patient is typically followed at [place].  Reports 8 years ago was referred to Dr. Waddell, but valvular regurgitation not bad enough at that time to do surgery.  However see has severe tricuspid and mitral valve disease which was confirmed during her previous admission by echocardiogram.  She is having dyspnea with exertion, shortness of breath at rest, and orthopnea.  Worsened with exertion.      Patient now s/p open mitral valve  repair, TC valve repair on 9/9/2020. Patient has had multiple re-intubations for hypoxemic respiratory failure resulting in severe hoarseness and some dysphagia. Patient currently being seen by PT/OT/PM&R for weakness, impaired balance, impaired endurance, impaired cardiopulmonary response to activity, impaired self care skills, decreased coordination, impaired functional mobilty, decreased lower extremity function, gait instability. Per primary team, patient has largely had minimal improvement with rehab to this point and has been very anxious lately and refusing some treatment options. CM spoke with son recently who voiced that patient sounds confused on phone.      Per C-L Psych MD:  When seen today, patient calm and cooperative with interview. AAOx4, son at bedside provides additional collateral. Low volume with some hoarseness noted but able to fully participate in interview. Endorses extensive cardiovascular course over the last few months/years, prior to current hospital presentation. Now endorses having been in a hosptial seting for past few weeks. During this time, endorses worsening depressive symptoms including constant low mood, anhedonia, poor sleep, low energy level, poor appetite, and hopelessness. Endorses worsening hopelessness and feeling like life is not worth living anymore, but denies any active suicidal plan or intent. Denies any previous suicide attempts or past psychiatric admissions. States she has felt like this in the past and was previously started by PCP on Lexapro (unknown dosage). Endorses it being effective and was on it for 5 years, before stopping it 2 years ago. Endorses symptoms of generalized anxiety disorder including easy fatiguability, sleep disturbances, restlessness, and irritability. Endorses having flashbacks, nightmares and hypervigilance related to initially resuscitation attempts made in the past. Denies any AVH or other hallucinations.. Patient does not demonstrate  paranoia, delusions, disorganized thinking or disorganized behavior. Denies any HI. Denies any symptoms of stefany now or in the past. States now the plan is to hopefully attend rehab on Monday which she is optimistic about.    Collateral:   Son at bedside, provides collateral integrated above    Psychiatric Review of Systems-is patient experiencing or having changes in  sleep: yes, poor  appetite: yes, decreased  weight: yes, loss  energy/anergy: yes  interest/pleasure/anhedonia: yes  somatic symptoms: no  anxiety/panic: yes  guilty/hopelessness: yes  concentration: no  S.I.B.s/risky behavior: no  any drugs: no  alcohol: no     Past Psychiatric History:  Previous Medication Trials: yes, lexapro  Previous Psychiatric Hospitalizations: no   Previous Suicide Attempts: no   History of Violence: no  Outpatient Psychiatrist: no    Social History:  Marital Status:   Children: 3   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  History of phys/sexual abuse: unknown  Access to gun: no    Substance Abuse History:  Recreational Drugs: denies  Use of Alcohol: occasional, social use  Rehab History: no   Tobacco Use: no    Legal History:  Past Charges/Incarcerations: no   Pending charges: no     Family Psychiatric History:   Denies    Psychosocial Stressors: health  Functioning Relationships: good support system and good relationship with children        Hospital Course: 10/3  This morning the patient is seen lying up right in bed and is suctioning saliva from her mouth with her son at bedside rubbing her feet. Due to her vocal cord swelling she is mostly unable to speak; she tries to whisper to get words out or just mouth words, so her interview is limited mostly to yes/no questions. She is able to nod that she is feeling sad, depressed, and anxious. She nods yes to feelings of hopelessness and wishing she would not be here  "anymore. When asked if she wants to kill herself, she adamantly shakes her head no. She shakes her head no when asking about AVH. When asked if her depression, anxiety, and hopelessness are largely related to being in the hospital, she nods yes. She has not been sleeping well at night and has poor PO intake as well due to her dysphagia. She is amenable to trying mirtazapine for mood and sleep.    10/5/20  Chart reviewed. VSS. No acute events overnight. No psychiatric PRNs required. Since last seen, patient has not been started on mirtazapine. When seen today, no distress noted, patient agreeable and cooperative with interview, but anxious due to scheduled procedure 1 hour after interview. Interview somewhat limited due to speech difficulties. Patient states she is feeling "bad" this morning. Patient reports sleeping yesterday after having received PRN benedryl. Endorses appetite but states she has been kept NPO due to planned provedure. No somatic complaints. Denies any suicidal ideation, but continues to endorse hopelessness, low mood and anxiety.     10/11/  Patient greeted at bedside.  Agreeable to interview.  States that her mood is "good."  States that appetite is diminished from baseline, no concerns regarding sleeping.  She affirms that she had thoughts about death earlier, but denies any current desire to die.  Denies SI/HI/AVH at this time.  States that issues with anxiety persist.  She states that she was looking forward to going outside later today.        Family History     Problem Relation (Age of Onset)    Cancer Mother    Cataracts Sister    No Known Problems Father, Brother, Maternal Aunt, Maternal Uncle, Paternal Aunt, Paternal Uncle, Maternal Grandmother, Maternal Grandfather, Paternal Grandmother, Paternal Grandfather        Tobacco Use    Smoking status: Never Smoker    Smokeless tobacco: Never Used   Substance and Sexual Activity    Alcohol use: No    Drug use: No    Sexual activity: Not " "Currently     Psychotherapeutics (From admission, onward)    Start     Stop Route Frequency Ordered    10/10/20 0900  escitalopram oxalate 5 mg/5 mL solution 10 mg      -- Oral Daily 10/09/20 1830             Objective:     Vital Signs (Most Recent):  Temp: 98.4 °F (36.9 °C) (10/11/20 0700)  Pulse: 77 (10/11/20 1000)  Resp: 13 (10/11/20 1000)  BP: 135/62 (10/11/20 1000)  SpO2: 100 % (10/11/20 1000) Vital Signs (24h Range):  Temp:  [97.6 °F (36.4 °C)-98.4 °F (36.9 °C)] 98.4 °F (36.9 °C)  Pulse:  [] 77  Resp:  [11-27] 13  SpO2:  [93 %-100 %] 100 %  BP: (129-175)/(56-74) 135/62  Arterial Line BP: (170-175)/(58-65) 170/58     Height: 5' 5" (165.1 cm)  Weight: 89 kg (196 lb 3.4 oz)  Body mass index is 32.65 kg/m².      Intake/Output Summary (Last 24 hours) at 10/11/2020 1019  Last data filed at 10/11/2020 1000  Gross per 24 hour   Intake 1038 ml   Output 705 ml   Net 333 ml       Physical Exam      Mental Status Exam:  Appearance: lying in bed, fatigued, in hospital gown  MSK: no abnormal involuntary movements  Level of Consciousness: alert, awake  Behavior/Cooperation: normal, cooperative  Psychomotor: unremarkable and within normal limits   Speech: whispered with difficulty  Language: english, fluid  Orientation: intact to person, place, time, situation  Attention Span/Concentration: intact  Memory: Intact  Mood: "good"  Affect: constricted  Thought Process: linear, normal and logical  Associations: normal and logical  Thought Content: thought of wanting to die and hoplessness, denies wanting to kill her self (no inten, no plan); -HI/AV  Fund of Knowledge: Aware of current events and Vocabulary appropriate   Abstraction: did not assess  Insight: good  Judgment: good    Significant Labs: All pertinent labs within the past 24 hours have been reviewed.    Significant Imaging: I have reviewed all pertinent imaging results/findings within the past 24 hours.       Scheduled Medications:   albuterol-ipratropium  3 mL " Nebulization Q6H    aspirin  300 mg Rectal Daily    cefTRIAXone (ROCEPHIN) IVPB  2 g Intravenous Q24H    digoxin  125 mcg Intravenous Daily    escitalopram oxalate  10 mg Oral Daily    famotidine (PF)  20 mg Intravenous BID    metoprolol  2.5 mg Intravenous Q6H    sodium chloride 3%  4 mL Nebulization Q6H       PRN Medications:  acetaminophen, calcium gluconate IVPB, calcium gluconate IVPB, calcium gluconate IVPB, dextrose 50%, dextrose 50%, glucagon (human recombinant), insulin aspart U-100, labetalol, lidocaine HCL 2%, magnesium sulfate IVPB, magnesium sulfate IVPB, morphine, ondansetron, potassium chloride in water **AND** potassium chloride in water **AND** potassium chloride in water, sodium phosphate IVPB, sodium phosphate IVPB, sodium phosphate IVPB    Review of patient's allergies indicates:  No Known Allergies    Assessment/Plan:     Adjustment disorder with mixed anxiety and depressed mood  Impression    Patient is a 75 year old female with past psychiatric history of depression who initially presented with acute respiratory failure with hypoxia, now s/p MVR and TVR with psychiatry consulted for depression. When seen today, patient calm and cooperative with interview. Endorses multiple symptoms of depression and anxiety, in the context of extended hospital course and poor physical health. Endorses history of depression in the past with significant improvement on Lexapro. Given current QTC(496) as well as poor sleep and appetite, will start Remeron 7.5 mg instead.     Adjustment disorder with mixed anxiety and depressed mood  R/o PTSD    RECOMMENDATION(S)      1. Scheduled Medication(s):  Start remeron 7.5 mg PO nightly    2. PRN Medication(s):  None    3.  Monitor:  Please monitor QTc    4. Legal Status/Precaution(s):  Pt currently does not meet criteria nor benefit from from involuntary inpatient psychiatric admission.     Psychiatry will continue to follow.                      Need for Continued  Hospitalization:  No need for inpatient psychiatric hospitalization. Continue medical care as per the primary team.     Anticipated Disposition:  Per Primary     Total time:  25 with greater than 50% of this time spent in counseling and/or coordination of care.          Moise Castillo MD   Psychiatry  [facility]

## 2020-10-11 NOTE — SUBJECTIVE & OBJECTIVE
"    Family History     Problem Relation (Age of Onset)    Cancer Mother    Cataracts Sister    No Known Problems Father, Brother, Maternal Aunt, Maternal Uncle, Paternal Aunt, Paternal Uncle, Maternal Grandmother, Maternal Grandfather, Paternal Grandmother, Paternal Grandfather        Tobacco Use    Smoking status: Never Smoker    Smokeless tobacco: Never Used   Substance and Sexual Activity    Alcohol use: No    Drug use: No    Sexual activity: Not Currently     Psychotherapeutics (From admission, onward)    Start     Stop Route Frequency Ordered    10/10/20 0900  escitalopram oxalate 5 mg/5 mL solution 10 mg      -- Oral Daily 10/09/20 1830             Objective:     Vital Signs (Most Recent):  Temp: 98.4 °F (36.9 °C) (10/11/20 0700)  Pulse: 77 (10/11/20 1000)  Resp: 13 (10/11/20 1000)  BP: 135/62 (10/11/20 1000)  SpO2: 100 % (10/11/20 1000) Vital Signs (24h Range):  Temp:  [97.6 °F (36.4 °C)-98.4 °F (36.9 °C)] 98.4 °F (36.9 °C)  Pulse:  [] 77  Resp:  [11-27] 13  SpO2:  [93 %-100 %] 100 %  BP: (129-175)/(56-74) 135/62  Arterial Line BP: (170-175)/(58-65) 170/58     Height: 5' 5" (165.1 cm)  Weight: 89 kg (196 lb 3.4 oz)  Body mass index is 32.65 kg/m².      Intake/Output Summary (Last 24 hours) at 10/11/2020 1019  Last data filed at 10/11/2020 1000  Gross per 24 hour   Intake 1038 ml   Output 705 ml   Net 333 ml       Physical Exam      Mental Status Exam:  Appearance: lying in bed, fatigued, in hospital gown  MSK: no abnormal involuntary movements  Level of Consciousness: alert, awake  Behavior/Cooperation: normal, cooperative  Psychomotor: unremarkable and within normal limits   Speech: whispered with difficulty  Language: english, fluid  Orientation: intact to person, place, time, situation  Attention Span/Concentration: intact  Memory: Intact  Mood: "good"  Affect: constricted  Thought Process: linear, normal and logical  Associations: normal and logical  Thought Content: thought of wanting to die " and hoplessness, denies wanting to kill her self (no inten, no plan); -HI/Counts include [facility] beds [facility] the [facility]  Fund of Knowledge: Aware of current events and Vocabulary appropriate   Abstraction: did not assess  Insight: good  Judgment: good    Significant Labs: All pertinent labs within the past 24 hours have been reviewed.    Significant Imaging: I have reviewed all pertinent imaging results/findings within the past 24 hours.

## 2020-10-11 NOTE — CONSULTS
[facility]  Plan of Care    Please see progress note from this morning for latest recommendations.      Moise Castillo MD  [facility] Psychiatry, PGY-2

## 2020-10-11 NOTE — NURSING
Bladder scanner brought to bedside showing 272 mL of urine. Purewick in place to continuous wall suction with no urine output. MD notified. Orders received for Yoo catheter.

## 2020-10-11 NOTE — PROGRESS NOTES
CT surgery progress note     COSME overnight      A/P  75F s/p MV repair, TV repair, and MAZE w/ ALISA resection on 09/09/20. Post op course complicated by a multiloculated R pleural effusion. Now POD6 from R VATS w/ decortication. CXR stable w/ RLL effusion      continue heparin gtt, pTT goal 60-80  Monitor dig levels  Lopressor 25 BID  IV abx for E. Coli in pleural fluid  SLP eval  Place feeding tube today   Supportive care  Delirium precautions  Increase activity   Cont ICU

## 2020-10-12 NOTE — PROGRESS NOTES
[facility]  Psychiatry  Progress Note    Patient Name: Fatou Lorenzo  MRN: [id number]   Code Status: Full Code  Admission Date: 8/30/2020  Hospital Length of Stay: 42 days  Expected Discharge Date: 10/15/2020  Attending Physician: Antoni Waddell MD  Primary Care Provider: Ludin Rivas MD    Current Legal Status: Uncontested    Patient information was obtained from patient and ER records.       Subjective:     Patient is a 75 y.o., female, presents with:    Principal Problem:Acute respiratory failure with hypoxia    Chief Complaint: Depression    HPI: Fatou Lorenzo is a 75 y.o. female with a past psychiatric history of anxiety who presented to [facility] due to Acute respiratory failure with hypoxia. Psychiatry was consulted to address the patient's symptoms of depression.     Per Primary MD:  Fatou Lorenzo is a 75 y.o. female that has a past medical history of Atrial fibrillation with RVR, Cataract, Glaucoma, Heart valve problem, Hyperlipidemia, and Severe mitral regurgitation.  has a past surgical history that includes Ankle surgery; lipoma removal; and Left heart catheterization (Left, 8/25/2020). Presents to [facility] complaining of recurrent shortness of breath.  Patient states it feels like she is having heart failure again.  She was discharged presumably on August 27th (no discharge summary available at this time), 4 days ago for acute respiratory failure with hypoxia secondary to CHF exacerbation.  The patient is typically followed at [facility].  Reports 8 years ago was referred to Dr. Waddell, but valvular regurgitation not bad enough at that time to do surgery.  However see has severe tricuspid and mitral valve disease which was confirmed during her previous admission by echocardiogram.  She is having dyspnea with exertion, shortness of breath at rest, and orthopnea.  Worsened with exertion.      Patient now s/p open mitral valve  repair, TC valve repair on 9/9/2020. Patient has had multiple re-intubations for hypoxemic respiratory failure resulting in severe hoarseness and some dysphagia. Patient currently being seen by PT/OT/PM&R for weakness, impaired balance, impaired endurance, impaired cardiopulmonary response to activity, impaired self care skills, decreased coordination, impaired functional mobilty, decreased lower extremity function, gait instability. Per primary team, patient has largely had minimal improvement with rehab to this point and has been very anxious lately and refusing some treatment options. CM spoke with son recently who voiced that patient sounds confused on phone.      Per C-L Psych MD:  When seen today, patient calm and cooperative with interview. AAOx4, son at bedside provides additional collateral. Low volume with some hoarseness noted but able to fully participate in interview. Endorses extensive cardiovascular course over the last few months/years, prior to current hospital presentation. Now endorses having been in a hosptial seting for past few weeks. During this time, endorses worsening depressive symptoms including constant low mood, anhedonia, poor sleep, low energy level, poor appetite, and hopelessness. Endorses worsening hopelessness and feeling like life is not worth living anymore, but denies any active suicidal plan or intent. Denies any previous suicide attempts or past psychiatric admissions. States she has felt like this in the past and was previously started by PCP on Lexapro (unknown dosage). Endorses it being effective and was on it for 5 years, before stopping it 2 years ago. Endorses symptoms of generalized anxiety disorder including easy fatiguability, sleep disturbances, restlessness, and irritability. Endorses having flashbacks, nightmares and hypervigilance related to initially resuscitation attempts made in the past. Denies any AVH or other hallucinations.. Patient does not demonstrate  paranoia, delusions, disorganized thinking or disorganized behavior. Denies any HI. Denies any symptoms of stefany now or in the past. States now the plan is to hopefully attend rehab on Monday which she is optimistic about.    Collateral:   Son at bedside, provides collateral integrated above    Psychiatric Review of Systems-is patient experiencing or having changes in  sleep: yes, poor  appetite: yes, decreased  weight: yes, loss  energy/anergy: yes  interest/pleasure/anhedonia: yes  somatic symptoms: no  anxiety/panic: yes  guilty/hopelessness: yes  concentration: no  S.I.B.s/risky behavior: no  any drugs: no  alcohol: no     Past Psychiatric History:  Previous Medication Trials: yes, lexapro  Previous Psychiatric Hospitalizations: no   Previous Suicide Attempts: no   History of Violence: no  Outpatient Psychiatrist: no    Social History:  Marital Status:   Children: 3   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  Education: did not assess  Special Ed: unknown  Housing Status: with son in [facility]  History of phys/sexual abuse: unknown  Access to gun: no    Substance Abuse History:  Recreational Drugs: denies  Use of Alcohol: occasional, social use  Rehab History: no   Tobacco Use: no    Legal History:  Past Charges/Incarcerations: no   Pending charges: no     Family Psychiatric History:   Denies    Psychosocial Stressors: health  Functioning Relationships: good support system and good relationship with children        Hospital Course: 10/3  This morning the patient is seen lying up right in bed and is suctioning saliva from her mouth with her son at bedside rubbing her feet. Due to her vocal cord swelling she is mostly unable to speak; she tries to whisper to get words out or just mouth words, so her interview is limited mostly to yes/no questions. She is able to nod that she is feeling sad, depressed, and anxious. She nods yes to feelings of hopelessness and wishing she would not be here  "anymore. When asked if she wants to kill herself, she adamantly shakes her head no. She shakes her head no when asking about AVH. When asked if her depression, anxiety, and hopelessness are largely related to being in the hospital, she nods yes. She has not been sleeping well at night and has poor PO intake as well due to her dysphagia. She is amenable to trying mirtazapine for mood and sleep.    10/5/20  Chart reviewed. VSS. No acute events overnight. No psychiatric PRNs required. Since last seen, patient has not been started on mirtazapine. When seen today, no distress noted, patient agreeable and cooperative with interview, but anxious due to scheduled procedure 1 hour after interview. Interview somewhat limited due to speech difficulties. Patient states she is feeling "bad" this morning. Patient reports sleeping yesterday after having received PRN benedryl. Endorses appetite but states she has been kept NPO due to planned provedure. No somatic complaints. Denies any suicidal ideation, but continues to endorse hopelessness, low mood and anxiety.     10/11/  Patient greeted at bedside.  Agreeable to interview.  States that her mood is "good."  States that appetite is diminished from baseline, no concerns regarding sleeping.  She affirms that she had thoughts about death earlier, but denies any current desire to die.  Denies SI/HI/AVH at this time.  States that issues with anxiety persist.  She states that she was looking forward to going outside later today.    10/12/2020  Chart reviewed. No acute events overnight. No psychiatric PRNs required. Patient has been compliant with medications. Tolerating medications without adverse side effects. When seen today, patient agreeable and cooperative with interview. Limited ability to participate due to vocal cord paralysis, mostly nods and shakes head. Does endorse taking mirtazapine yesterday evening and endorses sleep with it. Denies any suicidal ideation currently. " "Denies HI or AVH.         Family History     Problem Relation (Age of Onset)    Cancer Mother    Cataracts Sister    No Known Problems Father, Brother, Maternal Aunt, Maternal Uncle, Paternal Aunt, Paternal Uncle, Maternal Grandmother, Maternal Grandfather, Paternal Grandmother, Paternal Grandfather        Tobacco Use    Smoking status: Never Smoker    Smokeless tobacco: Never Used   Substance and Sexual Activity    Alcohol use: No    Drug use: No    Sexual activity: Not Currently     Psychotherapeutics (From admission, onward)    Start     Stop Route Frequency Ordered    10/11/20 2100  mirtazapine tablet 7.5 mg      -- PER NG TUBE Nightly 10/11/20 1853             Objective:     Vital Signs (Most Recent):  Temp: 97.7 °F (36.5 °C) (10/12/20 1120)  Pulse: 82 (10/12/20 1120)  Resp: (!) 24 (10/12/20 1120)  BP: (!) 147/65 (10/12/20 1120)  SpO2: 98 % (10/12/20 1120) Vital Signs (24h Range):  Temp:  [97.6 °F (36.4 °C)-99.2 °F (37.3 °C)] 97.7 °F (36.5 °C)  Pulse:  [73-85] 82  Resp:  [15-44] 24  SpO2:  [93 %-100 %] 98 %  BP: (113-164)/(56-83) 147/65     Height: 5' 5" (165.1 cm)  Weight: 86 kg (189 lb 9.5 oz)  Body mass index is 31.55 kg/m².      Intake/Output Summary (Last 24 hours) at 10/12/2020 1159  Last data filed at 10/12/2020 1044  Gross per 24 hour   Intake 2875 ml   Output 1738 ml   Net 1137 ml       Physical Exam          Mental Status Exam:  Appearance: lying in bed, fatigued, in hospital gown  MSK: no abnormal involuntary movements  Level of Consciousness: alert, awake  Behavior/Cooperation: normal, cooperative  Psychomotor: unremarkable and within normal limits   Speech: whispered with significant difficulty  Language: english, fluid  Orientation: intact to person, place, time, situation  Attention Span/Concentration: intact  Memory: Intact  Mood: unable to assess  Affect: constricted  Thought Process: linear, normal and logical  Associations: normal and logical  Thought Content: Denies SI/HI/AVH  Fund of " Knowledge: Aware of current events  Abstraction: did not assess  Insight: good  Judgment: good    Significant Labs: All pertinent labs within the past 24 hours have been reviewed.    Significant Imaging: I have reviewed all pertinent imaging results/findings within the past 24 hours.           Scheduled Medications:   albuterol-ipratropium  3 mL Nebulization Q6H    aspirin  81 mg Per NG tube Daily    cefTRIAXone (ROCEPHIN) IVPB  2 g Intravenous Q24H    digoxin  0.125 mg Per NG tube Daily    famotidine  20 mg Per NG tube BID    metoprolol  12.5 mg Per NG tube BID    mirtazapine  7.5 mg Per NG tube QHS    polyethylene glycol  17 g Oral Daily    sodium chloride 3%  4 mL Nebulization Q6H    warfarin  3 mg Oral Once       PRN Medications:  acetaminophen, dextrose 50%, dextrose 50%, glucagon (human recombinant), insulin aspart U-100, labetalol, lidocaine HCL 2%, melatonin, morphine, ondansetron    Review of patient's allergies indicates:  No Known Allergies    Assessment/Plan:     Adjustment disorder with mixed anxiety and depressed mood  Impression    Patient is a 75 year old female with past psychiatric history of depression who initially presented with acute respiratory failure with hypoxia, now s/p MVR and TVR with psychiatry consulted for depression. When seen today, patient calm and cooperative with interview. Endorses multiple symptoms of depression and anxiety, in the context of extended hospital course and poor physical health. Endorses history of depression in the past with significant improvement on Lexapro. Given current QTC(496) as well as poor sleep and appetite, will trial Remeron 7.5 mg instead. Reconsulted over the weekend for concern of suicidal ideation, reevaluated by weekend team, denies SI at that time and continues to do so now.      Adjustment disorder with mixed anxiety and depressed mood  R/o PTSD    RECOMMENDATION(S)      1. Scheduled Medication(s):  Continue remeron 7.5 mg PO  nightly    2. PRN Medication(s):  None    3.  Monitor:  Please monitor QTc    4. Legal Status/Precaution(s):  Pt currently does not meet criteria for PEC/CEC nor would benefit from involuntary inpatient psychiatric admission.     Psychiatry will continue to follow.        Case discussed with staff psychiatrist, Dr. Winkler.       Need for Continued Hospitalization:  No need for inpatient psychiatric hospitalization. Continue medical care as per the primary team.    Anticipated Disposition:  Still a Patient    Total time:  25 with greater than 50% of this time spent in counseling and/or coordination of care.       Silvestre Willis MD   Psychiatry  [facility]

## 2020-10-12 NOTE — PROGRESS NOTES
"[facility]  Adult Nutrition  Progress Note    SUMMARY       Recommendations    1.) Increase EN goal rate of Impact Peptide 1.5; advance 5-10mL Q8hr to goal rate at 45mL/hr. EN provides 1620kcal, 101gm of protein, 831mL of free water. Add free water per MD recommendations.     Goals: Pt to meet >75% of energy needs by RD follow up  Nutrition Goal Status: progressing towards goal  Communication of RD Recs: (POC)    Reason for Assessment    Reason For Assessment: RD follow-up  Diagnosis: (Acute respiratory failure with hypoxia)  Relevant Medical History: afib w/ RVR, HTN, HLD  Interdisciplinary Rounds: did not attend  General Information Comments: Pt is extubated with NG tube placed. NFPE completed 9/30 with no s/s of malnutrition. Pt s/p MVr, Tvr, MAZE. Pt asleep during RD visit. GI- LBM 10/7. Nsg staff reports pt is tolerating TF of Impact Peptide 1.5 at 20mL/hr.   Nutrition Discharge Planning: Pt to meet adequate energy and protien needs.     Nutrition Risk Screen    Nutrition Risk Screen: tube feeding or parenteral nutrition    Nutrition/Diet History    Spiritual, Cultural Beliefs, Advent Practices, Values that Affect Care: no  Factors Affecting Nutritional Intake: NPO    Anthropometrics    Temp: 97.6 °F (36.4 °C)  Height Method: Stated  Height: 5' 5" (165.1 cm)  Height (inches): 65 in  Weight Method: Bed Scale  Weight: 86 kg (189 lb 9.5 oz)  Weight (lb): 189.6 lb  Ideal Body Weight (IBW), Female: 125 lb  % Ideal Body Weight, Female (lb): 147.2 %  BMI (Calculated): 31.6       Lab/Procedures/Meds    Pertinent Labs Reviewed: reviewed  Pertinent Labs Comments: WBC 23.77, Hgb 9.0, Hct 30.5, Na 147, Chl 115, BUN 44, GFR 44.3, Ca 8.3, Phosphorus 2.4, Alb 2.0  Pertinent Medications Reviewed: reviewed  Pertinent Medications Comments: Albuterol, rocephin, famotidine, polyethylene glycol, warfarin    Physical Findings/Assessment     Edema 1+ generalized    Estimated/Assessed Needs    Weight Used For " Calorie Calculations: 86 kg (189 lb 9.5 oz)  Energy Calorie Requirements (kcal): 1693  Energy Need Method: Mills-St Jeor(x1.25PAL)  Protein Requirements: 85-102gm (1.5-1.8gm/kg IBW)  Weight Used For Protein Calculations: 56.8 kg (125 lb 3.5 oz)(IBW)  Fluid Requirements (mL): per MD or 1 mL/kcal     RDA Method (mL): 1693         Nutrition Prescription Ordered    Current Diet Order: NPO (10/5)  Current Nutrition Support Formula Ordered: Impact Peptide 1.5  Current Nutrition Support Rate Ordered: 20 (ml)  Current Nutrition Support Frequency Ordered: mL/hr    Evaluation of Received Nutrient/Fluid Intake    Enteral Calories (kcal): 720  Enteral Protein (gm): 45  Enteral (Free Water) Fluid (mL): 369  I/O: I: 2505; O; 1608; +4.4L since 9/28  Energy Calories Required: not meeting needs  Protein Required: not meeting needs  Fluid Required: (per MD)  Comments: LBM 10/7  Tolerance: tolerating  % Intake of Estimated Energy Needs: 42  % Meal Intake: 0    Nutrition Risk    Level of Risk/Frequency of Follow-up: high , 2x weekly    Assessment and Plan        Nutrition Problem  Inadequate energy intake    Related to (etiology):   Enteral nutrition    Signs and Symptoms (as evidenced by):   Enteral nutrition does not provide estimated energy needs.     Interventions(treatment strategy):  1.) Collaboration with other providers  2.) Enteral nutrition    Nutrition Diagnosis Status:   new     Monitor and Evaluation    Food and Nutrient Intake: enteral nutrition intake  Food and Nutrient Adminstration: enteral and parenteral nutrition administration  Knowledge/Beliefs/Attitudes: food and nutrition knowledge/skill  Anthropometric Measurements: weight, weight change, body mass index  Biochemical Data, Medical Tests and Procedures: electrolyte and renal panel, gastrointestinal profile, glucose/endocrine profile, inflammatory profile, lipid profile  Nutrition-Focused Physical Findings: overall appearance     Malnutrition Assessment                  Orbital Region (Subcutaneous Fat Loss): well nourished  Upper Arm Region (Subcutaneous Fat Loss): mild depletion   Hinduism Region (Muscle Loss): moderate depletion  Clavicle Bone Region (Muscle Loss): mild depletion  Clavicle and Acromion Bone Region (Muscle Loss): well nourished  Dorsal Hand (Muscle Loss): mild depletion  Anterior Thigh Region (Muscle Loss): well nourished  Posterior Calf Region (Muscle Loss): well nourished                 Nutrition Follow-Up    RD Follow-up?: Yes

## 2020-10-12 NOTE — PROGRESS NOTES
CT surgery progress note     COSME overnight      A/P  75F s/p MV repair, TV repair, and MAZE w/ ALISA resection on 09/09/20. Post op course complicated by a multiloculated R pleural effusion. Now POD7 from R VATS w/ decortication.     Progressing      continue heparin gtt, pTT goal 60-80  Warfarin 3mg via enteric feeding tube   Monitor dig levels  Lopressor 25 BID  IV abx for E. Coli in pleural fluid, AF. Monitor WBC#  SLP therapies for vocal cord weakness   Supportive care  Delirium precautions  Increase activity   Okay to floor

## 2020-10-12 NOTE — NURSING
Urine output less than 30 mL/hr for last 3 hours. CVP 14. MD notified. Orders received for 40mg Lasix IVP.

## 2020-10-12 NOTE — PLAN OF CARE
Plan of care discussed with patient. Patient is free of fall/trauma/injury, fall precautions in place. Pt turned q2. Tube feedings running at 30ml/hr. Residuals checked q8. Tolerating well. 200ml water bolus given q8. Blood glucose checked q6. Heparin infusing per MAR. Rocephin administered. Denies CP, SOB, or pain/discomfort. All questions addressed. Will continue to monitor.

## 2020-10-12 NOTE — PLAN OF CARE
Problem: Physical Therapy Goal  Goal: Physical Therapy Goal  Description: Goals to be met by: 2020     Patient will increase functional independence with mobility by performin. Supine to sit with Minimal Assistance x 1 person.-not met  2. Sit to stand transfer with Moderate Assistance.-not met  3. Bed to chair transfer with Moderate Assistance. -not met  4. Sitting at edge of bed x10 minutes with CGA.-not met  5. Lower extremity exercise program x 10 reps  with assistance as needed. -not met  6. Pt receive gait training ~ 10 ft with moderate assist- not met          Outcome: Ongoing, Progressing   Goals remain appropriate. Emily Blankenship, PT  10/12/2020

## 2020-10-12 NOTE — ASSESSMENT & PLAN NOTE
Fatou Lorenzo is a 75 y.o. female s/p MVr, TVr 9/9/2020. Case noteworthy for multiple pump runs (3) and RV hematoma due to retraction. Unstable overnight 9/18, required pericardial window for evac of posterior cardiac tamponade. Respiratory distress and so re-admitted to SICU on 10/2 now s/p VATS on 10/5.    Neuro:  - More alert and interactive today.  - Seen by psych who recommended mirtazepine over lexapro due to prolonged QTc and benefit of appetite stimulation. Will start via kevin tube tonight  - PRN liquid tylenol and IV morphine for pain      CV:  S/p MVr, TVr, MAZE 9/9/20. S/p bedside pericardial window 9/18  - Keep MAPs >60.   - pAF: digoxin 125 mcg. Had placed on IV metop as did not have enteral access. Will transition back to 12.5 BID via kevin   - Anti-coagulation: asa 81, hep gtt - goal 60-80  - labetalol IV PRN for SBP > 160     Pulm:   - Loculated pleural effusion s/p VATS on 10/5  - Extubated 10/9. Breathing comfortably on RA  - Scheduled duo and saline nebs  - CXR and ABG PRN  - HOB >30 deg    Renal:  - Removed vivas yesterday but had to have this replaced due to urinary retention. Still having a lot of sediment in the urine  - BUN/Cr 38/1 -> 43/1.1  - Continue to monitor UOP as was very minimal yesterday    FEN/GI:  - Net positive about 500 cc yesterday  - Replace other lytes as needed  - Discussed NGT placement yesterday and she is amenable to this now. Will place kevin tube today. Will start tube feeds following this and transition meds to per NGT  - Famotidine and bowel regimen    Heme/Onc:  - H/H stable 9.4/30.7 -> 9.2/30.8  - No evidence of bleeding  - hep gtt for pAF with aPTT goal of 60-80     ID:   - S/p 7 days of cefepime vanc for enterococcus cloacae UTI. Also has e coli growing from pleural cultures.  - BAL cultures felt to be colonization not infection  - Follow ID recommendations   - Needs 4 weeks course total. Recommended IV Ceftriaxone 2 mg q24 hrs while inpatient but would switch to PO  Augmentin 875 BID if discharged prior to completion of therapy.  (Est end date 11/2/20)      Endo:  - no hx of DM  - ISS    PPx:  - famotidine, SCD, hep gtt for pAF     Dispo: SICU, can likely step down tomorrow

## 2020-10-12 NOTE — PLAN OF CARE
Pt stepped down from [id number].      Anticipate d/c to LTAC when medically ready. Patient has been medically accepted to [facility] and waiting for auth from [facility]. Clinical Liaison Annie is following.  SW/CM will continue to follow and assist with d/c planning.     Julie Haase RN  Case Management [phone]

## 2020-10-12 NOTE — PLAN OF CARE
SW coordinating with following referral ([facility] and [facility]) and team to determine who will be most appropriate to accept patient.     SW will continue to coordinate with patient, family, team and insurance to complete patient's discharge plan.    UPDATE 10:23 AM    [facility] submitted for Auth 10/9/20.       Esthela Peralta LMSW   - Case Management

## 2020-10-12 NOTE — PLAN OF CARE
10/12/20 1115   Discharge Reassessment   Assessment Type Discharge Planning Reassessment   Provided patient/caregiver education on the expected discharge date and the discharge plan Yes   Do you have any problems affording any of your prescribed medications? No   Discharge Plan A Long-term acute care facility (LTAC)  ([facility])   Discharge Plan B Rehab   DME Needed Upon Discharge  other (see comments)  (TBD)   Anticipated Discharge Disposition Long Term   Post-Acute Status   Discharge Delays None known at this time       Mel Simons MPH, RN, CM  Ext. [phone]

## 2020-10-12 NOTE — PROGRESS NOTES
Pharmacist Renal Dose Adjustment Note    Fatou Lorenzo is a 75 y.o. female being treated with the medication famotidine    Patient Data:    Vital Signs (Most Recent):  Temp: 97.7 °F (36.5 °C) (10/12/20 1120)  Pulse: 76 (10/12/20 1314)  Resp: 18 (10/12/20 1314)  BP: (!) 147/65 (10/12/20 1120)  SpO2: 98 % (10/12/20 1314)   Vital Signs (72h Range):  Temp:  [97.6 °F (36.4 °C)-99.2 °F (37.3 °C)]   Pulse:  []   Resp:  [11-44]   BP: (113-175)/(56-83)   SpO2:  [85 %-100 %]   Arterial Line BP: (115-196)/(47-73)      Recent Labs   Lab 10/10/20  0411 10/11/20  0415 10/12/20  0329   CREATININE 1.0 1.1 1.2     Serum creatinine: 1.2 mg/dL 10/12/20 0329  Estimated creatinine clearance: 43.9 mL/min    Famotidine 20mg twice daily will be reduced to 20mg daily    Pharmacist's Name: Vania Herzog  Pharmacist's Extension: [phone]

## 2020-10-12 NOTE — PLAN OF CARE
Problem: SLP Goal  Goal: SLP Goal  Description: Speech Language Pathology Goals  Goals expected to be met by 10/19  1. Pt to participate in ongoing swallow assessment to determine safest and least restrictive diet.    Outcome: Ongoing, Progressing     POC updated. ST rec continued NPO with strict aspiration precautions at this time. ST to continue to monitor.    BURKE Perkins  10/12/2020

## 2020-10-12 NOTE — SUBJECTIVE & OBJECTIVE
Interval History/Significant Events:   Seen by psychiatry. trial of remeron to augment lexapro. PTT within goals.   UOP: 1538cc. Elevation of WBC to 23. Continues on antibiotics       Follow-up For: Procedure(s) (LRB):  LARYNGOSCOPY, MICRO SUSPENSION WITH AUGMENTATION (N/A)  VATS, WITH DECORTICATION, LUNG (Right)    Post-Operative Day: 6 Days Post-Op    Objective:     Vital Signs (Most Recent):  Temp: 98 °F (36.7 °C) (10/12/20 0300)  Pulse: 75 (10/12/20 0707)  Resp: (!) 21 (10/12/20 0707)  BP: 136/60 (10/12/20 0630)  SpO2: 99 % (10/12/20 0707) Vital Signs (24h Range):  Temp:  [97.7 °F (36.5 °C)-99.2 °F (37.3 °C)] 98 °F (36.7 °C)  Pulse:  [73-85] 75  Resp:  [12-44] 21  SpO2:  [93 %-100 %] 99 %  BP: (113-164)/(56-83) 136/60     Weight: 86 kg (189 lb 9.5 oz)  Body mass index is 31.55 kg/m².      Intake/Output Summary (Last 24 hours) at 10/12/2020 0714  Last data filed at 10/12/2020 0600  Gross per 24 hour   Intake 2505 ml   Output 1608 ml   Net 897 ml       Physical Exam  Vitals signs and nursing note reviewed.   Constitutional:       Appearance: She is well-developed. She is ill-appearing. She is not diaphoretic.   HENT:      Head: Normocephalic and atraumatic.   Eyes:      Conjunctiva/sclera: Conjunctivae normal.   Neck:      Musculoskeletal: Normal range of motion and neck supple.   Cardiovascular:      Rate and Rhythm: Normal rate and regular rhythm.      Comments: Midline sternotomy with clean, dry, and intact, without evidence of infection  Prior chest tubes sites with dressings in place, clean and dry  Pulmonary:      Comments: Breathing comfortably on comfort flow  No distress, but rhonchi present and she has a wet cough.  Abdominal:      General: There is no distension.      Palpations: Abdomen is soft.      Tenderness: There is no abdominal tenderness.   Skin:     General: Skin is warm and dry.   Psychiatric:      Comments: Depressed mood, flat affect           Lines/Drains/Airways     Central Venous Catheter  Line            Percutaneous Central Line Insertion/Assessment - Triple Lumen  10/05/20 1843 left internal jugular 6 days          Drain                 Urethral Catheter 10/11/20 0550 16 Fr. 1 day         NG/OG Tube 10/11/20 1300 Right nostril less than 1 day                Significant Labs:    CBC/Anemia Profile:  Recent Labs   Lab 10/11/20  0415 10/12/20  0329   WBC 18.86* 23.77*  23.77*   HGB 9.2* 9.0*  9.0*   HCT 30.8* 30.5*  30.5*    198  198   MCV 97 97  97   RDW 17.2* 17.1*  17.1*        Chemistries:  Recent Labs   Lab 10/11/20  0415 10/12/20  0329   * 147*   K 3.7 4.1   * 115*   CO2 23 22*   BUN 43* 44*   CREATININE 1.1 1.2   CALCIUM 8.3* 8.3*   ALBUMIN 2.0* 2.0*   PROT 5.3* 5.4*   BILITOT 1.0 0.9   ALKPHOS 272* 307*   ALT 43 44   AST 37 36   MG 2.1 2.1   PHOS 3.7 2.4*       Coagulation:   Recent Labs   Lab 10/12/20  0329   APTT 64.2*     All pertinent labs within the past 24 hours have been reviewed.    Significant Imaging:  I have reviewed and interpreted all pertinent imaging results/findings within the past 24 hours.

## 2020-10-12 NOTE — PLAN OF CARE
Problem: Occupational Therapy Goal  Goal: Occupational Therapy Goal  Description: Goals to be met by: 10/23/2020      Patient will increase functional independence with ADLs by performing:    UE Dressing with Minimum Assistance.  Grooming while sitting EOB with Contact Guard Assistance  Toileting from bedside commode with Max Assistance for hygiene and clothing management.   Toilet transfer to bedside commode with Max Assistance.  Supine <> Sit with minimum assistance in preparation for EOB/OOB functional activities  Pt to tolerate static standing for ~1 minute with min A while competing a functional task.        Outcome: Ongoing, Progressing

## 2020-10-12 NOTE — SUBJECTIVE & OBJECTIVE
"    Family History     Problem Relation (Age of Onset)    Cancer Mother    Cataracts Sister    No Known Problems Father, Brother, Maternal Aunt, Maternal Uncle, Paternal Aunt, Paternal Uncle, Maternal Grandmother, Maternal Grandfather, Paternal Grandmother, Paternal Grandfather        Tobacco Use    Smoking status: Never Smoker    Smokeless tobacco: Never Used   Substance and Sexual Activity    Alcohol use: No    Drug use: No    Sexual activity: Not Currently     Psychotherapeutics (From admission, onward)    Start     Stop Route Frequency Ordered    10/11/20 2100  mirtazapine tablet 7.5 mg      -- PER NG TUBE Nightly 10/11/20 1853             Objective:     Vital Signs (Most Recent):  Temp: 97.7 °F (36.5 °C) (10/12/20 1120)  Pulse: 82 (10/12/20 1120)  Resp: (!) 24 (10/12/20 1120)  BP: (!) 147/65 (10/12/20 1120)  SpO2: 98 % (10/12/20 1120) Vital Signs (24h Range):  Temp:  [97.6 °F (36.4 °C)-99.2 °F (37.3 °C)] 97.7 °F (36.5 °C)  Pulse:  [73-85] 82  Resp:  [15-44] 24  SpO2:  [93 %-100 %] 98 %  BP: (113-164)/(56-83) 147/65     Height: 5' 5" (165.1 cm)  Weight: 86 kg (189 lb 9.5 oz)  Body mass index is 31.55 kg/m².      Intake/Output Summary (Last 24 hours) at 10/12/2020 1159  Last data filed at 10/12/2020 1044  Gross per 24 hour   Intake 2875 ml   Output 1738 ml   Net 1137 ml       Physical Exam          Mental Status Exam:  Appearance: lying in bed, fatigued, in hospital gown  MSK: no abnormal involuntary movements  Level of Consciousness: alert, awake  Behavior/Cooperation: normal, cooperative  Psychomotor: unremarkable and within normal limits   Speech: whispered with significant difficulty  Language: english, fluid  Orientation: intact to person, place, time, situation  Attention Span/Concentration: intact  Memory: Intact  Mood: unable to assess  Affect: constricted  Thought Process: linear, normal and logical  Associations: normal and logical  Thought Content: Denies SI/HI/AVH  Fund of Knowledge: Aware of " current events  Abstraction: did not assess  Insight: good  Judgment: good    Significant Labs: All pertinent labs within the past 24 hours have been reviewed.    Significant Imaging: I have reviewed all pertinent imaging results/findings within the past 24 hours.

## 2020-10-12 NOTE — ASSESSMENT & PLAN NOTE
Impression    Patient is a 75 year old female with past psychiatric history of depression who initially presented with acute respiratory failure with hypoxia, now s/p MVR and TVR with psychiatry consulted for depression. When seen today, patient calm and cooperative with interview. Endorses multiple symptoms of depression and anxiety, in the context of extended hospital course and poor physical health. Endorses history of depression in the past with significant improvement on Lexapro. Given current QTC(496) as well as poor sleep and appetite, will trial Remeron 7.5 mg instead.     Adjustment disorder with mixed anxiety and depressed mood  R/o PTSD    RECOMMENDATION(S)      1. Scheduled Medication(s):  Continue remeron 7.5 mg PO nightly    2. PRN Medication(s):  None    3.  Monitor:  Please monitor QTc    4. Legal Status/Precaution(s):  Pt currently does not meet criteria nor benefit from from involuntary inpatient psychiatric admission.     Psychiatry will continue to follow.

## 2020-10-12 NOTE — NURSING
Report given to JOSE Wilson. Patient transferred via bed with bedside monitor, belongings, tube feeds, heparin drip, and chart by Primary RN x1 and student RN. No issues during transfer.

## 2020-10-12 NOTE — PT/OT/SLP PROGRESS
Speech Language Pathology Treatment    Patient Name:  Fatou Lorenzo   MRN:  [id number]  Admitting Diagnosis: Acute respiratory failure with hypoxia    Recommendations:                 General Recommendations:  Dysphagia therapy  Diet recommendations:  NPO, Liquid Diet Level: NPO   Aspiration Precautions: Strict aspiration precautions   General Precautions: Standard, aspiration, fall, sternal, NPO  Communication strategies:  provide increased time to answer and go to room if call light pushed    Subjective     Pt asleep upon SLP entry. Min-mod stim required to arouse, however pt remained lethargic with occasional eye-opening noted.    Objective:     Has the patient been evaluated by SLP for swallowing?   Yes  Keep patient NPO? Yes   Current Respiratory Status: room air      Pt seen for ongoing dysphagia therapy. Min-mod A required for pt to maintain alertness and attend to SLP. Pt remains aphonic. HOB elevated for PO trials. Dry cough appreciated before/during/after trials. Pt tolerated ice chips via tsp x2 with no throat clear or cough. Immediate, dry cough followed both trials of thin liquid via cup sip x2. Puree via tsp x1 also followed by immediate, dry cough. No oral stasis evident, however pt with continued spontaneous dry coughs after trials. No further trials presented 2/2 pt at high risk for aspiration. MBSS likely warranted in near future, however not warranted at this time given decreased level of alertness. Education provided regarding role of SLP, continued NPO, MBSS, and ongoing ST plan of care. Minimal carryover appreciated 2/2 pt lethargy. Education to be ongoing. ST to continue to monitor.    Assessment:     Fatou Lorenzo is a 75 y.o. female with an SLP diagnosis of Dysphagia and Aphonia.      Goals:   Multidisciplinary Problems     SLP Goals        Problem: SLP Goal    Goal Priority Disciplines Outcome   SLP Goal     SLP Ongoing, Progressing   Description: Speech Language Pathology Goals  Goals  expected to be met by 10/19  1. Pt to participate in ongoing swallow assessment to determine safest and least restrictive diet.                 Plan:     · Patient to be seen:  4 x/week   · Plan of Care expires:  11/08/20  · Plan of Care reviewed with:  patient   · SLP Follow-Up:  Yes       Discharge recommendations:  rehabilitation facility   Barriers to Discharge:  Level of Skilled Assistance Needed      Time Tracking:     SLP Treatment Date:   10/12/20  Speech Start Time:  0918  Speech Stop Time:  0927     Speech Total Time (min):  9 min    Billable Minutes: Treatment Swallowing Dysfunction 9    BURKE Perkins  10/12/2020

## 2020-10-12 NOTE — PROGRESS NOTES
[facility]  Critical Care - Surgery  Progress Note    Patient Name: Fatou Lorenzo  MRN: [id number]  Admission Date: 8/30/2020  Hospital Length of Stay: 41 days  Code Status: Full Code  Attending Provider: Antoni Waddell MD  Primary Care Provider: Ludin Rivas MD   Principal Problem: Acute respiratory failure with hypoxia    Subjective:     Hospital/ICU Course:  No notes on file    Interval History/Significant Events: Feeling very depressed yesterday and was asking to be let go. Did not want to go outside or have ngt placed. Looked at photos with nurse yesterday which lifted her spirits. Went outside and was amenable to kevin placement today.     Psych visited her as well and recommended mirtazepine    Follow-up For: Procedure(s) (LRB):  LARYNGOSCOPY, MICRO SUSPENSION WITH AUGMENTATION (N/A)  VATS, WITH DECORTICATION, LUNG (Right)    Post-Operative Day: 6 Days Post-Op    Objective:     Vital Signs (Most Recent):  Temp: 97.8 °F (36.6 °C) (10/11/20 1500)  Pulse: 75 (10/11/20 1847)  Resp: (!) 24 (10/11/20 1847)  BP: (!) 144/59 (10/11/20 1800)  SpO2: 99 % (10/11/20 1847) Vital Signs (24h Range):  Temp:  [97.6 °F (36.4 °C)-98.4 °F (36.9 °C)] 97.8 °F (36.6 °C)  Pulse:  [] 75  Resp:  [11-36] 24  SpO2:  [93 %-100 %] 99 %  BP: (129-175)/(56-74) 144/59     Weight: 89 kg (196 lb 3.4 oz)  Body mass index is 32.65 kg/m².      Intake/Output Summary (Last 24 hours) at 10/11/2020 1855  Last data filed at 10/11/2020 1800  Gross per 24 hour   Intake 2094 ml   Output 640 ml   Net 1454 ml       Physical Exam  Vitals signs and nursing note reviewed.   Constitutional:       Appearance: She is well-developed. She is ill-appearing. She is not diaphoretic.   HENT:      Head: Normocephalic and atraumatic.   Eyes:      Conjunctiva/sclera: Conjunctivae normal.   Neck:      Musculoskeletal: Normal range of motion and neck supple.   Cardiovascular:      Rate and Rhythm: Normal rate and regular rhythm.      Comments:  Midline sternotomy with clean, dry, and intact, without evidence of infection  Prior chest tubes sites with dressings in place, clean and dry  Pulmonary:      Comments: Breathing comfortably on comfort flow  No distress, but rhonchi present and she has a wet cough.  Abdominal:      General: There is no distension.      Palpations: Abdomen is soft.      Tenderness: There is no abdominal tenderness.   Skin:     General: Skin is warm and dry.   Psychiatric:      Comments: Depressed mood, flat affect         Vents:  Vent Mode: Spont (10/08/20 1525)  Ventilator Initiated: Yes(chart correction) (09/18/20 2202)  Set Rate: 20 BPM (10/08/20 0747)  Vt Set: 375 mL (10/08/20 0747)  Pressure Support: 10 cmH20 (10/08/20 1525)  PEEP/CPAP: 5 cmH20 (10/08/20 1525)  Oxygen Concentration (%): 40 (10/10/20 1100)  Peak Airway Pressure: 15 cmH2O (10/08/20 1525)  Plateau Pressure: 19 cmH20 (10/08/20 1525)  Total Ve: 5.3 mL (10/08/20 1525)  Negative Inspiratory Force (cm H2O): -21 (10/08/20 1452)  F/VT Ratio<105 (RSBI): (!) 70.71 (10/08/20 1258)    Lines/Drains/Airways     Central Venous Catheter Line            Percutaneous Central Line Insertion/Assessment - Triple Lumen  10/05/20 1843 left internal jugular 6 days          Drain                 NG/OG Tube 10/11/20 1300 Right nostril less than 1 day         Urethral Catheter 10/11/20 0550 16 Fr. less than 1 day                Significant Labs:    CBC/Anemia Profile:  Recent Labs   Lab 10/10/20  0411 10/11/20  0415   WBC 17.67*  17.67* 18.86*   HGB 9.4*  9.4* 9.2*   HCT 30.7*  30.7* 30.8*   *  144* 174   MCV 95  95 97   RDW 16.7*  16.7* 17.2*        Chemistries:  Recent Labs   Lab 10/10/20  0411 10/11/20  0415   * 147*   K 3.8 3.7   * 115*   CO2 18* 23   BUN 38* 43*   CREATININE 1.0 1.1   CALCIUM 8.1* 8.3*   ALBUMIN 1.9* 2.0*   PROT 5.2* 5.3*   BILITOT 1.2* 1.0   ALKPHOS 294* 272*   ALT 44 43   AST 47* 37   MG 2.1 2.1   PHOS 3.1 3.7       Coagulation:   Recent Labs    Lab 10/11/20  0415   APTT 74.3*     All pertinent labs within the past 24 hours have been reviewed.    Significant Imaging:  I have reviewed and interpreted all pertinent imaging results/findings within the past 24 hours.       Assessment/Plan:     Severe mitral regurgitation  Fatou Lorenzo is a 75 y.o. female s/p MVr, TVr 9/9/2020. Case noteworthy for multiple pump runs (3) and RV hematoma due to retraction. Unstable overnight 9/18, required pericardial window for evac of posterior cardiac tamponade. Respiratory distress and so re-admitted to SICU on 10/2 now s/p VATS on 10/5.    Neuro:  - More alert and interactive today.  - Seen by psych who recommended mirtazepine over lexapro due to prolonged QTc and benefit of appetite stimulation. Will start via kevin tube tonight  - PRN liquid tylenol and IV morphine for pain      CV:  S/p MVr, TVr, MAZE 9/9/20. S/p bedside pericardial window 9/18  - Keep MAPs >60.   - pAF: digoxin 125 mcg. Had placed on IV metop as did not have enteral access. Will transition back to 12.5 BID via kevin   - Anti-coagulation: asa 81, hep gtt - goal 60-80  - labetalol IV PRN for SBP > 160     Pulm:   - Loculated pleural effusion s/p VATS on 10/5  - Extubated 10/9. Breathing comfortably on RA  - Scheduled duo and saline nebs  - CXR and ABG PRN  - HOB >30 deg    Renal:  - Removed vivas yesterday but had to have this replaced due to urinary retention. Still having a lot of sediment in the urine  - BUN/Cr 38/1 -> 43/1.1  - Continue to monitor UOP as was very minimal yesterday    FEN/GI:  - Net positive about 500 cc yesterday  - Replace other lytes as needed  - Discussed NGT placement yesterday and she is amenable to this now. Will place kevin tube today. Will start tube feeds following this and transition meds to per NGT  - Famotidine and bowel regimen    Heme/Onc:  - H/H stable 9.4/30.7 -> 9.2/30.8  - No evidence of bleeding  - hep gtt for pAF with aPTT goal of 60-80     ID:   - S/p 7 days of  cefepime vanc for enterococcus cloacae UTI. Also has e coli growing from pleural cultures.  - BAL cultures felt to be colonization not infection  - Follow ID recommendations   - Needs 4 weeks course total. Recommended IV Ceftriaxone 2 mg q24 hrs while inpatient but would switch to PO Augmentin 875 BID if discharged prior to completion of therapy.  (Est end date 11/2/20)      Endo:  - no hx of DM  - ISS    PPx:  - famotidine, SCD, hep gtt for pAF     Dispo: SICU, can likely step down tomorrow       Critical care was time spent personally by me on the following activities: development of treatment plan with patient or surrogate and bedside caregivers, discussions with consultants, evaluation of patient's response to treatment, examination of patient, ordering and performing treatments and interventions, ordering and review of laboratory studies, ordering and review of radiographic studies, pulse oximetry, re-evaluation of patient's condition.  This critical care time did not overlap with that of any other provider or involve time for any procedures.     Madhuri Ng MD  Critical Care - Surgery  [facility]

## 2020-10-12 NOTE — PLAN OF CARE
Recommendations     1.) Increase EN goal rate of Impact Peptide 1.5; advance 5-10mL Q8hr to goal rate at 45mL/hr. EN provides 1620kcal, 101gm of protein, 831mL of free water. Add free water per MD recommendations.      Goals: Pt to meet >75% of energy needs by RD follow up  Nutrition Goal Status: progressing towards goal  Communication of RD Recs: (POC)

## 2020-10-12 NOTE — PT/OT/SLP PROGRESS
Physical Therapy  Co-Treatment with OT    Patient Name:  Fatou Lorenzo   MRN:  [id number]    Recommendations:     Discharge Recommendations:  rehabilitation facility   Discharge Equipment Recommendations: (will determine DME needs closer to discharge)   Barriers to discharge: Decreased caregiver support family will not be able to assist at current functional level.     Assessment:     Fatou Lorenzo is a 75 y.o. female admitted with a medical diagnosis of Acute respiratory failure with hypoxia.  She presents with the following impairments/functional limitations:  weakness, impaired endurance, impaired functional mobilty, gait instability, impaired balance, decreased safety awareness, decreased lower extremity function, decreased coordination, impaired cognition  Pt tolerated treatment well and continues to be at a low functional level. She will cont to benefit from skilled PT to progress physically. Pt will need inpt rehab when medically stable to maximize rehab potential. Pt is s/p MVr, TVr, MAZE 9/9 and VATS 10/5/20.    Rehab Prognosis: Good; patient would benefit from acute skilled PT services to address these deficits and reach maximum level of function.    Recent Surgery: Procedure(s) (LRB):  LARYNGOSCOPY, MICRO SUSPENSION WITH AUGMENTATION (N/A)  VATS, WITH DECORTICATION, LUNG (Right) 7 Days Post-Op    Plan:     During this hospitalization, patient to be seen 5 x/week to address the identified rehab impairments via gait training, therapeutic activities, therapeutic exercises, neuromuscular re-education and progress toward the following goals:    · Plan of Care Expires:  11/06/20    Subjective     Chief Complaint: pt had no complaints during treatment but tried a couple of times to lay down during treatment session.   Patient/Family Comments/goals: to get better and go home.   Pain/Comfort:  · Pain Rating 1: 0/10  · Pain Rating Post-Intervention 1: 0/10      Objective:     Communicated with nurse prior to  session.  Patient found supine with telemetry, blood pressure cuff, pulse ox (continuous), NG tube, vivas catheter, central line(CVP) upon PT entry to room.     General Precautions: Standard, fall, sternal   Orthopedic Precautions:    Functional Mobility:  · Bed Mobility:   Pt needed verbal cues for functional mobility with sternal precautions.   · Rolling Right: total assistance  · Supine to Sit: total assistance  · Sit to Supine: total assistance  ·   · Balance: pt sat on EOB x 10 min with CGA to max assist. pt leaned backwards with sitting. PT facilitated trunk control, postural alignment and sitting balance with verbal and tactile cues. With improved sitting balance, pt was able to perform ADLS with OT. Pt benefited from co-treatment due to complex medical condition and need for 2 skilled therapist knowledge and treatment skills.       AM-PAC 6 CLICK MOBILITY  Turning over in bed (including adjusting bedclothes, sheets and blankets)?: 2  Sitting down on and standing up from a chair with arms (e.g., wheelchair, bedside commode, etc.): 1  Moving from lying on back to sitting on the side of the bed?: 1  Moving to and from a bed to a chair (including a wheelchair)?: 1  Need to walk in hospital room?: 1  Climbing 3-5 steps with a railing?: 1  Basic Mobility Total Score: 7       Therapeutic Activities and Exercises:   pt received PROM BLE x 10 reps in supine. Pt was asleep during treatment and did not wake up.     Patient left supine with all lines intact, call button in reach and RN notified..    GOALS:   Multidisciplinary Problems     Physical Therapy Goals        Problem: Physical Therapy Goal    Goal Priority Disciplines Outcome Goal Variances Interventions   Physical Therapy Goal     PT, PT/OT Ongoing, Progressing     Description: Goals to be met by: 2020     Patient will increase functional independence with mobility by performin. Supine to sit with Minimal Assistance x 1 person.-not met  2. Sit to  stand transfer with Moderate Assistance.-not met  3. Bed to chair transfer with Moderate Assistance. -not met  4. Sitting at edge of bed x10 minutes with CGA.-not met  5. Lower extremity exercise program x 10 reps  with assistance as needed. -not met  6. Pt receive gait training ~ 10 ft with moderate assist- not met                           Time Tracking:     PT Received On: 10/12/20  PT Start Time: 1014     PT Stop Time: 1037  PT Total Time (min): 23 min     2nd PT start time: 11:37  2nd PT end time: 11:47    (23 min+ 10 min = 33 min)     Billable Minutes: Therapeutic Exercise 23 min and Neuromuscular Re-education 10 min    Treatment Type: Treatment(co-treatment with OT)  PT/PTA: PT     PTA Visit Number: 0     Emily Blankenship, PT  10/12/2020

## 2020-10-12 NOTE — PROGRESS NOTES
[facility]  Critical Care - Surgery  Progress Note    Patient Name: Fatou Lorenzo  MRN: [id number]  Admission Date: 8/30/2020  Hospital Length of Stay: 42 days  Code Status: Full Code  Attending Provider: Antoni Waddell MD  Primary Care Provider: Ludin Rivas MD   Principal Problem: Acute respiratory failure with hypoxia    Subjective:     Hospital/ICU Course:  No notes on file    Interval History/Significant Events:   Seen by psychiatry. trial of remeron to augment lexapro. PTT within goals.   UOP: 1538cc. Elevation of WBC to 23. Continues on antibiotics   40mg of lasix give overnight for Low UOP/      Follow-up For: Procedure(s) (LRB):  LARYNGOSCOPY, MICRO SUSPENSION WITH AUGMENTATION (N/A)  VATS, WITH DECORTICATION, LUNG (Right)    Post-Operative Day: 6 Days Post-Op    Objective:     Vital Signs (Most Recent):  Temp: 98 °F (36.7 °C) (10/12/20 0300)  Pulse: 75 (10/12/20 0707)  Resp: (!) 21 (10/12/20 0707)  BP: 136/60 (10/12/20 0630)  SpO2: 99 % (10/12/20 0707) Vital Signs (24h Range):  Temp:  [97.7 °F (36.5 °C)-99.2 °F (37.3 °C)] 98 °F (36.7 °C)  Pulse:  [73-85] 75  Resp:  [12-44] 21  SpO2:  [93 %-100 %] 99 %  BP: (113-164)/(56-83) 136/60     Weight: 86 kg (189 lb 9.5 oz)  Body mass index is 31.55 kg/m².      Intake/Output Summary (Last 24 hours) at 10/12/2020 0714  Last data filed at 10/12/2020 0600  Gross per 24 hour   Intake 2505 ml   Output 1608 ml   Net 897 ml       Physical Exam  Vitals signs and nursing note reviewed.   Constitutional:       Appearance: She is well-developed. She is ill-appearing. She is not diaphoretic.   HENT:      Head: Normocephalic and atraumatic.   Eyes:      Conjunctiva/sclera: Conjunctivae normal.   Neck:      Musculoskeletal: Normal range of motion and neck supple.   Cardiovascular:      Rate and Rhythm: Normal rate and regular rhythm.      Comments: Midline sternotomy with clean, dry, and intact, without evidence of infection  Prior chest tubes sites with  dressings in place, clean and dry  Pulmonary:      Comments: Breathing comfortably on comfort flow  No distress, but rhonchi present and she has a wet cough.  Abdominal:      General: There is no distension.      Palpations: Abdomen is soft.      Tenderness: There is no abdominal tenderness.   Skin:     General: Skin is warm and dry.   Psychiatric:      Comments: Depressed mood, flat affect           Lines/Drains/Airways     Central Venous Catheter Line            Percutaneous Central Line Insertion/Assessment - Triple Lumen  10/05/20 1843 left internal jugular 6 days          Drain                 Urethral Catheter 10/11/20 0550 16 Fr. 1 day         NG/OG Tube 10/11/20 1300 Right nostril less than 1 day                Significant Labs:    CBC/Anemia Profile:  Recent Labs   Lab 10/11/20  0415 10/12/20  0329   WBC 18.86* 23.77*  23.77*   HGB 9.2* 9.0*  9.0*   HCT 30.8* 30.5*  30.5*    198  198   MCV 97 97  97   RDW 17.2* 17.1*  17.1*        Chemistries:  Recent Labs   Lab 10/11/20  0415 10/12/20  0329   * 147*   K 3.7 4.1   * 115*   CO2 23 22*   BUN 43* 44*   CREATININE 1.1 1.2   CALCIUM 8.3* 8.3*   ALBUMIN 2.0* 2.0*   PROT 5.3* 5.4*   BILITOT 1.0 0.9   ALKPHOS 272* 307*   ALT 43 44   AST 37 36   MG 2.1 2.1   PHOS 3.7 2.4*       Coagulation:   Recent Labs   Lab 10/12/20  0329   APTT 64.2*     All pertinent labs within the past 24 hours have been reviewed.    Significant Imaging:  I have reviewed and interpreted all pertinent imaging results/findings within the past 24 hours.       Assessment/Plan:     Severe mitral regurgitation  Fatou Lorenzo is a 75 y.o. female s/p MVr, TVr 9/9/2020. Case noteworthy for multiple pump runs (3) and RV hematoma due to retraction. Unstable overnight 9/18, required pericardial window for evac of posterior cardiac tamponade. Respiratory distress and so re-admitted to SICU on 10/2 now s/p VATS on 10/5.    Neuro:  - More alert and interactive today.  - Seen by  psych - Following recs (started remeron)   - PRN liquid tylenol and IV morphine for pain      CV:  S/p MVr, TVr, MAZE 9/9/20. S/p bedside pericardial window 9/18  - Keep MAPs >60.   - pAF: digoxin 125 mcg.   -Transition back to 12.5 BID Metoprolol via kevin. Dig level 1.6   - Anti-coagulation: asa 81, hep gtt - goal 60-80  - labetalol IV PRN for SBP > 160  - Started on Warfarin today 3mg      Pulm:   - Loculated pleural effusion s/p VATS on 10/5  - Extubated 10/9. Breathing comfortably on RA  - Scheduled duo and saline nebs  - CXR and ABG PRN  - HOB >30 deg    Renal:  - Removed vivas yesterday but had to have this replaced due to urinary retention. Still having a lot of sediment in the urine  - BUN/Cr 38/1 -> 44/1.2  - Continue to monitor UOP   - Fluid positive +897cc   - DC mIVF    FEN/GI:  - Net positive about 39 cc yesterday  - Replace other lytes as needed  - NGT was placed yesterday, No issues   - Famotidine and bowel regimen    Heme/Onc:  - H/H stable 9.2/30.8 -> 9.0/30.5  - No evidence of bleeding  - hep gtt for pAF with aPTT goal of 60-80     ID:   - S/p 7 days of cefepime vanc for enterococcus cloacae UTI. Also has e coli growing from pleural cultures.  - BAL cultures felt to be colonization not infection  - Follow ID recommendations   - Needs 4 weeks course total. Recommended IV Ceftriaxone 2 mg q24 hrs while inpatient but would switch to PO Augmentin 875 BID if discharged prior to completion of therapy.  (Est end date 11/2/20)     - Started on fluconazole for candida in BAL 10/7      Endo:  - no hx of DM  - ISS    PPx:  - famotidine, SCD, hep gtt for pAF, Will start warfarin      Dispo: SICU, can likely step down today, will discuss with primary service       Critical care was time spent personally by me on the following activities: development of treatment plan with patient or surrogate and bedside caregivers, discussions with consultants, evaluation of patient's response to treatment, examination of  patient, ordering and performing treatments and interventions, ordering and review of laboratory studies, ordering and review of radiographic studies, pulse oximetry, re-evaluation of patient's condition.  This critical care time did not overlap with that of any other provider or involve time for any procedures.     Mir Croft MD  Critical Care - Surgery  [facility]

## 2020-10-12 NOTE — PLAN OF CARE
NAEON. Patient afebrile. VSS. NSR. Maintaining MAP > 65 and SBP < 180. Sats > 98% on room air.   CVP 7.     Neuro: PERRLA, AAO x 4, follows commands, moves all extremities.     Gtts:   Heparin @ 1000 units/hr with PTT goal 60-80  D5 1/2 NS 20 Kcl @ 75 mL/hr    Diet:  TF @ 20 mL/hr    Skin: Iodine applied to midline sternal incision. No new breakdown noted to sacrum, heels, or elbows. Foams in place. Patient turned q2h to prevent breakdown.

## 2020-10-12 NOTE — ASSESSMENT & PLAN NOTE
Fatou Lorenzo is a 75 y.o. female s/p MVr, TVr 9/9/2020. Case noteworthy for multiple pump runs (3) and RV hematoma due to retraction. Unstable overnight 9/18, required pericardial window for evac of posterior cardiac tamponade. Respiratory distress and so re-admitted to SICU on 10/2 now s/p VATS on 10/5.    Neuro:  - More alert and interactive today.  - Seen by psych - Following recs   - PRN liquid tylenol and IV morphine for pain      CV:  S/p MVr, TVr, MAZE 9/9/20. S/p bedside pericardial window 9/18  - Keep MAPs >60.   - pAF: digoxin 125 mcg. Had placed on IV metop as did not have enteral access. Will transition back to 12.5 BID via kevin. Dig level 1.6   - Anti-coagulation: asa 81, hep gtt - goal 60-80  - labetalol IV PRN for SBP > 160     Pulm:   - Loculated pleural effusion s/p VATS on 10/5  - Extubated 10/9. Breathing comfortably on RA  - Scheduled duo and saline nebs  - CXR and ABG PRN  - HOB >30 deg    Renal:  - Removed vivas yesterday but had to have this replaced due to urinary retention. Still having a lot of sediment in the urine  - BUN/Cr 38/1 -> 44/1.2  - Continue to monitor UOP as was very minimal yesterday    FEN/GI:  - Net positive about 500 cc yesterday  - Replace other lytes as needed  - Discussed NGT placement yesterday and she is amenable to this now. Will place kevin tube today. Will start tube feeds following this and transition meds to per NGT  - Famotidine and bowel regimen    Heme/Onc:  - H/H stable 9.4/30.7 -> 9.2/30.8  - No evidence of bleeding  - hep gtt for pAF with aPTT goal of 60-80     ID:   - S/p 7 days of cefepime vanc for enterococcus cloacae UTI. Also has e coli growing from pleural cultures.  - BAL cultures felt to be colonization not infection  - Follow ID recommendations   - Needs 4 weeks course total. Recommended IV Ceftriaxone 2 mg q24 hrs while inpatient but would switch to PO Augmentin 875 BID if discharged prior to completion of therapy.  (Est end date 11/2/20)       Endo:  - no hx of DM  - ISS    PPx:  - famotidine, SCD, hep gtt for pAF     Dispo: SICU, can likely step down tomorrow

## 2020-10-12 NOTE — PT/OT/SLP PROGRESS
Occupational Therapy  Co- Treatment Note    Name: Fatou Lorenzo  MRN: [id number]  Admitting Diagnosis:  Acute respiratory failure with hypoxia  7 Days Post-Op    Recommendations:     Discharge Recommendations: rehabilitation facility  Discharge Equipment Recommendations:  (TBD)  Barriers to discharge:  None    Assessment:     Fatou Lorenzo is a 75 y.o. female with a medical diagnosis of Acute respiratory failure with hypoxia.  She was able to perform supine/sit T/F c total assist and has poor static sitting balance.  Was able to tolerate sitting EOB for approx. 10 minutes c total assist.  Pt has a flat affect and appears to be depressed.  Follows simple 1 step commands but did not speak to OT.  Was able to perform grooming task c set-up while sitting EOB.  Required cues to maintain sternal precautions. Performance deficits affecting function are weakness, impaired endurance, impaired self care skills, impaired functional mobilty, impaired balance, decreased upper extremity function, decreased safety awareness, decreased ROM, impaired coordination, impaired fine motor.     Rehab Prognosis:  Good; patient would benefit from acute skilled OT services to address these deficits and reach maximum level of function.       Plan:     Patient to be seen 5 x/week to address the above listed problems via self-care/home management, therapeutic activities, therapeutic exercises, cognitive retraining  · Plan of Care Expires: 11/08/20  · Plan of Care Reviewed with: patient    Subjective     Pain/Comfort:  · Pain Rating 1: (Pt did not rate)   · Co-tx c PT d/t medical complexity and activity tolerance and to maintain pt safety.    Objective:     Communicated with: RN prior to session.  Patient found supine with blood pressure cuff, central line, vivas catheter, peripheral IV, pulse ox (continuous), telemetry upon OT entry to room.    General Precautions: Standard, fall, NPO, aspiration   Orthopedic Precautions:N/A   Braces: N/A      Occupational Performance:     Bed Mobility:    · Patient completed Supine to Sit with total assistance and 2 persons  · Patient completed Sit to Supine with total assistance and 2 persons       · Functional Mobility: Pt was able to tolerate sitting EOB for approx. 10 minutes c total assist.  Has poor static sitting balance.    Activities of Daily Living:  · Grooming: stand by assistance to wash off face and swab out mouth while sitting up on EOB.      AMPAC 6 Click ADL: 7    Treatment & Education:  Pt was able to perform static sitting balance activities 1x10 while sitting up on EOB.  Pt c difficulty initiating trunk control.      Patient left supine with all lines intact, call button in reach and RN notifiedEducation:      GOALS:   Multidisciplinary Problems     Occupational Therapy Goals        Problem: Occupational Therapy Goal    Goal Priority Disciplines Outcome Interventions   Occupational Therapy Goal     OT, PT/OT Ongoing, Progressing    Description: Goals to be met by: 10/23/2020      Patient will increase functional independence with ADLs by performing:    UE Dressing with Minimum Assistance.  Grooming while sitting EOB with Contact Guard Assistance  Toileting from bedside commode with Max Assistance for hygiene and clothing management.   Toilet transfer to bedside commode with Max Assistance.  Supine <> Sit with minimum assistance in preparation for EOB/OOB functional activities  Pt to tolerate static standing for ~1 minute with min A while competing a functional task.                         Time Tracking:     OT Date of Treatment: 10/12/20  OT Start Time: 1110  OT Stop Time: 1138  OT Total Time (min): 28 min    Billable Minutes:Self Care/Home Management JANETT Middleton  10/12/2020

## 2020-10-13 PROBLEM — D62 ACUTE BLOOD LOSS ANEMIA: Status: ACTIVE | Noted: 2020-01-01

## 2020-10-13 NOTE — PLAN OF CARE
OT leilani completed and goals established 10/13/2020  JOSE FRANCISCO Broussard/WYATT  Pager #: [phone]  10/13/2020    Problem: Occupational Therapy Goal  Goal: Occupational Therapy Goal  Description: Goals to be met by: 10/23/2020      Patient will increase functional independence with ADLs by performing:    UE Dressing with Minimum Assistance.  Grooming while sitting EOB with Contact Guard Assistance  Toileting from bedside commode with Max Assistance for hygiene and clothing management.   Toilet transfer to bedside commode with Max Assistance.  Supine <> Sit with minimum assistance in preparation for EOB/OOB functional activities  Pt to tolerate static standing for ~1 minute with min A while competing a functional task.        Outcome: Ongoing, Progressing

## 2020-10-13 NOTE — ASSESSMENT & PLAN NOTE
Fatou Lorenzo is a 75 y.o. female s/p MVr, TVr 9/9/2020. Case noteworthy for multiple pump runs (3) and RV hematoma due to retraction. Unstable overnight 9/18, required pericardial window for evac of posterior cardiac tamponade. Respiratory distress and so re-admitted to SICU on 10/2 now s/p VATS on 10/5.     - Lethargic today   - Seen by psych - Following recs (started remeron)   - PRN liquid tylenol and IV morphine for pain  - Keep MAPs >60.   - pAF: digoxin 125 mcg.   -Transition back to 12.5 BID Metoprolol via kevin.      - Anti-coagulation: asa 81, hep gtt - goal 60-80  - labetalol IV PRN for SBP > 160  - Continue coumadin 3mg (3)   - INR 1.3     - Loculated pleural effusion s/p VATS on 10/5  - Extubated 10/9. Breathing comfortably on RA  - Scheduled duo and saline nebs  - CXR and ABG PRN  - HOB >30 deg     - Removed vivas 10/11 but had to have this replaced due to urinary retention. Still having a lot of sediment in the urine. Will keep for one more day   - BUN/Cr 47/1.2 from 44/1.2  - Continue to monitor UOP   - slightly net neg      - Replace other lytes as needed  - NGT was placed 10/11, No issues   - Tube feeds to be increased 5Ml Q8hr to goal rate at 45mL/hr. Currently running at 35mL/hr.  - Famotidine and bowel regimen     - S/p 7 days of cefepime vanc for enterococcus cloacae UTI. Also has e coli growing from pleural cultures.  - BAL cultures felt to be colonization not infection  - Follow ID recommendations     - Needs 4 weeks course total. Recommended IV Ceftriaxone 2 mg q24 hrs while inpatient but would switch to PO Augmentin 875 BID if discharged prior to completion of therapy.  (Est end date 11/2/20)     - Started on fluconazole for candida in BAL 10/7

## 2020-10-13 NOTE — NURSING
Pt's rhythm changed to afib.   MD Bowers notified. He would like the team to be notified if the rhythm goes into afib with RVR. Will continue to monitor.

## 2020-10-13 NOTE — PROGRESS NOTES
Pharmacokinetic Initial Assessment: IV Vancomycin    Assessment/Plan:  - Initiate intravenous vancomycin 1500mg IV every 24 hours  - Desired empiric serum trough concentration is 10 - 20 mcg/ml  - Draw trough prior to 4th dose (not ordered yet - will see when first dose is hung)  - Pharmacy will continue to follow and monitor vancomycin.      Please contact pharmacy at extension [phone] with any questions regarding this assessment.     Thank you for the consult,   Yenny Frost       Patient brief summary:  Fatou Lorenzo is a 75 y.o. female initiated on antimicrobial therapy with IV Vancomycin for treatment of surgical prophylaxis    Drug Allergies:   Review of patient's allergies indicates:  No Known Allergies    Actual Body Weight:   86 kg    Renal Function:   Estimated Creatinine Clearance: 43.9 mL/min (based on SCr of 1.2 mg/dL).,       CBC (last 72 hours):  Recent Labs   Lab Result Units 10/10/20  0411 10/11/20  0415 10/12/20  0329   WBC K/uL 17.67*  17.67* 18.86* 23.77*  23.77*   Hemoglobin g/dL 9.4*  9.4* 9.2* 9.0*  9.0*   Hematocrit % 30.7*  30.7* 30.8* 30.5*  30.5*   Platelets K/uL 144*  144* 174 198  198   Gran% % 94.0*  94.0* 84.0* 92.0*  92.0*   Lymph% % 2.0*  2.0* 12.0* 5.0*  5.0*   Mono% % 1.0*  1.0* 3.0* 2.0*  2.0*   Eosinophil% % 0.0  0.0 0.0 0.0  0.0   Basophil% % 0.0  0.0 0.0 0.0  0.0   Differential Method  Manual  Manual Manual Manual  Manual       Metabolic Panel (last 72 hours):  Recent Labs   Lab Result Units 10/10/20  0411 10/11/20  0415 10/12/20  0329 10/12/20  1606   Sodium mmol/L 147* 147* 147*  --    Potassium mmol/L 3.8 3.7 4.1  --    Chloride mmol/L 117* 115* 115*  --    CO2 mmol/L 18* 23 22*  --    Glucose mg/dL 72 106 128*  --    BUN, Bld mg/dL 38* 43* 44*  --    Creatinine mg/dL 1.0 1.1 1.2  --    Albumin g/dL 1.9* 2.0* 2.0*  --    Total Bilirubin mg/dL 1.2* 1.0 0.9  --    Alkaline Phosphatase U/L 294* 272* 307*  --    AST U/L 47* 37 36  --    ALT U/L 44 43 44  --     Magnesium mg/dL 2.1 2.1 2.1  --    Phosphorus mg/dL 3.1 3.7 2.4* 3.0       Drug levels (last 3 results):  No results for input(s): VANCOMYCINRA, VANCOMYCINPE, VANCOMYCINTR in the last 72 hours.    Microbiologic Results:  Microbiology Results (last 7 days)     Procedure Component Value Units Date/Time    Culture, Respiratory with Gram Stain [[id number]]  (Abnormal) Collected: 10/07/20 1223    Order Status: Completed Specimen: Respiratory from Bronchial Wash Updated: 10/09/20 1346     Respiratory Culture No S aureus or Pseudomonas isolated.      JAMES LUSITANIAE  Moderate       Gram Stain (Respiratory) <10 epithelial cells per low power field.     Gram Stain (Respiratory) No WBC's     Gram Stain (Respiratory) No organisms seen    Narrative:      Right lung    Culture, Anaerobe [[id number]] Collected: 10/05/20 1513    Order Status: Completed Specimen: Body Fluid from Chest, Right Updated: 10/09/20 1149     Anaerobic Culture No anaerobes isolated    Narrative:      Right pleural effusion    Aerobic culture [[id number]]  (Abnormal)  (Susceptibility) Collected: 10/05/20 1513    Order Status: Completed Specimen: Body Fluid from Chest, Right Updated: 10/07/20 1117     Aerobic Bacterial Culture ESCHERICHIA COLI  Few      Narrative:      Right pleural effusion    AFB Culture & Smear [[id number]] Collected: 10/05/20 1513    Order Status: Completed Specimen: Body Fluid from Chest, Right Updated: 10/06/20 2127     AFB Culture & Smear Culture in progress     AFB CULTURE STAIN No acid fast bacilli seen.    Narrative:      Right pleural effusion    Fungus culture [[id number]] Collected: 10/05/20 1513    Order Status: Completed Specimen: Body Fluid from Chest, Right Updated: 10/06/20 1109     Fungus (Mycology) Culture Culture in progress    Narrative:      Right pleural effusion

## 2020-10-13 NOTE — PT/OT/SLP PROGRESS
Physical Therapy co-Treatment    Patient Name:  Fatou Lorenzo   MRN:  [id number]  Admitting Diagnosis:  Acute respiratory failure with hypoxia   Recent Surgery: Procedure(s) (LRB):  LARYNGOSCOPY, MICRO SUSPENSION WITH AUGMENTATION (N/A)  VATS, WITH DECORTICATION, LUNG (Right) 8 Days Post-Op  Admit Date: 8/30/2020  Length of Stay: 43 days    Recommendations:     Discharge Recommendations:  LTACH (long-term acute care hospital)   Discharge Equipment Recommendations: other (see comments)(tbd pending progress)   Barriers to discharge: current level of assist required    Assessment:     Fatou Lorenzo is a 75 y.o. female admitted with a medical diagnosis of Acute respiratory failure with hypoxia.  She presents with the following impairments/functional limitations:  weakness, impaired endurance, impaired self care skills, impaired functional mobilty, gait instability, decreased upper extremity function, decreased lower extremity function, decreased safety awareness, impaired cardiopulmonary response to activity, impaired fine motor, impaired coordination. Pt with increased lethargy and decreased participation on this date with patient keeping eyes closed for ~90% of session. Pt resisting therapist cueing and positioning while EOB and not following most commands on this date. Pt would benefit from continued therapy at a LTAC level in order to further manage pt's multiple medical commodities as well as perform therapeutic activities/exercises to further improve command following, core strength, and overall strength/mobility. Pt will continue to benefit from skilled PT services during this hospital admit to continue to improve transfer ability and efficiency as well as continue to progress pt's ambulation distance and cardiopulmonary endurance to maximize pt's functional independence and return to PLOF.    Rehab Prognosis: Good; patient would benefit from acute skilled PT services to address these deficits and reach maximum  level of function.    Recent Surgery: Procedure(s) (LRB):  LARYNGOSCOPY, MICRO SUSPENSION WITH AUGMENTATION (N/A)  VATS, WITH DECORTICATION, LUNG (Right) 8 Days Post-Op    Plan:     During this hospitalization, patient to be seen 5 x/week to address the identified rehab impairments via gait training, therapeutic activities, therapeutic exercises, neuromuscular re-education and progress toward the following goals:    · Plan of Care Expires:  11/06/20    Subjective     RN notified prior to session. Daughter-in-law present upon PT entrance into room.    Chief Complaint: Pt with minimal communication during session with eyes closed ~90% of time  Patient/Family Comments/goals: per DIL, get stronger   Pain/Comfort:  Pain Rating 1: 0/10  Pain Rating Post-Intervention 1: 0/10      Objective:     Additional staff present: OT for cotx due to pt's multiple deficits req'ing two skilled therapists to appropriately progress pt's musculoskeletal strength and endurance while appropriately facilitating neuromuscular postural balance and control    Patient found HOB elevated with: central line, NG tube, vivas catheter, oxygen, pressure relief boots, PRAFO, pulse ox (continuous), telemetry   Mental Status: Patient is oriented to AxOx1 (self, unable to gauge additional orientation) and follows ~25% of one step commands. Patient is Lethargic and Flat affect during session.    General Precautions: Standard, Cardiac aspiration, fall, sternal, NPO   Orthopedic Precautions:N/A   Braces: N/A   Body mass index is 30.82 kg/m².  Oxygen Device: Room Air    Outcome Measures:  AM-PAC 6 CLICK MOBILITY  Turning over in bed (including adjusting bedclothes, sheets and blankets)?: 2  Sitting down on and standing up from a chair with arms (e.g., wheelchair, bedside commode, etc.): 1  Moving from lying on back to sitting on the side of the bed?: 2  Moving to and from a bed to a chair (including a wheelchair)?: 1  Need to walk in hospital room?:  1  Climbing 3-5 steps with a railing?: 1  Basic Mobility Total Score: 8     Functional Mobility:    Bed Mobility:   · Rolling/Turning to Left: total assistance  · Rolling/Turning to Right: total assistance   · x2 trials each direction  · Pt required Max A and siderail to maintain sidelying position  · PT focused on bed mobility to decrease caregiver burden as well as promote independence while OT assisted patient with toileting higiene  · Scooting to HOB via supine bridge: total assistance and 2 persons  · Supine to Sit: total assistance, 2 persons and with HOB elevated; from Rt side of bed  · Scooting anteriorly to EOB to have both feet planted on floor: total assistance  · Sit to Supine: total assistance and 2 persons; to Rt side of bed    Sitting Balance at Edge of Bed:   Assistance Level Required: Total Assistance   Time: 11 minutes   Postural deviations noted: slouched posture and rounded shoulders   Encouraged: upright gaze to promote neutral c-sp   Comments:  Neuromuscular facilitation was provided to bilateral QL, periscapular musculature, and c-sp extensors to promote activation of postural musculature. PT focused on facilitation of sitting balance and promotion of postural musculature activation while OT performed therex and ADLs. SLP present partially to focus on PO tolerance while OT assisted with head control for feeding and PT facilitated balance. Pt attempted to lay down multiple times during time EOB and required cueing to maintain sternal precautions.    Transfers/Gait: deferred due to pt's decreased participation and poor sitting balance     Education:   Time provided for education, counseling and discussion of health disposition in regards to patient's current status   All questions answered within PT scope of practice and to patient's satisfaction   PT role in POC to address current functional deficits   Pt educated on proper body mechanics, safety techniques, and energy conservation  with PT facilitation and cueing throughout session   Whiteboard updated with pt's current mobility status documented above    Discussed discharge recommendations as well as different options with daughter-in-law within PT scope. Encouraged DIL to reach out to CM with further questions.    Patient left HOB elevated with all lines intact, call button in reach, RN notified and daughter-in-law present.    GOALS:   Multidisciplinary Problems     Physical Therapy Goals        Problem: Physical Therapy Goal    Goal Priority Disciplines Outcome Goal Variances Interventions   Physical Therapy Goal     PT, PT/OT Ongoing, Progressing     Description: Goals to be met by: 2020     Patient will increase functional independence with mobility by performin. Supine to sit with Minimal Assistance x 1 person.-not met  2. Sit to stand transfer with Moderate Assistance.-not met  3. Bed to chair transfer with Moderate Assistance. -not met  4. Sitting at edge of bed x10 minutes with CGA.-not met  5. Lower extremity exercise program x 10 reps  with assistance as needed. -not met  6. Pt receive gait training ~ 10 ft with moderate assist- not met                         Time Tracking:     PT Received On: 10/13/20  PT Start Time: 48     PT Stop Time: 1020  PT Total Time (min): 32 min       Billable Minutes:   · Therapeutic Activity 15 and Neuromuscular Re-education 10    Treatment Type: Treatment  PT/PTA: PT       Yenny Corral PT, DPT  10/13/2020  Pager: [phone]

## 2020-10-13 NOTE — PLAN OF CARE
Problem: SLP Goal  Goal: SLP Goal  Description: Speech Language Pathology Goals  Goals expected to be met by 10/19  1. Pt to participate in ongoing swallow assessment to determine safest and least restrictive diet.    Outcome: Ongoing, Progressing     Goals remain appropriate. Continue NPO with strict aspiration precautions at this time. ST to continue to monitor.    BURKE Perkins  10/13/2020

## 2020-10-13 NOTE — SUBJECTIVE & OBJECTIVE
Interval History: NAEON. Patient remains very hoarse and difficult to understand. Yoo in place. WBC 24 from 23, remains on IV abx until 11/2 per ID recs. Tube feeds to be increased 5Ml Q8hr to goal rate at 45mL/hr. Currently running at 35mL/hr. LTAC referrals sent and awaiting acceptance.     Review of Systems   Unable to perform ROS: acuity of condition   - Patient very hoarse   Medications:  Continuous Infusions:   heparin (porcine) in 5 % dex 1,000 Units/hr (10/12/20 2203)     Scheduled Meds:   albuterol-ipratropium  3 mL Nebulization Q6H    aspirin  81 mg Per NG tube Daily    cefTRIAXone (ROCEPHIN) IVPB  2 g Intravenous Q24H    digoxin  0.125 mg Per NG tube Daily    famotidine  20 mg Per NG tube Daily    metoprolol  12.5 mg Per NG tube BID    mirtazapine  7.5 mg Per NG tube QHS    polyethylene glycol  17 g Oral Daily    sodium chloride 3%  4 mL Nebulization Q6H    vancomycin (VANCOCIN) IVPB  1,500 mg Intravenous Q24H     PRN Meds:acetaminophen, dextrose 50%, dextrose 50%, glucagon (human recombinant), insulin aspart U-100, labetalol, lidocaine HCL 2%, melatonin, morphine, ondansetron     Objective:     Vital Signs (Most Recent):  Temp: 97.5 °F (36.4 °C) (10/13/20 0724)  Pulse: 96 (10/13/20 1040)  Resp: 20 (10/13/20 0803)  BP: (!) 168/75 (10/13/20 0724)  SpO2: (!) 93 % (10/13/20 0803) Vital Signs (24h Range):  Temp:  [97.4 °F (36.3 °C)-97.7 °F (36.5 °C)] 97.5 °F (36.4 °C)  Pulse:  [73-98] 96  Resp:  [16-24] 20  SpO2:  [93 %-99 %] 93 %  BP: (141-171)/(65-77) 168/75     Weight: 84 kg (185 lb 3 oz)  Body mass index is 30.82 kg/m².    SpO2: (!) 93 %  O2 Device (Oxygen Therapy): room air    Intake/Output - Last 3 Shifts       10/11 0700 - 10/12 0659 10/12 0700 - 10/13 0659 10/13 0700 - 10/14 0659    P.O.  0     I.V. (mL/kg) 2125 (24.7) 649.1 (7.5)     NG/ 1072     IV Piggyback  450     Total Intake(mL/kg) 2505 (29.1) 2171.1 (25.2)     Urine (mL/kg/hr) 1633 (0.8) 1085 (0.5)     Total Output 1633 1085      Net +872 +1086.1                  Lines/Drains/Airways     Central Venous Catheter Line            Percutaneous Central Line Insertion/Assessment - Triple Lumen  10/05/20 1843 left internal jugular 7 days          Drain                 Urethral Catheter 10/11/20 0550 16 Fr. 2 days         NG/OG Tube 10/11/20 1300 Right nostril 1 day                Physical Exam  Vitals signs reviewed.   Constitutional:       General: She is not in acute distress.     Appearance: She is well-developed.   HENT:      Head: Normocephalic and atraumatic.   Eyes:      Pupils: Pupils are equal, round, and reactive to light.   Neck:      Musculoskeletal: Normal range of motion.      Vascular: No JVD.   Cardiovascular:      Rate and Rhythm: Normal rate and regular rhythm.      Pulses: Normal pulses.      Heart sounds: Normal heart sounds. No murmur. No friction rub. No gallop.    Pulmonary:      Effort: Pulmonary effort is normal. No respiratory distress.      Breath sounds: Normal breath sounds.   Abdominal:      General: There is no distension.      Palpations: Abdomen is soft.   Musculoskeletal: Normal range of motion.   Skin:     General: Skin is warm and dry.      Capillary Refill: Capillary refill takes less than 2 seconds.      Coloration: Skin is not pale.      Findings: No erythema.   Neurological:      Mental Status: She is alert and oriented to person, place, and time.   Psychiatric:         Speech: Speech normal.         Behavior: Behavior normal.         Thought Content: Thought content normal.         Judgment: Judgment normal.         Significant Labs:  ABGs: No results for input(s): PH, PCO2, PO2, HCO3, POCSATURATED, BE in the last 48 hours.  Amylase: No results for input(s): AMYLASE in the last 48 hours.  BMP:   Recent Labs   Lab 10/13/20  0443         K 3.5   *   CO2 23   BUN 47*   CREATININE 1.2   CALCIUM 8.4*   MG 2.1     Cardiac markers: No results for input(s): CKMB, CPKMB, TROPONINT, TROPONINI,  MYOGLOBIN in the last 48 hours.  CBC:   Recent Labs   Lab 10/13/20  0443   WBC 24.34*   RBC 3.17*   HGB 9.3*   HCT 30.2*      MCV 95   MCH 29.3   MCHC 30.8*     CMP:   Recent Labs   Lab 10/13/20  0443      CALCIUM 8.4*   ALBUMIN 2.0*   PROT 5.4*      K 3.5   CO2 23   *   BUN 47*   CREATININE 1.2   ALKPHOS 375*   ALT 46*   AST 45*   BILITOT 0.9     Coagulation:   Recent Labs   Lab 10/13/20  0443 10/13/20  0838   INR  --  1.3*   APTT 62.6*  --      Lactic Acid: No results for input(s): LACTATE in the last 48 hours.  LFTs:   Recent Labs   Lab 10/13/20  0443   ALT 46*   AST 45*   ALKPHOS 375*   BILITOT 0.9   PROT 5.4*   ALBUMIN 2.0*     Lipase: No results for input(s): LIPASE in the last 48 hours.    Significant Diagnostics:  I have reviewed all pertinent imaging results/findings within the past 24 hours.

## 2020-10-13 NOTE — PROGRESS NOTES
[facility]  Cardiothoracic Surgery  Progress Note    Patient Name: Fatou Lorenzo  MRN: [id number]  Admission Date: 8/30/2020  Hospital Length of Stay: 43 days  Code Status: Full Code   Attending Physician: Antoni Waddell MD   Referring Provider: Self, Aaareferral  Principal Problem:Acute respiratory failure with hypoxia            Subjective:     Post-Op Info:  Procedure(s) (LRB):  LARYNGOSCOPY, MICRO SUSPENSION WITH AUGMENTATION (N/A)  VATS, WITH DECORTICATION, LUNG (Right)   8 Days Post-Op     Interval History: NAEON. Patient remains very hoarse and difficult to understand. Yoo in place. WBC 24 from 23, remains on IV abx until 11/2 per ID recs. Tube feeds to be increased 5Ml Q8hr to goal rate at 45mL/hr. Currently running at 35mL/hr. LTAC referrals sent and awaiting acceptance. Nurse reports patient sleeping a lot.     Medications:  Continuous Infusions:   heparin (porcine) in 5 % dex 1,000 Units/hr (10/12/20 2203)     Scheduled Meds:   albuterol-ipratropium  3 mL Nebulization Q6H    aspirin  81 mg Per NG tube Daily    cefTRIAXone (ROCEPHIN) IVPB  2 g Intravenous Q24H    digoxin  0.125 mg Per NG tube Daily    famotidine  20 mg Per NG tube Daily    metoprolol  12.5 mg Per NG tube BID    mirtazapine  7.5 mg Per NG tube QHS    polyethylene glycol  17 g Oral Daily    sodium chloride 3%  4 mL Nebulization Q6H    vancomycin (VANCOCIN) IVPB  1,500 mg Intravenous Q24H     PRN Meds:acetaminophen, dextrose 50%, dextrose 50%, glucagon (human recombinant), insulin aspart U-100, labetalol, lidocaine HCL 2%, melatonin, morphine, ondansetron     Objective:     Vital Signs (Most Recent):  Temp: 97.5 °F (36.4 °C) (10/13/20 0724)  Pulse: 96 (10/13/20 1040)  Resp: 20 (10/13/20 0803)  BP: (!) 168/75 (10/13/20 0724)  SpO2: (!) 93 % (10/13/20 0803) Vital Signs (24h Range):  Temp:  [97.4 °F (36.3 °C)-97.7 °F (36.5 °C)] 97.5 °F (36.4 °C)  Pulse:  [73-98] 96  Resp:  [16-24] 20  SpO2:  [93 %-99 %] 93  %  BP: (141-171)/(65-77) 168/75     Weight: 84 kg (185 lb 3 oz)  Body mass index is 30.82 kg/m².    SpO2: (!) 93 %  O2 Device (Oxygen Therapy): room air    Intake/Output - Last 3 Shifts       10/11 0700 - 10/12 0659 10/12 0700 - 10/13 0659 10/13 0700 - 10/14 0659    P.O.  0     I.V. (mL/kg) 2125 (24.7) 649.1 (7.5)     NG/ 1072     IV Piggyback  450     Total Intake(mL/kg) 2505 (29.1) 2171.1 (25.2)     Urine (mL/kg/hr) 1633 (0.8) 1085 (0.5)     Total Output 1633 1085     Net +872 +1086.1                  Lines/Drains/Airways     Central Venous Catheter Line            Percutaneous Central Line Insertion/Assessment - Triple Lumen  10/05/20 1843 left internal jugular 7 days          Drain                 Urethral Catheter 10/11/20 0550 16 Fr. 2 days         NG/OG Tube 10/11/20 1300 Right nostril 1 day                Physical Exam  Vitals signs reviewed.   Constitutional:       General: She is not in acute distress.     Appearance: She is well-developed.   HENT:      Head: Normocephalic and atraumatic.    NG tube   Eyes:      Pupils: Pupils are equal, round, and reactive to light.   Neck:      Musculoskeletal: Normal range of motion.   Cardiovascular:      Rate and Rhythm: Normal rate and regular rhythm.    Midline sternal incision c/d/i   Pulmonary:      Effort: Pulmonary effort is normal. No respiratory distress.   Abdominal:      General: There is no distension.   Skin:     Coloration: Skin is not pale.      Findings: No erythema.     Psychiatric:         Speech: Speech - hoarse         Behavior: Behavior normal.          Significant Labs:  ABGs: No results for input(s): PH, PCO2, PO2, HCO3, POCSATURATED, BE in the last 48 hours.  Amylase: No results for input(s): AMYLASE in the last 48 hours.  BMP:   Recent Labs   Lab 10/13/20  0443         K 3.5   *   CO2 23   BUN 47*   CREATININE 1.2   CALCIUM 8.4*   MG 2.1     Cardiac markers: No results for input(s): CKMB, CPKMB, TROPONINT, TROPONINI,  MYOGLOBIN in the last 48 hours.  CBC:   Recent Labs   Lab 10/13/20  0443   WBC 24.34*   RBC 3.17*   HGB 9.3*   HCT 30.2*      MCV 95   MCH 29.3   MCHC 30.8*     CMP:   Recent Labs   Lab 10/13/20  0443      CALCIUM 8.4*   ALBUMIN 2.0*   PROT 5.4*      K 3.5   CO2 23   *   BUN 47*   CREATININE 1.2   ALKPHOS 375*   ALT 46*   AST 45*   BILITOT 0.9     Coagulation:   Recent Labs   Lab 10/13/20  0443 10/13/20  0838   INR  --  1.3*   APTT 62.6*  --      Lactic Acid: No results for input(s): LACTATE in the last 48 hours.  LFTs:   Recent Labs   Lab 10/13/20  0443   ALT 46*   AST 45*   ALKPHOS 375*   BILITOT 0.9   PROT 5.4*   ALBUMIN 2.0*     Lipase: No results for input(s): LIPASE in the last 48 hours.    Significant Diagnostics:  I have reviewed all pertinent imaging results/findings within the past 24 hours.    Assessment/Plan:     Acute blood loss anemia  - Expected post operatively   - CBC daily     Pleural effusion  - S/P VATS     Adjustment disorder with mixed anxiety and depressed mood  - Psych following     Debility  - Continue with PT/OT  - Awaiting LTAC placement     S/P TVR (tricuspid valve repair)  See s/p MVr    S/P MVR (mitral valve repair)  Fatou Lorenzo is a 75 y.o. female s/p MVr, TVr 9/9/2020. Case noteworthy for multiple pump runs (3) and RV hematoma due to retraction. Unstable overnight 9/18, required pericardial window for evac of posterior cardiac tamponade. Respiratory distress and so re-admitted to SICU on 10/2 now s/p VATS on 10/5.     - Lethargic today   - Seen by psych - Following recs (started remeron)   - PRN liquid tylenol and IV morphine for pain  - Keep MAPs >60.   - pAF: digoxin 125 mcg.   -Transition back to 12.5 BID Metoprolol via kevin.      - Anti-coagulation: asa 81, hep gtt - goal 60-80  - labetalol IV PRN for SBP > 160  - Continue coumadin 3mg (3)   - INR 1.3     - Loculated pleural effusion s/p VATS on 10/5  - Extubated 10/9. Breathing comfortably on RA  -  Scheduled duo and saline nebs  - CXR and ABG PRN  - HOB >30 deg     - Removed vivas 10/11 but had to have this replaced due to urinary retention. Still having a lot of sediment in the urine. Will keep for one more day   - BUN/Cr 47/1.2 from 44/1.2  - Continue to monitor UOP   - slightly net neg      - Replace other lytes as needed  - NGT was placed 10/11, No issues   - Tube feeds to be increased 5Ml Q8hr to goal rate at 45mL/hr. Currently running at 35mL/hr.  - Famotidine and bowel regimen     - S/p 7 days of cefepime vanc for enterococcus cloacae UTI. Also has e coli growing from pleural cultures.  - BAL cultures felt to be colonization not infection  - Follow ID recommendations     - Needs 4 weeks course total. Recommended IV Ceftriaxone 2 mg q24 hrs while inpatient but would switch to PO Augmentin 875 BID if discharged prior to completion of therapy.  (Est end date 11/2/20)     - Started on fluconazole for candida in BAL 10/7           Vocal fold paresis, right  - ENT consulted, underwent microlaryngoscopy/possible vocal fold injection augmentation 10/5  - Still very hoarse       Hypernatremia  - resolved       Hypokalemia  - Replaced IV as patient NPO   - BMP daily to monitor trends    Pericardial effusion with cardiac tamponade  - S/P pericardial window     Leukocytosis  - CBC daily   - On IV abx until 11/2 per ID recs         Essential hypertension  - PRN labetalol     Paroxysmal atrial fibrillation  - Stopped amiodarone with increase in liver enzymes   - Continue Digoxin   - Coumadin         Dispo: CSU. Awaiting d/c to LTAC     Alison Hobbs PA-C  Cardiothoracic Surgery  [facility]

## 2020-10-13 NOTE — SUBJECTIVE & OBJECTIVE
"    Family History     Problem Relation (Age of Onset)    Cancer Mother    Cataracts Sister    No Known Problems Father, Brother, Maternal Aunt, Maternal Uncle, Paternal Aunt, Paternal Uncle, Maternal Grandmother, Maternal Grandfather, Paternal Grandmother, Paternal Grandfather        Tobacco Use    Smoking status: Never Smoker    Smokeless tobacco: Never Used   Substance and Sexual Activity    Alcohol use: No    Drug use: No    Sexual activity: Not Currently     Psychotherapeutics (From admission, onward)    Start     Stop Route Frequency Ordered    10/11/20 2100  mirtazapine tablet 7.5 mg      -- PER NG TUBE Nightly 10/11/20 1853             Objective:     Vital Signs (Most Recent):  Temp: 97.5 °F (36.4 °C) (10/13/20 0724)  Pulse: 96 (10/13/20 1040)  Resp: 20 (10/13/20 0803)  BP: (!) 168/75 (10/13/20 0724)  SpO2: (!) 93 % (10/13/20 0803) Vital Signs (24h Range):  Temp:  [97.4 °F (36.3 °C)-97.7 °F (36.5 °C)] 97.5 °F (36.4 °C)  Pulse:  [73-98] 96  Resp:  [16-24] 20  SpO2:  [93 %-99 %] 93 %  BP: (141-171)/(65-77) 168/75     Height: 5' 5" (165.1 cm)  Weight: 84 kg (185 lb 3 oz)  Body mass index is 30.82 kg/m².      Intake/Output Summary (Last 24 hours) at 10/13/2020 1059  Last data filed at 10/13/2020 1010  Gross per 24 hour   Intake 2001.05 ml   Output 850 ml   Net 1151.05 ml       Physical Exam          Mental Status Exam:  Appearance: lying in bed, fatigued, in hospital gown  MSK: no abnormal involuntary movements  Level of Consciousness: alert, awake  Behavior/Cooperation: normal, cooperative  Psychomotor: unremarkable and within normal limits   Speech: whispered with significant difficulty  Language: english, fluid  Orientation: intact to person, place, time, situation  Attention Span/Concentration: intact  Memory: Intact  Mood: depressed  Affect: constricted  Thought Process: linear, normal and logical  Associations: normal and logical  Thought Content: Denies SI/HI/AVH  Fund of Knowledge: Aware of current " events  Abstraction: did not assess  Insight: good  Judgment: good    Significant Labs:   Last 24 Hours:   Recent Lab Results       10/13/20  0838   10/13/20  0510   10/13/20  0443   10/12/20  1759   10/12/20  1606        Albumin     2.0         Alkaline Phosphatase     375         ALT     46         Anion Gap     9         Aniso     Slight         aPTT     62.6  Comment:  aPTT therapeutic range = 39-69 seconds         AST     45         BANDS     1.0         Basophil%     0.0         BILIRUBIN TOTAL     0.9  Comment:  For infants and newborns, interpretation of results should be based  on gestational age, weight and in agreement with clinical  observations.  Premature Infant recommended reference ranges:  Up to 24 hours.............<8.0 mg/dL  Up to 48 hours............<12.0 mg/dL  3-5 days..................<15.0 mg/dL  6-29 days.................<15.0 mg/dL           BUN, Bld     47         Calcium     8.4         Chloride     113         CO2     23         Creatinine     1.2         Differential Method     Manual         Digoxin Lvl     1.5  Comment:  Toxic:  Adult: >2.5 ng/mL, Pediatric: >3.0 ng/mL           eGFR if      51.1         eGFR if non      44.3  Comment:  Calculation used to obtain the estimated glomerular filtration  rate (eGFR) is the CKD-EPI equation.            Eosinophil%     0.0         Glucose     101         Gran%     94.0         Hematocrit     30.2         Hemoglobin     9.3         Hypo     Occasional         Immature Grans (Abs)     CANCELED  Comment:  Mild elevation in immature granulocytes is non specific and   can be seen in a variety of conditions including stress response,   acute inflammation, trauma and pregnancy. Correlation with other   laboratory and clinical findings is essential.    Result canceled by the ancillary.           Immature Granulocytes     CANCELED  Comment:  Result canceled by the ancillary.         INR 1.3  Comment:  Coumadin  Therapy:  2.0 - 3.0 for INR for all indicators except mechanical heart valves  and antiphospholipid syndromes which should use 2.5 - 3.5.               Lymph%     4.0         Magnesium     2.1         MCH     29.3         MCHC     30.8         MCV     95         Mono%     1.0         MPV     12.1         nRBC     0         Phosphorus     3.0   3.0     Platelet Estimate     Appears normal         Platelets     216         POCT Glucose   106   120       Poik     Slight         Potassium     3.5         PROTEIN TOTAL     5.4         Protime 13.8             RBC     3.17         RDW     16.8         Sodium     145         WBC     24.34                          10/12/20  1156        Albumin       Alkaline Phosphatase       ALT       Anion Gap       Aniso       aPTT       AST       BANDS       Basophil%       BILIRUBIN TOTAL       BUN, Bld       Calcium       Chloride       CO2       Creatinine       Differential Method       Digoxin Lvl       eGFR if        eGFR if non        Eosinophil%       Glucose       Gran%       Hematocrit       Hemoglobin       Hypo       Immature Grans (Abs)       Immature Granulocytes       INR       Lymph%       Magnesium       MCH       MCHC       MCV       Mono%       MPV       nRBC       Phosphorus       Platelet Estimate       Platelets       POCT Glucose 120     Poik       Potassium       PROTEIN TOTAL       Protime       RBC       RDW       Sodium       WBC             Significant Imaging: I have reviewed all pertinent imaging results/findings within the past 24 hours.

## 2020-10-13 NOTE — PROGRESS NOTES
[facility]  Psychiatry  Progress Note    Patient Name: Fatou Lorenzo  MRN: [id number]   Code Status: Full Code  Admission Date: 8/30/2020  Hospital Length of Stay: 43 days  Expected Discharge Date: 10/15/2020  Attending Physician: Antoni Waddell MD  Primary Care Provider: Ludin Rivas MD    Current Legal Status: Uncontested    Patient information was obtained from patient, relative(s) and ER records.       Subjective:     Patient is a 75 y.o., female, presents with:    Principal Problem:Acute respiratory failure with hypoxia    Chief Complaint: depression    HPI: Fatou Lorenzo is a 75 y.o. female with a past psychiatric history of anxiety who [place] [facility] due to Acute respiratory failure with hypoxia. Psychiatry was consulted to address the patient's symptoms of depression.     Per Primary MD:  Fatou Lorenzo is a 75 y.o. female that has a past medical history of Atrial fibrillation with RVR, Cataract, Glaucoma, Heart valve problem, Hyperlipidemia, and Severe mitral regurgitation.  has a past surgical history that includes Ankle surgery; lipoma removal; and Left heart catheterization (Left, 8/25/2020). Presents to [facility] complaining of recurrent shortness of breath.  Patient states it feels like she is having heart failure again.  She was discharged presumably on August 27th (no discharge summary available at this time), 4 days ago for acute respiratory failure with hypoxia secondary to CHF exacerbation.  The patient is typically followed at [place].  Reports 8 years ago was referred to Dr. Waddell, but valvular regurgitation not bad enough at that time to do surgery.  However see has severe tricuspid and mitral valve disease which was confirmed during her previous admission by echocardiogram.  She is having dyspnea with exertion, shortness of breath at rest, and orthopnea.  Worsened with exertion.      Patient now s/p open mitral  valve repair, TC valve repair on 9/9/2020. Patient has had multiple re-intubations for hypoxemic respiratory failure resulting in severe hoarseness and some dysphagia. Patient currently being seen by PT/OT/PM&R for weakness, impaired balance, impaired endurance, impaired cardiopulmonary response to activity, impaired self care skills, decreased coordination, impaired functional mobilty, decreased lower extremity function, gait instability. Per primary team, patient has largely had minimal improvement with rehab to this point and has been very anxious lately and refusing some treatment options. CM spoke with son recently who voiced that patient sounds confused on phone.      Per C-L Psych MD:  When seen today, patient calm and cooperative with interview. AAOx4, son at bedside provides additional collateral. Low volume with some hoarseness noted but able to fully participate in interview. Endorses extensive cardiovascular course over the last few months/years, prior to current hospital presentation. Now endorses having been in a hosptial seting for past few weeks. During this time, endorses worsening depressive symptoms including constant low mood, anhedonia, poor sleep, low energy level, poor appetite, and hopelessness. Endorses worsening hopelessness and feeling like life is not worth living anymore, but denies any active suicidal plan or intent. Denies any previous suicide attempts or past psychiatric admissions. States she has felt like this in the past and was previously started by PCP on Lexapro (unknown dosage). Endorses it being effective and was on it for 5 years, before stopping it 2 years ago. Endorses symptoms of generalized anxiety disorder including easy fatiguability, sleep disturbances, restlessness, and irritability. Endorses having flashbacks, nightmares and hypervigilance related to initially resuscitation attempts made in the past. Denies any AVH or other hallucinations.. Patient does not  demonstrate paranoia, delusions, disorganized thinking or disorganized behavior. Denies any HI. Denies any symptoms of stefany now or in the past. States now the plan is to hopefully attend rehab on Monday which she is optimistic about.    Collateral:   Son at bedside, provides collateral integrated above    Psychiatric Review of Systems-is patient experiencing or having changes in  sleep: yes, poor  appetite: yes, decreased  weight: yes, loss  energy/anergy: yes  interest/pleasure/anhedonia: yes  somatic symptoms: no  anxiety/panic: yes  guilty/hopelessness: yes  concentration: no  S.I.B.s/risky behavior: no  any drugs: no  alcohol: no     Past Psychiatric History:  Previous Medication Trials: yes, lexapro  Previous Psychiatric Hospitalizations: no   Previous Suicide Attempts: no   History of Violence: no  Outpatient Psychiatrist: no    Social History:  Marital Status:   Children: 3   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  Education: did not assess  Special Ed: unknown  Housing Status: with son in [place] [facility]  History of phys/sexual abuse: unknown  Access to gun: no    Substance Abuse History:  Recreational Drugs: denies  Use of Alcohol: occasional, social use  Rehab History: no   Tobacco Use: no    Legal History:  Past Charges/Incarcerations: no   Pending charges: no     Family Psychiatric History:   Denies    Psychosocial Stressors: health  Functioning Relationships: good support system and good relationship with children        Hospital Course: 10/3  This morning the patient is seen lying up right in bed and is suctioning saliva from her mouth with her son at bedside rubbing her feet. Due to her vocal cord swelling she is mostly unable to speak; she tries to whisper to get words out or just mouth words, so her interview is limited mostly to yes/no questions. She is able to nod that she is feeling sad, depressed, and anxious. She nods yes to feelings of hopelessness and wishing she would  "not be here anymore. When asked if she wants to kill herself, she adamantly shakes her head no. She shakes her head no when asking about AVH. When asked if her depression, anxiety, and hopelessness are largely related to being in the hospital, she nods yes. She has not been sleeping well at night and has poor PO intake as well due to her dysphagia. She is amenable to trying mirtazapine for mood and sleep.    10/5/20  Chart reviewed. VSS. No acute events overnight. No psychiatric PRNs required. Since last seen, patient has not been started on mirtazapine. When seen today, no distress noted, patient agreeable and cooperative with interview, but anxious due to scheduled procedure 1 hour after interview. Interview somewhat limited due to speech difficulties. Patient states she is feeling "bad" this morning. Patient reports sleeping yesterday after having received PRN benedryl. Endorses appetite but states she has been kept NPO due to planned provedure. No somatic complaints. Denies any suicidal ideation, but continues to endorse hopelessness, low mood and anxiety.     10/11/  Patient greeted at bedside.  Agreeable to interview.  States that her mood is "good."  States that appetite is diminished from baseline, no concerns regarding sleeping.  She affirms that she had thoughts about death earlier, but denies any current desire to die.  Denies SI/HI/AVH at this time.  States that issues with anxiety persist.  She states that she was looking forward to going outside later today.    10/12/2020  Chart reviewed. No acute events overnight. No psychiatric PRNs required. Patient has been compliant with medications. Tolerating medications without adverse side effects. When seen today, patient agreeable and cooperative with interview. Limited ability to participate due to vocal cord paralysis, mostly nods and shakes head. Does endorse taking mirtazapine yesterday evening and endorses sleep with it. Denies any suicidal ideation " "currently. Denies HI or AVH.     10/13/2020  Chart reviewed. No acute events overnight. No psychiatric PRNs required. Patient has been compliant with medications. Tolerating medications without adverse side effects. When seen today, patient agreeable and cooperative with interview. Limited ability to participate due to vocal cord paralysis, mostly nods and shakes head. Daughter in law at bedside. AAOx3. Does endorse taking mirtazapine yesterday evening and endorses sleep with it, though minimally improved. Endorses passive suicidal ideation now but denies any active plan. Denies HI or AVH. Daughter in law notes some confusion in the past, but improved when seen today.        Family History     Problem Relation (Age of Onset)    Cancer Mother    Cataracts Sister    No Known Problems Father, Brother, Maternal Aunt, Maternal Uncle, Paternal Aunt, Paternal Uncle, Maternal Grandmother, Maternal Grandfather, Paternal Grandmother, Paternal Grandfather        Tobacco Use    Smoking status: Never Smoker    Smokeless tobacco: Never Used   Substance and Sexual Activity    Alcohol use: No    Drug use: No    Sexual activity: Not Currently     Psychotherapeutics (From admission, onward)    Start     Stop Route Frequency Ordered    10/11/20 2100  mirtazapine tablet 7.5 mg      -- PER NG TUBE Nightly 10/11/20 1853             Objective:     Vital Signs (Most Recent):  Temp: 97.5 °F (36.4 °C) (10/13/20 0724)  Pulse: 96 (10/13/20 1040)  Resp: 20 (10/13/20 0803)  BP: (!) 168/75 (10/13/20 0724)  SpO2: (!) 93 % (10/13/20 0803) Vital Signs (24h Range):  Temp:  [97.4 °F (36.3 °C)-97.7 °F (36.5 °C)] 97.5 °F (36.4 °C)  Pulse:  [73-98] 96  Resp:  [16-24] 20  SpO2:  [93 %-99 %] 93 %  BP: (141-171)/(65-77) 168/75     Height: 5' 5" (165.1 cm)  Weight: 84 kg (185 lb 3 oz)  Body mass index is 30.82 kg/m².      Intake/Output Summary (Last 24 hours) at 10/13/2020 1059  Last data filed at 10/13/2020 1010  Gross per 24 hour   Intake 2001.05 ml "   Output 850 ml   Net 1151.05 ml       Physical Exam          Mental Status Exam:  Appearance: lying in bed, fatigued, in hospital gown  MSK: no abnormal involuntary movements  Level of Consciousness: alert, awake  Behavior/Cooperation: normal, cooperative  Psychomotor: unremarkable and within normal limits   Speech: whispered with significant difficulty  Language: english, fluid  Orientation: intact to person, place, time, situation  Attention Span/Concentration: intact  Memory: Intact  Mood: depressed  Affect: constricted  Thought Process: linear, normal and logical  Associations: normal and logical  Thought Content: Denies SI/HI/AVH  Fund of Knowledge: Aware of current events  Abstraction: did not assess  Insight: good  Judgment: good    Significant Labs:   Last 24 Hours:   Recent Lab Results       10/13/20  0838   10/13/20  0510   10/13/20  0443   10/12/20  1759   10/12/20  1606        Albumin     2.0         Alkaline Phosphatase     375         ALT     46         Anion Gap     9         Aniso     Slight         aPTT     62.6  Comment:  aPTT therapeutic range = 39-69 seconds         AST     45         BANDS     1.0         Basophil%     0.0         BILIRUBIN TOTAL     0.9  Comment:  For infants and newborns, interpretation of results should be based  on gestational age, weight and in agreement with clinical  observations.  Premature Infant recommended reference ranges:  Up to 24 hours.............<8.0 mg/dL  Up to 48 hours............<12.0 mg/dL  3-5 days..................<15.0 mg/dL  6-29 days.................<15.0 mg/dL           BUN, Bld     47         Calcium     8.4         Chloride     113         CO2     23         Creatinine     1.2         Differential Method     Manual         Digoxin Lvl     1.5  Comment:  Toxic:  Adult: >2.5 ng/mL, Pediatric: >3.0 ng/mL           eGFR if      51.1         eGFR if non      44.3  Comment:  Calculation used to obtain the estimated  glomerular filtration  rate (eGFR) is the CKD-EPI equation.            Eosinophil%     0.0         Glucose     101         Gran%     94.0         Hematocrit     30.2         Hemoglobin     9.3         Hypo     Occasional         Immature Grans (Abs)     CANCELED  Comment:  Mild elevation in immature granulocytes is non specific and   can be seen in a variety of conditions including stress response,   acute inflammation, trauma and pregnancy. Correlation with other   laboratory and clinical findings is essential.    Result canceled by the ancillary.           Immature Granulocytes     CANCELED  Comment:  Result canceled by the ancillary.         INR 1.3  Comment:  Coumadin Therapy:  2.0 - 3.0 for INR for all indicators except mechanical heart valves  and antiphospholipid syndromes which should use 2.5 - 3.5.               Lymph%     4.0         Magnesium     2.1         MCH     29.3         MCHC     30.8         MCV     95         Mono%     1.0         MPV     12.1         nRBC     0         Phosphorus     3.0   3.0     Platelet Estimate     Appears normal         Platelets     216         POCT Glucose   106   120       Poik     Slight         Potassium     3.5         PROTEIN TOTAL     5.4         Protime 13.8             RBC     3.17         RDW     16.8         Sodium     145         WBC     24.34                          10/12/20  1156        Albumin       Alkaline Phosphatase       ALT       Anion Gap       Aniso       aPTT       AST       BANDS       Basophil%       BILIRUBIN TOTAL       BUN, Bld       Calcium       Chloride       CO2       Creatinine       Differential Method       Digoxin Lvl       eGFR if        eGFR if non        Eosinophil%       Glucose       Gran%       Hematocrit       Hemoglobin       Hypo       Immature Grans (Abs)       Immature Granulocytes       INR       Lymph%       Magnesium       MCH       MCHC       MCV       Mono%       MPV       nRBC        Phosphorus       Platelet Estimate       Platelets       POCT Glucose 120     Poik       Potassium       PROTEIN TOTAL       Protime       RBC       RDW       Sodium       WBC             Significant Imaging: I have reviewed all pertinent imaging results/findings within the past 24 hours.               Scheduled Medications:   albuterol-ipratropium  3 mL Nebulization Q6H    aspirin  81 mg Per NG tube Daily    cefTRIAXone (ROCEPHIN) IVPB  2 g Intravenous Q24H    digoxin  0.125 mg Per NG tube Daily    famotidine  20 mg Per NG tube Daily    metoprolol  12.5 mg Per NG tube BID    mirtazapine  7.5 mg Per NG tube QHS    polyethylene glycol  17 g Oral Daily    sodium chloride 3%  4 mL Nebulization Q6H    vancomycin (VANCOCIN) IVPB  1,500 mg Intravenous Q24H    warfarin  3 mg Oral Once       PRN Medications:  acetaminophen, dextrose 50%, dextrose 50%, glucagon (human recombinant), insulin aspart U-100, labetalol, lidocaine HCL 2%, melatonin, morphine, ondansetron    Review of patient's allergies indicates:  No Known Allergies    Assessment/Plan:     Adjustment disorder with mixed anxiety and depressed mood  Impression    Patient is a 75 year old female with past psychiatric history of depression who initially presented with acute respiratory failure with hypoxia, now s/p MVR and TVR with psychiatry consulted for depression. When seen today, patient calm and cooperative with interview. Endorses multiple symptoms of depression and anxiety, in the context of extended hospital course and poor physical health. Endorses history of depression in the past with significant improvement on Lexapro. Given current QTC(496) as well as poor sleep and appetite, will trial Remeron 7.5 mg instead.     Adjustment disorder with mixed anxiety and depressed mood  R/o PTSD    RECOMMENDATION(S)      1. Scheduled Medication(s):  Continue remeron 7.5 mg PO nightly    2. PRN Medication(s):  None    3.  Monitor:  Please monitor QTc    4.  Legal Status/Precaution(s):  Pt currently does not meet criteria nor benefit from from involuntary inpatient psychiatric admission.     Thank you for the consult. Will sign off.   Case discussed with staff psychiatrist, Dr. Winkler.            Need for Continued Hospitalization:  No need for inpatient psychiatric hospitalization. Continue medical care as per the primary team.    Anticipated Disposition:  Still a Patient    Total time:  25 with greater than 50% of this time spent in counseling and/or coordination of care.       Silvestre Willis MD   Psychiatry  [facility]

## 2020-10-13 NOTE — PT/OT/SLP PROGRESS
Occupational Therapy   Co-Treatment    Name: Fatou Lorenzo  MRN: [id number]  Admitting Diagnosis:  Acute respiratory failure with hypoxia      Pre-op Diagnosis: Respiratory distress [R06.03]  S/P MVR (mitral valve repair) [Z98.890]    Procedure(s):  LARYNGOSCOPY, MICRO SUSPENSION WITH AUGMENTATION  VATS, WITH DECORTICATION, LUNG     Recommendations:     Discharge Recommendations: rehabilitation facility  Discharge Equipment Recommendations:  (TBD)  Barriers to discharge:  (level of skilled assist needed with ADLs and mobility)    Assessment:     Fatou Lorenzo is a 75 y.o. female with a medical diagnosis of Acute respiratory failure with hypoxia. She presents with the following performance deficits affecting function: weakness, impaired endurance, impaired self care skills, impaired functional mobilty, gait instability, impaired balance, decreased coordination, decreased upper extremity function, decreased lower extremity function, decreased safety awareness, decreased ROM, impaired cardiopulmonary response to activity, edema. Patient continues to require significant assist for bed mobility and sitting balance EOB. Patient presented with flat affect, lethargy, made minimal attempts to communicate with therapists, and minimal command following this date. At this time, patient will continue to benefit from acute skilled therapy intervention to address deficits/underlying impairments and progress towards prior level of function. After discharge, patient will benefit from continuing therapy at rehab facility to increase independence in ADLs and functional mobility through skilled balance training and skilled therapeutic exercise to promote safety at home.    Rehab Prognosis:  Good; patient would benefit from acute skilled OT services to address these deficits and reach maximum level of function.       Plan:     Patient to be seen 5 x/week to address the above listed problems via self-care/home management, therapeutic  activities, therapeutic exercises  · Plan of Care Expires: 11/08/20  · Plan of Care Reviewed with: patient(daughter in law)    Subjective     Pain/Comfort:  · Pain Rating 1: 0/10    Objective:     Communicated with: RN prior to session. Patient found HOB elevated with central line, NG tube, vivas catheter, oxygen, pressure relief boots, PRAFO, pulse ox (continuous), telemetry and daughter in law present upon OT entry to room.    PT present for co-tx as appropriate for patient 2* medical complexities and level of skilled assist needed for EOB activity. SLP present for partial co-tx to address swallowing while patient sitting EOB.    General Precautions: Standard, aspiration, fall, sternal, NPO   Orthopedic Precautions:N/A   Braces: N/A     Occupational Performance:     Bed Mobility:    · Patient completed Rolling/Turning to Left with  total assistance  · Patient completed Rolling/Turning to Right with total assistance  · Patient completed Scooting/Bridging with total assistance and 2 persons  · Patient completed Supine to Sit with total assistance and 2 persons  · Patient completed Sit to Supine with total assistance and 2 persons     Functional Mobility/Transfers:  · Not assessed    Balance:   · Sitting: patient tolerated sitting EOB ~11 min with total assist  · Patient presented with flexed posture (rounded shoulders and forward head) requiring max verbal and tactile cues for upright posture  · Standing: not assessed     Activities of Daily Living:  · Toileting: total assistance for hygiene in supine via rolling 2* BM   · Grooming: total assistance to wash face    [facility] 6 Click ADL: 6    Treatment & Education:   Reviewed sternal precautions with patient at start of session.   Therapist provided facilitation and instruction of proper body mechanics, energy conservation, and fall prevention strategies during tasks listed above.   Provided 2x10 B UE elbow flexion/extension PROM exercises sitting EOB.   Educated  patient and daughter-in-law on OT POC and answered all questions within OT scope of practice.   Whiteboard updated    Patient left HOB elevated with all lines intact, call button in reach and daughter-in-law present presentEducation:      GOALS:   Multidisciplinary Problems     Occupational Therapy Goals        Problem: Occupational Therapy Goal    Goal Priority Disciplines Outcome Interventions   Occupational Therapy Goal     OT, PT/OT Ongoing, Progressing    Description: Goals to be met by: 10/23/2020      Patient will increase functional independence with ADLs by performing:    UE Dressing with Minimum Assistance.  Grooming while sitting EOB with Contact Guard Assistance  Toileting from bedside commode with Max Assistance for hygiene and clothing management.   Toilet transfer to bedside commode with Max Assistance.  Supine <> Sit with minimum assistance in preparation for EOB/OOB functional activities  Pt to tolerate static standing for ~1 minute with min A while competing a functional task.                         Time Tracking:     OT Date of Treatment: 10/13/20  OT Start Time: 0950  OT Stop Time: 1020  OT Total Time (min): 30 min    Billable Minutes:Self Care/Home Management 15  Therapeutic Exercise 15    Lorena Cronin OT  10/13/2020

## 2020-10-13 NOTE — ASSESSMENT & PLAN NOTE
- ENT consulted, underwent microlaryngoscopy/possible vocal fold injection augmentation 10/5  - Still very hoarse

## 2020-10-13 NOTE — PLAN OF CARE
Discharge Recommendation: LTAC.    0 goals met today. PT goals appropriate.    Patient is safe to perform bed <> level exercises with nursing staff.    Yenny Corral, PT, DPT  10/13/2020  Pager: [phone]        Problem: Physical Therapy Goal  Goal: Physical Therapy Goal  Description: Goals to be met by: 2020     Patient will increase functional independence with mobility by performin. Supine to sit with Minimal Assistance x 1 person.-not met  2. Sit to stand transfer with Moderate Assistance.-not met  3. Bed to chair transfer with Moderate Assistance. -not met  4. Sitting at edge of bed x10 minutes with CGA.-not met  5. Lower extremity exercise program x 10 reps  with assistance as needed. -not met  6. Pt receive gait training ~ 10 ft with moderate assist- not met          Outcome: Ongoing, Progressing

## 2020-10-13 NOTE — ASSESSMENT & PLAN NOTE
Impression    Patient is a 75 year old female with past psychiatric history of depression who initially presented with acute respiratory failure with hypoxia, now s/p MVR and TVR with psychiatry consulted for depression. When seen today, patient calm and cooperative with interview. Endorses multiple symptoms of depression and anxiety, in the context of extended hospital course and poor physical health. Endorses history of depression in the past with significant improvement on Lexapro. Given current QTC(496) as well as poor sleep and appetite, will trial Remeron 7.5 mg instead.     Adjustment disorder with mixed anxiety and depressed mood  R/o PTSD    RECOMMENDATION(S)      1. Scheduled Medication(s):  Continue remeron 7.5 mg PO nightly    2. PRN Medication(s):  None    3.  Monitor:  Please monitor QTc    4. Legal Status/Precaution(s):  Pt currently does not meet criteria nor benefit from from involuntary inpatient psychiatric admission.     Thank you for the consult. Will sign off.   Case discussed with staff psychiatrist, Dr. Winkler.

## 2020-10-13 NOTE — PLAN OF CARE
Cruz Valencia with [facility] [place] [facility] has reviewed case and sent this AM to [facility] for review/approval.  Will continue to follow.    Ary Schmidt LMSW  [facility]  [id number]

## 2020-10-13 NOTE — PT/OT/SLP PROGRESS
"Speech Language Pathology Treatment    Patient Name:  Fatou Lorenzo   MRN:  [id number]  Admitting Diagnosis: Acute respiratory failure with hypoxia    Recommendations:                 General Recommendations:  Dysphagia therapy and Voice therapy  Diet recommendations:  NPO, Liquid Diet Level: NPO   Aspiration Precautions: Strict aspiration precautions   General Precautions: Standard, aspiration, fall, sternal, NPO  Communication strategies:  provide increased time to answer and go to room if call light pushed    Subjective     Pt alert and awake upon SLP entry, however reluctant to participate with ST. Daughter-in-law present at bedside. Pt with continued requests to turn to other side. PT/OT also present transitioning pt to edge of bed.  Pt aphonic "I don't want to"    Objective:     Has the patient been evaluated by SLP for swallowing?   Yes  Keep patient NPO? Yes   Current Respiratory Status: nasal cannula      Pt seen for ongoing dysphagia therapy. Limited participation noted this service date. Aphonia still evident. Pt with no attempts at voicing with increased intensity despite max encouragement and cueing for automatic speech tasks. ST attempted co-treat session with PT/OT to improve participation with pt in upright position. Pt with purposeful anterior loss of ice chip x1 and thin liquid via tsp x1. No further trials administered 2/2 pt refusal. Pt recommended to remain NPO at this time 2/2 high risk for aspiration. ST to conduct ongoing swallow assessment to determine whether MBSS is warranted. Education provided regarding role of SLP, purpose of PO trials, continued NPO, potential MBSS, and ongoing ST plan of care. Pt's daughter-in-law verbalized understanding and agreement. Pt nodded, however education to be ongoing. ENT cleared pt for vocal adduction exercises via secure chat after visit. ST to continue to monitor.    Assessment:     Fatou Lorenzo is a 75 y.o. female with an SLP diagnosis of Dysphagia and " Aphonia.      Goals:   Multidisciplinary Problems     SLP Goals        Problem: SLP Goal    Goal Priority Disciplines Outcome   SLP Goal     SLP Ongoing, Progressing   Description: Speech Language Pathology Goals  Goals expected to be met by 10/19  1. Pt to participate in ongoing swallow assessment to determine safest and least restrictive diet.                 Plan:     · Patient to be seen:  4 x/week   · Plan of Care expires:  11/08/20  · Plan of Care reviewed with:  patient, daughter   · SLP Follow-Up:  Yes       Discharge recommendations:  rehabilitation facility   Barriers to Discharge:  Level of Skilled Assistance Needed      Time Tracking:     SLP Treatment Date:   10/13/20  Speech Start Time:  0938  Speech Stop Time:  1005     Speech Total Time (min):  27 min (session halted for 10 min 2/2 discussion with MD and hygiene with PT/OT)    Billable Minutes: Treatment Swallowing Dysfunction 9 and Seld Care/Home Management Training 8    BURKE Perkins  10/13/2020

## 2020-10-13 NOTE — PLAN OF CARE
"Plan of care discussed with patient. Patient is free of fall/trauma/injury, fall precautions in place. Denies CP, SOB, or pain/discomfort. MSI cleansed with betadine. K=3.5, 10mEq IV Potassium chloride replaced. Tube feeding running at goal rate of 45mL/hr. 200mL water boluses administered q8. Blood glucose checked q6. Patient turned q2. Patient is depressed. She stated "Why am I still here? I want to go to [facility]". Reassurance provided.  All questions addressed. Will continue to monitor.  "

## 2020-10-13 NOTE — PLAN OF CARE
Spoke with Annie SCHNEIDER, liaison for [facility]. Patient is in peer to peer queue and has not be authorized for LTAC yet.    Peer to peer scheduled with ZULLY Lake tomorrow, October 14 for 11am.      Julie Haase RN  Case Management [phone]

## 2020-10-14 NOTE — PLAN OF CARE
Plan of care notation:    No s/s of pain or distress today.  As noted, pt transferred to SICU today per orders. O2 @ 2L via NC. Hematoma to right flank incision as noted in nursing notation, with Heparin IV stopped per MD order.  Plan per primary surgical team to reevaluate restarting Heparin IV tomorrow.  Wound Care consult placed as noted.  [place] placed by MD today without difficulty, with x-ray confirmation of placement obtained.  Yoo catheter discontinued and new Yoo catheter placed, with urine sent to lab per orders. Infectious Diseases service presents to bedside to assess.  Antibiotics administered per orders. Accu-checks and labs per orders.  Plan of care reviewed with pt and pt's son, and verbalization of understanding obtained. Emotional support offered.

## 2020-10-14 NOTE — SUBJECTIVE & OBJECTIVE
"Interval History: Plan to move patient back to ICU. Patient pulled out keotube again early this AM. Resident aware and will replace. Consider PEG. White count bumped to 34 from 24. Suggest replacing central line once back in unit. Yoo to be removed. Continues on vanc and rocephin. ID re-consulted. Repeat blood cultures, UA, TTE, CXR ordered. Started on IV lasix as patient very edematous this morning. Lethargic on exam. Kept trying to say "I am sorry." Would not answer many questions but when asked if she had a good visit with daughter-in-law yesterday she shook head yes.  Went into rate controlled afib last night. This morning with burst of Vtach. K 3.3 being replaced IV. Told nurse yesterday that she "wants to be with Niall."     Review of Systems   Unable to perform ROS: acuity of condition     Medications:  Continuous Infusions:   heparin (porcine) in 5 % dex 1,000 Units/hr (10/13/20 2304)     Scheduled Meds:   albuterol-ipratropium  3 mL Nebulization Q6H    aspirin  81 mg Per NG tube Daily    cefTRIAXone (ROCEPHIN) IVPB  2 g Intravenous Q24H    digoxin  0.125 mg Per NG tube Daily    famotidine  20 mg Per NG tube Daily    furosemide (LASIX) IV  40 mg Intravenous Q12H    metoprolol  25 mg Per NG tube BID    mirtazapine  7.5 mg Per NG tube QHS    polyethylene glycol  17 g Oral Daily    potassium chloride in water  20 mEq Intravenous BID    sodium chloride 3%  4 mL Nebulization Q6H    vancomycin (VANCOCIN) IVPB  1,500 mg Intravenous Q24H    warfarin  4 mg Oral Once     PRN Meds:acetaminophen, dextrose 50%, dextrose 50%, glucagon (human recombinant), insulin aspart U-100, labetalol, lidocaine HCL 2%, melatonin, morphine, ondansetron     Objective:     Vital Signs (Most Recent):  Temp: 98.4 °F (36.9 °C) (10/14/20 0814)  Pulse: 84 (10/14/20 0952)  Resp: 14 (10/14/20 0814)  BP: 128/66 (10/14/20 0952)  SpO2: (!) 94 % (10/14/20 0814) Vital Signs (24h Range):  Temp:  [96.3 °F (35.7 °C)-98.6 °F (37 °C)] 98.4 " °F (36.9 °C)  Pulse:  [] 84  Resp:  [14-20] 14  SpO2:  [89 %-97 %] 94 %  BP: (128-155)/() 128/66     Weight: 83.5 kg (184 lb)  Body mass index is 30.62 kg/m².    SpO2: (!) 94 %  O2 Device (Oxygen Therapy): room air    Intake/Output - Last 3 Shifts       10/12 0700 - 10/13 0659 10/13 0700 - 10/14 0659 10/14 0700 - 10/15 0659    P.O. 0 0     I.V. (mL/kg) 649.1 (7.5) 240 (2.9)     NG/GT 1072 1035     IV Piggyback 450 300     Total Intake(mL/kg) 2171.1 (25.2) 1575 (18.8)     Urine (mL/kg/hr) 1085 (0.5) 950 (0.5)     Stool  0     Total Output 1085 950     Net +1086.1 +625            Stool Occurrence  1 x           Lines/Drains/Airways     Central Venous Catheter Line            Percutaneous Central Line Insertion/Assessment - Triple Lumen  10/05/20 1843 left internal jugular 8 days          Drain                 Urethral Catheter 10/11/20 0550 16 Fr. 3 days         NG/OG Tube 10/11/20 1300 Right nostril 2 days                Physical Exam  Vitals signs reviewed.   Constitutional:       Appearance: She is well-developed. She is ill-appearing.      Comments: Lethargic    HENT:      Head: Normocephalic and atraumatic.   Eyes:      Pupils: Pupils are equal, round, and reactive to light.   Neck:      Musculoskeletal: Normal range of motion.      Vascular: No JVD.   Cardiovascular:      Rate and Rhythm: Normal rate.      Comments: afib   Central line in place   Pulmonary:      Effort: Pulmonary effort is normal. No respiratory distress.   Abdominal:      General: There is no distension.   Musculoskeletal:         General: Swelling present.   Skin:     Comments: Midline sternal incision c/d/i    Psychiatric:      Comments: Hoarse          Significant Labs:  ABGs: No results for input(s): PH, PCO2, PO2, HCO3, POCSATURATED, BE in the last 48 hours.  Amylase: No results for input(s): AMYLASE in the last 48 hours.  BMP:   Recent Labs   Lab 10/14/20  0430         K 3.3*   *   CO2 22*   BUN 53*    CREATININE 1.2   CALCIUM 8.4*   MG 2.1     Cardiac markers: No results for input(s): CKMB, CPKMB, TROPONINT, TROPONINI, MYOGLOBIN in the last 48 hours.  CBC:   Recent Labs   Lab 10/14/20  0430   WBC 34.24*   RBC 3.07*   HGB 9.1*   HCT 28.7*      MCV 94   MCH 29.6   MCHC 31.7*     CMP:   Recent Labs   Lab 10/14/20  0430      CALCIUM 8.4*   ALBUMIN 2.0*   PROT 5.6*      K 3.3*   CO2 22*   *   BUN 53*   CREATININE 1.2   ALKPHOS 380*   ALT 48*   AST 39   BILITOT 0.8     Coagulation:   Recent Labs   Lab 10/14/20  0430   INR 1.5*   APTT 64.1*     Lactic Acid: No results for input(s): LACTATE in the last 48 hours.  LFTs:   Recent Labs   Lab 10/14/20  0430   ALT 48*   AST 39   ALKPHOS 380*   BILITOT 0.8   PROT 5.6*   ALBUMIN 2.0*     Lipase: No results for input(s): LIPASE in the last 48 hours.    Significant Diagnostics:  TTE and CXR ordered

## 2020-10-14 NOTE — NURSING
Pt received to SICU [id number] via bed.  Dr. Croft notified of pt's arrival and presents to bedside to assess. Upon inspection of skin integrity, hematoma (ecchymotic, firm and raised) with moderate amount of sanguinous drainage noted to R lateral chest incision (right flank area).  Heparin IV stopped per Dr. Croft's order secondary to hematoma and for planned Daphne placement.  Wound care consult placed by Dr. Croft.  Pt drowsy, communicates in whispering tones, and is oriented x4.  Bed in low and locked position.   Pt turned and heels elevated (heels elevated w/ boots), with foam dressing to sacrum.  Pt's son presents to bedside, and plan of care reviewed with questions answered.  Pt's son verbalizes understanding. Emotional support offered.

## 2020-10-14 NOTE — NURSING
Dr Oates notified that Ke feeding tube pulled out by pt - pt states it was an accident;  Dr Oates ordered replacing tube, once received informed Dr Oates that she would need to insert tube.  She agreed but was busy at present and would be here asap.

## 2020-10-14 NOTE — SUBJECTIVE & OBJECTIVE
Past Medical History:   Diagnosis Date    Atrial fibrillation with RVR 8/24/2020    Cataract     Essential hypertension 8/31/2020    Glaucoma     Heart valve problem     Hyperlipidemia     Severe mitral regurgitation 8/24/2020       Past Surgical History:   Procedure Laterality Date    ANKLE SURGERY      BAIN MAZE PROCEDURE N/A 9/9/2020    Procedure: BAIN MAZE PROCEDURE;  Surgeon: Antoni Waddell MD;  [facility];  Service: Cardiothoracic;  Laterality: N/A;  MAZE     LARYNGOSCOPY N/A 10/5/2020    Procedure: LARYNGOSCOPY, MICRO SUSPENSION WITH AUGMENTATION;  Surgeon: Yfn Mendoza MD;  [facility];  Service: ENT;  Laterality: N/A;    LEFT HEART CATHETERIZATION Left 8/25/2020    Procedure: Left heart cath, radial;  Surgeon: Marlee Carrillo MD;  Location: [facility] CATH LAB;  Service: Cardiology;  Laterality: Left;    lipoma removal      THORACOSCOPIC DECORTICATION OF LUNG Right 10/5/2020    Procedure: VATS, WITH DECORTICATION, LUNG;  Surgeon: Jeremy Shine MD;  [facility];  Service: Thoracic;  Laterality: Right;    TRICUSPID VALVULOPLASTY N/A 9/9/2020    Procedure: REPAIR, TRICUSPID VALVE;  Surgeon: Antoni Waddell MD;  [facility];  Service: Cardiothoracic;  Laterality: N/A;       Review of patient's allergies indicates:  No Known Allergies    Medications:  Medications Prior to Admission   Medication Sig    metoprolol tartrate (LOPRESSOR) 25 MG tablet Take 1 tablet (25 mg total) by mouth 3 (three) times daily.    amiodarone (PACERONE) 400 MG tablet Take two tablets by mouth twice daily for twelve days, then take one tablet by mouth daily.    apixaban (ELIQUIS) 5 mg Tab Take 1 tablet (5 mg total) by mouth 2 (two) times daily.    furosemide (LASIX) 40 MG tablet Take 1 tablet (40 mg total) by mouth 2 (two) times daily.    losartan (COZAAR) 50 MG tablet Take 0.5 tablets (25 mg total) by mouth once daily.    pravastatin (PRAVACHOL) 80  MG tablet Take 80 mg by mouth once daily.     Antibiotics (From admission, onward)    Start     Stop Route Frequency Ordered    10/14/20 1400  ceFEPIme injection 2 g      -- IV Every 12 hours (non-standard times) 10/14/20 1324    10/13/20 0100  vancomycin 1.5 g in dextrose 5 % 250 mL IVPB (ready to mix)      -- IV Every 24 hours (non-standard times) 10/12/20 2358        Antifungals (From admission, onward)    Start     Stop Route Frequency Ordered    10/14/20 1430  fluconazole (DIFLUCAN) IVPB 200 mg 100 mL      -- IV Every 24 hours (non-standard times) 10/14/20 1323        Antivirals (From admission, onward)    None           Immunization History   Administered Date(s) Administered    PPD Test 09/24/2020       Family History     Problem Relation (Age of Onset)    Cancer Mother    Cataracts Sister    No Known Problems Father, Brother, Maternal Aunt, Maternal Uncle, Paternal Aunt, Paternal Uncle, Maternal Grandmother, Maternal Grandfather, Paternal Grandmother, Paternal Grandfather        Social History     Socioeconomic History    Marital status:      Spouse name: Not on file    Number of children: Not on file    Years of education: Not on file    Highest education level: Not on file   Occupational History    Not on file   Social Needs    Financial resource strain: Not on file    Food insecurity     Worry: Not on file     Inability: Not on file    Transportation needs     Medical: Not on file     Non-medical: Not on file   Tobacco Use    Smoking status: Never Smoker    Smokeless tobacco: Never Used   Substance and Sexual Activity    Alcohol use: No    Drug use: No    Sexual activity: Not Currently   Lifestyle    Physical activity     Days per week: Not on file     Minutes per session: Not on file    Stress: Not on file   Relationships    Social connections     Talks on phone: Not on file     Gets together: Not on file     Attends Anabaptist service: Not on file     Active member of club or  organization: Not on file     Attends meetings of clubs or organizations: Not on file     Relationship status: Not on file   Other Topics Concern    Not on file   Social History Narrative    Not on file     Review of Systems   Constitutional: Positive for fatigue. Negative for activity change, appetite change, chills, diaphoresis and fever.   HENT: Negative.    Eyes: Negative.    Respiratory: Positive for shortness of breath. Negative for cough and wheezing.    Cardiovascular: Positive for leg swelling. Negative for chest pain and palpitations.   Gastrointestinal: Negative for abdominal pain, constipation, diarrhea, nausea and vomiting.   Genitourinary: Negative for dysuria and flank pain.        Yoo   Musculoskeletal: Negative for arthralgias, back pain and neck pain.   Skin: Negative for wound.   Neurological: Negative for dizziness and headaches.   Psychiatric/Behavioral: Negative for agitation.     Objective:     Vital Signs (Most Recent):  Temp: 98.9 °F (37.2 °C) (10/14/20 1148)  Pulse: 93 (10/14/20 1435)  Resp: (!) 29 (10/14/20 1435)  BP: (!) 140/65 (10/14/20 1440)  SpO2: (!) 93 % (10/14/20 1435) Vital Signs (24h Range):  Temp:  [96.3 °F (35.7 °C)-98.9 °F (37.2 °C)] 98.9 °F (37.2 °C)  Pulse:  [] 93  Resp:  [14-31] 29  SpO2:  [91 %-100 %] 93 %  BP: (128-173)/() 140/65     Weight: 83.5 kg (184 lb)  Body mass index is 30.62 kg/m².    Estimated Creatinine Clearance: 43.2 mL/min (based on SCr of 1.2 mg/dL).    Physical Exam  Vitals signs and nursing note reviewed.   Constitutional:       Appearance: She is well-developed. She is ill-appearing. She is not diaphoretic.      Comments: Lethargic    HENT:      Head: Normocephalic and atraumatic.      Nose:      Comments: Feeding tube       Mouth/Throat:      Mouth: Mucous membranes are moist.      Pharynx: Oropharynx is clear. No oropharyngeal exudate.   Eyes:      General: No scleral icterus.     Conjunctiva/sclera: Conjunctivae normal.   Neck:       Musculoskeletal: Normal range of motion.      Vascular: No JVD.   Cardiovascular:      Rate and Rhythm: Normal rate. Rhythm irregular.      Heart sounds: No murmur.      Comments: afib   Central line in place   Pulmonary:      Effort: Tachypnea and accessory muscle usage present.      Comments: Coarse upper airway sounds.   Decreased breath sounds bilaterally ( ? Poor inspiratory effort?)  Abdominal:      General: There is no distension.      Palpations: Abdomen is soft.      Tenderness: There is no abdominal tenderness.   Musculoskeletal:         General: No tenderness.      Right lower leg: Edema present.      Left lower leg: Edema present.      Comments: No calf tenderness, warmth, erythema   Skin:     General: Skin is warm and dry.      Coloration: Skin is pale.      Findings: No rash.      Comments: Sternal incision c/d/i.  No erythema/drainage    Left IJ catheter - site clear    Oozing hematoma right upper back.  See photo below taken by Critical Care Team   Neurological:      Mental Status: She is lethargic.      Comments: Alert, answers questions, but very lethargic         Significant Labs:   Blood Culture:   Recent Labs   Lab 08/23/20  0149 08/23/20  0203   LABBLOO No growth after 5 days. No growth after 5 days.     CBC:   Recent Labs   Lab 10/13/20  0443 10/14/20  0430   WBC 24.34* 34.24*   HGB 9.3* 9.1*   HCT 30.2* 28.7*    253     CMP:   Recent Labs   Lab 10/13/20  0443 10/14/20  0430 10/14/20  1245    145  --    K 3.5 3.3* 3.8   * 112*  --    CO2 23 22*  --     107  --    BUN 47* 53*  --    CREATININE 1.2 1.2  --    CALCIUM 8.4* 8.4*  --    PROT 5.4* 5.6*  --    ALBUMIN 2.0* 2.0*  --    BILITOT 0.9 0.8  --    ALKPHOS 375* 380*  --    AST 45* 39  --    ALT 46* 48*  --    ANIONGAP 9 11  --    EGFRNONAA 44.3* 44.3*  --      Urine Culture:   Recent Labs   Lab 08/23/20  0228 09/29/20  0854   LABURIN No significant growth ENTEROBACTER CLOACAE  >100,000 cfu/ml  *     Urine Studies:    Recent Labs   Lab 08/23/20  2249  10/01/20  1618   COLORU Colorless*   < > Red*   APPEARANCEUA Clear   < > Cloudy*   PHUR 5.0   < > 5.0   SPECGRAV 1.005   < > 1.020   PROTEINUA Negative   < > 2+*   GLUCUA Negative   < > Negative   KETONESU Negative   < > Negative   BILIRUBINUA Negative   < > Negative   OCCULTUA 1+*   < > 2+*   NITRITE Negative   < > Negative   UROBILINOGEN Negative  --   --    LEUKOCYTESUR Trace*   < > Trace*   RBCUA 4   < > >100*   WBCUA 4   < > >100*   BACTERIA Occasional   < > Many*   SQUAMEPITHEL 1  --   --    HYALINECASTS 1   < > 0    < > = values in this interval not displayed.     Wound Culture:   Recent Labs   Lab 10/05/20  1513   LABAERO ESCHERICHIA COLI  Few  *       Significant Imaging: I have reviewed all pertinent imaging results/findings within the past 24 hours.

## 2020-10-14 NOTE — NURSING
Unable to give am PO meds d/t NG tube being pulled out last night. Notified Alison Hobbs PA-C. No new orders given. Advised pt may need a peg tube. Will continue to monitor.

## 2020-10-14 NOTE — ASSESSMENT & PLAN NOTE
Fatou Lorenzo is a 75 y.o. female s/p MVr, TVr 9/9/2020. Case noteworthy for multiple pump runs (3) and RV hematoma due to retraction. Unstable overnight 9/18, required pericardial window for evac of posterior cardiac tamponade. Respiratory distress and so re-admitted to SICU on 10/2 now s/p VATS on 10/5.     - Remain lethargic and appears ill today   - Short burst of Vtach this AM per nurse   - Seen by psych - Following recs (started remeron)   - PRN liquid tylenol and IV morphine for pain  - Keep MAPs >60.   - pAF: digoxin 125 mcg.   - increase metop to 25 bid      - Anti-coagulation: asa 81, hep gtt - goal 60-80  - labetalol IV PRN for SBP > 160  - Continue coumadin 4mg tonight (3,3)   - INR 1.5      - Loculated pleural effusion s/p VATS on 10/5  - Extubated 10/9. Breathing comfortably on RA  - Scheduled duo and saline nebs  - CXR and ABG PRN  - HOB >30 deg     - Removed vivas 10/11 but had to have this replaced due to urinary retention. Orders to remove today and place pure wick   - BUN/Cr 53/1.2 from 47/1.2   - Started on lasix. Patient very edematous    - Net +      - Replace other lytes as needed  - Tube feeds to be increased 5Ml Q8hr to goal rate at 45mL/hr. Currently on pause as patient has pulled her kevin-tube   - Famotidine and bowel regimen     - BAL cultures felt to be colonization not infection  - Follow ID recommendations     - Needs 4 weeks course total. Recommended IV Ceftriaxone 2 mg q24 hrs while inpatient but would switch to PO Augmentin 875 BID if discharged prior to completion of therapy.  (Est end date 11/2/20). Also on vanc Q24H      - Spoke with ID yesterday who recommend again fluconazole for candida in BAL 10/7 as thought to be contaminant     - Move patient back to ICU

## 2020-10-14 NOTE — ASSESSMENT & PLAN NOTE
75 y.o. female w/ history of Atrial fibrillation with RVR, HLD and severe mitral regurgitation who is s/p MV repair and TV annuloplasty on 9/9, complicated by posterior cardiac tamponade s/p pericardial window on 9/18, UTI (E cloacae, tx X 7 days with cefepime), and large right pleural effusion with multiple loculations s/p thoracentesis ( no cx data) and VATS with limited decortication on 10/5 with cx + for E Coli, s/p BAL 10/7 (candida lusitanae thought to be colonizer), who was last seen by ID on 10/9 who recommended 4 weeks of antibiotics post VATS (IV ceftriaxone while inpatient). Right pleural effusion improved on imaging and leukocytosis resolved.    ID is now reconsulted for re-current leukocytosis (WBC 11>>34).      Patient is afebrile.  Increased somnolence and increased work of breathing today and transferred to ICU for closer monitoring.   She denies subjective fevers, chills, or sweats.  + SOB.  Denies abdominal pain, n/v.  No diarrhea.  Denies joint pain, back pain, calf pain/tenderness.    Left IJ central line c/d/i.  Oozing hematoma noted to right upper back.  No other obvious skin breakdown.  Some coarse upper airway sounds, ? Decreased breath sounds but poor inspiratory effort.  She is tachynpneic. O2 sats 91-97% on room air earlier today.       Blood cultures 10/14 pending.  Yoo exchanged this afternoon, U/A negative (3 WBC).   2D echo negative for vegetation     CXR shows moderate right, mild left pleural effusions with bibasilar atelectasis or consolidation, similar to prior study.  Diffuse bilateral interstitial prominence slightly increased     Has been on IV ceftriaxone.  Vancomycin started 10/12.  Ceftriaxone broadened to cefepime on transfer to ICU today and fluconazole added.     Plan/recommendations  1.  Continue IV vancomycin (pharmacy to dose).  Trough goal 15-20.  2.  Agree with broadening to  cefepime from ceftriaxone  3.  Continue  fluconazole for now (though suspect candida  from prior BAL is colonizer)  4.  Respiratory culture if able to obtain.   5.  Will follow blood cultures, trend WBC, and follow up tomorrow with further recommendations.     Data reviewed and plan discussed with ID staff

## 2020-10-14 NOTE — PT/OT/SLP DISCHARGE
Occupational Therapy Discharge Summary    Fatou Lorenzo  MRN: [id number]   Principal Problem: Acute respiratory failure with hypoxia      Patient Discharged from acute Occupational Therapy on 10/14/2020 2* transfer to ICU. Please refer to prior OT note dated 10/14/2020 for functional status.    Assessment:     Patient transferred to ICU for higher level of care after patient pulled keotube this a.m.; therfore, OT orders dicontinued. Patient will need new therapy orders when medically appropriate for re-eval.    Objective:     GOALS:   Multidisciplinary Problems     Occupational Therapy Goals        Problem: Occupational Therapy Goal    Goal Priority Disciplines Outcome Interventions   Occupational Therapy Goal     OT, PT/OT Ongoing, Progressing    Description: Goals to be met by: 10/23/2020      Patient will increase functional independence with ADLs by performing:    UE Dressing with Minimum Assistance.  Grooming while sitting EOB with Contact Guard Assistance  Toileting from bedside commode with Max Assistance for hygiene and clothing management.   Toilet transfer to bedside commode with Max Assistance.  Supine <> Sit with minimum assistance in preparation for EOB/OOB functional activities  Pt to tolerate static standing for ~1 minute with min A while competing a functional task.                         Reasons for Discontinuation of Therapy Services  Transfer to ICU      Plan:     Patient Discharged to: ICU    Lorena Cronin OT  10/14/2020

## 2020-10-14 NOTE — PROGRESS NOTES
Patient seen on morning rounds. She seemed quite somnolent. She was also somnolent . I am concerned about respiratory compromise. We will plan to move her back to the intensive care unit.

## 2020-10-14 NOTE — H&P
[facility]  General Surgery  History & Physical    Patient Name: Fatou Lorenzo  MRN: [id number]  Admission Date: 8/30/2020  Attending Physician: Antoni Waddell MD   Primary Care Provider: Ludin Rivas MD    Subjective:     History of Present Illness:  74 yo female with extensive cardiac hx s/p Mvr, Tvr, MAZE 9/09/2020 with complicated post-operative course including protracted ICU stay, reintubation, pericardial window for cardiac tamponade, new arrythmia, JONAH. She was eventually stepped down but readmitted to SICU shortly after for increasing oxygen requirements, found to have worsening right sided pleural effusion. IR US showed multiple loculations; thoracentesis able to drain 60 cc of fluid.     Thoracic surgery was consulted, VATS / decortication was done on 10/05/20, a multiloculated pleural effusion was found, IV abx for E. Coli in pleural fluid. Patient was treated with antibiotics and was stepdown on 10/12/20, however she had an increase white count overnight and was more lethargic so she was stepped up to the ICU.     Morning WBC bumped to 24. Yoo was exchanged, UA, and blood cultures were ordered. ID was consulted. Off note he has a history of depression and is being followed by psychiatry.       No current facility-administered medications on file prior to encounter.      Current Outpatient Medications on File Prior to Encounter   Medication Sig    metoprolol tartrate (LOPRESSOR) 25 MG tablet Take 1 tablet (25 mg total) by mouth 3 (three) times daily.    amiodarone (PACERONE) 400 MG tablet Take two tablets by mouth twice daily for twelve days, then take one tablet by mouth daily.    apixaban (ELIQUIS) 5 mg Tab Take 1 tablet (5 mg total) by mouth 2 (two) times daily.    furosemide (LASIX) 40 MG tablet Take 1 tablet (40 mg total) by mouth 2 (two) times daily.    losartan (COZAAR) 50 MG tablet Take 0.5 tablets (25 mg total) by mouth once daily.    pravastatin (PRAVACHOL) 80  MG tablet Take 80 mg by mouth once daily.       Review of patient's allergies indicates:  No Known Allergies    Past Medical History:   Diagnosis Date    Atrial fibrillation with RVR 8/24/2020    Cataract     Essential hypertension 8/31/2020    Glaucoma     Heart valve problem     Hyperlipidemia     Severe mitral regurgitation 8/24/2020     Past Surgical History:   Procedure Laterality Date    ANKLE SURGERY      BAIN MAZE PROCEDURE N/A 9/9/2020    Procedure: BAIN MAZE PROCEDURE;  Surgeon: Antoni Waddell MD;  Location: [facility] OR [address] [place] [address];  Service: Cardiothoracic;  Laterality: N/A;  MAZE     LARYNGOSCOPY N/A 10/5/2020    Procedure: LARYNGOSCOPY, MICRO SUSPENSION WITH AUGMENTATION;  Surgeon: Yfn Mendoza MD;  Location: [facility] OR [address] [place] [address];  Service: ENT;  Laterality: N/A;    LEFT HEART CATHETERIZATION Left 8/25/2020    Procedure: Left heart cath, radial;  Surgeon: Marlee Carrillo MD;  Location: [facility] CATH LAB;  Service: Cardiology;  Laterality: Left;    lipoma removal      THORACOSCOPIC DECORTICATION OF LUNG Right 10/5/2020    Procedure: VATS, WITH DECORTICATION, LUNG;  Surgeon: Jeremy Shine MD;  Location: [facility] OR [address] [place] [address];  Service: Thoracic;  Laterality: Right;    TRICUSPID VALVULOPLASTY N/A 9/9/2020    Procedure: REPAIR, TRICUSPID VALVE;  Surgeon: Antoni Waddell MD;  Location: [facility] OR [address] [place] [address];  Service: Cardiothoracic;  Laterality: N/A;     Family History     Problem Relation (Age of Onset)    Cancer Mother    Cataracts Sister    No Known Problems Father, Brother, Maternal Aunt, Maternal Uncle, Paternal Aunt, Paternal Uncle, Maternal Grandmother, Maternal Grandfather, Paternal Grandmother, Paternal Grandfather        Tobacco Use    Smoking status: Never Smoker    Smokeless tobacco: Never Used   Substance and Sexual Activity    Alcohol use: No    Drug use: No    Sexual activity: Not Currently     Review of Systems  Objective:     Vital Signs (Most Recent):  Temp: 98.9 °F (37.2 °C)  (10/14/20 1148)  Pulse: 91 (10/14/20 1148)  Resp: 14 (10/14/20 1148)  BP: 133/70 (10/14/20 1148)  SpO2: (!) 94 % (10/14/20 1148) Vital Signs (24h Range):  Temp:  [96.3 °F (35.7 °C)-98.9 °F (37.2 °C)] 98.9 °F (37.2 °C)  Pulse:  [] 91  Resp:  [14-20] 14  SpO2:  [91 %-97 %] 94 %  BP: (128-155)/() 133/70     Weight: 83.5 kg (184 lb)  Body mass index is 30.62 kg/m².        Physical Exam   Physical Exam  Vitals signs reviewed.   Constitutional:       Appearance: She is well-developed. She is ill-appearing.      Comments: Lethargic    HENT:      Head: Normocephalic and atraumatic.   Eyes:      Pupils: Pupils are equal, round, and reactive to light.   Neck:      Musculoskeletal: Normal range of motion.      Vascular: No JVD.   Cardiovascular:      Rate and Rhythm: Normal rate.      Comments: afib   Central line in place   Pulmonary:      Effort: Pulmonary effort is normal. No respiratory distress.   Tachyphemic. On NC. Hematoma on the R side where previous chest site was.    Abdominal:      General: There is no distension.   Musculoskeletal:         General: Swelling present.   Skin:     Comments: Midline sternal incision c/d/i    Psychiatric:      Comments: Hoarse      Significant Labs:  CBC:   Recent Labs   Lab 10/14/20  0430   WBC 34.24*   RBC 3.07*   HGB 9.1*   HCT 28.7*      MCV 94   MCH 29.6   MCHC 31.7*     BMP:   Recent Labs   Lab 10/14/20  0430         K 3.3*   *   CO2 22*   BUN 53*   CREATININE 1.2   CALCIUM 8.4*   MG 2.1       Significant Diagnostics:  I have reviewed and interpreted all pertinent imaging results/findings within the past 24 hours.    Assessment/Plan:     Fatou Lorenzo is a 75 y.o. female s/p MVr, TVr 9/9/2020. Case noteworthy for multiple pump runs (3) and RV hematoma due to retraction. Unstable 9/18, required pericardial window for evac of posterior cardiac tamponade. Respiratory distress and so re-admitted to SICU on 10/2 now s/p VATS on 10/5.    Neuro:  -  lethargic today.  - Seen by psych - Following recs (started remeron)   - PRN liquid tylenol and IV morphine for pain      CV:  S/p MVr, TVr, MAZE 9/9/20. S/p bedside pericardial window 9/18  - Keep MAPs >60.   - pAF: digoxin 125 mcg.   -Transition back to 12.5 BID Metoprolol via makenzie.   - Follow Dig level      - Anti-coagulation: asa 81, hep gtt - goal 60-80  - labetalol IV PRN for SBP > 160  - Currently on Warfarin, last INR  1.5     Pulm:   - Loculated pleural effusion s/p VATS on 10/5  - Extubated 10/9. Breathing comfortably on RA  - Scheduled duo and saline nebs  - CXR and ABG PRN  - HOB >30 deg     Renal:  - Exchange vivas  - BUN/Cr  44/1.2 --> 53/1.3  - Continue to monitor UOP      FEN/GI:  - Net positive about 625 cc yesterday  - Lower extremity edema   - Replace other lytes as needed  - Will replace MAKENZIE  - Famotidine and bowel regimen     Heme/Onc:  - H/H stable   - No evidence of bleeding  - hep gtt for pAF with aPTT goal of 60-80     ID:   - S/p 7 days of cefepime vanc for enterococcus cloacae UTI. Also has e coli growing from pleural cultures.  - Follow ID recommendations pending      - Started on Cefepime fluconazole re-started toady 10/14/20. Candida on BAL       Endo:  - no hx of DM  - ISS     PPx:  - famotidine, SCD, holding hep gtt and warfarin for now     Dispo: Mir PRESTON MD  General Surgery  [facility]

## 2020-10-14 NOTE — ASSESSMENT & PLAN NOTE
- Stopped amiodarone with increase in liver enzymes   - Continue Digoxin   - metop increased to 25

## 2020-10-14 NOTE — ASSESSMENT & PLAN NOTE
- CBC daily   - On IV abx until 11/2 per ID recs   - WBC up to 34.24 from 24  - ID reconsulted   - Repeat blood cultures, UA, CXR, and TTE ordered   - Afebrile

## 2020-10-14 NOTE — CONSULTS
[facility]  Infectious Disease  Consult Note    Patient Name: Fatou Lorenzo  MRN: [id number]  Admission Date: 8/30/2020  Hospital Length of Stay: 44 days  Attending Physician: Antoni Waddell MD  Primary Care Provider: Ludin Rivas MD     Isolation Status: No active isolations      Inpatient consult to Infectious Diseases  Consult performed by: CAYLA Aguilera, ANP  Consult ordered by: Alison Hobbs PA-C  Reason for consult: WBC up to 34, on vanc and rocephin, repeat blood cx ordered          ID Consult acknowledged.   Full consult and recommendations to follow today  In the interim, please call for any immediate concerns.     Thank you.   CAYLA Harris, ANP-C  [phone] pager,  [phone]

## 2020-10-14 NOTE — PT/OT/SLP PROGRESS
Physical Therapy Co-Treatment    Patient Name:  Fatou Lorenzo   MRN:  [id number]  Admitting Diagnosis:  Acute respiratory failure with hypoxia   Recent Surgery: Procedure(s) (LRB):  LARYNGOSCOPY, MICRO SUSPENSION WITH AUGMENTATION (N/A)  VATS, WITH DECORTICATION, LUNG (Right) 9 Days Post-Op  Admit Date: 8/30/2020  Length of Stay: 44 days    Recommendations:     Discharge Recommendations:  LTACH (long-term acute care hospital)   Discharge Equipment Recommendations: other (see comments)(tbd pending progress)   Barriers to discharge: current level of assist    Assessment:     Fatou Lorenzo is a 75 y.o. female admitted with a medical diagnosis of Acute respiratory failure with hypoxia.  She presents with the following impairments/functional limitations:  weakness, impaired endurance, impaired self care skills, impaired functional mobilty, impaired balance, impaired cognition, decreased upper extremity function, decreased lower extremity function, pain, decreased safety awareness, impaired fine motor, impaired cardiopulmonary response to activity. Pt tolerated session fair today. Patient still with minimal participation on this date with patient attempting to return self to supine multiple times during session. Pt with improved sitting balance on this date despite limited participation. Pt will continue to benefit from skilled PT services during this hospital admit to continue to improve transfer ability and efficiency as well as continue to progress pt's ambulation distance and cardiopulmonary endurance to maximize pt's functional independence and return to PLOF. Pt would benefit from continued therapy at a LTAC level in order to further manage pt's multiple medical commodities as well as perform therapeutic activities/exercises to further improve command following, core strength, and overall strength/mobility.    Rehab Prognosis: Good; patient would benefit from acute skilled PT services to address these deficits and  reach maximum level of function.    Recent Surgery: Procedure(s) (LRB):  LARYNGOSCOPY, MICRO SUSPENSION WITH AUGMENTATION (N/A)  VATS, WITH DECORTICATION, LUNG (Right) 9 Days Post-Op    Plan:     During this hospitalization, patient to be seen 4 x/week to address the identified rehab impairments via gait training, therapeutic activities, therapeutic exercises, neuromuscular re-education and progress toward the following goals:    · Plan of Care Expires:  11/06/20    Subjective     RN notified prior to session. No family/friends present upon PT entrance into room.    Chief Complaint: Pt not verbalizing during session. Did not appear to be in pain  Patient/Family Comments/goals: GODFREY  Pain/Comfort:  · Pain Rating 1: other (see comments)(did not rate)  · Pain Addressed 1: Distraction, Reposition  · Pain Rating Post-Intervention 1: other (see comments)      Objective:     Additional staff present: OT for cotx due to pt's multiple deficits req'ing two skilled therapists to appropriately progress pt's musculoskeletal strength and endurance while appropriately facilitating neuromuscular postural balance and control     Patient found HOB elevated with: central line, NG tube, vivas catheter, pressure relief boots, PRAFO, pulse ox (continuous), telemetry   Mental Status: Patient is oriented to AxOx0 (pt not answering questions) and follows ~50% single step commands. Patient is Lethargic during session.    General Precautions: Standard, Cardiac aspiration, fall, sternal, NPO   Orthopedic Precautions:N/A   Braces: N/A   Body mass index is 30.62 kg/m².  Oxygen Device: Room Air    Outcome Measures:  AM-PAC 6 CLICK MOBILITY  Turning over in bed (including adjusting bedclothes, sheets and blankets)?: 2  Sitting down on and standing up from a chair with arms (e.g., wheelchair, bedside commode, etc.): 1  Moving from lying on back to sitting on the side of the bed?: 2  Moving to and from a bed to a chair (including a wheelchair)?:  1  Need to walk in hospital room?: 1  Climbing 3-5 steps with a railing?: 1  Basic Mobility Total Score: 8     Functional Mobility:    Bed Mobility:   · Rolling/Turning to Left: total assistance and with side rail  · Rolling/Turning to Right: total assistance and with side rail   · x2 trials each direction with sustained hold in sidelying on Lt and Rt with max A   · Scooting to HOB via supine bridge: total assistance, 2 persons and with drawsheet  · Supine to Sit: total assistance and 2 persons; from Lt side of bed  · Scooting anteriorly to EOB to have both feet planted on floor: total assistance  · Sit to Supine: total assistance and 2 persons; to Lt side of bed  Sitting Balance at Edge of Bed:   Assistance Level Required: Total Assistance   Time: 12 minutes   Postural deviations noted: slouched posture, rounded shoulders, forward head, increased cervical flexion and posterior pelvic tilt   Encouraged: gentle tactile cueing to forehead for neutral c-sp   Comments:  Neuromuscular facilitation was provided to bilateral QL, periscapular musculature, and c-sp extensors to promote activation of postural musculature. Pt attempting to return self to supine multiple times during session including with pushing with RUE to lay on Lt. PT focused on upright postural control and endurance while OT performed ADLs and therex. Pt able to sustain sitting balance for ~5 seconds without assist    Education:   Time provided for education, counseling and discussion of health disposition in regards to patient's current status   All questions answered within PT scope of practice and to patient's satisfaction   PT role in POC to address current functional deficits   Pt educated on proper body mechanics, safety techniques, and energy conservation with PT facilitation and cueing throughout session   Whiteboard updated with pt's current mobility status documented above    RN educated about importance of alternating PRAFO on each  LE for 2 hrs at a time   Spoke with RN about ordering positioning wedge    Patient left HOB elevated with all lines intact, call button in reach and RN notified.    GOALS:   Multidisciplinary Problems     Physical Therapy Goals        Problem: Physical Therapy Goal    Goal Priority Disciplines Outcome Goal Variances Interventions   Physical Therapy Goal     PT, PT/OT Ongoing, Progressing     Description: Goals to be met by: 2020     Patient will increase functional independence with mobility by performin. Supine to sit with Minimal Assistance x 1 person.-not met  2. Sit to stand transfer with Moderate Assistance.-not met  3. Bed to chair transfer with Moderate Assistance. -not met  4. Sitting at edge of bed x10 minutes with CGA.-not met  5. Lower extremity exercise program x 10 reps  with assistance as needed. -not met  6. Pt receive gait training ~ 10 ft with moderate assist- not met                         Time Tracking:     PT Received On: 10/14/20  PT Start Time: 0850     PT Stop Time: 0922  PT Total Time (min): 32 min       Billable Minutes:   · Therapeutic Activity 10 and Neuromuscular Re-education 15    Treatment Type: Treatment  PT/PTA: PT       Yenny Corral PT, DPT  10/14/2020  Pager: [phone]

## 2020-10-14 NOTE — NURSING TRANSFER
Nursing Transfer Note      10/14/2020     Transfer To: [id number]    Transfer via bed    Transfer with O2, cardiac monitoring    Transported by RN, Charge RN    Medicines sent: none    Chart send with patient: Yes

## 2020-10-14 NOTE — PT/OT/SLP DISCHARGE
Physical Therapy Discharge Summary    Name: Fatou Lorenzo  MRN: [id number]   Principal Problem: Acute respiratory failure with hypoxia     Patient Discharged from acute Physical Therapy on 10/14/2020.  Please refer to prior PT noted date on 10/14/2020 for functional status.     Assessment:     Patient was discharged unexpectedly.  Information required to complete an accurate discharge summary is unknown.  Refer to therapy initial evaluation and last progress note for initial and most recent functional status and goal achievement.  Recommendations made may be found in medical record. Patient appropriate for care in another setting. Patient transferred to lower level of care secondary to needing MAKENZIE tube replaced and closer monitoring    Objective:     GOALS:   Multidisciplinary Problems     Physical Therapy Goals        Problem: Physical Therapy Goal    Goal Priority Disciplines Outcome Goal Variances Interventions   Physical Therapy Goal     PT, PT/OT Ongoing, Progressing     Description: Goals to be met by: 2020     Patient will increase functional independence with mobility by performin. Supine to sit with Minimal Assistance x 1 person.-not met  2. Sit to stand transfer with Moderate Assistance.-not met  3. Bed to chair transfer with Moderate Assistance. -not met  4. Sitting at edge of bed x10 minutes with CGA.-not met  5. Lower extremity exercise program x 10 reps  with assistance as needed. -not met  6. Pt receive gait training ~ 10 ft with moderate assist- not met                           Reasons for Discontinuation of Therapy Services  Transfer to alternate level of care.      Plan:     Patient Discharged to: SICU.    Yenny Corral, PT, DPT  10/14/2020  Pager: [phone]

## 2020-10-14 NOTE — PROGRESS NOTES
"[facility]  Cardiothoracic Surgery  Progress Note    Patient Name: Fatou Lorenzo  MRN: [id number]  Admission Date: 8/30/2020  Hospital Length of Stay: 44 days  Code Status: Full Code   Attending Physician: Antoni Waddell MD   Referring Provider: Self, Aaareferral  Principal Problem:Acute respiratory failure with hypoxia            Subjective:     Post-Op Info:  Procedure(s) (LRB):  LARYNGOSCOPY, MICRO SUSPENSION WITH AUGMENTATION (N/A)  VATS, WITH DECORTICATION, LUNG (Right)   9 Days Post-Op     Interval History: Plan to move patient back to ICU. Patient pulled out keotube again early this AM. Resident aware and will replace. Consider PEG. White count bumped to 34 from 24. Suggest replacing central line once back in unit. Yoo to be removed. Continues on vanc and rocephin. ID re-consulted. Repeat blood cultures, UA, TTE, CXR ordered. Started on IV lasix as patient very edematous this morning. Lethargic on exam. Kept trying to say "I am sorry." Would not answer many questions but when asked if she had a good visit with daughter-in-law yesterday she shook head yes.  Went into rate controlled afib last night. This morning with burst of Vtach. K 3.3 being replaced IV. Told nurse yesterday that she "wants to be with Niall."     Review of Systems   Unable to perform ROS: acuity of condition     Medications:  Continuous Infusions:   heparin (porcine) in 5 % dex 1,000 Units/hr (10/13/20 2304)     Scheduled Meds:   albuterol-ipratropium  3 mL Nebulization Q6H    aspirin  81 mg Per NG tube Daily    cefTRIAXone (ROCEPHIN) IVPB  2 g Intravenous Q24H    digoxin  0.125 mg Per NG tube Daily    famotidine  20 mg Per NG tube Daily    furosemide (LASIX) IV  40 mg Intravenous Q12H    metoprolol  25 mg Per NG tube BID    mirtazapine  7.5 mg Per NG tube QHS    polyethylene glycol  17 g Oral Daily    potassium chloride in water  20 mEq Intravenous BID    sodium chloride 3%  4 mL Nebulization Q6H    " vancomycin (VANCOCIN) IVPB  1,500 mg Intravenous Q24H    warfarin  4 mg Oral Once     PRN Meds:acetaminophen, dextrose 50%, dextrose 50%, glucagon (human recombinant), insulin aspart U-100, labetalol, lidocaine HCL 2%, melatonin, morphine, ondansetron     Objective:     Vital Signs (Most Recent):  Temp: 98.4 °F (36.9 °C) (10/14/20 0814)  Pulse: 84 (10/14/20 0952)  Resp: 14 (10/14/20 0814)  BP: 128/66 (10/14/20 0952)  SpO2: (!) 94 % (10/14/20 0814) Vital Signs (24h Range):  Temp:  [96.3 °F (35.7 °C)-98.6 °F (37 °C)] 98.4 °F (36.9 °C)  Pulse:  [] 84  Resp:  [14-20] 14  SpO2:  [89 %-97 %] 94 %  BP: (128-155)/() 128/66     Weight: 83.5 kg (184 lb)  Body mass index is 30.62 kg/m².    SpO2: (!) 94 %  O2 Device (Oxygen Therapy): room air    Intake/Output - Last 3 Shifts       10/12 0700 - 10/13 0659 10/13 0700 - 10/14 0659 10/14 0700 - 10/15 0659    P.O. 0 0     I.V. (mL/kg) 649.1 (7.5) 240 (2.9)     NG/GT 1072 1035     IV Piggyback 450 300     Total Intake(mL/kg) 2171.1 (25.2) 1575 (18.8)     Urine (mL/kg/hr) 1085 (0.5) 950 (0.5)     Stool  0     Total Output 1085 950     Net +1086.1 +625            Stool Occurrence  1 x           Lines/Drains/Airways     Central Venous Catheter Line            Percutaneous Central Line Insertion/Assessment - Triple Lumen  10/05/20 1843 left internal jugular 8 days          Drain                 Urethral Catheter 10/11/20 0550 16 Fr. 3 days         NG/OG Tube 10/11/20 1300 Right nostril 2 days                Physical Exam  Vitals signs reviewed.   Constitutional:       Appearance: She is well-developed. She is ill-appearing.      Comments: Lethargic    HENT:      Head: Normocephalic and atraumatic.   Eyes:      Pupils: Pupils are equal, round, and reactive to light.   Neck:      Musculoskeletal: Normal range of motion.      Vascular: No JVD.   Cardiovascular:      Rate and Rhythm: Normal rate.      Comments: afib   Central line in place   Pulmonary:      Effort: Pulmonary  effort is normal. No respiratory distress.   Abdominal:      General: There is no distension.   Musculoskeletal:         General: Swelling present.   Skin:     Comments: Midline sternal incision c/d/i    Psychiatric:      Comments: Hoarse          Significant Labs:  ABGs: No results for input(s): PH, PCO2, PO2, HCO3, POCSATURATED, BE in the last 48 hours.  Amylase: No results for input(s): AMYLASE in the last 48 hours.  BMP:   Recent Labs   Lab 10/14/20  0430         K 3.3*   *   CO2 22*   BUN 53*   CREATININE 1.2   CALCIUM 8.4*   MG 2.1     Cardiac markers: No results for input(s): CKMB, CPKMB, TROPONINT, TROPONINI, MYOGLOBIN in the last 48 hours.  CBC:   Recent Labs   Lab 10/14/20  0430   WBC 34.24*   RBC 3.07*   HGB 9.1*   HCT 28.7*      MCV 94   MCH 29.6   MCHC 31.7*     CMP:   Recent Labs   Lab 10/14/20  0430      CALCIUM 8.4*   ALBUMIN 2.0*   PROT 5.6*      K 3.3*   CO2 22*   *   BUN 53*   CREATININE 1.2   ALKPHOS 380*   ALT 48*   AST 39   BILITOT 0.8     Coagulation:   Recent Labs   Lab 10/14/20  0430   INR 1.5*   APTT 64.1*     Lactic Acid: No results for input(s): LACTATE in the last 48 hours.  LFTs:   Recent Labs   Lab 10/14/20  0430   ALT 48*   AST 39   ALKPHOS 380*   BILITOT 0.8   PROT 5.6*   ALBUMIN 2.0*     Lipase: No results for input(s): LIPASE in the last 48 hours.    Significant Diagnostics:  TTE and CXR ordered         Assessment/Plan:     Acute blood loss anemia  - Expected post operatively   - CBC daily     Pleural effusion  - S/P VATS     Adjustment disorder with mixed anxiety and depressed mood  - Psych following     Debility  - Continue with PT/OT  - Awaiting LTAC placement     S/P TVR (tricuspid valve repair)  See s/p MVr    S/P MVR (mitral valve repair)  Fatou Lorenzo is a 75 y.o. female s/p MVr, TVr 9/9/2020. Case noteworthy for multiple pump runs (3) and RV hematoma due to retraction. Unstable overnight 9/18, required pericardial window for  evac of posterior cardiac tamponade. Respiratory distress and so re-admitted to SICU on 10/2 now s/p VATS on 10/5.     - Remain lethargic and appears ill today   - Short burst of Vtach this AM per nurse   - Seen by psych - Following recs (started remeron)   - PRN liquid tylenol and IV morphine for pain  - Keep MAPs >60.   - pAF: digoxin 125 mcg.   - increase metop to 25 bid      - Anti-coagulation: asa 81, hep gtt - goal 60-80  - labetalol IV PRN for SBP > 160  - Continue coumadin 4mg tonight (3,3)   - INR 1.5      - Loculated pleural effusion s/p VATS on 10/5  - Extubated 10/9. Breathing comfortably on RA  - Scheduled duo and saline nebs  - CXR and ABG PRN  - HOB >30 deg     - Removed vivas 10/11 but had to have this replaced due to urinary retention. Orders to remove today and place pure wick   - BUN/Cr 53/1.2 from 47/1.2   - Started on lasix. Patient very edematous    - Net +      - Replace other lytes as needed  - Tube feeds to be increased 5Ml Q8hr to goal rate at 45mL/hr. Currently on pause as patient has pulled her kevin-tube   - Famotidine and bowel regimen     - BAL cultures felt to be colonization not infection  - Follow ID recommendations     - Needs 4 weeks course total. Recommended IV Ceftriaxone 2 mg q24 hrs while inpatient but would switch to PO Augmentin 875 BID if discharged prior to completion of therapy.  (Est end date 11/2/20). Also on vanc Q24H      - Spoke with ID yesterday who recommend against fluconazole for candida in BAL 10/7 as thought to be contaminant     - Move patient back to ICU            Vocal fold paresis, right  - ENT consulted, underwent microlaryngoscopy/possible vocal fold injection augmentation 10/5  - Still very hoarse       Hypernatremia  - resolved       Hypokalemia  - Secheduled IV replacement as patient NPO   - BMP daily to monitor trends  - K 3.3 this AM     Pericardial effusion with cardiac tamponade  - S/P pericardial window     Leukocytosis  - CBC daily   - On IV abx  until 11/2 per ID recs   - WBC up to 34.24 from 24  - ID reconsulted   - Repeat blood cultures, UA, CXR, and TTE ordered   - Afebrile         Essential hypertension  - PRN labetalol for SBP > 160    Paroxysmal atrial fibrillation  - Stopped amiodarone with increase in liver enzymes   - Continue Digoxin   - metop increased to 25      Dispo: Move to ICU for further management. Patient appears very ill this AM with leukocytosis, episode of Vtach, edema, and removed keotube.     Alison Hobbs PA-C  Cardiothoracic Surgery  [facility]

## 2020-10-14 NOTE — CONSULTS
[facility]  Infectious Disease  Consult Note    Patient Name: Fatou Lorenzo  MRN: [id number]  Admission Date: 8/30/2020  Hospital Length of Stay: 44 days  Attending Physician: Antoni Waddell MD  Primary Care Provider: Ludin Rivas MD     Isolation Status: No active isolations    Patient information was obtained from patient and past medical records.      Consults  Assessment/Plan:     Leukocytosis      75 y.o. female w/ history of Atrial fibrillation with RVR, HLD and severe mitral regurgitation who is s/p MV repair and TV annuloplasty on 9/9, complicated by posterior cardiac tamponade s/p pericardial window on 9/18, UTI (E cloacae, tx X 7 days with cefepime), and large right pleural effusion with multiple loculations s/p thoracentesis ( no cx data) and VATS with limited decortication on 10/5 with cx + for E Coli, s/p BAL 10/7 (candida lusitanae thought to be colonizer), who was last seen by ID on 10/9 who recommended 4 weeks of antibiotics post VATS (IV ceftriaxone while inpatient). Right pleural effusion improved on imaging and leukocytosis resolved.    ID is now reconsulted for re-current leukocytosis (WBC 11>>34).      Patient is afebrile.  Increased somnolence and increased work of breathing today and transferred to ICU for closer monitoring.   She denies subjective fevers, chills, or sweats.  + SOB.  Denies abdominal pain, n/v.  No diarrhea.  Denies joint pain, back pain, calf pain/tenderness.    Left IJ central line c/d/i.  Oozing hematoma noted to right upper back.  No other obvious skin breakdown.  Some coarse upper airway sounds, ? Decreased breath sounds but poor inspiratory effort.  She is tachynpneic. O2 sats 91-97% on room air earlier today.       Blood cultures 10/14 pending.  Yoo exchanged this afternoon, U/A negative (3 WBC).   2D echo negative for vegetation     CXR shows moderate right, mild left pleural effusions with bibasilar atelectasis or consolidation, similar to  prior study.  Diffuse bilateral interstitial prominence slightly increased     Has been on IV ceftriaxone.  Vancomycin started 10/12.  Ceftriaxone broadened to cefepime on transfer to ICU today and fluconazole added.     Plan/recommendations  1.  Continue IV vancomycin (pharmacy to dose).  Trough goal 15-20.  2.  Agree with broadening to  cefepime from ceftriaxone  3.  Continue  fluconazole for now (though suspect candida from prior BAL is colonizer)  4.  Respiratory culture if able to obtain.   5.  Will follow blood cultures, trend WBC, and follow up tomorrow with further recommendations.     Data reviewed and plan discussed with ID staff        Thank you.   Please call for any questions or concerns.  CYALA Harris, ANP-C  [phone] pager, [place] [phone]  Subjective:     Principal Problem: Acute respiratory failure with hypoxia    HPI:   Pt is a 75 y.o. female w/ pmhx of Atrial fibrillation with RVR, HLD and severe mitral regurgitation.  Pt w/ recent left heart catheterization 8/25/2020 who presented w/ complaints of recurrent shortness of breath.  Transesophageal echocardiogram done recently w/ left atrial thrombus.  L heart angiography w/ nonobstructive CAD.   Upon admission, pt required BiPAP due to hypoxemia not responsive to nasal cannula.      Pt transferred to [facility] 8/30 for CT surgery evaluation given recurrent admission despite compliance, BP control and diuresis.  Pt seen by Dr. Waddell who recommended MV repair.  Pt underwent MV repair and TV annuloplasty on 9/9. Pt course c/b reintuabtion and pericardial window for evacuation of posterior cardiac tamponade on 9/18.  Pt extubated on 9/24.  Developed increasing white count on 9/28.  UA done concerning for infection.  Urine cxs positive for E.Cloacae.  Pt started on Bactrim.  Chest Xray from 10/1- Since September 28, 2020, increased large right pleural effusion.  Increased diffuse right airspace opacities.  X ray abdomen 9/23 w/ Probable bilateral  nephrolithiasis.  ID initially consulted on 10/2 for UTI.  Patient also noted to have large right loculated pleural effusion and underwent IR thoracentesis (no cx data) and subsequently VATS procedure with limited decortication by CTS on 10/5.  Cultures positive for E coli.  Brochoscopy with BAL on 10/7 with yeast.  She was treated with 7 days of cefepime for E. Cloacae in urine and E coli from pleural effusion, followed by transition to IV ceftriaxone with plan for 4 weeks of antibiotics after VATS procedure (ceftriaxone while inpatient with transition to oral Augmentin if d/c).      ID now reconsulted for rising WBC  11.8 >>34.  Repeat blood cultures pending.  Repeat CXR pending.  Afebrile.        Past Medical History:   Diagnosis Date    Atrial fibrillation with RVR 8/24/2020    Cataract     Essential hypertension 8/31/2020    Glaucoma     Heart valve problem     Hyperlipidemia     Severe mitral regurgitation 8/24/2020       Past Surgical History:   Procedure Laterality Date    ANKLE SURGERY      BAIN MAZE PROCEDURE N/A 9/9/2020    Procedure: BAIN MAZE PROCEDURE;  Surgeon: Antoni Waddell MD;  Location: [address];  Service: Cardiothoracic;  Laterality: N/A;  MAZE     LARYNGOSCOPY N/A 10/5/2020    Procedure: LARYNGOSCOPY, MICRO SUSPENSION WITH AUGMENTATION;  Surgeon: Yfn Mendoza MD;  Location: [address];  Service: ENT;  Laterality: N/A;    LEFT HEART CATHETERIZATION Left 8/25/2020    Procedure: Left heart cath, radial;  Surgeon: Marlee Carrillo MD;  Location: [facility] CATH LAB;  Service: Cardiology;  Laterality: Left;    lipoma removal      THORACOSCOPIC DECORTICATION OF LUNG Right 10/5/2020    Procedure: VATS, WITH DECORTICATION, LUNG;  Surgeon: Jeremy Shine MD;  Location: [address];  Service: Thoracic;  Laterality: Right;    TRICUSPID VALVULOPLASTY N/A 9/9/2020    Procedure: REPAIR, TRICUSPID VALVE;  Surgeon: Antoni Waddell MD;  Location: [address];   Service: Cardiothoracic;  Laterality: N/A;       Review of patient's allergies indicates:  No Known Allergies    Medications:  Medications Prior to Admission   Medication Sig    metoprolol tartrate (LOPRESSOR) 25 MG tablet Take 1 tablet (25 mg total) by mouth 3 (three) times daily.    amiodarone (PACERONE) 400 MG tablet Take two tablets by mouth twice daily for twelve days, then take one tablet by mouth daily.    apixaban (ELIQUIS) 5 mg Tab Take 1 tablet (5 mg total) by mouth 2 (two) times daily.    furosemide (LASIX) 40 MG tablet Take 1 tablet (40 mg total) by mouth 2 (two) times daily.    losartan (COZAAR) 50 MG tablet Take 0.5 tablets (25 mg total) by mouth once daily.    pravastatin (PRAVACHOL) 80 MG tablet Take 80 mg by mouth once daily.     Antibiotics (From admission, onward)    Start     Stop Route Frequency Ordered    10/14/20 1400  ceFEPIme injection 2 g      -- IV Every 12 hours (non-standard times) 10/14/20 1324    10/13/20 0100  vancomycin 1.5 g in dextrose 5 % 250 mL IVPB (ready to mix)      -- IV Every 24 hours (non-standard times) 10/12/20 2358        Antifungals (From admission, onward)    Start     Stop Route Frequency Ordered    10/14/20 1430  fluconazole (DIFLUCAN) IVPB 200 mg 100 mL      -- IV Every 24 hours (non-standard times) 10/14/20 1323        Antivirals (From admission, onward)    None           Immunization History   Administered Date(s) Administered    PPD Test 09/24/2020       Family History     Problem Relation (Age of Onset)    Cancer Mother    Cataracts Sister    No Known Problems Father, Brother, Maternal Aunt, Maternal Uncle, Paternal Aunt, Paternal Uncle, Maternal Grandmother, Maternal Grandfather, Paternal Grandmother, Paternal Grandfather        Social History     Socioeconomic History    Marital status:      Spouse name: Not on file    Number of children: Not on file    Years of education: Not on file    Highest education level: Not on file   Occupational  History    Not on file   Social Needs    Financial resource strain: Not on file    Food insecurity     Worry: Not on file     Inability: Not on file    Transportation needs     Medical: Not on file     Non-medical: Not on file   Tobacco Use    Smoking status: Never Smoker    Smokeless tobacco: Never Used   Substance and Sexual Activity    Alcohol use: No    Drug use: No    Sexual activity: Not Currently   Lifestyle    Physical activity     Days per week: Not on file     Minutes per session: Not on file    Stress: Not on file   Relationships    Social connections     Talks on phone: Not on file     Gets together: Not on file     Attends [employer] service: Not on file     Active member of club or organization: Not on file     Attends meetings of clubs or organizations: Not on file     Relationship status: Not on file   Other Topics Concern    Not on file   Social History Narrative    Not on file     Review of Systems   Constitutional: Positive for fatigue. Negative for activity change, appetite change, chills, diaphoresis and fever.   HENT: Negative.    Eyes: Negative.    Respiratory: Positive for shortness of breath. Negative for cough and wheezing.    Cardiovascular: Positive for leg swelling. Negative for chest pain and palpitations.   Gastrointestinal: Negative for abdominal pain, constipation, diarrhea, nausea and vomiting.   Genitourinary: Negative for dysuria and flank pain.        Yoo   Musculoskeletal: Negative for arthralgias, back pain and neck pain.   Skin: Negative for wound.   Neurological: Negative for dizziness and headaches.   Psychiatric/Behavioral: Negative for agitation.     Objective:     Vital Signs (Most Recent):  Temp: 98.9 °F (37.2 °C) (10/14/20 1148)  Pulse: 93 (10/14/20 1435)  Resp: (!) 29 (10/14/20 1435)  BP: (!) 140/65 (10/14/20 1440)  SpO2: (!) 93 % (10/14/20 1435) Vital Signs (24h Range):  Temp:  [96.3 °F (35.7 °C)-98.9 °F (37.2 °C)] 98.9 °F (37.2 °C)  Pulse:  []  93  Resp:  [14-31] 29  SpO2:  [91 %-100 %] 93 %  BP: (128-173)/() 140/65     Weight: 83.5 kg (184 lb)  Body mass index is 30.62 kg/m².    Estimated Creatinine Clearance: 43.2 mL/min (based on SCr of 1.2 mg/dL).    Physical Exam  Vitals signs and nursing note reviewed.   Constitutional:       Appearance: She is well-developed. She is ill-appearing. She is not diaphoretic.      Comments: Lethargic    HENT:      Head: Normocephalic and atraumatic.      Nose:      Comments: Feeding tube       Mouth/Throat:      Mouth: Mucous membranes are moist.      Pharynx: Oropharynx is clear. No oropharyngeal exudate.   Eyes:      General: No scleral icterus.     Conjunctiva/sclera: Conjunctivae normal.   Neck:      Musculoskeletal: Normal range of motion.      Vascular: No JVD.   Cardiovascular:      Rate and Rhythm: Normal rate. Rhythm irregular.      Heart sounds: No murmur.      Comments: afib   Central line in place   Pulmonary:      Effort: Tachypnea and accessory muscle usage present.      Comments: Coarse upper airway sounds.   Decreased breath sounds bilaterally ( ? Poor inspiratory effort?)  Abdominal:      General: There is no distension.      Palpations: Abdomen is soft.      Tenderness: There is no abdominal tenderness.   Musculoskeletal:         General: No tenderness.      Right lower leg: Edema present.      Left lower leg: Edema present.      Comments: No calf tenderness, warmth, erythema   Skin:     General: Skin is warm and dry.      Coloration: Skin is pale.      Findings: No rash.      Comments: Sternal incision c/d/i.  No erythema/drainage    Left IJ catheter - site clear    Oozing hematoma right upper back.  See photo below taken by Critical Care Team   Neurological:      Mental Status: She is lethargic.      Comments: Alert, answers questions, but very lethargic         Significant Labs:   Blood Culture:   Recent Labs   Lab 08/23/20  0149 08/23/20  0203   LABBLOO No growth after 5 days. No growth after  5 days.     CBC:   Recent Labs   Lab 10/13/20  0443 10/14/20  0430   WBC 24.34* 34.24*   HGB 9.3* 9.1*   HCT 30.2* 28.7*    253     CMP:   Recent Labs   Lab 10/13/20  0443 10/14/20  0430 10/14/20  1245    145  --    K 3.5 3.3* 3.8   * 112*  --    CO2 23 22*  --     107  --    BUN 47* 53*  --    CREATININE 1.2 1.2  --    CALCIUM 8.4* 8.4*  --    PROT 5.4* 5.6*  --    ALBUMIN 2.0* 2.0*  --    BILITOT 0.9 0.8  --    ALKPHOS 375* 380*  --    AST 45* 39  --    ALT 46* 48*  --    ANIONGAP 9 11  --    EGFRNONAA 44.3* 44.3*  --      Urine Culture:   Recent Labs   Lab 08/23/20 0228 09/29/20  0854   LABURIN No significant growth ENTEROBACTER CLOACAE  >100,000 cfu/ml  *     Urine Studies:   Recent Labs   Lab 08/23/20  2249  10/01/20  1618   COLORU Colorless*   < > Red*   APPEARANCEUA Clear   < > Cloudy*   PHUR 5.0   < > 5.0   SPECGRAV 1.005   < > 1.020   PROTEINUA Negative   < > 2+*   GLUCUA Negative   < > Negative   KETONESU Negative   < > Negative   BILIRUBINUA Negative   < > Negative   OCCULTUA 1+*   < > 2+*   NITRITE Negative   < > Negative   UROBILINOGEN Negative  --   --    LEUKOCYTESUR Trace*   < > Trace*   RBCUA 4   < > >100*   WBCUA 4   < > >100*   BACTERIA Occasional   < > Many*   SQUAMEPITHEL 1  --   --    HYALINECASTS 1   < > 0    < > = values in this interval not displayed.     Wound Culture:   Recent Labs   Lab 10/05/20  1513   LABAERO ESCHERICHIA COLI  Few  *       Significant Imaging: I have reviewed all pertinent imaging results/findings within the past 24 hours.

## 2020-10-14 NOTE — PLAN OF CARE
Problem: Occupational Therapy Goal  Goal: Occupational Therapy Goal  Description: Goals to be met by: 10/23/2020      Patient will increase functional independence with ADLs by performing:    UE Dressing with Minimum Assistance.  Grooming while sitting EOB with Contact Guard Assistance  Toileting from bedside commode with Max Assistance for hygiene and clothing management.   Toilet transfer to bedside commode with Max Assistance.  Supine <> Sit with minimum assistance in preparation for EOB/OOB functional activities  Pt to tolerate static standing for ~1 minute with min A while competing a functional task.        Outcome: Ongoing, Progressing   DANIEL Puente

## 2020-10-14 NOTE — PLAN OF CARE
Fatou LARSEN Maura transferred back to ICU for higher level of care; patient pulled out NG tube- MS's are considering PEG tube.           10/14/20 1358   Discharge Reassessment   Assessment Type Discharge Planning Reassessment   Provided patient/caregiver education on the expected discharge date and the discharge plan Yes   Do you have any problems affording any of your prescribed medications? No   Discharge Plan A Long-term acute care facility (LTAC)   DME Needed Upon Discharge  other (see comments)  (TBD)   Anticipated Discharge Disposition Long Term       Mel Simons MPH, RN, CM  Ext. [phone]

## 2020-10-14 NOTE — PT/OT/SLP PROGRESS
Occupational Therapy   Co-Treatment with PT    Name: Fatou Lorenzo  MRN: [id number]  Admitting Diagnosis:  Acute respiratory failure with hypoxia    9 Days Post-Op   Procedure(s):  LARYNGOSCOPY, MICRO SUSPENSION WITH AUGMENTATION  VATS, WITH DECORTICATION, LUNG     Recommendations:     Discharge Recommendations: LTACH ([facility])  Discharge Equipment Recommendations:  (TBD pending progress)  Barriers to discharge:  Other (Comment)(Increased assistance at current functional level)    Assessment:     Fatou Lorenzo is a 75 y.o. female with a medical diagnosis of Acute respiratory failure with hypoxia.  She presents with the following performance deficits affecting function: weakness, impaired functional mobilty, impaired cognition, impaired endurance, gait instability, decreased coordination, impaired self care skills, impaired balance, decreased upper extremity function, decreased lower extremity function, decreased ROM, decreased safety awareness, impaired fine motor, impaired cardiopulmonary response to activity. Pt tolerated therapy session poor this date with increased lethargy and difficulty maintaining alertness throughout therapy session. Pt continues to demonstrate decreased command following and is requiring significant assistance for bed mobility and static sitting balance. At this time, OT is recommending pt d/c to LTACH due to pt's increased assistance required to complete ADLs and functional mobility safely and complex medical situation. Pt should continue receiving skilled OT services to promote independence and safety during completion of functional mobility and ADL activities.      Rehab Prognosis:  Good; patient would benefit from acute skilled OT services to address these deficits and reach maximum level of function.       Plan:     Patient to be seen 5 x/week to address the above listed problems via self-care/home management, therapeutic activities, therapeutic exercises  · Plan  "of Care Expires: 11/08/20  · Plan of Care Reviewed with: patient    Subjective     Patient stated, "Quit" when sitting EOB    Pain/Comfort:  · Pain Rating 1: 0/10    Objective:     Communicated with: RN prior to session.  Patient found supine with central line, NG tube, pulse ox (continuous), telemetry, oxygen, PRAFO, pressure relief boots upon OT entry to room. Pt agreeable to therapy session. PT present for co-treatment as appropriate for pt 2* to medical complexities and level of skilled assist needed for EOB activity.     General Precautions: Standard, fall, aspiration, sternal, NPO   Orthopedic Precautions:N/A   Braces: N/A     Occupational Performance:     Bed Mobility:    · Patient completed Rolling/Turning to Right and Left with HOB flat with total assistance  · Patient completed Scooting/Bridging anteriorly towards EOB to place feet flat on floor with total assistance and to scoot towards HOB via drawsheet transfer with total A and 2 persons  · Patient completed Supine to Sit with HOB elevated with total assistance x 2 persons  · Patient completed Sit to Supine with HOB flat with total assistance x 2 persons    Functional Mobility/Transfers:  · Deferred due to decreased command following and increased assistance required for bed mobility    Activities of Daily Living:  · Grooming: maximal assistance for washing face with wet towel while sitting EOB  · Pt able to grasp towel in finger tips and bring to face with R hand  · TERRY provided to thoroughly clean face  · Lower Body Dressing: total assistance for donning B  socks while supine in bed  · Toileting: total assistance for hygiene after pt found soiled upon entry   · UB Dressing: Total A to don clean hospital gown while sitting EOB    EOB Sitting:  -Pt tolerated sitting EOB for ~12 mins with total assistance   · Tactile and verbal cues provided for cervical extension and forward gaze  · BUE positioned on EOB to encourage postural support and increase " static sitting balance  -Overall, pt with increased lethargy and eyes closed during sitting EOB with verbal cues provided to alert patient  -Pt unable to follow 1-step simple command to reach for therapist hand or squeeze therapist hand while sitting EOB  -BUE PROM completed while sitting EOB (bicep flexion/extension, shoulder flexion)    [facility] 6 Click ADL: 7    Treatment & Education:  -Pt educated on role of OT, POC, and goals for therapy  -Pt educated on importance of actively participating in therapy sessions  -Orientation provided throughout therapy session  -Whiteboard updated       Patient left HOB elevated with all lines intact, call button in reach and bed alarm onEducation:  , RN notified     GOALS:   Multidisciplinary Problems     Occupational Therapy Goals        Problem: Occupational Therapy Goal    Goal Priority Disciplines Outcome Interventions   Occupational Therapy Goal     OT, PT/OT Ongoing, Progressing    Description: Goals to be met by: 10/23/2020      Patient will increase functional independence with ADLs by performing:    UE Dressing with Minimum Assistance.  Grooming while sitting EOB with Contact Guard Assistance  Toileting from bedside commode with Max Assistance for hygiene and clothing management.   Toilet transfer to bedside commode with Max Assistance.  Supine <> Sit with minimum assistance in preparation for EOB/OOB functional activities  Pt to tolerate static standing for ~1 minute with min A while competing a functional task.                         Time Tracking:     OT Date of Treatment: 10/14/20  OT Start Time: 0850  OT Stop Time: 0921  OT Total Time (min): 31 min (Co-treat with PT)    Billable Minutes:Self Care/Home Management 15  Therapeutic Activity 10    DANIEL Puente  10/14/2020

## 2020-10-15 NOTE — PLAN OF CARE
Problem: Physical Therapy Goal  Goal: Physical Therapy Goal  Description: Goals to be met by: 2020     Patient will increase functional independence with mobility by performin. Supine to sit with Minimal Assistance x 1 person.-not met  2. Sit to stand transfer with Moderate Assistance.-not met  3. Bed to chair transfer with Moderate Assistance. -not met  4. Sitting at edge of bed x10 minutes with CGA.-not met  5. Lower extremity exercise program x 10 reps  with assistance as needed. -not met  6. Pt receive gait training ~ 10 ft with moderate assist- not met          Outcome: Ongoing, Progressing       Pt re-evaluated and appropriate goals established.     Janine Soto, PT, DPT  10/15/2020  [phone]

## 2020-10-15 NOTE — PLAN OF CARE
Pt vss, afebrile, sats >94% on RA. Heparin gtts infusing. Aptt q6, goal 60-80.  Accu check monitored, no insulin coverage required. Pt seen by PT/OT, see note for reqs. Pt eval by speech at bedside, barium swallow to obtained tomorrow. Frequent rounds made in order to ensure pain control. No complaints of pain or discomfort reported at this time. No skin breakdown noted to sacrum, buttock, and heels, turn q2. POC reviewed with pt and son, no questions/concerns at this time. Will continue to monitor.

## 2020-10-15 NOTE — PROGRESS NOTES
[facility]  Critical Care - Surgery  Progress Note    Patient Name: Fatou Lorenzo  MRN: [id number]  Admission Date: 8/30/2020  Hospital Length of Stay: 45 days  Code Status: Full Code  Attending Provider: Antoni Waddell MD  Primary Care Provider: Ludin Rivas MD   Principal Problem: Acute respiratory failure with hypoxia    Subjective:     Hospital/ICU Course:  No notes on file    Interval History/Significant Events:   Antibiotics were broaden to cefepime, fluc, and vancomycin   MAKENZIE tube was placed, feeds started  Seen by ID agreed with antibiotic management   Heparin stopped for now, pending wound care consult  WBC has decreased to 24, no fevers overnight     Follow-up For: Procedure(s) (LRB):  LARYNGOSCOPY, MICRO SUSPENSION WITH AUGMENTATION (N/A)  VATS, WITH DECORTICATION, LUNG (Right)    Post-Operative Day: 6 Days Post-Op    Objective:     Vital Signs (Most Recent):  Temp: 97.8 °F (36.6 °C) (10/15/20 0300)  Pulse: 93 (10/15/20 0615)  Resp: (!) 30 (10/15/20 0615)  BP: (!) 133/58 (10/15/20 0600)  SpO2: 100 % (10/15/20 0615) Vital Signs (24h Range):  Temp:  [97.6 °F (36.4 °C)-98.9 °F (37.2 °C)] 97.8 °F (36.6 °C)  Pulse:  [] 93  Resp:  [14-44] 30  SpO2:  [85 %-100 %] 100 %  BP: (128-176)/(58-92) 133/58     Weight: 83.5 kg (184 lb)  Body mass index is 30.62 kg/m².      Intake/Output Summary (Last 24 hours) at 10/15/2020 0708  Last data filed at 10/15/2020 0500  Gross per 24 hour   Intake 272 ml   Output 2760 ml   Net -2488 ml       Physical Exam  Vitals signs and nursing note reviewed.   Constitutional:       Appearance: She is well-developed. She is ill-appearing. She is not diaphoretic.   HENT:      Head: Normocephalic and atraumatic.   Eyes:      Conjunctiva/sclera: Conjunctivae normal.   Neck:      Musculoskeletal: Normal range of motion and neck supple.   Cardiovascular:      Rate and Rhythm: Normal rate and regular rhythm.      Comments: Midline sternotomy with clean, dry, and  intact, without evidence of infection  Prior chest tubes sites with dressings in place, clean and dry  Pulmonary:      Comments: Breathing comfortably on comfort flow  No distress, but rhonchi present and she has a wet cough.  Hematoma on the R chest, not actively bleeding   Abdominal:      General: There is no distension.      Palpations: Abdomen is soft.      Tenderness: There is no abdominal tenderness.   Skin:     General: Skin is warm and dry.   Psychiatric:      Comments: Depressed mood, flat affect           Lines/Drains/Airways     Central Venous Catheter Line            Percutaneous Central Line Insertion/Assessment - Triple Lumen  10/05/20 1843 left internal jugular 9 days          Drain                 Trans Pyloric Feeding Tube 10/14/20 1500 less than 1 day         Urethral Catheter 10/14/20 1500 less than 1 day                Significant Labs:    CBC/Anemia Profile:  Recent Labs   Lab 10/14/20  0430 10/15/20  0244   WBC 34.24* 24.89*   HGB 9.1* 7.9*   HCT 28.7* 25.3*    227   MCV 94 94   RDW 17.2* 17.2*        Chemistries:  Recent Labs   Lab 10/14/20  0430 10/14/20  1245 10/15/20  0244     --  146*   K 3.3* 3.8 3.5   *  --  112*   CO2 22*  --  24   BUN 53*  --  54*   CREATININE 1.2  --  1.3   CALCIUM 8.4*  --  8.1*   ALBUMIN 2.0*  --  2.0*   PROT 5.6*  --  5.3*   BILITOT 0.8  --  0.8   ALKPHOS 380*  --  280*   ALT 48*  --  43   AST 39  --  31   MG 2.1 2.1 2.0   PHOS 2.8 3.0 3.4       Coagulation:   Recent Labs   Lab 10/15/20  0244   INR 1.9*   APTT 37.3*     All pertinent labs within the past 24 hours have been reviewed.    Significant Imaging:  I have reviewed and interpreted all pertinent imaging results/findings within the past 24 hours.       Assessment/Plan:     Severe mitral regurgitation  Fatou Lorenzo is a 75 y.o. female s/p MVr, TVr 9/9/2020. Case noteworthy for multiple pump runs (3) and RV hematoma due to retraction. Unstable 9/18, required pericardial window for evac of  posterior cardiac tamponade. Respiratory distress and so re-admitted to SICU on 10/2 now s/p VATS on 10/5.     Neuro:  - Improvement of mental status   - Seen by psych - Following recs (started remeron)   - PRN liquid tylenol and IV morphine for pain      CV:  S/p MVr, TVr, MAZE 9/9/20. S/p bedside pericardial window 9/18  - Keep MAPs >60.   - pAF: digoxin 125 mcg.   -Transition back to 12.5 BID Metoprolol via makenzie.   - Follow Dig level      - Anti-coagulation: asa 81, hep gtt - goal 60-80   - labetalol IV PRN for SBP > 160       Pulm:   - Loculated pleural effusion s/p VATS on 10/5  - Extubated 10/9. Breathing comfortably on RA  - Scheduled duo and saline nebs  - CXR and ABG PRN  - HOB >30 deg     Renal:  - Yoo catheter was exchanged yesterday.   - BUN/Cr  54/1.3  - Continue to monitor UOP       FEN/GI:  - Lower extremity edema   - Replace other lytes as needed  - Will replace MAKENZIE  - Famotidine and bowel regimen     Heme/Onc:  - H/H stable   - No evidence of bleeding  - hep gtt for pAF with aPTT goal of 60-80     ID:   - S/p 7 days of cefepime vanc for enterococcus cloacae UTI. Also has e coli growing from pleural cultures.  - Follow ID recommendations pending      - Started on Cefepime fluconazole re-started toady 10/14/20. Candida on BAL     - Current antibiotics Cefepime/Vanc/ Antifungal      - WBC decreasing   - BC: No growth to date     Endo:  - no hx of DM  - ISS     PPx:  - famotidine, SCD, will re-start heparin gtt      Dispo: SICU,     Critical care was time spent personally by me on the following activities: development of treatment plan with patient or surrogate and bedside caregivers, discussions with consultants, evaluation of patient's response to treatment, examination of patient, ordering and performing treatments and interventions, ordering and review of laboratory studies, ordering and review of radiographic studies, pulse oximetry, re-evaluation of patient's condition.  This critical care time  did not overlap with that of any other provider or involve time for any procedures.     Mir Croft MD  Critical Care - Surgery  [facility]

## 2020-10-15 NOTE — SUBJECTIVE & OBJECTIVE
Interval History:  No acute events overnight. Remains afebrile. WBC trending down.  Lethargic today at time of visit.  Has to be encouraged/prodded to answer questions.       Past Medical History:   Diagnosis Date    Atrial fibrillation with RVR 8/24/2020    Cataract     Essential hypertension 8/31/2020    Glaucoma     Heart valve problem     Hyperlipidemia     Severe mitral regurgitation 8/24/2020       Past Surgical History:   Procedure Laterality Date    ANKLE SURGERY      BAIN MAZE PROCEDURE N/A 9/9/2020    Procedure: BAIN MAZE PROCEDURE;  Surgeon: Antoni Waddell MD;  Location: [facility] OR [address] [place] [address];  Service: Cardiothoracic;  Laterality: N/A;  MAZE     LARYNGOSCOPY N/A 10/5/2020    Procedure: LARYNGOSCOPY, MICRO SUSPENSION WITH AUGMENTATION;  Surgeon: Yfn Mendoza MD;  Location: [facility] OR [address] [place] [address];  Service: ENT;  Laterality: N/A;    LEFT HEART CATHETERIZATION Left 8/25/2020    Procedure: Left heart cath, radial;  Surgeon: Marlee Carrillo MD;  Location: [facility] CATH LAB;  Service: Cardiology;  Laterality: Left;    lipoma removal      THORACOSCOPIC DECORTICATION OF LUNG Right 10/5/2020    Procedure: VATS, WITH DECORTICATION, LUNG;  Surgeon: Jeremy Shine MD;  Location: [facility] OR [address] [place] [address];  Service: Thoracic;  Laterality: Right;    TRICUSPID VALVULOPLASTY N/A 9/9/2020    Procedure: REPAIR, TRICUSPID VALVE;  Surgeon: Antoni Waddell MD;  Location: [facility] OR [address] [place] [address];  Service: Cardiothoracic;  Laterality: N/A;       Review of patient's allergies indicates:  No Known Allergies    Medications:  Medications Prior to Admission   Medication Sig    metoprolol tartrate (LOPRESSOR) 25 MG tablet Take 1 tablet (25 mg total) by mouth 3 (three) times daily.    amiodarone (PACERONE) 400 MG tablet Take two tablets by mouth twice daily for twelve days, then take one tablet by mouth daily.    apixaban (ELIQUIS) 5 mg Tab Take 1 tablet (5 mg total) by mouth 2 (two) times daily.    furosemide (LASIX) 40 MG tablet  Take 1 tablet (40 mg total) by mouth 2 (two) times daily.    losartan (COZAAR) 50 MG tablet Take 0.5 tablets (25 mg total) by mouth once daily.    pravastatin (PRAVACHOL) 80 MG tablet Take 80 mg by mouth once daily.     Antibiotics (From admission, onward)    Start     Stop Route Frequency Ordered    10/15/20 0819  vancomycin - pharmacy to dose      -- IV pharmacy to manage frequency 10/15/20 0719    10/14/20 1400  ceFEPIme injection 2 g      -- IV Every 12 hours (non-standard times) 10/14/20 1324        Antifungals (From admission, onward)    Start     Stop Route Frequency Ordered    10/14/20 1430  fluconazole (DIFLUCAN) IVPB 200 mg 100 mL      -- IV Every 24 hours (non-standard times) 10/14/20 1323        Antivirals (From admission, onward)    None           Immunization History   Administered Date(s) Administered    PPD Test 09/24/2020       Family History     Problem Relation (Age of Onset)    Cancer Mother    Cataracts Sister    No Known Problems Father, Brother, Maternal Aunt, Maternal Uncle, Paternal Aunt, Paternal Uncle, Maternal Grandmother, Maternal Grandfather, Paternal Grandmother, Paternal Grandfather        Social History     Socioeconomic History    Marital status:      Spouse name: Not on file    Number of children: Not on file    Years of education: Not on file    Highest education level: Not on file   Occupational History    Not on file   Social Needs    Financial resource strain: Not on file    Food insecurity     Worry: Not on file     Inability: Not on file    Transportation needs     Medical: Not on file     Non-medical: Not on file   Tobacco Use    Smoking status: Never Smoker    Smokeless tobacco: Never Used   Substance and Sexual Activity    Alcohol use: No    Drug use: No    Sexual activity: Not Currently   Lifestyle    Physical activity     Days per week: Not on file     Minutes per session: Not on file    Stress: Not on file   Relationships    Social connections      Talks on phone: Not on file     Gets together: Not on file     Attends [employer] service: Not on file     Active member of club or organization: Not on file     Attends meetings of clubs or organizations: Not on file     Relationship status: Not on file   Other Topics Concern    Not on file   Social History Narrative    Not on file     Review of Systems   Constitutional: Positive for fatigue. Negative for activity change, appetite change, chills, diaphoresis and fever.   HENT: Negative.    Eyes: Negative.    Respiratory: Negative for cough, shortness of breath and wheezing.    Cardiovascular: Positive for leg swelling. Negative for chest pain and palpitations.   Gastrointestinal: Negative for abdominal pain, constipation, diarrhea, nausea and vomiting.   Genitourinary: Negative for dysuria and flank pain.        Yoo   Musculoskeletal: Negative for arthralgias, back pain and neck pain.   Skin: Negative for wound.   Neurological: Positive for weakness. Negative for dizziness and headaches.   Psychiatric/Behavioral: Negative for agitation.     Objective:     Vital Signs (Most Recent):  Temp: 97.6 °F (36.4 °C) (10/15/20 1102)  Pulse: 68 (10/15/20 1155)  Resp: (!) 26 (10/15/20 1155)  BP: 137/66 (10/15/20 1102)  SpO2: (!) 93 % (10/15/20 1155) Vital Signs (24h Range):  Temp:  [97.6 °F (36.4 °C)-97.8 °F (36.6 °C)] 97.6 °F (36.4 °C)  Pulse:  [65-95] 68  Resp:  [20-44] 26  SpO2:  [85 %-100 %] 93 %  BP: (126-176)/(58-92) 137/66     Weight: 83.5 kg (184 lb)  Body mass index is 30.62 kg/m².    Estimated Creatinine Clearance: 39.9 mL/min (based on SCr of 1.3 mg/dL).    Physical Exam  Vitals signs and nursing note reviewed.   Constitutional:       Appearance: She is well-developed. She is ill-appearing. She is not diaphoretic.      Comments: Lethargic   HENT:      Head: Normocephalic and atraumatic.      Nose:      Comments: Feeding tube       Mouth/Throat:      Mouth: Mucous membranes are moist.      Pharynx: Oropharynx  is clear. No oropharyngeal exudate.   Eyes:      General: No scleral icterus.     Conjunctiva/sclera: Conjunctivae normal.   Neck:      Musculoskeletal: Normal range of motion.      Vascular: No JVD.   Cardiovascular:      Rate and Rhythm: Normal rate. Rhythm irregular.      Heart sounds: No murmur.   Pulmonary:      Effort: Pulmonary effort is normal. Tachypnea present. No accessory muscle usage.      Breath sounds: Rhonchi present.      Comments: Nasal cannula O2.  No distress   Abdominal:      General: There is no distension.      Palpations: Abdomen is soft.      Tenderness: There is no abdominal tenderness.   Musculoskeletal:         General: No tenderness.      Right lower leg: Edema present.      Left lower leg: Edema present.      Comments: No calf tenderness, warmth, erythema   Skin:     General: Skin is warm and dry.      Coloration: Skin is pale.      Findings: No rash.      Comments: Sternal incision c/d/i.  No erythema/drainage    Left IJ catheter - site clear    Dressing right upper back/chest - c/d/i   Neurological:      Mental Status: She is lethargic.      Comments: Lethargic, eyes closed, answers questions to prompting         Significant Labs:   Blood Culture:   Recent Labs   Lab 08/23/20  0149 08/23/20  0203 10/14/20  1206   LABBLOO No growth after 5 days. No growth after 5 days. No Growth to date  No Growth to date     CBC:   Recent Labs   Lab 10/14/20  0430 10/15/20  0244   WBC 34.24* 24.89*   HGB 9.1* 7.9*   HCT 28.7* 25.3*    227     CMP:   Recent Labs   Lab 10/14/20  0430 10/14/20  1245 10/15/20  0244     --  146*   K 3.3* 3.8 3.5   *  --  112*   CO2 22*  --  24     --  82   BUN 53*  --  54*   CREATININE 1.2  --  1.3   CALCIUM 8.4*  --  8.1*   PROT 5.6*  --  5.3*   ALBUMIN 2.0*  --  2.0*   BILITOT 0.8  --  0.8   ALKPHOS 380*  --  280*   AST 39  --  31   ALT 48*  --  43   ANIONGAP 11  --  10   EGFRNONAA 44.3*  --  40.2*     Urine Culture:   Recent Labs   Lab  08/23/20  0228 09/29/20  0854   LABURIN No significant growth ENTEROBACTER CLOACAE  >100,000 cfu/ml  *     Urine Studies:   Recent Labs   Lab 08/23/20  2249  10/14/20  1530   COLORU Colorless*   < > Yellow   APPEARANCEUA Clear   < > Cloudy*   PHUR 5.0   < > 5.0   SPECGRAV 1.005   < > 1.010   PROTEINUA Negative   < > 1+*   GLUCUA Negative   < > Negative   KETONESU Negative   < > Negative   BILIRUBINUA Negative   < > Negative   OCCULTUA 1+*   < > 3+*   NITRITE Negative   < > Negative   UROBILINOGEN Negative  --   --    LEUKOCYTESUR Trace*   < > Trace*   RBCUA 4   < > 17*   WBCUA 4   < > 3   BACTERIA Occasional   < > Occasional   SQUAMEPITHEL 1  --  1   HYALINECASTS 1   < > 9*    < > = values in this interval not displayed.     Wound Culture:   Recent Labs   Lab 10/05/20  1513   LABAERO ESCHERICHIA COLI  Few  *       Significant Imaging: I have reviewed all pertinent imaging results/findings within the past 24 hours.

## 2020-10-15 NOTE — ASSESSMENT & PLAN NOTE
75 y.o. female w/ history of Atrial fibrillation with RVR, HLD and severe mitral regurgitation who is s/p MV repair and TV annuloplasty on 9/9, complicated by posterior cardiac tamponade s/p pericardial window on 9/18, UTI (E cloacae, tx X 7 days with cefepime), and large right pleural effusion with multiple loculations s/p thoracentesis ( no cx data) and VATS with limited decortication on 10/5 with cx + for E Coli, s/p BAL 10/7 (candida lusitanae thought to be colonizer), who was last seen by ID on 10/9 who recommended 4 weeks of antibiotics post VATS (IV ceftriaxone while inpatient). Right pleural effusion improved on imaging and leukocytosis resolved.    ID  reconsulted for re-current leukocytosis (WBC 11>>34).      Transferred to ICU yesterday.  Afebrile, WBC now trending down ( 34>>24).   Blood cultures NGTD.  Hemodynamically stable.  Appears more comfortable today, though still lethargic. O2 sats good on RA, but still tachypneic.  No diarrhea, vivas exchanged yesterday, U/A negative.  2D negative.      Blood cultures 10/14 pending.  Vivas exchanged this afternoon, U/A negative (3 WBC).   2D echo negative for vegetation     CXR yesterday with moderate right, mild left pleural effusions with bibasilar atelectasis or consolidation, similar to prior study.  Diffuse bilateral interstitial prominence slightly increased     Currently on vancomycin, cefepime, fluconazole.    Plan/recommendations  1.  Continue empiric IV vancomycin (pharmacy to dose, trough goal 15-20), and cefepime, and fluconazole.  Renal dosing.  2.  Will continue to follow cultures, follow WBC, and follow up tomorrow.     Data reviewed and plan discussed with ID staff

## 2020-10-15 NOTE — PT/OT/SLP RE-EVAL
Physical Therapy Re-evaluation and Treatment     Patient Name:  Fatou Lorenzo   MRN:  [id number]    Recommendations:     Discharge Recommendations:  nursing facility, skilled   Discharge Equipment Recommendations: (TBD)   Barriers to discharge: Decreased caregiver support    Assessment:     Fatou Lorenzo is a 75 y.o. female admitted with a medical diagnosis of Acute respiratory failure with hypoxia.  She presents with the following impairments/functional limitations:  weakness, impaired endurance, impaired self care skills, gait instability, impaired functional mobilty, impaired balance, impaired cardiopulmonary response to activity. Pt tolerated activity with improved sitting balance compared to previous sessions. Pt able to maintain sitting balance with moderate assistance, able to maintain sitting with short bouts with stand by assistance. Pt continues to demo' flat affect throughout session with poor initiation of movement. Upon discharge, pt would benefit from continued skilled therapy intervention at skilled nursing facility to progress toward more independent mobility. At this time, pt would continue to benefit from acute skilled therapy intervention to address deficits and progress toward prior level of function.       Rehab Prognosis:  good; patient would benefit from acute skilled PT services to address these deficits and reach maximum level of function.      Recent Surgery: Procedure(s) (LRB):  LARYNGOSCOPY, MICRO SUSPENSION WITH AUGMENTATION (N/A)  VATS, WITH DECORTICATION, LUNG (Right) 10 Days Post-Op    Plan:     During this hospitalization, patient to be seen 4 x/week to address the above listed problems via gait training, therapeutic activities, therapeutic exercises, neuromuscular re-education  · Plan of Care Expires:  11/15/20   Plan of Care Reviewed with: patient, son    Subjective     Communicated with RN prior to session.  Patient found HOB elevated with central line, blood pressure cuff, pulse  ox (continuous), telemetry, PRAFO, pressure relief boots, vivas catheter upon PT entry to room, agreeable to evaluation.      Chief Complaint: Pt c/o fatigue, discomfort with tangled lines   Patient comments/goals: to get better and return home   Pain/Comfort:  · Pain Rating 1: 0/10  · Pain Rating Post-Intervention 1: 0/10    Patients cultural, spiritual, Catholic conflicts given the current situation: no      Objective:     Patient found with: central line, blood pressure cuff, pulse ox (continuous), telemetry, PRAFO, pressure relief boots, vivas catheter     General Precautions: Standard, fall, sternal   Orthopedic Precautions:N/A   Braces: N/A     Exams:  · Cognitive Exam:  Patient is oriented to self and place, followed simple one step commands, difficulty vocalizing, communicates mostly non-verbally   · Gross Motor Coordination:  Impaired   · RLE ROM: WFL  · RLE Strength: grossly 2/5   · LLE ROM: WFL  · LLE Strength: grossly 2/5    Functional Mobility:  · Bed Mobility:     · Supine to Sit: total assistance  · Sit to Supine: total assistance    AM-PAC 6 CLICK MOBILITY  Total Score:8       Therapeutic Activities and Exercises:   Pt sat EOB for 10 mins with moderate assistance for static sitting balance. Pt with excessive posterior/right lateral lean, required moderate assistance to maintain midline sitting. Pt performed 2x 5 sec and 1x 10 sec of sitting balance with close stand by assistance and cuing for adjusting trunk to midline.   While pt sitting EOB, pt performed 5x B LAQ with active assistance to reach full ROM.   Pt educated on role of PT/POC. Pt verbalized understanding.   Pt encouraged to only perform OOB mobility with assistance from nursing/therapy. Pt agreeable.   Pt encouraged to perform bed level activity with assistance from family. Pt and son agreeable.     Patient left HOB elevated with all lines intact, call button in reach and RN notified.    GOALS:   Multidisciplinary Problems     Physical  Therapy Goals        Problem: Physical Therapy Goal    Goal Priority Disciplines Outcome Goal Variances Interventions   Physical Therapy Goal     PT, PT/OT Ongoing, Progressing     Description: Goals to be met by: 2020     Patient will increase functional independence with mobility by performin. Supine to sit with Minimal Assistance x 1 person.-not met  2. Sit to stand transfer with Moderate Assistance.-not met  3. Bed to chair transfer with Moderate Assistance. -not met  4. Sitting at edge of bed x10 minutes with CGA.-not met  5. Lower extremity exercise program x 10 reps  with assistance as needed. -not met  6. Pt receive gait training ~ 10 ft with moderate assist- not met                           History:     Past Medical History:   Diagnosis Date    Atrial fibrillation with RVR 2020    Cataract     Essential hypertension 2020    Glaucoma     Heart valve problem     Hyperlipidemia     Severe mitral regurgitation 2020       Past Surgical History:   Procedure Laterality Date    ANKLE SURGERY      BAIN MAZE PROCEDURE N/A 2020    Procedure: BAIN MAZE PROCEDURE;  Surgeon: Antoni Waddell MD;  Location: [address];  Service: Cardiothoracic;  Laterality: N/A;  MAZE     LARYNGOSCOPY N/A 10/5/2020    Procedure: LARYNGOSCOPY, MICRO SUSPENSION WITH AUGMENTATION;  Surgeon: Yfn Mendoza MD;  Location: [facility] OR [address] [place] [address];  Service: ENT;  Laterality: N/A;    LEFT HEART CATHETERIZATION Left 2020    Procedure: Left heart cath, radial;  Surgeon: Marlee Carrillo MD;  Location: [facility] CATH LAB;  Service: Cardiology;  Laterality: Left;    lipoma removal      THORACOSCOPIC DECORTICATION OF LUNG Right 10/5/2020    Procedure: VATS, WITH DECORTICATION, LUNG;  Surgeon: Jeremy Shine MD;  Location: [address];  Service: Thoracic;  Laterality: Right;    TRICUSPID VALVULOPLASTY N/A 2020    Procedure: REPAIR, TRICUSPID VALVE;  Surgeon: Antoni Waddell MD;  Location:  [facility] OR 2ND FLR;  Service: Cardiothoracic;  Laterality: N/A;       Time Tracking:     PT Received On: 10/15/20  PT Start Time: 1308     PT Stop Time: 1331  PT Total Time (min): 23 min     Billable Minutes: Re-eval 10 mins  and Therapeutic Exercise 13 mins       Janine Soto, PT  10/15/2020

## 2020-10-15 NOTE — ASSESSMENT & PLAN NOTE
Fatou Lorenzo is a 75 y.o. female s/p MVr, TVr 9/9/2020. Case noteworthy for multiple pump runs (3) and RV hematoma due to retraction. Unstable 9/18, required pericardial window for evac of posterior cardiac tamponade. Respiratory distress and so re-admitted to SICU on 10/2 now s/p VATS on 10/5.     Neuro:  - Improvement of mental status   - Seen by psych - Following recs (started remeron)   - PRN liquid tylenol and IV morphine for pain      CV:  S/p MVr, TVr, MAZE 9/9/20. S/p bedside pericardial window 9/18  - Keep MAPs >60.   - pAF: digoxin 125 mcg.   -Transition back to 12.5 BID Metoprolol via makenzie.   - Follow Dig level      - Anti-coagulation: asa 81, hep gtt - goal 60-80      - Holding anticoagulation for now   - labetalol IV PRN for SBP > 160  - Holding warfarin INR 1.9     Pulm:   - Loculated pleural effusion s/p VATS on 10/5  - Extubated 10/9. Breathing comfortably on RA  - Scheduled duo and saline nebs  - CXR and ABG PRN  - HOB >30 deg     Renal:  - Yoo catheter was exchanged yesterday.   - BUN/Cr  54/1.3  - Continue to monitor UOP       FEN/GI:  - Lower extremity edema   - Replace other lytes as needed  - Will replace MAKENZIE  - Famotidine and bowel regimen     Heme/Onc:  - H/H stable   - No evidence of bleeding  - hep gtt for pAF with aPTT goal of 60-80     ID:   - S/p 7 days of cefepime vanc for enterococcus cloacae UTI. Also has e coli growing from pleural cultures.  - Follow ID recommendations pending      - Started on Cefepime fluconazole re-started toady 10/14/20. Candida on BAL     - Current antibiotics Cefepime/Vanc/ Antifungal   - BC: No growth to date     Endo:  - no hx of DM  - ISS     PPx:  - famotidine, SCD, holding hep gtt and warfarin for now     Dispo: SICU,

## 2020-10-15 NOTE — PT/OT/SLP EVAL
Speech Language Pathology Evaluation  Bedside Swallow    Patient Name:  Fatou Lorenzo   MRN:  [id number]  Admitting Diagnosis: Acute respiratory failure with hypoxia    Recommendations:                General Recommendations:  Modified barium swallow study  Diet recommendations:  NPO, NPO   Aspiration Precautions:  Frequent oral care, Ice chips sparingly and Strict aspiration precautions   General Precautions: Standard, aspiration, fall, sternal, NPO    History:     Past Medical History:   Diagnosis Date    Atrial fibrillation with RVR 8/24/2020    Cataract     Essential hypertension 8/31/2020    Glaucoma     Heart valve problem     Hyperlipidemia     Severe mitral regurgitation 8/24/2020       Past Surgical History:   Procedure Laterality Date    ANKLE SURGERY      BAIN MAZE PROCEDURE N/A 9/9/2020    Procedure: BAIN MAZE PROCEDURE;  Surgeon: Antoni Waddell MD;  Location: [facility] OR [address] [place] [address];  Service: Cardiothoracic;  Laterality: N/A;  MAZE     LARYNGOSCOPY N/A 10/5/2020    Procedure: LARYNGOSCOPY, MICRO SUSPENSION WITH AUGMENTATION;  Surgeon: Ynf Mendoza MD;  Location: [address];  Service: ENT;  Laterality: N/A;    LEFT HEART CATHETERIZATION Left 8/25/2020    Procedure: Left heart cath, radial;  Surgeon: Marlee Carrillo MD;  Location: [facility] CATH LAB;  Service: Cardiology;  Laterality: Left;    lipoma removal      THORACOSCOPIC DECORTICATION OF LUNG Right 10/5/2020    Procedure: VATS, WITH DECORTICATION, LUNG;  Surgeon: Jeremy Shine MD;  Location: [address];  Service: Thoracic;  Laterality: Right;    TRICUSPID VALVULOPLASTY N/A 9/9/2020    Procedure: REPAIR, TRICUSPID VALVE;  Surgeon: Antoni Waddell MD;  Location: [address];  Service: Cardiothoracic;  Laterality: N/A;       Prior Intubation HX:  Extubated 10/8, was also intubated 9/18-9/23     Pt was previously being followed by  service for vocal exercises for severe dysphonia, R VF paresis. As of 10/1, pt was  last seen by  service & was on a regular diet & receiving voice therapy, prior to intubation. SLP reconstulted as patient stepped up to ICU.     Subjective     Awake, extremely weak. Son present    Objective:     Oral Musculature Evaluation  Oral Musculature: general weakness  Dentition: present and adequate  Secretion Management: adequate  Mucosal Quality: dry  Mandibular Strength and Mobility: WFL  Oral Labial Strength and Mobility: WFL  Lingual Strength and Mobility: WFL  Velar Elevation: WFL  Buccal Strength and Mobility: WFL  Volitional Cough: unable to produce adequate cough, very weak  Volitional Swallow: fair  Voice Prior to PO Intake: breathy, weak, low intensity    Bedside Swallow Eval:   Consistencies Assessed:  · Thin liquids ice chips x3, thin via spoon x2  · Puree 1/2 tsp x1     Oral Phase:   · Anterior loss of thin and puree from R labial seal     Pharyngeal Phase:   · Weak delayed coughing following puree   · delayed swallow initiation with all trials   · multiple spontaneous swallows with puree trial.   · No changes in vitals during all trials.     SLP educated pt on all recs, precautions, risks & s/s aspiration, role of SLP, POC, etc.   Recommending MBSS at this time to fully assess swallow function and safety.       Assessment:     Fatou Lorenzo is a 75 y.o. female with an SLP diagnosis of Dysphagia and Aphonia.  She presents with risk of aspiration.    Goals:   Multidisciplinary Problems     SLP Goals        Problem: SLP Goal    Goal Priority Disciplines Outcome   SLP Goal     SLP Ongoing, Progressing   Description: Speech Language Pathology Goals  Goals expected to be met by 10/19  1. Pt to participate in ongoing swallow assessment to determine safest and least restrictive diet.                   Plan:     · Patient to be seen:  4 x/week   · Plan of Care expires:  11/08/20  · Plan of Care reviewed with:  patient   · SLP Follow-Up:  Yes       Discharge recommendations:  LTACH (long-term acute  care hospital)     Time Tracking:     SLP Treatment Date:   10/15/20  Speech Start Time:  1300  Speech Stop Time:  1316     Speech Total Time (min):  16 min    Billable Minutes: Eval Swallow and Oral Function 8 and Seld Care/Home Management Training 8    Emily Abadie, CCC-SLP  10/15/2020

## 2020-10-15 NOTE — PLAN OF CARE
[facility] had previously submitted for auth; however, it was sent to Peer to Peer review, and the review was cancelled yesterday when patient stepped back up to the ICU. [facility] will have to resubmit for auth once patient once again medically ready for transfer to LTAC.     Esthela Peralta LMSW   - Case Management

## 2020-10-15 NOTE — PLAN OF CARE
"      SICU PLAN OF CARE NOTE    Dx: Acute respiratory failure with hypoxia    Neuro: AAO x4, Arouses to Voice, Follows Commands and Moves All Extremities    Vital Signs: BP (!) 133/58 (BP Location: Left arm, Patient Position: Lying)   Pulse 93   Temp 97.8 °F (36.6 °C) (Oral)   Resp (!) 30   Ht 5' 5" (1.651 m)   Wt 83.5 kg (184 lb)   SpO2 100%   Breastfeeding No   BMI 30.62 kg/m²     Cardiac: HR , Afib    Respiratory: Nasal Cannula 1L O2    Diet: NPO and Tube Feeds at 10 ml/hr, goal of 25 ml/hr    Urine Output: Urinary Catheter 1580 cc/shift    Drains: [place] tube, no residuals over shift     Accuchecks: Q6H    Skin: No new skin breakdown over shift. Pt's chest incisions remain CHARMAINE and clean. Right flank hematoma is ecchymotic with minimal drainage from site. Dressed with gauzed and abd pad. Pt's pressure points protected. Heel boots and sacral foam in place for prevention.       Shift Events: Pt VSS throughout the night. No acute events over shift. Labs drawn and accuchecks completed per orders. Vanc trough was 27, MD notified, 4th dose held.  Wound care consult in for R flank area. Plan to reevaluate anticoagulation therapy. Will continue to monitor.   "

## 2020-10-15 NOTE — PROGRESS NOTES
"[facility]  Adult Nutrition  Progress Note    SUMMARY       Recommendations    1.) Increase EN of Impact Peptide 1.5; advance 5-10mL Q8hr to goal rate at 45mL/hr. EN provides 1620kcal, 101gm of protein, 831mL of free water. Add free water per MD recommendations.   2.) RD to monitor and follow-up    Goals: Pt to meet 75% of energy needs by RD follow up  Nutrition Goal Status: progressing towards goal  Communication of RD Recs: (POC)    Reason for Assessment    Reason For Assessment: RD follow-up  Diagnosis: other (see comments)(Acute respiratory failure with hypoxia)  Relevant Medical History: atrial fibrillation with RVR, essential HTN, heart valve problem, HLD  Interdisciplinary Rounds: did not attend  General Information Comments: Pt is extubated with NG tube in place. Staff reported TF is being tolerated. Pt has experienced recent weight loss, per family member at bedside. UBW of 170 lbs. NFPE performed on 9/30 showing no s/s of malnutrition. LBM: 10/15. Pt at risk for malnutrition.   Nutrition Discharge Planning: Pt to meet adequate energy and protien needs.     Nutrition Risk Screen    Nutrition Risk Screen: tube feeding or parenteral nutrition    Nutrition/Diet History    Spiritual, Cultural Beliefs, Rastafari Practices, Values that Affect Care: no  Factors Affecting Nutritional Intake: NPO    Anthropometrics    Temp: 97.6 °F (36.4 °C)  Height Method: Stated  Height: 5' 5" (165.1 cm)  Height (inches): 65 in  Weight Method: Bed Scale  Weight: 83.5 kg (184 lb)  Weight (lb): 184 lb  Ideal Body Weight (IBW), Female: 125 lb  % Ideal Body Weight, Female (lb): 147.2 %  BMI (Calculated): 30.6       Lab/Procedures/Meds    Pertinent Labs Reviewed: reviewed  Pertinent Labs Comments: WBC 24.89; RBC 2.68; Na 146; BUN 54; Ca 8.1; Glu 82  Pertinent Medications Reviewed: reviewed  Pertinent Medications Comments: albuterol, aspirin, digoxin, famotidine, heparin, vacomycin      Estimated/Assessed " Needs    Weight Used For Calorie Calculations: 83 kg (182 lb 15.7 oz)  Energy Calorie Requirements (kcal): 1657 kcals/day  Energy Need Method: [place]-St Jeor(x 1.25)  Protein Requirements: 85-102gm (1.5-1.8gm/kg IBW)  Weight Used For Protein Calculations: 56.8 kg (125 lb 3.5 oz)(IBW)  Fluid Requirements (mL): per MD or 1 mL/kcal     RDA Method (mL): 1657         Nutrition Prescription Ordered    Current Diet Order: NPO (10/5)  Current Nutrition Support Formula Ordered: Impact Peptide 1.5  Current Nutrition Support Rate Ordered: 25 (ml)  Current Nutrition Support Frequency Ordered: mL/hr    Evaluation of Received Nutrient/Fluid Intake    Enteral Calories (kcal): 900  Enteral Protein (gm): 56  Enteral (Free Water) Fluid (mL): 462  Other Calories (kcal): 0  Total Calories (kcal): 0  Total Calories (kcal/kg): 0  % Kcal Needs: -  % Protein Needs: -  I/O: I: 2505; O; 1608; +4.4L since 9/28  Energy Calories Required: not meeting needs  Protein Required: not meeting needs  Fluid Required: (per MD)  Comments: LBM 10/7  Tolerance: tolerating  % Intake of Estimated Energy Needs: 25 - 50 %  % Meal Intake: NPO    Nutrition Risk    Level of Risk/Frequency of Follow-up: high     Assessment and Plan    Nutrition Problem  Inadequate energy intake     Related to (etiology):   Enteral nutrition     Signs and Symptoms (as evidenced by):   Enteral nutrition does not provide estimated energy needs.      Interventions(treatment strategy):  1.) Collaboration with other providers  2.) Enteral nutrition     Nutrition Diagnosis Status:   Continued    Monitor and Evaluation    Food and Nutrient Intake: enteral nutrition intake  Food and Nutrient Adminstration: enteral and parenteral nutrition administration  Knowledge/Beliefs/Attitudes: food and nutrition knowledge/skill, beliefs and attitudes  Anthropometric Measurements: body mass index, weight change, weight  Biochemical Data, Medical Tests and Procedures: electrolyte and renal panel,  gastrointestinal profile, glucose/endocrine profile, inflammatory profile, lipid profile  Nutrition-Focused Physical Findings: overall appearance     Malnutrition Assessment                 Orbital Region (Subcutaneous Fat Loss): well nourished  Upper Arm Region (Subcutaneous Fat Loss): mild depletion   [place] Region (Muscle Loss): moderate depletion  Clavicle Bone Region (Muscle Loss): mild depletion  Clavicle and Acromion Bone Region (Muscle Loss): well nourished  Dorsal Hand (Muscle Loss): mild depletion  Anterior Thigh Region (Muscle Loss): well nourished  Posterior Calf Region (Muscle Loss): well nourished                 Nutrition Follow-Up    RD Follow-up?: Yes

## 2020-10-15 NOTE — PROGRESS NOTES
Pharmacokinetic Assessment Follow Up: IV Vancomycin    Vancomycin Regimen Assessment & Plan:  - Vancomycin trough drawn prior to 3rd dose resulted as 27.2 ug/mL, supra therapeutic for goal trough 15-20 ug/mL (per ID).  - Transition from scheduled regimen to pulse dosing. Draw next level with morning labs tomorrow. Will re-dose as needed vs schedule new regimen depending on level and renal function.    Drug levels (last 3 results):  Recent Labs   Lab Result Units 10/15/20  0110   Vancomycin-Trough ug/mL 27.2*     Pharmacy will continue to follow and monitor vancomycin.    Please contact pharmacy at extension [phone] for questions regarding this assessment.    Thank you for the consult,   Christian Pinedo     Patient brief summary:  Fatou Lorenzo is a 75 y.o. female initiated on antimicrobial therapy with IV Vancomycin for treatment of surgical prophylaxis.    The patient's current regimen is pulse dosing.    Drug Allergies:   Review of patient's allergies indicates:  No Known Allergies    Actual Body Weight:   83.5 kg    Renal Function:   Estimated Creatinine Clearance: 39.9 mL/min (based on SCr of 1.3 mg/dL).,     Dialysis Method (if applicable):  N/A

## 2020-10-15 NOTE — PT/OT/SLP RE-EVAL
Occupational Therapy  Co- Re-evaluation and Co-Treatment Note    Name: Fatou Lorenzo  MRN: [id number]  Admitting Diagnosis:  Acute respiratory failure with hypoxia 10 Days Post-Op    Recommendations:     Discharge Recommendations: nursing facility, skilled  Discharge Equipment Recommendations:  (TBD)  Barriers to discharge:  Decreased caregiver support, Inaccessible home environment    Assessment:     Fatou Lorenzo is a 75 y.o. female with a medical diagnosis of Acute respiratory failure with hypoxia.  She was able to perform supine/sit T/F c total assist and sit up on EOB c mod A 2* poor static sitting balance.  B UE c 2-/5 shoulder strength and 3-/5 strength from elbow to digits.  Able to perform grooming task c set-up while sitting EOB.  Pt was very somnvolent during re-assessment and follows commands but does not speak.  Performance deficits affecting function are weakness, impaired endurance, impaired self care skills, impaired functional mobilty, impaired balance, impaired cognition, decreased upper extremity function, decreased ROM, decreased safety awareness, impaired coordination, impaired fine motor.      Rehab Prognosis:  Good; patient would benefit from acute skilled OT services to address these deficits and reach maximum level of function.       Plan:     Patient to be seen 3 x/week to address the above listed problems via self-care/home management, therapeutic activities, therapeutic exercises, cognitive retraining  · Plan of Care Expires: 10/29/20  · Plan of Care Reviewed with: patient, son    Subjective     Chief Complaint: Acute respiratory failure c hypoxia.  Patient/Family stated goals: To get better.  Communicated with: RN prior to session.  Pain/Comfort:  · Pain Rating 1: 0/10    Objective:     Communicated with: RN prior to session.  Patient found supine with: blood pressure cuff, central line, vivas catheter, oxygen, peripheral IV, PRAFO, pulse ox (continuous), telemetry upon OT entry to  room.    General Precautions: Standard, fall, sternal   Orthopedic Precautions:N/A   Braces: N/A     Occupational Performance:    Bed Mobility:    · Patient completed Supine to Sit with total assistance and 2 persons  · Patient completed Sit to Supine with total assistance and 2 persons    Functional Mobility/Transfers:    · Functional Mobility: Pt was able to perform static sitting balance activities while sitting EOB 1x10 and tolerated well.  Pt sat up for approx. 10 minutes and has poor static sitting balance.  Sat up on EOB c mod A.    Activities of Daily Living:  · Grooming: SBA to wash off face while sitting EOB.    Cognitive/Visual Perceptual:  Cognitive/Psychosocial Skills:     -       Oriented to: Person   -       Follows Commands/attention:Follows one-step commands  -       Communication: Pt not speaking at this time  -       Memory: No Deficits noted  -       Safety awareness/insight to disability: impaired   -       Mood/Affect/Coping skills/emotional control: Appropriate to situation    Physical Exam:  Upper Extremity Range of Motion:     -       Right Upper Extremity: WFL except for R shoulder which has approx. 30* of flexion and the rest of R UE is WFL.  -       Left Upper Extremity: WFL except for R shoulder which has approx. 30* of flexion and the rest of R UE is WFL.  Upper Extremity Strength:    -       Right Upper Extremity: WFL except for R shoulder which 2/5  -       Left Upper Extremity: WFL except for L shoulder which is 2/5.    [facility] 6 Click:  AMPAC Total Score: 10Education:      Patient left supine with all lines intact, call button in reach, RN notified and son present    GOALS:   Multidisciplinary Problems     Occupational Therapy Goals        Problem: Occupational Therapy Goal    Goal Priority Disciplines Outcome Interventions   Occupational Therapy Goal     OT, PT/OT Ongoing, Progressing    Description: Goals to be met by: 10/23/2020      Patient will increase functional independence  with ADLs by performing:    UE Dressing with Minimum Assistance.  Grooming while sitting EOB with Contact Guard Assistance  Toileting from bedside commode with Max Assistance for hygiene and clothing management.   Toilet transfer to bedside commode with Max Assistance.  Supine <> Sit with minimum assistance in preparation for EOB/OOB functional activities  Pt to tolerate static standing for ~1 minute with min A while competing a functional task.                         History:     Past Medical History:   Diagnosis Date    Atrial fibrillation with RVR 8/24/2020    Cataract     Essential hypertension 8/31/2020    Glaucoma     Heart valve problem     Hyperlipidemia     Severe mitral regurgitation 8/24/2020       Past Surgical History:   Procedure Laterality Date    ANKLE SURGERY      BAIN MAZE PROCEDURE N/A 9/9/2020    Procedure: BAIN MAZE PROCEDURE;  Surgeon: Antoni Waddell MD;  Location: [facility] OR [address] [place] [address];  Service: Cardiothoracic;  Laterality: N/A;  MAZE     LARYNGOSCOPY N/A 10/5/2020    Procedure: LARYNGOSCOPY, MICRO SUSPENSION WITH AUGMENTATION;  Surgeon: Yfn Mendoza MD;  Location: [address];  Service: ENT;  Laterality: N/A;    LEFT HEART CATHETERIZATION Left 8/25/2020    Procedure: Left heart cath, radial;  Surgeon: Marlee Carrillo MD;  Location: [facility] CATH LAB;  Service: Cardiology;  Laterality: Left;    lipoma removal      THORACOSCOPIC DECORTICATION OF LUNG Right 10/5/2020    Procedure: VATS, WITH DECORTICATION, LUNG;  Surgeon: Jeremy Shine MD;  Location: [address];  Service: Thoracic;  Laterality: Right;    TRICUSPID VALVULOPLASTY N/A 9/9/2020    Procedure: REPAIR, TRICUSPID VALVE;  Surgeon: Antoni Waddell MD;  Location: [address];  Service: Cardiothoracic;  Laterality: N/A;       Time Tracking:     OT Date of Treatment: 10/15/20  OT Start Time: 1305  OT Stop Time: 1331  OT Total Time (min): 26 min    Billable Minutes:Re-eval 13  Self Care/Home Management  13    Raji De Leon, LOTR  10/15/2020

## 2020-10-15 NOTE — CONSULTS
Wound care consult received from MD for hematoma to R chest. Patient known to wound care team. Patient is a 74 yo female with extensive cardiac hx s/p Mvr, Tvr, MAZE 9/09/2020 with complicated post-operative course including protracted ICU stay, reintubation, pericardial window for cardiac tamponade, new arrythmia, JONAH. Patient admitted for acute respiratory failure with hypoxia.     Assessments and pictures listed below. HUSSAIN surface and heel boots already in use.     Recommendations:   -Nursing to cleanse upper back site with sterile normal saline, then apply gauze dressing to hematoma site and secure with Mepore tape to absorb drainage. Change dressing daily.     -Nursing to continue with pressure injury prevention interventions.     Nursing notified at bedside with recommendations.   Wound care to sign off. Please re-consult for any concerns [phone]    Midline sternal incision  - Incision is clean, dry and intact. No odor. No drainage.       Right upper back:   - Hematoma to right upper back with purple bruising. Per chart review, hematoma formed from previous chest tube site. No drainage. No odor.       Right upper back adjacent to hematoma  - small partial thickness opening measuring 0.1 x 1 x 0.1 cm of unknown etiology. No odor. No drainage.

## 2020-10-15 NOTE — SUBJECTIVE & OBJECTIVE
Interval History/Significant Events:   Antibiotics were broaden to cefepime, fluc, and vancomycin   MAKENZIE tube was placed, feeds started  Seen by ID agreed with antibiotic management   Heparin stopped for now, pending wound care consult  WBC has decreased to 24, no fevers overnight     Follow-up For: Procedure(s) (LRB):  LARYNGOSCOPY, MICRO SUSPENSION WITH AUGMENTATION (N/A)  VATS, WITH DECORTICATION, LUNG (Right)    Post-Operative Day: 6 Days Post-Op    Objective:     Vital Signs (Most Recent):  Temp: 97.8 °F (36.6 °C) (10/15/20 0300)  Pulse: 93 (10/15/20 0615)  Resp: (!) 30 (10/15/20 0615)  BP: (!) 133/58 (10/15/20 0600)  SpO2: 100 % (10/15/20 0615) Vital Signs (24h Range):  Temp:  [97.6 °F (36.4 °C)-98.9 °F (37.2 °C)] 97.8 °F (36.6 °C)  Pulse:  [] 93  Resp:  [14-44] 30  SpO2:  [85 %-100 %] 100 %  BP: (128-176)/(58-92) 133/58     Weight: 83.5 kg (184 lb)  Body mass index is 30.62 kg/m².      Intake/Output Summary (Last 24 hours) at 10/15/2020 0708  Last data filed at 10/15/2020 0500  Gross per 24 hour   Intake 272 ml   Output 2760 ml   Net -2488 ml       Physical Exam  Vitals signs and nursing note reviewed.   Constitutional:       Appearance: She is well-developed. She is ill-appearing. She is not diaphoretic.   HENT:      Head: Normocephalic and atraumatic.   Eyes:      Conjunctiva/sclera: Conjunctivae normal.   Neck:      Musculoskeletal: Normal range of motion and neck supple.   Cardiovascular:      Rate and Rhythm: Normal rate and regular rhythm.      Comments: Midline sternotomy with clean, dry, and intact, without evidence of infection  Prior chest tubes sites with dressings in place, clean and dry  Pulmonary:      Comments: Breathing comfortably on comfort flow  No distress, but rhonchi present and she has a wet cough.  Hematoma on the R chest, not actively bleeding   Abdominal:      General: There is no distension.      Palpations: Abdomen is soft.      Tenderness: There is no abdominal tenderness.    Skin:     General: Skin is warm and dry.   Psychiatric:      Comments: Depressed mood, flat affect           Lines/Drains/Airways     Central Venous Catheter Line            Percutaneous Central Line Insertion/Assessment - Triple Lumen  10/05/20 1843 left internal jugular 9 days          Drain                 Trans Pyloric Feeding Tube 10/14/20 1500 less than 1 day         Urethral Catheter 10/14/20 1500 less than 1 day                Significant Labs:    CBC/Anemia Profile:  Recent Labs   Lab 10/14/20  0430 10/15/20  0244   WBC 34.24* 24.89*   HGB 9.1* 7.9*   HCT 28.7* 25.3*    227   MCV 94 94   RDW 17.2* 17.2*        Chemistries:  Recent Labs   Lab 10/14/20  0430 10/14/20  1245 10/15/20  0244     --  146*   K 3.3* 3.8 3.5   *  --  112*   CO2 22*  --  24   BUN 53*  --  54*   CREATININE 1.2  --  1.3   CALCIUM 8.4*  --  8.1*   ALBUMIN 2.0*  --  2.0*   PROT 5.6*  --  5.3*   BILITOT 0.8  --  0.8   ALKPHOS 380*  --  280*   ALT 48*  --  43   AST 39  --  31   MG 2.1 2.1 2.0   PHOS 2.8 3.0 3.4       Coagulation:   Recent Labs   Lab 10/15/20  0244   INR 1.9*   APTT 37.3*     All pertinent labs within the past 24 hours have been reviewed.    Significant Imaging:  I have reviewed and interpreted all pertinent imaging results/findings within the past 24 hours.

## 2020-10-15 NOTE — PROGRESS NOTES
Patient seen at the bedside on morning rounds with the critical care team. She is much more awake today. Her white count is down. She has a nasal feeding tube, but she has pulled those out multiple times before. We may approach her about a peg tube. Depression remains in major concern. Will continue with occupational and physical therapy.

## 2020-10-15 NOTE — PLAN OF CARE
Problem: SLP Goal  Goal: SLP Goal  Description: Speech Language Pathology Goals  Goals expected to be met by 10/19  1. Pt to participate in ongoing swallow assessment to determine safest and least restrictive diet.  Outcome: Ongoing, Progressing     POC implemented. Patient to be NPO at this time.  SLP recommending a MBSS at this time.   Emily P. Abadie M.S., CCC-SLP  Speech Language Pathologist  [phone]  10/15/2020

## 2020-10-16 NOTE — PROGRESS NOTES
Patient seen at bedside with critical care team. She failed her swallow study. She is neurologically intact, and has the potential for a full recovery. nutrition remains an issue. I discussed with her a gastrostomy tube.

## 2020-10-16 NOTE — PROCEDURES
Modified Barium Swallow    Patient Name:  Fatou Lorenzo   MRN:  [id number]    Recommendations:     Recommendations:                General Recommendations:  Dysphagia therapy and Voice therapy  Diet recommendations:  NPO, NPO Pt appropriate for ice chips and single sips of nectar-thick liquids for pleasure only  Aspiration Precautions: Frequent oral care, and Strict aspiration precautions   General Precautions: Standard, fall, sternal, aspiration, NPO  Communication strategies:  go to room if call light pushed    Referral     Reason for Referral  Patient was referred for a Modified Barium Swallow Study to assess the efficiency of her swallow function, rule out aspiration and make recommendations regarding safe dietary consistencies, effective compensatory strategies, and safe eating environment.     Diagnosis: Leukocytosis     History:     Past Medical History:   Diagnosis Date    Atrial fibrillation with RVR 8/24/2020    Cataract     Essential hypertension 8/31/2020    Glaucoma     Heart valve problem     Hyperlipidemia     Severe mitral regurgitation 8/24/2020     Objective:     Current Respiratory Status: 10/16/20    Alert: Pt required cues to maintain wakeful state throughout study, appearing disengaged and lethargic.    Cooperative: yes    Follows Directions: yes    Visualization  · Patient was seen in the lateral view  · NG tube observed in place    Oral Peripheral Examination  · Oral Musculature: general weakness  · Dentition: present and adequate  · Secretion Management: adequate  · Mucosal Quality: dry  · Mandibular Strength and Mobility: WFL  · Oral Labial Strength and Mobility: WFL  · Lingual Strength and Mobility: WFL  · Velar Elevation: WFL  · Buccal Strength and Mobility: WFL  · Volitional Cough: unable to produce adequate cough, very weak  · Volitional Swallow: fair  · Voice Prior to PO Intake: breathy, weak, low intensity    Consistencies Assessed  · Thin via tsp x1, cup x1  · Nectar thick via  tsp x1, cup x1, straw x1  · Puree via tsp x1    Oral Preparation/Oral Phase  · Prolonged A-P transfer  · Premature spillage to the valleculae with nectar-thick liquids    Pharyngeal Phase   · Mildly delayed initiation of swallow across consistencies  · Mildly decreased hyolaryngeal elevation/excursion  · Incomplete epiglottic inversion  · Mild vallecular and pyriform sinus stasis with thin liquids and nectar-thick liquids. Severe vallecular stasis and coating of posterior pharyngeal wall appreciated with puree. SLP instructed pt to swallow in attempt to clear. Pt unable to clear stasis despite multiple swallows and liquid washes. Pt with coughing and throat clearing at this time.  · Laryngeal penetration to the vocal folds essentially with all consistencies. Deep laryngreal penetration of puree likely 2/2 vallecular stasis mixing with thin and nectar-thick liquid trials. No sensory response appreciated.  One instance for fluoro turned on for last trial of nectar-thick liquids, aspiration appreciated, evidenced by coated vocal folds. Unsure to specific trials, though likely 2/2 previously mentioned puree stasis mixing with thin liquids and nectar-thick liquids  · No penetration/aspiration appreciated with isolated nectar-thick liquid trials.    Cervical Esophageal Phase  · UES appeared to accommodate all bolus types without stasis or retrograde movement observed     Assessment:     Impressions  · Patient demonstrates moderate-severe Oropharyngeal dysphagia characterized by severe vallecular stasis of puree, deep silent laryngeal penetration of mixed consistencies (puree + nectar-thick liquids 2/2 puree stasis), and the above mentioned oropharyngeal impairments.     Prognosis: Fair    Barriers:  · Decreased level of alertness      Plan  ST recommends pt to remain NPO at this time 2/2 high risk for aspiration. Pt safe for small amounts of ice chips and nectar-thick liquids for pleasure. ST with recommendations for  consideration of long term means of alternative hydration, nutrition, and medications.    Education  Results were discussed with patient. Education provided regarding justification for NPO, risk for aspiration, swallow precautions, pleasure feedings, and ongoing ST plan of care. Pt verbalized understanding and agreement. ST to continue to monitor.    Goals:   Multidisciplinary Problems     SLP Goals        Problem: SLP Goal    Goal Priority Disciplines Outcome   SLP Goal     SLP Ongoing, Progressing   Description: Speech Language Pathology Goals  Goals expected to be met by 10/23  1. Pt to participate in ongoing swallow assessment to determine safest and least restrictive diet.                 Plan:   · Patient to be seen:  Therapy Frequency: 4 x/week   · Plan of Care expires:  11/08/20  · Plan of Care reviewed with:  patient        Discharge recommendations:  nursing facility, skilled   Barriers to Discharge:  Level of Skilled Assistance Needed      Time Tracking:   SLP Treatment Date:   10/16/20  Speech Start Time:  0855  Speech Stop Time:  0915     Speech Total Time (min):  20 min    BURKE Perkins  10/16/2020

## 2020-10-16 NOTE — NURSING
"Dx: Leukocytosis    Neuro: drowsy throughout shift. Opens eyes spontaneously. AAOx4. Quiet/whisper voice.    Vital Signs: BP (!) 144/66 (BP Location: Right arm, Patient Position: Lying)   Pulse 83   Temp 97.6 °F (36.4 °C) (Oral)   Resp (!) 24   Ht 5' 5" (1.651 m)   Wt 83.5 kg (184 lb)   SpO2 95%   Breastfeeding No   BMI 30.62 kg/m²   Cardiac: NSR 70-90s  Resp: RA  Diet: TF @ 25 (goal)   Gtts: n/a  Urine Output: 810ml UOP  Drains: N/A   Labs/Accuchecks: daily labs; accuchecks Q8  Skin: midline sternal incision with sutures to superior portion. Right sided chest hematoma appears maroon/purple and hard to touch with no drainage. No skin tears or breakdown noted. Foam dsg in place. Bed inflated. Patient turned Q2. Heels boots on.  Shift Events: patient failed swallow study. No other acute events throughout shift.         "

## 2020-10-16 NOTE — MEDICAL/APP STUDENT
History & Physical    SUBJECTIVE:     History of Present Illness:  Fatou Lorenzo is a 76 yo female with an extensive cardiac hx s/p Mvr, Tvr, MAZE 9/09/2020 with complicated post-operative course including protracted ICU stay, reintubation, pericardial window for cardiac tamponade, new arrythmia, and JONAH. She was eventually stepped down but readmitted to SICU shortly after for increasing oxygen requirements, found to have worsening right sided pleural effusion. IR US showed multiple loculations; thoracentesis able to drain 60 cc of fluid.      Thoracic surgery was consulted, VATS / decortication was done on 10/05/20, a multiloculated pleural effusion was found, IV abx for E. Coli in pleural fluid. Patient was treated with antibiotics and was stepdown on 10/12/20, however she had an increase white count overnight and was more lethargic so she was stepped up to the ICU.      Morning WBC bumped to 24. Yoo was exchanged, UA, and blood cultures were ordered. ID was consulted. Off note he has a history of depression and is being followed by psychiatry.     Pt failed swallowing test per speech evaluation, but is permitted pleasure liquids, nectars etc.    Interval today:  Small neurologic improvement, has significant difficulty with audible speech, due to poor vocal cord function, but is audible with close listening to pt's mouth during attempted speech.      Chief Complaint   Patient presents with    Shortness of Breath     Patient reports SOB that started at 2000. Patient states when she lie down it feels as if shes drowning. Patient reports being seen in ED 3 days ago for the same symptoms.       Review of patient's allergies indicates:  No Known Allergies    Current Facility-Administered Medications   Medication Dose Route Frequency Provider Last Rate Last Dose    acetaminophen oral solution 650 mg  650 mg Oral Q6H PRN Albert Abrams MD   650 mg at 10/08/20 9441    albuterol-ipratropium 2.5 mg-0.5 mg/3 mL  nebulizer solution 3 mL  3 mL Nebulization Q4H Mir Croft MD   3 mL at 10/16/20 0741    aspirin chewable tablet 81 mg  81 mg Per NG tube Daily Madhuri Ng MD   81 mg at 10/16/20 0813    ceFEPIme injection 2 g  2 g Intravenous Q12H Mir Croft MD   2 g at 10/16/20 0200    dextrose 50% injection 12.5 g  12.5 g Intravenous PRN Miguel Huntley MD   12.5 g at 10/10/20 0749    dextrose 50% injection 25 g  25 g Intravenous PRN Albert Abrams MD        digoxin tablet 0.125 mg  0.125 mg Per NG tube Daily Madhuri Ng MD   0.125 mg at 10/16/20 0813    famotidine tablet 20 mg  20 mg Per NG tube Daily Antoni Waddell MD   20 mg at 10/16/20 0813    fluconazole (DIFLUCAN) IVPB 200 mg 100 mL  200 mg Intravenous Q24H Mir Croft MD   200 mg at 10/15/20 1515    furosemide injection 20 mg  20 mg Intravenous Q12H Jj Patterson MD   20 mg at 10/16/20 1041    glucagon (human recombinant) injection 1 mg  1 mg Intramuscular PRN Miguel Huntley MD        insulin aspart U-100 pen 0-5 Units  0-5 Units Subcutaneous Q6H PRN iMguel Huntley MD        labetalol 20 mg/4 mL (5 mg/mL) IV syring  10 mg Intravenous Q6H PRN Madhuri Ng MD   10 mg at 10/14/20 1432    lidocaine HCL 2% jelly   Topical (Top) PRN Madhuri Ng MD        melatonin tablet 6 mg  6 mg Per NG tube Nightly Andrea Grajeda MD   6 mg at 10/15/20 2000    metoprolol 12.5mg/1.25mL oral solution 25 mg  25 mg Per NG tube BID Alison Hobbs PA-C   25 mg at 10/16/20 0813    mirtazapine tablet 7.5 mg  7.5 mg Per NG tube QHS Madhuri Ng MD   7.5 mg at 10/15/20 2000    morphine injection 1 mg  1 mg Intravenous Q6H PRN Madhuri Ng MD   1 mg at 10/14/20 0156    multivitamin tablet  1 tablet Oral Daily Mir Croft MD   1 tablet at 10/16/20 0813    polyethylene glycol packet 17 g  17 g Oral Daily Mir Croft MD   17 g at 10/16/20 0813    potassium chloride 20  mEq in 100 mL IVPB (FOR CENTRAL LINE ADMINISTRATION ONLY)  20 mEq Intravenous Once Jeremy Florian II, MD        potassium chloride packet 20 mEq  20 mEq Per NG tube BID Mir Croft MD   20 mEq at 10/16/20 0813    sodium chloride 3% nebulizer solution 4 mL  4 mL Nebulization Q6H Madhuri Ng MD   4 mL at 10/16/20 0741    vancomycin - pharmacy to dose   Intravenous pharmacy to manage frequency Antoni Waddell MD        warfarin (COUMADIN) tablet 1 mg  1 mg Per NG tube Once Mir Croft MD           Past Medical History:   Diagnosis Date    Atrial fibrillation with RVR 8/24/2020    Cataract     Essential hypertension 8/31/2020    Glaucoma     Heart valve problem     Hyperlipidemia     Severe mitral regurgitation 8/24/2020     Past Surgical History:   Procedure Laterality Date    ANKLE SURGERY      BAIN MAZE PROCEDURE N/A 9/9/2020    Procedure: BAIN MAZE PROCEDURE;  Surgeon: Antoni Waddell MD;  Location: [facility] OR [address] [place] [address];  Service: Cardiothoracic;  Laterality: N/A;  MAZE     LARYNGOSCOPY N/A 10/5/2020    Procedure: LARYNGOSCOPY, MICRO SUSPENSION WITH AUGMENTATION;  Surgeon: Yfn Mendoza MD;  Location: [facility] OR [address] [place] [address];  Service: ENT;  Laterality: N/A;    LEFT HEART CATHETERIZATION Left 8/25/2020    Procedure: Left heart cath, radial;  Surgeon: Marlee Carrillo MD;  Location: [facility] CATH LAB;  Service: Cardiology;  Laterality: Left;    lipoma removal      THORACOSCOPIC DECORTICATION OF LUNG Right 10/5/2020    Procedure: VATS, WITH DECORTICATION, LUNG;  Surgeon: Jeremy Shine MD;  Location: [facility] OR [address] [place] [address];  Service: Thoracic;  Laterality: Right;    TRICUSPID VALVULOPLASTY N/A 9/9/2020    Procedure: REPAIR, TRICUSPID VALVE;  Surgeon: Antoni Waddell MD;  Location: [facility] OR [address] [place] [address];  Service: Cardiothoracic;  Laterality: N/A;     Family History   Problem Relation Age of Onset    Cancer Mother     Cataracts Sister     No Known Problems Father     No Known Problems  "Brother     No Known Problems Maternal Aunt     No Known Problems Maternal Uncle     No Known Problems Paternal Aunt     No Known Problems Paternal Uncle     No Known Problems Maternal Grandmother     No Known Problems Maternal Grandfather     No Known Problems Paternal Grandmother     No Known Problems Paternal Grandfather     Amblyopia Neg Hx     Blindness Neg Hx     Diabetes Neg Hx     Glaucoma Neg Hx     Hypertension Neg Hx     Macular degeneration Neg Hx     Retinal detachment Neg Hx     Strabismus Neg Hx     Stroke Neg Hx     Thyroid disease Neg Hx     Breast cancer Neg Hx     Colon cancer Neg Hx     Ovarian cancer Neg Hx      Social History     Tobacco Use    Smoking status: Never Smoker    Smokeless tobacco: Never Used   Substance Use Topics    Alcohol use: No    Drug use: No        Review of Systems:  Review of Systems   Constitutional: Positive for fatigue. Negative for activity change, appetite change, chills, diaphoresis and fever.   HENT: Negative.    Eyes: Negative.    Respiratory: Positive for shortness of breath. Negative for cough and wheezing.    Cardiovascular: Positive for leg swelling. Negative for chest pain and palpitations.   Gastrointestinal: Negative for abdominal pain, constipation, diarrhea, nausea and vomiting.   Genitourinary: Negative for dysuria and flank pain.        Yoo   Musculoskeletal: Negative for arthralgias, back pain and neck pain.   Skin: Negative for wound.   Neurological: Negative for dizziness and headaches.   Psychiatric/Behavioral: Negative for agitation.     OBJECTIVE:     Vital Signs (Most Recent)  Temp: 97.7 °F (36.5 °C) (10/16/20 1100)  Pulse: 73 (10/16/20 1115)  Resp: (!) 45 (10/16/20 1115)  BP: 134/62 (10/16/20 1100)  SpO2: 97 % (10/16/20 1115)  5' 5" (1.651 m)  83.5 kg (184 lb)     Physical Exam:  Vitals signs reviewed.   Constitutional:       Appearance: She is well-developed. She is ill-appearing.      Comments: Lethargic    HENT: "      Head: Normocephalic and atraumatic.   Eyes:      Pupils: Pupils are equal, round, and reactive to light.   Neck:      Musculoskeletal: Normal range of motion.      Vascular: No JVD.   Cardiovascular:      Rate and Rhythm: Normal rate.      Comments: afib   Central line in place   Pulmonary:      Effort: Pulmonary effort is normal. No respiratory distress.   Tachyphemic. On NC. Hematoma on the R side where previous chest site was.    Abdominal:      General: There is no distension.   Musculoskeletal:         General: Swelling present.   Skin:     Comments: Midline sternal incision c/d/i    Psychiatric:      Comments: Hoarse    Laboratory  CBC: Reviewed  CMP: Reviewed  LFTs: Reviewed    Diagnostic Results:  Labs: Reviewed    ASSESSMENT/PLAN:   Fatou Lorenzo is a 76 yo female with an extensive cardiac hx s/p Mvr, Tvr, MAZE 9/09/2020 with complicated post-operative course including protracted ICU stay, reintubation, pericardial window for cardiac tamponade, new arrythmia, and JONAH.    PLAN:  PEG tube placement will discuss with staff on timing

## 2020-10-16 NOTE — PLAN OF CARE
"      SICU PLAN OF CARE NOTE    Dx: Leukocytosis    Neuro: AAO x4, Arouses to Voice, Follows Commands and Moves All Extremities    Vital Signs: BP (!) 140/63 (BP Location: Right arm, Patient Position: Lying)   Pulse 71   Temp 97.7 °F (36.5 °C) (Oral)   Resp (!) 44   Ht 5' 5" (1.651 m)   Wt 83.5 kg (184 lb)   SpO2 97%   Breastfeeding No   BMI 30.62 kg/m²     Cardiac: HR 65-90s, Afib     Respiratory: Room Air    Diet: NPO, TF @ 25 ml/hr    Gtts: Heparin    Urine Output: Urinary Catheter 1425 cc/shift    Drains: Ke tube, no residuals over shift      Accuchecks: Q6H    Skin: No new skin breakdown over shift. Pt's chest incisions remain CHARMAINE and clean/dry. Right flank hematoma is ecchymotic with minimal drainage from site. Dressed with gauze and tape. Pt's pressure points protected. Heel boots and sacral foam in place for prevention.    Shift Events: Pt's VS stable throughout night. No acute events during shift. Pt's heparin gtt held and restarted per PTT lab monitoring, see lab results and flowsheets for further details. Labs drawn and accuchecks completed per orders, no insulin coverage given. K+ replacement given for K of 3.1. Q8H free water bolus of 300cc given at 0600. Plan to have barium swallow done today per SLP, and potential stepdown to CSU. Will continue to monitor          "

## 2020-10-16 NOTE — SUBJECTIVE & OBJECTIVE
Interval History/Significant Events:   Antibiotics were broaden to cefepime, fluc, and vancomycin 10/14/20  Feeds to goal  Swallow study today   Seen by ID agreed with antibiotic management   Heparin re-started, wound care following   WBC has decreased to 21, no fevers overnight     Follow-up For: Procedure(s) (LRB):  LARYNGOSCOPY, MICRO SUSPENSION WITH AUGMENTATION (N/A)  VATS, WITH DECORTICATION, LUNG (Right)    Post-Operative Day: 6 Days Post-Op    Objective:     Vital Signs (Most Recent):  Temp: 97.7 °F (36.5 °C) (10/16/20 0300)  Pulse: 73 (10/16/20 0630)  Resp: (!) 28 (10/16/20 0630)  BP: 134/63 (10/16/20 0600)  SpO2: (!) 93 % (10/16/20 0630) Vital Signs (24h Range):  Temp:  [97.5 °F (36.4 °C)-97.7 °F (36.5 °C)] 97.7 °F (36.5 °C)  Pulse:  [67-91] 73  Resp:  [20-72] 28  SpO2:  [92 %-100 %] 93 %  BP: (120-152)/(58-76) 134/63     Weight: 83.5 kg (184 lb)  Body mass index is 30.62 kg/m².      Intake/Output Summary (Last 24 hours) at 10/16/2020 0636  Last data filed at 10/16/2020 0600  Gross per 24 hour   Intake 1446 ml   Output 3575 ml   Net -2129 ml       Physical Exam  Vitals signs and nursing note reviewed.   Constitutional:       Appearance: She is well-developed. She is ill-appearing. She is not diaphoretic.   HENT:      Head: Normocephalic and atraumatic.   Eyes:      Conjunctiva/sclera: Conjunctivae normal.   Neck:      Musculoskeletal: Normal range of motion and neck supple.   Cardiovascular:      Rate and Rhythm: Normal rate and regular rhythm.      Comments: Midline sternotomy with clean, dry, and intact, without evidence of infection  Prior chest tubes sites with dressings in place, clean and dry  Pulmonary:      Comments: Breathing comfortably on comfort flow  No distress, but rhonchi present and she has a wet cough.  Hematoma on the R chest, not actively bleeding   Abdominal:      General: There is no distension.      Palpations: Abdomen is soft.      Tenderness: There is no abdominal tenderness.    Skin:     General: Skin is warm and dry.   Psychiatric:      Comments: Depressed mood, flat affect           Lines/Drains/Airways     Central Venous Catheter Line            Percutaneous Central Line Insertion/Assessment - Triple Lumen  10/05/20 1843 left internal jugular 10 days          Drain                 Trans Pyloric Feeding Tube 10/14/20 1500 1 day         Urethral Catheter 10/14/20 1500 1 day          Peripheral Intravenous Line                 Peripheral IV - Single Lumen 10/16/20 0610 20 G Posterior;Right Hand less than 1 day                Significant Labs:    CBC/Anemia Profile:  Recent Labs   Lab 10/15/20  0244 10/16/20  0352   WBC 24.89* 21.77*  21.77*   HGB 7.9* 7.7*  7.7*   HCT 25.3* 25.1*  25.1*    238  238   MCV 94 95  95   RDW 17.2* 17.3*  17.3*        Chemistries:  Recent Labs   Lab 10/14/20  1245 10/15/20  0244 10/16/20  0352   NA  --  146* 148*   K 3.8 3.5 3.1*   CL  --  112* 112*   CO2  --  24 25   BUN  --  54* 66*   CREATININE  --  1.3 1.5*   CALCIUM  --  8.1* 8.1*   ALBUMIN  --  2.0* 2.0*   PROT  --  5.3* 5.4*   BILITOT  --  0.8 0.8   ALKPHOS  --  280* 276*   ALT  --  43 44   AST  --  31 30   MG 2.1 2.0 2.2   PHOS 3.0 3.4 3.4       Coagulation:   Recent Labs   Lab 10/16/20  0352   INR 2.3*   APTT 62.1*     All pertinent labs within the past 24 hours have been reviewed.    Significant Imaging:  I have reviewed and interpreted all pertinent imaging results/findings within the past 24 hours.

## 2020-10-16 NOTE — PROGRESS NOTES
[facility]  Critical Care - Surgery  Progress Note    Patient Name: Fatou Lorenzo  MRN: [id number]  Admission Date: 8/30/2020  Hospital Length of Stay: 46 days  Code Status: Full Code  Attending Provider: Antoni Waddell MD  Primary Care Provider: Ludin Rivas MD   Principal Problem: Leukocytosis    Subjective:     Hospital/ICU Course:  No notes on file    Interval History/Significant Events:   Antibiotics were broaden to cefepime, fluc, and vancomycin 10/14/20  Feeds to goal  Swallow study today   Seen by ID agreed with antibiotic management   Wound care following for R chest hematoma   WBC has decreased to 21, no fevers overnight     Follow-up For: Procedure(s) (LRB):  LARYNGOSCOPY, MICRO SUSPENSION WITH AUGMENTATION (N/A)  VATS, WITH DECORTICATION, LUNG (Right)    Post-Operative Day: 6 Days Post-Op    Objective:     Vital Signs (Most Recent):  Temp: 97.7 °F (36.5 °C) (10/16/20 0300)  Pulse: 73 (10/16/20 0630)  Resp: (!) 28 (10/16/20 0630)  BP: 134/63 (10/16/20 0600)  SpO2: (!) 93 % (10/16/20 0630) Vital Signs (24h Range):  Temp:  [97.5 °F (36.4 °C)-97.7 °F (36.5 °C)] 97.7 °F (36.5 °C)  Pulse:  [67-91] 73  Resp:  [20-72] 28  SpO2:  [92 %-100 %] 93 %  BP: (120-152)/(58-76) 134/63     Weight: 83.5 kg (184 lb)  Body mass index is 30.62 kg/m².      Intake/Output Summary (Last 24 hours) at 10/16/2020 0636  Last data filed at 10/16/2020 0600  Gross per 24 hour   Intake 1446 ml   Output 3575 ml   Net -2129 ml       Physical Exam  Vitals signs and nursing note reviewed.   Constitutional:       Appearance: She is well-developed. She is ill-appearing. She is not diaphoretic.   HENT:      Head: Normocephalic and atraumatic.   Eyes:      Conjunctiva/sclera: Conjunctivae normal.   Neck:      Musculoskeletal: Normal range of motion and neck supple.   Cardiovascular:      Rate and Rhythm: Normal rate and regular rhythm.      Comments: Midline sternotomy with clean, dry, and intact, without evidence of  infection  Prior chest tubes sites with dressings in place, clean and dry  Pulmonary:      Comments: Breathing comfortably on comfort flow  No distress, but rhonchi present and she has a wet cough.  Hematoma on the R chest, not actively bleeding   Abdominal:      General: There is no distension.      Palpations: Abdomen is soft.      Tenderness: There is no abdominal tenderness.   Skin:     General: Skin is warm and dry.   Psychiatric:      Comments: Depressed mood, flat affect           Lines/Drains/Airways     Central Venous Catheter Line            Percutaneous Central Line Insertion/Assessment - Triple Lumen  10/05/20 1843 left internal jugular 10 days          Drain                 Trans Pyloric Feeding Tube 10/14/20 1500 1 day         Urethral Catheter 10/14/20 1500 1 day          Peripheral Intravenous Line                 Peripheral IV - Single Lumen 10/16/20 0610 20 G Posterior;Right Hand less than 1 day                Significant Labs:    CBC/Anemia Profile:  Recent Labs   Lab 10/15/20  0244 10/16/20  0352   WBC 24.89* 21.77*  21.77*   HGB 7.9* 7.7*  7.7*   HCT 25.3* 25.1*  25.1*    238  238   MCV 94 95  95   RDW 17.2* 17.3*  17.3*        Chemistries:  Recent Labs   Lab 10/14/20  1245 10/15/20  0244 10/16/20  0352   NA  --  146* 148*   K 3.8 3.5 3.1*   CL  --  112* 112*   CO2  --  24 25   BUN  --  54* 66*   CREATININE  --  1.3 1.5*   CALCIUM  --  8.1* 8.1*   ALBUMIN  --  2.0* 2.0*   PROT  --  5.3* 5.4*   BILITOT  --  0.8 0.8   ALKPHOS  --  280* 276*   ALT  --  43 44   AST  --  31 30   MG 2.1 2.0 2.2   PHOS 3.0 3.4 3.4       Coagulation:   Recent Labs   Lab 10/16/20  0352   INR 2.3*   APTT 62.1*     All pertinent labs within the past 24 hours have been reviewed.    Significant Imaging:  I have reviewed and interpreted all pertinent imaging results/findings within the past 24 hours.       Assessment/Plan:     Severe mitral regurgitation  Fatou Lorenzo is a 75 y.o. female s/p MVr, TVr 9/9/2020.  Case noteworthy for multiple pump runs (3) and RV hematoma due to retraction. Unstable 9/18, required pericardial window for evac of posterior cardiac tamponade. Respiratory distress and so re-admitted to SICU on 10/2 now s/p VATS on 10/5.     Neuro:  - Improvement of mental status - Will start modafinil today  - Seen by psych - Following recs (started mirtazapine and melatonin)   - PRN liquid tylenol and IV morphine for pain      CV:  S/p MVr, TVr, MAZE 9/9/20. S/p bedside pericardial window 9/18  - Keep MAPs >60.   - pAF: digoxin 125 mcg.   -Transition back to 12.5 BID Metoprolol via kevin.   - Follow Dig level      - Anti-coagulation: asa 81,  INR 2.3. stop heparin gtt      - labetalol IV PRN for SBP > 160     Pulm:   - Loculated pleural effusion s/p VATS on 10/5  - Extubated 10/9. Breathing comfortably on RA  - Scheduled duo and saline nebs  - CXR and ABG PRN  - HOB >30 deg  - Has a vocal cord paralysis.      Renal:  - Yoo cath   - BUN/Cr  54/1.3--> 66/1.5  - Continue to monitor UOP       FEN/GI:  - Lower extremity edema   - Replace other lytes as needed  - TF at goal and free water bolus   - Famotidine and bowel regimen     Heme/Onc:  - H/H stable 7.7/25  - No evidence of bleeding  - INR 2.3. Will give 1mg of warfarin and stop heparin gtt      ID:   - S/p 7 days of cefepime vanc for enterococcus cloacae UTI. Also has e coli growing from pleural cultures.  - Follow ID recommendations       - Started on Cefepime fluconazole re-started toady 10/14/20. Candida on BAL     - Current antibiotics Cefepime/Vanc/ Antifungal   - BC: No growth to date   - WBC decreasing     Endo:  - no hx of DM  - ISS     PPx:  - famotidine, SCD, holding hep gtt and warfarin for now     Dispo: SICU, Swallow study today.   ACS consult for PEG placement          Critical care was time spent personally by me on the following activities: development of treatment plan with patient or surrogate and bedside caregivers, discussions with  consultants, evaluation of patient's response to treatment, examination of patient, ordering and performing treatments and interventions, ordering and review of laboratory studies, ordering and review of radiographic studies, pulse oximetry, re-evaluation of patient's condition.  This critical care time did not overlap with that of any other provider or involve time for any procedures.     Mir Croft MD  Critical Care - Surgery  [facility]

## 2020-10-16 NOTE — ASSESSMENT & PLAN NOTE
Fatou Lorenzo is a 75 y.o. female s/p MVr, TVr 9/9/2020. Case noteworthy for multiple pump runs (3) and RV hematoma due to retraction. Unstable 9/18, required pericardial window for evac of posterior cardiac tamponade. Respiratory distress and so re-admitted to SICU on 10/2 now s/p VATS on 10/5.     Neuro:  - Improvement of mental status   - Seen by psych - Following recs (started remeron)   - PRN liquid tylenol and IV morphine for pain      CV:  S/p MVr, TVr, MAZE 9/9/20. S/p bedside pericardial window 9/18  - Keep MAPs >60.   - pAF: digoxin 125 mcg.   -Transition back to 12.5 BID Metoprolol via makenzie.   - Follow Dig level      - Anti-coagulation: asa 81, hep gtt - goal 60-80      - labetalol IV PRN for SBP > 160  - INR 2.3     Pulm:   - Loculated pleural effusion s/p VATS on 10/5  - Extubated 10/9. Breathing comfortably on RA  - Scheduled duo and saline nebs  - CXR and ABG PRN  - HOB >30 deg     Renal:  - Yoo catheter was exchanged yesterday.   - BUN/Cr  54/1.3--> 66/1.5  - Continue to monitor UOP       FEN/GI:  - Lower extremity edema   - Replace other lytes as needed  - Will replace MAKENZIE  - Famotidine and bowel regimen     Heme/Onc:  - H/H stable 7.7/25  - No evidence of bleeding  - hep gtt for pAF with aPTT goal of 60-80     ID:   - S/p 7 days of cefepime vanc for enterococcus cloacae UTI. Also has e coli growing from pleural cultures.  - Follow ID recommendations pending      - Started on Cefepime fluconazole re-started toady 10/14/20. Candida on BAL     - Current antibiotics Cefepime/Vanc/ Antifungal   - BC: No growth to date     Endo:  - no hx of DM  - ISS     PPx:  - famotidine, SCD, holding hep gtt and warfarin for now     Dispo: SICU, Swallow study today.

## 2020-10-16 NOTE — PROGRESS NOTES
CT surgery progress note     COSME overnight      A/P  75F s/p MV repair, TV repair, and MAZE w/ ALISA resection on 09/09/20. Post op course complicated by a multiloculated R pleural effusion for which she had a R VATS w/ decortication on 10/05/20     Progressing      Swallow eval today  If fails, will discuss PEG  continue heparin gtt, pTT goal 60-80  Monitor dig levels  Lopressor 25 BID  IV abx for E. Coli in pleural fluid, AF. Monitor WBC#  SLP therapies for vocal cord weakness   Supportive care  Delirium precautions  Increase activity   Continue ICU

## 2020-10-16 NOTE — PROGRESS NOTES
[facility]  Infectious Disease  Progress Note    Patient Name: Fatou Lorenzo  MRN: [id number]  Admission Date: 8/30/2020  Length of Stay: 45 days  Attending Physician: Antoni Waddell MD  Primary Care Provider: Ludin Rivas MD    Isolation Status: No active isolations  Assessment/Plan:      * Leukocytosis      75 y.o. female w/ history of Atrial fibrillation with RVR, HLD and severe mitral regurgitation who is s/p MV repair and TV annuloplasty on 9/9, complicated by posterior cardiac tamponade s/p pericardial window on 9/18, UTI (E cloacae, tx X 7 days with cefepime), and large right pleural effusion with multiple loculations s/p thoracentesis ( no cx data) and VATS with limited decortication on 10/5 with cx + for E Coli, s/p BAL 10/7 (candida lusitanae thought to be colonizer), who was last seen by ID on 10/9 who recommended 4 weeks of antibiotics post VATS (IV ceftriaxone while inpatient). Right pleural effusion improved on imaging and leukocytosis resolved.    ID  reconsulted for re-current leukocytosis (WBC 11>>34).      Transferred to ICU yesterday.  Afebrile, WBC now trending down ( 34>>24).   Blood cultures NGTD.  Hemodynamically stable.  Appears more comfortable today, though still lethargic. O2 sats good on RA, but still tachypneic.  No diarrhea, vivas exchanged yesterday, U/A negative.  2D negative.      Blood cultures 10/14 pending.  Vivas exchanged this afternoon, U/A negative (3 WBC).   2D echo negative for vegetation     CXR yesterday with moderate right, mild left pleural effusions with bibasilar atelectasis or consolidation, similar to prior study.  Diffuse bilateral interstitial prominence slightly increased     Currently on vancomycin, cefepime, fluconazole.    Plan/recommendations  1.  Continue empiric IV vancomycin (pharmacy to dose, trough goal 15-20), and cefepime, and fluconazole.  Renal dosing.  2.  Will continue to follow cultures, follow WBC, and follow up tomorrow.      Data reviewed and plan discussed with ID staff      Pleural effusion  See above    S/P TVR (tricuspid valve repair)  See above    S/P MVR (mitral valve repair)  See above    Thank you.   Please call for any questions or concerns.  CAYLA Harris, ANP-C  [phone] pager, [phone]    Subjective:     Principal Problem:Leukocytosis    HPI:   Pt is a 75 y.o. female w/ pmhx of Atrial fibrillation with RVR, HLD and severe mitral regurgitation.  Pt w/ recent left heart catheterization 8/25/2020 who presented w/ complaints of recurrent shortness of breath.  Transesophageal echocardiogram done recently w/ left atrial thrombus.  L heart angiography w/ nonobstructive CAD.   Upon admission, pt required BiPAP due to hypoxemia not responsive to nasal cannula.      Pt transferred to [facility] 8/30 for CT surgery evaluation given recurrent admission despite compliance, BP control and diuresis.  Pt seen by Dr. Waddell who recommended MV repair.  Pt underwent MV repair and TV annuloplasty on 9/9. Pt course c/b reintuabtion and pericardial window for evacuation of posterior cardiac tamponade on 9/18.  Pt extubated on 9/24.  Developed increasing white count on 9/28.  UA done concerning for infection.  Urine cxs positive for E.Cloacae.  Pt started on Bactrim.  Chest Xray from 10/1- Since September 28, 2020, increased large right pleural effusion.  Increased diffuse right airspace opacities.  X ray abdomen 9/23 w/ Probable bilateral nephrolithiasis.  ID initially consulted on 10/2 for UTI.  Patient also noted to have large right loculated pleural effusion and underwent IR thoracentesis (no cx data) and subsequently VATS procedure with limited decortication by CTS on 10/5.  Cultures positive for E coli.  Brochoscopy with BAL on 10/7 with yeast.  She was treated with 7 days of cefepime for E. Cloacae in urine and E coli from pleural effusion, followed by transition to IV ceftriaxone with plan for 4 weeks of antibiotics after VATS  procedure (ceftriaxone while inpatient with transition to oral Augmentin if d/c).      ID now reconsulted for rising WBC  11.8 >>34.  Repeat blood cultures pending.  Repeat CXR pending.  Afebrile.      Interval History:  No acute events overnight. Remains afebrile. WBC trending down.  Lethargic today at time of visit.  Has to be encouraged/prodded to answer questions.       Past Medical History:   Diagnosis Date    Atrial fibrillation with RVR 8/24/2020    Cataract     Essential hypertension 8/31/2020    Glaucoma     Heart valve problem     Hyperlipidemia     Severe mitral regurgitation 8/24/2020       Past Surgical History:   Procedure Laterality Date    ANKLE SURGERY      BAIN MAZE PROCEDURE N/A 9/9/2020    Procedure: BAIN MAZE PROCEDURE;  Surgeon: Antoni Waddell MD;  Location: [facility] OR [address] [place] [address];  Service: Cardiothoracic;  Laterality: N/A;  MAZE     LARYNGOSCOPY N/A 10/5/2020    Procedure: LARYNGOSCOPY, MICRO SUSPENSION WITH AUGMENTATION;  Surgeon: Yfn Mendoza MD;  Location: [address];  Service: ENT;  Laterality: N/A;    LEFT HEART CATHETERIZATION Left 8/25/2020    Procedure: Left heart cath, radial;  Surgeon: Marlee Carrillo MD;  Location: [facility] CATH LAB;  Service: Cardiology;  Laterality: Left;    lipoma removal      THORACOSCOPIC DECORTICATION OF LUNG Right 10/5/2020    Procedure: VATS, WITH DECORTICATION, LUNG;  Surgeon: Jeremy Shine MD;  Location: [address];  Service: Thoracic;  Laterality: Right;    TRICUSPID VALVULOPLASTY N/A 9/9/2020    Procedure: REPAIR, TRICUSPID VALVE;  Surgeon: Antoni Waddell MD;  Location: [address];  Service: Cardiothoracic;  Laterality: N/A;       Review of patient's allergies indicates:  No Known Allergies    Medications:  Medications Prior to Admission   Medication Sig    metoprolol tartrate (LOPRESSOR) 25 MG tablet Take 1 tablet (25 mg total) by mouth 3 (three) times daily.    amiodarone (PACERONE) 400 MG tablet Take two  tablets by mouth twice daily for twelve days, then take one tablet by mouth daily.    apixaban (ELIQUIS) 5 mg Tab Take 1 tablet (5 mg total) by mouth 2 (two) times daily.    furosemide (LASIX) 40 MG tablet Take 1 tablet (40 mg total) by mouth 2 (two) times daily.    losartan (COZAAR) 50 MG tablet Take 0.5 tablets (25 mg total) by mouth once daily.    pravastatin (PRAVACHOL) 80 MG tablet Take 80 mg by mouth once daily.     Antibiotics (From admission, onward)    Start     Stop Route Frequency Ordered    10/15/20 0819  vancomycin - pharmacy to dose      -- IV pharmacy to manage frequency 10/15/20 0719    10/14/20 1400  ceFEPIme injection 2 g      -- IV Every 12 hours (non-standard times) 10/14/20 1324        Antifungals (From admission, onward)    Start     Stop Route Frequency Ordered    10/14/20 1430  fluconazole (DIFLUCAN) IVPB 200 mg 100 mL      -- IV Every 24 hours (non-standard times) 10/14/20 1323        Antivirals (From admission, onward)    None           Immunization History   Administered Date(s) Administered    PPD Test 09/24/2020       Family History     Problem Relation (Age of Onset)    Cancer Mother    Cataracts Sister    No Known Problems Father, Brother, Maternal Aunt, Maternal Uncle, Paternal Aunt, Paternal Uncle, Maternal Grandmother, Maternal Grandfather, Paternal Grandmother, Paternal Grandfather        Social History     Socioeconomic History    Marital status:      Spouse name: Not on file    Number of children: Not on file    Years of education: Not on file    Highest education level: Not on file   Occupational History    Not on file   Social Needs    Financial resource strain: Not on file    Food insecurity     Worry: Not on file     Inability: Not on file    Transportation needs     Medical: Not on file     Non-medical: Not on file   Tobacco Use    Smoking status: Never Smoker    Smokeless tobacco: Never Used   Substance and Sexual Activity    Alcohol use: No    Drug  use: No    Sexual activity: Not Currently   Lifestyle    Physical activity     Days per week: Not on file     Minutes per session: Not on file    Stress: Not on file   Relationships    Social connections     Talks on phone: Not on file     Gets together: Not on file     Attends Buddhism service: Not on file     Active member of club or organization: Not on file     Attends meetings of clubs or organizations: Not on file     Relationship status: Not on file   Other Topics Concern    Not on file   Social History Narrative    Not on file     Review of Systems   Constitutional: Positive for fatigue. Negative for activity change, appetite change, chills, diaphoresis and fever.   HENT: Negative.    Eyes: Negative.    Respiratory: Negative for cough, shortness of breath and wheezing.    Cardiovascular: Positive for leg swelling. Negative for chest pain and palpitations.   Gastrointestinal: Negative for abdominal pain, constipation, diarrhea, nausea and vomiting.   Genitourinary: Negative for dysuria and flank pain.        [place]   Musculoskeletal: Negative for arthralgias, back pain and neck pain.   Skin: Negative for wound.   Neurological: Positive for weakness. Negative for dizziness and headaches.   Psychiatric/Behavioral: Negative for agitation.     Objective:     Vital Signs (Most Recent):  Temp: 97.6 °F (36.4 °C) (10/15/20 1102)  Pulse: 68 (10/15/20 1155)  Resp: (!) 26 (10/15/20 1155)  BP: 137/66 (10/15/20 1102)  SpO2: (!) 93 % (10/15/20 1155) Vital Signs (24h Range):  Temp:  [97.6 °F (36.4 °C)-97.8 °F (36.6 °C)] 97.6 °F (36.4 °C)  Pulse:  [65-95] 68  Resp:  [20-44] 26  SpO2:  [85 %-100 %] 93 %  BP: (126-176)/(58-92) 137/66     Weight: 83.5 kg (184 lb)  Body mass index is 30.62 kg/m².    Estimated Creatinine Clearance: 39.9 mL/min (based on SCr of 1.3 mg/dL).    Physical Exam  Vitals signs and nursing note reviewed.   Constitutional:       Appearance: She is well-developed. She is ill-appearing. She is not  diaphoretic.      Comments: Lethargic   HENT:      Head: Normocephalic and atraumatic.      Nose:      Comments: Feeding tube       Mouth/Throat:      Mouth: Mucous membranes are moist.      Pharynx: Oropharynx is clear. No oropharyngeal exudate.   Eyes:      General: No scleral icterus.     Conjunctiva/sclera: Conjunctivae normal.   Neck:      Musculoskeletal: Normal range of motion.      Vascular: No JVD.   Cardiovascular:      Rate and Rhythm: Normal rate. Rhythm irregular.      Heart sounds: No murmur.   Pulmonary:      Effort: Pulmonary effort is normal. Tachypnea present. No accessory muscle usage.      Breath sounds: Rhonchi present.      Comments: Nasal cannula O2.  No distress   Abdominal:      General: There is no distension.      Palpations: Abdomen is soft.      Tenderness: There is no abdominal tenderness.   Musculoskeletal:         General: No tenderness.      Right lower leg: Edema present.      Left lower leg: Edema present.      Comments: No calf tenderness, warmth, erythema   Skin:     General: Skin is warm and dry.      Coloration: Skin is pale.      Findings: No rash.      Comments: Sternal incision c/d/i.  No erythema/drainage    Left IJ catheter - site clear    Dressing right upper back/chest - c/d/i   Neurological:      Mental Status: She is lethargic.      Comments: Lethargic, eyes closed, answers questions to prompting         Significant Labs:   Blood Culture:   Recent Labs   Lab 08/23/20  0149 08/23/20  0203 10/14/20  1206   LABBLOO No growth after 5 days. No growth after 5 days. No Growth to date  No Growth to date     CBC:   Recent Labs   Lab 10/14/20  0430 10/15/20  0244   WBC 34.24* 24.89*   HGB 9.1* 7.9*   HCT 28.7* 25.3*    227     CMP:   Recent Labs   Lab 10/14/20  0430 10/14/20  1245 10/15/20  0244     --  146*   K 3.3* 3.8 3.5   *  --  112*   CO2 22*  --  24     --  82   BUN 53*  --  54*   CREATININE 1.2  --  1.3   CALCIUM 8.4*  --  8.1*   PROT 5.6*  --   5.3*   ALBUMIN 2.0*  --  2.0*   BILITOT 0.8  --  0.8   ALKPHOS 380*  --  280*   AST 39  --  31   ALT 48*  --  43   ANIONGAP 11  --  10   EGFRNONAA 44.3*  --  40.2*     Urine Culture:   Recent Labs   Lab 08/23/20 0228 09/29/20  0854   LABURIN No significant growth ENTEROBACTER CLOACAE  >100,000 cfu/ml  *     Urine Studies:   Recent Labs   Lab 08/23/20  2249  10/14/20  1530   COLORU Colorless*   < > Yellow   APPEARANCEUA Clear   < > Cloudy*   PHUR 5.0   < > 5.0   SPECGRAV 1.005   < > 1.010   PROTEINUA Negative   < > 1+*   GLUCUA Negative   < > Negative   KETONESU Negative   < > Negative   BILIRUBINUA Negative   < > Negative   OCCULTUA 1+*   < > 3+*   NITRITE Negative   < > Negative   UROBILINOGEN Negative  --   --    LEUKOCYTESUR Trace*   < > Trace*   RBCUA 4   < > 17*   WBCUA 4   < > 3   BACTERIA Occasional   < > Occasional   SQUAMEPITHEL 1  --  1   HYALINECASTS 1   < > 9*    < > = values in this interval not displayed.     Wound Culture:   Recent Labs   Lab 10/05/20  1513   LABAERO ESCHERICHIA COLI  Few  *       Significant Imaging: I have reviewed all pertinent imaging results/findings within the past 24 hours.

## 2020-10-16 NOTE — CONSULTS
GENERAL SURGERY  Consult Note        SUBJECTIVE:     HISTORY OF PRESENT ILLNESS:  Fatou Lorenzo is a 75 y.o. female paroxysmal atrial fibrillation (currently on digoxin), MR, HTN  s/p MVR and TVR 9/9/2020 by Dr. Waddell. Case noteworthy for multiple pump runs (3) and RV hematoma due to retraction. Unstable 9/18, requiring bedside pericardial window for evac of posterior cardiac tamponade. Respiratory distress and so re-admitted to SICU on 10/2 now s/p VATS on 10/5. Extubated on 10/9, with residual vocal cord paralysis, currently saturating well on room air. She is receiving tube feeds via [place] tube, currently at goal with free water flushes. Currently on cefepime/vancomycin/antifungal for E.coli by pleural cultures from BAL. On warfarin 1mg. No past abdominal surgeries.     General surgery has been consulted for PEG tube placement. Patient has failed multiple bedside swallow assessments, and failed a formal modified barium swallow study today. Currently neurologically intact and following commands. Patient is conversant, however difficulty with speech secondary to vocal cord paralysis. Speech therapy recommendations include NPO diet with ice chips/single sips nectar-thick liquids for pleasure as well as aspiration precautions. Speech will continue to meet and work with patient, to be seen four times weekly.         MEDICATIONS:  Home Medications:  No current facility-administered medications on file prior to encounter.      Current Outpatient Medications on File Prior to Encounter   Medication Sig Dispense Refill    metoprolol tartrate (LOPRESSOR) 25 MG tablet Take 1 tablet (25 mg total) by mouth 3 (three) times daily. 90 tablet 1    amiodarone (PACERONE) 400 MG tablet Take two tablets by mouth twice daily for twelve days, then take one tablet by mouth daily. 42 tablet 0    apixaban (ELIQUIS) 5 mg Tab Take 1 tablet (5 mg total) by mouth 2 (two) times daily. 60 tablet 1    furosemide (LASIX) 40 MG tablet Take 1  tablet (40 mg total) by mouth 2 (two) times daily. 60 tablet 1    losartan (COZAAR) 50 MG tablet Take 0.5 tablets (25 mg total) by mouth once daily. 30 tablet 1    pravastatin (PRAVACHOL) 80 MG tablet Take 80 mg by mouth once daily.         ALLERGIES:    Review of patient's allergies indicates:  No Known Allergies    PAST MEDICAL HISTORY:    Past Medical History:   Diagnosis Date    Atrial fibrillation with RVR 8/24/2020    Cataract     Essential hypertension 8/31/2020    Glaucoma     Heart valve problem     Hyperlipidemia     Severe mitral regurgitation 8/24/2020       SURGICAL HISTORY:  Past Surgical History:   Procedure Laterality Date    ANKLE SURGERY      ABIN MAZE PROCEDURE N/A 9/9/2020    Procedure: BAIN MAZE PROCEDURE;  Surgeon: Antoni Waddell MD;  Location: [facility] OR [address] [place] [address];  Service: Cardiothoracic;  Laterality: N/A;  MAZE     LARYNGOSCOPY N/A 10/5/2020    Procedure: LARYNGOSCOPY, MICRO SUSPENSION WITH AUGMENTATION;  Surgeon: Yfn Mendoza MD;  Location: [address];  Service: ENT;  Laterality: N/A;    LEFT HEART CATHETERIZATION Left 8/25/2020    Procedure: Left heart cath, radial;  Surgeon: Marlee Carrillo MD;  Location: [facility] CATH LAB;  Service: Cardiology;  Laterality: Left;    lipoma removal      THORACOSCOPIC DECORTICATION OF LUNG Right 10/5/2020    Procedure: VATS, WITH DECORTICATION, LUNG;  Surgeon: Jeremy Shine MD;  Location: [address];  Service: Thoracic;  Laterality: Right;    TRICUSPID VALVULOPLASTY N/A 9/9/2020    Procedure: REPAIR, TRICUSPID VALVE;  Surgeon: Antoni Waddell MD;  Location: [address];  Service: Cardiothoracic;  Laterality: N/A;       FAMILY HISTORY:  Family History   Problem Relation Age of Onset    Cancer Mother     Cataracts Sister     No Known Problems Father     No Known Problems Brother     No Known Problems Maternal Aunt     No Known Problems Maternal Uncle     No Known Problems Paternal Aunt     No Known Problems  Paternal Uncle     No Known Problems Maternal Grandmother     No Known Problems Maternal Grandfather     No Known Problems Paternal Grandmother     No Known Problems Paternal Grandfather     Amblyopia Neg Hx     Blindness Neg Hx     Diabetes Neg Hx     Glaucoma Neg Hx     Hypertension Neg Hx     Macular degeneration Neg Hx     Retinal detachment Neg Hx     Strabismus Neg Hx     Stroke Neg Hx     Thyroid disease Neg Hx     Breast cancer Neg Hx     Colon cancer Neg Hx     Ovarian cancer Neg Hx        SOCIAL HISTORY:  Social History     Tobacco Use    Smoking status: Never Smoker    Smokeless tobacco: Never Used   Substance Use Topics    Alcohol use: No    Drug use: No        REVIEW OF SYSTEMS:  Review of Systems   Constitutional: Negative for activity change, appetite change, chills and fatigue.   Eyes: Negative for discharge and itching.   Respiratory: Negative for apnea, choking and chest tightness.    Cardiovascular: Negative for chest pain and palpitations.   Gastrointestinal: Negative for abdominal distention and abdominal pain.   Endocrine: Negative for cold intolerance and heat intolerance.   Musculoskeletal: Negative for arthralgias and back pain.         OBJECTIVE:     Most Recent Vitals:  Temp: 97.7 °F (36.5 °C) (10/16/20 1100)  Pulse: 72 (10/16/20 1311)  Resp: (!) 30 (10/16/20 1311)  BP: 134/62 (10/16/20 1100)  SpO2: 97 % (10/16/20 1311)      PHYSICAL EXAM:  Physical Exam   Constitutional: She is oriented to person, place, and time and well-developed, well-nourished, and in no distress.   HENT:   Vocal cord paralysis   Cardiovascular: Normal rate, regular rhythm and intact distal pulses.   Pulmonary/Chest: Effort normal. No respiratory distress.   RA   Abdominal: Soft. Bowel sounds are normal. She exhibits no distension. There is no abdominal tenderness.   [place] tube in place. TF at goal    Neurological: She is alert and oriented to person, place, and time.   Appropriate and conversant,  depressed affect.    Skin: Skin is warm and dry.   Nursing note and vitals reviewed.        LABORATORY VALUES:  Lab Results   Component Value Date    WBC 21.77 (H) 10/16/2020    WBC 21.77 (H) 10/16/2020    HGB 7.7 (L) 10/16/2020    HGB 7.7 (L) 10/16/2020    HCT 25.1 (L) 10/16/2020    HCT 25.1 (L) 10/16/2020     10/16/2020     10/16/2020     Lab Results   Component Value Date     (H) 10/16/2020    K 3.1 (L) 10/16/2020     (H) 10/16/2020    CO2 25 10/16/2020    BUN 66 (H) 10/16/2020    CREATININE 1.5 (H) 10/16/2020     (H) 10/16/2020    CALCIUM 8.1 (L) 10/16/2020    MG 2.2 10/16/2020    PHOS 3.4 10/16/2020    AST 30 10/16/2020    ALT 44 10/16/2020    ALKPHOS 276 (H) 10/16/2020    BILITOT 0.8 10/16/2020    BILIDIR 0.5 (H) 09/03/2020    PROT 5.4 (L) 10/16/2020    ALBUMIN 2.0 (L) 10/16/2020     Lab Results   Component Value Date    INR 2.3 (H) 10/16/2020     No results found for: TSH, FREET4, PTH, CEA      DIAGNOSTIC STUDIES:  Reviewed    ASSESSMENT:     Fatou Lorenzo is a 75 y.o. female s/p paroxysmal atrial fibrillation (currently on digoxin), MR, HTN  s/p MVR and TVR 9/9/2020 by Dr. Waddell with post-operative course complicated by urgent bedside pericardial window, extubated 10/9/20. Patient has failed multiple bedside swallow assessments, as well as modified barium swallow today. General surgery has been consulted for placement of PEG tube. Currently receiving adequate nutrition by [place], with ongoing assessment by speech therapy. Patient is currently cleared for single sips of nectar-thickened liquids with plans to advance as tolerated.     PLAN:  - PEG tube placement early next week   - Recommend ongoing speech assessment and evaluation by speech therapy   - TF at goal with FWF       -- Octavia De La O M.D  General Surgery  Pager: [phone]

## 2020-10-16 NOTE — PLAN OF CARE
SW is following this Pt for DC planning needs.  Discharge Disposition: TBD - pending patient progress; possible PEG placement early next week.     SW will continue to coordinate with patient, family, team and insurance to complete patient's discharge plan.    Esthela Peralta LMSW   - Case Management

## 2020-10-16 NOTE — SUBJECTIVE & OBJECTIVE
Interval History:  No acute events overnight.  Remains afebrile.  WBC continues to trend down.  Failed swallow study, plan for PEG?  More alert and interactive today    Past Medical History:   Diagnosis Date    Atrial fibrillation with RVR 8/24/2020    Cataract     Essential hypertension 8/31/2020    Glaucoma     Heart valve problem     Hyperlipidemia     Severe mitral regurgitation 8/24/2020       Past Surgical History:   Procedure Laterality Date    ANKLE SURGERY      BAIN MAZE PROCEDURE N/A 9/9/2020    Procedure: BAIN MAZE PROCEDURE;  Surgeon: Antoni Waddell MD;  [facility];  Service: Cardiothoracic;  Laterality: N/A;  MAZE     LARYNGOSCOPY N/A 10/5/2020    Procedure: LARYNGOSCOPY, MICRO SUSPENSION WITH AUGMENTATION;  Surgeon: Yfn Mendoza MD;  Location: [facility] OR [address] [place] [address];  Service: ENT;  Laterality: N/A;    LEFT HEART CATHETERIZATION Left 8/25/2020    Procedure: Left heart cath, radial;  Surgeon: Marlee Carrillo MD;  Location: [facility] CATH LAB;  Service: Cardiology;  Laterality: Left;    lipoma removal      THORACOSCOPIC DECORTICATION OF LUNG Right 10/5/2020    Procedure: VATS, WITH DECORTICATION, LUNG;  Surgeon: Jeremy Shine MD;  Location: [facility] OR [address] [place] [address];  Service: Thoracic;  Laterality: Right;    TRICUSPID VALVULOPLASTY N/A 9/9/2020    Procedure: REPAIR, TRICUSPID VALVE;  Surgeon: Antoni Waddell MD;  Location: [facility] OR [address] [place] [address];  Service: Cardiothoracic;  Laterality: N/A;       Review of patient's allergies indicates:  No Known Allergies    Medications:  Medications Prior to Admission   Medication Sig    metoprolol tartrate (LOPRESSOR) 25 MG tablet Take 1 tablet (25 mg total) by mouth 3 (three) times daily.    amiodarone (PACERONE) 400 MG tablet Take two tablets by mouth twice daily for twelve days, then take one tablet by mouth daily.    apixaban (ELIQUIS) 5 mg Tab Take 1 tablet (5 mg total) by mouth 2 (two) times daily.    furosemide (LASIX) 40 MG tablet Take 1  tablet (40 mg total) by mouth 2 (two) times daily.    losartan (COZAAR) 50 MG tablet Take 0.5 tablets (25 mg total) by mouth once daily.    pravastatin (PRAVACHOL) 80 MG tablet Take 80 mg by mouth once daily.     Antibiotics (From admission, onward)    Start     Stop Route Frequency Ordered    10/15/20 0819  vancomycin - pharmacy to dose      -- IV pharmacy to manage frequency 10/15/20 0719    10/14/20 1400  ceFEPIme injection 2 g      -- IV Every 12 hours (non-standard times) 10/14/20 1324        Antifungals (From admission, onward)    Start     Stop Route Frequency Ordered    10/14/20 1430  fluconazole (DIFLUCAN) IVPB 200 mg 100 mL      -- IV Every 24 hours (non-standard times) 10/14/20 1323        Antivirals (From admission, onward)    None           Immunization History   Administered Date(s) Administered    PPD Test 09/24/2020       Family History     Problem Relation (Age of Onset)    Cancer Mother    Cataracts Sister    No Known Problems Father, Brother, Maternal Aunt, Maternal Uncle, Paternal Aunt, Paternal Uncle, Maternal Grandmother, Maternal Grandfather, Paternal Grandmother, Paternal Grandfather        Social History     Socioeconomic History    Marital status:      Spouse name: Not on file    Number of children: Not on file    Years of education: Not on file    Highest education level: Not on file   Occupational History    Not on file   Social Needs    Financial resource strain: Not on file    Food insecurity     Worry: Not on file     Inability: Not on file    Transportation needs     Medical: Not on file     Non-medical: Not on file   Tobacco Use    Smoking status: Never Smoker    Smokeless tobacco: Never Used   Substance and Sexual Activity    Alcohol use: No    Drug use: No    Sexual activity: Not Currently   Lifestyle    Physical activity     Days per week: Not on file     Minutes per session: Not on file    Stress: Not on file   Relationships    Social connections      Talks on phone: Not on file     Gets together: Not on file     Attends [employer] service: Not on file     Active member of club or organization: Not on file     Attends meetings of clubs or organizations: Not on file     Relationship status: Not on file   Other Topics Concern    Not on file   Social History Narrative    Not on file     Review of Systems   Constitutional: Positive for fatigue. Negative for activity change, appetite change, chills, diaphoresis and fever.   HENT: Negative.    Eyes: Negative.    Respiratory: Positive for shortness of breath. Negative for cough and wheezing.    Cardiovascular: Positive for leg swelling. Negative for chest pain and palpitations.   Gastrointestinal: Negative for abdominal pain, constipation, diarrhea, nausea and vomiting.   Genitourinary: Negative for dysuria and flank pain.        Yoo   Musculoskeletal: Negative for arthralgias, back pain and neck pain.   Skin: Negative for wound.   Neurological: Positive for weakness. Negative for dizziness and headaches.   Psychiatric/Behavioral: Negative for agitation.     Objective:     Vital Signs (Most Recent):  Temp: 97.7 °F (36.5 °C) (10/16/20 1100)  Pulse: 73 (10/16/20 1115)  Resp: (!) 45 (10/16/20 1115)  BP: 134/62 (10/16/20 1100)  SpO2: 97 % (10/16/20 1115) Vital Signs (24h Range):  Temp:  [97.5 °F (36.4 °C)-97.7 °F (36.5 °C)] 97.7 °F (36.5 °C)  Pulse:  [68-91] 73  Resp:  [20-72] 45  SpO2:  [92 %-100 %] 97 %  BP: (117-152)/() 134/62     Weight: 83.5 kg (184 lb)  Body mass index is 30.62 kg/m².    Estimated Creatinine Clearance: 34.6 mL/min (A) (based on SCr of 1.5 mg/dL (H)).    Physical Exam  Vitals signs and nursing note reviewed.   Constitutional:       Appearance: She is well-developed. She is ill-appearing. She is not diaphoretic.      Comments: More alert and interactive   HENT:      Head: Normocephalic and atraumatic.      Nose:      Comments: Feeding tube       Mouth/Throat:      Mouth: Mucous membranes are  moist.      Pharynx: Oropharynx is clear. No oropharyngeal exudate.   Eyes:      General: No scleral icterus.     Conjunctiva/sclera: Conjunctivae normal.   Neck:      Musculoskeletal: Normal range of motion.      Vascular: No JVD.   Cardiovascular:      Rate and Rhythm: Normal rate and regular rhythm.      Heart sounds: No murmur.   Pulmonary:      Effort: Pulmonary effort is normal. Tachypnea present. No accessory muscle usage.      Breath sounds: Rhonchi present.      Comments: Nasal cannula O2.  No distress   Abdominal:      General: There is no distension.      Palpations: Abdomen is soft.      Tenderness: There is no abdominal tenderness.   Musculoskeletal:         General: No tenderness.      Right lower leg: Edema present.      Left lower leg: Edema present.      Comments: No calf tenderness, warmth, erythema   Skin:     General: Skin is warm and dry.      Coloration: Skin is pale.      Findings: No rash.      Comments: Sternal incision c/d/i.  No erythema/drainage    Left IJ catheter - site clear    Dressing right upper back/chest - c/d/i   Neurological:      Mental Status: She is alert.      Comments: More alert, interactive         Significant Labs:   Blood Culture:   Recent Labs   Lab 08/23/20  0149 08/23/20  0203 10/14/20  1206   LABBLOO No growth after 5 days. No growth after 5 days. No Growth to date  No Growth to date  No Growth to date  No Growth to date     CBC:   Recent Labs   Lab 10/15/20  0244 10/16/20  0352   WBC 24.89* 21.77*  21.77*   HGB 7.9* 7.7*  7.7*   HCT 25.3* 25.1*  25.1*    238  238     CMP:   Recent Labs   Lab 10/14/20  1245 10/15/20  0244 10/16/20  0352   NA  --  146* 148*   K 3.8 3.5 3.1*   CL  --  112* 112*   CO2  --  24 25   GLU  --  82 139*   BUN  --  54* 66*   CREATININE  --  1.3 1.5*   CALCIUM  --  8.1* 8.1*   PROT  --  5.3* 5.4*   ALBUMIN  --  2.0* 2.0*   BILITOT  --  0.8 0.8   ALKPHOS  --  280* 276*   AST  --  31 30   ALT  --  43 44   ANIONGAP  --  10 11    EGFRNONAA  --  40.2* 33.8*     Urine Culture:   Recent Labs   Lab 08/23/20  0228 09/29/20  0854   LABURIN No significant growth ENTEROBACTER CLOACAE  >100,000 cfu/ml  *     Urine Studies:   Recent Labs   Lab 08/23/20  2249  10/14/20  1530   COLORU Colorless*   < > Yellow   APPEARANCEUA Clear   < > Cloudy*   PHUR 5.0   < > 5.0   SPECGRAV 1.005   < > 1.010   PROTEINUA Negative   < > 1+*   GLUCUA Negative   < > Negative   KETONESU Negative   < > Negative   BILIRUBINUA Negative   < > Negative   OCCULTUA 1+*   < > 3+*   NITRITE Negative   < > Negative   UROBILINOGEN Negative  --   --    LEUKOCYTESUR Trace*   < > Trace*   RBCUA 4   < > 17*   WBCUA 4   < > 3   BACTERIA Occasional   < > Occasional   SQUAMEPITHEL 1  --  1   HYALINECASTS 1   < > 9*    < > = values in this interval not displayed.     Wound Culture:   Recent Labs   Lab 10/05/20  1513   LABAERO ESCHERICHIA COLI  Few  *       Significant Imaging: I have reviewed all pertinent imaging results/findings within the past 24 hours.

## 2020-10-16 NOTE — PROGRESS NOTES
Pharmacokinetic Assessment Follow Up: IV Vancomycin    Vancomycin Regimen Assessment & Plan:  - Vancomycin level drawn with morning labs today resulted as 21.1 ug/mL, goal trough 15-20 ug/mL (per ID).  - Patient with JONAH, SCr continues up trending with very slow vancomycin elimination. Will continue pulse dosing while renal function remains unstable.  - No need for vancomycin dose today given level. Draw vancomycin level with morning labs tomorrow. Will re-dose as needed depending on level and renal function.    Drug levels (last 3 results):  Recent Labs   Lab Result Units 10/15/20  0110 10/16/20  0352   Vancomycin, Random ug/mL  --  21.1   Vancomycin-Trough ug/mL 27.2*  --      Pharmacy will continue to follow and monitor vancomycin.    Please contact pharmacy at extension [phone] for questions regarding this assessment.    Thank you for the consult,   Christian Pinedo     Patient brief summary:  Fatou Lorenzo is a 75 y.o. female initiated on antimicrobial therapy with IV Vancomycin for treatment of surgical prophylaxis.    The patient's current regimen is pulse dosing.    Drug Allergies:   Review of patient's allergies indicates:  No Known Allergies    Actual Body Weight:   83.5 kg    Renal Function:   Estimated Creatinine Clearance: 34.6 mL/min (A) (based on SCr of 1.5 mg/dL (H)).,     Dialysis Method (if applicable):  N/A

## 2020-10-16 NOTE — PLAN OF CARE
Problem: SLP Goal  Goal: SLP Goal  Description: Speech Language Pathology Goals  Goals expected to be met by 10/23  1. Pt to participate in ongoing swallow assessment to determine safest and least restrictive diet.    Outcome: Ongoing, Progressing     MBSS completed today. ST rec NPO at this time with strict aspiration precautions. Pt safe for ice chips and small amounts of nectar-thick liquids for pleasure. ST to continue to monitor.    BURKE Perkins  10/16/2020

## 2020-10-16 NOTE — PROGRESS NOTES
[facility]  Infectious Disease  Progress Note    Patient Name: Fatou Lorenzo  MRN: [id number]  Admission Date: 8/30/2020  Length of Stay: 46 days  Attending Physician: Antoni Waddell MD  Primary Care Provider: Ludin Rivas MD    Isolation Status: No active isolations  Assessment/Plan:      * Leukocytosis      75 y.o. female w/ history of Atrial fibrillation with RVR, HLD and severe mitral regurgitation who is s/p MV repair and TV annuloplasty on 9/9, complicated by posterior cardiac tamponade s/p pericardial window on 9/18, UTI (E cloacae, tx X 7 days with cefepime), and large right pleural effusion with multiple loculations s/p thoracentesis ( no cx data) and VATS with limited decortication on 10/5 with cx + for E Coli, s/p BAL 10/7 (candida lusitanae thought to be colonizer), who was last seen by ID on 10/9 who recommended 4 weeks of antibiotics post VATS (IV ceftriaxone while inpatient). Right pleural effusion improved on imaging and leukocytosis resolved.    ID  reconsulted for re-current leukocytosis (WBC 11>>34).      Transferred to ICU 10/14.  Afebrile, WBC now trending down ( 34>>24>>21).   Blood cultures NGTD.  Hemodynamically stable.  Appears more comfortable today and more alert.  O2 sats good on RA, but still tachypneic.  CXR 10/14 with moderate right, mild left pleural effusions with bibasilar atelectasis or consolidation, similar to prior study.  Diffuse bilateral interstitial prominence slightly increasedNo diarrhea, vivas exchanged yesterday, U/A negative.  2D negative.      Currently on vancomycin, cefepime, fluconazole.  Failed swallow study, plan for PEG.     Plan/recommendations  1.  Continue empiric IV vancomycin (pharmacy to dose, trough goal 15-20), and cefepime, and fluconazole for now.  Renal dosing.  2.  Will continue to follow.     Data reviewed and plan discussed with ID staff        Thank you.   Please call for any questions or concerns.  CAYLA Harris,  ANP-C  [phone] pager, [place] [phone]    Subjective:     Principal Problem:Leukocytosis    HPI:   Pt is a 75 y.o. female w/ pmhx of Atrial fibrillation with RVR, HLD and severe mitral regurgitation.  Pt w/ recent left heart catheterization 8/25/2020 who presented w/ complaints of recurrent shortness of breath.  Transesophageal echocardiogram done recently w/ left atrial thrombus.  L heart angiography w/ nonobstructive CAD.   Upon admission, pt required BiPAP due to hypoxemia not responsive to nasal cannula.      Pt transferred to [facility] 8/30 for CT surgery evaluation given recurrent admission despite compliance, BP control and diuresis.  Pt seen by Dr. Waddell who recommended MV repair.  Pt underwent MV repair and TV annuloplasty on 9/9. Pt course c/b reintuabtion and pericardial window for evacuation of posterior cardiac tamponade on 9/18.  Pt extubated on 9/24.  Developed increasing white count on 9/28.  UA done concerning for infection.  Urine cxs positive for E.Cloacae.  Pt started on Bactrim.  Chest Xray from 10/1- Since September 28, 2020, increased large right pleural effusion.  Increased diffuse right airspace opacities.  X ray abdomen 9/23 w/ Probable bilateral nephrolithiasis.  ID initially consulted on 10/2 for UTI.  Patient also noted to have large right loculated pleural effusion and underwent IR thoracentesis (no cx data) and subsequently VATS procedure with limited decortication by CTS on 10/5.  Cultures positive for E coli.  Brochoscopy with BAL on 10/7 with yeast.  She was treated with 7 days of cefepime for E. Cloacae in urine and E coli from pleural effusion, followed by transition to IV ceftriaxone with plan for 4 weeks of antibiotics after VATS procedure (ceftriaxone while inpatient with transition to oral Augmentin if d/c).      ID now reconsulted for rising WBC  11.8 >>34.  Repeat blood cultures pending.  Repeat CXR pending.  Afebrile.      Interval History:  No acute events overnight.  Remains  afebrile.  WBC continues to trend down.  Failed swallow study, plan for PEG?  More alert and interactive today    Past Medical History:   Diagnosis Date    Atrial fibrillation with RVR 8/24/2020    Cataract     Essential hypertension 8/31/2020    Glaucoma     Heart valve problem     Hyperlipidemia     Severe mitral regurgitation 8/24/2020       Past Surgical History:   Procedure Laterality Date    ANKLE SURGERY      BAIN MAZE PROCEDURE N/A 9/9/2020    Procedure: BAIN MAZE PROCEDURE;  Surgeon: Antoni Waddell MD;  Location: [facility] OR [address] [place] [address];  Service: Cardiothoracic;  Laterality: N/A;  MAZE     LARYNGOSCOPY N/A 10/5/2020    Procedure: LARYNGOSCOPY, MICRO SUSPENSION WITH AUGMENTATION;  Surgeon: Yfn Mendoza MD;  Location: [facility] OR [address] [place] [address];  Service: ENT;  Laterality: N/A;    LEFT HEART CATHETERIZATION Left 8/25/2020    Procedure: Left heart cath, radial;  Surgeon: Marlee Carrillo MD;  Location: [facility] CATH LAB;  Service: Cardiology;  Laterality: Left;    lipoma removal      THORACOSCOPIC DECORTICATION OF LUNG Right 10/5/2020    Procedure: VATS, WITH DECORTICATION, LUNG;  Surgeon: Jeremy Shine MD;  Location: [facility] OR [address] [place] [address];  Service: Thoracic;  Laterality: Right;    TRICUSPID VALVULOPLASTY N/A 9/9/2020    Procedure: REPAIR, TRICUSPID VALVE;  Surgeon: Antoni Waddell MD;  Location: [facility] OR [address] [place] [address];  Service: Cardiothoracic;  Laterality: N/A;       Review of patient's allergies indicates:  No Known Allergies    Medications:  Medications Prior to Admission   Medication Sig    metoprolol tartrate (LOPRESSOR) 25 MG tablet Take 1 tablet (25 mg total) by mouth 3 (three) times daily.    amiodarone (PACERONE) 400 MG tablet Take two tablets by mouth twice daily for twelve days, then take one tablet by mouth daily.    apixaban (ELIQUIS) 5 mg Tab Take 1 tablet (5 mg total) by mouth 2 (two) times daily.    furosemide (LASIX) 40 MG tablet Take 1 tablet (40 mg total) by mouth 2 (two) times daily.     losartan (COZAAR) 50 MG tablet Take 0.5 tablets (25 mg total) by mouth once daily.    pravastatin (PRAVACHOL) 80 MG tablet Take 80 mg by mouth once daily.     Antibiotics (From admission, onward)    Start     Stop Route Frequency Ordered    10/15/20 0819  vancomycin - pharmacy to dose      -- IV pharmacy to manage frequency 10/15/20 0719    10/14/20 1400  ceFEPIme injection 2 g      -- IV Every 12 hours (non-standard times) 10/14/20 1324        Antifungals (From admission, onward)    Start     Stop Route Frequency Ordered    10/14/20 1430  fluconazole (DIFLUCAN) IVPB 200 mg 100 mL      -- IV Every 24 hours (non-standard times) 10/14/20 1323        Antivirals (From admission, onward)    None           Immunization History   Administered Date(s) Administered    PPD Test 09/24/2020       Family History     Problem Relation (Age of Onset)    Cancer Mother    Cataracts Sister    No Known Problems Father, Brother, Maternal Aunt, Maternal Uncle, Paternal Aunt, Paternal Uncle, Maternal Grandmother, Maternal Grandfather, Paternal Grandmother, Paternal Grandfather        Social History     Socioeconomic History    Marital status:      Spouse name: Not on file    Number of children: Not on file    Years of education: Not on file    Highest education level: Not on file   Occupational History    Not on file   Social Needs    Financial resource strain: Not on file    Food insecurity     Worry: Not on file     Inability: Not on file    Transportation needs     Medical: Not on file     Non-medical: Not on file   Tobacco Use    Smoking status: Never Smoker    Smokeless tobacco: Never Used   Substance and Sexual Activity    Alcohol use: No    Drug use: No    Sexual activity: Not Currently   Lifestyle    Physical activity     Days per week: Not on file     Minutes per session: Not on file    Stress: Not on file   Relationships    Social connections     Talks on phone: Not on file     Gets together: Not on file      Attends [employer] service: Not on file     Active member of club or organization: Not on file     Attends meetings of clubs or organizations: Not on file     Relationship status: Not on file   Other Topics Concern    Not on file   Social History Narrative    Not on file     Review of Systems   Constitutional: Positive for fatigue. Negative for activity change, appetite change, chills, diaphoresis and fever.   HENT: Negative.    Eyes: Negative.    Respiratory: Positive for shortness of breath. Negative for cough and wheezing.    Cardiovascular: Positive for leg swelling. Negative for chest pain and palpitations.   Gastrointestinal: Negative for abdominal pain, constipation, diarrhea, nausea and vomiting.   Genitourinary: Negative for dysuria and flank pain.        Yoo   Musculoskeletal: Negative for arthralgias, back pain and neck pain.   Skin: Negative for wound.   Neurological: Positive for weakness. Negative for dizziness and headaches.   Psychiatric/Behavioral: Negative for agitation.     Objective:     Vital Signs (Most Recent):  Temp: 97.7 °F (36.5 °C) (10/16/20 1100)  Pulse: 73 (10/16/20 1115)  Resp: (!) 45 (10/16/20 1115)  BP: 134/62 (10/16/20 1100)  SpO2: 97 % (10/16/20 1115) Vital Signs (24h Range):  Temp:  [97.5 °F (36.4 °C)-97.7 °F (36.5 °C)] 97.7 °F (36.5 °C)  Pulse:  [68-91] 73  Resp:  [20-72] 45  SpO2:  [92 %-100 %] 97 %  BP: (117-152)/() 134/62     Weight: 83.5 kg (184 lb)  Body mass index is 30.62 kg/m².    Estimated Creatinine Clearance: 34.6 mL/min (A) (based on SCr of 1.5 mg/dL (H)).    Physical Exam  Vitals signs and nursing note reviewed.   Constitutional:       Appearance: She is well-developed. She is ill-appearing. She is not diaphoretic.      Comments: More alert and interactive   HENT:      Head: Normocephalic and atraumatic.      Nose:      Comments: Feeding tube       Mouth/Throat:      Mouth: Mucous membranes are moist.      Pharynx: Oropharynx is clear. No oropharyngeal  exudate.   Eyes:      General: No scleral icterus.     Conjunctiva/sclera: Conjunctivae normal.   Neck:      Musculoskeletal: Normal range of motion.      Vascular: No JVD.   Cardiovascular:      Rate and Rhythm: Normal rate and regular rhythm.      Heart sounds: No murmur.   Pulmonary:      Effort: Pulmonary effort is normal. Tachypnea present. No accessory muscle usage.      Breath sounds: Rhonchi present.      Comments: Nasal cannula O2.  No distress   Abdominal:      General: There is no distension.      Palpations: Abdomen is soft.      Tenderness: There is no abdominal tenderness.   Musculoskeletal:         General: No tenderness.      Right lower leg: Edema present.      Left lower leg: Edema present.      Comments: No calf tenderness, warmth, erythema   Skin:     General: Skin is warm and dry.      Coloration: Skin is pale.      Findings: No rash.      Comments: Sternal incision c/d/i.  No erythema/drainage    Left IJ catheter - site clear    Dressing right upper back/chest - c/d/i   Neurological:      Mental Status: She is alert.      Comments: More alert, interactive         Significant Labs:   Blood Culture:   Recent Labs   Lab 08/23/20  0149 08/23/20  0203 10/14/20  1206   LABBLOO No growth after 5 days. No growth after 5 days. No Growth to date  No Growth to date  No Growth to date  No Growth to date     CBC:   Recent Labs   Lab 10/15/20  0244 10/16/20  0352   WBC 24.89* 21.77*  21.77*   HGB 7.9* 7.7*  7.7*   HCT 25.3* 25.1*  25.1*    238  238     CMP:   Recent Labs   Lab 10/14/20  1245 10/15/20  0244 10/16/20  0352   NA  --  146* 148*   K 3.8 3.5 3.1*   CL  --  112* 112*   CO2  --  24 25   GLU  --  82 139*   BUN  --  54* 66*   CREATININE  --  1.3 1.5*   CALCIUM  --  8.1* 8.1*   PROT  --  5.3* 5.4*   ALBUMIN  --  2.0* 2.0*   BILITOT  --  0.8 0.8   ALKPHOS  --  280* 276*   AST  --  31 30   ALT  --  43 44   ANIONGAP  --  10 11   EGFRNONAA  --  40.2* 33.8*     Urine Culture:   Recent Labs    Lab 08/23/20  0228 09/29/20  0854   LABURIN No significant growth ENTEROBACTER CLOACAE  >100,000 cfu/ml  *     Urine Studies:   Recent Labs   Lab 08/23/20  2249  10/14/20  1530   COLORU Colorless*   < > Yellow   APPEARANCEUA Clear   < > Cloudy*   PHUR 5.0   < > 5.0   SPECGRAV 1.005   < > 1.010   PROTEINUA Negative   < > 1+*   GLUCUA Negative   < > Negative   KETONESU Negative   < > Negative   BILIRUBINUA Negative   < > Negative   OCCULTUA 1+*   < > 3+*   NITRITE Negative   < > Negative   UROBILINOGEN Negative  --   --    LEUKOCYTESUR Trace*   < > Trace*   RBCUA 4   < > 17*   WBCUA 4   < > 3   BACTERIA Occasional   < > Occasional   SQUAMEPITHEL 1  --  1   HYALINECASTS 1   < > 9*    < > = values in this interval not displayed.     Wound Culture:   Recent Labs   Lab 10/05/20  1513   LABAERO ESCHERICHIA COLI  Few  *       Significant Imaging: I have reviewed all pertinent imaging results/findings within the past 24 hours.

## 2020-10-17 NOTE — PROGRESS NOTES
Pharmacokinetic Assessment Follow Up: IV Vancomycin    Vancomycin Regimen Assessment & Plan:  - Vancomycin level drawn with morning labs today resulted as 19.1 ug/mL, goal trough 15-20 ug/mL (per ID).  - Patient with JONAH, SCr continues up trending with very slow vancomycin elimination. Will continue pulse dosing while renal function remains unstable.  - Vancomycin 1250 mg x1 ordered this AM. Draw vancomycin level with morning labs 10/19. Will re-dose as needed depending on level and renal function.    Drug levels (last 3 results):  Recent Labs   Lab Result Units 10/15/20  0110 10/16/20  0352 10/17/20  0335   Vancomycin, Random ug/mL  --  21.1 19.1   Vancomycin-Trough ug/mL 27.2*  --   --        Pharmacy will continue to follow and monitor vancomycin.    Please contact pharmacy at extension [phone] for questions regarding this assessment.    Thank you for the consult,   Evelina Maynard       Patient brief summary:  Fatou Lorenzo is a 75 y.o. female initiated on antimicrobial therapy with IV Vancomycin for treatment of surgical prophylaxis    The patient's current regimen is pulse dosing    Drug Allergies:   Review of patient's allergies indicates:  No Known Allergies    Actual Body Weight:   83.5 kg    Renal Function:   Estimated Creatinine Clearance: 30.5 mL/min (A) (based on SCr of 1.7 mg/dL (H)).,     Dialysis Method (if applicable):  N/A

## 2020-10-17 NOTE — ASSESSMENT & PLAN NOTE
Fatou Lorenzo is a 75 y.o. female s/p MVr, TVr 9/9/2020. Case noteworthy for multiple pump runs (3) and RV hematoma due to retraction. Unstable 9/18, required pericardial window for evac of posterior cardiac tamponade. Respiratory distress and so re-admitted to SICU on 10/2 now s/p VATS on 10/5.     Neuro:  - Flat affect but remains alert and responds to questions appropriately   - Seen by psych - Following recs (started remeron)   - PRN liquid tylenol and IV morphine for pain      CV:  S/p MVr, TVr, MAZE 9/9/20. S/p bedside pericardial window 9/18  - Keep MAPs >60.   - pAF: digoxin 125 mcg.   -Transition back to 12.5 BID Metoprolol via ke.   - Follow Dig level   - labetalol IV PRN for SBP > 160  - INR 2.1, will re-dose Coumadin 1 mg tonight. Continue Heparin gtt with ptt goal 60-80     Pulm:   - Loculated pleural effusion s/p VATS on 10/5  - Extubated 10/9. Breathing comfortably on room air  - Scheduled duo and saline nebs  - CXR and ABG PRN  - HOB >30 deg     Renal:  - Yoo catheter in place to monitor UOP  - BUN/Cr  54/1.3--> 66/1.5  - Continue to monitor UOP       FEN/GI:  - Replace other lytes as needed  - Ke tube in place with TF infusing.   - Will increase FW flushes to 400 Q6 to address hypovolemia  - Famotidine and bowel regimen     Heme/Onc:  - H/H stable   - No evidence of bleeding     ID:   - S/p 7 days of cefepime vanc for enterococcus cloacae UTI. Also has e coli growing from pleural cultures.  - Follow ID recommendations pending      - Started on Cefepime fluconazole re-started 10/14/20. Candida on BAL     - Current antibiotics Cefepime/Vanc/ Antifungal   - BC: No growth to date     Endo:  - no hx of DM  - ISS     PPx:  - famotidine, SCD, heparin gtt bridge to coumadin     Dispo: Continue ICU level care

## 2020-10-17 NOTE — PLAN OF CARE
SICU PLAN OF CARE NOTE    Dx: Leukocytosis    Shift Events: No acute events over night. Pt tolerated TF @ 25cc/hr no s/s of discomfort/abd distention. Pt SAT >92% on room air over night.   Neuro: AAO x4 pt able to follow commands and nods appropriately. pt remained lethargic throughout night.   Urine Output: Voids Spontaneously 1L/shift  Labs/Accuchecks: Labs trended per order. K replaced. accuchecks Q4 no coverage needed.    Skin: No new pressure or device injuries noted. Hematoma @ right midaxillary remains open to air per order, scant bloody drainage from area, cleansed PRN . All dressings remain clean dry and intact. Heel boot/foot drop boot rotated. Pt turned Q2.

## 2020-10-17 NOTE — SUBJECTIVE & OBJECTIVE
Interval History/Significant Events: NAEON. Not requiring any pressor support and has remained rate controlled. UOP adequate but labs significant for hypernatremia and rising BUN. Remains NPO on TF, tolerating well.    Follow-up For: Procedure(s) (LRB):  LARYNGOSCOPY, MICRO SUSPENSION WITH AUGMENTATION (N/A)  VATS, WITH DECORTICATION, LUNG (Right)    Post-Operative Day: 12 Days Post-Op    Objective:     Vital Signs (Most Recent):  Temp: 98.2 °F (36.8 °C) (10/17/20 1100)  Pulse: 72 (10/17/20 1145)  Resp: (!) 33 (10/17/20 1145)  BP: (!) 141/66 (10/17/20 1100)  SpO2: 98 % (10/17/20 1145) Vital Signs (24h Range):  Temp:  [97.6 °F (36.4 °C)-98.2 °F (36.8 °C)] 98.2 °F (36.8 °C)  Pulse:  [70-84] 72  Resp:  [18-58] 33  SpO2:  [95 %-100 %] 98 %  BP: (123-149)/(57-67) 141/66     Weight: 83.5 kg (184 lb)  Body mass index is 30.62 kg/m².      Intake/Output Summary (Last 24 hours) at 10/17/2020 1228  Last data filed at 10/17/2020 1200  Gross per 24 hour   Intake 1242 ml   Output 1725 ml   Net -483 ml       Physical Exam  Vitals signs and nursing note reviewed.   Constitutional:       Appearance: She is well-developed. She is ill-appearing. She is not diaphoretic.   HENT:      Head: Normocephalic and atraumatic.   Eyes:      Conjunctiva/sclera: Conjunctivae normal.   Neck:      Musculoskeletal: Normal range of motion and neck supple.   Cardiovascular:      Rate and Rhythm: Normal rate and regular rhythm.      Comments: Midline sternotomy with clean, dry, and intact, without evidence of infection  Prior chest tubes sites with dressings in place, clean and dry  Pulmonary:      Comments: Breathing comfortably on comfort flow  No distress, but rhonchi present and she has a wet cough.  Hematoma on the R chest, not actively bleeding   Abdominal:      General: There is no distension.      Palpations: Abdomen is soft.      Tenderness: There is no abdominal tenderness.   Skin:     General: Skin is warm and dry.   Psychiatric:       Comments: Depressed mood, flat affect         Vents:  Vent Mode: Spont (10/08/20 1525)  Ventilator Initiated: Yes(chart correction) (09/18/20 2202)  Set Rate: 20 BPM (10/08/20 0747)  Vt Set: 375 mL (10/08/20 0747)  Pressure Support: 10 cmH20 (10/08/20 1525)  PEEP/CPAP: 5 cmH20 (10/08/20 1525)  Oxygen Concentration (%): 24 (10/15/20 0506)  Peak Airway Pressure: 15 cmH2O (10/08/20 1525)  Plateau Pressure: 19 cmH20 (10/08/20 1525)  Total Ve: 5.3 mL (10/08/20 1525)  Negative Inspiratory Force (cm H2O): -21 (10/08/20 1452)  F/VT Ratio<105 (RSBI): (!) 70.71 (10/08/20 1258)    Lines/Drains/Airways     Central Venous Catheter Line            Percutaneous Central Line Insertion/Assessment - Triple Lumen  10/05/20 1843 left internal jugular 11 days          Drain                 Trans Pyloric Feeding Tube 10/14/20 1500 2 days         Urethral Catheter 10/14/20 1500 2 days          Peripheral Intravenous Line                 Peripheral IV - Single Lumen 10/16/20 0610 20 G Posterior;Right Hand 1 day                Significant Labs:    CBC/Anemia Profile:  Recent Labs   Lab 10/16/20  0352 10/17/20  0335   WBC 21.77*  21.77* 20.84*   HGB 7.7*  7.7* 7.8*   HCT 25.1*  25.1* 25.5*     238 237   MCV 95  95 96   RDW 17.3*  17.3* 18.0*        Chemistries:  Recent Labs   Lab 10/16/20  0352 10/17/20  0335   * 149*   K 3.1* 3.6   * 114*   CO2 25 24   BUN 66* 78*   CREATININE 1.5* 1.7*   CALCIUM 8.1* 8.0*   ALBUMIN 2.0* 1.9*   PROT 5.4* 5.4*   BILITOT 0.8 0.7   ALKPHOS 276* 254*   ALT 44 40   AST 30 32   MG 2.2 2.2   PHOS 3.4 3.6       ABGs: No results for input(s): PH, PCO2, HCO3, POCSATURATED, BE in the last 48 hours.  Coagulation:   Recent Labs   Lab 10/17/20  0335   INR 2.1*   APTT 41.9*     Lactic Acid: No results for input(s): LACTATE in the last 48 hours.    Significant Imaging:  I have reviewed all pertinent imaging results/findings within the past 24 hours.

## 2020-10-17 NOTE — NURSING
"Dx: Leukocytosis     Neuro: drowsy throughout shift. Opens eyes spontaneously. AAOx4. Quiet/whisper voice.    Vital Signs: BP (!) 144/66 (BP Location: Right arm, Patient Position: Lying)   Pulse 83   Temp 97.6 °F (36.4 °C) (Oral)   Resp (!) 24   Ht 5' 5" (1.651 m)   Wt 83.5 kg (184 lb)   SpO2 95%   Breastfeeding No   BMI 30.62 kg/m²   Cardiac: NSR 70-90s  Resp: RA  Diet: TF @ 25 (goal); 400ml H2O bolus Q4   Gtts: n/a  Urine Output: 700ml UOP  BM: x2  Drains: N/A   Labs/Accuchecks: daily labs; accuchecks Q8  Skin: midline sternal incision with sutures to superior portion. Right sided chest hematoma appears maroon/purple and hard to touch with no drainage. No skin tears or breakdown noted. Foam dsg in place. Bed inflated. Patient turned Q2. Heels boots on.  Shift Events: out of bed to cardiac chair for 2 1/2-3 hours.       "

## 2020-10-17 NOTE — PROGRESS NOTES
[facility]  Critical Care - Surgery  Progress Note    Patient Name: Fatou Lorenzo  MRN: [id number]  Admission Date: 8/30/2020  Hospital Length of Stay: 47 days  Code Status: Full Code  Attending Provider: Antoni Waddell MD  Primary Care Provider: Ludin Rivas MD   Principal Problem: Leukocytosis    Subjective:     Hospital/ICU Course:  No notes on file    Interval History/Significant Events: NAEON. Not requiring any pressor support and has remained rate controlled. UOP adequate but labs significant for hypernatremia and rising BUN. Remains NPO on TF, tolerating well.    Follow-up For: Procedure(s) (LRB):  LARYNGOSCOPY, MICRO SUSPENSION WITH AUGMENTATION (N/A)  VATS, WITH DECORTICATION, LUNG (Right)    Post-Operative Day: 12 Days Post-Op    Objective:     Vital Signs (Most Recent):  Temp: 98.2 °F (36.8 °C) (10/17/20 1100)  Pulse: 72 (10/17/20 1145)  Resp: (!) 33 (10/17/20 1145)  BP: (!) 141/66 (10/17/20 1100)  SpO2: 98 % (10/17/20 1145) Vital Signs (24h Range):  Temp:  [97.6 °F (36.4 °C)-98.2 °F (36.8 °C)] 98.2 °F (36.8 °C)  Pulse:  [70-84] 72  Resp:  [18-58] 33  SpO2:  [95 %-100 %] 98 %  BP: (123-149)/(57-67) 141/66     Weight: 83.5 kg (184 lb)  Body mass index is 30.62 kg/m².      Intake/Output Summary (Last 24 hours) at 10/17/2020 1228  Last data filed at 10/17/2020 1200  Gross per 24 hour   Intake 1242 ml   Output 1725 ml   Net -483 ml       Physical Exam  Vitals signs and nursing note reviewed.   Constitutional:       Appearance: She is well-developed. She is ill-appearing. She is not diaphoretic.   HENT:      Head: Normocephalic and atraumatic.   Eyes:      Conjunctiva/sclera: Conjunctivae normal.   Neck:      Musculoskeletal: Normal range of motion and neck supple.   Cardiovascular:      Rate and Rhythm: Normal rate and regular rhythm.      Comments: Midline sternotomy with clean, dry, and intact, without evidence of infection  Prior chest tubes sites with dressings in place, clean  and dry  Pulmonary:      Comments: Breathing comfortably on comfort flow  No distress, but rhonchi present and she has a wet cough.  Hematoma on the R chest, not actively bleeding   Abdominal:      General: There is no distension.      Palpations: Abdomen is soft.      Tenderness: There is no abdominal tenderness.   Skin:     General: Skin is warm and dry.   Psychiatric:      Comments: Depressed mood, flat affect         Vents:  Vent Mode: Spont (10/08/20 1525)  Ventilator Initiated: Yes(chart correction) (09/18/20 2202)  Set Rate: 20 BPM (10/08/20 0747)  Vt Set: 375 mL (10/08/20 0747)  Pressure Support: 10 cmH20 (10/08/20 1525)  PEEP/CPAP: 5 cmH20 (10/08/20 1525)  Oxygen Concentration (%): 24 (10/15/20 0506)  Peak Airway Pressure: 15 cmH2O (10/08/20 1525)  Plateau Pressure: 19 cmH20 (10/08/20 1525)  Total Ve: 5.3 mL (10/08/20 1525)  Negative Inspiratory Force (cm H2O): -21 (10/08/20 1452)  F/VT Ratio<105 (RSBI): (!) 70.71 (10/08/20 1258)    Lines/Drains/Airways     Central Venous Catheter Line            Percutaneous Central Line Insertion/Assessment - Triple Lumen  10/05/20 1843 left internal jugular 11 days          Drain                 Trans Pyloric Feeding Tube 10/14/20 1500 2 days         Urethral Catheter 10/14/20 1500 2 days          Peripheral Intravenous Line                 Peripheral IV - Single Lumen 10/16/20 0610 20 G Posterior;Right Hand 1 day                Significant Labs:    CBC/Anemia Profile:  Recent Labs   Lab 10/16/20  0352 10/17/20  0335   WBC 21.77*  21.77* 20.84*   HGB 7.7*  7.7* 7.8*   HCT 25.1*  25.1* 25.5*     238 237   MCV 95  95 96   RDW 17.3*  17.3* 18.0*        Chemistries:  Recent Labs   Lab 10/16/20  0352 10/17/20  0335   * 149*   K 3.1* 3.6   * 114*   CO2 25 24   BUN 66* 78*   CREATININE 1.5* 1.7*   CALCIUM 8.1* 8.0*   ALBUMIN 2.0* 1.9*   PROT 5.4* 5.4*   BILITOT 0.8 0.7   ALKPHOS 276* 254*   ALT 44 40   AST 30 32   MG 2.2 2.2   PHOS 3.4 3.6       ABGs:  No results for input(s): PH, PCO2, HCO3, POCSATURATED, BE in the last 48 hours.  Coagulation:   Recent Labs   Lab 10/17/20  0335   INR 2.1*   APTT 41.9*     Lactic Acid: No results for input(s): LACTATE in the last 48 hours.    Significant Imaging:  I have reviewed all pertinent imaging results/findings within the past 24 hours.    Assessment/Plan:     Severe mitral regurgitation  Fatou Lorenzo is a 75 y.o. female s/p MVr, TVr 9/9/2020. Case noteworthy for multiple pump runs (3) and RV hematoma due to retraction. Unstable 9/18, required pericardial window for evac of posterior cardiac tamponade. Respiratory distress and so re-admitted to SICU on 10/2 now s/p VATS on 10/5.     Neuro:  - Flat affect but remains alert and responds to questions appropriately   - Seen by psych - Following recs (started remeron)   - PRN liquid tylenol and IV morphine for pain      CV:  S/p MVr, TVr, MAZE 9/9/20. S/p bedside pericardial window 9/18  - Keep MAPs >60.   - pAF: digoxin 125 mcg.   -Transition back to 12.5 BID Metoprolol via kevin.   - Follow Dig level   - labetalol IV PRN for SBP > 160  - INR 2.1, will re-dose Coumadin 1 mg tonight. Continue Heparin gtt with ptt goal 60-80     Pulm:   - Loculated pleural effusion s/p VATS on 10/5  - Extubated 10/9. Breathing comfortably on room air  - Scheduled duo and saline nebs  - CXR and ABG PRN  - HOB >30 deg     Renal:  - Yoo catheter in place to monitor UOP  - BUN/Cr  54/1.3--> 66/1.5  - Continue to monitor UOP       FEN/GI:  - Replace other lytes as needed  - [place] tube in place with TF infusing.   - Will increase FW flushes to 400 Q6 to address hypovolemia  - Famotidine and bowel regimen     Heme/Onc:  - H/H stable   - No evidence of bleeding     ID:   - S/p 7 days of cefepime vanc for enterococcus cloacae UTI. Also has e coli growing from pleural cultures.  - Follow ID recommendations pending      - Started on Cefepime fluconazole re-started 10/14/20. Candida on BAL     - Current  antibiotics Cefepime/Vanc/ Antifungal   - BC: No growth to date     Endo:  - no hx of DM  - ISS     PPx:  - famotidine, SCD, heparin gtt bridge to coumadin     Dispo: Continue ICU level care      Critical care was time spent personally by me on the following activities: development of treatment plan with patient or surrogate and bedside caregivers, discussions with consultants, evaluation of patient's response to treatment, examination of patient, ordering and performing treatments and interventions, ordering and review of laboratory studies, ordering and review of radiographic studies, pulse oximetry, re-evaluation of patient's condition.  This critical care time did not overlap with that of any other provider or involve time for any procedures.     Ashley Oates MD  Critical Care - Surgery  [facility]

## 2020-10-18 NOTE — SUBJECTIVE & OBJECTIVE
Interval History:  No acute events overnight.  Remains afebrile.  WBC continues to trend down.  Repeat blood cultures NGTD.   Up in chair, alert.    Kidney function declining.     Past Medical History:   Diagnosis Date    Atrial fibrillation with RVR 8/24/2020    Cataract     Essential hypertension 8/31/2020    Glaucoma     Heart valve problem     Hyperlipidemia     Severe mitral regurgitation 8/24/2020       Past Surgical History:   Procedure Laterality Date    ANKLE SURGERY      BAIN MAZE PROCEDURE N/A 9/9/2020    Procedure: BAIN MAZE PROCEDURE;  Surgeon: Antoni Waddell MD;  Location: [facility] OR [address] [place] [address];  Service: Cardiothoracic;  Laterality: N/A;  MAZE     LARYNGOSCOPY N/A 10/5/2020    Procedure: LARYNGOSCOPY, MICRO SUSPENSION WITH AUGMENTATION;  Surgeon: Yfn Mendoza MD;  Location: [facility] OR [address] [place] [address];  Service: ENT;  Laterality: N/A;    LEFT HEART CATHETERIZATION Left 8/25/2020    Procedure: Left heart cath, radial;  Surgeon: Marlee Carrillo MD;  Location: [facility] CATH LAB;  Service: Cardiology;  Laterality: Left;    lipoma removal      THORACOSCOPIC DECORTICATION OF LUNG Right 10/5/2020    Procedure: VATS, WITH DECORTICATION, LUNG;  Surgeon: Jeremy Shine MD;  Location: [facility] OR [address] [place] [address];  Service: Thoracic;  Laterality: Right;    TRICUSPID VALVULOPLASTY N/A 9/9/2020    Procedure: REPAIR, TRICUSPID VALVE;  Surgeon: Antoni Waddell MD;  Location: [facility] OR [address] [place] [address];  Service: Cardiothoracic;  Laterality: N/A;       Review of patient's allergies indicates:  No Known Allergies    Medications:  Medications Prior to Admission   Medication Sig    metoprolol tartrate (LOPRESSOR) 25 MG tablet Take 1 tablet (25 mg total) by mouth 3 (three) times daily.    amiodarone (PACERONE) 400 MG tablet Take two tablets by mouth twice daily for twelve days, then take one tablet by mouth daily.    apixaban (ELIQUIS) 5 mg Tab Take 1 tablet (5 mg total) by mouth 2 (two) times daily.    furosemide (LASIX) 40 MG  tablet Take 1 tablet (40 mg total) by mouth 2 (two) times daily.    losartan (COZAAR) 50 MG tablet Take 0.5 tablets (25 mg total) by mouth once daily.    pravastatin (PRAVACHOL) 80 MG tablet Take 80 mg by mouth once daily.     Antibiotics (From admission, onward)    Start     Stop Route Frequency Ordered    10/15/20 0819  vancomycin - pharmacy to dose      -- IV pharmacy to manage frequency 10/15/20 0719    10/14/20 1400  ceFEPIme injection 2 g      -- IV Every 12 hours (non-standard times) 10/14/20 1324        Antifungals (From admission, onward)    Start     Stop Route Frequency Ordered    10/14/20 1430  fluconazole (DIFLUCAN) IVPB 200 mg 100 mL      -- IV Every 24 hours (non-standard times) 10/14/20 1323        Antivirals (From admission, onward)    None           Immunization History   Administered Date(s) Administered    PPD Test 09/24/2020       Family History     Problem Relation (Age of Onset)    Cancer Mother    Cataracts Sister    No Known Problems Father, Brother, Maternal Aunt, Maternal Uncle, Paternal Aunt, Paternal Uncle, Maternal Grandmother, Maternal Grandfather, Paternal Grandmother, Paternal Grandfather        Social History     Socioeconomic History    Marital status:      Spouse name: Not on file    Number of children: Not on file    Years of education: Not on file    Highest education level: Not on file   Occupational History    Not on file   Social Needs    Financial resource strain: Not on file    Food insecurity     Worry: Not on file     Inability: Not on file    Transportation needs     Medical: Not on file     Non-medical: Not on file   Tobacco Use    Smoking status: Never Smoker    Smokeless tobacco: Never Used   Substance and Sexual Activity    Alcohol use: No    Drug use: No    Sexual activity: Not Currently   Lifestyle    Physical activity     Days per week: Not on file     Minutes per session: Not on file    Stress: Not on file   Relationships    Social  connections     Talks on phone: Not on file     Gets together: Not on file     Attends Denominational service: Not on file     Active member of club or organization: Not on file     Attends meetings of clubs or organizations: Not on file     Relationship status: Not on file   Other Topics Concern    Not on file   Social History Narrative    Not on file     Review of Systems   Constitutional: Positive for fatigue. Negative for activity change, appetite change, chills, diaphoresis and fever.   HENT: Negative.    Eyes: Negative.    Respiratory: Positive for shortness of breath. Negative for cough and wheezing.    Cardiovascular: Positive for leg swelling. Negative for chest pain and palpitations.   Gastrointestinal: Negative for abdominal pain, constipation, diarrhea, nausea and vomiting.   Genitourinary: Negative for dysuria and flank pain.        Yoo   Musculoskeletal: Negative for arthralgias, back pain and neck pain.   Skin: Negative for wound.   Neurological: Positive for weakness. Negative for dizziness and headaches.   Psychiatric/Behavioral: Negative for agitation.     Objective:     Vital Signs (Most Recent):  Temp: 98.3 °F (36.8 °C) (10/17/20 1900)  Pulse: 69 (10/17/20 1951)  Resp: (!) 23 (10/17/20 1951)  BP: 134/62 (10/17/20 1900)  SpO2: 97 % (10/17/20 1951) Vital Signs (24h Range):  Temp:  [98.1 °F (36.7 °C)-98.3 °F (36.8 °C)] 98.3 °F (36.8 °C)  Pulse:  [69-75] 69  Resp:  [16-43] 23  SpO2:  [93 %-100 %] 97 %  BP: (131-149)/(60-67) 134/62     Weight: 83.5 kg (184 lb)  Body mass index is 30.62 kg/m².    Estimated Creatinine Clearance: 30.5 mL/min (A) (based on SCr of 1.7 mg/dL (H)).    Physical Exam  Vitals signs and nursing note reviewed.   Constitutional:       Appearance: She is well-developed. She is ill-appearing. She is not diaphoretic.      Comments: More alert and interactive   HENT:      Head: Normocephalic and atraumatic.      Nose:      Comments: Feeding tube       Mouth/Throat:      Mouth: Mucous  membranes are moist.      Pharynx: Oropharynx is clear. No oropharyngeal exudate.   Eyes:      General: No scleral icterus.     Conjunctiva/sclera: Conjunctivae normal.   Neck:      Musculoskeletal: Normal range of motion.      Vascular: No JVD.   Cardiovascular:      Rate and Rhythm: Normal rate and regular rhythm.      Heart sounds: No murmur.   Pulmonary:      Effort: Pulmonary effort is normal. Tachypnea present. No accessory muscle usage.      Breath sounds: Rhonchi present.      Comments: Nasal cannula O2.  No distress   Abdominal:      General: There is no distension.      Palpations: Abdomen is soft.      Tenderness: There is no abdominal tenderness.   Musculoskeletal:         General: No tenderness.      Right lower leg: Edema present.      Left lower leg: Edema present.      Comments: No calf tenderness, warmth, erythema   Skin:     General: Skin is warm and dry.      Coloration: Skin is pale.      Findings: No rash.      Comments: Sternal incision c/d/i.  No erythema/drainage    Left IJ catheter - site clear    Dressing right upper back/chest - c/d/i   Neurological:      Mental Status: She is alert.      Comments: More alert, interactive         Significant Labs:   Blood Culture:   Recent Labs   Lab 08/23/20  0149 08/23/20  0203 10/14/20  1206   LABBLOO No growth after 5 days. No growth after 5 days. No Growth to date  No Growth to date  No Growth to date  No Growth to date  No Growth to date  No Growth to date  No Growth to date  No Growth to date     CBC:   Recent Labs   Lab 10/16/20  0352 10/17/20  0335   WBC 21.77*  21.77* 20.84*   HGB 7.7*  7.7* 7.8*   HCT 25.1*  25.1* 25.5*     238 237     CMP:   Recent Labs   Lab 10/16/20  0352 10/17/20  0335   * 149*   K 3.1* 3.6   * 114*   CO2 25 24   * 122*   BUN 66* 78*   CREATININE 1.5* 1.7*   CALCIUM 8.1* 8.0*   PROT 5.4* 5.4*   ALBUMIN 2.0* 1.9*   BILITOT 0.8 0.7   ALKPHOS 276* 254*   AST 30 32   ALT 44 40   ANIONGAP 11  11   EGFRNONAA 33.8* 29.1*     Urine Culture:   Recent Labs   Lab 08/23/20  0228 09/29/20  0854   LABURIN No significant growth ENTEROBACTER CLOACAE  >100,000 cfu/ml  *     Urine Studies:   Recent Labs   Lab 08/23/20  2249  10/14/20  1530   COLORU Colorless*   < > Yellow   APPEARANCEUA Clear   < > Cloudy*   PHUR 5.0   < > 5.0   SPECGRAV 1.005   < > 1.010   PROTEINUA Negative   < > 1+*   GLUCUA Negative   < > Negative   KETONESU Negative   < > Negative   BILIRUBINUA Negative   < > Negative   OCCULTUA 1+*   < > 3+*   NITRITE Negative   < > Negative   UROBILINOGEN Negative  --   --    LEUKOCYTESUR Trace*   < > Trace*   RBCUA 4   < > 17*   WBCUA 4   < > 3   BACTERIA Occasional   < > Occasional   SQUAMEPITHEL 1  --  1   HYALINECASTS 1   < > 9*    < > = values in this interval not displayed.     Wound Culture:   Recent Labs   Lab 10/05/20  1513   LABAERO ESCHERICHIA COLI  Few  *       Significant Imaging: I have reviewed all pertinent imaging results/findings within the past 24 hours.

## 2020-10-18 NOTE — PLAN OF CARE
Plan of Care Note  Cardiothoracic Surgery    Fatou Lorenzo is a 75 y.o. female s/p MVr, TVr, MAZE (9/19)    Specific issues: stable leukocytosis, worsening renal function, some hypoxia  --increase free water  --coumadin 1  --will likely need G-tube  --continue abx      Plan of care for patient was discussed with ICU staff including nurses, residents, and faculty and appropriate consulting services.    Will continue to monitor patient's hemodynamics, functionality, neuro status, fluid status and renal function, and labs and will adjust medications and fluids as necessary while monitoring appropriateness for de-escalation of support and monitoring and transport to stepdown unit.    Isaiah Zacarias MD  Cardiothoracic Surgery Fellow

## 2020-10-18 NOTE — SUBJECTIVE & OBJECTIVE
Interval History/Significant Events: NAEON. Not requiring any pressor support and has remained rate controlled. WBC downtrending. UOP adequate but labs significant for hypernatremia and worsening renal function. Remains NPO on TF, tolerating well.    Follow-up For: Procedure(s) (LRB):  LARYNGOSCOPY, MICRO SUSPENSION WITH AUGMENTATION (N/A)  VATS, WITH DECORTICATION, LUNG (Right)    Post-Operative Day: 13 Days Post-Op    Objective:     Vital Signs (Most Recent):  Temp: 98.3 °F (36.8 °C) (10/17/20 1900)  Pulse: 68 (10/18/20 0500)  Resp: (!) 22 (10/18/20 0500)  BP: (!) 151/69 (10/18/20 0500)  SpO2: 98 % (10/18/20 0500) Vital Signs (24h Range):  Temp:  [98.1 °F (36.7 °C)-98.3 °F (36.8 °C)] 98.3 °F (36.8 °C)  Pulse:  [68-75] 68  Resp:  [16-43] 22  SpO2:  [93 %-100 %] 98 %  BP: (131-152)/(60-69) 151/69     Weight: 83.5 kg (184 lb)  Body mass index is 30.62 kg/m².      Intake/Output Summary (Last 24 hours) at 10/18/2020 0655  Last data filed at 10/18/2020 0500  Gross per 24 hour   Intake 2135 ml   Output 1605 ml   Net 530 ml       Physical Exam  Vitals signs and nursing note reviewed.   Constitutional:       Appearance: She is well-developed. She is ill-appearing. She is not diaphoretic.   HENT:      Head: Normocephalic and atraumatic.   Eyes:      Conjunctiva/sclera: Conjunctivae normal.   Neck:      Musculoskeletal: Normal range of motion and neck supple.   Cardiovascular:      Rate and Rhythm: Normal rate and regular rhythm.      Comments: Midline sternotomy with clean, dry, and intact, without evidence of infection  Prior chest tubes sites with dressings in place, clean and dry  Pulmonary:      Comments: Breathing comfortably on comfort flow  No distress, but rhonchi present and she has a wet cough.  Hematoma on the R chest, not actively bleeding   Abdominal:      General: There is no distension.      Palpations: Abdomen is soft.      Tenderness: There is no abdominal tenderness.   Skin:     General: Skin is warm and  dry.   Psychiatric:      Comments: Depressed mood, flat affect         Vents:  Vent Mode: Spont (10/08/20 1525)  Ventilator Initiated: Yes(chart correction) (09/18/20 2202)  Set Rate: 20 BPM (10/08/20 0747)  Vt Set: 375 mL (10/08/20 0747)  Pressure Support: 10 cmH20 (10/08/20 1525)  PEEP/CPAP: 5 cmH20 (10/08/20 1525)  Oxygen Concentration (%): 21 (10/17/20 1951)  Peak Airway Pressure: 15 cmH2O (10/08/20 1525)  Plateau Pressure: 19 cmH20 (10/08/20 1525)  Total Ve: 5.3 mL (10/08/20 1525)  Negative Inspiratory Force (cm H2O): -21 (10/08/20 1452)  F/VT Ratio<105 (RSBI): (!) 70.71 (10/08/20 1258)    Lines/Drains/Airways     Central Venous Catheter Line            Percutaneous Central Line Insertion/Assessment - Triple Lumen  10/05/20 1843 left internal jugular 12 days          Drain                 Trans Pyloric Feeding Tube 10/14/20 1500 3 days         Urethral Catheter 10/14/20 1500 3 days          Peripheral Intravenous Line                 Peripheral IV - Single Lumen 10/16/20 0610 20 G Posterior;Right Hand 2 days                Significant Labs:    CBC/Anemia Profile:  Recent Labs   Lab 10/17/20  0335 10/18/20  0357   WBC 20.84* 15.79*   HGB 7.8* 8.6*   HCT 25.5* 28.1*    169   MCV 96 96   RDW 18.0* 18.5*        Chemistries:  Recent Labs   Lab 10/17/20  0335 10/18/20  0355   * 146*   K 3.6 4.1   * 111*   CO2 24 25   BUN 78* 90*   CREATININE 1.7* 1.8*   CALCIUM 8.0* 7.9*   ALBUMIN 1.9* 1.8*   PROT 5.4* 5.2*   BILITOT 0.7 0.6   ALKPHOS 254* 235*   ALT 40 40   AST 32 37   MG 2.2 2.3   PHOS 3.6 3.2       ABGs: No results for input(s): PH, PCO2, HCO3, POCSATURATED, BE in the last 48 hours.  Coagulation:   Recent Labs   Lab 10/18/20  0355   INR 2.7*   APTT 39.8*     Lactic Acid: No results for input(s): LACTATE in the last 48 hours.    Significant Imaging:  I have reviewed all pertinent imaging results/findings within the past 24 hours.

## 2020-10-18 NOTE — PLAN OF CARE
"      SICU PLAN OF CARE NOTE    Dx: Leukocytosis    Shift Events: COSME. General surgery consulted for PEG tube placement.    Neuro: AAO x4, Follows Commands, and Moves All Extremities    Vital Signs: BP (!) 140/63 (BP Location: Right arm, Patient Position: Lying)   Pulse 71   Temp 97.9 °F (36.6 °C) (Oral)   Resp (!) 31   Ht 5' 5" (1.651 m)   Wt 83.5 kg (184 lb)   SpO2 (!) 92%   Breastfeeding No   BMI 30.62 kg/m²     Respiratory: Room Air    Diet: NPO and Tube Feeds    Gtts: none.    Urine Output: Urinary Catheter 555 cc/shift    Drains: MAKENZIE tube with 25mL/hr TF infusing per pump.     Labs/Accuchecks: checked per order.    Skin: bottom and heels intact. Weight shift assistance provided. Pt on specialty bed.        "

## 2020-10-18 NOTE — ASSESSMENT & PLAN NOTE
75 y.o. female w/ history of Atrial fibrillation with RVR, HLD and severe mitral regurgitation who is s/p MV repair and TV annuloplasty on 9/9, complicated by posterior cardiac tamponade s/p pericardial window on 9/18, UTI (E cloacae, tx X 7 days with cefepime), and large right pleural effusion with multiple loculations s/p thoracentesis ( no cx data) and VATS with limited decortication on 10/5 with cx + for E Coli, s/p BAL 10/7 (candida lusitanae thought to be colonizer), who was last seen by ID on 10/9 who recommended 4 weeks of antibiotics post VATS (IV ceftriaxone while inpatient). Right pleural effusion improved on imaging and leukocytosis resolved.    ID  reconsulted for re-current leukocytosis (WBC 11>>34).      Transferred to ICU 10/14.  Afebrile, WBC trending down ( 34>>24>>21>).   Blood cultures remain NGTD.  Hemodynamically stable.  Appears more comfortable today and more alert.  O2 sats good on RA.  Last UA negative. 2d negative.      Currently on vancomycin, cefepime, fluconazole.  Failed swallow study, plan for PEG.     Plan/recommendations  1.  Continue empiric IV vancomycin (pharmacy to dose.  Currently pulse dosing given decline in renal function.  Random level this am 19.1), and cefepime, and fluconazole for now.  Renal dosing.    2.  Will continue to follow.  Will consider de-escalating Monday, Tuesday?      Data reviewed and plan discussed with ID staff

## 2020-10-18 NOTE — PROGRESS NOTES
Pharmacist Renal Dose Adjustment Note    Fatou Lorenzo is a 75 y.o. female being treated with the medication cefepime 2gm IV q12h    Patient Data:    Vital Signs (Most Recent):  Temp: 97.9 °F (36.6 °C) (10/18/20 0700)  Pulse: 70 (10/18/20 0700)  Resp: (!) 28 (10/18/20 0700)  BP: (!) 134/58 (10/18/20 0700)  SpO2: 98 % (10/18/20 0700)   Vital Signs (72h Range):  Temp:  [97.5 °F (36.4 °C)-98.3 °F (36.8 °C)]   Pulse:  [67-91]   Resp:  [16-72]   BP: (111-152)/()   SpO2:  [92 %-100 %]      Recent Labs   Lab 10/16/20  0352 10/17/20  0335 10/18/20  0355   CREATININE 1.5* 1.7* 1.8*     Serum creatinine: 1.8 mg/dL (H) 10/18/20 0355  Estimated creatinine clearance: 28.8 mL/min (A)    Medication: cefepime 2g IV q12h will be changed to 1g q12h based on est CrCl <29 mL/min, pharmacy to continue to monitor    Pharmacist's Name: Evelina Maynard  Pharmacist's Extension: [phone]

## 2020-10-18 NOTE — PROGRESS NOTES
[facility]  Infectious Disease  Progress Note    Patient Name: Fatou Lroenzo  MRN: [id number]  Admission Date: 8/30/2020  Length of Stay: 47 days  Attending Physician: Antoni Waddell MD  Primary Care Provider: Ludin Rivas MD    Isolation Status: No active isolations  Assessment/Plan:      * Leukocytosis      75 y.o. female w/ history of Atrial fibrillation with RVR, HLD and severe mitral regurgitation who is s/p MV repair and TV annuloplasty on 9/9, complicated by posterior cardiac tamponade s/p pericardial window on 9/18, UTI (E cloacae, tx X 7 days with cefepime), and large right pleural effusion with multiple loculations s/p thoracentesis ( no cx data) and VATS with limited decortication on 10/5 with cx + for E Coli, s/p BAL 10/7 (candida lusitanae thought to be colonizer), who was last seen by ID on 10/9 who recommended 4 weeks of antibiotics post VATS (IV ceftriaxone while inpatient). Right pleural effusion improved on imaging and leukocytosis resolved.    ID  reconsulted for re-current leukocytosis (WBC 11>>34).      Transferred to ICU 10/14.  Afebrile, WBC trending down ( 34>>24>>21>).   Blood cultures remain NGTD.  Hemodynamically stable.  Appears more comfortable today and more alert.  O2 sats good on RA.  Last UA negative. 2d negative.      Currently on vancomycin, cefepime, fluconazole.  Failed swallow study, plan for PEG.     Plan/recommendations  1.  Continue empiric IV vancomycin (pharmacy to dose.  Currently pulse dosing given decline in renal function.  Random level this am 19.1), and cefepime, and fluconazole for now.  Renal dosing.    2.  Will continue to follow.  Will consider de-escalating Monday, Tuesday?      Data reviewed and plan discussed with ID staff      S/P TVR (tricuspid valve repair)  See above    S/P MVR (mitral valve repair)  See above      Thank you.   Please call for any questions or concerns.  CAYLA Harris, ANP-C  [phone] pager, [place]  [phone]    Subjective:     Principal Problem:Leukocytosis    HPI:   Pt is a 75 y.o. female w/ pmhx of Atrial fibrillation with RVR, HLD and severe mitral regurgitation.  Pt w/ recent left heart catheterization 8/25/2020 who presented w/ complaints of recurrent shortness of breath.  Transesophageal echocardiogram done recently w/ left atrial thrombus.  L heart angiography w/ nonobstructive CAD.   Upon admission, pt required BiPAP due to hypoxemia not responsive to nasal cannula.      Pt transferred to [facility] 8/30 for CT surgery evaluation given recurrent admission despite compliance, BP control and diuresis.  Pt seen by Dr. Waddell who recommended MV repair.  Pt underwent MV repair and TV annuloplasty on 9/9. Pt course c/b reintuabtion and pericardial window for evacuation of posterior cardiac tamponade on 9/18.  Pt extubated on 9/24.  Developed increasing white count on 9/28.  UA done concerning for infection.  Urine cxs positive for E.Cloacae.  Pt started on Bactrim.  Chest Xray from 10/1- Since September 28, 2020, increased large right pleural effusion.  Increased diffuse right airspace opacities.  X ray abdomen 9/23 w/ Probable bilateral nephrolithiasis.  ID initially consulted on 10/2 for UTI.  Patient also noted to have large right loculated pleural effusion and underwent IR thoracentesis (no cx data) and subsequently VATS procedure with limited decortication by CTS on 10/5.  Cultures positive for E coli.  Brochoscopy with BAL on 10/7 with yeast.  She was treated with 7 days of cefepime for E. Cloacae in urine and E coli from pleural effusion, followed by transition to IV ceftriaxone with plan for 4 weeks of antibiotics after VATS procedure (ceftriaxone while inpatient with transition to oral Augmentin if d/c).      ID now reconsulted for rising WBC  11.8 >>34.  Repeat blood cultures pending.  Repeat CXR pending.  Afebrile.      Interval History:  No acute events overnight.  Remains afebrile.  WBC continues to trend  down.  Repeat blood cultures NGTD.   Up in chair, alert.    Kidney function declining.     Past Medical History:   Diagnosis Date    Atrial fibrillation with RVR 8/24/2020    Cataract     Essential hypertension 8/31/2020    Glaucoma     Heart valve problem     Hyperlipidemia     Severe mitral regurgitation 8/24/2020       Past Surgical History:   Procedure Laterality Date    ANKLE SURGERY      BAIN MAZE PROCEDURE N/A 9/9/2020    Procedure: BAIN MAZE PROCEDURE;  Surgeon: Antoni Waddell MD;  Location: [facility] OR [address] [place] [address];  Service: Cardiothoracic;  Laterality: N/A;  MAZE     LARYNGOSCOPY N/A 10/5/2020    Procedure: LARYNGOSCOPY, MICRO SUSPENSION WITH AUGMENTATION;  Surgeon: Yfn Mendoza MD;  Location: [facility] OR [address] [place] [address];  Service: ENT;  Laterality: N/A;    LEFT HEART CATHETERIZATION Left 8/25/2020    Procedure: Left heart cath, radial;  Surgeon: Marlee aCrrillo MD;  Location: [facility] CATH LAB;  Service: Cardiology;  Laterality: Left;    lipoma removal      THORACOSCOPIC DECORTICATION OF LUNG Right 10/5/2020    Procedure: VATS, WITH DECORTICATION, LUNG;  Surgeon: Jeremy Shine MD;  Location: [facility] OR [address] [place] [address];  Service: Thoracic;  Laterality: Right;    TRICUSPID VALVULOPLASTY N/A 9/9/2020    Procedure: REPAIR, TRICUSPID VALVE;  Surgeon: Antoni Waddell MD;  Location: [facility] OR [address] [place] [address];  Service: Cardiothoracic;  Laterality: N/A;       Review of patient's allergies indicates:  No Known Allergies    Medications:  Medications Prior to Admission   Medication Sig    metoprolol tartrate (LOPRESSOR) 25 MG tablet Take 1 tablet (25 mg total) by mouth 3 (three) times daily.    amiodarone (PACERONE) 400 MG tablet Take two tablets by mouth twice daily for twelve days, then take one tablet by mouth daily.    apixaban (ELIQUIS) 5 mg Tab Take 1 tablet (5 mg total) by mouth 2 (two) times daily.    furosemide (LASIX) 40 MG tablet Take 1 tablet (40 mg total) by mouth 2 (two) times daily.    losartan (COZAAR) 50  MG tablet Take 0.5 tablets (25 mg total) by mouth once daily.    pravastatin (PRAVACHOL) 80 MG tablet Take 80 mg by mouth once daily.     Antibiotics (From admission, onward)    Start     Stop Route Frequency Ordered    10/15/20 0819  vancomycin - pharmacy to dose      -- IV pharmacy to manage frequency 10/15/20 0719    10/14/20 1400  ceFEPIme injection 2 g      -- IV Every 12 hours (non-standard times) 10/14/20 1324        Antifungals (From admission, onward)    Start     Stop Route Frequency Ordered    10/14/20 1430  fluconazole (DIFLUCAN) IVPB 200 mg 100 mL      -- IV Every 24 hours (non-standard times) 10/14/20 1323        Antivirals (From admission, onward)    None           Immunization History   Administered Date(s) Administered    PPD Test 09/24/2020       Family History     Problem Relation (Age of Onset)    Cancer Mother    Cataracts Sister    No Known Problems Father, Brother, Maternal Aunt, Maternal Uncle, Paternal Aunt, Paternal Uncle, Maternal Grandmother, Maternal Grandfather, Paternal Grandmother, Paternal Grandfather        Social History     Socioeconomic History    Marital status:      Spouse name: Not on file    Number of children: Not on file    Years of education: Not on file    Highest education level: Not on file   Occupational History    Not on file   Social Needs    Financial resource strain: Not on file    Food insecurity     Worry: Not on file     Inability: Not on file    Transportation needs     Medical: Not on file     Non-medical: Not on file   Tobacco Use    Smoking status: Never Smoker    Smokeless tobacco: Never Used   Substance and Sexual Activity    Alcohol use: No    Drug use: No    Sexual activity: Not Currently   Lifestyle    Physical activity     Days per week: Not on file     Minutes per session: Not on file    Stress: Not on file   Relationships    Social connections     Talks on phone: Not on file     Gets together: Not on file     Attends  Scientology service: Not on file     Active member of club or organization: Not on file     Attends meetings of clubs or organizations: Not on file     Relationship status: Not on file   Other Topics Concern    Not on file   Social History Narrative    Not on file     Review of Systems   Constitutional: Positive for fatigue. Negative for activity change, appetite change, chills, diaphoresis and fever.   HENT: Negative.    Eyes: Negative.    Respiratory: Positive for shortness of breath. Negative for cough and wheezing.    Cardiovascular: Positive for leg swelling. Negative for chest pain and palpitations.   Gastrointestinal: Negative for abdominal pain, constipation, diarrhea, nausea and vomiting.   Genitourinary: Negative for dysuria and flank pain.        Yoo   Musculoskeletal: Negative for arthralgias, back pain and neck pain.   Skin: Negative for wound.   Neurological: Positive for weakness. Negative for dizziness and headaches.   Psychiatric/Behavioral: Negative for agitation.     Objective:     Vital Signs (Most Recent):  Temp: 98.3 °F (36.8 °C) (10/17/20 1900)  Pulse: 69 (10/17/20 1951)  Resp: (!) 23 (10/17/20 1951)  BP: 134/62 (10/17/20 1900)  SpO2: 97 % (10/17/20 1951) Vital Signs (24h Range):  Temp:  [98.1 °F (36.7 °C)-98.3 °F (36.8 °C)] 98.3 °F (36.8 °C)  Pulse:  [69-75] 69  Resp:  [16-43] 23  SpO2:  [93 %-100 %] 97 %  BP: (131-149)/(60-67) 134/62     Weight: 83.5 kg (184 lb)  Body mass index is 30.62 kg/m².    Estimated Creatinine Clearance: 30.5 mL/min (A) (based on SCr of 1.7 mg/dL (H)).    Physical Exam  Vitals signs and nursing note reviewed.   Constitutional:       Appearance: She is well-developed. She is ill-appearing. She is not diaphoretic.      Comments: More alert and interactive.  Sitting up in chair  HENT:      Head: Normocephalic and atraumatic.      Nose:      Comments: Feeding tube       Mouth/Throat:      Mouth: Mucous membranes are moist.      Pharynx: Oropharynx is clear. No  oropharyngeal exudate.   Eyes:      General: No scleral icterus.     Conjunctiva/sclera: Conjunctivae normal.   Neck:      Musculoskeletal: Normal range of motion.      Vascular: No JVD.   Cardiovascular:      Rate and Rhythm: Normal rate and regular rhythm.      Heart sounds: No murmur.   Pulmonary:      Effort: Pulmonary effort is normal.      Breath sounds: clear, no rales/rhonchi     Comments: Room air.  Abdominal:      General: There is no distension.      Palpations: Abdomen is soft.      Tenderness: There is no abdominal tenderness.   Musculoskeletal:         General: No tenderness.      Right lower leg: Edema present.      Left lower leg: Edema present.      Comments: No calf tenderness, warmth, erythema   Skin:     General: Skin is warm and dry.      Coloration: Skin is pale.      Findings: No rash.      Comments: Sternal incision c/d/i.  No erythema/drainage    Left IJ catheter - site clear    Hematoma right upper chest - c/d/i   Neurological:      Mental Status: She is alert.      Comments: More alert, interactive         Significant Labs:   Blood Culture:   Recent Labs   Lab 08/23/20  0149 08/23/20  0203 10/14/20  1206   LABBLOO No growth after 5 days. No growth after 5 days. No Growth to date  No Growth to date  No Growth to date  No Growth to date  No Growth to date  No Growth to date  No Growth to date  No Growth to date     CBC:   Recent Labs   Lab 10/16/20  0352 10/17/20  0335   WBC 21.77*  21.77* 20.84*   HGB 7.7*  7.7* 7.8*   HCT 25.1*  25.1* 25.5*     238 237     CMP:   Recent Labs   Lab 10/16/20  0352 10/17/20  0335   * 149*   K 3.1* 3.6   * 114*   CO2 25 24   * 122*   BUN 66* 78*   CREATININE 1.5* 1.7*   CALCIUM 8.1* 8.0*   PROT 5.4* 5.4*   ALBUMIN 2.0* 1.9*   BILITOT 0.8 0.7   ALKPHOS 276* 254*   AST 30 32   ALT 44 40   ANIONGAP 11 11   EGFRNONAA 33.8* 29.1*     Urine Culture:   Recent Labs   Lab 08/23/20  0228 09/29/20  0854   LABURIN No significant growth  ENTEROBACTER CLOACAE  >100,000 cfu/ml  *     Urine Studies:   Recent Labs   Lab 08/23/20  2249  10/14/20  1530   COLORU Colorless*   < > Yellow   APPEARANCEUA Clear   < > Cloudy*   PHUR 5.0   < > 5.0   SPECGRAV 1.005   < > 1.010   PROTEINUA Negative   < > 1+*   GLUCUA Negative   < > Negative   KETONESU Negative   < > Negative   BILIRUBINUA Negative   < > Negative   OCCULTUA 1+*   < > 3+*   NITRITE Negative   < > Negative   UROBILINOGEN Negative  --   --    LEUKOCYTESUR Trace*   < > Trace*   RBCUA 4   < > 17*   WBCUA 4   < > 3   BACTERIA Occasional   < > Occasional   SQUAMEPITHEL 1  --  1   HYALINECASTS 1   < > 9*    < > = values in this interval not displayed.     Wound Culture:   Recent Labs   Lab 10/05/20  1513   LABAERO ESCHERICHIA COLI  Few  *       Significant Imaging: I have reviewed all pertinent imaging results/findings within the past 24 hours.

## 2020-10-18 NOTE — ASSESSMENT & PLAN NOTE
75 y.o. female w/ history of atrial fibrillation with RVR, HLD and severe mitral regurgitation who is s/p MV repair and TV annuloplasty on 9/9, complicated by posterior cardiac tamponade s/p pericardial window on 9/18, UTI (E cloacae, tx X 7 days with cefepime), and large right pleural effusion with multiple loculations s/p thoracentesis ( no cx data) and VATS with limited decortication on 10/5 with cx + for E Coli, s/p BAL 10/7 (candida lusitanae thought to be colonizer), who was last seen by ID on 10/9 who recommended 4 weeks of antibiotics post VATS (IV ceftriaxone while inpatient). Right pleural effusion improved on imaging and leukocytosis resolved.    ID  reconsulted for re-current leukocytosis (WBC 11>>34).      Transferred to ICU 10/14.  Antibiotics broadened to vanc, cefepime, fluconazole.  Blood cultures negative.  U/S negative.  2D negative. Remains afebrile, WBC continues to trend down ( 34>>24>>21>15).    Hemodynamically stable.  On RA. O2 sats 90-99%.     Kidney function declining.       Failed swallow study, plan for PEG.  Looks better, smiling today.     Plan/recommendations  1.  Continue empiric IV vancomycin (pharmacy to dose. Currently pulse dosing given decline in renal function.  Random level this am 30.6), cefepime, and fluconazole for now.  Renally dosed.   2.  Will follow up tomorrow and consider plan for de-escalating antibiotics this week.      Data reviewed and plan discussed with ID staff

## 2020-10-18 NOTE — PROGRESS NOTES
[facility]  Critical Care - Surgery  Progress Note    Patient Name: Fatou Lorenzo  MRN: [id number]  Admission Date: 8/30/2020  Hospital Length of Stay: 48 days  Code Status: Full Code  Attending Provider: Antoni Waddell MD  Primary Care Provider: Ludin Rivas MD   Principal Problem: Leukocytosis    Subjective:     Hospital/ICU Course:  No notes on file    Interval History/Significant Events: NAEON. Not requiring any pressor support and has remained rate controlled. WBC downtrending. UOP adequate but labs significant for hypernatremia and worsening renal function. Remains NPO on TF, tolerating well.    Follow-up For: Procedure(s) (LRB):  LARYNGOSCOPY, MICRO SUSPENSION WITH AUGMENTATION (N/A)  VATS, WITH DECORTICATION, LUNG (Right)    Post-Operative Day: 13 Days Post-Op    Objective:     Vital Signs (Most Recent):  Temp: 98.3 °F (36.8 °C) (10/17/20 1900)  Pulse: 68 (10/18/20 0500)  Resp: (!) 22 (10/18/20 0500)  BP: (!) 151/69 (10/18/20 0500)  SpO2: 98 % (10/18/20 0500) Vital Signs (24h Range):  Temp:  [98.1 °F (36.7 °C)-98.3 °F (36.8 °C)] 98.3 °F (36.8 °C)  Pulse:  [68-75] 68  Resp:  [16-43] 22  SpO2:  [93 %-100 %] 98 %  BP: (131-152)/(60-69) 151/69     Weight: 83.5 kg (184 lb)  Body mass index is 30.62 kg/m².      Intake/Output Summary (Last 24 hours) at 10/18/2020 0655  Last data filed at 10/18/2020 0500  Gross per 24 hour   Intake 2135 ml   Output 1605 ml   Net 530 ml       Physical Exam  Vitals signs and nursing note reviewed.   Constitutional:       Appearance: She is well-developed. She is ill-appearing. She is not diaphoretic.   HENT:      Head: Normocephalic and atraumatic.   Eyes:      Conjunctiva/sclera: Conjunctivae normal.   Neck:      Musculoskeletal: Normal range of motion and neck supple.   Cardiovascular:      Rate and Rhythm: Normal rate and regular rhythm.      Comments: Midline sternotomy with clean, dry, and intact, without evidence of infection  Prior chest tubes sites  with dressings in place, clean and dry  Pulmonary:      Comments: Breathing comfortably on comfort flow  No distress, but rhonchi present and she has a wet cough.  Hematoma on the R chest, not actively bleeding   Abdominal:      General: There is no distension.      Palpations: Abdomen is soft.      Tenderness: There is no abdominal tenderness.   Skin:     General: Skin is warm and dry.   Psychiatric:      Comments: Depressed mood, flat affect         Vents:  Vent Mode: Spont (10/08/20 1525)  Ventilator Initiated: Yes(chart correction) (09/18/20 2202)  Set Rate: 20 BPM (10/08/20 0747)  Vt Set: 375 mL (10/08/20 0747)  Pressure Support: 10 cmH20 (10/08/20 1525)  PEEP/CPAP: 5 cmH20 (10/08/20 1525)  Oxygen Concentration (%): 21 (10/17/20 1951)  Peak Airway Pressure: 15 cmH2O (10/08/20 1525)  Plateau Pressure: 19 cmH20 (10/08/20 1525)  Total Ve: 5.3 mL (10/08/20 1525)  Negative Inspiratory Force (cm H2O): -21 (10/08/20 1452)  F/VT Ratio<105 (RSBI): (!) 70.71 (10/08/20 1258)    Lines/Drains/Airways     Central Venous Catheter Line            Percutaneous Central Line Insertion/Assessment - Triple Lumen  10/05/20 1843 left internal jugular 12 days          Drain                 Trans Pyloric Feeding Tube 10/14/20 1500 3 days         Urethral Catheter 10/14/20 1500 3 days          Peripheral Intravenous Line                 Peripheral IV - Single Lumen 10/16/20 0610 20 G Posterior;Right Hand 2 days                Significant Labs:    CBC/Anemia Profile:  Recent Labs   Lab 10/17/20  0335 10/18/20  0357   WBC 20.84* 15.79*   HGB 7.8* 8.6*   HCT 25.5* 28.1*    169   MCV 96 96   RDW 18.0* 18.5*        Chemistries:  Recent Labs   Lab 10/17/20  0335 10/18/20  0355   * 146*   K 3.6 4.1   * 111*   CO2 24 25   BUN 78* 90*   CREATININE 1.7* 1.8*   CALCIUM 8.0* 7.9*   ALBUMIN 1.9* 1.8*   PROT 5.4* 5.2*   BILITOT 0.7 0.6   ALKPHOS 254* 235*   ALT 40 40   AST 32 37   MG 2.2 2.3   PHOS 3.6 3.2       ABGs: No results  for input(s): PH, PCO2, HCO3, POCSATURATED, BE in the last 48 hours.  Coagulation:   Recent Labs   Lab 10/18/20  0355   INR 2.7*   APTT 39.8*     Lactic Acid: No results for input(s): LACTATE in the last 48 hours.    Significant Imaging:  I have reviewed all pertinent imaging results/findings within the past 24 hours.    Assessment/Plan:     Severe mitral regurgitation  Fatou Lorenzo is a 75 y.o. female s/p MVr, TVr 9/9/2020. Case noteworthy for multiple pump runs (3) and RV hematoma due to retraction. Unstable 9/18, required pericardial window for evac of posterior cardiac tamponade. Respiratory distress and so re-admitted to SICU on 10/2 now s/p VATS on 10/5.     Neuro:  - Flat affect but remains alert and responds to questions appropriately   - Seen by psych - Following recs (started remeron)   - PRN liquid tylenol and IV morphine for pain      CV:  S/p MVr, TVr, MAZE 9/9/20. S/p bedside pericardial window 9/18  - Keep MAPs >60.   - pAF: digoxin 125 mcg.   -Transition back to 12.5 BID Metoprolol via kevin.   - Follow Dig level   - labetalol IV PRN for SBP > 160  - INR 2.7, will re-dose Coumadin 1 mg tonight. Continue Heparin gtt with ptt goal 60-80     Pulm:   - Loculated pleural effusion s/p VATS on 10/5  - Extubated 10/9. Breathing comfortably on room air  - Scheduled duo and saline nebs  - CXR and ABG PRN  - HOB >30 deg     Renal:  - Yoo catheter in place to monitor UOP  - BUN/Cr  54/1.3--> 66/1.5--> 90/1.8  - Continue to monitor UOP       FEN/GI:  - Replace other lytes as needed  - [place] tube in place with TF infusing.   - Will increase FW flushes to 400 Q6 to address hypovolemia  - Famotidine and bowel regimen     Heme/Onc:  - H/H stable: 8.6/28  - No evidence of bleeding     ID:   - S/p 7 days of cefepime vanc for enterococcus cloacae UTI. Also has e coli growing from pleural cultures.  - Follow ID recommendations pending      - Started on Cefepime fluconazole re-started 10/14/20. Candida on BAL     -  Current antibiotics Cefepime/Vanc/ Antifungal   - BC: No growth to date     Endo:  - no hx of DM  - ISS     PPx:  - famotidine, SCD, heparin gtt bridge to coumadin     Dispo: Continue ICU level care         Kvng Lafleur MD  Critical Care - Surgery  [facility]

## 2020-10-18 NOTE — NURSING
No acute events over night  Pt vss, afebrile, sats >93% on RA. Pt AAOx4 opens eyes spontaneously follows commands and moves upper/lower extremities bilaterally.  Pt nods appropriately but response with horse/quiet whisper. .Urine output 1.2L/shift, moderate response after scheduled lasix.  POCT monitored q4, PRN insulin given. No complaints of pain/discomfort over night.400cc of Free water bolus given Q4.  Tube feeds maintained at 25cc/hr (goal), no s/s of discomfort, nausea, or abd distention.   Heel boot/foot drop boot rotated. Heel foams in place. Sacral area pinkish/red but blanchable. Sacral foam in place. Wound care provided per order. Hematoma to the Rt chest, appears dark red/ purple, area around incision is firm to touch and without drainage, area remains open to air. Pt updated on plan of care. Will continue to monitor.

## 2020-10-18 NOTE — ASSESSMENT & PLAN NOTE
Fatou Lorenzo is a 75 y.o. female s/p MVr, TVr 9/9/2020. Case noteworthy for multiple pump runs (3) and RV hematoma due to retraction. Unstable 9/18, required pericardial window for evac of posterior cardiac tamponade. Respiratory distress and so re-admitted to SICU on 10/2 now s/p VATS on 10/5.     Neuro:  - Flat affect but remains alert and responds to questions appropriately   - Seen by psych - Following recs (started remeron)   - PRN liquid tylenol and IV morphine for pain      CV:  S/p MVr, TVr, MAZE 9/9/20. S/p bedside pericardial window 9/18  - Keep MAPs >60.   - pAF: digoxin 125 mcg.   -Transition back to 12.5 BID Metoprolol via ke.   - Follow Dig level   - labetalol IV PRN for SBP > 160  - INR 2.7, will re-dose Coumadin 1 mg tonight. Continue Heparin gtt with ptt goal 60-80     Pulm:   - Loculated pleural effusion s/p VATS on 10/5  - Extubated 10/9. Breathing comfortably on room air  - Scheduled duo and saline nebs  - CXR and ABG PRN  - HOB >30 deg     Renal:  - Yoo catheter in place to monitor UOP  - BUN/Cr  54/1.3--> 66/1.5--> 90/1.8  - Continue to monitor UOP       FEN/GI:  - Replace other lytes as needed  - Ke tube in place with TF infusing.   - Will increase FW flushes to 400 Q6 to address hypovolemia  - Famotidine and bowel regimen     Heme/Onc:  - H/H stable: 8.6/28  - No evidence of bleeding     ID:   - S/p 7 days of cefepime vanc for enterococcus cloacae UTI. Also has e coli growing from pleural cultures.  - Follow ID recommendations pending      - Started on Cefepime fluconazole re-started 10/14/20. Candida on BAL     - Current antibiotics Cefepime/Vanc/ Antifungal   - BC: No growth to date     Endo:  - no hx of DM  - ISS     PPx:  - famotidine, SCD, heparin gtt bridge to coumadin     Dispo: Continue ICU level care

## 2020-10-18 NOTE — PROGRESS NOTES
[facility]  Infectious Disease  Progress Note    Patient Name: Fatou Lorenzo  MRN: [id number]  Admission Date: 8/30/2020  Length of Stay: 48 days  Attending Physician: Antoni Waddell MD  Primary Care Provider: Ludin Rivas MD    Isolation Status: No active isolations  Assessment/Plan:      * Leukocytosis      75 y.o. female w/ history of atrial fibrillation with RVR, HLD and severe mitral regurgitation who is s/p MV repair and TV annuloplasty on 9/9, complicated by posterior cardiac tamponade s/p pericardial window on 9/18, UTI (E cloacae, tx X 7 days with cefepime), and large right pleural effusion with multiple loculations s/p thoracentesis ( no cx data) and VATS with limited decortication on 10/5 with cx + for E Coli, s/p BAL 10/7 (candida lusitanae thought to be colonizer), who was last seen by ID on 10/9 who recommended 4 weeks of antibiotics post VATS (IV ceftriaxone while inpatient). Right pleural effusion improved on imaging and leukocytosis resolved.    ID  reconsulted for re-current leukocytosis (WBC 11>>34).      Transferred to ICU 10/14.  Antibiotics broadened to vanc, cefepime, fluconazole.  Blood cultures negative.  U/S negative.  2D negative. Remains afebrile, WBC continues to trend down ( 34>>24>>21>15).    Hemodynamically stable.  On RA. O2 sats 90-99%.     Kidney function declining.       Failed swallow study, plan for PEG.  Looks better, smiling today.     Plan/recommendations  1.  Continue empiric IV vancomycin (pharmacy to dose. Currently pulse dosing given decline in renal function.  Random level this am 30.6), cefepime, and fluconazole for now.  Renally dosed.   2.  Will follow up tomorrow and consider plan for de-escalating antibiotics this week.      Data reviewed and plan discussed with ID staff        Thank you.   Please call for any questions or concerns.  CAYLA Harris, ANP-C  [phone] pager, [place] [phone]    Subjective:     Principal  Problem:Leukocytosis    HPI:   Pt is a 75 y.o. female w/ pmhx of Atrial fibrillation with RVR, HLD and severe mitral regurgitation.  Pt w/ recent left heart catheterization 8/25/2020 who presented w/ complaints of recurrent shortness of breath.  Transesophageal echocardiogram done recently w/ left atrial thrombus.  L heart angiography w/ nonobstructive CAD.   Upon admission, pt required BiPAP due to hypoxemia not responsive to nasal cannula.      [place] [facility] [place] surgery evaluation given recurrent admission despite compliance, BP control and diuresis.  Pt seen by Dr. Waddell who recommended MV repair.  Pt underwent MV repair and TV annuloplasty on 9/9. Pt course c/b reintuabtion and pericardial window for evacuation of posterior cardiac tamponade on 9/18.  Pt extubated on 9/24.  Developed increasing white count on 9/28.  UA done concerning for infection.  Urine cxs positive for E.Cloacae.  Pt started on Bactrim.  Chest Xray from 10/1- Since September 28, 2020, increased large right pleural effusion.  Increased diffuse right airspace opacities.  X ray abdomen 9/23 w/ Probable bilateral nephrolithiasis.  ID initially consulted on 10/2 for UTI.  Patient also noted to have large right loculated pleural effusion and underwent IR thoracentesis (no cx data) and subsequently VATS procedure with limited decortication by CTS on 10/5.  Cultures positive for E coli.  Brochoscopy with BAL on 10/7 with yeast.  She was treated with 7 days of cefepime for E. Cloacae in urine and E coli from pleural effusion, followed by transition to IV ceftriaxone with plan for 4 weeks of antibiotics after VATS procedure (ceftriaxone while inpatient with transition to oral Augmentin if d/c).      ID now reconsulted for rising WBC  11.8 >>34.  Repeat blood cultures pending.  Repeat CXR pending.  Afebrile.      Interval History:  No acute events overnight.  Remains afebrile.    WBC continues to trend down.  Repeat blood cultures  NGTD.  Kidney function declining.   Sitting up in bed, smiling today.  No complaints.     Past Medical History:   Diagnosis Date    Atrial fibrillation with RVR 8/24/2020    Cataract     Essential hypertension 8/31/2020    Glaucoma     Heart valve problem     Hyperlipidemia     Severe mitral regurgitation 8/24/2020       Past Surgical History:   Procedure Laterality Date    ANKLE SURGERY      BAIN MAZE PROCEDURE N/A 9/9/2020    Procedure: BAIN MAZE PROCEDURE;  Surgeon: Antoni Waddell MD;  Location: [facility] OR [address] [place] [address];  Service: Cardiothoracic;  Laterality: N/A;  MAZE     LARYNGOSCOPY N/A 10/5/2020    Procedure: LARYNGOSCOPY, MICRO SUSPENSION WITH AUGMENTATION;  Surgeon: Yfn Mendoza MD;  Location: [facility] OR [address] [place] [address];  Service: ENT;  Laterality: N/A;    LEFT HEART CATHETERIZATION Left 8/25/2020    Procedure: Left heart cath, radial;  Surgeon: Marlee Carrillo MD;  Location: [facility] CATH LAB;  Service: Cardiology;  Laterality: Left;    lipoma removal      THORACOSCOPIC DECORTICATION OF LUNG Right 10/5/2020    Procedure: VATS, WITH DECORTICATION, LUNG;  Surgeon: Jeremy Shine MD;  Location: [facility] OR [address] [place] [address];  Service: Thoracic;  Laterality: Right;    TRICUSPID VALVULOPLASTY N/A 9/9/2020    Procedure: REPAIR, TRICUSPID VALVE;  Surgeon: Antoni Waddell MD;  Location: [facility] OR [address] [place] [address];  Service: Cardiothoracic;  Laterality: N/A;       Review of patient's allergies indicates:  No Known Allergies    Medications:  Medications Prior to Admission   Medication Sig    metoprolol tartrate (LOPRESSOR) 25 MG tablet Take 1 tablet (25 mg total) by mouth 3 (three) times daily.    amiodarone (PACERONE) 400 MG tablet Take two tablets by mouth twice daily for twelve days, then take one tablet by mouth daily.    apixaban (ELIQUIS) 5 mg Tab Take 1 tablet (5 mg total) by mouth 2 (two) times daily.    furosemide (LASIX) 40 MG tablet Take 1 tablet (40 mg total) by mouth 2 (two) times daily.    losartan (COZAAR) 50 MG  tablet Take 0.5 tablets (25 mg total) by mouth once daily.    pravastatin (PRAVACHOL) 80 MG tablet Take 80 mg by mouth once daily.     Antibiotics (From admission, onward)    Start     Stop Route Frequency Ordered    10/18/20 1400  ceFEPIme injection 1 g      -- IV Every 12 hours (non-standard times) 10/18/20 0743    10/15/20 0819  vancomycin - pharmacy to dose      -- IV pharmacy to manage frequency 10/15/20 0719        Antifungals (From admission, onward)    Start     Stop Route Frequency Ordered    10/14/20 1430  fluconazole (DIFLUCAN) IVPB 200 mg 100 mL      -- IV Every 24 hours (non-standard times) 10/14/20 1323        Antivirals (From admission, onward)    None           Immunization History   Administered Date(s) Administered    PPD Test 09/24/2020       Family History     Problem Relation (Age of Onset)    Cancer Mother    Cataracts Sister    No Known Problems Father, Brother, Maternal Aunt, Maternal Uncle, Paternal Aunt, Paternal Uncle, Maternal Grandmother, Maternal Grandfather, Paternal Grandmother, Paternal Grandfather        Social History     Socioeconomic History    Marital status:      Spouse name: Not on file    Number of children: Not on file    Years of education: Not on file    Highest education level: Not on file   Occupational History    Not on file   Social Needs    Financial resource strain: Not on file    Food insecurity     Worry: Not on file     Inability: Not on file    Transportation needs     Medical: Not on file     Non-medical: Not on file   Tobacco Use    Smoking status: Never Smoker    Smokeless tobacco: Never Used   Substance and Sexual Activity    Alcohol use: No    Drug use: No    Sexual activity: Not Currently   Lifestyle    Physical activity     Days per week: Not on file     Minutes per session: Not on file    Stress: Not on file   Relationships    Social connections     Talks on phone: Not on file     Gets together: Not on file     Attends Faith  service: Not on file     Active member of club or organization: Not on file     Attends meetings of clubs or organizations: Not on file     Relationship status: Not on file   Other Topics Concern    Not on file   Social History Narrative    Not on file     Review of Systems   Constitutional: Positive for fatigue. Negative for activity change, appetite change, chills, diaphoresis and fever.   HENT: Negative.    Eyes: Negative.    Respiratory: Negative for cough, shortness of breath and wheezing.    Cardiovascular: Positive for leg swelling. Negative for chest pain and palpitations.   Gastrointestinal: Negative for abdominal pain, constipation, diarrhea, nausea and vomiting.   Genitourinary: Negative for dysuria and flank pain.        Yoo   Musculoskeletal: Negative for arthralgias, back pain and neck pain.   Skin: Negative for wound.   Neurological: Positive for weakness. Negative for dizziness and headaches.   Psychiatric/Behavioral: Negative for agitation.     Objective:     Vital Signs (Most Recent):  Temp: 97.9 °F (36.6 °C) (10/18/20 1500)  Pulse: 71 (10/18/20 1700)  Resp: (!) 31 (10/18/20 1700)  BP: (!) 140/63 (10/18/20 1700)  SpO2: (!) 92 % (10/18/20 1700) Vital Signs (24h Range):  Temp:  [97.9 °F (36.6 °C)-98.3 °F (36.8 °C)] 97.9 °F (36.6 °C)  Pulse:  [68-98] 71  Resp:  [18-38] 31  SpO2:  [90 %-99 %] 92 %  BP: (131-152)/(57-78) 140/63     Weight: 83.5 kg (184 lb)  Body mass index is 30.62 kg/m².    Estimated Creatinine Clearance: 28.8 mL/min (A) (based on SCr of 1.8 mg/dL (H)).    Physical Exam  Vitals signs and nursing note reviewed.   Constitutional:       Appearance: She is well-developed. She is ill-appearing. She is not diaphoretic.      Comments: Alert and smiling today.    HENT:      Head: Normocephalic and atraumatic.      Comments: Feeding tube       Nose:      Comments: Feeding tube       Mouth/Throat:      Mouth: Mucous membranes are moist.   Eyes:      General: No scleral icterus.      Conjunctiva/sclera: Conjunctivae normal.   Neck:      Musculoskeletal: Normal range of motion.      Vascular: No JVD.   Cardiovascular:      Rate and Rhythm: Normal rate and regular rhythm.      Heart sounds: No murmur.   Pulmonary:      Effort: Pulmonary effort is normal. Tachypnea present. No accessory muscle usage.      Breath sounds: No rhonchi or rales.      Comments: Nasal cannula O2.  No distress   Abdominal:      General: There is no distension.      Palpations: Abdomen is soft.      Tenderness: There is no abdominal tenderness.   Musculoskeletal:         General: No tenderness.      Right lower leg: Edema present.      Left lower leg: Edema present.      Comments: No calf tenderness, warmth, erythema   Skin:     General: Skin is warm and dry.      Coloration: Skin is pale.      Findings: No rash.      Comments: Sternal incision site c/d/i.  No erythema/drainage    Left IJ catheter - site clear    Dressing right upper back/chest - c/d/i   Neurological:      Mental Status: She is alert.      Comments: Alert, smiling         Significant Labs:   Blood Culture:   Recent Labs   Lab 08/23/20  0149 08/23/20  0203 10/14/20  1206   LABBLOO No growth after 5 days. No growth after 5 days. No Growth to date  No Growth to date  No Growth to date  No Growth to date  No Growth to date  No Growth to date  No Growth to date  No Growth to date  No Growth to date  No Growth to date     CBC:   Recent Labs   Lab 10/17/20  0335 10/18/20  0357   WBC 20.84* 15.79*   HGB 7.8* 8.6*   HCT 25.5* 28.1*    169     CMP:   Recent Labs   Lab 10/17/20  0335 10/18/20  0355   * 146*   K 3.6 4.1   * 111*   CO2 24 25   * 112*   BUN 78* 90*   CREATININE 1.7* 1.8*   CALCIUM 8.0* 7.9*   PROT 5.4* 5.2*   ALBUMIN 1.9* 1.8*   BILITOT 0.7 0.6   ALKPHOS 254* 235*   AST 32 37   ALT 40 40   ANIONGAP 11 10   EGFRNONAA 29.1* 27.1*     Urine Culture:   Recent Labs   Lab 08/23/20  0228 09/29/20  0854   LABURIN No significant  growth ENTEROBACTER CLOACAE  >100,000 cfu/ml  *     Urine Studies:   Recent Labs   Lab 08/23/20  2249  10/14/20  1530   COLORU Colorless*   < > Yellow   APPEARANCEUA Clear   < > Cloudy*   PHUR 5.0   < > 5.0   SPECGRAV 1.005   < > 1.010   PROTEINUA Negative   < > 1+*   GLUCUA Negative   < > Negative   KETONESU Negative   < > Negative   BILIRUBINUA Negative   < > Negative   OCCULTUA 1+*   < > 3+*   NITRITE Negative   < > Negative   UROBILINOGEN Negative  --   --    LEUKOCYTESUR Trace*   < > Trace*   RBCUA 4   < > 17*   WBCUA 4   < > 3   BACTERIA Occasional   < > Occasional   SQUAMEPITHEL 1  --  1   HYALINECASTS 1   < > 9*    < > = values in this interval not displayed.     Wound Culture:   Recent Labs   Lab 10/05/20  1513   LABAERO ESCHERICHIA COLI  Few  *       Significant Imaging: I have reviewed all pertinent imaging results/findings within the past 24 hours.

## 2020-10-18 NOTE — PROGRESS NOTES
Pharmacokinetic Assessment Follow Up: IV Vancomycin    Vancomycin Regimen Assessment & Plan:  - Vancomycin level drawn with morning labs today resulted as 30.6 ug/mL, goal trough 15-20 ug/mL (per ID).  - Patient with JONAH, SCr continues up trending with very slow vancomycin elimination. Will continue pulse dosing while renal function remains unstable.  - No additional doses today as level is above goal trough. Draw vancomycin level with morning labs 10/19. Will re-dose as needed depending on level and renal function.    Drug levels (last 3 results):  Recent Labs   Lab Result Units 10/16/20  0352 10/17/20  0335 10/18/20  0355   Vancomycin, Random ug/mL 21.1 19.1 30.6       Pharmacy will continue to follow and monitor vancomycin.    Please contact pharmacy at extension [phone] for questions regarding this assessment.    Thank you for the consult,   Evelina Maynard       Patient brief summary:  Fatou Lorenzo is a 75 y.o. female initiated on antimicrobial therapy with IV Vancomycin for treatment of surgical prophylaxis    The patient's current regimen is pulse dosing    Drug Allergies:   Review of patient's allergies indicates:  No Known Allergies    Actual Body Weight:   83.5 kg    Renal Function:   Estimated Creatinine Clearance: 28.8 mL/min (A) (based on SCr of 1.8 mg/dL (H)).,     Dialysis Method (if applicable):  N/A

## 2020-10-19 NOTE — PT/OT/SLP PROGRESS
Physical Therapy Treatment    Patient Name:  Fatou Lorenzo   MRN:  [id number]  Admitting Diagnosis: Leukocytosis  Recent Surgery: Procedure(s) (LRB):  LARYNGOSCOPY, MICRO SUSPENSION WITH AUGMENTATION (N/A)  VATS, WITH DECORTICATION, LUNG (Right) 14 Days Post-Op    Recommendations:     Discharge Recommendations:  nursing facility, skilled   Discharge Equipment Recommendations: (TBD)   Barriers to discharge: Decreased caregiver support Pt requiring increased assistance at current time.       Plan:     During this hospitalization, patient to be seen 4 x/week to address the above listed problems via gait training, therapeutic activities, therapeutic exercises, neuromuscular re-education  · Plan of Care Expires:  11/15/20   Plan of Care Reviewed with: patient, family    This Plan of care has been discussed with the patient who was involved in its development and understands and is in agreement with the identified goals and treatment plan    Subjective     Communicated with nurse (Vania) prior to session.     Patient comments: Pt non-verbal during session, however, smiling at family member in room  Pain/Comfort:  · Pain Rating 1: 0/10  · Pain Rating Post-Intervention 1: 0/10    Objective:     2 attempts for tx session 2* pt found soiled at 2:15 pm    Patient found with: blood pressure cuff, vivas catheter, NG tube, peripheral IV, pulse ox (continuous), SCD, telemetry(air mattress)    Patient found sup in bed upon PT entry to room, agreeable to treatment.  Male family member present in the room.    General Precautions: Standard, aspiration, fall, NPO, sternal   Orthopedic Precautions:N/A   Braces: N/A       BED MOBILITY (vc's for hand placement sequencing of task):        Rolling to the R:  Max A.       Rolling to the L:  Max A.        Sup > sit at the EOB:  Max A for trunk elevation and LE's, exiting on the R side, HOB at 30* angle, no use of rail.       Sit > sup:  Max A for trunk and LE's. HOB flat, no use of rail.        Scooting hips to EOB with max A       Scooting hips along the EOB to the R requiring max A x3 scoots                       SITTING AT THE EDGE OF THE BED (10-12 min)   Assistance Level Required: mod/min A for trunk with no UE support   Postural deviations noted: flexed trunk, PPT, L post lean   Encouraged: upright posture, APT, B feet in contact with the floor, midline orientation        TRANSFERS         Pt unable to perform 2* weakness and post lean    THERAPEUTIC ACTIVITIES/EXERCISES  Pt performs B LE AAROM ther ex's while sup inb ed x10 reps with vc's    EDUCATION  Patient provided with daily orientation and goals of this PT session. PTA reviews sternal precautions with pt being able to recall 0/3.  They were educated to call for assistance and to transfer with hospital staff only.  Also, pt was educated on the effects of prolonged immobility and the importance of performing OOB activity and exercises to promote healing and reduce recovery time      Whiteboard updated with correct mobility information. RN/PCT notified.  Pt requires max A to sit at the EOB.    Patient left supine, with  all lines intact, call button in reach, nurse notified and male family member present    AM-PAC 6 CLICK MOBILITY  Turning over in bed (including adjusting bedclothes, sheets and blankets)?: 2  Sitting down on and standing up from a chair with arms (e.g., wheelchair, bedside commode, etc.): 1  Moving from lying on back to sitting on the side of the bed?: 2  Moving to and from a bed to a chair (including a wheelchair)?: 1  Need to walk in hospital room?: 1  Climbing 3-5 steps with a railing?: 1  Basic Mobility Total Score: 8     Assessment:     Fatou Lorenzo is a 75 y.o. female admitted with a medical diagnosis of Leukocytosis.  She presents with the following impairments/functional limitations:  weakness, impaired endurance, impaired self care skills, impaired functional mobilty, gait instability, impaired balance, decreased  coordination, decreased upper extremity function, decreased lower extremity function, decreased safety awareness, impaired coordination, impaired fine motor, impaired skin, edema, impaired cardiopulmonary response to activity. requiring significant assistance and verbal cues for bed mob, scooting to/along the EOB, static sitting at the EOB 2* weakness, post lean.   In light of pt's current functional level and deficits, it is anticipated that pt will need to participate in an intense rehab program consisting of PT and OT in order to achieve full rehab potential to return to previous level of function and roles.  Pt will cont to benefit from skilled PT intervention to address deficits and improve functional mobility.     Rehab Prognosis:  Fair to Good; patient would benefit from acute skilled PT services to address these deficits and reach maximum level of function.      GOALS:   Multidisciplinary Problems     Physical Therapy Goals        Problem: Physical Therapy Goal    Goal Priority Disciplines Outcome Goal Variances Interventions   Physical Therapy Goal     PT, PT/OT Ongoing, Progressing     Description: Goals to be met by: 2020     Patient will increase functional independence with mobility by performin. Supine to sit with Minimal Assistance x 1 person.-not met  2. Sit to stand transfer with Moderate Assistance.-not met  3. Bed to chair transfer with Moderate Assistance. -not met  4. Sitting at edge of bed x10 minutes with CGA.-not met  5. Lower extremity exercise program x 10 reps  with assistance as needed. -not met  6. Pt receive gait training ~ 10 ft with moderate assist- not met                           Time Tracking:     PT Received On: 10/19/20  PT Start Time: 1534     PT Stop Time: 1600  PT Total Time (min): 26 min     Billable Minutes: Therapeutic Activity 16 and Therapeutic Exercise 10    Treatment Type: Treatment  PT/PTA: PTA     PTA Visit Number: 1       Lis Mendez PTA.  Pager  [phone]    10/19/2020    .

## 2020-10-19 NOTE — CONSULTS
Single lumen 18G x 10CM midline placed right brachial vein. Max dwell date 11/17/20, Lot#[id number].  Needle advanced into the vessel under real time ultrasound guidance.  Image recorded and saved.

## 2020-10-19 NOTE — CARE UPDATE
GENERAL SURGERY PROGRESS NOTE      Patient to undergo PEG placement tomorrow in OR. Please hold tube feeds at midnight in preparation for the procedure, will order repeat COVID. Informed consent signed.       -- Octavia De La O M.D  General Surgery  Pager: [phone]

## 2020-10-19 NOTE — SUBJECTIVE & OBJECTIVE
Interval History: Shakes head to yes/no questions.   75 y.o. female w/ history of Atrial fibrillation with RVR, HLD and severe mitral regurgitation who is s/p MV repair and TV annuloplasty on 9/9, complicated by posterior cardiac tamponade s/p pericardial window on 9/18, UTI (E cloacae, tx X 7 days with cefepime), and large right pleural effusion with multiple loculations s/p thoracentesis ( no cx data) and VATS with limited decortication on 10/5 with cx + for E Coli, s/p BAL 10/7 (candida lusitanae thought to be colonizer), who was last seen by ID on 10/9 who recommended 4 weeks of antibiotics post VATS (IV ceftriaxone while inpatient). Right pleural effusion improved on imaging and leukocytosis resolved.    ID is now reconsulted for re-current leukocytosis (WBC 11>>34).     Transferred to ICU 10/14.  Antibiotics broadened to vanc, cefepime, fluconazole.  Blood cultures negative.  U/S negative.  2D negative. Remains afebrile, WBC continues to trend down ( 34>>24>>21>15).    Hemodynamically stable.  On RA. O2 sats 90-99%.          Review of Systems   Constitutional: Positive for fatigue. Negative for chills, fever and unexpected weight change.   HENT: Negative for ear pain, facial swelling, hearing loss, mouth sores, nosebleeds, rhinorrhea, sinus pressure, sore throat, tinnitus, trouble swallowing and voice change.    Eyes: Negative for photophobia, pain, redness and visual disturbance.   Respiratory: Negative for cough, chest tightness, shortness of breath and wheezing.    Cardiovascular: Positive for leg swelling. Negative for chest pain and palpitations.   Gastrointestinal: Negative for abdominal pain, blood in stool, constipation, diarrhea, nausea and vomiting.   Endocrine: Negative for cold intolerance, heat intolerance, polydipsia, polyphagia and polyuria.   Genitourinary: Negative for decreased urine volume, dysuria, flank pain, frequency, hematuria, menstrual problem, urgency, vaginal bleeding, vaginal  discharge and vaginal pain.   Musculoskeletal: Negative for arthralgias, back pain, joint swelling, myalgias and neck pain.   Skin: Negative for rash.   Allergic/Immunologic: Negative for environmental allergies, food allergies and immunocompromised state.   Neurological: Positive for weakness. Negative for dizziness, seizures, syncope, light-headedness, numbness and headaches.   Hematological: Negative for adenopathy. Does not bruise/bleed easily.   Psychiatric/Behavioral: Negative for confusion, hallucinations, self-injury, sleep disturbance and suicidal ideas. The patient is not nervous/anxious.      Objective:     Vital Signs (Most Recent):  Temp: 97.7 °F (36.5 °C) (10/19/20 0700)  Pulse: 71 (10/19/20 0802)  Resp: (!) 25 (10/19/20 0802)  BP: (!) 146/68 (10/19/20 0700)  SpO2: 97 % (10/19/20 0802) Vital Signs (24h Range):  Temp:  [97.6 °F (36.4 °C)-97.9 °F (36.6 °C)] 97.7 °F (36.5 °C)  Pulse:  [69-98] 71  Resp:  [20-38] 25  SpO2:  [92 %-100 %] 97 %  BP: (131-153)/(57-68) 146/68     Weight: 83.5 kg (184 lb)  Body mass index is 30.62 kg/m².    Estimated Creatinine Clearance: 24.7 mL/min (A) (based on SCr of 2.1 mg/dL (H)).    Physical Exam  Vitals signs and nursing note reviewed.   Constitutional:       General: She is not in acute distress.     Appearance: Normal appearance. She is underweight. She is not toxic-appearing or diaphoretic.   HENT:      Head: Normocephalic and atraumatic. No right periorbital erythema or left periorbital erythema.      Right Ear: Hearing and external ear normal. No swelling.      Left Ear: Hearing and external ear normal. No swelling.      Nose: Nose normal. No nasal deformity.      Comments: NG tube in place     Mouth/Throat:      Pharynx: Uvula midline. No oropharyngeal exudate.   Eyes:      General: Lids are normal. No scleral icterus.        Right eye: No discharge.         Left eye: No discharge.      Conjunctiva/sclera: Conjunctivae normal.      Right eye: Right conjunctiva is  not injected. No exudate.     Left eye: Left conjunctiva is not injected. No exudate.  Neck:      Musculoskeletal: Normal range of motion and neck supple.      Thyroid: No thyromegaly.      Trachea: No tracheal deviation.      Comments: Left sided CVC  Cardiovascular:      Rate and Rhythm: Normal rate and regular rhythm.      Heart sounds: Normal heart sounds, S1 normal and S2 normal. No murmur. No friction rub. No gallop.    Pulmonary:      Effort: Pulmonary effort is normal. No tachypnea, accessory muscle usage or respiratory distress.      Breath sounds: No stridor. Examination of the right-middle field reveals decreased breath sounds. Examination of the right-lower field reveals decreased breath sounds. Examination of the left-lower field reveals decreased breath sounds. Decreased breath sounds present. No wheezing or rales.   Chest:      Chest wall: No tenderness.   Abdominal:      General: Bowel sounds are normal. There is no distension.      Palpations: Abdomen is soft.      Tenderness: There is no abdominal tenderness. There is no guarding or rebound.   Genitourinary:     Comments: Yoo catheter in place.  Musculoskeletal:         General: Swelling present. No tenderness.      Right lower leg: Edema present.      Left lower leg: Edema present.   Skin:     General: Skin is warm and dry.      Coloration: Skin is not pale.      Findings: No erythema, lesion or rash.      Nails: There is no clubbing.     Neurological:      Mental Status: She is alert, oriented to person, place, and time and easily aroused.      Cranial Nerves: No cranial nerve deficit.      Coordination: Coordination normal.   Psychiatric:         Speech: Speech normal.         Behavior: Behavior normal. Behavior is cooperative.         Thought Content: Thought content normal.         Judgment: Judgment normal.         Significant Labs:   Blood Culture:   Recent Labs   Lab 08/23/20  0149 08/23/20  0203 10/14/20  1206   LABBLOO No growth after 5  days. No growth after 5 days. No Growth to date  No Growth to date  No Growth to date  No Growth to date  No Growth to date  No Growth to date  No Growth to date  No Growth to date  No Growth to date  No Growth to date     BMP:   Recent Labs   Lab 10/19/20  0333   *      K 4.1      CO2 24   *   CREATININE 2.1*   CALCIUM 8.1*   MG 2.4     CBC:   Recent Labs   Lab 10/18/20  0357 10/19/20  0333   WBC 15.79* 19.21*   HGB 8.6* 7.7*   HCT 28.1* 25.2*    207     Procalcitonin: No results for input(s): PROCAL in the last 48 hours.  Urine Culture:   Recent Labs   Lab 08/23/20  0228 09/29/20  0854   LABURIN No significant growth ENTEROBACTER CLOACAE  >100,000 cfu/ml  *     Urine Studies:   Recent Labs   Lab 08/23/20  2249  10/14/20  1530   COLORU Colorless*   < > Yellow   APPEARANCEUA Clear   < > Cloudy*   PHUR 5.0   < > 5.0   SPECGRAV 1.005   < > 1.010   PROTEINUA Negative   < > 1+*   GLUCUA Negative   < > Negative   KETONESU Negative   < > Negative   BILIRUBINUA Negative   < > Negative   OCCULTUA 1+*   < > 3+*   NITRITE Negative   < > Negative   UROBILINOGEN Negative  --   --    LEUKOCYTESUR Trace*   < > Trace*   RBCUA 4   < > 17*   WBCUA 4   < > 3   BACTERIA Occasional   < > Occasional   SQUAMEPITHEL 1  --  1   HYALINECASTS 1   < > 9*    < > = values in this interval not displayed.       Significant Imaging: I have reviewed all pertinent imaging results/findings within the past 24 hours.

## 2020-10-19 NOTE — PROGRESS NOTES
CT surgery progress note     COSME overnight      A/P  75F s/p MV repair, TV repair, and MAZE w/ ALISA resection on 09/09/20. Post op course complicated by a multiloculated R pleural effusion for which she had a R VATS w/ decortication on 10/05/20     Progressing     Continue supportive care  Tentative plans for PEG on 10/21  Hold coumadin   Monitor dig levels, 2.2 this am   Monitor renal function, increasing Cr  Lopressor 25 BID  IV abx for E. Coli in pleural fluid, AF. Monitor WBC#  SLP therapies for vocal cord weakness   Delirium precautions  Increase activity   Continue ICU

## 2020-10-19 NOTE — PLAN OF CARE
10/19/20 1230   Discharge Reassessment   Assessment Type Discharge Planning Reassessment   Provided patient/caregiver education on the expected discharge date and the discharge plan Yes   Do you have any problems affording any of your prescribed medications? No   Discharge Plan A Long-term acute care facility (LTAC)   Discharge Plan B Skilled Nursing Facility   DME Needed Upon Discharge  other (see comments)  (TBD)   Anticipated Discharge Disposition Long Term       Mel Simons MPH, RN, CM  Ext. [phone]

## 2020-10-19 NOTE — PLAN OF CARE
"      SICU PLAN OF CARE NOTE    Dx: Leukocytosis    Shift Events: COSME. Plan for PEG tube tomorrow.    Neuro: AAO x4, Follows Commands, and Moves All Extremities    Vital Signs: BP (!) 129/58 (BP Location: Right arm, Patient Position: Lying)   Pulse 72   Temp 97.7 °F (36.5 °C) (Oral)   Resp (!) 33   Ht 5' 5" (1.651 m)   Wt 83.5 kg (184 lb)   SpO2 99%   Breastfeeding No   BMI 30.62 kg/m²     Respiratory: Room Air    Diet: NPO and Tube Feeds    Gtts: None    Urine Output: Urinary Catheter 225 cc/shift    Drains: 25mL/hr TF infusing per pump through L nare MAKENZIE tube    Labs/Accuchecks: checked per order. See flowsheet    Skin: Midsternal incision open to air. Old CT sites open to air. R chest hematoma dsg changed. Bottom intact. Heels intact.       "

## 2020-10-19 NOTE — PROGRESS NOTES
Pharmacokinetic Assessment Follow Up: IV Vancomycin    Vancomycin Regimen Assessment & Plan:  - Vancomycin level drawn with morning labs today resulted as 29.1 ug/mL, goal trough 15-20 ug/mL (per ID).  - Patient with JONAH, SCr continues up trending with very slow vancomycin elimination. Will continue pulse dosing while renal function remains unstable.  - No additional doses today as level is above goal trough. Draw vancomycin level with morning labs every other day, next due on 10/21. Will re-dose as needed depending on level and renal function.    Drug levels (last 3 results):  Recent Labs   Lab Result Units 10/17/20  0335 10/18/20  0355 10/19/20  0333   Vancomycin, Random ug/mL 19.1 30.6 29.1     Pharmacy will continue to follow and monitor vancomycin.    Please contact pharmacy at extension [phone] for questions regarding this assessment.    Thank you for the consult,   Christian Pinedo     Patient brief summary:  Fatou Lorenzo is a 75 y.o. female initiated on antimicrobial therapy with IV Vancomycin for treatment of surgical prophylaxis    The patient's current regimen is pulse dosing    Drug Allergies:   Review of patient's allergies indicates:  No Known Allergies    Actual Body Weight:   83.5 kg    Renal Function:   Estimated Creatinine Clearance: 24.7 mL/min (A) (based on SCr of 2.1 mg/dL (H)).,     Dialysis Method (if applicable):  N/A

## 2020-10-19 NOTE — PROGRESS NOTES
[facility]  Infectious Disease  Progress Note    Patient Name: Fatou Lorenzo  MRN: [id number]  Admission Date: 8/30/2020  Length of Stay: 49 days  Attending Physician: Antoni Waddell MD  Primary Care Provider: Ludin Rivas MD    Isolation Status: No active isolations  Assessment/Plan:      * Leukocytosis  Afebrile and with stable leukocytosis. Blood cultures NGTD. Etiology unclear however differential would include partially treated infected hematoma, partially treated empyema, a leukemoid reaction or drug reaction. Pending PEG placement.   -Continue cefepime and fluconazole.   -Monitor vancomycin levels. Will consider re-dosing when less than 18.   -Will consider antimicrobial de-escalation tomorrow.   -If leukocytosis does not decrease with removal of NGT then may need to consider CVC exchange if clinically safe to do so.       Anticipated Disposition: per primary    Thank you for your consult. I will follow-up with patient. Please contact us if you have any additional questions.    Vicki Wayne MD  Infectious Disease  [facility]    Subjective:     Principal Problem:Leukocytosis    HPI:   Pt is a 75 y.o. female w/ pmhx of Atrial fibrillation with RVR, HLD and severe mitral regurgitation.  Pt w/ recent left heart catheterization 8/25/2020 who presented w/ complaints of recurrent shortness of breath.  Transesophageal echocardiogram done recently w/ left atrial thrombus.  L heart angiography w/ nonobstructive CAD.   Upon admission, pt required BiPAP due to hypoxemia not responsive to nasal cannula.      Pt transferred to [facility] 8/30 for CT surgery evaluation given recurrent admission despite compliance, BP control and diuresis.  Pt seen by Dr. Waddell who recommended MV repair.  Pt underwent MV repair and TV annuloplasty on 9/9. Pt course c/b reintuabtion and pericardial window for evacuation of posterior cardiac tamponade on 9/18.  Pt extubated on 9/24.  Developed  increasing white count on 9/28.  UA done concerning for infection.  Urine cxs positive for E.Cloacae.  Pt started on Bactrim.  Chest Xray from 10/1- Since September 28, 2020, increased large right pleural effusion.  Increased diffuse right airspace opacities.  X ray abdomen 9/23 w/ Probable bilateral nephrolithiasis.  ID initially consulted on 10/2 for UTI.  Patient also noted to have large right loculated pleural effusion and underwent IR thoracentesis (no cx data) and subsequently VATS procedure with limited decortication by CTS on 10/5.  Cultures positive for E coli.  Brochoscopy with BAL on 10/7 with yeast.  She was treated with 7 days of cefepime for E. Cloacae in urine and E coli from pleural effusion, followed by transition to IV ceftriaxone with plan for 4 weeks of antibiotics after VATS procedure (ceftriaxone while inpatient with transition to oral Augmentin if d/c).      ID now reconsulted for rising WBC  11.8 >>34.  Repeat blood cultures pending.  Repeat CXR pending.  Afebrile.      Interval History: Shakes head to yes/no questions.   75 y.o. female w/ history of Atrial fibrillation with RVR, HLD and severe mitral regurgitation who is s/p MV repair and TV annuloplasty on 9/9, complicated by posterior cardiac tamponade s/p pericardial window on 9/18, UTI (E cloacae, tx X 7 days with cefepime), and large right pleural effusion with multiple loculations s/p thoracentesis ( no cx data) and VATS with limited decortication on 10/5 with cx + for E Coli, s/p BAL 10/7 (candida lusitanae thought to be colonizer), who was last seen by ID on 10/9 who recommended 4 weeks of antibiotics post VATS (IV ceftriaxone while inpatient). Right pleural effusion improved on imaging and leukocytosis resolved.    ID is now reconsulted for re-current leukocytosis (WBC 11>>34).     Transferred to ICU 10/14.  Antibiotics broadened to vanc, cefepime, fluconazole.  Blood cultures negative.  U/S negative.  2D negative. Remains afebrile,  WBC continues to trend down ( 34>>24>>21>15).    Hemodynamically stable.  On RA. O2 sats 90-99%.          Review of Systems   Constitutional: Positive for fatigue. Negative for chills, fever and unexpected weight change.   HENT: Negative for ear pain, facial swelling, hearing loss, mouth sores, nosebleeds, rhinorrhea, sinus pressure, sore throat, tinnitus, trouble swallowing and voice change.    Eyes: Negative for photophobia, pain, redness and visual disturbance.   Respiratory: Negative for cough, chest tightness, shortness of breath and wheezing.    Cardiovascular: Positive for leg swelling. Negative for chest pain and palpitations.   Gastrointestinal: Negative for abdominal pain, blood in stool, constipation, diarrhea, nausea and vomiting.   Endocrine: Negative for cold intolerance, heat intolerance, polydipsia, polyphagia and polyuria.   Genitourinary: Negative for decreased urine volume, dysuria, flank pain, frequency, hematuria, menstrual problem, urgency, vaginal bleeding, vaginal discharge and vaginal pain.   Musculoskeletal: Negative for arthralgias, back pain, joint swelling, myalgias and neck pain.   Skin: Negative for rash.   Allergic/Immunologic: Negative for environmental allergies, food allergies and immunocompromised state.   Neurological: Positive for weakness. Negative for dizziness, seizures, syncope, light-headedness, numbness and headaches.   Hematological: Negative for adenopathy. Does not bruise/bleed easily.   Psychiatric/Behavioral: Negative for confusion, hallucinations, self-injury, sleep disturbance and suicidal ideas. The patient is not nervous/anxious.      Objective:     Vital Signs (Most Recent):  Temp: 97.7 °F (36.5 °C) (10/19/20 0700)  Pulse: 71 (10/19/20 0802)  Resp: (!) 25 (10/19/20 0802)  BP: (!) 146/68 (10/19/20 0700)  SpO2: 97 % (10/19/20 0802) Vital Signs (24h Range):  Temp:  [97.6 °F (36.4 °C)-97.9 °F (36.6 °C)] 97.7 °F (36.5 °C)  Pulse:  [69-98] 71  Resp:  [20-38] 25  SpO2:   [92 %-100 %] 97 %  BP: (131-153)/(57-68) 146/68     Weight: 83.5 kg (184 lb)  Body mass index is 30.62 kg/m².    Estimated Creatinine Clearance: 24.7 mL/min (A) (based on SCr of 2.1 mg/dL (H)).    Physical Exam  Vitals signs and nursing note reviewed.   Constitutional:       General: She is not in acute distress.     Appearance: Normal appearance. She is underweight. She is not toxic-appearing or diaphoretic.   HENT:      Head: Normocephalic and atraumatic. No right periorbital erythema or left periorbital erythema.      Right Ear: Hearing and external ear normal. No swelling.      Left Ear: Hearing and external ear normal. No swelling.      Nose: Nose normal. No nasal deformity.      Comments: NG tube in place     Mouth/Throat:      Pharynx: Uvula midline. No oropharyngeal exudate.   Eyes:      General: Lids are normal. No scleral icterus.        Right eye: No discharge.         Left eye: No discharge.      Conjunctiva/sclera: Conjunctivae normal.      Right eye: Right conjunctiva is not injected. No exudate.     Left eye: Left conjunctiva is not injected. No exudate.  Neck:      Musculoskeletal: Normal range of motion and neck supple.      Thyroid: No thyromegaly.      Trachea: No tracheal deviation.      Comments: Left sided CVC  Cardiovascular:      Rate and Rhythm: Normal rate and regular rhythm.      Heart sounds: Normal heart sounds, S1 normal and S2 normal. No murmur. No friction rub. No gallop.    Pulmonary:      Effort: Pulmonary effort is normal. No tachypnea, accessory muscle usage or respiratory distress.      Breath sounds: No stridor. Examination of the right-middle field reveals decreased breath sounds. Examination of the right-lower field reveals decreased breath sounds. Examination of the left-lower field reveals decreased breath sounds. Decreased breath sounds present. No wheezing or rales.   Chest:      Chest wall: No tenderness.   Abdominal:      General: Bowel sounds are normal. There is no  distension.      Palpations: Abdomen is soft.      Tenderness: There is no abdominal tenderness. There is no guarding or rebound.   Genitourinary:     Comments: Yoo catheter in place.  Musculoskeletal:         General: Swelling present. No tenderness.      Right lower leg: Edema present.      Left lower leg: Edema present.   Skin:     General: Skin is warm and dry.      Coloration: Skin is not pale.      Findings: No erythema, lesion or rash.      Nails: There is no clubbing.     Neurological:      Mental Status: She is alert, oriented to person, place, and time and easily aroused.      Cranial Nerves: No cranial nerve deficit.      Coordination: Coordination normal.   Psychiatric:         Speech: Speech normal.         Behavior: Behavior normal. Behavior is cooperative.         Thought Content: Thought content normal.         Judgment: Judgment normal.         Significant Labs:   Blood Culture:   Recent Labs   Lab 08/23/20  0149 08/23/20  0203 10/14/20  1206   LABBLOO No growth after 5 days. No growth after 5 days. No Growth to date  No Growth to date  No Growth to date  No Growth to date  No Growth to date  No Growth to date  No Growth to date  No Growth to date  No Growth to date  No Growth to date     BMP:   Recent Labs   Lab 10/19/20  0333   *      K 4.1      CO2 24   *   CREATININE 2.1*   CALCIUM 8.1*   MG 2.4     CBC:   Recent Labs   Lab 10/18/20  0357 10/19/20  0333   WBC 15.79* 19.21*   HGB 8.6* 7.7*   HCT 28.1* 25.2*    207     Procalcitonin: No results for input(s): PROCAL in the last 48 hours.  Urine Culture:   Recent Labs   Lab 08/23/20  0228 09/29/20  0854   LABURIN No significant growth ENTEROBACTER CLOACAE  >100,000 cfu/ml  *     Urine Studies:   Recent Labs   Lab 08/23/20  2249  10/14/20  1530   COLORU Colorless*   < > Yellow   APPEARANCEUA Clear   < > Cloudy*   PHUR 5.0   < > 5.0   SPECGRAV 1.005   < > 1.010   PROTEINUA Negative   < > 1+*   GLUCUA  Negative   < > Negative   KETONESU Negative   < > Negative   BILIRUBINUA Negative   < > Negative   OCCULTUA 1+*   < > 3+*   NITRITE Negative   < > Negative   UROBILINOGEN Negative  --   --    LEUKOCYTESUR Trace*   < > Trace*   RBCUA 4   < > 17*   WBCUA 4   < > 3   BACTERIA Occasional   < > Occasional   SQUAMEPITHEL 1  --  1   HYALINECASTS 1   < > 9*    < > = values in this interval not displayed.       Significant Imaging: I have reviewed all pertinent imaging results/findings within the past 24 hours.

## 2020-10-19 NOTE — ASSESSMENT & PLAN NOTE
Afebrile and with stable leukocytosis. Blood cultures NGTD. Etiology unclear however differential would include partially treated infected hematoma, partially treated empyema, a leukemoid reaction or drug reaction. Pending PEG placement.   -Continue cefepime and fluconazole.   -Monitor vancomycin levels. Will consider re-dosing when less than 18.   -Will consider antimicrobial de-escalation tomorrow.   -If leukocytosis does not decrease with removal of NGT then may need to consider CVC exchange if clinically safe to do so.

## 2020-10-19 NOTE — PLAN OF CARE
SW is following this Pt for DC planning needs. There are no identified needs at this time. Discharge Disposition: TBD - pending patient progress; pending PEG placement.    SW will continue to coordinate with patient, family, team and insurance to complete patient's discharge plan.    Esthela Peralta LMSW   - Case Management

## 2020-10-19 NOTE — SUBJECTIVE & OBJECTIVE
Interval History/Significant Events: NAEON. Not requiring any pressor support and has remained rate controlled. WBC up to 19. UOP adequate labs, Na down to 143. Remains NPO on TF, tolerating well.    Follow-up For: Procedure(s) (LRB):  LARYNGOSCOPY, MICRO SUSPENSION WITH AUGMENTATION (N/A)  VATS, WITH DECORTICATION, LUNG (Right)    Post-Operative Day: 13 Days Post-Op    Objective:     Vital Signs (Most Recent):  Temp: 97.7 °F (36.5 °C) (10/19/20 0700)  Pulse: 71 (10/19/20 0802)  Resp: (!) 25 (10/19/20 0802)  BP: (!) 146/68 (10/19/20 0700)  SpO2: 97 % (10/19/20 0802) Vital Signs (24h Range):  Temp:  [97.6 °F (36.4 °C)-98.1 °F (36.7 °C)] 97.7 °F (36.5 °C)  Pulse:  [69-98] 71  Resp:  [20-38] 25  SpO2:  [90 %-100 %] 97 %  BP: (131-153)/(57-68) 146/68     Weight: 83.5 kg (184 lb)  Body mass index is 30.62 kg/m².      Intake/Output Summary (Last 24 hours) at 10/19/2020 0858  Last data filed at 10/19/2020 0700  Gross per 24 hour   Intake 2321 ml   Output 1220 ml   Net 1101 ml       Physical Exam  Vitals signs and nursing note reviewed.   Constitutional:       Appearance: She is well-developed. She is ill-appearing. She is not diaphoretic.   HENT:      Head: Normocephalic and atraumatic.   Eyes:      Conjunctiva/sclera: Conjunctivae normal.   Neck:      Musculoskeletal: Normal range of motion and neck supple.   Cardiovascular:      Rate and Rhythm: Normal rate and regular rhythm.      Comments: Midline sternotomy with clean, dry, and intact, without evidence of infection  Prior chest tubes sites with dressings in place, clean and dry  Pulmonary:      Comments: Breathing comfortably on comfort flow  No distress, but rhonchi present and she has a wet cough.  Hematoma on the R chest, not actively bleeding   Abdominal:      General: There is no distension.      Palpations: Abdomen is soft.      Tenderness: There is no abdominal tenderness.   Skin:     General: Skin is warm and dry.   Psychiatric:      Comments: Depressed mood,  flat affect         Vents:  Vent Mode: Spont (10/08/20 1525)  Ventilator Initiated: Yes(chart correction) (09/18/20 2202)  Set Rate: 20 BPM (10/08/20 0747)  Vt Set: 375 mL (10/08/20 0747)  Pressure Support: 10 cmH20 (10/08/20 1525)  PEEP/CPAP: 5 cmH20 (10/08/20 1525)  Oxygen Concentration (%): 21 (10/17/20 1951)  Peak Airway Pressure: 15 cmH2O (10/08/20 1525)  Plateau Pressure: 19 cmH20 (10/08/20 1525)  Total Ve: 5.3 mL (10/08/20 1525)  Negative Inspiratory Force (cm H2O): -21 (10/08/20 1452)  F/VT Ratio<105 (RSBI): (!) 70.71 (10/08/20 1258)    Lines/Drains/Airways     Central Venous Catheter Line            Percutaneous Central Line Insertion/Assessment - Triple Lumen  10/05/20 1843 left internal jugular 13 days          Drain                 Trans Pyloric Feeding Tube 10/14/20 1500 4 days         Urethral Catheter 10/14/20 1500 4 days                Significant Labs:    CBC/Anemia Profile:  Recent Labs   Lab 10/18/20  0357 10/19/20  0333   WBC 15.79* 19.21*   HGB 8.6* 7.7*   HCT 28.1* 25.2*    207   MCV 96 97   RDW 18.5* 18.7*        Chemistries:  Recent Labs   Lab 10/18/20  0355 10/19/20  0333   * 143   K 4.1 4.1   * 109   CO2 25 24   BUN 90* 103*   CREATININE 1.8* 2.1*   CALCIUM 7.9* 8.1*   ALBUMIN 1.8* 1.8*   PROT 5.2* 5.5*   BILITOT 0.6 0.6   ALKPHOS 235* 226*   ALT 40 38   AST 37 37   MG 2.3 2.4   PHOS 3.2 3.1       ABGs: No results for input(s): PH, PCO2, HCO3, POCSATURATED, BE in the last 48 hours.  Coagulation:   Recent Labs   Lab 10/19/20  0333   INR 2.3*   APTT 46.7*     Lactic Acid: No results for input(s): LACTATE in the last 48 hours.    Significant Imaging:  I have reviewed all pertinent imaging results/findings within the past 24 hours.

## 2020-10-19 NOTE — PLAN OF CARE
Problem: Physical Therapy Goal  Goal: Physical Therapy Goal  Description: Goals to be met by: 2020     Patient will increase functional independence with mobility by performin. Supine to sit with Minimal Assistance x 1 person.-not met  2. Sit to stand transfer with Moderate Assistance.-not met  3. Bed to chair transfer with Moderate Assistance. -not met  4. Sitting at edge of bed x10 minutes with CGA.-not met  5. Lower extremity exercise program x 10 reps  with assistance as needed. -not met  6. Pt receive gait training ~ 10 ft with moderate assist- not met          Outcome: Ongoing, Progressing     Discharge Recommendations: SNF    Pt requires max A to sit at the EOB.    Goals remain appropriate.     Lis Mendez, PTA.   [phone]   10/19/2020

## 2020-10-19 NOTE — ASSESSMENT & PLAN NOTE
Fatou Lorenzo is a 75 y.o. female s/p MVr, TVr 9/9/2020. Case noteworthy for multiple pump runs (3) and RV hematoma due to retraction. Unstable 9/18, required pericardial window for evac of posterior cardiac tamponade. Respiratory distress and so re-admitted to SICU on 10/2 now s/p VATS on 10/5.     Neuro:  - Flat affect but remains alert and responds to questions appropriately   - Seen by psych - Following recs (started remeron)   - PRN liquid tylenol and meds through KE      CV:  S/p MVr, TVr, MAZE 9/9/20. S/p bedside pericardial window 9/18  - Keep MAPs >60.   - pAF: digoxin 125 mcg.   -Transition back to 12.5 BID Metoprolol via ke.   - Follow Dig level   - labetalol IV PRN for SBP > 160  - INR 2.7, Still holding coumadin. Continue Heparin gtt with ptt goal 60-80     Pulm:   - Loculated pleural effusion s/p VATS on 10/5  - Extubated 10/9. Breathing comfortably on room air  - Scheduled duo and saline nebs  - CXR and ABG PRN  - HOB >30 deg     Renal:  - Yoo catheter in place to monitor UOP  - BUN/Cr  54/1.3--> 66/1.5--> 90/1.8-->103/2.1  - Continue to monitor UOP       FEN/GI:  - Replace other lytes as needed  - Ke tube in place with TF infusing.   - Will increase FW flushes to 400 Q6 to address hypovolemia  - Famotidine and bowel regimen      Heme/Onc:  - H/H stable: 8.6/28  - No evidence of bleeding     ID:   - S/p 7 days of cefepime vanc for enterococcus cloacae UTI. Also has e coli growing from pleural cultures.  - Follow ID recommendations pending      - Started on Cefepime fluconazole re-started 10/14/20. Candida on BAL     - Current antibiotics Cefepime/Vanc/ Antifungal   - BC: No growth to date     Endo:  - no hx of DM  - ISS     PPx:  - famotidine, SCD, heparin gtt bridge to coumadin     Dispo: Continue ICU level care

## 2020-10-19 NOTE — PLAN OF CARE
Plan of Care Note  Cardiothoracic Surgery    Fatou Lorenzo is a 75 y.o. female s/p MVr, TVr, MAZE (9/19)    Specific issues: improved leukocytosis, worsening renal function  --continue abx  --continue free water  --hold coumadin; hold dig given elevated level  --will likely need G-tube; gen surg consulted  --continue abx      Plan of care for patient was discussed with ICU staff including nurses, residents, and faculty and appropriate consulting services.    Will continue to monitor patient's hemodynamics, functionality, neuro status, fluid status and renal function, and labs and will adjust medications and fluids as necessary while monitoring appropriateness for de-escalation of support and monitoring and transport to stepdown unit.    Isaiah Zacarias MD  Cardiothoracic Surgery Fellow

## 2020-10-19 NOTE — NURSING
No acute events over night. Pt vss, afebrile, sats >93% on RA. Pt AAOx4 opens eyes spontaneously follows commands and moves upper/lower extremities bilaterally. .  Pt nods appropriately but response with horse/quiet whisper. .Urine output 690cc/shift, mild response after one time dose 20 lasix given.  POCT monitored q4, no coverage needed. No complaints of pain/discomfort over night. 400cc of Free water bolus given Q4, Na trending down.  Tube feeds maintained at 25cc/hr (goal), no s/s of discomfort, nausea, or abd distention.   Skin: no new pressure or device injuries noted.  Heel boot/foot drop boot rotated. Heel foams in place. Sacral area pinkish/red but blanchable.Wound care provided per order. Hematoma to the Rt chest, appears dark red/ purple, area around at old chest tube/VATs site is firm to touch and without drainage, area remains open to air. Pt updated on plan of care. Will continue to monitor.

## 2020-10-19 NOTE — PT/OT/SLP PROGRESS
Occupational Therapy   Treatment    Name: Fatou Lorenzo  MRN: [id number]  Admitting Diagnosis:  Leukocytosis  14 Days Post-Op    Recommendations:     Discharge Recommendations: nursing facility, skilled  Discharge Equipment Recommendations:  (TBD)  Barriers to discharge:  Decreased caregiver support, Inaccessible home environment    Assessment:     Fatou Lorenzo is a 75 y.o. female with a medical diagnosis of Leukocytosis.  She was able to perform supine/sit T/F c total assist and pt has fair+ static sitting balance.  Tolerated sitting EOB for approx. 10 minutes c CGA-max A.  Performed grooming task c max A.  Pt minimally responsive and has a very weak voice at this time.  Appears to be depressed.  Was noted to have blood on back which appears to be coming from old CT site. Performance deficits affecting function are weakness, impaired endurance, impaired self care skills, impaired functional mobilty, impaired balance, decreased upper extremity function, impaired cognition, decreased safety awareness, decreased ROM, impaired coordination, impaired fine motor.     Rehab Prognosis:  Good; patient would benefit from acute skilled OT services to address these deficits and reach maximum level of function.       Plan:     Patient to be seen 3 x/week to address the above listed problems via self-care/home management, therapeutic activities, therapeutic exercises, cognitive retraining  · Plan of Care Expires: 10/29/20  · Plan of Care Reviewed with: patient    Subjective     Pain/Comfort:  · Pain Rating 1: 0/10    Objective:     Communicated with: RN prior to session.  Patient found supine with blood pressure cuff, vivas catheter, NG tube, oxygen, peripheral IV, PRAFO, pulse ox (continuous), telemetry upon OT entry to room.    General Precautions: Standard, fall, sternal, NPO, aspiration   Orthopedic Precautions:N/A   Braces: N/A     Occupational Performance:     Bed Mobility:    · Patient completed Supine to Sit with total  assistance  · Patient completed Sit to Supine with total assistance     Functional Mobility/Transfers:    · Functional Mobility: Pt sat up on EOB for 10 minutes c CGA-max A and has fair+ static sitting balance at this time.      Activities of Daily Living:  · Grooming: maximal assistance to wash off face while sitting EOB.   · UB dressing: Total assist to don/doff hospital gown.  · LB dressing: Total assist to don socks.      [facility] 6 Click ADL: 10    Patient left supine with all lines intact, call button in reach and RN notifiedEducation:      GOALS:   Multidisciplinary Problems     Occupational Therapy Goals        Problem: Occupational Therapy Goal    Goal Priority Disciplines Outcome Interventions   Occupational Therapy Goal     OT, PT/OT Ongoing, Progressing    Description: Goals to be met by: 10/23/2020      Patient will increase functional independence with ADLs by performing:    UE Dressing with Minimum Assistance.  Grooming while sitting EOB with Contact Guard Assistance  Toileting from bedside commode with Max Assistance for hygiene and clothing management.   Toilet transfer to bedside commode with Max Assistance.  Supine <> Sit with minimum assistance in preparation for EOB/OOB functional activities  Pt to tolerate static standing for ~1 minute with min A while competing a functional task.                         Time Tracking:     OT Date of Treatment: 10/19/20  OT Start Time: 1045  OT Stop Time: 1110  OT Total Time (min): 25 min    Billable Minutes:Self Care/Home Management 13  Therapeutic Activity 12    JANETT Chatterjee  10/19/2020

## 2020-10-19 NOTE — PT/OT/SLP PROGRESS
Speech Language Pathology Treatment    Patient Name:  Fatou Lorenzo   MRN:  [id number]  Admitting Diagnosis: Leukocytosis    Recommendations:                 General Recommendations:  Dysphagia therapy and Voice therapy  Diet recommendations:  NPO, Liquid Diet Level: NPO, small amounts of nectar-thick liquids and ice chips for pleasure only  Aspiration Precautions: Alternate means of nutrition/hydration and Strict aspiration precautions   General Precautions: Standard, fall, aspiration, NPO, sternal  Communication strategies:  provide increased time to answer and go to room if call light pushed    Subjective     Pt awake and alert upon SLP entry. Granddaughter present at bedside. Lethargy noted.    Objective:     Has the patient been evaluated by SLP for swallowing?   Yes  Keep patient NPO? Yes   Current Respiratory Status: room air      Pt seen for ongoing dysphagia and voice therapies. Mod-max cueing required for pt to maintain alertness and attention to task. Limited participation noted throughout session. Pt with continued aphonia. HOB elevated for trials. Pt required max encouragement and cueing to accept tsp of nectar-thick liquid x1. Immediate cough appreciated following nectar-thick liquid trial. Pt refused further trials. Vocal adduction exercises attempted x3. Pt with attempt to voice x2/3, though no phonation appreciated. Education provided regarding role of SLP, continued NPO, ice chips and nectar-thick liquids for pleasure, appropriate ratio to achieve nectar-thick consistency, vocal fold adduction, and ongoing ST plan of care. Granddaughter verbalized understanding and agreement, though pt with no response. Education to be ongoing. Session ended 2/2 pt lethargy. ST to continue to monitor.    Assessment:     Fatou Lorenzo is a 75 y.o. female with an SLP diagnosis of Dysphagia and Aphonia.      Goals:   Multidisciplinary Problems     SLP Goals        Problem: SLP Goal    Goal Priority Disciplines Outcome    SLP Goal     SLP Ongoing, Progressing   Description: Speech Language Pathology Goals  Goals expected to be met by 10/26  1. Pt to participate in ongoing swallow assessment to determine safest and least restrictive diet.  2. Pt will complete vocal adduction exercises x10 per session with min A.                 Plan:     · Patient to be seen:  4 x/week   · Plan of Care expires:  11/08/20  · Plan of Care reviewed with:  patient, grandchild(luis)   · SLP Follow-Up:  Yes       Discharge recommendations:  nursing facility, skilled   Barriers to Discharge:  Level of Skilled Assistance Needed      Time Tracking:     SLP Treatment Date:   10/19/20  Speech Start Time:  0915  Speech Stop Time:  0927     Speech Total Time (min):  12 min    Billable Minutes: Speech Therapy Individual 6 and Treatment Swallowing Dysfunction 6    BURKE Perkins  10/19/2020

## 2020-10-19 NOTE — PLAN OF CARE
Problem: SLP Goal  Goal: SLP Goal  Description: Speech Language Pathology Goals  Goals expected to be met by 10/26  1. Pt to participate in ongoing swallow assessment to determine safest and least restrictive diet.  2. Pt will complete vocal adduction exercises x10 per session with min A.    Outcome: Ongoing, Progressing     POC updated. Continue NPO with small amounts of ice chips and nectar-thick liquids for pleasure only. ST to continue to monitor.    BURKE Perkins  10/19/2020    I personally performed the services described in the documentation, reviewed the documentation recorded by the scribe in my presence and it accurately and completely records my words and action.

## 2020-10-19 NOTE — PROGRESS NOTES
[facility]  Critical Care - Surgery  Progress Note    Patient Name: Fatou Lorenzo  MRN: [id number]  Admission Date: 8/30/2020  Hospital Length of Stay: 49 days  Code Status: Full Code  Attending Provider: Antoni Waddell MD  Primary Care Provider: Ludin Rivas MD   Principal Problem: Leukocytosis    Subjective:     Hospital/ICU Course:  No notes on file    Interval History/Significant Events: NAEON. Not requiring any pressor support and has remained rate controlled. WBC up to 19. UOP adequate labs, Na down to 143. Remains NPO on TF, tolerating well.    Follow-up For: Procedure(s) (LRB):  LARYNGOSCOPY, MICRO SUSPENSION WITH AUGMENTATION (N/A)  VATS, WITH DECORTICATION, LUNG (Right)    Post-Operative Day: 13 Days Post-Op    Objective:     Vital Signs (Most Recent):  Temp: 97.7 °F (36.5 °C) (10/19/20 0700)  Pulse: 71 (10/19/20 0802)  Resp: (!) 25 (10/19/20 0802)  BP: (!) 146/68 (10/19/20 0700)  SpO2: 97 % (10/19/20 0802) Vital Signs (24h Range):  Temp:  [97.6 °F (36.4 °C)-98.1 °F (36.7 °C)] 97.7 °F (36.5 °C)  Pulse:  [69-98] 71  Resp:  [20-38] 25  SpO2:  [90 %-100 %] 97 %  BP: (131-153)/(57-68) 146/68     Weight: 83.5 kg (184 lb)  Body mass index is 30.62 kg/m².      Intake/Output Summary (Last 24 hours) at 10/19/2020 0858  Last data filed at 10/19/2020 0700  Gross per 24 hour   Intake 2321 ml   Output 1220 ml   Net 1101 ml       Physical Exam  Vitals signs and nursing note reviewed.   Constitutional:       Appearance: She is well-developed. She is ill-appearing. She is not diaphoretic.   HENT:      Head: Normocephalic and atraumatic.   Eyes:      Conjunctiva/sclera: Conjunctivae normal.   Neck:      Musculoskeletal: Normal range of motion and neck supple.   Cardiovascular:      Rate and Rhythm: Normal rate and regular rhythm.      Comments: Midline sternotomy with clean, dry, and intact, without evidence of infection  Prior chest tubes sites with dressings in place, clean and dry  Pulmonary:       Comments: Breathing comfortably on comfort flow  No distress, but rhonchi present and she has a wet cough.  Hematoma on the R chest, not actively bleeding   Abdominal:      General: There is no distension.      Palpations: Abdomen is soft.      Tenderness: There is no abdominal tenderness.   Skin:     General: Skin is warm and dry.   Psychiatric:      Comments: Depressed mood, flat affect         Vents:  Vent Mode: Spont (10/08/20 1525)  Ventilator Initiated: Yes(chart correction) (09/18/20 2202)  Set Rate: 20 BPM (10/08/20 0747)  Vt Set: 375 mL (10/08/20 0747)  Pressure Support: 10 cmH20 (10/08/20 1525)  PEEP/CPAP: 5 cmH20 (10/08/20 1525)  Oxygen Concentration (%): 21 (10/17/20 1951)  Peak Airway Pressure: 15 cmH2O (10/08/20 1525)  Plateau Pressure: 19 cmH20 (10/08/20 1525)  Total Ve: 5.3 mL (10/08/20 1525)  Negative Inspiratory Force (cm H2O): -21 (10/08/20 1452)  F/VT Ratio<105 (RSBI): (!) 70.71 (10/08/20 1258)    Lines/Drains/Airways     Central Venous Catheter Line            Percutaneous Central Line Insertion/Assessment - Triple Lumen  10/05/20 1843 left internal jugular 13 days          Drain                 Trans Pyloric Feeding Tube 10/14/20 1500 4 days         Urethral Catheter 10/14/20 1500 4 days                Significant Labs:    CBC/Anemia Profile:  Recent Labs   Lab 10/18/20  0357 10/19/20  0333   WBC 15.79* 19.21*   HGB 8.6* 7.7*   HCT 28.1* 25.2*    207   MCV 96 97   RDW 18.5* 18.7*        Chemistries:  Recent Labs   Lab 10/18/20  0355 10/19/20  0333   * 143   K 4.1 4.1   * 109   CO2 25 24   BUN 90* 103*   CREATININE 1.8* 2.1*   CALCIUM 7.9* 8.1*   ALBUMIN 1.8* 1.8*   PROT 5.2* 5.5*   BILITOT 0.6 0.6   ALKPHOS 235* 226*   ALT 40 38   AST 37 37   MG 2.3 2.4   PHOS 3.2 3.1       ABGs: No results for input(s): PH, PCO2, HCO3, POCSATURATED, BE in the last 48 hours.  Coagulation:   Recent Labs   Lab 10/19/20  0333   INR 2.3*   APTT 46.7*     Lactic Acid: No results for input(s):  LACTATE in the last 48 hours.    Significant Imaging:  I have reviewed all pertinent imaging results/findings within the past 24 hours.    Assessment/Plan:     Severe mitral regurgitation  Fatou Lorenzo is a 75 y.o. female s/p MVr, TVr 9/9/2020. Case noteworthy for multiple pump runs (3) and RV hematoma due to retraction. Unstable 9/18, required pericardial window for evac of posterior cardiac tamponade. Respiratory distress and so re-admitted to SICU on 10/2 now s/p VATS on 10/5.     Neuro:  - Flat affect but remains alert and responds to questions appropriately   - Seen by psych - Following recs (started remeron)   - PRN liquid tylenol and meds through KE      CV:  S/p MVr, TVr, MAZE 9/9/20. S/p bedside pericardial window 9/18  - Keep MAPs >60.   - pAF: digoxin 125 mcg.   -Transition back to 12.5 BID Metoprolol via ke.   - Follow Dig level   - labetalol IV PRN for SBP > 160  - INR 2.7, Still holding coumadin. Continue Heparin gtt with ptt goal 60-80     Pulm:   - Loculated pleural effusion s/p VATS on 10/5  - Extubated 10/9. Breathing comfortably on room air  - Scheduled duo and saline nebs  - CXR and ABG PRN  - HOB >30 deg     Renal:  - Yoo catheter in place to monitor UOP  - BUN/Cr  54/1.3--> 66/1.5--> 90/1.8-->103/2.1  - Continue to monitor UOP       FEN/GI:  - Replace other lytes as needed  - Ke tube in place with TF infusing.   - Will increase FW flushes to 400 Q6 to address hypovolemia  - Famotidine and bowel regimen      Heme/Onc:  - H/H stable: 8.6/28  - No evidence of bleeding     ID:   - S/p 7 days of cefepime vanc for enterococcus cloacae UTI. Also has e coli growing from pleural cultures.  - Follow ID recommendations pending      - Started on Cefepime fluconazole re-started 10/14/20. Candida on BAL     - Current antibiotics Cefepime/Vanc/ Antifungal   - BC: No growth to date     Endo:  - no hx of DM  - ISS     PPx:  - famotidine, SCD, heparin gtt bridge to coumadin     Dispo: Continue ICU level  care      Critical care was time spent personally by me on the following activities: development of treatment plan with patient or surrogate and bedside caregivers, discussions with consultants, evaluation of patient's response to treatment, examination of patient, ordering and performing treatments and interventions, ordering and review of laboratory studies, ordering and review of radiographic studies, pulse oximetry, re-evaluation of patient's condition.  This critical care time did not overlap with that of any other provider or involve time for any procedures.     Mir Croft MD  Critical Care - Surgery  [facility]

## 2020-10-20 NOTE — PT/OT/SLP PROGRESS
Physical Therapy Treatment    Patient Name:  Fatou Lorenzo   MRN:  [id number]    Recommendations:     Discharge Recommendations:  nursing facility, skilled   Discharge Equipment Recommendations: (will determine DME needs closer to discharge)   Barriers to discharge: Decreased caregiver support family will not be able to assist at current functional.     Assessment:     Fatou Lorenzo is a 75 y.o. female admitted with a medical diagnosis of Leukocytosis.  She presents with the following impairments/functional limitations:  weakness, impaired endurance, impaired functional mobilty, gait instability, impaired balance, impaired cognition, decreased coordination, decreased safety awareness. pt tolerated treatment well being able to sit on EOB with less assistance. Pt continues to be at a very low functional level. Pt will benefit from continued skilled PT 4x/wk to progress physically. Pt is s/p MVr, TVr, MAZE 9/9, VATS 10/5/20.    Rehab Prognosis: Good; patient would benefit from acute skilled PT services to address these deficits and reach maximum level of function.    Recent Surgery: Procedure(s) (LRB):  LARYNGOSCOPY, MICRO SUSPENSION WITH AUGMENTATION (N/A)  VATS, WITH DECORTICATION, LUNG (Right) 15 Days Post-Op    Plan:     During this hospitalization, patient to be seen 4 x/week to address the identified rehab impairments via gait training, therapeutic activities, therapeutic exercises, neuromuscular re-education and progress toward the following goals:    · Plan of Care Expires:  11/15/20    Subjective     Chief Complaint: pt was non-verbal during treatment.   Patient/Family Comments/goals: family goals: pt to get stronger and go home.   Pain/Comfort:  · Pain Rating 1: 0/10  · Pain Rating Post-Intervention 1: 0/10      Objective:     Communicated with nurse prior to session.  Patient found supine with telemetry, blood pressure cuff, central line, PICC line, pulse ox (continuous), vivas catheter([place] tube) upon PT  entry to room.     General Precautions: Standard, aspiration, fall   Orthopedic Precautions:N/A   Braces:       Functional Mobility:  · Bed Mobility: pt needed verbal cues for functional mobility with sternal precautions.     · Rolling Left:  total assistance and of 2 persons  · Rolling Right: total assistance and of 2 persons  · Supine to Sit: total assistance and of 2 persons  · Sit to Supine: total assistance and of 2 persons  ·   · Balance: pt sat on EOB x 10 min with CGA and occassional moderate assist. pt needed verbal cues to hold head upright during treatment.       AM-PAC 6 CLICK MOBILITY  Turning over in bed (including adjusting bedclothes, sheets and blankets)?: 2  Sitting down on and standing up from a chair with arms (e.g., wheelchair, bedside commode, etc.): 1  Moving from lying on back to sitting on the side of the bed?: 2  Moving to and from a bed to a chair (including a wheelchair)?: 1  Need to walk in hospital room?: 1  Climbing 3-5 steps with a railing?: 1  Basic Mobility Total Score: 8       Therapeutic Activities and Exercises:   pt and significant other received verbal instruction in PT POC. Pt significant other verbally expressed understanding of such but pt gave no indication of understanding.     Patient left left sidelying with all lines intact, call button in reach and RN and significant other present..    GOALS:   Multidisciplinary Problems     Physical Therapy Goals        Problem: Physical Therapy Goal    Goal Priority Disciplines Outcome Goal Variances Interventions   Physical Therapy Goal     PT, PT/OT Ongoing, Progressing     Description: Goals to be met by: 2020     Patient will increase functional independence with mobility by performin. Supine to sit with Minimal Assistance x 1 person.-not met  2. Sit to stand transfer with Moderate Assistance.-not met  3. Bed to chair transfer with Moderate Assistance. -not met  4. Sitting at edge of bed x10 minutes with CGA.-not  met  5. Lower extremity exercise program x 10 reps  with assistance as needed. -not met  6. Pt receive gait training ~ 10 ft with moderate assist- not met                           Time Tracking:     PT Received On: 10/20/20  PT Start Time: 1336     PT Stop Time: 1406  PT Total Time (min): 30 min     Billable Minutes: Therapeutic Activity 30 min    Treatment Type: Treatment  PT/PTA: PT     PTA Visit Number: 0     Emily Blankenship, PT  10/20/2020

## 2020-10-20 NOTE — PLAN OF CARE
Problem: SLP Goal  Goal: SLP Goal  Description: Speech Language Pathology Goals  Goals expected to be met by 10/26  1. Pt to participate in ongoing swallow assessment to determine safest and least restrictive diet.  2. Pt will complete vocal adduction exercises x10 per session with min A.    Outcome: Ongoing, Progressing     Goals remain appropriate. Continue NPO with strict aspiration precautions. ST to continue to monitor.    BURKE Perkins  10/20/2020

## 2020-10-20 NOTE — PLAN OF CARE
Problem: Physical Therapy Goal  Goal: Physical Therapy Goal  Description: Goals to be met by: 2020     Patient will increase functional independence with mobility by performin. Supine to sit with Minimal Assistance x 1 person.-not met  2. Sit to stand transfer with Moderate Assistance.-not met  3. Bed to chair transfer with Moderate Assistance. -not met  4. Sitting at edge of bed x10 minutes with CGA.-not met  5. Lower extremity exercise program x 10 reps  with assistance as needed. -not met  6. Pt receive gait training ~ 10 ft with moderate assist- not met          Outcome: Ongoing, Progressing   Goals remain appropriate. Emily Blankenship, PT  10/20/2020

## 2020-10-20 NOTE — ASSESSMENT & PLAN NOTE
Fatou Lorenzo is a 75 y.o. female s/p MVr, TVr 9/9/2020. Case noteworthy for multiple pump runs (3) and RV hematoma due to retraction. Unstable 9/18, required pericardial window for evac of posterior cardiac tamponade. Respiratory distress and so re-admitted to SICU on 10/2 now s/p VATS on 10/5.     Neuro:  - Flat affect but remains alert and responds to questions appropriately   - Seen by psych - Following recs (started remeron)   - PRN liquid tylenol and meds through [facility]      CV:  S/p MVr, TVr, MAZE 9/9/20. S/p bedside pericardial window 9/18  - Keep MAPs >60.   - pAF: digoxin 125 mcg.   -Transition back to 12.5 BID Metoprolol via [facility].   - Follow Dig level   - labetalol IV PRN for SBP > 160  - INR 2.3, Still holding coumadin.      Pulm:   - Loculated pleural effusion s/p VATS on 10/5  - Extubated 10/9. Breathing comfortably on room air  - Scheduled duo and saline nebs  - CXR and ABG PRN  - HOB >30 deg     Renal:  - DC vivas   - BUN/Cr  54/1.3--> 66/1.5--> 90/1.8-->103/2.1-->113/2.4  - Continue to monitor UOP   - Probably renal FeNa>1%   - TBD nephrology consult       FEN/GI:  - Replace other lytes as needed  - [place] tube in place with TF infusing.   - Will increase FW flushes to 400 Q6 to   - Famotidine and bowel regimen      Heme/Onc:  - H/H stable: 8.6/28  - No evidence of bleeding     ID:   - S/p 7 days of cefepime vanc for enterococcus cloacae UTI. Also has e coli growing from pleural cultures.  - Follow ID recommendations pending      - Started on Cefepime fluconazole re-started 10/14/20. Candida on BAL     - Current antibiotics Cefepime/Vanc/ Antifungal (descalation today?)  - BC: No growth to date     Endo:  - no hx of DM  - ISS     PPx:  - famotidine, SCD,      Dispo: Continue ICU level care

## 2020-10-20 NOTE — PLAN OF CARE
POC reviewed with pt. VSS, afebrile. Pt follows commands and moves all extremities. AAOx3. MAP >65. SBP <180. SpO2 > 90% on RA. No drips. TF turned off at midnight. UOP treneded, see flowsheet for additional details. Will continue to monitor.     Skin: Bed plugged in, mattress inflated. Pt turned q2hrs. Heels elevated. No new skin breakdown noted. Heel boot and foot drop boot rotated q2. Wound care orders provided.

## 2020-10-20 NOTE — PLAN OF CARE
SW sent updated information to [facility] who had previously accepted patient.     SW will continue to coordinate with patient, family, team and insurance to complete patient's discharge plan.    Esthela Peralta LMSW   - Case Management

## 2020-10-20 NOTE — PT/OT/SLP PROGRESS
Speech Language Pathology Treatment    Patient Name:  Fatou Lorenzo   MRN:  [id number]  Admitting Diagnosis: Leukocytosis    Recommendations:                 General Recommendations:  Dysphagia therapy and Voice therapy  Diet recommendations:  NPO, Liquid Diet Level: NPO   Aspiration Precautions: Alternate means of nutrition/hydration, Small amounts of nectar-thick liquids and ice chips for pleasure and Strict aspiration precautions   General Precautions: Standard, aspiration, fall, NPO  Communication strategies:  provide increased time to answer and go to room if call light pushed    Subjective     Pt awake upon SLP entry, though minimal eye-opening noted.  and RN present at bedside. Lethargy evident.    Objective:     Has the patient been evaluated by SLP for swallowing?   Yes  Keep patient NPO? Yes   Current Respiratory Status: room air      Pt seen for ongoing dysphagia and voice therapies. Mod-max cueing required to maintain alertness. Pt with limited participation throughout session. Aphonia still evident with no attempts at active phonation. HOB elevated for PO trials. Pt accepted trials of nectar-thick liquid via tsp x2 given max encourgment and assistance. Delayed swallow immediately followed by coughing on both trials. No further trials presented. Vocal adduction exercises attempted x3. Pt with no attempts at active phonation despite max cues. Education provided regarding role of SLP, continued NPO, purpose of PO trials, importance of voicing outside of therapy, and ongoing ST plan of care. All questions within SLP scope addressed. Pt spouse verbalized understanding, pt with no response. Education to be ongoing. Pt will need max encouragement. ST to continue to monitor.     Assessment:     Fatou Lorenzo is a 75 y.o. female with an SLP diagnosis of Dysphagia and Aphonia.     Goals:   Multidisciplinary Problems     SLP Goals        Problem: SLP Goal    Goal Priority Disciplines Outcome   SLP Goal      SLP Ongoing, Progressing   Description: Speech Language Pathology Goals  Goals expected to be met by 10/26  1. Pt to participate in ongoing swallow assessment to determine safest and least restrictive diet.  2. Pt will complete vocal adduction exercises x10 per session with min A.                 Plan:     · Patient to be seen:  4 x/week   · Plan of Care expires:  11/08/20  · Plan of Care reviewed with:  patient, spouse   · SLP Follow-Up:  Yes       Discharge recommendations:  nursing facility, skilled   Barriers to Discharge:  Level of Skilled Assistance Needed      Time Tracking:     SLP Treatment Date:   10/20/20  Speech Start Time:  1015  Speech Stop Time:  1029     Speech Total Time (min):  14 min    Billable Minutes: Speech Therapy Individual 7 and Treatment Swallowing Dysfunction 7    BURKE Perkins  10/20/2020

## 2020-10-20 NOTE — PROGRESS NOTES
[facility]  Infectious Disease  Progress Note    Patient Name: Fatou Lorenzo  MRN: [id number]  Admission Date: 8/30/2020  Length of Stay: 50 days  Attending Physician: Antoni Waddell MD  Primary Care Provider: Ludin Rivas MD    Isolation Status: No active isolations  Assessment/Plan:      * Leukocytosis  Afebrile and with stable leukocytosis. Blood cultures NGTD. Etiology unclear however differential would include partially treated infected hematoma, partially treated empyema, a leukemoid reaction or drug reaction. Pending PEG placement.   -Continue cefepime and fluconazole.   -Will consider discontinuing fluconazole in the next 24-48 hours.  -Discontinue vancomycin.   -If leukocytosis does not decrease with removal of NGT then may need to consider CVC exchange if clinically safe to do so.       Anticipated Disposition: per primary    Thank you for your consult. I will follow-up with patient. Please contact us if you have any additional questions.    Vicki Wayne MD  Infectious Disease  [facility]    Subjective:     Principal Problem:Leukocytosis    HPI:   Pt is a 75 y.o. female w/ pmhx of Atrial fibrillation with RVR, HLD and severe mitral regurgitation.  Pt w/ recent left heart catheterization 8/25/2020 who presented w/ complaints of recurrent shortness of breath.  Transesophageal echocardiogram done recently w/ left atrial thrombus.  L heart angiography w/ nonobstructive CAD.   Upon admission, pt required BiPAP due to hypoxemia not responsive to nasal cannula.      Pt transferred to [facility] 8/30 for CT surgery evaluation given recurrent admission despite compliance, BP control and diuresis.  Pt seen by Dr. Waddell who recommended MV repair.  Pt underwent MV repair and TV annuloplasty on 9/9. Pt course c/b reintuabtion and pericardial window for evacuation of posterior cardiac tamponade on 9/18.  Pt extubated on 9/24.  Developed increasing white count on 9/28.  UA  done concerning for infection.  Urine cxs positive for E.Cloacae.  Pt started on Bactrim.  Chest Xray from 10/1- Since September 28, 2020, increased large right pleural effusion.  Increased diffuse right airspace opacities.  X ray abdomen 9/23 w/ Probable bilateral nephrolithiasis.  ID initially consulted on 10/2 for UTI.  Patient also noted to have large right loculated pleural effusion and underwent IR thoracentesis (no cx data) and subsequently VATS procedure with limited decortication by CTS on 10/5.  Cultures positive for E coli.  Brochoscopy with BAL on 10/7 with yeast.  She was treated with 7 days of cefepime for E. Cloacae in urine and E coli from pleural effusion, followed by transition to IV ceftriaxone with plan for 4 weeks of antibiotics after VATS procedure (ceftriaxone while inpatient with transition to oral Augmentin if d/c).      ID now reconsulted for rising WBC  11.8 >>34.  Repeat blood cultures pending.  Repeat CXR pending.  Afebrile.      Interval History: Shakes head to yes/no questions.   75 y.o. female w/ history of Atrial fibrillation with RVR, HLD and severe mitral regurgitation who is s/p MV repair and TV annuloplasty on 9/9, complicated by posterior cardiac tamponade s/p pericardial window on 9/18, UTI (E cloacae, tx X 7 days with cefepime), and large right pleural effusion with multiple loculations s/p thoracentesis ( no cx data) and VATS with limited decortication on 10/5 with cx + for E Coli, s/p BAL 10/7 (candida lusitanae thought to be colonizer), who was last seen by ID on 10/9 who recommended 4 weeks of antibiotics post VATS (IV ceftriaxone while inpatient). Right pleural effusion improved on imaging and leukocytosis resolved.    ID is now reconsulted for re-current leukocytosis (WBC 11>>34).     Transferred to ICU 10/14.  Antibiotics broadened to vanc, cefepime, fluconazole.  Blood cultures negative.  U/S negative.  2D negative. Remains afebrile, WBC continues to trend down (  34>>24>>21>15). Hemodynamically stable.  On RA. O2 sats 90-99%.  PEG placement aborted due to elevated INR. Leukocytosis remains stable.        Review of Systems   Unable to perform ROS: Mental status change     Objective:     Vital Signs (Most Recent):  Temp: 97.6 °F (36.4 °C) (10/20/20 1900)  Pulse: 65 (10/20/20 1915)  Resp: (!) 36 (10/20/20 1915)  BP: (!) 155/67 (10/20/20 1900)  SpO2: (!) 94 % (10/20/20 1915) Vital Signs (24h Range):  Temp:  [97.6 °F (36.4 °C)-98.7 °F (37.1 °C)] 97.6 °F (36.4 °C)  Pulse:  [64-86] 65  Resp:  [20-42] 36  SpO2:  [88 %-100 %] 94 %  BP: (115-169)/(55-89) 155/67     Weight: 83.5 kg (184 lb)  Body mass index is 30.62 kg/m².    Estimated Creatinine Clearance: 21.6 mL/min (A) (based on SCr of 2.4 mg/dL (H)).    Physical Exam  Vitals signs and nursing note reviewed.   Constitutional:       General: She is not in acute distress.     Appearance: Normal appearance. She is underweight. She is not toxic-appearing or diaphoretic.   HENT:      Head: Normocephalic and atraumatic. No right periorbital erythema or left periorbital erythema.      Right Ear: Hearing and external ear normal. No swelling.      Left Ear: Hearing and external ear normal. No swelling.      Nose: Nose normal. No nasal deformity.      Comments: NG tube in place     Mouth/Throat:      Pharynx: Uvula midline. No oropharyngeal exudate.   Eyes:      General: Lids are normal. No scleral icterus.        Right eye: No discharge.         Left eye: No discharge.      Conjunctiva/sclera: Conjunctivae normal.      Right eye: Right conjunctiva is not injected. No exudate.     Left eye: Left conjunctiva is not injected. No exudate.  Neck:      Musculoskeletal: Normal range of motion and neck supple.      Thyroid: No thyromegaly.      Trachea: No tracheal deviation.      Comments: Left sided CVC  Cardiovascular:      Rate and Rhythm: Normal rate and regular rhythm.      Heart sounds: Normal heart sounds, S1 normal and S2 normal. No  murmur. No friction rub. No gallop.    Pulmonary:      Effort: Pulmonary effort is normal. No tachypnea, accessory muscle usage or respiratory distress.      Breath sounds: No stridor. Examination of the right-middle field reveals decreased breath sounds. Examination of the right-lower field reveals decreased breath sounds. Examination of the left-lower field reveals decreased breath sounds. Decreased breath sounds present. No wheezing or rales.   Chest:      Chest wall: No tenderness.   Abdominal:      General: Bowel sounds are normal. There is no distension.      Palpations: Abdomen is soft.      Tenderness: There is no abdominal tenderness. There is no guarding or rebound.   Genitourinary:     Comments: Yoo catheter in place.  Musculoskeletal:         General: Swelling present. No tenderness.      Right lower leg: Edema present.      Left lower leg: Edema present.   Skin:     General: Skin is warm and dry.      Coloration: Skin is not pale.      Findings: No erythema, lesion or rash.      Nails: There is no clubbing.     Neurological:      Mental Status: She is oriented to person, place, and time and easily aroused. She is lethargic.      Cranial Nerves: No cranial nerve deficit.      Coordination: Coordination normal.   Psychiatric:         Speech: Speech normal.         Behavior: Behavior normal. Behavior is cooperative.         Thought Content: Thought content normal.         Judgment: Judgment normal.         Significant Labs:   Blood Culture:   Recent Labs   Lab 08/23/20  0149 08/23/20  0203 10/14/20  1206   LABBLOO No growth after 5 days. No growth after 5 days. No growth after 5 days.  No growth after 5 days.     BMP:   Recent Labs   Lab 10/20/20  0345   GLU 93      K 5.1   *   CO2 23   *   CREATININE 2.4*   CALCIUM 8.1*   MG 3.1*     CBC:   Recent Labs   Lab 10/19/20  0333 10/20/20  0345   WBC 19.21* 17.69*   HGB 7.7* 7.2*   HCT 25.2* 24.2*    227     Procalcitonin: No results  for input(s): PROCAL in the last 48 hours.  Urine Culture:   Recent Labs   Lab 08/23/20  0228 09/29/20  0854   LABURIN No significant growth ENTEROBACTER CLOACAE  >100,000 cfu/ml  *     Urine Studies:   Recent Labs   Lab 08/23/20  2249  10/14/20  1530   COLORU Colorless*   < > Yellow   APPEARANCEUA Clear   < > Cloudy*   PHUR 5.0   < > 5.0   SPECGRAV 1.005   < > 1.010   PROTEINUA Negative   < > 1+*   GLUCUA Negative   < > Negative   KETONESU Negative   < > Negative   BILIRUBINUA Negative   < > Negative   OCCULTUA 1+*   < > 3+*   NITRITE Negative   < > Negative   UROBILINOGEN Negative  --   --    LEUKOCYTESUR Trace*   < > Trace*   RBCUA 4   < > 17*   WBCUA 4   < > 3   BACTERIA Occasional   < > Occasional   SQUAMEPITHEL 1  --  1   HYALINECASTS 1   < > 9*    < > = values in this interval not displayed.       Significant Imaging: I have reviewed all pertinent imaging results/findings within the past 24 hours.

## 2020-10-20 NOTE — PLAN OF CARE
"      SICU PLAN OF CARE NOTE    Dx: Leukocytosis    Vital Signs: /89   Pulse 64   Temp 98.1 °F (36.7 °C) (Oral)   Resp (!) 33   Ht 5' 5" (1.651 m)   Wt 83.5 kg (184 lb)   SpO2 (!) 94%   Breastfeeding No   BMI 30.62 kg/m²     Neuro: Arouses to Voice, Follows Commands, and Moves All Extremities    Respiratory: Room Air    Cardiac: NSR    Diet: Tube Feeds @ 10cc/hr with a goal of 35 cc/hr    Gtts: Heparin @ 600 units/hr    Urine Output: Urinary Catheter 340 cc/shift    Labs/Accuchecks: q6h accuchecks    SKIN NOTE:  Skin: No sacral foam in place due to incontience. Foot drop boot switched q2h.     Skin precautions maintained including:  Sacrum and heels with foam dressing in place for pressure protection. Frequent weight shift assistance provided; Patient turned Q2 hr to prevent breakdown. Bed plugged in and mattress inflated. Adhesive use limited. Heels elevated off bed. Pressure points protected and positioning supports utilized.  Skin-to-device areas padded. Skin-to-skin areas padded    SHIFT EVENTS:  VSS. Afebrile throughout shift. Pt. Received 2 units of FFP; tolerated well; INR responded accordingly. Heparin drip started @ 600units/hr.; routine aPTT lab scheduled to monitor. TF restarted at a rate of 10cc/hr with a goal rate of 35cc/hr; pt tolerating well. Plans to get PEG tomorrow; pt to be NPO for the procedure starting at midnight. POC reviewed with sons and significant other at bedside.    "

## 2020-10-20 NOTE — PROGRESS NOTES
[facility]  Critical Care - Surgery  Progress Note    Patient Name: Fatou Lorenzo  MRN: [id number]  Admission Date: 8/30/2020  Hospital Length of Stay: 50 days  Code Status: Full Code  Attending Provider: Antoni Waddell MD  Primary Care Provider: Ludin Rivas MD   Principal Problem: Leukocytosis    Subjective:     Hospital/ICU Course:  No notes on file    Interval History/Significant Events: NAEON. Not requiring any pressor support and has remained rate controlled. WBC down to 17. Creatinine and BUN trending up.   INR Still 2.1 despite any warfarin. PEG tube was cancelled     Central line dc after midline.     Follow-up For: Procedure(s) (LRB):  LARYNGOSCOPY, MICRO SUSPENSION WITH AUGMENTATION (N/A)  VATS, WITH DECORTICATION, LUNG (Right)    Post-Operative Day: 13 Days Post-Op    Objective:     Vital Signs (Most Recent):  Temp: 98.1 °F (36.7 °C) (10/20/20 0300)  Pulse: 68 (10/20/20 0600)  Resp: (!) 28 (10/20/20 0600)  BP: 122/60 (10/20/20 0600)  SpO2: 96 % (10/20/20 0600) Vital Signs (24h Range):  Temp:  [97.6 °F (36.4 °C)-98.1 °F (36.7 °C)] 98.1 °F (36.7 °C)  Pulse:  [66-86] 68  Resp:  [25-42] 28  SpO2:  [88 %-100 %] 96 %  BP: (101-151)/(49-87) 122/60     Weight: 83.5 kg (184 lb)  Body mass index is 30.62 kg/m².      Intake/Output Summary (Last 24 hours) at 10/20/2020 0715  Last data filed at 10/20/2020 0600  Gross per 24 hour   Intake 967 ml   Output 655 ml   Net 312 ml       Physical Exam  Vitals signs and nursing note reviewed.   Constitutional:       Appearance: She is well-developed. She is ill-appearing. She is not diaphoretic.   HENT:      Head: Normocephalic and atraumatic.   Eyes:      Conjunctiva/sclera: Conjunctivae normal.   Neck:      Musculoskeletal: Normal range of motion and neck supple.   Cardiovascular:      Rate and Rhythm: Normal rate and regular rhythm.      Comments: Midline sternotomy with clean, dry, and intact, without evidence of infection  Prior chest tubes sites  with dressings in place, clean and dry  Pulmonary:      Comments: Breathing comfortably on comfort flow  No distress, but rhonchi present and she has a wet cough.  Hematoma on the R chest, not actively bleeding   Abdominal:      General: There is no distension.      Palpations: Abdomen is soft.      Tenderness: There is no abdominal tenderness.   Skin:     General: Skin is warm and dry.   Psychiatric:      Comments: Depressed mood, flat affect         Vents:  Vent Mode: Spont (10/08/20 1525)  Ventilator Initiated: Yes(chart correction) (09/18/20 2202)  Set Rate: 20 BPM (10/08/20 0747)  Vt Set: 375 mL (10/08/20 0747)  Pressure Support: 10 cmH20 (10/08/20 1525)  PEEP/CPAP: 5 cmH20 (10/08/20 1525)  Oxygen Concentration (%): 21 (10/17/20 1951)  Peak Airway Pressure: 15 cmH2O (10/08/20 1525)  Plateau Pressure: 19 cmH20 (10/08/20 1525)  Total Ve: 5.3 mL (10/08/20 1525)  Negative Inspiratory Force (cm H2O): -21 (10/08/20 1452)  F/VT Ratio<105 (RSBI): (!) 70.71 (10/08/20 1258)    Lines/Drains/Airways     Central Venous Catheter Line            Percutaneous Central Line Insertion/Assessment - Triple Lumen  10/05/20 1843 left internal jugular 14 days          Drain                 Trans Pyloric Feeding Tube 10/14/20 1500 5 days         Urethral Catheter 10/14/20 1500 5 days          Peripheral Intravenous Line                 Midline Catheter Insertion/Assessment  - Single Lumen 10/19/20 1654 brachial vein 18g x 10cm less than 1 day                Significant Labs:    CBC/Anemia Profile:  Recent Labs   Lab 10/19/20  0333 10/20/20  0345   WBC 19.21* 17.69*   HGB 7.7* 7.2*   HCT 25.2* 24.2*    227   MCV 97 98   RDW 18.7* 19.5*        Chemistries:  Recent Labs   Lab 10/19/20  0333 10/20/20  0345    144   K 4.1 5.1    111*   CO2 24 23   * 113*   CREATININE 2.1* 2.4*   CALCIUM 8.1* 8.1*   ALBUMIN 1.8* 1.8*   PROT 5.5* 5.7*   BILITOT 0.6 0.6   ALKPHOS 226* 200*   ALT 38 41   AST 37 45*   MG 2.4 3.1*   PHOS  3.1 3.8       ABGs: No results for input(s): PH, PCO2, HCO3, POCSATURATED, BE in the last 48 hours.  Coagulation:   Recent Labs   Lab 10/20/20  0345   INR 2.1*   APTT 41.0*     Lactic Acid: No results for input(s): LACTATE in the last 48 hours.    Significant Imaging:  I have reviewed all pertinent imaging results/findings within the past 24 hours.    Assessment/Plan:     Severe mitral regurgitation  Fatou Lorenzo is a 75 y.o. female s/p MVr, TVr 9/9/2020. Case noteworthy for multiple pump runs (3) and RV hematoma due to retraction. Unstable 9/18, required pericardial window for evac of posterior cardiac tamponade. Respiratory distress and so re-admitted to SICU on 10/2 now s/p VATS on 10/5.     Neuro:  - Flat affect but remains alert and responds to questions appropriately   - Seen by psych - Following recs (started remeron)   - PRN liquid tylenol and meds through MAKENZIE      CV:  S/p MVr, TVr, MAZE 9/9/20. S/p bedside pericardial window 9/18  - Keep MAPs >60.   - pAF: digoxin 125 mcg.   -Transition back to 12.5 BID Metoprolol via makenzie.   - Follow Dig level   - labetalol IV PRN for SBP > 160  - INR 2.3, Still holding coumadin.     - Will give FFP and re-start heparin     Pulm:   - Loculated pleural effusion s/p VATS on 10/5  - Extubated 10/9. Breathing comfortably on room air  - Scheduled duo and saline nebs  - CXR and ABG PRN  - HOB >30 deg     Renal:  - DC vivas   - BUN/Cr  54/1.3--> 66/1.5--> 90/1.8-->103/2.1-->113/2.4  - Continue to monitor UOP   - Probably renal FeNa>1%         FEN/GI:  - Replace other lytes as needed  - Cougar tube in place with TF infusing.   - Will increase FW flushes to 400 Q6 to   - Famotidine and bowel regimen      Heme/Onc:  - H/H stable  - No evidence of bleeding     ID:   - S/p 7 days of cefepime vanc for enterococcus cloacae UTI. Also has e coli growing from pleural cultures.  - Follow ID recommendations pending      - Started on Cefepime fluconazole re-started 10/14/20. Candida on  BAL     - Current antibiotics Cefepime/Vanc/ Antifungal (descalation today?)  - BC: No growth to date     Endo:  - no hx of DM  - ISS     PPx:  - famotidine, SCD,      Dispo: Continue ICU level care, Pending g tube, Will give FFP today and place her on a heparin gtt           Critical care was time spent personally by me on the following activities: development of treatment plan with patient or surrogate and bedside caregivers, discussions with consultants, evaluation of patient's response to treatment, examination of patient, ordering and performing treatments and interventions, ordering and review of laboratory studies, ordering and review of radiographic studies, pulse oximetry, re-evaluation of patient's condition.  This critical care time did not overlap with that of any other provider or involve time for any procedures.     Mir Croft MD  Critical Care - Surgery  [facility]

## 2020-10-20 NOTE — SUBJECTIVE & OBJECTIVE
Interval History/Significant Events: NAEON. Not requiring any pressor support and has remained rate controlled. WBC down to 17. Creatinine and BUN trending up.   INR Still 2.1 despite any warfarin. PEG tube was cancelled     Follow-up For: Procedure(s) (LRB):  LARYNGOSCOPY, MICRO SUSPENSION WITH AUGMENTATION (N/A)  VATS, WITH DECORTICATION, LUNG (Right)    Post-Operative Day: 13 Days Post-Op    Objective:     Vital Signs (Most Recent):  Temp: 98.1 °F (36.7 °C) (10/20/20 0300)  Pulse: 68 (10/20/20 0600)  Resp: (!) 28 (10/20/20 0600)  BP: 122/60 (10/20/20 0600)  SpO2: 96 % (10/20/20 0600) Vital Signs (24h Range):  Temp:  [97.6 °F (36.4 °C)-98.1 °F (36.7 °C)] 98.1 °F (36.7 °C)  Pulse:  [66-86] 68  Resp:  [25-42] 28  SpO2:  [88 %-100 %] 96 %  BP: (101-151)/(49-87) 122/60     Weight: 83.5 kg (184 lb)  Body mass index is 30.62 kg/m².      Intake/Output Summary (Last 24 hours) at 10/20/2020 0715  Last data filed at 10/20/2020 0600  Gross per 24 hour   Intake 967 ml   Output 655 ml   Net 312 ml       Physical Exam  Vitals signs and nursing note reviewed.   Constitutional:       Appearance: She is well-developed. She is ill-appearing. She is not diaphoretic.   HENT:      Head: Normocephalic and atraumatic.   Eyes:      Conjunctiva/sclera: Conjunctivae normal.   Neck:      Musculoskeletal: Normal range of motion and neck supple.   Cardiovascular:      Rate and Rhythm: Normal rate and regular rhythm.      Comments: Midline sternotomy with clean, dry, and intact, without evidence of infection  Prior chest tubes sites with dressings in place, clean and dry  Pulmonary:      Comments: Breathing comfortably on comfort flow  No distress, but rhonchi present and she has a wet cough.  Hematoma on the R chest, not actively bleeding   Abdominal:      General: There is no distension.      Palpations: Abdomen is soft.      Tenderness: There is no abdominal tenderness.   Skin:     General: Skin is warm and dry.   Psychiatric:       Comments: Depressed mood, flat affect         Vents:  Vent Mode: Spont (10/08/20 1525)  Ventilator Initiated: Yes(chart correction) (09/18/20 2202)  Set Rate: 20 BPM (10/08/20 0747)  Vt Set: 375 mL (10/08/20 0747)  Pressure Support: 10 cmH20 (10/08/20 1525)  PEEP/CPAP: 5 cmH20 (10/08/20 1525)  Oxygen Concentration (%): 21 (10/17/20 1951)  Peak Airway Pressure: 15 cmH2O (10/08/20 1525)  Plateau Pressure: 19 cmH20 (10/08/20 1525)  Total Ve: 5.3 mL (10/08/20 1525)  Negative Inspiratory Force (cm H2O): -21 (10/08/20 1452)  F/VT Ratio<105 (RSBI): (!) 70.71 (10/08/20 1258)    Lines/Drains/Airways     Central Venous Catheter Line            Percutaneous Central Line Insertion/Assessment - Triple Lumen  10/05/20 1843 left internal jugular 14 days          Drain                 Trans Pyloric Feeding Tube 10/14/20 1500 5 days         Urethral Catheter 10/14/20 1500 5 days          Peripheral Intravenous Line                 Midline Catheter Insertion/Assessment  - Single Lumen 10/19/20 1654 brachial vein 18g x 10cm less than 1 day                Significant Labs:    CBC/Anemia Profile:  Recent Labs   Lab 10/19/20  0333 10/20/20  0345   WBC 19.21* 17.69*   HGB 7.7* 7.2*   HCT 25.2* 24.2*    227   MCV 97 98   RDW 18.7* 19.5*        Chemistries:  Recent Labs   Lab 10/19/20  0333 10/20/20  0345    144   K 4.1 5.1    111*   CO2 24 23   * 113*   CREATININE 2.1* 2.4*   CALCIUM 8.1* 8.1*   ALBUMIN 1.8* 1.8*   PROT 5.5* 5.7*   BILITOT 0.6 0.6   ALKPHOS 226* 200*   ALT 38 41   AST 37 45*   MG 2.4 3.1*   PHOS 3.1 3.8       ABGs: No results for input(s): PH, PCO2, HCO3, POCSATURATED, BE in the last 48 hours.  Coagulation:   Recent Labs   Lab 10/20/20  0345   INR 2.1*   APTT 41.0*     Lactic Acid: No results for input(s): LACTATE in the last 48 hours.    Significant Imaging:  I have reviewed all pertinent imaging results/findings within the past 24 hours.

## 2020-10-20 NOTE — PROGRESS NOTES
"[facility]  Adult Nutrition  Progress Note    SUMMARY       Recommendations    1.) Change EN to Novasource Renal; begin at 10mL/hr and advance 5-10mL Q8hr to goal rate at 35mL/hr. Hold for N/V. EN provides 1680kcal, 76gm of protein, 602mL of free water. Add free water per MD recommendations.   2.) Replace magnesium. Monitor electrolytes and replace as needed.     Goals: Pt to meet 50% of energy needs by RD follow up  Nutrition Goal Status: progressing towards goal  Communication of RD Recs: (POC)    Reason for Assessment    Reason For Assessment: RD follow-up  Diagnosis: other (see comments)(Acute respiratory failure with hypoxia)  Relevant Medical History: atrial fibrillation with RVR, essential HTN, heart valve problem, HLD  Interdisciplinary Rounds: did not attend  General Information Comments: Pt is disoriented and resting during RD visist. RD unable to complete NFPE at this time. Pt s/p Mahmood Maze procedure; s/p MVr, TVr on 9/9/20. Nsg staff reports TF were held due to PEG placement scheduled today, however procedure was canceled due to INR lab levels. Pt has Keotube and plan to restart TF. GI- LBM 10/19  Nutrition Discharge Planning: Pt to meet adequate energy and protien needs.     Nutrition Risk Screen    Nutrition Risk Screen: tube feeding or parenteral nutrition    Nutrition/Diet History    Spiritual, Cultural Beliefs, Sabianism Practices, Values that Affect Care: no  Factors Affecting Nutritional Intake: NPO    Anthropometrics    Temp: 98.4 °F (36.9 °C)  Height Method: Stated  Height: 5' 5" (165.1 cm)  Height (inches): 65 in  Weight Method: Bed Scale  Weight: 83.5 kg (184 lb)  Weight (lb): 184 lb  Ideal Body Weight (IBW), Female: 125 lb  % Ideal Body Weight, Female (lb): 147.2 %  BMI (Calculated): 30.6       Lab/Procedures/Meds    Pertinent Labs Reviewed: reviewed  Pertinent Labs Comments: WBC 17.69, Hgb 7.2, Hct 24.2, Cl 111, , Cr 2.4, GFR 19.2, Ca 8.1, Magnesium 3.1, Alb " 1.8  Pertinent Medications Reviewed: reviewed  Pertinent Medications Comments: Albuterol, cefepime, digoxine, famotiidne, diflucan, MVI, polyethylene glycol    Physical Findings/Assessment     Edema 3+ generalized    Estimated/Assessed Needs    Weight Used For Calorie Calculations: 83 kg (182 lb 15.7 oz)  Energy Calorie Requirements (kcal): 1657 kcals/day  Energy Need Method: [place]-St Jeor(x 1.25)  Protein Requirements: 85-102gm (1.5-1.8gm/kg IBW)  Weight Used For Protein Calculations: 56.8 kg (125 lb 3.5 oz)(IBW)  Fluid Requirements (mL): per MD or 1 mL/kcal     RDA Method (mL): 1657         Nutrition Prescription Ordered    Current Diet Order: NPO (10/5)  Current Nutrition Support Formula Ordered: Impact Peptide 1.5  Current Nutrition Support Rate Ordered: 25 (ml)  Current Nutrition Support Frequency Ordered: mL/hr    Evaluation of Received Nutrient/Fluid Intake    I/O: I: 967; O: 655; +25.8mL since 10/6  Energy Calories Required: not meeting needs  Protein Required: not meeting needs  Fluid Required: (per MD)  Comments: LBM 10/19  Tolerance: (RD to monitor)  % Intake of Estimated Energy Needs: 0  % Meal Intake: 0    Nutrition Risk    Level of Risk/Frequency of Follow-up: high , 2x weekly    Assessment and Plan    Nutrition Problem  Inadequate energy intake     Related to (etiology):   Enteral nutrition     Signs and Symptoms (as evidenced by):   Enteral nutrition does not provide estimated energy needs.      Interventions(treatment strategy):  1.) Collaboration with other providers  2.) Enteral nutrition     Nutrition Diagnosis Status:   ongoing    Monitor and Evaluation    Food and Nutrient Intake: enteral nutrition intake  Food and Nutrient Adminstration: enteral and parenteral nutrition administration  Knowledge/Beliefs/Attitudes: food and nutrition knowledge/skill, beliefs and attitudes  Anthropometric Measurements: body mass index, weight change, weight  Biochemical Data, Medical Tests and Procedures:  electrolyte and renal panel, gastrointestinal profile, glucose/endocrine profile, inflammatory profile, lipid profile  Nutrition-Focused Physical Findings: overall appearance     Malnutrition Assessment                 Orbital Region (Subcutaneous Fat Loss): well nourished  Upper Arm Region (Subcutaneous Fat Loss): mild depletion   Leeds Region (Muscle Loss): moderate depletion  Clavicle Bone Region (Muscle Loss): mild depletion  Clavicle and Acromion Bone Region (Muscle Loss): well nourished  Dorsal Hand (Muscle Loss): mild depletion  Anterior Thigh Region (Muscle Loss): well nourished  Posterior Calf Region (Muscle Loss): well nourished                 Nutrition Follow-Up    RD Follow-up?: Yes

## 2020-10-20 NOTE — PLAN OF CARE
Recommendations     1.) Change EN to Novasource Renal; begin at 10mL/hr and advance 5-10mL Q8hr to goal rate at 35mL/hr. Hold for N/V. EN provides 1680kcal, 76gm of protein, 602mL of free water. Add free water per MD recommendations.   2.) Replace magnesium. Monitor electrolytes and replace as needed.      Goals: Pt to meet 50% of energy needs by RD follow up  Nutrition Goal Status: progressing towards goal  Communication of RD Recs: (POC)

## 2020-10-21 PROBLEM — T36.8X5A: Status: ACTIVE | Noted: 2020-01-01

## 2020-10-21 PROBLEM — J86.9 EMPYEMA LUNG: Status: ACTIVE | Noted: 2020-01-01

## 2020-10-21 NOTE — ASSESSMENT & PLAN NOTE
Afebrile and with stable leukocytosis. Blood cultures NGTD. Etiology unclear however differential would include partially treated infected hematoma, partially treated empyema, a leukemoid reaction or drug reaction. Pending PEG placement.   -Continue cefepime and fluconazole.   -Will consider discontinuing fluconazole in the next 24-48 hours.  -Discontinue vancomycin.   -If leukocytosis does not decrease with removal of NGT then may need to consider CVC exchange if clinically safe to do so.

## 2020-10-21 NOTE — PT/OT/SLP PROGRESS
Occupational Therapy Missed Visit       Patient Name:  Fatou Lorenzo   MRN:  [id number]  Admitting Diagnosis:  Leukocytosis   Recent Surgery: Procedure(s) (LRB):  INSERTION, PEG TUBE (N/A) Day of Surgery  Admit Date: 8/30/2020  Length of Stay: 51 days    Patient not seen today secondary to MARY for PEG placement. Will follow-up on next scheduled visit per OT POC.        Chitra Quiroga OT  10/21/2020

## 2020-10-21 NOTE — PROGRESS NOTES
Pharmacokinetic Assessment Follow Up: IV Vancomycin    Vancomycin Regimen Assessment & Plan:  - Vancomycin level drawn with morning labs today resulted as 22.8 ug/mL, goal trough 15-20 ug/mL (per ID).  - Patient with JONAH, SCr continues up trending with very slow vancomycin elimination. Will continue pulse dosing while renal function remains unstable.  - No dose needed today as level is above goal trough. Draw vancomycin level with morning labs tomorrow. Will re-dose as needed depending on level and renal function.    Drug levels (last 3 results):  Recent Labs   Lab Result Units 10/19/20  0333 10/21/20  0403   Vancomycin, Random ug/mL 29.1 22.8     Pharmacy will continue to follow and monitor vancomycin.    Please contact pharmacy at extension [phone] for questions regarding this assessment.    Thank you for the consult,   Christian Pinedo     Patient brief summary:  Fatou Lorenzo is a 75 y.o. female initiated on antimicrobial therapy with IV Vancomycin for treatment of surgical prophylaxis    The patient's current regimen is pulse dosing    Drug Allergies:   Review of patient's allergies indicates:  No Known Allergies    Actual Body Weight:   83.5 kg    Renal Function:   Estimated Creatinine Clearance: 20 mL/min (A) (based on SCr of 2.6 mg/dL (H)).,     Dialysis Method (if applicable):  N/A

## 2020-10-21 NOTE — ASSESSMENT & PLAN NOTE
Fatou Lorenzo is a 75 y.o. female s/p MVr, TVr 9/9/2020. Case noteworthy for multiple pump runs (3) and RV hematoma due to retraction. Unstable 9/18, required pericardial window for evac of posterior cardiac tamponade. Respiratory distress and so re-admitted to SICU on 10/2 now s/p VATS on 10/5.     Neuro:  - Flat affect but remains alert and responds to questions appropriately   - Seen by psych - Following recs (started remeron)   - PRN liquid tylenol and meds through KE      CV:  S/p MVr, TVr, MAZE 9/9/20. S/p bedside pericardial window 9/18  - Keep MAPs >60.   - pAF: digoxin 125 mcg.   -Transition back to 12.5 BID Metoprolol via ke.   - Follow Dig level   - labetalol IV PRN for SBP > 160  - INR 1.8. Gave 2 FFP      Pulm:   - Loculated pleural effusion s/p VATS on 10/5  - Extubated 10/9. Breathing comfortably on room air  - Scheduled duo and saline nebs  - CXR and ABG PRN  - HOB >30 deg     Renal:  - DC vivas   - BUN/Cr  54/1.3--> 66/1.5--> 90/1.8-->103/2.1-->113/2.4-->1117/2.6  - Continue to monitor UOP   - Probably renal FeNa>1%   - TBD nephrology consult       FEN/GI:  - Replace other lytes as needed  - Ke tube in place with TF infusing.   - FW flushes to 400 Q6    - Famotidine and bowel regimen      Heme/Onc:  - H/H stable: 6.9/23. Will give 1u pRBC   - No evidence of bleeding     ID:   - S/p 7 days of cefepime vanc for enterococcus cloacae UTI. Also has e coli growing from pleural cultures.  - Follow ID recommendations pending      - Started on Cefepime fluconazole re-started 10/14/20. Candida on BAL     - Current antibiotics Cefepime/Vanc/ Antifungal (descalation today?)  - BC: No growth to date     Endo:  - no hx of DM  - ISS     PPx:  - famotidine, SCD,      Dispo: Continue ICU level care

## 2020-10-21 NOTE — PLAN OF CARE
"      SICU PLAN OF CARE NOTE    Dx: Leukocytosis    Shift Events: peg tube placement    Neuro: Arouses to Voice and Follows Commands    Vital Signs: BP (!) 120/58   Pulse (!) 59   Temp 97.4 °F (36.3 °C) (Oral)   Resp 17   Ht 5' 5" (1.651 m)   Wt 83.5 kg (184 lb)   SpO2 100%   Breastfeeding No   BMI 30.62 kg/m²     Respiratory: Nasal Cannula    Diet: NPO    Gtts: heparin    Urine Output: 20-35 cc/hr       "

## 2020-10-21 NOTE — NURSING
POC reviewed with pt. VSS, afebrile. Pt follows commands and moves all extremities. MAP >65. SBP <180. SpO2 > 90% on RA. Heparin @ 600. TF turned off at midnight. UOP trended, see flowsheet for additional details. 1 BM during shift. Will continue to monitor.      Skin: Bed plugged in, mattress inflated. Pt turned q2hrs. Heels elevated. No new skin breakdown noted. Heel boot and foot drop boot rotated q2. Wound care orders provided. Partial bath given.

## 2020-10-21 NOTE — SUBJECTIVE & OBJECTIVE
Interval History: Shakes head to yes/no questions.   75 y.o. female w/ history of Atrial fibrillation with RVR, HLD and severe mitral regurgitation who is s/p MV repair and TV annuloplasty on 9/9, complicated by posterior cardiac tamponade s/p pericardial window on 9/18, UTI (E cloacae, tx X 7 days with cefepime), and large right pleural effusion with multiple loculations s/p thoracentesis ( no cx data) and VATS with limited decortication on 10/5 with cx + for E Coli, s/p BAL 10/7 (candida lusitanae thought to be colonizer), who was last seen by ID on 10/9 who recommended 4 weeks of antibiotics post VATS (IV ceftriaxone while inpatient). Right pleural effusion improved on imaging and leukocytosis resolved.    ID is now reconsulted for re-current leukocytosis (WBC 11>>34).     Transferred to ICU 10/14.  Antibiotics broadened to vanc, cefepime, fluconazole.  Blood cultures negative.  U/S negative.  2D negative. Remains afebrile, WBC continues to trend down ( 34>>24>>21>15). Hemodynamically stable.  On RA. O2 sats 90-99%.  PEG placement aborted due to elevated INR. Leukocytosis remains stable.        Review of Systems   Unable to perform ROS: Mental status change     Objective:     Vital Signs (Most Recent):  Temp: 97.6 °F (36.4 °C) (10/20/20 1900)  Pulse: 65 (10/20/20 1915)  Resp: (!) 36 (10/20/20 1915)  BP: (!) 155/67 (10/20/20 1900)  SpO2: (!) 94 % (10/20/20 1915) Vital Signs (24h Range):  Temp:  [97.6 °F (36.4 °C)-98.7 °F (37.1 °C)] 97.6 °F (36.4 °C)  Pulse:  [64-86] 65  Resp:  [20-42] 36  SpO2:  [88 %-100 %] 94 %  BP: (115-169)/(55-89) 155/67     Weight: 83.5 kg (184 lb)  Body mass index is 30.62 kg/m².    Estimated Creatinine Clearance: 21.6 mL/min (A) (based on SCr of 2.4 mg/dL (H)).    Physical Exam  Vitals signs and nursing note reviewed.   Constitutional:       General: She is not in acute distress.     Appearance: Normal appearance. She is underweight. She is not toxic-appearing or diaphoretic.   HENT:       Head: Normocephalic and atraumatic. No right periorbital erythema or left periorbital erythema.      Right Ear: Hearing and external ear normal. No swelling.      Left Ear: Hearing and external ear normal. No swelling.      Nose: Nose normal. No nasal deformity.      Comments: NG tube in place     Mouth/Throat:      Pharynx: Uvula midline. No oropharyngeal exudate.   Eyes:      General: Lids are normal. No scleral icterus.        Right eye: No discharge.         Left eye: No discharge.      Conjunctiva/sclera: Conjunctivae normal.      Right eye: Right conjunctiva is not injected. No exudate.     Left eye: Left conjunctiva is not injected. No exudate.  Neck:      Musculoskeletal: Normal range of motion and neck supple.      Thyroid: No thyromegaly.      Trachea: No tracheal deviation.      Comments: Left sided CVC  Cardiovascular:      Rate and Rhythm: Normal rate and regular rhythm.      Heart sounds: Normal heart sounds, S1 normal and S2 normal. No murmur. No friction rub. No gallop.    Pulmonary:      Effort: Pulmonary effort is normal. No tachypnea, accessory muscle usage or respiratory distress.      Breath sounds: No stridor. Examination of the right-middle field reveals decreased breath sounds. Examination of the right-lower field reveals decreased breath sounds. Examination of the left-lower field reveals decreased breath sounds. Decreased breath sounds present. No wheezing or rales.   Chest:      Chest wall: No tenderness.   Abdominal:      General: Bowel sounds are normal. There is no distension.      Palpations: Abdomen is soft.      Tenderness: There is no abdominal tenderness. There is no guarding or rebound.   Genitourinary:     Comments: Yoo catheter in place.  Musculoskeletal:         General: Swelling present. No tenderness.      Right lower leg: Edema present.      Left lower leg: Edema present.   Skin:     General: Skin is warm and dry.      Coloration: Skin is not pale.      Findings: No  erythema, lesion or rash.      Nails: There is no clubbing.     Neurological:      Mental Status: She is oriented to person, place, and time and easily aroused. She is lethargic.      Cranial Nerves: No cranial nerve deficit.      Coordination: Coordination normal.   Psychiatric:         Speech: Speech normal.         Behavior: Behavior normal. Behavior is cooperative.         Thought Content: Thought content normal.         Judgment: Judgment normal.         Significant Labs:   Blood Culture:   Recent Labs   Lab 08/23/20  0149 08/23/20  0203 10/14/20  1206   LABBLOO No growth after 5 days. No growth after 5 days. No growth after 5 days.  No growth after 5 days.     BMP:   Recent Labs   Lab 10/20/20  0345   GLU 93      K 5.1   *   CO2 23   *   CREATININE 2.4*   CALCIUM 8.1*   MG 3.1*     CBC:   Recent Labs   Lab 10/19/20  0333 10/20/20  0345   WBC 19.21* 17.69*   HGB 7.7* 7.2*   HCT 25.2* 24.2*    227     Procalcitonin: No results for input(s): PROCAL in the last 48 hours.  Urine Culture:   Recent Labs   Lab 08/23/20  0228 09/29/20  0854   LABURIN No significant growth ENTEROBACTER CLOACAE  >100,000 cfu/ml  *     Urine Studies:   Recent Labs   Lab 08/23/20  2249  10/14/20  1530   COLORU Colorless*   < > Yellow   APPEARANCEUA Clear   < > Cloudy*   PHUR 5.0   < > 5.0   SPECGRAV 1.005   < > 1.010   PROTEINUA Negative   < > 1+*   GLUCUA Negative   < > Negative   KETONESU Negative   < > Negative   BILIRUBINUA Negative   < > Negative   OCCULTUA 1+*   < > 3+*   NITRITE Negative   < > Negative   UROBILINOGEN Negative  --   --    LEUKOCYTESUR Trace*   < > Trace*   RBCUA 4   < > 17*   WBCUA 4   < > 3   BACTERIA Occasional   < > Occasional   SQUAMEPITHEL 1  --  1   HYALINECASTS 1   < > 9*    < > = values in this interval not displayed.       Significant Imaging: I have reviewed all pertinent imaging results/findings within the past 24 hours.

## 2020-10-21 NOTE — PROGRESS NOTES
[facility]  Infectious Disease  Progress Note    Patient Name: Fatou Lorenzo  MRN: [id number]  Admission Date: 8/30/2020  Length of Stay: 51 days  Attending Physician: Antoni Waddell MD  Primary Care Provider: Ludin Rivas MD    Isolation Status: No active isolations  Assessment/Plan:      * Leukocytosis  Afebrile and with stable leukocytosis. Blood cultures NGTD. Etiology unclear however differential would include partially treated infected hematoma, partially treated empyema, a leukemoid reaction or drug reaction. Pending PEG placement. Hemoglobing slowly decreasing.   -Continue cefepime.   -Discontinue fluconazole.   -Consider repeating CXR to assess pleural fluid. If resolved or stable then can complete a 3-4 week course of cefepime from decortication.     Empyema lung  Empyema vs infected hematoma s/p VATS with decortication. Cultures with E coli.  · Management as above.       Anticipated Disposition: per primary    Thank you for your consult. I will follow-up with patient. Please contact us if you have any additional questions.    Vicki Wayne MD  Infectious Disease  [facility]    Subjective:     Principal Problem:Leukocytosis    HPI:   Pt is a 75 y.o. female w/ pmhx of Atrial fibrillation with RVR, HLD and severe mitral regurgitation.  Pt w/ recent left heart catheterization 8/25/2020 who presented w/ complaints of recurrent shortness of breath.  Transesophageal echocardiogram done recently w/ left atrial thrombus.  L heart angiography w/ nonobstructive CAD.   Upon admission, pt required BiPAP due to hypoxemia not responsive to nasal cannula.      Pt transferred to [facility] 8/30 for CT surgery evaluation given recurrent admission despite compliance, BP control and diuresis.  Pt seen by Dr. Waddell who recommended MV repair.  Pt underwent MV repair and TV annuloplasty on 9/9. Pt course c/b reintuabtion and pericardial window for evacuation of posterior cardiac  "tamponade on 9/18.  Pt extubated on 9/24.  Developed increasing white count on 9/28.  UA done concerning for infection.  Urine cxs positive for E.Cloacae.  Pt started on Bactrim.  Chest Xray from 10/1- Since September 28, 2020, increased large right pleural effusion.  Increased diffuse right airspace opacities.  X ray abdomen 9/23 w/ Probable bilateral nephrolithiasis.  ID initially consulted on 10/2 for UTI.  Patient also noted to have large right loculated pleural effusion and underwent IR thoracentesis (no cx data) and subsequently VATS procedure with limited decortication by CTS on 10/5.  Cultures positive for E coli.  Brochoscopy with BAL on 10/7 with yeast.  She was treated with 7 days of cefepime for E. Cloacae in urine and E coli from pleural effusion, followed by transition to IV ceftriaxone with plan for 4 weeks of antibiotics after VATS procedure (ceftriaxone while inpatient with transition to oral Augmentin if d/c).      ID now reconsulted for rising WBC  11.8 >>34.  Repeat blood cultures pending.  Repeat CXR pending.  Afebrile.      Interval History: "tired"Shakes head to yes/no questions.   75 y.o. female w/ history of Atrial fibrillation with RVR, HLD and severe mitral regurgitation who is s/p MV repair and TV annuloplasty on 9/9, complicated by posterior cardiac tamponade s/p pericardial window on 9/18, UTI (E cloacae, tx X 7 days with cefepime), and large right pleural effusion with multiple loculations s/p thoracentesis ( no cx data) and VATS with limited decortication on 10/5 with cx + for E Coli, s/p BAL 10/7 (candida lusitanae thought to be colonizer), who was last seen by ID on 10/9 who recommended 4 weeks of antibiotics post VATS (IV ceftriaxone while inpatient). Right pleural effusion improved on imaging and leukocytosis resolved.    ID is now reconsulted for re-current leukocytosis (WBC 11>>34).     Transferred to ICU 10/14.  Antibiotics broadened to vanc, cefepime, fluconazole.  Blood " cultures negative.  U/S negative.  2D negative. Remains afebrile, WBC continues to trend down ( 34>>24>>21>15). Hemodynamically stable.  On RA. O2 sats 90-99%.  PEG placement aborted due to elevated INR. Leukocytosis remains stable. Hemoglobin decreasing on 10/21.        Review of Systems   Unable to perform ROS: Mental status change     Objective:     Vital Signs (Most Recent):  Temp: 97.6 °F (36.4 °C) (10/21/20 0715)  Pulse: 81 (10/21/20 0941)  Resp: (!) 27 (10/21/20 0941)  BP: (!) 192/74 (10/21/20 0830)  SpO2: 96 % (10/21/20 0941) Vital Signs (24h Range):  Temp:  [97.2 °F (36.2 °C)-98.1 °F (36.7 °C)] 97.6 °F (36.4 °C)  Pulse:  [61-81] 81  Resp:  [20-40] 27  SpO2:  [82 %-100 %] 96 %  BP: (136-192)/() 192/74     Weight: 83.5 kg (184 lb)  Body mass index is 30.62 kg/m².    Estimated Creatinine Clearance: 20 mL/min (A) (based on SCr of 2.6 mg/dL (H)).    Physical Exam  Vitals signs and nursing note reviewed.   Constitutional:       General: She is not in acute distress.     Appearance: Normal appearance. She is underweight. She is not toxic-appearing or diaphoretic.   HENT:      Head: Normocephalic and atraumatic. No right periorbital erythema or left periorbital erythema.      Right Ear: Hearing and external ear normal. No swelling.      Left Ear: Hearing and external ear normal. No swelling.      Nose: Nose normal. No nasal deformity.      Comments: NG tube in place     Mouth/Throat:      Pharynx: Uvula midline. No oropharyngeal exudate.   Eyes:      General: Lids are normal. No scleral icterus.        Right eye: No discharge.         Left eye: No discharge.      Conjunctiva/sclera: Conjunctivae normal.      Right eye: Right conjunctiva is not injected. No exudate.     Left eye: Left conjunctiva is not injected. No exudate.  Neck:      Musculoskeletal: Normal range of motion and neck supple.      Thyroid: No thyromegaly.      Trachea: No tracheal deviation.      Comments: Left sided CVC  Cardiovascular:       Rate and Rhythm: Normal rate and regular rhythm.      Heart sounds: Normal heart sounds, S1 normal and S2 normal. No murmur. No friction rub. No gallop.    Pulmonary:      Effort: Pulmonary effort is normal. No tachypnea, accessory muscle usage or respiratory distress.      Breath sounds: No stridor. Examination of the right-middle field reveals decreased breath sounds. Examination of the right-lower field reveals decreased breath sounds. Examination of the left-lower field reveals decreased breath sounds. Decreased breath sounds present. No wheezing or rales.   Chest:      Chest wall: No tenderness.   Abdominal:      General: Bowel sounds are normal. There is no distension.      Palpations: Abdomen is soft.      Tenderness: There is no abdominal tenderness. There is no guarding or rebound.   Genitourinary:     Comments: Yoo catheter in place.  Musculoskeletal:         General: Swelling present. No tenderness.      Right lower leg: Edema present.      Left lower leg: Edema present.   Skin:     General: Skin is warm and dry.      Coloration: Skin is not pale.      Findings: No erythema, lesion or rash.      Nails: There is no clubbing.     Neurological:      Mental Status: She is oriented to person, place, and time and easily aroused. She is lethargic.      Cranial Nerves: No cranial nerve deficit.      Coordination: Coordination normal.   Psychiatric:         Speech: Speech normal.         Behavior: Behavior normal. Behavior is cooperative.         Thought Content: Thought content normal.         Judgment: Judgment normal.         Significant Labs:   Blood Culture:   Recent Labs   Lab 08/23/20  0149 08/23/20  0203 10/14/20  1206   LABBLOO No growth after 5 days. No growth after 5 days. No growth after 5 days.  No growth after 5 days.     BMP:   Recent Labs   Lab 10/21/20  0403   GLU 91      K 4.4      CO2 23   *   CREATININE 2.6*   CALCIUM 8.5*   MG 2.9*     CBC:   Recent Labs   Lab  10/20/20  0345 10/21/20  0403   WBC 17.69* 16.20*   HGB 7.2* 6.9*   HCT 24.2* 23.3*    207     Procalcitonin: No results for input(s): PROCAL in the last 48 hours.  Urine Culture:   Recent Labs   Lab 08/23/20  0228 09/29/20  0854   LABURIN No significant growth ENTEROBACTER CLOACAE  >100,000 cfu/ml  *     Urine Studies:   Recent Labs   Lab 08/23/20  2249  10/14/20  1530   COLORU Colorless*   < > Yellow   APPEARANCEUA Clear   < > Cloudy*   PHUR 5.0   < > 5.0   SPECGRAV 1.005   < > 1.010   PROTEINUA Negative   < > 1+*   GLUCUA Negative   < > Negative   KETONESU Negative   < > Negative   BILIRUBINUA Negative   < > Negative   OCCULTUA 1+*   < > 3+*   NITRITE Negative   < > Negative   UROBILINOGEN Negative  --   --    LEUKOCYTESUR Trace*   < > Trace*   RBCUA 4   < > 17*   WBCUA 4   < > 3   BACTERIA Occasional   < > Occasional   SQUAMEPITHEL 1  --  1   HYALINECASTS 1   < > 9*    < > = values in this interval not displayed.       Significant Imaging: I have reviewed all pertinent imaging results/findings within the past 24 hours.

## 2020-10-21 NOTE — PLAN OF CARE
Post-Op PEG Care:   · Please keep tube to gravity for the next 24 hours.  · May administer meds after 6 hours and clamp for 30 minutes after medication administration.  · We will give further recs about when to restart tube feeds.   · Please notify General Surgery with any significant changes in patient's vital signs within the first 24 hours after PEG placement.  · Please call with questions about PEG tube.     Lonnie Lopez MD  General Surgery  [facility]

## 2020-10-21 NOTE — ASSESSMENT & PLAN NOTE
-being followed by id with unclear source, likely lung  -vanco levels elevated, stopped  -on cefepime

## 2020-10-21 NOTE — PT/OT/SLP PROGRESS
Physical Therapy Missed Treatment Note      Patient Name:  Fatou Lorenzo   MRN:  [id number]  Admitting Diagnosis:  Leukocytosis   Recent Surgery: Procedure(s) (LRB):  INSERTION, PEG TUBE (N/A) Day of Surgery  Admit Date: 8/30/2020  Length of Stay: 51 days    Patient not seen today due to Unavailable: Pt MARY at OR for PEG tube placement. Fatou Lorenzo's plan of care and PT goals reviewed on this date and remain appropriate. Will follow-up for progressive mobility pending continued medical stability and patient participation.    Yenny Corral PT, DPT  10/21/2020   Pager: [phone]

## 2020-10-21 NOTE — CONSULTS
[facility]  Nephrology  Consult Note    Patient Name: Fatou Lorenzo  MRN: [id number]  Admission Date: 8/30/2020  Hospital Length of Stay: 51 days  Attending Provider: Antoni Waddell MD   Primary Care Physician: Ludin Rivas MD  Principal Problem:Leukocytosis    Inpatient consult to Nephrology  Consult performed by: Rashad Borrero MD  Consult ordered by: Mir Croft MD  Reason for consult: jonah  Assessment/Recommendations: Atn, avoid vanco and keep euvolemic        Subjective:     HPI: Fatou Lorenzo is a 76 yo F who had MVR + TVR to 9/9 that was complicated by RV hematoma and subsequent pericardial effusion.  She has been in the hospital since 8/31/2020 and had a complex hospital course complicated by a significant JONAH 9/18. She then developed pneumonia. He JONAH improved to baseline creat of 0.8-1 9/24. She required vats 10/5 procedure for empyema. She never had resolution of leukocytosis and has been on vanc and cefepime. On 10/14 her creat began to increase again in conjunction with elevated vancomycin levels. We could not find any episode of hypotension or afib w/ rvr around that time.  Her creat has been increasing daily since 10/14 and urine output has been decreasing for last 2 days. Nephrology consulted for JONAH.    Past Medical History:   Diagnosis Date    Atrial fibrillation with RVR 8/24/2020    Cataract     Essential hypertension 8/31/2020    Glaucoma     Heart valve problem     Hyperlipidemia     Severe mitral regurgitation 8/24/2020       Past Surgical History:   Procedure Laterality Date    ANKLE SURGERY      BAIN MAZE PROCEDURE N/A 9/9/2020    Procedure: BAIN MAZE PROCEDURE;  Surgeon: Antoni Waddell MD;  Location: [facility] OR [address] [place] [address];  Service: Cardiothoracic;  Laterality: N/A;  MAZE     LARYNGOSCOPY N/A 10/5/2020    Procedure: LARYNGOSCOPY, MICRO SUSPENSION WITH AUGMENTATION;  Surgeon: Yfn Mendoza MD;  Location: [facility] OR [address] [place] [address];  Service: ENT;   Laterality: N/A;    LEFT HEART CATHETERIZATION Left 8/25/2020    Procedure: Left heart cath, radial;  Surgeon: Marlee Carrillo MD;  Location: [facility] CATH LAB;  Service: Cardiology;  Laterality: Left;    lipoma removal      THORACOSCOPIC DECORTICATION OF LUNG Right 10/5/2020    Procedure: VATS, WITH DECORTICATION, LUNG;  Surgeon: Jeremy Shine MD;  [facility];  Service: Thoracic;  Laterality: Right;    TRICUSPID VALVULOPLASTY N/A 9/9/2020    Procedure: REPAIR, TRICUSPID VALVE;  Surgeon: Antoni Waddell MD;  [facility];  Service: Cardiothoracic;  Laterality: N/A;       Review of patient's allergies indicates:  No Known Allergies  Current Facility-Administered Medications   Medication Frequency    0.9%  NaCl infusion (for blood administration) Q24H PRN    0.9%  NaCl infusion (for blood administration) Q24H PRN    0.9%  NaCl infusion (for blood administration) Q24H PRN    acetaminophen oral solution 650 mg Q6H PRN    albuterol-ipratropium 2.5 mg-0.5 mg/3 mL nebulizer solution 3 mL Q6H WAKE    aspirin chewable tablet 81 mg Daily    ceFEPIme injection 1 g Q24H    dextrose 50% injection 12.5 g PRN    dextrose 50% injection 25 g PRN    digoxin tablet 0.125 mg Daily    famotidine tablet 20 mg Daily    glucagon (human recombinant) injection 1 mg PRN    heparin 25,000 units in dextrose 5% 250 mL (100 units/mL) infusion (heparin infusion - NO NOMOGRAM) Continuous    insulin aspart U-100 pen 0-5 Units Q6H PRN    labetalol 20 mg/4 mL (5 mg/mL) IV syring Q6H PRN    lidocaine HCL 2% jelly PRN    melatonin tablet 6 mg Nightly    metoprolol 12.5mg/1.25mL oral solution 25 mg BID    mirtazapine tablet 7.5 mg QHS    morphine injection 1 mg Q6H PRN    multivitamin tablet Daily    polyethylene glycol packet 17 g Daily    sodium chloride 3% nebulizer solution 4 mL Q6H WAKE     Family History     Problem Relation (Age of Onset)    Cancer Mother    Cataracts Sister    No Known  Problems Father, Brother, Maternal Aunt, Maternal Uncle, Paternal Aunt, Paternal Uncle, Maternal Grandmother, Maternal Grandfather, Paternal Grandmother, Paternal Grandfather        Tobacco Use    Smoking status: Never Smoker    Smokeless tobacco: Never Used   Substance and Sexual Activity    Alcohol use: No    Drug use: No    Sexual activity: Not Currently     Review of Systems   Unable to perform ROS: Acuity of condition     Objective:     Vital Signs (Most Recent):  Temp: 97.4 °F (36.3 °C) (10/21/20 1500)  Pulse: (!) 58 (10/21/20 1530)  Resp: (!) 24 (10/21/20 1530)  BP: 129/62 (10/21/20 1530)  SpO2: 100 % (10/21/20 1530)  O2 Device (Oxygen Therapy): nasal cannula w/ humidification (10/21/20 1051) Vital Signs (24h Range):  Temp:  [97.2 °F (36.2 °C)-97.8 °F (36.6 °C)] 97.4 °F (36.3 °C)  Pulse:  [57-81] 58  Resp:  [20-40] 24  SpO2:  [82 %-100 %] 100 %  BP: (108-192)/() 129/62     Weight: 83.5 kg (184 lb) (10/14/20 0952)  Body mass index is 30.62 kg/m².  Body surface area is 1.96 meters squared.    I/O last 3 completed shifts:  In: 3082.9 [I.V.:391; Blood:861.9; NG/GT:1830]  Out: 1048 [Urine:1047; Stool:1]    Physical Exam  Vitals signs and nursing note reviewed.   Constitutional:       General: She is not in acute distress.     Appearance: She is ill-appearing. She is not toxic-appearing.   Eyes:      General: No scleral icterus.     Pupils: Pupils are equal, round, and reactive to light.   Cardiovascular:      Rate and Rhythm: Normal rate.      Heart sounds: No murmur.   Pulmonary:      Effort: Pulmonary effort is normal.   Musculoskeletal:         General: Swelling present.      Right lower leg: Edema present.      Left lower leg: Edema present.   Skin:     Coloration: Skin is not jaundiced.         Significant Labs:  All labs within the past 24 hours have been reviewed.    Significant Imaging:  Labs: Reviewed  X-Ray: Reviewed    Assessment/Plan:     * Leukocytosis  -being followed by id with unclear  source, likely lung  -vanco levels elevated, stopped  -on cefepime    Atrial fibrillation with RVR  - on amiodarone infusion    JONAH (acute kidney injury)  -non oliguric kdigo stage 2 jonah likely multifactorial ATN from vancomycin toxicity, infection, low effective arterial blood volume  -ua with spec grav 1010, prot 1+, blood 3+, Sang 30  -vivas present  -urine microscopy with frequent muddy brown casts  -recommend avoiding hypotension and if using vanco to keep within recommended levels  -would not recommend furosemide at this time, limits volume in to reduce need for diuresis                                  Adverse effect of vancomycin  -vanco level peakd at 30  -avoid elevated vanco levels        Rashad Borrero MD  Nephrology  [facility]

## 2020-10-21 NOTE — SUBJECTIVE & OBJECTIVE
"Interval History: "tired"Shakes head to yes/no questions.   75 y.o. female w/ history of Atrial fibrillation with RVR, HLD and severe mitral regurgitation who is s/p MV repair and TV annuloplasty on 9/9, complicated by posterior cardiac tamponade s/p pericardial window on 9/18, UTI (E cloacae, tx X 7 days with cefepime), and large right pleural effusion with multiple loculations s/p thoracentesis ( no cx data) and VATS with limited decortication on 10/5 with cx + for E Coli, s/p BAL 10/7 (candida lusitanae thought to be colonizer), who was last seen by ID on 10/9 who recommended 4 weeks of antibiotics post VATS (IV ceftriaxone while inpatient). Right pleural effusion improved on imaging and leukocytosis resolved.    ID is now reconsulted for re-current leukocytosis (WBC 11>>34).     Transferred to ICU 10/14.  Antibiotics broadened to vanc, cefepime, fluconazole.  Blood cultures negative.  U/S negative.  2D negative. Remains afebrile, WBC continues to trend down ( 34>>24>>21>15). Hemodynamically stable.  On RA. O2 sats 90-99%.  PEG placement aborted due to elevated INR. Leukocytosis remains stable. Hemoglobin decreasing on 10/21.        Review of Systems   Unable to perform ROS: Mental status change     Objective:     Vital Signs (Most Recent):  Temp: 97.6 °F (36.4 °C) (10/21/20 0715)  Pulse: 81 (10/21/20 0941)  Resp: (!) 27 (10/21/20 0941)  BP: (!) 192/74 (10/21/20 0830)  SpO2: 96 % (10/21/20 0941) Vital Signs (24h Range):  Temp:  [97.2 °F (36.2 °C)-98.1 °F (36.7 °C)] 97.6 °F (36.4 °C)  Pulse:  [61-81] 81  Resp:  [20-40] 27  SpO2:  [82 %-100 %] 96 %  BP: (136-192)/() 192/74     Weight: 83.5 kg (184 lb)  Body mass index is 30.62 kg/m².    Estimated Creatinine Clearance: 20 mL/min (A) (based on SCr of 2.6 mg/dL (H)).    Physical Exam  Vitals signs and nursing note reviewed.   Constitutional:       General: She is not in acute distress.     Appearance: Normal appearance. She is underweight. She is not " toxic-appearing or diaphoretic.   HENT:      Head: Normocephalic and atraumatic. No right periorbital erythema or left periorbital erythema.      Right Ear: Hearing and external ear normal. No swelling.      Left Ear: Hearing and external ear normal. No swelling.      Nose: Nose normal. No nasal deformity.      Comments: NG tube in place     Mouth/Throat:      Pharynx: Uvula midline. No oropharyngeal exudate.   Eyes:      General: Lids are normal. No scleral icterus.        Right eye: No discharge.         Left eye: No discharge.      Conjunctiva/sclera: Conjunctivae normal.      Right eye: Right conjunctiva is not injected. No exudate.     Left eye: Left conjunctiva is not injected. No exudate.  Neck:      Musculoskeletal: Normal range of motion and neck supple.      Thyroid: No thyromegaly.      Trachea: No tracheal deviation.      Comments: Left sided CVC  Cardiovascular:      Rate and Rhythm: Normal rate and regular rhythm.      Heart sounds: Normal heart sounds, S1 normal and S2 normal. No murmur. No friction rub. No gallop.    Pulmonary:      Effort: Pulmonary effort is normal. No tachypnea, accessory muscle usage or respiratory distress.      Breath sounds: No stridor. Examination of the right-middle field reveals decreased breath sounds. Examination of the right-lower field reveals decreased breath sounds. Examination of the left-lower field reveals decreased breath sounds. Decreased breath sounds present. No wheezing or rales.   Chest:      Chest wall: No tenderness.   Abdominal:      General: Bowel sounds are normal. There is no distension.      Palpations: Abdomen is soft.      Tenderness: There is no abdominal tenderness. There is no guarding or rebound.   Genitourinary:     Comments: Yoo catheter in place.  Musculoskeletal:         General: Swelling present. No tenderness.      Right lower leg: Edema present.      Left lower leg: Edema present.   Skin:     General: Skin is warm and dry.       Coloration: Skin is not pale.      Findings: No erythema, lesion or rash.      Nails: There is no clubbing.     Neurological:      Mental Status: She is oriented to person, place, and time and easily aroused. She is lethargic.      Cranial Nerves: No cranial nerve deficit.      Coordination: Coordination normal.   Psychiatric:         Speech: Speech normal.         Behavior: Behavior normal. Behavior is cooperative.         Thought Content: Thought content normal.         Judgment: Judgment normal.         Significant Labs:   Blood Culture:   Recent Labs   Lab 08/23/20  0149 08/23/20  0203 10/14/20  1206   LABBLOO No growth after 5 days. No growth after 5 days. No growth after 5 days.  No growth after 5 days.     BMP:   Recent Labs   Lab 10/21/20  0403   GLU 91      K 4.4      CO2 23   *   CREATININE 2.6*   CALCIUM 8.5*   MG 2.9*     CBC:   Recent Labs   Lab 10/20/20  0345 10/21/20  0403   WBC 17.69* 16.20*   HGB 7.2* 6.9*   HCT 24.2* 23.3*    207     Procalcitonin: No results for input(s): PROCAL in the last 48 hours.  Urine Culture:   Recent Labs   Lab 08/23/20  0228 09/29/20  0854   LABURIN No significant growth ENTEROBACTER CLOACAE  >100,000 cfu/ml  *     Urine Studies:   Recent Labs   Lab 08/23/20  2249  10/14/20  1530   COLORU Colorless*   < > Yellow   APPEARANCEUA Clear   < > Cloudy*   PHUR 5.0   < > 5.0   SPECGRAV 1.005   < > 1.010   PROTEINUA Negative   < > 1+*   GLUCUA Negative   < > Negative   KETONESU Negative   < > Negative   BILIRUBINUA Negative   < > Negative   OCCULTUA 1+*   < > 3+*   NITRITE Negative   < > Negative   UROBILINOGEN Negative  --   --    LEUKOCYTESUR Trace*   < > Trace*   RBCUA 4   < > 17*   WBCUA 4   < > 3   BACTERIA Occasional   < > Occasional   SQUAMEPITHEL 1  --  1   HYALINECASTS 1   < > 9*    < > = values in this interval not displayed.       Significant Imaging: I have reviewed all pertinent imaging results/findings within the past 24 hours.

## 2020-10-21 NOTE — SUBJECTIVE & OBJECTIVE
Interval History/Significant Events: NAEON. Not requiring any pressor support and has remained rate controlled. WBC down to 16. Creatinine and BUN trending up.   Was given 2 units of FFP, heparin will be paused of PEG tube today   INR today 1.8    Follow-up For: Procedure(s) (LRB):  LARYNGOSCOPY, MICRO SUSPENSION WITH AUGMENTATION (N/A)  VATS, WITH DECORTICATION, LUNG (Right)    Post-Operative Day: 13 Days Post-Op    Objective:     Vital Signs (Most Recent):  Temp: 97.6 °F (36.4 °C) (10/21/20 0715)  Pulse: 61 (10/21/20 0830)  Resp: (!) 22 (10/21/20 0830)  BP: (!) 192/74 (10/21/20 0830)  SpO2: 96 % (10/21/20 0830) Vital Signs (24h Range):  Temp:  [97.2 °F (36.2 °C)-98.4 °F (36.9 °C)] 97.6 °F (36.4 °C)  Pulse:  [61-77] 61  Resp:  [20-40] 22  SpO2:  [82 %-100 %] 96 %  BP: (115-192)/() 192/74     Weight: 83.5 kg (184 lb)  Body mass index is 30.62 kg/m².      Intake/Output Summary (Last 24 hours) at 10/21/2020 0848  Last data filed at 10/21/2020 0830  Gross per 24 hour   Intake 2774.87 ml   Output 668 ml   Net 2106.87 ml       Physical Exam  Vitals signs and nursing note reviewed.   Constitutional:       Appearance: She is well-developed. She is ill-appearing. She is not diaphoretic.   HENT:      Head: Normocephalic and atraumatic.   Eyes:      Conjunctiva/sclera: Conjunctivae normal.   Neck:      Musculoskeletal: Normal range of motion and neck supple.   Cardiovascular:      Rate and Rhythm: Normal rate and regular rhythm.      Comments: Midline sternotomy with clean, dry, and intact, without evidence of infection  Prior chest tubes sites with dressings in place, clean and dry  Pulmonary:      Comments: Breathing comfortably on comfort flow  No distress, but rhonchi present and she has a wet cough.  Hematoma on the R chest, not actively bleeding   Abdominal:      General: There is no distension.      Palpations: Abdomen is soft.      Tenderness: There is no abdominal tenderness.   Skin:     General: Skin is warm  and dry.   Psychiatric:      Comments: Depressed mood, flat affect         Vents:  Vent Mode: Spont (10/08/20 1525)  Ventilator Initiated: Yes(chart correction) (09/18/20 2202)  Set Rate: 20 BPM (10/08/20 0747)  Vt Set: 375 mL (10/08/20 0747)  Pressure Support: 10 cmH20 (10/08/20 1525)  PEEP/CPAP: 5 cmH20 (10/08/20 1525)  Oxygen Concentration (%): 21 (10/17/20 1951)  Peak Airway Pressure: 15 cmH2O (10/08/20 1525)  Plateau Pressure: 19 cmH20 (10/08/20 1525)  Total Ve: 5.3 mL (10/08/20 1525)  Negative Inspiratory Force (cm H2O): -21 (10/08/20 1452)  F/VT Ratio<105 (RSBI): (!) 70.71 (10/08/20 1258)    Lines/Drains/Airways     Central Venous Catheter Line            Percutaneous Central Line Insertion/Assessment - Triple Lumen  10/05/20 1843 left internal jugular 15 days          Drain                 Trans Pyloric Feeding Tube 10/14/20 1500 6 days         Urethral Catheter 10/14/20 1500 6 days          Peripheral Intravenous Line                 Midline Catheter Insertion/Assessment  - Single Lumen 10/19/20 1654 brachial vein 18g x 10cm 1 day                Significant Labs:    CBC/Anemia Profile:  Recent Labs   Lab 10/20/20  0345 10/21/20  0403   WBC 17.69* 16.20*   HGB 7.2* 6.9*   HCT 24.2* 23.3*    207   MCV 98 99*   RDW 19.5* 19.4*        Chemistries:  Recent Labs   Lab 10/20/20  0345 10/21/20  0403    145   K 5.1 4.4   * 110   CO2 23 23   * 113*   CREATININE 2.4* 2.6*   CALCIUM 8.1* 8.5*   ALBUMIN 1.8* 2.1*   PROT 5.7* 5.9*   BILITOT 0.6 0.6   ALKPHOS 200* 204*   ALT 41 39   AST 45* 37   MG 3.1* 2.9*   PHOS 3.8 4.5       ABGs: No results for input(s): PH, PCO2, HCO3, POCSATURATED, BE in the last 48 hours.  Coagulation:   Recent Labs   Lab 10/21/20  0403   INR 1.8*   APTT 75.2*     Lactic Acid: No results for input(s): LACTATE in the last 48 hours.    Significant Imaging:  I have reviewed all pertinent imaging results/findings within the past 24 hours.

## 2020-10-21 NOTE — OP NOTE
DATE OF PROCEDURE: 10/21/2020    PREOPERATIVE DIAGNOSES:   1. Dysphagia.    POSTOPERATIVE DIAGNOSES:   1. Dysphagia.     PROCEDURES PERFORMED:   1. Esophagogastroduodenoscopy with percutaneous endoscopic gastrostomy.    ATTENDING SURGEON: Jeremy Garcia MD    HOUSESTAFF SURGEON: Lonnie Lopez MD    ANESTHESIA: MAC.     ESTIMATED BLOOD LOSS: 2 mL     FINDINGS: A 20-Arabic percutaneous gastrostomy   placed without apparent complication.     SPECIMEN: None.     DRAINS: None.     COMPLICATIONS: None.     INDICATIONS: Fatou Lorenzo is a 75 y.o.female admitted to [facility] with inability to tolerate adequate PO after mitral valve repair 9/9/20. We recommend a percutaneous gastrostomy tube for the patient's dysphagia. We did obtain informed consent from the patient's family who expressed understanding of the risks and benefits and gave consent to proceed.     OPERATIVE PROCEDURE: The patient was identified and monitored throughout.  We directed our attention to the left upper quadrant for gastrostomy tube placement. The patient's abdomen was prepped and draped. An upper endoscope was introduced into the oropharynx and guided down into the esophagus and stomach. The stomach was insufflated with air. We identified an appropriate position for gastrostomy tube placement 2 finger-breadths below the left subcostal margin. Palpation of the anterior abdominal wall at this point was visualized endoscopically and transillumination from the endoscope was visualized through the anterior abdominal wall. We made a 1.5 cm transverse skin incision. At this point, the stomach was cannulated with a catheter loaded on a needle. This was grasped by a snare which had been passed through the endoscope. The needle was removed, and a guidewire was placed, and the snare was used to grasp the guidewire. The endoscope, snare, and guidewire were all withdrawn from the patient's mouth. A 20-Arabic gastrostomy tube was loaded  onto the guidewire and pulled through the anterior abdominal wall via Seldinger technique. Repeat endoscopy was performed with the gastrostomy tube at the 3 cm stephany at the skin. There was no blanching of the gastric mucosa, and when the tube was twisted, the button did not grab the mucosa. The insufflation in the stomach was evacuated, and the endoscope was removed. The gastrostomy tube was secured in place using the supplied devices and connected to a bag for gravity drainage.    The patient remained in stable condition at the end of the case.    Lonnie Lopez MD  General Surgery  [facility]

## 2020-10-21 NOTE — ASSESSMENT & PLAN NOTE
Afebrile and with stable leukocytosis. Blood cultures NGTD. Etiology unclear however differential would include partially treated infected hematoma, partially treated empyema, a leukemoid reaction or drug reaction. Pending PEG placement. Hemoglobing slowly decreasing.   -Continue cefepime.   -Discontinue fluconazole.   -Consider repeating CXR to assess pleural fluid. If resolved or stable then can complete a 3-4 week course of cefepime from decortication.

## 2020-10-21 NOTE — PLAN OF CARE
[facility] continues to follow patient.     SW will continue to coordinate with patient, family, team and insurance to complete patient's discharge plan.    Esthela Peralta LMSW   - Case Management

## 2020-10-21 NOTE — SUBJECTIVE & OBJECTIVE
Past Medical History:   Diagnosis Date    Atrial fibrillation with RVR 8/24/2020    Cataract     Essential hypertension 8/31/2020    Glaucoma     Heart valve problem     Hyperlipidemia     Severe mitral regurgitation 8/24/2020       Past Surgical History:   Procedure Laterality Date    ANKLE SURGERY      BAIN MAZE PROCEDURE N/A 9/9/2020    Procedure: BAIN MAZE PROCEDURE;  Surgeon: Antoni Waddell MD;  [facility];  Service: Cardiothoracic;  Laterality: N/A;  MAZE     LARYNGOSCOPY N/A 10/5/2020    Procedure: LARYNGOSCOPY, MICRO SUSPENSION WITH AUGMENTATION;  Surgeon: Yfn Mendoza MD;  [facility];  Service: ENT;  Laterality: N/A;    LEFT HEART CATHETERIZATION Left 8/25/2020    Procedure: Left heart cath, radial;  Surgeon: Marlee Carrillo MD;  [facility] LAB;  Service: Cardiology;  Laterality: Left;    lipoma removal      THORACOSCOPIC DECORTICATION OF LUNG Right 10/5/2020    Procedure: VATS, WITH DECORTICATION, LUNG;  Surgeon: Jeremy Shine MD;  [facility];  Service: Thoracic;  Laterality: Right;    TRICUSPID VALVULOPLASTY N/A 9/9/2020    Procedure: REPAIR, TRICUSPID VALVE;  Surgeon: Antoni Waddell MD;  [facility];  Service: Cardiothoracic;  Laterality: N/A;       Review of patient's allergies indicates:  No Known Allergies  Current Facility-Administered Medications   Medication Frequency    0.9%  NaCl infusion (for blood administration) Q24H PRN    0.9%  NaCl infusion (for blood administration) Q24H PRN    0.9%  NaCl infusion (for blood administration) Q24H PRN    acetaminophen oral solution 650 mg Q6H PRN    albuterol-ipratropium 2.5 mg-0.5 mg/3 mL nebulizer solution 3 mL Q6H WAKE    aspirin chewable tablet 81 mg Daily    ceFEPIme injection 1 g Q24H    dextrose 50% injection 12.5 g PRN    dextrose 50% injection 25 g PRN    digoxin tablet 0.125 mg Daily    famotidine tablet 20 mg Daily    glucagon (human  recombinant) injection 1 mg PRN    heparin 25,000 units in dextrose 5% 250 mL (100 units/mL) infusion (heparin infusion - NO NOMOGRAM) Continuous    insulin aspart U-100 pen 0-5 Units Q6H PRN    labetalol 20 mg/4 mL (5 mg/mL) IV syring Q6H PRN    lidocaine HCL 2% jelly PRN    melatonin tablet 6 mg Nightly    metoprolol 12.5mg/1.25mL oral solution 25 mg BID    mirtazapine tablet 7.5 mg QHS    morphine injection 1 mg Q6H PRN    multivitamin tablet Daily    polyethylene glycol packet 17 g Daily    sodium chloride 3% nebulizer solution 4 mL Q6H WAKE     Family History     Problem Relation (Age of Onset)    Cancer Mother    Cataracts Sister    No Known Problems Father, Brother, Maternal Aunt, Maternal Uncle, Paternal Aunt, Paternal Uncle, Maternal Grandmother, Maternal Grandfather, Paternal Grandmother, Paternal Grandfather        Tobacco Use    Smoking status: Never Smoker    Smokeless tobacco: Never Used   Substance and Sexual Activity    Alcohol use: No    Drug use: No    Sexual activity: Not Currently     Review of Systems   Unable to perform ROS: Acuity of condition     Objective:     Vital Signs (Most Recent):  Temp: 97.4 °F (36.3 °C) (10/21/20 1500)  Pulse: (!) 58 (10/21/20 1530)  Resp: (!) 24 (10/21/20 1530)  BP: 129/62 (10/21/20 1530)  SpO2: 100 % (10/21/20 1530)  O2 Device (Oxygen Therapy): nasal cannula w/ humidification (10/21/20 1051) Vital Signs (24h Range):  Temp:  [97.2 °F (36.2 °C)-97.8 °F (36.6 °C)] 97.4 °F (36.3 °C)  Pulse:  [57-81] 58  Resp:  [20-40] 24  SpO2:  [82 %-100 %] 100 %  BP: (108-192)/() 129/62     Weight: 83.5 kg (184 lb) (10/14/20 0952)  Body mass index is 30.62 kg/m².  Body surface area is 1.96 meters squared.    I/O last 3 completed shifts:  In: 3082.9 [I.V.:391; Blood:861.9; NG/GT:1830]  Out: 1048 [Urine:1047; Stool:1]    Physical Exam  Vitals signs and nursing note reviewed.   Constitutional:       General: She is not in acute distress.     Appearance: She is  ill-appearing. She is not toxic-appearing.   Eyes:      General: No scleral icterus.     Pupils: Pupils are equal, round, and reactive to light.   Cardiovascular:      Rate and Rhythm: Normal rate.      Heart sounds: No murmur.   Pulmonary:      Effort: Pulmonary effort is normal.   Musculoskeletal:         General: Swelling present.      Right lower leg: Edema present.      Left lower leg: Edema present.   Skin:     Coloration: Skin is not jaundiced.         Significant Labs:  All labs within the past 24 hours have been reviewed.    Significant Imaging:  Labs: Reviewed  X-Ray: Reviewed

## 2020-10-21 NOTE — ANESTHESIA PREPROCEDURE EVALUATION
10/21/2020  Fatou Lorenzo is a 75 y.o., female.    Anesthesia Evaluation    I have reviewed the Patient Summary Reports.    I have reviewed the Nursing Notes. I have reviewed the NPO Status.      Review of Systems       [facility]  Anesthesia Pre-Operative Evaluation         Patient Name: Fatou Lorenzo  YOB: 1945  MRN: [id number]    SUBJECTIVE:     Pre-operative evaluation for Procedure(s) (LRB):  INSERTION, PEG TUBE (N/A)     10/21/2020    Fatou Lorenzo is a 75 y.o. female w/ a significant PMHx of a-fib, MR and TR, s/p MVr, TVr and MAZE 9/9/2020, right vocal cord paralysis, CAD, CHF and JONAH. Case noteworthy for multiple pump runs (3) and RV hematoma due to retraction. Unstable 9/18, required pericardial window for evac of posterior cardiac tamponade. Respiratory distress and so re-admitted to SICU on 10/2 now s/p VATS on 10/5. Pt extubated on 10/9/20.     Patient now presents for the above procedure(s).      LDA:   Percutaneous Central Line Insertion/Assessment - Triple Lumen  10/05/20 1843 left internal jugular (Active)   Verification by X-ray Yes 10/19/20 2301   Site Assessment No drainage;No redness;No swelling;No warmth 10/19/20 2301   Line Securement Device Secured with sutures 10/19/20 2301   Dressing Type Biopatch in place;Central line dressing with pants 10/19/20 2301   Dressing Status Clean;Dry;Intact 10/19/20 2301   Dressing Intervention Integrity maintained 10/19/20 2301   Date on Dressing 10/19/20 10/19/20 2301   Dressing Due to be Changed 10/26/20 10/19/20 2301   Distal Patency/Care flushed w/o difficulty;normal saline locked 10/19/20 2301   Medial 1 Patency/Care flushed w/o difficulty;normal saline lock 10/19/20 2301   Proximal 1 Patency/Care flushed w/o difficulty;normal saline locked 10/19/20 2301   Waveform Other (Comments) 10/19/20 2301   Line Necessity  "Review Poor venous access 10/19/20 2301   Number of days: 14            Midline Catheter Insertion/Assessment  - Single Lumen 10/19/20 1654 brachial vein 18g x 10cm (Active)   Site Assessment Clean;Dry;No redness;Intact;No swelling 10/19/20 2301   IV Device Securement catheter securement device 10/19/20 1700   Line Status Blood return noted;Flushed;Saline locked 10/19/20 2301   Dressing Type Biopatch in place;Central line dressing with pants 10/19/20 2301   Dressing Status Clean;Dry;Intact 10/19/20 2301   Dressing Intervention Integrity maintained 10/19/20 2301   Dressing Change Due 10/26/20 10/19/20 2301   Site Change Due 11/17/20 10/19/20 1901   Reason Not Rotated Not due 10/19/20 2301   Number of days: 0            Urethral Catheter 10/14/20 1500 (Active)   Site Assessment Clean;Intact 10/19/20 2301   Collection Container Urimeter 10/19/20 2301   Securement Method secured to top of thigh w/ adhesive device 10/19/20 2301   Catheter Care Performed yes 10/19/20 2301   Reason for Continuing Urinary Catheterization Critically ill in ICU requiring intensive monitoring 10/19/20 2301   CAUTI Prevention Bundle StatLock in place w 1" slack;Intact seal between catheter & drainage tubing;No dependent loops or kinks;Drainage bag/urimeter not overfilled (<2/3 full);Drainage bag/urimeter below bladder;Drainage bag/urimeter off the floor;Green sheeting clip in use 10/19/20 2301   Output (mL) 25 mL 10/19/20 2300   Number of days: 5            Trans Pyloric Feeding Tube 10/14/20 1500 (Active)   Placement Check placement verified by x-ray 10/19/20 2301   Tolerance no signs/symptoms of discomfort 10/19/20 2301   Securement secured to nostril center w/ adhesive device 10/19/20 2301   Clamp Status/Tolerance unclamped 10/19/20 2301   Insertion Site Appearance no redness, warmth, tenderness, skin breakdown, drainage 10/19/20 2301   Drainage None 10/19/20 2301   Flush/Irrigation flushed w/;water;no resistance met 10/19/20 2301   Feeding " Type continuous;by pump 10/19/20 2301   Current Rate (mL/hr) 25 mL/hr 10/19/20 2301   Goal Rate (mL/hr) 25 mL/hr 10/19/20 2301   Intake (mL) 400 mL 10/19/20 2200   Intake (mL) - Formula Tube Feeding 25 10/19/20 2300   Residual Amount (ml) 0 ml 10/14/20 1900   Number of days: 5       Prev airway: Placement Date: 10/05/20; Placement Time: 1443 (created via procedure documentation); Method of Intubation: Direct laryngoscopy; Inserted by: CRNA; Airway Device: Double Lumen ET; Mask Ventilation: Easy; Intubated: Postinduction; Blade: Lechuga #2; Cuff Inflation: Minimal occlusive pressure; Placement Verified By: Capnometry; Grade: Grade I; Complicating Factors: None; Intubation Findings: Bilateral breath sounds, Atraumatic/Condition of teeth unchanged, Positive EtCO2;  Depth of Insertion: 25; Securment: Lips; Complications: None; Breath Sounds: Equal Bilateral; Insertion Attempts: 1; Removal Date: 10/05/20;  Removal Time: 1618    Drips: None documented.      Patient Active Problem List   Diagnosis    Nuclear sclerosis - Both Eyes    Dry eyes - Both Eyes    Refractive error    Acute respiratory failure with hypoxia    Paroxysmal atrial fibrillation    Severe mitral regurgitation    Left atrial thrombus    Essential hypertension    Other hyperlipidemia    Coronary artery disease involving native coronary artery of native heart without angina pectoris    Acute on chronic diastolic (congestive) heart failure    JONAH (acute kidney injury)    Atrial fibrillation with RVR    Leukocytosis    Pericardial effusion with cardiac tamponade    Hypokalemia    Hypernatremia    Vocal fold paresis, right    S/P MVR (mitral valve repair)    S/P TVR (tricuspid valve repair)    Debility    Adjustment disorder with mixed anxiety and depressed mood    Pleural effusion    Respiratory distress    Acute blood loss anemia       Review of patient's allergies indicates:  No Known Allergies    Current Inpatient  Medications:      Current Facility-Administered Medications on File Prior to Visit   Medication Dose Route Frequency Provider Last Rate Last Dose    0.9%  NaCl infusion (for blood administration)   Intravenous Q24H PRN Mir Croft MD        0.9%  NaCl infusion (for blood administration)   Intravenous Q24H PRN Mir Croft MD        0.9%  NaCl infusion (for blood administration)   Intravenous Q24H PRN Mir Croft MD        acetaminophen oral solution 650 mg  650 mg Oral Q6H PRN Albert Abrams MD   650 mg at 10/08/20 1737    albuterol-ipratropium 2.5 mg-0.5 mg/3 mL nebulizer solution 3 mL  3 mL Nebulization Q6H WAKE Antoni Waddell MD   3 mL at 10/21/20 0851    aspirin chewable tablet 81 mg  81 mg Per NG tube Daily Madhuri Ng MD   Stopped at 10/21/20 0900    ceFEPIme injection 1 g  1 g Intravenous Q24H Antoni Waddell MD   1 g at 10/21/20 0301    dextrose 50% injection 12.5 g  12.5 g Intravenous PRN Miguel Huntley MD   12.5 g at 10/10/20 0749    dextrose 50% injection 25 g  25 g Intravenous PRN Albert Abrams MD        digoxin tablet 0.125 mg  0.125 mg Per NG tube Daily Madhuri Ng MD   Stopped at 10/21/20 0900    famotidine tablet 20 mg  20 mg Per NG tube Daily Antoni Waddell MD   Stopped at 10/21/20 0900    fluconazole (DIFLUCAN) IVPB 200 mg 100 mL  200 mg Intravenous Q24H Mir Croft MD   200 mg at 10/20/20 1518    glucagon (human recombinant) injection 1 mg  1 mg Intramuscular PRN Miguel Huntley MD        heparin 25,000 units in dextrose 5% 250 mL (100 units/mL) infusion (heparin infusion - NO NOMOGRAM)  600 Units/hr Intravenous Continuous Mir Croft MD   Stopped at 10/21/20 0900    insulin aspart U-100 pen 0-5 Units  0-5 Units Subcutaneous Q6H PRN Miguel Huntley MD        labetalol 20 mg/4 mL (5 mg/mL) IV syring  10 mg Intravenous Q6H PRN Madhuri Ng MD   10 mg at 10/14/20 0883     lactated ringers bolus 1,000 mL  1,000 mL Intravenous Once Mir Croft MD   1,000 mL at 10/21/20 0931    lidocaine HCL 2% jelly   Topical (Top) PRN Madhuri Ng MD        melatonin tablet 6 mg  6 mg Per NG tube Nightly Andrea Grajeda MD   6 mg at 10/20/20 2017    metoprolol 12.5mg/1.25mL oral solution 25 mg  25 mg Per NG tube BID Alison Hobbs PA-C   Stopped at 10/21/20 0900    mirtazapine tablet 7.5 mg  7.5 mg Per NG tube QHS Madhuri Ng MD   7.5 mg at 10/20/20 2016    morphine injection 1 mg  1 mg Intravenous Q6H PRN Madhuri Ng MD   1 mg at 10/14/20 0156    multivitamin tablet  1 tablet Oral Daily Mir Croft MD   Stopped at 10/21/20 0900    polyethylene glycol packet 17 g  17 g Oral Daily Mir Croft MD   Stopped at 10/21/20 0900    propofoL (DIPRIVAN) 10 mg/mL infusion             sodium chloride 3% nebulizer solution 4 mL  4 mL Nebulization Q6H WAKE Antoni Waddell MD   4 mL at 10/21/20 0851     Current Outpatient Medications on File Prior to Visit   Medication Sig Dispense Refill    amiodarone (PACERONE) 400 MG tablet Take two tablets by mouth twice daily for twelve days, then take one tablet by mouth daily. 42 tablet 0    apixaban (ELIQUIS) 5 mg Tab Take 1 tablet (5 mg total) by mouth 2 (two) times daily. 60 tablet 1    furosemide (LASIX) 40 MG tablet Take 1 tablet (40 mg total) by mouth 2 (two) times daily. 60 tablet 1    losartan (COZAAR) 50 MG tablet Take 0.5 tablets (25 mg total) by mouth once daily. 30 tablet 1    metoprolol tartrate (LOPRESSOR) 25 MG tablet Take 1 tablet (25 mg total) by mouth 3 (three) times daily. 90 tablet 1    pravastatin (PRAVACHOL) 80 MG tablet Take 80 mg by mouth once daily.         Past Surgical History:   Procedure Laterality Date    ANKLE SURGERY      BAIN MAZE PROCEDURE N/A 9/9/2020    Procedure: BAIN MAZE PROCEDURE;  Surgeon: Antoni Waddell MD;  Location: [facility] OR [address] [place] [address];  Service:  Cardiothoracic;  Laterality: N/A;  MAZE     LARYNGOSCOPY N/A 10/5/2020    Procedure: LARYNGOSCOPY, MICRO SUSPENSION WITH AUGMENTATION;  Surgeon: Yfn Mendoza MD;  Location: [facility] OR [address] [place] [address];  Service: ENT;  Laterality: N/A;    LEFT HEART CATHETERIZATION Left 8/25/2020    Procedure: Left heart cath, radial;  Surgeon: Marlee Carrillo MD;  Location: [facility] CATH LAB;  Service: Cardiology;  Laterality: Left;    lipoma removal      THORACOSCOPIC DECORTICATION OF LUNG Right 10/5/2020    Procedure: VATS, WITH DECORTICATION, LUNG;  Surgeon: Jeremy Shine MD;  [facility];  Service: Thoracic;  Laterality: Right;    TRICUSPID VALVULOPLASTY N/A 9/9/2020    Procedure: REPAIR, TRICUSPID VALVE;  Surgeon: Antoni Waddell MD;  [facility];  Service: Cardiothoracic;  Laterality: N/A;       Social History     Socioeconomic History    Marital status:      Spouse name: Not on file    Number of children: Not on file    Years of education: Not on file    Highest education level: Not on file   Occupational History    Not on file   Social Needs    Financial resource strain: Not on file    Food insecurity     Worry: Not on file     Inability: Not on file    Transportation needs     Medical: Not on file     Non-medical: Not on file   Tobacco Use    Smoking status: Never Smoker    Smokeless tobacco: Never Used   Substance and Sexual Activity    Alcohol use: No    Drug use: No    Sexual activity: Not Currently   Lifestyle    Physical activity     Days per week: Not on file     Minutes per session: Not on file    Stress: Not on file   Relationships    Social connections     Talks on phone: Not on file     Gets together: Not on file     Attends Congregation service: Not on file     Active member of club or organization: Not on file     Attends meetings of clubs or organizations: Not on file     Relationship status: Not on file   Other Topics Concern    Not on file   Social History  Narrative    Not on file       OBJECTIVE:     Vital Signs Range (Last 24H):  Temp:  [36.2 °C (97.2 °F)-36.7 °C (98.1 °F)]   Pulse:  [61-81]   Resp:  [20-40]   BP: (136-192)/()   SpO2:  [82 %-100 %]       Significant Labs:  Lab Results   Component Value Date    WBC 16.20 (H) 10/21/2020    HGB 6.9 (L) 10/21/2020    HCT 23.3 (L) 10/21/2020     10/21/2020    ALT 39 10/21/2020    AST 37 10/21/2020     10/21/2020    K 4.4 10/21/2020     10/21/2020    CREATININE 2.6 (H) 10/21/2020     (H) 10/21/2020    CO2 23 10/21/2020    INR 1.8 (H) 10/21/2020       Diagnostic Studies: No relevant studies.    EKG:   Results for orders placed or performed during the hospital encounter of 08/30/20   EKG 12-lead    Collection Time: 10/15/20 10:40 AM    Narrative    Test Reason : Z79.899,    Vent. Rate : 069 BPM     Atrial Rate : 069 BPM     P-R Int : 362 ms          QRS Dur : 086 ms      QT Int : 376 ms       P-R-T Axes : 034 029 205 degrees     QTc Int : 402 ms    Sinus rhythm with 1st degree A-V block  Nonspecific ST and/or T wave abnormalities  Abnormal ECG  When compared with ECG of 01-OCT-2020 12:20,  Sinus rhythm has replaced Atrial fibrillation  Vent. rate has decreased BY  41 BPM  Confirmed by DARY CONTRERAS MD ([id number]) on 10/15/2020 1:07:53 PM    Referred By: [email or web address]   SELF           Confirmed By:DARY CONTRERAS MD       2D ECHO:  TTE:  Results for orders placed or performed during the hospital encounter of 08/30/20   Echo Color Flow Doppler? Yes   Result Value Ref Range    Ascending aorta 3.40 cm    STJ 2.86 cm    AV mean gradient 5 mmHg    Ao peak becca 1.67 m/s    Ao VTI 24.75 cm    IVS 1.12 (A) 0.6 - 1.1 cm    LA size 4.31 cm    Left Atrium Major Axis 5.20 cm    Left Atrium Minor Axis 5.31 cm    LVIDd 4.73 3.5 - 6.0 cm    LVIDs 4.01 (A) 2.1 - 4.0 cm    LVOT diameter 2.19 cm    LVOT peak VTI 14.74 cm    Posterior Wall 0.94 0.6 - 1.1 cm    RA Major Axis 4.36 cm    RA Width 2.71 cm    RVDD 2.91 cm     Sinus 2.88 cm    TAPSE 0.69 cm    TR Max Riley 3.09 m/s    TDI LATERAL 0.05 m/s    TDI SEPTAL 0.05 m/s    LA WIDTH 4.69 cm    MV stenosis pressure 1/2 time 46.74 ms    LV Diastolic Volume 103.86 mL    LV Systolic Volume 70.55 mL    LVOT peak riley 1.00 m/s    FS 15 %    LA volume 90.28 cm3    LV mass 173.01 g    Left Ventricle Relative Wall Thickness 0.40 cm    AV valve area 2.24 cm2    AV Velocity Ratio 0.60     AV index (prosthetic) 0.60     MV valve area p 1/2 method 4.71 cm2    Mean e' 0.05 m/s    LVOT area 3.8 cm2    LVOT stroke volume 55.50 cm3    AV peak gradient 11 mmHg    LV Systolic Volume Index 37.0 mL/m2    LV Diastolic Volume Index 54.40 mL/m2    LA Volume Index 47.3 mL/m2    LV Mass Index 91 g/m2    Triscuspid Valve Regurgitation Peak Gradient 38 mmHg    BSA 1.96 m2    Right Atrial Pressure (from IVC) 8 mmHg    MV mean gradient 4 mmHg    TV rest pulmonary artery pressure 46 mmHg    HR echo 94 bpm    Narrative    · Moderate left atrial enlargement.  · The left ventricle is normal in size with mildly decreased systolic   function. The estimated ejection fraction is 45%.  · There is mild left ventricular global hypokinesis.  · Low normal right ventricular systolic function.  · Mild aortic regurgitation.  · S/P mitral valve repair. There is no regurgitation. The mean diastolic   gradient across the mitral valve is 4 mmHg at a heart rate of 94 bpm.  · S/P tricuspid hector;ve annuloplasty. There is trivial regurgitation.  · Intermediate central venous pressure (8 mmHg).  · There is pulmonary hypertension.  · The estimated PA systolic pressure is 46 mmHg.  · There is a moderate left pleural effusion.  · Trivial circumferential pericardial effusion.          CHERY:  No results found for this or any previous visit.    ASSESSMENT/PLAN:                                                                                                                 10/21/2020  Fatou Lorenzo is a 75 y.o., female.    Anesthesia  Evaluation    I have reviewed the Patient Summary Reports.    I have reviewed the Nursing Notes. I have reviewed the NPO Status.   I have reviewed the Medications.     Review of Systems  Anesthesia Hx:  No problems with previous Anesthesia  History of prior surgery of interest to airway management or planning: Denies Family Hx of Anesthesia complications.   Denies Personal Hx of Anesthesia complications.   Social:  Non-Smoker, No Alcohol Use    Hematology/Oncology:         -- Anemia: Hematology Comments: WBC 19.21 (H)   HGB 7.7 (L)   HCT 25.2 (L)    EENT/Dental:EENT/Dental Normal   Cardiovascular:   Hypertension Valvular problems/Murmurs (s/p repair of MV and TV), MR CAD  Dysrhythmias atrial fibrillation CHF TTE 8/30/20.Moderate left atrial enlargement.  · The left ventricle is normal in size with mildly decreased systolic   function. The estimated ejection fraction is 45%.  · There is mild left ventricular global hypokinesis.  · Low normal right ventricular systolic function.  · Mild aortic regurgitation.  · S/P mitral valve repair. There is no regurgitation. The mean diastolic   gradient across the mitral valve is 4 mmHg at a heart rate of 94 bpm.  · S/P tricuspid hector;ve annuloplasty. There is trivial regurgitation.  · Intermediate central venous pressure (8 mmHg).  · There is pulmonary hypertension.  · The estimated PA systolic pressure is 46 mmHg.  · There is a moderate left pleural effusion.  · Trivial circumferential pericardial effusion.   Pulmonary:   Right loculated effusion   Renal/:   Chronic Renal Disease (JONAH)    Hepatic/GI:  Hepatic/GI Normal    Neurological:  Neurology Normal    Endocrine:  Endocrine Normal    Psych:   anxiety depression          Physical Exam  General:  Well nourished    Airway/Jaw/Neck:  Airway Findings: Mouth Opening: Normal Tongue: Normal  General Airway Assessment: Adult, Good  Mallampati: I  TM Distance: Normal, at least 6 cm  Jaw/Neck Findings:  Neck ROM: Normal ROM      Eyes/Ears/Nose:  EYES/EARS/NOSE FINDINGS: Normal   Dental:  Dental Findings: In tact   Chest/Lungs:  Chest/Lungs Clear    Heart/Vascular:  Heart Findings: Rate: Tachycardia  Rhythm: Irregularly Irregular        Mental Status:  Mental Status Findings: (Pt not verbalizing this morning. Follows commands.)         Anesthesia Plan  Type of Anesthesia, risks & benefits discussed:  Anesthesia Type:  general, MAC  Patient's Preference:   Intra-op Monitoring Plan: standard ASA monitors  Intra-op Monitoring Plan Comments:   Post Op Pain Control Plan: per primary service following discharge from PACU, IV/PO Opioids PRN and multimodal analgesia  Post Op Pain Control Plan Comments:   Induction:   IV  Beta Blocker:  Patient is on a Beta-Blocker and has received one dose within the past 24 hours (No further documentation required).       Informed Consent: Patient representative understands risks and agrees with Anesthesia plan.  Questions answered. Anesthesia consent signed with patient representative.  ASA Score: 4     Day of Surgery Review of History & Physical:    H&P update referred to the provider.     Anesthesia Plan Notes: Phone consent with son Carter Lorenzo due to pt not communicating this morning.        Ready For Surgery From Anesthesia Perspective.            Anesthesia Plan  Type of Anesthesia, risks & benefits discussed:  Anesthesia Type:  general, MAC  Patient's Preference:   Intra-op Monitoring Plan: standard ASA monitors  Intra-op Monitoring Plan Comments:   Post Op Pain Control Plan: per primary service following discharge from PACU, IV/PO Opioids PRN and multimodal analgesia  Post Op Pain Control Plan Comments:   Induction:   IV  Beta Blocker:  Patient is on a Beta-Blocker and has received one dose within the past 24 hours (No further documentation required).       Informed Consent: Patient representative understands risks and agrees with Anesthesia plan.  Questions answered. Anesthesia consent signed with  patient representative.  ASA Score: 4     Day of Surgery Review of History & Physical:    H&P update referred to the provider.     Anesthesia Plan Notes: Phone consent with son Carter Lorenzo due to pt not communicating this morning.        Ready For Surgery From Anesthesia Perspective.

## 2020-10-21 NOTE — ANESTHESIA POSTPROCEDURE EVALUATION
Anesthesia Post Evaluation    Patient: Fatou Lorenzo    Procedure(s) Performed: Procedure(s) (LRB):  INSERTION, PEG TUBE (N/A)    Final Anesthesia Type: general    Patient location during evaluation: ICU  Patient participation: No - Unable to Participate, Sedation  Level of consciousness: sedated  Post-procedure vital signs: reviewed and stable  Pain management: adequate  Airway patency: patent    PONV status at discharge: No PONV  Anesthetic complications: no      Cardiovascular status: blood pressure returned to baseline, hemodynamically stable and stable  Respiratory status: unassisted, spontaneous ventilation and oral airway  Hydration status: euvolemic  Follow-up not needed.          Vitals Value Taken Time   /53 10/21/20 1325   Temp 98 10/21/20 1327   Pulse 57 10/21/20 1327   Resp 27 10/21/20 1327   SpO2 99 % 10/21/20 1327   Vitals shown include unvalidated device data.      No case tracking events are documented in the log.      Pain/Sheila Score: No data recorded  Patient transferred after anesthetic with 8L NC and oral airway, Trasport monitor stable vitals.

## 2020-10-21 NOTE — PROGRESS NOTES
CT surgery progress note     COSME overnight      A/P  75F s/p MV repair, TV repair, and MAZE w/ ALISA resection on 09/09/20. Post op course complicated by a multiloculated R pleural effusion for which she had a R VATS w/ decortication on 10/05/20     Progressing      Enteral feeds and Heparin gtt held for PEG toeday  Continue supportive care  Monitor dig levels, 2.5 this am   Monitor renal function, increasing Cr. Marginal uop  Lopressor 25 BID  IV abx for E. Coli in pleural fluid, AF. Monitor WBC#  SLP therapies for vocal cord weakness   Delirium precautions  Increase activity   Continue ICU

## 2020-10-21 NOTE — ASSESSMENT & PLAN NOTE
Empyema vs infected hematoma s/p VATS with decortication. Cultures with E coli.  · Management as above.

## 2020-10-22 NOTE — PLAN OF CARE
Pt free from falls and injury this shift. All VSS at this time. Pt with no complaints of pain this shift. Sats >95% currently on 2L NC. MAPs maintained >65 and SBP < 180 with no issues. Pt only on continuous heparin @ 600 units/hr. Abdomen rounded/soft/nondistended, no BM this shift. CTS resident made aware of PEG with frequent sanguinous oozing at site, multiple dsg changes this shift. Yoo remains intact, uop minimal 15-30cc/hr, team aware. Pt to edge of bed with PT this shift. No other significant events this shift. Transfer orders in place, awaiting bed placement. Plan of care reviewed with pt, all questions answered. Will continue to monitor.

## 2020-10-22 NOTE — ASSESSMENT & PLAN NOTE
Afebrile and with stable to slightly down trending leukocytosis. Blood cultures NGTD. Etiology unclear however differential would include partially treated infected hematoma, partially treated empyema, a leukemoid reaction or drug reaction. S/P PEG placement on 10/21.   -Continue cefepime.   -Consider repeating CXR to assess pleural fluid.   -If pleural effusion is resolved or stable then can complete a 3-4 week course of cefepime from decortication. Possible end date: 11/2/2020.  -Will sign off. Please re-consult closer to possible end date of antibiotics for further guidance.

## 2020-10-22 NOTE — PLAN OF CARE
10/22/20 1009   Discharge Reassessment   Assessment Type Discharge Planning Reassessment   Provided patient/caregiver education on the expected discharge date and the discharge plan Yes   Do you have any problems affording any of your prescribed medications? No   Discharge Plan A Long-term acute care facility (LTAC)   Discharge Plan B Other  (TBD)   DME Needed Upon Discharge  other (see comments)  (TBD)   Anticipated Discharge Disposition LTAC   Post-Acute Status   Post-Acute Placement Status Referrals Sent       Mel Simons MPH, RN, CM  Ext. [phone]

## 2020-10-22 NOTE — ASSESSMENT & PLAN NOTE
Fatou Lorenzo is a 75 y.o. female s/p MVr, TVr 9/9/2020. Case noteworthy for multiple pump runs (3) and RV hematoma due to retraction. Unstable 9/18, required pericardial window for evac of posterior cardiac tamponade. Respiratory distress and so re-admitted to SICU on 10/2 now s/p VATS on 10/5.     Neuro:  - Flat affect but remains alert and responds to questions appropriately   - Seen by psych - Following recs (started remeron)   - PRN liquid tylenol and meds through g tube       CV:  S/p MVr, TVr, MAZE 9/9/20. S/p bedside pericardial window 9/18  - Keep MAPs >60.   - pAF: digoxin 125 mcg.   -Transition back to 12.5 BID Metoprolol via kevin.   - Follow Dig level (holding it for now)  - labetalol IV PRN for SBP > 160  - Heparin gtt      Pulm:   - Loculated pleural effusion s/p VATS on 10/5  - Extubated 10/9. Breathing comfortably on room air  - Scheduled duo and saline nebs  - CXR and ABG PRN  - HOB >30 deg     Renal:  - DC vivas   - BUN/Cr  -->113/2.4-->1117/2.6-->113/2.7  - Continue to monitor UOP   - Probably renal FeNa>1%   - Nephrology following       FEN/GI:  - Replace other lytes as needed  - Tf through g tube (follow gen surg recs).   - FW flushes to 400 Q8    - Famotidine and bowel regimen      Heme/Onc:  - H/H stable  - No evidence of bleeding     ID:   - S/p 7 days of cefepime vanc for enterococcus cloacae UTI. Also has e coli growing from pleural cultures.  - Follow ID recommendations pending      - Started on Cefepime fluconazole re-started 10/14/20. Candida on BAL     - Current antibiotics Cefepime  - BC: No growth to date     Endo:  - no hx of DM  - ISS     PPx:  - famotidine, SCD,      Dispo: step down TBD

## 2020-10-22 NOTE — PT/OT/SLP PROGRESS
Occupational Therapy   Treatment    Name: Fatou Lorenzo  MRN: [id number]  Admitting Diagnosis:  Leukocytosis  1 Day Post-Op    Recommendations:     Discharge Recommendations: nursing facility, skilled  Discharge Equipment Recommendations:  (TBD)  Barriers to discharge:  Decreased caregiver support, Inaccessible home environment    Assessment:     Fatou Lorenzo is a 75 y.o. female with a medical diagnosis of Leukocytosis.  She presents with global weakness from prolonged hospital stay. Performance deficits affecting function are weakness, impaired functional mobilty, decreased safety awareness, impaired coordination, impaired cardiopulmonary response to activity, gait instability, impaired endurance, impaired balance, impaired self care skills, decreased upper extremity function, decreased lower extremity function.     Rehab Prognosis:  Good; patient would benefit from acute skilled OT services to address these deficits and reach maximum level of function.       Plan:     Patient to be seen 4 x/week to address the above listed problems via self-care/home management, therapeutic activities, therapeutic exercises, neuromuscular re-education  · Plan of Care Expires: 10/29/20  · Plan of Care Reviewed with: patient    Subjective     Pain/Comfort:  · Pain Rating 1: 0/10  · Pain Rating Post-Intervention 1: 0/10    Objective:     Communicated with: RN prior to session.  Patient found supine with blood pressure cuff, pulse ox (continuous), telemetry, PEG Tube, vivas catheter, central line, oxygen(Midline) upon OT entry to room.    General Precautions: Standard, fall, aspiration   Orthopedic Precautions:N/A   Braces:       Occupational Performance:     Bed Mobility:    · Patient completed Supine to Sit with total assistance  · Patient completed Sit to Supine with total assistance   ·   Functional Mobility/Transfers:  · NT  · Functional Mobility: NT    Activities of Daily Living:  · Grooming: contact guard assistance for washing  face and swabbing mouth seated EOB      [facility] 6 Click ADL: 11    Treatment & Education:  Pt ed on OT POC  Pt sat EOB x 10 min with CGA mostly while engaged in self-care and ROM ex's  Pt completed B UE AAROM ex's with minimal A for balance during bimanual tasks  Pt attempted several times to return supine with max A needed for maintaining upright posture  Pt provided with cueing throughout session neutral cervical alignment  Pt ed on importance of attempting ROM even if only trace contraction achieved    Patient left with bed in chair position with all lines intact, call button in reach and RN notifiedEducation:      GOALS:   Multidisciplinary Problems     Occupational Therapy Goals        Problem: Occupational Therapy Goal    Goal Priority Disciplines Outcome Interventions   Occupational Therapy Goal     OT, PT/OT Ongoing, Progressing    Description: Goals to be met by: 10/23/2020      Patient will increase functional independence with ADLs by performing:    UE Dressing with Minimum Assistance.  Grooming while sitting EOB with Contact Guard Assistance  Toileting from bedside commode with Max Assistance for hygiene and clothing management.   Toilet transfer to bedside commode with Max Assistance.  Supine <> Sit with minimum assistance in preparation for EOB/OOB functional activities  Pt to tolerate static standing for ~1 minute with min A while competing a functional task.                         Time Tracking:     OT Date of Treatment: 10/22/20  OT Start Time: 0850  OT Stop Time: 0915  OT Total Time (min): 25 min    Billable Minutes:Therapeutic Exercise 24    JANETT Eddy  10/22/2020

## 2020-10-22 NOTE — PT/OT/SLP PROGRESS
Physical Therapy Co-Treatment    Patient Name:  Fatou Lorenzo   MRN:  [id number]  Admitting Diagnosis:  Leukocytosis   Recent Surgery: Procedure(s) (LRB):  INSERTION, PEG TUBE (N/A) 1 Day Post-Op  Admit Date: 8/30/2020  Length of Stay: 52 days    Recommendations:     Discharge Recommendations:  nursing facility, skilled   Discharge Equipment Recommendations: other (see comments)(tbd pending progress)   Barriers to discharge: current level of assist required    Assessment:     Fatou Lorenzo is a 75 y.o. female admitted with a medical diagnosis of Leukocytosis.  She presents with the following impairments/functional limitations:  impaired endurance, weakness, impaired self care skills, impaired functional mobilty, gait instability, impaired balance, decreased upper extremity function, decreased lower extremity function, decreased coordination, impaired coordination, impaired fine motor, impaired cardiopulmonary response to activity. Pt tolerated session fair today. Patient demonstrating improved sitting balance ability for static, unsupported tasks but still demo's impaired anticipatory balance responses when dynamic tasks are initated. Pt still presenting with self limiting behavior and attempts to return self to supine throughout session. Pt will benefit from SNF after discharge from acute services in order to continue to progress pt's strength, endurance, and balance to help pt maximize independence at or near OF.    Rehab Prognosis: Good; patient would benefit from acute skilled PT services to address these deficits and reach maximum level of function.    Recent Surgery: Procedure(s) (LRB):  INSERTION, PEG TUBE (N/A) 1 Day Post-Op    Plan:     During this hospitalization, patient to be seen 4 x/week to address the identified rehab impairments via gait training, therapeutic activities, therapeutic exercises, neuromuscular re-education and progress toward the following goals:    · Plan of Care Expires:   "11/15/20    Subjective     RN notified prior to session. Son present upon PT entrance into room.    Chief Complaint: "Don't lie to me" - pt to therapist after therapist told her she had a good session  Patient/Family Comments/goals: patient requesting ice chips/mouth swab throughout session and stated "I would do anything for some ice"  Pain/Comfort:  · Pain Rating 1: 0/10  · Pain Rating Post-Intervention 1: 0/10      Objective:     Additional staff present: OT for cotx due to pt's multiple deficits req'ing two skilled therapists to appropriately progress pt's musculoskeletal strength and endurance while appropriately facilitating neuromuscular postural balance and control    Patient found HOB elevated with: telemetry, pulse ox (continuous), blood pressure cuff, central line, vivas catheter, PEG Tube, oxygen   Mental Status: Patient is oriented to AxOx3 and follows single step commands. Patient is Alert and Cooperative during session.    General Precautions: Standard, Cardiac aspiration, fall, sternal   Orthopedic Precautions:N/A   Braces: N/A   Body mass index is 26.41 kg/m².  Oxygen Device: Nasal Cannula 3L    Outcome Measures:  AM-PAC 6 CLICK MOBILITY  Turning over in bed (including adjusting bedclothes, sheets and blankets)?: 2  Sitting down on and standing up from a chair with arms (e.g., wheelchair, bedside commode, etc.): 1  Moving from lying on back to sitting on the side of the bed?: 2  Moving to and from a bed to a chair (including a wheelchair)?: 1  Need to walk in hospital room?: 1  Climbing 3-5 steps with a railing?: 1  Basic Mobility Total Score: 8     Functional Mobility:    Bed Mobility:   · Scooting to HOB via supine bridge: total assistance and 2 persons  · Supine to Sit: total assistance and 2 persons; from Rt side of bed  · Scooting anteriorly to EOB to have both feet planted on floor: total assistance  · Sit to Supine: total assistance and 2 persons; to Rt side of bed    Sitting Balance at Edge " of Bed:   Assistance Level Required: Contact Guard Assistance to Total Assistance   Time: 10 minutes   Postural deviations noted: slouched posture and rounded shoulders   Comments: Pt with range of assist on this date with CGA for static, unsupported sitting to Max A for dynamic tasks including ADLs with OT as well as therex with OT.  Neuromuscular facilitation was provided to bilateral QL, periscapular musculature, and c-sp extensors to promote activation of postural musculature. Patient attempted to return self to supine x multiple trials on this date and required Total A to maintain upright.    Transfers/Gait: deferred, still progressing sitting balance    Education:   Time provided for education, counseling and discussion of health disposition in regards to patient's current status   All questions answered within PT scope of practice and to patient's satisfaction   PT role in POC to address current functional deficits   Pt educated on proper body mechanics, safety techniques, and energy conservation with PT facilitation and cueing throughout session    Patient left with bed in chair position with all lines intact, call button in reach, RN notified and SLP present.    GOALS:   Multidisciplinary Problems     Physical Therapy Goals        Problem: Physical Therapy Goal    Goal Priority Disciplines Outcome Goal Variances Interventions   Physical Therapy Goal     PT, PT/OT Ongoing, Progressing     Description: Goals to be met by: 2020     Patient will increase functional independence with mobility by performin. Supine to sit with Minimal Assistance x 1 person.-not met  2. Sit to stand transfer with Moderate Assistance.-not met  3. Bed to chair transfer with Moderate Assistance. -not met  4. Sitting at edge of bed x10 minutes with CGA.-not met  5. Lower extremity exercise program x 10 reps  with assistance as needed. -not met  6. Pt receive gait training ~ 10 ft with moderate assist- not  met                         Time Tracking:     PT Received On: 10/22/20  PT Start Time: 0850     PT Stop Time: 0914  PT Total Time (min): 24 min       Billable Minutes:   · Therapeutic Activity 14 and Neuromuscular Re-education 10    Treatment Type: Treatment  PT/PTA: PT       Yenny Corral PT, DPT  10/22/2020  Pager: [phone]

## 2020-10-22 NOTE — PROGRESS NOTES
[facility]  Critical Care - Surgery  Progress Note    Patient Name: Fatou Lorenzo  MRN: [id number]  Admission Date: 8/30/2020  Hospital Length of Stay: 52 days  Code Status: Full Code  Attending Provider: Antoni Waddell MD  Primary Care Provider: Ludin Rivas MD   Principal Problem: Leukocytosis    Subjective:     Hospital/ICU Course:  No notes on file    Interval History/Significant Events: NAEON. Not requiring any pressor support and has remained rate controlled. WBC down to 14. Creatinine and BUN trending up, nephrology following (will limit the amount of diuresis, vancomycin was dc)  Patient was PEG yesterday  Heparin gtt was re-started    Follow-up For: Procedure(s) (LRB):  LARYNGOSCOPY, MICRO SUSPENSION WITH AUGMENTATION (N/A)  VATS, WITH DECORTICATION, LUNG (Right)    Post-Operative Day: 13 Days Post-Op    Objective:     Vital Signs (Most Recent):  Temp: 97.5 °F (36.4 °C) (10/22/20 0700)  Pulse: 60 (10/22/20 0730)  Resp: (!) 24 (10/22/20 0730)  BP: (!) 147/109 (10/22/20 0730)  SpO2: 99 % (10/22/20 0730) Vital Signs (24h Range):  Temp:  [97.4 °F (36.3 °C)-97.8 °F (36.6 °C)] 97.5 °F (36.4 °C)  Pulse:  [57-81] 60  Resp:  [17-37] 24  SpO2:  [91 %-100 %] 99 %  BP: (108-192)/() 147/109     Weight: 72 kg (158 lb 11.7 oz)  Body mass index is 26.41 kg/m².      Intake/Output Summary (Last 24 hours) at 10/22/2020 0738  Last data filed at 10/22/2020 0600  Gross per 24 hour   Intake 1483 ml   Output 560 ml   Net 923 ml       Physical Exam  Vitals signs and nursing note reviewed.   Constitutional:       Appearance: She is well-developed. She is ill-appearing. She is not diaphoretic.   HENT:      Head: Normocephalic and atraumatic.   Eyes:      Conjunctiva/sclera: Conjunctivae normal.   Neck:      Musculoskeletal: Normal range of motion and neck supple.   Cardiovascular:      Rate and Rhythm: Normal rate and regular rhythm.      Comments: Midline sternotomy with clean, dry, and intact,  without evidence of infection  Prior chest tubes sites with dressings in place, clean and dry  Pulmonary:      Comments: Breathing comfortably on comfort flow  No distress, but rhonchi present and she has a wet cough.  Hematoma on the R chest, not actively bleeding   Abdominal:      General: There is no distension.      Palpations: Abdomen is soft.      Tenderness: There is no abdominal tenderness.      Comments: g tube in place    Skin:     General: Skin is warm and dry.   Psychiatric:      Comments: Depressed mood, flat affect         Vents:  Vent Mode: Spont (10/08/20 1525)  Ventilator Initiated: Yes(chart correction) (09/18/20 2202)  Set Rate: 20 BPM (10/08/20 0747)  Vt Set: 375 mL (10/08/20 0747)  Pressure Support: 10 cmH20 (10/08/20 1525)  PEEP/CPAP: 5 cmH20 (10/08/20 1525)  Oxygen Concentration (%): 21 (10/17/20 1951)  Peak Airway Pressure: 15 cmH2O (10/08/20 1525)  Plateau Pressure: 19 cmH20 (10/08/20 1525)  Total Ve: 5.3 mL (10/08/20 1525)  Negative Inspiratory Force (cm H2O): -21 (10/08/20 1452)  F/VT Ratio<105 (RSBI): (!) 70.71 (10/08/20 1258)    Lines/Drains/Airways     Central Venous Catheter Line            Percutaneous Central Line Insertion/Assessment - Triple Lumen  10/05/20 1843 left internal jugular 16 days          Drain                 Urethral Catheter 10/14/20 1500 7 days         Gastrostomy/Enterostomy 10/21/20 1304 Percutaneous endoscopic gastrostomy (PEG) feeding less than 1 day          Peripheral Intravenous Line                 Midline Catheter Insertion/Assessment  - Single Lumen 10/19/20 1654 brachial vein 18g x 10cm 2 days                Significant Labs:    CBC/Anemia Profile:  Recent Labs   Lab 10/21/20  0403 10/22/20  0326   WBC 16.20* 14.53*   HGB 6.9* 8.3*   HCT 23.3* 27.9*    194   MCV 99* 99*   RDW 19.4* 19.4*        Chemistries:  Recent Labs   Lab 10/21/20  0403 10/22/20  0326    146*   K 4.4 4.8    111*   CO2 23 23   * 113*   CREATININE 2.6* 2.7*    CALCIUM 8.5* 8.8   ALBUMIN 2.1* 2.0*   PROT 5.9* 5.9*   BILITOT 0.6 0.5   ALKPHOS 204* 200*   ALT 39 40   AST 37 39   MG 2.9* 2.8*   PHOS 4.5 6.6*       ABGs: No results for input(s): PH, PCO2, HCO3, POCSATURATED, BE in the last 48 hours.  Coagulation:   Recent Labs   Lab 10/22/20  0326   INR 1.9*   APTT 72.8*  72.8*     Lactic Acid: No results for input(s): LACTATE in the last 48 hours.    Significant Imaging:  I have reviewed all pertinent imaging results/findings within the past 24 hours.    Assessment/Plan:     Severe mitral regurgitation  Fatou Lorenzo is a 75 y.o. female s/p MVr, TVr 9/9/2020. Case noteworthy for multiple pump runs (3) and RV hematoma due to retraction. Unstable 9/18, required pericardial window for evac of posterior cardiac tamponade. Respiratory distress and so re-admitted to SICU on 10/2 now s/p VATS on 10/5.     Neuro:  - Flat affect but remains alert and responds to questions appropriately   - Seen by psych - Following recs (started remeron)   - PRN liquid tylenol and meds through g tube       CV:  S/p MVr, TVr, MAZE 9/9/20. S/p bedside pericardial window 9/18  - Keep MAPs >60.   - pAF: digoxin 125 mcg.   -Transition back to 12.5 BID Metoprolol via kevin.   - Follow Dig level (holding it for now)  - labetalol IV PRN for SBP > 160  - Heparin gtt      Pulm:   - Loculated pleural effusion s/p VATS on 10/5  - Extubated 10/9. Breathing comfortably on room air  - Scheduled duo and saline nebs  - CXR and ABG PRN  - HOB >30 deg     Renal:  - DC ortega   - BUN/Cr  -->113/2.4-->1117/2.6-->113/2.7  - Continue to monitor UOP   - Probably renal FeNa>1%   - Nephrology following       FEN/GI:  - Replace other lytes as needed  - Tf through g tube (follow gen surg recs).   - FW flushes to 400 Q8    - Famotidine and bowel regimen      Heme/Onc:  - H/H stable  - No evidence of bleeding     ID:   - S/p 7 days of cefepime vanc for enterococcus cloacae UTI. Also has e coli growing from pleural cultures.  -  Follow ID recommendations pending      - Started on Cefepime fluconazole re-started 10/14/20. Candida on BAL     - Current antibiotics Cefepime  - BC: No growth to date     Endo:  - no hx of DM  - ISS     PPx:  - famotidine, SCD,      Dispo: step down TBD    Critical care was time spent personally by me on the following activities: development of treatment plan with patient or surrogate and bedside caregivers, discussions with consultants, evaluation of patient's response to treatment, examination of patient, ordering and performing treatments and interventions, ordering and review of laboratory studies, ordering and review of radiographic studies, pulse oximetry, re-evaluation of patient's condition.  This critical care time did not overlap with that of any other provider or involve time for any procedures.     Mir Croft MD  Critical Care - Surgery  [facility]

## 2020-10-22 NOTE — PROGRESS NOTES
[facility]  Infectious Disease  Progress Note    Patient Name: Fatou Lorenzo  MRN: [id number]  Admission Date: 8/30/2020  Length of Stay: 52 days  Attending Physician: Antoni Waddell MD  Primary Care Provider: Ludin Rivas MD    Isolation Status: No active isolations  Assessment/Plan:      * Leukocytosis  Afebrile and with stable to slightly down trending leukocytosis. Blood cultures NGTD. Etiology unclear however differential would include partially treated infected hematoma, partially treated empyema, a leukemoid reaction or drug reaction. S/P PEG placement on 10/21.   -Continue cefepime.   -Consider repeating CXR to assess pleural fluid.   -If pleural effusion is resolved or stable then can complete a 3-4 week course of cefepime from decortication. Possible end date: 11/2/2020.  -Will sign off. Please re-consult closer to possible end date of antibiotics for further guidance.     Empyema lung  Empyema vs infected hematoma s/p VATS with decortication. Cultures with E coli.  · Management as above.       Anticipated Disposition: per primary    Thank you for your consult. I will sign off. Please contact us if you have any additional questions.    Vicki Wayne MD  Infectious Disease  [facility]    Subjective:     Principal Problem:Leukocytosis    HPI:   Pt is a 75 y.o. female w/ pmhx of Atrial fibrillation with RVR, HLD and severe mitral regurgitation.  Pt w/ recent left heart catheterization 8/25/2020 who presented w/ complaints of recurrent shortness of breath.  Transesophageal echocardiogram done recently w/ left atrial thrombus.  L heart angiography w/ nonobstructive CAD.   Upon admission, pt required BiPAP due to hypoxemia not responsive to nasal cannula.      Pt transferred to [facility] 8/30 for CT surgery evaluation given recurrent admission despite compliance, BP control and diuresis.  Pt seen by Dr. Waddell who recommended MV repair.  Pt underwent MV repair and  "TV annuloplasty on 9/9. Pt course c/b reintuabtion and pericardial window for evacuation of posterior cardiac tamponade on 9/18.  Pt extubated on 9/24.  Developed increasing white count on 9/28.  UA done concerning for infection.  Urine cxs positive for E.Cloacae.  Pt started on Bactrim.  Chest Xray from 10/1- Since September 28, 2020, increased large right pleural effusion.  Increased diffuse right airspace opacities.  X ray abdomen 9/23 w/ Probable bilateral nephrolithiasis.  ID initially consulted on 10/2 for UTI.  Patient also noted to have large right loculated pleural effusion and underwent IR thoracentesis (no cx data) and subsequently VATS procedure with limited decortication by CTS on 10/5.  Cultures positive for E coli.  Brochoscopy with BAL on 10/7 with yeast.  She was treated with 7 days of cefepime for E. Cloacae in urine and E coli from pleural effusion, followed by transition to IV ceftriaxone with plan for 4 weeks of antibiotics after VATS procedure (ceftriaxone while inpatient with transition to oral Augmentin if d/c).      ID now reconsulted for rising WBC  11.8 >>34.  Repeat blood cultures pending.  Repeat CXR pending.  Afebrile.      Interval History: "hurt"Shakes head to yes/no questions.   75 y.o. female w/ history of Atrial fibrillation with RVR, HLD and severe mitral regurgitation who is s/p MV repair and TV annuloplasty on 9/9, complicated by posterior cardiac tamponade s/p pericardial window on 9/18, UTI (E cloacae, tx X 7 days with cefepime), and large right pleural effusion with multiple loculations s/p thoracentesis ( no cx data) and VATS with limited decortication on 10/5 with cx + for E Coli, s/p BAL 10/7 (candida lusitanae thought to be colonizer), who was last seen by ID on 10/9 who recommended 4 weeks of antibiotics post VATS (IV ceftriaxone while inpatient). Right pleural effusion improved on imaging and leukocytosis resolved.    ID is now reconsulted for re-current leukocytosis " (WBC 11>>34).     Transferred to ICU 10/14.  Antibiotics broadened to vanc, cefepime, fluconazole.  Blood cultures negative.  U/S negative.  2D negative. Remains afebrile, WBC continues to trend down ( 34>>24>>21>15). Hemodynamically stable.  On RA. O2 sats 90-99%.  PEG placement aborted due to elevated INR. Leukocytosis remains stable. Hemoglobin decreasing on 10/21. S/P peg placement on 10/21.       Review of Systems   Constitutional: Positive for fatigue. Negative for chills, fever and unexpected weight change.   HENT: Negative for ear pain, facial swelling, hearing loss, mouth sores, nosebleeds, rhinorrhea, sinus pressure, sore throat, tinnitus, trouble swallowing and voice change.    Eyes: Negative for photophobia, pain, redness and visual disturbance.   Respiratory: Negative for cough, chest tightness, shortness of breath and wheezing.    Cardiovascular: Negative for chest pain, palpitations and leg swelling.   Gastrointestinal: Positive for abdominal pain. Negative for blood in stool, constipation, diarrhea, nausea and vomiting.   Endocrine: Negative for cold intolerance, heat intolerance, polydipsia, polyphagia and polyuria.   Genitourinary: Negative for decreased urine volume, dysuria, flank pain, frequency, hematuria, menstrual problem, urgency, vaginal bleeding, vaginal discharge and vaginal pain.   Musculoskeletal: Negative for arthralgias, back pain, joint swelling, myalgias and neck pain.   Skin: Negative for rash.   Allergic/Immunologic: Negative for environmental allergies, food allergies and immunocompromised state.   Neurological: Positive for weakness. Negative for dizziness, seizures, syncope, light-headedness, numbness and headaches.   Hematological: Negative for adenopathy. Does not bruise/bleed easily.   Psychiatric/Behavioral: Negative for confusion, hallucinations, self-injury, sleep disturbance and suicidal ideas. The patient is not nervous/anxious.      Objective:     Vital Signs (Most  Recent):  Temp: 97.5 °F (36.4 °C) (10/22/20 0700)  Pulse: 75 (10/22/20 1145)  Resp: (!) 24 (10/22/20 1145)  BP: (!) 134/58 (10/22/20 1130)  SpO2: 98 % (10/22/20 1145) Vital Signs (24h Range):  Temp:  [97.4 °F (36.3 °C)-97.8 °F (36.6 °C)] 97.5 °F (36.4 °C)  Pulse:  [57-92] 75  Resp:  [16-37] 24  SpO2:  [95 %-100 %] 98 %  BP: ()/() 134/58     Weight: 72 kg (158 lb 11.7 oz)  Body mass index is 26.41 kg/m².    Estimated Creatinine Clearance: 17.9 mL/min (A) (based on SCr of 2.7 mg/dL (H)).    Physical Exam  Vitals signs and nursing note reviewed.   Constitutional:       General: She is not in acute distress.     Appearance: Normal appearance. She is underweight. She is not toxic-appearing or diaphoretic.   HENT:      Head: Normocephalic and atraumatic. No right periorbital erythema or left periorbital erythema.      Right Ear: Hearing and external ear normal. No swelling.      Left Ear: Hearing and external ear normal. No swelling.      Nose: Nose normal. No nasal deformity.      Mouth/Throat:      Pharynx: Uvula midline. No oropharyngeal exudate.   Eyes:      General: Lids are normal. No scleral icterus.        Right eye: No discharge.         Left eye: No discharge.      Conjunctiva/sclera: Conjunctivae normal.      Right eye: Right conjunctiva is not injected. No exudate.     Left eye: Left conjunctiva is not injected. No exudate.  Neck:      Musculoskeletal: Normal range of motion and neck supple.      Thyroid: No thyromegaly.      Trachea: No tracheal deviation.      Comments: Left sided CVC  Cardiovascular:      Rate and Rhythm: Normal rate and regular rhythm.      Heart sounds: Normal heart sounds, S1 normal and S2 normal. No murmur. No friction rub. No gallop.    Pulmonary:      Effort: Pulmonary effort is normal. No tachypnea, accessory muscle usage or respiratory distress.      Breath sounds: No stridor. Examination of the right-middle field reveals decreased breath sounds. Examination of the  right-lower field reveals decreased breath sounds. Examination of the left-lower field reveals decreased breath sounds. Decreased breath sounds present. No wheezing or rales.   Chest:      Chest wall: No tenderness.   Abdominal:      General: Bowel sounds are normal. There is no distension.      Palpations: Abdomen is soft.      Tenderness: There is no abdominal tenderness. There is no guarding or rebound.      Comments: PEG in place covered by gauze dressing soiled in blood.   Genitourinary:     Comments: Yoo catheter in place.  Musculoskeletal:         General: Swelling present. No tenderness.      Right lower leg: Edema present.      Left lower leg: Edema present.   Skin:     General: Skin is warm and dry.      Coloration: Skin is not pale.      Findings: No erythema, lesion or rash.      Nails: There is no clubbing.     Neurological:      Mental Status: She is oriented to person, place, and time and easily aroused. She is lethargic.      Cranial Nerves: No cranial nerve deficit.      Coordination: Coordination normal.   Psychiatric:         Speech: Speech normal.         Behavior: Behavior normal. Behavior is cooperative.         Thought Content: Thought content normal.         Judgment: Judgment normal.         Significant Labs:   Blood Culture:   Recent Labs   Lab 08/23/20  0149 08/23/20  0203 10/14/20  1206   LABBLOO No growth after 5 days. No growth after 5 days. No growth after 5 days.  No growth after 5 days.     BMP:   Recent Labs   Lab 10/22/20  0326   GLU 75   *   K 4.8   *   CO2 23   *   CREATININE 2.7*   CALCIUM 8.8   MG 2.8*     CBC:   Recent Labs   Lab 10/21/20  0403 10/22/20  0326   WBC 16.20* 14.53*   HGB 6.9* 8.3*   HCT 23.3* 27.9*    194     Procalcitonin: No results for input(s): PROCAL in the last 48 hours.  Urine Culture:   Recent Labs   Lab 08/23/20  0228 09/29/20  0854   LABURIN No significant growth ENTEROBACTER CLOACAE  >100,000 cfu/ml  *     Urine Studies:    Recent Labs   Lab 08/23/20  2249  10/14/20  1530   COLORU Colorless*   < > Yellow   APPEARANCEUA Clear   < > Cloudy*   PHUR 5.0   < > 5.0   SPECGRAV 1.005   < > 1.010   PROTEINUA Negative   < > 1+*   GLUCUA Negative   < > Negative   KETONESU Negative   < > Negative   BILIRUBINUA Negative   < > Negative   OCCULTUA 1+*   < > 3+*   NITRITE Negative   < > Negative   UROBILINOGEN Negative  --   --    LEUKOCYTESUR Trace*   < > Trace*   RBCUA 4   < > 17*   WBCUA 4   < > 3   BACTERIA Occasional   < > Occasional   SQUAMEPITHEL 1  --  1   HYALINECASTS 1   < > 9*    < > = values in this interval not displayed.       Significant Imaging: I have reviewed all pertinent imaging results/findings within the past 24 hours.

## 2020-10-22 NOTE — PROGRESS NOTES
[facility]  Nephrology  Progress Note    Patient Name: Fatou Lorenzo  MRN: [id number]  Admission Date: 8/30/2020  Hospital Length of Stay: 52 days  Attending Provider: Antoni Waddell MD   Primary Care Physician: Ludin Rivas MD  Principal Problem:Leukocytosis    Subjective:     HPI: Fatou Lorenzo is a 74 yo F who had MVR + TVR to 9/9 that was complicated by RV hematoma and subsequent pericardial effusion.  She has been in the hospital since 8/31/2020 and had a complex hospital course complicated by a significant JONAH 9/18. She then developed pneumonia. He JONAH improved to baseline creat of 0.8-1 9/24. She required vats 10/5 procedure for empyema. She never had resolution of leukocytosis and has been on vanc and cefepime. On 10/14 her creat began to increase again in conjunction with elevated vancomycin levels. We could not find any episode of hypotension or afib w/ rvr around that time.  Her creat has been increasing daily since 10/14 and urine output has been decreasing for last 2 days. Nephrology consulted for JONAH.    Interval History: creat and bun stable. Patient more alert today. Urine output stable for last 3 days.    Review of patient's allergies indicates:  No Known Allergies  Current Facility-Administered Medications   Medication Frequency    0.9%  NaCl infusion (for blood administration) Q24H PRN    0.9%  NaCl infusion (for blood administration) Q24H PRN    0.9%  NaCl infusion (for blood administration) Q24H PRN    acetaminophen oral solution 650 mg Q6H PRN    albuterol-ipratropium 2.5 mg-0.5 mg/3 mL nebulizer solution 3 mL Q6H WAKE    aspirin chewable tablet 81 mg Daily    ceFEPIme injection 1 g Q24H    dextrose 50% injection 12.5 g PRN    dextrose 50% injection 25 g PRN    famotidine tablet 20 mg Daily    glucagon (human recombinant) injection 1 mg PRN    heparin 25,000 units in dextrose 5% 250 mL (100 units/mL) infusion (heparin infusion - NO NOMOGRAM) Continuous     insulin aspart U-100 pen 0-5 Units Q6H PRN    labetalol 20 mg/4 mL (5 mg/mL) IV syring Q6H PRN    lidocaine HCL 2% jelly PRN    melatonin tablet 6 mg Nightly    metoprolol 12.5mg/1.25mL oral solution 25 mg BID    mirtazapine tablet 7.5 mg QHS    morphine injection 1 mg Q6H PRN    multivitamin tablet Daily    polyethylene glycol packet 17 g Daily    sodium chloride 3% nebulizer solution 4 mL Q6H WAKE       Objective:     Vital Signs (Most Recent):  Temp: 97.6 °F (36.4 °C) (10/22/20 1500)  Pulse: 63 (10/22/20 1600)  Resp: (!) 23 (10/22/20 1600)  BP: 125/60 (10/22/20 1600)  SpO2: 97 % (10/22/20 1600)  O2 Device (Oxygen Therapy): nasal cannula (10/22/20 1600) Vital Signs (24h Range):  Temp:  [97.5 °F (36.4 °C)-97.8 °F (36.6 °C)] 97.6 °F (36.4 °C)  Pulse:  [58-92] 63  Resp:  [16-37] 23  SpO2:  [95 %-100 %] 97 %  BP: ()/() 125/60     Weight: 72 kg (158 lb 11.7 oz) (10/22/20 0500)  Body mass index is 26.41 kg/m².  Body surface area is 1.82 meters squared.    I/O last 3 completed shifts:  In: 1999 [I.V.:1259; Blood:270; NG/GT:470]  Out: 850 [Urine:820; Drains:30]    Physical Exam  Vitals signs and nursing note reviewed.   Constitutional:       General: She is not in acute distress.     Appearance: She is ill-appearing. She is not toxic-appearing.   Eyes:      General: No scleral icterus.     Pupils: Pupils are equal, round, and reactive to light.   Cardiovascular:      Rate and Rhythm: Normal rate.      Heart sounds: No murmur.   Pulmonary:      Effort: Pulmonary effort is normal.   Musculoskeletal:         General: Swelling present.      Right lower leg: Edema present.      Left lower leg: Edema present.   Skin:     Coloration: Skin is not jaundiced.         Significant Labs:  All labs within the past 24 hours have been reviewed.     Significant Imaging:  Labs: Reviewed  X-Ray: Reviewed    Assessment/Plan:     * Leukocytosis  -being followed by id with unclear source, likely lung  -vanco levels  elevated, stopped  -on cefepime    JONAH (acute kidney injury)  -non oliguric kdigo stage 2 jonah likely multifactorial ATN from vancomycin toxicity, infection, low effective arterial blood volume  -ua with spec grav 1010, prot 1+, blood 3+, Sang 30  -vivas present  -urine microscopy with frequent muddy brown casts  -recommend avoiding hypotension and if using vanco to keep within recommended levels  -would not recommend furosemide at this time, limits volume in to reduce need for diuresis    Adverse effect of vancomycin  -vanco level peakd at 30  -avoid elevated vanco levels    Severe mitral regurgitation  -per primary        Rashad Borrero MD  Nephrology  [facility]

## 2020-10-22 NOTE — PLAN OF CARE
Problem: Occupational Therapy Goal  Goal: Occupational Therapy Goal  Description: Goals to be met by: 10/23/2020      Patient will increase functional independence with ADLs by performing:    UE Dressing with Minimum Assistance.  Grooming while sitting EOB with Contact Guard Assistance  Toileting from bedside commode with Max Assistance for hygiene and clothing management.   Toilet transfer to bedside commode with Max Assistance.  Supine <> Sit with minimum assistance in preparation for EOB/OOB functional activities  Pt to tolerate static standing for ~1 minute with min A while competing a functional task.        Outcome: Ongoing, Progressing   The above goals remain appropriate. JANETT Eddy  10/22/2020

## 2020-10-22 NOTE — PT/OT/SLP PROGRESS
Speech Language Pathology Treatment    Patient Name:  Fatou Lorenzo   MRN:  [id number]  Admitting Diagnosis: Leukocytosis    Recommendations:                 General Recommendations:  Dysphagia therapy and Voice therapy  Diet recommendations:  NPO, Liquid Diet Level: NPO  Nectar thickened liquids for pleasure.   Aspiration Precautions: Alternate means of nutrition/hydration, Small amounts of nectar-thick liquids and ice chips for pleasure and Strict aspiration precautions   General Precautions: Standard, aspiration, fall, sternal  Communication strategies:  provide increased time to answer and go to room if call light pushed    Subjective     Pt awake upon SLP entry, completing session with PT/OT.     Objective:     Has the patient been evaluated by SLP for swallowing?   Yes  Keep patient NPO? Yes   Current Respiratory Status: room air      Pt seen for ongoing dysphagia and voice therapies. Mod-max cueing required to maintain alertness. Pt with limited participation throughout session. Patient frequently with eyes closes requiring cues. Poor head control.  Aphonia.   Pt accepted trials of nectar-thick liquid via tsp x2 given max encourgment and assistance. Vocal adduction exercises attempted x3. Patient unable to complete yulia and effortful swallow/ unsure if 2/2 disengagement or weakness.  Education provided regarding role of SLP, continued NPO, purpose of PO trials, importance of attempting voicing outside of therapy, and ongoing ST plan of care. All questions within SLP scope addressed. Education to be ongoing. Pt will need max encouragement. ST to continue to monitor.     Assessment:     Fatou Lorenzo is a 75 y.o. female with an SLP diagnosis of Dysphagia and Aphonia.     Goals:   Multidisciplinary Problems     SLP Goals        Problem: SLP Goal    Goal Priority Disciplines Outcome   SLP Goal     SLP Ongoing, Progressing   Description: Speech Language Pathology Goals  Goals expected to be met by 10/26  1. Pt to  participate in ongoing swallow assessment to determine safest and least restrictive diet.  2. Pt will complete vocal adduction exercises x10 per session with min A.  3. Patient will complete dysphagia exercises x10 per session with min A.                  Plan:     · Patient to be seen:  4 x/week   · Plan of Care expires:  11/08/20  · Plan of Care reviewed with:  patient   · SLP Follow-Up:  Yes       Discharge recommendations:  nursing facility, skilled   Barriers to Discharge:  Level of Skilled Assistance Needed      Time Tracking:     SLP Treatment Date:   10/22/20  Speech Start Time:  0850  Speech Stop Time:  0906     Speech Total Time (min):  16 min    Billable Minutes: Speech Therapy Individual 8 and Treatment Swallowing Dysfunction 8    Emily Abadie, CCC-SLP  10/22/2020

## 2020-10-22 NOTE — PROGRESS NOTES
Date Consent signed: 22-Oct-2020 at 13:43    Sponsor: N/A    Study Title: [facility] Protocol for Biospecimen Collection    IRB Number: [id number]    : Dr. Marco Win     Present for Discussion: Papo Lorenzo (Son/Legally Authorized Representative)           Ana Ramsay (impartial witness)                                           Matt Ortiz (CRC)                                               Prior to the Informed Consent (IC) being signed, or any study protocol required testing, procedure, or intervention being performed, patients legally authorized representative was contacted via phone in the presence of an impartial witness and the following was discussed:   The purpose of this research study is to collect urine specimens from patients that have mild to severe kidney injury in order to learn about new ways to predict, diagnose, treat nephropathic illnesses and further scientific discovery.    Procedures established per Protocol.   Risk, Benefits, Costs and Alternatives to the study.   Participation in the research trial is voluntary and patient may withdraw at anytime.   Contact information for study related questions.   Patients son verbalizes understanding all the above: Yes   Contact information for CRC and PI given to patients son: Yes   Patients son understands that while participating in the study, the Protocol may or may not be updated, and the patient or himself will be notified of any changes in a timely manner.     Ms. Fatou Lorenzo ( 1945) was unavailable to sign the informed consent form, therefore [facility] completed verbal informed consent with  Mr. Papo Lorenzo  (son/ authorized legal representative) using form for [facility] Protocol for Biospecimen Collection with IRB approval date of 10/02/2020. Each page of the short consent form was reviewed with Mr. Papo Lorenzo over the phone and all questions were answered satisfactorily by  study staff and investigator available during discussion.     Upon signature of the short consent form Mr. Ayan Hartley was mailed a copy of same. The original consent was scanned into electronic medical records (EPIC) and filed into the subject's research study binder.    CRC signing consent: Matt Ortiz     Investigator available for discussion and questions regarding the study: Dr. Marco Win

## 2020-10-22 NOTE — PLAN OF CARE
Pt vss, afebrile, sats >90% on 3L NC, danni well. Heparin  gtt infusing. Accuchecks q6, no insulin coverage needed. Lab monitored.  UO monitored approx 20-30cc/hr. PEG tube to gravity, minimal output. Frequent rounds made in order to ensure pain control, none noted at this time. POC reviewed with patient and family, no questions/concerns at this time. Will continue to monitor     Skin assessed, pt turned Q2. No skin break down noted.

## 2020-10-22 NOTE — PROGRESS NOTES
Patient seen at bedside with critical care team. Got peg tube yesterday. Desperately in need of physical therapy. Hopefully will be able to move to a rehab facility.

## 2020-10-22 NOTE — SUBJECTIVE & OBJECTIVE
Interval History: creat and bun stable. Patient more alert today. Urine output stable for last 3 days.    Review of patient's allergies indicates:  No Known Allergies  Current Facility-Administered Medications   Medication Frequency    0.9%  NaCl infusion (for blood administration) Q24H PRN    0.9%  NaCl infusion (for blood administration) Q24H PRN    0.9%  NaCl infusion (for blood administration) Q24H PRN    acetaminophen oral solution 650 mg Q6H PRN    albuterol-ipratropium 2.5 mg-0.5 mg/3 mL nebulizer solution 3 mL Q6H WAKE    aspirin chewable tablet 81 mg Daily    ceFEPIme injection 1 g Q24H    dextrose 50% injection 12.5 g PRN    dextrose 50% injection 25 g PRN    famotidine tablet 20 mg Daily    glucagon (human recombinant) injection 1 mg PRN    heparin 25,000 units in dextrose 5% 250 mL (100 units/mL) infusion (heparin infusion - NO NOMOGRAM) Continuous    insulin aspart U-100 pen 0-5 Units Q6H PRN    labetalol 20 mg/4 mL (5 mg/mL) IV syring Q6H PRN    lidocaine HCL 2% jelly PRN    melatonin tablet 6 mg Nightly    metoprolol 12.5mg/1.25mL oral solution 25 mg BID    mirtazapine tablet 7.5 mg QHS    morphine injection 1 mg Q6H PRN    multivitamin tablet Daily    polyethylene glycol packet 17 g Daily    sodium chloride 3% nebulizer solution 4 mL Q6H WAKE       Objective:     Vital Signs (Most Recent):  Temp: 97.6 °F (36.4 °C) (10/22/20 1500)  Pulse: 63 (10/22/20 1600)  Resp: (!) 23 (10/22/20 1600)  BP: 125/60 (10/22/20 1600)  SpO2: 97 % (10/22/20 1600)  O2 Device (Oxygen Therapy): nasal cannula (10/22/20 1600) Vital Signs (24h Range):  Temp:  [97.5 °F (36.4 °C)-97.8 °F (36.6 °C)] 97.6 °F (36.4 °C)  Pulse:  [58-92] 63  Resp:  [16-37] 23  SpO2:  [95 %-100 %] 97 %  BP: ()/() 125/60     Weight: 72 kg (158 lb 11.7 oz) (10/22/20 0500)  Body mass index is 26.41 kg/m².  Body surface area is 1.82 meters squared.    I/O last 3 completed shifts:  In: 1999 [I.V.:1259; Blood:270;  NG/GT:470]  Out: 850 [Urine:820; Drains:30]    Physical Exam  Vitals signs and nursing note reviewed.   Constitutional:       General: She is not in acute distress.     Appearance: She is ill-appearing. She is not toxic-appearing.   Eyes:      General: No scleral icterus.     Pupils: Pupils are equal, round, and reactive to light.   Cardiovascular:      Rate and Rhythm: Normal rate.      Heart sounds: No murmur.   Pulmonary:      Effort: Pulmonary effort is normal.   Musculoskeletal:         General: Swelling present.      Right lower leg: Edema present.      Left lower leg: Edema present.   Skin:     Coloration: Skin is not jaundiced.         Significant Labs:  All labs within the past 24 hours have been reviewed.     Significant Imaging:  Labs: Reviewed  X-Ray: Reviewed

## 2020-10-22 NOTE — SUBJECTIVE & OBJECTIVE
"Interval History: "hurt"Shakes head to yes/no questions.   75 y.o. female w/ history of Atrial fibrillation with RVR, HLD and severe mitral regurgitation who is s/p MV repair and TV annuloplasty on 9/9, complicated by posterior cardiac tamponade s/p pericardial window on 9/18, UTI (E cloacae, tx X 7 days with cefepime), and large right pleural effusion with multiple loculations s/p thoracentesis ( no cx data) and VATS with limited decortication on 10/5 with cx + for E Coli, s/p BAL 10/7 (candida lusitanae thought to be colonizer), who was last seen by ID on 10/9 who recommended 4 weeks of antibiotics post VATS (IV ceftriaxone while inpatient). Right pleural effusion improved on imaging and leukocytosis resolved.    ID is now reconsulted for re-current leukocytosis (WBC 11>>34).     Transferred to ICU 10/14.  Antibiotics broadened to vanc, cefepime, fluconazole.  Blood cultures negative.  U/S negative.  2D negative. Remains afebrile, WBC continues to trend down ( 34>>24>>21>15). Hemodynamically stable.  On RA. O2 sats 90-99%.  PEG placement aborted due to elevated INR. Leukocytosis remains stable. Hemoglobin decreasing on 10/21. S/P peg placement on 10/21.       Review of Systems   Constitutional: Positive for fatigue. Negative for chills, fever and unexpected weight change.   HENT: Negative for ear pain, facial swelling, hearing loss, mouth sores, nosebleeds, rhinorrhea, sinus pressure, sore throat, tinnitus, trouble swallowing and voice change.    Eyes: Negative for photophobia, pain, redness and visual disturbance.   Respiratory: Negative for cough, chest tightness, shortness of breath and wheezing.    Cardiovascular: Negative for chest pain, palpitations and leg swelling.   Gastrointestinal: Positive for abdominal pain. Negative for blood in stool, constipation, diarrhea, nausea and vomiting.   Endocrine: Negative for cold intolerance, heat intolerance, polydipsia, polyphagia and polyuria.   Genitourinary: " Negative for decreased urine volume, dysuria, flank pain, frequency, hematuria, menstrual problem, urgency, vaginal bleeding, vaginal discharge and vaginal pain.   Musculoskeletal: Negative for arthralgias, back pain, joint swelling, myalgias and neck pain.   Skin: Negative for rash.   Allergic/Immunologic: Negative for environmental allergies, food allergies and immunocompromised state.   Neurological: Positive for weakness. Negative for dizziness, seizures, syncope, light-headedness, numbness and headaches.   Hematological: Negative for adenopathy. Does not bruise/bleed easily.   Psychiatric/Behavioral: Negative for confusion, hallucinations, self-injury, sleep disturbance and suicidal ideas. The patient is not nervous/anxious.      Objective:     Vital Signs (Most Recent):  Temp: 97.5 °F (36.4 °C) (10/22/20 0700)  Pulse: 75 (10/22/20 1145)  Resp: (!) 24 (10/22/20 1145)  BP: (!) 134/58 (10/22/20 1130)  SpO2: 98 % (10/22/20 1145) Vital Signs (24h Range):  Temp:  [97.4 °F (36.3 °C)-97.8 °F (36.6 °C)] 97.5 °F (36.4 °C)  Pulse:  [57-92] 75  Resp:  [16-37] 24  SpO2:  [95 %-100 %] 98 %  BP: ()/() 134/58     Weight: 72 kg (158 lb 11.7 oz)  Body mass index is 26.41 kg/m².    Estimated Creatinine Clearance: 17.9 mL/min (A) (based on SCr of 2.7 mg/dL (H)).    Physical Exam  Vitals signs and nursing note reviewed.   Constitutional:       General: She is not in acute distress.     Appearance: Normal appearance. She is underweight. She is not toxic-appearing or diaphoretic.   HENT:      Head: Normocephalic and atraumatic. No right periorbital erythema or left periorbital erythema.      Right Ear: Hearing and external ear normal. No swelling.      Left Ear: Hearing and external ear normal. No swelling.      Nose: Nose normal. No nasal deformity.      Mouth/Throat:      Pharynx: Uvula midline. No oropharyngeal exudate.   Eyes:      General: Lids are normal. No scleral icterus.        Right eye: No discharge.          Left eye: No discharge.      Conjunctiva/sclera: Conjunctivae normal.      Right eye: Right conjunctiva is not injected. No exudate.     Left eye: Left conjunctiva is not injected. No exudate.  Neck:      Musculoskeletal: Normal range of motion and neck supple.      Thyroid: No thyromegaly.      Trachea: No tracheal deviation.      Comments: Left sided CVC  Cardiovascular:      Rate and Rhythm: Normal rate and regular rhythm.      Heart sounds: Normal heart sounds, S1 normal and S2 normal. No murmur. No friction rub. No gallop.    Pulmonary:      Effort: Pulmonary effort is normal. No tachypnea, accessory muscle usage or respiratory distress.      Breath sounds: No stridor. Examination of the right-middle field reveals decreased breath sounds. Examination of the right-lower field reveals decreased breath sounds. Examination of the left-lower field reveals decreased breath sounds. Decreased breath sounds present. No wheezing or rales.   Chest:      Chest wall: No tenderness.   Abdominal:      General: Bowel sounds are normal. There is no distension.      Palpations: Abdomen is soft.      Tenderness: There is no abdominal tenderness. There is no guarding or rebound.      Comments: PEG in place covered by gauze dressing soiled in blood.   Genitourinary:     Comments: Yoo catheter in place.  Musculoskeletal:         General: Swelling present. No tenderness.      Right lower leg: Edema present.      Left lower leg: Edema present.   Skin:     General: Skin is warm and dry.      Coloration: Skin is not pale.      Findings: No erythema, lesion or rash.      Nails: There is no clubbing.     Neurological:      Mental Status: She is oriented to person, place, and time and easily aroused. She is lethargic.      Cranial Nerves: No cranial nerve deficit.      Coordination: Coordination normal.   Psychiatric:         Speech: Speech normal.         Behavior: Behavior normal. Behavior is cooperative.         Thought Content:  Thought content normal.         Judgment: Judgment normal.         Significant Labs:   Blood Culture:   Recent Labs   Lab 08/23/20  0149 08/23/20  0203 10/14/20  1206   LABBLOO No growth after 5 days. No growth after 5 days. No growth after 5 days.  No growth after 5 days.     BMP:   Recent Labs   Lab 10/22/20  0326   GLU 75   *   K 4.8   *   CO2 23   *   CREATININE 2.7*   CALCIUM 8.8   MG 2.8*     CBC:   Recent Labs   Lab 10/21/20  0403 10/22/20  0326   WBC 16.20* 14.53*   HGB 6.9* 8.3*   HCT 23.3* 27.9*    194     Procalcitonin: No results for input(s): PROCAL in the last 48 hours.  Urine Culture:   Recent Labs   Lab 08/23/20  0228 09/29/20  0854   LABURIN No significant growth ENTEROBACTER CLOACAE  >100,000 cfu/ml  *     Urine Studies:   Recent Labs   Lab 08/23/20  2249  10/14/20  1530   COLORU Colorless*   < > Yellow   APPEARANCEUA Clear   < > Cloudy*   PHUR 5.0   < > 5.0   SPECGRAV 1.005   < > 1.010   PROTEINUA Negative   < > 1+*   GLUCUA Negative   < > Negative   KETONESU Negative   < > Negative   BILIRUBINUA Negative   < > Negative   OCCULTUA 1+*   < > 3+*   NITRITE Negative   < > Negative   UROBILINOGEN Negative  --   --    LEUKOCYTESUR Trace*   < > Trace*   RBCUA 4   < > 17*   WBCUA 4   < > 3   BACTERIA Occasional   < > Occasional   SQUAMEPITHEL 1  --  1   HYALINECASTS 1   < > 9*    < > = values in this interval not displayed.       Significant Imaging: I have reviewed all pertinent imaging results/findings within the past 24 hours.

## 2020-10-22 NOTE — PLAN OF CARE
[facility] has submitted for auth. SW met with patient and son at bedside and provided this information. SW answered their questions regarding changing insurance during open enrollment and how that would effect coverage related to this encounter of care.     SW will continue to coordinate with patient, family, team and insurance to complete patient's discharge plan.       10/22/20 0939   Post-Acute Status   Post-Acute Authorization Placement   Post-Acute Placement Status Pending Payor Review   Discharge Plan   Discharge Plan A Long-term acute care facility (LTAC)     Esthela Peralta LMSW   - Case Management

## 2020-10-22 NOTE — PROGRESS NOTES
"[facility]  Adult Nutrition  Progress Note    SUMMARY       Recommendations    1.) Change EN to Novasource Renal; begin at 10mL/hr and advance 5-10mL Q8hr to goal rate at 35mL/hr. Hold for N/V. EN provides 1680kcal, 76gm of protein, 602mL of free water. Add free water per MD recommendations.   2.) Monitor electrolytes and replace as needed.     Goals: Pt to meet >75% of estimated energy and protein needs over the course of 7 days.   Nutrition Goal Status: goal not met  Communication of RD Recs: (POC)    Reason for Assessment    Reason For Assessment: RD follow-up  Diagnosis: other (see comments)(Acute respiratory failure with hypoxia)  Relevant Medical History: atrial fibrillation with RVR, essential HTN, heart valve problem, HLD  Interdisciplinary Rounds: did not attend  General Information Comments: pt s/p Mahmood Maze procedure, MVr, TVr. SLP recommends NPO due to dysphagia. Pt is s/p PEG. Pt is cleared to start TF this afternoon. NFPE completed- pt has severe muscle wasting to temple; moderate wasting to clavicle, shoudler; pt has severe fat wasting to ortibal; pt meets ASPEN guidelines for severe malnutrition due to acute illness.   Nutrition Discharge Planning: Pt to meet adequate energy and protien needs.     Nutrition Risk Screen    Nutrition Risk Screen: tube feeding or parenteral nutrition    Nutrition/Diet History    Spiritual, Cultural Beliefs, Congregational Practices, Values that Affect Care: no  Factors Affecting Nutritional Intake: difficulty/impaired swallowing, NPO    Anthropometrics    Temp: 97.5 °F (36.4 °C)  Height Method: Stated  Height: 5' 5" (165.1 cm)  Height (inches): 65 in  Weight Method: Bed Scale  Weight: 72 kg (158 lb 11.7 oz)  Weight (lb): 158.73 lb  Ideal Body Weight (IBW), Female: 125 lb  % Ideal Body Weight, Female (lb): 147.2 %  BMI (Calculated): 26.4       Lab/Procedures/Meds    Pertinent Labs Reviewed: reviewed  Pertinent Labs Comments: WBC 14.53, Hgb 8.3, Hct 27.9, na " 146, Cl 111, , Cr 2.7, GFR 16.6, Phosphorus 6.6, Magnesium 2.8, Alb 2.0  Pertinent Medications Reviewed: reviewed  Pertinent Medications Comments: Albuterol, cefepime, famotidine, MVI, polyethylene glycol    Physical Findings/Assessment         Estimated/Assessed Needs    Weight Used For Calorie Calculations: 83 kg (182 lb 15.7 oz)  Energy Calorie Requirements (kcal): 1657 kcals/day  Energy Need Method: [place]-St Jeor(x 1.25)  Protein Requirements: 60-76gm (0.8-1.0gm/kg) renal function  Weight Used For Protein Calculations: 72 kg (158 lb 11.7 oz)  Fluid Requirements (mL): per MD or 1 mL/kcal     RDA Method (mL): 1657         Nutrition Prescription Ordered    Current Diet Order: NPO (10/5)  Current Nutrition Support Formula Ordered: Novasource Renal  Current Nutrition Support Rate Ordered: 35 (ml)  Current Nutrition Support Frequency Ordered: mL/hr    Evaluation of Received Nutrient/Fluid Intake    I/O: I: 1483; o: 560; +1.2L since 10/8  Energy Calories Required: not meeting needs  Protein Required: not meeting needs  Fluid Required: (per MD)  Comments: LBM 10/19  Tolerance: (RD to monitor)  % Intake of Estimated Energy Needs: 0  % Meal Intake: 0    Nutrition Risk    Level of Risk/Frequency of Follow-up: high , 2x weekly    Assessment and Plan     Nutrition Problem  Malnutrition (Severe)  In the context of acute illness    Related to (etiology):   Physiological causes increasing nutrient need do to acute illness    Signs and Symptoms (as evidenced by):   Malnutrition Assessment:  Body Fat Depletion: severe depletion of orbital   Muscle Mass Depletion: severe depletion of temple   Weight Loss: 10% x 2 months  Fluid Accumulation: moderately severe      Interventions(treatment strategy):  1.) Collaboration with other providers  2.) Enteral nutrition    Nutrition Diagnosis Status:    new    Monitor and Evaluation    Food and Nutrient Intake: enteral nutrition intake  Food and Nutrient Adminstration: enteral and  parenteral nutrition administration  Knowledge/Beliefs/Attitudes: food and nutrition knowledge/skill, beliefs and attitudes  Anthropometric Measurements: body mass index, weight change, weight  Biochemical Data, Medical Tests and Procedures: electrolyte and renal panel, gastrointestinal profile, glucose/endocrine profile, inflammatory profile, lipid profile  Nutrition-Focused Physical Findings: overall appearance     Malnutrition Assessment  Malnutrition Type: acute illness or injury              Orbital Region (Subcutaneous Fat Loss): severe depletion  Upper Arm Region (Subcutaneous Fat Loss): mild depletion   Roland Region (Muscle Loss): severe depletion  Clavicle Bone Region (Muscle Loss): moderate depletion  Clavicle and Acromion Bone Region (Muscle Loss): moderate depletion  Dorsal Hand (Muscle Loss): mild depletion  Anterior Thigh Region (Muscle Loss): well nourished  Posterior Calf Region (Muscle Loss): well nourished       Subcutaneous Fat Loss (Final Summary): severe protein-calorie malnutrition  Muscle Loss Evaluation (Final Summary): severe protein-calorie malnutrition    Severe Weight Loss (Malnutrition): greater than 5% in 1 month    Nutrition Follow-Up    RD Follow-up?: Yes

## 2020-10-22 NOTE — SUBJECTIVE & OBJECTIVE
Interval History/Significant Events: NAEON. Not requiring any pressor support and has remained rate controlled. WBC down to 14. Creatinine and BUN trending up, nephrology following (will limit the amount of diuresis, vancomycin was dc)  Patient was PEG yesterday  Heparin gtt was re-started    Follow-up For: Procedure(s) (LRB):  LARYNGOSCOPY, MICRO SUSPENSION WITH AUGMENTATION (N/A)  VATS, WITH DECORTICATION, LUNG (Right)    Post-Operative Day: 13 Days Post-Op    Objective:     Vital Signs (Most Recent):  Temp: 97.5 °F (36.4 °C) (10/22/20 0700)  Pulse: 60 (10/22/20 0730)  Resp: (!) 24 (10/22/20 0730)  BP: (!) 147/109 (10/22/20 0730)  SpO2: 99 % (10/22/20 0730) Vital Signs (24h Range):  Temp:  [97.4 °F (36.3 °C)-97.8 °F (36.6 °C)] 97.5 °F (36.4 °C)  Pulse:  [57-81] 60  Resp:  [17-37] 24  SpO2:  [91 %-100 %] 99 %  BP: (108-192)/() 147/109     Weight: 72 kg (158 lb 11.7 oz)  Body mass index is 26.41 kg/m².      Intake/Output Summary (Last 24 hours) at 10/22/2020 0738  Last data filed at 10/22/2020 0600  Gross per 24 hour   Intake 1483 ml   Output 560 ml   Net 923 ml       Physical Exam  Vitals signs and nursing note reviewed.   Constitutional:       Appearance: She is well-developed. She is ill-appearing. She is not diaphoretic.   HENT:      Head: Normocephalic and atraumatic.   Eyes:      Conjunctiva/sclera: Conjunctivae normal.   Neck:      Musculoskeletal: Normal range of motion and neck supple.   Cardiovascular:      Rate and Rhythm: Normal rate and regular rhythm.      Comments: Midline sternotomy with clean, dry, and intact, without evidence of infection  Prior chest tubes sites with dressings in place, clean and dry  Pulmonary:      Comments: Breathing comfortably on comfort flow  No distress, but rhonchi present and she has a wet cough.  Hematoma on the R chest, not actively bleeding   Abdominal:      General: There is no distension.      Palpations: Abdomen is soft.      Tenderness: There is no abdominal  tenderness.      Comments: g tube in place    Skin:     General: Skin is warm and dry.   Psychiatric:      Comments: Depressed mood, flat affect         Vents:  Vent Mode: Spont (10/08/20 1525)  Ventilator Initiated: Yes(chart correction) (09/18/20 2202)  Set Rate: 20 BPM (10/08/20 0747)  Vt Set: 375 mL (10/08/20 0747)  Pressure Support: 10 cmH20 (10/08/20 1525)  PEEP/CPAP: 5 cmH20 (10/08/20 1525)  Oxygen Concentration (%): 21 (10/17/20 1951)  Peak Airway Pressure: 15 cmH2O (10/08/20 1525)  Plateau Pressure: 19 cmH20 (10/08/20 1525)  Total Ve: 5.3 mL (10/08/20 1525)  Negative Inspiratory Force (cm H2O): -21 (10/08/20 1452)  F/VT Ratio<105 (RSBI): (!) 70.71 (10/08/20 1258)    Lines/Drains/Airways     Central Venous Catheter Line            Percutaneous Central Line Insertion/Assessment - Triple Lumen  10/05/20 1843 left internal jugular 16 days          Drain                 Urethral Catheter 10/14/20 1500 7 days         Gastrostomy/Enterostomy 10/21/20 1304 Percutaneous endoscopic gastrostomy (PEG) feeding less than 1 day          Peripheral Intravenous Line                 Midline Catheter Insertion/Assessment  - Single Lumen 10/19/20 1654 brachial vein 18g x 10cm 2 days                Significant Labs:    CBC/Anemia Profile:  Recent Labs   Lab 10/21/20  0403 10/22/20  0326   WBC 16.20* 14.53*   HGB 6.9* 8.3*   HCT 23.3* 27.9*    194   MCV 99* 99*   RDW 19.4* 19.4*        Chemistries:  Recent Labs   Lab 10/21/20  0403 10/22/20  0326    146*   K 4.4 4.8    111*   CO2 23 23   * 113*   CREATININE 2.6* 2.7*   CALCIUM 8.5* 8.8   ALBUMIN 2.1* 2.0*   PROT 5.9* 5.9*   BILITOT 0.6 0.5   ALKPHOS 204* 200*   ALT 39 40   AST 37 39   MG 2.9* 2.8*   PHOS 4.5 6.6*       ABGs: No results for input(s): PH, PCO2, HCO3, POCSATURATED, BE in the last 48 hours.  Coagulation:   Recent Labs   Lab 10/22/20  0326   INR 1.9*   APTT 72.8*  72.8*     Lactic Acid: No results for input(s): LACTATE in the last 48  hours.    Significant Imaging:  I have reviewed all pertinent imaging results/findings within the past 24 hours.

## 2020-10-22 NOTE — PLAN OF CARE
Problem: SLP Goal  Goal: SLP Goal  Description: Speech Language Pathology Goals  Goals expected to be met by 10/26  1. Pt to participate in ongoing swallow assessment to determine safest and least restrictive diet.  2. Pt will complete vocal adduction exercises x10 per session with min A.  10/22/2020 1409 by Emily Abadie, MABEL-SLP  Outcome: Ongoing, Progressing  10/22/2020 1408 by Emily Abadie, CCC-SLP  Outcome: Ongoing, Progressing     Goals remain appropriate  Emily P. Abadie M.S., CCC-SLP  Speech Language Pathologist  [phone]  10/22/2020

## 2020-10-22 NOTE — PROGRESS NOTES
General Surgery Note:     Hemodynamically stable and Hb/Hct stable after PEG placement yesterday. No signs of bleeding. Ok to use PEG for tube feeds this afternoon. General surgery will sign off.    Davida Hernandez MD  General Surgery Resident, PGY III  Pager [phone]

## 2020-10-22 NOTE — PLAN OF CARE
[facility] submitted for authorization this date.     SW will continue to coordinate with patient, family, team and insurance to complete patient's discharge plan.       10/22/20 1446   Post-Acute Status   Post-Acute Authorization Placement   Post-Acute Placement Status Pending Payor Review   Discharge Plan   Discharge Plan A Long-term acute care facility (LTAC)     Esthela Peralta LMSW   - Case Management

## 2020-10-23 NOTE — ASSESSMENT & PLAN NOTE
-non oliguric kdigo stage 2 [place] likely multifactorial ATN from vancomycin toxicity, infection, low effective arterial blood volume  -ua with spec grav 1010, prot 1+, blood 3+, Sang 30  -urine microscopy with frequent muddy brown casts  -new dehydration and worsening hemoglobin associated with worsening creatinine  -keep hgb>7; investigate hemoglobin drop  -increase po water intake as tolerated

## 2020-10-23 NOTE — CODE/ RAPID DOCUMENTATION
Rapid Response Nurse AI Alert     Rapid Response Nurse AI Alert     AI alert received, chart check completed abnormal VS noted. bedside RN, Yossi contacted, RN added 0.5 L for sats 88% per MD orders. NC set at 3.5 L currently. Pt remains tachypneic, but now 94%. RN instructed to call [phone] for further concerns or assistance.

## 2020-10-23 NOTE — PT/OT/SLP PROGRESS
Physical Therapy Treatment    Patient Name:  Fatou Lorenzo   MRN:  [id number]    Recommendations:     Discharge Recommendations:  nursing facility, skilled   Discharge Equipment Recommendations: (TBD)   Barriers to discharge: Decreased caregiver support at current functional level     Assessment:     Fatou Lorenzo is a 75 y.o. female admitted with a medical diagnosis of Leukocytosis.  She presents with the following impairments/functional limitations:  weakness, impaired endurance, impaired self care skills, impaired functional mobilty, gait instability, impaired balance, impaired cardiopulmonary response to activity, decreased lower extremity function, edema, impaired skin, impaired coordination, decreased upper extremity function. Patient demo improved function this date with slightly less assist required for bed mobility than previous session, and moments of SBA during seated balance EOB with static sitting. However, pt still requires max-total assist of 2 for bed mobility, is unable to transfer or stand, and fatigues rapidly with min exertion. Patient would benefit from acute skilled PT services to address current deficits and increase functional mobility. Upon d/c pt will benefit from skilled PT services at SNF to continue to progress upright tolerance, balance and functional mobility to prior level.    Rehab Prognosis: Good; patient would benefit from acute skilled PT services to address these deficits and reach maximum level of function.    Recent Surgery: Procedure(s) (LRB):  INSERTION, PEG TUBE (N/A) 2 Days Post-Op    Plan:     During this hospitalization, patient to be seen 4 x/week to address the identified rehab impairments via therapeutic activities, therapeutic exercises, neuromuscular re-education and progress toward the following goals:    · Plan of Care Expires:  11/15/20    Subjective      Chief Complaint: fatigue  Patient/Family Comments/goals: Pt amenable to therapy but required max encouragement  to participate.  Pain/Comfort:  · Pain Rating 1: 0/10      Objective:     Communicated with nurse prior to session.  Patient found HOB elevated with telemetry, vivas catheter, PEG Tube, central line, pulse ox (continuous)(visi) upon PT entry to room.     General Precautions: Standard, aspiration, fall, sternal   Orthopedic Precautions:N/A   Braces: N/A     Functional Mobility:  · Bed Mobility:   HOB elevated   · Rolling Right: maximal assistance and of 2 persons   · Scooting:   · Seated EOB scooting anteriorly: max assist   · Supine towards HOB: total assistance and of 2 persons with use of draw sheet   · Supine to Sit: maximal assistance and of 2 persons for trunk elevation and LE placement   · Cues for hand placement, technique, sequencing and timing   · Sit to Supine: maximal assistance and of 2 persons for LE elevation and midline positioning    · Seated Balance: ~12 minutes with ModA and with momentary (~1-2 minutes) SBA   · Cues provided for upright posture, midline positioning, and UE movement     AM-PAC 6 CLICK MOBILITY  Turning over in bed (including adjusting bedclothes, sheets and blankets)?: 2  Sitting down on and standing up from a chair with arms (e.g., wheelchair, bedside commode, etc.): 1  Moving from lying on back to sitting on the side of the bed?: 2  Moving to and from a bed to a chair (including a wheelchair)?: 1  Need to walk in hospital room?: 1  Climbing 3-5 steps with a railing?: 1  Basic Mobility Total Score: 8     Therapeutic Activities and Exercises:  Pt educated on role of PT and PT POC. Pt verbalized understanding.   Pt performed LE exercises during dynamic seated balance: x5 seated marches BLE, x5 knee extensions BLE with assist from therapist for full ROM.  Cues provided during dynamic seated balance to facilitate UE movement, for midline positioning, weight shifting, and pelvic alignment with OT tasks  Pt educated on the effects of bed rest and the importance of upright activity  tolerance. Pt encouraged to sit upright majority of day as tolerated. Pt verbalized understanding.  Pt oriented to call bell and instructed to call for staff assist as needed. Pt verbalized understanding.     Patient left HOB elevated with all lines intact, call button in reach, nurse notified and spouse present..    GOALS:   Multidisciplinary Problems     Physical Therapy Goals        Problem: Physical Therapy Goal    Goal Priority Disciplines Outcome Goal Variances Interventions   Physical Therapy Goal     PT, PT/OT Ongoing, Progressing     Description: Goals to be met by: 2020     Patient will increase functional independence with mobility by performin. Supine to sit with Minimal Assistance x 1 person.-not met  2. Sit to stand transfer with Moderate Assistance.-not met  3. Bed to chair transfer with Moderate Assistance. -not met  4. Sitting at edge of bed x10 minutes with CGA.-not met  5. Lower extremity exercise program x 10 reps  with assistance as needed. -not met  6. Pt receive gait training ~ 10 ft with moderate assist- not met                         Time Tracking:     PT Received On: 10/23/20  PT Start Time: 1400     PT Stop Time: 1425  PT Total Time (min): 25 min     Billable Minutes: Therapeutic Activity 25  Co-treatment performed this date due to need for 2 skilled therapists in order to ensure pt safety, accomodate for pt activity tolerance, and maximize functional potential    Treatment Type: Treatment  PT/PTA: PT     PTA Visit Number: 0     Jeremie Mony, [facility]  10/23/2020

## 2020-10-23 NOTE — PROGRESS NOTES
[facility]  Cardiothoracic Surgery  Progress Note    Patient Name: Fatou Lorenzo  MRN: [id number]  Admission Date: 8/30/2020  Hospital Length of Stay: 53 days  Code Status: Full Code   Attending Physician: Antoni Waddell MD   Referring Provider: Self, Aaareferral  Principal Problem:Leukocytosis  Subjective:     Post-Op Info:  Procedure(s) (LRB):  INSERTION, PEG TUBE (N/A)   2 Days Post-Op     Interval History: Stepped down from floor, continue to have bleeding around peg tube site.  INR 2.3 today.     Review of Systems   Constitution: Negative for malaise/fatigue.   Cardiovascular: Negative for chest pain, claudication, dyspnea on exertion, irregular heartbeat, leg swelling and palpitations.   Respiratory: Negative for cough and shortness of breath.    Hematologic/Lymphatic: Negative for bleeding problem.   Gastrointestinal: Negative for abdominal pain.   Genitourinary: Negative for dysuria.   Neurological: Negative for headaches and weakness.     Medications:  Continuous Infusions:   dextrose 5 % and 0.45 % NaCl 100 mL/hr at 10/23/20 1101     Scheduled Meds:   albuterol-ipratropium  3 mL Nebulization Q6H WAKE    aspirin  81 mg Per NG tube Daily    ceFEPime (MAXIPIME) IVPB  1 g Intravenous Q24H    famotidine  20 mg Per NG tube Daily    melatonin  6 mg Per NG tube Nightly    metoprolol  25 mg Per NG tube BID    mirtazapine  7.5 mg Per NG tube QHS    multivitamin  1 tablet Oral Daily    polyethylene glycol  17 g Oral Daily    sodium chloride 3%  4 mL Nebulization Q6H WAKE     PRN Meds:sodium chloride, sodium chloride, sodium chloride, sodium chloride, acetaminophen, dextrose 50%, dextrose 50%, glucagon (human recombinant), insulin aspart U-100, labetalol, lidocaine HCL 2%, morphine     Objective:     Vital Signs (Most Recent):  Temp: 97.2 °F (36.2 °C) (10/23/20 1513)  Pulse: 62 (10/23/20 1513)  Resp: 20 (10/23/20 1513)  BP: (!) 114/55 (10/23/20 1513)  SpO2: 100 % (10/23/20 1513) Vital  Signs (24h Range):  Temp:  [97.2 °F (36.2 °C)-98.1 °F (36.7 °C)] 97.2 °F (36.2 °C)  Pulse:  [61-94] 62  Resp:  [14-34] 20  SpO2:  [91 %-100 %] 100 %  BP: ()/(50-87) 114/55     Weight: 72 kg (158 lb 11.7 oz)  Body mass index is 26.41 kg/m².    SpO2: 100 %  O2 Device (Oxygen Therapy): nasal cannula    Intake/Output - Last 3 Shifts       10/21 0700 - 10/22 0659 10/22 0700 - 10/23 0659 10/23 0700 - 10/24 0659    I.V. (mL/kg) 1183 (16.4) 75 (1) 20.1 (0.3)    Blood 270      NG/GT 30 480 310    Total Intake(mL/kg) 1483 (20.6) 555 (7.7) 330.1 (4.6)    Urine (mL/kg/hr) 565 (0.3) 465 (0.3)     Drains 30      Stool       Total Output 595 465     Net +888 +90 +330.1                 Lines/Drains/Airways     Central Venous Catheter Line            Percutaneous Central Line Insertion/Assessment - Triple Lumen  10/05/20 1843 left internal jugular 17 days          Drain                 Urethral Catheter 10/14/20 1500 9 days         Gastrostomy/Enterostomy 10/21/20 1304 Percutaneous endoscopic gastrostomy (PEG) feeding 2 days          Peripheral Intravenous Line                 Midline Catheter Insertion/Assessment  - Single Lumen 10/19/20 1654 brachial vein 18g x 10cm 3 days                Physical Exam  Constitutional:       Appearance: She is well-developed.   Cardiovascular:      Rate and Rhythm: Normal rate and regular rhythm.      Heart sounds: Normal heart sounds.   Pulmonary:      Effort: Pulmonary effort is normal.      Breath sounds: Normal breath sounds.   Abdominal:      Palpations: Abdomen is soft.      Comments: PEG in place; bleeding to site   Musculoskeletal: Normal range of motion.   Skin:     General: Skin is warm and dry.      Comments: Sternal Incision CDI   Neurological:      Mental Status: She is alert and oriented to person, place, and time.         Significant Labs:  BMP:   Recent Labs   Lab 10/23/20  0411   GLU 78   *   K 4.9   *   CO2 21*   *   CREATININE 3.1*   CALCIUM 8.2*   MG  2.9*     CBC:   Recent Labs   Lab 10/23/20  0411   WBC 11.62   RBC 2.24*   HGB 6.6*   HCT 22.5*      *   MCH 29.5   MCHC 29.3*     CMP:   Recent Labs   Lab 10/23/20  0411   GLU 78   CALCIUM 8.2*   ALBUMIN 1.8*   PROT 5.3*   *   K 4.9   CO2 21*   *   *   CREATININE 3.1*   ALKPHOS 182*   ALT 38   AST 32   BILITOT 0.5     Coagulation:   Recent Labs   Lab 10/23/20  0411   INR 2.3*   APTT 86.4*  86.4*       Significant Diagnostics:  I have reviewed and interpreted all pertinent imaging results/findings within the past 24 hours.    Assessment/Plan:     Acute blood loss anemia  - Expected post operatively   - CBC daily   - transfused today    Pleural effusion  - S/P VATS     Adjustment disorder with mixed anxiety and depressed mood  - Psych following     Debility  - Continue with PT/OT  - Awaiting LTAC placement     Vocal fold paresis, right  - ENT consulted, underwent microlaryngoscopy/possible vocal fold injection augmentation 10/5  - Still very hoarse     Hypernatremia  - Starting D5W with 0.45% NS at 100 ml/hr    Pericardial effusion with cardiac tamponade  - S/P pericardial window     Essential hypertension  - PRN labetalol for SBP > 160    Severe mitral regurgitation   Fatou Lorenzo is a 75 y.o. female s/p MVr, TVr 9/9/2020. Case noteworthy for multiple pump runs (3) and RV hematoma due to retraction. Unstable 9/18, required pericardial window for evac of posterior cardiac tamponade. Respiratory distress and so re-admitted to SICU on 10/2 now s/p VATS on 10/5.  - Encourage IS  - NPO  - Transfusing today for low H/H  - Awaiting LTAC placement  - Discontinue free water flushes    Paroxysmal atrial fibrillation  - Stopped amiodarone with increase in liver enzymes   - Continue Digoxin   - metop increased to 25          Vania Anderson NP  Cardiothoracic Surgery  [facility]

## 2020-10-23 NOTE — PLAN OF CARE
Spoke with patient's son Carter over the phone and updated him on dc plan. We have not received authorization from [facility] and [facility] is full. They will have open beds on Monday.    Pt's son Papo at the bedside.  Updated him d/c plans.     Julie Haase RN  Case Management [phone]

## 2020-10-23 NOTE — PLAN OF CARE
Problem: SLP Goal  Goal: SLP Goal  Description: Speech Language Pathology Goals  Goals expected to be met by 10/26  1. Pt to participate in ongoing swallow assessment to determine safest and least restrictive diet.  2. Pt will complete vocal adduction exercises x10 per session with min A.  3. Patient will complete dysphagia exercises x10 per session with min A.     Outcome: Ongoing, Progressing     Goals remain appropriate. ST rec pt remain NPO with strict aspiration precautions at this time. ST to continue to monitor.    BURKE Perkins  10/23/2020

## 2020-10-23 NOTE — PROGRESS NOTES
ESTEBAN Oates MD notified patient PEG tube dressing saturated with sanguineous drainage.  VSS.  Orders to continue to change dressing as needed.  Will continue to monitor.

## 2020-10-23 NOTE — PROGRESS NOTES
[facility]  Nephrology  Progress Note    Patient Name: Fatou Lorenzo  MRN: [id number]  Admission Date: 8/30/2020  Hospital Length of Stay: 53 days  Attending Provider: Antoni Waddell MD   Primary Care Physician: Ludin Rivas MD  Principal Problem:Leukocytosis    Subjective:     HPI: Ftaou Lorenzo is a 74 yo F who had MVR + TVR to 9/9 that was complicated by RV hematoma and subsequent pericardial effusion.  She has been in the hospital since 8/31/2020 and had a complex hospital course complicated by a significant JONAH 9/18. She then developed pneumonia. He JONAH improved to baseline creat of 0.8-1 9/24. She required vats 10/5 procedure for empyema. She never had resolution of leukocytosis and has been on vanc and cefepime. On 10/14 her creat began to increase again in conjunction with elevated vancomycin levels. We could not find any episode of hypotension or afib w/ rvr around that time.  Her creat has been increasing daily since 10/14 and urine output has been decreasing for last 2 days. Nephrology consulted for JONAH.    Interval History: stepped down yesterday. Urine output and intake less than day prior. Sodium increasing and now is dehydrated. Recommend increasing po water intake and keeping hemoglobin above 7.    Review of patient's allergies indicates:  No Known Allergies  Current Facility-Administered Medications   Medication Frequency    0.9%  NaCl infusion (for blood administration) Q24H PRN    0.9%  NaCl infusion (for blood administration) Q24H PRN    0.9%  NaCl infusion (for blood administration) Q24H PRN    0.9%  NaCl infusion (for blood administration) Q24H PRN    acetaminophen oral solution 650 mg Q6H PRN    albuterol-ipratropium 2.5 mg-0.5 mg/3 mL nebulizer solution 3 mL Q6H WAKE    aspirin chewable tablet 81 mg Daily    ceFEPIme injection 1 g Q24H    dextrose 50% injection 12.5 g PRN    dextrose 50% injection 25 g PRN    famotidine tablet 20 mg Daily    glucagon  (human recombinant) injection 1 mg PRN    heparin 25,000 units in dextrose 5% 250 mL (100 units/mL) infusion (heparin infusion - NO NOMOGRAM) Continuous    insulin aspart U-100 pen 0-5 Units Q6H PRN    labetalol 20 mg/4 mL (5 mg/mL) IV syring Q6H PRN    lidocaine HCL 2% jelly PRN    melatonin tablet 6 mg Nightly    metoprolol 12.5mg/1.25mL oral solution 25 mg BID    mirtazapine tablet 7.5 mg QHS    morphine injection 1 mg Q6H PRN    multivitamin tablet Daily    polyethylene glycol packet 17 g Daily    sodium chloride 3% nebulizer solution 4 mL Q6H WAKE       Objective:     Vital Signs (Most Recent):  Temp: 97.5 °F (36.4 °C) (10/23/20 0915)  Pulse: 62 (10/23/20 0915)  Resp: 14 (10/23/20 0915)  BP: (!) 118/56 (10/23/20 0915)  SpO2: 96 % (10/23/20 0915)  O2 Device (Oxygen Therapy): nasal cannula (10/23/20 0915) Vital Signs (24h Range):  Temp:  [97.3 °F (36.3 °C)-98.1 °F (36.7 °C)] 97.5 °F (36.4 °C)  Pulse:  [61-94] 62  Resp:  [14-34] 14  SpO2:  [91 %-100 %] 96 %  BP: (106-153)/(52-87) 118/56     Weight: 72 kg (158 lb 11.7 oz) (10/22/20 0500)  Body mass index is 26.41 kg/m².  Body surface area is 1.82 meters squared.    I/O last 3 completed shifts:  In: 623 [I.V.:143; NG/GT:480]  Out: 760 [Urine:730; Drains:30]    Physical Exam  Vitals signs and nursing note reviewed.   Constitutional:       General: She is not in acute distress.     Appearance: She is ill-appearing. She is not toxic-appearing.   Eyes:      General: No scleral icterus.     Pupils: Pupils are equal, round, and reactive to light.   Cardiovascular:      Rate and Rhythm: Normal rate.      Heart sounds: Murmur present.   Pulmonary:      Effort: Pulmonary effort is normal.   Musculoskeletal:         General: Swelling present.      Right lower leg: Edema present.      Left lower leg: Edema present.   Skin:     Coloration: Skin is not jaundiced.         Significant Labs:  All labs within the past 24 hours have been reviewed.     Significant  Imaging:  Labs: Reviewed    Assessment/Plan:     * Leukocytosis  -being followed by id with unclear source, likely lung  -vanco levels elevated, stopped  -on cefepime    JONAH (acute kidney injury)  -non oliguric kdigo stage 2 jonah likely multifactorial ATN from vancomycin toxicity, infection, low effective arterial blood volume  -ua with spec grav 1010, prot 1+, blood 3+, Sang 30  -urine microscopy with frequent muddy brown casts  -new dehydration and worsening hemoglobin associated with worsening creatinine  -keep hgb>7; investigate hemoglobin drop  -increase po water intake as tolerated    Adverse effect of vancomycin  -vanco level peakd at 30  -avoid elevated vanco levels    Severe mitral regurgitation  -per primary          Rashad Borrero MD  Nephrology  [facility]

## 2020-10-23 NOTE — ASSESSMENT & PLAN NOTE
Fatou Lorenzo is a 75 y.o. female s/p MVr, TVr 9/9/2020. Case noteworthy for multiple pump runs (3) and RV hematoma due to retraction. Unstable 9/18, required pericardial window for evac of posterior cardiac tamponade. Respiratory distress and so re-admitted to SICU on 10/2 now s/p VATS on 10/5.  - Encourage IS  - NPO  - Transfusing today for low H/H  - Awaiting LTAC placement  - Discontinue free water flushes

## 2020-10-23 NOTE — PLAN OF CARE
10/23/20 1229   Discharge Reassessment   Assessment Type Discharge Planning Reassessment   Provided patient/caregiver education on the expected discharge date and the discharge plan Yes   Do you have any problems affording any of your prescribed medications? No   Discharge Plan A Long-term acute care facility (LTAC)   Discharge Plan B Long-term acute care facility (LTAC)   DME Needed Upon Discharge  none   Anticipated Discharge Disposition LTAC     Pt stepped down from [id number].    Pt's SO Amanuel in the room. Discussed possibility of transfer to [facility]. They are in agreement with plan to [facility] to [facility].      Anticipate d/c to [facility] this afternoon if authorization is obtained from [facility]. SW/CM will continue to follow and assist with d/c planning.     Julie Haase RN  Case Management [phone]

## 2020-10-23 NOTE — PLAN OF CARE
Patient free from falls and injuries throughout shift.  AAO and VSS.  Patient denies chest pain and SOB.  Blood glucose managed through meals and insulin; CBG 78.  Patient continues on heparin @ 500 units.   K+ 4.8 and Mg 2.8.   BUN/Cre 113/2.7.  apTT 86.4 (see note).  Patient MAKENZIE working WNL;  feeding @ 10 mL/hr ;  dressing saturated with sanguineous drainage.  400 cc bolus per order.  Patient NPO.  Yoo working WNL.  Urine output since stepdown  150 cc.   MSI CHARMAINE.  No acute events overnight. Patient resting well at this time.  Plan of care discussed with patient.  Patient verbalizes understanding.  Will continue to monitor.

## 2020-10-23 NOTE — SUBJECTIVE & OBJECTIVE
Interval History: Stepped down from floor, continue to have bleeding around peg tube site.  INR 2.3 today.     Review of Systems   Constitution: Negative for malaise/fatigue.   Cardiovascular: Negative for chest pain, claudication, dyspnea on exertion, irregular heartbeat, leg swelling and palpitations.   Respiratory: Negative for cough and shortness of breath.    Hematologic/Lymphatic: Negative for bleeding problem.   Gastrointestinal: Negative for abdominal pain.   Genitourinary: Negative for dysuria.   Neurological: Negative for headaches and weakness.     Medications:  Continuous Infusions:   dextrose 5 % and 0.45 % NaCl 100 mL/hr at 10/23/20 1101     Scheduled Meds:   albuterol-ipratropium  3 mL Nebulization Q6H WAKE    aspirin  81 mg Per NG tube Daily    ceFEPime (MAXIPIME) IVPB  1 g Intravenous Q24H    famotidine  20 mg Per NG tube Daily    melatonin  6 mg Per NG tube Nightly    metoprolol  25 mg Per NG tube BID    mirtazapine  7.5 mg Per NG tube QHS    multivitamin  1 tablet Oral Daily    polyethylene glycol  17 g Oral Daily    sodium chloride 3%  4 mL Nebulization Q6H WAKE     PRN Meds:sodium chloride, sodium chloride, sodium chloride, sodium chloride, acetaminophen, dextrose 50%, dextrose 50%, glucagon (human recombinant), insulin aspart U-100, labetalol, lidocaine HCL 2%, morphine     Objective:     Vital Signs (Most Recent):  Temp: 97.2 °F (36.2 °C) (10/23/20 1513)  Pulse: 62 (10/23/20 1513)  Resp: 20 (10/23/20 1513)  BP: (!) 114/55 (10/23/20 1513)  SpO2: 100 % (10/23/20 1513) Vital Signs (24h Range):  Temp:  [97.2 °F (36.2 °C)-98.1 °F (36.7 °C)] 97.2 °F (36.2 °C)  Pulse:  [61-94] 62  Resp:  [14-34] 20  SpO2:  [91 %-100 %] 100 %  BP: ()/(50-87) 114/55     Weight: 72 kg (158 lb 11.7 oz)  Body mass index is 26.41 kg/m².    SpO2: 100 %  O2 Device (Oxygen Therapy): nasal cannula    Intake/Output - Last 3 Shifts       10/21 0700 - 10/22 0659 10/22 0700 - 10/23 0659 10/23 0700 - 10/24 0659     I.V. (mL/kg) 1183 (16.4) 75 (1) 20.1 (0.3)    Blood 270      NG/GT 30 480 310    Total Intake(mL/kg) 1483 (20.6) 555 (7.7) 330.1 (4.6)    Urine (mL/kg/hr) 565 (0.3) 465 (0.3)     Drains 30      Stool       Total Output 595 465     Net +888 +90 +330.1                 Lines/Drains/Airways     Central Venous Catheter Line            Percutaneous Central Line Insertion/Assessment - Triple Lumen  10/05/20 1843 left internal jugular 17 days          Drain                 Urethral Catheter 10/14/20 1500 9 days         Gastrostomy/Enterostomy 10/21/20 1304 Percutaneous endoscopic gastrostomy (PEG) feeding 2 days          Peripheral Intravenous Line                 Midline Catheter Insertion/Assessment  - Single Lumen 10/19/20 1654 brachial vein 18g x 10cm 3 days                Physical Exam  Constitutional:       Appearance: She is well-developed.   Cardiovascular:      Rate and Rhythm: Normal rate and regular rhythm.      Heart sounds: Normal heart sounds.   Pulmonary:      Effort: Pulmonary effort is normal.      Breath sounds: Normal breath sounds.   Abdominal:      Palpations: Abdomen is soft.      Comments: PEG in place; bleeding to site   Musculoskeletal: Normal range of motion.   Skin:     General: Skin is warm and dry.      Comments: Sternal Incision CDI   Neurological:      Mental Status: She is alert and oriented to person, place, and time.         Significant Labs:  BMP:   Recent Labs   Lab 10/23/20  0411   GLU 78   *   K 4.9   *   CO2 21*   *   CREATININE 3.1*   CALCIUM 8.2*   MG 2.9*     CBC:   Recent Labs   Lab 10/23/20  0411   WBC 11.62   RBC 2.24*   HGB 6.6*   HCT 22.5*      *   MCH 29.5   MCHC 29.3*     CMP:   Recent Labs   Lab 10/23/20  0411   GLU 78   CALCIUM 8.2*   ALBUMIN 1.8*   PROT 5.3*   *   K 4.9   CO2 21*   *   *   CREATININE 3.1*   ALKPHOS 182*   ALT 38   AST 32   BILITOT 0.5     Coagulation:   Recent Labs   Lab 10/23/20  0411   INR 2.3*   APTT  86.4*  86.4*       Significant Diagnostics:  I have reviewed and interpreted all pertinent imaging results/findings within the past 24 hours.

## 2020-10-23 NOTE — PT/OT/SLP PROGRESS
Speech Language Pathology Treatment    Patient Name:  Fatou Lorenzo   MRN:  [id number]  Admitting Diagnosis: Leukocytosis    Recommendations:                 General Recommendations:  Dysphagia therapy and Voice therapy  Diet recommendations:  NPO, Liquid Diet Level: NPO   Aspiration Precautions: Alternate means of nutrition/hydration and Strict aspiration precautions, small amounts of nectar-thick liquid for pleasure only  General Precautions: Standard, aspiration, fall, sternal, NPO  Communication strategies:  yes/no questions only and provide increased time to answer    Subjective     Pt awake and alert upon SLP entry. Spouse present at bedside.     Objective:     Has the patient been evaluated by SLP for swallowing?   Yes  Keep patient NPO? Yes   Current Respiratory Status: nasal cannula      Pt seen for ongoing speech/language and dysphagia therapies. Mod cueing required for pt to actively participate throughout session. Pt with inconsistent alertness and lethargy. Aphonia still evident. HOB elevated for PO trials. Wet vocal quality followed 3/3 trials of nectar-thick liquid via tsp. Pt observed coughing up thick secretions, cleared with suction. Unable to rule out aspiration at this time. Vocal adduction push/pull and falsetto /i/ exercises completed with max encouragement and cueing. Pt with effort to voice, though no phonation appreciated. 5 second breath holds completed x3 with mod-max cueing. Education provided regarding role of SLP, continued NPO, vocal adduction exercises, s/s aspiration, and ongoing ST plan of care. Pt nodded to demonstrate understanding and agreement. ST to continue to monitor.    Assessment:     Fatou Lorenzo is a 75 y.o. female with an SLP diagnosis of Dysphagia and Aphonia.     Goals:   Multidisciplinary Problems     SLP Goals        Problem: SLP Goal    Goal Priority Disciplines Outcome   SLP Goal     SLP Ongoing, Progressing   Description: Speech Language Pathology Goals  Goals  expected to be met by 10/26  1. Pt to participate in ongoing swallow assessment to determine safest and least restrictive diet.  2. Pt will complete vocal adduction exercises x10 per session with min A.  3. Patient will complete dysphagia exercises x10 per session with min A.                  Plan:     · Patient to be seen:  4 x/week   · Plan of Care expires:  11/08/20  · Plan of Care reviewed with:  patient, spouse   · SLP Follow-Up:  Yes       Discharge recommendations:  nursing facility, skilled   Barriers to Discharge:  Level of Skilled Assistance Needed      Time Tracking:     SLP Treatment Date:   10/23/20  Speech Start Time:  1047  Speech Stop Time:  1106     Speech Total Time (min):  19 min    Billable Minutes: Speech Therapy Individual 9 and Treatment Swallowing Dysfunction 10    BURKE Perkins  10/23/2020

## 2020-10-23 NOTE — SUBJECTIVE & OBJECTIVE
Interval History: stepped down yesterday. Urine output and intake less than day prior. Sodium increasing and now is dehydrated. Recommend increasing po water intake and keeping hemoglobin above 7.    Review of patient's allergies indicates:  No Known Allergies  Current Facility-Administered Medications   Medication Frequency    0.9%  NaCl infusion (for blood administration) Q24H PRN    0.9%  NaCl infusion (for blood administration) Q24H PRN    0.9%  NaCl infusion (for blood administration) Q24H PRN    0.9%  NaCl infusion (for blood administration) Q24H PRN    acetaminophen oral solution 650 mg Q6H PRN    albuterol-ipratropium 2.5 mg-0.5 mg/3 mL nebulizer solution 3 mL Q6H WAKE    aspirin chewable tablet 81 mg Daily    ceFEPIme injection 1 g Q24H    dextrose 50% injection 12.5 g PRN    dextrose 50% injection 25 g PRN    famotidine tablet 20 mg Daily    glucagon (human recombinant) injection 1 mg PRN    heparin 25,000 units in dextrose 5% 250 mL (100 units/mL) infusion (heparin infusion - NO NOMOGRAM) Continuous    insulin aspart U-100 pen 0-5 Units Q6H PRN    labetalol 20 mg/4 mL (5 mg/mL) IV syring Q6H PRN    lidocaine HCL 2% jelly PRN    melatonin tablet 6 mg Nightly    metoprolol 12.5mg/1.25mL oral solution 25 mg BID    mirtazapine tablet 7.5 mg QHS    morphine injection 1 mg Q6H PRN    multivitamin tablet Daily    polyethylene glycol packet 17 g Daily    sodium chloride 3% nebulizer solution 4 mL Q6H WAKE       Objective:     Vital Signs (Most Recent):  Temp: 97.5 °F (36.4 °C) (10/23/20 0915)  Pulse: 62 (10/23/20 0915)  Resp: 14 (10/23/20 0915)  BP: (!) 118/56 (10/23/20 0915)  SpO2: 96 % (10/23/20 0915)  O2 Device (Oxygen Therapy): nasal cannula (10/23/20 0915) Vital Signs (24h Range):  Temp:  [97.3 °F (36.3 °C)-98.1 °F (36.7 °C)] 97.5 °F (36.4 °C)  Pulse:  [61-94] 62  Resp:  [14-34] 14  SpO2:  [91 %-100 %] 96 %  BP: (106-153)/(52-87) 118/56     Weight: 72 kg (158 lb 11.7 oz) (10/22/20  0500)  Body mass index is 26.41 kg/m².  Body surface area is 1.82 meters squared.    I/O last 3 completed shifts:  In: 623 [I.V.:143; NG/GT:480]  Out: 760 [Urine:730; Drains:30]    Physical Exam  Vitals signs and nursing note reviewed.   Constitutional:       General: She is not in acute distress.     Appearance: She is ill-appearing. She is not toxic-appearing.   Eyes:      General: No scleral icterus.     Pupils: Pupils are equal, round, and reactive to light.   Cardiovascular:      Rate and Rhythm: Normal rate.      Heart sounds: Murmur present.   Pulmonary:      Effort: Pulmonary effort is normal.   Musculoskeletal:         General: Swelling present.      Right lower leg: Edema present.      Left lower leg: Edema present.   Skin:     Coloration: Skin is not jaundiced.         Significant Labs:  All labs within the past 24 hours have been reviewed.     Significant Imaging:  Labs: Reviewed

## 2020-10-23 NOTE — CARE UPDATE
AI alert received, chart check completed abnormal VS noted. bedside RNYossi contacted, RN added 0.5 L for sats 88% per MD orders. NC set at 3.5 L currently. Pt remains tachypneic, but now 94%. RN instructed to call [phone] for further concerns or assistance.

## 2020-10-23 NOTE — PROGRESS NOTES
10/23/2020 04:11   Hemoglobin 6.6 (L)   Hematocrit 22.5 (L)   aPTT 86.4 (H)     Kay Croft MD notified of above labs.  Patient currently on heparin gtt @ 600 units w/ aPTT goal 60-80.  Orders to decrease heparin gtt to 500 units.  MD to give blood will wait for orders.  Will continue to monitor.

## 2020-10-23 NOTE — PT/OT/SLP PROGRESS
Occupational Therapy   Treatment    Name: Fatou Lorenzo  MRN: [id number]  Admitting Diagnosis:  Leukocytosis  2 Days Post-Op    Recommendations:     Discharge Recommendations: nursing facility, skilled  Discharge Equipment Recommendations:  (tbd)  Barriers to discharge:  Decreased caregiver support, Inaccessible home environment    Assessment:     Fatou Lorenzo is a 75 y.o. female with a medical diagnosis of Leukocytosis.  She presents with some improvement w/ sitting balance and coordination while completing functional tasks. Performance deficits affecting function are impaired coordination, weakness, impaired endurance, impaired self care skills, impaired functional mobilty, gait instability, impaired balance, impaired cardiopulmonary response to activity, decreased lower extremity function, edema, impaired skin, decreased upper extremity function, decreased ROM.     Rehab Prognosis:  Fair; patient would benefit from acute skilled OT services to address these deficits and reach maximum level of function.       Plan:     Patient to be seen 4 x/week to address the above listed problems via self-care/home management, therapeutic activities, therapeutic exercises  · Plan of Care Expires: 10/29/20  · Plan of Care Reviewed with: patient, spouse    Subjective     Pain/Comfort:  · Pain Rating 1: 0/10  · Pain Rating Post-Intervention 1: 0/10  · Pain Rating Post-Intervention 2: 0/10    Objective:     Communicated with: RN prior to session.  Patient found HOB elevated with telemetry, vivas catheter, PEG Tube, pulse ox (continuous), oxygen, PRAFO, pressure relief boots, central line upon OT entry to room.    General Precautions: Standard, aspiration, fall, sternal   Orthopedic Precautions:N/A   Braces: N/A     Occupational Performance:     Bed Mobility:    · Patient completed Rolling/Turning to Left with  moderate assistance  · Patient completed Rolling/Turning to Right with moderate assistance  · Patient completed  Scooting/Bridging with maximal assistance  · Patient completed Supine to Sit with total assistance  · Patient completed Sit to Supine with total assistance     Functional Mobility/Transfers:  · Functional Mobility: Pt sat EOB w/ BUE support and Mod A w/ 2x 1min periods of SBA but mostly HHAx1.     Activities of Daily Living:  · Bathing: stand by assistance seated at EOB w/ setup       [facility] 6 Click ADL: 11    Treatment & Education:  -Pt edu on OT role/POC  -Importance of OOB activity with staff assistance  -Safety during functional t/f and mobility  - White board updated  - Multiple self care tasks/functional mobility completed-- assistance level noted above  - All questions/concerns answered within OT scope of practice    RN reported some minor bleeding from PEG site on the Pt LUQ of her abdomen.  Site was check prior to and after therapy and no appreciable increase in drainage or blood was noted on the clean dressing that was in place.  RN Zaida was notified after session so she could also keep an eye on it.      Patient left HOB elevated with all lines intact, call button in reach, RN notified and spouse presentEducation:      GOALS:   Multidisciplinary Problems     Occupational Therapy Goals        Problem: Occupational Therapy Goal    Goal Priority Disciplines Outcome Interventions   Occupational Therapy Goal     OT, PT/OT Ongoing, Progressing    Description: Goals to be met by: 10/23/2020      Patient will increase functional independence with ADLs by performing:    UE Dressing with Minimum Assistance.  Grooming while sitting EOB with Contact Guard Assistance  Toileting from bedside commode with Max Assistance for hygiene and clothing management.   Toilet transfer to bedside commode with Max Assistance.  Supine <> Sit with minimum assistance in preparation for EOB/OOB functional activities  Pt to tolerate static standing for ~1 minute with min A while competing a functional task.                         Time  Tracking:     OT Date of Treatment: 10/23/20  OT Start Time: 1400  OT Stop Time: 1426  OT Total Time (min): 26 min    Billable Minutes:Self Care/Home Management 26 mins    Bartolo Ledbetter, OT  10/23/2020

## 2020-10-24 NOTE — SUBJECTIVE & OBJECTIVE
Interval History:     Review of Systems   Constitution: Negative for malaise/fatigue.   Cardiovascular: Negative for chest pain, claudication, dyspnea on exertion, irregular heartbeat, leg swelling and palpitations.   Respiratory: Negative for cough and shortness of breath.    Hematologic/Lymphatic: Positive for bleeding problem.   Gastrointestinal: Negative for abdominal pain.   Genitourinary: Negative for dysuria.   Neurological: Negative for headaches and weakness.     Medications:  Continuous Infusions:   dextrose 5 % 50 mL/hr at 10/23/20 1844     Scheduled Meds:   albuterol-ipratropium  3 mL Nebulization Q6H WAKE    aspirin  81 mg Per NG tube Daily    ceFEPime (MAXIPIME) IVPB  1 g Intravenous Q24H    famotidine  20 mg Per NG tube Daily    melatonin  6 mg Per NG tube Nightly    metoprolol  25 mg Per NG tube BID    mirtazapine  7.5 mg Per NG tube QHS    multivitamin  1 tablet Oral Daily    polyethylene glycol  17 g Oral Daily    sodium chloride 3%  4 mL Nebulization Q6H WAKE     PRN Meds:sodium chloride, sodium chloride, sodium chloride, sodium chloride, acetaminophen, dextrose 50%, dextrose 50%, glucagon (human recombinant), insulin aspart U-100, labetalol, lidocaine HCL 2%, morphine, ondansetron     Objective:     Vital Signs (Most Recent):  Temp: 97.3 °F (36.3 °C) (10/24/20 0712)  Pulse: (!) 56 (10/24/20 1115)  Resp: 18 (10/24/20 0849)  BP: (!) 110/53 (10/24/20 0712)  SpO2: 99 % (10/24/20 0849) Vital Signs (24h Range):  Temp:  [96.6 °F (35.9 °C)-97.5 °F (36.4 °C)] 97.3 °F (36.3 °C)  Pulse:  [] 56  Resp:  [14-22] 18  SpO2:  [96 %-100 %] 99 %  BP: (109-128)/(53-61) 110/53     Weight: 72 kg (158 lb 11.7 oz)  Body mass index is 26.41 kg/m².    SpO2: 99 %  O2 Device (Oxygen Therapy): nasal cannula    Intake/Output - Last 3 Shifts       10/22 0700 - 10/23 0659 10/23 0700 - 10/24 0659 10/24 0700 - 10/25 0659    P.O.  1000     I.V. (mL/kg) 75 (1) 1231.7 (17.1)     Blood       NG/ 607.5 200     Total Intake(mL/kg) 555 (7.7) 2839.2 (39.4) 200 (2.8)    Urine (mL/kg/hr) 465 (0.3)  300 (0.9)    Drains       Total Output 465  300    Net +90 +2839.2 -100                 Lines/Drains/Airways     Central Venous Catheter Line            Percutaneous Central Line Insertion/Assessment - Triple Lumen  10/05/20 1843 left internal jugular 18 days          Drain                 Urethral Catheter 10/14/20 1500 9 days         Gastrostomy/Enterostomy 10/21/20 1304 Percutaneous endoscopic gastrostomy (PEG) feeding 2 days          Peripheral Intravenous Line                 Midline Catheter Insertion/Assessment  - Single Lumen 10/19/20 1654 brachial vein 18g x 10cm 4 days                Physical Exam  Constitutional:       Appearance: She is well-developed.   Cardiovascular:      Rate and Rhythm: Normal rate and regular rhythm.      Heart sounds: Normal heart sounds.   Pulmonary:      Effort: Pulmonary effort is normal.      Breath sounds: Normal breath sounds.   Abdominal:      Palpations: Abdomen is soft.      Comments: PEG in place; bleeding to site   Musculoskeletal: Normal range of motion.   Skin:     General: Skin is warm and dry.      Comments: Sternal Incision CDI   Neurological:      Mental Status: She is alert and oriented to person, place, and time.         Significant Labs:  BMP:   Recent Labs   Lab 10/24/20  0307   *      K 4.7   *   CO2 21*   *   CREATININE 3.4*   CALCIUM 7.8*   MG 2.8*     CBC:   Recent Labs   Lab 10/24/20  0307   WBC 14.05*   RBC 2.26*   HGB 6.8*   HCT 22.3*      MCV 99*   MCH 30.1   MCHC 30.5*     CMP:   Recent Labs   Lab 10/24/20  0307   *   CALCIUM 7.8*   ALBUMIN 1.7*   PROT 5.0*      K 4.7   CO2 21*   *   *   CREATININE 3.4*   ALKPHOS 156*   ALT 35   AST 28   BILITOT 0.6     Coagulation:   Recent Labs   Lab 10/24/20  0307   INR 1.9*   APTT 44.5*       Significant Diagnostics:  I have reviewed and interpreted all pertinent imaging  results/findings within the past 24 hours.

## 2020-10-24 NOTE — ASSESSMENT & PLAN NOTE
- Discontinued D5W with 0.45% NS at 100 ml/hr  - Started D5W at 50 CC/hour per nephrology recommendation

## 2020-10-24 NOTE — PLAN OF CARE
"CTS on call MD, Dr. Wood, notified about Pt.'s Low H/H this AM, 6.8/22.3, Per on call, "Will notify fellow and call RN back." RN relayed information to AM RN. Will monitor.  "

## 2020-10-24 NOTE — PLAN OF CARE
"Held Pt.'s PM meds including Metoprolol, Melatonin and Mirtazapine due to Pt.'s response to speech, able to follow commands however, drowsy, HR 30s-60s bpm, BP low 100/50s, Per  and CTS on call, "Hold meds and monitor."  "

## 2020-10-24 NOTE — PLAN OF CARE
Plan of care reviewed with pt  Answered questions from family  Free from falls and injury  Afebrile  SR/Afib on tele  D5 @50ml/hr  Continuous feed goal of 35, currently 15ml/hr tolerating fairly  Wound dressing changed x3  ANTB daily   No insulin coverage needed  PT OT RT SLP seen  Will continue to monitor

## 2020-10-24 NOTE — ASSESSMENT & PLAN NOTE
Fatou Lorenzo is a 75 y.o. female s/p MVr, TVr 9/9/2020. Case noteworthy for multiple pump runs (3) and RV hematoma due to retraction. Unstable 9/18, required pericardial window for evac of posterior cardiac tamponade. Respiratory distress and so re-admitted to SICU on 10/2 now s/p VATS on 10/5.  - Encourage IS  - NPO  - Transfusing today for low H/H  - Awaiting LTAC placement  - Discontinue free water flushes   - Continue metoprolol 25 mg   - Fluids changed to D5W with improvement in Na  -

## 2020-10-24 NOTE — ASSESSMENT & PLAN NOTE
- Expected post operatively   - CBC daily   - transfused yesterday with increased H/H. Will hold off on transfusion to see if patient can autoregulate; possible transfusion tomorrow if remains low

## 2020-10-24 NOTE — PROGRESS NOTES
[facility]  Cardiothoracic Surgery  Progress Note    Patient Name: Fatou Lorenzo  MRN: [id number]  Admission Date: 8/30/2020  Hospital Length of Stay: 54 days  Code Status: Full Code   Attending Physician: Antoni Waddell MD   Referring Provider: Self, Aaareferral  Principal Problem:Leukocytosis  Subjective:     Post-Op Info:  Procedure(s) (LRB):  INSERTION, PEG TUBE (N/A)   3 Days Post-Op     Interval History:     Review of Systems   Constitution: Negative for malaise/fatigue.   Cardiovascular: Negative for chest pain, claudication, dyspnea on exertion, irregular heartbeat, leg swelling and palpitations.   Respiratory: Negative for cough and shortness of breath.    Hematologic/Lymphatic: Positive for bleeding problem.   Gastrointestinal: Negative for abdominal pain.   Genitourinary: Negative for dysuria.   Neurological: Negative for headaches and weakness.     Medications:  Continuous Infusions:   dextrose 5 % 50 mL/hr at 10/23/20 1844     Scheduled Meds:   albuterol-ipratropium  3 mL Nebulization Q6H WAKE    aspirin  81 mg Per NG tube Daily    ceFEPime (MAXIPIME) IVPB  1 g Intravenous Q24H    famotidine  20 mg Per NG tube Daily    melatonin  6 mg Per NG tube Nightly    metoprolol  25 mg Per NG tube BID    mirtazapine  7.5 mg Per NG tube QHS    multivitamin  1 tablet Oral Daily    polyethylene glycol  17 g Oral Daily    sodium chloride 3%  4 mL Nebulization Q6H WAKE     PRN Meds:sodium chloride, sodium chloride, sodium chloride, sodium chloride, acetaminophen, dextrose 50%, dextrose 50%, glucagon (human recombinant), insulin aspart U-100, labetalol, lidocaine HCL 2%, morphine, ondansetron     Objective:     Vital Signs (Most Recent):  Temp: 97.3 °F (36.3 °C) (10/24/20 0712)  Pulse: (!) 56 (10/24/20 1115)  Resp: 18 (10/24/20 0849)  BP: (!) 110/53 (10/24/20 0712)  SpO2: 99 % (10/24/20 0849) Vital Signs (24h Range):  Temp:  [96.6 °F (35.9 °C)-97.5 °F (36.4 °C)] 97.3 °F (36.3  °C)  Pulse:  [] 56  Resp:  [14-22] 18  SpO2:  [96 %-100 %] 99 %  BP: (109-128)/(53-61) 110/53     Weight: 72 kg (158 lb 11.7 oz)  Body mass index is 26.41 kg/m².    SpO2: 99 %  O2 Device (Oxygen Therapy): nasal cannula    Intake/Output - Last 3 Shifts       10/22 0700 - 10/23 0659 10/23 0700 - 10/24 0659 10/24 0700 - 10/25 0659    P.O.  1000     I.V. (mL/kg) 75 (1) 1231.7 (17.1)     Blood       NG/ 607.5 200    Total Intake(mL/kg) 555 (7.7) 2839.2 (39.4) 200 (2.8)    Urine (mL/kg/hr) 465 (0.3)  300 (0.9)    Drains       Total Output 465  300    Net +90 +2839.2 -100                 Lines/Drains/Airways     Central Venous Catheter Line            Percutaneous Central Line Insertion/Assessment - Triple Lumen  10/05/20 1843 left internal jugular 18 days          Drain                 Urethral Catheter 10/14/20 1500 9 days         Gastrostomy/Enterostomy 10/21/20 1304 Percutaneous endoscopic gastrostomy (PEG) feeding 2 days          Peripheral Intravenous Line                 Midline Catheter Insertion/Assessment  - Single Lumen 10/19/20 1654 brachial vein 18g x 10cm 4 days                Physical Exam  Constitutional:       Appearance: She is well-developed.   Cardiovascular:      Rate and Rhythm: Normal rate and regular rhythm.      Heart sounds: Normal heart sounds.   Pulmonary:      Effort: Pulmonary effort is normal.      Breath sounds: Normal breath sounds.   Abdominal:      Palpations: Abdomen is soft.      Comments: PEG in place; bleeding to site   Musculoskeletal: Normal range of motion.   Skin:     General: Skin is warm and dry.      Comments: Sternal Incision CDI   Neurological:      Mental Status: She is alert and oriented to person, place, and time.         Significant Labs:  BMP:   Recent Labs   Lab 10/24/20  0307   *      K 4.7   *   CO2 21*   *   CREATININE 3.4*   CALCIUM 7.8*   MG 2.8*     CBC:   Recent Labs   Lab 10/24/20  0307   WBC 14.05*   RBC 2.26*   HGB 6.8*    HCT 22.3*      MCV 99*   MCH 30.1   MCHC 30.5*     CMP:   Recent Labs   Lab 10/24/20  0307   *   CALCIUM 7.8*   ALBUMIN 1.7*   PROT 5.0*      K 4.7   CO2 21*   *   *   CREATININE 3.4*   ALKPHOS 156*   ALT 35   AST 28   BILITOT 0.6     Coagulation:   Recent Labs   Lab 10/24/20  0307   INR 1.9*   APTT 44.5*       Significant Diagnostics:  I have reviewed and interpreted all pertinent imaging results/findings within the past 24 hours.    Assessment/Plan:     * Leukocytosis  - CBC daily   - On IV abx until 11/2 per ID recs   - WBC up to 14  - Afebrile     Adverse effect of vancomycin  - Held for elevated vancomycin level    Acute blood loss anemia  - Expected post operatively   - CBC daily   - transfused yesterday with increased H/H. Will hold off on transfusion to see if patient can autoregulate; possible transfusion tomorrow if remains low    Pleural effusion  - S/P VATS     Adjustment disorder with mixed anxiety and depressed mood  - Psych following     Debility  - Continue with PT/OT  - Awaiting LTAC placement     S/P TVR (tricuspid valve repair)  See s/p MVr    S/P MVR (mitral valve repair)  Fatou Lorenzo is a 75 y.o. female s/p MVr, TVr 9/9/2020. Case noteworthy for multiple pump runs (3) and RV hematoma due to retraction. Unstable 9/18, required pericardial window for evac of posterior cardiac tamponade. Respiratory distress and so re-admitted to SICU on 10/2 now s/p VATS on 10/5.  - Encourage IS  - NPO  - Transfusing today for low H/H  - Awaiting LTAC placement  - Discontinue free water flushes   - Continue metoprolol 25 mg   - Fluids changed to D5W with improvement in Na    Vocal fold paresis, right  - ENT consulted, underwent microlaryngoscopy/possible vocal fold injection augmentation 10/5  - Still very hoarse     Hypernatremia  - Discontinued D5W with 0.45% NS at 100 ml/hr  - Started D5W at 50 CC/hour per nephrology recommendation    Pericardial effusion with cardiac  tamponade  - S/P pericardial window     Essential hypertension  - PRN labetalol for SBP > 160    Paroxysmal atrial fibrillation  - Stopped amiodarone with increase in liver enzymes   - Continue Digoxin   - metop 25          Vania Anderson NP  Cardiothoracic Surgery  [facility]

## 2020-10-24 NOTE — PLAN OF CARE
Plan of care reviewed with pt  Answered questions from family  Free from falls and injury  Afebrile  SB/Afib on tele  D5 @50ml/hr  Continuous feed goal of 35, currently 20ml/hr tolerating fairly  Wound dressing changed  ANTB daily   No insulin coverage needed  PT OT RT SLP seen  Will continue to monitor

## 2020-10-24 NOTE — PLAN OF CARE
Discussed plan of care with Pt., NAD, Pt. lethargic, Opens eyes to voice, Denies CP or SOB, VSS for Pt., Tolerated 2LNC overnight,Sats >90%, Peg tube in place, Tolerated  tube feeds at 15ml/hr, 5cc residual, R upper Midline and L IJ TLC in place, D5@ 50ml/hr infusing, Yoo intact, Minimal urine output noted,  Will continue to monitor.

## 2020-10-24 NOTE — NURSING
Notified MD Bowers HR at 58, instructed RN to give metoprolol and hold if HR <50  Will continue to monitor

## 2020-10-25 NOTE — PROGRESS NOTES
CT surgery progress note     COSME overnight      A/P  75F s/p MV repair, TV repair, and MAZE w/ ALISA resection on 09/09/20. Post op course complicated by a multiloculated R pleural effusion for which she had a R VATS w/ decortication on 10/05/20     Progressing      Continue supportive care  Start gentle diuresis  D/c MIVF infusion  Increase enteral feeds  2mg coumadin tonight   Monitor renal function  Keep vivas for strict I/O  Lopressor 25 BID  IV abx for E. Coli in pleural fluid, AF. Monitor WBC#  SLP therapies for vocal cord weakness   Delirium precautions  Increase activity   Cont CSU

## 2020-10-25 NOTE — PLAN OF CARE
No acute events during shift. Pt turned Q2 for prevention of PI. BG monitoring cont; no SSI given during shift. Tube feedings increased to goal; 35mL/hr. Initiated Lasix IVP for Fluid overload. Yoo care performed for CAUTI prevention. 400mL boluses performed Q4. Pt remains free from injury/falls for shift. Educated Pt on meds no questions at this time. VSS.  No complaints of CP, SOB, pain/discomfort  Bed locked in lowest position, call bell within reach. All questions addressed.  Will continue to monitor.

## 2020-10-26 PROBLEM — D72.829 LEUKOCYTOSIS: Status: RESOLVED | Noted: 2020-01-01 | Resolved: 2020-01-01

## 2020-10-26 NOTE — PLAN OF CARE
Spoke with Annie from [facility]. She has not heard anything from [facility] regarding authorization for LTAC.    Called keturah Sexton RN reviewer for LTAC with [facility], and left message inquiring about authorization and where in the process we were at in obtaining authorization.    Waiting for call back.    Julie Haase RN  Case Management [phone]

## 2020-10-26 NOTE — PT/OT/SLP PROGRESS
Physical Therapy      Patient Name:  Fatou Lorenzo   MRN:  [id number]    Patient not seen today secondary to (Hold per RN in AM 2* increased lethargy and vomiting. PT unable to return in PM.). Will follow-up at next scheduled session as able.    Jacquelin Cheung, PT, DPT   10/26/2020  [phone]

## 2020-10-26 NOTE — SUBJECTIVE & OBJECTIVE
Interval History: seen at the bedside no event overnight    Review of patient's allergies indicates:  No Known Allergies  Current Facility-Administered Medications   Medication Frequency    0.9%  NaCl infusion (for blood administration) Q24H PRN    0.9%  NaCl infusion (for blood administration) Q24H PRN    0.9%  NaCl infusion (for blood administration) Q24H PRN    0.9%  NaCl infusion (for blood administration) Q24H PRN    0.9%  NaCl infusion Continuous    acetaminophen oral solution 650 mg Q6H PRN    albuterol-ipratropium 2.5 mg-0.5 mg/3 mL nebulizer solution 3 mL Q6H WAKE    aspirin chewable tablet 81 mg Daily    calcium acetate(phosphat bind) capsule 2,001 mg TID WM    ceFEPIme injection 1 g Q24H    dextrose 50% injection 12.5 g PRN    dextrose 50% injection 25 g PRN    famotidine tablet 20 mg Daily    glucagon (human recombinant) injection 1 mg PRN    insulin aspart U-100 pen 0-5 Units Q6H PRN    labetalol 20 mg/4 mL (5 mg/mL) IV syring Q6H PRN    lidocaine HCL 2% jelly PRN    melatonin tablet 6 mg Nightly    metoprolol 12.5mg/1.25mL oral solution 25 mg BID    mirtazapine tablet 7.5 mg QHS    morphine injection 1 mg Q6H PRN    multivitamin tablet Daily    ondansetron injection 4 mg Q6H PRN    sodium chloride 3% nebulizer solution 4 mL Q6H WAKE    warfarin (COUMADIN) tablet 2 mg Once       Objective:     Vital Signs (Most Recent):  Temp: 97.4 °F (36.3 °C) (10/26/20 0756)  Pulse: (!) 58 (10/26/20 1110)  Resp: (!) 24 (10/26/20 0933)  BP: (!) 142/60 (10/26/20 0756)  SpO2: (!) 92 % (10/26/20 0933)  O2 Device (Oxygen Therapy): nasal cannula (10/26/20 0933) Vital Signs (24h Range):  Temp:  [96.2 °F (35.7 °C)-97.6 °F (36.4 °C)] 97.4 °F (36.3 °C)  Pulse:  [57-99] 58  Resp:  [16-24] 24  SpO2:  [58 %-99 %] 92 %  BP: (101-142)/(45-60) 142/60     Weight: 72 kg (158 lb 11.7 oz) (10/22/20 0500)  Body mass index is 26.41 kg/m².  Body surface area is 1.82 meters squared.    I/O last 3 completed  shifts:  In: 1300 [NG/GT:1300]  Out: 575 [Urine:575]    Physical Exam  Constitutional:       Appearance: She is well-developed.   Cardiovascular:      Rate and Rhythm: Normal rate and regular rhythm.      Heart sounds: Normal heart sounds.   Pulmonary:      Effort: Pulmonary effort is normal.      Breath sounds: Normal breath sounds.   Abdominal:      Palpations: Abdomen is soft.      Comments: PEG in place; bleeding to site   Musculoskeletal: Normal range of motion.   Skin:     General: Skin is warm and dry.      Comments: Sternal Incision CDI   Neurological:      Mental Status: She is alert and oriented to person, place, and time.         Significant Labs:  BMP:   Recent Labs   Lab 10/26/20  0322   *      CO2 19*   *   CREATININE 3.7*   CALCIUM 7.5*   MG 2.6     All labs within the past 24 hours have been reviewed.     Significant Imaging:  bmp

## 2020-10-26 NOTE — PLAN OF CARE
10/26/20 1418   Discharge Reassessment   Assessment Type Discharge Planning Reassessment   Provided patient/caregiver education on the expected discharge date and the discharge plan Yes   Do you have any problems affording any of your prescribed medications? No   Discharge Plan A Long-term acute care facility (LTAC)   Discharge Plan B Rehab   DME Needed Upon Discharge  none   Anticipated Discharge Disposition LTAC     Julie Haase RN  Case Management [phone]

## 2020-10-26 NOTE — PLAN OF CARE
Pt free of falls/trauma/injuries. AAOx2 VSS Denies c/o SOB; O2Sats remain stable on 2L NC. Tube feedings stopped d/t emesis of medication and feedings. Initiated NS @ 100ml/hr. Chest and Abdomen xrays taken. Yoo care performed for CAUTI prevention. Peg tube boluses given as ordered. Educated on fall precautions and use of call bell. Pt tolerating plan of care. Will continue to monitor.

## 2020-10-26 NOTE — PLAN OF CARE
Received call from Maricruz with [facility]. Pt was routed to peer to peer queue.     Peer to peer scheduled with TAI Gamboa for [facility] and Dr. Horn for [facility] at 2pm on 10/27/2020.     Julie Haase RN  Case Management [phone]

## 2020-10-26 NOTE — PROGRESS NOTES
[facility]  Cardiothoracic Surgery  Progress Note    Patient Name: Fatou Lorenzo  MRN: [id number]  Admission Date: 8/30/2020  Hospital Length of Stay: 56 days  Code Status: Full Code   Attending Physician: Antoni Waddell MD   Referring Provider: Self, Aaareferral  Principal Problem:Leukocytosis      Subjective:     Post-Op Info:  Procedure(s) (LRB):  INSERTION, PEG TUBE (N/A)   5 Days Post-Op     Interval History: Pt has one episode of vomiting, tube feed held. Pill appeared to be in vomit. Pending LTAC placement     Review of Systems   Constitution: Negative for malaise/fatigue.   Cardiovascular: Negative for chest pain, claudication, dyspnea on exertion, irregular heartbeat, leg swelling and palpitations.   Respiratory: Negative for cough and shortness of breath.    Hematologic/Lymphatic: Negative for bleeding problem.   Gastrointestinal: Negative for abdominal pain.   Genitourinary: Negative for dysuria.   Neurological: Negative for headaches and weakness.     Medications:  Continuous Infusions:   dextrose 5 % and 0.45 % NaCl       Scheduled Meds:   albuterol-ipratropium  3 mL Nebulization Q6H WAKE    aspirin  81 mg Per NG tube Daily    calcium acetate(phosphat bind)  2,001 mg Per G Tube TID WM    ceFEPime (MAXIPIME) IVPB  1 g Intravenous Q24H    famotidine  20 mg Per NG tube Daily    melatonin  6 mg Per NG tube Nightly    metoprolol  25 mg Per NG tube BID    mirtazapine  7.5 mg Per NG tube QHS    multivitamin  1 tablet Oral Daily    sodium chloride 3%  4 mL Nebulization Q6H WAKE    warfarin  2 mg Per G Tube Once     PRN Meds:sodium chloride, sodium chloride, sodium chloride, sodium chloride, acetaminophen, dextrose 50%, dextrose 50%, glucagon (human recombinant), insulin aspart U-100, labetalol, lidocaine HCL 2%, morphine, ondansetron     Objective:     Vital Signs (Most Recent):  Temp: 97.4 °F (36.3 °C) (10/26/20 0756)  Pulse: 77 (10/26/20 0933)  Resp: (!) 24 (10/26/20  0933)  BP: (!) 142/60 (10/26/20 0756)  SpO2: (!) 92 % (10/26/20 0933) Vital Signs (24h Range):  Temp:  [96.2 °F (35.7 °C)-97.6 °F (36.4 °C)] 97.4 °F (36.3 °C)  Pulse:  [57-99] 77  Resp:  [16-24] 24  SpO2:  [58 %-99 %] 92 %  BP: (101-142)/(45-60) 142/60     Weight: 72 kg (158 lb 11.7 oz)  Body mass index is 26.41 kg/m².    SpO2: (!) 92 %  O2 Device (Oxygen Therapy): nasal cannula    Intake/Output - Last 3 Shifts       10/24 0700 - 10/25 0659 10/25 0700 - 10/26 0659 10/26 0700 - 10/27 0659    P.O.       I.V. (mL/kg)       NG/ 1200     Total Intake(mL/kg) 500 (6.9) 1200 (16.7)     Urine (mL/kg/hr) 450 (0.3) 425 (0.2)     Stool 0      Total Output 450 425     Net +50 +775            Stool Occurrence 1 x 2 x           Lines/Drains/Airways     Central Venous Catheter Line            Percutaneous Central Line Insertion/Assessment - Triple Lumen  10/05/20 1843 left internal jugular 20 days          Drain                 Urethral Catheter 10/14/20 1500 11 days         Gastrostomy/Enterostomy 10/21/20 1304 Percutaneous endoscopic gastrostomy (PEG) feeding 4 days          Peripheral Intravenous Line                 Midline Catheter Insertion/Assessment  - Single Lumen 10/19/20 1654 brachial vein 18g x 10cm 6 days                Physical Exam    Significant Labs:  BMP:   Recent Labs   Lab 10/26/20  0322   *      K 4.8      CO2 19*   *   CREATININE 3.7*   CALCIUM 7.5*   MG 2.6     CBC:   Recent Labs   Lab 10/26/20  0956   WBC 12.67   RBC 2.40*   HGB 7.2*   HCT 23.7*      MCV 99*   MCH 30.0   MCHC 30.4*     Coagulation:   Recent Labs   Lab 10/26/20  0322   INR 1.3*   APTT 39.6*       Significant Diagnostics:  I have reviewed all pertinent imaging results/findings within the past 24 hours.    Assessment/Plan:     * Leukocytosis  - CBC daily   - On IV abx until 11/2 per ID recs   - WBC trending down 12  - Afebrile     Adverse effect of vancomycin  - Held for elevated vancomycin level    Acute  blood loss anemia  - Expected post operatively   - CBC daily   - transfused yesterday with increased H/H. Will hold off on transfusion to see if patient can autoregulate; possible transfusion tomorrow if remains low    Pleural effusion  - S/P VATS     Adjustment disorder with mixed anxiety and depressed mood  - Psych following     Debility  - Continue with PT/OT  - Awaiting LTAC placement     S/P TVR (tricuspid valve repair)  See s/p MVr    S/P MVR (mitral valve repair)  Fatou Lorenzo is a 75 y.o. female s/p MVr, TVr 9/9/2020. Case noteworthy for multiple pump runs (3) and RV hematoma due to retraction. Unstable 9/18, required pericardial window for evac of posterior cardiac tamponade. Respiratory distress and so re-admitted to SICU on 10/2 now s/p VATS on 10/5.  - Encourage IS  - NPO  - Transfusing today for low H/H  - Awaiting LTAC placement  - Discontinue free water flushes   - Continue metoprolol 25 mg   - Fluids restarted D5W 1/2 NS @ 100ml/hr  - CXR and abd xray today     Vocal fold paresis, right  - ENT consulted, underwent microlaryngoscopy/possible vocal fold injection augmentation 10/5  - Still very hoarse   - SLP following     Pericardial effusion with cardiac tamponade  - S/P pericardial window     Essential hypertension  - PRN labetalol for SBP > 160    Paroxysmal atrial fibrillation  - Stopped amiodarone with increase in liver enzymes   - Holding Digoxin today. Level 2.2  - metop 25      Ashley Gamboa NP  Cardiothoracic Surgery  [facility]

## 2020-10-26 NOTE — ASSESSMENT & PLAN NOTE
-non oliguric kdigo stage 2 [place] likely multifactorial ATN from vancomycin toxicity, infection, low effective arterial blood volume  -ua with spec grav 1010, prot 1+, blood 3+, Sang 30  -urine microscopy with frequent muddy brown casts  - please we need kidney US and urine lytes   -hydration with NS @100cc   -keep hgb>7; investigate hemoglobin drop  -increase po water intake as tolerated  - Strict I/O and chart  - Avoid nephrotoxic medications, NSAIDs, IV contrast, etc.  - Daily weights and chart  - Medication doses adjusted to GFR  - Maintain MAP > 65  - Hb > 7 gm/dL  - Will follow closely

## 2020-10-26 NOTE — SUBJECTIVE & OBJECTIVE
Interval History: Pt has one episode of vomiting, tube feed held. Pill appeared to be in vomit. Pending LTAC placement     Review of Systems   Constitution: Negative for malaise/fatigue.   Cardiovascular: Negative for chest pain, claudication, dyspnea on exertion, irregular heartbeat, leg swelling and palpitations.   Respiratory: Negative for cough and shortness of breath.    Hematologic/Lymphatic: Negative for bleeding problem.   Gastrointestinal: Negative for abdominal pain.   Genitourinary: Negative for dysuria.   Neurological: Negative for headaches and weakness.     Medications:  Continuous Infusions:   dextrose 5 % and 0.45 % NaCl       Scheduled Meds:   albuterol-ipratropium  3 mL Nebulization Q6H WAKE    aspirin  81 mg Per NG tube Daily    calcium acetate(phosphat bind)  2,001 mg Per G Tube TID WM    ceFEPime (MAXIPIME) IVPB  1 g Intravenous Q24H    famotidine  20 mg Per NG tube Daily    melatonin  6 mg Per NG tube Nightly    metoprolol  25 mg Per NG tube BID    mirtazapine  7.5 mg Per NG tube QHS    multivitamin  1 tablet Oral Daily    sodium chloride 3%  4 mL Nebulization Q6H WAKE    warfarin  2 mg Per G Tube Once     PRN Meds:sodium chloride, sodium chloride, sodium chloride, sodium chloride, acetaminophen, dextrose 50%, dextrose 50%, glucagon (human recombinant), insulin aspart U-100, labetalol, lidocaine HCL 2%, morphine, ondansetron     Objective:     Vital Signs (Most Recent):  Temp: 97.4 °F (36.3 °C) (10/26/20 0756)  Pulse: 77 (10/26/20 0933)  Resp: (!) 24 (10/26/20 0933)  BP: (!) 142/60 (10/26/20 0756)  SpO2: (!) 92 % (10/26/20 0933) Vital Signs (24h Range):  Temp:  [96.2 °F (35.7 °C)-97.6 °F (36.4 °C)] 97.4 °F (36.3 °C)  Pulse:  [57-99] 77  Resp:  [16-24] 24  SpO2:  [58 %-99 %] 92 %  BP: (101-142)/(45-60) 142/60     Weight: 72 kg (158 lb 11.7 oz)  Body mass index is 26.41 kg/m².    SpO2: (!) 92 %  O2 Device (Oxygen Therapy): nasal cannula    Intake/Output - Last 3 Shifts       10/24  0700 - 10/25 0659 10/25 0700 - 10/26 0659 10/26 0700 - 10/27 0659    P.O.       I.V. (mL/kg)       NG/ 1200     Total Intake(mL/kg) 500 (6.9) 1200 (16.7)     Urine (mL/kg/hr) 450 (0.3) 425 (0.2)     Stool 0      Total Output 450 425     Net +50 +775            Stool Occurrence 1 x 2 x           Lines/Drains/Airways     Central Venous Catheter Line            Percutaneous Central Line Insertion/Assessment - Triple Lumen  10/05/20 1843 left internal jugular 20 days          Drain                 Urethral Catheter 10/14/20 1500 11 days         Gastrostomy/Enterostomy 10/21/20 1304 Percutaneous endoscopic gastrostomy (PEG) feeding 4 days          Peripheral Intravenous Line                 Midline Catheter Insertion/Assessment  - Single Lumen 10/19/20 1654 brachial vein 18g x 10cm 6 days                Physical Exam    Significant Labs:  BMP:   Recent Labs   Lab 10/26/20  0322   *      K 4.8      CO2 19*   *   CREATININE 3.7*   CALCIUM 7.5*   MG 2.6     CBC:   Recent Labs   Lab 10/26/20  0956   WBC 12.67   RBC 2.40*   HGB 7.2*   HCT 23.7*      MCV 99*   MCH 30.0   MCHC 30.4*     Coagulation:   Recent Labs   Lab 10/26/20  0322   INR 1.3*   APTT 39.6*       Significant Diagnostics:  I have reviewed all pertinent imaging results/findings within the past 24 hours.

## 2020-10-26 NOTE — PROGRESS NOTES
[facility]  Nephrology  Progress Note    Patient Name: Fatou Lorenzo  MRN: [id number]  Admission Date: 8/30/2020  Hospital Length of Stay: 56 days  Attending Provider: Antoni Waddell MD   Primary Care Physician: Ludin Rivas MD  Principal Problem:Leukocytosis    Subjective:     HPI: Fatou Lorenzo is a 74 yo F who had MVR + TVR to 9/9 that was complicated by RV hematoma and subsequent pericardial effusion.  She has been in the hospital since 8/31/2020 and had a complex hospital course complicated by a significant JONAH 9/18. She then developed pneumonia. He JONAH improved to baseline creat of 0.8-1 9/24. She required vats 10/5 procedure for empyema. She never had resolution of leukocytosis and has been on vanc and cefepime. On 10/14 her creat began to increase again in conjunction with elevated vancomycin levels. We could not find any episode of hypotension or afib w/ rvr around that time.  Her creat has been increasing daily since 10/14 and urine output has been decreasing for last 2 days. Nephrology consulted for JONAH.    Interval History: seen at the bedside no event overnight    Review of patient's allergies indicates:  No Known Allergies  Current Facility-Administered Medications   Medication Frequency    0.9%  NaCl infusion (for blood administration) Q24H PRN    0.9%  NaCl infusion (for blood administration) Q24H PRN    0.9%  NaCl infusion (for blood administration) Q24H PRN    0.9%  NaCl infusion (for blood administration) Q24H PRN    0.9%  NaCl infusion Continuous    acetaminophen oral solution 650 mg Q6H PRN    albuterol-ipratropium 2.5 mg-0.5 mg/3 mL nebulizer solution 3 mL Q6H WAKE    aspirin chewable tablet 81 mg Daily    calcium acetate(phosphat bind) capsule 2,001 mg TID WM    ceFEPIme injection 1 g Q24H    dextrose 50% injection 12.5 g PRN    dextrose 50% injection 25 g PRN    famotidine tablet 20 mg Daily    glucagon (human recombinant) injection 1 mg PRN    insulin  aspart U-100 pen 0-5 Units Q6H PRN    labetalol 20 mg/4 mL (5 mg/mL) IV syring Q6H PRN    lidocaine HCL 2% jelly PRN    melatonin tablet 6 mg Nightly    metoprolol 12.5mg/1.25mL oral solution 25 mg BID    mirtazapine tablet 7.5 mg QHS    morphine injection 1 mg Q6H PRN    multivitamin tablet Daily    ondansetron injection 4 mg Q6H PRN    sodium chloride 3% nebulizer solution 4 mL Q6H WAKE    warfarin (COUMADIN) tablet 2 mg Once       Objective:     Vital Signs (Most Recent):  Temp: 97.4 °F (36.3 °C) (10/26/20 0756)  Pulse: (!) 58 (10/26/20 1110)  Resp: (!) 24 (10/26/20 0933)  BP: (!) 142/60 (10/26/20 0756)  SpO2: (!) 92 % (10/26/20 0933)  O2 Device (Oxygen Therapy): nasal cannula (10/26/20 0933) Vital Signs (24h Range):  Temp:  [96.2 °F (35.7 °C)-97.6 °F (36.4 °C)] 97.4 °F (36.3 °C)  Pulse:  [57-99] 58  Resp:  [16-24] 24  SpO2:  [58 %-99 %] 92 %  BP: (101-142)/(45-60) 142/60     Weight: 72 kg (158 lb 11.7 oz) (10/22/20 0500)  Body mass index is 26.41 kg/m².  Body surface area is 1.82 meters squared.    I/O last 3 completed shifts:  In: 1300 [NG/GT:1300]  Out: 575 [Urine:575]    Physical Exam  Constitutional:       Appearance: She is well-developed.   Cardiovascular:      Rate and Rhythm: Normal rate and regular rhythm.      Heart sounds: Normal heart sounds.   Pulmonary:      Effort: Pulmonary effort is normal.      Breath sounds: Normal breath sounds.   Abdominal:      Palpations: Abdomen is soft.      Comments: PEG in place; bleeding to site   Musculoskeletal: Normal range of motion.   Skin:     General: Skin is warm and dry.      Comments: Sternal Incision CDI   Neurological:      Mental Status: She is alert and oriented to person, place, and time.         Significant Labs:  BMP:   Recent Labs   Lab 10/26/20  0322   *      CO2 19*   *   CREATININE 3.7*   CALCIUM 7.5*   MG 2.6     All labs within the past 24 hours have been reviewed.     Significant Imaging:  bmp    Assessment/Plan:      JONAH (acute kidney injury)  -non oliguric kdigo stage 2 jonah likely multifactorial ATN from vancomycin toxicity, infection, low effective arterial blood volume  -ua with spec grav 1010, prot 1+, blood 3+, Sang 30  -urine microscopy with frequent muddy brown casts  - please we need kidney US and urine lytes   -hydration with NS @100cc   -keep hgb>7; investigate hemoglobin drop  -increase po water intake as tolerated  - Strict I/O and chart  - Avoid nephrotoxic medications, NSAIDs, IV contrast, etc.  - Daily weights and chart  - Medication doses adjusted to GFR  - Maintain MAP > 65  - Hb > 7 gm/dL  - Will follow closely      Severe mitral regurgitation  -per primary        Thank you for your consult. I will follow-up with patient. Please contact us if you have any additional questions.    Anisa Teran MD  Nephrology  [facility]

## 2020-10-26 NOTE — TELEPHONE ENCOUNTER
Pt was a no show to Covid-19 testing ordered by Dr. Mendoza. This nurse attempted to phone pt. No answer, left VM for pt to return call to clinic.

## 2020-10-27 PROBLEM — I48.91 ATRIAL FIBRILLATION WITH RVR: Status: RESOLVED | Noted: 2020-01-01 | Resolved: 2020-01-01

## 2020-10-27 NOTE — SUBJECTIVE & OBJECTIVE
Interval History: seen at the bedside This morning patient became bradycardic and had a respiratory code this morning, she was intubated and brought to the SICU.    Review of patient's allergies indicates:  No Known Allergies  Current Facility-Administered Medications   Medication Frequency    0.9%  NaCl infusion (CRRT USE ONLY) Continuous    0.9%  NaCl infusion (for blood administration) Q24H PRN    0.9%  NaCl infusion Continuous    acetaminophen oral solution 650 mg Q6H PRN    albuterol-ipratropium 2.5 mg-0.5 mg/3 mL nebulizer solution 3 mL Q6H WAKE    aspirin chewable tablet 81 mg Daily    calcium acetate(phosphat bind) capsule 2,001 mg TID WM    ceFEPIme injection 1 g Q24H    dextrose 50% injection 12.5 g PRN    dextrose 50% injection 25 g PRN    famotidine tablet 20 mg Daily    glucagon (human recombinant) injection 1 mg PRN    heparin 25,000 units in dextrose 5% 250 mL (100 units/mL) infusion (heparin infusion - NO NOMOGRAM) Continuous    insulin aspart U-100 pen 0-5 Units Q6H PRN    labetalol 20 mg/4 mL (5 mg/mL) IV syring Q6H PRN    lidocaine HCL 2% jelly PRN    magnesium sulfate 2g in water 50mL IVPB (premix) PRN    melatonin tablet 6 mg Nightly    mirtazapine tablet 7.5 mg QHS    morphine injection 1 mg Q6H PRN    multivitamin tablet Daily    ondansetron injection 4 mg Q6H PRN    propofol (DIPRIVAN) 10 mg/mL infusion Continuous    propofoL (DIPRIVAN) 10 mg/mL infusion     sodium chloride 3% nebulizer solution 4 mL Q6H WAKE    sodium phosphate 20.01 mmol in dextrose 5 % 250 mL IVPB PRN    sodium phosphate 30 mmol in dextrose 5 % 250 mL IVPB PRN    sodium phosphate 39.99 mmol in dextrose 5 % 250 mL IVPB PRN       Objective:     Vital Signs (Most Recent):  Temp: 97.7 °F (36.5 °C) (10/27/20 0800)  Pulse: 67 (10/27/20 0915)  Resp: 18 (10/27/20 0915)  BP: 124/60 (10/27/20 0915)  SpO2: 100 % (10/27/20 0915)  O2 Device (Oxygen Therapy): ventilator (10/27/20 1015) Vital Signs (24h  Range):  Temp:  [96 °F (35.6 °C)-97.7 °F (36.5 °C)] 97.7 °F (36.5 °C)  Pulse:  [39-89] 67  Resp:  [14-33] 18  SpO2:  [89 %-100 %] 100 %  BP: (110-178)/(56-81) 124/60     Weight: 72 kg (158 lb 11.7 oz) (10/22/20 0500)  Body mass index is 26.41 kg/m².  Body surface area is 1.82 meters squared.    I/O last 3 completed shifts:  In: 95 [NG/GT:95]  Out: 125 [Urine:125]    Physical Exam  Constitutional:       Appearance: She is well-developed.      Comments: Intubated    Cardiovascular:      Rate and Rhythm: Normal rate and regular rhythm.      Heart sounds: Normal heart sounds.   Pulmonary:      Effort: Pulmonary effort is normal.      Breath sounds: Normal breath sounds.   Abdominal:      Palpations: Abdomen is soft.      Comments: PEG in place; bleeding to site   Musculoskeletal: Normal range of motion.   Skin:     General: Skin is warm and dry.      Comments: Sternal Incision CDI   Neurological:      Mental Status: She is alert.         Significant Labs:  BMP:   Recent Labs   Lab 10/27/20  0701   *      CO2 17*   *   CREATININE 4.1*   CALCIUM 7.7*   MG 2.6     All labs within the past 24 hours have been reviewed.     Significant Imaging:  bmp

## 2020-10-27 NOTE — PT/OT/SLP PROGRESS
PROBLEM VISIT     Date of service: 2018    Archana Larios  Is a 28 y.o.  female    PT's PCP is: Julisa Flores DO     : 1986                                             Subjective:       Patient's last menstrual period was 2018 (approximate). OB History    Para Term  AB Living   1             SAB TAB Ectopic Molar Multiple Live Births                    # Outcome Date GA Lbr Wallace/2nd Weight Sex Delivery Anes PTL Lv   1 Current                    History   Smoking Status    Never Smoker   Smokeless Tobacco    Never Used        History   Alcohol Use No       Allergies: Patient has no known allergies. Current Outpatient Prescriptions:     Prenatal MV-Min-Fe Fum-FA-DHA (PRENATAL 1 PO), Take by mouth daily, Disp: , Rfl:     acetaZOLAMIDE (DIAMOX) 500 MG extended release capsule, Take 500 mg by mouth daily, Disp: , Rfl:     albuterol sulfate  (90 BASE) MCG/ACT inhaler, Inhale 2 puffs into the lungs every 6 hours as needed for Wheezing, Disp: , Rfl:     pantoprazole (PROTONIX) 20 MG tablet, Take 20 mg by mouth daily, Disp: , Rfl:     History   Sexual Activity    Sexual activity: Yes    Partners: Male       Last Yearly:      Last pap:     Last HPV:     No chief complaint on file. PE:  Vital Signs  Blood pressure 118/76, weight (!) 317 lb (143.8 kg), last menstrual period 2018. Labs:    No results found for this visit on 18. NURSE: LAUREANO Joya    HPI: Patient here for urine check, BP, and weight check. Patient states she has not been able to keep any food down since Friday. Per Rubina Soto, we will send in phenergan prescription.     No PT denies fever, chills, nausea and vomiting Speech Language Pathology      Fatuo Lorenzo  MRN: [id number]    Patient not seen today secondary to patient intubated and transferred to ICU this service date. Please reconsult ST services when appropriate.    BURKE Perkins   10/27/2020

## 2020-10-27 NOTE — ASSESSMENT & PLAN NOTE
This morning patient became bradycardic and had a respiratory code this morning, she was intubated and brought to the SICU.as per primary

## 2020-10-27 NOTE — RESPIRATORY THERAPY
Rapid Response Respiratory Therapy Note      Code Status: Full Code   : 1945  Bed: 71287 CVICU/98539 CVICU A:   MRN: [id number]  Time page Received:06   Time Rapid Response RT at Bedside: 06  Time Rapid Response RT left Bedside: 0710  Report given to: SHERINE Jimenez    SITUATION     Evaluated patient for: unresponsive    BACKGROUND     Patient has a past medical history of Atrial fibrillation with RVR, Cataract, Essential hypertension, Glaucoma, Heart valve problem, Hyperlipidemia, and Severe mitral regurgitation.    ASSESSMENT/INTERVENTIONS     Upon arrival in room pt beingbagged  Upon arrival.  She was intubated and taken to the ICU.      Pulse:  Respiratory rate:  BP: BP: (!) 178/74 SpO2:  Level of Consciousness: Level of Consciousness (AVPU): unresponsive  Respiratory Effort: Respiratory Effort: Mild  Expansion/Accessory Muscle Usage: Expansion/Accessory Muscles/Retractions: abdominal muscle use  All Lung Field Breath Sounds: All Lung Fields Breath Sounds: crackles  PRISCA Breath Sounds: coarse, diminished  LLL Breath Sounds: coarse, diminished  RUL Breath Sounds: coarse, diminished  RML Breath Sounds: coarse, diminished  RLL Breath Sounds: coarse, diminished  O2 Device/Concentration: Ambu bag  Most recent blood gas: No results for input(s): PH, PCO2, PO2, HCO3, POCSATURATED, BE, LACTATE in the last 72 hours.  Current Respiratory Care Orders:   NIPPV: No Surgical airway: No Vent: Yes, Settings: see flowsheet  ETCO2 monitored: ETCO2 (mmHg): 28 mmHg  Ambu at bedside: Ambu bag with the patient?: Yes, Adult Ambu    RECOMMENDATIONS   ?  We recommend:     ESCALATION      Discussed plan of care with   and primary RT, .     FOLLOW-UP     Disposition: Tx in ICU bed 52181.    Please call back the Rapid Response RT, Estela Pierson RRT at x [phone] for any questions or concerns.

## 2020-10-27 NOTE — ANESTHESIA PROCEDURE NOTES
Airway Management  Performed by: Ludin Solis MD  Authorized by: Ludin Solis MD     Indications:  Respiratory distress and airway protection  Diagnosis:  Acute respiratory distress   Patient Location:  Floor  Timeout:  10/27/2020 6:55 AM  Procedure Start Time:  10/27/2020 6:56 AM  Procedure End Time:  10/27/2020 6:58 AM  Staff:     patient identified, IV checked, site marked, risks and benefits discussed, surgical consent, monitors and equipment checked, pre-op evaluation, timeout performed and anesthesia consent      Anesthesiologist Present: No    Intubation:     Induction:  Intravenous    Intubated:  Postinduction    Mask Ventilation:  Easy mask    Attempts:  1    Attempted By:  Resident anesthesiologist    Method of Intubation:  Video laryngoscopy    Blade:  Frank 4    Laryngeal View Grade: Grade I - full view of chords      Difficult Airway Encountered?: No      Complications:  None    Airway Device:  Oral endotracheal tube    Airway Device Size:  7.0    Style/Cuff Inflation:  Cuffed (inflated to minimal occlusive pressure)    Inflation Amount (mL):  7    Tube secured:  22    Secured at:  The teeth    Placement Verified By:  Capnometry and Revisualization with laryngoscopy    Complicating Factors:  None    Findings Post-Intubation:  BS equal bilateral and atraumatic/condition of teeth unchanged

## 2020-10-27 NOTE — PROCEDURES
"Fatou Lorenzo is a 75 y.o. female patient.    Temp: (!) 95.2 °F (35.1 °C) (10/27/20 1215)  Pulse: (!) 59 (10/27/20 1215)  Resp: 18 (10/27/20 1030)  BP: 97/63 (10/27/20 1200)  SpO2: 100 % (10/27/20 1215)  Weight: 72 kg (158 lb 11.7 oz) (10/22/20 0500)  Height: 5' 5" (165.1 cm) (10/14/20 0926)       Central Line    Date/Time: 10/27/2020 12:35 PM  Performed by: Caitlin Lau MD  Authorized by: Caitlin Lau MD     Supervisor Name:  Marco Troy   Location procedure was performed:  [facility] CR[facility] SUR[facility]ERY  Assisting Provider:  Mir Croft MD  Pre-operative diagnosis:  Cardiac arrest   Post-operative diagnosis:  Same   Consent Done ?:  Yes  Time out complete?: Verified correct patient, procedure, equipment, staff, and site/side    Indications:  Hemodialysis  Anesthesia:  Local infiltration  Local anesthetic:  Lidocaine 1% without epinephrine  Anesthetic total (ml):  5  Description of findings:  Patent right IJ by ultrasound  Preparation:  Skin prepped with ChloraPrep  Skin prep agent dried: Skin prep agent completely dried prior to procedure    Sterile barriers: All five maximal sterile barriers used - gloves, gown, cap, mask and large sterile sheet    Hand hygiene: Hand hygiene performed immediately prior to central venous catheter insertion    Location:  Right internal jugular  Catheter type:  Trialysis  Catheter size:  13 Fr  Ultrasound guidance: Yes    Vessel Caliber:  Medium   patent  Comprressibility:  Normal  Doppler:  Not done  Needle advanced into vessel with real time ultrasound guidance.    Guidewire confirmed in vessel.    Steril sheath on probe.    Manometry: No    Number of attempts:  1  Securement:  Line sutured, chlorhexidine patch, sterile dressing applied and blood return through all ports  Technical Procedures Used:  US guided needle access; seldinger technique  Complications: No    Estimated blood loss (mL):  2  Specimens: No    Implants: Yes (specify)    XRay:  Placement verified " by x-ray, no pneumothorax on x-ray and successful placement  Adverse Events:  None        Caitlin Callkaren  10/27/2020

## 2020-10-27 NOTE — PLAN OF CARE
10/27/20 1157   Discharge Reassessment   Assessment Type Discharge Planning Reassessment   Provided patient/caregiver education on the expected discharge date and the discharge plan Yes   Do you have any problems affording any of your prescribed medications? No   Discharge Plan A Long-term acute care facility (LTAC)   DME Needed Upon Discharge  other (see comments)  (TBD)   Anticipated Discharge Disposition Long Term       Mel Simons MPH, RN, CM  Ext. [phone]

## 2020-10-27 NOTE — PROGRESS NOTES
"[facility]  Adult Nutrition  Progress Note    SUMMARY       Recommendations    1.) Change TF to Peptamen Intense VHP; begin at 10mL/hr and advance 5-10mL Q8hr as tolerated to goal rate at 35mL/hr. EN provides 840kcal, 77gm of protein, 705mL of free water. Add free water per MD recommendations.     Goals: Pt to meet >75% of estimated energy and protein needs over the course of 7 days.   Nutrition Goal Status: goal not met  Communication of RD Recs: (POC)    Reason for Assessment    Reason For Assessment: RD follow-up  Diagnosis: other (see comments)(Acute respiratory failure with hypoxia)  Relevant Medical History: atrial fibrillation with RVR, essential HTN, heart valve problem, HLD  Interdisciplinary Rounds: did not attend  General Information Comments: Pt s/p Mahmood MAZE, s/p TVR, s/p MVr. Pt intubated, sedated and on mechanical ventilation. Nsg staff reports TF were stopped yesterday due to vomitting. GI- LBM 10/26. S/P PEG. Pt receiving HD treatments. NFPE completed 10/22; pt meets ASPEN guidelines for severe malnutrition due to acute illness.   Nutrition Discharge Planning: Pt to meet adequate energy and protien needs.     Nutrition Risk Screen    Nutrition Risk Screen: tube feeding or parenteral nutrition    Nutrition/Diet History    Spiritual, Cultural Beliefs, Worship Practices, Values that Affect Care: no  Factors Affecting Nutritional Intake: difficulty/impaired swallowing, NPO, on mechanical ventilation    Anthropometrics    Temp: (!) 95.2 °F (35.1 °C)  Height Method: Stated  Height: 5' 5" (165.1 cm)  Height (inches): 65 in  Weight Method: Bed Scale  Weight: 72 kg (158 lb 11.7 oz)  Weight (lb): 158.73 lb  Ideal Body Weight (IBW), Female: 125 lb  % Ideal Body Weight, Female (lb): 147.2 %  BMI (Calculated): 26.4       Lab/Procedures/Meds    Pertinent Labs Reviewed: reviewed  Pertinent Labs Comments: Hgb 6.5, Hct 21.2, , Cr 4.0, GFR 10.3, Glucose 112, Ca 7.9, Phosphorus " 5.9  Pertinent Medications Reviewed: reviewed  Pertinent Medications Comments: Albuterol, Phosphate binder, cefepime, famotidine, MVI, warfarin    Physical Findings/Assessment     Edema 1+ generalized    Estimated/Assessed Needs    Weight Used For Calorie Calculations: 72 kg (158 lb 11.7 oz)  Energy Calorie Requirements (kcal): 1084  Energy Need Method: [facility]  Protein Requirements: 86-108gm (1.2-1.5gm/kg)  Weight Used For Protein Calculations: 72 kg (158 lb 11.7 oz)  Fluid Requirements (mL): per MD or 1 mL/kcal     RDA Method (mL): 1084         Nutrition Prescription Ordered    Current Diet Order: NPO (10/5)  Current Nutrition Support Formula Ordered: Novasource Renal  Current Nutrition Support Rate Ordered: 35 (ml)  Current Nutrition Support Frequency Ordered: mL/hr    Evaluation of Received Nutrient/Fluid Intake    Enteral Calories (kcal): 0  Enteral Protein (gm): 0  Enteral (Free Water) Fluid (mL): 0  Other Calories (kcal): 227(Propofol)  I/O: +4.1L since 10/13  Energy Calories Required: not meeting needs  Protein Required: not meeting needs  Fluid Required: (per MD)  Comments: LBM 10/26  Tolerance: not tolerating  % Intake of Estimated Energy Needs: 20  % Meal Intake: 0    Nutrition Risk    Level of Risk/Frequency of Follow-up: high , 2x weekly    Assessment and Plan     Nutrition Problem  Malnutrition (Severe)  In the context of acute illness     Related to (etiology):   Physiological causes increasing nutrient need do to acute illness     Signs and Symptoms (as evidenced by):   Malnutrition Assessment:  Body Fat Depletion: severe depletion of orbital   Muscle Mass Depletion: severe depletion of temple   Weight Loss: 10% x 2 months  Fluid Accumulation: moderately severe        Interventions(treatment strategy):  1.) Collaboration with other providers  2.) Enteral nutrition     Nutrition Diagnosis Status:   ongoing    Monitor and Evaluation    Food and Nutrient Intake: enteral nutrition intake  Food and  Nutrient Adminstration: enteral and parenteral nutrition administration  Knowledge/Beliefs/Attitudes: food and nutrition knowledge/skill, beliefs and attitudes  Anthropometric Measurements: body mass index, weight change, weight  Biochemical Data, Medical Tests and Procedures: electrolyte and renal panel, gastrointestinal profile, glucose/endocrine profile, inflammatory profile, lipid profile  Nutrition-Focused Physical Findings: overall appearance     Malnutrition Assessment  Malnutrition Type: acute illness or injury              Orbital Region (Subcutaneous Fat Loss): severe depletion  Upper Arm Region (Subcutaneous Fat Loss): mild depletion   [place] Region (Muscle Loss): severe depletion  Clavicle Bone Region (Muscle Loss): moderate depletion  Clavicle and Acromion Bone Region (Muscle Loss): moderate depletion  Dorsal Hand (Muscle Loss): mild depletion  Anterior Thigh Region (Muscle Loss): well nourished  Posterior Calf Region (Muscle Loss): well nourished       Subcutaneous Fat Loss (Final Summary): severe protein-calorie malnutrition  Muscle Loss Evaluation (Final Summary): severe protein-calorie malnutrition    Severe Weight Loss (Malnutrition): greater than 5% in 1 month    Nutrition Follow-Up    RD Follow-up?: Yes

## 2020-10-27 NOTE — H&P
[facility]  General Surgery  History & Physical    Patient Name: Fatou Lorenzo  MRN: [id number]  Admission Date: 8/30/2020  Attending Physician: Antoni Waddell MD   Primary Care Provider: Ludin Rivas MD    Subjective:     History of Present Illness:  76 yo female with extensive cardiac hx s/p Mvr, Tvr, MAZE 9/09/2020 with complicated post-operative course including protracted ICU stay, reintubation, pericardial window for cardiac tamponade, new arrythmia, JONAH. She was eventually stepped down but readmitted to SICU shortly after for increasing oxygen requirements, found to have worsening right sided pleural effusion. IR US showed multiple loculations; thoracentesis able to drain 60 cc of fluid.      Thoracic surgery was consulted, VATS / decortication was done on 10/05/20, a multiloculated pleural effusion was found, IV abx for E. Coli in pleural fluid. Patient was treated with antibiotics and was stepdown on 10/12/20, however she had an increase white count overnight and was more lethargic so she was stepped up to the ICU. Antibiotics were broaden, a PEG tube was placed, her mental status improved and white count went down. She was step down to the CSU.     This morning patient became bradycardic and had a respiratory code this morning, she was intubated and brought to the SICU. Creatine and BUN elevated to 150/4. Nephrology was consulted.     No current facility-administered medications on file prior to encounter.      Current Outpatient Medications on File Prior to Encounter   Medication Sig    metoprolol tartrate (LOPRESSOR) 25 MG tablet Take 1 tablet (25 mg total) by mouth 3 (three) times daily.    amiodarone (PACERONE) 400 MG tablet Take two tablets by mouth twice daily for twelve days, then take one tablet by mouth daily.    apixaban (ELIQUIS) 5 mg Tab Take 1 tablet (5 mg total) by mouth 2 (two) times daily.    furosemide (LASIX) 40 MG tablet Take 1 tablet (40 mg total) by mouth  2 (two) times daily.    losartan (COZAAR) 50 MG tablet Take 0.5 tablets (25 mg total) by mouth once daily.    pravastatin (PRAVACHOL) 80 MG tablet Take 80 mg by mouth once daily.       Review of patient's allergies indicates:  No Known Allergies    Past Medical History:   Diagnosis Date    Atrial fibrillation with RVR 8/24/2020    Cataract     Essential hypertension 8/31/2020    Glaucoma     Heart valve problem     Hyperlipidemia     Severe mitral regurgitation 8/24/2020     Past Surgical History:   Procedure Laterality Date    ANKLE SURGERY      BAIN MAZE PROCEDURE N/A 9/9/2020    Procedure: BAIN MAZE PROCEDURE;  Surgeon: Antoni Waddell MD;  Location: [facility] OR [address] [place] [address];  Service: Cardiothoracic;  Laterality: N/A;  MAZE     LARYNGOSCOPY N/A 10/5/2020    Procedure: LARYNGOSCOPY, MICRO SUSPENSION WITH AUGMENTATION;  Surgeon: Yfn Mendoza MD;  Location: [facility] OR [address] [place] [address];  Service: ENT;  Laterality: N/A;    LEFT HEART CATHETERIZATION Left 8/25/2020    Procedure: Left heart cath, radial;  Surgeon: Malree Carrillo MD;  Location: [facility] CATH LAB;  Service: Cardiology;  Laterality: Left;    lipoma removal      THORACOSCOPIC DECORTICATION OF LUNG Right 10/5/2020    Procedure: VATS, WITH DECORTICATION, LUNG;  Surgeon: Jeremy Shine MD;  Location: [facility] OR [address] [place] [address];  Service: Thoracic;  Laterality: Right;    TRICUSPID VALVULOPLASTY N/A 9/9/2020    Procedure: REPAIR, TRICUSPID VALVE;  Surgeon: Antoni Waddell MD;  Location: [facility] OR [address] [place] [address];  Service: Cardiothoracic;  Laterality: N/A;     Family History     Problem Relation (Age of Onset)    Cancer Mother    Cataracts Sister    No Known Problems Father, Brother, Maternal Aunt, Maternal Uncle, Paternal Aunt, Paternal Uncle, Maternal Grandmother, Maternal Grandfather, Paternal Grandmother, Paternal Grandfather        Tobacco Use    Smoking status: Never Smoker    Smokeless tobacco: Never Used   Substance and Sexual Activity    Alcohol use: No     Drug use: No    Sexual activity: Not Currently     Review of Systems   Unable to perform ROS: Intubated     Objective:     Vital Signs (Most Recent):  Temp: 97.7 °F (36.5 °C) (10/27/20 0800)  Pulse: 67 (10/27/20 0915)  Resp: 18 (10/27/20 0915)  BP: 124/60 (10/27/20 0915)  SpO2: 100 % (10/27/20 0915) Vital Signs (24h Range):  Temp:  [96 °F (35.6 °C)-97.7 °F (36.5 °C)] 97.7 °F (36.5 °C)  Pulse:  [39-89] 67  Resp:  [14-33] 18  SpO2:  [89 %-100 %] 100 %  BP: (110-178)/(56-81) 124/60     Weight: 72 kg (158 lb 11.7 oz)  Body mass index is 26.41 kg/m².    Physical Exam    Significant Labs:  CBC:   Recent Labs   Lab 10/27/20  0701   WBC 11.57   RBC 2.17*   HGB 6.5*   HCT 21.2*      MCV 98   MCH 30.0   MCHC 30.7*     BMP:   Recent Labs   Lab 10/27/20  0701   *      K 4.9      CO2 17*   *   CREATININE 4.1*   CALCIUM 7.7*   MG 2.6       Significant Diagnostics:  I have reviewed and interpreted all pertinent imaging results/findings within the past 24 hours.    Assessment/Plan:     Fatou Lorenzo is a 75 y.o. female s/p MVr, TVr 9/9/2020. Case noteworthy for multiple pump runs (3) and RV hematoma due to retraction. Unstable 9/18, required pericardial window for evac of posterior cardiac tamponade. Respiratory distress and so re-admitted to SICU on 10/2 now s/p VATS on 10/5. Readmitted on 10/27/20 for respiratory failure.      Neuro:  - Intubated and Sedated   - PRN liquid tylenol and meds through g tube       CV:  S/p MVr, TVr, MAZE 9/9/20. S/p bedside pericardial window 9/18  - Keep MAPs >60.   - pAF: digoxin 125 mcg.   - Holding metop for now - labetalol IV PRN for SBP > 160  - Heparin gtt re-started     Pulm:   - Loculated pleural effusion s/p VATS on 10/5  - Intubated 10/27/20  - Scheduled duo and saline nebs  - CXR and ABG PRN  - HOB >30 deg     Renal:  - DC vivas   - BUN/Cr  -->150/4.0  - Continue to monitor UOP   - Probably renal FeNa>1%   - Nephrology following   -- CRRT today?        FEN/GI:  - Do not replace lytes   - Tf through g tube (follow gen surg recs).   - FW flushes to 400 Q8    - Famotidine and bowel regimen      Heme/Onc:  - H/H stable  - No evidence of bleeding     ID:   - S/p 7 days of cefepime vanc for enterococcus cloacae UTI. Also has e coli growing from pleural cultures.  - Follow ID recommendations pending      - Started on Cefepime fluconazole re-started 10/14/20. Candida on BAL     - Current antibiotics Cefepime  - BC: No growth to date      Endo:  - no hx of DM  - ISS     PPx:  - famotidine, SCD,      Dispo: SICU       Mir Croft MD  General Surgery  [facility]

## 2020-10-27 NOTE — CARE UPDATE
"RAPID RESPONSE NURSE NOTE     Admit Date: 2020  LOS: 57  Code Status: Full Code   Date of Consult: 10/27/2020  : 1945  Age: 75 y.o.  Weight:   Wt Readings from Last 1 Encounters:   10/22/20 72 kg (158 lb 11.7 oz)     Sex: female  Race: White   Bed: [facility] CVICU/[facility] CVICU A:   MRN: [id number]  Time Rapid Response Team page Received: 0630  Time Rapid Response Team at Bedside: 0632  Time Rapid Response Team left Bedside: 0715  Was the patient discharged from an ICU this admission?   yes  Was the patient discharged from a PACU within last 24 hours?  no  Did the patient receive conscious sedation/general anesthesia within last 24 hours?  no  Was the patient in the ED within the past 24 hours?  no  Was the patient started on NIPPV within the past 24 hours?  no  Did this progress into an ARC or CPA:  yes  Attending Physician: Antoni Waddell MD  Primary Service: Networked reference to record PCT   Consult Requested By: Antoni Waddell MD     SITUATION     Notified by bedside RN via phone call.  Reason for alert: Respiratory Distress  Called to evaluate the patient for Respiratory    BACKGROUND     Why is the patient in the hospital?: Leukocytosis    Patient has a past medical history of Atrial fibrillation with RVR, Cataract, Essential hypertension, Glaucoma, Heart valve problem, Hyperlipidemia, and Severe mitral regurgitation.    ASSESSMENT/INTERVENTIONS     BP (!) 144/65 (BP Location: Left arm, Patient Position: Lying)   Pulse (!) 56   Temp 96 °F (35.6 °C) (Axillary)   Resp 14   Ht 5' 5" (1.651 m)   Wt 72 kg (158 lb 11.7 oz)   SpO2 100%   Breastfeeding No   BMI 26.41 kg/m²     What did you find: pt found unresponsive an agonal breathing. Rescue breathing initiated via ambubag. Code cart brought into room, pads attached, pt paced. Atropine given as ordered. No pulse loss, sats maintained @ 100% while bagging. Pt intubated and transferred to SICU rm 61952. See flowsheet for additional " details.    RECOMMENDATIONS     We recommend: Tx to SICU for higher level care, intubation, 12 ekg, chest xray, stat labs, abg with lytes, pressure support    FOLLOW-UP/CONTINGENCY PLAN     Call the Rapid Response Nurse, Bebeto Bonner RN at x [phone] for additional questions or concerns.    PHYSICIAN ESCALATION     Orders received and case discussed with Dr. Solis and Dr. Robinson Croft.    Disposition: Tx in ICU bed  SICU [id number].

## 2020-10-27 NOTE — PROGRESS NOTES
Walked into patient room to reassess patient after breathing treatment admin. Patient agonal breathing and unresponsive. Pulse ox reading 70s-80s% on multiple fingers. Increased patient to 5L 02 on nasal canula. Charge RN notified, patient was placed on non rebreather mask @ 15L pulse ox reading in 80s. 0625 Rapid team called and MD notified. MD ordered for STAT chest xray, ABGs, and d/c tube feedings and Normal saline gtt. 0627 Rapid team at bedside and code cart brought to bed side, patient was placed on ambu bag, pulse ox 100%.  Pacing pad placed on patient and pacing initiated. 0635 MD Guerda at bedside.  0650 patient intubated and moved to SICU

## 2020-10-27 NOTE — PT/OT/SLP DISCHARGE
Occupational Therapy Discharge Summary    Fatou Lorenzo  MRN: [id number]   Principal Problem: Leukocytosis      Patient Discharged from acute Occupational Therapy on 10/27/2020.  Please refer to prior OT note dated 10/23/2020 for functional status.    Assessment:      Patient transferred to lower level of care secondary to change in status    Objective:     GOALS:   Multidisciplinary Problems     Occupational Therapy Goals        Problem: Occupational Therapy Goal    Goal Priority Disciplines Outcome Interventions   Occupational Therapy Goal     OT, PT/OT Ongoing, Progressing    Description: Goals to be met by: 10/23/2020      Patient will increase functional independence with ADLs by performing:    UE Dressing with Minimum Assistance.  Grooming while sitting EOB with Contact Guard Assistance  Toileting from bedside commode with Max Assistance for hygiene and clothing management.   Toilet transfer to bedside commode with Max Assistance.  Supine <> Sit with minimum assistance in preparation for EOB/OOB functional activities  Pt to tolerate static standing for ~1 minute with min A while competing a functional task.                         Reasons for Discontinuation of Therapy Services  Transfer to alternate level of care.      Plan:     Patient Discharged to: ICU    Bartolo Ledbetter OT  10/27/2020

## 2020-10-27 NOTE — ASSESSMENT & PLAN NOTE
-non oliguric kdigo stage 2 rich[place] likely multifactorial ATN from vancomycin toxicity, infection, low effective arterial blood volume  -ua with spec grav 1010, prot 1+, blood 3+, Sang 30  -urine microscopy with frequent muddy brown casts  -kidney US and urine lytes seen   - Cr/Bun trending up pt is oliguric ,we will start SLED ,spoke with the family they agree will be for 8 hours   -keep hgb>7; investigate hemoglobin drop  - Strict I/O and chart  - Avoid nephrotoxic medications, NSAIDs, IV contrast, etc.  - Daily weights and chart  - Medication doses adjusted to GFR  - Maintain MAP > 65  - Hb > 7 gm/dL  - Will follow closely

## 2020-10-27 NOTE — PROGRESS NOTES
MD Audi notified pt tube feeding was d/c today due to vomiting. Chest and abdominal xray completed. MD stated to restart tube feeding @ 10cc/hr and maintain rate @ 10cc/hr tonight. WCTM

## 2020-10-27 NOTE — PLAN OF CARE
Recommendations     1.) Change TF to Peptamen Intense VHP; begin at 10mL/hr and advance 5-10mL Q8hr as tolerated to goal rate at 35mL/hr. EN provides 840kcal, 77gm of protein, 705mL of free water. Add free water per MD recommendations.      Goals: Pt to meet >75% of estimated energy and protein needs over the course of 7 days.   Nutrition Goal Status: goal not met  Communication of RD Recs: (POC)

## 2020-10-27 NOTE — PT/OT/SLP DISCHARGE
Physical Therapy Discharge Summary    Name: Fatou Lorenzo  MRN: [id number]   Principal Problem: Leukocytosis     Patient Discharged from acute Physical Therapy on 10/27/2020.   Please refer to prior PT noted date on 10/23/2020 for functional status.     Assessment:     Patient was discharged unexpectedly.  Information required to complete an accurate discharge summary is unknown.  Refer to therapy initial evaluation and last progress note for initial and most recent functional status and goal achievement.  Recommendations made may be found in medical record.   Pt transferred to SICU after being found unresponsive; intubated at bedside.    Objective:     GOALS:   Multidisciplinary Problems     Physical Therapy Goals        Problem: Physical Therapy Goal    Goal Priority Disciplines Outcome Goal Variances Interventions   Physical Therapy Goal     PT, PT/OT Ongoing, Progressing     Description: Goals to be met by: 2020     Patient will increase functional independence with mobility by performin. Supine to sit with Minimal Assistance x 1 person.-not met  2. Sit to stand transfer with Moderate Assistance.-not met  3. Bed to chair transfer with Moderate Assistance. -not met  4. Sitting at edge of bed x10 minutes with CGA.-not met  5. Lower extremity exercise program x 10 reps  with assistance as needed. -not met  6. Pt receive gait training ~ 10 ft with moderate assist- not met                           Reasons for Discontinuation of Therapy Services  Transfer to alternate level of care. and Patient is unable to continue work toward goals because of medical or psychosocial complications.      Plan:     Patient Discharged to: SICU. PT orders discontinued at this time. Please place new PT orders when pt medically appropriate for PT re-evaluation and treatment.     Jacquelin Cheung, PT  10/27/2020

## 2020-10-27 NOTE — PLAN OF CARE
Pt free of falls and injury during shift. POC reviewed with pt VS stable and AAox4. SR On telemetry. BG monitored q6. NS gtt infusing. Yoo in place. Tube feeding restarted @10cc/hr. IV antibx admin. Pt NPO. 2L o2 via NC. Turn q2.  No acute events noted at this time. No complaints. Educated pt why she is at risk for falls and to use call light for assistance.  Bed low and locked, call light with in reach. Will continue to monitor.

## 2020-10-27 NOTE — PROGRESS NOTES
MD Audi called pt has auditory crackles, pt on NS gtt @100cc/hr. Pt stated she feels like she has having trouble breathing, pulse ox 95%. Pt has schedule respiratory tx due @ 0800 sodium chloride nebulizer and Combivent. MD stated to admin meds now. WCTM

## 2020-10-27 NOTE — PLAN OF CARE
Pt stepped back up to ICU. D/C planning deferred to ICU CM.    Julie Haase RN  Case Management [phone]

## 2020-10-27 NOTE — PROGRESS NOTES
[facility]  Nephrology  Progress Note    Patient Name: Fatou Lorenzo  MRN: [id number]  Admission Date: 8/30/2020  Hospital Length of Stay: 57 days  Attending Provider: Antoni Waddell MD   Primary Care Physician: Ludin Rivas MD  Principal Problem:Leukocytosis    Subjective:     HPI: Fatou Lorenzo is a 76 yo F who had MVR + TVR to 9/9 that was complicated by RV hematoma and subsequent pericardial effusion.  She has been in the hospital since 8/31/2020 and had a complex hospital course complicated by a significant JONAH 9/18. She then developed pneumonia. He JONAH improved to baseline creat of 0.8-1 9/24. She required vats 10/5 procedure for empyema. She never had resolution of leukocytosis and has been on vanc and cefepime. On 10/14 her creat began to increase again in conjunction with elevated vancomycin levels. We could not find any episode of hypotension or afib w/ rvr around that time.  Her creat has been increasing daily since 10/14 and urine output has been decreasing for last 2 days. Nephrology consulted for JONAH.    Interval History: seen at the bedside This morning patient became bradycardic and had a respiratory code this morning, she was intubated and brought to the SICU.    Review of patient's allergies indicates:  No Known Allergies  Current Facility-Administered Medications   Medication Frequency    0.9%  NaCl infusion (CRRT USE ONLY) Continuous    0.9%  NaCl infusion (for blood administration) Q24H PRN    0.9%  NaCl infusion Continuous    acetaminophen oral solution 650 mg Q6H PRN    albuterol-ipratropium 2.5 mg-0.5 mg/3 mL nebulizer solution 3 mL Q6H WAKE    aspirin chewable tablet 81 mg Daily    calcium acetate(phosphat bind) capsule 2,001 mg TID WM    ceFEPIme injection 1 g Q24H    dextrose 50% injection 12.5 g PRN    dextrose 50% injection 25 g PRN    famotidine tablet 20 mg Daily    glucagon (human recombinant) injection 1 mg PRN    heparin 25,000 units in dextrose  5% 250 mL (100 units/mL) infusion (heparin infusion - NO NOMOGRAM) Continuous    insulin aspart U-100 pen 0-5 Units Q6H PRN    labetalol 20 mg/4 mL (5 mg/mL) IV syring Q6H PRN    lidocaine HCL 2% jelly PRN    magnesium sulfate 2g in water 50mL IVPB (premix) PRN    melatonin tablet 6 mg Nightly    mirtazapine tablet 7.5 mg QHS    morphine injection 1 mg Q6H PRN    multivitamin tablet Daily    ondansetron injection 4 mg Q6H PRN    propofol (DIPRIVAN) 10 mg/mL infusion Continuous    propofoL (DIPRIVAN) 10 mg/mL infusion     sodium chloride 3% nebulizer solution 4 mL Q6H WAKE    sodium phosphate 20.01 mmol in dextrose 5 % 250 mL IVPB PRN    sodium phosphate 30 mmol in dextrose 5 % 250 mL IVPB PRN    sodium phosphate 39.99 mmol in dextrose 5 % 250 mL IVPB PRN       Objective:     Vital Signs (Most Recent):  Temp: 97.7 °F (36.5 °C) (10/27/20 0800)  Pulse: 67 (10/27/20 0915)  Resp: 18 (10/27/20 0915)  BP: 124/60 (10/27/20 0915)  SpO2: 100 % (10/27/20 0915)  O2 Device (Oxygen Therapy): ventilator (10/27/20 1015) Vital Signs (24h Range):  Temp:  [96 °F (35.6 °C)-97.7 °F (36.5 °C)] 97.7 °F (36.5 °C)  Pulse:  [39-89] 67  Resp:  [14-33] 18  SpO2:  [89 %-100 %] 100 %  BP: (110-178)/(56-81) 124/60     Weight: 72 kg (158 lb 11.7 oz) (10/22/20 0500)  Body mass index is 26.41 kg/m².  Body surface area is 1.82 meters squared.    I/O last 3 completed shifts:  In: 95 [NG/GT:95]  Out: 125 [Urine:125]    Physical Exam  Constitutional:       Appearance: She is well-developed.      Comments: Intubated    Cardiovascular:      Rate and Rhythm: Normal rate and regular rhythm.      Heart sounds: Normal heart sounds.   Pulmonary:      Effort: Pulmonary effort is normal.      Breath sounds: Normal breath sounds.   Abdominal:      Palpations: Abdomen is soft.      Comments: PEG in place; bleeding to site   Musculoskeletal: Normal range of motion.   Skin:     General: Skin is warm and dry.      Comments: Sternal Incision CDI    Neurological:      Mental Status: She is alert.         Significant Labs:  BMP:   Recent Labs   Lab 10/27/20  0701   *      CO2 17*   *   CREATININE 4.1*   CALCIUM 7.7*   MG 2.6     All labs within the past 24 hours have been reviewed.     Significant Imaging:  bmp    Assessment/Plan:     JONAH (acute kidney injury)  -non oliguric kdigo stage 2 jonah likely multifactorial ATN from vancomycin toxicity, infection, low effective arterial blood volume  -ua with spec grav 1010, prot 1+, blood 3+, Sang 30  -urine microscopy with frequent muddy brown casts  -kidney US and urine lytes seen   - Cr/Bun trending up pt is oliguric ,we will start SLED ,spoke with the family they agree will be for 8 hours   -keep hgb>7; investigate hemoglobin drop  - Strict I/O and chart  - Avoid nephrotoxic medications, NSAIDs, IV contrast, etc.  - Daily weights and chart  - Medication doses adjusted to GFR  - Maintain MAP > 65  - Hb > 7 gm/dL  - Will follow closely      Severe mitral regurgitation  -per primary    Acute respiratory failure with hypoxia  This morning patient became bradycardic and had a respiratory code this morning, she was intubated and brought to the SICU.as per primary       Atrial fibrillation with RVR-resolved as of 10/27/2020  - on amiodarone infusion        Thank you for your consult. I will follow-up with patient. Please contact us if you have any additional questions.    Anisa Teran MD  Nephrology  [facility]

## 2020-10-27 NOTE — PLAN OF CARE
SW notified that [facility] will require a new referral via  once patient is approaching discharge again.     SW will continue to coordinate with patient, family, team and insurance to complete patient's discharge plan.    Esthela Peralta LMSW   - Case Management

## 2020-10-27 NOTE — RESPIRATORY THERAPY
Received patient from floor via rapid response. Patient intubated on floor. Placed on ventilator with documented settings. Will continue to monitor.

## 2020-10-27 NOTE — PROCEDURES
"Fatou Lorenzo is a 75 y.o. female patient.    Temp: 97.7 °F (36.5 °C) (10/27/20 0800)  Pulse: 67 (10/27/20 0915)  Resp: 18 (10/27/20 0915)  BP: 124/60 (10/27/20 0915)  SpO2: 100 % (10/27/20 0915)  Weight: 72 kg (158 lb 11.7 oz) (10/22/20 0500)  Height: 5' 5" (165.1 cm) (10/14/20 0952)       Arterial Line    Date/Time: 10/27/2020 11:13 AM  Location procedure was performed: [facility] CRITICAL CARE SURGERY  Performed by: Mir Croft MD  Authorized by: Mir Croft MD   Consent Done: Emergent Situation  Preparation: Patient was prepped and draped in the usual sterile fashion.  Indications: multiple ABGs, respiratory failure and hemodynamic monitoring  Location: right radial  Patient sedated: yes  Needle gauge: 20  Seldinger technique: Seldinger technique used  Number of attempts: 2  Complications: No  Specimens: No  Implants: No  Post-procedure: line sutured and dressing applied  Patient tolerance: Patient tolerated the procedure well with no immediate complications          Mir Croft  10/27/2020    "

## 2020-10-27 NOTE — CONSULTS
Patient is being followed by Nephrology. Consulted received 10/21. Please see progress note for today.

## 2020-10-27 NOTE — PROGRESS NOTES
Requested by nursing to assess pt sacrum.  See below. Recommended nursing to continue with silicone foam dressing to protect healing redness and to continue with pressure injury prevention interventions in place. Wound care team to sign off. Please reconsult for any further needs. [id number]    L sacrum- non-blanchable redness 3.5x.3cm in a linear presentation possibly resulting from wrinkled linens or medical device such as iv tubing.

## 2020-10-28 NOTE — EICU
Rounding (Video Assessment):  Yes    Intervention Initiated From:  Bedside    Pablo Communicated with Bedside Nurse regarding:  Time-Out    eAlerted to pt room for emergent intubation. Re-Intubated by Dr Troy. CXR ordered. Time-out and LDA entered in to Epic.

## 2020-10-28 NOTE — ASSESSMENT & PLAN NOTE
-non oliguric kdigo stage 2 [place] likely multifactorial ATN from vancomycin toxicity, infection, low effective arterial blood volume  -ua with spec grav 1010, prot 1+, blood 3+, Sang 30  -urine microscopy with frequent muddy brown casts  -kidney US and urine lytes seen   - Cr/Bun trending up pt is oliguric ,s/p SLED will assess on daily basis for the need for CRRT   -keep hgb>7;   - Strict I/O and chart  - Avoid nephrotoxic medications, NSAIDs, IV contrast, etc.  - Daily weights and chart  - Medication doses adjusted to GFR  - Maintain MAP > 65  - Hb > 7 gm/dL  - Will follow closely

## 2020-10-28 NOTE — PLAN OF CARE
"      SICU PLAN OF CARE NOTE    Dx: Leukocytosis    Shift Events: VSS. Extubated, tolerating TF     Goals of Care: MAP >60, SBP <180    Neuro: AAO x4, Arouses to Voice, Follows Commands and Moves All Extremities    Vital Signs: /60   Pulse 84   Temp 97.9 °F (36.6 °C) (Oral)   Resp (!) 21   Ht 5' 5" (1.651 m)   Wt 72 kg (158 lb 11.7 oz)   SpO2 98%   Breastfeeding No   BMI 26.41 kg/m²     Respiratory: Nasal Cannula    Diet: NPO and Tube Feeds    Gtts: Heparin    Urine Output: Urinary Catheter 10 cc/shift    Labs/Accuchecks: Accu Q6    Skin: CDI; heels, sacrum and elbows without breakdown. Pt turned Q2.        "

## 2020-10-28 NOTE — ASSESSMENT & PLAN NOTE
-non oliguric kdigo stage 2 [place] likely multifactorial ATN from vancomycin toxicity, infection, low effective arterial blood volume  -ua with spec grav 1010, prot 1+, blood 3+, Sang 30  -urine microscopy with frequent muddy brown casts  -kidney US and urine lytes seen   - Cr/Bun trending up pt is oliguric ,s/p SLED ,will start SCUF for 12 hours    - BMP Q 8   - keep hgb>7  - Strict I/O and chart  - Avoid nephrotoxic medications, NSAIDs, IV contrast, etc.  - Daily weights and chart  - Medication doses adjusted to GFR  - Maintain MAP > 65  - Hb > 7 gm/dL  - Will follow closely

## 2020-10-28 NOTE — PROGRESS NOTES
[facility]  Critical Care - Surgery  Progress Note    Patient Name: Fatou Lorenzo  MRN: [id number]  Admission Date: 8/30/2020  Hospital Length of Stay: 58 days  Code Status: Full Code  Attending Provider: Antoni Waddell MD  Primary Care Provider: Ludin Rivas MD   Principal Problem: Leukocytosis    Subjective:     Hospital/ICU Course:  No notes on file    Interval History/Significant Events:   Patient still intubated.   CRRT yesterday for clearance, BUN/Cr trending down 40/1.5  Better mentation  On spontaneous at the moment     Follow-up For: Procedure(s) (LRB):  LARYNGOSCOPY, MICRO SUSPENSION WITH AUGMENTATION (N/A)  VATS, WITH DECORTICATION, LUNG (Right)    Post-Operative Day: 13 Days Post-Op    Objective:     Vital Signs (Most Recent):  Temp: 98.4 °F (36.9 °C) (10/28/20 1100)  Pulse: 87 (10/28/20 1100)  Resp: 18 (10/28/20 0825)  BP: (!) 110/58 (10/28/20 1100)  SpO2: 99 % (10/28/20 1100) Vital Signs (24h Range):  Temp:  [94.8 °F (34.9 °C)-99 °F (37.2 °C)] 98.4 °F (36.9 °C)  Pulse:  [55-96] 87  Resp:  [18-22] 18  SpO2:  [99 %-100 %] 99 %  BP: ()/() 110/58  Arterial Line BP: ()/(43-64) 95/49     Weight: 72 kg (158 lb 11.7 oz)  Body mass index is 26.41 kg/m².      Intake/Output Summary (Last 24 hours) at 10/28/2020 1110  Last data filed at 10/28/2020 0900  Gross per 24 hour   Intake 875.5 ml   Output 3002 ml   Net -2126.5 ml       Physical Exam  Vitals signs and nursing note reviewed.   Constitutional:       Appearance: She is well-developed. She is ill-appearing. She is not diaphoretic.   HENT:      Head: Normocephalic and atraumatic.   Eyes:      Conjunctiva/sclera: Conjunctivae normal.   Neck:      Musculoskeletal: Normal range of motion and neck supple.   Cardiovascular:      Rate and Rhythm: Normal rate and regular rhythm.      Comments: Midline sternotomy with clean, dry, and intact, without evidence of infection  Prior chest tubes sites with dressings in place, clean  and dry  Pulmonary:      Comments: Intubated  Hematoma on the R chest, not actively bleeding   Abdominal:      General: There is no distension.      Palpations: Abdomen is soft.      Tenderness: There is no abdominal tenderness.      Comments: g tube in place    Skin:     General: Skin is warm and dry.   Psychiatric:      Comments: Depressed mood, flat affect         Vents:  Vent Mode: A/C (10/28/20 0825)  Ventilator Initiated: Yes (10/27/20 0657)  Set Rate: 18 BPM (10/28/20 0825)  Vt Set: 450 mL (10/28/20 0825)  Pressure Support: 10 cmH20 (10/08/20 1525)  PEEP/CPAP: 5 cmH20 (10/28/20 0825)  Oxygen Concentration (%): 40 (10/28/20 1100)  Peak Airway Pressure: 26 cmH2O (10/28/20 0825)  Plateau Pressure: 24 cmH20 (10/28/20 0825)  Total Ve: 9.19 mL (10/28/20 0825)  Negative Inspiratory Force (cm H2O): -21 (10/08/20 1452)  F/VT Ratio<105 (RSBI): (!) 35.36 (10/28/20 0825)    Lines/Drains/Airways     Central Venous Catheter Line            Trialysis (Dialysis) Catheter 10/27/20 1400 right internal jugular less than 1 day          Drain                 Urethral Catheter 10/14/20 1500 13 days         Gastrostomy/Enterostomy 10/21/20 1304 Percutaneous endoscopic gastrostomy (PEG) feeding 6 days          Airway                 Airway - Non-Surgical 10/27/20 0650 Endotracheal Tube 1 day       Airway Anesthesia 10/27/20 1 day          Arterial Line            Arterial Line 10/27/20 1500 Right Radial less than 1 day          Peripheral Intravenous Line                 Midline Catheter Insertion/Assessment  - Single Lumen 10/19/20 1654 brachial vein 18g x 10cm 8 days         Peripheral IV - Single Lumen 10/27/20 1910 20 G Left Antecubital less than 1 day                Significant Labs:    CBC/Anemia Profile:  Recent Labs   Lab 10/27/20  0701 10/27/20  1735 10/28/20  0357   WBC 11.57 10.83 11.76   HGB 6.5* 7.2* 7.2*   HCT 21.2* 22.3* 22.4*    153 147*   MCV 98 93 94   RDW 18.6* 17.6* 17.5*        Chemistries:  Recent Labs    Lab 10/27/20  1905 10/27/20  2205 10/28/20  0357    139 140   K 4.1 4.0 4.2    105 105   CO2 23 25 20*   BUN 54* 32* 40*   CREATININE 1.5* 1.0 1.4   CALCIUM 7.5* 7.6* 7.5*   ALBUMIN 1.6* 1.6*  --    PROT 4.9*  --   --    BILITOT 0.4  --   --    ALKPHOS 163*  --   --    ALT 32  --   --    AST 34  --   --    MG 2.0 1.9 2.4   PHOS 2.5* 1.8* 3.7       ABGs:   Recent Labs   Lab 10/28/20  0359   PH 7.492*   PCO2 33.2*   HCO3 25.4   POCSATURATED 100   BE 2     Coagulation:   Recent Labs   Lab 10/28/20  0357   INR 1.4*   APTT 48.4*     Lactic Acid: No results for input(s): LACTATE in the last 48 hours.    Significant Imaging:  I have reviewed all pertinent imaging results/findings within the past 24 hours.    Assessment/Plan:     Severe mitral regurgitation  Fatou Lorenzo is a 75 y.o. female s/p MVr, TVr 9/9/2020. Case noteworthy for multiple pump runs (3) and RV hematoma due to retraction. Unstable 9/18, required pericardial window for evac of posterior cardiac tamponade. Respiratory distress and so re-admitted to SICU on 10/2 now s/p VATS on 10/5. Readmitted on 10/27/20 for respiratory failure.      Neuro:  - Intubated and Sedated   - PRN liquid tylenol and meds through g tube       CV:  S/p MVr, TVr, MAZE 9/9/20. S/p bedside pericardial window 9/18  - Keep MAPs >60.   - pAF:   - Holding metop for now - labetalol IV PRN for SBP > 160  - Heparin gtt re-started     Pulm:   - Loculated pleural effusion s/p VATS on 10/5  - Intubated 10/27/20  - Scheduled duo and saline nebs  - CXR and ABG PRN  - HOB >30 deg     Renal:  - DC vivas   - BUN/Cr  -->150/4.0--> 40/1.5   - Continue to monitor UOP   - Nephrology following   -- CRRT 10/27/20      FEN/GI:  - Do not replace lytes   - Tf through g tube   - FW flushes to 400 Q8    - Famotidine and bowel regimen      Heme/Onc:  - H/H stable  - No evidence of bleeding     ID:   - S/p 7 days of cefepime vanc for enterococcus cloacae UTI. Also has e coli growing from pleural  cultures.  - Follow ID recommendations pending      - Started on Cefepime fluconazole re-started 10/14/20. Candida on BAL     - Current antibiotics Cefepime (1 month 11/5)  - BC: No growth to date      Endo:  - no hx of DM  - ISS     PPx:  - famotidine, SCD,      Dispo: SICU         Critical care was time spent personally by me on the following activities: development of treatment plan with patient or surrogate and bedside caregivers, discussions with consultants, evaluation of patient's response to treatment, examination of patient, ordering and performing treatments and interventions, ordering and review of laboratory studies, ordering and review of radiographic studies, pulse oximetry, re-evaluation of patient's condition.  This critical care time did not overlap with that of any other provider or involve time for any procedures.     Mir Croft MD  Critical Care - Surgery  [facility]

## 2020-10-28 NOTE — SUBJECTIVE & OBJECTIVE
Interval History: seen at the bedside no event overnight    Review of patient's allergies indicates:  No Known Allergies  Current Facility-Administered Medications   Medication Frequency    0.9%  NaCl infusion (for blood administration) Q24H PRN    acetaminophen oral solution 650 mg Q6H PRN    albuterol-ipratropium 2.5 mg-0.5 mg/3 mL nebulizer solution 3 mL Q6H WAKE    aspirin chewable tablet 81 mg Daily    calcium acetate(phosphat bind) capsule 2,001 mg TID WM    ceFEPIme injection 1 g Q24H    dextrose 50% injection 12.5 g PRN    dextrose 50% injection 25 g PRN    famotidine tablet 20 mg Daily    glucagon (human recombinant) injection 1 mg PRN    heparin 25,000 units in dextrose 5% 250 mL (100 units/mL) infusion (heparin infusion - NO NOMOGRAM) Continuous    insulin aspart U-100 pen 0-5 Units Q6H PRN    labetalol 20 mg/4 mL (5 mg/mL) IV syring Q6H PRN    lidocaine HCL 2% jelly PRN    melatonin tablet 6 mg Nightly    midodrine tablet 5 mg TID    mirtazapine tablet 7.5 mg QHS    morphine injection 1 mg Q6H PRN    multivitamin tablet Daily    norepinephrine 4 mg in dextrose 5% 250 mL infusion (premix) (titrating) Continuous    ondansetron injection 4 mg Q6H PRN    propofol (DIPRIVAN) 10 mg/mL infusion Continuous    sodium chloride 3% nebulizer solution 4 mL Q6H WAKE       Objective:     Vital Signs (Most Recent):  Temp: 98.2 °F (36.8 °C) (10/28/20 1130)  Pulse: 86 (10/28/20 1130)  Resp: 18 (10/28/20 0825)  BP: (!) 106/51 (10/28/20 1130)  SpO2: 100 % (10/28/20 1130)  O2 Device (Oxygen Therapy): ventilator (10/28/20 1100) Vital Signs (24h Range):  Temp:  [94.8 °F (34.9 °C)-99 °F (37.2 °C)] 98.2 °F (36.8 °C)  Pulse:  [55-96] 86  Resp:  [18-22] 18  SpO2:  [99 %-100 %] 100 %  BP: ()/() 106/51  Arterial Line BP: ()/(43-64) 95/49     Weight: 72 kg (158 lb 11.7 oz) (10/22/20 0500)  Body mass index is 26.41 kg/m².  Body surface area is 1.82 meters squared.    I/O last 3 completed  shifts:  In: 1302.5 [I.V.:605.5; Blood:247; NG/GT:450]  Out: 3037 [Urine:149; Drains:450; Other:2438]    Physical Exam  Vitals signs and nursing note reviewed.   Constitutional:       Appearance: She is well-developed. She is ill-appearing. She is not diaphoretic.   HENT:      Head: Normocephalic and atraumatic.   Eyes:      Conjunctiva/sclera: Conjunctivae normal.   Neck:      Musculoskeletal: Normal range of motion and neck supple.   Cardiovascular:      Rate and Rhythm: Normal rate and regular rhythm.      Comments: Midline sternotomy with clean, dry, and intact, without evidence of infection  Prior chest tubes sites with dressings in place, clean and dry  Pulmonary:      Comments: Intubated  Hematoma on the R chest, not actively bleeding   Abdominal:      General: There is no distension.      Palpations: Abdomen is soft.      Tenderness: There is no abdominal tenderness.      Comments: g tube in place    Skin:     General: Skin is warm and dry.   Psychiatric:      Comments: Depressed mood, flat affect         Significant Labs:  BMP:   Recent Labs   Lab 10/28/20  0357         CO2 20*   BUN 40*   CREATININE 1.4   CALCIUM 7.5*   MG 2.4     All labs within the past 24 hours have been reviewed.     Significant Imaging:  bmp

## 2020-10-28 NOTE — PLAN OF CARE
"      SICU PLAN OF CARE NOTE    Dx: Leukocytosis    Shift Events: 8 hours CRRT completed. Labs improved. TF started. Heparin gtt restarted after Left IJ pulled.     Neuro: Sedated, Arouses to Voice, and Follows Commands    Vital Signs: BP (!) 93/57 (BP Location: Left arm, Patient Position: Lying)   Pulse 83   Temp 98.4 °F (36.9 °C) (Core Esophageal)   Resp 18   Ht 5' 5" (1.651 m)   Wt 72 kg (158 lb 11.7 oz)   SpO2 100%   Breastfeeding No   BMI 26.41 kg/m²     Respiratory: Ventilator 40%/5 PEEP    Diet: Tube Feeds @ 35 cc/hr    Gtts: Propfol, Norepinephrine, and Heparin    Urine Output: Urinary Catheter 0-20 cc/shift     Labs/Accuchecks: Q6H accuchecks.    Skin: patient turned Q2H. Sacral foam on. Spot on sacrum checked by wound care and no LDA needed per team. Heel foams/boots on. Sizewise bed plugged in. No other skin breakdown noted       "

## 2020-10-28 NOTE — PROGRESS NOTES
Patient arrived to ICU intubated with RR team and CTS MD at bedside. Connected to ICU monitor, labs collected and sent. EKG done. VSS at this time. Son updated on patient status. See flowsheet for VS/lab results/gtt's and detailed assessments.       Skin note: Small bruise noted to L sacral area, blanchable. Wound care assessed at bedside. Foam in place. Heels intact, foams in place and rotated heel boots q2h.

## 2020-10-28 NOTE — CARE UPDATE
Patient emergently reintubated and placed on ventilator at documented settings. 7.0 ETT 21 @ lip.

## 2020-10-28 NOTE — PROGRESS NOTES
[facility]  Nephrology  Progress Note    Patient Name: Fatou Lorenzo  MRN: [id number]  Admission Date: 8/30/2020  Hospital Length of Stay: 58 days  Attending Provider: Antoni Waddell MD   Primary Care Physician: Ludin Rivas MD  Principal Problem:Leukocytosis    Subjective:     HPI: Fatou Lorenzo is a 74 yo F who had MVR + TVR to 9/9 that was complicated by RV hematoma and subsequent pericardial effusion.  She has been in the hospital since 8/31/2020 and had a complex hospital course complicated by a significant JONAH 9/18. She then developed pneumonia. He JONAH improved to baseline creat of 0.8-1 9/24. She required vats 10/5 procedure for empyema. She never had resolution of leukocytosis and has been on vanc and cefepime. On 10/14 her creat began to increase again in conjunction with elevated vancomycin levels. We could not find any episode of hypotension or afib w/ rvr around that time.  Her creat has been increasing daily since 10/14 and urine output has been decreasing for last 2 days. Nephrology consulted for JONAH.    Interval History: seen at the bedside no event overnight    Review of patient's allergies indicates:  No Known Allergies  Current Facility-Administered Medications   Medication Frequency    0.9%  NaCl infusion (for blood administration) Q24H PRN    acetaminophen oral solution 650 mg Q6H PRN    albuterol-ipratropium 2.5 mg-0.5 mg/3 mL nebulizer solution 3 mL Q6H WAKE    aspirin chewable tablet 81 mg Daily    calcium acetate(phosphat bind) capsule 2,001 mg TID WM    ceFEPIme injection 1 g Q24H    dextrose 50% injection 12.5 g PRN    dextrose 50% injection 25 g PRN    famotidine tablet 20 mg Daily    glucagon (human recombinant) injection 1 mg PRN    heparin 25,000 units in dextrose 5% 250 mL (100 units/mL) infusion (heparin infusion - NO NOMOGRAM) Continuous    insulin aspart U-100 pen 0-5 Units Q6H PRN    labetalol 20 mg/4 mL (5 mg/mL) IV syring Q6H PRN     lidocaine HCL 2% jelly PRN    melatonin tablet 6 mg Nightly    midodrine tablet 5 mg TID    mirtazapine tablet 7.5 mg QHS    morphine injection 1 mg Q6H PRN    multivitamin tablet Daily    norepinephrine 4 mg in dextrose 5% 250 mL infusion (premix) (titrating) Continuous    ondansetron injection 4 mg Q6H PRN    propofol (DIPRIVAN) 10 mg/mL infusion Continuous    sodium chloride 3% nebulizer solution 4 mL Q6H WAKE       Objective:     Vital Signs (Most Recent):  Temp: 98.2 °F (36.8 °C) (10/28/20 1130)  Pulse: 86 (10/28/20 1130)  Resp: 18 (10/28/20 0825)  BP: (!) 106/51 (10/28/20 1130)  SpO2: 100 % (10/28/20 1130)  O2 Device (Oxygen Therapy): ventilator (10/28/20 1100) Vital Signs (24h Range):  Temp:  [94.8 °F (34.9 °C)-99 °F (37.2 °C)] 98.2 °F (36.8 °C)  Pulse:  [55-96] 86  Resp:  [18-22] 18  SpO2:  [99 %-100 %] 100 %  BP: ()/() 106/51  Arterial Line BP: ()/(43-64) 95/49     Weight: 72 kg (158 lb 11.7 oz) (10/22/20 0500)  Body mass index is 26.41 kg/m².  Body surface area is 1.82 meters squared.    I/O last 3 completed shifts:  In: 1302.5 [I.V.:605.5; Blood:247; NG/GT:450]  Out: 3037 [Urine:149; Drains:450; Other:2438]    Physical Exam  Vitals signs and nursing note reviewed.   Constitutional:       Appearance: She is well-developed. She is ill-appearing. She is not diaphoretic.   HENT:      Head: Normocephalic and atraumatic.   Eyes:      Conjunctiva/sclera: Conjunctivae normal.   Neck:      Musculoskeletal: Normal range of motion and neck supple.   Cardiovascular:      Rate and Rhythm: Normal rate and regular rhythm.      Comments: Midline sternotomy with clean, dry, and intact, without evidence of infection  Prior chest tubes sites with dressings in place, clean and dry  Pulmonary:      Comments: Intubated  Hematoma on the R chest, not actively bleeding   Abdominal:      General: There is no distension.      Palpations: Abdomen is soft.      Tenderness: There is no abdominal tenderness.       Comments: g tube in place    Skin:     General: Skin is warm and dry.   Psychiatric:      Comments: Depressed mood, flat affect         Significant Labs:  BMP:   Recent Labs   Lab 10/28/20  0357         CO2 20*   BUN 40*   CREATININE 1.4   CALCIUM 7.5*   MG 2.4     All labs within the past 24 hours have been reviewed.     Significant Imaging:  bmp    Assessment/Plan:     JONAH (acute kidney injury)  -non oliguric kdigo stage 2 jonah likely multifactorial ATN from vancomycin toxicity, infection, low effective arterial blood volume  -ua with spec grav 1010, prot 1+, blood 3+, Sang 30  -urine microscopy with frequent muddy brown casts  -kidney US and urine lytes seen   - Cr/Bun trending up pt is oliguric ,s/p SLED will assess on daily basis for the need for CRRT   - can utilize lasix if need it   - BMP Q 8   - keep hgb>7  - Strict I/O and chart  - Avoid nephrotoxic medications, NSAIDs, IV contrast, etc.  - Daily weights and chart  - Medication doses adjusted to GFR  - Maintain MAP > 65  - Hb > 7 gm/dL  - Will follow closely        Thank you for your consult. I will follow-up with patient. Please contact us if you have any additional questions.    Anisa Teran MD  Nephrology  [facility]

## 2020-10-28 NOTE — PROGRESS NOTES
8 hour SLED treatment complete. Blood rinsed back without resistance. Right IJ trialysis flushed with normal saline and saline locked. See flowsheet for details.

## 2020-10-28 NOTE — ASSESSMENT & PLAN NOTE
Fatou Lorenzo is a 75 y.o. female s/p MVr, TVr 9/9/2020. Case noteworthy for multiple pump runs (3) and RV hematoma due to retraction. Unstable 9/18, required pericardial window for evac of posterior cardiac tamponade. Respiratory distress and so re-admitted to SICU on 10/2 now s/p VATS on 10/5. Readmitted on 10/27/20 for respiratory failure.      Neuro:  - Intubated and Sedated   - PRN liquid tylenol and meds through g tube       CV:  S/p MVr, TVr, MAZE 9/9/20. S/p bedside pericardial window 9/18  - Keep MAPs >60.   - pAF:   - Holding metop for now - labetalol IV PRN for SBP > 160  - Heparin gtt re-started     Pulm:   - Loculated pleural effusion s/p VATS on 10/5  - Intubated 10/27/20  - Scheduled duo and saline nebs  - CXR and ABG PRN  - HOB >30 deg     Renal:  - DC vivas   - BUN/Cr  -->150/4.0--> 40/1.5   - Continue to monitor UOP   - Nephrology following   -- CRRT 10/27/20      FEN/GI:  - Do not replace lytes   - Tf through g tube   - FW flushes to 400 Q8    - Famotidine and bowel regimen      Heme/Onc:  - H/H stable  - No evidence of bleeding     ID:   - S/p 7 days of cefepime vanc for enterococcus cloacae UTI. Also has e coli growing from pleural cultures.  - Follow ID recommendations pending      - Started on Cefepime fluconazole re-started 10/14/20. Candida on BAL     - Current antibiotics Cefepime (1 month 11/5)  - BC: No growth to date      Endo:  - no hx of DM  - ISS     PPx:  - famotidine, SCD,      Dispo: SICU

## 2020-10-28 NOTE — PLAN OF CARE
"      SICU PLAN OF CARE NOTE    Dx: Leukocytosis    Shift Events: Intubated on ACVC 75%/5 PEEP, weaning as tolerated. L Trialysis placed, CRRT started @ 300 UF rate, goal 200-300 - treatment for 8h. Anoop hugger applied intermittently for hypothermia. Started on heparin gtt, monitoring aptt. TF to be restarted thru J, currently draining green/brown output, refer to flowsheet. Jodi updated on plan of care throughout the day by team.     Goals of Care: Extubate. MAP >60.     Neuro: Sedated, Follows Commands, and Moves All Extremities    Vital Signs: BP (!) 106/55 (BP Location: Left arm, Patient Position: Lying)   Pulse 76   Temp 97.2 °F (36.2 °C) (Core Esophageal)   Resp 18   Ht 5' 5" (1.651 m)   Wt 72 kg (158 lb 11.7 oz)   SpO2 100%   Breastfeeding No   BMI 26.41 kg/m²     Respiratory: Ventilator    Diet: NPO and Tube Feeds    Gtts: Propfol, Norepinephrine, and Heparin    Urine Output: Urinary Catheter 5-20 cc/hour    Drains: G-tube/J-tube, total output 200 cc /  shift    CRRT goal -300, tolerating with levo gtt infusing. Monitoring frequent labs     Restraints Assessments done q2h, no skin injuries noted.     Labs/Accuchecks: BG 70-90. Labs q8h, aptt q6h.    Skin: DTI to sacrum, foam in place. Heels intact, alternating heel boots q2h. Turning q2h, utilizing wedge.        "

## 2020-10-28 NOTE — PLAN OF CARE
SW is following this Pt for DC planning needs. There are no identified needs at this time. Discharge Disposition: LTAC - pending patient progress; will need new referrals sent once closer to discharge due to previous one being sent more than 3 weeks ago and no longer being followed.     SW will continue to coordinate with patient, family, team and insurance to complete patient's discharge plan.    Esthela Peralta LMSW   - Case Management

## 2020-10-28 NOTE — PROGRESS NOTES
Pt extubated to 4 L Nasal Cannula by RT per MD order. Following extubation, pt is AAO x4, bilateral breath sounds noted. Restraints removed safely w/o injury. Pt's family brought to bedside, updated on the PoC for remainder of shift.

## 2020-10-28 NOTE — PROGRESS NOTES
patient seen at bedside with critical care team. Much more alert today after dialysis. Will attempt spontaneous breathing trial to see if extubation is an option. Will also start nutrition through her peg tube.

## 2020-10-29 NOTE — PROGRESS NOTES
VSS throughout shift. Afebrile. Patient on AC/VC 70% 5 PEEP with SATS above 95%. MAPS >60. Patient follows all commands and moves all extremities. On propofol gtt for RASS and comfort. Pt on Heparin gtt at 700units/hr. MDs called and made aware of aPTTs. Marginal UOP throughout shift; see flow sheet. Tube feeds not restarted per MD order. Accuchecks q6.     Skin: no additional breakdown throughout shift. Patient turned q2. Foams applied. Will continue to monitor.

## 2020-10-29 NOTE — PROCEDURES
Type of procedure:  Emergent.    Brief history:  Patient had been extubated earlier today.  She had been maintaining adequate oxygen saturations and was responsive.  At some point she is became unresponsive, she maintain her blood pressure and cardiac rhythm.  She maintain adequate oxygen saturations but only because she was rapidly assisted with an Ambu bag.    The patient had significant amount of salivary secretions in the mouth and rhonchi scattered through both lung fields.  Does we suspected that the patient had an episode of broncho aspiration.    Description of procedure:    1.  Endotracheal intubation with a 7.0 endotracheal tube using a glide scope.  2.  Medications used included 10 mg of etomidate and 50 mg of rocuronium.  3.  Number of attempts at intubation:  1  4.  Depth of endotracheal tube:  22 cm.  5.  Confirmation of endotracheal intubation was done through direct vision, checking of color change on carbon dioxide, bilateral breath sounds, and eventually using flexible bronchoscopy.    The patient tolerated the procedure well.  Oxygen saturations were 100% at all times.  Blood pressure was stable at all times.    Description of bronchoscopy and BAL.    After endotracheal intubation I performed a flexible bronchoscopy and BAL.  There were significant amounts of salivary secretions in the trachea and on both mainstem bronchi.  However there was no evidence of gastric content.  Secretions were aspirated and lavaged.  Samples were sent for cultures.      I performed the endotracheal intubation.  I performed the bronchoscopy and bronchoalveolar lavage out.      Marco Troy

## 2020-10-29 NOTE — PROGRESS NOTES
[facility]  Critical Care - Surgery  Progress Note    Patient Name: Fatou Lorenzo  MRN: [id number]  Admission Date: 8/30/2020  Hospital Length of Stay: 59 days  Code Status: Full Code  Attending Provider: Antoni Waddell MD  Primary Care Provider: Ludin Rivas MD   Principal Problem: Leukocytosis    Subjective:     Hospital/ICU Course:  No notes on file    Interval History/Significant Events:   Patient was re-intubated yesterday for respiratory failure.   Adequate response to CRRT   Minimal UOP  Bronch was performed at bedside - Cultures sent  H/H dropped.  - Giving multivtamin and folic acid   Plan was discussed with son.   General surgery consulted for a trach.     Follow-up For: Procedure(s) (LRB):  LARYNGOSCOPY, MICRO SUSPENSION WITH AUGMENTATION (N/A)  VATS, WITH DECORTICATION, LUNG (Right)    Post-Operative Day: 13 Days Post-Op    Objective:     Vital Signs (Most Recent):  Temp: 97.7 °F (36.5 °C) (10/29/20 0700)  Pulse: 66 (10/29/20 0945)  Resp: 17 (10/29/20 0745)  BP: 129/66 (10/29/20 0945)  SpO2: 100 % (10/29/20 0945) Vital Signs (24h Range):  Temp:  [97.5 °F (36.4 °C)-98.6 °F (37 °C)] 97.7 °F (36.5 °C)  Pulse:  [61-92] 66  Resp:  [16-21] 17  SpO2:  [98 %-100 %] 100 %  BP: ()/(46-81) 129/66  Arterial Line BP: ()/(43-77) 135/59     Weight: 72 kg (158 lb 11.7 oz)  Body mass index is 26.41 kg/m².      Intake/Output Summary (Last 24 hours) at 10/29/2020 1002  Last data filed at 10/29/2020 0800  Gross per 24 hour   Intake 1069 ml   Output 75 ml   Net 994 ml       Physical Exam  Vitals signs and nursing note reviewed.   Constitutional:       Appearance: She is well-developed. She is ill-appearing. She is not diaphoretic.   HENT:      Head: Normocephalic and atraumatic.   Eyes:      Conjunctiva/sclera: Conjunctivae normal.   Neck:      Musculoskeletal: Normal range of motion and neck supple.   Cardiovascular:      Rate and Rhythm: Normal rate and regular rhythm.       Comments: Midline sternotomy with clean, dry, and intact, without evidence of infection  Prior chest tubes sites with dressings in place, clean and dry  Pulmonary:      Comments: Intubated  Hematoma on the R chest, not actively bleeding   Abdominal:      General: There is no distension.      Palpations: Abdomen is soft.      Tenderness: There is no abdominal tenderness.      Comments: g tube in place    Skin:     General: Skin is warm and dry.   Psychiatric:      Comments: Depressed mood, flat affect         Vents:  Vent Mode: A/C (10/29/20 0749)  Ventilator Initiated: Yes (10/28/20 1840)  Set Rate: 16 BPM (10/29/20 0749)  Vt Set: 390 mL (10/29/20 0749)  Pressure Support: 5 cmH20 (10/28/20 1409)  PEEP/CPAP: 5 cmH20 (10/29/20 0749)  Oxygen Concentration (%): 60 (10/29/20 0945)  Peak Airway Pressure: 26 cmH2O (10/29/20 0749)  Plateau Pressure: 31 cmH20 (10/29/20 0749)  Total Ve: 12.6 mL (10/29/20 0749)  Negative Inspiratory Force (cm H2O): -21 (10/08/20 1452)  F/VT Ratio<105 (RSBI): (!) 40.92 (10/28/20 1926)    Lines/Drains/Airways     Central Venous Catheter Line            Trialysis (Dialysis) Catheter 10/27/20 1400 right internal jugular 1 day          Drain                 Urethral Catheter 10/14/20 1500 14 days         Gastrostomy/Enterostomy 10/21/20 1304 Percutaneous endoscopic gastrostomy (PEG) feeding 7 days          Airway                 Airway - Non-Surgical 10/28/20 1835 Endotracheal Tube less than 1 day         Airway - Non-Surgical 10/28/20 1838 less than 1 day          Arterial Line            Arterial Line 10/27/20 1500 Right Radial 1 day          Peripheral Intravenous Line                 Midline Catheter Insertion/Assessment  - Single Lumen 10/19/20 1654 brachial vein 18g x 10cm 9 days         Peripheral IV - Single Lumen 10/27/20 1910 20 G Left Antecubital 1 day                Significant Labs:    CBC/Anemia Profile:  Recent Labs   Lab 10/27/20  1735 10/28/20  0357 10/29/20  0338 10/29/20  0818    WBC 10.83 11.76 10.40  --    HGB 7.2* 7.2* 6.5*  --    HCT 22.3* 22.4* 21.4*  --     147* 127*  --    MCV 93 94 99*  --    RDW 17.6* 17.5* 17.7*  --    FOLATE  --   --   --  9.2   [id number]  --   --   --  1207*        Chemistries:  Recent Labs   Lab 10/27/20  1905 10/27/20  2205 10/28/20  0357 10/29/20  0338    139 140 138   K 4.1 4.0 4.2 4.3    105 105 103   CO2 23 25 20* 26   BUN 54* 32* 40* 52*   CREATININE 1.5* 1.0 1.4 2.0*   CALCIUM 7.5* 7.6* 7.5* 8.1*   ALBUMIN 1.6* 1.6*  --   --    PROT 4.9*  --   --   --    BILITOT 0.4  --   --   --    ALKPHOS 163*  --   --   --    ALT 32  --   --   --    AST 34  --   --   --    MG 2.0 1.9 2.4 2.3   PHOS 2.5* 1.8* 3.7 4.7*       ABGs:   Recent Labs   Lab 10/28/20  0359   PH 7.492*   PCO2 33.2*   HCO3 25.4   POCSATURATED 100   BE 2     Coagulation:   Recent Labs   Lab 10/29/20  0338 10/29/20  0818   INR 1.4*  --    APTT 69.7* 37.7*     Lactic Acid: No results for input(s): LACTATE in the last 48 hours.    Significant Imaging:  I have reviewed all pertinent imaging results/findings within the past 24 hours.    Assessment/Plan:     Severe mitral regurgitation  Fatou Lorenzo is a 75 y.o. female s/p MVr, TVr 9/9/2020. Case noteworthy for multiple pump runs (3) and RV hematoma due to retraction. Unstable 9/18, required pericardial window for evac of posterior cardiac tamponade. Respiratory distress and so re-admitted to SICU on 10/2 now s/p VATS on 10/5. Readmitted on 10/27/20 for respiratory failure.      Neuro:  - Intubated and Sedated   - PRN liquid tylenol and meds through g tube       CV:  S/p MVr, TVr, MAZE 9/9/20. S/p bedside pericardial window 9/18  - Keep MAPs >60.   - pAF:   - Holding metop for now - labetalol IV PRN for SBP > 160  - Holding heparin, drop in H/H     Pulm:   - Loculated pleural effusion s/p VATS on 10/5  - Intubated 10/27/20  - Scheduled duo and saline nebs  - CXR and ABG PRN  - HOB >30 deg  - Continue antibiotics until 11/5  -  Patient will need a trach.      Renal:  - DC vivas   - BUN/Cr  -->150/4.0--> 40/1.5--> 52/2.0   - Continue to monitor UOP   - Nephrology following   -- CRRT 10/27/20  -- Will give a lasix challenge today       FEN/GI:  - Do not replace lytes   - Tf through g tube   - FW flushes to 400 Q8    - Famotidine and bowel regimen      Heme/Onc:  - H/H decreased to 6.5/21   -- 2U pRBC today   - No evidence of bleeding  - Heparin stopped      ID:   - S/p 7 days of cefepime vanc for enterococcus cloacae UTI. Also has e coli growing from pleural cultures.  - Follow ID recommendations pending      - Started on Cefepime fluconazole re-started 10/14/20. Candida on BAL     - Current antibiotics Cefepime (1 month 11/5)  - BC: No growth to date      Endo:  - no hx of DM  - ISS     PPx:  - famotidine, SCD,      Dispo: SICU    Plan was discussed with son, questions were answered.          Critical care was time spent personally by me on the following activities: development of treatment plan with patient or surrogate and bedside caregivers, discussions with consultants, evaluation of patient's response to treatment, examination of patient, ordering and performing treatments and interventions, ordering and review of laboratory studies, ordering and review of radiographic studies, pulse oximetry, re-evaluation of patient's condition.  This critical care time did not overlap with that of any other provider or involve time for any procedures.     Mir Croft MD  Critical Care - Surgery  [facility]

## 2020-10-29 NOTE — PROGRESS NOTES
CRRT restarted, Good blood flow notes. /DFR SEQ , uf rate set at 300. B/P 123/62 , pulse 70. Patient in vent and sedated,o2 sat 100%

## 2020-10-29 NOTE — SUBJECTIVE & OBJECTIVE
Interval History: seen at the bedside no event overnight    Review of patient's allergies indicates:  No Known Allergies  Current Facility-Administered Medications   Medication Frequency    0.9%  NaCl infusion (for blood administration) Q24H PRN    0.9%  NaCl infusion (for blood administration) Q24H PRN    acetaminophen oral solution 650 mg Q6H PRN    albuterol-ipratropium 2.5 mg-0.5 mg/3 mL nebulizer solution 3 mL Q6H WAKE    aspirin chewable tablet 81 mg Daily    calcium acetate(phosphat bind) capsule 2,001 mg TID WM    ceFEPIme injection 1 g Q24H    dextrose 50% injection 12.5 g PRN    dextrose 50% injection 25 g PRN    famotidine tablet 20 mg Daily    glucagon (human recombinant) injection 1 mg PRN    insulin aspart U-100 pen 0-5 Units Q6H PRN    labetalol 20 mg/4 mL (5 mg/mL) IV syring Q6H PRN    lidocaine HCL 2% jelly PRN    melatonin tablet 6 mg Nightly    midodrine tablet 5 mg TID    mirtazapine tablet 7.5 mg QHS    morphine injection 1 mg Q6H PRN    multivitamin tablet Daily    norepinephrine 4 mg in dextrose 5% 250 mL infusion (premix) (titrating) Continuous    ondansetron injection 4 mg Q6H PRN    propofol (DIPRIVAN) 10 mg/mL infusion Continuous    sodium chloride 3% nebulizer solution 4 mL Q6H WAKE    thiamine tablet 100 mg Daily       Objective:     Vital Signs (Most Recent):  Temp: 97.7 °F (36.5 °C) (10/29/20 0700)  Pulse: 65 (10/29/20 0845)  Resp: 17 (10/29/20 0745)  BP: (!) 122/59 (10/29/20 0845)  SpO2: 100 % (10/29/20 0845)  O2 Device (Oxygen Therapy): ventilator (10/29/20 0800) Vital Signs (24h Range):  Temp:  [97.5 °F (36.4 °C)-98.6 °F (37 °C)] 97.7 °F (36.5 °C)  Pulse:  [61-92] 65  Resp:  [16-21] 17  SpO2:  [98 %-100 %] 100 %  BP: ()/(46-81) 122/59  Arterial Line BP: ()/(43-77) 135/57     Weight: 72 kg (158 lb 11.7 oz) (10/22/20 0500)  Body mass index is 26.41 kg/m².  Body surface area is 1.82 meters squared.    I/O last 3 completed shifts:  In: 1370.7  [I.V.:820.7; NG/GT:550]  Out: 1351 [Urine:129; Drains:250; Other:972]    Physical Exam  Vitals signs and nursing note reviewed.   Constitutional:       Appearance: She is well-developed. She is ill-appearing. She is not diaphoretic.   HENT:      Head: Normocephalic and atraumatic.   Eyes:      Conjunctiva/sclera: Conjunctivae normal.   Neck:      Musculoskeletal: Normal range of motion and neck supple.   Cardiovascular:      Rate and Rhythm: Normal rate and regular rhythm.      Comments: Midline sternotomy with clean, dry, and intact, without evidence of infection  Prior chest tubes sites with dressings in place, clean and dry  Pulmonary:      Comments:   Hematoma on the R chest, not actively bleeding   Abdominal:      General: There is no distension.      Palpations: Abdomen is soft.      Tenderness: There is no abdominal tenderness.      Comments: g tube in place    Skin:     General: Skin is warm and dry.   Psychiatric:      Comments: Depressed mood, flat affect         Significant Labs:  BMP:   Recent Labs   Lab 10/29/20  0338   GLU 77      CO2 26   BUN 52*   CREATININE 2.0*   CALCIUM 8.1*   MG 2.3     All labs within the past 24 hours have been reviewed.     Significant Imaging:  bm

## 2020-10-29 NOTE — SUBJECTIVE & OBJECTIVE
Interval History/Significant Events:   Patient was re-intubated yesterday for respiratory failure.   Adequate response to CRRT   Minimal UOP  Bronch was performed at bedside - Cultures sent  H/H dropped.  - Giving multivtamin and folic acid   Plan was discussed with son.   General surgery consulted for a trach.     Follow-up For: Procedure(s) (LRB):  LARYNGOSCOPY, MICRO SUSPENSION WITH AUGMENTATION (N/A)  VATS, WITH DECORTICATION, LUNG (Right)    Post-Operative Day: 13 Days Post-Op    Objective:     Vital Signs (Most Recent):  Temp: 97.7 °F (36.5 °C) (10/29/20 0700)  Pulse: 66 (10/29/20 0945)  Resp: 17 (10/29/20 0745)  BP: 129/66 (10/29/20 0945)  SpO2: 100 % (10/29/20 0945) Vital Signs (24h Range):  Temp:  [97.5 °F (36.4 °C)-98.6 °F (37 °C)] 97.7 °F (36.5 °C)  Pulse:  [61-92] 66  Resp:  [16-21] 17  SpO2:  [98 %-100 %] 100 %  BP: ()/(46-81) 129/66  Arterial Line BP: ()/(43-77) 135/59     Weight: 72 kg (158 lb 11.7 oz)  Body mass index is 26.41 kg/m².      Intake/Output Summary (Last 24 hours) at 10/29/2020 1002  Last data filed at 10/29/2020 0800  Gross per 24 hour   Intake 1069 ml   Output 75 ml   Net 994 ml       Physical Exam  Vitals signs and nursing note reviewed.   Constitutional:       Appearance: She is well-developed. She is ill-appearing. She is not diaphoretic.   HENT:      Head: Normocephalic and atraumatic.   Eyes:      Conjunctiva/sclera: Conjunctivae normal.   Neck:      Musculoskeletal: Normal range of motion and neck supple.   Cardiovascular:      Rate and Rhythm: Normal rate and regular rhythm.      Comments: Midline sternotomy with clean, dry, and intact, without evidence of infection  Prior chest tubes sites with dressings in place, clean and dry  Pulmonary:      Comments: Intubated  Hematoma on the R chest, not actively bleeding   Abdominal:      General: There is no distension.      Palpations: Abdomen is soft.      Tenderness: There is no abdominal tenderness.      Comments:  g tube in place    Skin:     General: Skin is warm and dry.   Psychiatric:      Comments: Depressed mood, flat affect         Vents:  Vent Mode: A/C (10/29/20 0749)  Ventilator Initiated: Yes (10/28/20 1840)  Set Rate: 16 BPM (10/29/20 0749)  Vt Set: 390 mL (10/29/20 0749)  Pressure Support: 5 cmH20 (10/28/20 1409)  PEEP/CPAP: 5 cmH20 (10/29/20 0749)  Oxygen Concentration (%): 60 (10/29/20 0945)  Peak Airway Pressure: 26 cmH2O (10/29/20 0749)  Plateau Pressure: 31 cmH20 (10/29/20 0749)  Total Ve: 12.6 mL (10/29/20 0749)  Negative Inspiratory Force (cm H2O): -21 (10/08/20 1452)  F/VT Ratio<105 (RSBI): (!) 40.92 (10/28/20 1926)    Lines/Drains/Airways     Central Venous Catheter Line            Trialysis (Dialysis) Catheter 10/27/20 1400 right internal jugular 1 day          Drain                 Urethral Catheter 10/14/20 1500 14 days         Gastrostomy/Enterostomy 10/21/20 1304 Percutaneous endoscopic gastrostomy (PEG) feeding 7 days          Airway                 Airway - Non-Surgical 10/28/20 1835 Endotracheal Tube less than 1 day         Airway - Non-Surgical 10/28/20 1838 less than 1 day          Arterial Line            Arterial Line 10/27/20 1500 Right Radial 1 day          Peripheral Intravenous Line                 Midline Catheter Insertion/Assessment  - Single Lumen 10/19/20 1654 brachial vein 18g x 10cm 9 days         Peripheral IV - Single Lumen 10/27/20 1910 20 G Left Antecubital 1 day                Significant Labs:    CBC/Anemia Profile:  Recent Labs   Lab 10/27/20  1735 10/28/20  0357 10/29/20  0338 10/29/20  0818   WBC 10.83 11.76 10.40  --    HGB 7.2* 7.2* 6.5*  --    HCT 22.3* 22.4* 21.4*  --     147* 127*  --    MCV 93 94 99*  --    RDW 17.6* 17.5* 17.7*  --    FOLATE  --   --   --  9.2   [id number]  --   --   --  1207*        Chemistries:  Recent Labs   Lab 10/27/20  1905 10/27/20  2205 10/28/20  0357 10/29/20  0338    139 140 138   K 4.1 4.0 4.2 4.3    105 105 103   CO2 23  25 20* 26   BUN 54* 32* 40* 52*   CREATININE 1.5* 1.0 1.4 2.0*   CALCIUM 7.5* 7.6* 7.5* 8.1*   ALBUMIN 1.6* 1.6*  --   --    PROT 4.9*  --   --   --    BILITOT 0.4  --   --   --    ALKPHOS 163*  --   --   --    ALT 32  --   --   --    AST 34  --   --   --    MG 2.0 1.9 2.4 2.3   PHOS 2.5* 1.8* 3.7 4.7*       ABGs:   Recent Labs   Lab 10/28/20  0359   PH 7.492*   PCO2 33.2*   HCO3 25.4   POCSATURATED 100   BE 2     Coagulation:   Recent Labs   Lab 10/29/20  0338 10/29/20  0818   INR 1.4*  --    APTT 69.7* 37.7*     Lactic Acid: No results for input(s): LACTATE in the last 48 hours.    Significant Imaging:  I have reviewed all pertinent imaging results/findings within the past 24 hours.

## 2020-10-29 NOTE — ASSESSMENT & PLAN NOTE
Fatou Lorenzo is a 75 y.o. female s/p MVr, TVr 9/9/2020. Case noteworthy for multiple pump runs (3) and RV hematoma due to retraction. Unstable 9/18, required pericardial window for evac of posterior cardiac tamponade. Respiratory distress and so re-admitted to SICU on 10/2 now s/p VATS on 10/5. Readmitted on 10/27/20 for respiratory failure.      Neuro:  - Intubated and Sedated   - PRN liquid tylenol and meds through g tube       CV:  S/p MVr, TVr, MAZE 9/9/20. S/p bedside pericardial window 9/18  - Keep MAPs >60.   - pAF:   - Holding metop for now - labetalol IV PRN for SBP > 160  - Holding heparin, drop in H/H     Pulm:   - Loculated pleural effusion s/p VATS on 10/5  - Intubated 10/27/20  - Scheduled duo and saline nebs  - CXR and ABG PRN  - HOB >30 deg  - Continue antibiotics until 11/5  - Patient will need a trach.      Renal:  - DC vivas   - BUN/Cr  -->150/4.0--> 40/1.5--> 52/2.0   - Continue to monitor UOP   - Nephrology following   -- CRRT 10/27/20  -- Will give a lasix challenge today       FEN/GI:  - Do not replace lytes   - Tf through g tube   - FW flushes to 400 Q8    - Famotidine and bowel regimen      Heme/Onc:  - H/H decreased to 6.5/21   -- 2U pRBC today   - No evidence of bleeding  - Heparin stopped      ID:   - S/p 7 days of cefepime vanc for enterococcus cloacae UTI. Also has e coli growing from pleural cultures.  - Follow ID recommendations pending      - Started on Cefepime fluconazole re-started 10/14/20. Candida on BAL     - Current antibiotics Cefepime (1 month 11/5)  - BC: No growth to date      Endo:  - no hx of DM  - ISS     PPx:  - famotidine, SCD,      Dispo: SICU    Plan was discussed with son, questions were answered.

## 2020-10-29 NOTE — PROGRESS NOTES
CT surgery progress note     Patient re intubated yesterday afternoon given respiratory distress.     I/O  556/55 net: 501  uop 55       A/P  75F s/p MV repair, TV repair, and MAZE w/ ALISA resection on 09/09/20. Post op course complicated by a multiloculated R pleural effusion for which she had a R VATS w/ decortication on 10/05/20       ACS consult for trach  CRRT per renal   Continue supportive care  Increase enteral feeds  IV abx for E. Coli in pleural fluid, AF. Monitor WBC#  Check b12 and folate levels given persistent anemia   Cont CSU

## 2020-10-29 NOTE — PLAN OF CARE
SW is following this Pt for DC planning needs. There are no identified needs at this time. Discharge Disposition: LTAC - pending patient progress; intubated, plan for trach    SW will continue to coordinate with patient, family, team and insurance to complete patient's discharge plan.    Esthela Peralta LMSW   - Case Management

## 2020-10-29 NOTE — PROGRESS NOTES
[facility]  Nephrology  Progress Note    Patient Name: Fatou Lorenzo  MRN: [id number]  Admission Date: 8/30/2020  Hospital Length of Stay: 59 days  Attending Provider: Antoni Waddell MD   Primary Care Physician: Ludin Rivas MD  Principal Problem:Leukocytosis    Subjective:     HPI: Fatou Lorenzo is a 76 yo F who had MVR + TVR to 9/9 that was complicated by RV hematoma and subsequent pericardial effusion.  She has been in the hospital since 8/31/2020 and had a complex hospital course complicated by a significant JONAH 9/18. She then developed pneumonia. He JONAH improved to baseline creat of 0.8-1 9/24. She required vats 10/5 procedure for empyema. She never had resolution of leukocytosis and has been on vanc and cefepime. On 10/14 her creat began to increase again in conjunction with elevated vancomycin levels. We could not find any episode of hypotension or afib w/ rvr around that time.  Her creat has been increasing daily since 10/14 and urine output has been decreasing for last 2 days. Nephrology consulted for JONAH.    Interval History: seen at the bedside no event overnight    Review of patient's allergies indicates:  No Known Allergies  Current Facility-Administered Medications   Medication Frequency    0.9%  NaCl infusion (for blood administration) Q24H PRN    0.9%  NaCl infusion (for blood administration) Q24H PRN    acetaminophen oral solution 650 mg Q6H PRN    albuterol-ipratropium 2.5 mg-0.5 mg/3 mL nebulizer solution 3 mL Q6H WAKE    aspirin chewable tablet 81 mg Daily    calcium acetate(phosphat bind) capsule 2,001 mg TID WM    ceFEPIme injection 1 g Q24H    dextrose 50% injection 12.5 g PRN    dextrose 50% injection 25 g PRN    famotidine tablet 20 mg Daily    glucagon (human recombinant) injection 1 mg PRN    insulin aspart U-100 pen 0-5 Units Q6H PRN    labetalol 20 mg/4 mL (5 mg/mL) IV syring Q6H PRN    lidocaine HCL 2% jelly PRN    melatonin tablet 6 mg Nightly     midodrine tablet 5 mg TID    mirtazapine tablet 7.5 mg QHS    morphine injection 1 mg Q6H PRN    multivitamin tablet Daily    norepinephrine 4 mg in dextrose 5% 250 mL infusion (premix) (titrating) Continuous    ondansetron injection 4 mg Q6H PRN    propofol (DIPRIVAN) 10 mg/mL infusion Continuous    sodium chloride 3% nebulizer solution 4 mL Q6H WAKE    thiamine tablet 100 mg Daily       Objective:     Vital Signs (Most Recent):  Temp: 97.7 °F (36.5 °C) (10/29/20 0700)  Pulse: 65 (10/29/20 0845)  Resp: 17 (10/29/20 0745)  BP: (!) 122/59 (10/29/20 0845)  SpO2: 100 % (10/29/20 0845)  O2 Device (Oxygen Therapy): ventilator (10/29/20 0800) Vital Signs (24h Range):  Temp:  [97.5 °F (36.4 °C)-98.6 °F (37 °C)] 97.7 °F (36.5 °C)  Pulse:  [61-92] 65  Resp:  [16-21] 17  SpO2:  [98 %-100 %] 100 %  BP: ()/(46-81) 122/59  Arterial Line BP: ()/(43-77) 135/57     Weight: 72 kg (158 lb 11.7 oz) (10/22/20 0500)  Body mass index is 26.41 kg/m².  Body surface area is 1.82 meters squared.    I/O last 3 completed shifts:  In: 1370.7 [I.V.:820.7; NG/GT:550]  Out: 1351 [Urine:129; Drains:250; Other:972]    Physical Exam  Vitals signs and nursing note reviewed.   Constitutional:       Appearance: She is well-developed. She is ill-appearing. She is not diaphoretic.   HENT:      Head: Normocephalic and atraumatic.   Eyes:      Conjunctiva/sclera: Conjunctivae normal.   Neck:      Musculoskeletal: Normal range of motion and neck supple.   Cardiovascular:      Rate and Rhythm: Normal rate and regular rhythm.      Comments: Midline sternotomy with clean, dry, and intact, without evidence of infection  Prior chest tubes sites with dressings in place, clean and dry  Pulmonary:      Comments:   Hematoma on the R chest, not actively bleeding   Abdominal:      General: There is no distension.      Palpations: Abdomen is soft.      Tenderness: There is no abdominal tenderness.      Comments: g tube in place    Skin:      General: Skin is warm and dry.   Psychiatric:      Comments: Depressed mood, flat affect         Significant Labs:  BMP:   Recent Labs   Lab 10/29/20  0338   GLU 77      CO2 26   BUN 52*   CREATININE 2.0*   CALCIUM 8.1*   MG 2.3     All labs within the past 24 hours have been reviewed.     Significant Imaging:  bm    Assessment/Plan:     JONAH (acute kidney injury)  -non oliguric kdigo stage 2 jonah likely multifactorial ATN from vancomycin toxicity, infection, low effective arterial blood volume  -ua with spec grav 1010, prot 1+, blood 3+, Sang 30  -urine microscopy with frequent muddy brown casts  -kidney US and urine lytes seen   - Cr/Bun trending up pt is oliguric ,s/p SLED ,will start SCUF for 12 hours    - BMP Q 8   - keep hgb>7  - Strict I/O and chart  - Avoid nephrotoxic medications, NSAIDs, IV contrast, etc.  - Daily weights and chart  - Medication doses adjusted to GFR  - Maintain MAP > 65  - Hb > 7 gm/dL  - Will follow closely      Severe mitral regurgitation  -per primary      Thank you for your consult. I will follow-up with patient. Please contact us if you have any additional questions.    Anisa Teran MD  Nephrology  [facility]

## 2020-10-30 NOTE — PROGRESS NOTES
12 hour SCUF treatment restarted. 4 hours remaining of 12 hour treatment. Right IJ trialysis cleaned and accessed per protocol. Unable to aspirate blood from blue line of right IJ trialysis, but able to flush. Able to aspirate and flush from red line of right IJ trialysis. Lines connected, treatment initiated. See flowsheet for details.

## 2020-10-30 NOTE — PROGRESS NOTES
[facility]  Critical Care - Surgery  Progress Note    Patient Name: Fatou Lorenzo  MRN: [id number]  Admission Date: 8/30/2020  Hospital Length of Stay: 60 days  Code Status: Full Code  Attending Provider: Antoni Waddell MD  Primary Care Provider: Ludin Rivas MD   Principal Problem: Leukocytosis    Subjective:     Hospital/ICU Course:  No notes on file    Interval History/Significant Events: NAEON.    Adequate response to CRRT   Minimal UOP  H/H dropped.  - Giving multivtamin and folic acid   Plan was discussed with son - family considering trach.     Follow-up For: Procedure(s) (LRB):  INSERTION, PEG TUBE (N/A)    Post-Operative Day: 9 Days Post-Op    Objective:     Vital Signs (Most Recent):  Temp: 97.6 °F (36.4 °C) (10/30/20 0702)  Pulse: 66 (10/30/20 0915)  Resp: 18 (10/30/20 0826)  BP: (!) 123/58 (10/30/20 0915)  SpO2: 100 % (10/30/20 0915) Vital Signs (24h Range):  Temp:  [97.5 °F (36.4 °C)-98.9 °F (37.2 °C)] 97.6 °F (36.4 °C)  Pulse:  [58-74] 66  Resp:  [17-25] 18  SpO2:  [99 %-100 %] 100 %  BP: ()/() 123/58  Arterial Line BP: (104-347)/() 119/53     Weight: 72 kg (158 lb 11.7 oz)  Body mass index is 26.41 kg/m².      Intake/Output Summary (Last 24 hours) at 10/30/2020 1041  Last data filed at 10/30/2020 0900  Gross per 24 hour   Intake 3500.28 ml   Output 4236 ml   Net -735.72 ml       Physical Exam  Vitals signs and nursing note reviewed.   Constitutional:       Appearance: She is not ill-appearing.   HENT:      Head: Normocephalic and atraumatic.   Eyes:      Conjunctiva/sclera: Conjunctivae normal.   Neck:      Musculoskeletal: Normal range of motion and neck supple.   Cardiovascular:      Rate and Rhythm: Normal rate and regular rhythm.      Comments: Midline sternotomy with clean, dry, and intact, without evidence of infection  Prior chest tubes sites with dressings in place, clean and dry    Pulmonary:      Comments: Intubated  Hematoma on the R chest,  not actively bleeding   Abdominal:      General: Abdomen is flat.      Palpations: Abdomen is soft.      Comments: g tube in place     Skin:     General: Skin is warm and dry.   Neurological:      Comments: Sedated         Vents:  Vent Mode: A/C (10/30/20 0820)  Ventilator Initiated: Yes (10/28/20 1840)  Set Rate: 16 BPM (10/30/20 0820)  Vt Set: 390 mL (10/30/20 0820)  Pressure Support: 5 cmH20 (10/28/20 1409)  PEEP/CPAP: 5 cmH20 (10/30/20 0820)  Oxygen Concentration (%): 60 (10/30/20 0915)  Peak Airway Pressure: 34 cmH2O (10/30/20 0820)  Plateau Pressure: 31 cmH20 (10/30/20 0820)  Total Ve: 7.87 mL (10/30/20 0820)  Negative Inspiratory Force (cm H2O): -21 (10/08/20 1452)  F/VT Ratio<105 (RSBI): (!) 39.43 (10/29/20 2033)    Lines/Drains/Airways     Central Venous Catheter Line            Trialysis (Dialysis) Catheter 10/27/20 1400 right internal jugular 2 days          Drain                 Urethral Catheter 10/14/20 1500 15 days         Gastrostomy/Enterostomy 10/21/20 1304 Percutaneous endoscopic gastrostomy (PEG) feeding 8 days          Airway                 Airway - Non-Surgical 10/28/20 1835 Endotracheal Tube 1 day          Arterial Line            Arterial Line 10/27/20 1500 Right Radial 2 days          Peripheral Intravenous Line                 Midline Catheter Insertion/Assessment  - Single Lumen 10/19/20 1654 brachial vein 18g x 10cm 10 days         Peripheral IV - Single Lumen 10/27/20 1910 20 G Left Antecubital 2 days                Significant Labs:    CBC/Anemia Profile:  Recent Labs   Lab 10/29/20  0818 10/29/20  1417 10/30/20  0309 10/30/20  0634   WBC  --  13.95* 10.99 10.99   HGB  --  9.2* 8.5* 8.2*   HCT  --  29.3* 26.1* 26.4*   PLT  --  136* 80* 75*   MCV  --  96 95 96   RDW  --  16.3* 16.9* 16.9*   FOLATE 9.2  --   --   --    [id number] 1207*  --   --   --         Chemistries:  Recent Labs   Lab 10/29/20  1417 10/29/20  2132 10/30/20  0348    138 136  136   K 4.3 4.3 4.3  4.3     105 104  104   CO2 22* 20* 22*  23   BUN 53* 56* 55*  57*   CREATININE 2.1* 2.1* 2.1*  2.2*   CALCIUM 7.9* 8.0* 7.8*  7.6*   ALBUMIN 1.5* 1.6* 1.5*   MG 2.3 2.4 2.3  2.2   PHOS 4.7* 4.8* 4.8*  4.9*       CMP:   Recent Labs   Lab 10/29/20  1417 10/29/20  2132 10/30/20  0348    138 136  136   K 4.3 4.3 4.3  4.3    105 104  104   CO2 22* 20* 22*  23   GLU 78 85 90  94   BUN 53* 56* 55*  57*   CREATININE 2.1* 2.1* 2.1*  2.2*   CALCIUM 7.9* 8.0* 7.8*  7.6*   ALBUMIN 1.5* 1.6* 1.5*   ANIONGAP 11 13 10  9   EGFRNONAA 22.5* 22.5* 22.5*  21.3*     Coagulation:   Recent Labs   Lab 10/30/20  0348   INR 1.3*   APTT 41.5*       POCT Glucose:   Recent Labs   Lab 10/30/20  0315 10/30/20  0632 10/30/20  0802   POCTGLUCOSE 86 94 93       Significant Imaging:  CXR: I have reviewed all pertinent results/findings within the past 24 hours and my personal findings are:    FINDINGS:  Endotracheal tube tip lies just superior to the apex of the aortic arch, well above the anjel.  No significant interval change in the appearance of the chest since 10/29/2020 is appreciated, allowing for differences in patient positioning, with the volume of pleural fluid on the right appearing stable.  No pneumothorax.    Assessment/Plan:     Severe mitral regurgitation  Fatou Lorenzo is a 75 y.o. female s/p MVr, TVr 9/9/2020. Case noteworthy for multiple pump runs (3) and RV hematoma due to retraction. Unstable 9/18, required pericardial window for evac of posterior cardiac tamponade. Respiratory distress and so re-admitted to SICU on 10/2 now s/p VATS on 10/5. Readmitted on 10/27/20 for respiratory failure.      Neuro:  - Intubated and Sedated; propofol    - PRN liquid tylenol and meds through g tube       CV:  S/p MVr, TVr, MAZE 9/9/20. S/p bedside pericardial window 9/18  - Keep MAPs >60. HDS off pressors   - Holding metop for now - labetalol IV PRN for SBP > 160  - Holding heparin, drop in H/H     Pulm:   - Loculated  pleural effusion s/p VATS on 10/5  - Intubated 10/27/20  - Scheduled duo and saline nebs  - CXR and ABG PRN  - HOB >30 deg  - Continue antibiotics until 11/5  - Patient will need a trach --> discussing with family today  - Daily CXR     Renal:  - DC vivas   - BUN/Cr  -->150/4.0--> 40/1.5--> 52/2.0 --> 57/2.2  - Continue to monitor UOP - minimal   - Nephrology following   -- Continue CRRT         FEN/GI:  - Do not replace lytes   - Tf through g tube   - FW flushes to 400 Q8    - Famotidine and bowel regimen      Heme/Onc:  - H/H decreased to 8.2 (9.2)  - No evidence of bleeding  - Heparin stopped      ID:   - WBC 10.99 (13.95)  - Afebrile  - S/p 7 days of cefepime vanc for enterococcus cloacae UTI. Also has e coli growing from pleural cultures.  - Follow ID recommendations pending      - Started on Cefepime fluconazole re-started 10/14/20. Candida on BAL     - Current antibiotics Cefepime (1 month 11/5)  - BC: No growth to date      Endo:  - no hx of DM  - ISS     PPx:  - famotidine, SCD,      Dispo: SICU    Plan was discussed with son, questions were answered.        Critical care was time spent personally by me on the following activities: development of treatment plan with patient or surrogate and bedside caregivers, discussions with consultants, evaluation of patient's response to treatment, examination of patient, ordering and performing treatments and interventions, ordering and review of laboratory studies, ordering and review of radiographic studies, pulse oximetry, re-evaluation of patient's condition.  This critical care time did not overlap with that of any other provider or involve time for any procedures.     Caitlin Lau MD  Critical Care - Surgery  [facility]

## 2020-10-30 NOTE — SUBJECTIVE & OBJECTIVE
Interval History: Seen at the bedside no event overnight       Review of patient's allergies indicates:  No Known Allergies  Current Facility-Administered Medications   Medication Frequency    0.9%  NaCl infusion (CRRT USE ONLY) Continuous    0.9%  NaCl infusion (for blood administration) Q24H PRN    0.9%  NaCl infusion (for blood administration) Q24H PRN    acetaminophen oral solution 650 mg Q6H PRN    albuterol-ipratropium 2.5 mg-0.5 mg/3 mL nebulizer solution 3 mL Q6H WAKE    aspirin chewable tablet 81 mg Daily    calcium acetate(phosphat bind) capsule 2,001 mg TID WM    ceFEPIme injection 1 g Q24H    dextrose 50% injection 12.5 g PRN    dextrose 50% injection 25 g PRN    escitalopram oxalate tablet 20 mg Daily    famotidine tablet 20 mg Daily    glucagon (human recombinant) injection 1 mg PRN    heparin (porcine) injection 5,000 Units Q8H    insulin aspart U-100 pen 0-5 Units Q6H PRN    labetalol 20 mg/4 mL (5 mg/mL) IV syring Q6H PRN    lidocaine HCL 2% jelly PRN    magnesium sulfate 2g in water 50mL IVPB (premix) PRN    melatonin tablet 6 mg Nightly    midodrine tablet 5 mg TID    mirtazapine tablet 7.5 mg QHS    morphine injection 1 mg Q6H PRN    multivitamin tablet Daily    norepinephrine 4 mg in dextrose 5% 250 mL infusion (premix) (titrating) Continuous    ondansetron injection 4 mg Q6H PRN    propofol (DIPRIVAN) 10 mg/mL infusion Continuous    sodium chloride 3% nebulizer solution 4 mL Q6H WAKE    sodium phosphate 20.01 mmol in dextrose 5 % 250 mL IVPB PRN    sodium phosphate 30 mmol in dextrose 5 % 250 mL IVPB PRN    sodium phosphate 39.99 mmol in dextrose 5 % 250 mL IVPB PRN    thiamine tablet 100 mg Daily       Objective:     Vital Signs (Most Recent):  Temp: 97.6 °F (36.4 °C) (10/30/20 0702)  Pulse: 68 (10/30/20 0826)  Resp: 18 (10/30/20 0826)  BP: (!) 104/51 (10/30/20 0820)  SpO2: 100 % (10/30/20 0826)  O2 Device (Oxygen Therapy): ventilator (10/30/20 0820) Vital Signs  (24h Range):  Temp:  [97.5 °F (36.4 °C)-98.9 °F (37.2 °C)] 97.6 °F (36.4 °C)  Pulse:  [58-74] 68  Resp:  [17-25] 18  SpO2:  [99 %-100 %] 100 %  BP: ()/() 104/51  Arterial Line BP: (104-347)/() 117/51     Weight: 72 kg (158 lb 11.7 oz) (10/22/20 0500)  Body mass index is 26.41 kg/m².  Body surface area is 1.82 meters squared.    I/O last 3 completed shifts:  In: 3847.3 [I.V.:2397.3; Blood:250; NG/GT:1200]  Out: 3904 [Urine:295; Other:3609]    Physical Exam  Vitals signs and nursing note reviewed.   Constitutional:       Appearance: She is well-developed. She is ill-appearing. She is not diaphoretic.   HENT:      Head: Normocephalic and atraumatic.   Eyes:      Conjunctiva/sclera: Conjunctivae normal.   Neck:      Musculoskeletal: Normal range of motion and neck supple.   Cardiovascular:      Rate and Rhythm: Normal rate and regular rhythm.      Comments: Midline sternotomy with clean, dry, and intact, without evidence of infection  Prior chest tubes sites with dressings in place, clean and dry  Pulmonary:      Comments: Intubated  Hematoma on the R chest, not actively bleeding   Abdominal:      General: There is no distension.      Palpations: Abdomen is soft.      Tenderness: There is no abdominal tenderness.      Comments: g tube in place    Skin:     General: Skin is warm and dry.   Psychiatric:      Comments: Depressed mood, flat affect         Significant Labs:  BMP:   Recent Labs   Lab 10/30/20  0348   GLU 90  94     104   CO2 22*  23   BUN 55*  57*   CREATININE 2.1*  2.2*   CALCIUM 7.8*  7.6*   MG 2.3  2.2     All labs within the past 24 hours have been reviewed.     Significant Imaging:  bmp

## 2020-10-30 NOTE — PLAN OF CARE
Problem: Adult Inpatient Plan of Care  Goal: Plan of Care Review  Outcome: Ongoing, Progressing  No acute events throughout the shift. VSS. Pt remains intubated A/C VC 60% FiO2, peep of 5, rate of 16, Vt 390. Gtts infusing propofol at 25mcg/kg/min. Tube feeds initiated Peptamen Intense VHP, 250cc Free water flushes given q8h. Pt tolerating feeding and flushes well. 120mg Lasix challenge given. UOP minimal this shift 125cc in total. 2u PRBC transfused for low H&H. CVP 12-9-9. Pt awakens and follows all commands. Moves extremities freely and equally. Pt turned q2h no skin breakdown noted. Pt had two smears of a bowel movement. CRRT initiated for 12 hours UF rate between 300-400cc/hr. Blood sugar of 48 this morning, corrected with 1/2 amp of d50. See flowsheets for assessments and intake and output. Plan of care reviewed with patients prateek hoskins by Dr. Troy and reviewed with patients elaine oshea by bedside RN. Plans for family meeting to be had to discuss patients plan of care.

## 2020-10-30 NOTE — PROGRESS NOTES
SCUF treatment restarted to right IJ trialysis. Flows were good, lines connected, treatment initiated. 5 hours remaining on 12 hour treatment. Will complete around 06:00a.m. See flowsheet for details.

## 2020-10-30 NOTE — PROGRESS NOTES
VSS throughout shift. Afebrile. Patient follows all commands and moves all extremities. Patient remains intubated AC/VC 60% 5 PEEP. Propofol infusing at 25ml/hr for RASS and comfort. Tube feeds at 35cc/hr. Minimal residuals. 250cc free water boluses given. Minimal UOP throughout shift.See flowsheet. Patient was on CRRT with UF at 400ml. Clotted off twice. Unable to rinse back. CBC checked. CRRT restarted.     Skin: no breakdown noted throughout shift. Patient turned q2 hours. Partial bath given. Will continue to monitor.

## 2020-10-30 NOTE — ASSESSMENT & PLAN NOTE
Fatou Lorenzo is an 75 y.o. female s/p AVR/TVR/MVR on 9/9/20 with prolonged ICU stay. S/p PEG on 10/21. General surgery now consulted for Trach.  Still requiring CRRT, would need permacath as well.    - Called her son Carter Lorenzo who had a long family discussion with Dr. Troy today about goals of care. They are unsure at this time that they would wish to proceed with Trach placement. Discussed that she would also likely need a tunneled dialysis line while she continues to recover if she's to be discharged to LTAC.   - Trach/Permacath next week if family amenable  - will still need to obtain informed consent  - Please call General surgery if/when the family would wish to have these procedures done

## 2020-10-30 NOTE — PROGRESS NOTES
"[facility]  Adult Nutrition  Progress Note    SUMMARY       Recommendations    1.) Continue Peptamen Intense VHP; goal rate at 50mL/hr (per Dr. Troy). EN provides 1200kcal, 110gm of protein, 1008mL of free water. Add free water per MD recommendations.     Goals: Pt to meet >75% of estimated energy and protein needs over the course of 7 days.   Nutrition Goal Status: goal met  Communication of RD Recs: (POC)    Reason for Assessment    Reason For Assessment: RD follow-up  Diagnosis: other (see comments)(Acute respiratory failure with hypoxia)  Relevant Medical History: atrial fibrillation with RVR, essential HTN, heart valve problem, HLD  Interdisciplinary Rounds: did not attend  General Information Comments: Pt s/p Mahmood Max, s/p MVR, TVR. Pt intubated, sedated and on mechanical ventilation. Pt receiving HD treatments. NFPE completed 10/22, pt meets ASPEN guidelines for severe malnutrition due to acute illness. Nsg staff reports pt is tolerating TF of Peptamen Intense at 50mL/hr. GI- LBM 10/20. NFPE completed 10/22, pt meets ASPEN guidelines for severe malnutrition due to acute illness. Pt had 19% weight loss since 10/11.   Nutrition Discharge Planning: Pt to meet adequate energy and protien needs.     Nutrition Risk Screen    Nutrition Risk Screen: tube feeding or parenteral nutrition    Nutrition/Diet History    Spiritual, Cultural Beliefs, Scientologist Practices, Values that Affect Care: no  Factors Affecting Nutritional Intake: NPO, on mechanical ventilation    Anthropometrics    Temp: 97.6 °F (36.4 °C)  Height Method: Stated  Height: 5' 5" (165.1 cm)  Height (inches): 65 in  Weight Method: Bed Scale  Weight: 72 kg (158 lb 11.7 oz)  Weight (lb): 158.73 lb  Ideal Body Weight (IBW), Female: 125 lb  % Ideal Body Weight, Female (lb): 147.2 %  BMI (Calculated): 26.4  Weight Change Amount: 37 lb 7.7 oz(19% sinc e10/11)       Lab/Procedures/Meds    Pertinent Labs Reviewed: reviewed  Pertinent Labs " Comments: Hgb 8.2, Hct 26.4, BUN 55, Cr 2.1, GFR 22.5, Ca 7.8, phosphorus 4.8  Pertinent Medications Reviewed: reviewed  Pertinent Medications Comments: Albuterol, phosphate binders, cefepime, famotidine, MVI, thiamine    Physical Findings/Assessment     Edema 2+ generalized    Estimated/Assessed Needs    Weight Used For Calorie Calculations: 72 kg (158 lb 11.7 oz)  Energy Calorie Requirements (kcal): 1084  Energy Need Method: [facility]  Protein Requirements: 86-108gm (1.2-1.5gm/kg)  Weight Used For Protein Calculations: 72 kg (158 lb 11.7 oz)  Fluid Requirements (mL): per MD or 1 mL/kcal     RDA Method (mL): 1084         Nutrition Prescription Ordered    Current Diet Order: NPO (10/5)  Current Nutrition Support Formula Ordered: Peptamen Intense VHP  Current Nutrition Support Rate Ordered: 50 (ml)  Current Nutrition Support Frequency Ordered: mL/hr    Evaluation of Received Nutrient/Fluid Intake    Enteral Calories (kcal): 1200  Enteral Protein (gm): 110  Enteral (Free Water) Fluid (mL): 1008  Other Calories (kcal): 285(Propofol)  I/O: +6.8L sicne 10/16; I: 3735; o: 3859; +6.8L since 10/16  Energy Calories Required: not meeting needs  Protein Required: not meeting needs  Fluid Required: (per MD)  Comments: LBM 10/20  Tolerance: tolerating  % Intake of Estimated Energy Needs: 1485   % Meal Intake: 0    Nutrition Risk    Level of Risk/Frequency of Follow-up: high , 2x weekly    Assessment and Plan     Nutrition Problem  Malnutrition (Severe)  In the context of acute illness     Related to (etiology):   Physiological causes increasing nutrient need do to acute illness     Signs and Symptoms (as evidenced by):   Malnutrition Assessment:  Body Fat Depletion: severe depletion of orbital   Muscle Mass Depletion: severe depletion of temple   Weight Loss: 10% x 2 months  Fluid Accumulation: moderately severe        Interventions(treatment strategy):  1.) Collaboration with other providers  2.) Enteral  nutrition     Nutrition Diagnosis Status:   ongoing     Monitor and Evaluation    Food and Nutrient Intake: enteral nutrition intake  Food and Nutrient Adminstration: enteral and parenteral nutrition administration  Knowledge/Beliefs/Attitudes: food and nutrition knowledge/skill, beliefs and attitudes  Anthropometric Measurements: body mass index, weight change, weight  Biochemical Data, Medical Tests and Procedures: electrolyte and renal panel, gastrointestinal profile, glucose/endocrine profile, inflammatory profile, lipid profile  Nutrition-Focused Physical Findings: overall appearance     Malnutrition Assessment  Malnutrition Type: acute illness or injury              Orbital Region (Subcutaneous Fat Loss): severe depletion  Upper Arm Region (Subcutaneous Fat Loss): mild depletion   [place] Region (Muscle Loss): severe depletion  Clavicle Bone Region (Muscle Loss): moderate depletion  Clavicle and Acromion Bone Region (Muscle Loss): moderate depletion  Dorsal Hand (Muscle Loss): mild depletion  Anterior Thigh Region (Muscle Loss): well nourished  Posterior Calf Region (Muscle Loss): well nourished       Subcutaneous Fat Loss (Final Summary): severe protein-calorie malnutrition  Muscle Loss Evaluation (Final Summary): severe protein-calorie malnutrition    Severe Weight Loss (Malnutrition): greater than 5% in 1 month    Nutrition Follow-Up    RD Follow-up?: Yes

## 2020-10-30 NOTE — PLAN OF CARE
"      SICU PLAN OF CARE NOTE    Dx: Leukocytosis    Shift Events: CRRT stopped @ 0800, family meeting regarding next steps in patients plan of care    Goals of Care: MAP >60, SBP <180    Neuro: Arouses to Voice and Follows Commands    Vital Signs: /60 (BP Location: Left arm, Patient Position: Lying)   Pulse 66   Temp 98.6 °F (37 °C) (Oral)   Resp 16   Ht 5' 5" (1.651 m)   Wt 72 kg (158 lb 11.7 oz)   SpO2 100%   Breastfeeding No   BMI 26.41 kg/m²     Respiratory: Ventilator    Diet: Tube Feeds    Gtts: Propfol    Urine Output: Urinary Catheter 30 cc/shift    CRRT       Labs/Accuchecks: daily labs/q6hr accuchecks    Skin: skin assessed, pressure points protected, patient turned q2hr      "

## 2020-10-30 NOTE — PROGRESS NOTES
"SCUF treatment machine alarming "Air Detector." Primary RN states neither she nor any other available nurse able to resent the dialysis machine to resume treatment. Blood lost. Primary RN states primary team wishes to draw CBC and review results prior to restarting, since patient has clotted twice this shift and lost blood both times. Will await call from primary RN prior to restarting CRRT, See flowsheet for additional details.  "

## 2020-10-30 NOTE — PLAN OF CARE
10/30/20 1011   Discharge Reassessment   Assessment Type Discharge Planning Reassessment   Provided patient/caregiver education on the expected discharge date and the discharge plan Yes   Do you have any problems affording any of your prescribed medications? No   Discharge Plan A Long-term acute care facility (LTAC)   Discharge Plan B Rehab  (TBD)   DME Needed Upon Discharge  other (see comments)  (TBD)   Anticipated Discharge Disposition Long Term       Mel Simons MPH, RN, CM  Ext. [phone]

## 2020-10-30 NOTE — ASSESSMENT & PLAN NOTE
Fatou Lorenzo is a 75 y.o. female s/p MVr, TVr 9/9/2020. Case noteworthy for multiple pump runs (3) and RV hematoma due to retraction. Unstable 9/18, required pericardial window for evac of posterior cardiac tamponade. Respiratory distress and so re-admitted to SICU on 10/2 now s/p VATS on 10/5. Readmitted on 10/27/20 for respiratory failure.      Neuro:  - Intubated and Sedated; propofol    - PRN liquid tylenol and meds through g tube       CV:  S/p MVr, TVr, MAZE 9/9/20. S/p bedside pericardial window 9/18  - Keep MAPs >60. HDS off pressors   - Holding metop for now - labetalol IV PRN for SBP > 160  - Holding heparin, drop in H/H     Pulm:   - Loculated pleural effusion s/p VATS on 10/5  - Intubated 10/27/20  - Scheduled duo and saline nebs  - CXR and ABG PRN  - HOB >30 deg  - Continue antibiotics until 11/5  - Patient will need a trach --> discussing with family today  - Daily CXR     Renal:  - DC vivas   - BUN/Cr  -->150/4.0--> 40/1.5--> 52/2.0 --> 57/2.2  - Continue to monitor UOP - minimal   - Nephrology following   -- Continue CRRT         FEN/GI:  - Do not replace lytes   - Tf through g tube   - FW flushes to 400 Q8    - Famotidine and bowel regimen      Heme/Onc:  - H/H decreased to 8.2 (9.2)  - No evidence of bleeding  - Heparin stopped      ID:   - WBC 10.99 (13.95)  - Afebrile  - S/p 7 days of cefepime vanc for enterococcus cloacae UTI. Also has e coli growing from pleural cultures.  - Follow ID recommendations pending      - Started on Cefepime fluconazole re-started 10/14/20. Candida on BAL     - Current antibiotics Cefepime (1 month 11/5)  - BC: No growth to date      Endo:  - no hx of DM  - ISS     PPx:  - famotidine, SCD,      Dispo: SICU    Plan was discussed with son, questions were answered.

## 2020-10-30 NOTE — CONSULTS
[facility]  General Surgery  Consult Note    Patient Name: Fatou Lorenzo  MRN: [id number]  Code Status: Full Code  Admission Date: 8/30/2020  Hospital Length of Stay: 60 days  Attending Physician: Antoni Waddell MD  Primary Care Provider: Ludin Rivas MD    Patient information was obtained from past medical records.       Subjective:     Principal Problem: Leukocytosis    History of Present Illness:  Fatou Lorenzo is a 75 y.o. female that is status post three valve replacement (TVR, MVR, AVR) on 9/9/20. Prolonged ICU course. General surgery placed a PEG for dysphagia on 10/21/20 without issue. On the evening of 10/28 the patient had an acute worsening of her respiratory status and required emergent intubation. Concern for aspiration. Now on mechanical ventilation with 60% FiO2 5PEEP. Still requiring CRRT (SCUF) via right IJ trialysis line. General surgery consulted for trach placement.     Review of Systems   Unable to perform ROS: Intubated     Physical Exam  Constitutional:       Appearance: She is well-developed.      Comments: Intubated and sedated   Neck:      Vascular: No JVD.      Trachea: No tracheal deviation.   Cardiovascular:      Rate and Rhythm: Normal rate and regular rhythm.   Pulmonary:      Effort: No respiratory distress.      Breath sounds: Normal breath sounds.      Comments: Vent Mode: A/C  Oxygen Concentration (%):  (60) 60  Resp Rate Total:  (16 br/min-41 br/min) 16 br/min  Vt Set:  (390 mL) 390 mL  PEEP/CPAP:  (5 cmH20) 5 cmH20  Mean Airway Pressure:  (8.8 adC26-49 cmH20) 9.2 cmH20    Abdominal:      General: There is no distension.      Palpations: Abdomen is soft. There is no mass.      Tenderness: There is no abdominal tenderness. There is no guarding or rebound.      Comments: Tolerating full tube feeds via PEG   Skin:     General: Skin is warm and dry.   Neurological:      Mental Status: She is alert and oriented to person, place, and time.            Assessment/Plan:     Acute respiratory failure with hypoxia  Fatou Lorenzo is an 75 y.o. female s/p AVR/TVR/MVR on 9/9/20 with prolonged ICU stay. S/p PEG on 10/21. General surgery now consulted for Trach.  Still requiring CRRT, would need permacath as well.    - Called her son Carter Lorenzo who had a long family discussion with Dr. Troy today about goals of care. They are unsure at this time that they would wish to proceed with Trach placement. Discussed that she would also likely need a tunneled dialysis line while she continues to recover if she's to be discharged to LTAC.   - Trach/Permacath next week if family amenable  - will still need to obtain informed consent  - Please call General surgery if/when the family would wish to have these procedures done      VTE Risk Mitigation (From admission, onward)         Ordered     heparin (porcine) injection 5,000 Units  Every 8 hours      10/30/20 0656     IP VTE HIGH RISK PATIENT  Once      09/09/20 2250     Place sequential compression device  Until discontinued      09/09/20 2250                Thank you for your consult. I will sign off. Please contact us if you have any additional questions.    Davida Hernandez MD  General Surgery  [facility]        I have personally performed a detailed history and physical examination on this patient. My findings are summarized in the resident's note included in the record.

## 2020-10-30 NOTE — SUBJECTIVE & OBJECTIVE
Interval History/Significant Events: NAEON.    Adequate response to CRRT   Minimal UOP  H/H dropped.  - Giving multivtamin and folic acid   Plan was discussed with son - family considering trach.     Follow-up For: Procedure(s) (LRB):  INSERTION, PEG TUBE (N/A)    Post-Operative Day: 9 Days Post-Op    Objective:     Vital Signs (Most Recent):  Temp: 97.6 °F (36.4 °C) (10/30/20 0702)  Pulse: 66 (10/30/20 0915)  Resp: 18 (10/30/20 0826)  BP: (!) 123/58 (10/30/20 0915)  SpO2: 100 % (10/30/20 0915) Vital Signs (24h Range):  Temp:  [97.5 °F (36.4 °C)-98.9 °F (37.2 °C)] 97.6 °F (36.4 °C)  Pulse:  [58-74] 66  Resp:  [17-25] 18  SpO2:  [99 %-100 %] 100 %  BP: ()/() 123/58  Arterial Line BP: (104-347)/() 119/53     Weight: 72 kg (158 lb 11.7 oz)  Body mass index is 26.41 kg/m².      Intake/Output Summary (Last 24 hours) at 10/30/2020 1041  Last data filed at 10/30/2020 0900  Gross per 24 hour   Intake 3500.28 ml   Output 4236 ml   Net -735.72 ml       Physical Exam  Vitals signs and nursing note reviewed.   Constitutional:       Appearance: She is not ill-appearing.   HENT:      Head: Normocephalic and atraumatic.   Eyes:      Conjunctiva/sclera: Conjunctivae normal.   Neck:      Musculoskeletal: Normal range of motion and neck supple.   Cardiovascular:      Rate and Rhythm: Normal rate and regular rhythm.      Comments: Midline sternotomy with clean, dry, and intact, without evidence of infection  Prior chest tubes sites with dressings in place, clean and dry    Pulmonary:      Comments: Intubated  Hematoma on the R chest, not actively bleeding   Abdominal:      General: Abdomen is flat.      Palpations: Abdomen is soft.      Comments: g tube in place     Skin:     General: Skin is warm and dry.   Neurological:      Comments: Sedated         Vents:  Vent Mode: A/C (10/30/20 0820)  Ventilator Initiated: Yes (10/28/20 1840)  Set Rate: 16 BPM (10/30/20 0820)  Vt Set: 390 mL (10/30/20 0820)  Pressure  Support: 5 cmH20 (10/28/20 1409)  PEEP/CPAP: 5 cmH20 (10/30/20 0820)  Oxygen Concentration (%): 60 (10/30/20 0915)  Peak Airway Pressure: 34 cmH2O (10/30/20 0820)  Plateau Pressure: 31 cmH20 (10/30/20 0820)  Total Ve: 7.87 mL (10/30/20 0820)  Negative Inspiratory Force (cm H2O): -21 (10/08/20 1452)  F/VT Ratio<105 (RSBI): (!) 39.43 (10/29/20 2033)    Lines/Drains/Airways     Central Venous Catheter Line            Trialysis (Dialysis) Catheter 10/27/20 1400 right internal jugular 2 days          Drain                 Urethral Catheter 10/14/20 1500 15 days         Gastrostomy/Enterostomy 10/21/20 1304 Percutaneous endoscopic gastrostomy (PEG) feeding 8 days          Airway                 Airway - Non-Surgical 10/28/20 1835 Endotracheal Tube 1 day          Arterial Line            Arterial Line 10/27/20 1500 Right Radial 2 days          Peripheral Intravenous Line                 Midline Catheter Insertion/Assessment  - Single Lumen 10/19/20 1654 brachial vein 18g x 10cm 10 days         Peripheral IV - Single Lumen 10/27/20 1910 20 G Left Antecubital 2 days                Significant Labs:    CBC/Anemia Profile:  Recent Labs   Lab 10/29/20  0818 10/29/20  1417 10/30/20  0309 10/30/20  0634   WBC  --  13.95* 10.99 10.99   HGB  --  9.2* 8.5* 8.2*   HCT  --  29.3* 26.1* 26.4*   PLT  --  136* 80* 75*   MCV  --  96 95 96   RDW  --  16.3* 16.9* 16.9*   FOLATE 9.2  --   --   --    [id number] 1207*  --   --   --         Chemistries:  Recent Labs   Lab 10/29/20  1417 10/29/20  2132 10/30/20  0348    138 136  136   K 4.3 4.3 4.3  4.3    105 104  104   CO2 22* 20* 22*  23   BUN 53* 56* 55*  57*   CREATININE 2.1* 2.1* 2.1*  2.2*   CALCIUM 7.9* 8.0* 7.8*  7.6*   ALBUMIN 1.5* 1.6* 1.5*   MG 2.3 2.4 2.3  2.2   PHOS 4.7* 4.8* 4.8*  4.9*       CMP:   Recent Labs   Lab 10/29/20  1417 10/29/20  2132 10/30/20  0348    138 136  136   K 4.3 4.3 4.3  4.3    105 104  104   CO2 22* 20* 22*  23   GLU 78  85 90  94   BUN 53* 56* 55*  57*   CREATININE 2.1* 2.1* 2.1*  2.2*   CALCIUM 7.9* 8.0* 7.8*  7.6*   ALBUMIN 1.5* 1.6* 1.5*   ANIONGAP 11 13 10  9   EGFRNONAA 22.5* 22.5* 22.5*  21.3*     Coagulation:   Recent Labs   Lab 10/30/20  0348   INR 1.3*   APTT 41.5*       POCT Glucose:   Recent Labs   Lab 10/30/20  0315 10/30/20  0632 10/30/20  0802   POCTGLUCOSE 86 94 93       Significant Imaging:  CXR: I have reviewed all pertinent results/findings within the past 24 hours and my personal findings are:    FINDINGS:  Endotracheal tube tip lies just superior to the apex of the aortic arch, well above the anjel.  No significant interval change in the appearance of the chest since 10/29/2020 is appreciated, allowing for differences in patient positioning, with the volume of pleural fluid on the right appearing stable.  No pneumothorax.

## 2020-10-30 NOTE — PLAN OF CARE
SW is following this Pt for DC planning needs. There are no identified needs at this time. Discharge Disposition: LTAC - pending patient progress; possible trach/peg    SW will continue to coordinate with patient, family, team and insurance to complete patient's discharge plan.    Esthela Peralta LMSW   - Case Management

## 2020-10-30 NOTE — PROGRESS NOTES
patient seen at the bedside with the critical care team. She remains intubated. Given her profound deconditioning and debility, I believe a tracheostomy is appropriate at this time. We will ask critical care surgery to address that. I spoke with one of her sons yesterday, but there was no family in the room this morning.

## 2020-10-30 NOTE — PROGRESS NOTES
[facility]  Nephrology  Progress Note    Patient Name: Fatou Lorenzo  MRN: [id number]  Admission Date: 8/30/2020  Hospital Length of Stay: 60 days  Attending Provider: Antoni Waddell MD   Primary Care Physician: Ludin Rivas MD  Principal Problem:Leukocytosis    Subjective:     HPI: Fatou Lorenzo is a 76 yo F who had MVR + TVR to 9/9 that was complicated by RV hematoma and subsequent pericardial effusion.  She has been in the hospital since 8/31/2020 and had a complex hospital course complicated by a significant JONAH 9/18. She then developed pneumonia. He JONAH improved to baseline creat of 0.8-1 9/24. She required vats 10/5 procedure for empyema. She never had resolution of leukocytosis and has been on vanc and cefepime. On 10/14 her creat began to increase again in conjunction with elevated vancomycin levels. We could not find any episode of hypotension or afib w/ rvr around that time.  Her creat has been increasing daily since 10/14 and urine output has been decreasing for last 2 days. Nephrology consulted for JONAH.    Interval History: Seen at the bedside no event overnight       Review of patient's allergies indicates:  No Known Allergies  Current Facility-Administered Medications   Medication Frequency    0.9%  NaCl infusion (CRRT USE ONLY) Continuous    0.9%  NaCl infusion (for blood administration) Q24H PRN    0.9%  NaCl infusion (for blood administration) Q24H PRN    acetaminophen oral solution 650 mg Q6H PRN    albuterol-ipratropium 2.5 mg-0.5 mg/3 mL nebulizer solution 3 mL Q6H WAKE    aspirin chewable tablet 81 mg Daily    calcium acetate(phosphat bind) capsule 2,001 mg TID WM    ceFEPIme injection 1 g Q24H    dextrose 50% injection 12.5 g PRN    dextrose 50% injection 25 g PRN    escitalopram oxalate tablet 20 mg Daily    famotidine tablet 20 mg Daily    glucagon (human recombinant) injection 1 mg PRN    heparin (porcine) injection 5,000 Units Q8H    insulin aspart  U-100 pen 0-5 Units Q6H PRN    labetalol 20 mg/4 mL (5 mg/mL) IV syring Q6H PRN    lidocaine HCL 2% jelly PRN    magnesium sulfate 2g in water 50mL IVPB (premix) PRN    melatonin tablet 6 mg Nightly    midodrine tablet 5 mg TID    mirtazapine tablet 7.5 mg QHS    morphine injection 1 mg Q6H PRN    multivitamin tablet Daily    norepinephrine 4 mg in dextrose 5% 250 mL infusion (premix) (titrating) Continuous    ondansetron injection 4 mg Q6H PRN    propofol (DIPRIVAN) 10 mg/mL infusion Continuous    sodium chloride 3% nebulizer solution 4 mL Q6H WAKE    sodium phosphate 20.01 mmol in dextrose 5 % 250 mL IVPB PRN    sodium phosphate 30 mmol in dextrose 5 % 250 mL IVPB PRN    sodium phosphate 39.99 mmol in dextrose 5 % 250 mL IVPB PRN    thiamine tablet 100 mg Daily       Objective:     Vital Signs (Most Recent):  Temp: 97.6 °F (36.4 °C) (10/30/20 0702)  Pulse: 68 (10/30/20 0826)  Resp: 18 (10/30/20 0826)  BP: (!) 104/51 (10/30/20 0820)  SpO2: 100 % (10/30/20 0826)  O2 Device (Oxygen Therapy): ventilator (10/30/20 0820) Vital Signs (24h Range):  Temp:  [97.5 °F (36.4 °C)-98.9 °F (37.2 °C)] 97.6 °F (36.4 °C)  Pulse:  [58-74] 68  Resp:  [17-25] 18  SpO2:  [99 %-100 %] 100 %  BP: ()/() 104/51  Arterial Line BP: (104-347)/() 117/51     Weight: 72 kg (158 lb 11.7 oz) (10/22/20 0500)  Body mass index is 26.41 kg/m².  Body surface area is 1.82 meters squared.    I/O last 3 completed shifts:  In: 3847.3 [I.V.:2397.3; Blood:250; NG/GT:1200]  Out: 3904 [Urine:295; Other:3609]    Physical Exam  Vitals signs and nursing note reviewed.   Constitutional:       Appearance: She is well-developed. She is ill-appearing. She is not diaphoretic.   HENT:      Head: Normocephalic and atraumatic.   Eyes:      Conjunctiva/sclera: Conjunctivae normal.   Neck:      Musculoskeletal: Normal range of motion and neck supple.   Cardiovascular:      Rate and Rhythm: Normal rate and regular rhythm.      Comments:  Midline sternotomy with clean, dry, and intact, without evidence of infection  Prior chest tubes sites with dressings in place, clean and dry  Pulmonary:      Comments: Intubated  Hematoma on the R chest, not actively bleeding   Abdominal:      General: There is no distension.      Palpations: Abdomen is soft.      Tenderness: There is no abdominal tenderness.      Comments: g tube in place    Skin:     General: Skin is warm and dry.   Psychiatric:      Comments: Depressed mood, flat affect         Significant Labs:  BMP:   Recent Labs   Lab 10/30/20  0348   GLU 90  94     104   CO2 22*  23   BUN 55*  57*   CREATININE 2.1*  2.2*   CALCIUM 7.8*  7.6*   MG 2.3  2.2     All labs within the past 24 hours have been reviewed.     Significant Imaging:  bmp    Assessment/Plan:     JONAH (acute kidney injury)  -non oliguric kdigo stage 2 jonah likely multifactorial ATN from vancomycin toxicity, infection, low effective arterial blood volume  -ua with spec grav 1010, prot 1+, blood 3+, Sang 30  -urine microscopy with frequent muddy brown casts  -kidney US and urine lytes seen   - Cr/Bun trending up pt is oliguric ,s/p SLED ,s/p SCUF for 12 hours  10/29/2020   - will have one round of SLED today   - BMP Q 8   - keep hgb>7  - Strict I/O and chart  - Avoid nephrotoxic medications, NSAIDs, IV contrast, etc.  - Daily weights and chart  - Medication doses adjusted to GFR  - Maintain MAP > 65  - Hb > 7 gm/dL  - Will follow closely      Severe mitral regurgitation  -per primary      Thank you for your consult. I will follow-up with patient. Please contact us if you have any additional questions.    Anisa Teran MD  Nephrology  [facility]

## 2020-10-30 NOTE — PROGRESS NOTES
Pt clotted off on CRRT 2x this shift. Unable to rinse back both times after multiple attempts. MD Romeo notified. CBC obtained. MD notified of results. Dialysis called to restart pt on CRRT.

## 2020-10-30 NOTE — ASSESSMENT & PLAN NOTE
-non oliguric kdigo stage 2 [place] likely multifactorial ATN from vancomycin toxicity, infection, low effective arterial blood volume  -ua with spec grav 1010, prot 1+, blood 3+, Sang 30  -urine microscopy with frequent muddy brown casts  -kidney US and urine lytes seen   - Cr/Bun trending up pt is oliguric ,s/p SLED ,s/p SCUF for 12 hours  10/29/2020   - will have one round of SLED today   - BMP Q 8   - keep hgb>7  - Strict I/O and chart  - Avoid nephrotoxic medications, NSAIDs, IV contrast, etc.  - Daily weights and chart  - Medication doses adjusted to GFR  - Maintain MAP > 65  - Hb > 7 gm/dL  - Will follow closely

## 2020-10-31 NOTE — PROGRESS NOTES
CRRT machine changed per protocol. Pt restarted primary nurse notified. Family at beside pt tolerating well.

## 2020-10-31 NOTE — ASSESSMENT & PLAN NOTE
Fatou Lorenzo is a 75 y.o. female s/p MVr, TVr 9/9/2020. Case noteworthy for multiple pump runs (3) and RV hematoma due to retraction. Unstable 9/18, required pericardial window for evac of posterior cardiac tamponade. Respiratory distress and so re-admitted to SICU on 10/2 now s/p VATS on 10/5. Readmitted on 10/27/20 for respiratory failure.      Neuro:  - Intubated and Sedated; propofol    - PRN liquid tylenol and meds through g tube       CV:  S/p MVr, TVr, MAZE 9/9/20. S/p bedside pericardial window 9/18  - Keep MAPs >60. HDS off pressors   - Holding metop for now - labetalol IV PRN for SBP > 160  - Holding heparin, drop in H/H     Pulm:   - Loculated pleural effusion s/p VATS on 10/5  - Intubated 10/27/20  - Scheduled duo and saline nebs  - CXR and ABG PRN  - HOB >30 deg  - Continue antibiotics until 11/5  - Patient will need a trach --> discussing with family today  - Daily CXR     Renal:  - DC vivas   - BUN/Cr  -->150/4.0--> 40/1.5--> 52/2.0 --> 57/2.2  - Continue to monitor UOP - minimal   - Nephrology following   - Continue CRRT         FEN/GI:  - Do not replace lytes   - Tf through g tube   - FW flushes to 400 Q8    - Famotidine and bowel regimen      Heme/Onc:  - H/H decreased to 8.2 (9.2)  - No evidence of bleeding  - Heparin stopped      ID:   - WBC 9.12  - Afebrile  - S/p 7 days of cefepime vanc for enterococcus cloacae UTI. Also has e coli growing from pleural cultures.  - Follow ID recommendations pending      - Started on Cefepime fluconazole re-started 10/14/20. Candida on BAL     - Current antibiotics Cefepime (1 month 11/5)  - BC: No growth to date      Endo:  - no hx of DM  - ISS     PPx:  - famotidine, SCD,      Dispo: SICU    Plan was discussed with son, questions were answered.

## 2020-10-31 NOTE — PROGRESS NOTES
CT surgery progress note     COSME overnight  Remains intubated      I/O  3017/3395 net: -378  uop 30  CRRT 3365     A/P  75F s/p MV repair, TV repair, and MAZE w/ ALISA resection on 09/09/20. Post op course complicated by a multiloculated R pleural effusion for which she had a R VATS w/ decortication on 10/05/20. PEG placed 10/21/20.       ACS consult for trach  Continue fluid removal per renal   Continue enteral feeds  IV abx for E. Coli in pleural fluid, AF. Monitor WBC#  dvt ppx: sqh  Gi ppx: pepcid  Family discussion pending re tracheostomy placement    Cont CSU,

## 2020-10-31 NOTE — PROGRESS NOTES
Nephrology Progress Note    Patient followed for anuric JONAH  Patient was seen on SLED which was running for uremic toxin clearance / UF for volume management.  Wt Readings from Last 3 Encounters:   10/22/20 72 kg (158 lb 11.7 oz)   09/02/20 77.2 kg (170 lb 3.1 oz)   08/26/20 80.7 kg (177 lb 14.6 oz)     Temp Readings from Last 3 Encounters:   10/31/20 98.4 °F (36.9 °C) (Oral)   08/27/20 97.5 °F (36.4 °C) (Oral)   02/21/20 (!) 101.2 °F (38.4 °C)     BP Readings from Last 3 Encounters:   10/31/20 (!) 100/57   08/27/20 117/65   02/21/20 129/84     Pulse Readings from Last 3 Encounters:   10/31/20 75   08/27/20 75   02/21/20 (!) 117       CMP  Sodium   Date Value Ref Range Status   10/31/2020 137 136 - 145 mmol/L Final     Potassium   Date Value Ref Range Status   10/31/2020 4.5 3.5 - 5.1 mmol/L Final     Chloride   Date Value Ref Range Status   10/31/2020 106 95 - 110 mmol/L Final     CO2   Date Value Ref Range Status   10/31/2020 23 23 - 29 mmol/L Final     Glucose   Date Value Ref Range Status   10/31/2020 109 70 - 110 mg/dL Final     BUN   Date Value Ref Range Status   10/31/2020 5 (L) 8 - 23 mg/dL Final     Creatinine   Date Value Ref Range Status   10/31/2020 0.4 (L) 0.5 - 1.4 mg/dL Final     Calcium   Date Value Ref Range Status   10/31/2020 7.3 (L) 8.7 - 10.5 mg/dL Final     Total Protein   Date Value Ref Range Status   10/27/2020 4.9 (L) 6.0 - 8.4 g/dL Final     Albumin   Date Value Ref Range Status   10/31/2020 1.4 (L) 3.5 - 5.2 g/dL Final     Total Bilirubin   Date Value Ref Range Status   10/27/2020 0.4 0.1 - 1.0 mg/dL Final     Comment:     For infants and newborns, interpretation of results should be based  on gestational age, weight and in agreement with clinical  observations.  Premature Infant recommended reference ranges:  Up to 24 hours.............<8.0 mg/dL  Up to 48 hours............<12.0 mg/dL  3-5 days..................<15.0 mg/dL  6-29 days.................<15.0 mg/dL       Alkaline Phosphatase    Date Value Ref Range Status   10/27/2020 163 (H) 55 - 135 U/L Final     AST   Date Value Ref Range Status   10/27/2020 34 10 - 40 U/L Final     ALT   Date Value Ref Range Status   10/27/2020 32 10 - 44 U/L Final     Anion Gap   Date Value Ref Range Status   10/31/2020 8 8 - 16 mmol/L Final     eGFR if    Date Value Ref Range Status   10/31/2020 >60.0 >60 mL/min/1.73 m^2 Final     eGFR if non    Date Value Ref Range Status   10/31/2020 >60.0 >60 mL/min/1.73 m^2 Final     Comment:     Calculation used to obtain the estimated glomerular filtration  rate (eGFR) is the CKD-EPI equation.          CBC  Lab Results   Component Value Date    WBC 9.12 10/31/2020    HGB 7.7 (L) 10/31/2020    HCT 24.8 (L) 10/31/2020    MCV 96 10/31/2020    PLT 71 (L) 10/31/2020         Intake/Output Summary (Last 24 hours) at 10/31/2020 1616  Last data filed at 10/31/2020 1611  Gross per 24 hour   Intake 2767 ml   Output 5936 ml   Net -3169 ml       Plan:   - Will continue SLED for volume and toxin clearance.  Started on Citrate and Ca gluconate yesterday 2/2 to multiple clotting/sluggish ports. Currently seen on SLED without interruptions reported.   - iCa 1.11. Corrected Ca 9.4 mg/dl.   - SLED prescription and dialysate baths were reviewed and changes made according to most recent labs.   - -350 cc/hr  as tolerated.   - Follow labs serially while on SLED to monitor electrolytes and follow replacement protocol as needed.   - Replenish Phosphorus   - iCa q 8 hrs        Iftikhar Trujillo MD  Nephrology Fellow  [facility]

## 2020-10-31 NOTE — SUBJECTIVE & OBJECTIVE
Interval History/Significant Events:   NAEON.    Adequate response to CRRT   Minimal UOP  H/H dropped 8.2 --> 7.7.   Family meeting today to discuss trach.     Follow-up For: Procedure(s) (LRB):  INSERTION, PEG TUBE (N/A)    Post-Operative Day: 9 Days Post-Op    Objective:     Vital Signs (Most Recent):  Temp: 98.4 °F (36.9 °C) (10/31/20 1200)  Pulse: 76 (10/31/20 1400)  Resp: (!) 24 (10/31/20 0757)  BP: (!) 100/57 (10/31/20 1400)  SpO2: 100 % (10/31/20 1400) Vital Signs (24h Range):  Temp:  [97.8 °F (36.6 °C)-98.6 °F (37 °C)] 98.4 °F (36.9 °C)  Pulse:  [66-85] 76  Resp:  [16-24] 24  SpO2:  [100 %] 100 %  BP: ()/(50-82) 100/57  Arterial Line BP: ()/(46-72) 110/52     Weight: 72 kg (158 lb 11.7 oz)  Body mass index is 26.41 kg/m².      Intake/Output Summary (Last 24 hours) at 10/31/2020 1432  Last data filed at 10/31/2020 1400  Gross per 24 hour   Intake 3117 ml   Output 5635 ml   Net -2518 ml       Physical Exam  Vitals signs and nursing note reviewed.   Constitutional:       Appearance: She is not ill-appearing.   HENT:      Head: Normocephalic and atraumatic.   Eyes:      Conjunctiva/sclera: Conjunctivae normal.   Neck:      Musculoskeletal: Normal range of motion and neck supple.   Cardiovascular:      Rate and Rhythm: Normal rate and regular rhythm.      Comments: Midline sternotomy with clean, dry, and intact, without evidence of infection  Prior chest tubes sites with dressings in place, clean and dry    Pulmonary:      Comments: Intubated  Hematoma on the R chest, not actively bleeding   Abdominal:      General: Abdomen is flat.      Palpations: Abdomen is soft.      Comments: g tube in place     Skin:     General: Skin is warm and dry.   Neurological:      Comments: Sedated         Vents:  Vent Mode: A/C (10/31/20 1134)  Ventilator Initiated: Yes (10/28/20 1840)  Set Rate: 16 BPM (10/31/20 1134)  Vt Set: 390 mL (10/31/20 1134)  Pressure Support: 5 cmH20 (10/28/20 1409)  PEEP/CPAP: 5 cmH20  (10/31/20 1134)  Oxygen Concentration (%): 60 (10/31/20 1400)  Peak Airway Pressure: 31 cmH2O (10/31/20 1134)  Plateau Pressure: 31 cmH20 (10/31/20 1134)  Total Ve: 5.84 mL (10/31/20 1134)  Negative Inspiratory Force (cm H2O): -21 (10/08/20 1452)  F/VT Ratio<105 (RSBI): 1142.86 (10/31/20 0757)    Lines/Drains/Airways     Central Venous Catheter Line            Trialysis (Dialysis) Catheter 10/27/20 1400 right internal jugular 4 days          Drain                 Gastrostomy/Enterostomy 10/21/20 1304 Percutaneous endoscopic gastrostomy (PEG) feeding 10 days          Airway                 Airway - Non-Surgical 10/28/20 1835 Endotracheal Tube 2 days          Arterial Line            Arterial Line 10/27/20 1500 Right Radial 3 days          Peripheral Intravenous Line                 Midline Catheter Insertion/Assessment  - Single Lumen 10/19/20 1654 brachial vein 18g x 10cm 11 days         Peripheral IV - Single Lumen 10/27/20 1910 20 G Left Antecubital 3 days                Significant Labs:    CBC/Anemia Profile:  Recent Labs   Lab 10/30/20  0309 10/30/20  0634 10/31/20  0240   WBC 10.99 10.99 9.12   HGB 8.5* 8.2* 7.7*   HCT 26.1* 26.4* 24.8*   PLT 80* 75* 71*   MCV 95 96 96   RDW 16.9* 16.9* 17.1*        Chemistries:  Recent Labs   Lab 10/30/20  0348 10/30/20  1437 10/31/20  0240     136 135* 138  138   K 4.3  4.3 4.3 4.4  4.4     104 104 106  106   CO2 22*  23 23 23  23   BUN 55*  57* 63* 21  21   CREATININE 2.1*  2.2* 2.1* 0.8  0.8   CALCIUM 7.8*  7.6* 7.8* 7.5*  7.5*   ALBUMIN 1.5* 1.3* 1.4*   MG 2.3  2.2 2.2 1.9   PHOS 4.8*  4.9* 4.4 1.8*  1.8*       CMP:   Recent Labs   Lab 10/30/20  0348 10/30/20  1437 10/31/20  0240     136 135* 138  138   K 4.3  4.3 4.3 4.4  4.4     104 104 106  106   CO2 22*  23 23 23  23   GLU 90  94 97 106  106   BUN 55*  57* 63* 21  21   CREATININE 2.1*  2.2* 2.1* 0.8  0.8   CALCIUM 7.8*  7.6* 7.8* 7.5*  7.5*   ALBUMIN 1.5* 1.3*  1.4*   ANIONGAP 10  9 8 9  9   EGFRNONAA 22.5*  21.3* 22.5* >60.0  >60.0     Coagulation:   Recent Labs   Lab 10/31/20  0240   INR 1.2   APTT 34.6*       POCT Glucose:   Recent Labs   Lab 10/30/20  1241 10/31/20  0239 10/31/20  1012   POCTGLUCOSE 87 119* 119*       Significant Imaging:  CXR: I have reviewed all pertinent results/findings within the past 24 hours and my personal findings are:    FINDINGS:  Endotracheal tube tip lies just superior to the apex of the aortic arch, well above the anjel.  No significant interval change in the appearance of the chest since 10/29/2020 is appreciated, allowing for differences in patient positioning, with the volume of pleural fluid on the right appearing stable.  No pneumothorax.

## 2020-10-31 NOTE — PROGRESS NOTES
[facility]  Critical Care - Surgery  Progress Note    Patient Name: Fatou Lorenzo  MRN: [id number]  Admission Date: 8/30/2020  Hospital Length of Stay: 61 days  Code Status: Full Code  Attending Provider: Antoni Waddell MD  Primary Care Provider: Ludin Rivas MD   Principal Problem: Leukocytosis    Subjective:     Hospital/ICU Course:  No notes on file    Interval History/Significant Events:   NAEON.    Adequate response to CRRT   Minimal UOP  H/H dropped 8.2 --> 7.7.   Family meeting today to discuss trach.     Follow-up For: Procedure(s) (LRB):  INSERTION, PEG TUBE (N/A)    Post-Operative Day: 9 Days Post-Op    Objective:     Vital Signs (Most Recent):  Temp: 98.4 °F (36.9 °C) (10/31/20 1200)  Pulse: 76 (10/31/20 1400)  Resp: (!) 24 (10/31/20 0757)  BP: (!) 100/57 (10/31/20 1400)  SpO2: 100 % (10/31/20 1400) Vital Signs (24h Range):  Temp:  [97.8 °F (36.6 °C)-98.6 °F (37 °C)] 98.4 °F (36.9 °C)  Pulse:  [66-85] 76  Resp:  [16-24] 24  SpO2:  [100 %] 100 %  BP: ()/(50-82) 100/57  Arterial Line BP: ()/(46-72) 110/52     Weight: 72 kg (158 lb 11.7 oz)  Body mass index is 26.41 kg/m².      Intake/Output Summary (Last 24 hours) at 10/31/2020 1432  Last data filed at 10/31/2020 1400  Gross per 24 hour   Intake 3117 ml   Output 5635 ml   Net -2518 ml       Physical Exam  Vitals signs and nursing note reviewed.   Constitutional:       Appearance: She is not ill-appearing.   HENT:      Head: Normocephalic and atraumatic.   Eyes:      Conjunctiva/sclera: Conjunctivae normal.   Neck:      Musculoskeletal: Normal range of motion and neck supple.   Cardiovascular:      Rate and Rhythm: Normal rate and regular rhythm.      Comments: Midline sternotomy with clean, dry, and intact, without evidence of infection  Prior chest tubes sites with dressings in place, clean and dry    Pulmonary:      Comments: Intubated  Hematoma on the R chest, not actively bleeding   Abdominal:      General: Abdomen  is flat.      Palpations: Abdomen is soft.      Comments: g tube in place     Skin:     General: Skin is warm and dry.   Neurological:      Comments: Sedated         Vents:  Vent Mode: A/C (10/31/20 1134)  Ventilator Initiated: Yes (10/28/20 1840)  Set Rate: 16 BPM (10/31/20 1134)  Vt Set: 390 mL (10/31/20 1134)  Pressure Support: 5 cmH20 (10/28/20 1409)  PEEP/CPAP: 5 cmH20 (10/31/20 1134)  Oxygen Concentration (%): 60 (10/31/20 1400)  Peak Airway Pressure: 31 cmH2O (10/31/20 1134)  Plateau Pressure: 31 cmH20 (10/31/20 1134)  Total Ve: 5.84 mL (10/31/20 1134)  Negative Inspiratory Force (cm H2O): -21 (10/08/20 1452)  F/VT Ratio<105 (RSBI): 1142.86 (10/31/20 0757)    Lines/Drains/Airways     Central Venous Catheter Line            Trialysis (Dialysis) Catheter 10/27/20 1400 right internal jugular 4 days          Drain                 Gastrostomy/Enterostomy 10/21/20 1304 Percutaneous endoscopic gastrostomy (PEG) feeding 10 days          Airway                 Airway - Non-Surgical 10/28/20 1835 Endotracheal Tube 2 days          Arterial Line            Arterial Line 10/27/20 1500 Right Radial 3 days          Peripheral Intravenous Line                 Midline Catheter Insertion/Assessment  - Single Lumen 10/19/20 1654 brachial vein 18g x 10cm 11 days         Peripheral IV - Single Lumen 10/27/20 1910 20 G Left Antecubital 3 days                Significant Labs:    CBC/Anemia Profile:  Recent Labs   Lab 10/30/20  0309 10/30/20  0634 10/31/20  0240   WBC 10.99 10.99 9.12   HGB 8.5* 8.2* 7.7*   HCT 26.1* 26.4* 24.8*   PLT 80* 75* 71*   MCV 95 96 96   RDW 16.9* 16.9* 17.1*        Chemistries:  Recent Labs   Lab 10/30/20  0348 10/30/20  1437 10/31/20  0240     136 135* 138  138   K 4.3  4.3 4.3 4.4  4.4     104 104 106  106   CO2 22*  23 23 23  23   BUN 55*  57* 63* 21  21   CREATININE 2.1*  2.2* 2.1* 0.8  0.8   CALCIUM 7.8*  7.6* 7.8* 7.5*  7.5*   ALBUMIN 1.5* 1.3* 1.4*   MG 2.3  2.2 2.2 1.9    PHOS 4.8*  4.9* 4.4 1.8*  1.8*       CMP:   Recent Labs   Lab 10/30/20  0348 10/30/20  1437 10/31/20  0240     136 135* 138  138   K 4.3  4.3 4.3 4.4  4.4     104 104 106  106   CO2 22*  23 23 23  23   GLU 90  94 97 106  106   BUN 55*  57* 63* 21  21   CREATININE 2.1*  2.2* 2.1* 0.8  0.8   CALCIUM 7.8*  7.6* 7.8* 7.5*  7.5*   ALBUMIN 1.5* 1.3* 1.4*   ANIONGAP 10  9 8 9  9   EGFRNONAA 22.5*  21.3* 22.5* >60.0  >60.0     Coagulation:   Recent Labs   Lab 10/31/20  0240   INR 1.2   APTT 34.6*       POCT Glucose:   Recent Labs   Lab 10/30/20  1241 10/31/20  0239 10/31/20  1012   POCTGLUCOSE 87 119* 119*       Significant Imaging:  CXR: I have reviewed all pertinent results/findings within the past 24 hours and my personal findings are:    FINDINGS:  Endotracheal tube tip lies just superior to the apex of the aortic arch, well above the anjel.  No significant interval change in the appearance of the chest since 10/29/2020 is appreciated, allowing for differences in patient positioning, with the volume of pleural fluid on the right appearing stable.  No pneumothorax.    Assessment/Plan:     Severe mitral regurgitation  Fatou Lorenzo is a 75 y.o. female s/p MVr, TVr 9/9/2020. Case noteworthy for multiple pump runs (3) and RV hematoma due to retraction. Unstable 9/18, required pericardial window for evac of posterior cardiac tamponade. Respiratory distress and so re-admitted to SICU on 10/2 now s/p VATS on 10/5. Readmitted on 10/27/20 for respiratory failure.      Neuro:  - Intubated and Sedated; propofol    - PRN liquid tylenol and meds through g tube       CV:  S/p MVr, TVr, MAZE 9/9/20. S/p bedside pericardial window 9/18  - Keep MAPs >60. HDS off pressors   - Holding metop for now - labetalol IV PRN for SBP > 160  - Holding heparin, drop in H/H     Pulm:   - Loculated pleural effusion s/p VATS on 10/5  - Intubated 10/27/20  - Scheduled duo and saline nebs  - CXR and ABG PRN  - HOB  >30 deg  - Continue antibiotics until 11/5  - Patient will need a trach --> discussing with family today  - Daily CXR     Renal:  - DC vivas   - BUN/Cr  -->150/4.0--> 40/1.5--> 52/2.0 --> 57/2.2  - Continue to monitor UOP - minimal   - Nephrology following   - Continue CRRT         FEN/GI:  - Do not replace lytes   - Tf through g tube   - FW flushes to 400 Q8    - Famotidine and bowel regimen      Heme/Onc:  - H/H decreased to 8.2 (9.2)  - No evidence of bleeding  - Heparin stopped      ID:   - WBC 9.12  - Afebrile  - S/p 7 days of cefepime vanc for enterococcus cloacae UTI. Also has e coli growing from pleural cultures.  - Follow ID recommendations pending      - Started on Cefepime fluconazole re-started 10/14/20. Candida on BAL     - Current antibiotics Cefepime (1 month 11/5)  - BC: No growth to date      Endo:  - no hx of DM  - ISS     PPx:  - famotidine, SCD,      Dispo: SICU    Plan was discussed with son, questions were answered.       Jacquelin Harris MD  Critical Care - Surgery  [facility]

## 2020-11-01 NOTE — SUBJECTIVE & OBJECTIVE
Interval History/Significant Events: NAEON. Ongoing conversations had with  and medical team regarding goals of care, potential tracheostomy placement.     Follow-up For: Procedure(s) (LRB):  INSERTION, PEG TUBE (N/A)    Post-Operative Day: 11 Days Post-Op    Objective:     Vital Signs (Most Recent):  Temp: 98.8 °F (37.1 °C) (11/01/20 1100)  Pulse: 94 (11/01/20 1215)  Resp: 20 (11/01/20 0833)  BP: (!) 103/51 (10/31/20 1900)  SpO2: 100 % (11/01/20 1215) Vital Signs (24h Range):  Temp:  [98.6 °F (37 °C)-98.9 °F (37.2 °C)] 98.8 °F (37.1 °C)  Pulse:  [] 94  Resp:  [16-21] 20  SpO2:  [99 %-100 %] 100 %  BP: ()/(49-58) 103/51  Arterial Line BP: ()/(45-65) 109/58     Weight: 70.4 kg (155 lb 3.3 oz)  Body mass index is 25.83 kg/m².      Intake/Output Summary (Last 24 hours) at 11/1/2020 1334  Last data filed at 11/1/2020 1200  Gross per 24 hour   Intake 7693.04 ml   Output 5976 ml   Net 1717.04 ml       Physical Exam  Vitals signs and nursing note reviewed.   HENT:      Head: Normocephalic and atraumatic.   Neck:      Comments: R. IJ trialysis   Cardiovascular:      Rate and Rhythm: Normal rate and regular rhythm.      Comments: Midline sternotomy with clean, dry, and intact, without evidence of infection  Prior chest tubes sites with dressings in place, clean and dry  Pulmonary:      Effort: Pulmonary effort is normal.      Comments: Intubated and ventilated  AC/VC  R 16    FiO2 40  PEEP 5  Abdominal:      General: Abdomen is flat.      Palpations: Abdomen is soft.      Comments: g tube in place, TF running @ 50 cc/hr    Neurological:      General: No focal deficit present.      Comments: Responds to questions and commands           Vents:  Vent Mode: A/C (11/01/20 1209)  Ventilator Initiated: Yes (10/28/20 1840)  Set Rate: 16 BPM (11/01/20 1209)  Vt Set: 390 mL (11/01/20 1209)  Pressure Support: 5 cmH20 (10/28/20 1409)  PEEP/CPAP: 5 cmH20 (11/01/20 1209)  Oxygen Concentration (%): 40  (11/01/20 1215)  Peak Airway Pressure: 26 cmH2O (11/01/20 1209)  Plateau Pressure: 31 cmH20 (11/01/20 1209)  Total Ve: 8.54 mL (11/01/20 1209)  Negative Inspiratory Force (cm H2O): -21 (10/08/20 1452)  F/VT Ratio<105 (RSBI): (!) 45.15 (11/01/20 0833)    Lines/Drains/Airways     Central Venous Catheter Line            Trialysis (Dialysis) Catheter 10/27/20 1400 right internal jugular 5 days          Drain                 Gastrostomy/Enterostomy 10/21/20 1304 Percutaneous endoscopic gastrostomy (PEG) feeding 11 days          Airway                 Airway - Non-Surgical 10/28/20 1835 Endotracheal Tube 3 days          Arterial Line            Arterial Line 10/27/20 1500 Right Radial 4 days          Peripheral Intravenous Line                 Midline Catheter Insertion/Assessment  - Single Lumen 10/19/20 1654 brachial vein 18g x 10cm 12 days         Peripheral IV - Single Lumen 10/27/20 1910 20 G Left Antecubital 4 days                Significant Labs:    CBC/Anemia Profile:  Recent Labs   Lab 10/31/20  0240 11/01/20  0412   WBC 9.12 8.57   HGB 7.7* 6.9*   HCT 24.8* 23.0*   PLT 71* 86*   MCV 96 97   RDW 17.1* 17.9*        Chemistries:  Recent Labs   Lab 10/31/20  1420 10/31/20  2350 11/01/20  0412    138 140  141   K 4.5 4.1 4.6  4.6    106 107  107   CO2 23 26 28  27   BUN 5* 3* 3*  2*   CREATININE 0.4* 0.3* 0.3*  0.3*   CALCIUM 7.3* 7.4* 8.8  8.7   ALBUMIN 1.4* 1.2* 1.3*   MG 1.8 2.2 2.2  2.2   PHOS <1.0* 2.6* 1.2*  1.2*       ABGs:   Recent Labs   Lab 11/01/20  0400   PH 7.443   PCO2 39.1   HCO3 26.7   POCSATURATED 100   BE 3     CMP:   Recent Labs   Lab 10/31/20  1420 10/31/20  2350 11/01/20  0412    138 140  141   K 4.5 4.1 4.6  4.6    106 107  107   CO2 23 26 28  27    121* 117*  116*   BUN 5* 3* 3*  2*   CREATININE 0.4* 0.3* 0.3*  0.3*   CALCIUM 7.3* 7.4* 8.8  8.7   ALBUMIN 1.4* 1.2* 1.3*   ANIONGAP 8 6* 5*  7*   EGFRNONAA >60.0 >60.0 >60.0  >60.0     POCT Glucose:    Recent Labs   Lab 10/31/20  0239 10/31/20  1012 11/01/20  0750   POCTGLUCOSE 119* 119* 120*     All pertinent labs within the past 24 hours have been reviewed.    Significant Imaging:  I have reviewed all pertinent imaging results/findings within the past 24 hours.

## 2020-11-01 NOTE — PROGRESS NOTES
[facility]  Critical Care - Surgery  Progress Note    Patient Name: Fatou Lorenzo  MRN: [id number]  Admission Date: 8/30/2020  Hospital Length of Stay: 62 days  Code Status: Full Code  Attending Provider: Antoni Waddell MD  Primary Care Provider: Ludin Rivas MD   Principal Problem: Leukocytosis    Subjective:     Hospital/ICU Course:  No notes on file    Interval History/Significant Events: NAEON. Ongoing conversations had with  and medical team regarding goals of care, potential tracheostomy placement.     Follow-up For: Procedure(s) (LRB):  INSERTION, PEG TUBE (N/A)    Post-Operative Day: 11 Days Post-Op    Objective:     Vital Signs (Most Recent):  Temp: 98.8 °F (37.1 °C) (11/01/20 1100)  Pulse: 94 (11/01/20 1215)  Resp: 20 (11/01/20 0833)  BP: (!) 103/51 (10/31/20 1900)  SpO2: 100 % (11/01/20 1215) Vital Signs (24h Range):  Temp:  [98.6 °F (37 °C)-98.9 °F (37.2 °C)] 98.8 °F (37.1 °C)  Pulse:  [] 94  Resp:  [16-21] 20  SpO2:  [99 %-100 %] 100 %  BP: ()/(49-58) 103/51  Arterial Line BP: ()/(45-65) 109/58     Weight: 70.4 kg (155 lb 3.3 oz)  Body mass index is 25.83 kg/m².      Intake/Output Summary (Last 24 hours) at 11/1/2020 1334  Last data filed at 11/1/2020 1200  Gross per 24 hour   Intake 7693.04 ml   Output 5976 ml   Net 1717.04 ml       Physical Exam  Vitals signs and nursing note reviewed.   HENT:      Head: Normocephalic and atraumatic.   Neck:      Comments: R. IJ trialysis   Cardiovascular:      Rate and Rhythm: Normal rate and regular rhythm.      Comments: Midline sternotomy with clean, dry, and intact, without evidence of infection  Prior chest tubes sites with dressings in place, clean and dry  Pulmonary:      Effort: Pulmonary effort is normal.      Comments: Intubated and ventilated  AC/VC  R 16    FiO2 40  PEEP 5  Abdominal:      General: Abdomen is flat.      Palpations: Abdomen is soft.      Comments: g tube in place, TF running @ 50  cc/hr    Neurological:      General: No focal deficit present.      Comments: Responds to questions and commands           Vents:  Vent Mode: A/C (11/01/20 1209)  Ventilator Initiated: Yes (10/28/20 1840)  Set Rate: 16 BPM (11/01/20 1209)  Vt Set: 390 mL (11/01/20 1209)  Pressure Support: 5 cmH20 (10/28/20 1409)  PEEP/CPAP: 5 cmH20 (11/01/20 1209)  Oxygen Concentration (%): 40 (11/01/20 1215)  Peak Airway Pressure: 26 cmH2O (11/01/20 1209)  Plateau Pressure: 31 cmH20 (11/01/20 1209)  Total Ve: 8.54 mL (11/01/20 1209)  Negative Inspiratory Force (cm H2O): -21 (10/08/20 1452)  F/VT Ratio<105 (RSBI): (!) 45.15 (11/01/20 0833)    Lines/Drains/Airways     Central Venous Catheter Line            Trialysis (Dialysis) Catheter 10/27/20 1400 right internal jugular 5 days          Drain                 Gastrostomy/Enterostomy 10/21/20 1304 Percutaneous endoscopic gastrostomy (PEG) feeding 11 days          Airway                 Airway - Non-Surgical 10/28/20 1835 Endotracheal Tube 3 days          Arterial Line            Arterial Line 10/27/20 1500 Right Radial 4 days          Peripheral Intravenous Line                 Midline Catheter Insertion/Assessment  - Single Lumen 10/19/20 1654 brachial vein 18g x 10cm 12 days         Peripheral IV - Single Lumen 10/27/20 1910 20 G Left Antecubital 4 days                Significant Labs:    CBC/Anemia Profile:  Recent Labs   Lab 10/31/20  0240 11/01/20  0412   WBC 9.12 8.57   HGB 7.7* 6.9*   HCT 24.8* 23.0*   PLT 71* 86*   MCV 96 97   RDW 17.1* 17.9*        Chemistries:  Recent Labs   Lab 10/31/20  1420 10/31/20  2350 11/01/20  0412    138 140  141   K 4.5 4.1 4.6  4.6    106 107  107   CO2 23 26 28  27   BUN 5* 3* 3*  2*   CREATININE 0.4* 0.3* 0.3*  0.3*   CALCIUM 7.3* 7.4* 8.8  8.7   ALBUMIN 1.4* 1.2* 1.3*   MG 1.8 2.2 2.2  2.2   PHOS <1.0* 2.6* 1.2*  1.2*       ABGs:   Recent Labs   Lab 11/01/20  0400   PH 7.443   PCO2 39.1   HCO3 26.7   POCSATURATED 100   BE  3     CMP:   Recent Labs   Lab 10/31/20  1420 10/31/20  2350 11/01/20  0412    138 140  141   K 4.5 4.1 4.6  4.6    106 107  107   CO2 23 26 28  27    121* 117*  116*   BUN 5* 3* 3*  2*   CREATININE 0.4* 0.3* 0.3*  0.3*   CALCIUM 7.3* 7.4* 8.8  8.7   ALBUMIN 1.4* 1.2* 1.3*   ANIONGAP 8 6* 5*  7*   EGFRNONAA >60.0 >60.0 >60.0  >60.0     POCT Glucose:   Recent Labs   Lab 10/31/20  0239 10/31/20  1012 11/01/20  0750   POCTGLUCOSE 119* 119* 120*     All pertinent labs within the past 24 hours have been reviewed.    Significant Imaging:  I have reviewed all pertinent imaging results/findings within the past 24 hours.    Assessment/Plan:     Severe mitral regurgitation  Fatou Lorenzo is a 75 y.o. female s/p MVr, TVr 9/9/2020. Case noteworthy for multiple pump runs (3) and RV hematoma due to retraction. Unstable 9/18, required pericardial window for evac of posterior cardiac tamponade. Respiratory distress and so re-admitted to SICU on 10/2 now s/p VATS on 10/5. Readmitted on 10/27/20 for respiratory failure.      Neuro:  - Intubated and Sedated; propofol    - PRN liquid tylenol and meds through g tube   - dilaudid prn  - rousable and responds appropriately       CV:  S/p MVr, TVr, MAZE 9/9/20. S/p bedside pericardial window 9/18  - Keep MAPs >60. HDS off pressors   - Holding metop for now - labetalol IV PRN for SBP > 160  - midodrine 5 mg TID      Pulm:   - Loculated pleural effusion s/p VATS on 10/5  - Intubated 10/27/20  - Scheduled duo and saline nebs  - CXR and ABG PRN  - HOB >30 deg  - Continue antibiotics until 11/5  - Patient will need a trach --> discussing with family today  - Daily CXR  - minimal vent settings      Renal:  - BUN/Cr  -->150/4.0--> 40/1.5--> 52/2.0 --> 57/2.2  - Continue to monitor UOP - minimal   - Nephrology following   - Continue CRRT         FEN/GI:  - Do not replace lytes   - Tf through g tube @50 cc/hr  - FW flushes to 400 Q8    - Famotidine and bowel regimen       Heme/Onc:  - H/H decreased to 6.9 (7.9)  - No evidence of bleeding  - will discuss transfusion with attending        ID:   - WBC 8.5 (9.12)  - Afebrile  - S/p 7 days of cefepime vanc for enterococcus cloacae UTI. Also has e coli growing from pleural cultures.  - Follow ID recommendations pending      - Started on Cefepime fluconazole re-started 10/14/20. Candida on BAL     - Current antibiotics Cefepime (1 month 11/5)  - BC: No growth to date      Endo:  - no hx of DM  - ISS     PPx:  - famotidine, SCD,      Dispo: SICU    Plan was discussed with son, questions were answered.       Critical care was time spent personally by me on the following activities: development of treatment plan with patient or surrogate and bedside caregivers, discussions with consultants, evaluation of patient's response to treatment, examination of patient, ordering and performing treatments and interventions, ordering and review of laboratory studies, ordering and review of radiographic studies, pulse oximetry, re-evaluation of patient's condition.  This critical care time did not overlap with that of any other provider or involve time for any procedures.     Caitlin Lau MD  Critical Care - Surgery  [facility]

## 2020-11-01 NOTE — PLAN OF CARE
SICU PLAN OF CARE NOTE     Dx: Leukocytosis     Shift Events: VSS at this time. Afebrile. MAP >65. Patient in A Fib/NSR throughout shift. O2 sats >92% on ventilator settings AC/VC 40% 5 PEEP. Tube feeds at 50 cc/hr. Minimal residuals. H2O boluses given. Tolerating well. CRRT increased from running even to pulling fluid @ 1200. Plan of care reviewed with patient and family. Questions and concerns addressed. WCTM.      Goals of Care: MAP >60, SBP <180     Neuro: Arouses to Voice, Follows Commands     Respiratory: Ventilator      Diet: Tube Feeds     Gtts: Propofol     Urine Output: Anuric (Bladder scan showing 0 cc)/CRRT      Labs/Accuchecks: Daily labs. Accuchecks q 6 hours.      Skin: No skin breakdown noted. Foams applied to sacrum and heels. Patient turned every 2 hours. Bed plugged in and working. Patient on immerse bed. Heel boots in place.

## 2020-11-01 NOTE — ASSESSMENT & PLAN NOTE
Fatou Lorenzo is a 75 y.o. female s/p MVr, TVr 9/9/2020. Case noteworthy for multiple pump runs (3) and RV hematoma due to retraction. Unstable 9/18, required pericardial window for evac of posterior cardiac tamponade. Respiratory distress and so re-admitted to SICU on 10/2 now s/p VATS on 10/5. Readmitted on 10/27/20 for respiratory failure.      Neuro:  - Intubated and Sedated; propofol    - PRN liquid tylenol and meds through g tube   - dilaudid prn  - rousable and responds appropriately       CV:  S/p MVr, TVr, MAZE 9/9/20. S/p bedside pericardial window 9/18  - Keep MAPs >60. HDS off pressors   - Holding metop for now - labetalol IV PRN for SBP > 160  - midodrine 5 mg TID      Pulm:   - Loculated pleural effusion s/p VATS on 10/5  - Intubated 10/27/20  - Scheduled duo and saline nebs  - CXR and ABG PRN  - HOB >30 deg  - Continue antibiotics until 11/5  - Patient will need a trach --> discussing with family today  - Daily CXR  - minimal vent settings      Renal:  - BUN/Cr  -->150/4.0--> 40/1.5--> 52/2.0 --> 57/2.2  - Continue to monitor UOP - minimal   - Nephrology following   - Continue CRRT         FEN/GI:  - Do not replace lytes   - Tf through g tube @50 cc/hr  - FW flushes to 400 Q8    - Famotidine and bowel regimen      Heme/Onc:  - H/H decreased to 6.9 (7.9)  - No evidence of bleeding  - will discuss transfusion with attending        ID:   - WBC 8.5 (9.12)  - Afebrile  - S/p 7 days of cefepime vanc for enterococcus cloacae UTI. Also has e coli growing from pleural cultures.  - Follow ID recommendations pending      - Started on Cefepime fluconazole re-started 10/14/20. Candida on BAL     - Current antibiotics Cefepime (1 month 11/5)  - BC: No growth to date      Endo:  - no hx of DM  - ISS     PPx:  - famotidine, SCD,      Dispo: SICU    Plan was discussed with son, questions were answered.

## 2020-11-01 NOTE — PROGRESS NOTES
I have reviewed and concur with the resident's history, physical, assessment, and plan.  I have personally interviewed and examined the patient at bedside.  I have rounded with the primary service (the liver team).  I have reviewed the available laboratory values and images.    Please also refer to the note written by the resident today and link it to this note.    Subjectively the patient has had no significant events, but continues to make very slow progress.    Objective:    1.  Neurological status.  The patient is following commands and is moving all extremities.  Neurological evaluation is limited by the fact that the patient is ventilator dependent.    2.  Hemodynamically stable.  No significant changes in the last 24 hr.    3.  Ventilator dependent.  No attempts at weaning off the ventilator.  Meeting adequate oxygenation and ventilation.    4.  On renal replacement therapy.  Fluid balance is essentially equal.    5.  Lines, tubes, and drains reviewed and considered necessary for his care.  Suggest that the Yoo catheter be removed.    6.  Severe malnutrition/ICU acquired weakness/severe muscle loss.  Patient is on Peptamen intense.  I suggest that the patient meet at least 1.5 g per of protein per kg per day ideal body weight.  Of note, the patient is now receiving all micronutrients through enteral nutrition and does not need supplemental vitamins.    7.  Anemia of critical illness.  Also some modest thrombocytopenia.  May require blood transfusion in the next 24-48 hours.    8. Patient is receiving appropriate DVT and GI prophylaxis as per protocol.    9.  Lines, tubes, and drains reviewed and considered necessary for his care.    We are continuing to support the family through these very difficult times.  As discussed in my note yesterday, I suggest that a palliative care consult be done tomorrow and that a discussion of goals of care be had with the family.    I have spent 32 min critical time involved  in the management of this patient.  This has included a physical exam, review of laboratory values and images and discussions with the multidisciplinary team.

## 2020-11-01 NOTE — PROGRESS NOTES
Pharmacist Renal Dose Adjustment Note    Fatou Lorenzo is a 75 y.o. female being treated with the medication famotidine.    Patient Data:    Vital Signs (Most Recent):  Temp: 98.8 °F (37.1 °C) (11/01/20 1100)  Pulse: 94 (11/01/20 1215)  Resp: 20 (11/01/20 0833)  BP: (!) 103/51 (10/31/20 1900)  SpO2: 100 % (11/01/20 1215)   Vital Signs (72h Range):  Temp:  [97.5 °F (36.4 °C)-98.9 °F (37.2 °C)]   Pulse:  []   Resp:  [16-25]   BP: ()/()   SpO2:  [99 %-100 %]   Arterial Line BP: ()/()      Recent Labs   Lab 10/31/20  1420 10/31/20  2350 11/01/20  0412   CREATININE 0.4* 0.3* 0.3*  0.3*     Serum creatinine: 0.3 mg/dL (L) 11/01/20 0412  Estimated creatinine clearance: 159.6 mL/min (A)    Medication: Placed on continuous SLED 10/31 - will renally adjust famotidine to twice daily dosing. CTM for RRT plan changes and will adjust accordingly.     Pharmacist's Name: Tobin Capone  Pharmacist's Extension: [phone]

## 2020-11-01 NOTE — PROGRESS NOTES
CT surgery progress note     COSME overnight  Remains intubated      I/O  [id number]/[id number] net: +330  CRRT 7463     A/P  75F s/p MV repair, TV repair, and MAZE w/ ALISA resection on 09/09/20. Post op course complicated by a multiloculated R pleural effusion for which she had a R VATS w/ decortication on 10/05/20. PEG placed 10/21/20.      CRRT per renal, fluid even over 24 hours  Continue enteral feeds  IV abx for E. Coli in pleural fluid, AF. Monitor WBC#  dvt ppx: sqh  Gi ppx: pepcid  Family decision regarding tracheostomy placement pending   Cont CSU

## 2020-11-01 NOTE — PLAN OF CARE
VSS. Pt follows commands in all extremities during sedation vacation. Propofol gtt continued. CRRT continued. MAPs > 65. Family meeting with Dr. Troy to discuss pt's wishes, family decided against trach. POC reviewed with pt and family.

## 2020-11-01 NOTE — PROGRESS NOTES
Pt became hypertensive, more awake. Titrated up on Propofol gtt, no pain medication ordered. MD notified, orders for 0.5 Dilaudid. WCTM.

## 2020-11-01 NOTE — PROGRESS NOTES
Pharmacist Renal Dose Adjustment Note    Fatou Lorenzo is a 75 y.o. female being treated with the medication cefepime.    Patient Data:    Vital Signs (Most Recent):  Temp: 98.8 °F (37.1 °C) (11/01/20 1100)  Pulse: 94 (11/01/20 1215)  Resp: 20 (11/01/20 0833)  BP: (!) 103/51 (10/31/20 1900)  SpO2: 100 % (11/01/20 1215)   Vital Signs (72h Range):  Temp:  [97.5 °F (36.4 °C)-98.9 °F (37.2 °C)]   Pulse:  []   Resp:  [16-25]   BP: ()/()   SpO2:  [99 %-100 %]   Arterial Line BP: ()/()      Recent Labs   Lab 10/31/20  1420 10/31/20  2350 11/01/20  0412   CREATININE 0.4* 0.3* 0.3*  0.3*     Serum creatinine: 0.3 mg/dL (L) 11/01/20 0412  Estimated creatinine clearance: 159.6 mL/min (A)    Medication: Placed on continuous SLED 10/31 - will renally adjust cefepime to Q8H. CTM for RRT plan changes and will adjust accordingly.     Pharmacist's Name: Tobin Capone  Pharmacist's Extension: [phone]

## 2020-11-01 NOTE — PROGRESS NOTES
Nephrology Progress Note    Patient followed for anuric JONAH  Patient was seen on RRT / SLED which was running for uremic toxin clearance / UF for volume management.  Wt Readings from Last 3 Encounters:   11/01/20 70.4 kg (155 lb 3.3 oz)   09/02/20 77.2 kg (170 lb 3.1 oz)   08/26/20 80.7 kg (177 lb 14.6 oz)     Temp Readings from Last 3 Encounters:   11/01/20 98.8 °F (37.1 °C) (Oral)   08/27/20 97.5 °F (36.4 °C) (Oral)   02/21/20 (!) 101.2 °F (38.4 °C)     BP Readings from Last 3 Encounters:   10/31/20 (!) 103/51   08/27/20 117/65   02/21/20 129/84     Pulse Readings from Last 3 Encounters:   11/01/20 86   08/27/20 75   02/21/20 (!) 117       CMP  Sodium   Date Value Ref Range Status   11/01/2020 138 136 - 145 mmol/L Final     Potassium   Date Value Ref Range Status   11/01/2020 4.5 3.5 - 5.1 mmol/L Final     Chloride   Date Value Ref Range Status   11/01/2020 105 95 - 110 mmol/L Final     CO2   Date Value Ref Range Status   11/01/2020 25 23 - 29 mmol/L Final     Glucose   Date Value Ref Range Status   11/01/2020 115 (H) 70 - 110 mg/dL Final     BUN   Date Value Ref Range Status   11/01/2020 2 (L) 8 - 23 mg/dL Final     Creatinine   Date Value Ref Range Status   11/01/2020 0.4 (L) 0.5 - 1.4 mg/dL Final     Calcium   Date Value Ref Range Status   11/01/2020 8.8 8.7 - 10.5 mg/dL Final     Total Protein   Date Value Ref Range Status   10/27/2020 4.9 (L) 6.0 - 8.4 g/dL Final     Albumin   Date Value Ref Range Status   11/01/2020 1.4 (L) 3.5 - 5.2 g/dL Final     Total Bilirubin   Date Value Ref Range Status   10/27/2020 0.4 0.1 - 1.0 mg/dL Final     Comment:     For infants and newborns, interpretation of results should be based  on gestational age, weight and in agreement with clinical  observations.  Premature Infant recommended reference ranges:  Up to 24 hours.............<8.0 mg/dL  Up to 48 hours............<12.0 mg/dL  3-5 days..................<15.0 mg/dL  6-29 days.................<15.0 mg/dL       Alkaline  Phosphatase   Date Value Ref Range Status   10/27/2020 163 (H) 55 - 135 U/L Final     AST   Date Value Ref Range Status   10/27/2020 34 10 - 40 U/L Final     ALT   Date Value Ref Range Status   10/27/2020 32 10 - 44 U/L Final     Anion Gap   Date Value Ref Range Status   11/01/2020 8 8 - 16 mmol/L Final     eGFR if    Date Value Ref Range Status   11/01/2020 >60.0 >60 mL/min/1.73 m^2 Final     eGFR if non    Date Value Ref Range Status   11/01/2020 >60.0 >60 mL/min/1.73 m^2 Final     Comment:     Calculation used to obtain the estimated glomerular filtration  rate (eGFR) is the CKD-EPI equation.          CBC  Lab Results   Component Value Date    WBC 8.57 11/01/2020    HGB 6.9 (L) 11/01/2020    HCT 23.0 (L) 11/01/2020    MCV 97 11/01/2020    PLT 86 (L) 11/01/2020         Intake/Output Summary (Last 24 hours) at 11/1/2020 1658  Last data filed at 11/1/2020 1400  Gross per 24 hour   Intake 7643.04 ml   Output 5354 ml   Net 2289.04 ml       Plan:   - Will continue SLED for volume and toxin clearance.    - SLED prescription and dialysate baths were reviewed and changes made according to most recent labs.   - -300 cc/hr  as tolerated.   - Continue citrate and calcium gluconate for regional anticoagulation . Stable iCa per latest result (1.28).  - Follow labs serially while on SLED to monitor electrolytes and follow replacement protocol as needed.         Iftikhar Trujillo MD  Nephrology Fellow  [facility]

## 2020-11-02 NOTE — ASSESSMENT & PLAN NOTE
Fatou Lorenzo is a 75 y.o. female s/p MVr, TVr 9/9/2020. Case noteworthy for multiple pump runs (3) and RV hematoma due to retraction. Unstable 9/18, required pericardial window for evac of posterior cardiac tamponade. Respiratory distress and so re-admitted to SICU on 10/2 now s/p VATS on 10/5. Readmitted on 10/27/20 for respiratory failure.      Neuro:  - Intubated and Sedated; propofol    - PRN liquid tylenol and meds through g tube   - dilaudid prn  - rousable and responds appropriately       CV:  S/p MVr, TVr, MAZE 9/9/20. S/p bedside pericardial window 9/18  - Keep MAPs >60. HDS off pressors   - Holding metop for now - labetalol IV PRN for SBP > 160  - midodrine 5 mg TID      Pulm:   - Loculated pleural effusion s/p VATS on 10/5  - Intubated 10/27/20  - Scheduled duo and saline nebs  - CXR and ABG PRN  - HOB >30 deg  - Continue antibiotics until 11/5  - Patient will need a trach --> discussing with family today  - Daily CXR  - minimal vent settings      Renal:  - Continue to monitor UOP - minimal   - Nephrology following   - Continue CRRT         FEN/GI:  - Do not replace lytes   - Tf through g tube @50 cc/hr  - FW flushes to 400 Q8    - Famotidine and bowel regimen      Heme/Onc:  - H/H stable  - No evidence of bleeding       ID:   - WBC 8.5 (9.12)  - Afebrile  - S/p 7 days of cefepime vanc for enterococcus cloacae UTI. Also has e coli growing from pleural cultures.  - Follow ID recommendations pending      - Started on Cefepime fluconazole re-started 10/14/20. Candida on BAL     - Current antibiotics Cefepime (1 month 11/5)  - BC: No growth to date      Endo:  - no hx of DM  - ISS     PPx:  - famotidine, SCD,      Dispo: SICU    Extensive conversation with the son, questions were answered. Will get palliative involve for goals of care

## 2020-11-02 NOTE — PLAN OF CARE
11/02/20 1023   Discharge Reassessment   Assessment Type Discharge Planning Reassessment   Provided patient/caregiver education on the expected discharge date and the discharge plan Yes   Do you have any problems affording any of your prescribed medications? No   Discharge Plan A Long-term acute care facility (LTAC)   DME Needed Upon Discharge  other (see comments)  (TBD)   Anticipated Discharge Disposition Long Term       Mel Simons MPH, RN, CM  Ext. [phone]

## 2020-11-02 NOTE — SUBJECTIVE & OBJECTIVE
Interval History/Significant Events: NAEON. Ongoing conversations had with  and medical team regarding goals of care, potential tracheostomy placement?     Follow-up For: Procedure(s) (LRB):  INSERTION, PEG TUBE (N/A)    Post-Operative Day: 11 Days Post-Op    Objective:     Vital Signs (Most Recent):  Temp: 98.7 °F (37.1 °C) (11/02/20 0700)  Pulse: 99 (11/02/20 0700)  Resp: 16 (11/02/20 0533)  BP: (!) 103/51 (10/31/20 1900)  SpO2: 100 % (11/02/20 0700) Vital Signs (24h Range):  Temp:  [98.7 °F (37.1 °C)-98.8 °F (37.1 °C)] 98.7 °F (37.1 °C)  Pulse:  [] 99  Resp:  [16-22] 16  SpO2:  [99 %-100 %] 100 %  Arterial Line BP: ()/(46-75) 112/57     Weight: 70.4 kg (155 lb 3.3 oz)  Body mass index is 25.83 kg/m².      Intake/Output Summary (Last 24 hours) at 11/2/2020 0727  Last data filed at 11/2/2020 0700  Gross per 24 hour   Intake 7734.2 ml   Output 6814 ml   Net 920.2 ml       Physical Exam  Vitals signs and nursing note reviewed.   HENT:      Head: Normocephalic and atraumatic.   Neck:      Comments: R. IJ trialysis   Cardiovascular:      Rate and Rhythm: Normal rate and regular rhythm.      Comments: Midline sternotomy with clean, dry, and intact, without evidence of infection  Prior chest tubes sites with dressings in place, clean and dry  Pulmonary:      Effort: Pulmonary effort is normal.      Comments: Intubated and ventilated  AC/VC  R 16    FiO2 40  PEEP 5  Abdominal:      General: Abdomen is flat.      Palpations: Abdomen is soft.      Comments: g tube in place, TF running @ 50 cc/hr    Neurological:      General: No focal deficit present.      Comments: Responds to questions and commands           Vents:  Vent Mode: A/C (11/02/20 0533)  Ventilator Initiated: Yes (10/28/20 1840)  Set Rate: 16 BPM (11/02/20 0533)  Vt Set: 390 mL (11/02/20 0533)  Pressure Support: 5 cmH20 (10/28/20 1409)  PEEP/CPAP: 5 cmH20 (11/02/20 0533)  Oxygen Concentration (%): 40 (11/02/20 0700)  Peak Airway  Pressure: 28 cmH2O (11/02/20 0533)  Plateau Pressure: 31 cmH20 (11/02/20 0533)  Total Ve: 6.33 mL (11/02/20 0533)  Negative Inspiratory Force (cm H2O): -21 (10/08/20 1452)  F/VT Ratio<105 (RSBI): (!) 45.58 (11/02/20 0533)    Lines/Drains/Airways     Central Venous Catheter Line            Trialysis (Dialysis) Catheter 10/27/20 1400 right internal jugular 5 days          Drain                 Gastrostomy/Enterostomy 10/21/20 1304 Percutaneous endoscopic gastrostomy (PEG) feeding 11 days          Airway                 Airway - Non-Surgical 10/28/20 1835 Endotracheal Tube 4 days          Arterial Line            Arterial Line 10/27/20 1500 Right Radial 5 days          Peripheral Intravenous Line                 Midline Catheter Insertion/Assessment  - Single Lumen 10/19/20 1654 brachial vein 18g x 10cm 13 days                Significant Labs:    CBC/Anemia Profile:  Recent Labs   Lab 11/01/20 0412 11/02/20 0438   WBC 8.57 10.79   HGB 6.9* 7.3*   HCT 23.0* 24.1*   PLT 86* 103*   MCV 97 100*   RDW 17.9* 18.2*        Chemistries:  Recent Labs   Lab 11/01/20 0412 11/01/20 1427 11/01/20 2215 11/02/20 0438     141 138 140 139   K 4.6  4.6 4.5 4.4 4.5     107 105 106 106   CO2 28  27 25 26 28   BUN 3*  2* 2* 2* 3*   CREATININE 0.3*  0.3* 0.4* 0.3* 0.4*   CALCIUM 8.8  8.7 8.8 9.7 10.0   ALBUMIN 1.3* 1.4* 1.4*  --    MG 2.2  2.2 2.3 2.3 2.4   PHOS 1.2*  1.2* 1.8* 2.9 3.1       ABGs:   Recent Labs   Lab 11/02/20 0348   PH 7.420   PCO2 40.0   HCO3 25.9   POCSATURATED 99   BE 1     CMP:   Recent Labs   Lab 11/01/20 0412 11/01/20 1427 11/01/20 2215 11/02/20  0438     141 138 140 139   K 4.6  4.6 4.5 4.4 4.5     107 105 106 106   CO2 28  27 25 26 28   *  116* 115* 127* 110   BUN 3*  2* 2* 2* 3*   CREATININE 0.3*  0.3* 0.4* 0.3* 0.4*   CALCIUM 8.8  8.7 8.8 9.7 10.0   ALBUMIN 1.3* 1.4* 1.4*  --    ANIONGAP 5*  7* 8 8 5*   EGFRNONAA >60.0  >60.0 >60.0 >60.0 >60.0     POCT  Glucose:   Recent Labs   Lab 11/01/20  1435 11/01/20  2215 11/02/20  0437   POCTGLUCOSE 127* 133* 117*     All pertinent labs within the past 24 hours have been reviewed.    Significant Imaging:  I have reviewed all pertinent imaging results/findings within the past 24 hours.

## 2020-11-02 NOTE — PLAN OF CARE
SW is following this Pt for DC planning needs. There are no identified needs at this time. Discharge Disposition: LTAC - pending patient progress; possible trach?    SW will continue to coordinate with patient, family, team and insurance to complete patient's discharge plan.    Esthela Peralta LMSW   - Case Management

## 2020-11-02 NOTE — ASSESSMENT & PLAN NOTE
-non oliguric kdigo stage 2 [place] likely multifactorial ATN from vancomycin toxicity, infection, low effective arterial blood volume  -ua with spec grav 1010, prot 1+, blood 3+, Sang 30  -urine microscopy with frequent muddy brown casts  -kidney US and urine lytes seen   - CRRT as need it for clearance and volume management   - keep hgb>7  - Strict I/O and chart  - Avoid nephrotoxic medications, NSAIDs, IV contrast, etc.  - Daily weights and chart  - Medication doses adjusted to GFR  - Maintain MAP > 65  - Hb > 7 gm/dL  - Will follow closely

## 2020-11-02 NOTE — SUBJECTIVE & OBJECTIVE
Interval History: Seen at the bedside no event overnight       Review of patient's allergies indicates:  No Known Allergies  Current Facility-Administered Medications   Medication Frequency    acetaminophen oral solution 650 mg Q6H PRN    albuterol-ipratropium 2.5 mg-0.5 mg/3 mL nebulizer solution 3 mL Q6H WAKE    aspirin chewable tablet 81 mg Daily    calcium acetate(phosphat bind) capsule 2,001 mg TID WM    calcium gluconate 3,000 mg in dextrose 5 % 100 mL IVPB Continuous    ceFEPIme injection 1 g Q8H    dextrose 50% injection 12.5 g PRN    dextrose 50% injection 25 g PRN    dextrose-sod citrate-citric ac 2.45-2.2 gram- 800 mg/100 mL Soln Continuous    escitalopram oxalate tablet 20 mg Daily    famotidine tablet 20 mg BID    glucagon (human recombinant) injection 1 mg PRN    heparin (porcine) injection 5,000 Units Q8H    insulin aspart U-100 pen 0-5 Units Q6H PRN    labetalol 20 mg/4 mL (5 mg/mL) IV syring Q6H PRN    lidocaine HCL 2% jelly PRN    magnesium sulfate 2g in water 50mL IVPB (premix) PRN    melatonin tablet 6 mg Nightly    midodrine tablet 5 mg TID    mirtazapine tablet 7.5 mg QHS    morphine injection 1 mg Q6H PRN    multivitamin tablet Daily    ondansetron injection 4 mg Q6H PRN    propofol (DIPRIVAN) 10 mg/mL infusion Continuous    sodium chloride 3% nebulizer solution 4 mL Q6H WAKE    sodium phosphate 20.01 mmol in dextrose 5 % 250 mL IVPB PRN    sodium phosphate 30 mmol in dextrose 5 % 250 mL IVPB PRN    sodium phosphate 39.99 mmol in dextrose 5 % 250 mL IVPB PRN    thiamine tablet 100 mg Daily       Objective:     Vital Signs (Most Recent):  Temp: 98.7 °F (37.1 °C) (11/02/20 0700)  Pulse: 101 (11/02/20 0816)  Resp: (!) 22 (11/02/20 0816)  BP: (!) 103/51 (10/31/20 1900)  SpO2: 100 % (11/02/20 0816)  O2 Device (Oxygen Therapy): ventilator (11/02/20 0816) Vital Signs (24h Range):  Temp:  [98.7 °F (37.1 °C)-98.8 °F (37.1 °C)] 98.7 °F (37.1 °C)  Pulse:  [] 101  Resp:   [16-22] 22  SpO2:  [99 %-100 %] 100 %  Arterial Line BP: ()/(46-75) 102/53     Weight: 70.4 kg (155 lb 3.3 oz) (11/01/20 0600)  Body mass index is 25.83 kg/m².  Body surface area is 1.8 meters squared.    I/O last 3 completed shifts:  In: 43762.2 [I.V.:3515.2; NG/GT:3050; IV Piggyback:4892]  Out: 9863 [Other:9863]    Physical Exam  Vitals signs and nursing note reviewed.   HENT:      Head: Normocephalic and atraumatic.   Neck:      Comments: R. IJ trialysis   Cardiovascular:      Rate and Rhythm: Normal rate and regular rhythm.      Comments: Midline sternotomy with clean, dry, and intact, without evidence of infection  Prior chest tubes sites with dressings in place, clean and dry  Pulmonary:      Effort: Pulmonary effort is normal.      Comments: Intubated and ventilated  AC/VC  R 16    FiO2 40  PEEP 5  Abdominal:      General: Abdomen is flat.      Palpations: Abdomen is soft.      Comments: g tube in place, TF running @ 50 cc/hr    Neurological:      General: No focal deficit present.      Comments: Responds to questions and commands           Significant Labs:  BMP:   Recent Labs   Lab 11/02/20  0438         CO2 28   BUN 3*   CREATININE 0.4*   CALCIUM 10.0   MG 2.4     All labs within the past 24 hours have been reviewed.     Significant Imaging:  chest

## 2020-11-02 NOTE — PLAN OF CARE
Pt remains intubated, lightly sedated.  Follows commands and denies c/o pain.  CRRT overnight w/o difficulty.  Tolerating TF at goal.  Plan of care reviewed with pt, positive reinforcement provided.

## 2020-11-02 NOTE — CONSULTS
Palliative medicine consult received for goals of care.  Chart has been reviewed and patient discussed with primary team.    Palliative medicine APRN met with patient's son as Mrs. Lorenzo is unable to participate in conversation.  SonCarter states the family has complete understanding of the current situation.  Family's understanding is she will require long term dialysis and tube feedings.  States the patient had spoken about her goals of care and would not ever consent to long term life support or long term nursing facility care.      Son, Carter, states the family has understanding of the current clinical condition. The sons do not want their mother to die however, she, Mrs. Lorenzo would not choose to live without quality of life.  Carter states the family is in complete agreement in their decision not to proceed to with trach.    Family understands they will have to make decisions regarding withdrawal of life support.

## 2020-11-02 NOTE — PROGRESS NOTES
patient seen at bedside with critical care team. Remains into baited. At this point, she is profoundly week and debilitated. She has required re-intubation multiple times. The family is reluctant to proceed with tracheostomy. I discussed with one of her sons that she, to our knowledge, has no irreversible problems. For example, she is completely neurologically intact. I recommended the tracheostomy both for comfort and for ease of weani ng from the ventilator. I will plan to meet with her sons later today or tomorrow to discuss further.

## 2020-11-02 NOTE — PROGRESS NOTES
[facility]  Critical Care - Surgery  Progress Note    Patient Name: Fatou Lorenzo  MRN: [id number]  Admission Date: 8/30/2020  Hospital Length of Stay: 63 days  Code Status: Full Code  Attending Provider: Antoni Waddell MD  Primary Care Provider: Ludin Rivas MD   Principal Problem: Leukocytosis    Subjective:     Hospital/ICU Course:  No notes on file    Interval History/Significant Events: NAEON. Ongoing conversations had with  and medical team regarding goals of care, potential tracheostomy placement?     Follow-up For: Procedure(s) (LRB):  INSERTION, PEG TUBE (N/A)    Post-Operative Day: 11 Days Post-Op    Objective:     Vital Signs (Most Recent):  Temp: 98.7 °F (37.1 °C) (11/02/20 0700)  Pulse: 99 (11/02/20 0700)  Resp: 16 (11/02/20 0533)  BP: (!) 103/51 (10/31/20 1900)  SpO2: 100 % (11/02/20 0700) Vital Signs (24h Range):  Temp:  [98.7 °F (37.1 °C)-98.8 °F (37.1 °C)] 98.7 °F (37.1 °C)  Pulse:  [] 99  Resp:  [16-22] 16  SpO2:  [99 %-100 %] 100 %  Arterial Line BP: ()/(46-75) 112/57     Weight: 70.4 kg (155 lb 3.3 oz)  Body mass index is 25.83 kg/m².      Intake/Output Summary (Last 24 hours) at 11/2/2020 0727  Last data filed at 11/2/2020 0700  Gross per 24 hour   Intake 7734.2 ml   Output 6814 ml   Net 920.2 ml       Physical Exam  Vitals signs and nursing note reviewed.   HENT:      Head: Normocephalic and atraumatic.   Neck:      Comments: R. IJ trialysis   Cardiovascular:      Rate and Rhythm: Normal rate and regular rhythm.      Comments: Midline sternotomy with clean, dry, and intact, without evidence of infection  Prior chest tubes sites with dressings in place, clean and dry  Pulmonary:      Effort: Pulmonary effort is normal.      Comments: Intubated and ventilated  AC/VC  R 16    FiO2 40  PEEP 5  Abdominal:      General: Abdomen is flat.      Palpations: Abdomen is soft.      Comments: g tube in place, TF running @ 50 cc/hr    Neurological:       General: No focal deficit present.      Comments: Responds to questions and commands           Vents:  Vent Mode: A/C (11/02/20 0533)  Ventilator Initiated: Yes (10/28/20 1840)  Set Rate: 16 BPM (11/02/20 0533)  Vt Set: 390 mL (11/02/20 0533)  Pressure Support: 5 cmH20 (10/28/20 1409)  PEEP/CPAP: 5 cmH20 (11/02/20 0533)  Oxygen Concentration (%): 40 (11/02/20 0700)  Peak Airway Pressure: 28 cmH2O (11/02/20 0533)  Plateau Pressure: 31 cmH20 (11/02/20 0533)  Total Ve: 6.33 mL (11/02/20 0533)  Negative Inspiratory Force (cm H2O): -21 (10/08/20 1452)  F/VT Ratio<105 (RSBI): (!) 45.58 (11/02/20 0533)    Lines/Drains/Airways     Central Venous Catheter Line            Trialysis (Dialysis) Catheter 10/27/20 1400 right internal jugular 5 days          Drain                 Gastrostomy/Enterostomy 10/21/20 1304 Percutaneous endoscopic gastrostomy (PEG) feeding 11 days          Airway                 Airway - Non-Surgical 10/28/20 1835 Endotracheal Tube 4 days          Arterial Line            Arterial Line 10/27/20 1500 Right Radial 5 days          Peripheral Intravenous Line                 Midline Catheter Insertion/Assessment  - Single Lumen 10/19/20 1654 brachial vein 18g x 10cm 13 days                Significant Labs:    CBC/Anemia Profile:  Recent Labs   Lab 11/01/20  0412 11/02/20  0438   WBC 8.57 10.79   HGB 6.9* 7.3*   HCT 23.0* 24.1*   PLT 86* 103*   MCV 97 100*   RDW 17.9* 18.2*        Chemistries:  Recent Labs   Lab 11/01/20  0412 11/01/20  1427 11/01/20  2215 11/02/20  0438     141 138 140 139   K 4.6  4.6 4.5 4.4 4.5     107 105 106 106   CO2 28  27 25 26 28   BUN 3*  2* 2* 2* 3*   CREATININE 0.3*  0.3* 0.4* 0.3* 0.4*   CALCIUM 8.8  8.7 8.8 9.7 10.0   ALBUMIN 1.3* 1.4* 1.4*  --    MG 2.2  2.2 2.3 2.3 2.4   PHOS 1.2*  1.2* 1.8* 2.9 3.1       ABGs:   Recent Labs   Lab 11/02/20  0348   PH 7.420   PCO2 40.0   HCO3 25.9   POCSATURATED 99   BE 1     CMP:   Recent Labs   Lab 11/01/20  0412  11/01/20  1427 11/01/20 2215 11/02/20  0438     141 138 140 139   K 4.6  4.6 4.5 4.4 4.5     107 105 106 106   CO2 28  27 25 26 28   *  116* 115* 127* 110   BUN 3*  2* 2* 2* 3*   CREATININE 0.3*  0.3* 0.4* 0.3* 0.4*   CALCIUM 8.8  8.7 8.8 9.7 10.0   ALBUMIN 1.3* 1.4* 1.4*  --    ANIONGAP 5*  7* 8 8 5*   EGFRNONAA >60.0  >60.0 >60.0 >60.0 >60.0     POCT Glucose:   Recent Labs   Lab 11/01/20  1435 11/01/20 2215 11/02/20  0437   POCTGLUCOSE 127* 133* 117*     All pertinent labs within the past 24 hours have been reviewed.    Significant Imaging:  I have reviewed all pertinent imaging results/findings within the past 24 hours.    Assessment/Plan:     Severe mitral regurgitation  Fatou Lorenzo is a 75 y.o. female s/p MVr, TVr 9/9/2020. Case noteworthy for multiple pump runs (3) and RV hematoma due to retraction. Unstable 9/18, required pericardial window for evac of posterior cardiac tamponade. Respiratory distress and so re-admitted to SICU on 10/2 now s/p VATS on 10/5. Readmitted on 10/27/20 for respiratory failure.      Neuro:  - Intubated and Sedated; propofol    - PRN liquid tylenol and meds through g tube   - dilaudid prn  - rousable and responds appropriately       CV:  S/p MVr, TVr, MAZE 9/9/20. S/p bedside pericardial window 9/18  - Keep MAPs >60. HDS off pressors   - Holding metop for now - labetalol IV PRN for SBP > 160  - midodrine 5 mg TID      Pulm:   - Loculated pleural effusion s/p VATS on 10/5  - Intubated 10/27/20  - Scheduled duo and saline nebs  - CXR and ABG PRN  - HOB >30 deg  - Continue antibiotics until 11/5  - Patient will need a trach --> discussing with family today  - Daily CXR  - minimal vent settings      Renal:  - Continue to monitor UOP - minimal   - Nephrology following   - Continue CRRT         FEN/GI:  - Do not replace lytes   - Tf through g tube @50 cc/hr  - FW flushes to 400 Q8    - Famotidine and bowel regimen      Heme/Onc:  - H/H stable  - No  evidence of bleeding       ID:   - WBC 8.5 (9.12)  - Afebrile  - S/p 7 days of cefepime vanc for enterococcus cloacae UTI. Also has e coli growing from pleural cultures.  - Follow ID recommendations pending      - Started on Cefepime fluconazole re-started 10/14/20. Candida on BAL     - Current antibiotics Cefepime (1 month 11/5)  - BC: No growth to date      Endo:  - no hx of DM  - ISS     PPx:  - famotidine, SCD,      Dispo: SICU    Extensive conversation with the son, questions were answered. Will get palliative involve for goals of care     Critical care was time spent personally by me on the following activities: development of treatment plan with patient or surrogate and bedside caregivers, discussions with consultants, evaluation of patient's response to treatment, examination of patient, ordering and performing treatments and interventions, ordering and review of laboratory studies, ordering and review of radiographic studies, pulse oximetry, re-evaluation of patient's condition.  This critical care time did not overlap with that of any other provider or involve time for any procedures.     Mir Crfot MD  Critical Care - Surgery  [facility]

## 2020-11-03 PROBLEM — Z51.5 PALLIATIVE CARE ENCOUNTER: Status: ACTIVE | Noted: 2020-01-01

## 2020-11-03 NOTE — PROGRESS NOTES
Nurse requesting re-assessment of sacrum.  Pt known to wound care team. See below.   Recommendations:  - Nursing to continue with pressure injury prevention interventions with HUSSAIN surface and Venelex ointment BID to stimulate capillary blood flow.  - Wound care team to follow pt prn [id number]    L sacrum- healing linear redness now presenting with faded redness and brown color  Coccyx- 2x0.3cm Linear purple maroon discolored skin likely related to pressure.

## 2020-11-03 NOTE — SUBJECTIVE & OBJECTIVE
Interval History/Significant Events: NAEON. Ongoing conversations had with  and medical team regarding goals of care, potential tracheostomy placement? Palliative was consulted, family meeting today.   Stable otherwise, nephrology following.     Follow-up For: Procedure(s) (LRB):  INSERTION, PEG TUBE (N/A)    Post-Operative Day: 11 Days Post-Op    Objective:     Vital Signs (Most Recent):  Temp: 98.6 °F (37 °C) (11/03/20 0300)  Pulse: 106 (11/03/20 0600)  Resp: 18 (11/03/20 0019)  BP: (!) 103/51 (10/31/20 1900)  SpO2: 100 % (11/03/20 0600) Vital Signs (24h Range):  Temp:  [97.6 °F (36.4 °C)-98.7 °F (37.1 °C)] 98.6 °F (37 °C)  Pulse:  [] 106  Resp:  [16-22] 18  SpO2:  [99 %-100 %] 100 %  Arterial Line BP: ()/(47-67) 98/57     Weight: 70.4 kg (155 lb 3.3 oz)  Body mass index is 25.83 kg/m².      Intake/Output Summary (Last 24 hours) at 11/3/2020 0656  Last data filed at 11/3/2020 0600  Gross per 24 hour   Intake 4873.9 ml   Output 7035 ml   Net -2161.1 ml       Physical Exam  Vitals signs and nursing note reviewed.   HENT:      Head: Normocephalic and atraumatic.   Neck:      Comments: R. IJ trialysis   Cardiovascular:      Rate and Rhythm: Normal rate and regular rhythm.      Comments: Midline sternotomy with clean, dry, and intact, without evidence of infection  Prior chest tubes sites with dressings in place, clean and dry  Pulmonary:      Effort: Pulmonary effort is normal.      Comments: Intubated and ventilated  AC/VC  R 16    FiO2 40  PEEP 5  Chest hematoma stbale   Abdominal:      General: Abdomen is flat.      Palpations: Abdomen is soft.      Comments: g tube in place, TF running @ 50 cc/hr    Neurological:      General: No focal deficit present.      Comments: Responds to questions and commands           Vents:  Vent Mode: A/C (11/03/20 0432)  Ventilator Initiated: Yes (10/28/20 1840)  Set Rate: 16 BPM (11/03/20 0432)  Vt Set: 390 mL (11/03/20 0432)  Pressure Support: 5 cmH20  (10/28/20 1409)  PEEP/CPAP: 5 cmH20 (11/03/20 0432)  Oxygen Concentration (%): 40 (11/03/20 0600)  Peak Airway Pressure: 30 cmH2O (11/03/20 0432)  Plateau Pressure: 20 cmH20 (11/03/20 0432)  Total Ve: 7.17 mL (11/03/20 0432)  Negative Inspiratory Force (cm H2O): -21 (10/08/20 1452)  F/VT Ratio<105 (RSBI): (!) 34.48 (11/03/20 0019)    Lines/Drains/Airways     Central Venous Catheter Line            Trialysis (Dialysis) Catheter 10/27/20 1400 right internal jugular 6 days          Drain                 Gastrostomy/Enterostomy 10/21/20 1304 Percutaneous endoscopic gastrostomy (PEG) feeding 12 days          Airway                 Airway - Non-Surgical 10/28/20 1835 Endotracheal Tube 5 days          Arterial Line            Arterial Line 10/27/20 1500 Right Radial 6 days          Peripheral Intravenous Line                 Midline Catheter Insertion/Assessment  - Single Lumen 10/19/20 1654 brachial vein 18g x 10cm 14 days                Significant Labs:    CBC/Anemia Profile:  Recent Labs   Lab 11/02/20  0438 11/03/20 0327   WBC 10.79 11.78   HGB 7.3* 7.1*   HCT 24.1* 24.1*   * 122*   * 102*   RDW 18.2* 18.3*        Chemistries:  Recent Labs   Lab 11/02/20  1355 11/02/20 2220 11/03/20 0327    138 139   K 4.7 4.6 4.5    107 106   CO2 27 25 26   BUN 3* 3* 3*   CREATININE 0.3* 0.3* 0.3*   CALCIUM 9.7 9.2 9.0   ALBUMIN 1.4* 1.3* 1.5*   MG 2.5 2.3 2.2   PHOS 1.1* 2.2* 1.2*  1.2*       ABGs:   Recent Labs   Lab 11/02/20 0348   PH 7.420   PCO2 40.0   HCO3 25.9   POCSATURATED 99   BE 1     CMP:   Recent Labs   Lab 11/02/20  1355 11/02/20 2220 11/03/20 0327    138 139   K 4.7 4.6 4.5    107 106   CO2 27 25 26    111* 105   BUN 3* 3* 3*   CREATININE 0.3* 0.3* 0.3*   CALCIUM 9.7 9.2 9.0   ALBUMIN 1.4* 1.3* 1.5*   ANIONGAP 5* 6* 7*   EGFRNONAA >60.0 >60.0 >60.0     POCT Glucose:   Recent Labs   Lab 11/02/20  0849 11/02/20  2109 11/03/20  0311   POCTGLUCOSE 108 118* 121*     All  pertinent labs within the past 24 hours have been reviewed.    Significant Imaging:  I have reviewed all pertinent imaging results/findings within the past 24 hours.

## 2020-11-03 NOTE — SUBJECTIVE & OBJECTIVE
Interval History: no adverse events over night,  Remains intubated with CRRT continued.  Family meeting today to discuss goals of care.     Past Medical History:   Diagnosis Date    Atrial fibrillation with RVR 8/24/2020    Cataract     Essential hypertension 8/31/2020    Glaucoma     Heart valve problem     Hyperlipidemia     Severe mitral regurgitation 8/24/2020       Past Surgical History:   Procedure Laterality Date    ANKLE SURGERY      BAIN MAZE PROCEDURE N/A 9/9/2020    Procedure: BAIN MAZE PROCEDURE;  Surgeon: Antoni Waddell MD;  [facility];  Service: Cardiothoracic;  Laterality: N/A;  MAZE     LARYNGOSCOPY N/A 10/5/2020    Procedure: LARYNGOSCOPY, MICRO SUSPENSION WITH AUGMENTATION;  Surgeon: Yfn Mendoza MD;  Location: [facility] OR [address] [place] [address];  Service: ENT;  Laterality: N/A;    LEFT HEART CATHETERIZATION Left 8/25/2020    Procedure: Left heart cath, radial;  Surgeon: Marlee Carrillo MD;  Location: [facility] CATH LAB;  Service: Cardiology;  Laterality: Left;    lipoma removal      THORACOSCOPIC DECORTICATION OF LUNG Right 10/5/2020    Procedure: VATS, WITH DECORTICATION, LUNG;  Surgeon: Jeremy Shine MD;  [facility];  Service: Thoracic;  Laterality: Right;    TRICUSPID VALVULOPLASTY N/A 9/9/2020    Procedure: REPAIR, TRICUSPID VALVE;  Surgeon: Antoni Waddell MD;  [facility];  Service: Cardiothoracic;  Laterality: N/A;       Review of patient's allergies indicates:  No Known Allergies    Medications:  Continuous Infusions:   calcium gluconate IVPB      dextrose-sod citrate-citric ac      propofoL 10 mcg/kg/min (11/03/20 1000)     Scheduled Meds:   albuterol-ipratropium  3 mL Nebulization Q6H WAKE    aspirin  81 mg Per NG tube Daily    balsam peru-castor oiL   Topical (Top) BID    escitalopram oxalate  20 mg Per G Tube Daily    famotidine  20 mg Per NG tube Daily    heparin (porcine)  5,000 Units Subcutaneous Q8H    melatonin  6 mg  Per NG tube Nightly    midodrine  5 mg Oral TID    mirtazapine  7.5 mg Per NG tube QHS    multivitamin  1 tablet Oral Daily    sodium chloride 3%  4 mL Nebulization Q6H WAKE    sodium phosphate IVPB  15 mmol Intravenous Once    thiamine  100 mg Per G Tube Daily     PRN Meds:acetaminophen, dextrose 50%, dextrose 50%, glucagon (human recombinant), insulin aspart U-100, labetalol, lidocaine HCL 2%, morphine, ondansetron    Family History     Problem Relation (Age of Onset)    Cancer Mother    Cataracts Sister    No Known Problems Father, Brother, Maternal Aunt, Maternal Uncle, Paternal Aunt, Paternal Uncle, Maternal Grandmother, Maternal Grandfather, Paternal Grandmother, Paternal Grandfather        Tobacco Use    Smoking status: Never Smoker    Smokeless tobacco: Never Used   Substance and Sexual Activity    Alcohol use: No    Drug use: No    Sexual activity: Not Currently       Review of Systems   Unable to perform ROS: Intubated     Objective:     Vital Signs (Most Recent):  Temp: 98.7 °F (37.1 °C) (11/03/20 0900)  Pulse: 110 (11/03/20 1105)  Resp: 16 (11/03/20 1105)  BP: (!) 103/51 (10/31/20 1900)  SpO2: 100 % (11/03/20 1105) Vital Signs (24h Range):  Temp:  [97.6 °F (36.4 °C)-98.7 °F (37.1 °C)] 98.7 °F (37.1 °C)  Pulse:  [] 110  Resp:  [16-18] 16  SpO2:  [99 %-100 %] 100 %  Arterial Line BP: ()/(47-67) 104/59     Weight: 70.4 kg (155 lb 3.3 oz)  Body mass index is 25.83 kg/m².    Physical Exam  Vitals signs and nursing note reviewed.   Constitutional:       Appearance: She is ill-appearing.      Comments: Sedated, opens eyes to verbal and tactile stimuli    HENT:      Head: Normocephalic.      Nose: Nose normal.      Mouth/Throat:      Mouth: Mucous membranes are moist.      Comments: intubated  Eyes:      Pupils: Pupils are equal, round, and reactive to light.   Neck:      Musculoskeletal: Normal range of motion.      Comments:  Trialysis catheter intact   Cardiovascular:      Rate and  Rhythm: Normal rate and regular rhythm.      Pulses: Normal pulses.      Heart sounds: Normal heart sounds.   Pulmonary:      Comments: Intubated   Abdominal:      General: Bowel sounds are normal.      Palpations: Abdomen is soft.      Comments: PEG intact    Musculoskeletal:      Comments: Passive range of motion    Skin:     General: Skin is warm and dry.      Coloration: Skin is pale.   Neurological:      Comments: Sedated, Follows simple commands - open eyes,  Does not attempt to answer questions     Psychiatric:         Behavior: Behavior normal.      Comments: Unable to assess judgement and content         Review of Symptoms    Symptom Assessment (ESAS 0-10 Scale)  Pain:  0  Dyspnea:  0  Anxiety:  0  Nausea:  0  Depression:  0  Anorexia:  0  Fatigue:  0  Insomnia:  0  Restlessness:  0  Agitation:  0         Comments:  Patient is intubated unable to complete ESAS, appears comfortable no facial grimacing or moaning           Performance Status:  20    Living Arrangements:  Lives with family    Psychosocial/Cultural: ,  30 plus years,  Three adult sons, several grandchildren.  Retired from [employer].   Continued to work [employer]. Very active in community - loved caring for her community and her family.      Spiritual:  F - Briana and Belief:  Hoahaoism  A - Address in Care:  Amenable to  visits       Advance Care Planning   Advance Directives:   Living Will: No        Oral Declaration: No    LaPOST: No    Do Not Resuscitate Status: No    Medical Power of : No (next of kin are three adult sons )      Decision Making:  Family answered questions and Patient unable to communicate due to disease severity/cognitive impairment         Significant Labs: All pertinent labs within the past 24 hours have been reviewed.  CBC:   Recent Labs   Lab 11/03/20  1057   WBC 12.30   HGB 7.2*   HCT 24.3*   *   *     BMP:  Recent Labs   Lab 11/03/20  1057          K 4.6      CO2 27   BUN 3*   CREATININE 0.3*   CALCIUM 8.9   MG 2.3     LFT:  Lab Results   Component Value Date    AST 49 (H) 11/03/2020    ALKPHOS 327 (H) 11/03/2020    BILITOT 0.4 11/03/2020     Albumin:   Albumin   Date Value Ref Range Status   11/03/2020 1.5 (L) 3.5 - 5.2 g/dL Final     Protein:   Total Protein   Date Value Ref Range Status   11/03/2020 5.0 (L) 6.0 - 8.4 g/dL Final     Lactic acid:   Lab Results   Component Value Date    LACTATE 3.7 (HH) 09/18/2020       Significant Imaging: I have reviewed all pertinent imaging results/findings within the past 24 hours.

## 2020-11-03 NOTE — ASSESSMENT & PLAN NOTE
-non oliguric kdigo stage 2 richmond likely multifactorial ATN from vancomycin toxicity, infection, low effective arterial blood volume  -ua with spec grav 1010, prot 1+, blood 3+, Sang 30  -urine microscopy with frequent muddy brown casts  -kidney US and urine lytes seen   - continue CRRT for clearance and volume management   - keep hgb>7  - Strict I/O and chart  - Avoid nephrotoxic medications, NSAIDs, IV contrast, etc.  - Daily weights and chart  - Medication doses adjusted to GFR  - Maintain MAP > 65  - Hb > 7 gm/dL  - Will follow closely

## 2020-11-03 NOTE — HPI
HPI obtained from chart review:     Mrs. Lorenzo is a 74 yo lady with PMH of: with extensive cardiac hx, s/p  Mvr, Tvr, MAZE 9/09/2020. Post op period complicated by lengthy critical care stay, multiple (6)  reintubations, pericardial window for cardiac tamponade, new arrythmia, JONAH now requiring CRRT and s/p PEG placement.  She has been liberated from critical care and shortly afterward transitioned back to CCS with increasing oxygen requirements, and  worsening right sided pleural effusion.   Ultra sound via interventional rad. Indicated multiple loculations; only able to drain  60 cc of fluid via thoracentesis.      10/5/2020 : Thoracic surgery was consulted now s/p  VATS / decortication a multiloculated pleural effusion positive for E.coli. Treated with antibiotics and was stepdown from critical care 10/12/20.  Following this white count increased overnight and was returned to CCS.  Antibiotics were broaden, a PEG placed, tube feeding initiated.  mental status improved and white count improved and she was able to step down to CSU.     Unfortunately she had additional set back -  respiratory code, was intubated and resumed critical care services.   Creatine and BUN elevated to 150/4. Nephrology was consulted started on CRRT     Palliative medicine has been consulted for goals of care and advanced care planning .

## 2020-11-03 NOTE — ASSESSMENT & PLAN NOTE
Fatou Lorenzo is a 75 y.o. female s/p MVr, TVr 9/9/2020. Case noteworthy for multiple pump runs (3) and RV hematoma due to retraction. Unstable 9/18, required pericardial window for evac of posterior cardiac tamponade. Respiratory distress and so re-admitted to SICU on 10/2 now s/p VATS on 10/5. Readmitted on 10/27/20 for respiratory failure.      Neuro:  - Intubated and Sedated; propofol    - PRN liquid tylenol and meds through g tube   - dilaudid prn  - rousable and responds appropriately       CV:  S/p MVr, TVr, MAZE 9/9/20. S/p bedside pericardial window 9/18  - Keep MAPs >60. HDS off pressors   - Holding metop for now - labetalol IV PRN for SBP > 160  - midodrine 5 mg TID      Pulm:   - Loculated pleural effusion s/p VATS on 10/5  - Intubated 10/27/20  - Scheduled duo and saline nebs  - CXR and ABG PRN  - HOB >30 deg  - Finished one month of antibiotics   - Patient will need a trach --> discussing with family today  - Daily CXR  - minimal vent settings      Renal:  - Continue to monitor UOP - minimal   - Nephrology following   - Continue CRRT         FEN/GI:  - Do not replace lytes   - Tf through g tube @50 cc/hr  - FW flushes to 400 Q8    - Famotidine and bowel regimen      Heme/Onc:  - H/H stable  - No evidence of bleeding  - TBD re-start of heparin gtt      ID:   - WBC 11.7  - Afebrile  - S/p 7 days of cefepime vanc for enterococcus cloacae UTI. Also has e coli growing from pleural cultures.     - Started on Cefepime fluconazole re-started 10/14/20. Candida on BAL     - Finished 1 month treatment of cefepime   - BC: No growth to date      Endo:  - no hx of DM  - ISS     PPx:  - famotidine, SCD,      Dispo: SICU    Extensive conversation with the son, questions were answered.   Family meeting today for goals of care

## 2020-11-03 NOTE — PROGRESS NOTES
Patient seen at bedside with critical care team. Remains into baited. Plan for a family meeting today at one. I spoke with the son yesterday about tracheostomy. I believe that would be a significant benefit for her.

## 2020-11-03 NOTE — CONSULTS
[facility]  Palliative Medicine  Consult Note    Patient Name: Fatou Lorenzo  MRN: [id number]  Admission Date: 8/30/2020  Hospital Length of Stay: 64 days  Code Status: Full Code   Attending Provider: Antoni Waddell MD  Consulting Provider: IVONNE Samuel  Primary Care Physician: Ludin Rivas MD  Principal Problem:Leukocytosis    Patient information was obtained from relative(s), past medical records and ER records.      Consults  Assessment/Plan:     Palliative care encounter  Palliative medicine consult received for goals of care.  Chart has been reviewed and patient discussed with primary team Resident via secure chat message.  Dr. Ferrer attending unavailable.      Mrs. Lorenzo is a 76 yo lady with extensive cardiac history.  S/P Mvr, Tvr, MAZE 9/09/2020. Now with multiple post op complications - respiratory failure: extubated and re-intubated several times, renal failure requiring CRRT, infection, debility. S/P PEG placement and artificial nutrition initiated.  She remains intubated and sedated as she is known to bite on the ET tube.  At the time of this assessment she appears comfortable no facial grimacing or moaning. Follows simple commands - open eyes.  She does not appear able to answer questions.      Advance Care Planning     - no advanced care planning documents have been received.   - next of kin for medical decision making are her three adult sons   - full code per primary team    Goals of Care:   -Palliative medicine APRN met with patient's son as Mrs. Lorenzo is unable to participate in conversation.   - Son, Carter states the family has complete understanding of the current situation.  Family's understanding is she will require long term dialysis and tube feedings.   - States the patient had spoken about her goals of care and would not ever consent to long term life support or long term nursing facility care.    - Son, Carter, states the family has understanding of the  current clinical condition. The sons do not want their mother to die however, she, Mrs. Lorenzo would not choose to live without quality of life.    -Carter states the family is in complete agreement in their decision not to proceed to with trach.    - Family understands without having a trach, she will not be able to remain intubated indefinitely.  The family understands they will have to make decisions regarding withdrawal of life support.     Plan/Recommendations  - Continue current plan of care - remains fullcode  - Family is amenable to having family meeting with three sons, primary team and palliative med  - This has been scheduled for Tues 11/3/2020 at 1:30 PM.  Primary team resident states will be present.  - Palliative medicine will continue to follow for goals of care and advanced care planning.     Primary team attending and resident notified of the above goals of care via secure chat message.                    Thank you for your consult. I will follow-up with patient. Please contact us if you have any additional questions.    Subjective:     HPI:   HPI obtained from chart review:     Mrs. Lorenzo is a 76 yo lady with PMH of: with extensive cardiac hx, s/p  Mvr, Tvr, MAZE 9/09/2020. Post op period complicated by lengthy critical care stay, multiple (6)  reintubations, pericardial window for cardiac tamponade, new arrythmia, OJNAH now requiring CRRT and s/p PEG placement.  She has been liberated from critical care and shortly afterward transitioned back to CCS with increasing oxygen requirements, and  worsening right sided pleural effusion.   Ultra sound via interventional rad. Indicated multiple loculations; only able to drain  60 cc of fluid via thoracentesis.      10/5/2020 : Thoracic surgery was consulted now s/p  VATS / decortication a multiloculated pleural effusion positive for E.coli. Treated with antibiotics and was stepdown from critical care 10/12/20.  Following this white count increased overnight and  was returned to [facility].  Antibiotics were broaden, a PEG placed, tube feeding initiated.  mental status improved and white count improved and she was able to step down to CSU.     Unfortunately she had additional set back -  respiratory code, was intubated and resumed critical care services.   Creatine and BUN elevated to 150/4. Nephrology was consulted started on CRRT     Palliative medicine has been consulted for goals of care and advanced care planning .        Hospital Course:  No notes on file    Interval History:     Past Medical History:   Diagnosis Date    Atrial fibrillation with RVR 8/24/2020    Cataract     Essential hypertension 8/31/2020    Glaucoma     Heart valve problem     Hyperlipidemia     Severe mitral regurgitation 8/24/2020       Past Surgical History:   Procedure Laterality Date    ANKLE SURGERY      BAIN MAZE PROCEDURE N/A 9/9/2020    Procedure: BAIN MAZE PROCEDURE;  Surgeon: Antoni Waddell MD;  Location: [facility] OR [address] [place] [address];  Service: Cardiothoracic;  Laterality: N/A;  MAZE     LARYNGOSCOPY N/A 10/5/2020    Procedure: LARYNGOSCOPY, MICRO SUSPENSION WITH AUGMENTATION;  Surgeon: Yfn Mendoza MD;  Location: [address];  Service: ENT;  Laterality: N/A;    LEFT HEART CATHETERIZATION Left 8/25/2020    Procedure: Left heart cath, radial;  Surgeon: Marlee Carrillo MD;  Location: [facility] LAB;  Service: Cardiology;  Laterality: Left;    lipoma removal      THORACOSCOPIC DECORTICATION OF LUNG Right 10/5/2020    Procedure: VATS, WITH DECORTICATION, LUNG;  Surgeon: Jeremy Shine MD;  Location: [address];  Service: Thoracic;  Laterality: Right;    TRICUSPID VALVULOPLASTY N/A 9/9/2020    Procedure: REPAIR, TRICUSPID VALVE;  Surgeon: Antoni Waddell MD;  Location: [address];  Service: Cardiothoracic;  Laterality: N/A;       Review of patient's allergies indicates:  No Known Allergies    Medications:  Continuous Infusions:   calcium gluconate IVPB 3,000 mg  (11/02/20 1837)    propofoL 10 mcg/kg/min (11/03/20 0600)     Scheduled Meds:   albuterol-ipratropium  3 mL Nebulization Q6H WAKE    aspirin  81 mg Per NG tube Daily    balsam peru-castor oiL   Topical (Top) BID    escitalopram oxalate  20 mg Per G Tube Daily    famotidine  20 mg Per NG tube Daily    heparin (porcine)  5,000 Units Subcutaneous Q8H    melatonin  6 mg Per NG tube Nightly    midodrine  5 mg Oral TID    mirtazapine  7.5 mg Per NG tube QHS    multivitamin  1 tablet Oral Daily    sodium chloride 3%  4 mL Nebulization Q6H WAKE    sodium phosphate IVPB  30 mmol Intravenous Once    sodium phosphate IVPB  39.99 mmol Intravenous Once    thiamine  100 mg Per G Tube Daily     PRN Meds:acetaminophen, dextrose 50%, dextrose 50%, glucagon (human recombinant), insulin aspart U-100, labetalol, lidocaine HCL 2%, morphine, ondansetron    Family History     Problem Relation (Age of Onset)    Cancer Mother    Cataracts Sister    No Known Problems Father, Brother, Maternal Aunt, Maternal Uncle, Paternal Aunt, Paternal Uncle, Maternal Grandmother, Maternal Grandfather, Paternal Grandmother, Paternal Grandfather        Tobacco Use    Smoking status: Never Smoker    Smokeless tobacco: Never Used   Substance and Sexual Activity    Alcohol use: No    Drug use: No    Sexual activity: Not Currently       Review of Systems   Unable to perform ROS: Intubated     Objective:     Vital Signs (Most Recent):  Temp: 98.6 °F (37 °C) (11/03/20 0300)  Pulse: 108 (11/03/20 0715)  Resp: 17 (11/03/20 0715)  BP: (!) 103/51 (10/31/20 1900)  SpO2: 100 % (11/03/20 0715) Vital Signs (24h Range):  Temp:  [97.6 °F (36.4 °C)-98.6 °F (37 °C)] 98.6 °F (37 °C)  Pulse:  [] 108  Resp:  [16-18] 17  SpO2:  [99 %-100 %] 100 %  Arterial Line BP: ()/(47-67) 98/57     Weight: 70.4 kg (155 lb 3.3 oz)  Body mass index is 25.83 kg/m².    Physical Exam  Vitals signs and nursing note reviewed.   Constitutional:       Appearance: She  is ill-appearing.      Comments: Sedated, opens eyes to verbal and tactile stimuli    HENT:      Head: Normocephalic.      Nose: Nose normal.      Mouth/Throat:      Mouth: Mucous membranes are moist.      Comments: intubated  Eyes:      Pupils: Pupils are equal, round, and reactive to light.   Neck:      Musculoskeletal: Normal range of motion.      Comments:  Trialysis catheter intact   Cardiovascular:      Rate and Rhythm: Normal rate and regular rhythm.      Pulses: Normal pulses.      Heart sounds: Normal heart sounds.   Pulmonary:      Comments: Intubated   Abdominal:      General: Bowel sounds are normal.      Palpations: Abdomen is soft.      Comments: PEG intact    Musculoskeletal:      Comments: Passive range of motion    Skin:     General: Skin is warm and dry.      Coloration: Skin is pale.   Neurological:      Comments: Sedated, Follows simple commands - open eyes,  Does not attempt to answer questions     Psychiatric:         Behavior: Behavior normal.      Comments: Unable to assess judgement and content         Review of Symptoms    Symptom Assessment (ESAS 0-10 Scale)  Pain:  0  Dyspnea:  0  Anxiety:  0  Nausea:  0  Depression:  0  Anorexia:  0  Fatigue:  0  Insomnia:  0  Restlessness:  0  Agitation:  0         Comments:  Patient is intubated unable to complete ESAS, appears comfortable no facial grimacing or moaning           Performance Status:  20    Living Arrangements:  Lives with family    Psychosocial/Cultural: ,  30 plus years,  Three adult sons, several grandchildren.  Retired from [employer]  .   Continued to work [employer]. Very active in community - loved caring for her community and her family.      Spiritual:  F - Briana and Belief:  Muslim  A - Address in Care:  Amenable to  visits       Advance Care Planning   Advance Directives:   Living Will: No        Oral Declaration: No    LaPOST: No    Do Not Resuscitate Status: No    Medical Power  of : No (next of kin are three adult sons )      Decision Making:  Family answered questions and Patient unable to communicate due to disease severity/cognitive impairment         Significant Labs: All pertinent labs within the past 24 hours have been reviewed.  CBC:   Recent Labs   Lab 11/03/20 0327   WBC 11.78   HGB 7.1*   HCT 24.1*   *   *     BMP:  Recent Labs   Lab 11/03/20 0327         K 4.5      CO2 26   BUN 3*   CREATININE 0.3*   CALCIUM 9.0   MG 2.2     LFT:  Lab Results   Component Value Date    AST 34 10/27/2020    ALKPHOS 163 (H) 10/27/2020    BILITOT 0.4 10/27/2020     Albumin:   Albumin   Date Value Ref Range Status   11/03/2020 1.5 (L) 3.5 - 5.2 g/dL Final     Protein:   Total Protein   Date Value Ref Range Status   10/27/2020 4.9 (L) 6.0 - 8.4 g/dL Final     Lactic acid:   Lab Results   Component Value Date    LACTATE 3.7 (HH) 09/18/2020       Significant Imaging: I have reviewed all pertinent imaging results/findings within the past 24 hours.      > 50% of 70  min visit spent in chart review, face to face discussion of goals of care,  symptom assessment, coordination of care and emotional support.  Additional 20 ins spent in advanced care planning     Sallie Banuelos, CNS  Palliative Medicine  [facility]

## 2020-11-03 NOTE — PROGRESS NOTES
[facility]  Nephrology  Progress Note    Patient Name: Fatou Lorenzo  MRN: [id number]  Admission Date: 8/30/2020  Hospital Length of Stay: 64 days  Attending Provider: Antoni Waddell MD   Primary Care Physician: Ludin Rivas MD  Principal Problem:Leukocytosis    Subjective:     HPI: Fatou Lorenzo is a 74 yo F who had MVR + TVR to 9/9 that was complicated by RV hematoma and subsequent pericardial effusion.  She has been in the hospital since 8/31/2020 and had a complex hospital course complicated by a significant JONAH 9/18. She then developed pneumonia. He JONAH improved to baseline creat of 0.8-1 9/24. She required vats 10/5 procedure for empyema. She never had resolution of leukocytosis and has been on vanc and cefepime. On 10/14 her creat began to increase again in conjunction with elevated vancomycin levels. We could not find any episode of hypotension or afib w/ rvr around that time.  Her creat has been increasing daily since 10/14 and urine output has been decreasing for last 2 days. Nephrology consulted for JONAH.    Interval History: Seen at the bedside no event overnight       Review of patient's allergies indicates:  No Known Allergies  Current Facility-Administered Medications   Medication Frequency    acetaminophen oral solution 650 mg Q6H PRN    albuterol-ipratropium 2.5 mg-0.5 mg/3 mL nebulizer solution 3 mL Q6H WAKE    aspirin chewable tablet 81 mg Daily    balsam peru-castor oiL Oint BID    calcium gluconate 3,000 mg in dextrose 5 % 100 mL IVPB Continuous    dextrose 50% injection 12.5 g PRN    dextrose 50% injection 25 g PRN    escitalopram oxalate tablet 20 mg Daily    famotidine tablet 20 mg Daily    glucagon (human recombinant) injection 1 mg PRN    heparin (porcine) injection 5,000 Units Q8H    insulin aspart U-100 pen 0-5 Units Q6H PRN    labetalol 20 mg/4 mL (5 mg/mL) IV syring Q6H PRN    lidocaine HCL 2% jelly PRN    melatonin tablet 6 mg Nightly     midodrine tablet 5 mg TID    mirtazapine tablet 7.5 mg QHS    morphine injection 1 mg Q6H PRN    multivitamin tablet Daily    ondansetron injection 4 mg Q6H PRN    propofol (DIPRIVAN) 10 mg/mL infusion Continuous    sodium chloride 3% nebulizer solution 4 mL Q6H WAKE    thiamine tablet 100 mg Daily       Objective:     Vital Signs (Most Recent):  Temp: 98.6 °F (37 °C) (11/03/20 0300)  Pulse: 109 (11/03/20 0845)  Resp: 17 (11/03/20 0715)  BP: (!) 103/51 (10/31/20 1900)  SpO2: 100 % (11/03/20 0845)  O2 Device (Oxygen Therapy): ventilator (11/03/20 0800) Vital Signs (24h Range):  Temp:  [97.6 °F (36.4 °C)-98.6 °F (37 °C)] 98.6 °F (37 °C)  Pulse:  [] 109  Resp:  [16-18] 17  SpO2:  [99 %-100 %] 100 %  Arterial Line BP: ()/(47-67) 94/51     Weight: 70.4 kg (155 lb 3.3 oz) (11/01/20 0600)  Body mass index is 25.83 kg/m².  Body surface area is 1.8 meters squared.    I/O last 3 completed shifts:  In: 8512.1 [I.V.:5270.1; NG/GT:2750; IV Piggyback:492]  Out: 08882 [Other:75611]    Physical Exam    Significant Labs:  BMP:   Recent Labs   Lab 11/03/20  0327         CO2 26   BUN 3*   CREATININE 0.3*   CALCIUM 9.0   MG 2.2     All labs within the past 24 hours have been reviewed.     Significant Imaging:  bmp    Assessment/Plan:     JONAH (acute kidney injury)  -non oliguric kdigo stage 2 jonah likely multifactorial ATN from vancomycin toxicity, infection, low effective arterial blood volume  -ua with spec grav 1010, prot 1+, blood 3+, Sang 30  -urine microscopy with frequent muddy brown casts  -kidney US and urine lytes seen   - continue CRRT for clearance and volume management   - keep hgb>7  - Strict I/O and chart  - Avoid nephrotoxic medications, NSAIDs, IV contrast, etc.  - Daily weights and chart  - Medication doses adjusted to GFR  - Maintain MAP > 65  - Hb > 7 gm/dL  - Will follow closely        Thank you for your consult. I will follow-up with patient. Please contact us if you have any  additional questions.    Anisa Teran MD  Nephrology  [facility]

## 2020-11-03 NOTE — PROGRESS NOTES
[facility]  Palliative Medicine  Progress Note    Patient Name: Fatou Lorenzo  MRN: [id number]  Admission Date: 8/30/2020  Hospital Length of Stay: 64 days  Code Status: Full Code   Attending Provider: Antoni Waddell MD  Consulting Provider: IVONNE Samuel  Primary Care Physician: Ludin Rivas MD  Principal Problem:Leukocytosis    Patient information was obtained from patient, relative(s), past medical records and ER records.      Assessment/Plan:     Palliative care encounter  Follow up to goals of care       Mrs. Lorenzo is a 74 yo lady with extensive cardiac history.  S/P Mvr, Tvr, MAZE 9/09/2020. Now with multiple post op complications - respiratory failure: extubated and re-intubated several times, renal failure requiring CRRT, infection, debility. S/P PEG placement and artificial nutrition initiated.  She remains intubated and sedated.  At the time of this assessment she appears comfortable no facial grimacing or moaning. Much more alert and oriented  than previous encounter. Opens eyes spontaneously, follows simple commands - blink eyes, squeeze hand,  Able communicate appropriately by nodding head in agreement or shaking head to say no, mouthing words.        Advance Care Planning     - no advanced care planning documents have been received.   - next of kin for medical decision making are her three adult sons   - Family is amenable to DNR order to be written per primary team     Goals of Care   Family conference conducted by , [facility] resident, Palliative medicine APRN, staff nurse Rishi, chaplain Blake and the patient's three sons: Carter, Donenrikee, Al and daugther in law at bedside with Mrs. Lorenzo  to discuss pt's current clinical status, goals of care, treatment options, code status, long term expected outcomes and prognosis.  - Dr. Oliva provided clinical updates.    -Family with many questions - allow ample time for family to receive answers and discuss their hopes  and wishes for their mother.    - Mrs. Lorenzo remained alert, sedation decreased to allow her to participate in conversation.   - When asked by the  Mrs. Lorenzo indicates by nodding, blinking once for yes and mouthed the word yes to indicate she understands what is being discussed.   - Mrs. Lorenzo then indicated by shaking her head and mouthing word no to question of having a trach placed.   - When asked if she wanted to be be comfortable and to let her go, she indicated agreement by nodding her head, blinking once for yes and mouthing the words yes.     After a lengthy discussion concerning the pt's values and wishes the pt's family verbalize excellent understanding and insight pertaining to the pt's clinical status the following goals of care were established:    - Family appropriately tearful during this time. Family confirmed that they know this to be the patient's wishes.Family assured that her wishes will be honored.-   - family decision is to withdraw life support   - discussed withdrawal of life sustaining measures. Assured patient and family aggressive measure will be taken to ensure her comfort.  - Assured family this will not occur until the family is ready.  Briefly discussed process of withdrawing ventilator.  - Family in agreement that she will not be re-intubated    - Briefly discussed possibility of inpatient hospice as indicated.  After talking about previous extubation it was decided that Mrs. Lorenzo will remain in the ICU and continue comfort care.   - Family is amenable to DNR order - to be written per primary team and stopping CRRT     Symptom Management- Comfort Care      -  Patient is opiate naive and is in renal failure      Comfort Care Recommendations   Dyspnea and Pain   Hydromorphone 0.2 mg IVP every 15 mins as needed for pain or dyspnea  Anxiety   Lorazepam 0.5 mg IVP very 30 mins as needed for anxiety.    Withdrawal of  Life Support  Symptom Management     Dyspnea/Pain   -  Hydromorphone 0.2 mg IVP prior to extubation and  every 15 mins as needed for pain or dyspnea   If patients requires more than 4 bolus does of hydromorphone 0.2 mg every 15 mins,   Consider  Non-titratable continuous infusion of hydromorphone 0.2 mg per hour with hydromorphone 0.2 mg every 15 mins as needed for dyspnea or pain.  Bolus dose may be given through the pump     Anxiety   -Lorazepam 0.5 mg IVP prior to extubation and  Lorazepam 0.5 mg IVP every 30 mins as needed for anxiety     Secretion Control   - Glycopyrrolate 0.2 mg IVP prior to extubation and Glycopyrrolate 0.2 mg IVP every 8 hrs as needed for secretions       Plan/Recommendations    - Family is amenable to stopping CRRT  - Amenable to DNR order   -anticipate  Withdrawal of life support 11/4/2020 after additional family has arrived and patient has received anointing of the sick   - See above symptom management recommendations   -Additional comfort care and withdrawal of life support orders are available to primary care via the palliative medicine comfort care and withdrawal of life support order set.   -Please contact  at time of extubation  -Palliative medicine will continue to follow for bereavement     Primary team resident aware of goals of care and recommendations                                           I will follow-up with patient. Please contact us if you have any additional questions.    Subjective:     Chief Complaint:   Chief Complaint   Patient presents with    Shortness of Breath     Patient reports SOB that started at 2000. Patient states when she lie down it feels as if shes drowning. Patient reports being seen in ED 3 days ago for the same symptoms.       HPI:   HPI obtained from chart review:     Mrs. Lorenzo is a 76 yo lady with PMH of: with extensive cardiac hx, s/p  Mvr, Tvr, MAZE 9/09/2020. Post op period complicated by lengthy critical care stay, multiple (6)  reintubations, pericardial window for cardiac tamponade,  new arrythmia, JONAH now requiring CRRT and s/p PEG placement.  She has been liberated from critical care and shortly afterward transitioned back to CCS with increasing oxygen requirements, and  worsening right sided pleural effusion.   Ultra sound via interventional rad. Indicated multiple loculations; only able to drain  60 cc of fluid via thoracentesis.      10/5/2020 : Thoracic surgery was consulted now s/p  VATS / decortication a multiloculated pleural effusion positive for E.coli. Treated with antibiotics and was stepdown from critical care 10/12/20.  Following this white count increased overnight and was returned to CCS.  Antibiotics were broaden, a PEG placed, tube feeding initiated.  mental status improved and white count improved and she was able to step down to CSU.     Unfortunately she had additional set back -  respiratory code, was intubated and resumed critical care services.   Creatine and BUN elevated to 150/4. Nephrology was consulted started on CRRT     Palliative medicine has been consulted for goals of care and advanced care planning .        Hospital Course:  No notes on file    Interval History: no adverse events over night,  Remains intubated with CRRT continued.  Family meeting today to discuss goals of care.     Past Medical History:   Diagnosis Date    Atrial fibrillation with RVR 8/24/2020    Cataract     Essential hypertension 8/31/2020    Glaucoma     Heart valve problem     Hyperlipidemia     Severe mitral regurgitation 8/24/2020       Past Surgical History:   Procedure Laterality Date    ANKLE SURGERY      BAIN MAZE PROCEDURE N/A 9/9/2020    Procedure: BAIN MAZE PROCEDURE;  Surgeon: Antoni Waddell MD;  Location: [facility] OR [address] [place] [address];  Service: Cardiothoracic;  Laterality: N/A;  MAZE     LARYNGOSCOPY N/A 10/5/2020    Procedure: LARYNGOSCOPY, MICRO SUSPENSION WITH AUGMENTATION;  Surgeon: Yfn Mendoza MD;  Location: [facility] OR [address] [place] [address];  Service: ENT;  Laterality: N/A;    LEFT  HEART CATHETERIZATION Left 8/25/2020    Procedure: Left heart cath, radial;  Surgeon: Marlee Carrillo MD;  Location: [facility] CATH LAB;  Service: Cardiology;  Laterality: Left;    lipoma removal      THORACOSCOPIC DECORTICATION OF LUNG Right 10/5/2020    Procedure: VATS, WITH DECORTICATION, LUNG;  Surgeon: Jeremy Shine MD;  [facility];  Service: Thoracic;  Laterality: Right;    TRICUSPID VALVULOPLASTY N/A 9/9/2020    Procedure: REPAIR, TRICUSPID VALVE;  Surgeon: Antoni Waddell MD;  [facility];  Service: Cardiothoracic;  Laterality: N/A;       Review of patient's allergies indicates:  No Known Allergies    Medications:  Continuous Infusions:   calcium gluconate IVPB      dextrose-sod citrate-citric ac      propofoL 10 mcg/kg/min (11/03/20 1000)     Scheduled Meds:   albuterol-ipratropium  3 mL Nebulization Q6H WAKE    aspirin  81 mg Per NG tube Daily    balsam peru-castor oiL   Topical (Top) BID    escitalopram oxalate  20 mg Per G Tube Daily    famotidine  20 mg Per NG tube Daily    heparin (porcine)  5,000 Units Subcutaneous Q8H    melatonin  6 mg Per NG tube Nightly    midodrine  5 mg Oral TID    mirtazapine  7.5 mg Per NG tube QHS    multivitamin  1 tablet Oral Daily    sodium chloride 3%  4 mL Nebulization Q6H WAKE    sodium phosphate IVPB  15 mmol Intravenous Once    thiamine  100 mg Per G Tube Daily     PRN Meds:acetaminophen, dextrose 50%, dextrose 50%, glucagon (human recombinant), insulin aspart U-100, labetalol, lidocaine HCL 2%, morphine, ondansetron    Family History     Problem Relation (Age of Onset)    Cancer Mother    Cataracts Sister    No Known Problems Father, Brother, Maternal Aunt, Maternal Uncle, Paternal Aunt, Paternal Uncle, Maternal Grandmother, Maternal Grandfather, Paternal Grandmother, Paternal Grandfather        Tobacco Use    Smoking status: Never Smoker    Smokeless tobacco: Never Used   Substance and Sexual Activity     Alcohol use: No    Drug use: No    Sexual activity: Not Currently       Review of Systems   Unable to perform ROS: Intubated     Objective:     Vital Signs (Most Recent):  Temp: 98.7 °F (37.1 °C) (11/03/20 0900)  Pulse: 110 (11/03/20 1105)  Resp: 16 (11/03/20 1105)  BP: (!) 103/51 (10/31/20 1900)  SpO2: 100 % (11/03/20 1105) Vital Signs (24h Range):  Temp:  [97.6 °F (36.4 °C)-98.7 °F (37.1 °C)] 98.7 °F (37.1 °C)  Pulse:  [] 110  Resp:  [16-18] 16  SpO2:  [99 %-100 %] 100 %  Arterial Line BP: ()/(47-67) 104/59     Weight: 70.4 kg (155 lb 3.3 oz)  Body mass index is 25.83 kg/m².    Physical Exam  Vitals signs and nursing note reviewed.   Constitutional:       Appearance: She is ill-appearing.      Comments: Sedated, opens eyes to verbal and tactile stimuli    HENT:      Head: Normocephalic.      Nose: Nose normal.      Mouth/Throat:      Mouth: Mucous membranes are moist.      Comments: intubated  Eyes:      Pupils: Pupils are equal, round, and reactive to light.   Neck:      Musculoskeletal: Normal range of motion.      Comments:  Trialysis catheter intact   Cardiovascular:      Rate and Rhythm: Normal rate and regular rhythm.      Pulses: Normal pulses.      Heart sounds: Normal heart sounds.   Pulmonary:      Comments: Intubated   Abdominal:      General: Bowel sounds are normal.      Palpations: Abdomen is soft.      Comments: PEG intact    Musculoskeletal:      Comments: Passive range of motion    Skin:     General: Skin is warm and dry.      Coloration: Skin is pale.   Neurological:      Comments: Sedated, Follows simple commands - open eyes,  Does not attempt to answer questions     Psychiatric:         Behavior: Behavior normal.      Comments: Unable to assess judgement and content         Review of Symptoms    Symptom Assessment (ESAS 0-10 Scale)  Pain:  0  Dyspnea:  0  Anxiety:  0  Nausea:  0  Depression:  0  Anorexia:  0  Fatigue:  0  Insomnia:  0  Restlessness:  0  Agitation:  0          Comments:  Patient is intubated unable to complete ESAS, appears comfortable no facial grimacing or moaning           Performance Status:  20    Living Arrangements:  Lives with family    Psychosocial/Cultural: ,  30 plus years,  Three adult sons, several grandchildren.  Retired from [employer].   Continued to work [employer]. Very active in community - loved caring for her community and her family.      Spiritual:  F - Briana and Belief:  Anabaptism  A - Address in Care:  [place]men[place] to  visits       Advance Care Planning   Advance Directives:   Living Will: No        Oral Declaration: No    LaPOST: No    Do Not Resuscitate Status: No    Medical Power of : No (next of kin are three adult sons )      Decision Making:  Family answered questions and Patient unable to communicate due to disease severity/cognitive impairment         Significant Labs: All pertinent labs within the past 24 hours have been reviewed.  CBC:   Recent Labs   Lab 11/03/20  1057   WBC 12.30   HGB 7.2*   HCT 24.3*   *   *     BMP:  Recent Labs   Lab 11/03/20  1057         K 4.6      CO2 27   BUN 3*   CREATININE 0.3*   CALCIUM 8.9   MG 2.3     LFT:  Lab Results   Component Value Date    AST 49 (H) 11/03/2020    ALKPHOS 327 (H) 11/03/2020    BILITOT 0.4 11/03/2020     Albumin:   Albumin   Date Value Ref Range Status   11/03/2020 1.5 (L) 3.5 - 5.2 g/dL Final     Protein:   Total Protein   Date Value Ref Range Status   11/03/2020 5.0 (L) 6.0 - 8.4 g/dL Final     Lactic acid:   Lab Results   Component Value Date    LACTATE 3.7 (HH) 09/18/2020       Significant Imaging: I have reviewed all pertinent imaging results/findings within the past 24 hours.      > 50% of 65  min visit spent in chart review, face to face discussion of goals of care,  symptom assessment, coordination of care and emotional support.  - additional 30 mins spent in advanced care planning      SCOTT Vaughan, APRN, ACNS-BC  Palliative Medicine  [facility]

## 2020-11-03 NOTE — PROGRESS NOTES
"      SICU PLAN OF CARE NOTE    Dx: Leukocytosis    Shift Events: VSS; no acute events throughout shift. Palliative care consulted.    Goals of Care: MAP >65    Neuro: Sedated, Arouses to Voice, Follows Commands and Moves All Extremities    Vital Signs: BP (!) 103/51 (BP Location: Left arm, Patient Position: Lying)   Pulse 101   Temp 97.6 °F (36.4 °C) (Oral)   Resp 16   Ht 5' 5" (1.651 m)   Wt 70.4 kg (155 lb 3.3 oz)   SpO2 100%   Breastfeeding No   BMI 25.83 kg/m²     Respiratory: Ventilator    Diet: Tube Feeds    Gtts: Propfol    Urine Output: Anuric     CRRT , pt tolerates well     Labs/Accuchecks: Accu Q6, renal/mag/Ca Q8    Skin: CDI; heels, sacrum and elbows without breakdown. Pt on immerse mattress and turned Q2 to prevent breakdown. Pt given bath and linens changed on shift. Wound care done per orders.       "

## 2020-11-03 NOTE — PLAN OF CARE
SW is following this Pt for DC planning needs. There are no identified needs at this time. Discharge Disposition: see palliative medicine note from this date.         Esthela Peralta LMSW   - Case Management

## 2020-11-03 NOTE — SUBJECTIVE & OBJECTIVE
Interval History:     Past Medical History:   Diagnosis Date    Atrial fibrillation with RVR 8/24/2020    Cataract     Essential hypertension 8/31/2020    Glaucoma     Heart valve problem     Hyperlipidemia     Severe mitral regurgitation 8/24/2020       Past Surgical History:   Procedure Laterality Date    ANKLE SURGERY      BAIN MAZE PROCEDURE N/A 9/9/2020    Procedure: BAIN MAZE PROCEDURE;  Surgeon: Antoni Waddell MD;  [facility];  Service: Cardiothoracic;  Laterality: N/A;  MAZE     LARYNGOSCOPY N/A 10/5/2020    Procedure: LARYNGOSCOPY, MICRO SUSPENSION WITH AUGMENTATION;  Surgeon: Yfn Mendoza MD;  [facility];  Service: ENT;  Laterality: N/A;    LEFT HEART CATHETERIZATION Left 8/25/2020    Procedure: Left heart cath, radial;  Surgeon: Marlee Carrillo MD;  Location: [facility] CATH LAB;  Service: Cardiology;  Laterality: Left;    lipoma removal      THORACOSCOPIC DECORTICATION OF LUNG Right 10/5/2020    Procedure: VATS, WITH DECORTICATION, LUNG;  Surgeon: Jeremy Shine MD;  [facility];  Service: Thoracic;  Laterality: Right;    TRICUSPID VALVULOPLASTY N/A 9/9/2020    Procedure: REPAIR, TRICUSPID VALVE;  Surgeon: Antoni Waddell MD;  [facility];  Service: Cardiothoracic;  Laterality: N/A;       Review of patient's allergies indicates:  No Known Allergies    Medications:  Continuous Infusions:   calcium gluconate IVPB 3,000 mg (11/02/20 1837)    propofoL 10 mcg/kg/min (11/03/20 0600)     Scheduled Meds:   albuterol-ipratropium  3 mL Nebulization Q6H WAKE    aspirin  81 mg Per NG tube Daily    balsam peru-castor oiL   Topical (Top) BID    escitalopram oxalate  20 mg Per G Tube Daily    famotidine  20 mg Per NG tube Daily    heparin (porcine)  5,000 Units Subcutaneous Q8H    melatonin  6 mg Per NG tube Nightly    midodrine  5 mg Oral TID    mirtazapine  7.5 mg Per NG tube QHS    multivitamin  1 tablet Oral Daily     sodium chloride 3%  4 mL Nebulization Q6H WAKE    sodium phosphate IVPB  30 mmol Intravenous Once    sodium phosphate IVPB  39.99 mmol Intravenous Once    thiamine  100 mg Per G Tube Daily     PRN Meds:acetaminophen, dextrose 50%, dextrose 50%, glucagon (human recombinant), insulin aspart U-100, labetalol, lidocaine HCL 2%, morphine, ondansetron    Family History     Problem Relation (Age of Onset)    Cancer Mother    Cataracts Sister    No Known Problems Father, Brother, Maternal Aunt, Maternal Uncle, Paternal Aunt, Paternal Uncle, Maternal Grandmother, Maternal Grandfather, Paternal Grandmother, Paternal Grandfather        Tobacco Use    Smoking status: Never Smoker    Smokeless tobacco: Never Used   Substance and Sexual Activity    Alcohol use: No    Drug use: No    Sexual activity: Not Currently       Review of Systems   Unable to perform ROS: Intubated     Objective:     Vital Signs (Most Recent):  Temp: 98.6 °F (37 °C) (11/03/20 0300)  Pulse: 108 (11/03/20 0715)  Resp: 17 (11/03/20 0715)  BP: (!) 103/51 (10/31/20 1900)  SpO2: 100 % (11/03/20 0715) Vital Signs (24h Range):  Temp:  [97.6 °F (36.4 °C)-98.6 °F (37 °C)] 98.6 °F (37 °C)  Pulse:  [] 108  Resp:  [16-18] 17  SpO2:  [99 %-100 %] 100 %  Arterial Line BP: ()/(47-67) 98/57     Weight: 70.4 kg (155 lb 3.3 oz)  Body mass index is 25.83 kg/m².    Physical Exam  Vitals signs and nursing note reviewed.   Constitutional:       Appearance: She is ill-appearing.      Comments: Sedated, opens eyes to verbal and tactile stimuli    HENT:      Head: Normocephalic.      Nose: Nose normal.      Mouth/Throat:      Mouth: Mucous membranes are moist.      Comments: intubated  Eyes:      Pupils: Pupils are equal, round, and reactive to light.   Neck:      Musculoskeletal: Normal range of motion.      Comments:  Trialysis catheter intact   Cardiovascular:      Rate and Rhythm: Normal rate and regular rhythm.      Pulses: Normal pulses.      Heart sounds:  Normal heart sounds.   Pulmonary:      Comments: Intubated   Abdominal:      General: Bowel sounds are normal.      Palpations: Abdomen is soft.      Comments: PEG intact    Musculoskeletal:      Comments: Passive range of motion    Skin:     General: Skin is warm and dry.      Coloration: Skin is pale.   Neurological:      Comments: Sedated, Follows simple commands - open eyes,  Does not attempt to answer questions     Psychiatric:         Behavior: Behavior normal.      Comments: Unable to assess judgement and content         Review of Symptoms    Symptom Assessment (ESAS 0-10 Scale)  Pain:  0  Dyspnea:  0  Anxiety:  0  Nausea:  0  Depression:  0  Anorexia:  0  Fatigue:  0  Insomnia:  0  Restlessness:  0  Agitation:  0         Comments:  Patient is intubated unable to complete ESAS, appears comfortable no facial grimacing or moaning           Performance Status:  20    Living Arrangements:  Lives with family    Psychosocial/Cultural: ,  30 plus years,  Three adult sons, several grandchildren.  Retired from [email or web address].   Continued to work [employer]. Very active in community - loved caring for her community and her family.      Spiritual:  F - Briana and Belief:  [place]  A - Address in Care:  Amenable to  visits       Advance Care Planning   Advance Directives:   Living Will: No        Oral Declaration: No    LaPOST: No    Do Not Resuscitate Status: No    Medical Power of : No (next of kin are three adult sons )      Decision Making:  Family answered questions and Patient unable to communicate due to disease severity/cognitive impairment         Significant Labs: All pertinent labs within the past 24 hours have been reviewed.  CBC:   Recent Labs   Lab 11/03/20  0327   WBC 11.78   HGB 7.1*   HCT 24.1*   *   *     BMP:  Recent Labs   Lab 11/03/20  0327         K 4.5      CO2 26   BUN 3*   CREATININE 0.3*   CALCIUM 9.0   MG 2.2      LFT:  Lab Results   Component Value Date    AST 34 10/27/2020    ALKPHOS 163 (H) 10/27/2020    BILITOT 0.4 10/27/2020     Albumin:   Albumin   Date Value Ref Range Status   11/03/2020 1.5 (L) 3.5 - 5.2 g/dL Final     Protein:   Total Protein   Date Value Ref Range Status   10/27/2020 4.9 (L) 6.0 - 8.4 g/dL Final     Lactic acid:   Lab Results   Component Value Date    LACTATE 3.7 (HH) 09/18/2020       Significant Imaging: I have reviewed all pertinent imaging results/findings within the past 24 hours.

## 2020-11-03 NOTE — PROGRESS NOTES
CRRT:      Rinsed back. Machine changed.    SLED restarted; both ports with good flow.  UF rate set to 350-400ml/hr    Daily checks done. Orders verified.  Patient is on Citrate 200ml/hr pre filter, Calcium Gluconate 10ml/hr post filter

## 2020-11-03 NOTE — PLAN OF CARE
"      SICU PLAN OF CARE NOTE    Dx: Leukocytosis    Shift Events: No acute events overnight. All VSS at this time. Pt remains intubated, vent settings AC/VC FiO2 40%, PEEP 5.    Goals of Care: MAP > 65    Neuro: Arouses to Voice and Follows Commands    Vital Signs: BP (!) 103/51 (BP Location: Left arm, Patient Position: Lying)   Pulse 105   Temp 98.6 °F (37 °C) (Oral)   Resp 18   Ht 5' 5" (1.651 m)   Wt 70.4 kg (155 lb 3.3 oz)   SpO2 99%   Breastfeeding No   BMI 25.83 kg/m²     Respiratory: Ventilator    Diet: Tube Feeds @ 50 cc/hr    Gtts: Propofol    Urine Output: Anuric     CRRT : SLED continuous, , tolerating well     Labs/Accuchecks: Q8h renal/Mg. Accuchecks Q6h.    Skin: No skin breakdown noted. Bed plugged in with mattress inflated. Heels elevated off of bed with boots. Weight shift assistance provided throughout shift.        "

## 2020-11-03 NOTE — SUBJECTIVE & OBJECTIVE
Interval History: Seen at the bedside no event overnight       Review of patient's allergies indicates:  No Known Allergies  Current Facility-Administered Medications   Medication Frequency    acetaminophen oral solution 650 mg Q6H PRN    albuterol-ipratropium 2.5 mg-0.5 mg/3 mL nebulizer solution 3 mL Q6H WAKE    aspirin chewable tablet 81 mg Daily    balsam peru-castor oiL Oint BID    calcium gluconate 3,000 mg in dextrose 5 % 100 mL IVPB Continuous    dextrose 50% injection 12.5 g PRN    dextrose 50% injection 25 g PRN    escitalopram oxalate tablet 20 mg Daily    famotidine tablet 20 mg Daily    glucagon (human recombinant) injection 1 mg PRN    heparin (porcine) injection 5,000 Units Q8H    insulin aspart U-100 pen 0-5 Units Q6H PRN    labetalol 20 mg/4 mL (5 mg/mL) IV syring Q6H PRN    lidocaine HCL 2% jelly PRN    melatonin tablet 6 mg Nightly    midodrine tablet 5 mg TID    mirtazapine tablet 7.5 mg QHS    morphine injection 1 mg Q6H PRN    multivitamin tablet Daily    ondansetron injection 4 mg Q6H PRN    propofol (DIPRIVAN) 10 mg/mL infusion Continuous    sodium chloride 3% nebulizer solution 4 mL Q6H WAKE    thiamine tablet 100 mg Daily       Objective:     Vital Signs (Most Recent):  Temp: 98.6 °F (37 °C) (11/03/20 0300)  Pulse: 109 (11/03/20 0845)  Resp: 17 (11/03/20 0715)  BP: (!) 103/51 (10/31/20 1900)  SpO2: 100 % (11/03/20 0845)  O2 Device (Oxygen Therapy): ventilator (11/03/20 0800) Vital Signs (24h Range):  Temp:  [97.6 °F (36.4 °C)-98.6 °F (37 °C)] 98.6 °F (37 °C)  Pulse:  [] 109  Resp:  [16-18] 17  SpO2:  [99 %-100 %] 100 %  Arterial Line BP: ()/(47-67) 94/51     Weight: 70.4 kg (155 lb 3.3 oz) (11/01/20 0600)  Body mass index is 25.83 kg/m².  Body surface area is 1.8 meters squared.    I/O last 3 completed shifts:  In: 8512.1 [I.V.:5270.1; NG/GT:2750; IV Piggyback:492]  Out: 92223 [Other:71569]    Physical Exam    Significant Labs:  BMP:   Recent Labs   Lab  11/03/20  0327         CO2 26   BUN 3*   CREATININE 0.3*   CALCIUM 9.0   MG 2.2     All labs within the past 24 hours have been reviewed.     Significant Imaging:  bmp

## 2020-11-03 NOTE — PROGRESS NOTES
[facility]  Critical Care - Surgery  Progress Note    Patient Name: Fatou Lorenzo  MRN: [id number]  Admission Date: 8/30/2020  Hospital Length of Stay: 64 days  Code Status: Full Code  Attending Provider: Antoni Waddell MD  Primary Care Provider: Ludin Rivas MD   Principal Problem: Leukocytosis    Subjective:     Hospital/ICU Course:  No notes on file    Interval History/Significant Events: NAEON. Ongoing conversations had with  and medical team regarding goals of care, potential tracheostomy placement? Palliative was consulted, family meeting today.   Stable otherwise, nephrology following.     Follow-up For: Procedure(s) (LRB):  INSERTION, PEG TUBE (N/A)    Post-Operative Day: 11 Days Post-Op    Objective:     Vital Signs (Most Recent):  Temp: 98.6 °F (37 °C) (11/03/20 0300)  Pulse: 106 (11/03/20 0600)  Resp: 18 (11/03/20 0019)  BP: (!) 103/51 (10/31/20 1900)  SpO2: 100 % (11/03/20 0600) Vital Signs (24h Range):  Temp:  [97.6 °F (36.4 °C)-98.7 °F (37.1 °C)] 98.6 °F (37 °C)  Pulse:  [] 106  Resp:  [16-22] 18  SpO2:  [99 %-100 %] 100 %  Arterial Line BP: ()/(47-67) 98/57     Weight: 70.4 kg (155 lb 3.3 oz)  Body mass index is 25.83 kg/m².      Intake/Output Summary (Last 24 hours) at 11/3/2020 0656  Last data filed at 11/3/2020 0600  Gross per 24 hour   Intake 4873.9 ml   Output 7035 ml   Net -2161.1 ml       Physical Exam  Vitals signs and nursing note reviewed.   HENT:      Head: Normocephalic and atraumatic.   Neck:      Comments: R. IJ trialysis   Cardiovascular:      Rate and Rhythm: Normal rate and regular rhythm.      Comments: Midline sternotomy with clean, dry, and intact, without evidence of infection  Prior chest tubes sites with dressings in place, clean and dry  Pulmonary:      Effort: Pulmonary effort is normal.      Comments: Intubated and ventilated  AC/VC  R 16    FiO2 40  PEEP 5  Chest hematoma stbale   Abdominal:      General: Abdomen is flat.       Palpations: Abdomen is soft.      Comments: g tube in place, TF running @ 50 cc/hr    Neurological:      General: No focal deficit present.      Comments: Responds to questions and commands           Vents:  Vent Mode: A/C (11/03/20 0432)  Ventilator Initiated: Yes (10/28/20 1840)  Set Rate: 16 BPM (11/03/20 0432)  Vt Set: 390 mL (11/03/20 0432)  Pressure Support: 5 cmH20 (10/28/20 1409)  PEEP/CPAP: 5 cmH20 (11/03/20 0432)  Oxygen Concentration (%): 40 (11/03/20 0600)  Peak Airway Pressure: 30 cmH2O (11/03/20 0432)  Plateau Pressure: 20 cmH20 (11/03/20 0432)  Total Ve: 7.17 mL (11/03/20 0432)  Negative Inspiratory Force (cm H2O): -21 (10/08/20 1452)  F/VT Ratio<105 (RSBI): (!) 34.48 (11/03/20 0019)    Lines/Drains/Airways     Central Venous Catheter Line            Trialysis (Dialysis) Catheter 10/27/20 1400 right internal jugular 6 days          Drain                 Gastrostomy/Enterostomy 10/21/20 1304 Percutaneous endoscopic gastrostomy (PEG) feeding 12 days          Airway                 Airway - Non-Surgical 10/28/20 1835 Endotracheal Tube 5 days          Arterial Line            Arterial Line 10/27/20 1500 Right Radial 6 days          Peripheral Intravenous Line                 Midline Catheter Insertion/Assessment  - Single Lumen 10/19/20 1654 brachial vein 18g x 10cm 14 days                Significant Labs:    CBC/Anemia Profile:  Recent Labs   Lab 11/02/20  0438 11/03/20  0327   WBC 10.79 11.78   HGB 7.3* 7.1*   HCT 24.1* 24.1*   * 122*   * 102*   RDW 18.2* 18.3*        Chemistries:  Recent Labs   Lab 11/02/20  1355 11/02/20  2220 11/03/20  0327    138 139   K 4.7 4.6 4.5    107 106   CO2 27 25 26   BUN 3* 3* 3*   CREATININE 0.3* 0.3* 0.3*   CALCIUM 9.7 9.2 9.0   ALBUMIN 1.4* 1.3* 1.5*   MG 2.5 2.3 2.2   PHOS 1.1* 2.2* 1.2*  1.2*       ABGs:   Recent Labs   Lab 11/02/20  0348   PH 7.420   PCO2 40.0   HCO3 25.9   POCSATURATED 99   BE 1     CMP:   Recent Labs   Lab  11/02/20  1355 11/02/20  2220 11/03/20  0327    138 139   K 4.7 4.6 4.5    107 106   CO2 27 25 26    111* 105   BUN 3* 3* 3*   CREATININE 0.3* 0.3* 0.3*   CALCIUM 9.7 9.2 9.0   ALBUMIN 1.4* 1.3* 1.5*   ANIONGAP 5* 6* 7*   EGFRNONAA >60.0 >60.0 >60.0     POCT Glucose:   Recent Labs   Lab 11/02/20  0849 11/02/20  2109 11/03/20  0311   POCTGLUCOSE 108 118* 121*     All pertinent labs within the past 24 hours have been reviewed.    Significant Imaging:  I have reviewed all pertinent imaging results/findings within the past 24 hours.    Assessment/Plan:     Severe mitral regurgitation  Fatou Lorenzo is a 75 y.o. female s/p MVr, TVr 9/9/2020. Case noteworthy for multiple pump runs (3) and RV hematoma due to retraction. Unstable 9/18, required pericardial window for evac of posterior cardiac tamponade. Respiratory distress and so re-admitted to SICU on 10/2 now s/p VATS on 10/5. Readmitted on 10/27/20 for respiratory failure.      Neuro:  - Intubated and Sedated; propofol    - PRN liquid tylenol and meds through g tube   - dilaudid prn  - rousable and responds appropriately       CV:  S/p MVr, TVr, MAZE 9/9/20. S/p bedside pericardial window 9/18  - Keep MAPs >60. HDS off pressors   - Holding metop for now - labetalol IV PRN for SBP > 160  - midodrine 5 mg TID      Pulm:   - Loculated pleural effusion s/p VATS on 10/5  - Intubated 10/27/20  - Scheduled duo and saline nebs  - CXR and ABG PRN  - HOB >30 deg  - Finished one month of antibiotics   - Patient will need a trach --> discussing with family today  - Daily CXR  - minimal vent settings      Renal:  - Continue to monitor UOP - minimal   - Nephrology following   - Continue CRRT         FEN/GI:  - Do not replace lytes   - Tf through g tube @50 cc/hr  - FW flushes to 400 Q8    - Famotidine and bowel regimen      Heme/Onc:  - H/H stable  - No evidence of bleeding  - TBD re-start of heparin gtt      ID:   - WBC 11.7  - Afebrile  - S/p 7 days of cefepime  vanc for enterococcus cloacae UTI. Also has e coli growing from pleural cultures.     - Started on Cefepime fluconazole re-started 10/14/20. Candida on BAL     - Finished 1 month treatment of cefepime   - BC: No growth to date      Endo:  - no hx of DM  - ISS     PPx:  - famotidine, SCD,      Dispo: SICU    Extensive conversation with the son, questions were answered.   Family meeting today for goals of care          Critical care was time spent personally by me on the following activities: development of treatment plan with patient or surrogate and bedside caregivers, discussions with consultants, evaluation of patient's response to treatment, examination of patient, ordering and performing treatments and interventions, ordering and review of laboratory studies, ordering and review of radiographic studies, pulse oximetry, re-evaluation of patient's condition.  This critical care time did not overlap with that of any other provider or involve time for any procedures.     Mir Croft MD  Critical Care - Surgery  [facility]

## 2020-11-03 NOTE — ASSESSMENT & PLAN NOTE
Palliative medicine consult received for goals of care.  Chart has been reviewed and patient discussed with primary team Resident via secure chat message.  Dr. Ferrer attending unavailable.      Mrs. Lorenzo is a 74 yo lady with extensive cardiac history.  S/P Mvr, Tvr, MAZE 9/09/2020. Now with multiple post op complications - respiratory failure: extubated and re-intubated several times, renal failure requiring CRRT, infection, debility. S/P PEG placement and artificial nutrition initiated.  She remains intubated and sedated as she is known to bite on the ET tube.  At the time of this assessment she appears comfortable no facial grimacing or moaning. Follows simple commands - open eyes.  She does not appear able to answer questions.      Advance Care Planning     - no advanced care planning documents have been received.   - next of kin for medical decision making are her three adult sons   - full code per primary team    Goals of Care:   -Palliative medicine APRN met with patient's son as Mrs. Lorenzo is unable to participate in conversation.   - SonCarter states the family has complete understanding of the current situation.  Family's understanding is she will require long term dialysis and tube feedings.   - States the patient had spoken about her goals of care and would not ever consent to long term life support or long term nursing facility care.    - SonCarter, states the family has understanding of the current clinical condition. The sons do not want their mother to die however, she, Mrs. Lorenzo would not choose to live without quality of life.    -Carter states the family is in complete agreement in their decision not to proceed to with trach.    - Family understands without having a trach, she will not be able to remain intubated indefinitely.  The family understands they will have to make decisions regarding withdrawal of life support.     Plan/Recommendations  - Continue current plan of care - remains  fullcode  - Family is amenable to having family meeting with three sons, primary team and palliative med  - This has been scheduled for Tues 11/3/2020 at 1:30 PM.  Primary team resident states will be present.  - Palliative medicine will continue to follow for goals of care and advanced care planning.     Primary team attending and resident notified of the above goals of care via secure chat message.

## 2020-11-03 NOTE — PROGRESS NOTES
Pt following commands, nods head approiatley, VSS. HR 70-89s NSR, MAPs>65, Afebrile, 02 sats remain >99% on 50/5 AC/VC. Propofol gtt currently @ 20mcg/kg/min. Pt tolerating tube feeds, no residual, +BM during shift. Patient anuric. Pt bathed, linen changes, Venelex applied to sacrum. Plan is to withdraw tomorrow morning, will continue to keep patient comfortable.

## 2020-11-03 NOTE — ASSESSMENT & PLAN NOTE
Follow up to goals of care       Mrs. Lorenzo is a 74 yo lady with extensive cardiac history.  S/P Mvr, Tvr, MAZE 9/09/2020. Now with multiple post op complications - respiratory failure: extubated and re-intubated several times, renal failure requiring CRRT, infection, debility. S/P PEG placement and artificial nutrition initiated.  She remains intubated and sedated.  At the time of this assessment she appears comfortable no facial grimacing or moaning. Much more alert and oriented  than previous encounter. Opens eyes spontaneously, follows simple commands - blink eyes, squeeze hand,  Able communicate appropriately by nodding head in agreement or shaking head to say no, mouthing words.        Advance Care Planning     - no advanced care planning documents have been received.   - next of kin for medical decision making are her three adult sons   - Family is amenable to DNR order to be written per primary team     Goals of Care   Family conference conducted by , [facility] resident, Palliative medicine APRN, staff nurse Rishi,  Linda and the patient's three sons: Carter, Teri, Al and daugther in law at bedside with Mrs. Lorenzo  to discuss pt's current clinical status, goals of care, treatment options, code status, long term expected outcomes and prognosis.  - Dr. Oliva provided clinical updates.    -Family with many questions - allow ample time for family to receive answers and discuss their hopes and wishes for their mother.    - Mrs. Lorenzo remained alert, sedation decreased to allow her to participate in conversation.   - When asked by the  Mrs. Lorenzo indicates by nodding, blinking once for yes and mouthed the word yes to indicate she understands what is being discussed.   - Mrs. Lorenzo then indicated by shaking her head and mouthing word no to question of having a trach placed.   - When asked if she wanted to be be comfortable and to let her go, she indicated agreement by nodding her head, blinking  once for yes and mouthing the words yes.     After a lengthy discussion concerning the pt's values and wishes the pt's family verbalize excellent understanding and insight pertaining to the pt's clinical status the following goals of care were established:    - Family appropriately tearful during this time. Family confirmed that they know this to be the patient's wishes.Family assured that her wishes will be honored.-   - family decision is to withdraw life support   - discussed withdrawal of life sustaining measures. Assured patient and family aggressive measure will be taken to ensure her comfort.  - Assured family this will not occur until the family is ready.  Briefly discussed process of withdrawing ventilator.  - Family in agreement that she will not be re-intubated    - Briefly discussed possibility of inpatient hospice as indicated.  After talking about previous extubation it was decided that Mrs. Lorenzo will remain in the ICU and continue comfort care.   - Family is amenable to DNR order - to be written per primary team and stopping CRRT     Symptom Management- Comfort Care      -  Patient is opiate naive and is in renal failure      Comfort Care Recommendations   Dyspnea and Pain   Hydromorphone 0.2 mg IVP every 15 mins as needed for pain or dyspnea  Anxiety   Lorazepam 0.5 mg IVP very 30 mins as needed for anxiety.    Withdrawal of  Life Support  Symptom Management     Dyspnea/Pain   - Hydromorphone 0.2 mg IVP prior to extubation and  every 15 mins as needed for pain or dyspnea   If patients requires more than 4 bolus does of hydromorphone 0.2 mg every 15 mins,   Consider  Non-titratable continuous infusion of hydromorphone 0.2 mg per hour with hydromorphone 0.2 mg every 15 mins as needed for dyspnea or pain.  Bolus dose may be given through the pump     Anxiety   -Lorazepam 0.5 mg IVP prior to extubation and  Lorazepam 0.5 mg IVP every 30 mins as needed for anxiety     Secretion Control   - Glycopyrrolate  0.2 mg IVP prior to extubation and Glycopyrrolate 0.2 mg IVP every 8 hrs as needed for secretions       Plan/Recommendations    - Family is amenable to stopping CRRT  - Amenable to DNR order   -anticipate  Withdrawal of life support 11/4/2020 after additional family has arrived and patient has received anointing of the sick   - See above symptom management recommendations   -Additional comfort care and withdrawal of life support orders are available to primary care via the palliative medicine comfort care and withdrawal of life support order set.   -Please contact  at time of extubation  -Palliative medicine will continue to follow for bereavement     Primary team resident aware of goals of care and recommendations

## 2020-11-04 PROBLEM — Z51.5 COMFORT MEASURES ONLY STATUS: Status: ACTIVE | Noted: 2020-01-01

## 2020-11-04 NOTE — PLAN OF CARE
"      SICU PLAN OF CARE NOTE    Dx: Leukocytosis    Goals of Care: MAP >60    Neuro: Arouses to Voice, Follows Commands, and Moves All Extremities    Vital Signs: BP (!) 103/51 (BP Location: Left arm, Patient Position: Lying)   Pulse 79   Temp 98.8 °F (37.1 °C) (Oral)   Resp (!) 22   Ht 5' 5" (1.651 m)   Wt 70.4 kg (155 lb 3.3 oz)   SpO2 100%   Breastfeeding No   BMI 25.83 kg/m²     Respiratory: Ventilator    Diet: Tube Feeds    Gtts: Propfol    Urine Output: Anuric    CRRT SLED  -400, Citrate 200 cc/hr, Calcium gluconate 10 cc/hr     Labs/Accuchecks: Daily labs, accuchecks q6h.    Skin: venelex applied to sacrum, heel boots applied, bed plugged in and working properly, pt turned q2h       "

## 2020-11-04 NOTE — PROGRESS NOTES
[facility]  Critical Care - Surgery  Progress Note    Patient Name: Fatou Lorenzo  MRN: [id number]  Admission Date: 8/30/2020  Hospital Length of Stay: 65 days  Code Status: DNR  Attending Provider: Antoni Waddell MD  Primary Care Provider: Ludin Rivas MD   Principal Problem: Leukocytosis    Subjective:     Hospital/ICU Course:  No notes on file    Interval History/Significant Events: NAEON. Ongoing conversations had with sons and medical team regarding goals of care, patient was made DNR overnight.     After discussion with the family and the palliative care, the decision was made to continue to withdraw support. We explained to family that she can recover from this and she is terrible debilitated. However, next of kin for medical decision (her three adult sons) wants to proceed with comfort care and doing a palliative extubation.     Follow-up For: Procedure(s) (LRB):  INSERTION, PEG TUBE (N/A)    Post-Operative Day: 11 Days Post-Op    Objective:     Vital Signs (Most Recent):  Temp: 97.9 °F (36.6 °C) (11/04/20 1100)  Pulse: 106 (11/04/20 1351)  Resp: (!) 27 (11/04/20 1351)  BP: (!) 103/51 (10/31/20 1900)  SpO2: 100 % (11/04/20 1351) Vital Signs (24h Range):  Temp:  [97.9 °F (36.6 °C)-98.8 °F (37.1 °C)] 97.9 °F (36.6 °C)  Pulse:  [] 106  Resp:  [16-27] 27  SpO2:  [98 %-100 %] 100 %  Arterial Line BP: ()/(39-67) 85/51     Weight: 70.4 kg (155 lb 3.3 oz)  Body mass index is 25.83 kg/m².      Intake/Output Summary (Last 24 hours) at 11/4/2020 1428  Last data filed at 11/4/2020 1200  Gross per 24 hour   Intake 5502.3 ml   Output 5219 ml   Net 283.3 ml       Physical Exam  Vitals signs and nursing note reviewed.   HENT:      Head: Normocephalic and atraumatic.   Neck:      Comments: R. IJ trialysis   Cardiovascular:      Rate and Rhythm: Normal rate and regular rhythm.      Comments: Midline sternotomy with clean, dry, and intact, without evidence of infection  Prior chest tubes  sites with dressings in place, clean and dry  Pulmonary:      Effort: Pulmonary effort is normal.      Comments: Intubated and ventilated  AC/VC  R 16    FiO2 40  PEEP 5  Chest hematoma stbale   Abdominal:      General: Abdomen is flat.      Palpations: Abdomen is soft.      Comments: g tube in place, TF running @ 50 cc/hr    Neurological:      General: No focal deficit present.      Comments: Responds to questions and commands           Vents:  Vent Mode: Spont (11/04/20 1351)  Ventilator Initiated: Yes(chart correction) (10/28/20 1840)  Set Rate: 16 BPM (11/03/20 1105)  Vt Set: 390 mL (11/03/20 1105)  Pressure Support: 10 cmH20 (11/04/20 1351)  PEEP/CPAP: 5 cmH20 (11/04/20 1351)  Oxygen Concentration (%): 40 (11/04/20 1351)  Peak Airway Pressure: 15 cmH2O (11/04/20 1351)  Plateau Pressure: 19 cmH20 (11/04/20 1351)  Total Ve: 4.29 mL (11/04/20 1351)  Negative Inspiratory Force (cm H2O): -21 (10/08/20 1452)  F/VT Ratio<105 (RSBI): (!) 40.51 (11/03/20 1105)    Lines/Drains/Airways     Central Venous Catheter Line            Trialysis (Dialysis) Catheter 10/27/20 1400 right internal jugular 8 days          Drain                 Gastrostomy/Enterostomy 10/21/20 1304 Percutaneous endoscopic gastrostomy (PEG) feeding 14 days          Airway                 Airway - Non-Surgical 10/28/20 1835 Endotracheal Tube 6 days          Arterial Line            Arterial Line 10/27/20 1500 Right Radial 8 days          Peripheral Intravenous Line                 Midline Catheter Insertion/Assessment  - Single Lumen 10/19/20 1654 brachial vein 18g x 10cm 15 days                Significant Labs:    CBC/Anemia Profile:  Recent Labs   Lab 11/03/20  0327 11/03/20  1057 11/04/20  0339   WBC 11.78 12.30 10.02   HGB 7.1* 7.2* 7.1*   HCT 24.1* 24.3* 24.6*   * 121* 124*   * 102* 103*   RDW 18.3* 18.1* 18.1*        Chemistries:  Recent Labs   Lab 11/03/20  1057 11/03/20  2154 11/04/20  0339    143 140   K 4.6 4.3 4.2     106 104   CO2 27 30* 29   BUN 3* 4* 2*   CREATININE 0.3* 0.3* 0.3*   CALCIUM 8.9 9.3 9.1   ALBUMIN 1.5* 1.5* 1.5*   PROT 5.0*  --   --    BILITOT 0.4  --   --    ALKPHOS 327*  --   --    ALT 48*  --   --    AST 49*  --   --    MG 2.3 2.4 2.4   PHOS 2.2* 1.4* 1.6*  1.6*       ABGs:   No results for input(s): PH, PCO2, HCO3, POCSATURATED, BE in the last 48 hours.  CMP:   Recent Labs   Lab 11/03/20  1057 11/03/20  2154 11/04/20  0339    143 140   K 4.6 4.3 4.2    106 104   CO2 27 30* 29    99 100   BUN 3* 4* 2*   CREATININE 0.3* 0.3* 0.3*   CALCIUM 8.9 9.3 9.1   PROT 5.0*  --   --    ALBUMIN 1.5* 1.5* 1.5*   BILITOT 0.4  --   --    ALKPHOS 327*  --   --    AST 49*  --   --    ALT 48*  --   --    ANIONGAP 8 7* 7*   EGFRNONAA >60.0 >60.0 >60.0     POCT Glucose:   Recent Labs   Lab 11/03/20  1748 11/03/20  2109 11/04/20  0341   POCTGLUCOSE 157* 110 140*     All pertinent labs within the past 24 hours have been reviewed.    Significant Imaging:  I have reviewed all pertinent imaging results/findings within the past 24 hours.    Assessment/Plan:     Severe mitral regurgitation  Fatou Lorenzo is a 75 y.o. female s/p MVr, TVr 9/9/2020. Case noteworthy for multiple pump runs (3) and RV hematoma due to retraction. Unstable 9/18, required pericardial window for evac of posterior cardiac tamponade. Respiratory distress and so re-admitted to SICU on 10/2 now s/p VATS on 10/5. Readmitted on 10/27/20 for respiratory failure.      Neuro:  - Intubated and Sedated; propofol    - PRN liquid tylenol and meds through g tube   - dilaudid prn  - rousable and responds appropriately       CV:  S/p MVr, TVr, MAZE 9/9/20. S/p bedside pericardial window 9/18  - Keep MAPs >60. HDS off pressors   - Holding metop for now - labetalol IV PRN for SBP > 160  - midodrine 5 mg TID      Pulm:   - Loculated pleural effusion s/p VATS on 10/5  - Intubated 10/27/20  - Scheduled duo and saline nebs  - CXR and ABG PRN  - HOB >30  deg  - Finished one month of antibiotics   - Daily CXR  - minimal vent settings       Renal:  - Continue to monitor UOP - minimal   - Nephrology following   - Continue CRRT         FEN/GI:  - Do not replace lytes   - Tf through g tube @50 cc/hr  - FW flushes to 400 Q8    - Famotidine and bowel regimen      Heme/Onc:  - H/H stable  - No evidence of bleeding  - TBD re-start of heparin gtt      ID:   - WBC 11.7  - Afebrile  - S/p 7 days of cefepime vanc for enterococcus cloacae UTI. Also has e coli growing from pleural cultures.     - Started on Cefepime fluconazole re-started 10/14/20. Candida on BAL     - Finished 1 month treatment of cefepime   - BC: No growth to date      Endo:  - no hx of DM  - ISS     PPx:  - famotidine, SCD,      Dispo:   Comfort care, palliative care will get involved and manage the sedation and withdrawal process.        Critical care was time spent personally by me on the following activities: development of treatment plan with patient or surrogate and bedside caregivers, discussions with consultants, evaluation of patient's response to treatment, examination of patient, ordering and performing treatments and interventions, ordering and review of laboratory studies, ordering and review of radiographic studies, pulse oximetry, re-evaluation of patient's condition.  This critical care time did not overlap with that of any other provider or involve time for any procedures.     Mir Croft MD  Critical Care - Surgery  [facility]

## 2020-11-04 NOTE — PROGRESS NOTES
"[facility]  Adult Nutrition  Progress Note    SUMMARY       Recommendations    1.) Continue Peptamen Intense VHP; goal rate at 50mL/hr (per Dr. Troy). EN provides 1200kcal, 110gm of protein, 1008mL of free water. Add free water per MD recommendations.     Goals: Pt to meet >75% of estimated energy and protein needs over the course of 7 days.   Nutrition Goal Status: goal met  Communication of RD Recs: (POC)    Reason for Assessment    Reason For Assessment: RD follow-up  Diagnosis: other (see comments)(Acute respiratory failure with hypoxia)  Relevant Medical History: atrial fibrillation with RVR, essential HTN, heart valve problem, HLD  Interdisciplinary Rounds: did not attend  General Information Comments: Pt s/p Mvr, Tvr, Mahmood Maze. NFPE completed 10/22, pt meets ASPEN guidelines for severe malnutrition due to acute illness. Pt reports pt is tolerated TF at goal rate of 50mL/hr. Pt sedated, intubated and on mechanical ventilation. GI- LBM 11/3, s/p PEG.   Nutrition Discharge Planning: Pt to meet adequate energy and protien needs.     Nutrition Risk Screen    Nutrition Risk Screen: tube feeding or parenteral nutrition    Nutrition/Diet History    Spiritual, Cultural Beliefs, Islam Practices, Values that Affect Care: no  Factors Affecting Nutritional Intake: NPO, on mechanical ventilation    Anthropometrics    Temp: 97.9 °F (36.6 °C)  Height Method: Stated  Height: 5' 5" (165.1 cm)  Height (inches): 65 in  Weight Method: Bed Scale  Weight: 70.4 kg (155 lb 3.3 oz)  Weight (lb): 155.21 lb  Ideal Body Weight (IBW), Female: 125 lb  % Ideal Body Weight, Female (lb): 147.2 %  BMI (Calculated): 25.8  Weight Change Amount: 37 lb 7.7 oz(19% sin e10/11)       Lab/Procedures/Meds    Pertinent Labs Reviewed: reviewed  Pertinent Labs Comments: Hgb 7.1, Hct 24.6, BUN 2, Cr 0.3, Phosphorus 1.6, Alb 1.5  Pertinent Medications Reviewed: reviewed  Pertinent Medications Comments: Albuterol, famotidine, " heparin, MVI, thiamine    Physical Findings/Assessment     Edema 2+ generalized    Estimated/Assessed Needs    Weight Used For Calorie Calculations: 72 kg (158 lb 11.7 oz)  Energy Calorie Requirements (kcal): 1084  [facility] Need Method: [facility]  Protein Requirements: 86-108gm (1.2-1.5gm/kg)  Weight Used For Protein Calculations: 72 kg (158 lb 11.7 oz)  Fluid Requirements (mL): per MD or 1 mL/kcal     RDA Method (mL): 1084         Nutrition Prescription Ordered    Current Diet Order: NPO (10/5)  Current Nutrition Support Formula Ordered: Peptamen Intense VHP  Current Nutrition Support Rate Ordered: 50 (ml)  Current Nutrition Support Frequency Ordered: mL/hr    Evaluation of Received Nutrient/Fluid Intake    Enteral Calories (kcal): 1200  Enteral Protein (gm): 110  Enteral (Free Water) Fluid (mL): 1008  Other Calories (kcal): 113(Propofol)  I/O: I: 6102; O: 7368; +448mL since 10/21  Energy Calories Required: exceeds needs  Protein Required: exceeds needs  Fluid Required: (per MD)  Comments: LBM 11/3  Tolerance: tolerating  % Intake of Estimated Energy Needs: 121  % Meal Intake: 0    Nutrition Risk    Level of Risk/Frequency of Follow-up: high , 2x weekly    Assessment and Plan     Nutrition Problem  Malnutrition (Severe)  In the context of acute illness     Related to (etiology):   Physiological causes increasing nutrient need do to acute illness     Signs and Symptoms (as evidenced by):   Malnutrition Assessment:  Body Fat Depletion: severe depletion of orbital   Muscle Mass Depletion: severe depletion of temple   Weight Loss: 10% x 2 months  Fluid Accumulation: moderately severe        Interventions(treatment strategy):  1.) Collaboration with other providers  2.) Enteral nutrition     Nutrition Diagnosis Status:   ongoing      Monitor and Evaluation    Food and Nutrient Intake: enteral nutrition intake  Food and Nutrient Adminstration: enteral and parenteral nutrition administration  Knowledge/Beliefs/Attitudes:  food and nutrition knowledge/skill, beliefs and attitudes  Anthropometric Measurements: body mass index, weight change, weight  Biochemical Data, Medical Tests and Procedures: electrolyte and renal panel, gastrointestinal profile, glucose/endocrine profile, inflammatory profile, lipid profile  Nutrition-Focused Physical Findings: overall appearance     Malnutrition Assessment  Malnutrition Type: acute illness or injury              Orbital Region (Subcutaneous Fat Loss): severe depletion  Upper Arm Region (Subcutaneous Fat Loss): mild depletion   [place] Region (Muscle Loss): severe depletion  Clavicle Bone Region (Muscle Loss): moderate depletion  Clavicle and Acromion Bone Region (Muscle Loss): moderate depletion  Dorsal Hand (Muscle Loss): mild depletion  Anterior Thigh Region (Muscle Loss): well nourished  Posterior Calf Region (Muscle Loss): well nourished       Subcutaneous Fat Loss (Final Summary): severe protein-calorie malnutrition  Muscle Loss Evaluation (Final Summary): severe protein-calorie malnutrition    Severe Weight Loss (Malnutrition): greater than 5% in 1 month    Nutrition Follow-Up    RD Follow-up?: Yes

## 2020-11-04 NOTE — PLAN OF CARE
SW is following this Pt for DC planning needs. There are no identified needs at this time. Discharge Disposition: palliative extubation this date.     Esthela Peralta LMSW   - Case Management

## 2020-11-04 NOTE — SUBJECTIVE & OBJECTIVE
Interval History: no adverse events over night,  Remains intubated with CRRT continued.dnr per primary team, anticipate palliative extubation 11/4/2020 as established in goals of care meeting.      Past Medical History:   Diagnosis Date    Atrial fibrillation with RVR 8/24/2020    Cataract     Essential hypertension 8/31/2020    Glaucoma     Heart valve problem     Hyperlipidemia     Severe mitral regurgitation 8/24/2020       Past Surgical History:   Procedure Laterality Date    ANKLE SURGERY      BAIN MAZE PROCEDURE N/A 9/9/2020    Procedure: BAIN MAZE PROCEDURE;  Surgeon: Antoni Waddell MD;  [facility];  Service: Cardiothoracic;  Laterality: N/A;  MAZE     LARYNGOSCOPY N/A 10/5/2020    Procedure: LARYNGOSCOPY, MICRO SUSPENSION WITH AUGMENTATION;  Surgeon: Yfn Mendoza MD;  [facility];  Service: ENT;  Laterality: N/A;    LEFT HEART CATHETERIZATION Left 8/25/2020    Procedure: Left heart cath, radial;  Surgeon: Marlee Carrillo MD;  Location: [facility] CATH LAB;  Service: Cardiology;  Laterality: Left;    lipoma removal      THORACOSCOPIC DECORTICATION OF LUNG Right 10/5/2020    Procedure: VATS, WITH DECORTICATION, LUNG;  Surgeon: Jeremy Shine MD;  [facility];  Service: Thoracic;  Laterality: Right;    TRICUSPID VALVULOPLASTY N/A 9/9/2020    Procedure: REPAIR, TRICUSPID VALVE;  Surgeon: Antoni Waddell MD;  [facility];  Service: Cardiothoracic;  Laterality: N/A;       Review of patient's allergies indicates:  No Known Allergies    Medications:  Continuous Infusions:   calcium gluconate IVPB 3,000 mg (11/04/20 0641)    propofoL 15 mcg/kg/min (11/04/20 1200)     Scheduled Meds:   albuterol-ipratropium  3 mL Nebulization Q6H WAKE    aspirin  81 mg Per NG tube Daily    balsam peru-castor oiL   Topical (Top) BID    escitalopram oxalate  20 mg Per G Tube Daily    famotidine  20 mg Per NG tube Daily    heparin (porcine)  5,000  Units Subcutaneous Q8H    HYDROmorphone  0.5 mg Intravenous Once    lorazepam        melatonin  6 mg Per NG tube Nightly    midodrine  5 mg Oral TID    mirtazapine  7.5 mg Per NG tube QHS    multivitamin  1 tablet Oral Daily    sodium chloride 3%  4 mL Nebulization Q6H WAKE    sodium phosphate IVPB  15 mmol Intravenous Once    thiamine  100 mg Per G Tube Daily     PRN Meds:acetaminophen, dextrose 50%, dextrose 50%, glucagon (human recombinant), haloperidol lactate, HYDROmorphone, insulin aspart U-100, labetalol, lidocaine HCL 2%, lorazepam, morphine, ondansetron    Family History     Problem Relation (Age of Onset)    Cancer Mother    Cataracts Sister    No Known Problems Father, Brother, Maternal Aunt, Maternal Uncle, Paternal Aunt, Paternal Uncle, Maternal Grandmother, Maternal Grandfather, Paternal Grandmother, Paternal Grandfather        Tobacco Use    Smoking status: Never Smoker    Smokeless tobacco: Never Used   Substance and Sexual Activity    Alcohol use: No    Drug use: No    Sexual activity: Not Currently       Review of Systems   Unable to perform ROS: Intubated     Objective:     Vital Signs (Most Recent):  Temp: 97.9 °F (36.6 °C) (11/04/20 1100)  Pulse: 106 (11/04/20 1351)  Resp: (!) 27 (11/04/20 1351)  BP: (!) 103/51 (10/31/20 1900)  SpO2: 100 % (11/04/20 1351) Vital Signs (24h Range):  Temp:  [97.9 °F (36.6 °C)-98.8 °F (37.1 °C)] 97.9 °F (36.6 °C)  Pulse:  [] 106  Resp:  [16-27] 27  SpO2:  [98 %-100 %] 100 %  Arterial Line BP: ()/(39-84) 85/51     Weight: 70.4 kg (155 lb 3.3 oz)  Body mass index is 25.83 kg/m².    Physical Exam  Vitals signs and nursing note reviewed.   Constitutional:       Appearance: She is ill-appearing.      Comments: Sedated, opens eyes to verbal and tactile stimuli    HENT:      Head: Normocephalic.      Nose: Nose normal.      Mouth/Throat:      Mouth: Mucous membranes are moist.      Comments: intubated  Eyes:      Pupils: Pupils are equal, round,  and reactive to light.   Neck:      Musculoskeletal: Normal range of motion.      Comments:  Trialysis catheter intact   Cardiovascular:      Rate and Rhythm: Normal rate and regular rhythm.      Pulses: Normal pulses.      Heart sounds: Normal heart sounds.   Pulmonary:      Comments: Intubated   Abdominal:      General: Bowel sounds are normal.      Palpations: Abdomen is soft.      Comments: PEG intact    Musculoskeletal:      Comments: Passive range of motion    Skin:     General: Skin is warm and dry.      Coloration: Skin is pale.   Neurological:      Comments: Sedated, Follows simple commands - open eyes,  Does not attempt to answer questions     Psychiatric:         Behavior: Behavior normal.      Comments: Unable to assess judgement and content         Review of Symptoms    Symptom Assessment (ESAS 0-10 Scale)  Pain:  0  Dyspnea:  0  Anxiety:  0  Nausea:  0  Depression:  0  Anorexia:  0  Fatigue:  0  Insomnia:  0  Restlessness:  0  Agitation:  0         Comments:  Patient is intubated unable to complete ESAS, appears comfortable no facial grimacing or moaning           Performance Status:  20    Living Arrangements:  Lives with family    Psychosocial/Cultural: ,  30 plus years,  Three adult sons, several grandchildren.  Retired from [employer].   Continued to work [employer]. Very active in community - loved caring for her community and her family.      Spiritual:  F - Briana and Belief:  Shinto  A - Address in Care:  [place] to  visits       Advance Care Planning   Advance Directives:   Living Will: No        Oral Declaration: No    LaPOST: No    Do Not Resuscitate Status: No    Medical Power of : No (next of kin are three adult sons )      Decision Making:  Family answered questions and Patient unable to communicate due to disease severity/cognitive impairment         Significant Labs: All pertinent labs within the past 24 hours have been  reviewed.  CBC:   Recent Labs   Lab 11/04/20  0339   WBC 10.02   HGB 7.1*   HCT 24.6*   *   *     BMP:  Recent Labs   Lab 11/04/20 0339         K 4.2      CO2 29   BUN 2*   CREATININE 0.3*   CALCIUM 9.1   MG 2.4     LFT:  Lab Results   Component Value Date    AST 49 (H) 11/03/2020    ALKPHOS 327 (H) 11/03/2020    BILITOT 0.4 11/03/2020     Albumin:   Albumin   Date Value Ref Range Status   11/04/2020 1.5 (L) 3.5 - 5.2 g/dL Final     Protein:   Total Protein   Date Value Ref Range Status   11/03/2020 5.0 (L) 6.0 - 8.4 g/dL Final     Lactic acid:   Lab Results   Component Value Date    LACTATE 3.7 (HH) 09/18/2020       Significant Imaging: I have reviewed all pertinent imaging results/findings within the past 24 hours.

## 2020-11-04 NOTE — PROGRESS NOTES
[facility]  Palliative Medicine  Progress Note    Patient Name: Fatou Lorenzo  MRN: [id number]  Admission Date: 8/30/2020  Hospital Length of Stay: 65 days  Code Status: DNR   Attending Provider: Antoni Waddell MD  Consulting Provider: IVONNE Samuel  Primary Care Physician: Ludin Rivas MD  Principal Problem:Leukocytosis    Patient information was obtained from patient, relative(s), caregiver / friend and ER records.      Assessment/Plan:     Palliative care encounter  Follow up to goals of care       Mrs. Lorenzo is a 74 yo lady with extensive cardiac history.  S/P Mvr, Tvr, MAZE 9/09/2020. Now with multiple post op complications - respiratory failure: extubated and re-intubated several times, renal failure requiring CRRT, infection, debility. S/P PEG placement and artificial nutrition initiated.  She remains intubated and sedated with propofol. She appears comfortable no facial grimacing or moaning. Much more alert and oriented  than previous encounter. Opens eyes spontaneously, follows simple commands - blink eyes, squeeze hand,  Able communicate appropriately by nodding head in agreement or shaking head to say no, mouthing words.        Advance Care Planning     - no advanced care planning documents have been received.   - next of kin for medical decision making are her three adult sons   -When propofol held Mrs. Lorenzo able to make own decisions   - Family is amenable to DNR order to be written per primary team     Goals of Care     Follow up to goals of care.   -palliative medicine APRN returned to bedside.  Dr. Waddell rounding at this time.  Discussing surgical option of trach placement with patient and family. Dr. Waddell also discussing continued care - trach to allow the patient to regain strength with LTAC , rehab, continue tube feeding,  Continue dialysis and possible nursing home placement.  States her heart and brain are fully functioning and it is possible she will  recover. Understandably Dr. Waddell is not able to provide family with certainty or what the time line would look like.   - Patient's son Carter states understanding these options and understands there are no guarantees.  Carter states these options are not his mother's goals.  He states a decision was made during the family conference to proceed with palliative extubation.  Family is adamant that she will not have trach placed. Family is fully aware without continued mechanical ventilation  this will lead to Ms. Lorenzo's death  - Palliative medicine APRN explained all of the possible treatment options were discussed in the family meeting.  The patient was able to state understanding and it is her decision to be extubated.  From this meeting it was clear the quality of her life is very important.  Family confirms she had made her wishes known - does not want to live hooked up to machines and does not want to be placed in nursing home.   - Patient's staff nurse reports  When she was not sedated, Mrs. Lorenzo had previously stated she did not want to have a trach.    -The patient's wishes will be honored.   - withdrawal process explained to patient and family     Withdrawal of life support symptom management recommendations - discussed with and orders placed per Dr. Lanier, Palliative Medicine Attending    Hydromophone 0.5 mg prior to extubation and hydromorphone 0.2 mg IVP every 15 mins as needed for pain and or dyspnea   Lorazepam 1 mg prior to extubation and Lorazepam 0.5 mg every 10 mins as needed for anxiety   Haloperidol 2 mg IVP every 4  Hrs as needed for agitation/anxiety   Primary team to discontinue all other non comfort focused medications.     Family allowed ample time to spend with patient.   also available to family   Propofol and tube feeding discontinued  Pre-medications given per staff nurse.    Patient appeared comfortable and was extubated at 1:15 PM    Plan/Recommendations  - Patient is dnr   -  primary team to discontinue non-comfort focused medications  - comfort care/withdrawal of life support orders written per palliative medicine attending    -  notified   - bereavement tray provided   - Please contact palliative medicine for additional comfort measures as needed   -Palliative medicine will continue to follow for bereavement     Primary team attending and resident aware of goals of care and recommendations - sent by secure chat message.  Message acknowledged by resident.                                           I will follow-up with patient. Please contact us if you have any additional questions.    Subjective:     Chief Complaint:   Chief Complaint   Patient presents with    Shortness of Breath     Patient reports SOB that started at 2000. Patient states when she lie down it feels as if shes drowning. Patient reports being seen in ED 3 days ago for the same symptoms.       HPI:   HPI obtained from chart review:     Mrs. Lorenzo is a 74 yo lady with PMH of: with extensive cardiac hx, s/p  Mvr, Tvr, MAZE 9/09/2020. Post op period complicated by lengthy critical care stay, multiple (6)  reintubations, pericardial window for cardiac tamponade, new arrythmia, JONAH now requiring CRRT and s/p PEG placement.  She has been liberated from critical care and shortly afterward transitioned back to CCS with increasing oxygen requirements, and  worsening right sided pleural effusion.   Ultra sound via interventional rad. Indicated multiple loculations; only able to drain  60 cc of fluid via thoracentesis.      10/5/2020 : Thoracic surgery was consulted now s/p  VATS / decortication a multiloculated pleural effusion positive for E.coli. Treated with antibiotics and was stepdown from critical care 10/12/20.  Following this white count increased overnight and was returned to CCS.  Antibiotics were broaden, a PEG placed, tube feeding initiated.  mental status improved and white count improved and she was able to  step down to CSU.     Unfortunately she had additional set back -  respiratory code, was intubated and resumed critical care services.   Creatine and BUN elevated to 150/4. Nephrology was consulted started on CRRT     Palliative medicine has been consulted for goals of care and advanced care planning .        Hospital Course:  No notes on file    Interval History: no adverse events over night,  Remains intubated with CRRT continued.dnr per primary team, anticipate palliative extubation 11/4/2020 as established in goals of care meeting.      Past Medical History:   Diagnosis Date    Atrial fibrillation with RVR 8/24/2020    Cataract     Essential hypertension 8/31/2020    Glaucoma     Heart valve problem     Hyperlipidemia     Severe mitral regurgitation 8/24/2020       Past Surgical History:   Procedure Laterality Date    ANKLE SURGERY      BAIN MAZE PROCEDURE N/A 9/9/2020    Procedure: BAIN MAZE PROCEDURE;  Surgeon: Antoni Waddell MD;  [facility]: [facility] OR [address] [place] [address];  Service: Cardiothoracic;  Laterality: N/A;  MAZE     LARYNGOSCOPY N/A 10/5/2020    Procedure: LARYNGOSCOPY, MICRO SUSPENSION WITH AUGMENTATION;  Surgeon: Yfn Mendoza MD;  Location: [address];  Service: ENT;  Laterality: N/A;    LEFT HEART CATHETERIZATION Left 8/25/2020    Procedure: Left heart cath, radial;  Surgeon: Marlee Carrillo MD;  Location: [facility] CATH LAB;  Service: Cardiology;  Laterality: Left;    lipoma removal      THORACOSCOPIC DECORTICATION OF LUNG Right 10/5/2020    Procedure: VATS, WITH DECORTICATION, LUNG;  Surgeon: Jeremy Shine MD;  Location: [address];  Service: Thoracic;  Laterality: Right;    TRICUSPID VALVULOPLASTY N/A 9/9/2020    Procedure: REPAIR, TRICUSPID VALVE;  Surgeon: Antoni Waddell MD;  Location: [address];  Service: Cardiothoracic;  Laterality: N/A;       Review of patient's allergies indicates:  No Known Allergies    Medications:  Continuous Infusions:   calcium gluconate  IVPB 3,000 mg (11/04/20 0641)    propofoL 15 mcg/kg/min (11/04/20 1200)     Scheduled Meds:   albuterol-ipratropium  3 mL Nebulization Q6H WAKE    aspirin  81 mg Per NG tube Daily    balsam peru-castor oiL   Topical (Top) BID    escitalopram oxalate  20 mg Per G Tube Daily    famotidine  20 mg Per NG tube Daily    heparin (porcine)  5,000 Units Subcutaneous Q8H    HYDROmorphone  0.5 mg Intravenous Once    lorazepam        melatonin  6 mg Per NG tube Nightly    midodrine  5 mg Oral TID    mirtazapine  7.5 mg Per NG tube QHS    multivitamin  1 tablet Oral Daily    sodium chloride 3%  4 mL Nebulization Q6H WAKE    sodium phosphate IVPB  15 mmol Intravenous Once    thiamine  100 mg Per G Tube Daily     PRN Meds:acetaminophen, dextrose 50%, dextrose 50%, glucagon (human recombinant), haloperidol lactate, HYDROmorphone, insulin aspart U-100, labetalol, lidocaine HCL 2%, lorazepam, morphine, ondansetron    Family History     Problem Relation (Age of Onset)    Cancer Mother    Cataracts Sister    No Known Problems Father, Brother, Maternal Aunt, Maternal Uncle, Paternal Aunt, Paternal Uncle, Maternal Grandmother, Maternal Grandfather, Paternal Grandmother, Paternal Grandfather        Tobacco Use    Smoking status: Never Smoker    Smokeless tobacco: Never Used   Substance and Sexual Activity    Alcohol use: No    Drug use: No    Sexual activity: Not Currently       Review of Systems   Unable to perform ROS: Intubated     Objective:     Vital Signs (Most Recent):  Temp: 97.9 °F (36.6 °C) (11/04/20 1100)  Pulse: 106 (11/04/20 1351)  Resp: (!) 27 (11/04/20 1351)  BP: (!) 103/51 (10/31/20 1900)  SpO2: 100 % (11/04/20 1351) Vital Signs (24h Range):  Temp:  [97.9 °F (36.6 °C)-98.8 °F (37.1 °C)] 97.9 °F (36.6 °C)  Pulse:  [] 106  Resp:  [16-27] 27  SpO2:  [98 %-100 %] 100 %  Arterial Line BP: ()/(39-84) 85/51     Weight: 70.4 kg (155 lb 3.3 oz)  Body mass index is 25.83 kg/m².    Physical  Exam  Vitals signs and nursing note reviewed.   Constitutional:       Appearance: She is ill-appearing.      Comments: Sedated, opens eyes to verbal and tactile stimuli    HENT:      Head: Normocephalic.      Nose: Nose normal.      Mouth/Throat:      Mouth: Mucous membranes are moist.      Comments: intubated  Eyes:      Pupils: Pupils are equal, round, and reactive to light.   Neck:      Musculoskeletal: Normal range of motion.      Comments:  Trialysis catheter intact   Cardiovascular:      Rate and Rhythm: Normal rate and regular rhythm.      Pulses: Normal pulses.      Heart sounds: Normal heart sounds.   Pulmonary:      Comments: Intubated   Abdominal:      General: Bowel sounds are normal.      Palpations: Abdomen is soft.      Comments: PEG intact    Musculoskeletal:      Comments: Passive range of motion    Skin:     General: Skin is warm and dry.      Coloration: Skin is pale.   Neurological:      Comments: Sedated, Follows simple commands - open eyes,  Does not attempt to answer questions     Psychiatric:         Behavior: Behavior normal.      Comments: Unable to assess judgement and content         Review of Symptoms    Symptom Assessment (ESAS 0-10 Scale)  Pain:  0  Dyspnea:  0  Anxiety:  0  Nausea:  0  Depression:  0  Anorexia:  0  Fatigue:  0  Insomnia:  0  Restlessness:  0  Agitation:  0         Comments:  Patient is intubated unable to complete ESAS, appears comfortable no facial grimacing or moaning           Performance Status:  20    Living Arrangements:  Lives with family    Psychosocial/Cultural: ,  30 plus years,  Three adult sons, several grandchildren.  Retired from [employer].   Continued to work [employer]. Very active in community - loved caring for her community and her family.      Spiritual:  F - Briana and Belief:  Yazdanism  A - Address in Care:  [place] to  visits       Advance Care Planning   Advance Directives:   Living Will: No         Oral Declaration: No    LaPOST: No    Do Not Resuscitate Status: No    Medical Power of : No (next of kin are three adult sons )      Decision Making:  Family answered questions and Patient unable to communicate due to disease severity/cognitive impairment         Significant Labs: All pertinent labs within the past 24 hours have been reviewed.  CBC:   Recent Labs   Lab 11/04/20 0339   WBC 10.02   HGB 7.1*   HCT 24.6*   *   *     BMP:  Recent Labs   Lab 11/04/20 0339         K 4.2      CO2 29   BUN 2*   CREATININE 0.3*   CALCIUM 9.1   MG 2.4     LFT:  Lab Results   Component Value Date    AST 49 (H) 11/03/2020    ALKPHOS 327 (H) 11/03/2020    BILITOT 0.4 11/03/2020     Albumin:   Albumin   Date Value Ref Range Status   11/04/2020 1.5 (L) 3.5 - 5.2 g/dL Final     Protein:   Total Protein   Date Value Ref Range Status   11/03/2020 5.0 (L) 6.0 - 8.4 g/dL Final     Lactic acid:   Lab Results   Component Value Date    LACTATE 3.7 (HH) 09/18/2020       Significant Imaging: I have reviewed all pertinent imaging results/findings within the past 24 hours.      > 50% of 60 min visit spent in chart review, face to face discussion of goals of care,  symptom assessment, coordination of care and emotional support.  Additional 30  mins spent in advanced care planning     SCOTT Vaughan, APRN, ACNS-BC  Palliative Medicine  [facility]

## 2020-11-04 NOTE — CARE UPDATE
Withdrawal orders initiated. Extubation completed via RRT x 2. Palliative medicine, , and primary RN at bedside. No acute distress noted.

## 2020-11-04 NOTE — SUBJECTIVE & OBJECTIVE
Interval History: Seen at the bedside no event overnight       Review of patient's allergies indicates:  No Known Allergies  Current Facility-Administered Medications   Medication Frequency    acetaminophen oral solution 650 mg Q6H PRN    albuterol-ipratropium 2.5 mg-0.5 mg/3 mL nebulizer solution 3 mL Q6H WAKE    aspirin chewable tablet 81 mg Daily    balsam peru-castor oiL Oint BID    calcium gluconate 3,000 mg in dextrose 5 % 100 mL IVPB Continuous    dextrose 50% injection 12.5 g PRN    dextrose 50% injection 25 g PRN    dextrose-sod citrate-citric ac 2.45-2.2 gram- 800 mg/100 mL Soln Continuous    escitalopram oxalate tablet 20 mg Daily    famotidine tablet 20 mg Daily    glucagon (human recombinant) injection 1 mg PRN    heparin (porcine) injection 5,000 Units Q8H    insulin aspart U-100 pen 0-5 Units Q6H PRN    labetalol 20 mg/4 mL (5 mg/mL) IV syring Q6H PRN    lidocaine HCL 2% jelly PRN    melatonin tablet 6 mg Nightly    midodrine tablet 5 mg TID    mirtazapine tablet 7.5 mg QHS    morphine injection 1 mg Q6H PRN    multivitamin tablet Daily    ondansetron injection 4 mg Q6H PRN    propofol (DIPRIVAN) 10 mg/mL infusion Continuous    sodium chloride 3% nebulizer solution 4 mL Q6H WAKE    sodium phosphate 15 mmol in dextrose 5 % 250 mL IVPB Once    thiamine tablet 100 mg Daily       Objective:     Vital Signs (Most Recent):  Temp: 97.9 °F (36.6 °C) (11/04/20 0700)  Pulse: 107 (11/04/20 0845)  Resp: 16 (11/04/20 0739)  BP: (!) 103/51 (10/31/20 1900)  SpO2: 100 % (11/04/20 0845)  O2 Device (Oxygen Therapy): ventilator (11/04/20 0800) Vital Signs (24h Range):  Temp:  [97.9 °F (36.6 °C)-98.8 °F (37.1 °C)] 97.9 °F (36.6 °C)  Pulse:  [] 107  Resp:  [16-22] 16  SpO2:  [98 %-100 %] 100 %  Arterial Line BP: ()/(39-84) 81/50     Weight: 70.4 kg (155 lb 3.3 oz) (11/01/20 0600)  Body mass index is 25.83 kg/m².  Body surface area is 1.8 meters squared.    I/O last 3 completed  shifts:  In: 9695.3 [I.V.:4100.3; NG/GT:2850; IV Piggyback:2745]  Out: 61873 [Other:29595]    Physical Exam  Vitals signs and nursing note reviewed.   Constitutional:       Appearance: She is ill-appearing.      Comments: Sedated, opens eyes to verbal and tactile stimuli    HENT:      Head: Normocephalic.      Nose: Nose normal.      Mouth/Throat:      Mouth: Mucous membranes are moist.      Comments: intubated  Eyes:      Pupils: Pupils are equal, round, and reactive to light.   Neck:      Musculoskeletal: Normal range of motion.      Comments:  Trialysis catheter intact   Cardiovascular:      Rate and Rhythm: Normal rate and regular rhythm.      Pulses: Normal pulses.      Heart sounds: Normal heart sounds.   Pulmonary:      Comments: Intubated   Abdominal:      General: Bowel sounds are normal.      Palpations: Abdomen is soft.      Comments: PEG intact    Musculoskeletal:      Comments: Passive range of motion    Skin:     General: Skin is warm and dry.      Coloration: Skin is pale.   Neurological:      Comments: Sedated, Follows simple commands - open eyes,  Does not attempt to answer questions     Psychiatric:         Behavior: Behavior normal.      Comments: Unable to assess judgement and content         Significant Labs:  BMP:   Recent Labs   Lab 11/04/20  0339         CO2 29   BUN 2*   CREATININE 0.3*   CALCIUM 9.1   MG 2.4     All labs within the past 24 hours have been reviewed.     Significant Imaging:  BMP

## 2020-11-04 NOTE — PROGRESS NOTES
patient seen at bedside with critical care team. There was a family meeting yesterday that did not include me. The family has decided that the patient would not want a tracheostomy, although when I asked her about it this morning she nodded her head. I believe she could recover from this as she does not have an irreversible neurological injury, she has good cardiac function, and good pulmonary function. She is terribly debilitated. Afte r additional discussion with the family and the palliative care nurse Lakisha, the decision was made to continue with drawl support. I disagree with this, but will respect the families wishes.

## 2020-11-04 NOTE — PLAN OF CARE
Chart reviewed  the family decision was made to continue with drawl support,nephrology will sing off

## 2020-11-04 NOTE — ASSESSMENT & PLAN NOTE
Follow up to goals of care       Mrs. Lorenzo is a 74 yo lady with extensive cardiac history.  S/P Mvr, Tvr, MAZE 9/09/2020. Now with multiple post op complications - respiratory failure: extubated and re-intubated several times, renal failure requiring CRRT, infection, debility. S/P PEG placement and artificial nutrition initiated.  She remains intubated and sedated with propofol. She appears comfortable no facial grimacing or moaning. Much more alert and oriented  than previous encounter. Opens eyes spontaneously, follows simple commands - blink eyes, squeeze hand,  Able communicate appropriately by nodding head in agreement or shaking head to say no, mouthing words.        Advance Care Planning     - no advanced care planning documents have been received.   - next of kin for medical decision making are her three adult sons   -When propofol held Mrs. Lorenzo able to make own decisions   - Family is amenable to DNR order to be written per primary team     Goals of Care     Follow up to goals of care.   -palliative medicine APRN returned to bedside.  Dr. Waddell rounding at this time.  Discussing surgical option of trach placement with patient and family. Dr. Waddell also discussing continued care - trach to allow the patient to regain strength with LTAC , rehab, continue tube feeding,  Continue dialysis and possible nursing home placement.  States her heart and brain are fully functioning and it is possible she will recover. Understandably Dr. Waddell is not able to provide family with certainty or what the time line would look like.   - Patient's son Carter states understanding these options and understands there are no guarantees.  Carter states these options are not his mother's goals.  He states a decision was made during the family conference to proceed with palliative extubation.  Family is adamant that she will not have trach placed. Family is fully aware without continued mechanical ventilation  this will lead to  Ms. Lorenzo's death  - Palliative medicine APRN explained all of the possible treatment options were discussed in the family meeting.  The patient was able to state understanding and it is her decision to be extubated.  From this meeting it was clear the quality of her life is very important.  Family confirms she had made her wishes known - does not want to live hooked up to machines and does not want to be placed in nursing home.   - Patient's staff nurse reports  When she was not sedated, Mrs. Lorenzo had previously stated she did not want to have a trach.    -The patient's wishes will be honored.   - withdrawal process explained to patient and family     Withdrawal of life support symptom management recommendations - discussed with and orders placed per Dr. Lanier, Palliative Medicine Attending    Hydromophone 0.5 mg prior to extubation and hydromorphone 0.2 mg IVP every 15 mins as needed for pain and or dyspnea   Lorazepam 1 mg prior to extubation and Lorazepam 0.5 mg every 10 mins as needed for anxiety   Haloperidol 2 mg IVP every 4  Hrs as needed for agitation/anxiety   Primary team to discontinue all other non comfort focused medications.     Family allowed ample time to spend with patient.   also available to family   Propofol and tube feeding discontinued  Pre-medications given per staff nurse.    Patient appeared comfortable and was extubated at 1:15 PM    Plan/Recommendations  - Patient is dnr   - primary team to discontinue non-comfort focused medications  - comfort care/withdrawal of life support orders written per palliative medicine attending    -  notified   - bereavement tray provided   - Please contact palliative medicine for additional comfort measures as needed   -Palliative medicine will continue to follow for bereavement     Primary team resident aware of goals of care and recommendations

## 2020-11-04 NOTE — SUBJECTIVE & OBJECTIVE
Interval History/Significant Events: NAEON. Ongoing conversations had with sons and medical team regarding goals of care, patient was made DNR overnight.     After discussion with the family and the palliative care, the decision was made to continue to withdraw support. We explained to family that she can recover from this and she is terrible debilitated. However, next of kin for medical decision (her three adult sons) wants to proceed with comfort care and doing a palliative extubation.     Follow-up For: Procedure(s) (LRB):  INSERTION, PEG TUBE (N/A)    Post-Operative Day: 11 Days Post-Op    Objective:     Vital Signs (Most Recent):  Temp: 97.9 °F (36.6 °C) (11/04/20 1100)  Pulse: 106 (11/04/20 1351)  Resp: (!) 27 (11/04/20 1351)  BP: (!) 103/51 (10/31/20 1900)  SpO2: 100 % (11/04/20 1351) Vital Signs (24h Range):  Temp:  [97.9 °F (36.6 °C)-98.8 °F (37.1 °C)] 97.9 °F (36.6 °C)  Pulse:  [] 106  Resp:  [16-27] 27  SpO2:  [98 %-100 %] 100 %  Arterial Line BP: ()/(39-67) 85/51     Weight: 70.4 kg (155 lb 3.3 oz)  Body mass index is 25.83 kg/m².      Intake/Output Summary (Last 24 hours) at 11/4/2020 1428  Last data filed at 11/4/2020 1200  Gross per 24 hour   Intake 5502.3 ml   Output 5219 ml   Net 283.3 ml       Physical Exam  Vitals signs and nursing note reviewed.   HENT:      Head: Normocephalic and atraumatic.   Neck:      Comments: R. IJ trialysis   Cardiovascular:      Rate and Rhythm: Normal rate and regular rhythm.      Comments: Midline sternotomy with clean, dry, and intact, without evidence of infection  Prior chest tubes sites with dressings in place, clean and dry  Pulmonary:      Effort: Pulmonary effort is normal.      Comments: Intubated and ventilated  AC/VC  R 16    FiO2 40  PEEP 5  Chest hematoma stbale   Abdominal:      General: Abdomen is flat.      Palpations: Abdomen is soft.      Comments: g tube in place, TF running @ 50 cc/hr    Neurological:      General: No focal deficit  present.      Comments: Responds to questions and commands           Vents:  Vent Mode: Spont (11/04/20 1351)  Ventilator Initiated: Yes(chart correction) (10/28/20 1840)  Set Rate: 16 BPM (11/03/20 1105)  Vt Set: 390 mL (11/03/20 1105)  Pressure Support: 10 cmH20 (11/04/20 1351)  PEEP/CPAP: 5 cmH20 (11/04/20 1351)  Oxygen Concentration (%): 40 (11/04/20 1351)  Peak Airway Pressure: 15 cmH2O (11/04/20 1351)  Plateau Pressure: 19 cmH20 (11/04/20 1351)  Total Ve: 4.29 mL (11/04/20 1351)  Negative Inspiratory Force (cm H2O): -21 (10/08/20 1452)  F/VT Ratio<105 (RSBI): (!) 40.51 (11/03/20 1105)    Lines/Drains/Airways     Central Venous Catheter Line            Trialysis (Dialysis) Catheter 10/27/20 1400 right internal jugular 8 days          Drain                 Gastrostomy/Enterostomy 10/21/20 1304 Percutaneous endoscopic gastrostomy (PEG) feeding 14 days          Airway                 Airway - Non-Surgical 10/28/20 1835 Endotracheal Tube 6 days          Arterial Line            Arterial Line 10/27/20 1500 Right Radial 8 days          Peripheral Intravenous Line                 Midline Catheter Insertion/Assessment  - Single Lumen 10/19/20 1654 brachial vein 18g x 10cm 15 days                Significant Labs:    CBC/Anemia Profile:  Recent Labs   Lab 11/03/20  0327 11/03/20  1057 11/04/20  0339   WBC 11.78 12.30 10.02   HGB 7.1* 7.2* 7.1*   HCT 24.1* 24.3* 24.6*   * 121* 124*   * 102* 103*   RDW 18.3* 18.1* 18.1*        Chemistries:  Recent Labs   Lab 11/03/20  1057 11/03/20  2154 11/04/20  0339    143 140   K 4.6 4.3 4.2    106 104   CO2 27 30* 29   BUN 3* 4* 2*   CREATININE 0.3* 0.3* 0.3*   CALCIUM 8.9 9.3 9.1   ALBUMIN 1.5* 1.5* 1.5*   PROT 5.0*  --   --    BILITOT 0.4  --   --    ALKPHOS 327*  --   --    ALT 48*  --   --    AST 49*  --   --    MG 2.3 2.4 2.4   PHOS 2.2* 1.4* 1.6*  1.6*       ABGs:   No results for input(s): PH, PCO2, HCO3, POCSATURATED, BE in the last 48 hours.  CMP:    Recent Labs   Lab 11/03/20  1057 11/03/20  2154 11/04/20  0339    143 140   K 4.6 4.3 4.2    106 104   CO2 27 30* 29    99 100   BUN 3* 4* 2*   CREATININE 0.3* 0.3* 0.3*   CALCIUM 8.9 9.3 9.1   PROT 5.0*  --   --    ALBUMIN 1.5* 1.5* 1.5*   BILITOT 0.4  --   --    ALKPHOS 327*  --   --    AST 49*  --   --    ALT 48*  --   --    ANIONGAP 8 7* 7*   EGFRNONAA >60.0 >60.0 >60.0     POCT Glucose:   Recent Labs   Lab 11/03/20  1748 11/03/20  2109 11/04/20  0341   POCTGLUCOSE 157* 110 140*     All pertinent labs within the past 24 hours have been reviewed.    Significant Imaging:  I have reviewed all pertinent imaging results/findings within the past 24 hours.

## 2020-11-05 NOTE — PROGRESS NOTES
[facility]  Critical Care - Surgery  Progress Note    Patient Name: Fatou Lorenzo  MRN: [id number]  Admission Date: 8/30/2020  Hospital Length of Stay: 66 days  Code Status: DNR  Attending Provider: Antoni Waddell MD  Primary Care Provider: Ludin Rivas MD   Principal Problem: Leukocytosis    Subjective:     Hospital/ICU Course:  No notes on file    Interval History/Significant Events: NAEON.   Patient had a palliative extubation.  Palliative is following helping with comfort care order sets  We provided support to family today, and answered all the questions .     Follow-up For: Procedure(s) (LRB):  INSERTION, PEG TUBE (N/A)    Post-Operative Day: 11 Days Post-Op    Objective:     Vital Signs (Most Recent):  Temp: 97.9 °F (36.6 °C) (11/04/20 1100)  Pulse: 102 (11/05/20 0700)  Resp: (!) 40 (11/05/20 0730)  BP: (!) 103/51 (10/31/20 1900)  SpO2: (!) 83 % (11/05/20 0700) Vital Signs (24h Range):  Temp:  [97.9 °F (36.6 °C)] 97.9 °F (36.6 °C)  Pulse:  [100-107] 102  Resp:  [21-40] 40  SpO2:  [80 %-100 %] 83 %  Arterial Line BP: ()/(46-69) 120/64     Weight: 70.4 kg (155 lb 3.3 oz)  Body mass index is 25.83 kg/m².      Intake/Output Summary (Last 24 hours) at 11/5/2020 0833  Last data filed at 11/4/2020 1200  Gross per 24 hour   Intake 200 ml   Output 208 ml   Net -8 ml       Physical Exam  Vitals signs and nursing note reviewed.   HENT:      Head: Normocephalic and atraumatic.   Neck:      Comments: R. IJ trialysis   Cardiovascular:      Rate and Rhythm: Normal rate and regular rhythm.      Comments: Midline sternotomy with clean, dry, and intact, without evidence of infection  Prior chest tubes sites with dressings in place, clean and dry  Pulmonary:      Effort: Pulmonary effort is normal.      Comments: Extubated  Abdominal:      General: Abdomen is flat.      Palpations: Abdomen is soft.      Comments: g tube in place   Musculoskeletal:         General: No swelling.   Neurological:       General: No focal deficit present.      Comments:   Sedated,         Vents:  Vent Mode: Spont (11/04/20 1351)  Ventilator Initiated: Yes(chart correction) (10/28/20 1840)  Set Rate: 16 BPM (11/03/20 1105)  Vt Set: 390 mL (11/03/20 1105)  Pressure Support: 10 cmH20 (11/04/20 1351)  PEEP/CPAP: 5 cmH20 (11/04/20 1351)  Oxygen Concentration (%): 40 (11/04/20 1351)  Peak Airway Pressure: 15 cmH2O (11/04/20 1351)  Plateau Pressure: 19 cmH20 (11/04/20 1351)  Total Ve: 4.29 mL (11/04/20 1351)  Negative Inspiratory Force (cm H2O): -21 (10/08/20 1452)  F/VT Ratio<105 (RSBI): (!) 40.51 (11/03/20 1105)    Lines/Drains/Airways     Central Venous Catheter Line            Trialysis (Dialysis) Catheter 10/27/20 1400 right internal jugular 8 days          Drain                 Gastrostomy/Enterostomy 10/21/20 1304 Percutaneous endoscopic gastrostomy (PEG) feeding 14 days          Airway                 Airway - Non-Surgical 10/28/20 1835 Endotracheal Tube 7 days          Arterial Line            Arterial Line 10/27/20 1500 Right Radial 8 days          Peripheral Intravenous Line                 Midline Catheter Insertion/Assessment  - Single Lumen 10/19/20 1654 brachial vein 18g x 10cm 16 days                Significant Labs:    CBC/Anemia Profile:  Recent Labs   Lab 11/03/20  1057 11/04/20  0339   WBC 12.30 10.02   HGB 7.2* 7.1*   HCT 24.3* 24.6*   * 124*   * 103*   RDW 18.1* 18.1*        Chemistries:  Recent Labs   Lab 11/03/20  1057 11/03/20  2154 11/04/20  0339    143 140   K 4.6 4.3 4.2    106 104   CO2 27 30* 29   BUN 3* 4* 2*   CREATININE 0.3* 0.3* 0.3*   CALCIUM 8.9 9.3 9.1   ALBUMIN 1.5* 1.5* 1.5*   PROT 5.0*  --   --    BILITOT 0.4  --   --    ALKPHOS 327*  --   --    ALT 48*  --   --    AST 49*  --   --    MG 2.3 2.4 2.4   PHOS 2.2* 1.4* 1.6*  1.6*       ABGs:   No results for input(s): PH, PCO2, HCO3, POCSATURATED, BE in the last 48 hours.  CMP:   Recent Labs   Lab 11/03/20  4508  11/03/20  2154 11/04/20  0339    143 140   K 4.6 4.3 4.2    106 104   CO2 27 30* 29    99 100   BUN 3* 4* 2*   CREATININE 0.3* 0.3* 0.3*   CALCIUM 8.9 9.3 9.1   PROT 5.0*  --   --    ALBUMIN 1.5* 1.5* 1.5*   BILITOT 0.4  --   --    ALKPHOS 327*  --   --    AST 49*  --   --    ALT 48*  --   --    ANIONGAP 8 7* 7*   EGFRNONAA >60.0 >60.0 >60.0     POCT Glucose:   Recent Labs   Lab 11/03/20  1748 11/03/20  2109 11/04/20  0341   POCTGLUCOSE 157* 110 140*     All pertinent labs within the past 24 hours have been reviewed.    Significant Imaging:  I have reviewed all pertinent imaging results/findings within the past 24 hours.    Assessment/Plan:     Severe mitral regurgitation  Fatou Lorenzo is a 75 y.o. female s/p MVr, TVr 9/9/2020. Case noteworthy for multiple pump runs (3) and RV hematoma due to retraction. Unstable 9/18, required pericardial window for evac of posterior cardiac tamponade. Respiratory distress and so re-admitted to SICU on 10/2 now s/p VATS on 10/5. Readmitted on 10/27/20 for respiratory failure.      Neuro:  - Extubated    - PRN liquid tylenol and meds through g tube   - Withdrawal order sets       CV:  S/p MVr, TVr, MAZE 9/9/20. S/p bedside pericardial window 9/18     Pulm:   - Loculated pleural effusion s/p VATS on 10/5  - Intubated 10/27/20  - Palliative extubation 11/4/20    Renal:  - No CRRT  - Palliative      FEN/GI:  - Do not replace lytes   - Famotidine and bowel regimen      Heme/Onc:  - No labs     ID:   - No labs   - Afebrile  - S/p 7 days of cefepime vanc for enterococcus cloacae UTI. Also has e coli growing from pleural cultures.     - Started on Cefepime fluconazole re-started 10/14/20. Candida on BAL     - Finished 1 month treatment of cefepime   - BC: No growth to date      Endo:  - no hx of DM  - ISS     PPx:  - famotidine, SCD,      Dispo:   Comfort care. No labs.   Case discussed with family, support given         Critical care was time spent personally by me on  the following activities: development of treatment plan with patient or surrogate and bedside caregivers, discussions with consultants, evaluation of patient's response to treatment, examination of patient, ordering and performing treatments and interventions, ordering and review of laboratory studies, ordering and review of radiographic studies, pulse oximetry, re-evaluation of patient's condition.  This critical care time did not overlap with that of any other provider or involve time for any procedures.     Mir Croft MD  Critical Care - Surgery  [facility]

## 2020-11-05 NOTE — PROGRESS NOTES
[facility]  Palliative Medicine  Progress Note    Patient Name: Fatou Lorenzo  MRN: [id number]  Admission Date: 8/30/2020  Hospital Length of Stay: 66 days  Code Status: DNR   Attending Provider: Antoni Waddell MD  Consulting Provider: IVONNE Samuel  Primary Care Physician: Ludin Rivas MD  Principal Problem:Leukocytosis    Patient information was obtained from relative(s).      Assessment/Plan:     Palliative care encounter  Follow up to goals of care - Dr. Lanier and CAYLA met at bedside with patient and family      Mrs. Lorenzo is a 74 yo lady with extensive cardiac history.  S/P Mvr, Tvr, MAZE 9/09/2020. Now with multiple post op complications - respiratory failure: extubated and re-intubated several times, renal failure requiring CRRT, infection, debility. S/P PEG placement and artificial nutrition initiated. Palliative extubation 11/4/2020.  Full comfort measures continued during the dying phase.  Appears comfortable - no acute distress.           Advance Care Planning     - no advanced care planning documents have been received.   - next of kin for medical decision making are her three adult sons   - DNR per primary team     Goals of Care     Follow up to goals of care.   -family is holding perry at bedside. In response to family questions explained physical changes that occur during the dying phase.    - In response to family question regarding time line - explained it could be hours to days.  Assured family everything is being done to ensure comfort   - During previous encounters had discussed possible transition to inpatient hospice bed.  Family is aware this could happen if there is an increased need for critical care beds.  At this time plan is to remain here in room 89764  - Family again expressed gratitude for the care she has received.  - intense emotional support provided.     Symptom Management  - Continue comfort care as previously ordered with the addition of    Hydromorphone 0.2 mg IVP every 6 hrs scheduled.       Plan/Recommendations  - Patient is dnr   - hydromorhone 0.2 mg IVP every 6 hrs scheduled    - if patient remains stable and there is increased need for critical care bed, please consult Passages for general [place] [facility] [place]  -Palliative medicine will continue to follow for bereavement     Primary team attending, resident and  notified of the above recommendations                                               I will follow-up with patient. Please contact us if you have any additional questions.    Subjective:     Chief Complaint:   Chief Complaint   Patient presents with    Shortness of Breath     Patient reports SOB that started at 2000. Patient states when she lie down it feels as if shes drowning. Patient reports being seen in ED 3 days ago for the same symptoms.       HPI:   HPI obtained from chart review:     Mrs. Lorenzo is a 74 yo lady with PMH of: with extensive cardiac hx, s/p  Mvr, Tvr, MAZE 9/09/2020. Post op period complicated by lengthy critical care stay, multiple (6)  reintubations, pericardial window for cardiac tamponade, new arrythmia, JONAH now requiring CRRT and s/p PEG placement.  She has been liberated from critical care and shortly afterward transitioned back to CCS with increasing oxygen requirements, and  worsening right sided pleural effusion.   Ultra sound via interventional rad. Indicated multiple loculations; only able to drain  60 cc of fluid via thoracentesis.      10/5/2020 : Thoracic surgery was consulted now s/p  VATS / decortication a multiloculated pleural effusion positive for E.coli. Treated with antibiotics and was stepdown from critical care 10/12/20.  Following this white count increased overnight and was returned to CCS.  Antibiotics were broaden, a PEG placed, tube feeding initiated.  mental status improved and white count improved and she was able to step down to CSU.     Unfortunately she  had additional set back -  respiratory code, was intubated and resumed critical care services.   Creatine and BUN elevated to 150/4. Nephrology was consulted started on CRRT     Palliative medicine has been consulted for goals of care and advanced care planning .        Hospital Course:  No notes on file    Interval History: Palliative extubation 11/4/2020.  Comfort measures continued.  No acute distress,family holding perry at bedside.       Past Medical History:   Diagnosis Date    Atrial fibrillation with RVR 8/24/2020    Cataract     Essential hypertension 8/31/2020    Glaucoma     Heart valve problem     Hyperlipidemia     Severe mitral regurgitation 8/24/2020       Past Surgical History:   Procedure Laterality Date    ANKLE SURGERY      BAIN MAZE PROCEDURE N/A 9/9/2020    Procedure: BAIN MAZE PROCEDURE;  Surgeon: Antoni Waddell MD;  Location: [facility] OR [address] [place] [address];  Service: Cardiothoracic;  Laterality: N/A;  MAZE     LARYNGOSCOPY N/A 10/5/2020    Procedure: LARYNGOSCOPY, MICRO SUSPENSION WITH AUGMENTATION;  Surgeon: Yfn Mendoza MD;  Location: [facility] OR [address] [place] [address];  Service: ENT;  Laterality: N/A;    LEFT HEART CATHETERIZATION Left 8/25/2020    Procedure: Left heart cath, radial;  Surgeon: Marlee Carrillo MD;  Location: [facility] CATH LAB;  Service: Cardiology;  Laterality: Left;    lipoma removal      THORACOSCOPIC DECORTICATION OF LUNG Right 10/5/2020    Procedure: VATS, WITH DECORTICATION, LUNG;  Surgeon: Jeremy Shine MD;  Location: [address];  Service: Thoracic;  Laterality: Right;    TRICUSPID VALVULOPLASTY N/A 9/9/2020    Procedure: REPAIR, TRICUSPID VALVE;  Surgeon: Antoni Waddell MD;  Location: [address];  Service: Cardiothoracic;  Laterality: N/A;       Review of patient's allergies indicates:  No Known Allergies    Medications:  Continuous Infusions:    Scheduled Meds:    PRN Meds:acetaminophen, atropine 1%, haloperidol lactate, HYDROmorphone, lorazepam,  ondansetron    Family History     Problem Relation (Age of Onset)    Cancer Mother    Cataracts Sister    No Known Problems Father, Brother, Maternal Aunt, Maternal Uncle, Paternal Aunt, Paternal Uncle, Maternal Grandmother, Maternal Grandfather, Paternal Grandmother, Paternal Grandfather        Tobacco Use    Smoking status: Never Smoker    Smokeless tobacco: Never Used   Substance and Sexual Activity    Alcohol use: No    Drug use: No    Sexual activity: Not Currently       Review of Systems   Unable to perform ROS: Intubated     Objective:     Vital Signs (Most Recent):  Temp: 97.9 °F (36.6 °C) (11/04/20 1100)  Pulse: 103 (11/05/20 0845)  Resp: (!) 31 (11/05/20 0845)  BP: (!) 103/51 (10/31/20 1900)  SpO2: (!) 82 % (11/05/20 0845) Vital Signs (24h Range):  Temp:  [97.9 °F (36.6 °C)] 97.9 °F (36.6 °C)  Pulse:  [100-107] 103  Resp:  [21-40] 31  SpO2:  [79 %-100 %] 82 %  Arterial Line BP: ()/(47-69) 121/65     Weight: 70.4 kg (155 lb 3.3 oz)  Body mass index is 25.83 kg/m².    Physical Exam  Vitals signs and nursing note reviewed.   Constitutional:       Appearance: She is ill-appearing.      Comments: Sedated, opens eyes to verbal and tactile stimuli    HENT:      Head: Normocephalic.      Nose: Nose normal.      Mouth/Throat:      Mouth: Mucous membranes are moist.      Comments: intubated  Eyes:      Pupils: Pupils are equal, round, and reactive to light.   Neck:      Musculoskeletal: Normal range of motion.      Comments:  Trialysis catheter intact   Cardiovascular:      Rate and Rhythm: Normal rate and regular rhythm.      Pulses: Normal pulses.      Heart sounds: Normal heart sounds.   Pulmonary:      Comments: Intubated   Abdominal:      General: Bowel sounds are normal.      Palpations: Abdomen is soft.      Comments: PEG intact    Musculoskeletal:      Comments: Passive range of motion    Skin:     General: Skin is warm and dry.      Coloration: Skin is pale.   Neurological:      Comments:  Sedated, Follows simple commands - open eyes,  Does not attempt to answer questions     Psychiatric:         Behavior: Behavior normal.      Comments: Unable to assess judgement and content         Review of Symptoms    Symptom Assessment (ESAS 0-10 Scale)  Pain:  0  Dyspnea:  0  Anxiety:  0  Nausea:  0  Depression:  0  Anorexia:  0  Fatigue:  0  Insomnia:  0  Restlessness:  0  Agitation:  0         Comments:  Patient is intubated unable to complete ESAS, appears comfortable no facial grimacing or moaning           Performance Status:  20    Living Arrangements:  Lives with family    Psychosocial/Cultural: ,  30 plus years,  Three adult sons, several grandchildren.  Retired from "[employer].   Continued to work [employer]. Very active in community - loved caring for her community and her family.      Spiritual:  F - Briana and Belief:  Episcopalian  A - Address in Care:  Amenable to  visits       Advance Care Planning   Advance Directives:   Living Will: No        Oral Declaration: No    LaPOST: No    Do Not Resuscitate Status: No    Medical Power of : No (next of kin are three adult sons )      Decision Making:  Family answered questions and Patient unable to communicate due to disease severity/cognitive impairment         Significant Labs: All pertinent labs within the past 24 hours have been reviewed.  CBC:   Recent Labs   Lab 11/04/20  0339   WBC 10.02   HGB 7.1*   HCT 24.6*   *   *     BMP:  No results for input(s): GLU, NA, K, CL, CO2, BUN, CREATININE, CALCIUM, MG in the last 24 hours.  LFT:  Lab Results   Component Value Date    AST 49 (H) 11/03/2020    ALKPHOS 327 (H) 11/03/2020    BILITOT 0.4 11/03/2020     Albumin:   Albumin   Date Value Ref Range Status   11/04/2020 1.5 (L) 3.5 - 5.2 g/dL Final     Protein:   Total Protein   Date Value Ref Range Status   11/03/2020 5.0 (L) 6.0 - 8.4 g/dL Final     Lactic acid:   Lab Results   Component Value  Date    LACTATE 3.7 () 09/18/2020       Significant Imaging: I have reviewed all pertinent imaging results/findings within the past 24 hours.      > 50% of 35  min visit spent in chart review, face to face discussion of goals of care,  symptom assessment, coordination of care and emotional support.    SCOTT Vaughan, APRN, ACNS-BC  Palliative Medicine  [facility]

## 2020-11-05 NOTE — ASSESSMENT & PLAN NOTE
Fatou Lorenzo is a 75 y.o. female s/p MVr, TVr 9/9/2020. Case noteworthy for multiple pump runs (3) and RV hematoma due to retraction. Unstable 9/18, required pericardial window for evac of posterior cardiac tamponade. Respiratory distress and so re-admitted to SICU on 10/2 now s/p VATS on 10/5. Readmitted on 10/27/20 for respiratory failure.      Neuro:  - Extubated    - PRN liquid tylenol and meds through g tube   - Withdrawal order sets       CV:  S/p MVr, TVr, MAZE 9/9/20. S/p bedside pericardial window 9/18     Pulm:   - Loculated pleural effusion s/p VATS on 10/5  - Intubated 10/27/20  - Palliative extubation 11/4/20    Renal:  - No CRRT  - Palliative      FEN/GI:  - Do not replace lytes   - Famotidine and bowel regimen      Heme/Onc:  - No labs     ID:   - No labs   - Afebrile  - S/p 7 days of cefepime vanc for enterococcus cloacae UTI. Also has e coli growing from pleural cultures.     - Started on Cefepime fluconazole re-started 10/14/20. Candida on BAL     - Finished 1 month treatment of cefepime   - BC: No growth to date      Endo:  - no hx of DM  - ISS     PPx:  - famotidine, SCD,      Dispo:   Comfort care. No labs.   Case discussed with family, support given

## 2020-11-05 NOTE — PLAN OF CARE
CM notified by SICU team that patient has .  Family at bedside;  notified.    Patient pronounced by MD Guerda at 1430. Family and  at bedside.            20 1510   Final Note   Assessment Type Final Discharge Note   Anticipated Discharge Disposition        Mel Simons MPH, RN, CM  Ext. [phone]

## 2020-11-05 NOTE — ASSESSMENT & PLAN NOTE
Follow up to goals of care - Dr. Lanier and CAYLA met at bedside with patient and family      Mrs. Lorenzo is a 76 yo lady with extensive cardiac history.  S/P Mvr, Tvr, MAZE 9/09/2020. Now with multiple post op complications - respiratory failure: extubated and re-intubated several times, renal failure requiring CRRT, infection, debility. S/P PEG placement and artificial nutrition initiated. Palliative extubation 11/4/2020.  Full comfort measures continued during the dying phase.  Appears comfortable - no acute distress.           Advance Care Planning     - no advanced care planning documents have been received.   - next of kin for medical decision making are her three adult sons   - DNR per primary team     Goals of Care     Follow up to goals of care.   -family is holding perry at bedside. In response to family questions explained physical changes that occur during the dying phase.    - In response to family question regarding time line - explained it could be hours to days.  Assured family everything is being done to ensure comfort   - During previous encounters had discussed possible transition to inpatient hospice bed.  Family is aware this could happen if there is an increased need for critical care beds.  At this time plan is to remain here in room 70229  - Family again expressed gratitude for the care she has received.  - intense emotional support provided.     Symptom Management  - Continue comfort care as previously ordered with the addition of   Hydromorphone 0.2 mg IVP every 6 hrs scheduled.       Plan/Recommendations  - Patient is dnr   - hydromorhone 0.2 mg IVP every 6 hrs scheduled    - if patient remains stable and there is increased need for critical care bed, please consult Passages for [place] [facility] [place]  -Palliative medicine will continue to follow for bereavement     Primary team attending, resident and  notified of the above recommendations

## 2020-11-05 NOTE — SUBJECTIVE & OBJECTIVE
Interval History/Significant Events: NAEON.   Patient had a palliative extubation.  Palliative is following helping with comfort care order sets  We provided support to family today, and answered all the questions .     Follow-up For: Procedure(s) (LRB):  INSERTION, PEG TUBE (N/A)    Post-Operative Day: 11 Days Post-Op    Objective:     Vital Signs (Most Recent):  Temp: 97.9 °F (36.6 °C) (11/04/20 1100)  Pulse: 102 (11/05/20 0700)  Resp: (!) 40 (11/05/20 0730)  BP: (!) 103/51 (10/31/20 1900)  SpO2: (!) 83 % (11/05/20 0700) Vital Signs (24h Range):  Temp:  [97.9 °F (36.6 °C)] 97.9 °F (36.6 °C)  Pulse:  [100-107] 102  Resp:  [21-40] 40  SpO2:  [80 %-100 %] 83 %  Arterial Line BP: ()/(46-69) 120/64     Weight: 70.4 kg (155 lb 3.3 oz)  Body mass index is 25.83 kg/m².      Intake/Output Summary (Last 24 hours) at 11/5/2020 0833  Last data filed at 11/4/2020 1200  Gross per 24 hour   Intake 200 ml   Output 208 ml   Net -8 ml       Physical Exam  Vitals signs and nursing note reviewed.   HENT:      Head: Normocephalic and atraumatic.   Neck:      Comments: R. IJ trialysis   Cardiovascular:      Rate and Rhythm: Normal rate and regular rhythm.      Comments: Midline sternotomy with clean, dry, and intact, without evidence of infection  Prior chest tubes sites with dressings in place, clean and dry  Pulmonary:      Effort: Pulmonary effort is normal.      Comments: Extubated  Abdominal:      General: Abdomen is flat.      Palpations: Abdomen is soft.      Comments: g tube in place   Musculoskeletal:         General: No swelling.   Neurological:      General: No focal deficit present.      Comments:   Sedated,         Vents:  Vent Mode: Spont (11/04/20 1351)  Ventilator Initiated: Yes(chart correction) (10/28/20 1840)  Set Rate: 16 BPM (11/03/20 1105)  Vt Set: 390 mL (11/03/20 1105)  Pressure Support: 10 cmH20 (11/04/20 1351)  PEEP/CPAP: 5 cmH20 (11/04/20 1351)  Oxygen Concentration (%): 40 (11/04/20 1351)  Peak Airway  Pressure: 15 cmH2O (11/04/20 1351)  Plateau Pressure: 19 cmH20 (11/04/20 1351)  Total Ve: 4.29 mL (11/04/20 1351)  Negative Inspiratory Force (cm H2O): -21 (10/08/20 1452)  F/VT Ratio<105 (RSBI): (!) 40.51 (11/03/20 1105)    Lines/Drains/Airways     Central Venous Catheter Line            Trialysis (Dialysis) Catheter 10/27/20 1400 right internal jugular 8 days          Drain                 Gastrostomy/Enterostomy 10/21/20 1304 Percutaneous endoscopic gastrostomy (PEG) feeding 14 days          Airway                 Airway - Non-Surgical 10/28/20 1835 Endotracheal Tube 7 days          Arterial Line            Arterial Line 10/27/20 1500 Right Radial 8 days          Peripheral Intravenous Line                 Midline Catheter Insertion/Assessment  - Single Lumen 10/19/20 1654 brachial vein 18g x 10cm 16 days                Significant Labs:    CBC/Anemia Profile:  Recent Labs   Lab 11/03/20  1057 11/04/20 0339   WBC 12.30 10.02   HGB 7.2* 7.1*   HCT 24.3* 24.6*   * 124*   * 103*   RDW 18.1* 18.1*        Chemistries:  Recent Labs   Lab 11/03/20  1057 11/03/20 2154 11/04/20 0339    143 140   K 4.6 4.3 4.2    106 104   CO2 27 30* 29   BUN 3* 4* 2*   CREATININE 0.3* 0.3* 0.3*   CALCIUM 8.9 9.3 9.1   ALBUMIN 1.5* 1.5* 1.5*   PROT 5.0*  --   --    BILITOT 0.4  --   --    ALKPHOS 327*  --   --    ALT 48*  --   --    AST 49*  --   --    MG 2.3 2.4 2.4   PHOS 2.2* 1.4* 1.6*  1.6*       ABGs:   No results for input(s): PH, PCO2, HCO3, POCSATURATED, BE in the last 48 hours.  CMP:   Recent Labs   Lab 11/03/20  1057 11/03/20 2154 11/04/20  0339    143 140   K 4.6 4.3 4.2    106 104   CO2 27 30* 29    99 100   BUN 3* 4* 2*   CREATININE 0.3* 0.3* 0.3*   CALCIUM 8.9 9.3 9.1   PROT 5.0*  --   --    ALBUMIN 1.5* 1.5* 1.5*   BILITOT 0.4  --   --    ALKPHOS 327*  --   --    AST 49*  --   --    ALT 48*  --   --    ANIONGAP 8 7* 7*   EGFRNONAA >60.0 >60.0 >60.0     POCT Glucose:   Recent  Labs   Lab 11/03/20  1748 11/03/20  2109 11/04/20  0341   POCTGLUCOSE 157* 110 140*     All pertinent labs within the past 24 hours have been reviewed.    Significant Imaging:  I have reviewed all pertinent imaging results/findings within the past 24 hours.

## 2020-11-05 NOTE — SUBJECTIVE & OBJECTIVE
Interval History: Palliative extubation 11/4/2020.  Comfort measures continued.  No acute distress,family holding perry at bedside.       Past Medical History:   Diagnosis Date    Atrial fibrillation with RVR 8/24/2020    Cataract     Essential hypertension 8/31/2020    Glaucoma     Heart valve problem     Hyperlipidemia     Severe mitral regurgitation 8/24/2020       Past Surgical History:   Procedure Laterality Date    ANKLE SURGERY      BAIN MAZE PROCEDURE N/A 9/9/2020    Procedure: BAIN MAZE PROCEDURE;  Surgeon: Antoni Waddell MD;  [facility];  Service: Cardiothoracic;  Laterality: N/A;  MAZE     LARYNGOSCOPY N/A 10/5/2020    Procedure: LARYNGOSCOPY, MICRO SUSPENSION WITH AUGMENTATION;  Surgeon: Yfn Mendoza MD;  Location: [facility] OR [address] [place] [address];  Service: ENT;  Laterality: N/A;    LEFT HEART CATHETERIZATION Left 8/25/2020    Procedure: Left heart cath, radial;  Surgeon: Marlee Carrillo MD;  Location: [facility] CATH LAB;  Service: Cardiology;  Laterality: Left;    lipoma removal      THORACOSCOPIC DECORTICATION OF LUNG Right 10/5/2020    Procedure: VATS, WITH DECORTICATION, LUNG;  Surgeon: Jeremy Shine MD;  Location: [facility] OR [address] [place] [address];  Service: Thoracic;  Laterality: Right;    TRICUSPID VALVULOPLASTY N/A 9/9/2020    Procedure: REPAIR, TRICUSPID VALVE;  Surgeon: Antoni Waddell MD;  Location: [facility] OR [address] [place] [address];  Service: Cardiothoracic;  Laterality: N/A;       Review of patient's allergies indicates:  No Known Allergies    Medications:  Continuous Infusions:    Scheduled Meds:    PRN Meds:acetaminophen, atropine 1%, haloperidol lactate, HYDROmorphone, lorazepam, ondansetron    Family History     Problem Relation (Age of Onset)    Cancer Mother    Cataracts Sister    No Known Problems Father, Brother, Maternal Aunt, Maternal Uncle, Paternal Aunt, Paternal Uncle, Maternal Grandmother, Maternal Grandfather, Paternal Grandmother, Paternal Grandfather        Tobacco Use    Smoking  status: Never Smoker    Smokeless tobacco: Never Used   Substance and Sexual Activity    Alcohol use: No    Drug use: No    Sexual activity: Not Currently       Review of Systems   Unable to perform ROS: Intubated     Objective:     Vital Signs (Most Recent):  Temp: 97.9 °F (36.6 °C) (11/04/20 1100)  Pulse: 103 (11/05/20 0845)  Resp: (!) 31 (11/05/20 0845)  BP: (!) 103/51 (10/31/20 1900)  SpO2: (!) 82 % (11/05/20 0845) Vital Signs (24h Range):  Temp:  [97.9 °F (36.6 °C)] 97.9 °F (36.6 °C)  Pulse:  [100-107] 103  Resp:  [21-40] 31  SpO2:  [79 %-100 %] 82 %  Arterial Line BP: ()/(47-69) 121/65     Weight: 70.4 kg (155 lb 3.3 oz)  Body mass index is 25.83 kg/m².    Physical Exam  Vitals signs and nursing note reviewed.   Constitutional:       Appearance: She is ill-appearing.      Comments: Sedated, opens eyes to verbal and tactile stimuli    HENT:      Head: Normocephalic.      Nose: Nose normal.      Mouth/Throat:      Mouth: Mucous membranes are moist.      Comments: intubated  Eyes:      Pupils: Pupils are equal, round, and reactive to light.   Neck:      Musculoskeletal: Normal range of motion.      Comments:  Trialysis catheter intact   Cardiovascular:      Rate and Rhythm: Normal rate and regular rhythm.      Pulses: Normal pulses.      Heart sounds: Normal heart sounds.   Pulmonary:      Comments: Intubated   Abdominal:      General: Bowel sounds are normal.      Palpations: Abdomen is soft.      Comments: PEG intact    Musculoskeletal:      Comments: Passive range of motion    Skin:     General: Skin is warm and dry.      Coloration: Skin is pale.   Neurological:      Comments: Sedated, Follows simple commands - open eyes,  Does not attempt to answer questions     Psychiatric:         Behavior: Behavior normal.      Comments: Unable to assess judgement and content         Review of Symptoms    Symptom Assessment (ESAS 0-10 Scale)  Pain:  0  Dyspnea:  0  Anxiety:  0  Nausea:  0  Depression:   0  Anorexia:  0  Fatigue:  0  Insomnia:  0  Restlessness:  0  Agitation:  0         Comments:  Patient is intubated unable to complete ESAS, appears comfortable no facial grimacing or moaning           Performance Status:  20    Living Arrangements:  Lives with family    Psychosocial/Cultural: ,  30 plus years,  Three adult sons, several grandchildren.  Retired from [employer].   Continued to work [employer]. Very active in community - loved caring for her community and her family.      Spiritual:  F - Briana and Belief:  Hindu  A - Address in Care:  Amenable to  visits       Advance Care Planning   Advance Directives:   Living Will: No        Oral Declaration: No    LaPOST: No    Do Not Resuscitate Status: No    Medical Power of : No (next of kin are three adult sons )      Decision Making:  Family answered questions and Patient unable to communicate due to disease severity/cognitive impairment         Significant Labs: All pertinent labs within the past 24 hours have been reviewed.  CBC:   Recent Labs   Lab 11/04/20  0339   WBC 10.02   HGB 7.1*   HCT 24.6*   *   *     BMP:  No results for input(s): GLU, NA, K, CL, CO2, BUN, CREATININE, CALCIUM, MG in the last 24 hours.  LFT:  Lab Results   Component Value Date    AST 49 (H) 11/03/2020    ALKPHOS 327 (H) 11/03/2020    BILITOT 0.4 11/03/2020     Albumin:   Albumin   Date Value Ref Range Status   11/04/2020 1.5 (L) 3.5 - 5.2 g/dL Final     Protein:   Total Protein   Date Value Ref Range Status   11/03/2020 5.0 (L) 6.0 - 8.4 g/dL Final     Lactic acid:   Lab Results   Component Value Date    LACTATE 3.7 (HH) 09/18/2020       Significant Imaging: I have reviewed all pertinent imaging results/findings within the past 24 hours.

## 2020-11-06 NOTE — PLAN OF CARE
"      SICU PLAN OF CARE NOTE    Dx: Acute respiratory failure with hypoxia    Goals of Care: MAP >60    Neuro: AAO x4, Follows Commands and Confused    Vital Signs: BP (!) 90/54 (BP Location: Right arm, Patient Position: Lying)   Pulse 109   Temp 99.1 °F (37.3 °C) (Core Bladder)   Resp 18   Ht 5' 5" (1.651 m)   Wt 83.5 kg (184 lb)   SpO2 100%   Breastfeeding No   BMI 30.62 kg/m²     Respiratory: Nasal Cannula with humidification    Diet: NPO    Gtts: Amiodarone    Urine Output: Urinary Catheter 1300 for shift    Drains: NG tube, Chest tube    Labs/Accuchecks: ACCU check Q6.    Skin: Pt. Turned and encouraged to turn independently Q2 hours. Foam on sacrum. Wound care consulted and came to bedside. No new skin breakdown on shift.        " Enoxaparin/Lovenox - Compliance/Enoxaparin/Lovenox - Dietary Advice/Enoxaparin/Lovenox - Follow up monitoring/Enoxaparin/Lovenox - Potential for adverse drug reactions and interactions

## 2020-11-09 LAB — FUNGUS SPEC CULT: NORMAL

## 2020-12-07 LAB
ACID FAST MOD KINY STN SPEC: NORMAL
MYCOBACTERIUM SPEC QL CULT: NORMAL

## 2021-06-15 NOTE — ASSESSMENT & PLAN NOTE
Fatou Lorenzo is a 75 y.o. female s/p MVr, TVr 9/9/2020. Case noteworthy for multiple pump runs (3) and RV hematoma due to retraction.     Neuro:  - Sedation: propofol - daily sedation vacation  - fentanyl for pain     CV:  S/p MVr, TVr 9/9/20. Case noteworthy for pump run x3, RV hematoma 2/2 retraction  - Intrinsic rate in the 70s but with occasional PAC/PVCs.   - Will try to wean off epi as possibly contributing to arrhythmia. Will titrate vaso appropriately  - Decrease milrinone to 0.125  - MAP goal 60-80, SBP <140  - Chest tubes (mediastinal x2) to suction     Pulm:  Will continue mechanical ventilation until more hemodynamically stable  Adjusting tidal volume, RR, and FiO2 appropriately with RT  Daily CXR  ABG q6 hrs     FEN/GI:  - Net positive 1.5 L in the last 24 hrs, 7.5 L overall  - replacing lytes as needed  - NPO  - famotidine  - miralax     Renal:  - Yoo in place for strict I/O  - Still with low UOP  - Start lasix 40 IV BID (is on lasix at home)  - BUN/Cr 22/1.7, will continue to monitor     Heme/Onc:  - H/H and coags stabilized  - Continue to trend  - No indication for anticoagulation  - Transition to q6 hr labs     ID:  - Afebrile with mild elevation in WBC  - periop antibiotics complete  - Will continue to monitor for now     Endo:  - Insulin gtt  - Endocrine consulted, appreciate recs     PPx:  - famotidine, SCDs     Dispo: continue ICU care   Patient reports a family history of hemochromatosis.  She states that she had genetic testing done which showed that she also has the gene for hemochromatosis.  She has not been diagnosed with this before nor has difficulty with iron levels in the past.  Her most recent iron level was approximately 1 year ago, she had did not get her blood work at [facility].

## 2021-07-28 NOTE — PLAN OF CARE
SW sent referral to [facility]. Per steven, patient likely to need LTAC placement due to chest tubes.     Esthela Peralta LMSW   - Case Management       10-May-2021

## 2022-01-06 NOTE — PROGRESS NOTES
[facility]  Infectious Disease  Progress Note    Patient Name: Fatou Lorenzo  MRN: [id number]  Admission Date: 8/30/2020  Length of Stay: 39 days  Attending Physician: Antoni Waddell MD  Primary Care Provider: Ludin Rivas MD    Isolation Status: No active isolations  Assessment/Plan:      Pleural effusion  See below    Leukocytosis  Pt is a 75 y.o. female w/ pmhx of Atrial fibrillation with RVR, HLD and Severe mitral regurgitation.  Pt w/ recent left heart catheterization 8/25/2020 who presented w/ complaints SOB.  Transesophageal echocardiogram done recently w/ left atrial thrombus.  L heart angiography w/ nonobstructive CAD.       [place] [facility] [place] and underwent MV repair and TV annuloplasty on 9/9. Pt course c/b reintuabtion and pericardial window for evacuation of posterior cardiac tamponade on 9/18. Developed increasing leukocytosis on 9/28.    Urine cxs positive for E.Cloacae.  Pt started on Bactrim.  Chest Xray from 10/1- Since September 28, 2020, increased large right pleural effusion.  Increased diffuse right airspace opacities.  X ray abdomen 9/23 w/ Probable bilateral nephrolithiasis.  ID consulted for positive urine culture w/ E. Cloacae.      Pt underwent IR thoracentesis- not sent for cx and counts not sent.  Per report innumerable loculations. Pt s/p right VATS, drainage of loculated effusion, limited decortication on 10/5 by CTS.   CXs taken w/ E. Coli growing. Also underwent R vocal  Fold injection.  Leukocytosis down trended. Repeat CXR with some clearing of the right pleural effusion is suggested.   BAL cxs w/ yeast-likely colonizer    Plan  -Continue on Ceftriaxone 2 g q24h.    -Pt E. Cloacae uti likely with adequate treatment given has received 7 days of Cefepime therapy.   - Recommend 4 weeks of abx from VATS.  Recommend  IV Ceftriaxone while inpt.  If discharged prior to completion of therapy would switch to PO Augmentin 875 BID for  Patient understands condition, prognosis and need for follow up care. remainder of therapy.  (Est end date 11/2/20)  - ID f/u before completion of therapy in 2-4 weeks  - Pt abx regimen and duration discussed with SICU and ID staff   - ID will sign off. Please re-consult with any new infectious concerns.      Thank you for your consult. I will sign off. Please contact us if you have any additional questions.    Vance Singh PA-C  Infectious Disease  [facility]    Subjective:     Principal Problem:Acute respiratory failure with hypoxia    HPI: Pt is a 75 y.o. female w/ pmhx of Atrial fibrillation with RVR, HLD and Severe mitral regurgitation.  Pt w/ recent left heart catheterization 8/25/2020 who presented w/ complaints of recurrent shortness of breath.  Transesophageal echocardiogram done recently w/ left atrial thrombus.  L heart angiography w/ nonobstructive CAD.   Upon admission, pt required BiPAP due to hypoxemia not responsive to nasal cannula.    Pt transferred to [facility] [place] evaluation given recurrent admission despite compliance, BP control and diuresis.  Pt seen by Dr. Waddell who recommended MV repair.  Pt underwent MV repair and TV annuloplasty on 9/9. Pt course c/b reintuabtion and pericardial window for evacuation of posterior cardiac tamponade on 9/18.  Pt extubated on 9/24.  Developed increasing white count on 9/28.  UA done concerning for infection.  Urine cxs positive for E.Cloacae.  Pt started on Bactrim.  Chest Xray from 10/1- Since September 28, 2020, increased large right pleural effusion.  Increased diffuse right airspace opacities.  X ray abdomen 9/23 w/ Probable bilateral nephrolithiasis.    ID consulted for positive urine culture.      Interval History: Pt afebrile, white count decreasing.  Switched to Ceftriaxone yesterday.  Currently extubated.  Off of pressors.  Bal cxs w/ yeast    Review of Systems   Unable to perform ROS: Acuity of condition     Objective:     Vital Signs (Most Recent):  Temp: 98.8 °F (37.1 °C)  (10/09/20 0500)  Pulse: 79 (10/09/20 0756)  Resp: (!) 23 (10/09/20 0811)  BP: (!) 119/55 (10/09/20 0500)  SpO2: 96 % (10/09/20 0756) Vital Signs (24h Range):  Temp:  [96.6 °F (35.9 °C)-99.7 °F (37.6 °C)] 98.8 °F (37.1 °C)  Pulse:  [65-93] 79  Resp:  [12-26] 23  SpO2:  [95 %-100 %] 96 %  BP: (101-132)/(50-84) 119/55  Arterial Line BP: (106-184)/(36-68) 147/50     Weight: 85 kg (187 lb 6.3 oz)  Body mass index is 31.18 kg/m².    Estimated Creatinine Clearance: 52.3 mL/min (based on SCr of 1 mg/dL).    Physical Exam  Constitutional:       Appearance: She is well-developed. She is ill-appearing.   HENT:      Nose: Nose normal.   Eyes:      General: No scleral icterus.  Neck:      Musculoskeletal: Normal range of motion.   Cardiovascular:      Rate and Rhythm: Normal rate and regular rhythm.      Heart sounds: Murmur present.   Pulmonary:      Effort: Pulmonary effort is normal.      Breath sounds: Normal breath sounds. No wheezing or rhonchi.      Comments: Extubated on comfort flow  Chest tubes removed  Abdominal:      General: There is no distension.      Palpations: Abdomen is soft. There is no mass.      Tenderness: There is no abdominal tenderness.   Musculoskeletal: Normal range of motion.         General: No swelling or tenderness.      Right lower leg: No edema.      Left lower leg: No edema.   Skin:     General: Skin is warm and dry.      Findings: No bruising, erythema or lesion.      Comments: Sternal Incision CDI   Neurological:      Mental Status: She is alert and oriented to person, place, and time. Mental status is at baseline.         Significant Labs: All pertinent labs within the past 24 hours have been reviewed.    Significant Imaging: I have reviewed all pertinent imaging results/findings within the past 24 hours.

## 2022-12-29 NOTE — ASSESSMENT & PLAN NOTE
75 y.o. female w/ history of Atrial fibrillation with RVR, HLD and severe mitral regurgitation who is s/p MV repair and TV annuloplasty on 9/9, complicated by posterior cardiac tamponade s/p pericardial window on 9/18, UTI (E cloacae, tx X 7 days with cefepime), and large right pleural effusion with multiple loculations s/p thoracentesis ( no cx data) and VATS with limited decortication on 10/5 with cx + for E Coli, s/p BAL 10/7 (candida lusitanae thought to be colonizer), who was last seen by ID on 10/9 who recommended 4 weeks of antibiotics post VATS (IV ceftriaxone while inpatient). Right pleural effusion improved on imaging and leukocytosis resolved.    ID  reconsulted for re-current leukocytosis (WBC 11>>34).      Transferred to ICU 10/14.  Afebrile, WBC now trending down ( 34>>24>>21).   Blood cultures NGTD.  Hemodynamically stable.  Appears more comfortable today and more alert.  O2 sats good on RA, but still tachypneic.  CXR 10/14 with moderate right, mild left pleural effusions with bibasilar atelectasis or consolidation, similar to prior study.  Diffuse bilateral interstitial prominence slightly increasedNo diarrhea, vivas exchanged yesterday, U/A negative.  2D negative.      Currently on vancomycin, cefepime, fluconazole.  Failed swallow study, plan for PEG.     Plan/recommendations  1.  Continue empiric IV vancomycin (pharmacy to dose, trough goal 15-20), and cefepime, and fluconazole for now.  Renal dosing.  2.  Will continue to follow.     Data reviewed and plan discussed with ID staff     [facility], Division of Infectious Diseases  MARINA Mccray Y. Patel, S. Shah, G. [facility]  [phone]    Name: DOROTHY VOSS  Age: 60y  Gender: Male  MRN: [id number]    Interval History:  Patient seen and examined at bedside in SPCU  Afebrile  Notes reviewed    Antibiotics:  piperacillin/tazobactam IVPB.. 3.375 Gram(s) IV Intermittent every 12 hours      Medications:  albuterol    90 MICROgram(s) HFA Inhaler 2 Puff(s) Inhalation every 6 hours  albuterol/ipratropium for Nebulization. 3 milliLiter(s) Nebulizer once  aspirin  chewable 81 milliGRAM(s) Oral daily  chlorhexidine 0.12% Liquid 15 milliLiter(s) Oral Mucosa every 12 hours  dextrose 5%. 1000 milliLiter(s) IV Continuous <Continuous>  dextrose 50% Injectable 25 Gram(s) IV Push once  dextrose 50% Injectable 12.5 Gram(s) IV Push once  dextrose 50% Injectable 25 Gram(s) IV Push once  dextrose Oral Gel 15 Gram(s) Oral once PRN  fentaNYL    Injectable 100 MICROGram(s) IV Push every 4 hours PRN  glucagon  Injectable 1 milliGRAM(s) IntraMuscular once  heparin   Injectable 5000 Unit(s) SubCutaneous every 12 hours  insulin lispro (ADMELOG) corrective regimen sliding scale   SubCutaneous every 6 hours  ipratropium 17 MICROgram(s) HFA Inhaler 1 Puff(s) Inhalation every 6 hours  lactulose Syrup 20 Gram(s) Oral two times a day  mupirocin 2% Nasal 1 Application(s) Both Nostrils two times a day  pantoprazole  Injectable 40 milliGRAM(s) IV Push daily  piperacillin/tazobactam IVPB.. 3.375 Gram(s) IV Intermittent every 12 hours  sodium chloride 0.45%. 1000 milliLiter(s) IV Continuous <Continuous>      Review of Systems:  unable to obtain  Allergies: No Known Allergies    For details regarding the patient's past medical history, social history, family history, and other miscellaneous elements, please refer the initial infectious diseases consultation and/or the admitting history and physical examination for this admission.    Objective:  Vital Signs Last 24 Hrs  T(C): 36.1 (29 Dec 2022 08:30), Max: 36.6 (29 Dec 2022 04:00)  T(F): 97 (29 Dec 2022 08:30), Max: 97.9 (29 Dec 2022 04:00)  HR: 82 (29 Dec 2022 12:00) (78 - 89)  BP: 101/70 (29 Dec 2022 12:00) (88/68 - 121/80)  BP(mean): 80 (29 Dec 2022 12:00) (75 - 103)  RR: 25 (29 Dec 2022 12:00) (13 - 25)  SpO2: 98% (29 Dec 2022 12:00) (97% - 99%)    Parameters below as of 29 Dec 2022 09:05  Patient On (Oxygen Delivery Method): ventilator,35    Mode: CPAP with PS  FiO2: 35  PEEP: 5  PS: 10  ITime: 1  MAP: 11  PIP: 25      Physical Examination:  General:  intubated  HEENT: NC/AT, ETT in place  Lungs: MV breath sounds  Chest: L CT in place  Heart: RRR  Abdomen: Soft.   Extremities: No cyanosis or clubbing. No edema.       Laboratory Studies:                        15.5   11.96 )-----------( 116      ( 29 Dec 2022 12:29 )             47.6   12-28    144  |  108  |  127<H>  ----------------------------<  179<H>  4.5   |  27  |  4.04<H>    Ca    8.9      28 Dec 2022 06:40    TPro  6.1  /  Alb  2.4<L>  /  TBili  1.5<H>  /  DBili  x   /  AST  117<H>  /  ALT  735<H>  /  AlkPhos  326<H>  12-28        Microbiology: reviewed    Culture - Sputum (collected 12-24-22 @ 17:43)  Source: ET Tube ET Tube  Gram Stain (12-25-22 @ 07:58):    Numerous polymorphonuclear leukocytes per low power field    No Squamous epithelial cells per low power field    Rare Gram Positive Rods seen per oil power field  Final Report (12-26-22 @ 16:49):    Rare Pseudomonas aeruginosa    Normal Respiratory Cathy absent  Organism: Pseudomonas aeruginosa (12-26-22 @ 16:49)  Organism: Pseudomonas aeruginosa (12-26-22 @ 16:49)      -  Amikacin: S <=16      -  Aztreonam: S 8      -  Cefepime: S <=2      -  Ceftazidime: S 4      -  Ciprofloxacin: S <=0.25      -  Gentamicin: S 4      -  Imipenem: S <=1      -  Levofloxacin: S <=0.5      -  Meropenem: S <=1      -  Piperacillin/Tazobactam: S <=8      -  Tobramycin: S <=2      Method Type: BAY    Culture - Blood (collected 12-23-22 @ 14:06)  Source: .Blood Blood-Peripheral  Preliminary Report (12-24-22 @ 19:01):    No growth to date.    Culture - Urine (collected 12-23-22 @ 11:16)  Source: Clean Catch Clean Catch (Midstream)  Final Report (12-26-22 @ 07:02):    >100,000 CFU/ml Klebsiella oxytoca/Raoultella ornithinolytica  Organism: Klebsiella oxytoca /Raoutella ornithinolytica (12-26-22 @ 07:02)  Organism: Klebsiella oxytoca /Raoutella ornithinolytica (12-26-22 @ 07:02)      -  Amikacin: S <=16      -  Amoxicillin/Clavulanic Acid: S <=8/4      -  Ampicillin: R 16 These ampicillin results predict results for amoxicillin      -  Ampicillin/Sulbactam: S <=4/2 Enterobacter, Klebsiella aerogenes, Citrobacter, and Serratia may develop resistance during prolonged therapy (3-4 days)      -  Aztreonam: S <=4      -  Cefazolin: S <=2      -  Cefepime: S <=2      -  Cefoxitin: S <=8      -  Ceftriaxone: S <=1 Enterobacter, Klebsiella aerogenes, Citrobacter, and Serratia may develop resistance during prolonged therapy      -  Ciprofloxacin: S <=0.25      -  Ertapenem: S <=0.5      -  Gentamicin: S <=2      -  Imipenem: S <=1      -  Levofloxacin: S <=0.5      -  Meropenem: S <=1      -  Nitrofurantoin: S <=32 Should not be used to treat pyelonephritis      -  Piperacillin/Tazobactam: S <=8      -  Tobramycin: S <=2      -  Trimethoprim/Sulfamethoxazole: S <=0.5/9.5      Method Type: BAY    Culture - Blood (collected 12-23-22 @ 10:17)  Source: .Blood Blood-Peripheral  Preliminary Report (12-24-22 @ 18:01):    No growth to date.          Radiology: reviewed

## 2023-08-22 NOTE — PLAN OF CARE
SW sent update to [facility] regarding patient's progress as they has previously accepted patient and patient is now extubated and progressing.     SW will continue to coordinate with patient, family, team and insurance to complete patient's discharge plan.    UPDATE 4:13 PM    IZZY notified by [facility] that they have submitted for auth in the case that the patient needs LTAC placement still.      10/09/20 1301   Post-Acute Status   Post-Acute Authorization Placement   Post-Acute Placement Status Pending Post-Acute Medical Review   Discharge Plan   Discharge Plan A Rehab     Esthela Peralta LMSW   - Case Management     3 = A little assistance

## 2024-09-12 NOTE — ASSESSMENT & PLAN NOTE
Call went to [facility]  with no identification; RN left  to call the office with a clarification of which pharmacy Rx should be sent to.   Fatou Lorenzo is a 75 y.o. female s/p MVr, TVr 9/9/2020. Case noteworthy for multiple pump runs (3) and RV hematoma due to retraction. Unstable 9/18, required pericardial window for evac of posterior cardiac tamponade. Respiratory distress and so re-admitted to SICU on 10/2 now s/p VATS on 10/5. Readmitted on 10/27/20 for respiratory failure.      Neuro:  - Intubated and Sedated; propofol    - PRN liquid tylenol and meds through g tube   - dilaudid prn  - rousable and responds appropriately       CV:  S/p MVr, TVr, MAZE 9/9/20. S/p bedside pericardial window 9/18  - Keep MAPs >60. HDS off pressors   - Holding metop for now - labetalol IV PRN for SBP > 160  - midodrine 5 mg TID      Pulm:   - Loculated pleural effusion s/p VATS on 10/5  - Intubated 10/27/20  - Scheduled duo and saline nebs  - CXR and ABG PRN  - HOB >30 deg  - Finished one month of antibiotics   - Daily CXR  - minimal vent settings       Renal:  - Continue to monitor UOP - minimal   - Nephrology following   - Continue CRRT         FEN/GI:  - Do not replace lytes   - Tf through g tube @50 cc/hr  - FW flushes to 400 Q8    - Famotidine and bowel regimen      Heme/Onc:  - H/H stable  - No evidence of bleeding  - TBD re-start of heparin gtt      ID:   - WBC 11.7  - Afebrile  - S/p 7 days of cefepime vanc for enterococcus cloacae UTI. Also has e coli growing from pleural cultures.     - Started on Cefepime fluconazole re-started 10/14/20. Candida on BAL     - Finished 1 month treatment of cefepime   - BC: No growth to date      Endo:  - no hx of DM  - ISS     PPx:  - famotidine, SCD,      Dispo:   Comfort care, palliative care will get involved and manage the sedation and withdrawal process.
